# Patient Record
Sex: MALE | Race: WHITE | NOT HISPANIC OR LATINO | URBAN - METROPOLITAN AREA
[De-identification: names, ages, dates, MRNs, and addresses within clinical notes are randomized per-mention and may not be internally consistent; named-entity substitution may affect disease eponyms.]

---

## 2017-01-29 ENCOUNTER — EMERGENCY (EMERGENCY)
Facility: HOSPITAL | Age: 57
LOS: 1 days | Discharge: HOME | End: 2017-01-29
Attending: EMERGENCY MEDICINE | Admitting: EMERGENCY MEDICINE
Payer: COMMERCIAL

## 2017-01-29 VITALS
SYSTOLIC BLOOD PRESSURE: 124 MMHG | OXYGEN SATURATION: 95 % | HEART RATE: 94 BPM | DIASTOLIC BLOOD PRESSURE: 72 MMHG | TEMPERATURE: 98 F | RESPIRATION RATE: 17 BRPM

## 2017-01-29 VITALS
DIASTOLIC BLOOD PRESSURE: 60 MMHG | RESPIRATION RATE: 18 BRPM | OXYGEN SATURATION: 96 % | SYSTOLIC BLOOD PRESSURE: 109 MMHG | HEART RATE: 106 BPM | TEMPERATURE: 98 F

## 2017-01-29 DIAGNOSIS — Z88.0 ALLERGY STATUS TO PENICILLIN: ICD-10-CM

## 2017-01-29 DIAGNOSIS — Z88.5 ALLERGY STATUS TO NARCOTIC AGENT: ICD-10-CM

## 2017-01-29 DIAGNOSIS — R55 SYNCOPE AND COLLAPSE: ICD-10-CM

## 2017-01-29 DIAGNOSIS — Z88.6 ALLERGY STATUS TO ANALGESIC AGENT: ICD-10-CM

## 2017-01-29 DIAGNOSIS — F17.200 NICOTINE DEPENDENCE, UNSPECIFIED, UNCOMPLICATED: ICD-10-CM

## 2017-01-29 DIAGNOSIS — I15.9 SECONDARY HYPERTENSION, UNSPECIFIED: ICD-10-CM

## 2017-01-29 DIAGNOSIS — R07.89 OTHER CHEST PAIN: ICD-10-CM

## 2017-01-29 DIAGNOSIS — Z95.810 PRESENCE OF AUTOMATIC (IMPLANTABLE) CARDIAC DEFIBRILLATOR: Chronic | ICD-10-CM

## 2017-01-29 DIAGNOSIS — Z88.8 ALLERGY STATUS TO OTHER DRUGS, MEDICAMENTS AND BIOLOGICAL SUBSTANCES STATUS: ICD-10-CM

## 2017-01-29 LAB
ALBUMIN SERPL ELPH-MCNC: 3.4 G/DL — SIGNIFICANT CHANGE UP (ref 3.4–5)
ALP SERPL-CCNC: 181 U/L — HIGH (ref 40–120)
ALT FLD-CCNC: 52 U/L — HIGH (ref 12–42)
ANION GAP SERPL CALC-SCNC: 8 MMOL/L — LOW (ref 9–16)
APTT BLD: 39.2 SEC — HIGH (ref 27.5–37.4)
AST SERPL-CCNC: 72 U/L — HIGH (ref 15–37)
BASOPHILS NFR BLD AUTO: 0.2 % — SIGNIFICANT CHANGE UP (ref 0–2)
BILIRUB SERPL-MCNC: 1.6 MG/DL — HIGH (ref 0.2–1.2)
BUN SERPL-MCNC: 30 MG/DL — HIGH (ref 7–23)
CALCIUM SERPL-MCNC: 8.9 MG/DL — SIGNIFICANT CHANGE UP (ref 8.5–10.5)
CHLORIDE SERPL-SCNC: 100 MMOL/L — SIGNIFICANT CHANGE UP (ref 96–108)
CK MB CFR SERPL CALC: 1.9 NG/ML — SIGNIFICANT CHANGE UP (ref 0.5–3.6)
CK SERPL-CCNC: 119 U/L — SIGNIFICANT CHANGE UP (ref 39–308)
CO2 SERPL-SCNC: 28 MMOL/L — SIGNIFICANT CHANGE UP (ref 22–31)
CREAT SERPL-MCNC: 0.98 MG/DL — SIGNIFICANT CHANGE UP (ref 0.5–1.3)
EOSINOPHIL NFR BLD AUTO: 1.2 % — SIGNIFICANT CHANGE UP (ref 0–6)
GLUCOSE SERPL-MCNC: 87 MG/DL — SIGNIFICANT CHANGE UP (ref 70–99)
HCT VFR BLD CALC: 54.6 % — HIGH (ref 39–50)
HGB BLD-MCNC: 18.2 G/DL — HIGH (ref 13–17)
INR BLD: 1.49 — HIGH (ref 0.88–1.16)
LYMPHOCYTES # BLD AUTO: 17.1 % — SIGNIFICANT CHANGE UP (ref 13–44)
MCHC RBC-ENTMCNC: 28 PG — SIGNIFICANT CHANGE UP (ref 27–34)
MCHC RBC-ENTMCNC: 33.3 G/DL — SIGNIFICANT CHANGE UP (ref 32–36)
MCV RBC AUTO: 84.1 FL — SIGNIFICANT CHANGE UP (ref 80–100)
MONOCYTES NFR BLD AUTO: 15.5 % — HIGH (ref 2–14)
NEUTROPHILS NFR BLD AUTO: 66 % — SIGNIFICANT CHANGE UP (ref 43–77)
PLATELET # BLD AUTO: 224 K/UL — SIGNIFICANT CHANGE UP (ref 150–400)
POTASSIUM SERPL-MCNC: 4.4 MMOL/L — SIGNIFICANT CHANGE UP (ref 3.5–5.3)
POTASSIUM SERPL-SCNC: 4.4 MMOL/L — SIGNIFICANT CHANGE UP (ref 3.5–5.3)
PROT SERPL-MCNC: 7.8 G/DL — SIGNIFICANT CHANGE UP (ref 6.4–8.2)
PROTHROM AB SERPL-ACNC: 16.6 SEC — HIGH (ref 10–13.1)
RBC # BLD: 6.49 M/UL — HIGH (ref 4.2–5.8)
RBC # FLD: 14.6 % — SIGNIFICANT CHANGE UP (ref 10.3–16.9)
SODIUM SERPL-SCNC: 136 MMOL/L — SIGNIFICANT CHANGE UP (ref 135–145)
TROPONIN I SERPL-MCNC: <0.015 NG/ML — SIGNIFICANT CHANGE UP (ref 0.01–0.04)
WBC # BLD: 10.1 K/UL — SIGNIFICANT CHANGE UP (ref 3.8–10.5)
WBC # FLD AUTO: 10.1 K/UL — SIGNIFICANT CHANGE UP (ref 3.8–10.5)

## 2017-01-29 PROCEDURE — 85610 PROTHROMBIN TIME: CPT

## 2017-01-29 PROCEDURE — 84484 ASSAY OF TROPONIN QUANT: CPT

## 2017-01-29 PROCEDURE — 82550 ASSAY OF CK (CPK): CPT

## 2017-01-29 PROCEDURE — 93005 ELECTROCARDIOGRAM TRACING: CPT

## 2017-01-29 PROCEDURE — 80053 COMPREHEN METABOLIC PANEL: CPT

## 2017-01-29 PROCEDURE — 71046 X-RAY EXAM CHEST 2 VIEWS: CPT

## 2017-01-29 PROCEDURE — 71020: CPT | Mod: 26

## 2017-01-29 PROCEDURE — 99284 EMERGENCY DEPT VISIT MOD MDM: CPT | Mod: 25

## 2017-01-29 PROCEDURE — 85730 THROMBOPLASTIN TIME PARTIAL: CPT

## 2017-01-29 PROCEDURE — 99285 EMERGENCY DEPT VISIT HI MDM: CPT | Mod: 25

## 2017-01-29 PROCEDURE — 93010 ELECTROCARDIOGRAM REPORT: CPT

## 2017-01-29 PROCEDURE — 82553 CREATINE MB FRACTION: CPT

## 2017-01-29 PROCEDURE — 85025 COMPLETE CBC W/AUTO DIFF WBC: CPT

## 2017-01-29 PROCEDURE — 80162 ASSAY OF DIGOXIN TOTAL: CPT

## 2017-01-29 NOTE — ED PROVIDER NOTE - NEURO NEGATIVE STATEMENT, MLM
no HA or focal numbness or weakness but does have chronic lower extrem neuropathy d/t his spine issues for which he uses rolling walker to ambulate

## 2017-01-29 NOTE — ED ADULT NURSE NOTE - OBJECTIVE STATEMENT
Pt BIBA p/w 2 syncopal episodes as per ems. Pt currently endorsing dizziness and states he has a history of Afib. Pt BIBA p/w 2 syncopal episodes as per ems. Pt currently endorsing dizziness and states he has a history of Afib. Pt is also falling asleep during initial nursing assessment.

## 2017-01-29 NOTE — ED PROVIDER NOTE - DIAGNOSTIC INTERPRETATION
CXR: borderline cardiomegaly, ICD right chest with single lead in place, clear lungs, no bony abnormalities

## 2017-01-29 NOTE — ED PROVIDER NOTE - CONSTITUTIONAL, MLM
normal... Nontoxic appearing, sedated but rouses to repeated vocal commands, answers questions appropriately

## 2017-01-29 NOTE — ED ADULT NURSE NOTE - CHIEF COMPLAINT QUOTE
patient states that he had 2 syncopal episodes this morning he states he was walking, felt dizzy, and then sat down woke up and a few minutes later the same thing happened. hx of a-fib states that he was treated at [facility] yesterday for rapid afib. denies dizziness, chest pain, sob at rest.

## 2017-01-29 NOTE — ED ADULT NURSE NOTE - CHPI ED SYMPTOMS NEG
no cough/no nausea/no shortness of breath/no chills/no vomiting/no diaphoresis/no fever/no back pain

## 2017-01-29 NOTE — ED PROVIDER NOTE - PMH
Atrial fibrillation, unspecified type    Carcinoma of kidney, unspecified laterality    Chronic congestive heart failure, unspecified congestive heart failure type    Hep C w/o coma, chronic    Secondary hypertension

## 2017-01-29 NOTE — ED PROVIDER NOTE - OBJECTIVE STATEMENT
56yom with h/o renal cell CA s/p partial nephrectomy, afib s/p AICD on digoxin, Eliquis and metroprolol, CHF, chronic back pain for which he takes methadone prn p/w 2 episodes of syncope this morning prior to arrival. He reports he was walking and felt lightheaded, sat down and lost consciousness but because he was seated did not sustain any injuries. He reports after the second episode he felt some transient left sided CP. Of note pt was sedated and falling asleep during the interview and slept through his entire ED stay.

## 2017-01-29 NOTE — ED ADULT TRIAGE NOTE - CHIEF COMPLAINT QUOTE
patient states that he had 2 syncopal episodes this morning he states he was walking, felt dizzy, and then sat down woke up and a few minutes later the same thing happened. hx of a-fib states that he was treated at [facility] yesterday for rapid afib. denies dizziness, chest pain, sob at rest.

## 2017-02-03 NOTE — ED PROVIDER NOTE - MEDICAL DECISION MAKING DETAILS
Before Your Surgery      Call your surgeon if there is any change in your health. This includes signs of a cold or flu (such as a sore throat, runny nose, cough, rash or fever).    Do not smoke, drink alcohol or take over the counter medicine (unless your surgeon or primary care doctor tells you to) for the 24 hours before and after surgery.    If you take prescribed drugs: Follow your doctor s orders about which medicines to take and which to stop until after surgery.    Eating and drinking prior to surgery: follow the instructions from your surgeon    Take a shower or bath the night before surgery. Use the soap your surgeon gave you to gently clean your skin. If you do not have soap from your surgeon, use your regular soap. Do not shave or scrub the surgery site.  Wear clean pajamas and have clean sheets on your bed.       RECOMMENDATIONS:                                                      -- Patient is to take no medications on the day of surgery   -- Decrease furosemide to 20 mg daily.   If doing well, would try to decrease dose further  -- Discontinue aspirin 7-10 days prior to procedure to reduce bleeding risk.  It should be resumed post-operatively.  -- Tentative plan to start anticoagulation after surgery, in which case would stop the aspirin   
Pt with h/o afib on digoxin, beta blocker and Eliquis. Pt is not entirely forthcoming w/information but does state he was seen at [facility] 2d ago for similar symptoms and was started on digoxin at that time. I d/w PA in ED at [facility] who reviewed his recent stay there. He was admitted to cardiology, had negative serial cardiac enzymes, EP interrogated his ICD which showed no firing/events and that he has had multiple presentations with same complaints, always has a reassuring cardiac workup. He also has a h/o difficult behavior w/staff, was witnessed to leave the unit with IV in place during this past admission. My suspicion is that he did not experience cardiac syncope (if at all,) and may be over-medicating himself w/his methadone prescription based on his behavior here. His digoxin level is still pending but when I d/w him plan for continued outpatient cardiology f/u for his symptoms incl likely Holter monitor placement given his recurrent episodes of possible syncope - he became upset, stating that "I'm tired of being seen for this and never being told what's going on with me, you're sending me out to die," demanded his PIV be removed and left without receiving any discharge paperwork or copies of his results from today. VSS, ambulating w/rolling walker on discharge

## 2017-03-04 NOTE — ED ADULT NURSE NOTE - NEURO WDL
Problem: Communication  Goal: The ability to communicate needs accurately and effectively will improve  Outcome: PROGRESSING AS EXPECTED  Pt able to communicate needs clearly and effectively.    Problem: Safety  Goal: Will remain free from falls  Outcome: PROGRESSING AS EXPECTED  Pt remains free from falls at this time - bed in lowest position, bed alarm on, treaded socks and appropriate rails in use, call bell within reach. Pt rounded on hourly to address any needs.         Alert and oriented to person, place and time, memory intact, behavior appropriate to situation, PERRL.

## 2017-08-17 ENCOUNTER — EMERGENCY (EMERGENCY)
Facility: HOSPITAL | Age: 57
LOS: 1 days | Discharge: HOME | End: 2017-08-17
Attending: EMERGENCY MEDICINE | Admitting: EMERGENCY MEDICINE
Payer: COMMERCIAL

## 2017-08-17 VITALS
TEMPERATURE: 98 F | DIASTOLIC BLOOD PRESSURE: 87 MMHG | OXYGEN SATURATION: 99 % | HEART RATE: 82 BPM | SYSTOLIC BLOOD PRESSURE: 122 MMHG | RESPIRATION RATE: 18 BRPM

## 2017-08-17 VITALS
RESPIRATION RATE: 18 BRPM | OXYGEN SATURATION: 100 % | WEIGHT: 225.09 LBS | TEMPERATURE: 98 F | HEART RATE: 98 BPM | SYSTOLIC BLOOD PRESSURE: 100 MMHG | DIASTOLIC BLOOD PRESSURE: 82 MMHG

## 2017-08-17 DIAGNOSIS — Z88.0 ALLERGY STATUS TO PENICILLIN: ICD-10-CM

## 2017-08-17 DIAGNOSIS — R06.02 SHORTNESS OF BREATH: ICD-10-CM

## 2017-08-17 DIAGNOSIS — Z88.5 ALLERGY STATUS TO NARCOTIC AGENT: ICD-10-CM

## 2017-08-17 DIAGNOSIS — Z95.810 PRESENCE OF AUTOMATIC (IMPLANTABLE) CARDIAC DEFIBRILLATOR: Chronic | ICD-10-CM

## 2017-08-17 DIAGNOSIS — R05 COUGH: ICD-10-CM

## 2017-08-17 DIAGNOSIS — Z79.891 LONG TERM (CURRENT) USE OF OPIATE ANALGESIC: ICD-10-CM

## 2017-08-17 DIAGNOSIS — Z72.0 TOBACCO USE: ICD-10-CM

## 2017-08-17 DIAGNOSIS — Z79.899 OTHER LONG TERM (CURRENT) DRUG THERAPY: ICD-10-CM

## 2017-08-17 DIAGNOSIS — Z88.8 ALLERGY STATUS TO OTHER DRUGS, MEDICAMENTS AND BIOLOGICAL SUBSTANCES STATUS: ICD-10-CM

## 2017-08-17 LAB
ALBUMIN SERPL ELPH-MCNC: 3.4 G/DL — SIGNIFICANT CHANGE UP (ref 3.3–5)
ALP SERPL-CCNC: 215 U/L — HIGH (ref 40–120)
ALT FLD-CCNC: 44 U/L — SIGNIFICANT CHANGE UP (ref 10–45)
ANION GAP SERPL CALC-SCNC: 12 MMOL/L — SIGNIFICANT CHANGE UP (ref 5–17)
APTT BLD: 42.1 SEC — HIGH (ref 27.5–37.4)
AST SERPL-CCNC: 59 U/L — HIGH (ref 10–40)
BASOPHILS NFR BLD AUTO: 0.1 % — SIGNIFICANT CHANGE UP (ref 0–2)
BILIRUB SERPL-MCNC: 1.1 MG/DL — SIGNIFICANT CHANGE UP (ref 0.2–1.2)
BUN SERPL-MCNC: 33 MG/DL — HIGH (ref 7–23)
CALCIUM SERPL-MCNC: 9.3 MG/DL — SIGNIFICANT CHANGE UP (ref 8.4–10.5)
CHLORIDE SERPL-SCNC: 100 MMOL/L — SIGNIFICANT CHANGE UP (ref 96–108)
CK SERPL-CCNC: 58 U/L — SIGNIFICANT CHANGE UP (ref 30–200)
CO2 SERPL-SCNC: 26 MMOL/L — SIGNIFICANT CHANGE UP (ref 22–31)
CREAT SERPL-MCNC: 1.1 MG/DL — SIGNIFICANT CHANGE UP (ref 0.5–1.3)
EOSINOPHIL NFR BLD AUTO: 1.7 % — SIGNIFICANT CHANGE UP (ref 0–6)
ETHANOL SERPL-MCNC: <10 MG/DL — SIGNIFICANT CHANGE UP (ref 0–10)
GLUCOSE SERPL-MCNC: 123 MG/DL — HIGH (ref 70–99)
HCT VFR BLD CALC: 45.7 % — SIGNIFICANT CHANGE UP (ref 39–50)
HGB BLD-MCNC: 15 G/DL — SIGNIFICANT CHANGE UP (ref 13–17)
INR BLD: 1.55 — HIGH (ref 0.88–1.16)
LYMPHOCYTES # BLD AUTO: 19.9 % — SIGNIFICANT CHANGE UP (ref 13–44)
MCHC RBC-ENTMCNC: 28.4 PG — SIGNIFICANT CHANGE UP (ref 27–34)
MCHC RBC-ENTMCNC: 32.8 G/DL — SIGNIFICANT CHANGE UP (ref 32–36)
MCV RBC AUTO: 86.6 FL — SIGNIFICANT CHANGE UP (ref 80–100)
MONOCYTES NFR BLD AUTO: 10.9 % — SIGNIFICANT CHANGE UP (ref 2–14)
NEUTROPHILS NFR BLD AUTO: 67.4 % — SIGNIFICANT CHANGE UP (ref 43–77)
PCP SPEC-MCNC: SIGNIFICANT CHANGE UP
PLATELET # BLD AUTO: 146 K/UL — LOW (ref 150–400)
POTASSIUM SERPL-MCNC: 4.6 MMOL/L — SIGNIFICANT CHANGE UP (ref 3.5–5.3)
POTASSIUM SERPL-SCNC: 4.6 MMOL/L — SIGNIFICANT CHANGE UP (ref 3.5–5.3)
PROT SERPL-MCNC: 6.4 G/DL — SIGNIFICANT CHANGE UP (ref 6–8.3)
PROTHROM AB SERPL-ACNC: 17.4 SEC — HIGH (ref 9.8–12.7)
RBC # BLD: 5.28 M/UL — SIGNIFICANT CHANGE UP (ref 4.2–5.8)
RBC # FLD: 18.1 % — HIGH (ref 10.3–16.9)
SODIUM SERPL-SCNC: 138 MMOL/L — SIGNIFICANT CHANGE UP (ref 135–145)
WBC # BLD: 10.7 K/UL — HIGH (ref 3.8–10.5)
WBC # FLD AUTO: 10.7 K/UL — HIGH (ref 3.8–10.5)

## 2017-08-17 PROCEDURE — 99284 EMERGENCY DEPT VISIT MOD MDM: CPT | Mod: 25

## 2017-08-17 PROCEDURE — 82550 ASSAY OF CK (CPK): CPT

## 2017-08-17 PROCEDURE — 71045 X-RAY EXAM CHEST 1 VIEW: CPT

## 2017-08-17 PROCEDURE — 36415 COLL VENOUS BLD VENIPUNCTURE: CPT

## 2017-08-17 PROCEDURE — 93005 ELECTROCARDIOGRAM TRACING: CPT

## 2017-08-17 PROCEDURE — 85025 COMPLETE CBC W/AUTO DIFF WBC: CPT

## 2017-08-17 PROCEDURE — 85610 PROTHROMBIN TIME: CPT

## 2017-08-17 PROCEDURE — 71010: CPT | Mod: 26

## 2017-08-17 PROCEDURE — 83880 ASSAY OF NATRIURETIC PEPTIDE: CPT

## 2017-08-17 PROCEDURE — 80053 COMPREHEN METABOLIC PANEL: CPT

## 2017-08-17 PROCEDURE — 80307 DRUG TEST PRSMV CHEM ANLYZR: CPT

## 2017-08-17 PROCEDURE — 93010 ELECTROCARDIOGRAM REPORT: CPT

## 2017-08-17 PROCEDURE — 85730 THROMBOPLASTIN TIME PARTIAL: CPT

## 2017-08-17 PROCEDURE — 99285 EMERGENCY DEPT VISIT HI MDM: CPT | Mod: 25

## 2017-08-17 NOTE — ED ADULT NURSE REASSESSMENT NOTE - NS ED NURSE REASSESS COMMENT FT1
Patient verbalized he is feeling better, ambulated with steady gait, denies discomfort.  MIKE Fermin aware.  Awaiting discharge.

## 2017-08-17 NOTE — ED ADULT TRIAGE NOTE - CHIEF COMPLAINT QUOTE
Patient complaining of cough with brown sputum and leg pain.  Patient is lethargic but arousal.  Patient is on Methadone.

## 2017-08-17 NOTE — ED PROVIDER NOTE - MEDICAL DECISION MAKING DETAILS
c/o cough, leg pain, chronic skin changes, chronic ulcer, no evidence of acute cellulitis, no resp distress, no hypoxia, afebrile.  lethargic, falls asleep during interview  -check labs, ekg, cxr

## 2017-08-17 NOTE — ED PROVIDER NOTE - PROGRESS NOTE DETAILS
vitals stable. pt is sleeping CXR no acute infiltrates CXR no acute infiltrates. Pt still unable to ambulate at this given time CXR no acute infiltrates. pt laying flat, no resp distress. extremely lethargic, quickly falls back asleep, likely klonipin and methadone. will continue to monitor Pt received from Dr. Powell at s/o, pt with c/o sob and cough, w/u neg including cxr, ekg, and labs; sx's thought to be related to recent klonopin and methadone use. Pt sleeping supine in stretcher, comfortable, no resp distress. Will re-assess when awake. Pt reassessed, ambulating to bathroom with steady gait, reports feeling much improved and stable for discharge at this time

## 2017-08-17 NOTE — ED ADULT NURSE NOTE - CHPI ED SYMPTOMS NEG
no chills/no wheezing/no diaphoresis/no edema/no headache/no body aches/no chest pain/no hemoptysis/no cough/no fever

## 2017-08-17 NOTE — ED PROVIDER NOTE - ATTENDING CONTRIBUTION TO CARE
57M hx htn, chf, afib, AICD, hep C, on methadone, BIBEMS c/o cough with brown phlegm.  no fevers. no vomiting. also c/o leg pain. difficult to get hx from pt as he falls asleep during interview.  admits to taking klonipin tonight.   gen- nad  heent- ncat, clear conj  cv -rrr  lungs -ctab  abd - soft, nt, nd  ext -wwp, hyperpigmentation b/l, chronic ulceration to R hallux, no drainage, no fluctuance  neuro -aox3, carmen  no hypoxia, no resp distress, no airway compromise, pt falls alseep during exam and interview, no infiltrate on cxr

## 2017-08-17 NOTE — ED PROVIDER NOTE - CONSTITUTIONAL, MLM
normal... Well appearing, well nourished, in and out of sleep while in conversation  and in no apparent distress.

## 2017-08-17 NOTE — ED ADULT NURSE NOTE - OBJECTIVE STATEMENT
pt. presented with c/o "passing out, dozing off in front of walker" and shortness of breath for "not long". pt. is hard to arouse during the assessment, pt. states he took trazodone, clonazepam and methadone. brown skin discoloration and hardened skin with several small ulcers noted to b/l lower legs, pt. states those developed about 3 weeks ago. at the same time pt. states he was diagnosed with heart failure. pt. is on cardiac monitor, rhythm sinus irregular, O2 sat monitoring continuous, O2 sat is WDL. will monitor.

## 2017-08-17 NOTE — ED PROVIDER NOTE - OBJECTIVE STATEMENT
Pt is seen at bedside falling asleep as he is talking. Pt states he took Klonopin earlier tonight. Pt states he "passed out" in front of his walker twice tonight. Pt states he was experiencing shortness of breath. Pt states he has a wound care doctor at [facility] and he has an appointment this month. Pt denies fever, nausea, vomiting.

## 2017-09-05 ENCOUNTER — EMERGENCY (EMERGENCY)
Facility: HOSPITAL | Age: 57
LOS: 1 days | Discharge: HOME | End: 2017-09-05
Attending: EMERGENCY MEDICINE | Admitting: EMERGENCY MEDICINE
Payer: COMMERCIAL

## 2017-09-05 VITALS
SYSTOLIC BLOOD PRESSURE: 110 MMHG | RESPIRATION RATE: 22 BRPM | HEIGHT: 70 IN | HEART RATE: 94 BPM | TEMPERATURE: 98 F | DIASTOLIC BLOOD PRESSURE: 73 MMHG | OXYGEN SATURATION: 98 % | WEIGHT: 199.96 LBS

## 2017-09-05 VITALS
OXYGEN SATURATION: 99 % | SYSTOLIC BLOOD PRESSURE: 131 MMHG | RESPIRATION RATE: 16 BRPM | DIASTOLIC BLOOD PRESSURE: 92 MMHG | TEMPERATURE: 98 F | HEART RATE: 76 BPM

## 2017-09-05 DIAGNOSIS — Z79.899 OTHER LONG TERM (CURRENT) DRUG THERAPY: ICD-10-CM

## 2017-09-05 DIAGNOSIS — Z88.8 ALLERGY STATUS TO OTHER DRUGS, MEDICAMENTS AND BIOLOGICAL SUBSTANCES: ICD-10-CM

## 2017-09-05 DIAGNOSIS — Z95.810 PRESENCE OF AUTOMATIC (IMPLANTABLE) CARDIAC DEFIBRILLATOR: Chronic | ICD-10-CM

## 2017-09-05 DIAGNOSIS — R09.81 NASAL CONGESTION: ICD-10-CM

## 2017-09-05 DIAGNOSIS — R42 DIZZINESS AND GIDDINESS: ICD-10-CM

## 2017-09-05 DIAGNOSIS — Z88.0 ALLERGY STATUS TO PENICILLIN: ICD-10-CM

## 2017-09-05 DIAGNOSIS — I48.91 UNSPECIFIED ATRIAL FIBRILLATION: ICD-10-CM

## 2017-09-05 DIAGNOSIS — Z88.6 ALLERGY STATUS TO ANALGESIC AGENT: ICD-10-CM

## 2017-09-05 DIAGNOSIS — F17.200 NICOTINE DEPENDENCE, UNSPECIFIED, UNCOMPLICATED: ICD-10-CM

## 2017-09-05 LAB
ALBUMIN SERPL ELPH-MCNC: 3.9 G/DL — SIGNIFICANT CHANGE UP (ref 3.3–5)
ALP SERPL-CCNC: 215 U/L — HIGH (ref 40–120)
ALT FLD-CCNC: 46 U/L — HIGH (ref 10–45)
ANION GAP SERPL CALC-SCNC: 14 MMOL/L — SIGNIFICANT CHANGE UP (ref 5–17)
APPEARANCE UR: CLEAR — SIGNIFICANT CHANGE UP
APTT BLD: 39.9 SEC — HIGH (ref 27.5–37.4)
AST SERPL-CCNC: 62 U/L — HIGH (ref 10–40)
BACTERIA # UR AUTO: PRESENT /HPF
BASOPHILS NFR BLD AUTO: 0.3 % — SIGNIFICANT CHANGE UP (ref 0–2)
BILIRUB SERPL-MCNC: 0.9 MG/DL — SIGNIFICANT CHANGE UP (ref 0.2–1.2)
BILIRUB UR-MCNC: (no result)
BUN SERPL-MCNC: 25 MG/DL — HIGH (ref 7–23)
CALCIUM SERPL-MCNC: 9.5 MG/DL — SIGNIFICANT CHANGE UP (ref 8.4–10.5)
CHLORIDE SERPL-SCNC: 102 MMOL/L — SIGNIFICANT CHANGE UP (ref 96–108)
CK MB CFR SERPL CALC: 5.6 NG/ML — SIGNIFICANT CHANGE UP (ref 0–6.7)
CK SERPL-CCNC: 74 U/L — SIGNIFICANT CHANGE UP (ref 30–200)
CO2 SERPL-SCNC: 24 MMOL/L — SIGNIFICANT CHANGE UP (ref 22–31)
COLOR SPEC: YELLOW — SIGNIFICANT CHANGE UP
CREAT SERPL-MCNC: 1.1 MG/DL — SIGNIFICANT CHANGE UP (ref 0.5–1.3)
DIFF PNL FLD: (no result)
EOSINOPHIL NFR BLD AUTO: 0.8 % — SIGNIFICANT CHANGE UP (ref 0–6)
EPI CELLS # UR: SIGNIFICANT CHANGE UP /HPF
GLUCOSE SERPL-MCNC: 113 MG/DL — HIGH (ref 70–99)
GLUCOSE UR QL: NEGATIVE — SIGNIFICANT CHANGE UP
HCT VFR BLD CALC: 45.3 % — SIGNIFICANT CHANGE UP (ref 39–50)
HGB BLD-MCNC: 14.6 G/DL — SIGNIFICANT CHANGE UP (ref 13–17)
INR BLD: 1.3 — HIGH (ref 0.88–1.16)
KETONES UR-MCNC: NEGATIVE — SIGNIFICANT CHANGE UP
LEUKOCYTE ESTERASE UR-ACNC: NEGATIVE — SIGNIFICANT CHANGE UP
LYMPHOCYTES # BLD AUTO: 20.3 % — SIGNIFICANT CHANGE UP (ref 13–44)
MCHC RBC-ENTMCNC: 28.3 PG — SIGNIFICANT CHANGE UP (ref 27–34)
MCHC RBC-ENTMCNC: 32.2 G/DL — SIGNIFICANT CHANGE UP (ref 32–36)
MCV RBC AUTO: 88 FL — SIGNIFICANT CHANGE UP (ref 80–100)
MONOCYTES NFR BLD AUTO: 16.3 % — HIGH (ref 2–14)
NEUTROPHILS NFR BLD AUTO: 62.3 % — SIGNIFICANT CHANGE UP (ref 43–77)
NITRITE UR-MCNC: NEGATIVE — SIGNIFICANT CHANGE UP
PH UR: 5 — SIGNIFICANT CHANGE UP (ref 5–8)
PLATELET # BLD AUTO: 170 K/UL — SIGNIFICANT CHANGE UP (ref 150–400)
POTASSIUM SERPL-MCNC: 4.2 MMOL/L — SIGNIFICANT CHANGE UP (ref 3.5–5.3)
POTASSIUM SERPL-SCNC: 4.2 MMOL/L — SIGNIFICANT CHANGE UP (ref 3.5–5.3)
PROT SERPL-MCNC: 6.6 G/DL — SIGNIFICANT CHANGE UP (ref 6–8.3)
PROT UR-MCNC: 100 MG/DL
PROTHROM AB SERPL-ACNC: 14.5 SEC — HIGH (ref 9.8–12.7)
RBC # BLD: 5.15 M/UL — SIGNIFICANT CHANGE UP (ref 4.2–5.8)
RBC # FLD: 17.6 % — HIGH (ref 10.3–16.9)
RBC CASTS # UR COMP ASSIST: (no result) /HPF
SODIUM SERPL-SCNC: 140 MMOL/L — SIGNIFICANT CHANGE UP (ref 135–145)
SP GR SPEC: 1.02 — SIGNIFICANT CHANGE UP (ref 1–1.03)
TROPONIN T SERPL-MCNC: <0.01 NG/ML — SIGNIFICANT CHANGE UP (ref 0–0.01)
UROBILINOGEN FLD QL: 1 E.U./DL — SIGNIFICANT CHANGE UP
WBC # BLD: 8.6 K/UL — SIGNIFICANT CHANGE UP (ref 3.8–10.5)
WBC # FLD AUTO: 8.6 K/UL — SIGNIFICANT CHANGE UP (ref 3.8–10.5)
WBC UR QL: < 5 /HPF — SIGNIFICANT CHANGE UP

## 2017-09-05 PROCEDURE — 99204 OFFICE O/P NEW MOD 45 MIN: CPT

## 2017-09-05 PROCEDURE — 71010: CPT | Mod: 26

## 2017-09-05 PROCEDURE — 84484 ASSAY OF TROPONIN QUANT: CPT

## 2017-09-05 PROCEDURE — 93005 ELECTROCARDIOGRAM TRACING: CPT

## 2017-09-05 PROCEDURE — 99284 EMERGENCY DEPT VISIT MOD MDM: CPT | Mod: 25

## 2017-09-05 PROCEDURE — 80053 COMPREHEN METABOLIC PANEL: CPT

## 2017-09-05 PROCEDURE — 80162 ASSAY OF DIGOXIN TOTAL: CPT

## 2017-09-05 PROCEDURE — 93010 ELECTROCARDIOGRAM REPORT: CPT

## 2017-09-05 PROCEDURE — 82550 ASSAY OF CK (CPK): CPT

## 2017-09-05 PROCEDURE — 85730 THROMBOPLASTIN TIME PARTIAL: CPT

## 2017-09-05 PROCEDURE — 96374 THER/PROPH/DIAG INJ IV PUSH: CPT

## 2017-09-05 PROCEDURE — 85610 PROTHROMBIN TIME: CPT

## 2017-09-05 PROCEDURE — 81001 URINALYSIS AUTO W/SCOPE: CPT

## 2017-09-05 PROCEDURE — 85025 COMPLETE CBC W/AUTO DIFF WBC: CPT

## 2017-09-05 PROCEDURE — 87086 URINE CULTURE/COLONY COUNT: CPT

## 2017-09-05 PROCEDURE — 82553 CREATINE MB FRACTION: CPT

## 2017-09-05 PROCEDURE — 71045 X-RAY EXAM CHEST 1 VIEW: CPT

## 2017-09-05 RX ORDER — CLONAZEPAM 1 MG
0.5 TABLET ORAL ONCE
Qty: 0 | Refills: 0 | Status: DISCONTINUED | OUTPATIENT
Start: 2017-09-05 | End: 2017-09-05

## 2017-09-05 RX ORDER — FUROSEMIDE 40 MG
40 TABLET ORAL ONCE
Qty: 0 | Refills: 0 | Status: COMPLETED | OUTPATIENT
Start: 2017-09-05 | End: 2017-09-05

## 2017-09-05 RX ADMIN — Medication 40 MILLIGRAM(S): at 17:47

## 2017-09-05 RX ADMIN — Medication 0.5 MILLIGRAM(S): at 16:09

## 2017-09-05 NOTE — CONSULT NOTE ADULT - SUBJECTIVE AND OBJECTIVE BOX
HPI: Patient is a poor historian and fell asleep a couple of times when questioning him - this is a directed history.  called for device check    Mr. Pedro is a 57 year old male with paroxysmal atrial fibrillation (states cardioversion >10 years ago, on eliquis / notes compliance), kidney carcinoma, on methadone, hepatitis C, HTN and cardiomyopathy s/p ICD (battery change 7/2017 at [facility]), presents to the ER complaining of palpitations and SOB.    Mr. Pedro states that for the past few days he has noted increased palpitations, lightheadedness and SOB.  He states that he became lightheaded and passed out.  During our conversation he fell asleep multiple times.  He had recurrent lightheadedness and came to the ER.  He states he sees a cardiologist through [facility] but does not know the doctors name.  He denies any history of ICD shock, ablation or being on an antiarrhythmic.  He states that he goes in and out of afib and states he had one cardioversion in the past about 10 years ago.   	         PAST MEDICAL & SURGICAL HISTORY:  Carcinoma of kidney, unspecified laterality  Hep C w/o coma, chronic  Secondary hypertension  Chronic congestive heart failure, unspecified congestive heart failure type  Atrial fibrillation, unspecified type  AICD (automatic cardioverter/defibrillator) present          Social History:  + smoking    home medications (as per ED note - patient confirmed Eliquis but was unable to confirm rest):  · 	metoprolol succinate 100 mg oral tablet, extended release: 1 tab(s) orally once a day  · 	digoxin 50 mcg (0.05 mg) oral capsule: 50 microgram(s) orally once a day  · 	Eliquis 5 mg oral tablet: 1 tab(s) orally 2 times a day  · 	Plavix 75 mg oral tablet: 1 tab(s) orally once a day  · 	clonazePAM 0.5 mg oral tablet: 500 microgram(s) orally once a day (at bedtime)  · 	traZODone 50 mg oral tablet: 50 milligram(s) orally once a day (at bedtime)  · 	methadone 10 mg oral tablet: 1 tab(s) orally every 6 hours, As Needed           · Lasix 20 mg oral tablet: 1 tab(s) orally once a day    Inpatient Medications:   clonazePAM Tablet 0.5 milliGRAM(s) Oral Once    Allergies-->  aspirin (Unknown)  codeine (Unknown)  Haldol (Unknown)  morphine (Unknown)  Motrin (Unknown)  penicillin (Unknown)  Toradol (Unknown)      ROS:   CONSTITUTIONAL: No fever, weight loss + fatigue  RESPIRATORY: No cough, wheezing, chills or hemoptysis; No Shortness of Breath  CARDIOVASCULAR: see HPI  GASTROINTESTINAL: Pt denies  PSYCHIATRIC: Pt denies  HEME/LYMPH: Pt denies    PHYSICAL:  T(C): 36.4 (09-05-17 @ 14:16), Max: 36.4 (09-05-17 @ 14:16)  HR: 94 (09-05-17 @ 14:16) (94 - 94)  BP: 110/73 (09-05-17 @ 14:16) (110/73 - 110/73)  RR: 22 (09-05-17 @ 14:16) (22 - 22)  SpO2: 98% (09-05-17 @ 14:16) (98% - 98%)     Appearance: NAD, sleepy  Cardiovascular: irregularly irregular, normal rate  Respiratory: decreased breath sounds bilaterally  Neurologic: A&O x 3, No deficit noted      LABS:                        14.6   8.6   )-----------( 170      ( 05 Sep 2017 15:16 )             45.3       140  |  102  |  25<H>  ----------------------------<  113<H>  4.2   |  24  |  1.10    Ca    9.5      05 Sep 2017 15:16    TPro  6.6  /  Alb  3.9  /  TBili  0.9  /  DBili  x   /  AST  62<H>  /  ALT  46<H>  /  AlkPhos  215<H>         EKG: atrial fibrillation with a ventricular rate of 100bpm    ECHO: pending    Prior EP procedures: as above     Assessment Plan:  57 year old male with paroxysmal atrial fibrillation (states cardioversion >10 years ago, on eliquis / notes compliance), kidney carcinoma, on methadone, hepatitis C, HTN and cardiomyopathy s/p ICD (battery change 7/2017 at [facility]), presents to the ER complaining of palpitations and SOB.  Optivol (heart failure monitor on device) up over the past couple of weeks and he has been in atrial fibrillation since HPI: Patient is a poor historian and fell asleep a couple of times when questioning him - this is a directed history.  called for device check    Mr. Pedro is a 57 year old male with trial fibrillation (states cardioversion >10 years ago, on eliquis / notes compliance), kidney carcinoma, on methadone, hepatitis C, HTN and cardiomyopathy s/p ICD (battery change 7/2017 at [facility]), presents to the ER complaining of palpitations and SOB.    Mr. Pedro states that for the past few days he has noted increased palpitations, lightheadedness and SOB.  He states that he became lightheaded and passed out.  During our conversation he fell asleep multiple times.  He had recurrent lightheadedness and came to the ER.  He states he sees a cardiologist through [facility] but does not know the doctors name.  He denies any history of ICD shock, ablation or being on an antiarrhythmic.  He states that hhad one cardioversion in the past about 10 years ago.   	         PAST MEDICAL & SURGICAL HISTORY:  Carcinoma of kidney, unspecified laterality  Hep C w/o coma, chronic  Secondary hypertension  Chronic congestive heart failure, unspecified congestive heart failure type  Atrial fibrillation, unspecified type  AICD (automatic cardioverter/defibrillator) present          Social History:  + smoking    home medications (as per ED note - patient confirmed Eliquis but was unable to confirm rest):  · 	metoprolol succinate 100 mg oral tablet, extended release: 1 tab(s) orally once a day  · 	digoxin 50 mcg (0.05 mg) oral capsule: 50 microgram(s) orally once a day  · 	Eliquis 5 mg oral tablet: 1 tab(s) orally 2 times a day  · 	Plavix 75 mg oral tablet: 1 tab(s) orally once a day  · 	clonazePAM 0.5 mg oral tablet: 500 microgram(s) orally once a day (at bedtime)  · 	traZODone 50 mg oral tablet: 50 milligram(s) orally once a day (at bedtime)  · 	methadone 10 mg oral tablet: 1 tab(s) orally every 6 hours, As Needed           · Lasix 20 mg oral tablet: 1 tab(s) orally once a day    Inpatient Medications:   clonazePAM Tablet 0.5 milliGRAM(s) Oral Once    Allergies-->  aspirin (Unknown)  codeine (Unknown)  Haldol (Unknown)  morphine (Unknown)  Motrin (Unknown)  penicillin (Unknown)  Toradol (Unknown)      ROS:   CONSTITUTIONAL: No fever, weight loss + fatigue  RESPIRATORY: No cough, wheezing, chills or hemoptysis; No Shortness of Breath  CARDIOVASCULAR: see HPI  GASTROINTESTINAL: Pt denies  PSYCHIATRIC: Pt denies  HEME/LYMPH: Pt denies    PHYSICAL:  T(C): 36.4 (09-05-17 @ 14:16), Max: 36.4 (09-05-17 @ 14:16)  HR: 94 (09-05-17 @ 14:16) (94 - 94)  BP: 110/73 (09-05-17 @ 14:16) (110/73 - 110/73)  RR: 22 (09-05-17 @ 14:16) (22 - 22)  SpO2: 98% (09-05-17 @ 14:16) (98% - 98%)     Appearance: NAD, sleepy  Cardiovascular: irregularly irregular, normal rate  Respiratory: decreased breath sounds bilaterally  Neurologic: A&O x 3, No deficit noted      LABS:                        14.6   8.6   )-----------( 170      ( 05 Sep 2017 15:16 )             45.3       140  |  102  |  25<H>  ----------------------------<  113<H>  4.2   |  24  |  1.10    Ca    9.5      05 Sep 2017 15:16    TPro  6.6  /  Alb  3.9  /  TBili  0.9  /  DBili  x   /  AST  62<H>  /  ALT  46<H>  /  AlkPhos  215<H>         EKG: atrial fibrillation with a ventricular rate of 100bpm    ECHO: pending    Prior EP procedures: as above    [employer] Visia - implanted 9/11/00, generator change 7/14/17  Battery - 11.3 years  VVI 50  Patient not dependant  1.4%  Optivol up third week in July   AFIB 100% of the time  HR   Shock impedance 68  RV impedance 380  threshold 1V@0.5ms  sense 12mV  episodes reviewed consistent with afib with RVR   Assessment Plan:  57 year old male with trial fibrillation (states cardioversion >10 years ago, on eliquis / notes compliance), kidney carcinoma, on methadone, hepatitis C, HTN and cardiomyopathy s/p ICD (battery change 7/2017 at [facility]), presents to the ER complaining of palpitations and SOB.  Patient states he thinks he is in an out of atrial fibrillation; but as per interrogation he has been chronically in afib since his generator change in July.  He is unclear who his cardiologist is ([facility]), but if able to obtain would get collateral information.  If patient is permanently in atrial fibrillation would proceed with rate control strategy and diuresis.  If he is in persistent afib can consider cardioversion.  Would get an echocardiogram to assess LA size and EF.  Optivol (heart failure monitor on device) up over the past couple of weeks and has relatively rapid rates while in atrial fibrillation.  Will continue to monitor telemetry but would recommend getting collateral information and diuresis / rate control

## 2017-09-05 NOTE — ED PROVIDER NOTE - OBJECTIVE STATEMENT
57 M co syncope- states he had palpitations, lightheadedness, weakness this am for awhile- more than 10 minutes- sat down in a cab and passed out- states he took his meds this am, says while walking he felt very lightheaded and had to sit down again, fell onto his buttocks- did not pass out a second time- no head/back trauma  had similar story 2 weeks ago- hx of mi, chf, defib, smoker, methadone use  no exac/allev factors  px pain free now

## 2017-09-05 NOTE — ED PROVIDER NOTE - MUSCULOSKELETAL, MLM
Spine appears normal, range of motion is not limited, no muscle or joint tenderness, cast to L arm, short arm cast

## 2017-09-05 NOTE — ED PROVIDER NOTE - MEDICAL DECISION MAKING DETAILS
no events on interrogation, mild increase in thoracic compliance,-cxr- very mild congestion  px laying flat in the stretcher sleeping comfortably- says he is rx'ed lasix 20- but he is not taking it- d/w him- he will restart and FU with his cardiologist in 6 days

## 2017-09-05 NOTE — ED ADULT NURSE NOTE - OBJECTIVE STATEMENT
Patient to ED alert and orientedx3, complaining of syncope episode x2 while in a cab this afternoon. As per patient, "I was visiting my wife who lives in [place] and then I started feeling weak, so I jumped into a cab and asked the  to take me to [facility]. Well, I ended up passing out in the cab and then he left me out. I thought I was in front of the hospital, but I wasn't so I had to walk for a little bit. I ended up passing out again, and then I wake up and I walk over here to be triaged. I have atrial fibrillation and am on metoprolol and eliquis. I also have a defibrillator (right anterior chest wall)". EKG completed, lab work drawn and sent. Pending diagnostic radiology. Patient previously here for similar complaint. Patient walking around pacing back and forth in the ED.

## 2017-09-07 LAB
CULTURE RESULTS: SIGNIFICANT CHANGE UP
SPECIMEN SOURCE: SIGNIFICANT CHANGE UP

## 2017-11-16 ENCOUNTER — EMERGENCY (EMERGENCY)
Facility: HOSPITAL | Age: 57
LOS: 1 days | Discharge: HOME | End: 2017-11-16
Admitting: EMERGENCY MEDICINE
Payer: MEDICAID

## 2017-11-16 VITALS
SYSTOLIC BLOOD PRESSURE: 148 MMHG | TEMPERATURE: 98 F | HEART RATE: 83 BPM | DIASTOLIC BLOOD PRESSURE: 85 MMHG | HEIGHT: 71 IN | OXYGEN SATURATION: 97 % | RESPIRATION RATE: 16 BRPM | WEIGHT: 218.92 LBS

## 2017-11-16 DIAGNOSIS — R55 SYNCOPE AND COLLAPSE: ICD-10-CM

## 2017-11-16 DIAGNOSIS — I10 ESSENTIAL (PRIMARY) HYPERTENSION: ICD-10-CM

## 2017-11-16 DIAGNOSIS — Z79.891 LONG TERM (CURRENT) USE OF OPIATE ANALGESIC: ICD-10-CM

## 2017-11-16 DIAGNOSIS — Z79.899 OTHER LONG TERM (CURRENT) DRUG THERAPY: ICD-10-CM

## 2017-11-16 DIAGNOSIS — Z95.810 PRESENCE OF AUTOMATIC (IMPLANTABLE) CARDIAC DEFIBRILLATOR: Chronic | ICD-10-CM

## 2017-11-16 DIAGNOSIS — I25.10 ATHEROSCLEROTIC HEART DISEASE OF NATIVE CORONARY ARTERY WITHOUT ANGINA PECTORIS: ICD-10-CM

## 2017-11-16 DIAGNOSIS — Z79.2 LONG TERM (CURRENT) USE OF ANTIBIOTICS: ICD-10-CM

## 2017-11-16 DIAGNOSIS — Z88.0 ALLERGY STATUS TO PENICILLIN: ICD-10-CM
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
## 2017-11-16 DIAGNOSIS — E78.5 HYPERLIPIDEMIA, UNSPECIFIED: ICD-10-CM

## 2017-11-16 DIAGNOSIS — Z88.5 ALLERGY STATUS TO NARCOTIC AGENT: ICD-10-CM

## 2017-11-16 DIAGNOSIS — Z88.8 ALLERGY STATUS TO OTHER DRUGS, MEDICAMENTS AND BIOLOGICAL SUBSTANCES STATUS: ICD-10-CM

## 2017-11-16 LAB
ALBUMIN SERPL ELPH-MCNC: 3.4 G/DL — SIGNIFICANT CHANGE UP (ref 3.4–5)
ALP SERPL-CCNC: 210 U/L — HIGH (ref 40–120)
ALT FLD-CCNC: 134 U/L — HIGH (ref 12–42)
ANION GAP SERPL CALC-SCNC: 10 MMOL/L — SIGNIFICANT CHANGE UP (ref 9–16)
AST SERPL-CCNC: 81 U/L — HIGH (ref 15–37)
BASOPHILS NFR BLD AUTO: 0.3 % — SIGNIFICANT CHANGE UP (ref 0–2)
BILIRUB SERPL-MCNC: 0.7 MG/DL — SIGNIFICANT CHANGE UP (ref 0.2–1.2)
BUN SERPL-MCNC: 30 MG/DL — HIGH (ref 7–23)
CALCIUM SERPL-MCNC: 9.3 MG/DL — SIGNIFICANT CHANGE UP (ref 8.5–10.5)
CHLORIDE SERPL-SCNC: 102 MMOL/L — SIGNIFICANT CHANGE UP (ref 96–108)
CO2 SERPL-SCNC: 27 MMOL/L — SIGNIFICANT CHANGE UP (ref 22–31)
CREAT SERPL-MCNC: 1.11 MG/DL — SIGNIFICANT CHANGE UP (ref 0.5–1.3)
EOSINOPHIL NFR BLD AUTO: 2 % — SIGNIFICANT CHANGE UP (ref 0–6)
ETHANOL SERPL-MCNC: <3 MG/DL — SIGNIFICANT CHANGE UP
GLUCOSE SERPL-MCNC: 134 MG/DL — HIGH (ref 70–99)
HCT VFR BLD CALC: 41.2 % — SIGNIFICANT CHANGE UP (ref 39–50)
HGB BLD-MCNC: 14.1 G/DL — SIGNIFICANT CHANGE UP (ref 13–17)
IMM GRANULOCYTES NFR BLD AUTO: 0.4 % — SIGNIFICANT CHANGE UP (ref 0–1.5)
LYMPHOCYTES # BLD AUTO: 16 % — SIGNIFICANT CHANGE UP (ref 13–44)
MCHC RBC-ENTMCNC: 29.6 PG — SIGNIFICANT CHANGE UP (ref 27–34)
MCHC RBC-ENTMCNC: 34.2 G/DL — SIGNIFICANT CHANGE UP (ref 32–36)
MCV RBC AUTO: 86.4 FL — SIGNIFICANT CHANGE UP (ref 80–100)
MONOCYTES NFR BLD AUTO: 12.3 % — SIGNIFICANT CHANGE UP (ref 2–14)
NEUTROPHILS NFR BLD AUTO: 69 % — SIGNIFICANT CHANGE UP (ref 43–77)
PLATELET # BLD AUTO: 157 K/UL — SIGNIFICANT CHANGE UP (ref 150–400)
POTASSIUM SERPL-MCNC: 3 MMOL/L — LOW (ref 3.5–5.3)
POTASSIUM SERPL-SCNC: 3 MMOL/L — LOW (ref 3.5–5.3)
PROT SERPL-MCNC: 7.2 G/DL — SIGNIFICANT CHANGE UP (ref 6.4–8.2)
RBC # BLD: 4.77 M/UL — SIGNIFICANT CHANGE UP (ref 4.2–5.8)
RBC # FLD: 13.9 % — SIGNIFICANT CHANGE UP (ref 10.3–16.9)
SODIUM SERPL-SCNC: 139 MMOL/L — SIGNIFICANT CHANGE UP (ref 132–145)
TROPONIN I SERPL-MCNC: <0.017 NG/ML — LOW (ref 0.02–0.06)
WBC # BLD: 10.9 K/UL — HIGH (ref 3.8–10.5)
WBC # FLD AUTO: 10.9 K/UL — HIGH (ref 3.8–10.5)

## 2017-11-16 PROCEDURE — 71010: CPT | Mod: 26

## 2017-11-16 PROCEDURE — 93010 ELECTROCARDIOGRAM REPORT: CPT

## 2017-11-16 PROCEDURE — 99285 EMERGENCY DEPT VISIT HI MDM: CPT | Mod: 25

## 2017-11-16 RX ORDER — SODIUM CHLORIDE 9 MG/ML
1000 INJECTION INTRAMUSCULAR; INTRAVENOUS; SUBCUTANEOUS ONCE
Qty: 0 | Refills: 0 | Status: COMPLETED | OUTPATIENT
Start: 2017-11-16 | End: 2017-11-16

## 2017-11-16 RX ADMIN — SODIUM CHLORIDE 1000 MILLILITER(S): 9 INJECTION INTRAMUSCULAR; INTRAVENOUS; SUBCUTANEOUS at 23:25

## 2017-11-16 NOTE — ED PROVIDER NOTE - MEDICAL DECISION MAKING DETAILS
EKG sinus rhythm, nonischemic. CXR shows no acute cardiopulmonary pathology. Two troponins negative. Pt reports feeling much better while in ED, A&Ox3. NAD. AFVSS. Currently asymptomatic with no complaints at this time. Will D/C with instructions to F/U with Cardio within 24 hours. Strict return precautions reviewed with pt in which pt verbalizes understanding and agrees to.

## 2017-11-16 NOTE — ED PROVIDER NOTE - OBJECTIVE STATEMENT
56 y/o M with PMH of HTN, HLD, CAD s/o cardiac stent, A-fib s/p AICD, Renal Cell CA, on methadone, presents c/o syncopal episode this evening. [place] when he began to feel weak and lightheaded with tunnel vision. He states he began to feel his heart "race" and the next thing he remembers is waking up still seated two stops later. He states he must have syncopized but did not fall or sustain any injuries. He reports some persistent lightheadedness at this time but states he is otherwise now beginning to feel much better.    Denies fever, chills, headache, diplopia, tinnitus or hearing loss, dysarthria, CP, SOB, abdo pain, N/V/D, focal numbness or focal weakness

## 2017-11-16 NOTE — ED ADULT TRIAGE NOTE - CHIEF COMPLAINT QUOTE
Patient presents to ED with c/o syncopal episode. Pt states he was on the train, felt dizzy and passed out. Pt has hx of a-fib and methadone use. Patient ambulatory to ED, gait steady, and A+O x 3.

## 2017-11-16 NOTE — ED ADULT NURSE NOTE - OBJECTIVE STATEMENT
Pt. presents to the ED s/p syncopal episode, pt. denies falling or hitting his head reports he was sitting in his walker when he fainted. Pt. reports feeling dizzy before episode. Pt. with significant cardiac hx. Placed on continuous cardiac monitoring.

## 2017-11-16 NOTE — ED PROVIDER NOTE - PMH
Atrial fibrillation, unspecified type    Carcinoma of kidney, unspecified laterality    Chronic congestive heart failure, unspecified congestive heart failure type    Hep C w/o coma, chronic    HLD (hyperlipidemia)    HTN (hypertension)    Secondary hypertension

## 2017-11-17 VITALS
DIASTOLIC BLOOD PRESSURE: 83 MMHG | RESPIRATION RATE: 18 BRPM | OXYGEN SATURATION: 96 % | HEART RATE: 71 BPM | SYSTOLIC BLOOD PRESSURE: 172 MMHG | TEMPERATURE: 98 F

## 2017-11-17 LAB
NT-PROBNP SERPL-SCNC: 536 PG/ML — HIGH
TROPONIN I SERPL-MCNC: 0.02 NG/ML — SIGNIFICANT CHANGE UP (ref 0.02–0.06)

## 2017-11-17 RX ORDER — POTASSIUM CHLORIDE 20 MEQ
40 PACKET (EA) ORAL ONCE
Qty: 0 | Refills: 0 | Status: COMPLETED | OUTPATIENT
Start: 2017-11-17 | End: 2017-11-17

## 2017-11-17 RX ADMIN — Medication 40 MILLIEQUIVALENT(S): at 01:00

## 2017-11-22 ENCOUNTER — INPATIENT (INPATIENT)
Facility: HOSPITAL | Age: 57
LOS: 4 days | Discharge: HOME | DRG: 312 | End: 2017-11-27
Attending: INTERNAL MEDICINE | Admitting: INTERNAL MEDICINE
Payer: MEDICAID

## 2017-11-22 VITALS
WEIGHT: 218.92 LBS | RESPIRATION RATE: 18 BRPM | TEMPERATURE: 98 F | DIASTOLIC BLOOD PRESSURE: 114 MMHG | SYSTOLIC BLOOD PRESSURE: 182 MMHG | OXYGEN SATURATION: 100 % | HEIGHT: 71 IN | HEART RATE: 62 BPM

## 2017-11-22 DIAGNOSIS — Z95.810 PRESENCE OF AUTOMATIC (IMPLANTABLE) CARDIAC DEFIBRILLATOR: Chronic | ICD-10-CM

## 2017-11-22 DIAGNOSIS — N20.0 CALCULUS OF KIDNEY: Chronic | ICD-10-CM

## 2017-11-22 DIAGNOSIS — J44.9 CHRONIC OBSTRUCTIVE PULMONARY DISEASE, UNSPECIFIED: ICD-10-CM

## 2017-11-22 DIAGNOSIS — I25.10 ATHEROSCLEROTIC HEART DISEASE OF NATIVE CORONARY ARTERY WITHOUT ANGINA PECTORIS: ICD-10-CM

## 2017-11-22 DIAGNOSIS — C64.2 MALIGNANT NEOPLASM OF LEFT KIDNEY, EXCEPT RENAL PELVIS: ICD-10-CM

## 2017-11-22 DIAGNOSIS — Z29.9 ENCOUNTER FOR PROPHYLACTIC MEASURES, UNSPECIFIED: ICD-10-CM

## 2017-11-22 DIAGNOSIS — R55 SYNCOPE AND COLLAPSE: ICD-10-CM

## 2017-11-22 DIAGNOSIS — Z95.810 PRESENCE OF AUTOMATIC (IMPLANTABLE) CARDIAC DEFIBRILLATOR: ICD-10-CM

## 2017-11-22 DIAGNOSIS — I10 ESSENTIAL (PRIMARY) HYPERTENSION: ICD-10-CM

## 2017-11-22 DIAGNOSIS — I48.91 UNSPECIFIED ATRIAL FIBRILLATION: ICD-10-CM

## 2017-11-22 DIAGNOSIS — R10.9 UNSPECIFIED ABDOMINAL PAIN: ICD-10-CM

## 2017-11-22 DIAGNOSIS — Z90.5 ACQUIRED ABSENCE OF KIDNEY: Chronic | ICD-10-CM

## 2017-11-22 DIAGNOSIS — Z90.49 ACQUIRED ABSENCE OF OTHER SPECIFIED PARTS OF DIGESTIVE TRACT: Chronic | ICD-10-CM

## 2017-11-22 LAB
ALBUMIN SERPL ELPH-MCNC: 3.7 G/DL — SIGNIFICANT CHANGE UP (ref 3.5–5)
ALP SERPL-CCNC: 261 U/L — HIGH (ref 40–120)
ALT FLD-CCNC: 110 U/L DA — HIGH (ref 10–60)
ANION GAP SERPL CALC-SCNC: 7 MMOL/L — SIGNIFICANT CHANGE UP (ref 5–17)
APPEARANCE UR: CLEAR — SIGNIFICANT CHANGE UP
APTT BLD: 36.8 SEC — SIGNIFICANT CHANGE UP (ref 27.5–37.4)
AST SERPL-CCNC: 81 U/L — HIGH (ref 10–40)
BILIRUB SERPL-MCNC: 0.5 MG/DL — SIGNIFICANT CHANGE UP (ref 0.2–1.2)
BILIRUB UR-MCNC: NEGATIVE — SIGNIFICANT CHANGE UP
BUN SERPL-MCNC: 26 MG/DL — HIGH (ref 7–18)
CALCIUM SERPL-MCNC: 9.1 MG/DL — SIGNIFICANT CHANGE UP (ref 8.4–10.5)
CHLORIDE SERPL-SCNC: 106 MMOL/L — SIGNIFICANT CHANGE UP (ref 96–108)
CK MB BLD-MCNC: 3.5 % — SIGNIFICANT CHANGE UP (ref 0–3.5)
CK MB BLD-MCNC: 4.2 % — HIGH (ref 0–3.5)
CK MB CFR SERPL CALC: 7.3 NG/ML — HIGH (ref 0–3.6)
CK MB CFR SERPL CALC: 7.6 NG/ML — HIGH (ref 0–3.6)
CK SERPL-CCNC: 174 U/L — SIGNIFICANT CHANGE UP (ref 35–232)
CK SERPL-CCNC: 220 U/L — SIGNIFICANT CHANGE UP (ref 35–232)
CO2 SERPL-SCNC: 26 MMOL/L — SIGNIFICANT CHANGE UP (ref 22–31)
COLOR SPEC: YELLOW — SIGNIFICANT CHANGE UP
CREAT SERPL-MCNC: 0.92 MG/DL — SIGNIFICANT CHANGE UP (ref 0.5–1.3)
DIFF PNL FLD: NEGATIVE — SIGNIFICANT CHANGE UP
GLUCOSE SERPL-MCNC: 72 MG/DL — SIGNIFICANT CHANGE UP (ref 70–99)
GLUCOSE UR QL: NEGATIVE — SIGNIFICANT CHANGE UP
HCT VFR BLD CALC: 46 % — SIGNIFICANT CHANGE UP (ref 39–50)
HGB BLD-MCNC: 14.7 G/DL — SIGNIFICANT CHANGE UP (ref 13–17)
INR BLD: 1.04 RATIO — SIGNIFICANT CHANGE UP (ref 0.88–1.16)
KETONES UR-MCNC: NEGATIVE — SIGNIFICANT CHANGE UP
LEUKOCYTE ESTERASE UR-ACNC: NEGATIVE — SIGNIFICANT CHANGE UP
MCHC RBC-ENTMCNC: 29.9 PG — SIGNIFICANT CHANGE UP (ref 27–34)
MCHC RBC-ENTMCNC: 31.9 GM/DL — LOW (ref 32–36)
MCV RBC AUTO: 93.8 FL — SIGNIFICANT CHANGE UP (ref 80–100)
NITRITE UR-MCNC: NEGATIVE — SIGNIFICANT CHANGE UP
PH UR: 5 — SIGNIFICANT CHANGE UP (ref 5–8)
PLATELET # BLD AUTO: 180 K/UL — SIGNIFICANT CHANGE UP (ref 150–400)
POTASSIUM SERPL-MCNC: 4.1 MMOL/L — SIGNIFICANT CHANGE UP (ref 3.5–5.3)
POTASSIUM SERPL-SCNC: 4.1 MMOL/L — SIGNIFICANT CHANGE UP (ref 3.5–5.3)
PROT SERPL-MCNC: 7.8 G/DL — SIGNIFICANT CHANGE UP (ref 6–8.3)
PROT UR-MCNC: 100
PROTHROM AB SERPL-ACNC: 11.3 SEC — SIGNIFICANT CHANGE UP (ref 9.8–12.7)
RBC # BLD: 4.9 M/UL — SIGNIFICANT CHANGE UP (ref 4.2–5.8)
RBC # FLD: 13.6 % — SIGNIFICANT CHANGE UP (ref 10.3–14.5)
SODIUM SERPL-SCNC: 139 MMOL/L — SIGNIFICANT CHANGE UP (ref 135–145)
SP GR SPEC: 1.02 — SIGNIFICANT CHANGE UP (ref 1.01–1.02)
TROPONIN I SERPL-MCNC: 0.02 NG/ML — SIGNIFICANT CHANGE UP (ref 0–0.04)
TROPONIN I SERPL-MCNC: <0.015 NG/ML — SIGNIFICANT CHANGE UP (ref 0–0.04)
UROBILINOGEN FLD QL: NEGATIVE — SIGNIFICANT CHANGE UP
WBC # BLD: 8.1 K/UL — SIGNIFICANT CHANGE UP (ref 3.8–10.5)
WBC # FLD AUTO: 8.1 K/UL — SIGNIFICANT CHANGE UP (ref 3.8–10.5)

## 2017-11-22 RX ORDER — APIXABAN 2.5 MG/1
5 TABLET, FILM COATED ORAL EVERY 12 HOURS
Qty: 0 | Refills: 0 | Status: DISCONTINUED | OUTPATIENT
Start: 2017-11-22 | End: 2017-11-27

## 2017-11-22 RX ORDER — CIPROFLOXACIN LACTATE 400MG/40ML
400 VIAL (ML) INTRAVENOUS ONCE
Qty: 0 | Refills: 0 | Status: COMPLETED | OUTPATIENT
Start: 2017-11-22 | End: 2017-11-22

## 2017-11-22 RX ORDER — CLOPIDOGREL BISULFATE 75 MG/1
75 TABLET, FILM COATED ORAL DAILY
Qty: 0 | Refills: 0 | Status: DISCONTINUED | OUTPATIENT
Start: 2017-11-22 | End: 2017-11-27

## 2017-11-22 RX ORDER — METOPROLOL TARTRATE 50 MG
25 TABLET ORAL
Qty: 0 | Refills: 0 | Status: DISCONTINUED | OUTPATIENT
Start: 2017-11-22 | End: 2017-11-27

## 2017-11-22 RX ORDER — CEFTRIAXONE 500 MG/1
INJECTION, POWDER, FOR SOLUTION INTRAMUSCULAR; INTRAVENOUS
Qty: 0 | Refills: 0 | Status: DISCONTINUED | OUTPATIENT
Start: 2017-11-22 | End: 2017-11-22

## 2017-11-22 RX ORDER — CIPROFLOXACIN LACTATE 400MG/40ML
500 VIAL (ML) INTRAVENOUS EVERY 12 HOURS
Qty: 0 | Refills: 0 | Status: DISCONTINUED | OUTPATIENT
Start: 2017-11-22 | End: 2017-11-22

## 2017-11-22 RX ORDER — BUDESONIDE AND FORMOTEROL FUMARATE DIHYDRATE 160; 4.5 UG/1; UG/1
2 AEROSOL RESPIRATORY (INHALATION)
Qty: 0 | Refills: 0 | Status: DISCONTINUED | OUTPATIENT
Start: 2017-11-22 | End: 2017-11-27

## 2017-11-22 RX ORDER — OXYCODONE AND ACETAMINOPHEN 5; 325 MG/1; MG/1
1 TABLET ORAL ONCE
Qty: 0 | Refills: 0 | Status: DISCONTINUED | OUTPATIENT
Start: 2017-11-22 | End: 2017-11-22

## 2017-11-22 RX ORDER — LABETALOL HCL 100 MG
10 TABLET ORAL ONCE
Qty: 0 | Refills: 0 | Status: COMPLETED | OUTPATIENT
Start: 2017-11-22 | End: 2017-11-22

## 2017-11-22 RX ORDER — GABAPENTIN 400 MG/1
400 CAPSULE ORAL THREE TIMES A DAY
Qty: 0 | Refills: 0 | Status: DISCONTINUED | OUTPATIENT
Start: 2017-11-22 | End: 2017-11-27

## 2017-11-22 RX ORDER — ISOSORBIDE MONONITRATE 60 MG/1
30 TABLET, EXTENDED RELEASE ORAL DAILY
Qty: 0 | Refills: 0 | Status: DISCONTINUED | OUTPATIENT
Start: 2017-11-22 | End: 2017-11-27

## 2017-11-22 RX ORDER — CIPROFLOXACIN LACTATE 400MG/40ML
400 VIAL (ML) INTRAVENOUS EVERY 12 HOURS
Qty: 0 | Refills: 0 | Status: DISCONTINUED | OUTPATIENT
Start: 2017-11-22 | End: 2017-11-23

## 2017-11-22 RX ORDER — SODIUM CHLORIDE 9 MG/ML
1000 INJECTION INTRAMUSCULAR; INTRAVENOUS; SUBCUTANEOUS ONCE
Qty: 0 | Refills: 0 | Status: COMPLETED | OUTPATIENT
Start: 2017-11-22 | End: 2017-11-22

## 2017-11-22 RX ORDER — HYDROMORPHONE HYDROCHLORIDE 2 MG/ML
2 INJECTION INTRAMUSCULAR; INTRAVENOUS; SUBCUTANEOUS EVERY 6 HOURS
Qty: 0 | Refills: 0 | Status: DISCONTINUED | OUTPATIENT
Start: 2017-11-22 | End: 2017-11-25

## 2017-11-22 RX ORDER — HYDROMORPHONE HYDROCHLORIDE 2 MG/ML
0.5 INJECTION INTRAMUSCULAR; INTRAVENOUS; SUBCUTANEOUS ONCE
Qty: 0 | Refills: 0 | Status: DISCONTINUED | OUTPATIENT
Start: 2017-11-22 | End: 2017-11-22

## 2017-11-22 RX ORDER — ATORVASTATIN CALCIUM 80 MG/1
40 TABLET, FILM COATED ORAL AT BEDTIME
Qty: 0 | Refills: 0 | Status: DISCONTINUED | OUTPATIENT
Start: 2017-11-22 | End: 2017-11-27

## 2017-11-22 RX ORDER — CIPROFLOXACIN LACTATE 400MG/40ML
VIAL (ML) INTRAVENOUS
Qty: 0 | Refills: 0 | Status: DISCONTINUED | OUTPATIENT
Start: 2017-11-22 | End: 2017-11-23

## 2017-11-22 RX ORDER — MORPHINE SULFATE 50 MG/1
4 CAPSULE, EXTENDED RELEASE ORAL DAILY
Qty: 0 | Refills: 0 | Status: DISCONTINUED | OUTPATIENT
Start: 2017-11-22 | End: 2017-11-22

## 2017-11-22 RX ORDER — SODIUM CHLORIDE 9 MG/ML
1000 INJECTION INTRAMUSCULAR; INTRAVENOUS; SUBCUTANEOUS
Qty: 0 | Refills: 0 | Status: DISCONTINUED | OUTPATIENT
Start: 2017-11-22 | End: 2017-11-27

## 2017-11-22 RX ADMIN — HYDROMORPHONE HYDROCHLORIDE 0.5 MILLIGRAM(S): 2 INJECTION INTRAMUSCULAR; INTRAVENOUS; SUBCUTANEOUS at 16:10

## 2017-11-22 RX ADMIN — HYDROMORPHONE HYDROCHLORIDE 2 MILLIGRAM(S): 2 INJECTION INTRAMUSCULAR; INTRAVENOUS; SUBCUTANEOUS at 11:41

## 2017-11-22 RX ADMIN — Medication 10 MILLIGRAM(S): at 21:54

## 2017-11-22 RX ADMIN — CLOPIDOGREL BISULFATE 75 MILLIGRAM(S): 75 TABLET, FILM COATED ORAL at 11:54

## 2017-11-22 RX ADMIN — Medication 200 MILLIGRAM(S): at 18:00

## 2017-11-22 RX ADMIN — HYDROMORPHONE HYDROCHLORIDE 2 MILLIGRAM(S): 2 INJECTION INTRAMUSCULAR; INTRAVENOUS; SUBCUTANEOUS at 18:00

## 2017-11-22 RX ADMIN — MORPHINE SULFATE 4 MILLIGRAM(S): 50 CAPSULE, EXTENDED RELEASE ORAL at 08:47

## 2017-11-22 RX ADMIN — HYDROMORPHONE HYDROCHLORIDE 0.5 MILLIGRAM(S): 2 INJECTION INTRAMUSCULAR; INTRAVENOUS; SUBCUTANEOUS at 16:40

## 2017-11-22 RX ADMIN — Medication 25 MILLIGRAM(S): at 18:18

## 2017-11-22 RX ADMIN — HYDROMORPHONE HYDROCHLORIDE 2 MILLIGRAM(S): 2 INJECTION INTRAMUSCULAR; INTRAVENOUS; SUBCUTANEOUS at 18:45

## 2017-11-22 RX ADMIN — SODIUM CHLORIDE 75 MILLILITER(S): 9 INJECTION INTRAMUSCULAR; INTRAVENOUS; SUBCUTANEOUS at 23:46

## 2017-11-22 RX ADMIN — SODIUM CHLORIDE 1000 MILLILITER(S): 9 INJECTION INTRAMUSCULAR; INTRAVENOUS; SUBCUTANEOUS at 08:32

## 2017-11-22 RX ADMIN — HYDROMORPHONE HYDROCHLORIDE 2 MILLIGRAM(S): 2 INJECTION INTRAMUSCULAR; INTRAVENOUS; SUBCUTANEOUS at 11:11

## 2017-11-22 RX ADMIN — Medication 200 MILLIGRAM(S): at 11:53

## 2017-11-22 RX ADMIN — BUDESONIDE AND FORMOTEROL FUMARATE DIHYDRATE 2 PUFF(S): 160; 4.5 AEROSOL RESPIRATORY (INHALATION) at 21:53

## 2017-11-22 RX ADMIN — MORPHINE SULFATE 4 MILLIGRAM(S): 50 CAPSULE, EXTENDED RELEASE ORAL at 09:17

## 2017-11-22 RX ADMIN — GABAPENTIN 400 MILLIGRAM(S): 400 CAPSULE ORAL at 21:54

## 2017-11-22 RX ADMIN — Medication 40 MILLIGRAM(S): at 23:44

## 2017-11-22 RX ADMIN — SODIUM CHLORIDE 75 MILLILITER(S): 9 INJECTION INTRAMUSCULAR; INTRAVENOUS; SUBCUTANEOUS at 18:07

## 2017-11-22 RX ADMIN — APIXABAN 5 MILLIGRAM(S): 2.5 TABLET, FILM COATED ORAL at 18:18

## 2017-11-22 RX ADMIN — Medication 40 MILLIGRAM(S): at 18:18

## 2017-11-22 RX ADMIN — HYDROMORPHONE HYDROCHLORIDE 2 MILLIGRAM(S): 2 INJECTION INTRAMUSCULAR; INTRAVENOUS; SUBCUTANEOUS at 23:45

## 2017-11-22 RX ADMIN — GABAPENTIN 400 MILLIGRAM(S): 400 CAPSULE ORAL at 15:04

## 2017-11-22 RX ADMIN — ATORVASTATIN CALCIUM 40 MILLIGRAM(S): 80 TABLET, FILM COATED ORAL at 21:54

## 2017-11-22 NOTE — CONSULT NOTE ADULT - SUBJECTIVE AND OBJECTIVE BOX
PULMONARY CONSULT NOTE      RENEA AVILES  MRN-[id number]    Patient is a 57y old Male who presents with a chief complaint of dizziness. Pt is a current every day smoker for the past 20 years, 1pk/day. PMH of current renal cell carcinoma, biopsied 2 weeks ago, kidney stones, CAD w stents, AICD, afib on eliquis/plavix, HTN, HLD, COPD, Hep C, here for syncope on train tonight. LOC for unknown time, now just c/l L flank pain, PE wnl, will admit tele,     HISTORY OF PRESENT ILLNESS:    MEDICATIONS  (STANDING):  ciprofloxacin   IVPB      ciprofloxacin   IVPB 400 milliGRAM(s) IV Intermittent once  ciprofloxacin   IVPB 400 milliGRAM(s) IV Intermittent every 12 hours      MEDICATIONS  (PRN):  HYDROmorphone  Injectable 2 milliGRAM(s) IV Push every 6 hours PRN Moderate Pain (4 - 6)      Allergies    aspirin (Hives)  codeine (Rash)  Motrin (Rash)  penicillin (Hives; Anaphylaxis)  Toradol (Rash)  Tylenol (Hives)    Intolerances        PAST MEDICAL & SURGICAL HISTORY:  Calcium kidney stones  Renal cell carcinoma of left kidney: s/p partial nephrectomy in   biopsy again in Sep 2017 showed RCC again in stage 2  Hyperlipidemia  Hypertension  CAD (coronary artery disease): (s/p 2 stents in Sep 2017)  Atrial fibrillation  AICD (automatic cardioverter/defibrillator) present: Medtronic  Calcium kidney stone  H/O partial nephrectomy:   S/P cholecystectomy:       FAMILY HISTORY:  Family history of heart disease (Mother)  Family history of diabetes mellitus (Mother)  Family history of lung cancer (Father)  Family history of breast cancer (Mother)      SOCIAL HISTORY  Smoking History:     REVIEW OF SYSTEMS:    CONSTITUTIONAL:  No fevers, chills, sweats    HEENT:  Eyes:  No diplopia or blurred vision. ENT:  No earache, sore throat or runny nose.    CARDIOVASCULAR:  No pressure, squeezing, tightness, or heaviness about the chest; no palpitations.    RESPIRATORY:  Per HPI    GASTROINTESTINAL:  No abdominal pain, nausea, vomiting or diarrhea.    GENITOURINARY:  No dysuria, frequency or urgency.    NEUROLOGIC:  No paresthesias, fasciculations, seizures or weakness.    PSYCHIATRIC:  No disorder of thought or mood.    Vital Signs Last 24 Hrs  T(C): 36.4 (2017 07:33), Max: 36.6 (2017 05:32)  T(F): 97.6 (2017 07:33), Max: 97.9 (2017 05:32)  HR: 55 (2017 07:33) (55 - 62)  BP: 152/80 (2017 07:33) (152/80 - 182/114)  BP(mean): --  RR: 18 (2017 07:33) (18 - 18)  SpO2: 100% (:) (100% - 100%)  I&O's Detail      PHYSICAL EXAMINATION:    GENERAL: The patient is a well-developed, well-nourished _____in no apparent distress.     HEENT: Head is normocephalic and atraumatic. Extraocular muscles are intact. Mucous membranes are moist.     NECK: Supple.     LUNGS: Clear to auscultation without wheezing, rales, or rhonchi. Respirations unlabored    HEART: Regular rate and rhythm without murmur.    ABDOMEN: Soft, nontender, and nondistended.  No hepatosplenomegaly is noted.    EXTREMITIES: Without any cyanosis, clubbing, rash, lesions or edema.    NEUROLOGIC: Grossly intact.      LABS:                        14.7   8.1   )-----------( 180      ( 2017 07:04 )             46.0         139  |  106  |  26<H>  ----------------------------<  72  4.1   |  26  |  0.92    Ca    9.1      2017 07:04    TPro  7.8  /  Alb  3.7  /  TBili  0.5  /  DBili  x   /  AST  81<H>  /  ALT  110<H>  /  AlkPhos  261<H>      PT/INR - ( 2017 07:04 )   PT: 11.3 sec;   INR: 1.04 ratio         PTT - ( 2017 07:04 )  PTT:36.8 sec  Urinalysis Basic - ( 2017 07:04 )    Color: Yellow / Appearance: Clear / S.020 / pH: x  Gluc: x / Ketone: Negative  / Bili: Negative / Urobili: Negative   Blood: x / Protein: 100 / Nitrite: Negative   Leuk Esterase: Negative / RBC: 5-10 /HPF / WBC 6-10 /HPF   Sq Epi: x / Non Sq Epi: Moderate /HPF / Bacteria: Moderate /HPF        CARDIAC MARKERS ( 2017 07:04 )  <0.015 ng/mL / x     / 220 U/L / x     / 7.6 ng/mL      D-Dimer Assay, Quantitative: <150 ng/mL DDU (17 @ 07:04)            MICROBIOLOGY:    RADIOLOGY & ADDITIONAL STUDIES:    CXR:    Ct scan chest:    ekg;    echo: PULMONARY CONSULT NOTE      RENEA AVILES  MRN-[id number]    Patient is a 57y old Male who presents with a chief complaint of dizziness. Pt is a current every day smoker for the past 20 years, 1pk/day. PMH of current renal cell carcinoma, biopsied 2 weeks ago, kidney stones, CAD w stents, AICD, afib on eliquis/plavix, HTN, HLD, COPD, Hep C, here for syncope on train tonight. LOC for unknown time, now just c/l L flank pain, PE wnl, will admit tele,   Awake, alert, comfortable in bed in NAD.  HISTORY OF PRESENT ILLNESS: As above. Has sob and Rivero. Still smoking half a pack of cigs per day.    MEDICATIONS  (STANDING):  ciprofloxacin   IVPB      ciprofloxacin   IVPB 400 milliGRAM(s) IV Intermittent once  ciprofloxacin   IVPB 400 milliGRAM(s) IV Intermittent every 12 hours      MEDICATIONS  (PRN):  HYDROmorphone  Injectable 2 milliGRAM(s) IV Push every 6 hours PRN Moderate Pain (4 - 6)      Allergies    aspirin (Hives)  codeine (Rash)  Motrin (Rash)  penicillin (Hives; Anaphylaxis)  Toradol (Rash)  Tylenol (Hives)    Intolerances        PAST MEDICAL & SURGICAL HISTORY:  Calcium kidney stones  Renal cell carcinoma of left kidney: s/p partial nephrectomy in   biopsy again in Sep 2017 showed RCC again in stage 2  Hyperlipidemia  Hypertension  CAD (coronary artery disease): (s/p 2 stents in Sep 2017)  Atrial fibrillation  AICD (automatic cardioverter/defibrillator) present: Medtronic  Calcium kidney stone  H/O partial nephrectomy:   S/P cholecystectomy:       FAMILY HISTORY:  Family history of heart disease (Mother)  Family history of diabetes mellitus (Mother)  Family history of lung cancer (Father)  Family history of breast cancer (Mother)      SOCIAL HISTORY  Smoking History:     REVIEW OF SYSTEMS:    CONSTITUTIONAL:  No fevers, chills, sweats    HEENT:  Eyes:  No diplopia or blurred vision. ENT:  No earache, sore throat or runny nose.    CARDIOVASCULAR:  No pressure, squeezing, tightness, or heaviness about the chest; no palpitations.    RESPIRATORY:  Per HPI    GASTROINTESTINAL:  No abdominal pain, nausea, vomiting or diarrhea.    GENITOURINARY:  No dysuria, frequency or urgency.    NEUROLOGIC:  No paresthesias, fasciculations, seizures or weakness.    PSYCHIATRIC:  No disorder of thought or mood.    Vital Signs Last 24 Hrs  T(C): 36.4 (2017 07:33), Max: 36.6 (2017 05:32)  T(F): 97.6 (2017 07:33), Max: 97.9 (2017 05:32)  HR: 55 (2017 07:33) (55 - 62)  BP: 152/80 (2017 07:33) (152/80 - 182/114)  BP(mean): --  RR: 18 (2017 07:33) (18 - 18)  SpO2: 100% (2017 07:33) (100% - 100%)  I&O's Detail      PHYSICAL EXAMINATION:    GENERAL: The patient is a well-developed, well-nourished _____in no apparent distress.     HEENT: Head is normocephalic and atraumatic. Extraocular muscles are intact. Mucous membranes are moist.     NECK: Supple.     LUNGS: bilateral wheezes    HEART: Regular rate and rhythm without murmur.    ABDOMEN: Soft, nontender, and nondistended.  No hepatosplenomegaly is noted.    EXTREMITIES: Without any cyanosis, clubbing, rash, lesions or edema.    NEUROLOGIC: Grossly intact.      LABS:                        14.7   8.1   )-----------( 180      ( 2017 07:04 )             46.0         139  |  106  |  26<H>  ----------------------------<  72  4.1   |  26  |  0.92    Ca    9.1      2017 07:04    TPro  7.8  /  Alb  3.7  /  TBili  0.5  /  DBili  x   /  AST  81<H>  /  ALT  110<H>  /  AlkPhos  261<H>  11    PT/INR - ( 2017 07:04 )   PT: 11.3 sec;   INR: 1.04 ratio         PTT - ( 2017 07:04 )  PTT:36.8 sec  Urinalysis Basic - ( 2017 07:04 )    Color: Yellow / Appearance: Clear / S.020 / pH: x  Gluc: x / Ketone: Negative  / Bili: Negative / Urobili: Negative   Blood: x / Protein: 100 / Nitrite: Negative   Leuk Esterase: Negative / RBC: 5-10 /HPF / WBC 6-10 /HPF   Sq Epi: x / Non Sq Epi: Moderate /HPF / Bacteria: Moderate /HPF        CARDIAC MARKERS ( 2017 07:04 )  <0.015 ng/mL / x     / 220 U/L / x     / 7.6 ng/mL      D-Dimer Assay, Quantitative: <150 ng/mL DDU (17 @ 07:04)            MICROBIOLOGY:    RADIOLOGY & ADDITIONAL STUDIES:    CXR:    Ct scan chest:    ekg;    echo:

## 2017-11-22 NOTE — H&P ADULT - PROBLEM SELECTOR PLAN 2
-severe left flank pain associated with increased urinary frequency and burning pain  -h/o kidney stones in past  -f/u CT abdomen to r/o hydronephrosis or renal stone causing obstructive uropathy  -c/w IV abx, gentle IV hydration and pain control

## 2017-11-22 NOTE — H&P ADULT - ATTENDING COMMENTS
57 years old male from home with extensive PMH presents c/o syncope last night. 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   Patient states that he was in train last night and all of suddenly felt dizzy associated with headache and sweating and synopsized with LOC for less than a minute, regained consciousness quickly. No jerking movements. Never had similar episode before. No recent trauma, travel or sick contacts. Has already taken Flu vaccine.     Patient relays that recently about 8 weeks ago, [facility], had CATH with 2 stent placement (CATH report in chart). Also has left flank pain, had CT abdomen and underwent repeat kidney biopsy which showed left RCC again in stage 2, has to follow up for either partial or total nephrectomy. Does not remember the name if Oncologist but says follows with PCP Dr. Hyatt and Cardiologist Dr. Glenn Weiss at [facility].     Currently denies any chest pain, sob, fever, chills, nausea or vomiting. Complains of severe left flank pain 10/10, sharp, radiating to left back associated with urinary frequency, burning and dysuria. Received Morphine in ED but says it does not help and asking for Dilaudid. Says goes to [facility] and receives 2 mg IV Dilaudid every 2-3 days for pain.     pt seen in bed, a+o x3, nad, vitals stable except for elevated bp on admisssion, physical exam reveals no focal motor deficit, lungs b/l wheeze, regular s1s2, abd soft nd, nt, bs+. labs and diagnostic test result reviewed.    assessment   --- syncope, r/o acs, r/o arythmia, r/o cns patho, uncontrolled htn, copd exacerb, uti, r/o renal colic, r/o pyelonephritis, h/o HTN, HLD, COPD,  Afib (on eliquis), AICD (Medtronic, last s/p CATH,  Renal Call Carcinoma of left kidney (s/p Partial nephrectomy), multiple calcium kidney stones, hep c.    plan  --  adm to tele, aspirin, statin, cipro, solumedrol, atrov and prov, cont preadmit home meds, gi and dvt profilaxis  cbc, bmp, mg, phos, lipid, tsh, ce q8 x3    ct abd-pelv  echo  carotid duplex    cardio cons  neuro cons.  pulm cons

## 2017-11-22 NOTE — H&P ADULT - PROBLEM SELECTOR PLAN 3
-c/w Plavix and statin.  -Allergic to aspirin (rash) -c/w Plavix, statin and metoprolol.   -Allergic to aspirin (rash)

## 2017-11-22 NOTE — CONSULT NOTE ADULT - ASSESSMENT
57 years old male from home with extensive PMH presents c/o syncope last night. 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   Patient states that he was in train last night and all of suddenly felt dizzy associated with headache and sweating and synopsized with LOC for less than a minute, regained consciousness quickly.  1.Tele monitoring.  2.AICD interrogation.  3.Obtain most recent cath report.  4.Check urine tox.  5.Continue cardiac medication.  6.PAF-Eliquis.  7.PPI.

## 2017-11-22 NOTE — H&P ADULT - PROBLEM SELECTOR PLAN 7
-Medtronic, Last interrogated one month back   -Last known (8 weeks ago) EF is 40-45% (as per patient)

## 2017-11-22 NOTE — ED ADULT NURSE NOTE - OBJECTIVE STATEMENT
57 years old male received lying supine in ed alert and oriented x3, breathing spontaneously on room air. Patient complained of sudden onset of dizziness, also reported that he is experiencing left flank pains. P/S 8-9/10. ED physician is ware of patient.

## 2017-11-22 NOTE — ED PROVIDER NOTE - PMH
AICD (automatic cardioverter/defibrillator) present  Medtronic  Atrial fibrillation    CAD (coronary artery disease)  (s/p 2 stents in Sep 2017)  Calcium kidney stones    Hyperlipidemia    Hypertension    Opioid dependence    Renal cell carcinoma of left kidney  s/p partial nephrectomy in 2002  biopsy again in Sep 2017 showed RCC again in stage 2

## 2017-11-22 NOTE — ED PROVIDER NOTE - OBJECTIVE STATEMENT
hx current renal cell carcinoma, biopsied 2 weeks ago, kidney stones, CAD w stents, AICD, afib on eliquis/plavix, HTN, HLD, COPD, Hep C, here for syncope on train tonight. LOC for unknown time, now just c/l L flank pain    57 years old male from home with extensive PMH presents c/o syncope last night. 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   Patient states that he was in train last night and all of suddenly felt dizzy associated with headache and sweating and synopsized with LOC for less than a minute, regained consciousness quickly. No jerking movements. Never had similar episode before. No recent trauma, travel or sick contacts. Has already taken Flu vaccine.     Patient relays that recently about 8 weeks ago, he was admitted to [facility] for chest pain, had CATH with 2 stent placement (CATH report in chart). Also has left flank pain, had CT abdomen and underwent repeat kidney biopsy which showed left RCC again in stage 2, has to follow up for either partial or total nephrectomy. Does not remember the name if Oncologist but says follows with PCP Dr. Hyatt and Cardiologist Dr. Glenn Weiss at [facility].     Currently denies any chest pain, sob, fever, chills, nausea or vomiting. Complains of severe left flank pain 10/10, sharp, radiating to left back associated with urinary frequency, burning and dysuria. Received Morphine in ED but says it does not help and asking for Dilaudid. Says goes to [facility] and receives 2 mg IV Dilaudid every 2-3 days for pain.

## 2017-11-22 NOTE — H&P ADULT - ASSESSMENT
57 years old male from home with extensive PMH presents c/o syncope last night. Also has severe left flank pain associated with dysuria and increased urinary frequency. 57 years old male from home with extensive PMH presents c/o syncope last night. Also has severe left flank pain associated with dysuria and increased urinary frequency. Admitted to telemetry for further management.

## 2017-11-22 NOTE — H&P ADULT - PROBLEM SELECTOR PLAN 5
-c/w Enalapril and Imdur at home dose  -No longer taking Lasix or Aldactone  -Metoprolol on hold 2/2 bradycardia, resume appropraitely -c/w Metoprolol, Enalapril and Imdur at home dose  -No longer taking Lasix or Aldactone

## 2017-11-22 NOTE — H&P ADULT - PROBLEM SELECTOR PLAN 1
-syncope associated with LOC for less than a minute with full return to baseline  -preceded by dizziness and headache, no jerking activities  -EKG: sinus bradycardia  -f/u CT head, orthostatics  -T1 negative, continue to trend  -monitor on tele -syncope associated with LOC for less than a minute with full return to baseline  -preceded by dizziness and headache, no jerking activities  -EKG: sinus bradycardia  -f/u CT head, orthostatics  -T1 negative, continue to trend  -monitor on tele  -Holding metoprolol 2/2 sinus bradycardia, f/u Cardio consult Dr. Lackey  -Neuro consult Dr. Quach -syncope associated with LOC for less than a minute with full return to baseline  -preceded by dizziness and headache, no jerking activities  -EKG: sinus bradycardia  -f/u CT head, orthostatics  -T1 negative, continue to trend  -monitor on tele  -c/w Plavix, statin and metoprolol.   -Cardio consult Dr. Harper   -Neuro consult Dr. Ambrocio -syncope associated with LOC for less than a minute with full return to baseline  -preceded by dizziness and headache, no jerking activities  -EKG: sinus bradycardia  -f/u CT head, orthostatics  -T1 negative, continue to trend  -monitor on tele, f/u ECHO  -c/w Plavix, statin and metoprolol.   -Cardio consult Dr. Harper   -Neuro consult Dr. Ambrocio

## 2017-11-22 NOTE — CONSULT NOTE ADULT - SUBJECTIVE AND OBJECTIVE BOX
CHIEF COMPLAINT:Patient is a 57y old  Male who presents with a chief complaint of syncope.      HPI:  57 years old male from home with extensive PMH presents c/o syncope last night. 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   Patient states that he was in train last night and all of suddenly felt dizzy associated with headache and sweating and synopsized with LOC for less than a minute, regained consciousness quickly. No jerking movements. Never had similar episode before. No recent trauma, travel or sick contacts. Has already taken Flu vaccine.     Patient relays that recently about 8 weeks ago, he was admitted to [facility] for chest pain, had CATH with 2 stent placement. Also has left flank pain, had CT abdomen and underwent repeat kidney biopsy which showed left RCC again in stage 2, has to follow up for either partial or total nephrectomy. Does not remember the name if Oncologist but says follows with PCP Dr. Hyatt and Cardiologist Dr. Glenn Weiss at [facility].     Currently denies any chest pain, sob, fever, chills, nausea or vomiting. Complains of severe left flank pain 10/10, sharp, radiating to left back associated with urinary frequency, burning and dysuria. Received Morphine in ED but says it does not help and asking for Dilaudid. Says goes to [facility] and receives 2 mg IV Dilaudid every 2-3 days for pain. (22 Nov 2017 10:19)      PAST MEDICAL & SURGICAL HISTORY:  Calcium kidney stones  Renal cell carcinoma of left kidney: s/p partial nephrectomy in 2002  biopsy again in Sep 2017 showed RCC again in stage 2  Hyperlipidemia  Hypertension  CAD (coronary artery disease): (s/p 2 stents in Sep 2017)  Atrial fibrillation  AICD (automatic cardioverter/defibrillator) present: Medtronic  Calcium kidney stone  H/O partial nephrectomy: 2002  S/P cholecystectomy: 2015      MEDICATIONS  (STANDING):  apixaban 5 milliGRAM(s) Oral every 12 hours  atorvastatin 40 milliGRAM(s) Oral at bedtime  buDESOnide 160 MICROgram(s)/formoterol 4.5 MICROgram(s) Inhaler 2 Puff(s) Inhalation two times a day  ciprofloxacin   IVPB      ciprofloxacin   IVPB 400 milliGRAM(s) IV Intermittent every 12 hours  clopidogrel Tablet 75 milliGRAM(s) Oral daily  enalapril 5 milliGRAM(s) Oral daily  gabapentin 400 milliGRAM(s) Oral three times a day  isosorbide   mononitrate ER Tablet (IMDUR) 30 milliGRAM(s) Oral daily    MEDICATIONS  (PRN):  HYDROmorphone  Injectable 2 milliGRAM(s) IV Push every 6 hours PRN Moderate Pain (4 - 6)      FAMILY HISTORY:  Family history of heart disease (Mother)  Family history of diabetes mellitus (Mother)  Family history of lung cancer (Father)  Family history of breast cancer (Mother)      SOCIAL HISTORY:    [X ] Non-smoker    [X ] Alcohol-social    Allergies    aspirin (Hives)  codeine (Rash)  Motrin (Rash)  penicillin (Hives; Anaphylaxis)  Toradol (Rash)  Tylenol (Hives)        	    REVIEW OF SYSTEMS:  CONSTITUTIONAL: No fever, weight loss, or fatigue  EYES: No eye pain, visual disturbances, or discharge  ENT:  No difficulty hearing, tinnitus, vertigo; No sinus or throat pain  NECK: No pain or stiffness  RESPIRATORY: No cough, wheezing, chills or hemoptysis; No Shortness of Breath  CARDIOVASCULAR: No chest pain, palpitations, + passing out  GASTROINTESTINAL: No abdominal or epigastric pain. No nausea, vomiting, or hematemesis; No diarrhea or constipation. No melena or hematochezia.  GENITOURINARY: No dysuria, frequency, hematuria, or incontinence  NEUROLOGICAL: No headaches, memory loss, loss of strength, numbness, or tremors  SKIN: No itching, burning, rashes, or lesions   LYMPH Nodes: No enlarged glands  ENDOCRINE: No heat or cold intolerance; No hair loss  MUSCULOSKELETAL: No joint pain or swelling; No muscle, back, or extremity pain  PSYCHIATRIC: No depression, anxiety, mood swings, or difficulty sleeping  HEME/LYMPH: No easy bruising, or bleeding gums  ALLERGY AND IMMUNOLOGIC: No hives or eczema	      PHYSICAL EXAM:  T(C): 36.4 (11-22-17 @ 11:30), Max: 36.6 (11-22-17 @ 05:32)  HR: 56 (11-22-17 @ 11:30) (55 - 62)  BP: 170/80 (11-22-17 @ 11:30) (152/80 - 182/114)  RR: 18 (11-22-17 @ 11:30) (18 - 18)  SpO2: 100% (11-22-17 @ 11:30) (100% - 100%)      Appearance: Normal	  HEENT:   Normal oral mucosa, PERRL, EOMI	  Lymphatic: No lymphadenopathy  Cardiovascular: Normal S1 S2, No JVD, No murmurs, No edema  Respiratory: Lungs clear to auscultation	  Psychiatry: A & O x 3, Mood & affect appropriate  Gastrointestinal:  Soft, Non-tender, + BS	  Skin: No rashes, No ecchymoses, No cyanosis	  Neurologic: Non-focal  Extremities: Normal range of motion, No clubbing, cyanosis or edema  Vascular: Peripheral pulses palpable 2+ bilaterally        ECG:  	not in chart    	  LABS:	 	    CARDIAC MARKERS:  CARDIAC MARKERS ( 22 Nov 2017 07:04 )  <0.015 ng/mL / x     / 220 U/L / x     / 7.6 ng/mL                          14.7   8.1   )-----------( 180      ( 22 Nov 2017 07:04 )             46.0     11-22    139  |  106  |  26<H>  ----------------------------<  72  4.1   |  26  |  0.92    Ca    9.1      22 Nov 2017 07:04    TPro  7.8  /  Alb  3.7  /  TBili  0.5  /  DBili  x   /  AST  81<H>  /  ALT  110<H>  /  AlkPhos  261<H>  11-22    INTERPRETATION:  CT HEAD WITHOUT CONTRAST    HISTORY: syncope.    COMPARISON: None available.    TECHNIQUE: Noncontrast axial CT head was obtained from the skull base to   vertex.    FINDINGS:  There is no evidence of acute intracranial hemorrhage, mass effect or   midline shift. No CT evidence of acute large territory vascular infarct.   The ventricles and cortical sulci are within normal limits for age.    The visualized paranasal sinuses and mastoid air cells are well aerated.   Chronic deformity of the right lamina papyracea is a 1.9 cm in AP   dimension, with herniation of retrobulbar fat, and mild thickening of the   right medial rectus muscle, probably old posttraumatic sequelae.    IMPRESSION:   No acute intracranial hemorrhage, mass effect or midline shift.      Findings:    Abdomen: Limited sections through the lung bases demonstrate mild   bilateral atelectasis. There is a small calcified granuloma in the left   lower lobe. There is a nonspecific noncalcified 4 mm right lower lobe   lung nodule (image 3 series 2).     Stable surgical clips adjacent to the left renal hilum. No evidence for a   calculus in the kidneys, or ureters. No hydronephrosis. Evaluation for   renal parenchyma is limited by lack of IV contrast. No gross renal lesion   is identified.    Interval cholecystectomy.    Allowing for the noncontrast technique, the liver, pancreas, spleen, and  adrenals appear grossly unremarkable.    The appendix appears normal. Colonic diverticulosis without evidence for   diverticulitis. No bowel obstruction, or grossly thickened bowel wall. No   evidence for free air, ascites, or enlarged lymph node. Duplicated left   IVC.    Pelvis: No evidence for a calculus in the lower urinary tract. The   urinary bladder appears grossly unremarkable. Sigmoid diverticulosis   without evidence for diverticulitis. No pelvic free fluid, or enlarged   lymph node.    Impression: No evidence for a urinary calculus. No hydronephrosis.    Normal appendix. No bowel obstruction or grossly thickened bowel wall.   Colonic diverticulosis without evidence for diverticulitis.    Small nonspecific 4 mm right lower lobe lung nodule; follow-up chest CT   may be pursued in 12 months to ensure stability.

## 2017-11-22 NOTE — H&P ADULT - PROBLEM SELECTOR PLAN 6
-s/p Partial nephrectomy in 2002. repeat biopsy 8 wks ago showed stage 2 left RCC, does not remember the name oncologist he follows as outpatient, likely needs partial or total nephrectomy of left kidney

## 2017-11-22 NOTE — H&P ADULT - HISTORY OF PRESENT ILLNESS
57 years old male from home with extensive PMH presents c/o syncope last night. PMH of HTN, HLD, Afib (on eliquis), AICD ([employer]tr[employer], last interrogated month ago, improved EF from 10-15% to 40-45% on CATH done 8 wks back),  Renal Call Carcinoma of left kidney (s/p Partial nephrectomy in 2002, repeat biopsy 8 wks back again showed RCC in stage 2), multiple calcium kidney stones.   Patient states that he was in train last night and all of suddenly felt dizzy associated with headache and sweating and synopsized with LOC for less than a minute, regained consciousness quickly. No jerking movements. Never had similar episode before. No recent trauma, travel or sick contacts. Has already taken Flu vaccine.     Patient relays that recently about 8 weeks ago, he was admitted to [facility] for chest pain, had CATH with 2 stent placement. Also has left flank pain, had CT abdomen and underwent repeat kidney biopsy which showed left RCC again in stage 2, has to follow up for either partial or total nephrectomy. Does not remember the name if Oncologist but says follows with PCP Dr. Hyatt and Cardiologist Dr. Glenn Weiss at [facility].     Currently denies any chest pain, sob, fever, chills, nausea or vomiting. Complains of severe left flank pain 10/10, sharp, radiating to left back associated with urinary frequency, burning and dysuria. Received Morphine in ED but says it does not help and asking for Dilaudid. Says goes to [facility] and receives 2 mg IV Dilaudid every 2-3 days for pain. 57 years old male from home with extensive PMH presents c/o syncope last night. 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   Patient states that he was in train last night and all of suddenly felt dizzy associated with headache and sweating and synopsized with LOC for less than a minute, regained consciousness quickly. No jerking movements. Never had similar episode before. No recent trauma, travel or sick contacts. Has already taken Flu vaccine.     Patient relays that recently about 8 weeks ago, he was admitted to [facility] for chest pain, had CATH with 2 stent placement. Also has left flank pain, had CT abdomen and underwent repeat kidney biopsy which showed left RCC again in stage 2, has to follow up for either partial or total nephrectomy. Does not remember the name if Oncologist but says follows with PCP Dr. Hyatt and Cardiologist Dr. Glenn Weiss at [facility].     Currently denies any chest pain, sob, fever, chills, nausea or vomiting. Complains of severe left flank pain 10/10, sharp, radiating to left back associated with urinary frequency, burning and dysuria. Received Morphine in ED but says it does not help and asking for Dilaudid. Says goes to [facility] and receives 2 mg IV Dilaudid every 2-3 days for pain. 57 years old male from home with extensive PMH presents c/o syncope last night. 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   Patient states that he was in train last night and all of suddenly felt dizzy associated with headache and sweating and synopsized with LOC for less than a minute, regained consciousness quickly. No jerking movements. Never had similar episode before. No recent trauma, travel or sick contacts. Has already taken Flu vaccine.     Patient relays that recently about 8 weeks [facility] had [facility]ATH with 2 stent placement (CATH report in chart). Also has left flank pain, had CT abdomen and underwent repeat kidney biopsy which showed left RCC again in stage 2, has to follow up for either partial or total nephrectomy. Does not remember the name if Oncologist but says follows with PCP Dr. Hyatt and Cardiologist Dr. Glenn Weiss at [facility].     Currently denies any chest pain, sob, fever, chills, nausea or vomiting. Complains of severe left flank pain 10/10, sharp, radiating to left back associated with urinary frequency, burning and dysuria. Received Morphine in ED but says it does not help and asking for Dilaudid. Says goes to [facility] and receives 2 mg IV Dilaudid every 2-3 days for pain.

## 2017-11-22 NOTE — ED PROVIDER NOTE - MEDICAL DECISION MAKING DETAILS
hx current renal cell carcinoma, biopsied 2 weeks ago, kidney stones, CAD w stents, AICD, afib on eliquis/plavix, HTN, HLD, COPD, Hep C, here for syncope on train tonight. LOC for unknown time, now just c/l L flank pain, PE wnl, will admit tele, check ekg/labs/CXR, CTH and abd/pelvis r/o stone, tx pain, UA, admit tele

## 2017-11-22 NOTE — H&P ADULT - PMH
AICD (automatic cardioverter/defibrillator) present  Medtronic  Atrial fibrillation    CAD (coronary artery disease)  (s/p 2 stents in Sep 2017)  Calcium kidney stones    Hyperlipidemia    Hypertension    Renal cell carcinoma of left kidney  s/p partial nephrectomy in 2002  biopsy again in Sep 2017 showed RCC again in stage 2

## 2017-11-22 NOTE — H&P ADULT - FAMILY HISTORY
Mother  Still living? Unknown  Family history of breast cancer, Age at diagnosis: Age Unknown  Family history of diabetes mellitus, Age at diagnosis: Age Unknown  Family history of heart disease, Age at diagnosis: Age Unknown     Father  Still living? Unknown  Family history of lung cancer, Age at diagnosis: Age Unknown

## 2017-11-22 NOTE — H&P ADULT - PROBLEM SELECTOR PLAN 8
-Improved VTE score of 2. No indication of DVT ppx. -Improved VTE score of 0. No indication of DVT ppx.

## 2017-11-22 NOTE — H&P ADULT - NSHPSOCIALHISTORY_GEN_ALL_CORE
From home, ambulates with walker, retired [employer]  Current everyday smoker, smokes 10 cigarettes / day everyday (previously 2 and half pack a day) x 20 years  Denies current alcohol or drug use

## 2017-11-23 LAB
AMPHET UR-MCNC: NEGATIVE — SIGNIFICANT CHANGE UP
ANION GAP SERPL CALC-SCNC: 12 MMOL/L — SIGNIFICANT CHANGE UP (ref 5–17)
BARBITURATES UR SCN-MCNC: NEGATIVE — SIGNIFICANT CHANGE UP
BASOPHILS # BLD AUTO: 0 K/UL — SIGNIFICANT CHANGE UP (ref 0–0.2)
BASOPHILS NFR BLD AUTO: 0.3 % — SIGNIFICANT CHANGE UP (ref 0–2)
BENZODIAZ UR-MCNC: NEGATIVE — SIGNIFICANT CHANGE UP
BUN SERPL-MCNC: 23 MG/DL — HIGH (ref 7–18)
CALCIUM SERPL-MCNC: 9.5 MG/DL — SIGNIFICANT CHANGE UP (ref 8.4–10.5)
CHLORIDE SERPL-SCNC: 103 MMOL/L — SIGNIFICANT CHANGE UP (ref 96–108)
CHOLEST SERPL-MCNC: 138 MG/DL — SIGNIFICANT CHANGE UP (ref 10–199)
CK MB BLD-MCNC: 5 % — HIGH (ref 0–3.5)
CK MB CFR SERPL CALC: 6.3 NG/ML — HIGH (ref 0–3.6)
CK SERPL-CCNC: 127 U/L — SIGNIFICANT CHANGE UP (ref 35–232)
CO2 SERPL-SCNC: 24 MMOL/L — SIGNIFICANT CHANGE UP (ref 22–31)
COCAINE METAB.OTHER UR-MCNC: NEGATIVE — SIGNIFICANT CHANGE UP
CREAT SERPL-MCNC: 0.91 MG/DL — SIGNIFICANT CHANGE UP (ref 0.5–1.3)
CULTURE RESULTS: NO GROWTH — SIGNIFICANT CHANGE UP
EOSINOPHIL # BLD AUTO: 0 K/UL — SIGNIFICANT CHANGE UP (ref 0–0.5)
EOSINOPHIL NFR BLD AUTO: 0 % — SIGNIFICANT CHANGE UP (ref 0–6)
GGT SERPL-CCNC: 594 U/L — HIGH (ref 9–50)
GLUCOSE SERPL-MCNC: 120 MG/DL — HIGH (ref 70–99)
HBA1C BLD-MCNC: 5.2 % — SIGNIFICANT CHANGE UP (ref 4–5.6)
HCT VFR BLD CALC: 47.7 % — SIGNIFICANT CHANGE UP (ref 39–50)
HDLC SERPL-MCNC: 88 MG/DL — SIGNIFICANT CHANGE UP (ref 40–125)
HGB BLD-MCNC: 15.5 G/DL — SIGNIFICANT CHANGE UP (ref 13–17)
LIPID PNL WITH DIRECT LDL SERPL: 43 MG/DL — SIGNIFICANT CHANGE UP
LYMPHOCYTES # BLD AUTO: 0.6 K/UL — LOW (ref 1–3.3)
LYMPHOCYTES # BLD AUTO: 9.5 % — LOW (ref 13–44)
MAGNESIUM SERPL-MCNC: 2.1 MG/DL — SIGNIFICANT CHANGE UP (ref 1.6–2.6)
MCHC RBC-ENTMCNC: 30.2 PG — SIGNIFICANT CHANGE UP (ref 27–34)
MCHC RBC-ENTMCNC: 32.4 GM/DL — SIGNIFICANT CHANGE UP (ref 32–36)
MCV RBC AUTO: 93 FL — SIGNIFICANT CHANGE UP (ref 80–100)
METHADONE UR-MCNC: POSITIVE
MONOCYTES # BLD AUTO: 0.1 K/UL — SIGNIFICANT CHANGE UP (ref 0–0.9)
MONOCYTES NFR BLD AUTO: 1.7 % — LOW (ref 2–14)
NEUTROPHILS # BLD AUTO: 5.6 K/UL — SIGNIFICANT CHANGE UP (ref 1.8–7.4)
NEUTROPHILS NFR BLD AUTO: 88.4 % — HIGH (ref 43–77)
OPIATES UR-MCNC: POSITIVE
PCP SPEC-MCNC: SIGNIFICANT CHANGE UP
PCP UR-MCNC: NEGATIVE — SIGNIFICANT CHANGE UP
PHOSPHATE SERPL-MCNC: 2.6 MG/DL — SIGNIFICANT CHANGE UP (ref 2.5–4.5)
PLATELET # BLD AUTO: 186 K/UL — SIGNIFICANT CHANGE UP (ref 150–400)
POTASSIUM SERPL-MCNC: 4 MMOL/L — SIGNIFICANT CHANGE UP (ref 3.5–5.3)
POTASSIUM SERPL-SCNC: 4 MMOL/L — SIGNIFICANT CHANGE UP (ref 3.5–5.3)
RBC # BLD: 5.13 M/UL — SIGNIFICANT CHANGE UP (ref 4.2–5.8)
RBC # FLD: 13.5 % — SIGNIFICANT CHANGE UP (ref 10.3–14.5)
SODIUM SERPL-SCNC: 139 MMOL/L — SIGNIFICANT CHANGE UP (ref 135–145)
SPECIMEN SOURCE: SIGNIFICANT CHANGE UP
THC UR QL: POSITIVE
TOTAL CHOLESTEROL/HDL RATIO MEASUREMENT: 1.6 RATIO — LOW (ref 3.4–9.6)
TRIGL SERPL-MCNC: 33 MG/DL — SIGNIFICANT CHANGE UP (ref 10–149)
TROPONIN I SERPL-MCNC: <0.015 NG/ML — SIGNIFICANT CHANGE UP (ref 0–0.04)
TSH SERPL-MCNC: 0.49 UU/ML — SIGNIFICANT CHANGE UP (ref 0.34–4.82)
WBC # BLD: 6.4 K/UL — SIGNIFICANT CHANGE UP (ref 3.8–10.5)
WBC # FLD AUTO: 6.4 K/UL — SIGNIFICANT CHANGE UP (ref 3.8–10.5)

## 2017-11-23 RX ORDER — DIPHENHYDRAMINE HCL 50 MG
25 CAPSULE ORAL EVERY 6 HOURS
Qty: 0 | Refills: 0 | Status: DISCONTINUED | OUTPATIENT
Start: 2017-11-23 | End: 2017-11-27

## 2017-11-23 RX ORDER — METOPROLOL TARTRATE 50 MG
1 TABLET ORAL
Qty: 0 | Refills: 0 | COMMUNITY
Start: 2017-11-23

## 2017-11-23 RX ORDER — METOPROLOL TARTRATE 50 MG
2 TABLET ORAL
Qty: 0 | Refills: 0 | COMMUNITY
Start: 2017-11-23

## 2017-11-23 RX ORDER — CALAMINE AND ZINC OXIDE AND PHENOL 160; 10 MG/ML; MG/ML
1 LOTION TOPICAL
Qty: 0 | Refills: 0 | DISCHARGE
Start: 2017-11-23

## 2017-11-23 RX ORDER — HYDRALAZINE HCL 50 MG
10 TABLET ORAL ONCE
Qty: 0 | Refills: 0 | Status: COMPLETED | OUTPATIENT
Start: 2017-11-23 | End: 2017-11-23

## 2017-11-23 RX ORDER — APIXABAN 2.5 MG/1
1 TABLET, FILM COATED ORAL
Qty: 0 | Refills: 0 | DISCHARGE
Start: 2017-11-23

## 2017-11-23 RX ORDER — ISOSORBIDE MONONITRATE 60 MG/1
1 TABLET, EXTENDED RELEASE ORAL
Qty: 0 | Refills: 0 | COMMUNITY
Start: 2017-11-23

## 2017-11-23 RX ORDER — GABAPENTIN 400 MG/1
1 CAPSULE ORAL
Qty: 0 | Refills: 0 | COMMUNITY
Start: 2017-11-23

## 2017-11-23 RX ORDER — CLOPIDOGREL BISULFATE 75 MG/1
1 TABLET, FILM COATED ORAL
Qty: 0 | Refills: 0 | DISCHARGE
Start: 2017-11-23

## 2017-11-23 RX ORDER — ATORVASTATIN CALCIUM 80 MG/1
1 TABLET, FILM COATED ORAL
Qty: 0 | Refills: 0 | DISCHARGE
Start: 2017-11-23

## 2017-11-23 RX ORDER — CALAMINE 8% AND ZINC OXIDE 8% 160 MG/ML
1 LOTION TOPICAL
Qty: 0 | Refills: 0 | Status: DISCONTINUED | OUTPATIENT
Start: 2017-11-23 | End: 2017-11-27

## 2017-11-23 RX ADMIN — CALAMINE 8% AND ZINC OXIDE 8% 1 APPLICATION(S): 160 LOTION TOPICAL at 02:41

## 2017-11-23 RX ADMIN — HYDROMORPHONE HYDROCHLORIDE 2 MILLIGRAM(S): 2 INJECTION INTRAMUSCULAR; INTRAVENOUS; SUBCUTANEOUS at 06:01

## 2017-11-23 RX ADMIN — ATORVASTATIN CALCIUM 40 MILLIGRAM(S): 80 TABLET, FILM COATED ORAL at 21:29

## 2017-11-23 RX ADMIN — HYDROMORPHONE HYDROCHLORIDE 2 MILLIGRAM(S): 2 INJECTION INTRAMUSCULAR; INTRAVENOUS; SUBCUTANEOUS at 00:30

## 2017-11-23 RX ADMIN — HYDROMORPHONE HYDROCHLORIDE 2 MILLIGRAM(S): 2 INJECTION INTRAMUSCULAR; INTRAVENOUS; SUBCUTANEOUS at 06:45

## 2017-11-23 RX ADMIN — Medication 40 MILLIGRAM(S): at 18:00

## 2017-11-23 RX ADMIN — HYDROMORPHONE HYDROCHLORIDE 2 MILLIGRAM(S): 2 INJECTION INTRAMUSCULAR; INTRAVENOUS; SUBCUTANEOUS at 23:48

## 2017-11-23 RX ADMIN — Medication 200 MILLIGRAM(S): at 05:57

## 2017-11-23 RX ADMIN — Medication 25 MILLIGRAM(S): at 02:41

## 2017-11-23 RX ADMIN — Medication 40 MILLIGRAM(S): at 05:57

## 2017-11-23 RX ADMIN — HYDROMORPHONE HYDROCHLORIDE 2 MILLIGRAM(S): 2 INJECTION INTRAMUSCULAR; INTRAVENOUS; SUBCUTANEOUS at 12:01

## 2017-11-23 RX ADMIN — Medication 25 MILLIGRAM(S): at 05:57

## 2017-11-23 RX ADMIN — HYDROMORPHONE HYDROCHLORIDE 2 MILLIGRAM(S): 2 INJECTION INTRAMUSCULAR; INTRAVENOUS; SUBCUTANEOUS at 18:56

## 2017-11-23 RX ADMIN — BUDESONIDE AND FORMOTEROL FUMARATE DIHYDRATE 2 PUFF(S): 160; 4.5 AEROSOL RESPIRATORY (INHALATION) at 21:28

## 2017-11-23 RX ADMIN — ISOSORBIDE MONONITRATE 30 MILLIGRAM(S): 60 TABLET, EXTENDED RELEASE ORAL at 11:34

## 2017-11-23 RX ADMIN — BUDESONIDE AND FORMOTEROL FUMARATE DIHYDRATE 2 PUFF(S): 160; 4.5 AEROSOL RESPIRATORY (INHALATION) at 11:34

## 2017-11-23 RX ADMIN — HYDROMORPHONE HYDROCHLORIDE 2 MILLIGRAM(S): 2 INJECTION INTRAMUSCULAR; INTRAVENOUS; SUBCUTANEOUS at 18:02

## 2017-11-23 RX ADMIN — Medication 10 MILLIGRAM(S): at 00:36

## 2017-11-23 RX ADMIN — Medication 40 MILLIGRAM(S): at 11:35

## 2017-11-23 RX ADMIN — Medication 5 MILLIGRAM(S): at 05:58

## 2017-11-23 RX ADMIN — GABAPENTIN 400 MILLIGRAM(S): 400 CAPSULE ORAL at 21:29

## 2017-11-23 RX ADMIN — APIXABAN 5 MILLIGRAM(S): 2.5 TABLET, FILM COATED ORAL at 05:57

## 2017-11-23 RX ADMIN — APIXABAN 5 MILLIGRAM(S): 2.5 TABLET, FILM COATED ORAL at 18:00

## 2017-11-23 RX ADMIN — GABAPENTIN 400 MILLIGRAM(S): 400 CAPSULE ORAL at 14:01

## 2017-11-23 RX ADMIN — Medication 25 MILLIGRAM(S): at 18:00

## 2017-11-23 RX ADMIN — HYDROMORPHONE HYDROCHLORIDE 2 MILLIGRAM(S): 2 INJECTION INTRAMUSCULAR; INTRAVENOUS; SUBCUTANEOUS at 12:30

## 2017-11-23 RX ADMIN — GABAPENTIN 400 MILLIGRAM(S): 400 CAPSULE ORAL at 05:57

## 2017-11-23 RX ADMIN — Medication 40 MILLIGRAM(S): at 23:48

## 2017-11-23 RX ADMIN — CLOPIDOGREL BISULFATE 75 MILLIGRAM(S): 75 TABLET, FILM COATED ORAL at 11:34

## 2017-11-23 NOTE — PROGRESS NOTE ADULT - PROBLEM SELECTOR PLAN 3
ACS protocol  ASA, BB, Statin.  Cardiology consult. ACS protocol  ASA, BB, Statin.  Cardiology follow up

## 2017-11-23 NOTE — PROGRESS NOTE ADULT - PROBLEM SELECTOR PLAN 1
-syncope associated with LOC for less than a minute with full return to baseline  -preceded by dizziness and headache, no jerking activities  -EKG: sinus bradycardia  -f/u CT head, orthostatics  -T1 negative, continue to trend  -monitor on tele, f/u ECHO  -c/w Plavix, statin and metoprolol.   -Cardio consult Dr. Harper   -Neuro consult Dr. Ambrocio -syncope associated with LOC for less than a minute with full return to baseline  -preceded by dizziness and headache, no jerking activities  -EKG: sinus bradycardia  -f/u CT head, orthostatics  -Trops neg x 3  -continue to monitor on tele, f/u ECHO  -c/w Plavix, statin and metoprolol.   -f/u carotid doppler  -Cardio consult Dr. Harper   -Neuro consult Dr. Ambrocio

## 2017-11-23 NOTE — PROGRESS NOTE ADULT - ASSESSMENT
57 years old male from home with extensive PMH presents c/o syncope last night. 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   Patient states that he was in train last night and all of suddenly felt dizzy associated with headache and sweating and synopsized with LOC for less than a minute, regained consciousness quickly.  1.Tele monitoring.  2.AICD interrogation-no evaents  3.Check urine tox.  4.Continue cardiac medication.  5.PAF-Eliquis.  6.PPI.  7.Echocardiogram.

## 2017-11-23 NOTE — PROGRESS NOTE ADULT - SUBJECTIVE AND OBJECTIVE BOX
PGY 1 Note discussed with supervising resident and primary attending    Patient is a 57y old  Male who presents with a chief complaint of syncope (2017 10:19)      INTERVAL HPI/OVERNIGHT EVENTS: no new complaints    MEDICATIONS  (STANDING):  apixaban 5 milliGRAM(s) Oral every 12 hours  atorvastatin 40 milliGRAM(s) Oral at bedtime  buDESOnide 160 MICROgram(s)/formoterol 4.5 MICROgram(s) Inhaler 2 Puff(s) Inhalation two times a day  ciprofloxacin   IVPB      ciprofloxacin   IVPB 400 milliGRAM(s) IV Intermittent every 12 hours  clopidogrel Tablet 75 milliGRAM(s) Oral daily  enalapril 5 milliGRAM(s) Oral daily  gabapentin 400 milliGRAM(s) Oral three times a day  isosorbide   mononitrate ER Tablet (IMDUR) 30 milliGRAM(s) Oral daily  methylPREDNISolone sodium succinate Injectable 40 milliGRAM(s) IV Push every 6 hours  metoprolol     tartrate 25 milliGRAM(s) Oral two times a day  sodium chloride 0.9%. 1000 milliLiter(s) (75 mL/Hr) IV Continuous <Continuous>    MEDICATIONS  (PRN):  calamine Lotion 1 Application(s) Topical two times a day PRN Rash and/or Itching  diphenhydrAMINE   Capsule 25 milliGRAM(s) Oral every 6 hours PRN Rash and/or Itching  HYDROmorphone  Injectable 2 milliGRAM(s) IV Push every 6 hours PRN Moderate Pain (4 - 6)      __________________________________________________  REVIEW OF SYSTEMS:    CONSTITUTIONAL: No fever,   EYES: no acute visual disturbances  NECK: No pain or stiffness  RESPIRATORY: No cough; No shortness of breath  CARDIOVASCULAR: No chest pain, no palpitations  GASTROINTESTINAL: No pain. No nausea or vomiting; No diarrhea   NEUROLOGICAL: No headache or numbness, no tremors  MUSCULOSKELETAL: No joint pain, no muscle pain  GENITOURINARY: no dysuria, no frequency, no hesitancy  PSYCHIATRY: no depression , no anxiety  ALL OTHER  ROS negative        Vital Signs Last 24 Hrs  T(C): 36.8 (2017 04:39), Max: 36.8 (2017 20:34)  T(F): 98.3 (2017 04:39), Max: 98.3 (2017 04:39)  HR: 70 (2017 04:39) (55 - 70)  BP: 142/71 (2017 04:39) (142/71 - 171/101)  BP(mean): --  RR: 18 (2017 04:39) (17 - 18)  SpO2: 98% (2017 04:39) (98% - 100%)    ________________________________________________  PHYSICAL EXAM:  GENERAL: NAD  HEENT:Normocephalic;  conjunctivae and sclerae clear; moist mucous membranes;   NECK : supple  CHEST/LUNG: Clear to auscuitation bilaterally with good air entry   HEART: S1 S2  regular; no murmurs, gallops or rubs  ABDOMEN: Soft, Nontender, Nondistended; Bowel sounds present  EXTREMITIES: no cyanosis; no edema; no calf tenderness  NERVOUS SYSTEM:  Awake and alert; Oriented  to place, person and time ; no new deficits    _________________________________________________  LABS:                        14.7   8.1   )-----------( 180      ( 2017 07:04 )             46.0         139  |  106  |  26<H>  ----------------------------<  72  4.1   |  26  |  0.92    Ca    9.1      2017 07:04    TPro  7.8  /  Alb  3.7  /  TBili  0.5  /  DBili  x   /  AST  81<H>  /  ALT  110<H>  /  AlkPhos  261<H>      PT/INR - ( 2017 07:04 )   PT: 11.3 sec;   INR: 1.04 ratio         PTT - ( 2017 07:04 )  PTT:36.8 sec  Urinalysis Basic - ( 2017 07:04 )    Color: Yellow / Appearance: Clear / S.020 / pH: x  Gluc: x / Ketone: Negative  / Bili: Negative / Urobili: Negative   Blood: x / Protein: 100 / Nitrite: Negative   Leuk Esterase: Negative / RBC: 5-10 /HPF / WBC 6-10 /HPF   Sq Epi: x / Non Sq Epi: Moderate /HPF / Bacteria: Moderate /HPF      CAPILLARY BLOOD GLUCOSE            RADIOLOGY & ADDITIONAL TESTS:    Imaging Personally Reviewed:  YES/NO    Consultant(s) Notes Reviewed:   YES/ No    Care Discussed with Consultants :     Plan of care was discussed with patient and /or primary care giver; all questions and concerns were addressed and care was aligned with patient's wishes. PGY 1 Note discussed with supervising resident and primary attending    Patient is a 57y old  Male who presents with a chief complaint of syncope (2017 10:19)      INTERVAL HPI/OVERNIGHT EVENTS:   No acute events reported overnight.    Today pt presents in no acute distress.  Pt found resting comfortably in bed today.  Pt ambulating without assistance   Pt will undergo dialysis this AM.       MEDICATIONS  (STANDING):  apixaban 5 milliGRAM(s) Oral every 12 hours  atorvastatin 40 milliGRAM(s) Oral at bedtime  buDESOnide 160 MICROgram(s)/formoterol 4.5 MICROgram(s) Inhaler 2 Puff(s) Inhalation two times a day  clopidogrel Tablet 75 milliGRAM(s) Oral daily  enalapril 5 milliGRAM(s) Oral daily  gabapentin 400 milliGRAM(s) Oral three times a day  isosorbide   mononitrate ER Tablet (IMDUR) 30 milliGRAM(s) Oral daily  levoFLOXacin IVPB 750 milliGRAM(s) IV Intermittent every 24 hours  methylPREDNISolone sodium succinate Injectable 40 milliGRAM(s) IV Push every 6 hours  metoprolol     tartrate 25 milliGRAM(s) Oral two times a day  sodium chloride 0.9%. 1000 milliLiter(s) (75 mL/Hr) IV Continuous <Continuous>    MEDICATIONS  (PRN):  calamine Lotion 1 Application(s) Topical two times a day PRN Rash and/or Itching  diphenhydrAMINE   Capsule 25 milliGRAM(s) Oral every 6 hours PRN Rash and/or Itching  HYDROmorphone  Injectable 2 milliGRAM(s) IV Push every 6 hours PRN Moderate Pain (4 - 6)        __________________________________________________  REVIEW OF SYSTEMS:    CONSTITUTIONAL: No fever,   EYES: no acute visual disturbances  NECK: No pain or stiffness  RESPIRATORY: No cough; No shortness of breath  CARDIOVASCULAR: No chest pain, no palpitations  GASTROINTESTINAL: No pain. No nausea or vomiting; No diarrhea   NEUROLOGICAL: No headache or numbness, no tremors  MUSCULOSKELETAL: No joint pain, no muscle pain  GENITOURINARY: no dysuria, no frequency, no hesitancy  PSYCHIATRY: no depression , no anxiety  ALL OTHER  ROS negative        Vital Signs Last 24 Hrs  T(C): 36.8 (2017 04:39), Max: 36.8 (2017 20:34)  T(F): 98.3 (2017 04:39), Max: 98.3 (2017 04:39)  HR: 70 (2017 04:39) (55 - 70)  BP: 142/71 (2017 04:39) (142/71 - 171/101)  BP(mean): --  RR: 18 (2017 04:39) (17 - 18)  SpO2: 98% (2017 04:39) (98% - 100%)    ________________________________________________  PHYSICAL EXAM:  GENERAL: NAD  HEENT: Normocephalic;  conjunctivae and sclerae clear; moist mucous membranes;   NECK : supple  CHEST/LUNG: Clear to auscultation bilaterally with good air entry   HEART: S1 S2  regular; no murmurs, gallops or rubs  ABDOMEN: Soft, Nontender, Nondistended; Bowel sounds present  EXTREMITIES: no cyanosis; no edema; no calf tenderness  NERVOUS SYSTEM:  Awake and alert; Oriented  to place, person and time ; no new deficits    _________________________________________________  LABS:                                   15.5   6.4   )-----------( 186      ( 2017 07:03 )             47.7         139  |  103  |  23<H>  ----------------------------<  120<H>  4.0   |  24  |  0.91    Ca    9.5      2017 07:03  Phos  2.6       Mg     2.1         TPro  7.8  /  Alb  3.7  /  TBili  0.5  /  DBili  x   /  AST  81<H>  /  ALT  110<H>  /  AlkPhos  261<H>  11-22      Color: Yellow / Appearance: Clear / S.020 / pH: x  Gluc: x / Ketone: Negative  / Bili: Negative / Urobili: Negative   Blood: x / Protein: 100 / Nitrite: Negative   Leuk Esterase: Negative / RBC: 5-10 /HPF / WBC 6-10 /HPF   Sq Epi: x / Non Sq Epi: Moderate /HPF / Bacteria: Moderate /HPF      CAPILLARY BLOOD GLUCOSE    RADIOLOGY & ADDITIONAL TESTS:    Imaging Personally Reviewed:  YES    Consultant(s) Notes Reviewed:   YES    Care Discussed with Consultants :     Plan of care was discussed with patient and /or primary care giver; all questions and concerns were addressed and care was aligned with patient's wishes.

## 2017-11-23 NOTE — PROGRESS NOTE ADULT - PROBLEM SELECTOR PLAN 1
Tele monitoring  Echo and carotid doppler  Neuro and cardio eval. Tele monitoring  Echo and carotid doppler  Neuro and cardio follow up

## 2017-11-23 NOTE — PROGRESS NOTE ADULT - PROBLEM SELECTOR PLAN 2
-severe left flank pain associated with increased urinary frequency and burning pain  -h/o kidney stones in past  -f/u CT abdomen to r/o hydronephrosis or renal stone causing obstructive uropathy  -c/w IV abx, gentle IV hydration and pain control -severe left flank pain associated with increased urinary frequency and burning pain  -h/o kidney stones in past  -f/u CT abdomen to r/o hydronephrosis or renal stone causing obstructive uropathy  -UA pos, awaiting cultures  -c/w Cipro day 2

## 2017-11-23 NOTE — PROGRESS NOTE ADULT - SUBJECTIVE AND OBJECTIVE BOX
Patient is a 57y old  Male who presents with a chief complaint of syncope.  PMH of COPD.  Awake, alert, comfortable in bed in NAD.      INTERVAL HPI/OVERNIGHT EVENTS:      VITAL SIGNS:  T(F): 97.6 (17 @ 07:47)  HR: 72 (17 @ 07:47)  BP: 119/91 (17 @ 07:47)  RR: 16 (17 @ 07:47)  SpO2: 100% (17 @ 07:47)  Wt(kg): --  I&O's Detail    2017 07:01  -  2017 07:00  --------------------------------------------------------  IN:    sodium chloride 0.9%.: 600 mL    Solution: 200 mL  Total IN: 800 mL    OUT:  Total OUT: 0 mL    Total NET: 800 mL              REVIEW OF SYSTEMS:    CONSTITUTIONAL:  No fevers, chills, sweats    HEENT:  Eyes:  No diplopia or blurred vision. ENT:  No earache, sore throat or runny nose.    CARDIOVASCULAR:  No pressure, squeezing, tightness, or heaviness about the chest; no palpitations.    RESPIRATORY:  Per HPI    GASTROINTESTINAL:  No abdominal pain, nausea, vomiting or diarrhea.    GENITOURINARY:  No dysuria, frequency or urgency.    NEUROLOGIC:  No paresthesias, fasciculations, seizures or weakness.    PSYCHIATRIC:  No disorder of thought or mood.      PHYSICAL EXAM:    Constitutional: Well developed and nourished  Eyes:Perrla  ENMT: normal  Neck:supple  Respiratory: good air entry  Cardiovascular: S1 S2 regular  Gastrointestinal: Soft, Non tender  Extremities: No edema  Vascular:normal  Neurological:Awake, alert,Ox3  Musculoskeletal:Normal      MEDICATIONS  (STANDING):  apixaban 5 milliGRAM(s) Oral every 12 hours  atorvastatin 40 milliGRAM(s) Oral at bedtime  buDESOnide 160 MICROgram(s)/formoterol 4.5 MICROgram(s) Inhaler 2 Puff(s) Inhalation two times a day  clopidogrel Tablet 75 milliGRAM(s) Oral daily  enalapril 5 milliGRAM(s) Oral daily  gabapentin 400 milliGRAM(s) Oral three times a day  isosorbide   mononitrate ER Tablet (IMDUR) 30 milliGRAM(s) Oral daily  levoFLOXacin IVPB 750 milliGRAM(s) IV Intermittent every 24 hours  methylPREDNISolone sodium succinate Injectable 40 milliGRAM(s) IV Push every 6 hours  metoprolol     tartrate 25 milliGRAM(s) Oral two times a day  sodium chloride 0.9%. 1000 milliLiter(s) (75 mL/Hr) IV Continuous <Continuous>    MEDICATIONS  (PRN):  calamine Lotion 1 Application(s) Topical two times a day PRN Rash and/or Itching  diphenhydrAMINE   Capsule 25 milliGRAM(s) Oral every 6 hours PRN Rash and/or Itching  HYDROmorphone  Injectable 2 milliGRAM(s) IV Push every 6 hours PRN Moderate Pain (4 - 6)      Allergies    aspirin (Hives)  codeine (Rash)  Motrin (Rash)  penicillin (Hives; Anaphylaxis)  Toradol (Rash)  Tylenol (Hives)    Intolerances        LABS:                        15.5   6.4   )-----------( 186      ( 2017 07:03 )             47.7     11    139  |  103  |  23<H>  ----------------------------<  120<H>  4.0   |  24  |  0.91    Ca    9.5      2017 07:03  Phos  2.6       Mg     2.1         TPro  7.8  /  Alb  3.7  /  TBili  0.5  /  DBili  x   /  AST  81<H>  /  ALT  110<H>  /  AlkPhos  261<H>  11    PT/INR - ( 2017 07:04 )   PT: 11.3 sec;   INR: 1.04 ratio         PTT - ( 2017 07:04 )  PTT:36.8 sec  Urinalysis Basic - ( 2017 07:04 )    Color: Yellow / Appearance: Clear / S.020 / pH: x  Gluc: x / Ketone: Negative  / Bili: Negative / Urobili: Negative   Blood: x / Protein: 100 / Nitrite: Negative   Leuk Esterase: Negative / RBC: 5-10 /HPF / WBC 6-10 /HPF   Sq Epi: x / Non Sq Epi: Moderate /HPF / Bacteria: Moderate /HPF        CARDIAC MARKERS ( 2017 07:03 )  <0.015 ng/mL / x     / 127 U/L / x     / 6.3 ng/mL  CARDIAC MARKERS ( 2017 15:26 )  0.016 ng/mL / x     / 174 U/L / x     / 7.3 ng/mL  CARDIAC MARKERS ( 2017 07:04 )  <0.015 ng/mL / x     / 220 U/L / x     / 7.6 ng/mL      CAPILLARY BLOOD GLUCOSE        pro-bnp --  @ 07:04     d-dimer <150   @ 07:04      RADIOLOGY & ADDITIONAL TESTS:    CXR:    Ct scan chest:    ekg;    echo: Patient is a 57y old  Male who presents with a chief complaint of syncope.  PMH of COPD.  Awake, alert, comfortable in bed in NAD.  Denies sob or cough. No chest pain or dizziness    INTERVAL HPI/OVERNIGHT EVENTS:      VITAL SIGNS:  T(F): 97.6 (17 @ 07:47)  HR: 72 (17 @ 07:47)  BP: 119/91 (17 @ 07:47)  RR: 16 (17 @ 07:47)  SpO2: 100% (17 @ 07:47)  Wt(kg): --  I&O's Detail    2017 07:01  -  2017 07:00  --------------------------------------------------------  IN:    sodium chloride 0.9%.: 600 mL    Solution: 200 mL  Total IN: 800 mL    OUT:  Total OUT: 0 mL    Total NET: 800 mL              REVIEW OF SYSTEMS:    CONSTITUTIONAL:  No fevers, chills, sweats    HEENT:  Eyes:  No diplopia or blurred vision. ENT:  No earache, sore throat or runny nose.    CARDIOVASCULAR:  No pressure, squeezing, tightness, or heaviness about the chest; no palpitations.    RESPIRATORY:  Per HPI    GASTROINTESTINAL:  No abdominal pain, nausea, vomiting or diarrhea.    GENITOURINARY:  No dysuria, frequency or urgency.    NEUROLOGIC:  No paresthesias, fasciculations, seizures or weakness.    PSYCHIATRIC:  No disorder of thought or mood.      PHYSICAL EXAM:    Constitutional: Well developed and nourished  Eyes:Perrla  ENMT: normal  Neck:supple  Respiratory: good air entry  Cardiovascular: S1 S2 regular  Gastrointestinal: Soft, Non tender  Extremities: No edema  Vascular:normal  Neurological:Awake, alert,Ox3  Musculoskeletal:Normal      MEDICATIONS  (STANDING):  apixaban 5 milliGRAM(s) Oral every 12 hours  atorvastatin 40 milliGRAM(s) Oral at bedtime  buDESOnide 160 MICROgram(s)/formoterol 4.5 MICROgram(s) Inhaler 2 Puff(s) Inhalation two times a day  clopidogrel Tablet 75 milliGRAM(s) Oral daily  enalapril 5 milliGRAM(s) Oral daily  gabapentin 400 milliGRAM(s) Oral three times a day  isosorbide   mononitrate ER Tablet (IMDUR) 30 milliGRAM(s) Oral daily  levoFLOXacin IVPB 750 milliGRAM(s) IV Intermittent every 24 hours  methylPREDNISolone sodium succinate Injectable 40 milliGRAM(s) IV Push every 6 hours  metoprolol     tartrate 25 milliGRAM(s) Oral two times a day  sodium chloride 0.9%. 1000 milliLiter(s) (75 mL/Hr) IV Continuous <Continuous>    MEDICATIONS  (PRN):  calamine Lotion 1 Application(s) Topical two times a day PRN Rash and/or Itching  diphenhydrAMINE   Capsule 25 milliGRAM(s) Oral every 6 hours PRN Rash and/or Itching  HYDROmorphone  Injectable 2 milliGRAM(s) IV Push every 6 hours PRN Moderate Pain (4 - 6)      Allergies    aspirin (Hives)  codeine (Rash)  Motrin (Rash)  penicillin (Hives; Anaphylaxis)  Toradol (Rash)  Tylenol (Hives)    Intolerances        LABS:                        15.5   6.4   )-----------( 186      ( 2017 07:03 )             47.7     11-    139  |  103  |  23<H>  ----------------------------<  120<H>  4.0   |  24  |  0.91    Ca    9.5      2017 07:03  Phos  2.6       Mg     2.1         TPro  7.8  /  Alb  3.7  /  TBili  0.5  /  DBili  x   /  AST  81<H>  /  ALT  110<H>  /  AlkPhos  261<H>  11-22    PT/INR - ( 2017 07:04 )   PT: 11.3 sec;   INR: 1.04 ratio         PTT - ( 2017 07:04 )  PTT:36.8 sec  Urinalysis Basic - ( 2017 07:04 )    Color: Yellow / Appearance: Clear / S.020 / pH: x  Gluc: x / Ketone: Negative  / Bili: Negative / Urobili: Negative   Blood: x / Protein: 100 / Nitrite: Negative   Leuk Esterase: Negative / RBC: 5-10 /HPF / WBC 6-10 /HPF   Sq Epi: x / Non Sq Epi: Moderate /HPF / Bacteria: Moderate /HPF        CARDIAC MARKERS ( 2017 07:03 )  <0.015 ng/mL / x     / 127 U/L / x     / 6.3 ng/mL  CARDIAC MARKERS ( 2017 15:26 )  0.016 ng/mL / x     / 174 U/L / x     / 7.3 ng/mL  CARDIAC MARKERS ( 2017 07:04 )  <0.015 ng/mL / x     / 220 U/L / x     / 7.6 ng/mL      CAPILLARY BLOOD GLUCOSE        pro-bnp --  @ 07:04     d-dimer <150   @ 07:04      RADIOLOGY & ADDITIONAL TESTS:    CXR:    Ct scan chest:    ekg;    echo:

## 2017-11-23 NOTE — PROGRESS NOTE ADULT - PROBLEM SELECTOR PLAN 7
-[employer], Last interrogated one month back   -Last known (8 weeks ago) EF is 40-45% (as per patient) -[employer]- no abn on interrogation  -Last known (8 weeks ago) EF is 40-45% (as per patient)

## 2017-11-23 NOTE — PROGRESS NOTE ADULT - ASSESSMENT
57 years old male from home with extensive PMH presents c/o syncope last night. Also has severe left flank pain associated with dysuria and increased urinary frequency. Admitted to telemetry for further management. 57 years old male from home with extensive PMH presents c/o syncope last night. Also has severe left flank pain associated with dysuria and increased urinary frequency. Admitted to telemetry for further management.   Pt remains asymptomatic and rate controlled.  Pain is controlled with steroids and gabapentin.  Pt awaiting neuro consult.  Pacer interrogated- no abnormalities.  Will continue IV steroid for now.

## 2017-11-23 NOTE — PROGRESS NOTE ADULT - SUBJECTIVE AND OBJECTIVE BOX
CHIEF COMPLAINT:Patient is a 57y old  Male who presents with a chief complaint of syncope (22 Nov 2017 10:19)    	  REVIEW OF SYSTEMS:  CONSTITUTIONAL: No fever, weight loss, or fatigue  EYES: No eye pain, visual disturbances, or discharge  ENT:  No difficulty hearing, tinnitus, vertigo; No sinus or throat pain  NECK: No pain or stiffness  RESPIRATORY: No cough, wheezing, chills or hemoptysis; No Shortness of Breath  CARDIOVASCULAR: No chest pain, palpitations, passing out, dizziness, or leg swelling  GASTROINTESTINAL: No abdominal or epigastric pain. No nausea, vomiting, or hematemesis; No diarrhea or constipation. No melena or hematochezia.  GENITOURINARY: No dysuria, frequency, hematuria, or incontinence  NEUROLOGICAL: No headaches, memory loss, loss of strength, numbness, or tremors  SKIN: No itching, burning, rashes, or lesions   LYMPH Nodes: No enlarged glands  ENDOCRINE: No heat or cold intolerance; No hair loss  MUSCULOSKELETAL: No joint pain or swelling; No muscle, back, or extremity pain  PSYCHIATRIC: No depression, anxiety, mood swings, or difficulty sleeping  HEME/LYMPH: No easy bruising, or bleeding gums  ALLERGY AND IMMUNOLOGIC: No hives or eczema	    [ ] All others negative	  [ ] Unable to obtain    PHYSICAL EXAM:  T(C): 36.4 (11-23-17 @ 07:47), Max: 36.8 (11-22-17 @ 20:34)  HR: 72 (11-23-17 @ 07:47) (56 - 72)  BP: 119/91 (11-23-17 @ 07:47) (119/91 - 171/101)  RR: 16 (11-23-17 @ 07:47) (16 - 18)  SpO2: 100% (11-23-17 @ 07:47) (98% - 100%)  Wt(kg): --  I&O's Summary    22 Nov 2017 07:01  -  23 Nov 2017 07:00  --------------------------------------------------------  IN: 800 mL / OUT: 0 mL / NET: 800 mL        Appearance: Normal	  HEENT:   Normal oral mucosa, PERRL, EOMI	  Lymphatic: No lymphadenopathy  Cardiovascular: Normal S1 S2, No JVD, No murmurs, No edema  Respiratory: Lungs clear to auscultation	  Psychiatry: A & O x 3, Mood & affect appropriate  Gastrointestinal:  Soft, Non-tender, + BS	  Skin: No rashes, No ecchymoses, No cyanosis	  Neurologic: Non-focal  Extremities: Normal range of motion, No clubbing, cyanosis or edema  Vascular: Peripheral pulses palpable 2+ bilaterally    MEDICATIONS  (STANDING):  apixaban 5 milliGRAM(s) Oral every 12 hours  atorvastatin 40 milliGRAM(s) Oral at bedtime  buDESOnide 160 MICROgram(s)/formoterol 4.5 MICROgram(s) Inhaler 2 Puff(s) Inhalation two times a day  ciprofloxacin   IVPB      ciprofloxacin   IVPB 400 milliGRAM(s) IV Intermittent every 12 hours  clopidogrel Tablet 75 milliGRAM(s) Oral daily  enalapril 5 milliGRAM(s) Oral daily  gabapentin 400 milliGRAM(s) Oral three times a day  isosorbide   mononitrate ER Tablet (IMDUR) 30 milliGRAM(s) Oral daily  methylPREDNISolone sodium succinate Injectable 40 milliGRAM(s) IV Push every 6 hours  metoprolol     tartrate 25 milliGRAM(s) Oral two times a day  sodium chloride 0.9%. 1000 milliLiter(s) (75 mL/Hr) IV Continuous <Continuous>      TELEMETRY: 	    ECG:  	  RADIOLOGY:  OTHER: 	  	  LABS:	 	    CARDIAC MARKERS:  CARDIAC MARKERS ( 23 Nov 2017 07:03 )  <0.015 ng/mL / x     / 127 U/L / x     / 6.3 ng/mL  CARDIAC MARKERS ( 22 Nov 2017 15:26 )  0.016 ng/mL / x     / 174 U/L / x     / 7.3 ng/mL  CARDIAC MARKERS ( 22 Nov 2017 07:04 )  <0.015 ng/mL / x     / 220 U/L / x     / 7.6 ng/mL                                15.5   6.4   )-----------( 186      ( 23 Nov 2017 07:03 )             47.7     11-23    139  |  103  |  23<H>  ----------------------------<  120<H>  4.0   |  24  |  0.91    Ca    9.5      23 Nov 2017 07:03  Phos  2.6     11-23  Mg     2.1     11-23    TPro  7.8  /  Alb  3.7  /  TBili  0.5  /  DBili  x   /  AST  81<H>  /  ALT  110<H>  /  AlkPhos  261<H>  11-22      Lipid Profile: Cholesterol 138  LDL 43  HDL 88  TG 33      TSH: Thyroid Stimulating Hormone, Serum: 0.49 uU/mL (11-23 @ 07:03)      AICD-nl fx,no events.   Cath-9/29/17-s/p PTCA of RCA

## 2017-11-24 ENCOUNTER — TRANSCRIPTION ENCOUNTER (OUTPATIENT)
Age: 57
End: 2017-11-24

## 2017-11-24 LAB
ANION GAP SERPL CALC-SCNC: 8 MMOL/L — SIGNIFICANT CHANGE UP (ref 5–17)
BUN SERPL-MCNC: 39 MG/DL — HIGH (ref 7–18)
CALCIUM SERPL-MCNC: 9 MG/DL — SIGNIFICANT CHANGE UP (ref 8.4–10.5)
CHLORIDE SERPL-SCNC: 107 MMOL/L — SIGNIFICANT CHANGE UP (ref 96–108)
CO2 SERPL-SCNC: 23 MMOL/L — SIGNIFICANT CHANGE UP (ref 22–31)
CREAT SERPL-MCNC: 0.98 MG/DL — SIGNIFICANT CHANGE UP (ref 0.5–1.3)
GLUCOSE SERPL-MCNC: 179 MG/DL — HIGH (ref 70–99)
HCT VFR BLD CALC: 42.5 % — SIGNIFICANT CHANGE UP (ref 39–50)
HGB BLD-MCNC: 14 G/DL — SIGNIFICANT CHANGE UP (ref 13–17)
MAGNESIUM SERPL-MCNC: 2.3 MG/DL — SIGNIFICANT CHANGE UP (ref 1.6–2.6)
MCHC RBC-ENTMCNC: 30.2 PG — SIGNIFICANT CHANGE UP (ref 27–34)
MCHC RBC-ENTMCNC: 33 GM/DL — SIGNIFICANT CHANGE UP (ref 32–36)
MCV RBC AUTO: 91.4 FL — SIGNIFICANT CHANGE UP (ref 80–100)
PHOSPHATE SERPL-MCNC: 3.6 MG/DL — SIGNIFICANT CHANGE UP (ref 2.5–4.5)
PLATELET # BLD AUTO: 174 K/UL — SIGNIFICANT CHANGE UP (ref 150–400)
POTASSIUM SERPL-MCNC: 4.4 MMOL/L — SIGNIFICANT CHANGE UP (ref 3.5–5.3)
POTASSIUM SERPL-SCNC: 4.4 MMOL/L — SIGNIFICANT CHANGE UP (ref 3.5–5.3)
RBC # BLD: 4.65 M/UL — SIGNIFICANT CHANGE UP (ref 4.2–5.8)
RBC # FLD: 13.5 % — SIGNIFICANT CHANGE UP (ref 10.3–14.5)
SODIUM SERPL-SCNC: 138 MMOL/L — SIGNIFICANT CHANGE UP (ref 135–145)
WBC # BLD: 15.8 K/UL — HIGH (ref 3.8–10.5)
WBC # FLD AUTO: 15.8 K/UL — HIGH (ref 3.8–10.5)

## 2017-11-24 RX ORDER — HYDROMORPHONE HYDROCHLORIDE 2 MG/ML
0.5 INJECTION INTRAMUSCULAR; INTRAVENOUS; SUBCUTANEOUS ONCE
Qty: 0 | Refills: 0 | Status: DISCONTINUED | OUTPATIENT
Start: 2017-11-24 | End: 2017-11-24

## 2017-11-24 RX ORDER — TRAMADOL HYDROCHLORIDE 50 MG/1
50 TABLET ORAL EVERY 12 HOURS
Qty: 0 | Refills: 0 | Status: DISCONTINUED | OUTPATIENT
Start: 2017-11-24 | End: 2017-11-27

## 2017-11-24 RX ORDER — SENNA PLUS 8.6 MG/1
2 TABLET ORAL AT BEDTIME
Qty: 0 | Refills: 0 | Status: COMPLETED | OUTPATIENT
Start: 2017-11-24 | End: 2017-11-26

## 2017-11-24 RX ORDER — SENNA PLUS 8.6 MG/1
2 TABLET ORAL
Qty: 0 | Refills: 0 | COMMUNITY
Start: 2017-11-24

## 2017-11-24 RX ORDER — HYDROMORPHONE HYDROCHLORIDE 2 MG/ML
1 INJECTION INTRAMUSCULAR; INTRAVENOUS; SUBCUTANEOUS ONCE
Qty: 0 | Refills: 0 | Status: DISCONTINUED | OUTPATIENT
Start: 2017-11-24 | End: 2017-11-24

## 2017-11-24 RX ADMIN — HYDROMORPHONE HYDROCHLORIDE 0.5 MILLIGRAM(S): 2 INJECTION INTRAMUSCULAR; INTRAVENOUS; SUBCUTANEOUS at 16:26

## 2017-11-24 RX ADMIN — HYDROMORPHONE HYDROCHLORIDE 2 MILLIGRAM(S): 2 INJECTION INTRAMUSCULAR; INTRAVENOUS; SUBCUTANEOUS at 12:09

## 2017-11-24 RX ADMIN — SENNA PLUS 2 TABLET(S): 8.6 TABLET ORAL at 21:52

## 2017-11-24 RX ADMIN — HYDROMORPHONE HYDROCHLORIDE 2 MILLIGRAM(S): 2 INJECTION INTRAMUSCULAR; INTRAVENOUS; SUBCUTANEOUS at 07:04

## 2017-11-24 RX ADMIN — ATORVASTATIN CALCIUM 40 MILLIGRAM(S): 80 TABLET, FILM COATED ORAL at 21:52

## 2017-11-24 RX ADMIN — HYDROMORPHONE HYDROCHLORIDE 2 MILLIGRAM(S): 2 INJECTION INTRAMUSCULAR; INTRAVENOUS; SUBCUTANEOUS at 06:08

## 2017-11-24 RX ADMIN — APIXABAN 5 MILLIGRAM(S): 2.5 TABLET, FILM COATED ORAL at 18:30

## 2017-11-24 RX ADMIN — Medication 40 MILLIGRAM(S): at 21:52

## 2017-11-24 RX ADMIN — APIXABAN 5 MILLIGRAM(S): 2.5 TABLET, FILM COATED ORAL at 06:08

## 2017-11-24 RX ADMIN — HYDROMORPHONE HYDROCHLORIDE 0.5 MILLIGRAM(S): 2 INJECTION INTRAMUSCULAR; INTRAVENOUS; SUBCUTANEOUS at 09:06

## 2017-11-24 RX ADMIN — HYDROMORPHONE HYDROCHLORIDE 2 MILLIGRAM(S): 2 INJECTION INTRAMUSCULAR; INTRAVENOUS; SUBCUTANEOUS at 18:45

## 2017-11-24 RX ADMIN — HYDROMORPHONE HYDROCHLORIDE 2 MILLIGRAM(S): 2 INJECTION INTRAMUSCULAR; INTRAVENOUS; SUBCUTANEOUS at 18:30

## 2017-11-24 RX ADMIN — HYDROMORPHONE HYDROCHLORIDE 0.5 MILLIGRAM(S): 2 INJECTION INTRAMUSCULAR; INTRAVENOUS; SUBCUTANEOUS at 08:51

## 2017-11-24 RX ADMIN — Medication 40 MILLIGRAM(S): at 06:08

## 2017-11-24 RX ADMIN — GABAPENTIN 400 MILLIGRAM(S): 400 CAPSULE ORAL at 21:52

## 2017-11-24 RX ADMIN — GABAPENTIN 400 MILLIGRAM(S): 400 CAPSULE ORAL at 06:08

## 2017-11-24 RX ADMIN — HYDROMORPHONE HYDROCHLORIDE 2 MILLIGRAM(S): 2 INJECTION INTRAMUSCULAR; INTRAVENOUS; SUBCUTANEOUS at 12:24

## 2017-11-24 RX ADMIN — CLOPIDOGREL BISULFATE 75 MILLIGRAM(S): 75 TABLET, FILM COATED ORAL at 12:09

## 2017-11-24 RX ADMIN — BUDESONIDE AND FORMOTEROL FUMARATE DIHYDRATE 2 PUFF(S): 160; 4.5 AEROSOL RESPIRATORY (INHALATION) at 21:52

## 2017-11-24 RX ADMIN — Medication 25 MILLIGRAM(S): at 18:30

## 2017-11-24 RX ADMIN — ISOSORBIDE MONONITRATE 30 MILLIGRAM(S): 60 TABLET, EXTENDED RELEASE ORAL at 12:09

## 2017-11-24 RX ADMIN — GABAPENTIN 400 MILLIGRAM(S): 400 CAPSULE ORAL at 16:31

## 2017-11-24 RX ADMIN — BUDESONIDE AND FORMOTEROL FUMARATE DIHYDRATE 2 PUFF(S): 160; 4.5 AEROSOL RESPIRATORY (INHALATION) at 12:09

## 2017-11-24 RX ADMIN — HYDROMORPHONE HYDROCHLORIDE 0.5 MILLIGRAM(S): 2 INJECTION INTRAMUSCULAR; INTRAVENOUS; SUBCUTANEOUS at 16:41

## 2017-11-24 RX ADMIN — HYDROMORPHONE HYDROCHLORIDE 2 MILLIGRAM(S): 2 INJECTION INTRAMUSCULAR; INTRAVENOUS; SUBCUTANEOUS at 00:28

## 2017-11-24 RX ADMIN — Medication 5 MILLIGRAM(S): at 06:08

## 2017-11-24 RX ADMIN — Medication 25 MILLIGRAM(S): at 06:08

## 2017-11-24 NOTE — DISCHARGE NOTE ADULT - CARE PLAN
Principal Discharge DX:	Left flank pain  Goal:	Please schedule close follow up with PCP  Instructions for follow-up, activity and diet:	You were admitted for lt flank pain thought to be from renal stones.  CT abd/pelvis neg for stones.  US kidney bladder showed              .  Please schedule close follow up with PCP within one week of discharge and for continued pain management.  Please complete ____ day course of Levaquin.  Secondary Diagnosis:	Atrial fibrillation  Goal:	Please continue current cardiac regimen  Instructions for follow-up, activity and diet:	You are rate controlled and on anticoagulation.  Please continue rate control meds  Secondary Diagnosis:	CAD (coronary artery disease)  Goal:	Please continue current cardiac regimen  Secondary Diagnosis:	Syncope  Goal:	Please schedule close follow up with PCP  Instructions for follow-up, activity and diet:	You presented post syncopal episode and were admitted for syncope workup.  No events on Telemetry.  Troponins neg.  Echo sig for EF 72% w/ mod Lt ventricular hypertrophy, and grade II DD.  Carotid doppler sig for _____.  CT head was neg for acute intracranial process, and neurologist, Dr. Arana, does not believe this was due to a neurological process.  Please schedule close follow up with PCP within 1 wek of discharge. Principal Discharge DX:	Left flank pain  Goal:	Please schedule close follow up with PCP  Instructions for follow-up, activity and diet:	You were admitted for lt flank pain thought to be from renal stones.  CT abd/pelvis neg for stones.  US kidney bladder showed              .  Please schedule close follow up with PCP within one week of discharge and for continued pain management.  Please complete ____ day course of Levaquin.  Secondary Diagnosis:	Atrial fibrillation  Goal:	Please continue current cardiac regimen  Instructions for follow-up, activity and diet:	You are rate controlled and on anticoagulation.  Please continue rate control meds  Secondary Diagnosis:	CAD (coronary artery disease)  Goal:	Please continue current cardiac regimen  Secondary Diagnosis:	Syncope  Goal:	Please schedule close follow up with PCP  Instructions for follow-up, activity and diet:	You presented post syncopal episode and were admitted for syncope workup.  No events on Telemetry.  Troponins neg.  Echo sig for EF 72% w/ mod Lt ventricular hypertrophy, and grade II DD.  Carotid doppler sig for _____.  CT head was neg for acute intracranial process, and neurologist, Dr. Arana, does not believe this was due to a neurological process.  Please schedule close follow up with PCP within 1 wek of discharge.  Secondary Diagnosis:	Renal cell carcinoma of left kidney  Goal:	Please schedule close follow up with urologist within one week of discharge  Instructions for follow-up, activity and diet:	Hematuria likely 2/2 renal cell carcinoma.  Please follow up with urologist as OP.  Recommendation below- Dr. Mart. Principal Discharge DX:	Left flank pain  Goal:	Please schedule close follow up with PCP  Instructions for follow-up, activity and diet:	You were admitted for lt flank pain thought to be from renal stones.  CT abd/pelvis neg for stones.   Please  follow up with PCP dr. Hyatt within one week of discharge and for continued pain management.  Please complete 7day course of Levaquin.  Secondary Diagnosis:	Atrial fibrillation  Goal:	Please continue current cardiac regimen  Instructions for follow-up, activity and diet:	You are rate controlled and on anticoagulation.  Please continue rate control meds  Secondary Diagnosis:	CAD (coronary artery disease)  Goal:	Please continue current cardiac regimen  Secondary Diagnosis:	Syncope  Goal:	Please schedule close follow up with PCP  Instructions for follow-up, activity and diet:	You presented post syncopal episode and were admitted for syncope workup.  No events on Telemetry.  Troponins neg.  Echo sig for EF 72% w/ mod Lt ventricular hypertrophy, and grade II DD.  CT head was neg for acute intracranial process, and neurologist, Dr. Arana, does not believe this was due to a neurological process.  Please follow up with PCP within 1 week of discharge.  Secondary Diagnosis:	Renal cell carcinoma of left kidney  Goal:	Please schedule close follow up with urologist within one week of discharge  Instructions for follow-up, activity and diet:	Hematuria likely 2/2 renal cell carcinoma.  Please follow up with urologist as OP.  Recommendation below- Dr. Mart. Principal Discharge DX:	Left flank pain  Goal:	Please schedule close follow up with PCP  Instructions for follow-up, activity and diet:	You were admitted for lt flank pain thought to be from renal stones.  CT abd/pelvis neg for stones.   Please  follow up with PCP dr. Hyatt within one week of discharge and for continued pain management.  Please complete 7day course of Levaquin.  Secondary Diagnosis:	Atrial fibrillation  Goal:	Please continue current cardiac regimen  Instructions for follow-up, activity and diet:	You are rate controlled and on anticoagulation.  Please continue rate control meds  Secondary Diagnosis:	CAD (coronary artery disease)  Goal:	Please continue current cardiac regimen  Secondary Diagnosis:	Syncope  Goal:	Please schedule close follow up with PCP  Instructions for follow-up, activity and diet:	You presented post syncopal episode and were admitted for syncope workup.  No events on Telemetry.  Troponins neg.  Carotid doppler negative. Echo sig for EF 72% w/ mod Lt ventricular hypertrophy, and grade II DD.  CT head was neg for acute intracranial process, and neurologist, Dr. Arana, does not believe this was due to a neurological process.  Please follow up with PCP within 1 week of discharge.  Secondary Diagnosis:	Renal cell carcinoma of left kidney  Goal:	Please schedule close follow up with urologist within one week of discharge  Instructions for follow-up, activity and diet:	Hematuria likely 2/2 renal cell carcinoma.  Please follow up with urologist as OP.  Recommendation below- Dr. Mart.

## 2017-11-24 NOTE — DISCHARGE NOTE ADULT - PATIENT PORTAL LINK FT
“You can access the [facility] Patient Portal, offered by [facility], by registering with the following website: [email or web address]”

## 2017-11-24 NOTE — DISCHARGE NOTE ADULT - PROVIDER TOKENS
ARLETH:'[id number]' TOKEN:'[id number]',FREE:[LAST:[Olena],FIRST:[Amy],PHONE:[phone]:[(   )    -],ADDRESS:[[address] [place] [address]]]

## 2017-11-24 NOTE — DISCHARGE NOTE ADULT - MEDICATION SUMMARY - MEDICATIONS TO TAKE
I will START or STAY ON the medications listed below when I get home from the hospital:    traMADol 50 mg oral tablet  -- 1 tab(s) by mouth every 12 hours, As needed, Moderate Pain (4 - 6) MDD:2  -- Indication: For Pain    enalapril 5 mg oral tablet  -- 1 tab(s) by mouth once a day  -- Indication: For Hypertension    Flomax 0.4 mg oral capsule  -- 1 cap(s) by mouth once a day  -- Indication: For Prostate    isosorbide mononitrate 30 mg oral tablet, extended release  -- 1 tab(s) by mouth once a day  -- Indication: For Hypertension    apixaban 5 mg oral tablet  -- 1 tab(s) by mouth every 12 hours  -- Indication: For Atrial fibrillation    gabapentin 400 mg oral capsule  -- 1 cap(s) by mouth 3 times a day  -- Indication: For Pain    clonazePAM 1 mg oral tablet  -- 1 tab(s) by mouth once a day (at bedtime)  -- Indication: For Anxiety    traZODone 50 mg oral tablet  -- 1 tab(s) by mouth once a day  -- Indication: For Anxiety    atorvastatin 40 mg oral tablet  -- 1 tab(s) by mouth once a day (at bedtime)  -- Indication: For Hyperlipidemia    clopidogrel 75 mg oral tablet  -- 1 tab(s) by mouth once a day  -- Indication: For CAD (coronary artery disease)    metoprolol tartrate 25 mg oral tablet  -- 1 tab(s) by mouth 2 times a day  -- Indication: For Hypertension    Spiriva 18 mcg inhalation capsule  -- 1 cap(s) inhaled once a day  -- Indication: For COPD (chronic obstructive pulmonary disease)    Symbicort 160 mcg-4.5 mcg/inh inhalation aerosol  -- 2 puff(s) inhaled 2 times a day  -- Indication: For COPD (chronic obstructive pulmonary disease)    calamine topical lotion  -- 1 application on skin 2 times a day, As needed, Rash and/or Itching  -- Indication: For Rash    senna oral tablet  -- 2 tab(s) by mouth once a day (at bedtime)  -- Indication: For COnstipation    levoFLOXacin 750 mg oral tablet  -- 1 tab(s) by mouth every 24 hours  -- Indication: For uti

## 2017-11-24 NOTE — PROGRESS NOTE ADULT - SUBJECTIVE AND OBJECTIVE BOX
CHIEF COMPLAINT:Patient is a 57y old  Male who presents with a chief complaint of syncope. Pt appears comfortable.    	  REVIEW OF SYSTEMS:  CONSTITUTIONAL: No fever, weight loss, or fatigue  EYES: No eye pain, visual disturbances, or discharge  ENT:  No difficulty hearing, tinnitus, vertigo; No sinus or throat pain  NECK: No pain or stiffness  RESPIRATORY: No cough, wheezing, chills or hemoptysis; No Shortness of Breath  CARDIOVASCULAR: No chest pain, palpitations, passing out, dizziness, or leg swelling  GASTROINTESTINAL: No abdominal or epigastric pain. No nausea, vomiting, or hematemesis; No diarrhea or constipation. No melena or hematochezia.  GENITOURINARY: No dysuria, frequency, hematuria, or incontinence  NEUROLOGICAL: No headaches, memory loss, loss of strength, numbness, or tremors  SKIN: No itching, burning, rashes, or lesions   LYMPH Nodes: No enlarged glands  ENDOCRINE: No heat or cold intolerance; No hair loss  MUSCULOSKELETAL: No joint pain or swelling; No muscle, back, or extremity pain  PSYCHIATRIC: No depression, anxiety, mood swings, or difficulty sleeping  HEME/LYMPH: No easy bruising, or bleeding gums  ALLERGY AND IMMUNOLOGIC: No hives or eczema	      PHYSICAL EXAM:  T(C): 36.7 (11-24-17 @ 04:37), Max: 36.7 (11-23-17 @ 11:43)  HR: 68 (11-24-17 @ 04:37) (68 - 90)  BP: 139/73 (11-24-17 @ 04:37) (120/52 - 146/80)  RR: 17 (11-24-17 @ 04:37) (16 - 18)  SpO2: 99% (11-24-17 @ 04:37) (97% - 100%)  I&O's Summary    23 Nov 2017 07:01  -  24 Nov 2017 07:00  --------------------------------------------------------  IN: 1825 mL / OUT: 0 mL / NET: 1825 mL        Appearance: Normal	  HEENT:   Normal oral mucosa, PERRL, EOMI	  Lymphatic: No lymphadenopathy  Cardiovascular: Normal S1 S2, No JVD, No murmurs, No edema  Respiratory: Lungs clear to auscultation	  Psychiatry: A & O x 3, Mood & affect appropriate  Gastrointestinal:  Soft, Non-tender, + BS	  Skin: No rashes, No ecchymoses, No cyanosis	  Neurologic: Non-focal  Extremities: Normal range of motion, No clubbing, cyanosis or edema  Vascular: Peripheral pulses palpable 2+ bilaterally    MEDICATIONS  (STANDING):  apixaban 5 milliGRAM(s) Oral every 12 hours  atorvastatin 40 milliGRAM(s) Oral at bedtime  buDESOnide 160 MICROgram(s)/formoterol 4.5 MICROgram(s) Inhaler 2 Puff(s) Inhalation two times a day  clopidogrel Tablet 75 milliGRAM(s) Oral daily  enalapril 5 milliGRAM(s) Oral daily  gabapentin 400 milliGRAM(s) Oral three times a day  isosorbide   mononitrate ER Tablet (IMDUR) 30 milliGRAM(s) Oral daily  levoFLOXacin IVPB 750 milliGRAM(s) IV Intermittent every 24 hours  methylPREDNISolone sodium succinate Injectable 40 milliGRAM(s) IV Push every 6 hours  metoprolol     tartrate 25 milliGRAM(s) Oral two times a day  sodium chloride 0.9%. 1000 milliLiter(s) (75 mL/Hr) IV Continuous <Continuous>      TELEMETRY: 	nsr   	  LABS:	 	    CARDIAC MARKERS:  CARDIAC MARKERS ( 23 Nov 2017 07:03 )  <0.015 ng/mL / x     / 127 U/L / x     / 6.3 ng/mL  CARDIAC MARKERS ( 22 Nov 2017 15:26 )  0.016 ng/mL / x     / 174 U/L / x     / 7.3 ng/mL                                15.5   6.4   )-----------( 186      ( 23 Nov 2017 07:03 )             47.7     11-23    139  |  103  |  23<H>  ----------------------------<  120<H>  4.0   |  24  |  0.91    Ca    9.5      23 Nov 2017 07:03  Phos  2.6     11-23  Mg     2.1     11-23        Lipid Profile: Cholesterol 138  LDL 43  HDL 88  TG 33    HgA1c: Hemoglobin A1C, Whole Blood: 5.2 % (11-23 @ 09:44)    TSH: Thyroid Stimulating Hormone, Serum: 0.49 uU/mL (11-23 @ 07:03)      Methadone, Urine (11.23.17 @ 17:25)    Methadone, Urine: Positive    Opiate, Urine (11.23.17 @ 17:25)    Opiate, Urine: Positive    THC, Urine Qualitative (11.23.17 @ 17:25)    THC, Urine Qualitative: Positive

## 2017-11-24 NOTE — DISCHARGE NOTE ADULT - HOSPITAL COURSE
57 years old male from home with extensive PMH presents c/o syncope last night. 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   Patient states that he was in train last night and all of suddenly felt dizzy associated with headache and sweating and synopsized with LOC for less than a minute, regained consciousness quickly. No jerking movements. Never had similar episode before. No recent trauma, travel or sick contacts. Has already taken Flu vaccine.     Patient relays that recently about 8 weeks ago, he was admitted to [facility] for chest pain, had CATH with 2 stent placement (CATH report in chart). Also has left flank pain, had CT abdomen and underwent repeat kidney biopsy which showed left RCC again in stage 2, has to follow up for either partial or total nephrectomy. Does not remember the name if Oncologist but says follows with PCP Dr. Hyatt and Cardiologist Dr. Glenn Weiss at [facility].     Currently denies any chest pain, sob, fever, chills, nausea or vomiting. Complains of severe left flank pain 10/10, sharp, radiating to left back associated with urinary frequency, burning and dysuria. Received Morphine in ED but says it does not help and asking for Dilaudid. Says goes to [facility] and receives 2 mg IV Dilaudid every 2-3 days for pain.     Pt admitted to telemetry for syncopal episode and flank pain.  Pt underwent Echo sig for EF 72% w/ mod Lt ventricular hypertrophy, and grade II DD.  Carotid doppler sig for _____.  CT head was neg for acute intracranial process, and neurologist, Dr. Arana, does not believe this was due to a neurological process.  CT abd neg for stones or hydro.  Urine tox pos for methadone, opiates, an THC.     Pt currently medically stable and ready for discharge with instruction to schedule close follow up with PCP within 1 week of DC. 57 years old male from home with extensive PMH presents c/o syncope last night. 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   Patient states that he was in train last night and all of suddenly felt dizzy associated with headache and sweating and synopsized with LOC for less than a minute, regained consciousness quickly. No jerking movements. Never had similar episode before. No recent trauma, travel or sick contacts. Has already taken Flu vaccine.     Patient relays that recently about 8 weeks ago, he was admitted to [facility] for chest pain, had CATH with 2 stent placement (CATH report in chart). Also has left flank pain, had CT abdomen and underwent repeat kidney biopsy which showed left RCC again in stage 2, has to follow up for either partial or total nephrectomy. Does not remember the name if Oncologist but says follows with PCP Dr. Hyatt and Cardiologist Dr. Glenn Weiss at [facility].     Currently denies any chest pain, sob, fever, chills, nausea or vomiting. Complains of severe left flank pain 10/10, sharp, radiating to left back associated with urinary frequency, burning and dysuria. Received Morphine in ED but says it does not help and asking for Dilaudid. Says goes to [facility] and receives 2 mg IV Dilaudid every 2-3 days for pain.     Pt admitted to telemetry for syncopal episode and flank pain.  Pt underwent Echo sig for EF 72% w/ mod Lt ventricular hypertrophy, and grade II DD.  Car CT head was neg for acute intracranial process, and neurologist, Dr. Arana, does not believe this was due to a neurological process.  CT abd neg for stones or hydro.  Urine tox pos for methadone, opiates, an THC.     Pt currently medically stable and ready for discharge with instruction to schedule close follow up with PCP within 1 week of DC.

## 2017-11-24 NOTE — PROGRESS NOTE ADULT - PROBLEM SELECTOR PLAN 1
Tele monitoring  Echo and carotid doppler  Neuro and cardio follow up Tele monitoring   carotid doppler  Neuro and cardio follow up

## 2017-11-24 NOTE — DISCHARGE NOTE ADULT - CARE PROVIDER_API CALL
Ralf Mart), Urology  [address] [place] [address]  Phone: [phone]  Fax: [phone] Ralf Mart (MD), Urology  [address] [place] [address]  Phone: [phone]  Fax: [phone]    Amy Hyatt  [address] [place] [address]  Phone: [phone]  Fax: (   )    -

## 2017-11-24 NOTE — DISCHARGE NOTE ADULT - PLAN OF CARE
Please schedule close follow up with PCP You were admitted for lt flank pain thought to be from renal stones.  CT abd/pelvis neg for stones.  US kidney bladder showed              .  Please schedule close follow up with PCP within one week of discharge and for continued pain management.  Please complete ____ day course of Levaquin. Please continue current cardiac regimen You are rate controlled and on anticoagulation.  Please continue rate control meds You presented post syncopal episode and were admitted for syncope workup.  No events on Telemetry.  Troponins neg.  Echo sig for EF 72% w/ mod Lt ventricular hypertrophy, and grade II DD.  Carotid doppler sig for _____.  CT head was neg for acute intracranial process, and neurologist, Dr. Arana, does not believe this was due to a neurological process.  Please schedule close follow up with PCP within 1 wek of discharge. Please schedule close follow up with urologist within one week of discharge Hematuria likely 2/2 renal cell carcinoma.  Please follow up with urologist as OP.  Recommendation below- Dr. Mart. You were admitted for lt flank pain thought to be from renal stones.  CT abd/pelvis neg for stones.   Please  follow up with PCP dr. Hyatt within one week of discharge and for continued pain management.  Please complete 7day course of Levaquin. You presented post syncopal episode and were admitted for syncope workup.  No events on Telemetry.  Troponins neg.  Echo sig for EF 72% w/ mod Lt ventricular hypertrophy, and grade II DD.  CT head was neg for acute intracranial process, and neurologist, Dr. Arana, does not believe this was due to a neurological process.  Please follow up with PCP within 1 week of discharge. You presented post syncopal episode and were admitted for syncope workup.  No events on Telemetry.  Troponins neg.  Carotid doppler negative. Echo sig for EF 72% w/ mod Lt ventricular hypertrophy, and grade II DD.  CT head was neg for acute intracranial process, and neurologist, Dr. Arana, does not believe this was due to a neurological process.  Please follow up with PCP within 1 week of discharge.

## 2017-11-24 NOTE — PROGRESS NOTE ADULT - SUBJECTIVE AND OBJECTIVE BOX
Patient is a 57y old  Male who presents with a chief complaint of syncope.  PMH of COPD.  Awake, alert, comfortable in bed in NAD.  Denies sob or cough. No chest pain or dizziness      pt seen in icu [ x ], reg med floor [ x  ], bed [ x ], chair at bedside [ x  ]    REVIEW OF SYSTEMS:    CONSTITUTIONAL: No weakness, fevers or chills  EYES/ENT: No visual changes;  No vertigo or throat pain   NECK: No pain or stiffness  RESPIRATORY: No cough, wheezing, hemoptysis; No shortness of breath  CARDIOVASCULAR: No chest pain or palpitations  GASTROINTESTINAL: No abdominal or epigastric pain. No nausea, vomiting, or hematemesis; No diarrhea or constipation. No melena or hematochezia.  GENITOURINARY: No dysuria, frequency or hematuria  NEUROLOGICAL: No numbness or weakness  SKIN: No itching, burning, rashes, or lesions   All other review of systems is negative unless indicated above.    Physical Exam    General: WN/WD NAD  Neurology: A&Ox3, nonfocal, GONZALEZ x 4  Respiratory: CTA B/L  CV: RRR, S1S2, no murmurs, rubs or gallops  Abdominal: Soft, NT, ND +BS, Last BM  Extremities: No edema, + peripheral pulses      Allergies  aspirin (Hives)  codeine (Rash)  Motrin (Rash)  penicillin (Hives; Anaphylaxis)  Toradol (Rash)  Tylenol (Hives)      Health Issues  Syncope and collapse  Calcium kidney stones  Renal cell carcinoma of left kidney  Hyperlipidemia  Hypertension  CAD (coronary artery disease)  Atrial fibrillation  AICD (automatic cardioverter/defibrillator) present  Pacemaker  Calcium kidney stone  H/O partial nephrectomy  S/P cholecystectomy      Vitals  T(F): 98.4 (11-24-17 @ 08:08), Max: 98.4 (11-24-17 @ 08:08)  HR: 67 (11-24-17 @ 08:08) (67 - 90)  BP: 134/68 (11-24-17 @ 08:08) (120/52 - 146/80)  RR: 18 (11-24-17 @ 08:08) (16 - 18)  SpO2: 100% (11-24-17 @ 08:08) (97% - 100%)  Wt(kg): --  CAPILLARY BLOOD GLUCOSE          Labs                          14.0   15.8  )-----------( 174      ( 24 Nov 2017 08:33 )             42.5       11-24    138  |  107  |  39<H>  ----------------------------<  179<H>  4.4   |  23  |  0.98    Ca    9.0      24 Nov 2017 08:33  Phos  3.6     11-24  Mg     2.3     11-24        CARDIAC MARKERS ( 23 Nov 2017 07:03 )  <0.015 ng/mL / x     / 127 U/L / x     / 6.3 ng/mL  CARDIAC MARKERS ( 22 Nov 2017 15:26 )  0.016 ng/mL / x     / 174 U/L / x     / 7.3 ng/mL        Radiology Results  < from: CT Head No Cont (11.22.17 @ 11:35) >  No acute intracranial hemorrhage, mass effect or midline shift.    < end of copied text >      Meds    MEDICATIONS  (STANDING):  apixaban 5 milliGRAM(s) Oral every 12 hours  atorvastatin 40 milliGRAM(s) Oral at bedtime  buDESOnide 160 MICROgram(s)/formoterol 4.5 MICROgram(s) Inhaler 2 Puff(s) Inhalation two times a day  clopidogrel Tablet 75 milliGRAM(s) Oral daily  enalapril 5 milliGRAM(s) Oral daily  gabapentin 400 milliGRAM(s) Oral three times a day  isosorbide   mononitrate ER Tablet (IMDUR) 30 milliGRAM(s) Oral daily  levoFLOXacin IVPB 750 milliGRAM(s) IV Intermittent every 24 hours  methylPREDNISolone sodium succinate Injectable 40 milliGRAM(s) IV Push every 8 hours  metoprolol     tartrate 25 milliGRAM(s) Oral two times a day  sodium chloride 0.9%. 1000 milliLiter(s) (75 mL/Hr) IV Continuous <Continuous>      MEDICATIONS  (PRN):  calamine Lotion 1 Application(s) Topical two times a day PRN Rash and/or Itching  diphenhydrAMINE   Capsule 25 milliGRAM(s) Oral every 6 hours PRN Rash and/or Itching  HYDROmorphone  Injectable 2 milliGRAM(s) IV Push every 6 hours PRN Moderate Pain (4 - 6) Patient is a 57y old  Male who presents with a chief complaint of syncope.  PMH of COPD.  Awake, alert, comfortable in bed in NAD.  Denies sob or cough. No chest pain or dizziness      pt seen in icu [ x ], reg med floor [ x  ], bed [ x ], chair at bedside [ x  ]    REVIEW OF SYSTEMS:    CONSTITUTIONAL: No weakness, fevers or chills  EYES/ENT: No visual changes;  No vertigo or throat pain   NECK: No pain or stiffness  RESPIRATORY: No cough, wheezing, hemoptysis; No shortness of breath  CARDIOVASCULAR: No chest pain or palpitations  GASTROINTESTINAL: No abdominal or epigastric pain. No nausea, vomiting, or hematemesis; No diarrhea or constipation. No melena or hematochezia.  GENITOURINARY: No dysuria, frequency or hematuria  NEUROLOGICAL: No numbness or weakness  SKIN: No itching, burning, rashes, or lesions   All other review of systems is negative unless indicated above.    Physical Exam    General: WN/WD NAD  Neurology: A&Ox3, nonfocal, GONZALEZ x 4  Respiratory: CTA B/L  CV: RRR, S1S2, no murmurs, rubs or gallops  Abdominal: Soft, NT, ND +BS, Last BM  Extremities: No edema, + peripheral pulses      Allergies  aspirin (Hives)  codeine (Rash)  Motrin (Rash)  penicillin (Hives; Anaphylaxis)  Toradol (Rash)  Tylenol (Hives)      Health Issues  Syncope and collapse  Calcium kidney stones  Renal cell carcinoma of left kidney  Hyperlipidemia  Hypertension  CAD (coronary artery disease)  Atrial fibrillation  AICD (automatic cardioverter/defibrillator) present  Pacemaker  Calcium kidney stone  H/O partial nephrectomy  S/P cholecystectomy      Vitals  T(F): 98.4 (11-24-17 @ 08:08), Max: 98.4 (11-24-17 @ 08:08)  HR: 67 (11-24-17 @ 08:08) (67 - 90)  BP: 134/68 (11-24-17 @ 08:08) (120/52 - 146/80)  RR: 18 (11-24-17 @ 08:08) (16 - 18)  SpO2: 100% (11-24-17 @ 08:08) (97% - 100%)  Wt(kg): --  CAPILLARY BLOOD GLUCOSE          Labs                          14.0   15.8  )-----------( 174      ( 24 Nov 2017 08:33 )             42.5       11-24    138  |  107  |  39<H>  ----------------------------<  179<H>  4.4   |  23  |  0.98    Ca    9.0      24 Nov 2017 08:33  Phos  3.6     11-24  Mg     2.3     11-24        CARDIAC MARKERS ( 23 Nov 2017 07:03 )  <0.015 ng/mL / x     / 127 U/L / x     / 6.3 ng/mL  CARDIAC MARKERS ( 22 Nov 2017 15:26 )  0.016 ng/mL / x     / 174 U/L / x     / 7.3 ng/mL        Radiology Results  < from: CT Head No Cont (11.22.17 @ 11:35) >  No acute intracranial hemorrhage, mass effect or midline shift.    < end of copied text >        Meds    MEDICATIONS  (STANDING):  apixaban 5 milliGRAM(s) Oral every 12 hours  atorvastatin 40 milliGRAM(s) Oral at bedtime  buDESOnide 160 MICROgram(s)/formoterol 4.5 MICROgram(s) Inhaler 2 Puff(s) Inhalation two times a day  clopidogrel Tablet 75 milliGRAM(s) Oral daily  enalapril 5 milliGRAM(s) Oral daily  gabapentin 400 milliGRAM(s) Oral three times a day  isosorbide   mononitrate ER Tablet (IMDUR) 30 milliGRAM(s) Oral daily  levoFLOXacin IVPB 750 milliGRAM(s) IV Intermittent every 24 hours  methylPREDNISolone sodium succinate Injectable 40 milliGRAM(s) IV Push every 8 hours  metoprolol     tartrate 25 milliGRAM(s) Oral two times a day  sodium chloride 0.9%. 1000 milliLiter(s) (75 mL/Hr) IV Continuous <Continuous>      MEDICATIONS  (PRN):  calamine Lotion 1 Application(s) Topical two times a day PRN Rash and/or Itching  diphenhydrAMINE   Capsule 25 milliGRAM(s) Oral every 6 hours PRN Rash and/or Itching  HYDROmorphone  Injectable 2 milliGRAM(s) IV Push every 6 hours PRN Moderate Pain (4 - 6) Patient is a 57y old  Male who presents with a chief complaint of syncope.  PMH of COPD.  Awake, alert, comfortable in bed in NAD.  Denies sob or cough. No chest pain but still with some dizziness when ambulating      pt seen in icu [ x ], reg med floor [ x  ], bed [ x ], chair at bedside [ x  ]    REVIEW OF SYSTEMS:    CONSTITUTIONAL: No weakness, fevers or chills  EYES/ENT: No visual changes;  No vertigo or throat pain   NECK: No pain or stiffness  RESPIRATORY: No cough, wheezing, hemoptysis; No shortness of breath  CARDIOVASCULAR: No chest pain or palpitations  GASTROINTESTINAL: No abdominal or epigastric pain. No nausea, vomiting, or hematemesis; No diarrhea or constipation. No melena or hematochezia.  GENITOURINARY: No dysuria, frequency or hematuria  NEUROLOGICAL: No numbness or weakness  SKIN: No itching, burning, rashes, or lesions   All other review of systems is negative unless indicated above.    Physical Exam    General: WN/WD NAD  Neurology: A&Ox3, nonfocal, GONZALEZ x 4  Respiratory: CTA B/L  CV: RRR, S1S2, no murmurs, rubs or gallops  Abdominal: Soft, NT, ND +BS, Last BM  Extremities: No edema, + peripheral pulses      Allergies  aspirin (Hives)  codeine (Rash)  Motrin (Rash)  penicillin (Hives; Anaphylaxis)  Toradol (Rash)  Tylenol (Hives)      Health Issues  Syncope and collapse  Calcium kidney stones  Renal cell carcinoma of left kidney  Hyperlipidemia  Hypertension  CAD (coronary artery disease)  Atrial fibrillation  AICD (automatic cardioverter/defibrillator) present  Pacemaker  Calcium kidney stone  H/O partial nephrectomy  S/P cholecystectomy      Vitals  T(F): 98.4 (11-24-17 @ 08:08), Max: 98.4 (11-24-17 @ 08:08)  HR: 67 (11-24-17 @ 08:08) (67 - 90)  BP: 134/68 (11-24-17 @ 08:08) (120/52 - 146/80)  RR: 18 (11-24-17 @ 08:08) (16 - 18)  SpO2: 100% (11-24-17 @ 08:08) (97% - 100%)  Wt(kg): --  CAPILLARY BLOOD GLUCOSE          Labs                          14.0   15.8  )-----------( 174      ( 24 Nov 2017 08:33 )             42.5       11-24    138  |  107  |  39<H>  ----------------------------<  179<H>  4.4   |  23  |  0.98    Ca    9.0      24 Nov 2017 08:33  Phos  3.6     11-24  Mg     2.3     11-24        CARDIAC MARKERS ( 23 Nov 2017 07:03 )  <0.015 ng/mL / x     / 127 U/L / x     / 6.3 ng/mL  CARDIAC MARKERS ( 22 Nov 2017 15:26 )  0.016 ng/mL / x     / 174 U/L / x     / 7.3 ng/mL        Radiology Results  < from: CT Head No Cont (11.22.17 @ 11:35) >  No acute intracranial hemorrhage, mass effect or midline shift.    < end of copied text >        Meds    MEDICATIONS  (STANDING):  apixaban 5 milliGRAM(s) Oral every 12 hours  atorvastatin 40 milliGRAM(s) Oral at bedtime  buDESOnide 160 MICROgram(s)/formoterol 4.5 MICROgram(s) Inhaler 2 Puff(s) Inhalation two times a day  clopidogrel Tablet 75 milliGRAM(s) Oral daily  enalapril 5 milliGRAM(s) Oral daily  gabapentin 400 milliGRAM(s) Oral three times a day  isosorbide   mononitrate ER Tablet (IMDUR) 30 milliGRAM(s) Oral daily  levoFLOXacin IVPB 750 milliGRAM(s) IV Intermittent every 24 hours  methylPREDNISolone sodium succinate Injectable 40 milliGRAM(s) IV Push every 8 hours  metoprolol     tartrate 25 milliGRAM(s) Oral two times a day  sodium chloride 0.9%. 1000 milliLiter(s) (75 mL/Hr) IV Continuous <Continuous>      MEDICATIONS  (PRN):  calamine Lotion 1 Application(s) Topical two times a day PRN Rash and/or Itching  diphenhydrAMINE   Capsule 25 milliGRAM(s) Oral every 6 hours PRN Rash and/or Itching  HYDROmorphone  Injectable 2 milliGRAM(s) IV Push every 6 hours PRN Moderate Pain (4 - 6)

## 2017-11-24 NOTE — PROGRESS NOTE ADULT - ASSESSMENT
57 years old male from home with extensive PMH presents c/o syncope last night. Also has severe left flank pain associated with dysuria and increased urinary frequency. Admitted to telemetry for further management.   Pt remains asymptomatic and rate controlled.  Pain is controlled with steroids and gabapentin.  Pt awaiting neuro consult.  Pacer interrogated- no abnormalities.  Will continue IV steroid for now. 57 years old male from home with extensive PMH presents c/o syncope last night. Also has severe left flank pain associated with dysuria and increased urinary frequency. Admitted to telemetry for further management.   Pt remains asymptomatic and rate controlled.  Pain is controlled with steroids and gabapentin/ hydromorphone.  Pt complaining of hematuria- will obtain Kidney bladder US.   Neuro consult not in physical chart- no recommendations at this time.   Pacer interrogated- no abnormalities.  Will begin steroid taper

## 2017-11-24 NOTE — PROGRESS NOTE ADULT - SUBJECTIVE AND OBJECTIVE BOX
PGY 1 Note discussed with supervising resident and primary attending    Patient is a 57y old  Male who presents with a chief complaint of syncope (2017 10:19)      INTERVAL HPI/OVERNIGHT EVENTS:   No acute events reported overnight.    Today pt presents in no acute distress.  Pt found resting comfortably in bed today.  Pt ambulating without assistance   Pt will undergo dialysis this AM.       MEDICATIONS  (STANDING):  apixaban 5 milliGRAM(s) Oral every 12 hours  atorvastatin 40 milliGRAM(s) Oral at bedtime  buDESOnide 160 MICROgram(s)/formoterol 4.5 MICROgram(s) Inhaler 2 Puff(s) Inhalation two times a day  clopidogrel Tablet 75 milliGRAM(s) Oral daily  enalapril 5 milliGRAM(s) Oral daily  gabapentin 400 milliGRAM(s) Oral three times a day  isosorbide   mononitrate ER Tablet (IMDUR) 30 milliGRAM(s) Oral daily  levoFLOXacin IVPB 750 milliGRAM(s) IV Intermittent every 24 hours  methylPREDNISolone sodium succinate Injectable 40 milliGRAM(s) IV Push every 6 hours  metoprolol     tartrate 25 milliGRAM(s) Oral two times a day  sodium chloride 0.9%. 1000 milliLiter(s) (75 mL/Hr) IV Continuous <Continuous>    MEDICATIONS  (PRN):  calamine Lotion 1 Application(s) Topical two times a day PRN Rash and/or Itching  diphenhydrAMINE   Capsule 25 milliGRAM(s) Oral every 6 hours PRN Rash and/or Itching  HYDROmorphone  Injectable 2 milliGRAM(s) IV Push every 6 hours PRN Moderate Pain (4 - 6)        __________________________________________________  REVIEW OF SYSTEMS:    CONSTITUTIONAL: No fever,   EYES: no acute visual disturbances  NECK: No pain or stiffness  RESPIRATORY: No cough; No shortness of breath  CARDIOVASCULAR: No chest pain, no palpitations  GASTROINTESTINAL: No pain. No nausea or vomiting; No diarrhea   NEUROLOGICAL: No headache or numbness, no tremors  MUSCULOSKELETAL: No joint pain, no muscle pain  GENITOURINARY: no dysuria, no frequency, no hesitancy  PSYCHIATRY: no depression , no anxiety  ALL OTHER  ROS negative        Vital Signs Last 24 Hrs  T(C): 36.8 (2017 04:39), Max: 36.8 (2017 20:34)  T(F): 98.3 (2017 04:39), Max: 98.3 (2017 04:39)  HR: 70 (2017 04:39) (55 - 70)  BP: 142/71 (2017 04:39) (142/71 - 171/101)  BP(mean): --  RR: 18 (2017 04:39) (17 - 18)  SpO2: 98% (2017 04:39) (98% - 100%)    ________________________________________________  PHYSICAL EXAM:  GENERAL: NAD  HEENT: Normocephalic;  conjunctivae and sclerae clear; moist mucous membranes;   NECK : supple  CHEST/LUNG: Clear to auscultation bilaterally with good air entry   HEART: S1 S2  regular; no murmurs, gallops or rubs  ABDOMEN: Soft, Nontender, Nondistended; Bowel sounds present  EXTREMITIES: no cyanosis; no edema; no calf tenderness  NERVOUS SYSTEM:  Awake and alert; Oriented  to place, person and time ; no new deficits    _________________________________________________  LABS:                                   15.5   6.4   )-----------( 186      ( 2017 07:03 )             47.7         139  |  103  |  23<H>  ----------------------------<  120<H>  4.0   |  24  |  0.91    Ca    9.5      2017 07:03  Phos  2.6       Mg     2.1         TPro  7.8  /  Alb  3.7  /  TBili  0.5  /  DBili  x   /  AST  81<H>  /  ALT  110<H>  /  AlkPhos  261<H>  11-22      Color: Yellow / Appearance: Clear / S.020 / pH: x  Gluc: x / Ketone: Negative  / Bili: Negative / Urobili: Negative   Blood: x / Protein: 100 / Nitrite: Negative   Leuk Esterase: Negative / RBC: 5-10 /HPF / WBC 6-10 /HPF   Sq Epi: x / Non Sq Epi: Moderate /HPF / Bacteria: Moderate /HPF      CAPILLARY BLOOD GLUCOSE    RADIOLOGY & ADDITIONAL TESTS:    Imaging Personally Reviewed:  YES    Consultant(s) Notes Reviewed:   YES    Care Discussed with Consultants :     Plan of care was discussed with patient and /or primary care giver; all questions and concerns were addressed and care was aligned with patient's wishes. PGY 1 Note discussed with supervising resident and primary attending    Patient is a 57y old  Male who presents with a chief complaint of syncope (2017 10:19)      INTERVAL HPI/OVERNIGHT EVENTS:   No acute events reported overnight.    Today pt presents in no acute distress.  Pt found resting comfortably in bed today.  Pt ambulating without assistance   Pt complaining of blood tinged urine and back pain.       MEDICATIONS  (STANDING):  apixaban 5 milliGRAM(s) Oral every 12 hours  atorvastatin 40 milliGRAM(s) Oral at bedtime  buDESOnide 160 MICROgram(s)/formoterol 4.5 MICROgram(s) Inhaler 2 Puff(s) Inhalation two times a day  clopidogrel Tablet 75 milliGRAM(s) Oral daily  enalapril 5 milliGRAM(s) Oral daily  gabapentin 400 milliGRAM(s) Oral three times a day  isosorbide   mononitrate ER Tablet (IMDUR) 30 milliGRAM(s) Oral daily  levoFLOXacin IVPB 750 milliGRAM(s) IV Intermittent every 24 hours  methylPREDNISolone sodium succinate Injectable 40 milliGRAM(s) IV Push every 8 hours  metoprolol     tartrate 25 milliGRAM(s) Oral two times a day  sodium chloride 0.9%. 1000 milliLiter(s) (75 mL/Hr) IV Continuous <Continuous>    MEDICATIONS  (PRN):  calamine Lotion 1 Application(s) Topical two times a day PRN Rash and/or Itching  diphenhydrAMINE   Capsule 25 milliGRAM(s) Oral every 6 hours PRN Rash and/or Itching  HYDROmorphone  Injectable 2 milliGRAM(s) IV Push every 6 hours PRN Moderate Pain (4 - 6)        __________________________________________________  REVIEW OF SYSTEMS:    CONSTITUTIONAL: No fever,   EYES: no acute visual disturbances  NECK: No pain or stiffness  RESPIRATORY: No cough; No shortness of breath  CARDIOVASCULAR: No chest pain, no palpitations  GASTROINTESTINAL: No pain. No nausea or vomiting; No diarrhea   NEUROLOGICAL: No headache or numbness, no tremors  MUSCULOSKELETAL: No joint pain, Lt sided flank pain  GENITOURINARY: no dysuria, no frequency, no hesitancy  PSYCHIATRY: no depression , no anxiety  ALL OTHER  ROS negative        Vital Signs Last 24 Hrs  T(C): 36.9 (2017 08:08), Max: 36.9 (2017 08:08)  T(F): 98.4 (2017 08:08), Max: 98.4 (2017 08:08)  HR: 67 (2017 08:08) (67 - 90)  BP: 134/68 (2017 08:08) (120/52 - 146/80)  BP(mean): --  RR: 18 (2017 08:08) (16 - 18)  SpO2: 100% (2017 08:08) (97% - 100%)    ________________________________________________  PHYSICAL EXAM:  GENERAL: NAD  HEENT: Normocephalic;  conjunctivae and sclerae clear; moist mucous membranes;   NECK : supple  CHEST/LUNG: Clear to auscultation bilaterally with good air entry   HEART: S1 S2  regular; no murmurs, gallops or rubs  ABDOMEN: Soft, Nontender, Nondistended; Bowel sounds present  EXTREMITIES: no cyanosis; no edema; no calf tenderness  NERVOUS SYSTEM:  Awake and alert; Oriented  to place, person and time ; no new deficits;  Lt sided flank pain, not reproducible     _________________________________________________  LABS:                                   15.5   6.4   )-----------( 186      ( 2017 07:03 )             47.7     11-    139  |  103  |  23<H>  ----------------------------<  120<H>  4.0   |  24  |  0.91    Ca    9.5      2017 07:03  Phos  2.6     11-  Mg     2.1     11-    TPro  7.8  /  Alb  3.7  /  TBili  0.5  /  DBili  x   /  AST  81<H>  /  ALT  110<H>  /  AlkPhos  261<H>  11-22      Color: Yellow / Appearance: Clear / S.020 / pH: x  Gluc: x / Ketone: Negative  / Bili: Negative / Urobili: Negative   Blood: x / Protein: 100 / Nitrite: Negative   Leuk Esterase: Negative / RBC: 5-10 /HPF / WBC 6-10 /HPF   Sq Epi: x / Non Sq Epi: Moderate /HPF / Bacteria: Moderate /HPF      CAPILLARY BLOOD GLUCOSE    RADIOLOGY & ADDITIONAL TESTS:    Imaging Personally Reviewed:  YES    Consultant(s) Notes Reviewed:   YES    Care Discussed with Consultants :     Plan of care was discussed with patient and /or primary care giver; all questions and concerns were addressed and care was aligned with patient's wishes. PGY 1 Note discussed with supervising resident and primary attending    Patient is a 57y old  Male who presents with a chief complaint of syncope (2017 10:19)      INTERVAL HPI/OVERNIGHT EVENTS:   No acute events reported overnight.    Today pt presents in no acute distress.  Pt found resting comfortably in bed today.  Pt ambulating without assistance   Pt complaining of blood tinged urine and back pain.       MEDICATIONS  (STANDING):  apixaban 5 milliGRAM(s) Oral every 12 hours  atorvastatin 40 milliGRAM(s) Oral at bedtime  buDESOnide 160 MICROgram(s)/formoterol 4.5 MICROgram(s) Inhaler 2 Puff(s) Inhalation two times a day  clopidogrel Tablet 75 milliGRAM(s) Oral daily  enalapril 5 milliGRAM(s) Oral daily  gabapentin 400 milliGRAM(s) Oral three times a day  isosorbide   mononitrate ER Tablet (IMDUR) 30 milliGRAM(s) Oral daily  levoFLOXacin IVPB 750 milliGRAM(s) IV Intermittent every 24 hours  methylPREDNISolone sodium succinate Injectable 40 milliGRAM(s) IV Push every 8 hours  metoprolol     tartrate 25 milliGRAM(s) Oral two times a day  sodium chloride 0.9%. 1000 milliLiter(s) (75 mL/Hr) IV Continuous <Continuous>    MEDICATIONS  (PRN):  calamine Lotion 1 Application(s) Topical two times a day PRN Rash and/or Itching  diphenhydrAMINE   Capsule 25 milliGRAM(s) Oral every 6 hours PRN Rash and/or Itching  HYDROmorphone  Injectable 2 milliGRAM(s) IV Push every 6 hours PRN Moderate Pain (4 - 6)        __________________________________________________  REVIEW OF SYSTEMS:    CONSTITUTIONAL: No fever,   EYES: no acute visual disturbances  NECK: No pain or stiffness  RESPIRATORY: No cough; No shortness of breath  CARDIOVASCULAR: No chest pain, no palpitations  GASTROINTESTINAL: No pain. No nausea or vomiting; No diarrhea   NEUROLOGICAL: No headache or numbness, no tremors  MUSCULOSKELETAL: No joint pain, Lt sided flank pain  GENITOURINARY: no dysuria, no frequency, no hesitancy  PSYCHIATRY: no depression , no anxiety  ALL OTHER  ROS negative        Vital Signs Last 24 Hrs  T(C): 36.9 (2017 08:08), Max: 36.9 (2017 08:08)  T(F): 98.4 (2017 08:08), Max: 98.4 (2017 08:08)  HR: 67 (2017 08:08) (67 - 90)  BP: 134/68 (2017 08:08) (120/52 - 146/80)  BP(mean): --  RR: 18 (2017 08:08) (16 - 18)  SpO2: 100% (2017 08:08) (97% - 100%)    ________________________________________________  PHYSICAL EXAM:  GENERAL: NAD  HEENT: Normocephalic;  conjunctivae and sclerae clear; moist mucous membranes;   NECK : supple  CHEST/LUNG: Clear to auscultation bilaterally with good air entry   HEART: S1 S2  regular; no murmurs, gallops or rubs  ABDOMEN: Soft, Nontender, Nondistended; Bowel sounds present  EXTREMITIES: no cyanosis; no edema; no calf tenderness  NERVOUS SYSTEM:  Awake and alert; Oriented  to place, person and time ; no new deficits;  Lt sided flank pain, not reproducible     _________________________________________________  LABS:                                              14.0   15.8  )-----------( 174      ( 2017 08:33 )             42.5     11-24    138  |  107  |  39<H>  ----------------------------<  179<H>  4.4   |  23  |  0.98    Ca    9.0      2017 08:33  Phos  3.6     11-24  Mg     2.3     11-24          Color: Yellow / Appearance: Clear / S.020 / pH: x  Gluc: x / Ketone: Negative  / Bili: Negative / Urobili: Negative   Blood: x / Protein: 100 / Nitrite: Negative   Leuk Esterase: Negative / RBC: 5-10 /HPF / WBC 6-10 /HPF   Sq Epi: x / Non Sq Epi: Moderate /HPF / Bacteria: Moderate /HPF      CAPILLARY BLOOD GLUCOSE    RADIOLOGY & ADDITIONAL TESTS:    Imaging Personally Reviewed:  YES    Consultant(s) Notes Reviewed:   YES    Care Discussed with Consultants :     Plan of care was discussed with patient and /or primary care giver; all questions and concerns were addressed and care was aligned with patient's wishes.

## 2017-11-24 NOTE — PROGRESS NOTE ADULT - PROBLEM SELECTOR PLAN 1
-syncope associated with LOC for less than a minute with full return to baseline  -preceded by dizziness and headache, no jerking activities  -EKG: sinus bradycardia  -f/u CT head, orthostatics  -Trops neg x 3  -continue to monitor on tele, f/u ECHO  -c/w Plavix, statin and metoprolol.   -f/u carotid doppler  -Cardio consult Dr. Harper   -Neuro consult Dr. Ambrocio -syncope associated with LOC for less than a minute with full return to baseline  -preceded by dizziness and headache, no jerking activities  -EKG: sinus bradycardia  -f/u CT head, orthostatics  -Trops neg x 3  -continue to monitor on tele, f/u ECHO  -c/w Plavix, statin and metoprolol.   -f/u carotid doppler  -Neuro- Physical paper note in chart: no recommendations, unlikely neuro related  -Cardio consult Dr. Harper   -Neuro consult Dr. Oneill

## 2017-11-24 NOTE — PROGRESS NOTE ADULT - ASSESSMENT
57 years old male from home with extensive PMH presents c/o syncope last night. 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   Patient states that he was in train last night and all of suddenly felt dizzy associated with headache and sweating and synopsized with LOC for less than a minute, regained consciousness quickly.  1.D/C Tele monitoring.  2.AICD interrogation-no evaents  3. urine tox+, may be cause of syncope.  4.Continue cardiac medication.  5.PAF-Eliquis.  6.PPI.  7.Echocardiogram.

## 2017-11-24 NOTE — PROGRESS NOTE ADULT - PROBLEM SELECTOR PLAN 2
-severe left flank pain associated with increased urinary frequency and burning pain  -h/o kidney stones in past  -f/u CT abdomen to r/o hydronephrosis or renal stone causing obstructive uropathy  -UA pos, awaiting cultures  -c/w Cipro day 2 -severe left flank pain associated with increased urinary frequency and burning pain  -h/o kidney stones in past  -f/u CT abdomen neg for hydronephrosis or renal stone causing obstructive  uropathy  -UA pos; Urine culture negative  -Levaquin day 2/5 day course  -f/u Kidney bladder US

## 2017-11-24 NOTE — PROGRESS NOTE ADULT - PROBLEM SELECTOR PLAN 8
-Improved VTE score of 0. No indication of DVT ppx.
-Improved VTE score of 0. No indication of DVT ppx.

## 2017-11-25 DIAGNOSIS — R91.1 SOLITARY PULMONARY NODULE: ICD-10-CM

## 2017-11-25 DIAGNOSIS — I27.20 PULMONARY HYPERTENSION, UNSPECIFIED: ICD-10-CM

## 2017-11-25 LAB
ANION GAP SERPL CALC-SCNC: 9 MMOL/L — SIGNIFICANT CHANGE UP (ref 5–17)
BUN SERPL-MCNC: 44 MG/DL — HIGH (ref 7–18)
CALCIUM SERPL-MCNC: 9 MG/DL — SIGNIFICANT CHANGE UP (ref 8.4–10.5)
CHLORIDE SERPL-SCNC: 105 MMOL/L — SIGNIFICANT CHANGE UP (ref 96–108)
CO2 SERPL-SCNC: 23 MMOL/L — SIGNIFICANT CHANGE UP (ref 22–31)
CREAT SERPL-MCNC: 0.97 MG/DL — SIGNIFICANT CHANGE UP (ref 0.5–1.3)
GLUCOSE SERPL-MCNC: 119 MG/DL — HIGH (ref 70–99)
HCT VFR BLD CALC: 44.9 % — SIGNIFICANT CHANGE UP (ref 39–50)
HGB BLD-MCNC: 14.3 G/DL — SIGNIFICANT CHANGE UP (ref 13–17)
MCHC RBC-ENTMCNC: 30 PG — SIGNIFICANT CHANGE UP (ref 27–34)
MCHC RBC-ENTMCNC: 32 GM/DL — SIGNIFICANT CHANGE UP (ref 32–36)
MCV RBC AUTO: 94 FL — SIGNIFICANT CHANGE UP (ref 80–100)
PLATELET # BLD AUTO: 183 K/UL — SIGNIFICANT CHANGE UP (ref 150–400)
POTASSIUM SERPL-MCNC: 4.4 MMOL/L — SIGNIFICANT CHANGE UP (ref 3.5–5.3)
POTASSIUM SERPL-SCNC: 4.4 MMOL/L — SIGNIFICANT CHANGE UP (ref 3.5–5.3)
RBC # BLD: 4.77 M/UL — SIGNIFICANT CHANGE UP (ref 4.2–5.8)
RBC # FLD: 13.6 % — SIGNIFICANT CHANGE UP (ref 10.3–14.5)
SODIUM SERPL-SCNC: 137 MMOL/L — SIGNIFICANT CHANGE UP (ref 135–145)
WBC # BLD: 16.5 K/UL — HIGH (ref 3.8–10.5)
WBC # FLD AUTO: 16.5 K/UL — HIGH (ref 3.8–10.5)

## 2017-11-25 RX ORDER — HYDROMORPHONE HYDROCHLORIDE 2 MG/ML
2 INJECTION INTRAMUSCULAR; INTRAVENOUS; SUBCUTANEOUS
Qty: 0 | Refills: 0 | Status: DISCONTINUED | OUTPATIENT
Start: 2017-11-25 | End: 2017-11-27

## 2017-11-25 RX ADMIN — HYDROMORPHONE HYDROCHLORIDE 2 MILLIGRAM(S): 2 INJECTION INTRAMUSCULAR; INTRAVENOUS; SUBCUTANEOUS at 06:21

## 2017-11-25 RX ADMIN — GABAPENTIN 400 MILLIGRAM(S): 400 CAPSULE ORAL at 21:59

## 2017-11-25 RX ADMIN — CLOPIDOGREL BISULFATE 75 MILLIGRAM(S): 75 TABLET, FILM COATED ORAL at 11:29

## 2017-11-25 RX ADMIN — Medication 40 MILLIGRAM(S): at 05:47

## 2017-11-25 RX ADMIN — HYDROMORPHONE HYDROCHLORIDE 2 MILLIGRAM(S): 2 INJECTION INTRAMUSCULAR; INTRAVENOUS; SUBCUTANEOUS at 00:07

## 2017-11-25 RX ADMIN — HYDROMORPHONE HYDROCHLORIDE 2 MILLIGRAM(S): 2 INJECTION INTRAMUSCULAR; INTRAVENOUS; SUBCUTANEOUS at 11:29

## 2017-11-25 RX ADMIN — GABAPENTIN 400 MILLIGRAM(S): 400 CAPSULE ORAL at 05:48

## 2017-11-25 RX ADMIN — Medication 25 MILLIGRAM(S): at 17:30

## 2017-11-25 RX ADMIN — Medication 5 MILLIGRAM(S): at 05:48

## 2017-11-25 RX ADMIN — HYDROMORPHONE HYDROCHLORIDE 2 MILLIGRAM(S): 2 INJECTION INTRAMUSCULAR; INTRAVENOUS; SUBCUTANEOUS at 21:58

## 2017-11-25 RX ADMIN — SENNA PLUS 2 TABLET(S): 8.6 TABLET ORAL at 21:59

## 2017-11-25 RX ADMIN — ATORVASTATIN CALCIUM 40 MILLIGRAM(S): 80 TABLET, FILM COATED ORAL at 21:59

## 2017-11-25 RX ADMIN — APIXABAN 5 MILLIGRAM(S): 2.5 TABLET, FILM COATED ORAL at 17:30

## 2017-11-25 RX ADMIN — HYDROMORPHONE HYDROCHLORIDE 2 MILLIGRAM(S): 2 INJECTION INTRAMUSCULAR; INTRAVENOUS; SUBCUTANEOUS at 06:06

## 2017-11-25 RX ADMIN — ISOSORBIDE MONONITRATE 30 MILLIGRAM(S): 60 TABLET, EXTENDED RELEASE ORAL at 11:29

## 2017-11-25 RX ADMIN — BUDESONIDE AND FORMOTEROL FUMARATE DIHYDRATE 2 PUFF(S): 160; 4.5 AEROSOL RESPIRATORY (INHALATION) at 21:59

## 2017-11-25 RX ADMIN — BUDESONIDE AND FORMOTEROL FUMARATE DIHYDRATE 2 PUFF(S): 160; 4.5 AEROSOL RESPIRATORY (INHALATION) at 11:30

## 2017-11-25 RX ADMIN — HYDROMORPHONE HYDROCHLORIDE 2 MILLIGRAM(S): 2 INJECTION INTRAMUSCULAR; INTRAVENOUS; SUBCUTANEOUS at 22:13

## 2017-11-25 RX ADMIN — HYDROMORPHONE HYDROCHLORIDE 2 MILLIGRAM(S): 2 INJECTION INTRAMUSCULAR; INTRAVENOUS; SUBCUTANEOUS at 11:45

## 2017-11-25 RX ADMIN — HYDROMORPHONE HYDROCHLORIDE 2 MILLIGRAM(S): 2 INJECTION INTRAMUSCULAR; INTRAVENOUS; SUBCUTANEOUS at 19:23

## 2017-11-25 RX ADMIN — HYDROMORPHONE HYDROCHLORIDE 2 MILLIGRAM(S): 2 INJECTION INTRAMUSCULAR; INTRAVENOUS; SUBCUTANEOUS at 16:00

## 2017-11-25 RX ADMIN — Medication 25 MILLIGRAM(S): at 05:47

## 2017-11-25 RX ADMIN — HYDROMORPHONE HYDROCHLORIDE 2 MILLIGRAM(S): 2 INJECTION INTRAMUSCULAR; INTRAVENOUS; SUBCUTANEOUS at 15:48

## 2017-11-25 RX ADMIN — APIXABAN 5 MILLIGRAM(S): 2.5 TABLET, FILM COATED ORAL at 05:50

## 2017-11-25 RX ADMIN — HYDROMORPHONE HYDROCHLORIDE 2 MILLIGRAM(S): 2 INJECTION INTRAMUSCULAR; INTRAVENOUS; SUBCUTANEOUS at 00:31

## 2017-11-25 RX ADMIN — GABAPENTIN 400 MILLIGRAM(S): 400 CAPSULE ORAL at 15:08

## 2017-11-25 RX ADMIN — HYDROMORPHONE HYDROCHLORIDE 2 MILLIGRAM(S): 2 INJECTION INTRAMUSCULAR; INTRAVENOUS; SUBCUTANEOUS at 18:57

## 2017-11-25 NOTE — PROGRESS NOTE ADULT - SUBJECTIVE AND OBJECTIVE BOX
pt seen in icu [  ], reg med floor [  x ], bed [  ], chair at bedside [   ]    Patient is a 57y old  Male who presents with a chief complaint of syncope.  PMH of COPD. Awake, alert, comfortable in bed in NAD. Denies sob or cough. No chest pain but still with some dizziness when ambulating              REVIEW OF SYSTEMS:    CONSTITUTIONAL: No weakness, fevers or chills  EYES/ENT: No visual changes;  No vertigo or throat pain   NECK: No pain or stiffness  RESPIRATORY: No cough, wheezing, hemoptysis; No shortness of breath  CARDIOVASCULAR: No chest pain or palpitations  GASTROINTESTINAL: No abdominal or epigastric pain. No nausea, vomiting, or hematemesis; No diarrhea or constipation. No melena or hematochezia.  GENITOURINARY: No dysuria, frequency or hematuria  NEUROLOGICAL: No numbness or weakness  SKIN: No itching, burning, rashes, or lesions   All other review of systems is negative unless indicated above.    Physical Exam    General: WN/WD NAD  Neurology: A&Ox3, nonfocal, GONZALEZ x 4  Respiratory: CTA B/L  CV: RRR, S1S2, no murmurs, rubs or gallops  Abdominal: Soft, NT, ND +BS, Last BM  Extremities: No edema, + peripheral pulses      Allergies  aspirin (Hives)  codeine (Rash)  Motrin (Rash)  penicillin (Hives; Anaphylaxis)  Toradol (Rash)  Tylenol (Hives)      Health Issues  Syncope and collapse  Calcium kidney stones  Renal cell carcinoma of left kidney  Hyperlipidemia  Hypertension  CAD (coronary artery disease)  Atrial fibrillation  AICD (automatic cardioverter/defibrillator) present  Pacemaker  Calcium kidney stone  H/O partial nephrectomy  S/P cholecystectomy      Vitals  T(F): 97.4 (11-24-17 @ 21:11), Max: 98.5 (11-24-17 @ 11:54)  HR: 68 (11-25-17 @ 05:43) (62 - 77)  BP: 146/67 (11-25-17 @ 05:43) (143/67 - 152/66)  RR: 18 (11-25-17 @ 05:43) (18 - 18)  SpO2: 99% (11-25-17 @ 05:43) (97% - 100%)  Wt(kg): --  CAPILLARY BLOOD GLUCOSE          Labs                          14.3   16.5  )-----------( 183      ( 25 Nov 2017 07:16 )             44.9       11-25    137  |  105  |  44<H>  ----------------------------<  119<H>  4.4   |  23  |  0.97    Ca    9.0      25 Nov 2017 07:16  Phos  3.6     11-24  Mg     2.3     11-24              Radiology Results    < from: CT Abdomen and Pelvis No Cont (11.22.17 @ 11:35) >     No evidence for a urinary calculus. No hydronephrosis. Normal appendix. No bowel obstruction or grossly thickened bowel wall.   Colonic diverticulosis without evidence for diverticulitis. Small nonspecific 4 mm right lower lobe lung nodule; follow-up chest CT   may be pursued in 12 months to ensure stability.    < end of copied text >    < from: 12 Lead ECG (11.22.17 @ 05:41) >    Demand pacemaker; interpretation is based on intrinsic rhythm Sinus bradycardia with Fusion complexes Otherwise normal ECG    < end of copied text >    < from: Transthoracic Echocardiogram (11.23.17 @ 12:38) >     1. Mild left atrial enlargement. 2. Moderate concentric left ventricular hypertrophy.  3. Endocardium not well visualized; grossly normal left. ventricular function.  4. Grade II diastolic dysfunction. 5. RV systolic pressure is mildly increased at  41 mm Hg.  6. There is mild tricuspid regurgitation.    < end of copied text >        Meds    MEDICATIONS  (STANDING):  apixaban 5 milliGRAM(s) Oral every 12 hours  atorvastatin 40 milliGRAM(s) Oral at bedtime  buDESOnide 160 MICROgram(s)/formoterol 4.5 MICROgram(s) Inhaler 2 Puff(s) Inhalation two times a day  clopidogrel Tablet 75 milliGRAM(s) Oral daily  enalapril 5 milliGRAM(s) Oral daily  gabapentin 400 milliGRAM(s) Oral three times a day  isosorbide   mononitrate ER Tablet (IMDUR) 30 milliGRAM(s) Oral daily  levoFLOXacin IVPB 750 milliGRAM(s) IV Intermittent every 24 hours  metoprolol     tartrate 25 milliGRAM(s) Oral two times a day  predniSONE   Tablet 40 milliGRAM(s) Oral daily  senna 2 Tablet(s) Oral at bedtime  sodium chloride 0.9%. 1000 milliLiter(s) (75 mL/Hr) IV Continuous <Continuous>      MEDICATIONS  (PRN):  calamine Lotion 1 Application(s) Topical two times a day PRN Rash and/or Itching  diphenhydrAMINE   Capsule 25 milliGRAM(s) Oral every 6 hours PRN Rash and/or Itching  HYDROmorphone  Injectable 2 milliGRAM(s) IV Push every 6 hours PRN Moderate Pain (4 - 6)  traMADol 50 milliGRAM(s) Oral every 12 hours PRN Moderate Pain (4 - 6) pt seen in icu [  ], reg med floor [  x ], bed [ x ], chair at bedside [   ]    Patient is a 57y old  Male who presents with a chief complaint of syncope.  PMH of COPD. Awake, alert, comfortable in bed in NAD. Denies sob or cough. No chest pain but still with some dizziness when ambulating  Feels better despite some dizziness still. No nausea, vomiting or sob.          REVIEW OF SYSTEMS:    CONSTITUTIONAL: No weakness, fevers or chills  EYES/ENT: No visual changes;  No vertigo or throat pain   NECK: No pain or stiffness  RESPIRATORY: No cough, wheezing, hemoptysis; No shortness of breath  CARDIOVASCULAR: No chest pain or palpitations  GASTROINTESTINAL: No abdominal or epigastric pain. No nausea, vomiting, or hematemesis; No diarrhea or constipation. No melena or hematochezia.  GENITOURINARY: No dysuria, frequency or hematuria  NEUROLOGICAL: No numbness or weakness  SKIN: No itching, burning, rashes, or lesions   All other review of systems is negative unless indicated above.    Physical Exam    General: WN/WD NAD  Neurology: A&Ox3, nonfocal, GONZALEZ x 4  Respiratory: CTA B/L  CV: RRR, S1S2, no murmurs, rubs or gallops  Abdominal: Soft, NT, ND +BS, Last BM  Extremities: No edema, + peripheral pulses      Allergies  aspirin (Hives)  codeine (Rash)  Motrin (Rash)  penicillin (Hives; Anaphylaxis)  Toradol (Rash)  Tylenol (Hives)      Health Issues  Syncope and collapse  Calcium kidney stones  Renal cell carcinoma of left kidney  Hyperlipidemia  Hypertension  CAD (coronary artery disease)  Atrial fibrillation  AICD (automatic cardioverter/defibrillator) present  Pacemaker  Calcium kidney stone  H/O partial nephrectomy  S/P cholecystectomy      Vitals  T(F): 97.4 (11-24-17 @ 21:11), Max: 98.5 (11-24-17 @ 11:54)  HR: 68 (11-25-17 @ 05:43) (62 - 77)  BP: 146/67 (11-25-17 @ 05:43) (143/67 - 152/66)  RR: 18 (11-25-17 @ 05:43) (18 - 18)  SpO2: 99% (11-25-17 @ 05:43) (97% - 100%)  Wt(kg): --  CAPILLARY BLOOD GLUCOSE          Labs                          14.3   16.5  )-----------( 183      ( 25 Nov 2017 07:16 )             44.9       11-25    137  |  105  |  44<H>  ----------------------------<  119<H>  4.4   |  23  |  0.97    Ca    9.0      25 Nov 2017 07:16  Phos  3.6     11-24  Mg     2.3     11-24              Radiology Results    < from: CT Abdomen and Pelvis No Cont (11.22.17 @ 11:35) >     No evidence for a urinary calculus. No hydronephrosis. Normal appendix. No bowel obstruction or grossly thickened bowel wall.   Colonic diverticulosis without evidence for diverticulitis. Small nonspecific 4 mm right lower lobe lung nodule; follow-up chest CT   may be pursued in 12 months to ensure stability.    < end of copied text >    < from: 12 Lead ECG (11.22.17 @ 05:41) >    Demand pacemaker; interpretation is based on intrinsic rhythm Sinus bradycardia with Fusion complexes Otherwise normal ECG    < end of copied text >    < from: Transthoracic Echocardiogram (11.23.17 @ 12:38) >     1. Mild left atrial enlargement. 2. Moderate concentric left ventricular hypertrophy.  3. Endocardium not well visualized; grossly normal left. ventricular function.  4. Grade II diastolic dysfunction. 5. RV systolic pressure is mildly increased at  41 mm Hg.  6. There is mild tricuspid regurgitation.    < end of copied text >        Meds    MEDICATIONS  (STANDING):  apixaban 5 milliGRAM(s) Oral every 12 hours  atorvastatin 40 milliGRAM(s) Oral at bedtime  buDESOnide 160 MICROgram(s)/formoterol 4.5 MICROgram(s) Inhaler 2 Puff(s) Inhalation two times a day  clopidogrel Tablet 75 milliGRAM(s) Oral daily  enalapril 5 milliGRAM(s) Oral daily  gabapentin 400 milliGRAM(s) Oral three times a day  isosorbide   mononitrate ER Tablet (IMDUR) 30 milliGRAM(s) Oral daily  levoFLOXacin IVPB 750 milliGRAM(s) IV Intermittent every 24 hours  metoprolol     tartrate 25 milliGRAM(s) Oral two times a day  predniSONE   Tablet 40 milliGRAM(s) Oral daily  senna 2 Tablet(s) Oral at bedtime  sodium chloride 0.9%. 1000 milliLiter(s) (75 mL/Hr) IV Continuous <Continuous>      MEDICATIONS  (PRN):  calamine Lotion 1 Application(s) Topical two times a day PRN Rash and/or Itching  diphenhydrAMINE   Capsule 25 milliGRAM(s) Oral every 6 hours PRN Rash and/or Itching  HYDROmorphone  Injectable 2 milliGRAM(s) IV Push every 6 hours PRN Moderate Pain (4 - 6)  traMADol 50 milliGRAM(s) Oral every 12 hours PRN Moderate Pain (4 - 6)

## 2017-11-25 NOTE — PROGRESS NOTE ADULT - ASSESSMENT
57 years old male from home with extensive PMH presents c/o syncope last night. 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   Patient states that he was in train last night and all of suddenly felt dizzy associated with headache and sweating and synopsized with LOC for less than a minute, regained consciousness quickly.  1.[place] outpatient  2.AICD interrogation-no events  3. urine tox+, may be cause of syncope.  4.Continue cardiac medication.  5.PAF-Eliquis.  6.PPI.

## 2017-11-25 NOTE — PROGRESS NOTE ADULT - PROBLEM SELECTOR PLAN 3
ACS protocol  ASA, BB, Statin.  Cardiology follow up Bronchodilators neb  oxygen supp  CCB  Pfts as OP

## 2017-11-25 NOTE — PROGRESS NOTE ADULT - PROBLEM SELECTOR PLAN 1
Tele monitoring   carotid doppler  Neuro and cardio follow up carotid doppler  Neuro and cardio follow up

## 2017-11-25 NOTE — PROGRESS NOTE ADULT - SUBJECTIVE AND OBJECTIVE BOX
CHIEF COMPLAINT:Patient is a 57y old  Male who presents with a chief complaint of syncope. Pt appears comfortable.    	  REVIEW OF SYSTEMS:  CONSTITUTIONAL: No fever, weight loss, or fatigue  EYES: No eye pain, visual disturbances, or discharge  ENT:  No difficulty hearing, tinnitus, vertigo; No sinus or throat pain  NECK: No pain or stiffness  RESPIRATORY: No cough, wheezing, chills or hemoptysis; No Shortness of Breath  CARDIOVASCULAR: No chest pain, palpitations, passing out, dizziness, or leg swelling  GASTROINTESTINAL: No abdominal or epigastric pain. No nausea, vomiting, or hematemesis; No diarrhea or constipation. No melena or hematochezia.  GENITOURINARY: No dysuria, frequency, hematuria, or incontinence  NEUROLOGICAL: No headaches, memory loss, loss of strength, numbness, or tremors  SKIN: No itching, burning, rashes, or lesions   LYMPH Nodes: No enlarged glands  ENDOCRINE: No heat or cold intolerance; No hair loss  MUSCULOSKELETAL: No joint pain or swelling; No muscle, back, or extremity pain  PSYCHIATRIC: No depression, anxiety, mood swings, or difficulty sleeping  HEME/LYMPH: No easy bruising, or bleeding gums  ALLERGY AND IMMUNOLOGIC: No hives or eczema	    PHYSICAL EXAM:  T(C): 36.3 (11-24-17 @ 21:11), Max: 36.9 (11-24-17 @ 15:21)  HR: 68 (11-25-17 @ 05:43) (62 - 77)  BP: 146/67 (11-25-17 @ 05:43) (143/67 - 152/66)  RR: 18 (11-25-17 @ 05:43) (18 - 18)  SpO2: 99% (11-25-17 @ 05:43) (97% - 99%)      Appearance: Normal	  HEENT:   Normal oral mucosa, PERRL, EOMI	  Lymphatic: No lymphadenopathy  Cardiovascular: Normal S1 S2, No JVD, No murmurs, No edema  Respiratory: Lungs clear to auscultation	  Psychiatry: A & O x 3, Mood & affect appropriate  Gastrointestinal:  Soft, Non-tender, + BS	  Skin: No rashes, No ecchymoses, No cyanosis	  Neurologic: Non-focal  Extremities: Normal range of motion, No clubbing, cyanosis or edema  Vascular: Peripheral pulses palpable 2+ bilaterally    MEDICATIONS  (STANDING):  apixaban 5 milliGRAM(s) Oral every 12 hours  atorvastatin 40 milliGRAM(s) Oral at bedtime  buDESOnide 160 MICROgram(s)/formoterol 4.5 MICROgram(s) Inhaler 2 Puff(s) Inhalation two times a day  clopidogrel Tablet 75 milliGRAM(s) Oral daily  enalapril 5 milliGRAM(s) Oral daily  gabapentin 400 milliGRAM(s) Oral three times a day  isosorbide   mononitrate ER Tablet (IMDUR) 30 milliGRAM(s) Oral daily  levoFLOXacin IVPB 750 milliGRAM(s) IV Intermittent every 24 hours  metoprolol     tartrate 25 milliGRAM(s) Oral two times a day  predniSONE   Tablet 40 milliGRAM(s) Oral daily  senna 2 Tablet(s) Oral at bedtime  sodium chloride 0.9%. 1000 milliLiter(s) (75 mL/Hr) IV Continuous <Continuous>      	  LABS:	 	                        14.3   16.5  )-----------( 183      ( 25 Nov 2017 07:16 )             44.9     11-25    137  |  105  |  44<H>  ----------------------------<  119<H>  4.4   |  23  |  0.97    Ca    9.0      25 Nov 2017 07:16  Phos  3.6     11-24  Mg     2.3     11-24        Lipid Profile: Cholesterol 138  LDL 43  HDL 88  TG 33    HgA1c: Hemoglobin A1C, Whole Blood: 5.2 % (11-23 @ 09:44)    TSH: Thyroid Stimulating Hormone, Serum: 0.49 uU/mL (11-23 @ 07:03)      OBSERVATIONS:  Mitral Valve: Normal mitral valve.  Aortic Root: Aortic Root: 3.9 cm.    Aortic Valve: Normal trileaflet aortic valve.  Left Atrium: Mild left atrial enlargement.  Left Ventricle: Endocardium not well visualized; grossly  normal left ventricular function. Moderate concentric left  ventricular hypertrophy. Grade II diastolic dysfunction.  Right Heart: Normal right atrium. Normal right ventricular  size and function.pacer wire in rv There is mild tricuspid  regurgitation. Normal pulmonic valve.  Pericardium/PleuraNormal pericardium with no pericardial  effusion.  Hemodynamic: RV systolic pressure is mildly increased at  41 mm Hg.

## 2017-11-26 DIAGNOSIS — R31.9 HEMATURIA, UNSPECIFIED: ICD-10-CM

## 2017-11-26 LAB
ANION GAP SERPL CALC-SCNC: 9 MMOL/L — SIGNIFICANT CHANGE UP (ref 5–17)
APPEARANCE UR: ABNORMAL
BACTERIA # UR AUTO: ABNORMAL /HPF
BILIRUB UR-MCNC: NEGATIVE — SIGNIFICANT CHANGE UP
BUN SERPL-MCNC: 44 MG/DL — HIGH (ref 7–18)
CALCIUM SERPL-MCNC: 8.7 MG/DL — SIGNIFICANT CHANGE UP (ref 8.4–10.5)
CHLORIDE SERPL-SCNC: 103 MMOL/L — SIGNIFICANT CHANGE UP (ref 96–108)
CO2 SERPL-SCNC: 27 MMOL/L — SIGNIFICANT CHANGE UP (ref 22–31)
COLOR SPEC: ABNORMAL
CREAT SERPL-MCNC: 1.07 MG/DL — SIGNIFICANT CHANGE UP (ref 0.5–1.3)
DIFF PNL FLD: ABNORMAL
EPI CELLS # UR: SIGNIFICANT CHANGE UP /HPF
GLUCOSE SERPL-MCNC: 130 MG/DL — HIGH (ref 70–99)
GLUCOSE UR QL: NEGATIVE — SIGNIFICANT CHANGE UP
HCT VFR BLD CALC: 44.3 % — SIGNIFICANT CHANGE UP (ref 39–50)
HGB BLD-MCNC: 14.2 G/DL — SIGNIFICANT CHANGE UP (ref 13–17)
KETONES UR-MCNC: NEGATIVE — SIGNIFICANT CHANGE UP
LEUKOCYTE ESTERASE UR-ACNC: NEGATIVE — SIGNIFICANT CHANGE UP
MCHC RBC-ENTMCNC: 30.2 PG — SIGNIFICANT CHANGE UP (ref 27–34)
MCHC RBC-ENTMCNC: 32 GM/DL — SIGNIFICANT CHANGE UP (ref 32–36)
MCV RBC AUTO: 94.6 FL — SIGNIFICANT CHANGE UP (ref 80–100)
NITRITE UR-MCNC: NEGATIVE — SIGNIFICANT CHANGE UP
PH UR: 6 — SIGNIFICANT CHANGE UP (ref 5–8)
PLATELET # BLD AUTO: 167 K/UL — SIGNIFICANT CHANGE UP (ref 150–400)
POTASSIUM SERPL-MCNC: 4.1 MMOL/L — SIGNIFICANT CHANGE UP (ref 3.5–5.3)
POTASSIUM SERPL-SCNC: 4.1 MMOL/L — SIGNIFICANT CHANGE UP (ref 3.5–5.3)
PROT UR-MCNC: 100
RBC # BLD: 4.68 M/UL — SIGNIFICANT CHANGE UP (ref 4.2–5.8)
RBC # FLD: 13.7 % — SIGNIFICANT CHANGE UP (ref 10.3–14.5)
RBC CASTS # UR COMP ASSIST: >50 /HPF (ref 0–2)
SODIUM SERPL-SCNC: 139 MMOL/L — SIGNIFICANT CHANGE UP (ref 135–145)
SP GR SPEC: 1.01 — SIGNIFICANT CHANGE UP (ref 1.01–1.02)
UROBILINOGEN FLD QL: NEGATIVE — SIGNIFICANT CHANGE UP
WBC # BLD: 13.1 K/UL — HIGH (ref 3.8–10.5)
WBC # FLD AUTO: 13.1 K/UL — HIGH (ref 3.8–10.5)
WBC UR QL: SIGNIFICANT CHANGE UP /HPF (ref 0–5)

## 2017-11-26 RX ADMIN — APIXABAN 5 MILLIGRAM(S): 2.5 TABLET, FILM COATED ORAL at 05:13

## 2017-11-26 RX ADMIN — HYDROMORPHONE HYDROCHLORIDE 2 MILLIGRAM(S): 2 INJECTION INTRAMUSCULAR; INTRAVENOUS; SUBCUTANEOUS at 20:40

## 2017-11-26 RX ADMIN — HYDROMORPHONE HYDROCHLORIDE 2 MILLIGRAM(S): 2 INJECTION INTRAMUSCULAR; INTRAVENOUS; SUBCUTANEOUS at 04:49

## 2017-11-26 RX ADMIN — CLOPIDOGREL BISULFATE 75 MILLIGRAM(S): 75 TABLET, FILM COATED ORAL at 12:28

## 2017-11-26 RX ADMIN — ISOSORBIDE MONONITRATE 30 MILLIGRAM(S): 60 TABLET, EXTENDED RELEASE ORAL at 12:28

## 2017-11-26 RX ADMIN — BUDESONIDE AND FORMOTEROL FUMARATE DIHYDRATE 2 PUFF(S): 160; 4.5 AEROSOL RESPIRATORY (INHALATION) at 10:59

## 2017-11-26 RX ADMIN — Medication 5 MILLIGRAM(S): at 05:13

## 2017-11-26 RX ADMIN — HYDROMORPHONE HYDROCHLORIDE 2 MILLIGRAM(S): 2 INJECTION INTRAMUSCULAR; INTRAVENOUS; SUBCUTANEOUS at 18:00

## 2017-11-26 RX ADMIN — APIXABAN 5 MILLIGRAM(S): 2.5 TABLET, FILM COATED ORAL at 17:18

## 2017-11-26 RX ADMIN — HYDROMORPHONE HYDROCHLORIDE 2 MILLIGRAM(S): 2 INJECTION INTRAMUSCULAR; INTRAVENOUS; SUBCUTANEOUS at 00:55

## 2017-11-26 RX ADMIN — HYDROMORPHONE HYDROCHLORIDE 2 MILLIGRAM(S): 2 INJECTION INTRAMUSCULAR; INTRAVENOUS; SUBCUTANEOUS at 14:34

## 2017-11-26 RX ADMIN — HYDROMORPHONE HYDROCHLORIDE 2 MILLIGRAM(S): 2 INJECTION INTRAMUSCULAR; INTRAVENOUS; SUBCUTANEOUS at 01:10

## 2017-11-26 RX ADMIN — BUDESONIDE AND FORMOTEROL FUMARATE DIHYDRATE 2 PUFF(S): 160; 4.5 AEROSOL RESPIRATORY (INHALATION) at 22:11

## 2017-11-26 RX ADMIN — HYDROMORPHONE HYDROCHLORIDE 2 MILLIGRAM(S): 2 INJECTION INTRAMUSCULAR; INTRAVENOUS; SUBCUTANEOUS at 04:34

## 2017-11-26 RX ADMIN — HYDROMORPHONE HYDROCHLORIDE 2 MILLIGRAM(S): 2 INJECTION INTRAMUSCULAR; INTRAVENOUS; SUBCUTANEOUS at 10:58

## 2017-11-26 RX ADMIN — GABAPENTIN 400 MILLIGRAM(S): 400 CAPSULE ORAL at 05:13

## 2017-11-26 RX ADMIN — HYDROMORPHONE HYDROCHLORIDE 2 MILLIGRAM(S): 2 INJECTION INTRAMUSCULAR; INTRAVENOUS; SUBCUTANEOUS at 11:15

## 2017-11-26 RX ADMIN — HYDROMORPHONE HYDROCHLORIDE 2 MILLIGRAM(S): 2 INJECTION INTRAMUSCULAR; INTRAVENOUS; SUBCUTANEOUS at 23:34

## 2017-11-26 RX ADMIN — HYDROMORPHONE HYDROCHLORIDE 2 MILLIGRAM(S): 2 INJECTION INTRAMUSCULAR; INTRAVENOUS; SUBCUTANEOUS at 23:49

## 2017-11-26 RX ADMIN — SENNA PLUS 2 TABLET(S): 8.6 TABLET ORAL at 22:11

## 2017-11-26 RX ADMIN — Medication 25 MILLIGRAM(S): at 17:18

## 2017-11-26 RX ADMIN — HYDROMORPHONE HYDROCHLORIDE 2 MILLIGRAM(S): 2 INJECTION INTRAMUSCULAR; INTRAVENOUS; SUBCUTANEOUS at 14:30

## 2017-11-26 RX ADMIN — CALAMINE 8% AND ZINC OXIDE 8% 1 APPLICATION(S): 160 LOTION TOPICAL at 00:55

## 2017-11-26 RX ADMIN — HYDROMORPHONE HYDROCHLORIDE 2 MILLIGRAM(S): 2 INJECTION INTRAMUSCULAR; INTRAVENOUS; SUBCUTANEOUS at 17:17

## 2017-11-26 RX ADMIN — HYDROMORPHONE HYDROCHLORIDE 2 MILLIGRAM(S): 2 INJECTION INTRAMUSCULAR; INTRAVENOUS; SUBCUTANEOUS at 08:26

## 2017-11-26 RX ADMIN — GABAPENTIN 400 MILLIGRAM(S): 400 CAPSULE ORAL at 13:26

## 2017-11-26 RX ADMIN — HYDROMORPHONE HYDROCHLORIDE 2 MILLIGRAM(S): 2 INJECTION INTRAMUSCULAR; INTRAVENOUS; SUBCUTANEOUS at 20:25

## 2017-11-26 RX ADMIN — ATORVASTATIN CALCIUM 40 MILLIGRAM(S): 80 TABLET, FILM COATED ORAL at 22:10

## 2017-11-26 RX ADMIN — Medication 25 MILLIGRAM(S): at 05:12

## 2017-11-26 RX ADMIN — GABAPENTIN 400 MILLIGRAM(S): 400 CAPSULE ORAL at 22:11

## 2017-11-26 RX ADMIN — HYDROMORPHONE HYDROCHLORIDE 2 MILLIGRAM(S): 2 INJECTION INTRAMUSCULAR; INTRAVENOUS; SUBCUTANEOUS at 08:11

## 2017-11-26 RX ADMIN — Medication 25 MILLIGRAM(S): at 05:13

## 2017-11-26 RX ADMIN — Medication 40 MILLIGRAM(S): at 05:13

## 2017-11-26 NOTE — PROGRESS NOTE ADULT - SUBJECTIVE AND OBJECTIVE BOX
Patient is a 57y old  Male who presents with a chief complaint of syncope.  PMH of COPD. Awake, alert, comfortable in bed in NAD. Denies sob or cough, chest pain but still with some dizziness when ambulating  Feels better despite some dizziness still. No nausea, vomiting or sob.  Awake, alert, comfoartable in NAD    pt seen in icu [  ], reg med floor [ x  ], bed [  ], chair at bedside [   ]    REVIEW OF SYSTEMS: As above    CONSTITUTIONAL: No weakness, fevers or chills  EYES/ENT: No visual changes;  No vertigo or throat pain   NECK: No pain or stiffness  RESPIRATORY: No cough, wheezing, hemoptysis; No shortness of breath  CARDIOVASCULAR: No chest pain or palpitations  GASTROINTESTINAL: No abdominal or epigastric pain. No nausea, vomiting, or hematemesis; No diarrhea or constipation. No melena or hematochezia.  GENITOURINARY: No dysuria, frequency or hematuria  NEUROLOGICAL: No numbness or weakness  SKIN: No itching, burning, rashes, or lesions   All other review of systems is negative unless indicated above.    Physical Exam    General: WN/WD NAD  Neurology: A&Ox3, nonfocal, GONZALEZ x 4  Respiratory: CTA B/L  CV: RRR, S1S2, no murmurs, rubs or gallops  Abdominal: Soft, NT, ND +BS, Last BM  Extremities: No edema, + peripheral pulses      Allergies  aspirin (Hives)  codeine (Rash)  Motrin (Rash)  penicillin (Hives; Anaphylaxis)  Toradol (Rash)  Tylenol (Hives)      Health Issues  Syncope and collapse  Calcium kidney stones  Renal cell carcinoma of left kidney  Hyperlipidemia  Hypertension  CAD (coronary artery disease)  Atrial fibrillation  AICD (automatic cardioverter/defibrillator) present  Pacemaker  Calcium kidney stone  H/O partial nephrectomy  S/P cholecystectomy      Vitals  T(F): 97.5 (11-26-17 @ 05:05), Max: 98.1 (11-25-17 @ 21:12)  HR: 62 (11-26-17 @ 05:05) (62 - 67)  BP: 133/67 (11-26-17 @ 05:05) (105/75 - 163/86)  RR: 18 (11-26-17 @ 05:05) (17 - 18)  SpO2: 100% (11-26-17 @ 05:05) (97% - 100%)  Wt(kg): --  CAPILLARY BLOOD GLUCOSE          Labs                          14.2   13.1  )-----------( 167      ( 26 Nov 2017 07:07 )             44.3       11-26    139  |  103  |  44<H>  ----------------------------<  130<H>  4.1   |  27  |  1.07    Ca    8.7      26 Nov 2017 07:07              Radiology Results    < from: CT Abdomen and Pelvis No Cont (11.22.17 @ 11:35) >  Impression: No evidence for a urinary calculus. No hydronephrosis.    Normal appendix. No bowel obstruction or grossly thickened bowel wall.   Colonic diverticulosis without evidence for diverticulitis.    Small nonspecific 4 mm right lower lobe lung nodule; follow-up chest CT   may be pursued in 12 months to ensure stability.        < from: Transthoracic Echocardiogram (11.23.17 @ 12:38) >  CONCLUSIONS:  1. Mild left atrial enlargement.  2. Moderate concentric left ventricular hypertrophy.  3. Endocardium not well visualized; grossly normal left  ventricular function.  4. Grade II diastolic dysfunction.  5. RV systolic pressure is mildly increased at  41 mm Hg.  6. There is mild tricuspid regurgitation.      < end of copied text >      Meds    MEDICATIONS  (STANDING):  apixaban 5 milliGRAM(s) Oral every 12 hours  atorvastatin 40 milliGRAM(s) Oral at bedtime  buDESOnide 160 MICROgram(s)/formoterol 4.5 MICROgram(s) Inhaler 2 Puff(s) Inhalation two times a day  clopidogrel Tablet 75 milliGRAM(s) Oral daily  enalapril 5 milliGRAM(s) Oral daily  gabapentin 400 milliGRAM(s) Oral three times a day  isosorbide   mononitrate ER Tablet (IMDUR) 30 milliGRAM(s) Oral daily  levoFLOXacin IVPB 750 milliGRAM(s) IV Intermittent every 24 hours  metoprolol     tartrate 25 milliGRAM(s) Oral two times a day  predniSONE   Tablet 40 milliGRAM(s) Oral daily  senna 2 Tablet(s) Oral at bedtime  sodium chloride 0.9%. 1000 milliLiter(s) (75 mL/Hr) IV Continuous <Continuous>      MEDICATIONS  (PRN):  calamine Lotion 1 Application(s) Topical two times a day PRN Rash and/or Itching  diphenhydrAMINE   Capsule 25 milliGRAM(s) Oral every 6 hours PRN Rash and/or Itching  HYDROmorphone  Injectable 2 milliGRAM(s) IV Push every 3 hours PRN Severe Pain (7 - 10)  traMADol 50 milliGRAM(s) Oral every 12 hours PRN Moderate Pain (4 - 6) Patient is a 57y old  Male who presents with a chief complaint of syncope.  PMH of COPD. Awake, alert, comfortable in bed in NAD. Denies sob or cough, chest pain but still with some dizziness when ambulating  Feels better despite some dizziness still. No nausea, vomiting or sob.  Awake, alert, comfoartable in NAD    pt seen in icu [  ], reg med floor [ x  ], bed [  ], chair at bedside [   ]  Complaining of hematuria  REVIEW OF SYSTEMS: As above    CONSTITUTIONAL: No weakness, fevers or chills  EYES/ENT: No visual changes;  No vertigo or throat pain   NECK: No pain or stiffness  RESPIRATORY: No cough, wheezing, hemoptysis; No shortness of breath  CARDIOVASCULAR: No chest pain or palpitations  GASTROINTESTINAL: No abdominal or epigastric pain. No nausea, vomiting, or hematemesis; No diarrhea or constipation. No melena or hematochezia.  GENITOURINARY: No dysuria, frequency but + hematuria  NEUROLOGICAL: No numbness or weakness  SKIN: No itching, burning, rashes, or lesions   All other review of systems is negative unless indicated above.    Physical Exam    General: WN/WD NAD  Neurology: A&Ox3, nonfocal, GONZALEZ x 4  Respiratory: CTA B/L  CV: RRR, S1S2, no murmurs, rubs or gallops  Abdominal: Soft, NT, ND +BS, Last BM  Extremities: No edema, + peripheral pulses      Allergies  aspirin (Hives)  codeine (Rash)  Motrin (Rash)  penicillin (Hives; Anaphylaxis)  Toradol (Rash)  Tylenol (Hives)      Health Issues  Syncope and collapse  Calcium kidney stones  Renal cell carcinoma of left kidney  Hyperlipidemia  Hypertension  CAD (coronary artery disease)  Atrial fibrillation  AICD (automatic cardioverter/defibrillator) present  Pacemaker  Calcium kidney stone  H/O partial nephrectomy  S/P cholecystectomy      Vitals  T(F): 97.5 (11-26-17 @ 05:05), Max: 98.1 (11-25-17 @ 21:12)  HR: 62 (11-26-17 @ 05:05) (62 - 67)  BP: 133/67 (11-26-17 @ 05:05) (105/75 - 163/86)  RR: 18 (11-26-17 @ 05:05) (17 - 18)  SpO2: 100% (11-26-17 @ 05:05) (97% - 100%)  Wt(kg): --  CAPILLARY BLOOD GLUCOSE          Labs                          14.2   13.1  )-----------( 167      ( 26 Nov 2017 07:07 )             44.3       11-26    139  |  103  |  44<H>  ----------------------------<  130<H>  4.1   |  27  |  1.07    Ca    8.7      26 Nov 2017 07:07              Radiology Results    < from: CT Abdomen and Pelvis No Cont (11.22.17 @ 11:35) >  Impression: No evidence for a urinary calculus. No hydronephrosis.    Normal appendix. No bowel obstruction or grossly thickened bowel wall.   Colonic diverticulosis without evidence for diverticulitis.    Small nonspecific 4 mm right lower lobe lung nodule; follow-up chest CT   may be pursued in 12 months to ensure stability.        < from: Transthoracic Echocardiogram (11.23.17 @ 12:38) >  CONCLUSIONS:  1. Mild left atrial enlargement.  2. Moderate concentric left ventricular hypertrophy.  3. Endocardium not well visualized; grossly normal left  ventricular function.  4. Grade II diastolic dysfunction.  5. RV systolic pressure is mildly increased at  41 mm Hg.  6. There is mild tricuspid regurgitation.      < end of copied text >      Meds    MEDICATIONS  (STANDING):  apixaban 5 milliGRAM(s) Oral every 12 hours  atorvastatin 40 milliGRAM(s) Oral at bedtime  buDESOnide 160 MICROgram(s)/formoterol 4.5 MICROgram(s) Inhaler 2 Puff(s) Inhalation two times a day  clopidogrel Tablet 75 milliGRAM(s) Oral daily  enalapril 5 milliGRAM(s) Oral daily  gabapentin 400 milliGRAM(s) Oral three times a day  isosorbide   mononitrate ER Tablet (IMDUR) 30 milliGRAM(s) Oral daily  levoFLOXacin IVPB 750 milliGRAM(s) IV Intermittent every 24 hours  metoprolol     tartrate 25 milliGRAM(s) Oral two times a day  predniSONE   Tablet 40 milliGRAM(s) Oral daily  senna 2 Tablet(s) Oral at bedtime  sodium chloride 0.9%. 1000 milliLiter(s) (75 mL/Hr) IV Continuous <Continuous>      MEDICATIONS  (PRN):  calamine Lotion 1 Application(s) Topical two times a day PRN Rash and/or Itching  diphenhydrAMINE   Capsule 25 milliGRAM(s) Oral every 6 hours PRN Rash and/or Itching  HYDROmorphone  Injectable 2 milliGRAM(s) IV Push every 3 hours PRN Severe Pain (7 - 10)  traMADol 50 milliGRAM(s) Oral every 12 hours PRN Moderate Pain (4 - 6)

## 2017-11-26 NOTE — PROGRESS NOTE ADULT - SUBJECTIVE AND OBJECTIVE BOX
CHIEF COMPLAINT:Patient is a 57y old  Male who presents with a chief complaint of syncope. Pt appears comfortable.    	  REVIEW OF SYSTEMS:  CONSTITUTIONAL: No fever, weight loss, or fatigue  EYES: No eye pain, visual disturbances, or discharge  ENT:  No difficulty hearing, tinnitus, vertigo; No sinus or throat pain  NECK: No pain or stiffness  RESPIRATORY: No cough, wheezing, chills or hemoptysis; No Shortness of Breath  CARDIOVASCULAR: No chest pain, palpitations, passing out, dizziness, or leg swelling  GASTROINTESTINAL: No abdominal or epigastric pain. No nausea, vomiting, or hematemesis; No diarrhea or constipation. No melena or hematochezia.  GENITOURINARY: No dysuria, frequency, hematuria, or incontinence  NEUROLOGICAL: No headaches, memory loss, loss of strength, numbness, or tremors  SKIN: No itching, burning, rashes, or lesions   LYMPH Nodes: No enlarged glands  ENDOCRINE: No heat or cold intolerance; No hair loss  MUSCULOSKELETAL: No joint pain or swelling; No muscle, back, or extremity pain  PSYCHIATRIC: No depression, anxiety, mood swings, or difficulty sleeping  HEME/LYMPH: No easy bruising, or bleeding gums  ALLERGY AND IMMUNOLOGIC: No hives or eczema	    PHYSICAL EXAM:  T(C): 36.4 (11-26-17 @ 05:05), Max: 36.7 (11-25-17 @ 21:12)  HR: 62 (11-26-17 @ 05:05) (62 - 67)  BP: 133/67 (11-26-17 @ 05:05) (105/75 - 163/86)  RR: 18 (11-26-17 @ 05:05) (17 - 18)  SpO2: 100% (11-26-17 @ 05:05) (97% - 100%)  Wt(kg): --  I&O's Summary      Appearance: Normal	  HEENT:   Normal oral mucosa, PERRL, EOMI	  Lymphatic: No lymphadenopathy  Cardiovascular: Normal S1 S2, No JVD, No murmurs, No edema  Respiratory: Lungs clear to auscultation	  Psychiatry: A & O x 3, Mood & affect appropriate  Gastrointestinal:  Soft, Non-tender, + BS	  Skin: No rashes, No ecchymoses, No cyanosis	  Neurologic: Non-focal  Extremities: Normal range of motion, No clubbing, cyanosis or edema  Vascular: Peripheral pulses palpable 2+ bilaterally    MEDICATIONS  (STANDING):  apixaban 5 milliGRAM(s) Oral every 12 hours  atorvastatin 40 milliGRAM(s) Oral at bedtime  buDESOnide 160 MICROgram(s)/formoterol 4.5 MICROgram(s) Inhaler 2 Puff(s) Inhalation two times a day  clopidogrel Tablet 75 milliGRAM(s) Oral daily  enalapril 5 milliGRAM(s) Oral daily  gabapentin 400 milliGRAM(s) Oral three times a day  isosorbide   mononitrate ER Tablet (IMDUR) 30 milliGRAM(s) Oral daily  levoFLOXacin IVPB 750 milliGRAM(s) IV Intermittent every 24 hours  metoprolol     tartrate 25 milliGRAM(s) Oral two times a day  predniSONE   Tablet 40 milliGRAM(s) Oral daily  senna 2 Tablet(s) Oral at bedtime  sodium chloride 0.9%. 1000 milliLiter(s) (75 mL/Hr) IV Continuous <Continuous>      	  LABS:	 	                       14.2   13.1  )-----------( 167      ( 26 Nov 2017 07:07 )             44.3     11-26    139  |  103  |  44<H>  ----------------------------<  130<H>  4.1   |  27  |  1.07    Ca    8.7      26 Nov 2017 07:07        Lipid Profile: Cholesterol 138  LDL 43  HDL 88  TG 33    HgA1c: Hemoglobin A1C, Whole Blood: 5.2 % (11-23 @ 09:44)    TSH: Thyroid Stimulating Hormone, Serum: 0.49 uU/mL (11-23 @ 07:03)

## 2017-11-26 NOTE — PROGRESS NOTE ADULT - PROBLEM SELECTOR PLAN 6
-s/p Partial nephrectomy in 2002. repeat biopsy 8 wks ago showed stage 2 left RCC, does not remember the name oncologist he follows as outpatient, likely needs partial or total nephrectomy of left kidney
Quantiferon TB Gold test  Follow up CT chest in 6 months
-s/p Partial nephrectomy in 2002. repeat biopsy 8 wks ago showed stage 2 left RCC, does not remember the name oncologist he follows as outpatient, likely needs partial or total nephrectomy of left kidney
Quantiferon TB Gold test  Follow up CT chest in 6 months

## 2017-11-26 NOTE — PROGRESS NOTE ADULT - PROBLEM SELECTOR PROBLEM 7
AICD (automatic cardioverter/defibrillator) present
AICD (automatic cardioverter/defibrillator) present
Hematuria

## 2017-11-27 VITALS
OXYGEN SATURATION: 99 % | TEMPERATURE: 97 F | DIASTOLIC BLOOD PRESSURE: 85 MMHG | RESPIRATION RATE: 16 BRPM | SYSTOLIC BLOOD PRESSURE: 142 MMHG | HEART RATE: 66 BPM

## 2017-11-27 LAB
ANION GAP SERPL CALC-SCNC: 7 MMOL/L — SIGNIFICANT CHANGE UP (ref 5–17)
BUN SERPL-MCNC: 43 MG/DL — HIGH (ref 7–18)
CALCIUM SERPL-MCNC: 8.8 MG/DL — SIGNIFICANT CHANGE UP (ref 8.4–10.5)
CHLORIDE SERPL-SCNC: 102 MMOL/L — SIGNIFICANT CHANGE UP (ref 96–108)
CO2 SERPL-SCNC: 29 MMOL/L — SIGNIFICANT CHANGE UP (ref 22–31)
CREAT SERPL-MCNC: 1.13 MG/DL — SIGNIFICANT CHANGE UP (ref 0.5–1.3)
GLUCOSE SERPL-MCNC: 122 MG/DL — HIGH (ref 70–99)
HCT VFR BLD CALC: 44.2 % — SIGNIFICANT CHANGE UP (ref 39–50)
HGB BLD-MCNC: 14.4 G/DL — SIGNIFICANT CHANGE UP (ref 13–17)
MCHC RBC-ENTMCNC: 30.8 PG — SIGNIFICANT CHANGE UP (ref 27–34)
MCHC RBC-ENTMCNC: 32.5 GM/DL — SIGNIFICANT CHANGE UP (ref 32–36)
MCV RBC AUTO: 94.6 FL — SIGNIFICANT CHANGE UP (ref 80–100)
PLATELET # BLD AUTO: 147 K/UL — LOW (ref 150–400)
POTASSIUM SERPL-MCNC: 4.3 MMOL/L — SIGNIFICANT CHANGE UP (ref 3.5–5.3)
POTASSIUM SERPL-SCNC: 4.3 MMOL/L — SIGNIFICANT CHANGE UP (ref 3.5–5.3)
RBC # BLD: 4.67 M/UL — SIGNIFICANT CHANGE UP (ref 4.2–5.8)
RBC # FLD: 13.3 % — SIGNIFICANT CHANGE UP (ref 10.3–14.5)
SODIUM SERPL-SCNC: 138 MMOL/L — SIGNIFICANT CHANGE UP (ref 135–145)
WBC # BLD: 10.4 K/UL — SIGNIFICANT CHANGE UP (ref 3.8–10.5)
WBC # FLD AUTO: 10.4 K/UL — SIGNIFICANT CHANGE UP (ref 3.8–10.5)

## 2017-11-27 PROCEDURE — 76770 US EXAM ABDO BACK WALL COMP: CPT

## 2017-11-27 PROCEDURE — 80053 COMPREHEN METABOLIC PANEL: CPT

## 2017-11-27 PROCEDURE — 83036 HEMOGLOBIN GLYCOSYLATED A1C: CPT

## 2017-11-27 PROCEDURE — 82550 ASSAY OF CK (CPK): CPT

## 2017-11-27 PROCEDURE — 85027 COMPLETE CBC AUTOMATED: CPT

## 2017-11-27 PROCEDURE — 99285 EMERGENCY DEPT VISIT HI MDM: CPT | Mod: 25

## 2017-11-27 PROCEDURE — 80307 DRUG TEST PRSMV CHEM ANLYZR: CPT

## 2017-11-27 PROCEDURE — 93306 TTE W/DOPPLER COMPLETE: CPT

## 2017-11-27 PROCEDURE — 84100 ASSAY OF PHOSPHORUS: CPT

## 2017-11-27 PROCEDURE — 87086 URINE CULTURE/COLONY COUNT: CPT

## 2017-11-27 PROCEDURE — 70450 CT HEAD/BRAIN W/O DYE: CPT

## 2017-11-27 PROCEDURE — 82962 GLUCOSE BLOOD TEST: CPT

## 2017-11-27 PROCEDURE — 85730 THROMBOPLASTIN TIME PARTIAL: CPT

## 2017-11-27 PROCEDURE — 82553 CREATINE MB FRACTION: CPT

## 2017-11-27 PROCEDURE — 85610 PROTHROMBIN TIME: CPT

## 2017-11-27 PROCEDURE — 80048 BASIC METABOLIC PNL TOTAL CA: CPT

## 2017-11-27 PROCEDURE — 83735 ASSAY OF MAGNESIUM: CPT

## 2017-11-27 PROCEDURE — 84484 ASSAY OF TROPONIN QUANT: CPT

## 2017-11-27 PROCEDURE — 93005 ELECTROCARDIOGRAM TRACING: CPT

## 2017-11-27 PROCEDURE — 94640 AIRWAY INHALATION TREATMENT: CPT

## 2017-11-27 PROCEDURE — 85379 FIBRIN DEGRADATION QUANT: CPT

## 2017-11-27 PROCEDURE — 82977 ASSAY OF GGT: CPT

## 2017-11-27 PROCEDURE — 80061 LIPID PANEL: CPT

## 2017-11-27 PROCEDURE — 93880 EXTRACRANIAL BILAT STUDY: CPT

## 2017-11-27 PROCEDURE — 81001 URINALYSIS AUTO W/SCOPE: CPT

## 2017-11-27 PROCEDURE — 84443 ASSAY THYROID STIM HORMONE: CPT

## 2017-11-27 PROCEDURE — 74176 CT ABD & PELVIS W/O CONTRAST: CPT

## 2017-11-27 RX ORDER — CIPROFLOXACIN LACTATE 400MG/40ML
1 VIAL (ML) INTRAVENOUS
Qty: 2 | Refills: 0 | OUTPATIENT
Start: 2017-11-27 | End: 2017-11-29

## 2017-11-27 RX ORDER — SODIUM CHLORIDE 9 MG/ML
1000 INJECTION INTRAMUSCULAR; INTRAVENOUS; SUBCUTANEOUS
Qty: 0 | Refills: 0 | Status: DISCONTINUED | OUTPATIENT
Start: 2017-11-27 | End: 2017-11-27

## 2017-11-27 RX ORDER — TRAMADOL HYDROCHLORIDE 50 MG/1
1 TABLET ORAL
Qty: 10 | Refills: 0 | OUTPATIENT
Start: 2017-11-27 | End: 2017-12-02

## 2017-11-27 RX ORDER — HYDROMORPHONE HYDROCHLORIDE 2 MG/ML
2 INJECTION INTRAMUSCULAR; INTRAVENOUS; SUBCUTANEOUS ONCE
Qty: 0 | Refills: 0 | Status: DISCONTINUED | OUTPATIENT
Start: 2017-11-27 | End: 2017-11-27

## 2017-11-27 RX ORDER — HYDROMORPHONE HYDROCHLORIDE 2 MG/ML
2 INJECTION INTRAMUSCULAR; INTRAVENOUS; SUBCUTANEOUS EVERY 6 HOURS
Qty: 0 | Refills: 0 | Status: DISCONTINUED | OUTPATIENT
Start: 2017-11-27 | End: 2017-11-27

## 2017-11-27 RX ADMIN — HYDROMORPHONE HYDROCHLORIDE 2 MILLIGRAM(S): 2 INJECTION INTRAMUSCULAR; INTRAVENOUS; SUBCUTANEOUS at 05:26

## 2017-11-27 RX ADMIN — ISOSORBIDE MONONITRATE 30 MILLIGRAM(S): 60 TABLET, EXTENDED RELEASE ORAL at 12:22

## 2017-11-27 RX ADMIN — Medication 25 MILLIGRAM(S): at 05:26

## 2017-11-27 RX ADMIN — HYDROMORPHONE HYDROCHLORIDE 2 MILLIGRAM(S): 2 INJECTION INTRAMUSCULAR; INTRAVENOUS; SUBCUTANEOUS at 12:55

## 2017-11-27 RX ADMIN — CLOPIDOGREL BISULFATE 75 MILLIGRAM(S): 75 TABLET, FILM COATED ORAL at 12:22

## 2017-11-27 RX ADMIN — Medication 40 MILLIGRAM(S): at 05:26

## 2017-11-27 RX ADMIN — APIXABAN 5 MILLIGRAM(S): 2.5 TABLET, FILM COATED ORAL at 05:26

## 2017-11-27 RX ADMIN — GABAPENTIN 400 MILLIGRAM(S): 400 CAPSULE ORAL at 14:18

## 2017-11-27 RX ADMIN — HYDROMORPHONE HYDROCHLORIDE 2 MILLIGRAM(S): 2 INJECTION INTRAMUSCULAR; INTRAVENOUS; SUBCUTANEOUS at 09:02

## 2017-11-27 RX ADMIN — HYDROMORPHONE HYDROCHLORIDE 2 MILLIGRAM(S): 2 INJECTION INTRAMUSCULAR; INTRAVENOUS; SUBCUTANEOUS at 02:35

## 2017-11-27 RX ADMIN — Medication 5 MILLIGRAM(S): at 05:26

## 2017-11-27 RX ADMIN — Medication 25 MILLIGRAM(S): at 17:37

## 2017-11-27 RX ADMIN — HYDROMORPHONE HYDROCHLORIDE 2 MILLIGRAM(S): 2 INJECTION INTRAMUSCULAR; INTRAVENOUS; SUBCUTANEOUS at 05:45

## 2017-11-27 RX ADMIN — HYDROMORPHONE HYDROCHLORIDE 2 MILLIGRAM(S): 2 INJECTION INTRAMUSCULAR; INTRAVENOUS; SUBCUTANEOUS at 12:31

## 2017-11-27 RX ADMIN — GABAPENTIN 400 MILLIGRAM(S): 400 CAPSULE ORAL at 05:26

## 2017-11-27 RX ADMIN — HYDROMORPHONE HYDROCHLORIDE 2 MILLIGRAM(S): 2 INJECTION INTRAMUSCULAR; INTRAVENOUS; SUBCUTANEOUS at 16:23

## 2017-11-27 RX ADMIN — APIXABAN 5 MILLIGRAM(S): 2.5 TABLET, FILM COATED ORAL at 17:36

## 2017-11-27 RX ADMIN — HYDROMORPHONE HYDROCHLORIDE 2 MILLIGRAM(S): 2 INJECTION INTRAMUSCULAR; INTRAVENOUS; SUBCUTANEOUS at 02:50

## 2017-11-27 RX ADMIN — HYDROMORPHONE HYDROCHLORIDE 2 MILLIGRAM(S): 2 INJECTION INTRAMUSCULAR; INTRAVENOUS; SUBCUTANEOUS at 08:38

## 2017-11-27 RX ADMIN — HYDROMORPHONE HYDROCHLORIDE 2 MILLIGRAM(S): 2 INJECTION INTRAMUSCULAR; INTRAVENOUS; SUBCUTANEOUS at 16:41

## 2017-11-27 RX ADMIN — BUDESONIDE AND FORMOTEROL FUMARATE DIHYDRATE 2 PUFF(S): 160; 4.5 AEROSOL RESPIRATORY (INHALATION) at 10:04

## 2017-11-27 NOTE — PROGRESS NOTE ADULT - SUBJECTIVE AND OBJECTIVE BOX
Patient is a 57y old  Male who presents with a chief complaint of syncope (24 Nov 2017 14:15)    pt seen in icu [  ], reg med floor [ x  ], bed [x  ], chair at bedside [   ], a+o x3 [ x ], lethargic [  ],  nad [x  ]    Allergies    aspirin (Hives)  codeine (Rash)  Motrin (Rash)  penicillin (Hives; Anaphylaxis)  Toradol (Rash)  Tylenol (Hives)        Vitals    T(F): 97.2 (11-27-17 @ 05:15), Max: 98.4 (11-26-17 @ 20:40)  HR: 66 (11-27-17 @ 05:15) (62 - 69)  BP: 142/85 (11-27-17 @ 05:15) (142/85 - 152/68)  RR: 16 (11-27-17 @ 05:15) (16 - 18)  SpO2: 99% (11-27-17 @ 05:15) (99% - 100%)  Wt(kg): --  CAPILLARY BLOOD GLUCOSE          Labs                          14.4   10.4  )-----------( 147      ( 27 Nov 2017 07:45 )             44.2       11-27    138  |  102  |  43<H>  ----------------------------<  122<H>  4.3   |  29  |  1.13    Ca    8.8      27 Nov 2017 07:45              .Urine Clean Catch (Midstream)  11-22 @ 14:31   No growth  --  --          Radiology Results      Meds    MEDICATIONS  (STANDING):  apixaban 5 milliGRAM(s) Oral every 12 hours  atorvastatin 40 milliGRAM(s) Oral at bedtime  buDESOnide 160 MICROgram(s)/formoterol 4.5 MICROgram(s) Inhaler 2 Puff(s) Inhalation two times a day  clopidogrel Tablet 75 milliGRAM(s) Oral daily  enalapril 5 milliGRAM(s) Oral daily  gabapentin 400 milliGRAM(s) Oral three times a day  isosorbide   mononitrate ER Tablet (IMDUR) 30 milliGRAM(s) Oral daily  levoFLOXacin IVPB 750 milliGRAM(s) IV Intermittent every 24 hours  metoprolol     tartrate 25 milliGRAM(s) Oral two times a day  sodium chloride 0.9%. 1000 milliLiter(s) (75 mL/Hr) IV Continuous <Continuous>  sodium chloride 0.9%. 1000 milliLiter(s) (75 mL/Hr) IV Continuous <Continuous>      MEDICATIONS  (PRN):  calamine Lotion 1 Application(s) Topical two times a day PRN Rash and/or Itching  diphenhydrAMINE   Capsule 25 milliGRAM(s) Oral every 6 hours PRN Rash and/or Itching  HYDROmorphone  Injectable 2 milliGRAM(s) IV Push every 6 hours PRN Severe Pain (7 - 10)  traMADol 50 milliGRAM(s) Oral every 12 hours PRN Moderate Pain (4 - 6)      Physical Exam    Neuro :  no focal deficits  Respiratory: CTA B/L  CV: RRR, S1S2, no murmurs,   Abdominal: Soft, NT, ND +BS,  Extremities: No edema, + peripheral pulses    ASSESSMENT    Syncope and collapse  h/o Calcium kidney stones  Renal cell carcinoma of left kidney  Hyperlipidemia  Hypertension  CAD (coronary artery disease)  Atrial fibrillation  AICD (automatic cardioverter/defibrillator) present  Pacemaker  Calcium kidney stone  H/O partial nephrectomy  S/P cholecystectomy      PLAN    tilt table test out pt  urology f/u outpt   d/c plan Patient is a 57y old  Male who presents with a chief complaint of syncope (24 Nov 2017 14:15)    pt seen in icu [  ], reg med floor [ x  ], bed [x  ], chair at bedside [   ], a+o x3 [ x ], lethargic [  ],  nad [x  ]    Allergies    aspirin (Hives)  codeine (Rash)  Motrin (Rash)  penicillin (Hives; Anaphylaxis)  Toradol (Rash)  Tylenol (Hives)        Vitals    T(F): 97.2 (11-27-17 @ 05:15), Max: 98.4 (11-26-17 @ 20:40)  HR: 66 (11-27-17 @ 05:15) (62 - 69)  BP: 142/85 (11-27-17 @ 05:15) (142/85 - 152/68)  RR: 16 (11-27-17 @ 05:15) (16 - 18)  SpO2: 99% (11-27-17 @ 05:15) (99% - 100%)  Wt(kg): --  CAPILLARY BLOOD GLUCOSE          Labs                          14.4   10.4  )-----------( 147      ( 27 Nov 2017 07:45 )             44.2       11-27    138  |  102  |  43<H>  ----------------------------<  122<H>  4.3   |  29  |  1.13    Ca    8.8      27 Nov 2017 07:45              .Urine Clean Catch (Midstream)  11-22 @ 14:31   No growth  --  --          Radiology Results      Meds    MEDICATIONS  (STANDING):  apixaban 5 milliGRAM(s) Oral every 12 hours  atorvastatin 40 milliGRAM(s) Oral at bedtime  buDESOnide 160 MICROgram(s)/formoterol 4.5 MICROgram(s) Inhaler 2 Puff(s) Inhalation two times a day  clopidogrel Tablet 75 milliGRAM(s) Oral daily  enalapril 5 milliGRAM(s) Oral daily  gabapentin 400 milliGRAM(s) Oral three times a day  isosorbide   mononitrate ER Tablet (IMDUR) 30 milliGRAM(s) Oral daily  levoFLOXacin IVPB 750 milliGRAM(s) IV Intermittent every 24 hours  metoprolol     tartrate 25 milliGRAM(s) Oral two times a day  sodium chloride 0.9%. 1000 milliLiter(s) (75 mL/Hr) IV Continuous <Continuous>  sodium chloride 0.9%. 1000 milliLiter(s) (75 mL/Hr) IV Continuous <Continuous>      MEDICATIONS  (PRN):  calamine Lotion 1 Application(s) Topical two times a day PRN Rash and/or Itching  diphenhydrAMINE   Capsule 25 milliGRAM(s) Oral every 6 hours PRN Rash and/or Itching  HYDROmorphone  Injectable 2 milliGRAM(s) IV Push every 6 hours PRN Severe Pain (7 - 10)  traMADol 50 milliGRAM(s) Oral every 12 hours PRN Moderate Pain (4 - 6)      Physical Exam    Neuro :  no focal deficits  Respiratory: CTA B/L  CV: RRR, S1S2, no murmurs,   Abdominal: Soft, NT, ND +BS,  Extremities: No edema, + peripheral pulses    ASSESSMENT    Syncope and collapse  h/o Calcium kidney stones  Renal cell carcinoma of left kidney  Hyperlipidemia  Hypertension  CAD (coronary artery disease)  Atrial fibrillation  AICD (automatic cardioverter/defibrillator) present  Pacemaker  Calcium kidney stone  H/O partial nephrectomy  S/P cholecystectomy      PLAN    cardio f/u noted  aicd interogation neg for events  tilt table test out pt  urology f/u outpt   pain mgmt f/u outpt  cont current meds  pt stable for d/c

## 2017-11-27 NOTE — PROGRESS NOTE ADULT - SUBJECTIVE AND OBJECTIVE BOX
Patient is a 57y old  Male who presents with a chief complaint of syncope.    Pt is a current every day smoker for the past 20 years, 1pk/day. PMH of current renal cell carcinoma, biopsied 2 weeks ago, kidney stones, CAD w stents, AICD, afib on eliquis/plavix, HTN, HLD, COPD, Hep C, here for syncope on train tonight. LOC for unknown time, now just c/l L flank pain, PE wnl, will admit tele,   Awake, alert, comfortable in bed in NAD.    INTERVAL HPI/OVERNIGHT EVENTS:      VITAL SIGNS:  T(F): 97.2 (17 @ 05:15)  HR: 66 (17 @ 05:15)  BP: 142/85 (17 @ 05:15)  RR: 16 (17 @ 05:15)  SpO2: 99% (17 @ 05:15)  Wt(kg): --  I&O's Detail          REVIEW OF SYSTEMS:    CONSTITUTIONAL:  No fevers, chills, sweats    HEENT:  Eyes:  No diplopia or blurred vision. ENT:  No earache, sore throat or runny nose.    CARDIOVASCULAR:  No pressure, squeezing, tightness, or heaviness about the chest; no palpitations.    RESPIRATORY:  Per HPI    GASTROINTESTINAL:  No abdominal pain, nausea, vomiting or diarrhea.    GENITOURINARY:  No dysuria, frequency or urgency.    NEUROLOGIC:  No paresthesias, fasciculations, seizures or weakness.    PSYCHIATRIC:  No disorder of thought or mood.      PHYSICAL EXAM:    Constitutional: Well developed and nourished  Eyes:Perrla  ENMT: normal  Neck:supple  Respiratory: good air entry  Cardiovascular: S1 S2 regular  Gastrointestinal: Soft, Non tender  Extremities: No edema  Vascular:normal  Neurological:Awake, alert,Ox3  Musculoskeletal:Normal      MEDICATIONS  (STANDING):  apixaban 5 milliGRAM(s) Oral every 12 hours  atorvastatin 40 milliGRAM(s) Oral at bedtime  buDESOnide 160 MICROgram(s)/formoterol 4.5 MICROgram(s) Inhaler 2 Puff(s) Inhalation two times a day  clopidogrel Tablet 75 milliGRAM(s) Oral daily  enalapril 5 milliGRAM(s) Oral daily  gabapentin 400 milliGRAM(s) Oral three times a day  isosorbide   mononitrate ER Tablet (IMDUR) 30 milliGRAM(s) Oral daily  levoFLOXacin IVPB 750 milliGRAM(s) IV Intermittent every 24 hours  metoprolol     tartrate 25 milliGRAM(s) Oral two times a day  sodium chloride 0.9%. 1000 milliLiter(s) (75 mL/Hr) IV Continuous <Continuous>  sodium chloride 0.9%. 1000 milliLiter(s) (75 mL/Hr) IV Continuous <Continuous>    MEDICATIONS  (PRN):  calamine Lotion 1 Application(s) Topical two times a day PRN Rash and/or Itching  diphenhydrAMINE   Capsule 25 milliGRAM(s) Oral every 6 hours PRN Rash and/or Itching  HYDROmorphone  Injectable 2 milliGRAM(s) IV Push every 6 hours PRN Severe Pain (7 - 10)  traMADol 50 milliGRAM(s) Oral every 12 hours PRN Moderate Pain (4 - 6)      Allergies    aspirin (Hives)  codeine (Rash)  Motrin (Rash)  penicillin (Hives; Anaphylaxis)  Toradol (Rash)  Tylenol (Hives)    Intolerances        LABS:                        14.4   10.4  )-----------( 147      ( 2017 07:45 )             44.2         138  |  102  |  43<H>  ----------------------------<  122<H>  4.3   |  29  |  1.13    Ca    8.8      2017 07:45        Urinalysis Basic - ( 2017 12:47 )    Color: Red / Appearance: bloody / S.015 / pH: x  Gluc: x / Ketone: Negative  / Bili: Negative / Urobili: Negative   Blood: x / Protein: 100 / Nitrite: Negative   Leuk Esterase: Negative / RBC: >50 /HPF / WBC 0-2 /HPF   Sq Epi: x / Non Sq Epi: Few /HPF / Bacteria: Moderate /HPF            CAPILLARY BLOOD GLUCOSE        pro-bnp --  @ 07:04     d-dimer <150   @ 07:04      RADIOLOGY & ADDITIONAL TESTS:    CXR:    Ct scan chest:  < from: CT Abdomen and Pelvis No Cont (17 @ 11:35) >    Small nonspecific 4 mm right lower lobe lung nodule; follow-up chest CT   may be pursued in 12 months to ensure stability.    < end of copied text >    ekg;    echo:< from: Transthoracic Echocardiogram (. @ 12:38) >  1. Mild left atrial enlargement.  2. Moderate concentric left ventricular hypertrophy.  3. Endocardium not well visualized; grossly normal left  ventricular function.  4. Grade II diastolic dysfunction.  5. RV systolic pressure is mildly increased at  41 mm Hg.  6. There is mild tricuspid regurgitation.    < end of copied text > Patient is a 57y old  Male who presents with a chief complaint of syncope.    Pt is a current every day smoker for the past 20 years, 1pk/day. PMH of current renal cell carcinoma, biopsied 2 weeks ago, kidney stones, CAD w stents, AICD, afib on eliquis/plavix, HTN, HLD, COPD, Hep C, here for syncope on train tonight. LOC for unknown time, now just c/l L flank pain, PE wnl, will admit tele,   Awake, alert, comfortable in bed in NAD.  For carotid doppler today. Ambulating without difficulty  INTERVAL HPI/OVERNIGHT EVENTS:      VITAL SIGNS:  T(F): 97.2 (17 @ 05:15)  HR: 66 (17 @ 05:15)  BP: 142/85 (17 @ 05:15)  RR: 16 (17 @ 05:15)  SpO2: 99% (17 @ 05:15)  Wt(kg): --  I&O's Detail          REVIEW OF SYSTEMS:    CONSTITUTIONAL:  No fevers, chills, sweats    HEENT:  Eyes:  No diplopia or blurred vision. ENT:  No earache, sore throat or runny nose.    CARDIOVASCULAR:  No pressure, squeezing, tightness, or heaviness about the chest; no palpitations.    RESPIRATORY:  Per HPI    GASTROINTESTINAL:  No abdominal pain, nausea, vomiting or diarrhea.    GENITOURINARY:  No dysuria, frequency or urgency.    NEUROLOGIC:  No paresthesias, fasciculations, seizures or weakness.    PSYCHIATRIC:  No disorder of thought or mood.      PHYSICAL EXAM:    Constitutional: Well developed and nourished  Eyes:Perrla  ENMT: normal  Neck:supple  Respiratory: good air entry  Cardiovascular: S1 S2 regular  Gastrointestinal: Soft, Non tender  Extremities: No edema  Vascular:normal  Neurological:Awake, alert,Ox3  Musculoskeletal:Normal      MEDICATIONS  (STANDING):  apixaban 5 milliGRAM(s) Oral every 12 hours  atorvastatin 40 milliGRAM(s) Oral at bedtime  buDESOnide 160 MICROgram(s)/formoterol 4.5 MICROgram(s) Inhaler 2 Puff(s) Inhalation two times a day  clopidogrel Tablet 75 milliGRAM(s) Oral daily  enalapril 5 milliGRAM(s) Oral daily  gabapentin 400 milliGRAM(s) Oral three times a day  isosorbide   mononitrate ER Tablet (IMDUR) 30 milliGRAM(s) Oral daily  levoFLOXacin IVPB 750 milliGRAM(s) IV Intermittent every 24 hours  metoprolol     tartrate 25 milliGRAM(s) Oral two times a day  sodium chloride 0.9%. 1000 milliLiter(s) (75 mL/Hr) IV Continuous <Continuous>  sodium chloride 0.9%. 1000 milliLiter(s) (75 mL/Hr) IV Continuous <Continuous>    MEDICATIONS  (PRN):  calamine Lotion 1 Application(s) Topical two times a day PRN Rash and/or Itching  diphenhydrAMINE   Capsule 25 milliGRAM(s) Oral every 6 hours PRN Rash and/or Itching  HYDROmorphone  Injectable 2 milliGRAM(s) IV Push every 6 hours PRN Severe Pain (7 - 10)  traMADol 50 milliGRAM(s) Oral every 12 hours PRN Moderate Pain (4 - 6)      Allergies    aspirin (Hives)  codeine (Rash)  Motrin (Rash)  penicillin (Hives; Anaphylaxis)  Toradol (Rash)  Tylenol (Hives)    Intolerances        LABS:                        14.4   10.4  )-----------( 147      ( 2017 07:45 )             44.2         138  |  102  |  43<H>  ----------------------------<  122<H>  4.3   |  29  |  1.13    Ca    8.8      2017 07:45        Urinalysis Basic - ( 2017 12:47 )    Color: Red / Appearance: bloody / S.015 / pH: x  Gluc: x / Ketone: Negative  / Bili: Negative / Urobili: Negative   Blood: x / Protein: 100 / Nitrite: Negative   Leuk Esterase: Negative / RBC: >50 /HPF / WBC 0-2 /HPF   Sq Epi: x / Non Sq Epi: Few /HPF / Bacteria: Moderate /HPF            CAPILLARY BLOOD GLUCOSE        pro-bnp --  @ 07:04     d-dimer <150   @ 07:04      RADIOLOGY & ADDITIONAL TESTS:    CXR:    Ct scan chest:  < from: CT Abdomen and Pelvis No Cont (17 @ 11:35) >    Small nonspecific 4 mm right lower lobe lung nodule; follow-up chest CT   may be pursued in 12 months to ensure stability.    < end of copied text >    ekg;    echo:< from: Transthoracic Echocardiogram (17 @ 12:38) >  1. Mild left atrial enlargement.  2. Moderate concentric left ventricular hypertrophy.  3. Endocardium not well visualized; grossly normal left  ventricular function.  4. Grade II diastolic dysfunction.  5. RV systolic pressure is mildly increased at  41 mm Hg.  6. There is mild tricuspid regurgitation.    < end of copied text >

## 2017-11-27 NOTE — PROGRESS NOTE ADULT - PROBLEM SELECTOR PROBLEM 5
CAD (coronary artery disease)
Hypertension
Renal cell carcinoma of left kidney
Hypertension
Renal cell carcinoma of left kidney

## 2017-11-27 NOTE — PROGRESS NOTE ADULT - PROBLEM SELECTOR PROBLEM 2
COPD (chronic obstructive pulmonary disease)
Lung nodule
COPD (chronic obstructive pulmonary disease)
Left flank pain
Left flank pain

## 2017-11-28 ENCOUNTER — EMERGENCY (EMERGENCY)
Facility: HOSPITAL | Age: 57
LOS: 1 days | Discharge: HOME | End: 2017-11-28
Attending: EMERGENCY MEDICINE | Admitting: EMERGENCY MEDICINE
Payer: MEDICAID

## 2017-11-28 VITALS
RESPIRATION RATE: 16 BRPM | HEART RATE: 80 BPM | SYSTOLIC BLOOD PRESSURE: 174 MMHG | OXYGEN SATURATION: 99 % | DIASTOLIC BLOOD PRESSURE: 119 MMHG | TEMPERATURE: 98 F

## 2017-11-28 DIAGNOSIS — Z88.4 ALLERGY STATUS TO ANESTHETIC AGENT: ICD-10-CM

## 2017-11-28 DIAGNOSIS — M79.671 PAIN IN RIGHT FOOT: ICD-10-CM

## 2017-11-28 DIAGNOSIS — N20.0 CALCULUS OF KIDNEY: Chronic | ICD-10-CM

## 2017-11-28 DIAGNOSIS — R20.0 ANESTHESIA OF SKIN: ICD-10-CM

## 2017-11-28 DIAGNOSIS — M79.672 PAIN IN LEFT FOOT: ICD-10-CM

## 2017-11-28 DIAGNOSIS — Z90.5 ACQUIRED ABSENCE OF KIDNEY: Chronic | ICD-10-CM

## 2017-11-28 DIAGNOSIS — Z90.49 ACQUIRED ABSENCE OF OTHER SPECIFIED PARTS OF DIGESTIVE TRACT: Chronic | ICD-10-CM

## 2017-11-28 DIAGNOSIS — Z95.810 PRESENCE OF AUTOMATIC (IMPLANTABLE) CARDIAC DEFIBRILLATOR: Chronic | ICD-10-CM

## 2017-11-28 DIAGNOSIS — Z88.5 ALLERGY STATUS TO NARCOTIC AGENT: ICD-10-CM

## 2017-11-28 DIAGNOSIS — Z79.899 OTHER LONG TERM (CURRENT) DRUG THERAPY: ICD-10-CM

## 2017-11-28 DIAGNOSIS — Z88.8 ALLERGY STATUS TO OTHER DRUGS, MEDICAMENTS AND BIOLOGICAL SUBSTANCES STATUS: ICD-10-CM

## 2017-11-28 DIAGNOSIS — Z88.0 ALLERGY STATUS TO PENICILLIN: ICD-10-CM

## 2017-11-28 PROCEDURE — 99284 EMERGENCY DEPT VISIT MOD MDM: CPT | Mod: 25

## 2017-11-28 RX ORDER — CYCLOBENZAPRINE HYDROCHLORIDE 10 MG/1
10 TABLET, FILM COATED ORAL ONCE
Qty: 0 | Refills: 0 | Status: DISCONTINUED | OUTPATIENT
Start: 2017-11-28 | End: 2017-11-28

## 2017-11-28 NOTE — ED ADULT NURSE REASSESSMENT NOTE - NS ED NURSE REASSESS COMMENT FT1
pt uncooperative. was offered by MD for some pain meds and CT, but states 'only dilaudid works for him'. pt wants to get d/c; however refused to wait for papers. left the ed ambulatory.

## 2017-11-28 NOTE — ED ADULT NURSE NOTE - CHPI ED SYMPTOMS NEG
no dizziness/no chills/no tingling/no nausea/no fever/no vomiting/no decreased eating/drinking/no weakness

## 2017-11-28 NOTE — ED PROVIDER NOTE - OBJECTIVE STATEMENT
patient with hx of peripheral neuropathy, per patient manages with dialudid. per I stop, last dispensed methadone #40 on 9/12/2017, and #120 on 7/2017. patient notes he was on the train, and got up from being seated for some time, and felt numbness, and pain to both feet. patient notes mild flank pain on the L 2 weeks ago, since he passed a stone. denies N/V/D, denies abd pain, denies uti symptoms. patient notes he can only take dilaudid or methadone for his pain.

## 2017-11-28 NOTE — ED PROVIDER NOTE - MEDICAL DECISION MAKING DETAILS
patient offered cyclobenzaprine, and tylenol. notes he can only take dilaudid. I informed him we can do a workup for his neuropathy and recent diagnosis of kidney stones. patient refusing to do all work up, and requested DC.

## 2017-11-29 ENCOUNTER — EMERGENCY (EMERGENCY)
Facility: HOSPITAL | Age: 57
LOS: 1 days | Discharge: HOME | End: 2017-11-29
Attending: EMERGENCY MEDICINE
Payer: MEDICAID

## 2017-11-29 VITALS
HEART RATE: 86 BPM | OXYGEN SATURATION: 99 % | RESPIRATION RATE: 18 BRPM | DIASTOLIC BLOOD PRESSURE: 94 MMHG | SYSTOLIC BLOOD PRESSURE: 151 MMHG | TEMPERATURE: 98 F

## 2017-11-29 VITALS
WEIGHT: 209 LBS | SYSTOLIC BLOOD PRESSURE: 144 MMHG | RESPIRATION RATE: 18 BRPM | HEART RATE: 74 BPM | DIASTOLIC BLOOD PRESSURE: 86 MMHG | TEMPERATURE: 98 F | HEIGHT: 71 IN | OXYGEN SATURATION: 100 %

## 2017-11-29 DIAGNOSIS — Z79.02 LONG TERM (CURRENT) USE OF ANTITHROMBOTICS/ANTIPLATELETS: ICD-10-CM

## 2017-11-29 DIAGNOSIS — Z88.6 ALLERGY STATUS TO ANALGESIC AGENT: ICD-10-CM

## 2017-11-29 DIAGNOSIS — Z88.5 ALLERGY STATUS TO NARCOTIC AGENT: ICD-10-CM
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
## 2017-11-29 DIAGNOSIS — Z90.5 ACQUIRED ABSENCE OF KIDNEY: Chronic | ICD-10-CM

## 2017-11-29 DIAGNOSIS — I25.10 ATHEROSCLEROTIC HEART DISEASE OF NATIVE CORONARY ARTERY WITHOUT ANGINA PECTORIS: ICD-10-CM

## 2017-11-29 DIAGNOSIS — Z90.49 ACQUIRED ABSENCE OF OTHER SPECIFIED PARTS OF DIGESTIVE TRACT: ICD-10-CM

## 2017-11-29 DIAGNOSIS — Z95.810 PRESENCE OF AUTOMATIC (IMPLANTABLE) CARDIAC DEFIBRILLATOR: ICD-10-CM

## 2017-11-29 DIAGNOSIS — I10 ESSENTIAL (PRIMARY) HYPERTENSION: ICD-10-CM

## 2017-11-29 DIAGNOSIS — N20.0 CALCULUS OF KIDNEY: Chronic | ICD-10-CM

## 2017-11-29 DIAGNOSIS — M54.9 DORSALGIA, UNSPECIFIED: ICD-10-CM
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
## 2017-11-29 DIAGNOSIS — I48.91 UNSPECIFIED ATRIAL FIBRILLATION: ICD-10-CM

## 2017-11-29 DIAGNOSIS — Z95.810 PRESENCE OF AUTOMATIC (IMPLANTABLE) CARDIAC DEFIBRILLATOR: Chronic | ICD-10-CM

## 2017-11-29 DIAGNOSIS — Z90.5 ACQUIRED ABSENCE OF KIDNEY: ICD-10-CM

## 2017-11-29 DIAGNOSIS — E78.5 HYPERLIPIDEMIA, UNSPECIFIED: ICD-10-CM

## 2017-11-29 DIAGNOSIS — Z90.49 ACQUIRED ABSENCE OF OTHER SPECIFIED PARTS OF DIGESTIVE TRACT: Chronic | ICD-10-CM

## 2017-11-29 DIAGNOSIS — Z88.0 ALLERGY STATUS TO PENICILLIN: ICD-10-CM

## 2017-11-29 LAB
ANION GAP SERPL CALC-SCNC: 8 MMOL/L — SIGNIFICANT CHANGE UP (ref 5–17)
BASOPHILS # BLD AUTO: 0.1 K/UL — SIGNIFICANT CHANGE UP (ref 0–0.2)
BASOPHILS NFR BLD AUTO: 1.1 % — SIGNIFICANT CHANGE UP (ref 0–2)
BUN SERPL-MCNC: 30 MG/DL — HIGH (ref 7–18)
CALCIUM SERPL-MCNC: 8.6 MG/DL — SIGNIFICANT CHANGE UP (ref 8.4–10.5)
CHLORIDE SERPL-SCNC: 104 MMOL/L — SIGNIFICANT CHANGE UP (ref 96–108)
CO2 SERPL-SCNC: 29 MMOL/L — SIGNIFICANT CHANGE UP (ref 22–31)
CREAT SERPL-MCNC: 1.13 MG/DL — SIGNIFICANT CHANGE UP (ref 0.5–1.3)
EOSINOPHIL # BLD AUTO: 0.2 K/UL — SIGNIFICANT CHANGE UP (ref 0–0.5)
EOSINOPHIL NFR BLD AUTO: 1.8 % — SIGNIFICANT CHANGE UP (ref 0–6)
GLUCOSE SERPL-MCNC: 153 MG/DL — HIGH (ref 70–99)
HCT VFR BLD CALC: 54.1 % — HIGH (ref 39–50)
HGB BLD-MCNC: 17 G/DL — SIGNIFICANT CHANGE UP (ref 13–17)
LYMPHOCYTES # BLD AUTO: 17.4 % — SIGNIFICANT CHANGE UP (ref 13–44)
LYMPHOCYTES # BLD AUTO: 2.3 K/UL — SIGNIFICANT CHANGE UP (ref 1–3.3)
MCHC RBC-ENTMCNC: 29.4 PG — SIGNIFICANT CHANGE UP (ref 27–34)
MCHC RBC-ENTMCNC: 31.4 GM/DL — LOW (ref 32–36)
MCV RBC AUTO: 93.8 FL — SIGNIFICANT CHANGE UP (ref 80–100)
MONOCYTES # BLD AUTO: 1.1 K/UL — HIGH (ref 0–0.9)
MONOCYTES NFR BLD AUTO: 8.1 % — SIGNIFICANT CHANGE UP (ref 2–14)
NEUTROPHILS # BLD AUTO: 9.6 K/UL — HIGH (ref 1.8–7.4)
NEUTROPHILS NFR BLD AUTO: 71.6 % — SIGNIFICANT CHANGE UP (ref 43–77)
PLATELET # BLD AUTO: 171 K/UL — SIGNIFICANT CHANGE UP (ref 150–400)
POTASSIUM SERPL-MCNC: 4 MMOL/L — SIGNIFICANT CHANGE UP (ref 3.5–5.3)
POTASSIUM SERPL-SCNC: 4 MMOL/L — SIGNIFICANT CHANGE UP (ref 3.5–5.3)
RBC # BLD: 5.77 M/UL — SIGNIFICANT CHANGE UP (ref 4.2–5.8)
RBC # FLD: 13.5 % — SIGNIFICANT CHANGE UP (ref 10.3–14.5)
SODIUM SERPL-SCNC: 141 MMOL/L — SIGNIFICANT CHANGE UP (ref 135–145)
WBC # BLD: 13.5 K/UL — HIGH (ref 3.8–10.5)
WBC # FLD AUTO: 13.5 K/UL — HIGH (ref 3.8–10.5)

## 2017-11-29 RX ORDER — SODIUM CHLORIDE 9 MG/ML
1000 INJECTION INTRAMUSCULAR; INTRAVENOUS; SUBCUTANEOUS ONCE
Qty: 0 | Refills: 0 | Status: COMPLETED | OUTPATIENT
Start: 2017-11-29 | End: 2017-11-29

## 2017-11-29 RX ORDER — SODIUM CHLORIDE 9 MG/ML
1000 INJECTION INTRAMUSCULAR; INTRAVENOUS; SUBCUTANEOUS
Qty: 0 | Refills: 0 | Status: DISCONTINUED | OUTPATIENT
Start: 2017-11-29 | End: 2017-12-03

## 2017-11-29 RX ORDER — HYDROMORPHONE HYDROCHLORIDE 2 MG/ML
1 INJECTION INTRAMUSCULAR; INTRAVENOUS; SUBCUTANEOUS ONCE
Qty: 0 | Refills: 0 | Status: DISCONTINUED | OUTPATIENT
Start: 2017-11-29 | End: 2017-11-29

## 2017-11-29 RX ORDER — SODIUM CHLORIDE 9 MG/ML
3 INJECTION INTRAMUSCULAR; INTRAVENOUS; SUBCUTANEOUS EVERY 8 HOURS
Qty: 0 | Refills: 0 | Status: DISCONTINUED | OUTPATIENT
Start: 2017-11-29 | End: 2017-12-03

## 2017-11-29 RX ADMIN — SODIUM CHLORIDE 3000 MILLILITER(S): 9 INJECTION INTRAMUSCULAR; INTRAVENOUS; SUBCUTANEOUS at 23:21

## 2017-11-29 RX ADMIN — HYDROMORPHONE HYDROCHLORIDE 1 MILLIGRAM(S): 2 INJECTION INTRAMUSCULAR; INTRAVENOUS; SUBCUTANEOUS at 23:22

## 2017-11-29 RX ADMIN — HYDROMORPHONE HYDROCHLORIDE 1 MILLIGRAM(S): 2 INJECTION INTRAMUSCULAR; INTRAVENOUS; SUBCUTANEOUS at 23:19

## 2017-11-29 RX ADMIN — SODIUM CHLORIDE 3 MILLILITER(S): 9 INJECTION INTRAMUSCULAR; INTRAVENOUS; SUBCUTANEOUS at 23:22

## 2017-11-29 NOTE — ED PROVIDER NOTE - MEDICAL DECISION MAKING DETAILS
1:15a- Pt observed ambualting in ED, no distress. CT reported no TBI. Pt is well appearing walking with normal gait, stable for discharge and follow up with medical doctor. Pt educated on care and need for follow up. Discussed anticipatory guidance and return precautions. Questions answered. I had a detailed discussion with the patient and/or guardian regarding the historical points, exam findings, and any diagnostic results supporting the discharge diagnosis. Pt will f/u with PMD Dr. Hyatt at [facility].

## 2017-11-29 NOTE — ED PROVIDER NOTE - NEURO NEGATIVE STATEMENT, MLM
no loss of consciousness, no gait abnormality, no headache, no sensory deficits, and no weakness. no loss of consciousness, , no headache, no sensory deficits,

## 2017-11-29 NOTE — ED PROVIDER NOTE - PHYSICAL EXAMINATION
GCS 15, no raccoon eyes, no Battles sign, no scalp step off deformities.   No cervical, thoracic or lumbosacral midline bony deformities,  +rotation and flexion-extension of neck and truncal area intact.

## 2017-11-29 NOTE — ED PROVIDER NOTE - CHPI ED SYMPTOMS NEG
no fever, no chills, no shortness of breath, no cough, no chest pain, no palpitations, no nausea, no vomiting, no diarrhea, no abd pain, no dysuria, no urinary frequency, no hematuria, no numbness, no tingling, no weakness, no saddle anesthesia, no LOC

## 2017-11-29 NOTE — ED PROVIDER NOTE - OBJECTIVE STATEMENT
56 y/o M pt with PMHx of AICD, A-Fib, CAD (stents x 2), HLD, HTN, renal cell carcinoma, presents to ED c/o back pain s/p fall x 20 minutes PTA. Pt reports he was going down a flight of stairs when he felt dizzy and felt onto the steps; pt normally walks with a walker at baseline. Pt states he injured his back during the fall. Pt denies fever, chills, shortness of breath, cough, chest pain, palpitations, nausea, vomiting, diarrhea, abd pain, dysuria, urinary frequency, hematuria, numbness, tingling, weakness, saddle anesthesia, LOC, or any other complaints. ALLERGIES: Multiple allergies listed below. CURRENT MEDICATIONS: Metoprolol, Plavix, Eliquis, Dilaudid 6mg 56 y/o M pt with PMHx of AICD, A-Fib, CAD (stents x 2), HLD, HTN, renal cell carcinoma, presents to ED c/o back pain s/p fall x 20 minutes PTA. Pt reports he was going down a flight of stairs when he felt dizzy and felt onto the steps; pt normally walks with a walker at baseline. Pt states he injured his back during the fall. Pt denies fever, chills, shortness of breath, cough, chest pain, palpitations, nausea, vomiting, diarrhea, abd pain, dysuria, urinary frequency, hematuria, numbness, tingling, weakness, saddle anesthesia, LOC, or any other complaints. ALLERGIES: Multiple allergies listed below. CURRENT MEDICATIONS: Metoprolol, Plavix, Eliquis, Dilaudid 6mg; PMD: Dr. Camargo 56 y/o M pt with PMHx of AICD, A-Fib, CAD (stents x 2), HLD, HTN, renal cell carcinoma, presents to ED c/o back pain s/p fall x 20 minutes PTA. Pt reports he was going down a flight of stairs and fell down few steps; pt normally walks with a walker at baseline. Pt states he injured his back during the fall. Pt denies fever, chills, shortness of breath, cough, chest pain, palpitations, nausea, vomiting, diarrhea, abd pain, dysuria, urinary frequency, hematuria, numbness, tingling, weakness, saddle anesthesia, LOC, or any other complaints. ALLERGIES: Multiple allergies listed below. CURRENT MEDICATIONS: Metoprolol, Plavix, Eliquis, Dilaudid 6mg; PMD: Dr. Camargo

## 2017-11-30 LAB
APPEARANCE UR: CLEAR — SIGNIFICANT CHANGE UP
BILIRUB UR-MCNC: NEGATIVE — SIGNIFICANT CHANGE UP
COLOR SPEC: YELLOW — SIGNIFICANT CHANGE UP
DIFF PNL FLD: ABNORMAL
GLUCOSE UR QL: NEGATIVE — SIGNIFICANT CHANGE UP
KETONES UR-MCNC: NEGATIVE — SIGNIFICANT CHANGE UP
LEUKOCYTE ESTERASE UR-ACNC: NEGATIVE — SIGNIFICANT CHANGE UP
NITRITE UR-MCNC: NEGATIVE — SIGNIFICANT CHANGE UP
PH UR: 6 — SIGNIFICANT CHANGE UP (ref 5–8)
PROT UR-MCNC: 100
SP GR SPEC: 1.02 — SIGNIFICANT CHANGE UP (ref 1.01–1.02)
UROBILINOGEN FLD QL: NEGATIVE — SIGNIFICANT CHANGE UP

## 2017-11-30 PROCEDURE — 70450 CT HEAD/BRAIN W/O DYE: CPT

## 2017-11-30 PROCEDURE — 93005 ELECTROCARDIOGRAM TRACING: CPT

## 2017-11-30 PROCEDURE — 80048 BASIC METABOLIC PNL TOTAL CA: CPT

## 2017-11-30 PROCEDURE — 99284 EMERGENCY DEPT VISIT MOD MDM: CPT | Mod: 25

## 2017-11-30 PROCEDURE — 85027 COMPLETE CBC AUTOMATED: CPT

## 2017-11-30 PROCEDURE — 82962 GLUCOSE BLOOD TEST: CPT

## 2017-11-30 PROCEDURE — 81001 URINALYSIS AUTO W/SCOPE: CPT

## 2017-11-30 PROCEDURE — 96374 THER/PROPH/DIAG INJ IV PUSH: CPT

## 2017-12-09 ENCOUNTER — INPATIENT (INPATIENT)
Facility: HOSPITAL | Age: 57
LOS: 2 days | Discharge: HOME | DRG: 313 | End: 2017-12-12
Attending: INTERNAL MEDICINE | Admitting: INTERNAL MEDICINE
Payer: MEDICAID

## 2017-12-09 VITALS
TEMPERATURE: 98 F | WEIGHT: 209 LBS | DIASTOLIC BLOOD PRESSURE: 90 MMHG | RESPIRATION RATE: 16 BRPM | SYSTOLIC BLOOD PRESSURE: 145 MMHG | OXYGEN SATURATION: 100 % | HEART RATE: 83 BPM

## 2017-12-09 DIAGNOSIS — R07.9 CHEST PAIN, UNSPECIFIED: ICD-10-CM

## 2017-12-09 DIAGNOSIS — Z90.49 ACQUIRED ABSENCE OF OTHER SPECIFIED PARTS OF DIGESTIVE TRACT: Chronic | ICD-10-CM

## 2017-12-09 DIAGNOSIS — Z90.5 ACQUIRED ABSENCE OF KIDNEY: Chronic | ICD-10-CM

## 2017-12-09 DIAGNOSIS — N20.0 CALCULUS OF KIDNEY: Chronic | ICD-10-CM

## 2017-12-09 DIAGNOSIS — Z95.810 PRESENCE OF AUTOMATIC (IMPLANTABLE) CARDIAC DEFIBRILLATOR: Chronic | ICD-10-CM

## 2017-12-09 LAB
ALBUMIN SERPL ELPH-MCNC: 3.4 G/DL — LOW (ref 3.5–5)
ALP SERPL-CCNC: 175 U/L — HIGH (ref 40–120)
ALT FLD-CCNC: 113 U/L DA — HIGH (ref 10–60)
ANION GAP SERPL CALC-SCNC: 8 MMOL/L — SIGNIFICANT CHANGE UP (ref 5–17)
AST SERPL-CCNC: 78 U/L — HIGH (ref 10–40)
BASOPHILS # BLD AUTO: 0.1 K/UL — SIGNIFICANT CHANGE UP (ref 0–0.2)
BASOPHILS NFR BLD AUTO: 0.5 % — SIGNIFICANT CHANGE UP (ref 0–2)
BILIRUB SERPL-MCNC: 0.7 MG/DL — SIGNIFICANT CHANGE UP (ref 0.2–1.2)
BUN SERPL-MCNC: 37 MG/DL — HIGH (ref 7–18)
CALCIUM SERPL-MCNC: 9.5 MG/DL — SIGNIFICANT CHANGE UP (ref 8.4–10.5)
CHLORIDE SERPL-SCNC: 104 MMOL/L — SIGNIFICANT CHANGE UP (ref 96–108)
CK MB BLD-MCNC: 5.7 % — HIGH (ref 0–3.5)
CK MB CFR SERPL CALC: 4.9 NG/ML — HIGH (ref 0–3.6)
CK SERPL-CCNC: 86 U/L — SIGNIFICANT CHANGE UP (ref 35–232)
CO2 SERPL-SCNC: 27 MMOL/L — SIGNIFICANT CHANGE UP (ref 22–31)
CREAT SERPL-MCNC: 1.05 MG/DL — SIGNIFICANT CHANGE UP (ref 0.5–1.3)
EOSINOPHIL # BLD AUTO: 0.1 K/UL — SIGNIFICANT CHANGE UP (ref 0–0.5)
EOSINOPHIL NFR BLD AUTO: 1.1 % — SIGNIFICANT CHANGE UP (ref 0–6)
GLUCOSE SERPL-MCNC: 86 MG/DL — SIGNIFICANT CHANGE UP (ref 70–99)
HCT VFR BLD CALC: 46.7 % — SIGNIFICANT CHANGE UP (ref 39–50)
HGB BLD-MCNC: 15.1 G/DL — SIGNIFICANT CHANGE UP (ref 13–17)
LIDOCAIN IGE QN: 122 U/L — SIGNIFICANT CHANGE UP (ref 73–393)
LYMPHOCYTES # BLD AUTO: 1.5 K/UL — SIGNIFICANT CHANGE UP (ref 1–3.3)
LYMPHOCYTES # BLD AUTO: 13.9 % — SIGNIFICANT CHANGE UP (ref 13–44)
MCHC RBC-ENTMCNC: 29.6 PG — SIGNIFICANT CHANGE UP (ref 27–34)
MCHC RBC-ENTMCNC: 32.3 GM/DL — SIGNIFICANT CHANGE UP (ref 32–36)
MCV RBC AUTO: 91.5 FL — SIGNIFICANT CHANGE UP (ref 80–100)
MONOCYTES # BLD AUTO: 0.9 K/UL — SIGNIFICANT CHANGE UP (ref 0–0.9)
MONOCYTES NFR BLD AUTO: 7.7 % — SIGNIFICANT CHANGE UP (ref 2–14)
NEUTROPHILS # BLD AUTO: 8.4 K/UL — HIGH (ref 1.8–7.4)
NEUTROPHILS NFR BLD AUTO: 76.7 % — SIGNIFICANT CHANGE UP (ref 43–77)
NT-PROBNP SERPL-SCNC: 293 PG/ML — HIGH (ref 0–125)
PLATELET # BLD AUTO: 122 K/UL — LOW (ref 150–400)
POTASSIUM SERPL-MCNC: 4.2 MMOL/L — SIGNIFICANT CHANGE UP (ref 3.5–5.3)
POTASSIUM SERPL-SCNC: 4.2 MMOL/L — SIGNIFICANT CHANGE UP (ref 3.5–5.3)
PROT SERPL-MCNC: 6.9 G/DL — SIGNIFICANT CHANGE UP (ref 6–8.3)
RBC # BLD: 5.1 M/UL — SIGNIFICANT CHANGE UP (ref 4.2–5.8)
RBC # FLD: 13.3 % — SIGNIFICANT CHANGE UP (ref 10.3–14.5)
SODIUM SERPL-SCNC: 139 MMOL/L — SIGNIFICANT CHANGE UP (ref 135–145)
TROPONIN I SERPL-MCNC: 0.07 NG/ML — HIGH (ref 0–0.04)
WBC # BLD: 11 K/UL — HIGH (ref 3.8–10.5)
WBC # FLD AUTO: 11 K/UL — HIGH (ref 3.8–10.5)

## 2017-12-09 RX ORDER — SODIUM CHLORIDE 9 MG/ML
3 INJECTION INTRAMUSCULAR; INTRAVENOUS; SUBCUTANEOUS ONCE
Qty: 0 | Refills: 0 | Status: COMPLETED | OUTPATIENT
Start: 2017-12-09 | End: 2017-12-09

## 2017-12-09 RX ORDER — MORPHINE SULFATE 50 MG/1
4 CAPSULE, EXTENDED RELEASE ORAL ONCE
Qty: 0 | Refills: 0 | Status: DISCONTINUED | OUTPATIENT
Start: 2017-12-09 | End: 2017-12-09

## 2017-12-09 RX ADMIN — SODIUM CHLORIDE 3 MILLILITER(S): 9 INJECTION INTRAMUSCULAR; INTRAVENOUS; SUBCUTANEOUS at 21:35

## 2017-12-09 RX ADMIN — MORPHINE SULFATE 4 MILLIGRAM(S): 50 CAPSULE, EXTENDED RELEASE ORAL at 22:47

## 2017-12-09 NOTE — ED PROVIDER NOTE - OBJECTIVE STATEMENT
58 y/o M pt with PMHx of AICD, A-Fib, CAD (stents x 2), HLD, HTN, renal cell carcinoma, presents to the ED with chest pain. Pain started 20 min prior to arrival. left sided pressure/squeezing constant associated nausea and diaphoresis. no sob. non-radiating. moderate intensity . feels like previous mi. No f/c/ha/dizziness/dysuria/abd pain. pt is anaphylactic to ASA.

## 2017-12-09 NOTE — ED PROVIDER NOTE - PHYSICAL EXAMINATION
Constitutional: mild distress AAOx3  Eyes: PERRLA EOMI  Head: Normocephalic atraumatic  Mouth: MMM  Cardiac: regular rate   Resp: Lungs CTAB  GI: Abd s/nt/nd no ttp in ruq  Neuro: CN2-12 intact  Skin: No rashes

## 2017-12-09 NOTE — ED PROVIDER NOTE - NS ED ROS FT
Constitutional: No fever or chills  Eyes: No visual changes  HEENT: No throat pain  CV: + chest pain  Resp: No SOB no cough  GI: No abd pain, nausea or vomiting  : No dysuria  MSK: No musculoskeletal pain  Skin: No rash  Neuro: No headache

## 2017-12-09 NOTE — ED PROVIDER NOTE - MEDICAL DECISION MAKING DETAILS
58 y/o M pt with PMHx of AICD, A-Fib, CAD (stents x 2), HLD, HTN, renal cell carcinoma, presents to the ED with chest pain. Pain started 20 min prior to arrival. left sided pressure/squeezing constant associated nausea and diaphoresis. no sob. non-radiating. moderate intensity . feels like previous mi. No f/c/ha/dizziness/dysuria/abd pain. pt is anaphylactic to ASA. concern for acs. will obtain labs cardiac enzymes and admit for further work up. Dominic Reyez M.D., Attending Physician

## 2017-12-09 NOTE — ED ADULT NURSE NOTE - OBJECTIVE STATEMENT
56 y/o M pt with PMHx of AICD, A-Fib, CAD (stents x 2), HLD, HTN, renal cell carcinoma, presents to the ED with chest pain. Pain started 20 min prior to arrival. left sided pressure/squeezing constant associated nausea and diaphoresis. no sob. non-radiating. moderate intensity . feels like previous mi. No f/c/ha/dizziness/dysuria/abd pain

## 2017-12-10 DIAGNOSIS — R07.9 CHEST PAIN, UNSPECIFIED: ICD-10-CM

## 2017-12-10 DIAGNOSIS — E78.5 HYPERLIPIDEMIA, UNSPECIFIED: ICD-10-CM

## 2017-12-10 DIAGNOSIS — I48.91 UNSPECIFIED ATRIAL FIBRILLATION: ICD-10-CM

## 2017-12-10 DIAGNOSIS — J44.9 CHRONIC OBSTRUCTIVE PULMONARY DISEASE, UNSPECIFIED: ICD-10-CM

## 2017-12-10 DIAGNOSIS — Z29.9 ENCOUNTER FOR PROPHYLACTIC MEASURES, UNSPECIFIED: ICD-10-CM

## 2017-12-10 DIAGNOSIS — I10 ESSENTIAL (PRIMARY) HYPERTENSION: ICD-10-CM

## 2017-12-10 DIAGNOSIS — I25.10 ATHEROSCLEROTIC HEART DISEASE OF NATIVE CORONARY ARTERY WITHOUT ANGINA PECTORIS: ICD-10-CM

## 2017-12-10 LAB
ANION GAP SERPL CALC-SCNC: 9 MMOL/L — SIGNIFICANT CHANGE UP (ref 5–17)
BASOPHILS # BLD AUTO: 0 K/UL — SIGNIFICANT CHANGE UP (ref 0–0.2)
BASOPHILS NFR BLD AUTO: 0.4 % — SIGNIFICANT CHANGE UP (ref 0–2)
BUN SERPL-MCNC: 29 MG/DL — HIGH (ref 7–18)
CALCIUM SERPL-MCNC: 9 MG/DL — SIGNIFICANT CHANGE UP (ref 8.4–10.5)
CHLORIDE SERPL-SCNC: 106 MMOL/L — SIGNIFICANT CHANGE UP (ref 96–108)
CHOLEST SERPL-MCNC: 147 MG/DL — SIGNIFICANT CHANGE UP (ref 10–199)
CK MB BLD-MCNC: 6.9 % — HIGH (ref 0–3.5)
CK MB CFR SERPL CALC: 4.8 NG/ML — HIGH (ref 0–3.6)
CK SERPL-CCNC: 70 U/L — SIGNIFICANT CHANGE UP (ref 35–232)
CO2 SERPL-SCNC: 25 MMOL/L — SIGNIFICANT CHANGE UP (ref 22–31)
CREAT SERPL-MCNC: 0.86 MG/DL — SIGNIFICANT CHANGE UP (ref 0.5–1.3)
EOSINOPHIL # BLD AUTO: 0.2 K/UL — SIGNIFICANT CHANGE UP (ref 0–0.5)
EOSINOPHIL NFR BLD AUTO: 2.6 % — SIGNIFICANT CHANGE UP (ref 0–6)
FOLATE SERPL-MCNC: 13.4 NG/ML — SIGNIFICANT CHANGE UP (ref 4.8–24.2)
GLUCOSE SERPL-MCNC: 106 MG/DL — HIGH (ref 70–99)
HCT VFR BLD CALC: 45.4 % — SIGNIFICANT CHANGE UP (ref 39–50)
HDLC SERPL-MCNC: 93 MG/DL — SIGNIFICANT CHANGE UP (ref 40–125)
HGB BLD-MCNC: 15.2 G/DL — SIGNIFICANT CHANGE UP (ref 13–17)
LIPID PNL WITH DIRECT LDL SERPL: 35 MG/DL — SIGNIFICANT CHANGE UP
LYMPHOCYTES # BLD AUTO: 1.6 K/UL — SIGNIFICANT CHANGE UP (ref 1–3.3)
LYMPHOCYTES # BLD AUTO: 19.4 % — SIGNIFICANT CHANGE UP (ref 13–44)
MAGNESIUM SERPL-MCNC: 2.4 MG/DL — SIGNIFICANT CHANGE UP (ref 1.6–2.6)
MCHC RBC-ENTMCNC: 30.5 PG — SIGNIFICANT CHANGE UP (ref 27–34)
MCHC RBC-ENTMCNC: 33.4 GM/DL — SIGNIFICANT CHANGE UP (ref 32–36)
MCV RBC AUTO: 91.4 FL — SIGNIFICANT CHANGE UP (ref 80–100)
MONOCYTES # BLD AUTO: 0.8 K/UL — SIGNIFICANT CHANGE UP (ref 0–0.9)
MONOCYTES NFR BLD AUTO: 10.2 % — SIGNIFICANT CHANGE UP (ref 2–14)
NEUTROPHILS # BLD AUTO: 5.6 K/UL — SIGNIFICANT CHANGE UP (ref 1.8–7.4)
NEUTROPHILS NFR BLD AUTO: 67.4 % — SIGNIFICANT CHANGE UP (ref 43–77)
PHOSPHATE SERPL-MCNC: 3.8 MG/DL — SIGNIFICANT CHANGE UP (ref 2.5–4.5)
PLATELET # BLD AUTO: 108 K/UL — LOW (ref 150–400)
POTASSIUM SERPL-MCNC: 3.8 MMOL/L — SIGNIFICANT CHANGE UP (ref 3.5–5.3)
POTASSIUM SERPL-SCNC: 3.8 MMOL/L — SIGNIFICANT CHANGE UP (ref 3.5–5.3)
RBC # BLD: 4.96 M/UL — SIGNIFICANT CHANGE UP (ref 4.2–5.8)
RBC # FLD: 13.3 % — SIGNIFICANT CHANGE UP (ref 10.3–14.5)
SODIUM SERPL-SCNC: 140 MMOL/L — SIGNIFICANT CHANGE UP (ref 135–145)
TOTAL CHOLESTEROL/HDL RATIO MEASUREMENT: 1.6 RATIO — LOW (ref 3.4–9.6)
TRIGL SERPL-MCNC: 95 MG/DL — SIGNIFICANT CHANGE UP (ref 10–149)
TROPONIN I SERPL-MCNC: 0.06 NG/ML — HIGH (ref 0–0.04)
TSH SERPL-MCNC: 0.43 UU/ML — SIGNIFICANT CHANGE UP (ref 0.34–4.82)
VIT B12 SERPL-MCNC: 481 PG/ML — SIGNIFICANT CHANGE UP (ref 243–894)
WBC # BLD: 8.3 K/UL — SIGNIFICANT CHANGE UP (ref 3.8–10.5)
WBC # FLD AUTO: 8.3 K/UL — SIGNIFICANT CHANGE UP (ref 3.8–10.5)

## 2017-12-10 RX ORDER — CLOPIDOGREL BISULFATE 75 MG/1
75 TABLET, FILM COATED ORAL DAILY
Qty: 0 | Refills: 0 | Status: DISCONTINUED | OUTPATIENT
Start: 2017-12-10 | End: 2017-12-12

## 2017-12-10 RX ORDER — ONDANSETRON 8 MG/1
4 TABLET, FILM COATED ORAL EVERY 6 HOURS
Qty: 0 | Refills: 0 | Status: DISCONTINUED | OUTPATIENT
Start: 2017-12-10 | End: 2017-12-12

## 2017-12-10 RX ORDER — TRAZODONE HCL 50 MG
50 TABLET ORAL DAILY
Qty: 0 | Refills: 0 | Status: DISCONTINUED | OUTPATIENT
Start: 2017-12-10 | End: 2017-12-12

## 2017-12-10 RX ORDER — MORPHINE SULFATE 50 MG/1
1 CAPSULE, EXTENDED RELEASE ORAL EVERY 6 HOURS
Qty: 0 | Refills: 0 | Status: DISCONTINUED | OUTPATIENT
Start: 2017-12-10 | End: 2017-12-10

## 2017-12-10 RX ORDER — CLONAZEPAM 1 MG
1 TABLET ORAL AT BEDTIME
Qty: 0 | Refills: 0 | Status: DISCONTINUED | OUTPATIENT
Start: 2017-12-10 | End: 2017-12-12

## 2017-12-10 RX ORDER — GABAPENTIN 400 MG/1
400 CAPSULE ORAL THREE TIMES A DAY
Qty: 0 | Refills: 0 | Status: DISCONTINUED | OUTPATIENT
Start: 2017-12-10 | End: 2017-12-12

## 2017-12-10 RX ORDER — NICOTINE POLACRILEX 2 MG
1 GUM BUCCAL DAILY
Qty: 0 | Refills: 0 | Status: DISCONTINUED | OUTPATIENT
Start: 2017-12-10 | End: 2017-12-12

## 2017-12-10 RX ORDER — MORPHINE SULFATE 50 MG/1
2 CAPSULE, EXTENDED RELEASE ORAL EVERY 6 HOURS
Qty: 0 | Refills: 0 | Status: DISCONTINUED | OUTPATIENT
Start: 2017-12-10 | End: 2017-12-12

## 2017-12-10 RX ORDER — METOPROLOL TARTRATE 50 MG
25 TABLET ORAL
Qty: 0 | Refills: 0 | Status: DISCONTINUED | OUTPATIENT
Start: 2017-12-10 | End: 2017-12-12

## 2017-12-10 RX ORDER — BUDESONIDE AND FORMOTEROL FUMARATE DIHYDRATE 160; 4.5 UG/1; UG/1
2 AEROSOL RESPIRATORY (INHALATION)
Qty: 0 | Refills: 0 | Status: DISCONTINUED | OUTPATIENT
Start: 2017-12-10 | End: 2017-12-12

## 2017-12-10 RX ORDER — ISOSORBIDE MONONITRATE 60 MG/1
30 TABLET, EXTENDED RELEASE ORAL DAILY
Qty: 0 | Refills: 0 | Status: DISCONTINUED | OUTPATIENT
Start: 2017-12-10 | End: 2017-12-12

## 2017-12-10 RX ORDER — HYDROMORPHONE HYDROCHLORIDE 2 MG/ML
0.5 INJECTION INTRAMUSCULAR; INTRAVENOUS; SUBCUTANEOUS EVERY 6 HOURS
Qty: 0 | Refills: 0 | Status: DISCONTINUED | OUTPATIENT
Start: 2017-12-10 | End: 2017-12-10

## 2017-12-10 RX ORDER — APIXABAN 2.5 MG/1
5 TABLET, FILM COATED ORAL EVERY 12 HOURS
Qty: 0 | Refills: 0 | Status: DISCONTINUED | OUTPATIENT
Start: 2017-12-10 | End: 2017-12-12

## 2017-12-10 RX ORDER — TIOTROPIUM BROMIDE 18 UG/1
1 CAPSULE ORAL; RESPIRATORY (INHALATION) DAILY
Qty: 0 | Refills: 0 | Status: DISCONTINUED | OUTPATIENT
Start: 2017-12-10 | End: 2017-12-12

## 2017-12-10 RX ORDER — DOCUSATE SODIUM 100 MG
100 CAPSULE ORAL DAILY
Qty: 0 | Refills: 0 | Status: DISCONTINUED | OUTPATIENT
Start: 2017-12-10 | End: 2017-12-12

## 2017-12-10 RX ORDER — TAMSULOSIN HYDROCHLORIDE 0.4 MG/1
0.4 CAPSULE ORAL AT BEDTIME
Qty: 0 | Refills: 0 | Status: DISCONTINUED | OUTPATIENT
Start: 2017-12-10 | End: 2017-12-12

## 2017-12-10 RX ORDER — SENNA PLUS 8.6 MG/1
2 TABLET ORAL AT BEDTIME
Qty: 0 | Refills: 0 | Status: DISCONTINUED | OUTPATIENT
Start: 2017-12-10 | End: 2017-12-12

## 2017-12-10 RX ORDER — ATORVASTATIN CALCIUM 80 MG/1
40 TABLET, FILM COATED ORAL AT BEDTIME
Qty: 0 | Refills: 0 | Status: DISCONTINUED | OUTPATIENT
Start: 2017-12-10 | End: 2017-12-12

## 2017-12-10 RX ADMIN — Medication 25 MILLIGRAM(S): at 05:28

## 2017-12-10 RX ADMIN — APIXABAN 5 MILLIGRAM(S): 2.5 TABLET, FILM COATED ORAL at 17:20

## 2017-12-10 RX ADMIN — HYDROMORPHONE HYDROCHLORIDE 0.5 MILLIGRAM(S): 2 INJECTION INTRAMUSCULAR; INTRAVENOUS; SUBCUTANEOUS at 18:00

## 2017-12-10 RX ADMIN — Medication 25 MILLIGRAM(S): at 17:20

## 2017-12-10 RX ADMIN — MORPHINE SULFATE 2 MILLIGRAM(S): 50 CAPSULE, EXTENDED RELEASE ORAL at 21:00

## 2017-12-10 RX ADMIN — Medication 5 MILLIGRAM(S): at 05:28

## 2017-12-10 RX ADMIN — HYDROMORPHONE HYDROCHLORIDE 0.5 MILLIGRAM(S): 2 INJECTION INTRAMUSCULAR; INTRAVENOUS; SUBCUTANEOUS at 11:34

## 2017-12-10 RX ADMIN — HYDROMORPHONE HYDROCHLORIDE 0.5 MILLIGRAM(S): 2 INJECTION INTRAMUSCULAR; INTRAVENOUS; SUBCUTANEOUS at 17:20

## 2017-12-10 RX ADMIN — MORPHINE SULFATE 2 MILLIGRAM(S): 50 CAPSULE, EXTENDED RELEASE ORAL at 20:24

## 2017-12-10 RX ADMIN — Medication 50 MILLIGRAM(S): at 11:38

## 2017-12-10 RX ADMIN — ISOSORBIDE MONONITRATE 30 MILLIGRAM(S): 60 TABLET, EXTENDED RELEASE ORAL at 11:37

## 2017-12-10 RX ADMIN — APIXABAN 5 MILLIGRAM(S): 2.5 TABLET, FILM COATED ORAL at 05:28

## 2017-12-10 RX ADMIN — HYDROMORPHONE HYDROCHLORIDE 0.5 MILLIGRAM(S): 2 INJECTION INTRAMUSCULAR; INTRAVENOUS; SUBCUTANEOUS at 05:28

## 2017-12-10 RX ADMIN — HYDROMORPHONE HYDROCHLORIDE 0.5 MILLIGRAM(S): 2 INJECTION INTRAMUSCULAR; INTRAVENOUS; SUBCUTANEOUS at 12:00

## 2017-12-10 RX ADMIN — HYDROMORPHONE HYDROCHLORIDE 0.5 MILLIGRAM(S): 2 INJECTION INTRAMUSCULAR; INTRAVENOUS; SUBCUTANEOUS at 05:45

## 2017-12-10 RX ADMIN — BUDESONIDE AND FORMOTEROL FUMARATE DIHYDRATE 2 PUFF(S): 160; 4.5 AEROSOL RESPIRATORY (INHALATION) at 11:36

## 2017-12-10 RX ADMIN — GABAPENTIN 400 MILLIGRAM(S): 400 CAPSULE ORAL at 13:11

## 2017-12-10 RX ADMIN — CLOPIDOGREL BISULFATE 75 MILLIGRAM(S): 75 TABLET, FILM COATED ORAL at 11:37

## 2017-12-10 RX ADMIN — TIOTROPIUM BROMIDE 1 CAPSULE(S): 18 CAPSULE ORAL; RESPIRATORY (INHALATION) at 11:37

## 2017-12-10 RX ADMIN — Medication 100 MILLIGRAM(S): at 11:37

## 2017-12-10 RX ADMIN — GABAPENTIN 400 MILLIGRAM(S): 400 CAPSULE ORAL at 05:28

## 2017-12-10 RX ADMIN — Medication 1 PATCH: at 11:38

## 2017-12-10 NOTE — CONSULT NOTE ADULT - ASSESSMENT
PT  is a 57 year old male, from home, ambulates with a walker. PMHx opioid dependence, HLD, HTN, CAD (stents x2 [Sep/17]) Afib (Eliquis), AICD ([employer] interrogated on previous admission), RCC of left kidney (s/p Partial nephrectomy in 2002, repeated biopsy 2 months ago revealed recurrence in stage 2). BIBEMS to ED c/o sharp left chest pain, 5/10, lasting 30 mins and radiating to neck.    1.Tele monitoring.  2.Atypical chest pain.  3.Doubt PE, given on eliquis.  4.Dopplers-R/O DVT.  5.Continue cardiac medication.  6.PPI.

## 2017-12-10 NOTE — CONSULT NOTE ADULT - PROBLEM SELECTOR RECOMMENDATION 4
Continue with home Metoprolol + Enalapril + Imdur  Monitor BP and adjust medications if clinically indicated.

## 2017-12-10 NOTE — H&P ADULT - HISTORY OF PRESENT ILLNESS
Iker Pedro is a 57 year old male, from home, ambulates with a walker. PMHx opioid dependence, HLD, HTN, CAD (stents x2 [Sep/17]) Afib (Eliquis), AICD ([employer] interrogated on previous admission), RCC of left kidney (s/p Partial nephrectomy in 2002, repeated biopsy 2 months ago revealed recurrence in stage 2). BIBEMS to ED c/o sharp left chest pain, 5/10, lasting 30 mins and radiating to neck.  Accompanied by nausea, SOB, diaphoresis and headache. Episode [place] Pain is non reproducible, non pleuritic. Denies palpitations, abdominal pain, vomiting, paresthesias, visual disturbances.    In ED vitals were: BP: 145/90 mmHg, HR: 83 bpm, RR: 16 rpm, SaO2: 100% on room air, T: 98.1 F  EKG: NSR VR@69 bpm, no acute ST-T waves changes. Troponin 1: 0.068, proBNP: 293

## 2017-12-10 NOTE — H&P ADULT - ATTENDING COMMENTS
Iker Pedro is a 57 year old male, from home, ambulates with a walker. PMHx opioid dependence, HLD, HTN, CAD (stents x2 [Sep/17]) Afib (Eliquis), AICD ([employer] interrogated on previous admission), RCC of left kidney (s/p Partial nephrectomy in 2002, repeated biopsy 2 months ago revealed recurrence in stage 2). BIBEMS to ED c/o sharp left chest pain, 5/10, lasting 30 mins and radiating to neck.  Accompanied by nausea, SOB, diaphoresis and headache. Episode [place] Pain is non reproducible, non pleuritic. Denies palpitations, abdominal pain, vomiting, paresthesias, visual disturbances.    In ED vitals were: BP: 145/90 mmHg, HR: 83 bpm, RR: 16 rpm, SaO2: 100% on room air, T: 98.1 F  EKG: NSR VR@69 bpm, no acute ST-T waves changes. Troponin 1: 0.068, proBNP: 293    pt seen in bed, a+o x3, nad, vitals stable except elevated bp, physical exam reveals no focal motor deficit, clear lungs, regular s1s2, abd soft nd nt bs+, ext no edema. labs and diagnostic test result reviewed.    assessment   --- chest pain,  r/o acs, h/o opioid dependence, HLD, HTN, CAD (stents x2 [Sep/17]) Afib (Eliquis), AICD ([employer] interrogated on previous admission), RCC of left kidney (s/p Partial nephrectomy in 2002    plan  --  adm to tele, acs protocol, lopressor, aspirin, statin, cont preadmit home meds, gi and dvt profilaxis  cbc, bmp, mg, phos, lipid, tsh, ce q8 x3    < from: Transthoracic Echocardiogram (11.23.17 @ 12:38) >    CONCLUSIONS:  1. Mild left atrial enlargement.  2. Moderate concentric left ventricular hypertrophy.  3. Endocardium not well visualized; grossly normal left  ventricular function.  4. Grade II diastolic dysfunction.  5. RV systolic pressure is mildly increased at  41 mm Hg.  6. There is mild tricuspid regurgitation.    < end of copied text >        cardio cons

## 2017-12-10 NOTE — H&P ADULT - PROBLEM SELECTOR PLAN 1
Presentation: sharp left chest pain radiating to neck lasting 30 minutes  EKG: NSR VR 69 bpm, no acute ST-T segment changes; T1: 0.068  Lipitor + Metoprolol Presentation: sharp left chest pain radiating to neck lasting 30 minutes  EKG: NSR VR 69 bpm, no acute ST-T segment changes; T1: 0.068; trend CE  Lipitor + Metoprolol  TTE 11/17: G2DD EF: 72% w/ mild concentric LVH  Admit to Telemetry  TSH, Lipid profile, Utox follow up  Dr Harper Presentation: sharp left chest pain radiating to neck lasting 30 minutes  EKG: NSR VR 69 bpm, no acute ST-T segment changes; T1: 0.068; trend CE  Lipitor + Metoprolol, [no ASA as patient is allergic]  TTE 11/17: G2DD EF: 72% w/ mild concentric LVH  Admit to Telemetry  TSH, Lipid profile, Utox follow up  Dr Harper

## 2017-12-10 NOTE — CONSULT NOTE ADULT - PROBLEM SELECTOR RECOMMENDATION 9
EKG: NSR VR 69 bpm, no acute ST-T segment changes  Lipitor + Metoprolol, [no ASA as patient is allergic]  TTE 11/17: G2DD EF: 72% w/ mild concentric LVH  Admit to Telemetry  TSH, Lipid profile, Utox follow up  Dr Harper. EKG: NSR VR 69 bpm, no acute ST-T segment changes  Lipitor + Metoprolol, [no ASA as patient is allergic]  TTE 11/17: G2DD EF: 72% w/ mild concentric LVH  Admit to Telemetry  TSH, Lipid profile, Detox follow up  Dr Carley sifuentes.

## 2017-12-10 NOTE — CONSULT NOTE ADULT - SUBJECTIVE AND OBJECTIVE BOX
CHIEF COMPLAINT:Patient is a 57y old  Male who presents with a chief complaint of chest pain (10 Dec 2017 04:57)      HPI: PT  is a 57 year old male, from home, ambulates with a walker. PMHx opioid dependence, HLD, HTN, CAD (stents x2 [Sep/17]) Afib (Eliquis), AICD ([employer] interrogated on previous admission), RCC of left kidney (s/p Partial nephrectomy in 2002, repeated biopsy 2 months ago revealed recurrence in stage 2). BIBEMS to ED c/o sharp left chest pain, 5/10, lasting 30 mins and radiating to neck.  Accompanied by nausea, SOB, diaphoresis and headache. [place] Pain is non reproducible, non pleuritic. Denies palpitations, abdominal pain, vomiting, paresthesias, visual disturbances.    In ED vitals were: BP: 145/90 mmHg, HR: 83 bpm, RR: 16 rpm, SaO2: 100% on room air, T: 98.1 F  EKG: NSR VR@69 bpm, no acute ST-T waves changes. Troponin 1: 0.068, proBNP: 293 (10 Dec 2017 04:57)      PAST MEDICAL & SURGICAL HISTORY:  Opioid dependence  Calcium kidney stones  Renal cell carcinoma of left kidney: s/p partial nephrectomy in 2002  biopsy again in Sep 2017 showed RCC again in stage 2  Hyperlipidemia  Hypertension  CAD (coronary artery disease): (s/p 2 stents in Sep 2017)  Atrial fibrillation  AICD (automatic cardioverter/defibrillator) present: Medtronic  Calcium kidney stone  H/O partial nephrectomy: 2002  S/P cholecystectomy: 2015      MEDICATIONS  (STANDING):  apixaban 5 milliGRAM(s) Oral every 12 hours  atorvastatin 40 milliGRAM(s) Oral at bedtime  buDESOnide  80 MICROgram(s)/formoterol 4.5 MICROgram(s) Inhaler 2 Puff(s) Inhalation two times a day  clonazePAM Tablet 1 milliGRAM(s) Oral at bedtime  clopidogrel Tablet 75 milliGRAM(s) Oral daily  docusate sodium 100 milliGRAM(s) Oral daily  enalapril 5 milliGRAM(s) Oral daily  gabapentin 400 milliGRAM(s) Oral three times a day  isosorbide   mononitrate ER Tablet (IMDUR) 30 milliGRAM(s) Oral daily  metoprolol     tartrate 25 milliGRAM(s) Oral two times a day  nicotine -  14 mG/24Hr(s) Patch 1 patch Transdermal daily  senna 2 Tablet(s) Oral at bedtime  tamsulosin 0.4 milliGRAM(s) Oral at bedtime  tiotropium 18 MICROgram(s) Capsule 1 Capsule(s) Inhalation daily  traZODone 50 milliGRAM(s) Oral daily    MEDICATIONS  (PRN):  HYDROmorphone  Injectable 0.5 milliGRAM(s) IV Push every 6 hours PRN Severe Pain (7 - 10)      FAMILY HISTORY:  No pertinent family history in first degree relatives      SOCIAL HISTORY:    [X ] Non-smoker    [ X] Alcohol-social    Allergies    aspirin (Hives)  codeine (Rash)  Motrin (Rash)  penicillin (Hives; Anaphylaxis)  Toradol (Rash)  Tylenol (Hives)    Intolerances    	    REVIEW OF SYSTEMS:  CONSTITUTIONAL: No fever, weight loss, or fatigue  EYES: No eye pain, visual disturbances, or discharge  ENT:  No difficulty hearing, tinnitus, vertigo; No sinus or throat pain  NECK: No pain or stiffness  RESPIRATORY: No cough, wheezing, chills or hemoptysis; +Shortness of Breath  CARDIOVASCULAR: + chest pain, palpitations, passing out, dizziness, or leg swelling  GASTROINTESTINAL: No abdominal or epigastric pain. No nausea, vomiting, or hematemesis; No diarrhea or constipation. No melena or hematochezia.  GENITOURINARY: No dysuria, frequency, hematuria, or incontinence  NEUROLOGICAL: No headaches, memory loss, loss of strength, numbness, or tremors  SKIN: No itching, burning, rashes, or lesions   LYMPH Nodes: No enlarged glands  ENDOCRINE: No heat or cold intolerance; No hair loss  MUSCULOSKELETAL: No joint pain or swelling; No muscle, back, or extremity pain  PSYCHIATRIC: No depression, anxiety, mood swings, or difficulty sleeping  HEME/LYMPH: No easy bruising, or bleeding gums  ALLERGY AND IMMUNOLOGIC: No hives or eczema	        PHYSICAL EXAM:  T(C): 36.5 (12-10-17 @ 08:32), Max: 36.7 (12-09-17 @ 20:52)  HR: 59 (12-10-17 @ 08:32) (59 - 83)  BP: 137/78 (12-10-17 @ 08:32) (120/70 - 149/79)  RR: 18 (12-10-17 @ 08:32) (15 - 18)  SpO2: 100% (12-10-17 @ 08:32) (97% - 100%)  Wt(kg): --  I&O's Summary      Appearance: Normal	  HEENT:   Normal oral mucosa, PERRL, EOMI	  Lymphatic: No lymphadenopathy  Cardiovascular: Normal S1 S2, No JVD, No murmurs, No edema  Respiratory: Lungs clear to auscultation	  Psychiatry: A & O x 3, Mood & affect appropriate  Gastrointestinal:  Soft, Non-tender, + BS	  Skin: No rashes, No ecchymoses, No cyanosis	  Neurologic: Non-focal  Extremities: Normal range of motion, No clubbing, cyanosis or edema  Vascular: Peripheral pulses palpable 2+ bilaterally        ECG:  	NSR,LAE  	  LABS:	 	    CARDIAC MARKERS:  CARDIAC MARKERS ( 10 Dec 2017 06:25 )  0.063 ng/mL / x     / 70 U/L / x     / 4.8 ng/mL  CARDIAC MARKERS ( 09 Dec 2017 21:25 )  0.068 ng/mL / x     / 86 U/L / x     / 4.9 ng/mL                         15.2   8.3   )-----------( 108      ( 10 Dec 2017 06:25 )             45.4     12-10    140  |  106  |  29<H>  ----------------------------<  106<H>  3.8   |  25  |  0.86    Ca    9.0      10 Dec 2017 06:25  Phos  3.8     12-10  Mg     2.4     12-10    TPro  6.9  /  Alb  3.4<L>  /  TBili  0.7  /  DBili  x   /  AST  78<H>  /  ALT  113<H>  /  AlkPhos  175<H>  12-09    proBNP: Serum Pro-Brain Natriuretic Peptide: 293 pg/mL (12-09 @ 21:25)      TSH: Thyroid Stimulating Hormone, Serum: 0.43 uU/mL (12-10 @ 06:25)    CXR-.    Echocardiogram 11/17  CONCLUSIONS:  1. Mild left atrial enlargement.  2. Moderate concentric left ventricular hypertrophy.  3. Endocardium not well visualized; grossly normal left  ventricular function.  4. Grade II diastolic dysfunction.  5. RV systolic pressure is mildly increased at  41 mm Hg.  6. There is mild tricuspid regurgitation.

## 2017-12-10 NOTE — H&P ADULT - PROBLEM SELECTOR PLAN 3
Continue with home Metoprolol + Enalapril + Imdur  Monitor BP and adjust medications if clinically indicated

## 2017-12-10 NOTE — CONSULT NOTE ADULT - SUBJECTIVE AND OBJECTIVE BOX
PULMONARY CONSULT NOTE      RENEA AVILES  MRN-[id number]    Patient is a 57y old  Male who presents with a chief complaint of chest pain. The pain is 5/10, lasting 30 mins and radiating to neck.  Accompanied by nausea, SOB, diaphoresis and headache. Episode [place] Pain is non reproducible, non pleuritic. PMHx opioid dependence, HLD, HTN, CAD (stents x2 [Sep/17]) Afib (Eliquis), AICD ([employer] interrogated on previous admission), RCC of left kidney (s/p Partial nephrectomy in 2002, repeated biopsy 2 months ago revealed recurrence in stage 2). Denies palpitations, abdominal pain, vomiting, paresthesias, visual disturbances.          HISTORY OF PRESENT ILLNESS:    MEDICATIONS  (STANDING):  apixaban 5 milliGRAM(s) Oral every 12 hours  atorvastatin 40 milliGRAM(s) Oral at bedtime  buDESOnide  80 MICROgram(s)/formoterol 4.5 MICROgram(s) Inhaler 2 Puff(s) Inhalation two times a day  clonazePAM Tablet 1 milliGRAM(s) Oral at bedtime  clopidogrel Tablet 75 milliGRAM(s) Oral daily  docusate sodium 100 milliGRAM(s) Oral daily  enalapril 5 milliGRAM(s) Oral daily  gabapentin 400 milliGRAM(s) Oral three times a day  isosorbide   mononitrate ER Tablet (IMDUR) 30 milliGRAM(s) Oral daily  metoprolol     tartrate 25 milliGRAM(s) Oral two times a day  nicotine -  14 mG/24Hr(s) Patch 1 patch Transdermal daily  senna 2 Tablet(s) Oral at bedtime  tamsulosin 0.4 milliGRAM(s) Oral at bedtime  tiotropium 18 MICROgram(s) Capsule 1 Capsule(s) Inhalation daily  traZODone 50 milliGRAM(s) Oral daily      MEDICATIONS  (PRN):  HYDROmorphone  Injectable 0.5 milliGRAM(s) IV Push every 6 hours PRN Severe Pain (7 - 10)      Allergies    aspirin (Hives)  codeine (Rash)  Motrin (Rash)  penicillin (Hives; Anaphylaxis)  Toradol (Rash)  Tylenol (Hives)    Intolerances        PAST MEDICAL & SURGICAL HISTORY:  Opioid dependence  Calcium kidney stones  Renal cell carcinoma of left kidney: s/p partial nephrectomy in 2002  biopsy again in Sep 2017 showed RCC again in stage 2  Hyperlipidemia  Hypertension  CAD (coronary artery disease): (s/p 2 stents in Sep 2017)  Atrial fibrillation  AICD (automatic cardioverter/defibrillator) present: [employer]  Calcium kidney stone  H/O partial nephrectomy: 2002  S/P cholecystectomy: 2015      FAMILY HISTORY:  No pertinent family history in first degree relatives      SOCIAL HISTORY  Smoking History:     REVIEW OF SYSTEMS:    CONSTITUTIONAL:  No fevers, chills, sweats    HEENT:  Eyes:  No diplopia or blurred vision. ENT:  No earache, sore throat or runny nose.    CARDIOVASCULAR:  No pressure, squeezing, tightness, or heaviness about the chest; no palpitations.    RESPIRATORY:  Per HPI    GASTROINTESTINAL:  No abdominal pain, nausea, vomiting or diarrhea.    GENITOURINARY:  No dysuria, frequency or urgency.    NEUROLOGIC:  No paresthesias, fasciculations, seizures or weakness.    PSYCHIATRIC:  No disorder of thought or mood.    Vital Signs Last 24 Hrs  T(C): 36.5 (10 Dec 2017 08:32), Max: 36.7 (09 Dec 2017 20:52)  T(F): 97.7 (10 Dec 2017 08:32), Max: 98.1 (09 Dec 2017 20:52)  HR: 59 (10 Dec 2017 08:32) (59 - 83)  BP: 137/78 (10 Dec 2017 08:32) (120/70 - 149/79)  BP(mean): --  RR: 18 (10 Dec 2017 08:32) (15 - 18)  SpO2: 100% (10 Dec 2017 08:32) (97% - 100%)  I&O's Detail      PHYSICAL EXAMINATION:    GENERAL: The patient is a well-developed, well-nourished _____in no apparent distress.     HEENT: Head is normocephalic and atraumatic. Extraocular muscles are intact. Mucous membranes are moist.     NECK: Supple.     LUNGS: Clear to auscultation without wheezing, rales, or rhonchi. Respirations unlabored    HEART: Regular rate and rhythm without murmur.    ABDOMEN: Soft, nontender, and nondistended.  No hepatosplenomegaly is noted.    EXTREMITIES: Without any cyanosis, clubbing, rash, lesions or edema.    NEUROLOGIC: Grossly intact.      LABS:                        15.2   8.3   )-----------( 108      ( 10 Dec 2017 06:25 )             45.4     12-10    140  |  106  |  29<H>  ----------------------------<  106<H>  3.8   |  25  |  0.86    Ca    9.0      10 Dec 2017 06:25  Phos  3.8     12-10  Mg     2.4     12-10    TPro  6.9  /  Alb  3.4<L>  /  TBili  0.7  /  DBili  x   /  AST  78<H>  /  ALT  113<H>  /  AlkPhos  175<H>  12-09          CARDIAC MARKERS ( 10 Dec 2017 06:25 )  0.063 ng/mL / x     / 70 U/L / x     / 4.8 ng/mL  CARDIAC MARKERS ( 09 Dec 2017 21:25 )  0.068 ng/mL / x     / 86 U/L / x     / 4.9 ng/mL        Serum Pro-Brain Natriuretic Peptide: 293 pg/mL (12-09-17 @ 21:25)          MICROBIOLOGY:    RADIOLOGY & ADDITIONAL STUDIES:    CXR:    Ct scan chest:    ekg;    echo: PULMONARY CONSULT NOTE      RENEA AVILES  MRN-[id number]    Patient is a 57y old  Male who presents with a chief complaint of chest pain. The pain is 5/10, lasting 30 mins and radiating to neck.  Accompanied by nausea, SOB, diaphoresis and headache. [place] Pain is non reproducible, non pleuritic. PMHx opioid dependence, HLD, HTN, CAD (stents x2 [Sep/17]) Afib (Eliquis), AICD ([employer] interrogated on previous admission), RCC of left kidney (s/p Partial nephrectomy in 2002, repeated biopsy 2 months ago revealed recurrence in stage 2). Denies palpitations, abdominal pain, vomiting, paresthesias, visual disturbances.          HISTORY OF PRESENT ILLNESS:    MEDICATIONS  (STANDING):  apixaban 5 milliGRAM(s) Oral every 12 hours  atorvastatin 40 milliGRAM(s) Oral at bedtime  buDESOnide  80 MICROgram(s)/formoterol 4.5 MICROgram(s) Inhaler 2 Puff(s) Inhalation two times a day  clonazePAM Tablet 1 milliGRAM(s) Oral at bedtime  clopidogrel Tablet 75 milliGRAM(s) Oral daily  docusate sodium 100 milliGRAM(s) Oral daily  enalapril 5 milliGRAM(s) Oral daily  gabapentin 400 milliGRAM(s) Oral three times a day  isosorbide   mononitrate ER Tablet (IMDUR) 30 milliGRAM(s) Oral daily  metoprolol     tartrate 25 milliGRAM(s) Oral two times a day  nicotine -  14 mG/24Hr(s) Patch 1 patch Transdermal daily  senna 2 Tablet(s) Oral at bedtime  tamsulosin 0.4 milliGRAM(s) Oral at bedtime  tiotropium 18 MICROgram(s) Capsule 1 Capsule(s) Inhalation daily  traZODone 50 milliGRAM(s) Oral daily      MEDICATIONS  (PRN):  HYDROmorphone  Injectable 0.5 milliGRAM(s) IV Push every 6 hours PRN Severe Pain (7 - 10)      Allergies    aspirin (Hives)  codeine (Rash)  Motrin (Rash)  penicillin (Hives; Anaphylaxis)  Toradol (Rash)  Tylenol (Hives)    Intolerances        PAST MEDICAL & SURGICAL HISTORY:  Opioid dependence  Calcium kidney stones  Renal cell carcinoma of left kidney: s/p partial nephrectomy in 2002  biopsy again in Sep 2017 showed RCC again in stage 2  Hyperlipidemia  Hypertension  CAD (coronary artery disease): (s/p 2 stents in Sep 2017)  Atrial fibrillation  AICD (automatic cardioverter/defibrillator) present: [employer]  Calcium kidney stone  H/O partial nephrectomy: 2002  S/P cholecystectomy: 2015      FAMILY HISTORY:  No pertinent family history in first degree relatives      SOCIAL HISTORY  Smoking History:     REVIEW OF SYSTEMS:    CONSTITUTIONAL:  No fevers, chills, sweats    HEENT:  Eyes:  No diplopia or blurred vision. ENT:  No earache, sore throat or runny nose.    CARDIOVASCULAR:  No pressure, squeezing, tightness, or heaviness about the chest; no palpitations.    RESPIRATORY:  Per HPI    GASTROINTESTINAL:  No abdominal pain, nausea, vomiting or diarrhea.    GENITOURINARY:  No dysuria, frequency or urgency.    NEUROLOGIC:  No paresthesias, fasciculations, seizures or weakness.    PSYCHIATRIC:  No disorder of thought or mood.    Vital Signs Last 24 Hrs  T(C): 36.5 (10 Dec 2017 08:32), Max: 36.7 (09 Dec 2017 20:52)  T(F): 97.7 (10 Dec 2017 08:32), Max: 98.1 (09 Dec 2017 20:52)  HR: 59 (10 Dec 2017 08:32) (59 - 83)  BP: 137/78 (10 Dec 2017 08:32) (120/70 - 149/79)  BP(mean): --  RR: 18 (10 Dec 2017 08:32) (15 - 18)  SpO2: 100% (10 Dec 2017 08:32) (97% - 100%)  I&O's Detail      PHYSICAL EXAMINATION:    GENERAL: The patient is a well-developed, well-nourished _____in no apparent distress.     HEENT: Head is normocephalic and atraumatic. Extraocular muscles are intact. Mucous membranes are moist.     NECK: Supple.     LUNGS: Clear to auscultation without wheezing, rales, or rhonchi. Respirations unlabored    HEART: Regular rate and rhythm without murmur.    ABDOMEN: Soft, nontender, and nondistended.  No hepatosplenomegaly is noted.    EXTREMITIES: Without any cyanosis, clubbing, rash, lesions or edema.    NEUROLOGIC: Grossly intact.      LABS:                        15.2   8.3   )-----------( 108      ( 10 Dec 2017 06:25 )             45.4     12-10    140  |  106  |  29<H>  ----------------------------<  106<H>  3.8   |  25  |  0.86    Ca    9.0      10 Dec 2017 06:25  Phos  3.8     12-10  Mg     2.4     12-10    TPro  6.9  /  Alb  3.4<L>  /  TBili  0.7  /  DBili  x   /  AST  78<H>  /  ALT  113<H>  /  AlkPhos  175<H>  12-09          CARDIAC MARKERS ( 10 Dec 2017 06:25 )  0.063 ng/mL / x     / 70 U/L / x     / 4.8 ng/mL  CARDIAC MARKERS ( 09 Dec 2017 21:25 )  0.068 ng/mL / x     / 86 U/L / x     / 4.9 ng/mL        Serum Pro-Brain Natriuretic Peptide: 293 pg/mL (12-09-17 @ 21:25)          MICROBIOLOGY:    RADIOLOGY & ADDITIONAL STUDIES:    CXR:    < from: Xray Chest 2 Views PA/Lat (12.09.17 @ 21:29) >    Clear lungs.     < end of copied text >    Ct scan chest:    < from: CT Abdomen and Pelvis No Cont (11.22.17 @ 11:35) >    Abdomen: Limited sections through the lung bases demonstrate mild bilateral atelectasis. There is a small calcified granuloma in the left lower lobe.  There is a nonspecific noncalcified 4 mm right lower lobe lung nodule (image 3 series 2).     < end of copied text >    ekg;    < from: 12 Lead ECG (11.29.17 @ 21:42) >    Sinus rhythm with Premature atrial complexes. Possible Left atrial enlargement.   ST abnormality, possible digitalis effect. Abnormal ECG    < end of copied text >    echo: PULMONARY CONSULT NOTE      RENEA AVILES  MRN-[id number]    Patient is a 57y old  Male who presents with a chief complaint of chest pain. The pain is 5/10, lasting 30 mins and radiating to neck.  Accompanied by nausea, SOB, diaphoresis and headache. Episode [place] Pain is non reproducible, non pleuritic. PMHx opioid dependence, HLD, HTN, CAD (stents x2 [Sep/17]) Afib (Eliquis), AICD ([employer] interrogated on previous admission), RCC of left kidney (s/p Partial nephrectomy in 2002, repeated biopsy 2 months ago revealed recurrence in stage 2). Denies palpitations, abdominal pain, vomiting, paresthesias, visual disturbances. Awake, alert. comfortable in bed in NAD.  No sob or cough. Compliant with meds. Has not had PFTs as yet.        HISTORY OF PRESENT ILLNESS: As above.    MEDICATIONS  (STANDING):  apixaban 5 milliGRAM(s) Oral every 12 hours  atorvastatin 40 milliGRAM(s) Oral at bedtime  buDESOnide  80 MICROgram(s)/formoterol 4.5 MICROgram(s) Inhaler 2 Puff(s) Inhalation two times a day  clonazePAM Tablet 1 milliGRAM(s) Oral at bedtime  clopidogrel Tablet 75 milliGRAM(s) Oral daily  docusate sodium 100 milliGRAM(s) Oral daily  enalapril 5 milliGRAM(s) Oral daily  gabapentin 400 milliGRAM(s) Oral three times a day  isosorbide   mononitrate ER Tablet (IMDUR) 30 milliGRAM(s) Oral daily  metoprolol     tartrate 25 milliGRAM(s) Oral two times a day  nicotine -  14 mG/24Hr(s) Patch 1 patch Transdermal daily  senna 2 Tablet(s) Oral at bedtime  tamsulosin 0.4 milliGRAM(s) Oral at bedtime  tiotropium 18 MICROgram(s) Capsule 1 Capsule(s) Inhalation daily  traZODone 50 milliGRAM(s) Oral daily      MEDICATIONS  (PRN):  HYDROmorphone  Injectable 0.5 milliGRAM(s) IV Push every 6 hours PRN Severe Pain (7 - 10)      Allergies    aspirin (Hives)  codeine (Rash)  Motrin (Rash)  penicillin (Hives; Anaphylaxis)  Toradol (Rash)  Tylenol (Hives)    Intolerances        PAST MEDICAL & SURGICAL HISTORY:  Opioid dependence  Calcium kidney stones  Renal cell carcinoma of left kidney: s/p partial nephrectomy in 2002  biopsy again in Sep 2017 showed RCC again in stage 2  Hyperlipidemia  Hypertension  CAD (coronary artery disease): (s/p 2 stents in Sep 2017)  Atrial fibrillation  AICD (automatic cardioverter/defibrillator) present: [employer]  Calcium kidney stone  H/O partial nephrectomy: 2002  S/P cholecystectomy: 2015      FAMILY HISTORY:  No pertinent family history in first degree relatives      SOCIAL HISTORY  Smoking History:     REVIEW OF SYSTEMS:    CONSTITUTIONAL:  No fevers, chills, sweats    HEENT:  Eyes:  No diplopia or blurred vision. ENT:  No earache, sore throat or runny nose.    CARDIOVASCULAR:  No pressure, squeezing, tightness, or heaviness about the chest; no palpitations.    RESPIRATORY:  Per HPI    GASTROINTESTINAL:  No abdominal pain, nausea, vomiting or diarrhea.    GENITOURINARY:  No dysuria, frequency or urgency.    NEUROLOGIC:  No paresthesias, fasciculations, seizures or weakness.    PSYCHIATRIC:  No disorder of thought or mood.    Vital Signs Last 24 Hrs  T(C): 36.5 (10 Dec 2017 08:32), Max: 36.7 (09 Dec 2017 20:52)  T(F): 97.7 (10 Dec 2017 08:32), Max: 98.1 (09 Dec 2017 20:52)  HR: 59 (10 Dec 2017 08:32) (59 - 83)  BP: 137/78 (10 Dec 2017 08:32) (120/70 - 149/79)  BP(mean): --  RR: 18 (10 Dec 2017 08:32) (15 - 18)  SpO2: 100% (10 Dec 2017 08:32) (97% - 100%)  I&O's Detail      PHYSICAL EXAMINATION:    GENERAL: The patient is a well-developed, well-nourished _____in no apparent distress.     HEENT: Head is normocephalic and atraumatic. Extraocular muscles are intact. Mucous membranes are moist.     NECK: Supple.     LUNGS: Clear to auscultation without wheezing, rales, or rhonchi. Respirations unlabored    HEART: Regular rate and rhythm without murmur.    ABDOMEN: Soft, nontender, and nondistended.  No hepatosplenomegaly is noted.    EXTREMITIES: Without any cyanosis, clubbing, rash, lesions or edema.    NEUROLOGIC: Grossly intact.      LABS:                        15.2   8.3   )-----------( 108      ( 10 Dec 2017 06:25 )             45.4     12-10    140  |  106  |  29<H>  ----------------------------<  106<H>  3.8   |  25  |  0.86    Ca    9.0      10 Dec 2017 06:25  Phos  3.8     12-10  Mg     2.4     12-10    TPro  6.9  /  Alb  3.4<L>  /  TBili  0.7  /  DBili  x   /  AST  78<H>  /  ALT  113<H>  /  AlkPhos  175<H>  12-09          CARDIAC MARKERS ( 10 Dec 2017 06:25 )  0.063 ng/mL / x     / 70 U/L / x     / 4.8 ng/mL  CARDIAC MARKERS ( 09 Dec 2017 21:25 )  0.068 ng/mL / x     / 86 U/L / x     / 4.9 ng/mL        Serum Pro-Brain Natriuretic Peptide: 293 pg/mL (12-09-17 @ 21:25)          MICROBIOLOGY:    RADIOLOGY & ADDITIONAL STUDIES:    CXR:    < from: Xray Chest 2 Views PA/Lat (12.09.17 @ 21:29) >    Clear lungs.     < end of copied text >    Ct scan chest:    < from: CT Abdomen and Pelvis No Cont (11.22.17 @ 11:35) >    Abdomen: Limited sections through the lung bases demonstrate mild bilateral atelectasis. There is a small calcified granuloma in the left lower lobe.  There is a nonspecific noncalcified 4 mm right lower lobe lung nodule (image 3 series 2).     < end of copied text >    ekg;    < from: 12 Lead ECG (11.29.17 @ 21:42) >    Sinus rhythm with Premature atrial complexes. Possible Left atrial enlargement.   ST abnormality, possible digitalis effect. Abnormal ECG    < end of copied text >    echo:

## 2017-12-10 NOTE — H&P ADULT - ASSESSMENT
Iker Pedro is a 57 year old male, from home, ambulates with a walker. PMHx opioid dependence, HLD, HTN, CAD (stents x2 [Sep/17]) Afib (Eliquis). BIBEMS to ED c/o sharp left chest pain, 5/10, lasting 30 mins and radiating to neck.  Accompanied by nausea, SOB, diaphoresis and headache.     Admitted to telemetry floor to rule out ACS.

## 2017-12-11 DIAGNOSIS — J98.11 ATELECTASIS: ICD-10-CM

## 2017-12-11 DIAGNOSIS — F11.20 OPIOID DEPENDENCE, UNCOMPLICATED: ICD-10-CM

## 2017-12-11 DIAGNOSIS — C64.2 MALIGNANT NEOPLASM OF LEFT KIDNEY, EXCEPT RENAL PELVIS: ICD-10-CM

## 2017-12-11 LAB — 24R-OH-CALCIDIOL SERPL-MCNC: 24.4 NG/ML — LOW (ref 30–80)

## 2017-12-11 RX ORDER — MORPHINE SULFATE 50 MG/1
2 CAPSULE, EXTENDED RELEASE ORAL ONCE
Qty: 0 | Refills: 0 | Status: DISCONTINUED | OUTPATIENT
Start: 2017-12-11 | End: 2017-12-11

## 2017-12-11 RX ORDER — CHOLECALCIFEROL (VITAMIN D3) 125 MCG
1000 CAPSULE ORAL DAILY
Qty: 0 | Refills: 0 | Status: DISCONTINUED | OUTPATIENT
Start: 2017-12-12 | End: 2017-12-12

## 2017-12-11 RX ADMIN — GABAPENTIN 400 MILLIGRAM(S): 400 CAPSULE ORAL at 13:30

## 2017-12-11 RX ADMIN — Medication 5 MILLIGRAM(S): at 06:47

## 2017-12-11 RX ADMIN — APIXABAN 5 MILLIGRAM(S): 2.5 TABLET, FILM COATED ORAL at 18:09

## 2017-12-11 RX ADMIN — Medication 25 MILLIGRAM(S): at 18:09

## 2017-12-11 RX ADMIN — MORPHINE SULFATE 2 MILLIGRAM(S): 50 CAPSULE, EXTENDED RELEASE ORAL at 09:10

## 2017-12-11 RX ADMIN — Medication 50 MILLIGRAM(S): at 12:13

## 2017-12-11 RX ADMIN — MORPHINE SULFATE 2 MILLIGRAM(S): 50 CAPSULE, EXTENDED RELEASE ORAL at 02:19

## 2017-12-11 RX ADMIN — MORPHINE SULFATE 2 MILLIGRAM(S): 50 CAPSULE, EXTENDED RELEASE ORAL at 21:48

## 2017-12-11 RX ADMIN — GABAPENTIN 400 MILLIGRAM(S): 400 CAPSULE ORAL at 21:06

## 2017-12-11 RX ADMIN — Medication 100 MILLIGRAM(S): at 12:13

## 2017-12-11 RX ADMIN — CLOPIDOGREL BISULFATE 75 MILLIGRAM(S): 75 TABLET, FILM COATED ORAL at 12:12

## 2017-12-11 RX ADMIN — BUDESONIDE AND FORMOTEROL FUMARATE DIHYDRATE 2 PUFF(S): 160; 4.5 AEROSOL RESPIRATORY (INHALATION) at 21:05

## 2017-12-11 RX ADMIN — GABAPENTIN 400 MILLIGRAM(S): 400 CAPSULE ORAL at 06:47

## 2017-12-11 RX ADMIN — MORPHINE SULFATE 2 MILLIGRAM(S): 50 CAPSULE, EXTENDED RELEASE ORAL at 15:15

## 2017-12-11 RX ADMIN — Medication 1 MILLIGRAM(S): at 21:05

## 2017-12-11 RX ADMIN — MORPHINE SULFATE 2 MILLIGRAM(S): 50 CAPSULE, EXTENDED RELEASE ORAL at 08:40

## 2017-12-11 RX ADMIN — APIXABAN 5 MILLIGRAM(S): 2.5 TABLET, FILM COATED ORAL at 06:48

## 2017-12-11 RX ADMIN — Medication 25 MILLIGRAM(S): at 06:47

## 2017-12-11 RX ADMIN — ATORVASTATIN CALCIUM 40 MILLIGRAM(S): 80 TABLET, FILM COATED ORAL at 21:06

## 2017-12-11 RX ADMIN — TIOTROPIUM BROMIDE 1 CAPSULE(S): 18 CAPSULE ORAL; RESPIRATORY (INHALATION) at 12:13

## 2017-12-11 RX ADMIN — MORPHINE SULFATE 2 MILLIGRAM(S): 50 CAPSULE, EXTENDED RELEASE ORAL at 20:51

## 2017-12-11 RX ADMIN — ISOSORBIDE MONONITRATE 30 MILLIGRAM(S): 60 TABLET, EXTENDED RELEASE ORAL at 12:13

## 2017-12-11 RX ADMIN — SENNA PLUS 2 TABLET(S): 8.6 TABLET ORAL at 21:06

## 2017-12-11 RX ADMIN — TAMSULOSIN HYDROCHLORIDE 0.4 MILLIGRAM(S): 0.4 CAPSULE ORAL at 21:06

## 2017-12-11 RX ADMIN — MORPHINE SULFATE 2 MILLIGRAM(S): 50 CAPSULE, EXTENDED RELEASE ORAL at 14:50

## 2017-12-11 RX ADMIN — MORPHINE SULFATE 2 MILLIGRAM(S): 50 CAPSULE, EXTENDED RELEASE ORAL at 01:48

## 2017-12-11 NOTE — PROGRESS NOTE ADULT - SUBJECTIVE AND OBJECTIVE BOX
Patient is a 57y old  Male who presents with a chief complaint of chest pain (10 Dec 2017 04:57)    pt seen in icu [  ], reg med floor [   ], bed [  ], chair at bedside [   ], a+o x3 [  ], lethargic [  ],  nad [  ]    tran [  ], ngt [  ], peg [  ], et tube [  ], cent line [  ], picc line [  ]        Allergies    aspirin (Hives)  codeine (Rash)  Motrin (Rash)  penicillin (Hives; Anaphylaxis)  Toradol (Rash)  Tylenol (Hives)        Vitals    T(F): 98.4 (12-11-17 @ 11:37), Max: 98.6 (12-11-17 @ 00:29)  HR: 55 (12-11-17 @ 11:37) (55 - 60)  BP: 135/61 (12-11-17 @ 11:37) (135/61 - 154/81)  RR: 16 (12-11-17 @ 11:37) (16 - 18)  SpO2: 96% (12-11-17 @ 11:37) (96% - 100%)  Wt(kg): --  CAPILLARY BLOOD GLUCOSE          Labs                          15.2   8.3   )-----------( 108      ( 10 Dec 2017 06:25 )             45.4       12-10    140  |  106  |  29<H>  ----------------------------<  106<H>  3.8   |  25  |  0.86    Ca    9.0      10 Dec 2017 06:25  Phos  3.8     12-10  Mg     2.4     12-10    TPro  6.9  /  Alb  3.4<L>  /  TBili  0.7  /  DBili  x   /  AST  78<H>  /  ALT  113<H>  /  AlkPhos  175<H>  12-09      CARDIAC MARKERS ( 10 Dec 2017 06:25 )  0.063 ng/mL / x     / 70 U/L / x     / 4.8 ng/mL  CARDIAC MARKERS ( 09 Dec 2017 21:25 )  0.068 ng/mL / x     / 86 U/L / x     / 4.9 ng/mL        .Urine Clean Catch (Midstream)  11-22 @ 14:31   No growth  --  --          Radiology Results      Meds    MEDICATIONS  (STANDING):  apixaban 5 milliGRAM(s) Oral every 12 hours  atorvastatin 40 milliGRAM(s) Oral at bedtime  buDESOnide  80 MICROgram(s)/formoterol 4.5 MICROgram(s) Inhaler 2 Puff(s) Inhalation two times a day  clonazePAM Tablet 1 milliGRAM(s) Oral at bedtime  clopidogrel Tablet 75 milliGRAM(s) Oral daily  docusate sodium 100 milliGRAM(s) Oral daily  enalapril 5 milliGRAM(s) Oral daily  gabapentin 400 milliGRAM(s) Oral three times a day  isosorbide   mononitrate ER Tablet (IMDUR) 30 milliGRAM(s) Oral daily  metoprolol     tartrate 25 milliGRAM(s) Oral two times a day  nicotine -  14 mG/24Hr(s) Patch 1 patch Transdermal daily  senna 2 Tablet(s) Oral at bedtime  tamsulosin 0.4 milliGRAM(s) Oral at bedtime  tiotropium 18 MICROgram(s) Capsule 1 Capsule(s) Inhalation daily  traZODone 50 milliGRAM(s) Oral daily      MEDICATIONS  (PRN):  morphine  - Injectable 2 milliGRAM(s) IV Push every 6 hours PRN Moderate Pain (4 - 6)  ondansetron Injectable 4 milliGRAM(s) IV Push every 6 hours PRN Nausea and/or Vomiting      Physical Exam    Neuro :  no focal deficits  Respiratory: CTA B/L  CV: RRR, S1S2, no murmurs,   Abdominal: Soft, NT, ND +BS,  Extremities: No edema, + peripheral pulses    ASSESSMENT    atypical Chest pain  h/o Opioid dependence  copd  Calcium kidney stones  Renal cell carcinoma of left kidney  Hyperlipidemia  Hypertension  CAD (coronary artery disease)  Atrial fibrillation on eliquis  AICD (automatic cardioverter/defibrillator) present  Pacemaker  Calcium kidney stone  H/O partial nephrectomy  S/P cholecystectomy      PLAN    cont tele,   acs protocol,   cont lopressor, aspirin, statin,   ce q8 x 2 noted above  cardio f/u noted  pulm f/u noted   cont pain control  pain mgmt eval  contcurrent meds

## 2017-12-11 NOTE — PROGRESS NOTE ADULT - SUBJECTIVE AND OBJECTIVE BOX
Patient is a 57y old  Male who presents with a chief complaint of chest pain. Accompanied by nausea, SOB, diaphoresis and headache.  Denies palpitations, abdominal pain, vomiting, paresthesias, visual disturbances. Awake, alert, comfortable in bed in NAD.  No sob or cough. Compliant with meds. Has not had PFTs as yet.      INTERVAL HPI/OVERNIGHT EVENTS:      VITAL SIGNS:  T(F): 98.3 (12-11-17 @ 06:18)  HR: 55 (12-11-17 @ 06:18)  BP: 136/80 (12-11-17 @ 06:18)  RR: 17 (12-11-17 @ 06:18)  SpO2: 100% (12-11-17 @ 06:18)  Wt(kg): --  I&O's Detail          REVIEW OF SYSTEMS:    CONSTITUTIONAL:  No fevers, chills, sweats    HEENT:  Eyes:  No diplopia or blurred vision. ENT:  No earache, sore throat or runny nose.    CARDIOVASCULAR:  No pressure, squeezing, tightness, or heaviness about the chest; no palpitations.    RESPIRATORY:  Per HPI    GASTROINTESTINAL:  No abdominal pain, nausea, vomiting or diarrhea.    GENITOURINARY:  No dysuria, frequency or urgency.    NEUROLOGIC:  No paresthesias, fasciculations, seizures or weakness.    PSYCHIATRIC:  No disorder of thought or mood.      PHYSICAL EXAM:    Constitutional: Well developed and nourished  Eyes:Perrla  ENMT: normal  Neck:supple  Respiratory: good air entry  Cardiovascular: S1 S2 regular  Gastrointestinal: Soft, Non tender  Extremities: No edema  Vascular:normal  Neurological:Awake, alert,Ox3  Musculoskeletal:Normal      MEDICATIONS  (STANDING):  apixaban 5 milliGRAM(s) Oral every 12 hours  atorvastatin 40 milliGRAM(s) Oral at bedtime  buDESOnide  80 MICROgram(s)/formoterol 4.5 MICROgram(s) Inhaler 2 Puff(s) Inhalation two times a day  clonazePAM Tablet 1 milliGRAM(s) Oral at bedtime  clopidogrel Tablet 75 milliGRAM(s) Oral daily  docusate sodium 100 milliGRAM(s) Oral daily  enalapril 5 milliGRAM(s) Oral daily  gabapentin 400 milliGRAM(s) Oral three times a day  isosorbide   mononitrate ER Tablet (IMDUR) 30 milliGRAM(s) Oral daily  metoprolol     tartrate 25 milliGRAM(s) Oral two times a day  nicotine -  14 mG/24Hr(s) Patch 1 patch Transdermal daily  senna 2 Tablet(s) Oral at bedtime  tamsulosin 0.4 milliGRAM(s) Oral at bedtime  tiotropium 18 MICROgram(s) Capsule 1 Capsule(s) Inhalation daily  traZODone 50 milliGRAM(s) Oral daily    MEDICATIONS  (PRN):  morphine  - Injectable 2 milliGRAM(s) IV Push every 6 hours PRN Moderate Pain (4 - 6)  ondansetron Injectable 4 milliGRAM(s) IV Push every 6 hours PRN Nausea and/or Vomiting      Allergies    aspirin (Hives)  codeine (Rash)  Motrin (Rash)  penicillin (Hives; Anaphylaxis)  Toradol (Rash)  Tylenol (Hives)    Intolerances        LABS:                        15.2   8.3   )-----------( 108      ( 10 Dec 2017 06:25 )             45.4     12-10    140  |  106  |  29<H>  ----------------------------<  106<H>  3.8   |  25  |  0.86    Ca    9.0      10 Dec 2017 06:25  Phos  3.8     12-10  Mg     2.4     12-10    TPro  6.9  /  Alb  3.4<L>  /  TBili  0.7  /  DBili  x   /  AST  78<H>  /  ALT  113<H>  /  AlkPhos  175<H>  12-09          CARDIAC MARKERS ( 10 Dec 2017 06:25 )  0.063 ng/mL / x     / 70 U/L / x     / 4.8 ng/mL  CARDIAC MARKERS ( 09 Dec 2017 21:25 )  0.068 ng/mL / x     / 86 U/L / x     / 4.9 ng/mL      CAPILLARY BLOOD GLUCOSE        pro-bnp 293 12-09 @ 21:25     d-dimer --  12-09 @ 21:25      RADIOLOGY & ADDITIONAL TESTS:    CXR:  < from: Xray Chest 2 Views PA/Lat (12.09.17 @ 21:29) >  Clear lungs.     < end of copied text >    Ct scan chest:  < from: CT Abdomen and Pelvis No Cont (11.22.17 @ 11:35) >  Abdomen: Limited sections through the lung bases demonstrate mild   bilateral atelectasis. There is a small calcified granuloma in the left   lower lobe. There is a nonspecific noncalcified 4 mm right lower lobe   lung nodule (image 3 series 2).     Stable surgical clips adjacent to the left renal hilum. No evidence for a   calculus in the kidneys, or ureters. No hydronephrosis. Evaluation for   renal parenchyma is limited by lack of IV contrast. No gross renal lesion   is identified.    Interval cholecystectomy.    Allowing for the noncontrast technique, the liver, pancreas, spleen, and  adrenals appear grossly unremarkable.    The appendix appears normal. Colonic diverticulosis without evidence for   diverticulitis. No bowel obstruction, or grossly thickened bowel wall. No   evidence for free air, ascites, or enlarged lymph node. Duplicated left   IVC.    Pelvis: No evidence for a calculus in the lower urinary tract. The   urinary bladder appears grossly unremarkable. Sigmoid diverticulosis   without evidence for diverticulitis. No pelvic free fluid, or enlarged   lymph node.    Impression: No evidence for a urinary calculus. No hydronephrosis.    Normal appendix. No bowel obstruction or grossly thickened bowel wall.   Colonic diverticulosis without evidence for diverticulitis.    Small nonspecific 4 mm right lower lobe lung nodule; follow-up chest CT   may be pursued in 12 months to ensure stability.      < end of copied text >    ekg;  < from: 12 Lead ECG (12.09.17 @ 20:59) >  Normal sinus rhythm  Possible Left atrial enlargement  Borderline ECG    < end of copied text >    echo:  < from: Transthoracic Echocardiogram (11.23.17 @ 12:38) >  1. Mild left atrial enlargement.  2. Moderate concentric left ventricular hypertrophy.  3. Endocardium not well visualized; grossly normal left  ventricular function.  4. Grade II diastolic dysfunction.  5. RV systolic pressure is mildly increased at  41 mm Hg.  6. There is mild tricuspid regurgitation.      < end of copied text >

## 2017-12-11 NOTE — PROGRESS NOTE ADULT - SUBJECTIVE AND OBJECTIVE BOX
==================PGY 1 Note===================   Discussed with supervising resident and primary attending    ================CHIEF COMPLAINT===============  Patient is a 57y old  Male who presents with a chief complaint of chest pain (10 Dec 2017 04:57)        =========INTERVAL HPI/OVERNIGHT EVENTS=========  Offers no new complaints; current symptoms resolving      ============CURRENT MEDICATIONS===============    MEDICATIONS  (STANDING):  apixaban 5 milliGRAM(s) Oral every 12 hours  atorvastatin 40 milliGRAM(s) Oral at bedtime  buDESOnide  80 MICROgram(s)/formoterol 4.5 MICROgram(s) Inhaler 2 Puff(s) Inhalation two times a day  clonazePAM Tablet 1 milliGRAM(s) Oral at bedtime  clopidogrel Tablet 75 milliGRAM(s) Oral daily  docusate sodium 100 milliGRAM(s) Oral daily  enalapril 5 milliGRAM(s) Oral daily  gabapentin 400 milliGRAM(s) Oral three times a day  isosorbide   mononitrate ER Tablet (IMDUR) 30 milliGRAM(s) Oral daily  metoprolol     tartrate 25 milliGRAM(s) Oral two times a day  nicotine -  14 mG/24Hr(s) Patch 1 patch Transdermal daily  senna 2 Tablet(s) Oral at bedtime  tamsulosin 0.4 milliGRAM(s) Oral at bedtime  tiotropium 18 MICROgram(s) Capsule 1 Capsule(s) Inhalation daily  traZODone 50 milliGRAM(s) Oral daily    MEDICATIONS  (PRN):  morphine  - Injectable 2 milliGRAM(s) IV Push every 6 hours PRN Moderate Pain (4 - 6)  ondansetron Injectable 4 milliGRAM(s) IV Push every 6 hours PRN Nausea and/or Vomiting        ============REVIEW OF SYSTEMS==================    CONSTITUTIONAL: No fever  EYES: no acute visual disturbances  NECK: No pain or stiffness  RESPIRATORY: No cough; No shortness of breath  CARDIOVASCULAR: No chest pain, no palpitations  GASTROINTESTINAL: No pain. No nausea or vomiting; No diarrhea   NEUROLOGICAL: No headache or numbness, no tremors  MUSCULOSKELETAL: No joint pain, no muscle pain  GENITOURINARY: no dysuria, no frequency, no hesitancy  PSYCHIATRY: no depression , no anxiety  ALL OTHER  ROS negative      ================VITALS SIGNS=====================  Vital Signs Last 24 Hrs  T(C): 36.3 (11 Dec 2017 16:05), Max: 37 (11 Dec 2017 00:29)  T(F): 97.3 (11 Dec 2017 16:05), Max: 98.6 (11 Dec 2017 00:29)  HR: 74 (11 Dec 2017 16:05) (55 - 74)  BP: 117/74 (11 Dec 2017 16:05) (117/74 - 154/81)  BP(mean): --  RR: 17 (11 Dec 2017 16:05) (16 - 18)  SpO2: 99% (11 Dec 2017 16:05) (96% - 100%)    ===============PHYSICAL EXAM====================    GENERAL: NAD  HEENT: Normocephalic;  conjunctivae and sclerae clear; moist mucous membranes;   NECK : supple  CHEST/LUNG: Clear to auscultation bilaterally with good air entry   HEART: S1 S2  regular; no murmurs, gallops or rubs  ABDOMEN: Soft, Nontender, Nondistended; Bowel sounds present  EXTREMITIES: no cyanosis; no edema; no calf tenderness  SKIN: warm and dry; no rash  NERVOUS SYSTEM:  Awake and alert; Oriented  to place, person and time ; no new deficits    ==============LABORATORIES======================  LABS:                        15.2   8.3   )-----------( 108      ( 10 Dec 2017 06:25 )             45.4     12-10    140  |  106  |  29<H>  ----------------------------<  106<H>  3.8   |  25  |  0.86    Ca    9.0      10 Dec 2017 06:25  Phos  3.8     12-10  Mg     2.4     12-10    TPro  6.9  /  Alb  3.4<L>  /  TBili  0.7  /  DBili  x   /  AST  78<H>  /  ALT  113<H>  /  AlkPhos  175<H>  12-09        CAPILLARY BLOOD GLUCOSE          =============INPUTS/OUPUTS=====================        RADIOLOGY & ADDITIONAL TESTS:    Imaging Personally Reviewed:  YES    Consultant(s) Notes Reviewed:   YES    Care Discussed with Consultants : YES    Plan of care was discussed with patient  and /or primary care giver; all questions and concerns were addressed and care was aligned with patient's wishes. Time was allowed for questions that were answered to the best of my abilities

## 2017-12-11 NOTE — PROGRESS NOTE ADULT - SUBJECTIVE AND OBJECTIVE BOX
CARDIOLOGY     PROGRESS  NOTE   ________________________________________________    CHIEF COMPLAINT:Patient is a 57y old  Male who presents with a chief complaint of chest pain (10 Dec 2017 04:57)    	  REVIEW OF SYSTEMS:  CONSTITUTIONAL: No fever, weight loss, or fatigue  EYES: No eye pain, visual disturbances, or discharge  ENT:  No difficulty hearing, tinnitus, vertigo; No sinus or throat pain  NECK: No pain or stiffness  RESPIRATORY: No cough, wheezing, chills or hemoptysis; No Shortness of Breath  CARDIOVASCULAR: No chest pain, palpitations, passing out, dizziness, or leg swelling  GASTROINTESTINAL: No abdominal or epigastric pain. No nausea, vomiting, or hematemesis; No diarrhea or constipation. No melena or hematochezia.  GENITOURINARY: No dysuria, frequency, hematuria, or incontinence  NEUROLOGICAL: No headaches, memory loss, loss of strength, numbness, or tremors  SKIN: No itching, burning, rashes, or lesions   LYMPH Nodes: No enlarged glands  ENDOCRINE: No heat or cold intolerance; No hair loss  MUSCULOSKELETAL: No joint pain or swelling; No muscle, back, or extremity pain  PSYCHIATRIC: No depression, anxiety, mood swings, or difficulty sleeping  HEME/LYMPH: No easy bruising, or bleeding gums  ALLERGY AND IMMUNOLOGIC: No hives or eczema	    [ ] All others negative	  [ ] Unable to obtain    PHYSICAL EXAM:  T(C): 36.9 (12-11-17 @ 11:37), Max: 37 (12-11-17 @ 00:29)  HR: 55 (12-11-17 @ 11:37) (55 - 68)  BP: 135/61 (12-11-17 @ 11:37) (135/61 - 154/81)  RR: 16 (12-11-17 @ 11:37) (16 - 18)  SpO2: 96% (12-11-17 @ 11:37) (96% - 100%)  Wt(kg): --  I&O's Summary      Appearance: Normal	  HEENT:   Normal oral mucosa, PERRL, EOMI	  Lymphatic: No lymphadenopathy  Cardiovascular: Normal S1 S2, No JVD, No murmurs, No edema  Respiratory: Lungs clear to auscultation	  Psychiatry: A & O x 3, Mood & affect appropriate  Gastrointestinal:  Soft, Non-tender, + BS	  Skin: No rashes, No ecchymoses, No cyanosis	  Neurologic: Non-focal  Extremities: Normal range of motion, No clubbing, cyanosis or edema  Vascular: Peripheral pulses palpable 2+ bilaterally    MEDICATIONS  (STANDING):  apixaban 5 milliGRAM(s) Oral every 12 hours  atorvastatin 40 milliGRAM(s) Oral at bedtime  buDESOnide  80 MICROgram(s)/formoterol 4.5 MICROgram(s) Inhaler 2 Puff(s) Inhalation two times a day  clonazePAM Tablet 1 milliGRAM(s) Oral at bedtime  clopidogrel Tablet 75 milliGRAM(s) Oral daily  docusate sodium 100 milliGRAM(s) Oral daily  enalapril 5 milliGRAM(s) Oral daily  gabapentin 400 milliGRAM(s) Oral three times a day  isosorbide   mononitrate ER Tablet (IMDUR) 30 milliGRAM(s) Oral daily  metoprolol     tartrate 25 milliGRAM(s) Oral two times a day  nicotine -  14 mG/24Hr(s) Patch 1 patch Transdermal daily  senna 2 Tablet(s) Oral at bedtime  tamsulosin 0.4 milliGRAM(s) Oral at bedtime  tiotropium 18 MICROgram(s) Capsule 1 Capsule(s) Inhalation daily  traZODone 50 milliGRAM(s) Oral daily      TELEMETRY: 	    ECG:  	  RADIOLOGY:  OTHER: 	  	  LABS:	 	    CARDIAC MARKERS:  CARDIAC MARKERS ( 10 Dec 2017 06:25 )  0.063 ng/mL / x     / 70 U/L / x     / 4.8 ng/mL  CARDIAC MARKERS ( 09 Dec 2017 21:25 )  0.068 ng/mL / x     / 86 U/L / x     / 4.9 ng/mL                                15.2   8.3   )-----------( 108      ( 10 Dec 2017 06:25 )             45.4     12-10    140  |  106  |  29<H>  ----------------------------<  106<H>  3.8   |  25  |  0.86    Ca    9.0      10 Dec 2017 06:25  Phos  3.8     12-10  Mg     2.4     12-10    TPro  6.9  /  Alb  3.4<L>  /  TBili  0.7  /  DBili  x   /  AST  78<H>  /  ALT  113<H>  /  AlkPhos  175<H>  12-09    proBNP: Serum Pro-Brain Natriuretic Peptide: 293 pg/mL (12-09 @ 21:25)    Lipid Profile: Cholesterol 147  LDL 35  HDL 93  TG 95  Cholesterol 138  LDL 43  HDL 88  TG 33    HgA1c: Hemoglobin A1C, Whole Blood: 5.2 % (11-23 @ 09:44)    TSH: Thyroid Stimulating Hormone, Serum: 0.43 uU/mL (12-10 @ 06:25)  Thyroid Stimulating Hormone, Serum: 0.49 uU/mL (11-23 @ 07:03)          Assessment and plan  ---------------------------  PT  is a 57 year old male, from home, ambulates with a walker. PMHx opioid dependence, HLD, HTN, CAD (stents x2 [Sep/17]) Afib (Eliquis), AICD ([employer] interrogated on previous admission), RCC of left kidney (s/p Partial nephrectomy in 2002, repeated biopsy 2 months ago revealed recurrence in stage 2). BIBEMS to ED c/o sharp left chest pain, 5/10, lasting 30 mins and radiating to neck.    1.Tele monitoring.  2.Atypical chest pain.  3.Doubt PE, given on eliquis.  4.Dopplers-R/O DVT.  5.Continue cardiac medication.  6.PPI.

## 2017-12-12 ENCOUNTER — TRANSCRIPTION ENCOUNTER (OUTPATIENT)
Age: 57
End: 2017-12-12

## 2017-12-12 VITALS
DIASTOLIC BLOOD PRESSURE: 64 MMHG | OXYGEN SATURATION: 99 % | RESPIRATION RATE: 18 BRPM | TEMPERATURE: 97 F | HEART RATE: 58 BPM | SYSTOLIC BLOOD PRESSURE: 168 MMHG

## 2017-12-12 PROCEDURE — 82553 CREATINE MB FRACTION: CPT

## 2017-12-12 PROCEDURE — 84100 ASSAY OF PHOSPHORUS: CPT

## 2017-12-12 PROCEDURE — 82550 ASSAY OF CK (CPK): CPT

## 2017-12-12 PROCEDURE — 93005 ELECTROCARDIOGRAM TRACING: CPT

## 2017-12-12 PROCEDURE — 82607 VITAMIN B-12: CPT

## 2017-12-12 PROCEDURE — 85027 COMPLETE CBC AUTOMATED: CPT

## 2017-12-12 PROCEDURE — 80061 LIPID PANEL: CPT

## 2017-12-12 PROCEDURE — 83880 ASSAY OF NATRIURETIC PEPTIDE: CPT

## 2017-12-12 PROCEDURE — 84443 ASSAY THYROID STIM HORMONE: CPT

## 2017-12-12 PROCEDURE — 83735 ASSAY OF MAGNESIUM: CPT

## 2017-12-12 PROCEDURE — 82306 VITAMIN D 25 HYDROXY: CPT

## 2017-12-12 PROCEDURE — 93970 EXTREMITY STUDY: CPT

## 2017-12-12 PROCEDURE — 94640 AIRWAY INHALATION TREATMENT: CPT

## 2017-12-12 PROCEDURE — 82746 ASSAY OF FOLIC ACID SERUM: CPT

## 2017-12-12 PROCEDURE — 96374 THER/PROPH/DIAG INJ IV PUSH: CPT

## 2017-12-12 PROCEDURE — 83690 ASSAY OF LIPASE: CPT

## 2017-12-12 PROCEDURE — 99285 EMERGENCY DEPT VISIT HI MDM: CPT | Mod: 25

## 2017-12-12 PROCEDURE — 80048 BASIC METABOLIC PNL TOTAL CA: CPT

## 2017-12-12 PROCEDURE — 71046 X-RAY EXAM CHEST 2 VIEWS: CPT

## 2017-12-12 PROCEDURE — 80053 COMPREHEN METABOLIC PANEL: CPT

## 2017-12-12 PROCEDURE — 84484 ASSAY OF TROPONIN QUANT: CPT

## 2017-12-12 RX ADMIN — Medication 5 MILLIGRAM(S): at 06:38

## 2017-12-12 RX ADMIN — MORPHINE SULFATE 2 MILLIGRAM(S): 50 CAPSULE, EXTENDED RELEASE ORAL at 04:00

## 2017-12-12 RX ADMIN — GABAPENTIN 400 MILLIGRAM(S): 400 CAPSULE ORAL at 13:20

## 2017-12-12 RX ADMIN — MORPHINE SULFATE 2 MILLIGRAM(S): 50 CAPSULE, EXTENDED RELEASE ORAL at 03:26

## 2017-12-12 RX ADMIN — TIOTROPIUM BROMIDE 1 CAPSULE(S): 18 CAPSULE ORAL; RESPIRATORY (INHALATION) at 11:16

## 2017-12-12 RX ADMIN — Medication 100 MILLIGRAM(S): at 11:16

## 2017-12-12 RX ADMIN — MORPHINE SULFATE 2 MILLIGRAM(S): 50 CAPSULE, EXTENDED RELEASE ORAL at 09:27

## 2017-12-12 RX ADMIN — APIXABAN 5 MILLIGRAM(S): 2.5 TABLET, FILM COATED ORAL at 06:38

## 2017-12-12 RX ADMIN — APIXABAN 5 MILLIGRAM(S): 2.5 TABLET, FILM COATED ORAL at 17:20

## 2017-12-12 RX ADMIN — Medication 1000 UNIT(S): at 11:17

## 2017-12-12 RX ADMIN — MORPHINE SULFATE 2 MILLIGRAM(S): 50 CAPSULE, EXTENDED RELEASE ORAL at 15:35

## 2017-12-12 RX ADMIN — MORPHINE SULFATE 2 MILLIGRAM(S): 50 CAPSULE, EXTENDED RELEASE ORAL at 16:30

## 2017-12-12 RX ADMIN — Medication 25 MILLIGRAM(S): at 17:20

## 2017-12-12 RX ADMIN — GABAPENTIN 400 MILLIGRAM(S): 400 CAPSULE ORAL at 06:38

## 2017-12-12 RX ADMIN — MORPHINE SULFATE 2 MILLIGRAM(S): 50 CAPSULE, EXTENDED RELEASE ORAL at 09:57

## 2017-12-12 RX ADMIN — CLOPIDOGREL BISULFATE 75 MILLIGRAM(S): 75 TABLET, FILM COATED ORAL at 11:16

## 2017-12-12 RX ADMIN — ISOSORBIDE MONONITRATE 30 MILLIGRAM(S): 60 TABLET, EXTENDED RELEASE ORAL at 11:16

## 2017-12-12 RX ADMIN — Medication 25 MILLIGRAM(S): at 06:38

## 2017-12-12 RX ADMIN — BUDESONIDE AND FORMOTEROL FUMARATE DIHYDRATE 2 PUFF(S): 160; 4.5 AEROSOL RESPIRATORY (INHALATION) at 09:28

## 2017-12-12 NOTE — DISCHARGE NOTE ADULT - SECONDARY DIAGNOSIS.
Atrial fibrillation Opioid dependence Hypertension CAD (coronary artery disease) Hyperlipidemia Renal cell carcinoma of left kidney

## 2017-12-12 NOTE — PROGRESS NOTE ADULT - PROBLEM SELECTOR PLAN 1
Bronchodilators  Inhaled Steroid  O2 suppl.  Incentive spirometry.
Bronchodilators  Inhaled Steroid  O2 suppl.  Incentive spirometry.
Sharp left chest pain radiating to neck lasting 30 minutes  EKG: NSR VR 69 bpm, no acute ST-T segment changes  Troponins negative  Lipitor + Metoprolol, [no ASA as patient is allergic]  TTE 11/17: G2DD EF: 72% w/ mild concentric LVH  Admit to Telemetry  Cardiology Dr. Harper

## 2017-12-12 NOTE — PROGRESS NOTE ADULT - SUBJECTIVE AND OBJECTIVE BOX
Patient is a 57y old Male who presents with a chief complaint of chest pain.  Accompanied by nausea, SOB, diaphoresis and headache.  Denies palpitations, abdominal pain, vomiting, paresthesias, visual disturbances. Awake, alert, comfortable in bed in NAD.  No sob or cough. Compliant with meds. Has not had PFTs as yet.        INTERVAL HPI/OVERNIGHT EVENTS:      VITAL SIGNS:  T(F): 98.1 (12-11-17 @ 23:44)  HR: 65 (12-12-17 @ 06:35)  BP: 140/55 (12-12-17 @ 06:35)  RR: 17 (12-11-17 @ 23:44)  SpO2: 96% (12-11-17 @ 23:44)  Wt(kg): --  I&O's Detail    11 Dec 2017 07:01  -  12 Dec 2017 07:00  --------------------------------------------------------  IN:    Oral Fluid: 950 mL  Total IN: 950 mL    OUT:  Total OUT: 0 mL    Total NET: 950 mL              REVIEW OF SYSTEMS:    CONSTITUTIONAL:  No fevers, chills, sweats    HEENT:  Eyes:  No diplopia or blurred vision. ENT:  No earache, sore throat or runny nose.    CARDIOVASCULAR:  No pressure, squeezing, tightness, or heaviness about the chest; no palpitations.    RESPIRATORY:  Per HPI    GASTROINTESTINAL:  No abdominal pain, nausea, vomiting or diarrhea.    GENITOURINARY:  No dysuria, frequency or urgency.    NEUROLOGIC:  No paresthesias, fasciculations, seizures or weakness.    PSYCHIATRIC:  No disorder of thought or mood.      PHYSICAL EXAM:    Constitutional: Well developed and nourished  Eyes:Perrla  ENMT: normal  Neck:supple  Respiratory: good air entry  Cardiovascular: S1 S2 regular  Gastrointestinal: Soft, Non tender  Extremities: No edema  Vascular:normal  Neurological:Awake, alert,Ox3  Musculoskeletal:Normal      MEDICATIONS  (STANDING):  apixaban 5 milliGRAM(s) Oral every 12 hours  atorvastatin 40 milliGRAM(s) Oral at bedtime  buDESOnide  80 MICROgram(s)/formoterol 4.5 MICROgram(s) Inhaler 2 Puff(s) Inhalation two times a day  cholecalciferol 1000 Unit(s) Oral daily  clonazePAM Tablet 1 milliGRAM(s) Oral at bedtime  clopidogrel Tablet 75 milliGRAM(s) Oral daily  docusate sodium 100 milliGRAM(s) Oral daily  enalapril 5 milliGRAM(s) Oral daily  gabapentin 400 milliGRAM(s) Oral three times a day  isosorbide   mononitrate ER Tablet (IMDUR) 30 milliGRAM(s) Oral daily  metoprolol     tartrate 25 milliGRAM(s) Oral two times a day  nicotine -  14 mG/24Hr(s) Patch 1 patch Transdermal daily  senna 2 Tablet(s) Oral at bedtime  tamsulosin 0.4 milliGRAM(s) Oral at bedtime  tiotropium 18 MICROgram(s) Capsule 1 Capsule(s) Inhalation daily  traZODone 50 milliGRAM(s) Oral daily    MEDICATIONS  (PRN):  morphine  - Injectable 2 milliGRAM(s) IV Push every 6 hours PRN Moderate Pain (4 - 6)  ondansetron Injectable 4 milliGRAM(s) IV Push every 6 hours PRN Nausea and/or Vomiting      Allergies    aspirin (Hives)  codeine (Rash)  Motrin (Rash)  penicillin (Hives; Anaphylaxis)  Toradol (Rash)  Tylenol (Hives)    Intolerances        LABS:                    CAPILLARY BLOOD GLUCOSE        pro-bnp 293 12-09 @ 21:25     d-dimer --  12-09 @ 21:25      RADIOLOGY & ADDITIONAL TESTS:    CXR:  < from: Xray Chest 2 Views PA/Lat (12.09.17 @ 21:29) >  Clear lungs.     < end of copied text >    Ct scan chest:    ekg;    echo:

## 2017-12-12 NOTE — PROGRESS NOTE ADULT - PROBLEM SELECTOR PLAN 3
Bronchodilators  Inhaled Steroid  O2 suppl.
Bronchodilators  Inhaled Steroid  O2 suppl.
Urine toxicity: Positive for THC and Opioids

## 2017-12-12 NOTE — PROGRESS NOTE ADULT - PROBLEM SELECTOR PLAN 5
Cont med  Monitor BP.  low salt diet
Cont med  Monitor BP.  low salt diet
Continue with home Metoprolol + Enalapril + Imdur  Monitor BP and adjust medications if clinically indicated.

## 2017-12-12 NOTE — PROGRESS NOTE ADULT - PROBLEM SELECTOR PLAN 6
Statin  Lipid Profile
Statin  Lipid Profile
Lipitor 40 mg HS  Follow up Lipid profile  DASH/TLC diet.

## 2017-12-12 NOTE — DISCHARGE NOTE ADULT - HOSPITAL COURSE
Iker Pedro is a 57 year old male, from home, ambulates with a walker. PMHx opioid dependence, HLD, HTN, CAD (stents x2 [Sep/17]) Afib (Eliquis). BIBEMS to ED c/o sharp left chest pain, 5/10, lasting 30 mins and radiating to neck.  Accompanied by nausea, SOB, diaphoresis and headache. Admitted to telemetry floor to rule out ACS.    Cardiovascular work up was done: EKG: NSR VR 69 bpm, no acute ST-T segment changes. Troponins negative. Lipitor + Metoprolol, [no ASA as patient is allergic] TTE 11/17: G2DD EF: 72% w/ mild concentric LVH. Monitored in the telemetry with no significant events. Patient evaluated by Cardiologist Dr Harper. Who recommended no further work up needed. Please continue with your anticoagulation medication Eliquis as instructed in the medication reconciliation and your primary care physician. Patient with history of opoid pain medication dependence. Needs to be followed with Primary Care physician.    Given patient's improved clinical status and current hemodynamic stability, decision was made to discharge.  Please refer to patient's complete medical chart with documents for a full hospital course, for this is only a brief summary.

## 2017-12-12 NOTE — PROGRESS NOTE ADULT - SUBJECTIVE AND OBJECTIVE BOX
CHIEF COMPLAINT:Patient is a 57y old  Male who presents with a chief complaint of chest pain .Pt appears comfortbale.    	  REVIEW OF SYSTEMS:  CONSTITUTIONAL: No fever, weight loss, or fatigue  EYES: No eye pain, visual disturbances, or discharge  ENT:  No difficulty hearing, tinnitus, vertigo; No sinus or throat pain  NECK: No pain or stiffness  RESPIRATORY: No cough, wheezing, chills or hemoptysis; No Shortness of Breath  CARDIOVASCULAR: No chest pain, palpitations, passing out, dizziness, or leg swelling  GASTROINTESTINAL: No abdominal or epigastric pain. No nausea, vomiting, or hematemesis; No diarrhea or constipation. No melena or hematochezia.  GENITOURINARY: No dysuria, frequency, hematuria, or incontinence  NEUROLOGICAL: No headaches, memory loss, loss of strength, numbness, or tremors  SKIN: No itching, burning, rashes, or lesions   LYMPH Nodes: No enlarged glands  ENDOCRINE: No heat or cold intolerance; No hair loss  MUSCULOSKELETAL: No joint pain or swelling; No muscle, back, or extremity pain  PSYCHIATRIC: No depression, anxiety, mood swings, or difficulty sleeping  HEME/LYMPH: No easy bruising, or bleeding gums  ALLERGY AND IMMUNOLOGIC: No hives or eczema	      PHYSICAL EXAM:  T(C): 36.7 (12-11-17 @ 23:44), Max: 36.9 (12-11-17 @ 11:37)  HR: 65 (12-12-17 @ 06:35) (55 - 74)  BP: 140/55 (12-12-17 @ 06:35) (117/54 - 140/55)  RR: 17 (12-11-17 @ 23:44) (16 - 17)  SpO2: 96% (12-11-17 @ 23:44) (96% - 99%)    I&O's Summary    11 Dec 2017 07:01  -  12 Dec 2017 07:00  --------------------------------------------------------  IN: 950 mL / OUT: 0 mL / NET: 950 mL        Appearance: Normal	  HEENT:   Normal oral mucosa, PERRL, EOMI	  Lymphatic: No lymphadenopathy  Cardiovascular: Normal S1 S2, No JVD, No murmurs, No edema  Respiratory: Lungs clear to auscultation	  Psychiatry: A & O x 3, Mood & affect appropriate  Gastrointestinal:  Soft, Non-tender, + BS	  Skin: No rashes, No ecchymoses, No cyanosis	  Neurologic: Non-focal  Extremities: Normal range of motion, No clubbing, cyanosis or edema  Vascular: Peripheral pulses palpable 2+ bilaterally    MEDICATIONS  (STANDING):  apixaban 5 milliGRAM(s) Oral every 12 hours  atorvastatin 40 milliGRAM(s) Oral at bedtime  buDESOnide  80 MICROgram(s)/formoterol 4.5 MICROgram(s) Inhaler 2 Puff(s) Inhalation two times a day  cholecalciferol 1000 Unit(s) Oral daily  clonazePAM Tablet 1 milliGRAM(s) Oral at bedtime  clopidogrel Tablet 75 milliGRAM(s) Oral daily  docusate sodium 100 milliGRAM(s) Oral daily  enalapril 5 milliGRAM(s) Oral daily  gabapentin 400 milliGRAM(s) Oral three times a day  isosorbide   mononitrate ER Tablet (IMDUR) 30 milliGRAM(s) Oral daily  metoprolol     tartrate 25 milliGRAM(s) Oral two times a day  nicotine -  14 mG/24Hr(s) Patch 1 patch Transdermal daily  senna 2 Tablet(s) Oral at bedtime  tamsulosin 0.4 milliGRAM(s) Oral at bedtime  tiotropium 18 MICROgram(s) Capsule 1 Capsule(s) Inhalation daily  traZODone 50 milliGRAM(s) Oral daily      	  LABS:	 	    proBNP: Serum Pro-Brain Natriuretic Peptide: 293 pg/mL (12-09 @ 21:25)    Lipid Profile: Cholesterol 147  LDL 35  HDL 93  TG 95  Cholesterol 138  LDL 43  HDL 88  TG 33    HgA1c: Hemoglobin A1C, Whole Blood: 5.2 % (11-23 @ 09:44)    TSH: Thyroid Stimulating Hormone, Serum: 0.43 uU/mL (12-10 @ 06:25)  Thyroid Stimulating Hormone, Serum: 0.49 uU/mL (11-23 @ 07:03)      doppler-no dvt.

## 2017-12-12 NOTE — PROGRESS NOTE ADULT - ASSESSMENT
PT  is a 57 year old male, from home, ambulates with a walker. PMHx opioid dependence, HLD, HTN, CAD (stents x2 [Sep/17]) Afib (Eliquis), AICD ([employer]tr[employer] interrogated on previous admission), RCC of left kidney (s/p Partial nephrectomy in 2002, repeated biopsy 2 months ago revealed recurrence in stage 2). BIBEMS to ED c/o sharp left chest pain, 5/10, lasting 30 mins and radiating to neck.    1.D/C Tele monitoring.  2.Atypical chest pain.  3.Doubt PE, given on eliquis.  4..Continue cardiac medication.  5.PPI.

## 2017-12-12 NOTE — PROGRESS NOTE ADULT - PROBLEM SELECTOR PLAN 4
Cont med  Anticogulant  Monitor INR  EKG  Cardiology Eval
Cont med  EKG  Cardiology Eval
Continue with home Metoprolol + Enalapril + Imdur  Monitor BP and adjust medications if clinically indicated.

## 2017-12-12 NOTE — PROGRESS NOTE ADULT - PROBLEM SELECTOR PLAN 7
Aspirin, Beta blocker, Statin.  Cardiology Eval
Aspirin, Beta blocker, Statin.  Cardiology Eval
RCC of left kidney (s/p Partial nephrectomy in 2002, repeated biopsy 2 months ago revealed recurrence in stage 2)

## 2017-12-12 NOTE — DISCHARGE NOTE ADULT - CARE PLAN
Principal Discharge DX:	Chest pain  Secondary Diagnosis:	Atrial fibrillation  Secondary Diagnosis:	Opioid dependence  Secondary Diagnosis:	Hypertension  Secondary Diagnosis:	CAD (coronary artery disease)  Secondary Diagnosis:	Hyperlipidemia  Secondary Diagnosis:	Renal cell carcinoma of left kidney Principal Discharge DX:	Chest pain  Instructions for follow-up, activity and diet:	Sharp left chest pain radiating to neck lasting 30 minutes. EKG: NSR VR 69 bpm, no acute ST-T segment changes. Troponins negative. Lipitor + Metoprolol, [no ASA as patient is allergic]  TTE 11/17: G2DD EF: 72% w/ mild concentric LVH  Secondary Diagnosis:	Atrial fibrillation  Instructions for follow-up, activity and diet:	Please continue with your anticoagulation medication Eliquis as instructed in the medication reconciliation and your primary care physician.  Please continue with you rate control medication: metorprolol as instructed in the medication reconciliation and your primary care physician.  Secondary Diagnosis:	Opioid dependence  Instructions for follow-up, activity and diet:	Patient with history of opoid pain medication dependence. Needs to be followed with Primary Care physician  Secondary Diagnosis:	Hypertension  Instructions for follow-up, activity and diet:	Continue with blood pressure medication. Maintain a healthy diet that consist of low sugar, low fat, low sodium diet. Exercise frequently if possible.  Follow up with primary care physician in one week after discharge.  Secondary Diagnosis:	CAD (coronary artery disease)  Instructions for follow-up, activity and diet:	Continue with Statin therapy and Blood pressure medications  Secondary Diagnosis:	Hyperlipidemia  Instructions for follow-up, activity and diet:	Continue with cholesterol medications. Maintain a healthy diet that consist of low sugar, low fat, low sodium diet. Exercise frequently if possible.  Follow up with primary care physician in one week after discharge.  Diet suggested: DASH Diet that Emphasizes vegetables, fruits, and fat-free or low-fat dairy products. Includes whole grains, fish, poultry, beans, seeds, nuts, and vegetable oils. Limits sodium, sweets, sugary beverages, and red meats.  Secondary Diagnosis:	Renal cell carcinoma of left kidney  Instructions for follow-up, activity and diet:	RCC of left kidney (s/p Partial nephrectomy in 2002, repeated biopsy 2 months ago revealed recurrence in stage 2). Follow up outpatient with PCP and heme oncologist Principal Discharge DX:	Chest pain  Goal:	Continue with Blood pressure medications and Cholesterol medication  Instructions for follow-up, activity and diet:	Sharp left chest pain radiating to neck lasting 30 minutes. EKG: NSR VR 69 bpm, no acute ST-T segment changes. Troponins negative. Lipitor + Metoprolol, [no ASA as patient is allergic]  TTE 11/17: G2DD EF: 72% w/ mild concentric LVH  Secondary Diagnosis:	Atrial fibrillation  Instructions for follow-up, activity and diet:	Please continue with your anticoagulation medication Eliquis as instructed in the medication reconciliation and your primary care physician.  Please continue with you rate control medication: metorprolol as instructed in the medication reconciliation and your primary care physician.  Secondary Diagnosis:	Opioid dependence  Instructions for follow-up, activity and diet:	Patient with history of opoid pain medication dependence. Needs to be followed with Primary Care physician  Secondary Diagnosis:	Hypertension  Instructions for follow-up, activity and diet:	Continue with blood pressure medication. Maintain a healthy diet that consist of low sugar, low fat, low sodium diet. Exercise frequently if possible.  Follow up with primary care physician in one week after discharge.  Secondary Diagnosis:	CAD (coronary artery disease)  Instructions for follow-up, activity and diet:	Continue with Statin therapy and Blood pressure medications  Secondary Diagnosis:	Hyperlipidemia  Instructions for follow-up, activity and diet:	Continue with cholesterol medications. Maintain a healthy diet that consist of low sugar, low fat, low sodium diet. Exercise frequently if possible.  Follow up with primary care physician in one week after discharge.  Diet suggested: DASH Diet that Emphasizes vegetables, fruits, and fat-free or low-fat dairy products. Includes whole grains, fish, poultry, beans, seeds, nuts, and vegetable oils. Limits sodium, sweets, sugary beverages, and red meats.  Secondary Diagnosis:	Renal cell carcinoma of left kidney  Instructions for follow-up, activity and diet:	RCC of left kidney (s/p Partial nephrectomy in 2002, repeated biopsy 2 months ago revealed recurrence in stage 2). Follow up outpatient with PCP and heme oncologist

## 2017-12-12 NOTE — PROGRESS NOTE ADULT - SUBJECTIVE AND OBJECTIVE BOX
Patient is a 57y old  Male who presents with a chief complaint of chest pain (10 Dec 2017 04:57)    pt seen in icu [  ], reg med floor [   ], bed [  ], chair at bedside [   ], a+o x3 [  ], lethargic [  ],  nad [  ]    tran [  ], ngt [  ], peg [  ], et tube [  ], cent line [  ], picc line [  ]        Allergies    aspirin (Hives)  codeine (Rash)  Motrin (Rash)  penicillin (Hives; Anaphylaxis)  Toradol (Rash)  Tylenol (Hives)        Vitals    T(F): 98.1 (12-11-17 @ 23:44), Max: 98.4 (12-11-17 @ 11:37)  HR: 65 (12-12-17 @ 06:35) (55 - 74)  BP: 140/55 (12-12-17 @ 06:35) (117/54 - 140/55)  RR: 17 (12-11-17 @ 23:44) (16 - 17)  SpO2: 96% (12-11-17 @ 23:44) (96% - 99%)  Wt(kg): --  CAPILLARY BLOOD GLUCOSE          Labs                      .Urine Clean Catch (Midstream)  11-22 @ 14:31   No growth  --  --          Radiology Results      Meds    MEDICATIONS  (STANDING):  apixaban 5 milliGRAM(s) Oral every 12 hours  atorvastatin 40 milliGRAM(s) Oral at bedtime  buDESOnide  80 MICROgram(s)/formoterol 4.5 MICROgram(s) Inhaler 2 Puff(s) Inhalation two times a day  cholecalciferol 1000 Unit(s) Oral daily  clonazePAM Tablet 1 milliGRAM(s) Oral at bedtime  clopidogrel Tablet 75 milliGRAM(s) Oral daily  docusate sodium 100 milliGRAM(s) Oral daily  enalapril 5 milliGRAM(s) Oral daily  gabapentin 400 milliGRAM(s) Oral three times a day  isosorbide   mononitrate ER Tablet (IMDUR) 30 milliGRAM(s) Oral daily  metoprolol     tartrate 25 milliGRAM(s) Oral two times a day  nicotine -  14 mG/24Hr(s) Patch 1 patch Transdermal daily  senna 2 Tablet(s) Oral at bedtime  tamsulosin 0.4 milliGRAM(s) Oral at bedtime  tiotropium 18 MICROgram(s) Capsule 1 Capsule(s) Inhalation daily  traZODone 50 milliGRAM(s) Oral daily      MEDICATIONS  (PRN):  morphine  - Injectable 2 milliGRAM(s) IV Push every 6 hours PRN Moderate Pain (4 - 6)  ondansetron Injectable 4 milliGRAM(s) IV Push every 6 hours PRN Nausea and/or Vomiting      Physical Exam    Neuro :  no focal deficits  Respiratory: CTA B/L  CV: RRR, S1S2, no murmurs,   Abdominal: Soft, NT, ND +BS,  Extremities: No edema, + peripheral pulses    ASSESSMENT    atypical Chest pain  h/o Opioid dependence  copd  Calcium kidney stones  Renal cell carcinoma of left kidney  Hyperlipidemia  Hypertension  CAD (coronary artery disease)  Atrial fibrillation on eliquis  AICD (automatic cardioverter/defibrillator) present  Pacemaker  Calcium kidney stone  H/O partial nephrectomy  S/P cholecystectomy      PLAN    cont tele,   acs protocol,   cont lopressor, aspirin, statin,   ce q8 x 2 noted above  cardio f/u noted  pulm f/u noted   cont pain control  pain mgmt eval  contcurrent meds Patient is a 57y old  Male who presents with a chief complaint of chest pain (10 Dec 2017 04:57)    pt seen in tele [x  ], reg med floor [   ], bed [ x ], chair at bedside [   ], a+o x3 [ x ], lethargic [  ],  nad [x  ]      Allergies    aspirin (Hives)  codeine (Rash)  Motrin (Rash)  penicillin (Hives; Anaphylaxis)  Toradol (Rash)  Tylenol (Hives)        Vitals    T(F): 98.1 (12-11-17 @ 23:44), Max: 98.4 (12-11-17 @ 11:37)  HR: 65 (12-12-17 @ 06:35) (55 - 74)  BP: 140/55 (12-12-17 @ 06:35) (117/54 - 140/55)  RR: 17 (12-11-17 @ 23:44) (16 - 17)  SpO2: 96% (12-11-17 @ 23:44) (96% - 99%)  Wt(kg): --  CAPILLARY BLOOD GLUCOSE          Labs        .Urine Clean Catch (Midstream)  11-22 @ 14:31   No growth  --  --      Radiology Results      Meds    MEDICATIONS  (STANDING):  apixaban 5 milliGRAM(s) Oral every 12 hours  atorvastatin 40 milliGRAM(s) Oral at bedtime  buDESOnide  80 MICROgram(s)/formoterol 4.5 MICROgram(s) Inhaler 2 Puff(s) Inhalation two times a day  cholecalciferol 1000 Unit(s) Oral daily  clonazePAM Tablet 1 milliGRAM(s) Oral at bedtime  clopidogrel Tablet 75 milliGRAM(s) Oral daily  docusate sodium 100 milliGRAM(s) Oral daily  enalapril 5 milliGRAM(s) Oral daily  gabapentin 400 milliGRAM(s) Oral three times a day  isosorbide   mononitrate ER Tablet (IMDUR) 30 milliGRAM(s) Oral daily  metoprolol     tartrate 25 milliGRAM(s) Oral two times a day  nicotine -  14 mG/24Hr(s) Patch 1 patch Transdermal daily  senna 2 Tablet(s) Oral at bedtime  tamsulosin 0.4 milliGRAM(s) Oral at bedtime  tiotropium 18 MICROgram(s) Capsule 1 Capsule(s) Inhalation daily  traZODone 50 milliGRAM(s) Oral daily      MEDICATIONS  (PRN):  morphine  - Injectable 2 milliGRAM(s) IV Push every 6 hours PRN Moderate Pain (4 - 6)  ondansetron Injectable 4 milliGRAM(s) IV Push every 6 hours PRN Nausea and/or Vomiting      Physical Exam    Neuro :  no focal deficits  Respiratory: CTA B/L  CV: RRR, S1S2, no murmurs,   Abdominal: Soft, NT, ND +BS,  Extremities: No edema, + peripheral pulses    ASSESSMENT    atypical Chest pain  h/o Opioid dependence  copd  Calcium kidney stones  Renal cell carcinoma of left kidney  Hyperlipidemia  Hypertension  CAD (coronary artery disease)  Atrial fibrillation on eliquis  AICD (automatic cardioverter/defibrillator) present  Pacemaker  Calcium kidney stone  H/O partial nephrectomy  S/P cholecystectomy      PLAN    cont tele,   acs protocol,   cont lopressor, aspirin, statin,   ce q8 x 2 noted above  cardio f/u   cp not of cardiac ethiology  pulm f/u noted   cont pain control  pain mgmt eval  cont current meds  pt stable for d/c

## 2017-12-12 NOTE — DISCHARGE NOTE ADULT - PLAN OF CARE
Sharp left chest pain radiating to neck lasting 30 minutes. EKG: NSR VR 69 bpm, no acute ST-T segment changes. Troponins negative. Lipitor + Metoprolol, [no ASA as patient is allergic]  TTE 11/17: G2DD EF: 72% w/ mild concentric LVH Please continue with your anticoagulation medication Eliquis as instructed in the medication reconciliation and your primary care physician.  Please continue with you rate control medication: metorprolol as instructed in the medication reconciliation and your primary care physician. Patient with history of opoid pain medication dependence. Needs to be followed with Primary Care physician Continue with blood pressure medication. Maintain a healthy diet that consist of low sugar, low fat, low sodium diet. Exercise frequently if possible.  Follow up with primary care physician in one week after discharge. Continue with Statin therapy and Blood pressure medications Continue with cholesterol medications. Maintain a healthy diet that consist of low sugar, low fat, low sodium diet. Exercise frequently if possible.  Follow up with primary care physician in one week after discharge.  Diet suggested: DASH Diet that Emphasizes vegetables, fruits, and fat-free or low-fat dairy products. Includes whole grains, fish, poultry, beans, seeds, nuts, and vegetable oils. Limits sodium, sweets, sugary beverages, and red meats. RCC of left kidney (s/p Partial nephrectomy in 2002, repeated biopsy 2 months ago revealed recurrence in stage 2). Follow up outpatient with PCP and heme oncologist Continue with Blood pressure medications and Cholesterol medication

## 2017-12-12 NOTE — DISCHARGE NOTE ADULT - MEDICATION SUMMARY - MEDICATIONS TO TAKE
I will START or STAY ON the medications listed below when I get home from the hospital:    Rolling Walker  -- Rolling Walker  -- Indication: For Rolling Walker    traMADol 50 mg oral tablet  -- 1 tab(s) by mouth every 12 hours, As needed, Moderate Pain (4 - 6) MDD:2  -- Indication: For Pain    enalapril 5 mg oral tablet  -- 1 tab(s) by mouth once a day  -- Indication: For Hypertension    Flomax 0.4 mg oral capsule  -- 1 cap(s) by mouth once a day  -- Indication: For BPH    isosorbide mononitrate 30 mg oral tablet, extended release  -- 1 tab(s) by mouth once a day  -- Indication: For Hypertension    apixaban 5 mg oral tablet  -- 1 tab(s) by mouth every 12 hours  -- Indication: For Atrial fibrillation    gabapentin 400 mg oral capsule  -- 1 cap(s) by mouth 3 times a day  -- Indication: For Neuropatic Pain    clonazePAM 1 mg oral tablet  -- 1 tab(s) by mouth once a day (at bedtime)  -- Indication: For Anxiety    traZODone 50 mg oral tablet  -- 1 tab(s) by mouth once a day  -- Indication: For Antidepressant    atorvastatin 40 mg oral tablet  -- 1 tab(s) by mouth once a day (at bedtime)  -- Indication: For Hyperlipidemia    clopidogrel 75 mg oral tablet  -- 1 tab(s) by mouth once a day  -- Indication: For CAD (coronary artery disease)    metoprolol tartrate 25 mg oral tablet  -- 1 tab(s) by mouth 2 times a day  -- Indication: For Hypertension    Spiriva 18 mcg inhalation capsule  -- 1 cap(s) inhaled once a day  -- Indication: For Asthma    Symbicort 160 mcg-4.5 mcg/inh inhalation aerosol  -- 2 puff(s) inhaled 2 times a day  -- Indication: For Asthma    calamine topical lotion  -- 1 application on skin 2 times a day, As needed, Rash and/or Itching  -- Indication: For Topical Lotion    senna oral tablet  -- 2 tab(s) by mouth once a day (at bedtime)  -- Indication: For COnstipation

## 2017-12-12 NOTE — PROGRESS NOTE ADULT - PROBLEM SELECTOR PROBLEM 3
COPD (chronic obstructive pulmonary disease)
COPD (chronic obstructive pulmonary disease)
Opioid dependence

## 2017-12-27 ENCOUNTER — EMERGENCY (EMERGENCY)
Facility: HOSPITAL | Age: 57
LOS: 1 days | Discharge: HOME | End: 2017-12-27
Attending: EMERGENCY MEDICINE
Payer: MEDICAID

## 2017-12-27 VITALS
HEART RATE: 71 BPM | SYSTOLIC BLOOD PRESSURE: 107 MMHG | DIASTOLIC BLOOD PRESSURE: 70 MMHG | RESPIRATION RATE: 18 BRPM | OXYGEN SATURATION: 99 % | TEMPERATURE: 98 F

## 2017-12-27 VITALS
DIASTOLIC BLOOD PRESSURE: 72 MMHG | RESPIRATION RATE: 17 BRPM | OXYGEN SATURATION: 99 % | SYSTOLIC BLOOD PRESSURE: 109 MMHG | HEART RATE: 73 BPM

## 2017-12-27 DIAGNOSIS — Z90.49 ACQUIRED ABSENCE OF OTHER SPECIFIED PARTS OF DIGESTIVE TRACT: Chronic | ICD-10-CM

## 2017-12-27 DIAGNOSIS — Z95.810 PRESENCE OF AUTOMATIC (IMPLANTABLE) CARDIAC DEFIBRILLATOR: Chronic | ICD-10-CM

## 2017-12-27 DIAGNOSIS — N20.0 CALCULUS OF KIDNEY: Chronic | ICD-10-CM

## 2017-12-27 DIAGNOSIS — Z90.5 ACQUIRED ABSENCE OF KIDNEY: Chronic | ICD-10-CM

## 2017-12-27 PROCEDURE — 99284 EMERGENCY DEPT VISIT MOD MDM: CPT | Mod: 25

## 2017-12-27 PROCEDURE — 96372 THER/PROPH/DIAG INJ SC/IM: CPT

## 2017-12-27 RX ORDER — MORPHINE SULFATE 50 MG/1
4 CAPSULE, EXTENDED RELEASE ORAL ONCE
Qty: 0 | Refills: 0 | Status: DISCONTINUED | OUTPATIENT
Start: 2017-12-27 | End: 2017-12-27

## 2017-12-27 RX ADMIN — MORPHINE SULFATE 4 MILLIGRAM(S): 50 CAPSULE, EXTENDED RELEASE ORAL at 16:24

## 2017-12-27 NOTE — ED PROVIDER NOTE - OBJECTIVE STATEMENT
57 year-old male, history of CAD, A.fib on Apixaban, COPD, renal cancer, scheduled for partial nephrectomy next week, presents with cc acute on chronic right flank pain. Pain is continuous, sharp, getting worse today. Today, while walking, tripped and fell twice on his knees. Was able to get on his own and ambulatory after. Denies knee pain right now and states "I need a shot for my pain". Already has Oxycodone Rx for PMD. Denies head injury, neck or back pain or any other complaints.

## 2017-12-27 NOTE — ED PROVIDER NOTE - PMH
AICD (automatic cardioverter/defibrillator) present  [employer]  Atrial fibrillation    CAD (coronary artery disease)  (s/p 2 stents in Sep 2017)  Calcium kidney stones    COPD (chronic obstructive pulmonary disease)    Hyperlipidemia    Hypertension    Opioid dependence    Renal cell carcinoma of left kidney  s/p partial nephrectomy in 2002  biopsy again in Sep 2017 showed RCC again in stage 2

## 2017-12-27 NOTE — ED PROVIDER NOTE - PHYSICAL EXAMINATION
Neck full range of motion. No spinal/paraspinal tenderness. No rash. No CVAT. Ambulatory with steady gait. No focal neuro deficit.

## 2017-12-27 NOTE — ED PROVIDER NOTE - PROGRESS NOTE DETAILS
Feeling much better. Wants to go home. Voiced that will follow up with his doctors. Understands return instructions. Pt is well appearing walking with steady gait, stable for discharge and follow up without fail with medical doctor. I had a detailed discussion with the patient and/or guardian regarding the historical points, exam findings, and any diagnostic results supporting the discharge diagnosis. Pt educated on care and need for follow up. Strict return instructions and red flag signs and symptoms discussed with patient. Questions answered. Pt shows understanding of discharge information and agrees to follow.

## 2017-12-27 NOTE — ED ADULT NURSE NOTE - OBJECTIVE STATEMENT
Patient presents to ED with back pain s/p tripped and fell onto his knees x today. Denies loss of consciousness, head trauma. Patient ambulates withi cane, steady gait. MD evaluation in progress.

## 2017-12-27 NOTE — ED PROVIDER NOTE - ATTENDING CONTRIBUTION TO CARE
history of CAD, A.fib on Apixaban, COPD, renal cancer  c/o acute on chronic pain to rt side  denies any cp/sob/dizziness  on Percocet asking for "shot"  pain improved after IM Morphine  asking for d/c afterwards

## 2017-12-27 NOTE — ED PROVIDER NOTE - MEDICAL DECISION MAKING DETAILS
57 year-old male, presents with acute on chronic flank pain from renal carcinoma. Appears uncomfortable, vital signs within normal limits, afebrile. Unremarkable exam. Plan: Morphine IM x 1 and dc with surgeon follow up.

## 2018-01-01 ENCOUNTER — INPATIENT (INPATIENT)
Facility: HOSPITAL | Age: 58
LOS: 1 days | Discharge: LEFT BEFORE DISCHARGE | End: 2018-01-03
Attending: INTERNAL MEDICINE | Admitting: INTERNAL MEDICINE
Payer: MEDICAID

## 2018-01-01 VITALS
TEMPERATURE: 98 F | RESPIRATION RATE: 16 BRPM | DIASTOLIC BLOOD PRESSURE: 60 MMHG | SYSTOLIC BLOOD PRESSURE: 113 MMHG | HEART RATE: 83 BPM | OXYGEN SATURATION: 99 %

## 2018-01-01 DIAGNOSIS — R07.9 CHEST PAIN, UNSPECIFIED: ICD-10-CM

## 2018-01-01 DIAGNOSIS — Z90.49 ACQUIRED ABSENCE OF OTHER SPECIFIED PARTS OF DIGESTIVE TRACT: Chronic | ICD-10-CM

## 2018-01-01 DIAGNOSIS — N20.0 CALCULUS OF KIDNEY: Chronic | ICD-10-CM

## 2018-01-01 DIAGNOSIS — Z95.810 PRESENCE OF AUTOMATIC (IMPLANTABLE) CARDIAC DEFIBRILLATOR: Chronic | ICD-10-CM

## 2018-01-01 DIAGNOSIS — Z90.5 ACQUIRED ABSENCE OF KIDNEY: Chronic | ICD-10-CM

## 2018-01-01 LAB
ALBUMIN SERPL ELPH-MCNC: 3.8 G/DL — SIGNIFICANT CHANGE UP (ref 3.3–5)
ALP SERPL-CCNC: 162 U/L — HIGH (ref 40–120)
ALT FLD-CCNC: 130 U/L — HIGH (ref 4–41)
APPEARANCE UR: CLEAR — SIGNIFICANT CHANGE UP
APTT BLD: 42 SEC — HIGH (ref 27.5–37.4)
AST SERPL-CCNC: 99 U/L — HIGH (ref 4–40)
BASOPHILS # BLD AUTO: 0.02 K/UL — SIGNIFICANT CHANGE UP (ref 0–0.2)
BASOPHILS NFR BLD AUTO: 0.2 % — SIGNIFICANT CHANGE UP (ref 0–2)
BILIRUB SERPL-MCNC: 0.6 MG/DL — SIGNIFICANT CHANGE UP (ref 0.2–1.2)
BILIRUB UR-MCNC: NEGATIVE — SIGNIFICANT CHANGE UP
BLOOD UR QL VISUAL: HIGH
BUN SERPL-MCNC: 23 MG/DL — SIGNIFICANT CHANGE UP (ref 7–23)
CALCIUM SERPL-MCNC: 8.3 MG/DL — LOW (ref 8.4–10.5)
CHLORIDE SERPL-SCNC: 104 MMOL/L — SIGNIFICANT CHANGE UP (ref 98–107)
CK MB BLD-MCNC: 5 — HIGH (ref 0–2.5)
CK MB BLD-MCNC: 8.83 NG/ML — HIGH (ref 1–6.6)
CK SERPL-CCNC: 178 U/L — SIGNIFICANT CHANGE UP (ref 30–200)
CO2 SERPL-SCNC: 21 MMOL/L — LOW (ref 22–31)
COLOR SPEC: SIGNIFICANT CHANGE UP
CREAT SERPL-MCNC: 0.98 MG/DL — SIGNIFICANT CHANGE UP (ref 0.5–1.3)
EOSINOPHIL # BLD AUTO: 0.05 K/UL — SIGNIFICANT CHANGE UP (ref 0–0.5)
EOSINOPHIL NFR BLD AUTO: 0.6 % — SIGNIFICANT CHANGE UP (ref 0–6)
EPI CELLS # UR: SIGNIFICANT CHANGE UP
GLUCOSE SERPL-MCNC: 81 MG/DL — SIGNIFICANT CHANGE UP (ref 70–99)
GLUCOSE UR-MCNC: NEGATIVE — SIGNIFICANT CHANGE UP
HCT VFR BLD CALC: 40.4 % — SIGNIFICANT CHANGE UP (ref 39–50)
HGB BLD-MCNC: 13.4 G/DL — SIGNIFICANT CHANGE UP (ref 13–17)
IMM GRANULOCYTES # BLD AUTO: 0.03 # — SIGNIFICANT CHANGE UP
IMM GRANULOCYTES NFR BLD AUTO: 0.4 % — SIGNIFICANT CHANGE UP (ref 0–1.5)
INR BLD: 1.28 — HIGH (ref 0.88–1.17)
KETONES UR-MCNC: NEGATIVE — SIGNIFICANT CHANGE UP
LEUKOCYTE ESTERASE UR-ACNC: SIGNIFICANT CHANGE UP
LYMPHOCYTES # BLD AUTO: 1.03 K/UL — SIGNIFICANT CHANGE UP (ref 1–3.3)
LYMPHOCYTES # BLD AUTO: 12 % — LOW (ref 13–44)
MCHC RBC-ENTMCNC: 29.3 PG — SIGNIFICANT CHANGE UP (ref 27–34)
MCHC RBC-ENTMCNC: 33.2 % — SIGNIFICANT CHANGE UP (ref 32–36)
MCV RBC AUTO: 88.2 FL — SIGNIFICANT CHANGE UP (ref 80–100)
MONOCYTES # BLD AUTO: 1.29 K/UL — HIGH (ref 0–0.9)
MONOCYTES NFR BLD AUTO: 15.1 % — HIGH (ref 2–14)
NEUTROPHILS # BLD AUTO: 6.14 K/UL — SIGNIFICANT CHANGE UP (ref 1.8–7.4)
NEUTROPHILS NFR BLD AUTO: 71.7 % — SIGNIFICANT CHANGE UP (ref 43–77)
NITRITE UR-MCNC: NEGATIVE — SIGNIFICANT CHANGE UP
NRBC # FLD: 0 — SIGNIFICANT CHANGE UP
PH UR: 5.5 — SIGNIFICANT CHANGE UP (ref 5–8)
PLATELET # BLD AUTO: 128 K/UL — LOW (ref 150–400)
PMV BLD: 10.3 FL — SIGNIFICANT CHANGE UP (ref 7–13)
POTASSIUM SERPL-MCNC: 4.2 MMOL/L — SIGNIFICANT CHANGE UP (ref 3.5–5.3)
POTASSIUM SERPL-SCNC: 4.2 MMOL/L — SIGNIFICANT CHANGE UP (ref 3.5–5.3)
PROT SERPL-MCNC: 6.6 G/DL — SIGNIFICANT CHANGE UP (ref 6–8.3)
PROT UR-MCNC: 100 MG/DL — SIGNIFICANT CHANGE UP
PROTHROM AB SERPL-ACNC: 14.3 SEC — HIGH (ref 9.8–13.1)
RBC # BLD: 4.58 M/UL — SIGNIFICANT CHANGE UP (ref 4.2–5.8)
RBC # FLD: 14.2 % — SIGNIFICANT CHANGE UP (ref 10.3–14.5)
RBC CASTS # UR COMP ASSIST: >50 — HIGH (ref 0–?)
SODIUM SERPL-SCNC: 139 MMOL/L — SIGNIFICANT CHANGE UP (ref 135–145)
SP GR SPEC: 1.02 — SIGNIFICANT CHANGE UP (ref 1–1.04)
TROPONIN T SERPL-MCNC: < 0.06 NG/ML — SIGNIFICANT CHANGE UP (ref 0–0.06)
UROBILINOGEN FLD QL: NORMAL MG/DL — SIGNIFICANT CHANGE UP
WBC # BLD: 8.56 K/UL — SIGNIFICANT CHANGE UP (ref 3.8–10.5)
WBC # FLD AUTO: 8.56 K/UL — SIGNIFICANT CHANGE UP (ref 3.8–10.5)
WBC UR QL: HIGH (ref 0–?)

## 2018-01-01 RX ORDER — SODIUM CHLORIDE 9 MG/ML
1000 INJECTION INTRAMUSCULAR; INTRAVENOUS; SUBCUTANEOUS ONCE
Qty: 0 | Refills: 0 | Status: COMPLETED | OUTPATIENT
Start: 2018-01-01 | End: 2018-01-01

## 2018-01-01 RX ORDER — MORPHINE SULFATE 50 MG/1
4 CAPSULE, EXTENDED RELEASE ORAL ONCE
Qty: 0 | Refills: 0 | Status: DISCONTINUED | OUTPATIENT
Start: 2018-01-01 | End: 2018-01-01

## 2018-01-01 RX ADMIN — MORPHINE SULFATE 4 MILLIGRAM(S): 50 CAPSULE, EXTENDED RELEASE ORAL at 21:55

## 2018-01-01 RX ADMIN — MORPHINE SULFATE 4 MILLIGRAM(S): 50 CAPSULE, EXTENDED RELEASE ORAL at 18:55

## 2018-01-01 RX ADMIN — MORPHINE SULFATE 4 MILLIGRAM(S): 50 CAPSULE, EXTENDED RELEASE ORAL at 21:40

## 2018-01-01 RX ADMIN — MORPHINE SULFATE 4 MILLIGRAM(S): 50 CAPSULE, EXTENDED RELEASE ORAL at 19:10

## 2018-01-01 RX ADMIN — SODIUM CHLORIDE 1000 MILLILITER(S): 9 INJECTION INTRAMUSCULAR; INTRAVENOUS; SUBCUTANEOUS at 21:05

## 2018-01-01 NOTE — ED ADULT TRIAGE NOTE - CHIEF COMPLAINT QUOTE
pt bib ems with blood in  urine,  x 1/2 hr ;cough, chest pain, L flank pain, and congestion  x few days.  Pt is on eliquis

## 2018-01-01 NOTE — ED PROVIDER NOTE - CARE PLAN
Principal Discharge DX:	Chest pain  Goal:	r/o ACS, r/o nephrolithiasis  Secondary Diagnosis:	Flank pain

## 2018-01-01 NOTE — ED PROVIDER NOTE - OBJECTIVE STATEMENT
56 yo M w/PMHx history of CAD s/p stents x 2 ~4 months ago, A.fib on Apixaban, COPD, renal cancer, scheduled for partial nephrectomy next week, 56 yo M w/PMHx history of CAD s/p stents x 2 ~4 months ago, A.fib on Apixaban, COPD, renal cancer (s/p Partial nephrectomy in 2002, repeated biopsy 2 months ago revealed recurrence in stage 2), scheduled for partial nephrectomy next week, presenting w/acute left sided chest pain x 1-2 hrs and left flank pain w/hematuria x3 hrs. 56 yo M w/PMHx history of CAD s/p stents x 2 ~4 months ago, A.fib on Apixaban, COPD, renal cancer (s/p Partial nephrectomy in 2002, repeated biopsy 2 months ago revealed recurrence in stage 2), scheduled for partial nephrectomy next week, presenting w/acute left sided chest pain x 1-2 hrs and left flank pain w/hematuria x3 hrs. Denies F/C, +cough w/yellow/brown sputum, +mild SOB, denies N/V, peripheral weakness. +Peripheral swelling in LE B/L.

## 2018-01-01 NOTE — ED PROVIDER NOTE - MEDICAL DECISION MAKING DETAILS
56 yo M w/PMHx history of CAD s/p stents x 2 ~4 months ago, A.fib on Apixaban, COPD, renal cancer (s/p Partial nephrectomy in 2002, repeated biopsy 2 months ago revealed recurrence in stage 2), scheduled for partial nephrectomy next week, presenting w/acute left sided chest pain x 1-2 hrs and left flank pain w/hematuria x3 hrs. 56 yo M w/PMHx history of CAD s/p stents x 2 ~4 months ago, A.fib on Apixaban, COPD, renal cancer (s/p Partial nephrectomy in 2002, repeated biopsy 2 months ago revealed recurrence in stage 2), scheduled for partial nephrectomy next week, presenting w/acute left sided chest pain x 1-2 hrs and left flank pain w/hematuria x3 hrs.  -pain control PRN  -CE, EKG, UA, UCX, CBC, CMP, CT A/P, coags

## 2018-01-01 NOTE — ED ADULT NURSE NOTE - OBJECTIVE STATEMENT
Pt received in spot 13. Alert and oriented x3, ambulatory. Co hematuria, chest pain, left flank pain x 1 hour. Hx of afib, aicd, copd, htn, cad (cardiac stents x2), opiod dependence. Labs sent. IV placed. VS as stated. MD at bedside. Will continue to monitor.

## 2018-01-01 NOTE — ED PROVIDER NOTE - ATTENDING CONTRIBUTION TO CARE
KIMBERLY Attending Note - Dr. Deras 58 yo M w/PMHx history of CAD s/p stents x 2 ~4 months ago, A.fib on Apixaban, COPD, renal cancer (s/p Partial nephrectomy in 2002, repeated biopsy 2 months ago revealed recurrence in stage 2), scheduled for partial nephrectomy next week, presenting w/acute left sided chest pain x 1-2 hrs   PE: pt is alert and oriented, perrl, ent normal, membranes are moist, neck supple. no lymphadenopathy or thyroid enlargement, No JVD.  Chest clear to P&A, Heart- reg rhythm without murmur, rubs or gallops, radial pulses equal bilaterally.  Abd is soft, non-tender, Bowel sounds are active. no mass or organomegaly. : left CVA tenderness. Neuro:  Pt alert and oriented x 3. Perrl    Distal neurosensory is intact. Motor function is 5/5 strength bilaterally.  No focal deficits. Extremities:  No edema.  Skin: warm and dry.  Impression: Chest pain R/O ACS, Hematuria from Renal cancer   Plan: labs, EKG, Troponin, admission

## 2018-01-02 DIAGNOSIS — I25.10 ATHEROSCLEROTIC HEART DISEASE OF NATIVE CORONARY ARTERY WITHOUT ANGINA PECTORIS: ICD-10-CM

## 2018-01-02 DIAGNOSIS — Z29.9 ENCOUNTER FOR PROPHYLACTIC MEASURES, UNSPECIFIED: ICD-10-CM

## 2018-01-02 DIAGNOSIS — E78.5 HYPERLIPIDEMIA, UNSPECIFIED: ICD-10-CM

## 2018-01-02 DIAGNOSIS — R07.9 CHEST PAIN, UNSPECIFIED: ICD-10-CM

## 2018-01-02 DIAGNOSIS — I10 ESSENTIAL (PRIMARY) HYPERTENSION: ICD-10-CM

## 2018-01-02 DIAGNOSIS — R10.9 UNSPECIFIED ABDOMINAL PAIN: ICD-10-CM

## 2018-01-02 DIAGNOSIS — I48.91 UNSPECIFIED ATRIAL FIBRILLATION: ICD-10-CM

## 2018-01-02 LAB
AMPHET UR-MCNC: NEGATIVE — SIGNIFICANT CHANGE UP
ANISOCYTOSIS BLD QL: SLIGHT — SIGNIFICANT CHANGE UP
APAP SERPL-MCNC: < 15 UG/ML — LOW (ref 15–25)
BARBITURATES MEASUREMENT: NEGATIVE — SIGNIFICANT CHANGE UP
BARBITURATES UR SCN-MCNC: NEGATIVE — SIGNIFICANT CHANGE UP
BASOPHILS # BLD AUTO: 0.02 K/UL — SIGNIFICANT CHANGE UP (ref 0–0.2)
BASOPHILS NFR BLD AUTO: 0.3 % — SIGNIFICANT CHANGE UP (ref 0–2)
BASOPHILS NFR SPEC: 0 % — SIGNIFICANT CHANGE UP (ref 0–2)
BENZODIAZ SERPL-MCNC: NEGATIVE — SIGNIFICANT CHANGE UP
BENZODIAZ UR-MCNC: NEGATIVE — SIGNIFICANT CHANGE UP
BLASTS # FLD: 0 % — SIGNIFICANT CHANGE UP (ref 0–0)
BUN SERPL-MCNC: 27 MG/DL — HIGH (ref 7–23)
CALCIUM SERPL-MCNC: 8.2 MG/DL — LOW (ref 8.4–10.5)
CANNABINOIDS UR-MCNC: POSITIVE — SIGNIFICANT CHANGE UP
CHLORIDE SERPL-SCNC: 106 MMOL/L — SIGNIFICANT CHANGE UP (ref 98–107)
CK MB BLD-MCNC: 5.93 NG/ML — SIGNIFICANT CHANGE UP (ref 1–6.6)
CK MB BLD-MCNC: SIGNIFICANT CHANGE UP (ref 0–2.5)
CK SERPL-CCNC: 133 U/L — SIGNIFICANT CHANGE UP (ref 30–200)
CO2 SERPL-SCNC: 24 MMOL/L — SIGNIFICANT CHANGE UP (ref 22–31)
COCAINE METAB.OTHER UR-MCNC: NEGATIVE — SIGNIFICANT CHANGE UP
CREAT SERPL-MCNC: 1.05 MG/DL — SIGNIFICANT CHANGE UP (ref 0.5–1.3)
EOSINOPHIL # BLD AUTO: 0.09 K/UL — SIGNIFICANT CHANGE UP (ref 0–0.5)
EOSINOPHIL NFR BLD AUTO: 1.6 % — SIGNIFICANT CHANGE UP (ref 0–6)
EOSINOPHIL NFR FLD: 1.9 % — SIGNIFICANT CHANGE UP (ref 0–6)
ETHANOL BLD-MCNC: < 10 MG/DL — SIGNIFICANT CHANGE UP
GIANT PLATELETS BLD QL SMEAR: PRESENT — SIGNIFICANT CHANGE UP
GLUCOSE SERPL-MCNC: 120 MG/DL — HIGH (ref 70–99)
HCT VFR BLD CALC: 37.3 % — LOW (ref 39–50)
HGB BLD-MCNC: 12.7 G/DL — LOW (ref 13–17)
IMM GRANULOCYTES # BLD AUTO: 0.03 # — SIGNIFICANT CHANGE UP
IMM GRANULOCYTES NFR BLD AUTO: 0.5 % — SIGNIFICANT CHANGE UP (ref 0–1.5)
LYMPHOCYTES # BLD AUTO: 1.37 K/UL — SIGNIFICANT CHANGE UP (ref 1–3.3)
LYMPHOCYTES # BLD AUTO: 24 % — SIGNIFICANT CHANGE UP (ref 13–44)
LYMPHOCYTES NFR SPEC AUTO: 17 % — SIGNIFICANT CHANGE UP (ref 13–44)
MAGNESIUM SERPL-MCNC: 1.9 MG/DL — SIGNIFICANT CHANGE UP (ref 1.6–2.6)
MCHC RBC-ENTMCNC: 30.7 PG — SIGNIFICANT CHANGE UP (ref 27–34)
MCHC RBC-ENTMCNC: 34 % — SIGNIFICANT CHANGE UP (ref 32–36)
MCV RBC AUTO: 90.1 FL — SIGNIFICANT CHANGE UP (ref 80–100)
METAMYELOCYTES # FLD: 0 % — SIGNIFICANT CHANGE UP (ref 0–1)
METHADONE UR-MCNC: NEGATIVE — SIGNIFICANT CHANGE UP
MICROCYTES BLD QL: SLIGHT — SIGNIFICANT CHANGE UP
MONOCYTES # BLD AUTO: 1.33 K/UL — HIGH (ref 0–0.9)
MONOCYTES NFR BLD AUTO: 23.3 % — HIGH (ref 2–14)
MONOCYTES NFR BLD: 16 % — HIGH (ref 2–9)
MYELOCYTES NFR BLD: 0 % — SIGNIFICANT CHANGE UP (ref 0–0)
NEUTROPHIL AB SER-ACNC: 57.6 % — SIGNIFICANT CHANGE UP (ref 43–77)
NEUTROPHILS # BLD AUTO: 2.88 K/UL — SIGNIFICANT CHANGE UP (ref 1.8–7.4)
NEUTROPHILS NFR BLD AUTO: 50.3 % — SIGNIFICANT CHANGE UP (ref 43–77)
NEUTS BAND # BLD: 2.8 % — SIGNIFICANT CHANGE UP (ref 0–6)
NRBC # FLD: 0 — SIGNIFICANT CHANGE UP
OPIATES UR-MCNC: POSITIVE — SIGNIFICANT CHANGE UP
OTHER - HEMATOLOGY %: 0 — SIGNIFICANT CHANGE UP
OXYCODONE UR-MCNC: NEGATIVE — SIGNIFICANT CHANGE UP
PCP UR-MCNC: NEGATIVE — SIGNIFICANT CHANGE UP
PHOSPHATE SERPL-MCNC: 3.4 MG/DL — SIGNIFICANT CHANGE UP (ref 2.5–4.5)
PLATELET # BLD AUTO: 113 K/UL — LOW (ref 150–400)
PLATELET COUNT - ESTIMATE: SIGNIFICANT CHANGE UP
PMV BLD: 10.2 FL — SIGNIFICANT CHANGE UP (ref 7–13)
POTASSIUM SERPL-MCNC: 4.3 MMOL/L — SIGNIFICANT CHANGE UP (ref 3.5–5.3)
POTASSIUM SERPL-SCNC: 4.3 MMOL/L — SIGNIFICANT CHANGE UP (ref 3.5–5.3)
PROMYELOCYTES # FLD: 0 % — SIGNIFICANT CHANGE UP (ref 0–0)
RBC # BLD: 4.14 M/UL — LOW (ref 4.2–5.8)
RBC # FLD: 14.1 % — SIGNIFICANT CHANGE UP (ref 10.3–14.5)
SALICYLATES SERPL-MCNC: < 5 MG/DL — LOW (ref 15–30)
SODIUM SERPL-SCNC: 139 MMOL/L — SIGNIFICANT CHANGE UP (ref 135–145)
TROPONIN T SERPL-MCNC: < 0.06 NG/ML — SIGNIFICANT CHANGE UP (ref 0–0.06)
TSH SERPL-MCNC: 0.67 UIU/ML — SIGNIFICANT CHANGE UP (ref 0.27–4.2)
VARIANT LYMPHS # BLD: 4.7 % — SIGNIFICANT CHANGE UP
WBC # BLD: 5.72 K/UL — SIGNIFICANT CHANGE UP (ref 3.8–10.5)
WBC # FLD AUTO: 5.72 K/UL — SIGNIFICANT CHANGE UP (ref 3.8–10.5)

## 2018-01-02 RX ORDER — APIXABAN 2.5 MG/1
5 TABLET, FILM COATED ORAL EVERY 12 HOURS
Qty: 0 | Refills: 0 | Status: DISCONTINUED | OUTPATIENT
Start: 2018-01-02 | End: 2018-01-03

## 2018-01-02 RX ORDER — ATORVASTATIN CALCIUM 80 MG/1
40 TABLET, FILM COATED ORAL AT BEDTIME
Qty: 0 | Refills: 0 | Status: DISCONTINUED | OUTPATIENT
Start: 2018-01-02 | End: 2018-01-03

## 2018-01-02 RX ORDER — LIDOCAINE 4 G/100G
1 CREAM TOPICAL DAILY
Qty: 0 | Refills: 0 | Status: DISCONTINUED | OUTPATIENT
Start: 2018-01-02 | End: 2018-01-03

## 2018-01-02 RX ORDER — APIXABAN 2.5 MG/1
5 TABLET, FILM COATED ORAL EVERY 12 HOURS
Qty: 0 | Refills: 0 | Status: DISCONTINUED | OUTPATIENT
Start: 2018-01-02 | End: 2018-01-02

## 2018-01-02 RX ORDER — MORPHINE SULFATE 50 MG/1
1 CAPSULE, EXTENDED RELEASE ORAL ONCE
Qty: 0 | Refills: 0 | Status: DISCONTINUED | OUTPATIENT
Start: 2018-01-02 | End: 2018-01-02

## 2018-01-02 RX ORDER — CLOPIDOGREL BISULFATE 75 MG/1
75 TABLET, FILM COATED ORAL DAILY
Qty: 0 | Refills: 0 | Status: DISCONTINUED | OUTPATIENT
Start: 2018-01-02 | End: 2018-01-03

## 2018-01-02 RX ORDER — METOPROLOL TARTRATE 50 MG
50 TABLET ORAL
Qty: 0 | Refills: 0 | Status: DISCONTINUED | OUTPATIENT
Start: 2018-01-02 | End: 2018-01-02

## 2018-01-02 RX ORDER — GABAPENTIN 400 MG/1
400 CAPSULE ORAL THREE TIMES A DAY
Qty: 0 | Refills: 0 | Status: DISCONTINUED | OUTPATIENT
Start: 2018-01-02 | End: 2018-01-03

## 2018-01-02 RX ORDER — OXYCODONE HYDROCHLORIDE 5 MG/1
5 TABLET ORAL ONCE
Qty: 0 | Refills: 0 | Status: DISCONTINUED | OUTPATIENT
Start: 2018-01-02 | End: 2018-01-02

## 2018-01-02 RX ORDER — METOPROLOL TARTRATE 50 MG
25 TABLET ORAL
Qty: 0 | Refills: 0 | Status: DISCONTINUED | OUTPATIENT
Start: 2018-01-02 | End: 2018-01-03

## 2018-01-02 RX ORDER — TRAMADOL HYDROCHLORIDE 50 MG/1
25 TABLET ORAL ONCE
Qty: 0 | Refills: 0 | Status: DISCONTINUED | OUTPATIENT
Start: 2018-01-02 | End: 2018-01-02

## 2018-01-02 RX ORDER — SODIUM CHLORIDE 9 MG/ML
3 INJECTION INTRAMUSCULAR; INTRAVENOUS; SUBCUTANEOUS EVERY 8 HOURS
Qty: 0 | Refills: 0 | Status: DISCONTINUED | OUTPATIENT
Start: 2018-01-02 | End: 2018-01-03

## 2018-01-02 RX ORDER — MORPHINE SULFATE 50 MG/1
0.5 CAPSULE, EXTENDED RELEASE ORAL ONCE
Qty: 0 | Refills: 0 | Status: DISCONTINUED | OUTPATIENT
Start: 2018-01-02 | End: 2018-01-02

## 2018-01-02 RX ORDER — OXYCODONE HYDROCHLORIDE 5 MG/1
5 TABLET ORAL EVERY 8 HOURS
Qty: 0 | Refills: 0 | Status: DISCONTINUED | OUTPATIENT
Start: 2018-01-02 | End: 2018-01-03

## 2018-01-02 RX ORDER — TRAMADOL HYDROCHLORIDE 50 MG/1
50 TABLET ORAL EVERY 12 HOURS
Qty: 0 | Refills: 0 | Status: DISCONTINUED | OUTPATIENT
Start: 2018-01-02 | End: 2018-01-02

## 2018-01-02 RX ORDER — TAMSULOSIN HYDROCHLORIDE 0.4 MG/1
0.4 CAPSULE ORAL AT BEDTIME
Qty: 0 | Refills: 0 | Status: DISCONTINUED | OUTPATIENT
Start: 2018-01-02 | End: 2018-01-03

## 2018-01-02 RX ADMIN — SODIUM CHLORIDE 3 MILLILITER(S): 9 INJECTION INTRAMUSCULAR; INTRAVENOUS; SUBCUTANEOUS at 20:42

## 2018-01-02 RX ADMIN — Medication 25 MILLIGRAM(S): at 17:50

## 2018-01-02 RX ADMIN — TAMSULOSIN HYDROCHLORIDE 0.4 MILLIGRAM(S): 0.4 CAPSULE ORAL at 20:42

## 2018-01-02 RX ADMIN — OXYCODONE HYDROCHLORIDE 5 MILLIGRAM(S): 5 TABLET ORAL at 13:40

## 2018-01-02 RX ADMIN — GABAPENTIN 400 MILLIGRAM(S): 400 CAPSULE ORAL at 20:42

## 2018-01-02 RX ADMIN — GABAPENTIN 400 MILLIGRAM(S): 400 CAPSULE ORAL at 06:07

## 2018-01-02 RX ADMIN — SODIUM CHLORIDE 3 MILLILITER(S): 9 INJECTION INTRAMUSCULAR; INTRAVENOUS; SUBCUTANEOUS at 06:07

## 2018-01-02 RX ADMIN — SODIUM CHLORIDE 3 MILLILITER(S): 9 INJECTION INTRAMUSCULAR; INTRAVENOUS; SUBCUTANEOUS at 14:22

## 2018-01-02 RX ADMIN — OXYCODONE HYDROCHLORIDE 5 MILLIGRAM(S): 5 TABLET ORAL at 14:10

## 2018-01-02 RX ADMIN — OXYCODONE HYDROCHLORIDE 5 MILLIGRAM(S): 5 TABLET ORAL at 12:45

## 2018-01-02 RX ADMIN — Medication 5 MILLIGRAM(S): at 06:06

## 2018-01-02 RX ADMIN — APIXABAN 5 MILLIGRAM(S): 2.5 TABLET, FILM COATED ORAL at 06:06

## 2018-01-02 RX ADMIN — CLOPIDOGREL BISULFATE 75 MILLIGRAM(S): 75 TABLET, FILM COATED ORAL at 12:58

## 2018-01-02 RX ADMIN — MORPHINE SULFATE 0.5 MILLIGRAM(S): 50 CAPSULE, EXTENDED RELEASE ORAL at 22:36

## 2018-01-02 RX ADMIN — MORPHINE SULFATE 1 MILLIGRAM(S): 50 CAPSULE, EXTENDED RELEASE ORAL at 04:10

## 2018-01-02 RX ADMIN — MORPHINE SULFATE 0.5 MILLIGRAM(S): 50 CAPSULE, EXTENDED RELEASE ORAL at 20:42

## 2018-01-02 RX ADMIN — Medication 25 MILLIGRAM(S): at 06:07

## 2018-01-02 RX ADMIN — ATORVASTATIN CALCIUM 40 MILLIGRAM(S): 80 TABLET, FILM COATED ORAL at 20:42

## 2018-01-02 RX ADMIN — APIXABAN 5 MILLIGRAM(S): 2.5 TABLET, FILM COATED ORAL at 17:50

## 2018-01-02 RX ADMIN — OXYCODONE HYDROCHLORIDE 5 MILLIGRAM(S): 5 TABLET ORAL at 12:15

## 2018-01-02 RX ADMIN — GABAPENTIN 400 MILLIGRAM(S): 400 CAPSULE ORAL at 13:33

## 2018-01-02 NOTE — H&P ADULT - PROBLEM SELECTOR PLAN 4
Patient with episode of hematuria at home. Monitor for hematuria.   Will continue plavix for now given patient had stent placed this year.  H/H stable. Monitor H/H Patient with episode of hematuria at home. Monitor for hematuria.   Will continue plavix for now given patient had stent placed this year.  H/H stable. Monitor H/H  Urology consult in AM

## 2018-01-02 NOTE — H&P ADULT - HISTORY OF PRESENT ILLNESS
57 year old male, from home, ambulates with a walker. PMHx opioid dependence, HLD, HTN, CAD (stents x2 [Sep/17]) Afib (Eliquis), AICD (Medtronic), RCC of left kidney (s/p Partial nephrectomy in 2002, repeated biopsy 2 months ago revealed recurrence in stage 2). BIBEMS to ED c/o sharp left chest pain x 1 day. Patient states the chest pain is L sided, constant and radiates to the L side of the neck. He also complains of L sided flank pain and episodes of hematuria. He states he gets hematuria occassionally. Denies fever, chills cough, falls, LOC, SOB, abdominal pain, nausea, vomiting, melena, hematochezia, LE edema, calf tenderness or dysuria.

## 2018-01-02 NOTE — H&P ADULT - NSHPLABSRESULTS_GEN_ALL_CORE
EKG: NSR 84 BPM                        13.4   8.56  )-----------( 128      ( 01 Jan 2018 17:45 )             40.4   01-01    139  |  104  |  23  ----------------------------<  81  4.2   |  21<L>  |  0.98    Ca    8.3<L>      01 Jan 2018 17:45    TPro  6.6  /  Alb  3.8  /  TBili  0.6  /  DBili  x   /  AST  99<H>  /  ALT  130<H>  /  AlkPhos  162<H>  01-01  CARDIAC MARKERS ( 01 Jan 2018 17:45 )  x     / < 0.06 ng/mL / 178 u/L / 8.83 ng/mL / x        < from: Xray Chest 1 View AP- PORTABLE-Urgent (01.01.18 @ 18:56) >    PROCEDURE DATE:  Jan 1 2018     ******PRELIMINARY REPORT******    ******PRELIMINARY REPORT******            INTERPRETATION:  no emergent findings            ******PRELIMINARY REPORT******    ******PRELIMINARY REPORT******          STACEY LEMUS M.D., RADIOLOGY RESIDENT      < end of copied text >

## 2018-01-02 NOTE — H&P ADULT - PROBLEM SELECTOR PLAN 2
cont metoprolol  [id number] score of 3 cont metoprolol  [id number] score of 3  Will continue eliquis for now. patient had no episode of hematuria since arrival to ED and H/H stable. if patient continues to have hematuria or have drop in H/H consider holding eliquis cont metoprolol  [id number] score of 3  Will continue eliquis for now. patient had no episode of hematuria since arrival to ED and H/H stable.

## 2018-01-02 NOTE — H&P ADULT - PROBLEM SELECTOR PLAN 3
cont plavix. will continue plavix for now despite complaint of hematuria given stable H/H and patient had stent placed this year.   hx of asa allergy

## 2018-01-02 NOTE — H&P ADULT - ATTENDING COMMENTS
Last labs were February 2016. With patient's health problems and medications she needs labs and visit every 6 months. She was last seen last August. She has a lab order given at that visit. She needs to get her labs and be seen with the resident in the next month or I will not refill any further medications.   Pt seen and examined at bedside. Agree with assessment and plan as above   Admitted with Chest pain  history of CAD/PCI  Check SPECT   If large zone of ischemia, will need coronary angiography

## 2018-01-02 NOTE — H&P ADULT - PROBLEM SELECTOR PLAN 1
Admit to tele  check cbc, bmp,a 1c, flp, tsh, trend CE  consider ischemic eval  f/u M Dnote  cont plavix Admit to tele  check cbc, bmp,a 1c, flp, tsh, trend CE  SPECT  f/u M Dnote  cont plavix

## 2018-01-02 NOTE — H&P ADULT - ASSESSMENT
57 year old male, from home, ambulates with a walker. PMHx opioid dependence, HLD, HTN, CAD (stents x2 [Sep/17]) Afib (Eliquis), AICD (Medtronic), RCC of left kidney (s/p Partial nephrectomy in 2002, repeated biopsy 2 months ago revealed recurrence in stage 2). BIBEMS to ED c/o sharp left chest pain x 1 day.  admitted for chest pain and hematuria

## 2018-01-02 NOTE — ED ADULT NURSE REASSESSMENT NOTE - NS ED NURSE REASSESS COMMENT FT1
Pt c/o left flank pain, non-radiating rated 7-10 on pain scale over past few hours. Many pain interventions attempted. Comfort measures provided, blankets and pillows provided. MIKE Gasca contacted and aware of patient's pain status. Tramadol ordered and offered, patient refused at 01:50. Oxycodone ordered and offered, patient refused at 03:45. Patient states only morphine IV helps the pain. Patient does not show any symptoms of acute pain, VS within normal range, ambulatory without assistance with ease, on cell phone, sleeping, no grimace, no guarding, no crying, no moaning. Morphine 1mg IVP administered as ordered. Patient continues to sleep.

## 2018-01-03 ENCOUNTER — TRANSCRIPTION ENCOUNTER (OUTPATIENT)
Age: 58
End: 2018-01-03

## 2018-01-03 VITALS
DIASTOLIC BLOOD PRESSURE: 83 MMHG | OXYGEN SATURATION: 96 % | SYSTOLIC BLOOD PRESSURE: 130 MMHG | TEMPERATURE: 97 F | HEART RATE: 56 BPM | RESPIRATION RATE: 18 BRPM

## 2018-01-03 LAB
BACTERIA UR CULT: SIGNIFICANT CHANGE UP
SPECIMEN SOURCE: SIGNIFICANT CHANGE UP

## 2018-01-03 RX ORDER — TIZANIDINE 4 MG/1
2 TABLET ORAL EVERY 6 HOURS
Qty: 0 | Refills: 0 | Status: DISCONTINUED | OUTPATIENT
Start: 2018-01-03 | End: 2018-01-03

## 2018-01-03 RX ORDER — ACETAMINOPHEN 500 MG
2 TABLET ORAL
Qty: 0 | Refills: 0 | COMMUNITY
Start: 2018-01-03

## 2018-01-03 RX ORDER — ACETAMINOPHEN 500 MG
650 TABLET ORAL EVERY 6 HOURS
Qty: 0 | Refills: 0 | Status: DISCONTINUED | OUTPATIENT
Start: 2018-01-03 | End: 2018-01-03

## 2018-01-03 RX ADMIN — Medication 5 MILLIGRAM(S): at 05:12

## 2018-01-03 RX ADMIN — OXYCODONE HYDROCHLORIDE 5 MILLIGRAM(S): 5 TABLET ORAL at 13:05

## 2018-01-03 RX ADMIN — APIXABAN 5 MILLIGRAM(S): 2.5 TABLET, FILM COATED ORAL at 05:12

## 2018-01-03 RX ADMIN — CLOPIDOGREL BISULFATE 75 MILLIGRAM(S): 75 TABLET, FILM COATED ORAL at 12:12

## 2018-01-03 RX ADMIN — OXYCODONE HYDROCHLORIDE 5 MILLIGRAM(S): 5 TABLET ORAL at 04:00

## 2018-01-03 RX ADMIN — OXYCODONE HYDROCHLORIDE 5 MILLIGRAM(S): 5 TABLET ORAL at 04:32

## 2018-01-03 RX ADMIN — OXYCODONE HYDROCHLORIDE 5 MILLIGRAM(S): 5 TABLET ORAL at 12:12

## 2018-01-03 RX ADMIN — GABAPENTIN 400 MILLIGRAM(S): 400 CAPSULE ORAL at 05:12

## 2018-01-03 RX ADMIN — GABAPENTIN 400 MILLIGRAM(S): 400 CAPSULE ORAL at 12:12

## 2018-01-03 RX ADMIN — Medication 25 MILLIGRAM(S): at 05:12

## 2018-01-03 NOTE — DISCHARGE NOTE ADULT - CARE PLAN
Principal Discharge DX:	Chest pain  Goal:	Prevent future episodes. You have refused to pursue further cardiac workup despite encouragement.  Instructions for follow-up, activity and diet:	Follow up with cardiologist within one week of discharge. Call for appointment. Return to ED for any concerning symptoms. Continue medications as prescribed. Low salt, low fat, low cholesterol diet.  Secondary Diagnosis:	CAD (coronary artery disease)  Goal:	Prevent progression of disease. Ensure compliance with medications.  Instructions for follow-up, activity and diet:	Follow up with cardiologist within one week of discharge. Call for appointment. Return to ED for any concerning symptoms. Continue medications as prescribed. Low salt, low fat, low cholesterol diet.  Secondary Diagnosis:	AF (atrial fibrillation)  Goal:	Maintain HR between 60bpm-100bpm. Continue Eliquis for stroke prevention.  Instructions for follow-up, activity and diet:	Follow up with cardiologist within one week of discharge. Call for appointment. Continue medications as instructed.  Secondary Diagnosis:	HTN (hypertension)  Goal:	Maintain adequate control of your blood pressure. Goal BP < 130/80. Continue low sodium diet.  Instructions for follow-up, activity and diet:	Follow up with PCP and/or cardiologist for ongoing medical management of your hypertension. Continue medications as prescribed. Low salt diet.  Secondary Diagnosis:	HLD (hyperlipidemia)  Goal:	Maintain adequate control of your cholesterol levels. Goal LDL < 70.  Instructions for follow-up, activity and diet:	Follow up with PCP for ongoing medical management. Continue medications as prescribed. Low cholesterol diet.  Secondary Diagnosis:	Renal cell carcinoma of left kidney  Goal:	Follow up with your oncologist.  Instructions for follow-up, activity and diet:	Follow up with your oncologist.

## 2018-01-03 NOTE — DISCHARGE NOTE ADULT - PROVIDER TOKENS
FREE:[LAST:[Cardiologist],PHONE:[(   )    -],FAX:[(   )    -],ADDRESS:[Follow up with your outpatient cardiologist.]]

## 2018-01-03 NOTE — DISCHARGE NOTE ADULT - NS AS ACTIVITY OBS
Walking-Outdoors allowed/Showering allowed/Walking-Indoors allowed/No Heavy lifting/straining/Do not drive or operate machinery/Bathing allowed

## 2018-01-03 NOTE — DISCHARGE NOTE ADULT - PLAN OF CARE
Follow up with cardiologist within one week of discharge. Call for appointment. Return to ED for any concerning symptoms. Continue medications as prescribed. Low salt, low fat, low cholesterol diet. Maintain HR between 60bpm-100bpm. Continue Eliquis for stroke prevention. Follow up with cardiologist within one week of discharge. Call for appointment. Continue medications as instructed. Maintain adequate control of your blood pressure. Goal BP < 130/80. Continue low sodium diet. Follow up with PCP and/or cardiologist for ongoing medical management of your hypertension. Continue medications as prescribed. Low salt diet. Maintain adequate control of your cholesterol levels. Goal LDL < 70. Follow up with PCP for ongoing medical management. Continue medications as prescribed. Low cholesterol diet. Follow up with your oncologist. Prevent future episodes. You have refused to pursue further cardiac workup despite encouragement. Prevent progression of disease. Ensure compliance with medications.

## 2018-01-03 NOTE — PROGRESS NOTE ADULT - PROBLEM SELECTOR PLAN 1
Admit to tele  check cbc, bmp,a 1c, flp, tsh, trend CE  SPECT  f/u M Dnote  cont plavix
Admit to tele  check cbc, bmp,a 1c, flp, tsh, trend CE  SPECT and TTE   f/u M Dnote  cont plavix

## 2018-01-03 NOTE — PROGRESS NOTE ADULT - PROBLEM SELECTOR PLAN 4
Patient with episode of hematuria at home. Monitor for hematuria.   Will continue plavix for now given patient had stent placed this year.  H/H stable. Monitor H/H
+hematuria   Nephrology and Urology consults

## 2018-01-03 NOTE — DISCHARGE NOTE ADULT - PATIENT PORTAL LINK FT
“You can access the [facility] Patient Portal, offered by [facility], by registering with the following website: [email or web address]”

## 2018-01-03 NOTE — PROGRESS NOTE ADULT - SUBJECTIVE AND OBJECTIVE BOX
Subjective: Patient seen and examined. No new events except as noted.     REVIEW OF SYSTEMS:    CONSTITUTIONAL: No weakness, fevers or chills  EYES/ENT: No visual changes;  No vertigo or throat pain   NECK: No pain or stiffness  RESPIRATORY: No cough, wheezing, hemoptysis; No shortness of breath  CARDIOVASCULAR: No chest pain or palpitations  GASTROINTESTINAL: No abdominal or epigastric pain. No nausea, vomiting, or hematemesis; No diarrhea or constipation. No melena or hematochezia.  GENITOURINARY: No dysuria, frequency or hematuria  NEUROLOGICAL: No numbness or weakness  SKIN: No itching, burning, rashes, or lesions   All other review of systems is negative unless indicated above.    MEDICATIONS:  MEDICATIONS  (STANDING):  apixaban 5 milliGRAM(s) Oral every 12 hours  atorvastatin 40 milliGRAM(s) Oral at bedtime  clopidogrel Tablet 75 milliGRAM(s) Oral daily  enalapril 5 milliGRAM(s) Oral daily  gabapentin 400 milliGRAM(s) Oral three times a day  metoprolol     tartrate 25 milliGRAM(s) Oral two times a day  sodium chloride 0.9% lock flush 3 milliLiter(s) IV Push every 8 hours  tamsulosin 0.4 milliGRAM(s) Oral at bedtime      PHYSICAL EXAM:  T(C): 36.9 (01-02-18 @ 10:34), Max: 36.9 (01-01-18 @ 16:40)  HR: 60 (01-02-18 @ 10:34) (57 - 83)  BP: 152/71 (01-02-18 @ 10:34) (112/63 - 152/71)  RR: 16 (01-02-18 @ 10:34) (14 - 18)  SpO2: 100% (01-02-18 @ 10:34) (96% - 100%)  Wt(kg): --  I&O's Summary        Appearance: Normal	  HEENT:   Normal oral mucosa, PERRL, EOMI	  Lymphatic: No lymphadenopathy , no edema  Cardiovascular: Normal S1 S2, No JVD, No murmurs , Peripheral pulses palpable 2+ bilaterally  Respiratory: Lungs clear to auscultation, normal effort 	  Gastrointestinal:  Soft, Non-tender, + BS	  Skin: No rashes, No ecchymoses, No cyanosis, warm to touch  Musculoskeletal: Normal range of motion, normal strength  Psychiatry:  Mood & affect appropriate  Ext: No edema      LABS:    CARDIAC MARKERS:  CARDIAC MARKERS ( 02 Jan 2018 05:00 )  x     / < 0.06 ng/mL / 133 u/L / 5.93 ng/mL / x      CARDIAC MARKERS ( 01 Jan 2018 17:45 )  x     / < 0.06 ng/mL / 178 u/L / 8.83 ng/mL / x                                    12.7   5.72  )-----------( 113      ( 02 Jan 2018 05:00 )             37.3     01-02    139  |  106  |  27<H>  ----------------------------<  120<H>  4.3   |  24  |  1.05    Ca    8.2<L>      02 Jan 2018 05:00  Phos  3.4     01-02  Mg     1.9     01-02    TPro  6.6  /  Alb  3.8  /  TBili  0.6  /  DBili  x   /  AST  99<H>  /  ALT  130<H>  /  AlkPhos  162<H>  01-01    proBNP:   Lipid Profile:   HgA1c:   TSH: Thyroid Stimulating Hormone, Serum: 0.67 uIU/mL (01-02 @ 05:00)      NEGATIVE          TELEMETRY: 	    ECG:  	  RADIOLOGY:   DIAGNOSTIC TESTING:  [ ] Echocardiogram:  [ ]  Catheterization:  [ ] Stress Test:    OTHER:
Subjective: Patient seen and examined. No new events except as noted.   c/o flank pain   no cp or sob     REVIEW OF SYSTEMS:    CONSTITUTIONAL: + weakness, No fevers or chills  EYES/ENT: No visual changes;  No vertigo or throat pain   NECK: No pain or stiffness  RESPIRATORY: No cough, wheezing, hemoptysis; No shortness of breath  CARDIOVASCULAR: No chest pain or palpitations  GASTROINTESTINAL: No abdominal or epigastric pain. No nausea, vomiting, or hematemesis; No diarrhea or constipation. No melena or hematochezia.  GENITOURINARY: No dysuria, frequency or hematuria, +flank pain   NEUROLOGICAL: No numbness or weakness  SKIN: No itching, burning, rashes, or lesions   All other review of systems is negative unless indicated above.    MEDICATIONS:  MEDICATIONS  (STANDING):  apixaban 5 milliGRAM(s) Oral every 12 hours  atorvastatin 40 milliGRAM(s) Oral at bedtime  clopidogrel Tablet 75 milliGRAM(s) Oral daily  enalapril 5 milliGRAM(s) Oral daily  gabapentin 400 milliGRAM(s) Oral three times a day  metoprolol     tartrate 25 milliGRAM(s) Oral two times a day  sodium chloride 0.9% lock flush 3 milliLiter(s) IV Push every 8 hours  tamsulosin 0.4 milliGRAM(s) Oral at bedtime      PHYSICAL EXAM:  T(C): 36.4 (01-03-18 @ 04:42), Max: 36.4 (01-03-18 @ 04:42)  HR: 61 (01-03-18 @ 04:42) (56 - 69)  BP: 156/88 (01-03-18 @ 04:42) (142/82 - 159/88)  RR: 16 (01-03-18 @ 04:42) (16 - 18)  SpO2: 100% (01-03-18 @ 04:42) (99% - 100%)  Wt(kg): --  I&O's Summary    Height (cm): 180.34 (01-02 @ 21:31)  Weight (kg): 99.8 (01-02 @ 21:31)  BMI (kg/m2): 30.7 (01-02 @ 21:31)  BSA (m2): 2.2 (01-02 @ 21:31)    Appearance: Normal	  HEENT:   Normal oral mucosa, PERRL, EOMI	  Lymphatic: No lymphadenopathy , no edema  Cardiovascular: Normal S1 S2, No JVD, No murmurs , Peripheral pulses palpable 2+ bilaterally  Respiratory: Lungs clear to auscultation, normal effort 	  Gastrointestinal:  Soft, Non-tender, + BS	  Skin: No rashes, No ecchymoses, No cyanosis, warm to touch  Musculoskeletal: Normal range of motion, normal strength  Psychiatry:  Mood & affect appropriate  Ext: No edema      LABS:    CARDIAC MARKERS:  CARDIAC MARKERS ( 02 Jan 2018 05:00 )  x     / < 0.06 ng/mL / 133 u/L / 5.93 ng/mL / x      CARDIAC MARKERS ( 01 Jan 2018 17:45 )  x     / < 0.06 ng/mL / 178 u/L / 8.83 ng/mL / x                                    12.7   5.72  )-----------( 113      ( 02 Jan 2018 05:00 )             37.3     01-02    139  |  106  |  27<H>  ----------------------------<  120<H>  4.3   |  24  |  1.05    Ca    8.2<L>      02 Jan 2018 05:00  Phos  3.4     01-02  Mg     1.9     01-02    TPro  6.6  /  Alb  3.8  /  TBili  0.6  /  DBili  x   /  AST  99<H>  /  ALT  130<H>  /  AlkPhos  162<H>  01-01    proBNP:   Lipid Profile:   HgA1c:   TSH:     NEGATIVE          TELEMETRY: 	 Refusing Tele   ECG:  	  RADIOLOGY:   DIAGNOSTIC TESTING:  [ ] Echocardiogram:  [ ]  Catheterization:  [ ] Stress Test:    OTHER:

## 2018-01-03 NOTE — PROGRESS NOTE ADULT - PROBLEM SELECTOR PLAN 2
cont metoprolol  [id number] score of 3  Will continue eliquis for now. patient had no episode of hematuria since arrival to ED and H/H stable.
cont metoprolol  [id number] score of 3  Will continue eliquis for now. patient had no episode of hematuria since arrival to ED and H/H stable.

## 2018-01-03 NOTE — DISCHARGE NOTE ADULT - MEDICATION SUMMARY - MEDICATIONS TO TAKE
I will START or STAY ON the medications listed below when I get home from the hospital:    Rolling Walker  -- Rolling Walker  -- Indication: For Prior script    enalapril 5 mg oral tablet  -- 1 tab(s) by mouth once a day  -- Indication: For HTN (hypertension)    Flomax 0.4 mg oral capsule  -- 1 cap(s) by mouth once a day  -- Indication: For BPH     isosorbide mononitrate 30 mg oral tablet, extended release  -- 1 tab(s) by mouth once a day  -- Indication: For CAD (coronary artery disease)    apixaban 5 mg oral tablet  -- 1 tab(s) by mouth every 12 hours  -- Indication: For AF (atrial fibrillation)    gabapentin 400 mg oral capsule  -- 1 cap(s) by mouth 3 times a day  -- Indication: For Neuropathy/pain    clonazePAM 1 mg oral tablet  -- 1 tab(s) by mouth once a day (at bedtime)  -- Indication: For Home med    traZODone 50 mg oral tablet  -- 1 tab(s) by mouth once a day  -- Indication: For Depression    atorvastatin 40 mg oral tablet  -- 1 tab(s) by mouth once a day (at bedtime)  -- Indication: For HLD (hyperlipidemia)    clopidogrel 75 mg oral tablet  -- 1 tab(s) by mouth once a day  -- Indication: For CAD (coronary artery disease)    metoprolol tartrate 25 mg oral tablet  -- 1 tab(s) by mouth 2 times a day  -- Indication: For HTN (hypertension)    Spiriva 18 mcg inhalation capsule  -- 1 cap(s) inhaled once a day  -- Indication: For Asthma/COPD I will START or STAY ON the medications listed below when I get home from the hospital:    Rolling Walker  -- Rolling Walker  -- Indication: For Prior script    acetaminophen 325 mg oral tablet  -- 2 tab(s) by mouth every 6 hours  -- Indication: For Pain    enalapril 5 mg oral tablet  -- 1 tab(s) by mouth once a day  -- Indication: For HTN (hypertension)    Flomax 0.4 mg oral capsule  -- 1 cap(s) by mouth once a day  -- Indication: For BPH     isosorbide mononitrate 30 mg oral tablet, extended release  -- 1 tab(s) by mouth once a day  -- Indication: For CAD (coronary artery disease)    apixaban 5 mg oral tablet  -- 1 tab(s) by mouth every 12 hours  -- Indication: For AF (atrial fibrillation)    gabapentin 400 mg oral capsule  -- 1 cap(s) by mouth 3 times a day  -- Indication: For Neuropathy/pain    clonazePAM 1 mg oral tablet  -- 1 tab(s) by mouth once a day (at bedtime)  -- Indication: For Home med    traZODone 50 mg oral tablet  -- 1 tab(s) by mouth once a day  -- Indication: For Depression    atorvastatin 40 mg oral tablet  -- 1 tab(s) by mouth once a day (at bedtime)  -- Indication: For HLD (hyperlipidemia)    clopidogrel 75 mg oral tablet  -- 1 tab(s) by mouth once a day  -- Indication: For CAD (coronary artery disease)    metoprolol tartrate 25 mg oral tablet  -- 1 tab(s) by mouth 2 times a day  -- Indication: For HTN (hypertension)    Spiriva 18 mcg inhalation capsule  -- 1 cap(s) inhaled once a day  -- Indication: For Asthma/COPD

## 2018-01-03 NOTE — CONSULT NOTE ADULT - SUBJECTIVE AND OBJECTIVE BOX
Chief Complaint:    HPI:  57 year old male, from home, ambulates with a walker. PMHx opioid dependence, HLD, HTN, CAD (stents x2 [Sep/17]) Afib (Eliquis), AICD (Medtronic), RCC of left kidney (s/p Partial nephrectomy in 2002, repeated biopsy 2 months ago revealed recurrence in stage 2). BIBEMS to ED c/o sharp left chest pain x 1 day. Patient states the chest pain is L sided, constant and radiates to the L side of the neck. He also complains of L sided flank pain and episodes of hematuria. He states he gets hematuria occassionally. Denies fever, chills cough, falls, LOC, SOB, abdominal pain, nausea, vomiting, melena, hematochezia, LE edema, calf tenderness or dysuria. (02 Jan 2018 01:02)      PAST MEDICAL & SURGICAL HISTORY:  COPD (chronic obstructive pulmonary disease)  Opioid dependence  Calcium kidney stones  Renal cell carcinoma of left kidney: s/p partial nephrectomy in 2002  biopsy again in Sep 2017 showed RCC again in stage 2  Hyperlipidemia  Hypertension  CAD (coronary artery disease): (s/p 2 stents in Sep 2017)  Atrial fibrillation  AICD (automatic cardioverter/defibrillator) present: Medtronic  Calcium kidney stone  H/O partial nephrectomy: 2002  S/P cholecystectomy: 2015      FAMILY HISTORY:  No pertinent family history in first degree relatives      SOCIAL HISTORY:  [ ] Denies Smoking, Alcohol, or Drug Use    Allergies    aspirin (Hives)  codeine (Rash)  Motrin (Rash)  penicillin (Hives; Anaphylaxis)  Toradol (Rash)  Tylenol (Hives)    Intolerances        PAIN MEDICATIONS:  gabapentin 400 milliGRAM(s) Oral three times a day  oxyCODONE    IR 5 milliGRAM(s) Oral every 8 hours PRN      Vital Signs Last 24 Hrs  T(C): 36.3 (03 Jan 2018 12:19), Max: 36.4 (03 Jan 2018 04:42)  T(F): 97.4 (03 Jan 2018 12:19), Max: 97.6 (03 Jan 2018 04:42)  HR: 56 (03 Jan 2018 12:19) (56 - 69)  BP: 130/83 (03 Jan 2018 12:19) (130/83 - 159/88)  BP(mean): --  RR: 18 (03 Jan 2018 12:19) (16 - 18)  SpO2: 96% (03 Jan 2018 12:19) (96% - 100%)    PAIN SCORE:         SCALE USED: (1-10 VNRS)             PHYSICAL EXAM:    GENERAL: NAD, well-groomed, well-developed  HEAD:  Atraumatic, Normocephalic  EYES: EOMI, PERRLA, conjunctiva and sclera clear  ENMT: No tonsillar erythema, exudates, or enlargement; Moist mucous membranes, Good dentition, No lesions  NECK: Supple, No JVD, Normal thyroid  NERVOUS SYSTEM:  Alert & Oriented X3, Good concentration; Motor Strength 5/5 B/L upper and lower extremities; DTRs 2+ intact and symmetric  CHEST/LUNG: Clear to percussion bilaterally; No rales, rhonchi, wheezing, or rubs  HEART: Regular rate and rhythm; No murmurs, rubs, or gallops  ABDOMEN: Soft, Nontender, Nondistended; Bowel sounds present  EXTREMITIES:  2+ Peripheral Pulses, No clubbing, cyanosis, or edema  LYMPH: No lymphadenopathy noted  SKIN: No rashes or lesions        Subjective: "I have a throbbing sharp pain in my kidneys, and nothing seems to help it. The meds they're giving me isn't enough, my pain right now is 9/10"    Objective: Pt. A&Ox3, NAD, laying in bed with RN at beside attempting to place an IV. Pt. answers questions appropriately and maintains eye contact.         RECOMMENDATIONS  1. Add Tizanidine 2mg PO Q6hr PRN  2. Tylenol around the clock   3. Refer pt. to out patient pain management prior to D/C some suggestions: [facility] [phone], Dr. Iyer [phone]    [X ]  SAMINA  Reviewed and Copied to Chart ISTOP#[id number]

## 2018-01-03 NOTE — PROGRESS NOTE ADULT - ASSESSMENT
57 year old male, from home, ambulates with a walker. PMHx opioid dependence, HLD, HTN, CAD (stents x2 [Sep/17]) Afib (Eliquis), AICD (Medtronic), RCC of left kidney (s/p Partial nephrectomy in 2002, repeated biopsy 2 months ago revealed recurrence in stage 2). BIBEMS to ED c/o sharp left chest pain x 1 day.  admitted for chest pain and hematuria
57 year old male, from home, ambulates with a walker. PMHx opioid dependence, HLD, HTN, CAD (stents x2 [Sep/17]) Afib (Eliquis), AICD (Medtronic), RCC of left kidney (s/p Partial nephrectomy in 2002, repeated biopsy 2 months ago revealed recurrence in stage 2). BIBEMS to ED c/o sharp left chest pain x 1 day.  admitted for chest pain and hematuria

## 2018-01-03 NOTE — PROVIDER CONTACT NOTE (OTHER) - BACKGROUND
Hx of opioid dependence, Renal cell carcinoma, kidney stones. Admitted with chest pain and hematuria.

## 2018-01-03 NOTE — CONSULT NOTE ADULT - SUBJECTIVE AND OBJECTIVE BOX
HPI:  Mr. Pedro is a 57 year-old man with history of hypertension, coronary artery disease, atrial fibrillation, COPD, left renal cell cancer s/p partial nephrectomy in , and s/p repeat biopsy of the left kidney recently confirming recurrence of renal cell cancer. He presented yesterday to the [facility] ER with sharp left-sided chest pain for 1 day with radiation to the left neck; he also attested to intermittent left flank pain and episodic hematuria.      PAST MEDICAL & SURGICAL HISTORY:  COPD (chronic obstructive pulmonary disease)  Opioid dependence  Calcium kidney stones  Renal cell carcinoma of left kidney: s/p partial nephrectomy in   biopsy again in Sep 2017 showed RCC again in stage 2  Hyperlipidemia  Hypertension  CAD (coronary artery disease): (s/p 2 stents in Sep 2017)  Atrial fibrillation  AICD (automatic cardioverter/defibrillator) present: Medtronic  Calcium kidney stone  H/O partial nephrectomy:   S/P cholecystectomy:     Allergies  aspirin (Hives)  codeine (Rash)  Motrin (Rash)  penicillin (Hives; Anaphylaxis)  Toradol (Rash)  Tylenol (Hives)    SOCIAL HISTORY:  Denies ETOh,Smoking,     FAMILY HISTORY:  No pertinent family history in first degree relatives    REVIEW OF SYSTEMS:  CONSTITUTIONAL: No weakness, fevers or chills  EYES/ENT: No visual changes;  No vertigo or throat pain   NECK: (+)left neck pain  RESPIRATORY: No cough, wheezing, hemoptysis; No shortness of breath  CARDIOVASCULAR: (+)chest pain; no palpitations  GASTROINTESTINAL: No abdominal or epigastric pain. No nausea, vomiting, or hematemesis; No diarrhea or constipation. No melena or hematochezia.  GENITOURINARY: No dysuria, no frequency; (+)hematuria, (+)left flank pain  NEUROLOGICAL: No numbness or weakness  SKIN: No itching, burning, rashes, or lesions   All other review of systems is negative unless indicated above.    VITAL:  T(C): , Max: 36.4 (18 @ 04:42)  T(F): , Max: 97.6 (18 @ 04:42)  HR: 56 (18 @ 12:19)  BP: 130/83 (18 @ 12:19)  BP(mean): --  RR: 18 (18 @ 12:19)  SpO2: 96% (18 @ 12:19)      PHYSICAL EXAM:  Constitutional: NAD, Alert  HEENT: NCAT, MMM  Neck: Supple, No JVD  Respiratory: CTA-b/l  Cardiovascular: RRR s1s2, no m/r/g  Gastrointestinal: BS+, soft, NT/ND  Extremities: No peripheral edema b/l  Neurological: no focal deficits; strength grossly intact  Psychiatric: Normal mood, normal affect  Back: no CVAT b/l  Skin: No rashes, no nevi    LABS:                        12.7   5.72  )-----------( 113      ( 2018 05:00 )             37.3     Na(139)/K(4.3)/Cl(106)/HCO3(24)/BUN(27)/Cr(1.05)Glu(120)/Ca(8.2)/Mg(1.9)/PO4(3.4)     @ 05:00  Na(139)/K(4.2)/Cl(104)/HCO3(21)/BUN(23)/Cr(0.98)Glu(81)/Ca(8.3)/Mg(--)/PO4(--)     @ 17:45    Urinalysis Basic - ( 2018 22:18 )  Color: PINK / Appearance: CLEAR / S.024 / pH: 5.5  Gluc: NEGATIVE / Ketone: NEGATIVE  / Bili: NEGATIVE / Urobili: NORMAL mg/dL   Blood: MODERATE / Protein: 100 mg/dL / Nitrite: NEGATIVE   Leuk Esterase: TRACE / RBC: >50 / WBC 5-10   Sq Epi: x / Non Sq Epi: FEW / Bacteria: x      IMAGING:  < from: CT Abdomen and Pelvis No Cont (18 @ 19:46) >  1.  No hydronephrosis or renal stones.  2.  Trace hyperdense material in the right renal collecting system.    Bladder is partially distended with homogeneous hyperdense fluid.  I   suspect the findings represent residual IV contrast from a prior imaging   study however hemorrhage is not excluded.  If there is persistent   unexplained hematuria a follow-up CT urogram can be performed.  3.  A subcentimeter hypodensity in the lower pole left kidney probably   reflects a cyst but is incompletely characterized on this noncontrast CT   scan  4.  Mild fluid distention of the small bowel and nonspecific air-fluid   levels in the colon.  Correlate clinically for nonspecific   gastroenteritis.  5.  Unilateral right sacroiliitis.    SPECT: (1/3/18)  abnormal study - EF 38% - nonspecific cardiomyopathy      ASSESSMENT:  (1)Renal - recurrence of left renal cancer; the "hyperdense material" noted on the noncontrast CT of from 18 is highly likely blood making its way from the site of the renal cell CA down through the urinary collecting system. Intact renal function.    (2) - RCC    (3)Chest pain - awaiting SPECT.    RECOMMEND:  (1)    Thank you for involving [facility] in this patient's care.    With warm regards,    Eduardo Eden MD   [facility]  [phone] HPI:  Mr. Pedro is a 57 year-old man with history of hypertension, coronary artery disease, atrial fibrillation, COPD, left renal cell cancer s/p partial nephrectomy in , and s/p repeat biopsy of the left kidney recently confirming recurrence of renal cell cancer. He presented yesterday to the [facility] ER with sharp left-sided chest pain for 1 day with radiation to the left neck; he also attested to intermittent left flank pain, dysuria, and episodic hematuria.      PAST MEDICAL & SURGICAL HISTORY:  COPD (chronic obstructive pulmonary disease)  Opioid dependence  Calcium kidney stones  Renal cell carcinoma of left kidney: s/p partial nephrectomy in   biopsy again in Sep 2017 showed RCC again in stage 2  Hyperlipidemia  Hypertension  CAD (coronary artery disease): (s/p 2 stents in Sep 2017)  Atrial fibrillation  AICD (automatic cardioverter/defibrillator) present: Medtronic  Calcium kidney stone  H/O partial nephrectomy:   S/P cholecystectomy:     Allergies  aspirin (Hives)  codeine (Rash)  Motrin (Rash)  penicillin (Hives; Anaphylaxis)  Toradol (Rash)  Tylenol (Hives)    SOCIAL HISTORY:  Denies ETOh,Smoking,     FAMILY HISTORY:  No pertinent family history in first degree relatives    REVIEW OF SYSTEMS:  CONSTITUTIONAL: No weakness, fevers or chills  EYES/ENT: No visual changes;  No vertigo or throat pain   NECK: (+)left neck pain  RESPIRATORY: No cough, wheezing, hemoptysis; No shortness of breath  CARDIOVASCULAR: (+)chest pain; no palpitations  GASTROINTESTINAL: No abdominal or epigastric pain. No nausea, vomiting, or hematemesis; No diarrhea or constipation. No melena or hematochezia.  GENITOURINARY: (+)dysuria, (+)hematuria, (+)left flank pain  NEUROLOGICAL: No numbness or weakness  SKIN: No itching, burning, rashes, or lesions   All other review of systems is negative unless indicated above.    VITAL:  T(C): , Max: 36.4 (18 @ 04:42)  T(F): , Max: 97.6 (18 @ 04:42)  HR: 56 (18 @ 12:19)  BP: 130/83 (18 @ 12:19)  BP(mean): --  RR: 18 (18 @ 12:19)  SpO2: 96% (18 @ 12:19)      PHYSICAL EXAM:  Constitutional: NAD, Alert  HEENT: NCAT, MMM  Neck: Supple, No JVD  Respiratory: CTA-b/l  Cardiovascular: RRR s1s2, no m/r/g  Gastrointestinal: BS+, soft, NT/ND  Extremities: No peripheral edema b/l  Neurological: no focal deficits; strength grossly intact  Psychiatric: Normal mood, normal affect  Back: no CVAT b/l  Skin: No rashes, no nevi    LABS:                        12.7   5.72  )-----------( 113      ( 2018 05:00 )             37.3     Na(139)/K(4.3)/Cl(106)/HCO3(24)/BUN(27)/Cr(1.05)Glu(120)/Ca(8.2)/Mg(1.9)/PO4(3.4)     @ 05:00  Na(139)/K(4.2)/Cl(104)/HCO3(21)/BUN(23)/Cr(0.98)Glu(81)/Ca(8.3)/Mg(--)/PO4(--)     @ 17:45    Urinalysis Basic - ( 2018 22:18 )  Color: PINK / Appearance: CLEAR / S.024 / pH: 5.5  Gluc: NEGATIVE / Ketone: NEGATIVE  / Bili: NEGATIVE / Urobili: NORMAL mg/dL   Blood: MODERATE / Protein: 100 mg/dL / Nitrite: NEGATIVE   Leuk Esterase: TRACE / RBC: >50 / WBC 5-10   Sq Epi: x / Non Sq Epi: FEW / Bacteria: x      IMAGING:  < from: CT Abdomen and Pelvis No Cont (18 @ 19:46) >  1.  No hydronephrosis or renal stones.  2.  Trace hyperdense material in the right renal collecting system.    Bladder is partially distended with homogeneous hyperdense fluid.  I   suspect the findings represent residual IV contrast from a prior imaging   study however hemorrhage is not excluded.  If there is persistent   unexplained hematuria a follow-up CT urogram can be performed.  3.  A subcentimeter hypodensity in the lower pole left kidney probably   reflects a cyst but is incompletely characterized on this noncontrast CT   scan  4.  Mild fluid distention of the small bowel and nonspecific air-fluid   levels in the colon.  Correlate clinically for nonspecific   gastroenteritis.  5.  Unilateral right sacroiliitis.    SPECT: (1/3/18)  abnormal study - EF 38% - nonspecific cardiomyopathy      ASSESSMENT:  (1)Renal - recurrence of left renal cancer; the "hyperdense material" noted on the noncontrast CT of from 18 is highly likely blood making its way from the site of the renal cell CA down through the urinary collecting system. Intact renal function.    (2) - RCC    (3)Chest pain - abnormal SPECT      RECOMMEND:  (1)No need for prophylaxis against KIAH if to go for cardiac cath  (2)No objection to use of NSAIDs if needed  (3)Follow up  for management of hematuria  (4)Dose new meds for GFR 60ml/min    Thank you for involving [facility] in this patient's care.    With warm regards,    Eduardo Eden MD   [facility]  [phone]

## 2018-01-03 NOTE — CHART NOTE - NSCHARTNOTEFT_GEN_A_CORE
Informed patient left AMA prior to completion of eval and consult. In brief:    Per patient, hx of RCC s/p partial nephrectomy in 2002, came in for chest pain but having L flank pain had "8 kidney stone surgeries" at various hospitals around [facility]. CT abdomen 1/1/18 demonstrates no renal calculi or hydroureteronephrosis. Was not able to obtain further records of reported biopsy of recurrent RCC at [facility] approximately 3 months ago    Abd: mid to lower back pain, no CVA tenderness    Gustavo Licona, PGY-1  Urology, pager #[phone]

## 2018-01-03 NOTE — PROVIDER CONTACT NOTE (OTHER) - SITUATION
Pt unhappy with pain management, refused tx and was becoming verbally abusive with staff. AN's Saida and Nisreen spoke with pt. Security called as precautionary measure..

## 2018-01-03 NOTE — DISCHARGE NOTE ADULT - HOSPITAL COURSE
58 y/o male with a PMHx of CAD S/P stent placement about 4 months ago, atrial fibrillation on Eliquis, COPD, renal cancer S/P partial nephrectomy in 2002 (repeated biopsy 2 months ago revealed recurrence in stage 2, scheduled for partial nephrectomy next week), presents to ED with acute left sided chest pain.    Pt was admitted to telemetry. EKG was non-ischemic. Cardiac enzymes were negative. CXR clear. Pt was scheduled for NST and echocardiogram which he refused because he wanted to be given IV narcotics. During the extent of the hospitalization, pt continued to express belligerent behavior, continuing to ask for IV narcotics. Although PO Oxycodone was offered and given and pt was never in any acute distress, as he was seen ambulating and conversing without pain, he continued to refuse cardiac workup and just wanted pain medication to be given through his IV.     Given refusal to pursue any further cardiac workup, pt is now medically stable for discharge home as per Dr. Moon. 58 y/o male with a PMHx of CAD S/P stent placement about 4 months ago, atrial fibrillation on Eliquis, COPD, renal cancer S/P partial nephrectomy in 2002 (repeated biopsy 2 months ago revealed recurrence in stage 2, scheduled for partial nephrectomy next week), presents to ED with acute left sided chest pain.    Pt was admitted to telemetry. EKG was non-ischemic. Cardiac enzymes were negative. CXR clear. Pt was scheduled for NST and echocardiogram which he refused because he wanted to be given IV narcotics. During the extent of the hospitalization, pt continued to express belligerent behavior, continuing to ask for IV narcotics. Although PO Oxycodone was offered and given and pt was never in any acute distress, as he was seen ambulating and conversing without pain, he continued to refuse cardiac workup and just wanted pain medication to be given through his IV.     Pain management was consulted and they recommended Oxycodone as ordered with addition of Zanaflex. Pt was unhappy with pain management recommendations. Pt continued to express belligerent behavior. Aggressive with staff and smoking in the bathroom. Pt wants to leave AMA and would not let me explain risks. He is refusing to sign paperwork for AMA.

## 2018-01-03 NOTE — DISCHARGE NOTE ADULT - SECONDARY DIAGNOSIS.
AF (atrial fibrillation) HTN (hypertension) HLD (hyperlipidemia) Renal cell carcinoma of left kidney CAD (coronary artery disease)

## 2018-01-04 ENCOUNTER — INPATIENT (INPATIENT)
Facility: HOSPITAL | Age: 58
LOS: 1 days | Discharge: HOME | DRG: 313 | End: 2018-01-06
Attending: INTERNAL MEDICINE | Admitting: INTERNAL MEDICINE
Payer: MEDICAID

## 2018-01-04 VITALS — HEIGHT: 71 IN | WEIGHT: 220.02 LBS

## 2018-01-04 DIAGNOSIS — I25.10 ATHEROSCLEROTIC HEART DISEASE OF NATIVE CORONARY ARTERY WITHOUT ANGINA PECTORIS: ICD-10-CM

## 2018-01-04 DIAGNOSIS — Z29.9 ENCOUNTER FOR PROPHYLACTIC MEASURES, UNSPECIFIED: ICD-10-CM

## 2018-01-04 DIAGNOSIS — Z90.49 ACQUIRED ABSENCE OF OTHER SPECIFIED PARTS OF DIGESTIVE TRACT: Chronic | ICD-10-CM

## 2018-01-04 DIAGNOSIS — N20.0 CALCULUS OF KIDNEY: Chronic | ICD-10-CM

## 2018-01-04 DIAGNOSIS — Z90.5 ACQUIRED ABSENCE OF KIDNEY: Chronic | ICD-10-CM

## 2018-01-04 DIAGNOSIS — R07.9 CHEST PAIN, UNSPECIFIED: ICD-10-CM

## 2018-01-04 DIAGNOSIS — Z95.810 PRESENCE OF AUTOMATIC (IMPLANTABLE) CARDIAC DEFIBRILLATOR: Chronic | ICD-10-CM

## 2018-01-04 DIAGNOSIS — I10 ESSENTIAL (PRIMARY) HYPERTENSION: ICD-10-CM

## 2018-01-04 DIAGNOSIS — I48.91 UNSPECIFIED ATRIAL FIBRILLATION: ICD-10-CM

## 2018-01-04 LAB
ALBUMIN SERPL ELPH-MCNC: 3.5 G/DL — SIGNIFICANT CHANGE UP (ref 3.5–5)
ALP SERPL-CCNC: 166 U/L — HIGH (ref 40–120)
ALT FLD-CCNC: 136 U/L DA — HIGH (ref 10–60)
ANION GAP SERPL CALC-SCNC: 8 MMOL/L — SIGNIFICANT CHANGE UP (ref 5–17)
APPEARANCE UR: ABNORMAL
AST SERPL-CCNC: 87 U/L — HIGH (ref 10–40)
BASOPHILS # BLD AUTO: 0 K/UL — SIGNIFICANT CHANGE UP (ref 0–0.2)
BASOPHILS NFR BLD AUTO: 0.6 % — SIGNIFICANT CHANGE UP (ref 0–2)
BILIRUB SERPL-MCNC: 0.4 MG/DL — SIGNIFICANT CHANGE UP (ref 0.2–1.2)
BILIRUB UR-MCNC: NEGATIVE — SIGNIFICANT CHANGE UP
BUN SERPL-MCNC: 19 MG/DL — HIGH (ref 7–18)
CALCIUM SERPL-MCNC: 8.8 MG/DL — SIGNIFICANT CHANGE UP (ref 8.4–10.5)
CHLORIDE SERPL-SCNC: 107 MMOL/L — SIGNIFICANT CHANGE UP (ref 96–108)
CK MB BLD-MCNC: 3.8 % — HIGH (ref 0–3.5)
CK MB CFR SERPL CALC: 6.6 NG/ML — HIGH (ref 0–3.6)
CK SERPL-CCNC: 172 U/L — SIGNIFICANT CHANGE UP (ref 35–232)
CO2 SERPL-SCNC: 24 MMOL/L — SIGNIFICANT CHANGE UP (ref 22–31)
COLOR SPEC: ABNORMAL
CREAT SERPL-MCNC: 0.91 MG/DL — SIGNIFICANT CHANGE UP (ref 0.5–1.3)
D DIMER BLD IA.RAPID-MCNC: <150 NG/ML DDU — SIGNIFICANT CHANGE UP
DIFF PNL FLD: ABNORMAL
EOSINOPHIL # BLD AUTO: 0.1 K/UL — SIGNIFICANT CHANGE UP (ref 0–0.5)
EOSINOPHIL NFR BLD AUTO: 0.8 % — SIGNIFICANT CHANGE UP (ref 0–6)
GLUCOSE SERPL-MCNC: 80 MG/DL — SIGNIFICANT CHANGE UP (ref 70–99)
GLUCOSE UR QL: NEGATIVE — SIGNIFICANT CHANGE UP
HCT VFR BLD CALC: 46.3 % — SIGNIFICANT CHANGE UP (ref 39–50)
HGB BLD-MCNC: 15.2 G/DL — SIGNIFICANT CHANGE UP (ref 13–17)
KETONES UR-MCNC: NEGATIVE — SIGNIFICANT CHANGE UP
LEUKOCYTE ESTERASE UR-ACNC: ABNORMAL
LYMPHOCYTES # BLD AUTO: 2 K/UL — SIGNIFICANT CHANGE UP (ref 1–3.3)
LYMPHOCYTES # BLD AUTO: 22.2 % — SIGNIFICANT CHANGE UP (ref 13–44)
MCHC RBC-ENTMCNC: 30.2 PG — SIGNIFICANT CHANGE UP (ref 27–34)
MCHC RBC-ENTMCNC: 32.8 GM/DL — SIGNIFICANT CHANGE UP (ref 32–36)
MCV RBC AUTO: 92.1 FL — SIGNIFICANT CHANGE UP (ref 80–100)
MONOCYTES # BLD AUTO: 0.8 K/UL — SIGNIFICANT CHANGE UP (ref 0–0.9)
MONOCYTES NFR BLD AUTO: 8.7 % — SIGNIFICANT CHANGE UP (ref 2–14)
NEUTROPHILS # BLD AUTO: 6 K/UL — SIGNIFICANT CHANGE UP (ref 1.8–7.4)
NEUTROPHILS NFR BLD AUTO: 67.6 % — SIGNIFICANT CHANGE UP (ref 43–77)
NITRITE UR-MCNC: NEGATIVE — SIGNIFICANT CHANGE UP
PH UR: 5 — SIGNIFICANT CHANGE UP (ref 5–8)
PLATELET # BLD AUTO: 125 K/UL — LOW (ref 150–400)
POTASSIUM SERPL-MCNC: 4.1 MMOL/L — SIGNIFICANT CHANGE UP (ref 3.5–5.3)
POTASSIUM SERPL-SCNC: 4.1 MMOL/L — SIGNIFICANT CHANGE UP (ref 3.5–5.3)
PROT SERPL-MCNC: 7 G/DL — SIGNIFICANT CHANGE UP (ref 6–8.3)
PROT UR-MCNC: 100
RBC # BLD: 5.02 M/UL — SIGNIFICANT CHANGE UP (ref 4.2–5.8)
RBC # FLD: 13.3 % — SIGNIFICANT CHANGE UP (ref 10.3–14.5)
SODIUM SERPL-SCNC: 139 MMOL/L — SIGNIFICANT CHANGE UP (ref 135–145)
SP GR SPEC: 1.02 — SIGNIFICANT CHANGE UP (ref 1.01–1.02)
TROPONIN I SERPL-MCNC: <0.015 NG/ML — SIGNIFICANT CHANGE UP (ref 0–0.04)
UROBILINOGEN FLD QL: NEGATIVE — SIGNIFICANT CHANGE UP
WBC # BLD: 8.8 K/UL — SIGNIFICANT CHANGE UP (ref 3.8–10.5)
WBC # FLD AUTO: 8.8 K/UL — SIGNIFICANT CHANGE UP (ref 3.8–10.5)

## 2018-01-04 RX ORDER — LIDOCAINE 4 G/100G
1 CREAM TOPICAL ONCE
Qty: 0 | Refills: 0 | Status: COMPLETED | OUTPATIENT
Start: 2018-01-04 | End: 2018-01-04

## 2018-01-04 RX ORDER — IPRATROPIUM/ALBUTEROL SULFATE 18-103MCG
3 AEROSOL WITH ADAPTER (GRAM) INHALATION ONCE
Qty: 0 | Refills: 0 | Status: COMPLETED | OUTPATIENT
Start: 2018-01-04 | End: 2018-01-04

## 2018-01-04 RX ORDER — ATORVASTATIN CALCIUM 80 MG/1
40 TABLET, FILM COATED ORAL AT BEDTIME
Qty: 0 | Refills: 0 | Status: DISCONTINUED | OUTPATIENT
Start: 2018-01-04 | End: 2018-01-06

## 2018-01-04 RX ORDER — MORPHINE SULFATE 50 MG/1
2 CAPSULE, EXTENDED RELEASE ORAL ONCE
Qty: 0 | Refills: 0 | Status: DISCONTINUED | OUTPATIENT
Start: 2018-01-04 | End: 2018-01-04

## 2018-01-04 RX ORDER — IPRATROPIUM/ALBUTEROL SULFATE 18-103MCG
3 AEROSOL WITH ADAPTER (GRAM) INHALATION EVERY 6 HOURS
Qty: 0 | Refills: 0 | Status: DISCONTINUED | OUTPATIENT
Start: 2018-01-04 | End: 2018-01-06

## 2018-01-04 RX ORDER — NICOTINE POLACRILEX 2 MG
1 GUM BUCCAL DAILY
Qty: 0 | Refills: 0 | Status: DISCONTINUED | OUTPATIENT
Start: 2018-01-04 | End: 2018-01-06

## 2018-01-04 RX ORDER — GABAPENTIN 400 MG/1
400 CAPSULE ORAL THREE TIMES A DAY
Qty: 0 | Refills: 0 | Status: DISCONTINUED | OUTPATIENT
Start: 2018-01-04 | End: 2018-01-06

## 2018-01-04 RX ORDER — CLOPIDOGREL BISULFATE 75 MG/1
75 TABLET, FILM COATED ORAL DAILY
Qty: 0 | Refills: 0 | Status: DISCONTINUED | OUTPATIENT
Start: 2018-01-04 | End: 2018-01-06

## 2018-01-04 RX ORDER — METOPROLOL TARTRATE 50 MG
25 TABLET ORAL
Qty: 0 | Refills: 0 | Status: DISCONTINUED | OUTPATIENT
Start: 2018-01-04 | End: 2018-01-06

## 2018-01-04 RX ORDER — TIOTROPIUM BROMIDE 18 UG/1
1 CAPSULE ORAL; RESPIRATORY (INHALATION) DAILY
Qty: 0 | Refills: 0 | Status: DISCONTINUED | OUTPATIENT
Start: 2018-01-04 | End: 2018-01-06

## 2018-01-04 RX ORDER — ISOSORBIDE MONONITRATE 60 MG/1
30 TABLET, EXTENDED RELEASE ORAL DAILY
Qty: 0 | Refills: 0 | Status: DISCONTINUED | OUTPATIENT
Start: 2018-01-04 | End: 2018-01-06

## 2018-01-04 RX ORDER — MORPHINE SULFATE 50 MG/1
2 CAPSULE, EXTENDED RELEASE ORAL EVERY 6 HOURS
Qty: 0 | Refills: 0 | Status: DISCONTINUED | OUTPATIENT
Start: 2018-01-04 | End: 2018-01-06

## 2018-01-04 RX ORDER — TAMSULOSIN HYDROCHLORIDE 0.4 MG/1
0.4 CAPSULE ORAL AT BEDTIME
Qty: 0 | Refills: 0 | Status: DISCONTINUED | OUTPATIENT
Start: 2018-01-04 | End: 2018-01-06

## 2018-01-04 RX ORDER — APIXABAN 2.5 MG/1
5 TABLET, FILM COATED ORAL EVERY 12 HOURS
Qty: 0 | Refills: 0 | Status: DISCONTINUED | OUTPATIENT
Start: 2018-01-04 | End: 2018-01-06

## 2018-01-04 RX ADMIN — Medication 25 MILLIGRAM(S): at 17:45

## 2018-01-04 RX ADMIN — ATORVASTATIN CALCIUM 40 MILLIGRAM(S): 80 TABLET, FILM COATED ORAL at 23:18

## 2018-01-04 RX ADMIN — MORPHINE SULFATE 2 MILLIGRAM(S): 50 CAPSULE, EXTENDED RELEASE ORAL at 23:48

## 2018-01-04 RX ADMIN — TAMSULOSIN HYDROCHLORIDE 0.4 MILLIGRAM(S): 0.4 CAPSULE ORAL at 23:18

## 2018-01-04 RX ADMIN — MORPHINE SULFATE 2 MILLIGRAM(S): 50 CAPSULE, EXTENDED RELEASE ORAL at 23:47

## 2018-01-04 RX ADMIN — MORPHINE SULFATE 2 MILLIGRAM(S): 50 CAPSULE, EXTENDED RELEASE ORAL at 17:44

## 2018-01-04 RX ADMIN — APIXABAN 5 MILLIGRAM(S): 2.5 TABLET, FILM COATED ORAL at 23:19

## 2018-01-04 RX ADMIN — MORPHINE SULFATE 2 MILLIGRAM(S): 50 CAPSULE, EXTENDED RELEASE ORAL at 22:20

## 2018-01-04 RX ADMIN — GABAPENTIN 400 MILLIGRAM(S): 400 CAPSULE ORAL at 23:49

## 2018-01-04 NOTE — H&P ADULT - NEGATIVE GASTROINTESTINAL SYMPTOMS
no vomiting/no steatorrhea/no diarrhea/no change in bowel habits/no nausea/no constipation/no abdominal pain/no hematochezia

## 2018-01-04 NOTE — ED PROVIDER NOTE - OBJECTIVE STATEMENT
56 y/o pt w/ a PMHx of MI w/ one stent, Afib, kidney CA, DVT and CHF presents to the ED c/o SOB and CP since today. [place]. Pain radiates to left neck and is described by the pt as a pressure. Pt was scheduled to have a surgery from his kidney cancer tomorrow but had it canceled b/c he caught a URI with sx of productive cough; denies nausea, fever and any other complaints. Allergic to penicillin, Tylenol, aspirin, Toradol, codeine.

## 2018-01-04 NOTE — H&P ADULT - NSHPLABSRESULTS_GEN_ALL_CORE
Vital Signs Last 24 Hrs  T(C): 37.2 (04 Jan 2018 15:34), Max: 37.2 (04 Jan 2018 15:34)  T(F): 99 (04 Jan 2018 15:34), Max: 99 (04 Jan 2018 15:34)  HR: 92 (04 Jan 2018 15:34) (92 - 92)  BP: 129/69 (04 Jan 2018 15:34) (129/69 - 129/69)  BP(mean): --  RR: 18 (04 Jan 2018 15:34) (18 - 18)  SpO2: 91% (04 Jan 2018 15:34) (91% - 91%)

## 2018-01-04 NOTE — ED PROVIDER NOTE - PROGRESS NOTE DETAILS
pt refused duoneb, states he is not short of breath and just wants pain meds patient repeated asking for pain meds, states lidoderm doesn't work, medical record states he is allergic to APAP and codiene and given renal issues will not give nsaids.  pt talking to his neighbors and walking around yet states "im supposed to tolerate a 10 out of 10" pt exhibiting drug seeking behavior.  Dr Stephen Cuevas knows this patient and agrees pt has exhivited similar behavior in the past

## 2018-01-04 NOTE — H&P ADULT - PROBLEM SELECTOR PLAN 5
-IMPROVE VTE Individual Risk Assessment        RISK                                                          Points  [  ] Previous VTE                                                3  [  ] Thrombophilia                                             2  [  ] Lower limb paralysis                                    2        (unable to hold up >15 seconds)    [  ] Current Cancer                                             2         (within 6 months)  [  ] Immobilization > 24 hrs                              1  [  ] ICU/CCU stay > 24 hours                            1  [] Age > 60                                                    1    IMPROVE VTE Score 0. Patient is on Eliquis for A fib

## 2018-01-04 NOTE — H&P ADULT - NEUROLOGICAL DETAILS
responds to verbal commands/alert and oriented x 3/normal strength/responds to pain/sensation intact

## 2018-01-04 NOTE — H&P ADULT - PROBLEM SELECTOR PLAN 1
-Last Echo showed EF 72 percent with Grade 2 diastolic dysfunction.  -Patient has NST done that showed: Diffuse patchy uptake of tracer suggestive of a  nonspecific cardiomyopathy. Post-stress gated wall motion analysis was performed (LVEF = 38%; LVEDV = 159 ml.), revealing moderate global hypokinesis.   - -Patient presented with L sided chest pain with SOB, recently had abnormal NST in [place], but signed out AMA without cardiology follow up.   -Last Echo showed EF 72 percent with Grade 2 diastolic dysfunction.  -Patient has NST done that showed: Diffuse patchy uptake of tracer suggestive of a  nonspecific cardiomyopathy. Post-stress gated wall motion analysis was performed (LVEF = 38%; LVEDV = 159 ml.), revealing moderate global hypokinesis.   -Troponin times 1 negative. EKG NSR at 78 bpm without ST wave changes.   -Started Plavix, statin and metoprolol. Patient might need repeat cath.   -Cardiology Dr Harper.

## 2018-01-04 NOTE — ED ADULT NURSE NOTE - ED STAT RN HANDOFF DETAILS
Pt AOX3, in stable condition, endorsed to nurse Sasha Pt AOX3, in stable condition, endorsed to nurse Baez  1/5/2018 Patient was endorsed to RN Uvaldo in stable condition and no acute distress. Notified patient continues to refused Tele box and/or monitor placement.

## 2018-01-04 NOTE — ED ADULT NURSE REASSESSMENT NOTE - NS ED NURSE REASSESS COMMENT FT1
Patient is alert and orientedx3, in no acute distress, speaking in full sentences and breathing comfortably in room air. Patient denies chest pain , dizziness , SOB. Continues to refuse TELE BOX placement . MD Quinones made aware of.

## 2018-01-04 NOTE — H&P ADULT - ASSESSMENT
57 year old male, from home, ambulates with a walker. PMHx opioid dependence, HLD, HTN, CAD (stents x2 [Sep/17]) Afib (Eliquis), AICD (Medtronic), EF improved from 10 percent to 45 percent as per previous records.  RCC of left kidney (s/p Partial nephrectomy in 2002, repeated biopsy 2 months ago revealed recurrence in stage 2) presented to ED with sharp left chest pain associated with SOB and L kidney pain.

## 2018-01-04 NOTE — H&P ADULT - HISTORY OF PRESENT ILLNESS
57 year old male, from home, ambulates with a walker. PMHx opioid dependence, HLD, HTN, CAD (stents x2 [Sep/17]) Afib (Eliquis), AICD (Medtronic), RCC of left kidney (s/p Partial nephrectomy in 2002, repeated biopsy 2 months ago revealed recurrence in stage 2) presented to ED with sharp left chest pain. 57 year old male, from home, ambulates with a walker. PMHx opioid dependence, HLD, HTN, CAD (stents x2 [Sep/17]) Afib (Eliquis), AICD (Medtronic), RCC of left kidney (s/p Partial nephrectomy in 2002, repeated biopsy 2 months ago revealed recurrence in stage 2) presented to ED with sharp left chest pain associated with SOB. [place] pain [place]. Pain radiates to left neck and is described by the pt as a pressure.       Pt was scheduled to have a surgery from his kidney cancer tomorrow but had it canceled b/c he caught a URI with sx of productive cough; denies nausea, fever and any other complaints. A    He was admitted to [facility] for chest pain in September, had CATH with 2 stent placement. 57 year old male, from home, ambulates with a walker. PMHx opioid dependence, HLD, HTN, CAD (stents x2 [Sep/17]) Afib (Eliquis), AICD (Medtronic), RCC of left kidney (s/p Partial nephrectomy in 2002, repeated biopsy 2 months ago revealed recurrence in stage 2) presented to ED with sharp left chest pain associated with SOB and L kidney pain. [place] pain [place]. Pain radiates to left neck and is described by the pt as a pressure. Pt was scheduled to have a surgery from his kidney cancer tomorrow but it was canceled b/c he caught a URI with sx of productive cough. Denies nausea, vomiting, diarrhea, constipation, pedal edema, headache or blurry visio. He was admitted to [facility] for chest pain in September, had CATH with 2 stent placement.    Use to smoke 2.5 ppd from last 25 years, cut down to 10 cig/day from last 3 months. Denies alcohol and illicit drug use., 57 year old male, from home, ambulates with a walker. PMHx opioid dependence, HLD, HTN, CAD (stents x2 [Sep/17]) Afib (Eliquis), AICD (Medtronic), EF improved from 10 percent to 45 percent as per previous records.  RCC of left kidney (s/p Partial nephrectomy in 2002, repeated biopsy 2 months ago revealed recurrence in stage 2) presented to ED with sharp left chest pain associated with SOB and L kidney pain. [place] pain [place]. Pain radiates to left neck and is described by the pt as a pressure. Pt was scheduled to have a surgery from his kidney cancer tomorrow but it was canceled b/c he caught a URI with sx of productive cough. Denies nausea, vomiting, diarrhea, constipation, pedal edema, headache or blurry visio.   He was admitted to [facility] for chest pain in September, had CATH with 2 stent placement. He was admitted in [place] for chest pain and had NST done that showed Diffuse patchy uptake of tracer suggestive of a  nonspecific cardiomyopathy. Post-stress gated wall motion analysis was performed (LVEF = 38%; LVEDV = 159 ml.) revealing moderate global hypokinesis. Patient left AMA without pursuing further cardiac work up.     SH: Use to smoke 2.5 ppd from last 25 years, cut down to 10 cig/day from last 3 months. Denies alcohol and illicit drug use.,

## 2018-01-04 NOTE — ED PROVIDER NOTE - MEDICAL DECISION MAKING DETAILS
cp states hx of MI with stents but normal sinus rhythem on EKG  states short of breath yet refuses Duoneb  requesting pain medications  d-miler normal will admit for cp work up

## 2018-01-04 NOTE — ED ADULT NURSE NOTE - OBJECTIVE STATEMENT
Pt AOx3, ambulatory, c/o left flank pain, and hematuria due to kidney cancer. Pt denies N/V/dizziness/headache

## 2018-01-05 ENCOUNTER — TRANSCRIPTION ENCOUNTER (OUTPATIENT)
Age: 58
End: 2018-01-05

## 2018-01-05 DIAGNOSIS — C64.2 MALIGNANT NEOPLASM OF LEFT KIDNEY, EXCEPT RENAL PELVIS: ICD-10-CM

## 2018-01-05 DIAGNOSIS — J44.9 CHRONIC OBSTRUCTIVE PULMONARY DISEASE, UNSPECIFIED: ICD-10-CM

## 2018-01-05 LAB
ALBUMIN SERPL ELPH-MCNC: 3.2 G/DL — LOW (ref 3.5–5)
ALP SERPL-CCNC: 158 U/L — HIGH (ref 40–120)
ALT FLD-CCNC: 123 U/L DA — HIGH (ref 10–60)
ANION GAP SERPL CALC-SCNC: 6 MMOL/L — SIGNIFICANT CHANGE UP (ref 5–17)
AST SERPL-CCNC: 77 U/L — HIGH (ref 10–40)
BILIRUB SERPL-MCNC: 0.6 MG/DL — SIGNIFICANT CHANGE UP (ref 0.2–1.2)
BUN SERPL-MCNC: 20 MG/DL — HIGH (ref 7–18)
CALCIUM SERPL-MCNC: 8.6 MG/DL — SIGNIFICANT CHANGE UP (ref 8.4–10.5)
CHLORIDE SERPL-SCNC: 109 MMOL/L — HIGH (ref 96–108)
CK MB BLD-MCNC: 4.3 % — HIGH (ref 0–3.5)
CK MB BLD-MCNC: 4.3 % — HIGH (ref 0–3.5)
CK MB CFR SERPL CALC: 4.6 NG/ML — HIGH (ref 0–3.6)
CK MB CFR SERPL CALC: 5.8 NG/ML — HIGH (ref 0–3.6)
CK SERPL-CCNC: 108 U/L — SIGNIFICANT CHANGE UP (ref 35–232)
CK SERPL-CCNC: 135 U/L — SIGNIFICANT CHANGE UP (ref 35–232)
CO2 SERPL-SCNC: 26 MMOL/L — SIGNIFICANT CHANGE UP (ref 22–31)
CREAT SERPL-MCNC: 0.9 MG/DL — SIGNIFICANT CHANGE UP (ref 0.5–1.3)
CULTURE RESULTS: NO GROWTH — SIGNIFICANT CHANGE UP
GLUCOSE SERPL-MCNC: 90 MG/DL — SIGNIFICANT CHANGE UP (ref 70–99)
HAV IGM SER-ACNC: SIGNIFICANT CHANGE UP
HBV CORE IGM SER-ACNC: SIGNIFICANT CHANGE UP
HBV SURFACE AG SER-ACNC: SIGNIFICANT CHANGE UP
HCT VFR BLD CALC: 44.3 % — SIGNIFICANT CHANGE UP (ref 39–50)
HCV AB S/CO SERPL IA: 11.95 S/CO — SIGNIFICANT CHANGE UP
HCV AB SERPL-IMP: REACTIVE
HGB BLD-MCNC: 14.6 G/DL — SIGNIFICANT CHANGE UP (ref 13–17)
HIV 1+2 AB+HIV1 P24 AG SERPL QL IA: SIGNIFICANT CHANGE UP
MAGNESIUM SERPL-MCNC: 2.2 MG/DL — SIGNIFICANT CHANGE UP (ref 1.6–2.6)
MCHC RBC-ENTMCNC: 30.4 PG — SIGNIFICANT CHANGE UP (ref 27–34)
MCHC RBC-ENTMCNC: 33.1 GM/DL — SIGNIFICANT CHANGE UP (ref 32–36)
MCV RBC AUTO: 91.9 FL — SIGNIFICANT CHANGE UP (ref 80–100)
PHOSPHATE SERPL-MCNC: 3.5 MG/DL — SIGNIFICANT CHANGE UP (ref 2.5–4.5)
PLATELET # BLD AUTO: 121 K/UL — LOW (ref 150–400)
POTASSIUM SERPL-MCNC: 3.8 MMOL/L — SIGNIFICANT CHANGE UP (ref 3.5–5.3)
POTASSIUM SERPL-SCNC: 3.8 MMOL/L — SIGNIFICANT CHANGE UP (ref 3.5–5.3)
PROT SERPL-MCNC: 6.7 G/DL — SIGNIFICANT CHANGE UP (ref 6–8.3)
RBC # BLD: 4.82 M/UL — SIGNIFICANT CHANGE UP (ref 4.2–5.8)
RBC # FLD: 13.2 % — SIGNIFICANT CHANGE UP (ref 10.3–14.5)
SODIUM SERPL-SCNC: 141 MMOL/L — SIGNIFICANT CHANGE UP (ref 135–145)
SPECIMEN SOURCE: SIGNIFICANT CHANGE UP
TROPONIN I SERPL-MCNC: 0.02 NG/ML — SIGNIFICANT CHANGE UP (ref 0–0.04)
TROPONIN I SERPL-MCNC: <0.015 NG/ML — SIGNIFICANT CHANGE UP (ref 0–0.04)
WBC # BLD: 6.5 K/UL — SIGNIFICANT CHANGE UP (ref 3.8–10.5)
WBC # FLD AUTO: 6.5 K/UL — SIGNIFICANT CHANGE UP (ref 3.8–10.5)

## 2018-01-05 RX ORDER — ISOSORBIDE MONONITRATE 60 MG/1
1 TABLET, EXTENDED RELEASE ORAL
Qty: 0 | Refills: 0 | COMMUNITY

## 2018-01-05 RX ORDER — SPIRONOLACTONE 25 MG/1
1 TABLET, FILM COATED ORAL
Qty: 0 | Refills: 0 | COMMUNITY

## 2018-01-05 RX ORDER — GABAPENTIN 400 MG/1
1 CAPSULE ORAL
Qty: 0 | Refills: 0 | COMMUNITY

## 2018-01-05 RX ORDER — ATORVASTATIN CALCIUM 80 MG/1
1 TABLET, FILM COATED ORAL
Qty: 0 | Refills: 0 | COMMUNITY

## 2018-01-05 RX ORDER — FUROSEMIDE 40 MG
1 TABLET ORAL
Qty: 0 | Refills: 0 | COMMUNITY

## 2018-01-05 RX ORDER — BUDESONIDE AND FORMOTEROL FUMARATE DIHYDRATE 160; 4.5 UG/1; UG/1
2 AEROSOL RESPIRATORY (INHALATION)
Qty: 0 | Refills: 0 | COMMUNITY

## 2018-01-05 RX ORDER — LACTULOSE 10 G/15ML
15 SOLUTION ORAL
Qty: 0 | Refills: 0 | COMMUNITY

## 2018-01-05 RX ORDER — SIMVASTATIN 20 MG/1
1 TABLET, FILM COATED ORAL
Qty: 0 | Refills: 0 | COMMUNITY

## 2018-01-05 RX ORDER — TRAZODONE HCL 50 MG
1 TABLET ORAL
Qty: 0 | Refills: 0 | COMMUNITY

## 2018-01-05 RX ORDER — APIXABAN 2.5 MG/1
1 TABLET, FILM COATED ORAL
Qty: 0 | Refills: 0 | COMMUNITY

## 2018-01-05 RX ORDER — TIOTROPIUM BROMIDE 18 UG/1
1 CAPSULE ORAL; RESPIRATORY (INHALATION)
Qty: 0 | Refills: 0 | COMMUNITY

## 2018-01-05 RX ORDER — METOPROLOL TARTRATE 50 MG
1 TABLET ORAL
Qty: 0 | Refills: 0 | COMMUNITY

## 2018-01-05 RX ORDER — CLONAZEPAM 1 MG
1 TABLET ORAL
Qty: 0 | Refills: 0 | COMMUNITY

## 2018-01-05 RX ORDER — CLOPIDOGREL BISULFATE 75 MG/1
1 TABLET, FILM COATED ORAL
Qty: 0 | Refills: 0 | COMMUNITY

## 2018-01-05 RX ADMIN — Medication 25 MILLIGRAM(S): at 05:24

## 2018-01-05 RX ADMIN — MORPHINE SULFATE 2 MILLIGRAM(S): 50 CAPSULE, EXTENDED RELEASE ORAL at 15:13

## 2018-01-05 RX ADMIN — GABAPENTIN 400 MILLIGRAM(S): 400 CAPSULE ORAL at 22:35

## 2018-01-05 RX ADMIN — APIXABAN 5 MILLIGRAM(S): 2.5 TABLET, FILM COATED ORAL at 05:24

## 2018-01-05 RX ADMIN — GABAPENTIN 400 MILLIGRAM(S): 400 CAPSULE ORAL at 05:24

## 2018-01-05 RX ADMIN — TAMSULOSIN HYDROCHLORIDE 0.4 MILLIGRAM(S): 0.4 CAPSULE ORAL at 22:35

## 2018-01-05 RX ADMIN — MORPHINE SULFATE 2 MILLIGRAM(S): 50 CAPSULE, EXTENDED RELEASE ORAL at 19:55

## 2018-01-05 RX ADMIN — GABAPENTIN 400 MILLIGRAM(S): 400 CAPSULE ORAL at 13:13

## 2018-01-05 RX ADMIN — Medication 5 MILLIGRAM(S): at 05:24

## 2018-01-05 RX ADMIN — ISOSORBIDE MONONITRATE 30 MILLIGRAM(S): 60 TABLET, EXTENDED RELEASE ORAL at 13:09

## 2018-01-05 RX ADMIN — ATORVASTATIN CALCIUM 40 MILLIGRAM(S): 80 TABLET, FILM COATED ORAL at 22:35

## 2018-01-05 RX ADMIN — MORPHINE SULFATE 2 MILLIGRAM(S): 50 CAPSULE, EXTENDED RELEASE ORAL at 20:55

## 2018-01-05 RX ADMIN — CLOPIDOGREL BISULFATE 75 MILLIGRAM(S): 75 TABLET, FILM COATED ORAL at 13:09

## 2018-01-05 RX ADMIN — MORPHINE SULFATE 2 MILLIGRAM(S): 50 CAPSULE, EXTENDED RELEASE ORAL at 13:13

## 2018-01-05 RX ADMIN — Medication 3 MILLILITER(S): at 21:19

## 2018-01-05 NOTE — CONSULT NOTE ADULT - ASSESSMENT
57 year old male, from home, ambulates with a walker. PMHx opioid dependence, HLD, HTN, CAD (stents x2 [Sep/17]) Afib (Eliquis), AICD (Medtronic), EF improved from 10 percent to 45 percent as per previous records.  RCC of left kidney (s/p Partial nephrectomy in 2002, repeated biopsy 2 months ago revealed recurrence in stage 2) presented to ED with sharp left chest pain associated with SOB and L kidney pain.     1.Pt with recent negative stress test and negative trponin's-doubt chest pain is cardiac.  2.PAF-Eliquis.  3.CAD and CHF-continue cardiac medication.  4.PPI.  5.AICD checkq 3 months.

## 2018-01-05 NOTE — ED ADULT NURSE REASSESSMENT NOTE - NS ED NURSE REASSESS COMMENT FT1
Patient remains alert and oriented ,  hemodynamically stable and in no acute distress. Refused blood pressure to be retaken . Patient continues to refuse to be placed on a Tele Box and change into a gown. Patient remains alert and oriented x3,  hemodynamically stable and in no acute distress. Refused blood pressure to be retaken . Patient continues to refuse to be placed on a Tele Box and change into a gown.

## 2018-01-05 NOTE — PROGRESS NOTE ADULT - SUBJECTIVE AND OBJECTIVE BOX
Patient is a 57y old  Male who presents with a chief complaint of Chest Pain and "L kidney pain" (04 Jan 2018 15:45)    pt seen in icu [  ], reg med floor [   ], bed [  ], chair at bedside [   ], a+o x3 [  ], lethargic [  ],  nad [  ]    tran [  ], ngt [  ], peg [  ], et tube [  ], cent line [  ], picc line [  ]        Allergies    aspirin (Hives)  codeine (Rash)  Motrin (Rash)  penicillin (Hives; Anaphylaxis)  Toradol (Rash)  Tylenol (Hives)        Vitals    T(F): 97.8 (01-05-18 @ 06:35), Max: 99 (01-04-18 @ 15:34)  HR: 55 (01-05-18 @ 06:35) (55 - 92)  BP: 189/89 (01-05-18 @ 06:35) (129/69 - 189/89)  RR: 17 (01-05-18 @ 06:35) (17 - 18)  SpO2: 98% (01-05-18 @ 06:35) (91% - 98%)  Wt(kg): --  CAPILLARY BLOOD GLUCOSE          Labs                          14.6   6.5   )-----------( 121      ( 05 Jan 2018 05:37 )             44.3       01-05    141  |  109<H>  |  20<H>  ----------------------------<  90  3.8   |  26  |  0.90    Ca    8.6      05 Jan 2018 05:37  Phos  3.5     01-05  Mg     2.2     01-05    TPro  6.7  /  Alb  3.2<L>  /  TBili  0.6  /  DBili  x   /  AST  77<H>  /  ALT  123<H>  /  AlkPhos  158<H>  01-05      CARDIAC MARKERS ( 05 Jan 2018 05:37 )  <0.015 ng/mL / x     / 108 U/L / x     / 4.6 ng/mL  CARDIAC MARKERS ( 05 Jan 2018 00:47 )  0.018 ng/mL / x     / 135 U/L / x     / 5.8 ng/mL  CARDIAC MARKERS ( 04 Jan 2018 15:24 )  <0.015 ng/mL / x     / 172 U/L / x     / 6.6 ng/mL        URINE MIDSTREAM  01-02 @ 00:48 --  --  --      .Urine Clean Catch (Midstream)  11-22 @ 14:31   No growth  --  --          Radiology Results      Meds    MEDICATIONS  (STANDING):  ALBUTerol/ipratropium for Nebulization 3 milliLiter(s) Nebulizer every 6 hours  apixaban 5 milliGRAM(s) Oral every 12 hours  atorvastatin 40 milliGRAM(s) Oral at bedtime  clopidogrel Tablet 75 milliGRAM(s) Oral daily  enalapril 5 milliGRAM(s) Oral daily  gabapentin 400 milliGRAM(s) Oral three times a day  isosorbide   mononitrate ER Tablet (IMDUR) 30 milliGRAM(s) Oral daily  metoprolol     tartrate 25 milliGRAM(s) Oral two times a day  nicotine - 21 mG/24Hr(s) Patch 1 patch Transdermal daily  tamsulosin 0.4 milliGRAM(s) Oral at bedtime  tiotropium 18 MICROgram(s) Capsule 1 Capsule(s) Inhalation daily      MEDICATIONS  (PRN):  morphine  - Injectable 2 milliGRAM(s) IV Push every 6 hours PRN Severe Pain (7 - 10)      Physical Exam    Neuro :  no focal deficits  Respiratory: CTA B/L  CV: RRR, S1S2, no murmurs,   Abdominal: Soft, NT, ND +BS,  Extremities: No edema, + peripheral pulses    ASSESSMENT    Chest pain  COPD (chronic obstructive pulmonary disease)  Opioid dependence  Calcium kidney stones  Renal cell carcinoma of left kidney  Hyperlipidemia  Hypertension  CAD (coronary artery disease)  Atrial fibrillation  AICD (automatic cardioverter/defibrillator) present  Pacemaker  Calcium kidney stone  H/O partial nephrectomy  S/P cholecystectomy      PLAN Patient is a 57y old  Male who presents with a chief complaint of Chest Pain and "L kidney pain" (04 Jan 2018 15:45)    pt seen in ed telemetry [x  ], reg med floor [   ], bed [x  ], chair at bedside [   ], a+o x3 [x  ], lethargic [  ],  nad [x  ]      Allergies    aspirin (Hives)  codeine (Rash)  Motrin (Rash)  penicillin (Hives; Anaphylaxis)  Toradol (Rash)  Tylenol (Hives)        Vitals    T(F): 97.8 (01-05-18 @ 06:35), Max: 99 (01-04-18 @ 15:34)  HR: 55 (01-05-18 @ 06:35) (55 - 92)  BP: 189/89 (01-05-18 @ 06:35) (129/69 - 189/89)  RR: 17 (01-05-18 @ 06:35) (17 - 18)  SpO2: 98% (01-05-18 @ 06:35) (91% - 98%)  Wt(kg): --  CAPILLARY BLOOD GLUCOSE          Labs                          14.6   6.5   )-----------( 121      ( 05 Jan 2018 05:37 )             44.3       01-05    141  |  109<H>  |  20<H>  ----------------------------<  90  3.8   |  26  |  0.90    Ca    8.6      05 Jan 2018 05:37  Phos  3.5     01-05  Mg     2.2     01-05    TPro  6.7  /  Alb  3.2<L>  /  TBili  0.6  /  DBili  x   /  AST  77<H>  /  ALT  123<H>  /  AlkPhos  158<H>  01-05      CARDIAC MARKERS ( 05 Jan 2018 05:37 )  <0.015 ng/mL / x     / 108 U/L / x     / 4.6 ng/mL  CARDIAC MARKERS ( 05 Jan 2018 00:47 )  0.018 ng/mL / x     / 135 U/L / x     / 5.8 ng/mL  CARDIAC MARKERS ( 04 Jan 2018 15:24 )  <0.015 ng/mL / x     / 172 U/L / x     / 6.6 ng/mL        URINE MIDSTREAM  01-02 @ 00:48 --  --  --        Radiology Results      Meds    MEDICATIONS  (STANDING):  ALBUTerol/ipratropium for Nebulization 3 milliLiter(s) Nebulizer every 6 hours  apixaban 5 milliGRAM(s) Oral every 12 hours  atorvastatin 40 milliGRAM(s) Oral at bedtime  clopidogrel Tablet 75 milliGRAM(s) Oral daily  enalapril 5 milliGRAM(s) Oral daily  gabapentin 400 milliGRAM(s) Oral three times a day  isosorbide   mononitrate ER Tablet (IMDUR) 30 milliGRAM(s) Oral daily  metoprolol     tartrate 25 milliGRAM(s) Oral two times a day  nicotine - 21 mG/24Hr(s) Patch 1 patch Transdermal daily  tamsulosin 0.4 milliGRAM(s) Oral at bedtime  tiotropium 18 MICROgram(s) Capsule 1 Capsule(s) Inhalation daily      MEDICATIONS  (PRN):  morphine  - Injectable 2 milliGRAM(s) IV Push every 6 hours PRN Severe Pain (7 - 10)      Physical Exam    Neuro :  no focal deficits  Respiratory: CTA B/L  CV: RRR, S1S2, no murmurs,   Abdominal: Soft, NT, ND +BS,  Extremities: No edema, + peripheral pulses    ASSESSMENT    Chest pain  possible drug seeking behavior  h/o COPD (chronic obstructive pulmonary disease)  Opioid dependence  Calcium kidney stones  Renal cell carcinoma of left kidney  Hyperlipidemia  Hypertension  CAD (coronary artery disease)  Atrial fibrillation  AICD (automatic cardioverter/defibrillator) present  Pacemaker  Calcium kidney stone  H/O partial nephrectomy  S/P cholecystectomy      PLAN    cont to tele,   acs protocol,   cont lopressor, aspirin, statin,   cont preadmit home meds,   ce q8 x3 neg  cardio cons  psych cons  pain mgmt cons

## 2018-01-05 NOTE — CONSULT NOTE ADULT - SUBJECTIVE AND OBJECTIVE BOX
PULMONARY CONSULT NOTE      RENEA AVILES  MRN-[id number]    Patient is a 57y old  Male who presents with a chief complaint of Chest Pain and "L kidney pain" (2018 15:45)    History of Present Illness:  Chief Complaint/Reason for Admission: Chest Pain and "L kidney pain"	  History of Present Illness: 	  57 year old male, from home, ambulates with a walker. PMHx opioid dependence, HLD, HTN, CAD (stents x2 [Sep/17]) Afib (Eliquis), AICD (Medtronic), EF improved from 10 percent to 45 percent as per previous records.  RCC of left kidney (s/p Partial nephrectomy in , repeated biopsy 2 months ago revealed recurrence in stage 2) presented to ED with sharp left chest pain associated with SOB and L kidney pain. [place]. Pain radiates to left neck and is described by the pt as a pressure. Pt was scheduled to have a surgery from his kidney cancer tomorrow but it was canceled b/c he caught a URI with sx of productive cough. Denies nausea, vomiting, diarrhea, constipation, pedal edema, headache or blurry visio.   He was admitted to [facility] for chest pain in September, had CATH with 2 stent placement. He was admitted in [place] for chest pain and had NST done that showed Diffuse patchy uptake of tracer suggestive of a  nonspecific cardiomyopathy. Post-stress gated wall motion analysis was performed (LVEF = 38%; LVEDV = 159 ml.) revealing moderate global hypokinesis. Patient left AMA without pursuing further cardiac work up.         HISTORY OF PRESENT ILLNESS:As above. Still smoking on and off. Has cough and occasional sob associated with wheezing    MEDICATIONS  (STANDING):  ALBUTerol/ipratropium for Nebulization 3 milliLiter(s) Nebulizer every 6 hours  apixaban 5 milliGRAM(s) Oral every 12 hours  atorvastatin 40 milliGRAM(s) Oral at bedtime  clopidogrel Tablet 75 milliGRAM(s) Oral daily  enalapril 5 milliGRAM(s) Oral daily  gabapentin 400 milliGRAM(s) Oral three times a day  isosorbide   mononitrate ER Tablet (IMDUR) 30 milliGRAM(s) Oral daily  metoprolol     tartrate 25 milliGRAM(s) Oral two times a day  nicotine - 21 mG/24Hr(s) Patch 1 patch Transdermal daily  tamsulosin 0.4 milliGRAM(s) Oral at bedtime  tiotropium 18 MICROgram(s) Capsule 1 Capsule(s) Inhalation daily      MEDICATIONS  (PRN):  morphine  - Injectable 2 milliGRAM(s) IV Push every 6 hours PRN Severe Pain (7 - 10)      Allergies    aspirin (Hives)  aspirin (Unknown)  codeine (Rash)  codeine (Unknown)  Haldol (Unknown)  morphine (Unknown)  Motrin (Rash)  Motrin (Unknown)  penicillin (Hives; Anaphylaxis)  penicillin (Unknown)  Toradol (Rash)  Toradol (Unknown)  Tylenol (Hives)    Intolerances        PAST MEDICAL & SURGICAL HISTORY:  COPD (chronic obstructive pulmonary disease)  Opioid dependence  Calcium kidney stones  Renal cell carcinoma of left kidney: s/p partial nephrectomy in   biopsy again in Sep 2017 showed RCC again in stage 2  Hyperlipidemia  Hypertension  CAD (coronary artery disease): (s/p 2 stents in Sep 2017)  Atrial fibrillation  AICD (automatic cardioverter/defibrillator) present: Pinchd  HLD (hyperlipidemia)  HTN (hypertension)  Carcinoma of kidney, unspecified laterality  Hep C w/o coma, chronic  Secondary hypertension  Chronic congestive heart failure, unspecified congestive heart failure type  Atrial fibrillation, unspecified type  Calcium kidney stone  H/O partial nephrectomy:   S/P cholecystectomy:   AICD (automatic cardioverter/defibrillator) present      FAMILY HISTORY:  No pertinent family history in first degree relatives      SOCIAL HISTORY  Smoking History:     REVIEW OF SYSTEMS:    CONSTITUTIONAL:  No fevers, chills, sweats    HEENT:  Eyes:  No diplopia or blurred vision. ENT:  No earache, sore throat or runny nose.    CARDIOVASCULAR:  No pressure, squeezing, tightness, or heaviness about the chest; no palpitations.    RESPIRATORY:  Per HPI    GASTROINTESTINAL:  No abdominal pain, nausea, vomiting or diarrhea.    GENITOURINARY:  No dysuria, frequency or urgency.    NEUROLOGIC:  No paresthesias, fasciculations, seizures or weakness.    PSYCHIATRIC:  No disorder of thought or mood.    Vital Signs Last 24 Hrs  T(C): 36.6 (2018 06:35), Max: 37.2 (2018 15:34)  T(F): 97.8 (2018 06:35), Max: 99 (2018 15:34)  HR: 55 (2018 06:35) (55 - 92)  BP: 189/89 (2018 06:35) (129/69 - 189/89)  BP(mean): --  RR: 17 (2018 06:35) (17 - 18)  SpO2: 98% (2018 06:35) (91% - 98%)  I&O's Detail      PHYSICAL EXAMINATION:    GENERAL: The patient is a well-developed, well-nourished _____in no apparent distress.     HEENT: Head is normocephalic and atraumatic. Extraocular muscles are intact. Mucous membranes are moist.     NECK: Supple.     LUNGS: Ronchi bilaterally    HEART: Regular rate and rhythm without murmur.    ABDOMEN: Soft, nontender, and nondistended.  No hepatosplenomegaly is noted.    EXTREMITIES: Without any cyanosis, clubbing, rash, lesions or edema.    NEUROLOGIC: Grossly intact.      LABS:                        14.6   6.5   )-----------( 121      ( 2018 05:37 )             44.3     01-    141  |  109<H>  |  20<H>  ----------------------------<  90  3.8   |  26  |  0.90    Ca    8.6      2018 05:37  Phos  3.5     -  Mg     2.2     -    TPro  6.7  /  Alb  3.2<L>  /  TBili  0.6  /  DBili  x   /  AST  77<H>  /  ALT  123<H>  /  AlkPhos  158<H>  -      Urinalysis Basic - ( 2018 15:25 )    Color: Red / Appearance: Slightly Turbid / S.020 / pH: x  Gluc: x / Ketone: Negative  / Bili: Negative / Urobili: Negative   Blood: x / Protein: 100 / Nitrite: Negative   Leuk Esterase: Trace / RBC: >50 /HPF / WBC 0-2 /HPF   Sq Epi: x / Non Sq Epi: Few /HPF / Bacteria: Trace /HPF        CARDIAC MARKERS ( 2018 05:37 )  <0.015 ng/mL / x     / 108 U/L / x     / 4.6 ng/mL  CARDIAC MARKERS ( 2018 00:47 )  0.018 ng/mL / x     / 135 U/L / x     / 5.8 ng/mL  CARDIAC MARKERS ( 2018 15:24 )  <0.015 ng/mL / x     / 172 U/L / x     / 6.6 ng/mL      D-Dimer Assay, Quantitative: <150 ng/mL DDU (18 @ 15:24)            MICROBIOLOGY:    RADIOLOGY & ADDITIONAL STUDIES:    CXR:  < from: Xray Chest 1 View AP -PORTABLE-Routine (18 @ 16:42) >  Impression: The left costophrenic angle is excluded from the radiograph.   No gross pulmonary consolidation, pleural effusion or pneumothorax.    The trachea is midline.    The cardiac silhouette is within normal limits.    Stable right cardiac device.      < end of copied text >    Ct scan chest:    ekg;    echo:

## 2018-01-05 NOTE — CONSULT NOTE ADULT - SUBJECTIVE AND OBJECTIVE BOX
CHIEF COMPLAINT:Patient is a 57y old  Male who presents with a chief complaint of Chest Pain and "L kidney pain" (04 Jan 2018 15:45)      HPI:  57 year old male, from home, ambulates with a walker. PMHx opioid dependence, HLD, HTN, CAD (stents x2 [Sep/17]) Afib (Eliquis), AICD (Medtronic), EF improved from 10 percent to 45 percent as per previous records.  RCC of left kidney (s/p Partial nephrectomy in 2002, repeated biopsy 2 months ago revealed recurrence in stage 2) presented to ED with sharp left chest pain associated with SOB and L kidney pain. [place]. Pain radiates to left neck and is described by the pt as a pressure. Pt was scheduled to have a surgery from his kidney cancer tomorrow but it was canceled b/c he caught a URI with sx of productive cough. Denies nausea, vomiting, diarrhea, constipation, pedal edema, headache or blurry visio.   He was admitted to [facility] for chest pain in September, had CATH with 2 stent placement. He was admitted in [facility] for chest pain and had NST done that showed Diffuse patchy uptake of tracer suggestive of a  nonspecific cardiomyopathy. Post-stress gated wall motion analysis was performed (LVEF = 38%; LVEDV = 159 ml.) revealing moderate global hypokinesis. Patient left AMA and now presents to [place] [facility].          PAST MEDICAL & SURGICAL HISTORY:  COPD (chronic obstructive pulmonary disease)  Opioid dependence  Calcium kidney stones  Renal cell carcinoma of left kidney: s/p partial nephrectomy in 2002  biopsy again in Sep 2017 showed RCC again in stage 2  Hyperlipidemia  Hypertension  CAD (coronary artery disease): (s/p 2 stents in Sep 2017)  Atrial fibrillation  AICD (automatic cardioverter/defibrillator) present: Medtronic  HLD (hyperlipidemia)  HTN (hypertension)  Carcinoma of kidney, unspecified laterality  Hep C w/o coma, chronic  Secondary hypertension  Chronic congestive heart failure, unspecified congestive heart failure type  Atrial fibrillation, unspecified type  Calcium kidney stone  H/O partial nephrectomy: 2002  S/P cholecystectomy: 2015  AICD (automatic cardioverter/defibrillator) present      MEDICATIONS  (STANDING):  ALBUTerol/ipratropium for Nebulization 3 milliLiter(s) Nebulizer every 6 hours  apixaban 5 milliGRAM(s) Oral every 12 hours  atorvastatin 40 milliGRAM(s) Oral at bedtime  clopidogrel Tablet 75 milliGRAM(s) Oral daily  enalapril 5 milliGRAM(s) Oral daily  gabapentin 400 milliGRAM(s) Oral three times a day  isosorbide   mononitrate ER Tablet (IMDUR) 30 milliGRAM(s) Oral daily  metoprolol     tartrate 25 milliGRAM(s) Oral two times a day  nicotine - 21 mG/24Hr(s) Patch 1 patch Transdermal daily  tamsulosin 0.4 milliGRAM(s) Oral at bedtime  tiotropium 18 MICROgram(s) Capsule 1 Capsule(s) Inhalation daily    MEDICATIONS  (PRN):  morphine  - Injectable 2 milliGRAM(s) IV Push every 6 hours PRN Severe Pain (7 - 10)      FAMILY HISTORY: No hx of CAD      SOCIAL HISTORY:  SH: Use to smoke 2.5 ppd from last 25 years, cut down to 10 cig/day from last 3 months. Denies alcohol and illicit drug use.,       Allergies    aspirin (Hives)  aspirin (Unknown)  codeine (Rash)  codeine (Unknown)  Haldol (Unknown)  morphine (Unknown)  Motrin (Rash)  Motrin (Unknown)  penicillin (Hives; Anaphylaxis)  penicillin (Unknown)  Toradol (Rash)  Toradol (Unknown)  Tylenol (Hives)    Intolerances    	    REVIEW OF SYSTEMS:  CONSTITUTIONAL: No fever, weight loss, or fatigue  EYES: No eye pain, visual disturbances, or discharge  ENT:  No difficulty hearing, tinnitus, vertigo; No sinus or throat pain  NECK: No pain or stiffness  RESPIRATORY: No cough, wheezing, chills or hemoptysis; No Shortness of Breath  CARDIOVASCULAR: + chest pain, No palpitations, passing out, dizziness, or leg swelling  GASTROINTESTINAL: No abdominal or epigastric pain. No nausea, vomiting, or hematemesis; No diarrhea or constipation. No melena or hematochezia.  GENITOURINARY: No dysuria, frequency, hematuria, or incontinence  NEUROLOGICAL: No headaches, memory loss, loss of strength, numbness, or tremors  SKIN: No itching, burning, rashes, or lesions   LYMPH Nodes: No enlarged glands  ENDOCRINE: No heat or cold intolerance; No hair loss  MUSCULOSKELETAL: No joint pain or swelling; No muscle, back, or extremity pain  PSYCHIATRIC: No depression, anxiety, mood swings, or difficulty sleeping  HEME/LYMPH: No easy bruising, or bleeding gums  ALLERGY AND IMMUNOLOGIC: No hives or eczema	      PHYSICAL EXAM:  T(C): 36.6 (01-05-18 @ 06:35), Max: 37.2 (01-04-18 @ 15:34)  HR: 55 (01-05-18 @ 06:35) (55 - 92)  BP: 189/89 (01-05-18 @ 06:35) (129/69 - 189/89)  RR: 17 (01-05-18 @ 06:35) (17 - 18)  SpO2: 98% (01-05-18 @ 06:35) (91% - 98%)      Appearance: Normal	  HEENT:   Normal oral mucosa, PERRL, EOMI	  Lymphatic: No lymphadenopathy  Cardiovascular: Normal S1 S2, No JVD, No murmurs, No edema  Respiratory: Lungs clear to auscultation	  Psychiatry: A & O x 3, Mood & affect appropriate  Gastrointestinal:  Soft, Non-tender, + BS	  Skin: No rashes, No ecchymoses, No cyanosis	  Neurologic: Non-focal  Extremities: Normal range of motion, No clubbing, cyanosis or edema  Vascular: Peripheral pulses palpable 2+ bilaterally    	    ECG:  NSR	    	  LABS:	 	    CARDIAC MARKERS:  CARDIAC MARKERS ( 05 Jan 2018 05:37 )  <0.015 ng/mL / x     / 108 U/L / x     / 4.6 ng/mL  CARDIAC MARKERS ( 05 Jan 2018 00:47 )  0.018 ng/mL / x     / 135 U/L / x     / 5.8 ng/mL  CARDIAC MARKERS ( 04 Jan 2018 15:24 )  <0.015 ng/mL / x     / 172 U/L / x     / 6.6 ng/mL                              14.6   6.5   )-----------( 121      ( 05 Jan 2018 05:37 )             44.3     01-05    141  |  109<H>  |  20<H>  ----------------------------<  90  3.8   |  26  |  0.90    Ca    8.6      05 Jan 2018 05:37  Phos  3.5     01-05  Mg     2.2     01-05    TPro  6.7  /  Alb  3.2<L>  /  TBili  0.6  /  DBili  x   /  AST  77<H>  /  ALT  123<H>  /  AlkPhos  158<H>  01-05    Stress test 1/3/18:    IMPRESSIONS:Abnormal Study  * Myocardial Perfusion SPECT results are abnormal.  * Diffuse patchy uptake of tracer suggestive of a  nonspecific cardiomyopathy.  * Post-stress gated wall motion analysis was performed  (LVEF = 38 %;LVEDV = 159 ml.), revealing moderate global  hypokinesis.

## 2018-01-05 NOTE — CONSULT NOTE ADULT - PROBLEM SELECTOR RECOMMENDATION 2
Bronchodilators neb  inhaled steroids/LABA combo po Bid  smoking cessation  Pfts as OP Bronchodilators neb  inhaled steroids/LABA combo po Bid  smoking cessation  Pfts as OP  Spiriva haqndihaler

## 2018-01-06 VITALS
OXYGEN SATURATION: 100 % | RESPIRATION RATE: 17 BRPM | HEART RATE: 66 BPM | DIASTOLIC BLOOD PRESSURE: 97 MMHG | SYSTOLIC BLOOD PRESSURE: 141 MMHG | TEMPERATURE: 98 F

## 2018-01-06 RX ORDER — CLONAZEPAM 1 MG
500 TABLET ORAL
Qty: 0 | Refills: 0 | COMMUNITY

## 2018-01-06 RX ORDER — TIOTROPIUM BROMIDE 18 UG/1
1 CAPSULE ORAL; RESPIRATORY (INHALATION)
Qty: 0 | Refills: 0 | DISCHARGE
Start: 2018-01-06

## 2018-01-06 RX ORDER — DIGOXIN 250 MCG
50 TABLET ORAL
Qty: 0 | Refills: 0 | COMMUNITY

## 2018-01-06 RX ORDER — ALBUTEROL 90 UG/1
2 AEROSOL, METERED ORAL EVERY 6 HOURS
Qty: 0 | Refills: 0 | Status: DISCONTINUED | OUTPATIENT
Start: 2018-01-06 | End: 2018-01-06

## 2018-01-06 RX ORDER — APIXABAN 2.5 MG/1
1 TABLET, FILM COATED ORAL
Qty: 0 | Refills: 0 | COMMUNITY

## 2018-01-06 RX ORDER — METOPROLOL TARTRATE 50 MG
1 TABLET ORAL
Qty: 0 | Refills: 0 | COMMUNITY

## 2018-01-06 RX ORDER — CLOPIDOGREL BISULFATE 75 MG/1
1 TABLET, FILM COATED ORAL
Qty: 0 | Refills: 0 | COMMUNITY

## 2018-01-06 RX ORDER — TIOTROPIUM BROMIDE 18 UG/1
1 CAPSULE ORAL; RESPIRATORY (INHALATION)
Qty: 0 | Refills: 0 | COMMUNITY

## 2018-01-06 RX ADMIN — MORPHINE SULFATE 2 MILLIGRAM(S): 50 CAPSULE, EXTENDED RELEASE ORAL at 00:00

## 2018-01-06 RX ADMIN — MORPHINE SULFATE 2 MILLIGRAM(S): 50 CAPSULE, EXTENDED RELEASE ORAL at 14:52

## 2018-01-06 RX ADMIN — APIXABAN 5 MILLIGRAM(S): 2.5 TABLET, FILM COATED ORAL at 05:22

## 2018-01-06 RX ADMIN — TIOTROPIUM BROMIDE 1 CAPSULE(S): 18 CAPSULE ORAL; RESPIRATORY (INHALATION) at 13:31

## 2018-01-06 RX ADMIN — MORPHINE SULFATE 2 MILLIGRAM(S): 50 CAPSULE, EXTENDED RELEASE ORAL at 02:27

## 2018-01-06 RX ADMIN — MORPHINE SULFATE 2 MILLIGRAM(S): 50 CAPSULE, EXTENDED RELEASE ORAL at 02:55

## 2018-01-06 RX ADMIN — GABAPENTIN 400 MILLIGRAM(S): 400 CAPSULE ORAL at 13:31

## 2018-01-06 RX ADMIN — Medication 25 MILLIGRAM(S): at 05:22

## 2018-01-06 RX ADMIN — Medication 3 MILLILITER(S): at 02:26

## 2018-01-06 RX ADMIN — Medication 5 MILLIGRAM(S): at 05:22

## 2018-01-06 RX ADMIN — CLOPIDOGREL BISULFATE 75 MILLIGRAM(S): 75 TABLET, FILM COATED ORAL at 13:31

## 2018-01-06 RX ADMIN — MORPHINE SULFATE 2 MILLIGRAM(S): 50 CAPSULE, EXTENDED RELEASE ORAL at 08:41

## 2018-01-06 RX ADMIN — ISOSORBIDE MONONITRATE 30 MILLIGRAM(S): 60 TABLET, EXTENDED RELEASE ORAL at 13:31

## 2018-01-06 RX ADMIN — GABAPENTIN 400 MILLIGRAM(S): 400 CAPSULE ORAL at 05:22

## 2018-01-06 RX ADMIN — Medication 3 MILLILITER(S): at 09:42

## 2018-01-06 NOTE — PROGRESS NOTE ADULT - SUBJECTIVE AND OBJECTIVE BOX
Patient is a 57y old  Male who presents with a chief complaint of Chest Pain and "L kidney pain" (06 Jan 2018 00:44)    pt seen in icu [  ], reg med floor [   ], bed [  ], chair at bedside [   ], a+o x3 [  ], lethargic [  ],  nad [  ]    tran [  ], ngt [  ], peg [  ], et tube [  ], cent line [  ], picc line [  ]        Allergies    aspirin (Hives)  aspirin (Unknown)  codeine (Rash)  codeine (Unknown)  Haldol (Unknown)  morphine (Unknown)  Motrin (Rash)  Motrin (Unknown)  penicillin (Hives; Anaphylaxis)  penicillin (Unknown)  Toradol (Rash)  Toradol (Unknown)  Tylenol (Hives)        Vitals    T(F): 98.2 (01-05-18 @ 21:23), Max: 98.2 (01-05-18 @ 21:23)  HR: 57 (01-06-18 @ 05:26) (55 - 73)  BP: 152/75 (01-06-18 @ 05:26) (114/79 - 162/87)  RR: 18 (01-06-18 @ 05:26) (14 - 18)  SpO2: 98% (01-06-18 @ 05:26) (97% - 100%)  Wt(kg): --  CAPILLARY BLOOD GLUCOSE          Labs                          14.6   6.5   )-----------( 121      ( 05 Jan 2018 05:37 )             44.3       01-05    141  |  109<H>  |  20<H>  ----------------------------<  90  3.8   |  26  |  0.90    Ca    8.6      05 Jan 2018 05:37  Phos  3.5     01-05  Mg     2.2     01-05    TPro  6.7  /  Alb  3.2<L>  /  TBili  0.6  /  DBili  x   /  AST  77<H>  /  ALT  123<H>  /  AlkPhos  158<H>  01-05      CARDIAC MARKERS ( 05 Jan 2018 05:37 )  <0.015 ng/mL / x     / 108 U/L / x     / 4.6 ng/mL  CARDIAC MARKERS ( 05 Jan 2018 00:47 )  0.018 ng/mL / x     / 135 U/L / x     / 5.8 ng/mL  CARDIAC MARKERS ( 04 Jan 2018 15:24 )  <0.015 ng/mL / x     / 172 U/L / x     / 6.6 ng/mL        .Urine Clean Catch (Midstream)  01-04 @ 21:13   No growth  --  --      URINE MIDSTREAM  01-02 @ 00:48 --  --  --      .Urine Clean Catch (Midstream)  11-22 @ 14:31   No growth  --  --          Radiology Results      Meds    MEDICATIONS  (STANDING):  ALBUTerol/ipratropium for Nebulization 3 milliLiter(s) Nebulizer every 6 hours  apixaban 5 milliGRAM(s) Oral every 12 hours  atorvastatin 40 milliGRAM(s) Oral at bedtime  clopidogrel Tablet 75 milliGRAM(s) Oral daily  enalapril 5 milliGRAM(s) Oral daily  gabapentin 400 milliGRAM(s) Oral three times a day  isosorbide   mononitrate ER Tablet (IMDUR) 30 milliGRAM(s) Oral daily  metoprolol     tartrate 25 milliGRAM(s) Oral two times a day  nicotine - 21 mG/24Hr(s) Patch 1 patch Transdermal daily  tamsulosin 0.4 milliGRAM(s) Oral at bedtime  tiotropium 18 MICROgram(s) Capsule 1 Capsule(s) Inhalation daily      MEDICATIONS  (PRN):  morphine  - Injectable 2 milliGRAM(s) IV Push every 6 hours PRN Severe Pain (7 - 10)      Physical Exam    Neuro :  no focal deficits  Respiratory: CTA B/L  CV: RRR, S1S2, no murmurs,   Abdominal: Soft, NT, ND +BS,  Extremities: No edema, + peripheral pulses    ASSESSMENT    Chest pain  COPD (chronic obstructive pulmonary disease)  Opioid dependence  Calcium kidney stones  Renal cell carcinoma of left kidney  Hyperlipidemia  Hypertension  CAD (coronary artery disease)  Atrial fibrillation  AICD (automatic cardioverter/defibrillator) present  Pacemaker  Calcium kidney stone  H/O partial nephrectomy  S/P cholecystectomy      PLAN Patient is a 57y old  Male who presents with a chief complaint of Chest Pain and "L kidney pain" (06 Jan 2018 00:44)    pt seen in icu [  ], reg med floor [  x ], bed [ x ], chair at bedside [   ], a+o x3 [ x ], lethargic [  ],  nad [x  ]    no c/o cp at the moment. pt just returned from smoking a cigarette outside        Allergies    aspirin (Hives)  aspirin (Unknown)  codeine (Rash)  codeine (Unknown)  Haldol (Unknown)  morphine (Unknown)  Motrin (Rash)  Motrin (Unknown)  penicillin (Hives; Anaphylaxis)  penicillin (Unknown)  Toradol (Rash)  Toradol (Unknown)  Tylenol (Hives)        Vitals    T(F): 98.2 (01-05-18 @ 21:23), Max: 98.2 (01-05-18 @ 21:23)  HR: 57 (01-06-18 @ 05:26) (55 - 73)  BP: 152/75 (01-06-18 @ 05:26) (114/79 - 162/87)  RR: 18 (01-06-18 @ 05:26) (14 - 18)  SpO2: 98% (01-06-18 @ 05:26) (97% - 100%)  Wt(kg): --  CAPILLARY BLOOD GLUCOSE          Labs                          14.6   6.5   )-----------( 121      ( 05 Jan 2018 05:37 )             44.3       01-05    141  |  109<H>  |  20<H>  ----------------------------<  90  3.8   |  26  |  0.90    Ca    8.6      05 Jan 2018 05:37  Phos  3.5     01-05  Mg     2.2     01-05    TPro  6.7  /  Alb  3.2<L>  /  TBili  0.6  /  DBili  x   /  AST  77<H>  /  ALT  123<H>  /  AlkPhos  158<H>  01-05      CARDIAC MARKERS ( 05 Jan 2018 05:37 )  <0.015 ng/mL / x     / 108 U/L / x     / 4.6 ng/mL  CARDIAC MARKERS ( 05 Jan 2018 00:47 )  0.018 ng/mL / x     / 135 U/L / x     / 5.8 ng/mL  CARDIAC MARKERS ( 04 Jan 2018 15:24 )  <0.015 ng/mL / x     / 172 U/L / x     / 6.6 ng/mL        .Urine Clean Catch (Midstream)  01-04 @ 21:13   No growth  --  --      URINE MIDSTREAM  01-02 @ 00:48 --  --  --    Radiology Results      Meds    MEDICATIONS  (STANDING):  ALBUTerol/ipratropium for Nebulization 3 milliLiter(s) Nebulizer every 6 hours  apixaban 5 milliGRAM(s) Oral every 12 hours  atorvastatin 40 milliGRAM(s) Oral at bedtime  clopidogrel Tablet 75 milliGRAM(s) Oral daily  enalapril 5 milliGRAM(s) Oral daily  gabapentin 400 milliGRAM(s) Oral three times a day  isosorbide   mononitrate ER Tablet (IMDUR) 30 milliGRAM(s) Oral daily  metoprolol     tartrate 25 milliGRAM(s) Oral two times a day  nicotine - 21 mG/24Hr(s) Patch 1 patch Transdermal daily  tamsulosin 0.4 milliGRAM(s) Oral at bedtime  tiotropium 18 MICROgram(s) Capsule 1 Capsule(s) Inhalation daily      MEDICATIONS  (PRN):  morphine  - Injectable 2 milliGRAM(s) IV Push every 6 hours PRN Severe Pain (7 - 10)      Physical Exam    Neuro :  no focal deficits  Respiratory: CTA B/L  CV: RRR, S1S2, no murmurs,   Abdominal: Soft, NT, ND +BS,  Extremities: No edema, + peripheral pulses    ASSESSMENT    Chest pain  possible drug seeking behavior  h/o COPD (chronic obstructive pulmonary disease)  Opioid dependence  Calcium kidney stones  Renal cell carcinoma of left kidney  Hyperlipidemia  Hypertension  CAD (coronary artery disease)  Atrial fibrillation  AICD (automatic cardioverter/defibrillator) present  Pacemaker  Calcium kidney stone  H/O partial nephrectomy  S/P cholecystectomy      PLAN    cont lopressor, aspirin, statin,   cont preadmit home meds,   ce q8 x3 neg  cardio cons noted  Pt with recent negative stress test and negative trponin's-doubt chest pain is cardiac.  AICD checkq 3 months.  pain mgmt cons  advised smoking cesation  pulm f/u  cont current meds  d/c plan if cleared by cardio

## 2018-01-06 NOTE — DISCHARGE NOTE ADULT - MEDICATION SUMMARY - MEDICATIONS TO TAKE
I will START or STAY ON the medications listed below when I get home from the hospital:    methadone 10 mg oral tablet  -- 1 tab(s) by mouth every 6 hours, As Needed  -- Indication: For Prophylactic measure    acetaminophen 325 mg oral tablet  -- 2 tab(s) by mouth every 6 hours  -- Indication: For Chest pain    enalapril 5 mg oral tablet  -- 1 tab(s) by mouth once a day  -- Indication: For CAD (coronary artery disease)    Flomax 0.4 mg oral capsule  -- 1 cap(s) by mouth once a day  -- Indication: For BPH    apixaban 5 mg oral tablet  -- 1 tab(s) by mouth every 12 hours  -- Indication: For Atrial fibrillation    traZODone 50 mg oral tablet  -- 50 milligram(s) by mouth once a day (at bedtime)  -- Indication: For Insomnia    atorvastatin 40 mg oral tablet  -- 1 tab(s) by mouth once a day (at bedtime)  -- Indication: For CAD (coronary artery disease)    clopidogrel 75 mg oral tablet  -- 1 tab(s) by mouth once a day  -- Indication: For CAD (coronary artery disease)    metoprolol tartrate 25 mg oral tablet  -- 1 tab(s) by mouth 2 times a day  -- Indication: For CAD (coronary artery disease)    tiotropium 18 mcg inhalation capsule  -- 1 cap(s) inhaled once a day  -- Indication: For COPD (chronic obstructive pulmonary disease)    Lasix 20 mg oral tablet  -- 1 tab(s) by mouth once a day  -- Indication: For CAD (coronary artery disease)

## 2018-01-06 NOTE — DISCHARGE NOTE ADULT - PLAN OF CARE
You have chronic obstructive pulmonary disease, which affects your lungs. Please continue to take your home medication as prescribed. If you experience increasing shortness of breath, dizziness, or lightheadedness, please proceed to your nearest emergency department. You have high cholesterol, and should continue to take your statin as prescribed You have high blood pressure, and should continue to take your home blood pressure medications as prescribed. If you notice any dizziness or lightheadedness upon standing, please follow up with your primary care provider. If you notice any sudden severe headaches, palpitations, or weakness, please proceed to your nearest emergency department or call 911. Follow up with your cardiologist within a week from discharge. You presented with chest pain - the cardiologist does not believe it's likely secondary to your heart secondary to recent negative work up. Please have your AICD (defibrillator) checked every 3 months. Please follow up with your cardiologist within a week from discharge. You have atrial fibrillation,  an irregular heart rhythm, which can increase your risk of stroke. Please continue to take your anticoagulant medication as prescribed and follow up with your primary care provider. If you notice any signs of major bleeding, including a change in mental status, asymmetric weakness, or slurred speech, please return to the emergency department

## 2018-01-06 NOTE — DISCHARGE NOTE ADULT - HOSPITAL COURSE
57 year old male, from home, ambulates with a walker. PMHx opioid dependence, HLD, HTN, CAD (stents x2 [Sep/17]) Afib (Eliquis), AICD (Medtronic), EF improved from 10 percent to 45 percent as per previous records.  RCC of left kidney (s/p Partial nephrectomy in 2002, repeated biopsy 2 months ago revealed recurrence in stage 2) presented to ED with sharp left chest pain associated with SOB and L kidney pain. [place]. Pain radiates to left neck and is described by the pt as a pressure. Pt was scheduled to have a surgery from his kidney cancer tomorrow but it was canceled b/c he caught a URI with sx of productive cough. Denies nausea, vomiting, diarrhea, constipation, pedal edema, headache or blurry visio.   He was admitted to [facility] for chest pain in September, had CATH with 2 stent placement. He was admitted in [place] for chest pain and had NST done that showed Diffuse patchy uptake of tracer suggestive of a  nonspecific cardiomyopathy. Post-stress gated wall motion analysis was performed (LVEF = 38%; LVEDV = 159 ml.) revealing moderate global hypokinesis. Patient left AMA without pursuing further cardiac work up. Used to smoke 2.5 ppd from last 25 years, cut down to 10 cig/day from last 3 months. Denies alcohol and illicit drug use.    Pt cleared by cardiology and instructs patient to have AICD check q3 months. Patient seen and examined at bedside with no acute complaints. The medical plan and results were discussed with the patient throughout the hospital course. Patient is medically clear for discharge and is advised to follow up with his primary care physician within a week for continued medical care. Please see above and the medical records for additional information.

## 2018-01-06 NOTE — DISCHARGE NOTE ADULT - PATIENT PORTAL LINK FT
“You can access the [facility] Patient Portal, offered by [facility], by registering with the following website: [email or web address]”

## 2018-01-06 NOTE — PROGRESS NOTE ADULT - SUBJECTIVE AND OBJECTIVE BOX
CHIEF COMPLAINT:Patient is a 57y old  Male who presents with a chief complaint of Chest Pain and "L kidney pain". Pt appears comfortable.    	  REVIEW OF SYSTEMS:  CONSTITUTIONAL: No fever, weight loss, or fatigue  EYES: No eye pain, visual disturbances, or discharge  ENT:  No difficulty hearing, tinnitus, vertigo; No sinus or throat pain  NECK: No pain or stiffness  RESPIRATORY: No cough, wheezing, chills or hemoptysis; No Shortness of Breath  CARDIOVASCULAR: No chest pain, palpitations, passing out, dizziness, or leg swelling  GASTROINTESTINAL: No abdominal or epigastric pain. No nausea, vomiting, or hematemesis; No diarrhea or constipation. No melena or hematochezia.  GENITOURINARY: No dysuria, frequency, hematuria, or incontinence  NEUROLOGICAL: No headaches, memory loss, loss of strength, numbness, or tremors  SKIN: No itching, burning, rashes, or lesions   LYMPH Nodes: No enlarged glands  ENDOCRINE: No heat or cold intolerance; No hair loss  MUSCULOSKELETAL: No joint pain or swelling; No muscle, back, or extremity pain  PSYCHIATRIC: No depression, anxiety, mood swings, or difficulty sleeping  HEME/LYMPH: No easy bruising, or bleeding gums  ALLERGY AND IMMUNOLOGIC: No hives or eczema	      PHYSICAL EXAM:  T(C): 36.8 (01-05-18 @ 21:23), Max: 36.8 (01-05-18 @ 21:23)  HR: 57 (01-06-18 @ 05:26) (55 - 73)  BP: 152/75 (01-06-18 @ 05:26) (114/79 - 162/87)  RR: 18 (01-06-18 @ 05:26) (14 - 18)  SpO2: 98% (01-06-18 @ 05:26) (97% - 100%)      Appearance: Normal	  HEENT:   Normal oral mucosa, PERRL, EOMI	  Lymphatic: No lymphadenopathy  Cardiovascular: Normal S1 S2, No JVD, No murmurs, No edema  Respiratory: Lungs clear to auscultation	  Psychiatry: A & O x 3, Mood & affect appropriate  Gastrointestinal:  Soft, Non-tender, + BS	  Skin: No rashes, No ecchymoses, No cyanosis	  Neurologic: Non-focal  Extremities: Normal range of motion, No clubbing, cyanosis or edema  Vascular: Peripheral pulses palpable 2+ bilaterally    MEDICATIONS  (STANDING):  ALBUTerol/ipratropium for Nebulization 3 milliLiter(s) Nebulizer every 6 hours  apixaban 5 milliGRAM(s) Oral every 12 hours  atorvastatin 40 milliGRAM(s) Oral at bedtime  clopidogrel Tablet 75 milliGRAM(s) Oral daily  enalapril 5 milliGRAM(s) Oral daily  gabapentin 400 milliGRAM(s) Oral three times a day  isosorbide   mononitrate ER Tablet (IMDUR) 30 milliGRAM(s) Oral daily  metoprolol     tartrate 25 milliGRAM(s) Oral two times a day  nicotine - 21 mG/24Hr(s) Patch 1 patch Transdermal daily  tamsulosin 0.4 milliGRAM(s) Oral at bedtime  tiotropium 18 MICROgram(s) Capsule 1 Capsule(s) Inhalation daily      	  LABS:	 	    CARDIAC MARKERS:  CARDIAC MARKERS ( 05 Jan 2018 05:37 )  <0.015 ng/mL / x     / 108 U/L / x     / 4.6 ng/mL  CARDIAC MARKERS ( 05 Jan 2018 00:47 )  0.018 ng/mL / x     / 135 U/L / x     / 5.8 ng/mL  CARDIAC MARKERS ( 04 Jan 2018 15:24 )  <0.015 ng/mL / x     / 172 U/L / x     / 6.6 ng/mL                        14.6   6.5   )-----------( 121      ( 05 Jan 2018 05:37 )             44.3     01-05    141  |  109<H>  |  20<H>  ----------------------------<  90  3.8   |  26  |  0.90    Ca    8.6      05 Jan 2018 05:37  Phos  3.5     01-05  Mg     2.2     01-05    TPro  6.7  /  Alb  3.2<L>  /  TBili  0.6  /  DBili  x   /  AST  77<H>  /  ALT  123<H>  /  AlkPhos  158<H>  01-05    proBNP: Serum Pro-Brain Natriuretic Peptide: 293 pg/mL (12-09 @ 21:25)    Lipid Profile: Cholesterol 147  LDL 35  HDL 93  TG 95    HgA1c:   TSH: Thyroid Stimulating Hormone, Serum: 0.67 uIU/mL (01-02 @ 05:00)  Thyroid Stimulating Hormone, Serum: 0.43 uU/mL (12-10 @ 06:25)

## 2018-01-06 NOTE — DISCHARGE NOTE ADULT - CARE PLAN
Principal Discharge DX:	CAD (coronary artery disease)  Goal:	Follow up with your cardiologist within a week from discharge.  Instructions for follow-up, activity and diet:	You presented with chest pain - the cardiologist does not believe it's likely secondary to your heart secondary to recent negative work up. Please have your AICD (defibrillator) checked every 3 months. Please follow up with your cardiologist within a week from discharge.  Secondary Diagnosis:	Atrial fibrillation  Instructions for follow-up, activity and diet:	You have atrial fibrillation,  an irregular heart rhythm, which can increase your risk of stroke. Please continue to take your anticoagulant medication as prescribed and follow up with your primary care provider. If you notice any signs of major bleeding, including a change in mental status, asymmetric weakness, or slurred speech, please return to the emergency department  Secondary Diagnosis:	COPD (chronic obstructive pulmonary disease)  Instructions for follow-up, activity and diet:	You have chronic obstructive pulmonary disease, which affects your lungs. Please continue to take your home medication as prescribed. If you experience increasing shortness of breath, dizziness, or lightheadedness, please proceed to your nearest emergency department.  Secondary Diagnosis:	HLD (hyperlipidemia)  Instructions for follow-up, activity and diet:	You have high cholesterol, and should continue to take your statin as prescribed  Secondary Diagnosis:	Hypertension  Instructions for follow-up, activity and diet:	You have high blood pressure, and should continue to take your home blood pressure medications as prescribed. If you notice any dizziness or lightheadedness upon standing, please follow up with your primary care provider. If you notice any sudden severe headaches, palpitations, or weakness, please proceed to your nearest emergency department or call 911.

## 2018-01-06 NOTE — DISCHARGE NOTE ADULT - CARE PROVIDER_API CALL
Jeannie Harper), Cardiology; Internal Medicine  [address] [place] [id number]  Phone: [phone]  Fax: [phone]

## 2018-01-06 NOTE — PROGRESS NOTE ADULT - SUBJECTIVE AND OBJECTIVE BOX
Patient is a 57y old  Male who presents with a chief complaint of Chest Pain and "L kidney pain" (2018 00:44)  Awake, alert, comfortable in bed in NAD. He was disrespectful to staff earlier and agitated. Still smoking outside the premises. no respiratory distress. No cough or wheezing. No further chest pain.    INTERVAL HPI/OVERNIGHT EVENTS:      VITAL SIGNS:  T(F): 98.2 (18 @ 21:23)  HR: 57 (18 @ 05:26)  BP: 152/75 (18 @ 05:26)  RR: 18 (18 @ 05:26)  SpO2: 98% (18 @ 05:26)  Wt(kg): --  I&O's Detail          REVIEW OF SYSTEMS:    CONSTITUTIONAL:  No fevers, chills, sweats    HEENT:  Eyes:  No diplopia or blurred vision. ENT:  No earache, sore throat or runny nose.    CARDIOVASCULAR:  No pressure, squeezing, tightness, or heaviness about the chest; no palpitations.    RESPIRATORY:  Per HPI    GASTROINTESTINAL:  No abdominal pain, nausea, vomiting or diarrhea.    GENITOURINARY:  No dysuria, frequency or urgency.    NEUROLOGIC:  No paresthesias, fasciculations, seizures or weakness.    PSYCHIATRIC:  No disorder of thought or mood.      PHYSICAL EXAM:    Constitutional: Well developed and nourished  Eyes:Perrla  ENMT: normal  Neck:supple  Respiratory: Occasional ronchi bilaterally  Cardiovascular: S1 S2 regular  Gastrointestinal: Soft, Non tender  Extremities: No edema  Vascular:normal  Neurological:Awake, alert,Ox3  Musculoskeletal:Normal      MEDICATIONS  (STANDING):  ALBUTerol/ipratropium for Nebulization 3 milliLiter(s) Nebulizer every 6 hours  apixaban 5 milliGRAM(s) Oral every 12 hours  atorvastatin 40 milliGRAM(s) Oral at bedtime  clopidogrel Tablet 75 milliGRAM(s) Oral daily  enalapril 5 milliGRAM(s) Oral daily  gabapentin 400 milliGRAM(s) Oral three times a day  isosorbide   mononitrate ER Tablet (IMDUR) 30 milliGRAM(s) Oral daily  metoprolol     tartrate 25 milliGRAM(s) Oral two times a day  nicotine - 21 mG/24Hr(s) Patch 1 patch Transdermal daily  tamsulosin 0.4 milliGRAM(s) Oral at bedtime  tiotropium 18 MICROgram(s) Capsule 1 Capsule(s) Inhalation daily    MEDICATIONS  (PRN):  morphine  - Injectable 2 milliGRAM(s) IV Push every 6 hours PRN Severe Pain (7 - 10)      Allergies    aspirin (Hives)  aspirin (Unknown)  codeine (Rash)  codeine (Unknown)  Haldol (Unknown)  morphine (Unknown)  Motrin (Rash)  Motrin (Unknown)  penicillin (Hives; Anaphylaxis)  penicillin (Unknown)  Toradol (Rash)  Toradol (Unknown)  Tylenol (Hives)    Intolerances        LABS:                        14.6   6.5   )-----------( 121      ( 2018 05:37 )             44.3         141  |  109<H>  |  20<H>  ----------------------------<  90  3.8   |  26  |  0.90    Ca    8.6      2018 05:37  Phos  3.5       Mg     2.2         TPro  6.7  /  Alb  3.2<L>  /  TBili  0.6  /  DBili  x   /  AST  77<H>  /  ALT  123<H>  /  AlkPhos  158<H>        Urinalysis Basic - ( 2018 15:25 )    Color: Red / Appearance: Slightly Turbid / S.020 / pH: x  Gluc: x / Ketone: Negative  / Bili: Negative / Urobili: Negative   Blood: x / Protein: 100 / Nitrite: Negative   Leuk Esterase: Trace / RBC: >50 /HPF / WBC 0-2 /HPF   Sq Epi: x / Non Sq Epi: Few /HPF / Bacteria: Trace /HPF        CARDIAC MARKERS ( 2018 05:37 )  <0.015 ng/mL / x     / 108 U/L / x     / 4.6 ng/mL  CARDIAC MARKERS ( 2018 00:47 )  0.018 ng/mL / x     / 135 U/L / x     / 5.8 ng/mL  CARDIAC MARKERS ( 2018 15:24 )  <0.015 ng/mL / x     / 172 U/L / x     / 6.6 ng/mL      CAPILLARY BLOOD GLUCOSE        pro-bnp --  @ 15:24     d-dimer <150   @ 15:24      RADIOLOGY & ADDITIONAL TESTS:    CXR:    Ct scan chest:    ekg;    echo:

## 2018-01-11 ENCOUNTER — EMERGENCY (EMERGENCY)
Facility: HOSPITAL | Age: 58
LOS: 1 days | Discharge: HOME | End: 2018-01-11
Attending: EMERGENCY MEDICINE | Admitting: EMERGENCY MEDICINE
Payer: COMMERCIAL

## 2018-01-11 VITALS
TEMPERATURE: 98 F | SYSTOLIC BLOOD PRESSURE: 181 MMHG | OXYGEN SATURATION: 100 % | DIASTOLIC BLOOD PRESSURE: 96 MMHG | RESPIRATION RATE: 18 BRPM | HEART RATE: 61 BPM

## 2018-01-11 VITALS
RESPIRATION RATE: 19 BRPM | HEART RATE: 77 BPM | SYSTOLIC BLOOD PRESSURE: 181 MMHG | DIASTOLIC BLOOD PRESSURE: 97 MMHG | TEMPERATURE: 97 F | OXYGEN SATURATION: 98 %

## 2018-01-11 DIAGNOSIS — Z88.8 ALLERGY STATUS TO OTHER DRUGS, MEDICAMENTS AND BIOLOGICAL SUBSTANCES STATUS: ICD-10-CM

## 2018-01-11 DIAGNOSIS — R10.9 UNSPECIFIED ABDOMINAL PAIN: ICD-10-CM

## 2018-01-11 DIAGNOSIS — Z72.0 TOBACCO USE: ICD-10-CM

## 2018-01-11 DIAGNOSIS — Z90.49 ACQUIRED ABSENCE OF OTHER SPECIFIED PARTS OF DIGESTIVE TRACT: Chronic | ICD-10-CM

## 2018-01-11 DIAGNOSIS — N20.0 CALCULUS OF KIDNEY: Chronic | ICD-10-CM

## 2018-01-11 DIAGNOSIS — J44.9 CHRONIC OBSTRUCTIVE PULMONARY DISEASE, UNSPECIFIED: ICD-10-CM

## 2018-01-11 DIAGNOSIS — Z95.810 PRESENCE OF AUTOMATIC (IMPLANTABLE) CARDIAC DEFIBRILLATOR: Chronic | ICD-10-CM

## 2018-01-11 DIAGNOSIS — Z79.899 OTHER LONG TERM (CURRENT) DRUG THERAPY: ICD-10-CM

## 2018-01-11 DIAGNOSIS — I25.10 ATHEROSCLEROTIC HEART DISEASE OF NATIVE CORONARY ARTERY WITHOUT ANGINA PECTORIS: ICD-10-CM

## 2018-01-11 DIAGNOSIS — N30.90 CYSTITIS, UNSPECIFIED WITHOUT HEMATURIA: ICD-10-CM

## 2018-01-11 DIAGNOSIS — Z88.6 ALLERGY STATUS TO ANALGESIC AGENT: ICD-10-CM

## 2018-01-11 DIAGNOSIS — Z90.5 ACQUIRED ABSENCE OF KIDNEY: Chronic | ICD-10-CM

## 2018-01-11 DIAGNOSIS — Z88.0 ALLERGY STATUS TO PENICILLIN: ICD-10-CM

## 2018-01-11 DIAGNOSIS — I10 ESSENTIAL (PRIMARY) HYPERTENSION: ICD-10-CM

## 2018-01-11 DIAGNOSIS — E78.5 HYPERLIPIDEMIA, UNSPECIFIED: ICD-10-CM

## 2018-01-11 LAB
ALBUMIN SERPL ELPH-MCNC: 3.8 G/DL — SIGNIFICANT CHANGE UP (ref 3.3–5)
ALP SERPL-CCNC: 148 U/L — HIGH (ref 40–120)
ALT FLD-CCNC: 145 U/L — HIGH (ref 10–45)
ANION GAP SERPL CALC-SCNC: 11 MMOL/L — SIGNIFICANT CHANGE UP (ref 5–17)
APPEARANCE UR: CLEAR — SIGNIFICANT CHANGE UP
AST SERPL-CCNC: 97 U/L — HIGH (ref 10–40)
BASOPHILS NFR BLD AUTO: 0.1 % — SIGNIFICANT CHANGE UP (ref 0–2)
BILIRUB SERPL-MCNC: 0.3 MG/DL — SIGNIFICANT CHANGE UP (ref 0.2–1.2)
BILIRUB UR-MCNC: NEGATIVE — SIGNIFICANT CHANGE UP
BUN SERPL-MCNC: 17 MG/DL — SIGNIFICANT CHANGE UP (ref 7–23)
CALCIUM SERPL-MCNC: 9 MG/DL — SIGNIFICANT CHANGE UP (ref 8.4–10.5)
CHLORIDE SERPL-SCNC: 104 MMOL/L — SIGNIFICANT CHANGE UP (ref 96–108)
CO2 SERPL-SCNC: 26 MMOL/L — SIGNIFICANT CHANGE UP (ref 22–31)
COLOR SPEC: YELLOW — SIGNIFICANT CHANGE UP
CREAT SERPL-MCNC: 0.99 MG/DL — SIGNIFICANT CHANGE UP (ref 0.5–1.3)
DIFF PNL FLD: (no result)
EOSINOPHIL NFR BLD AUTO: 2 % — SIGNIFICANT CHANGE UP (ref 0–6)
GLUCOSE SERPL-MCNC: 129 MG/DL — HIGH (ref 70–99)
GLUCOSE UR QL: NEGATIVE — SIGNIFICANT CHANGE UP
HCT VFR BLD CALC: 40 % — SIGNIFICANT CHANGE UP (ref 39–50)
HGB BLD-MCNC: 13.5 G/DL — SIGNIFICANT CHANGE UP (ref 13–17)
KETONES UR-MCNC: NEGATIVE — SIGNIFICANT CHANGE UP
LEUKOCYTE ESTERASE UR-ACNC: (no result)
LYMPHOCYTES # BLD AUTO: 21.8 % — SIGNIFICANT CHANGE UP (ref 13–44)
MCHC RBC-ENTMCNC: 29.4 PG — SIGNIFICANT CHANGE UP (ref 27–34)
MCHC RBC-ENTMCNC: 33.8 G/DL — SIGNIFICANT CHANGE UP (ref 32–36)
MCV RBC AUTO: 87.1 FL — SIGNIFICANT CHANGE UP (ref 80–100)
MONOCYTES NFR BLD AUTO: 12.2 % — SIGNIFICANT CHANGE UP (ref 2–14)
NEUTROPHILS NFR BLD AUTO: 63.9 % — SIGNIFICANT CHANGE UP (ref 43–77)
NITRITE UR-MCNC: POSITIVE
PH UR: 6 — SIGNIFICANT CHANGE UP (ref 5–8)
PLATELET # BLD AUTO: 129 K/UL — LOW (ref 150–400)
POTASSIUM SERPL-MCNC: 3.9 MMOL/L — SIGNIFICANT CHANGE UP (ref 3.5–5.3)
POTASSIUM SERPL-SCNC: 3.9 MMOL/L — SIGNIFICANT CHANGE UP (ref 3.5–5.3)
PROT SERPL-MCNC: 6.3 G/DL — SIGNIFICANT CHANGE UP (ref 6–8.3)
PROT UR-MCNC: 100 MG/DL
RBC # BLD: 4.59 M/UL — SIGNIFICANT CHANGE UP (ref 4.2–5.8)
RBC # FLD: 14.2 % — SIGNIFICANT CHANGE UP (ref 10.3–16.9)
SODIUM SERPL-SCNC: 141 MMOL/L — SIGNIFICANT CHANGE UP (ref 135–145)
SP GR SPEC: 1.02 — SIGNIFICANT CHANGE UP (ref 1–1.03)
UROBILINOGEN FLD QL: 0.2 E.U./DL — SIGNIFICANT CHANGE UP
WBC # BLD: 8.1 K/UL — SIGNIFICANT CHANGE UP (ref 3.8–10.5)
WBC # FLD AUTO: 8.1 K/UL — SIGNIFICANT CHANGE UP (ref 3.8–10.5)

## 2018-01-11 PROCEDURE — 87086 URINE CULTURE/COLONY COUNT: CPT

## 2018-01-11 PROCEDURE — 74176 CT ABD & PELVIS W/O CONTRAST: CPT | Mod: 26

## 2018-01-11 PROCEDURE — 99285 EMERGENCY DEPT VISIT HI MDM: CPT | Mod: 25

## 2018-01-11 PROCEDURE — 74176 CT ABD & PELVIS W/O CONTRAST: CPT

## 2018-01-11 PROCEDURE — 81001 URINALYSIS AUTO W/SCOPE: CPT

## 2018-01-11 PROCEDURE — 71046 X-RAY EXAM CHEST 2 VIEWS: CPT | Mod: 26

## 2018-01-11 PROCEDURE — 85025 COMPLETE CBC W/AUTO DIFF WBC: CPT

## 2018-01-11 PROCEDURE — 80053 COMPREHEN METABOLIC PANEL: CPT

## 2018-01-11 PROCEDURE — 96374 THER/PROPH/DIAG INJ IV PUSH: CPT

## 2018-01-11 PROCEDURE — 71046 X-RAY EXAM CHEST 2 VIEWS: CPT

## 2018-01-11 PROCEDURE — 99284 EMERGENCY DEPT VISIT MOD MDM: CPT | Mod: 25

## 2018-01-11 RX ORDER — CIPROFLOXACIN LACTATE 400MG/40ML
1 VIAL (ML) INTRAVENOUS
Qty: 20 | Refills: 0 | OUTPATIENT
Start: 2018-01-11 | End: 2018-01-20

## 2018-01-11 RX ORDER — MORPHINE SULFATE 50 MG/1
6 CAPSULE, EXTENDED RELEASE ORAL ONCE
Qty: 0 | Refills: 0 | Status: DISCONTINUED | OUTPATIENT
Start: 2018-01-11 | End: 2018-01-11

## 2018-01-11 RX ADMIN — MORPHINE SULFATE 6 MILLIGRAM(S): 50 CAPSULE, EXTENDED RELEASE ORAL at 08:46

## 2018-01-11 NOTE — ED PROVIDER NOTE - OBJECTIVE STATEMENT
56 yo M, with recurrence of L Renal Cell Carcinoma about 2 months ago, history of partial L nephrectomy for kidney stones about 15 years ago, presenting with sudden onset of left flank pain about 20 min prior to arrival. Patient reports he was walking down the street when he felt a sudden 58 yo M, with recurrence of L Renal Cell Carcinoma about 2 months ago, history of partial L nephrectomy for kidney stones about 15 years ago, presenting with sudden onset of left flank pain about 20 min prior to arrival. Patient reports he was walking down the street when he felt a sudden pain to the left flank, reports he has peripheral neuropathy so he couldn't catch himself and he fell. States witnessed fall. No LOC. No trauma. Reports he then walked to [facility] to be evaluated.     Patient states he has had this pain before, intermittently and intermittent hematuria as well. Patient states he was scheduled for a partial nephrectomy a few weeks ago however he had a cold and it got delayed.     Currently reports pain 10/10, sharp, left flank, non radiating.

## 2018-01-11 NOTE — ED ADULT NURSE NOTE - PMH
AICD (automatic cardioverter/defibrillator) present  Medtronic  Atrial fibrillation    Atrial fibrillation, unspecified type    CAD (coronary artery disease)  (s/p 2 stents in Sep 2017)  Calcium kidney stones    Carcinoma of kidney, unspecified laterality    Chronic congestive heart failure, unspecified congestive heart failure type    COPD (chronic obstructive pulmonary disease)    Hep C w/o coma, chronic    HLD (hyperlipidemia)    HTN (hypertension)    Hyperlipidemia    Hypertension    Opioid dependence    Renal cell carcinoma of left kidney  s/p partial nephrectomy in 2002  biopsy again in Sep 2017 showed RCC again in stage 2  Secondary hypertension

## 2018-01-11 NOTE — ED ADULT NURSE NOTE - PSH
AICD (automatic cardioverter/defibrillator) present    Calcium kidney stone    H/O partial nephrectomy  2002  S/P cholecystectomy  2015

## 2018-01-11 NOTE — ED ADULT NURSE NOTE - OBJECTIVE STATEMENT
Pt presents to ED c/o left kidney pain onset 30 mins ago. Pt states he was walking down the street and got a sudden stabbing pain in his left flank. Pt presents to ED c/o left kidney pain onset 30 mins ago. Pt states he was walking down the street and got a sudden stabbing pain in his left flank. hx renal cell carcinoma. Pt states he has blood in urine off and on.

## 2018-01-11 NOTE — ED PROVIDER NOTE - MEDICAL DECISION MAKING DETAILS
Very well appearing patient with alleged RCC, complaining of sudden onset of L flank pain, hematuria, which occurs fairly routinely for patient. Many bacteria and nitrate positive urine, will treat for UTI outpatient with cipro. Follow up with Nephrologist.

## 2018-01-11 NOTE — ED ADULT TRIAGE NOTE - CHIEF COMPLAINT QUOTE
pt has a history of renal cell carcinoma , pt c/o left flank pain that started 20 min ago , pt also c/o hematuria , pt very uncomfortable in triage

## 2018-01-12 LAB
CULTURE RESULTS: NO GROWTH — SIGNIFICANT CHANGE UP
SPECIMEN SOURCE: SIGNIFICANT CHANGE UP

## 2018-01-13 ENCOUNTER — EMERGENCY (EMERGENCY)
Facility: HOSPITAL | Age: 58
LOS: 1 days | Discharge: HOME | End: 2018-01-13
Attending: EMERGENCY MEDICINE
Payer: MEDICAID

## 2018-01-13 VITALS
DIASTOLIC BLOOD PRESSURE: 80 MMHG | RESPIRATION RATE: 16 BRPM | WEIGHT: 225.09 LBS | HEIGHT: 71 IN | SYSTOLIC BLOOD PRESSURE: 190 MMHG | HEART RATE: 76 BPM

## 2018-01-13 DIAGNOSIS — Z95.810 PRESENCE OF AUTOMATIC (IMPLANTABLE) CARDIAC DEFIBRILLATOR: Chronic | ICD-10-CM

## 2018-01-13 DIAGNOSIS — Z90.5 ACQUIRED ABSENCE OF KIDNEY: Chronic | ICD-10-CM

## 2018-01-13 DIAGNOSIS — Z90.49 ACQUIRED ABSENCE OF OTHER SPECIFIED PARTS OF DIGESTIVE TRACT: Chronic | ICD-10-CM

## 2018-01-13 DIAGNOSIS — N20.0 CALCULUS OF KIDNEY: Chronic | ICD-10-CM

## 2018-01-13 LAB
ACETONE SERPL-MCNC: NEGATIVE — SIGNIFICANT CHANGE UP
ALBUMIN SERPL ELPH-MCNC: 3.3 G/DL — LOW (ref 3.5–5)
ALP SERPL-CCNC: 171 U/L — HIGH (ref 40–120)
ALT FLD-CCNC: 144 U/L DA — HIGH (ref 10–60)
ANION GAP SERPL CALC-SCNC: 5 MMOL/L — SIGNIFICANT CHANGE UP (ref 5–17)
AST SERPL-CCNC: 76 U/L — HIGH (ref 10–40)
BASOPHILS # BLD AUTO: 0.1 K/UL — SIGNIFICANT CHANGE UP (ref 0–0.2)
BASOPHILS NFR BLD AUTO: 0.6 % — SIGNIFICANT CHANGE UP (ref 0–2)
BILIRUB SERPL-MCNC: 0.5 MG/DL — SIGNIFICANT CHANGE UP (ref 0.2–1.2)
BUN SERPL-MCNC: 12 MG/DL — SIGNIFICANT CHANGE UP (ref 7–18)
CALCIUM SERPL-MCNC: 7.9 MG/DL — LOW (ref 8.4–10.5)
CHLORIDE SERPL-SCNC: 107 MMOL/L — SIGNIFICANT CHANGE UP (ref 96–108)
CO2 SERPL-SCNC: 28 MMOL/L — SIGNIFICANT CHANGE UP (ref 22–31)
CREAT SERPL-MCNC: 0.85 MG/DL — SIGNIFICANT CHANGE UP (ref 0.5–1.3)
EOSINOPHIL # BLD AUTO: 0.2 K/UL — SIGNIFICANT CHANGE UP (ref 0–0.5)
EOSINOPHIL NFR BLD AUTO: 1.6 % — SIGNIFICANT CHANGE UP (ref 0–6)
GLUCOSE SERPL-MCNC: 133 MG/DL — HIGH (ref 70–99)
HCT VFR BLD CALC: 41.9 % — SIGNIFICANT CHANGE UP (ref 39–50)
HGB BLD-MCNC: 13.5 G/DL — SIGNIFICANT CHANGE UP (ref 13–17)
LIDOCAIN IGE QN: 152 U/L — SIGNIFICANT CHANGE UP (ref 73–393)
LYMPHOCYTES # BLD AUTO: 2 K/UL — SIGNIFICANT CHANGE UP (ref 1–3.3)
LYMPHOCYTES # BLD AUTO: 20.6 % — SIGNIFICANT CHANGE UP (ref 13–44)
MAGNESIUM SERPL-MCNC: 2.1 MG/DL — SIGNIFICANT CHANGE UP (ref 1.6–2.6)
MCHC RBC-ENTMCNC: 29.6 PG — SIGNIFICANT CHANGE UP (ref 27–34)
MCHC RBC-ENTMCNC: 32.3 GM/DL — SIGNIFICANT CHANGE UP (ref 32–36)
MCV RBC AUTO: 91.7 FL — SIGNIFICANT CHANGE UP (ref 80–100)
MONOCYTES # BLD AUTO: 0.5 K/UL — SIGNIFICANT CHANGE UP (ref 0–0.9)
MONOCYTES NFR BLD AUTO: 5.2 % — SIGNIFICANT CHANGE UP (ref 2–14)
NEUTROPHILS # BLD AUTO: 6.9 K/UL — SIGNIFICANT CHANGE UP (ref 1.8–7.4)
NEUTROPHILS NFR BLD AUTO: 72 % — SIGNIFICANT CHANGE UP (ref 43–77)
PLATELET # BLD AUTO: 127 K/UL — LOW (ref 150–400)
POTASSIUM SERPL-MCNC: 3.3 MMOL/L — LOW (ref 3.5–5.3)
POTASSIUM SERPL-SCNC: 3.3 MMOL/L — LOW (ref 3.5–5.3)
PROT SERPL-MCNC: 6.4 G/DL — SIGNIFICANT CHANGE UP (ref 6–8.3)
RBC # BLD: 4.57 M/UL — SIGNIFICANT CHANGE UP (ref 4.2–5.8)
RBC # FLD: 13.2 % — SIGNIFICANT CHANGE UP (ref 10.3–14.5)
SODIUM SERPL-SCNC: 140 MMOL/L — SIGNIFICANT CHANGE UP (ref 135–145)
WBC # BLD: 9.6 K/UL — SIGNIFICANT CHANGE UP (ref 3.8–10.5)
WBC # FLD AUTO: 9.6 K/UL — SIGNIFICANT CHANGE UP (ref 3.8–10.5)

## 2018-01-13 PROCEDURE — 96375 TX/PRO/DX INJ NEW DRUG ADDON: CPT

## 2018-01-13 PROCEDURE — 96374 THER/PROPH/DIAG INJ IV PUSH: CPT

## 2018-01-13 PROCEDURE — 82009 KETONE BODYS QUAL: CPT

## 2018-01-13 PROCEDURE — 83690 ASSAY OF LIPASE: CPT

## 2018-01-13 PROCEDURE — 80053 COMPREHEN METABOLIC PANEL: CPT

## 2018-01-13 PROCEDURE — 85027 COMPLETE CBC AUTOMATED: CPT

## 2018-01-13 PROCEDURE — 99285 EMERGENCY DEPT VISIT HI MDM: CPT

## 2018-01-13 PROCEDURE — 99284 EMERGENCY DEPT VISIT MOD MDM: CPT | Mod: 25

## 2018-01-13 PROCEDURE — 83735 ASSAY OF MAGNESIUM: CPT

## 2018-01-13 RX ORDER — MORPHINE SULFATE 50 MG/1
4 CAPSULE, EXTENDED RELEASE ORAL ONCE
Qty: 0 | Refills: 0 | Status: DISCONTINUED | OUTPATIENT
Start: 2018-01-13 | End: 2018-01-13

## 2018-01-13 RX ORDER — SODIUM CHLORIDE 9 MG/ML
3 INJECTION INTRAMUSCULAR; INTRAVENOUS; SUBCUTANEOUS ONCE
Qty: 0 | Refills: 0 | Status: COMPLETED | OUTPATIENT
Start: 2018-01-13 | End: 2018-01-13

## 2018-01-13 RX ORDER — SODIUM CHLORIDE 9 MG/ML
1000 INJECTION INTRAMUSCULAR; INTRAVENOUS; SUBCUTANEOUS ONCE
Qty: 0 | Refills: 0 | Status: COMPLETED | OUTPATIENT
Start: 2018-01-13 | End: 2018-01-13

## 2018-01-13 RX ORDER — ONDANSETRON 8 MG/1
4 TABLET, FILM COATED ORAL ONCE
Qty: 0 | Refills: 0 | Status: COMPLETED | OUTPATIENT
Start: 2018-01-13 | End: 2018-01-13

## 2018-01-13 RX ORDER — POTASSIUM CHLORIDE 20 MEQ
40 PACKET (EA) ORAL ONCE
Qty: 0 | Refills: 0 | Status: DISCONTINUED | OUTPATIENT
Start: 2018-01-13 | End: 2018-01-17

## 2018-01-13 RX ORDER — SODIUM CHLORIDE 9 MG/ML
1000 INJECTION INTRAMUSCULAR; INTRAVENOUS; SUBCUTANEOUS
Qty: 0 | Refills: 0 | Status: DISCONTINUED | OUTPATIENT
Start: 2018-01-13 | End: 2018-01-17

## 2018-01-13 RX ADMIN — SODIUM CHLORIDE 1000 MILLILITER(S): 9 INJECTION INTRAMUSCULAR; INTRAVENOUS; SUBCUTANEOUS at 19:32

## 2018-01-13 RX ADMIN — SODIUM CHLORIDE 3 MILLILITER(S): 9 INJECTION INTRAMUSCULAR; INTRAVENOUS; SUBCUTANEOUS at 19:36

## 2018-01-13 RX ADMIN — ONDANSETRON 4 MILLIGRAM(S): 8 TABLET, FILM COATED ORAL at 19:32

## 2018-01-13 RX ADMIN — MORPHINE SULFATE 4 MILLIGRAM(S): 50 CAPSULE, EXTENDED RELEASE ORAL at 19:32

## 2018-01-13 RX ADMIN — MORPHINE SULFATE 4 MILLIGRAM(S): 50 CAPSULE, EXTENDED RELEASE ORAL at 19:58

## 2018-01-13 RX ADMIN — SODIUM CHLORIDE 125 MILLILITER(S): 9 INJECTION INTRAMUSCULAR; INTRAVENOUS; SUBCUTANEOUS at 19:32

## 2018-01-13 NOTE — ED PROVIDER NOTE - MUSCULOSKELETAL, MLM
Spine appears normal, range of motion is not limited, L lower back tenderness, L straight leg 90 degrees.

## 2018-01-13 NOTE — ED PROVIDER NOTE - OBJECTIVE STATEMENT
56 y/o M pt w/ PMHx of COPD, Renal Cell CA, Renal colic, Afib, presents to ED c/o L flank pain x 1 hour. Pt reports constant pain, 10/10 in intensity, w/ radiation to his L groin. Pt also reports hematuria and dysuria. Pt states pain is similar to previous renal colic. Pt denies vomiting, fever, fecal incontinence, or any other complaints. ALLERGIES: Penicillin (anaphylaxis), Tylenol (hives), aspirin (hives), Toradol 58 y/o M pt w/ PMHx of COPD, Renal Cell CA, Renal colic, Afib, presents to ED c/o L flank pain x 1 hour. Pt reports constant pain, 10/10 in intensity, w/ radiation to his L groin. Pt also reports hematuria and dysuria. Pt states pain is similar to previous renal colic. Pt denies vomiting, fever, fecal incontinence, or any other complaints. ALLERGIES: Penicillin (anaphylaxis), Tylenol (hives), aspirin (hives), Toradol  Pt has appt for Nephrectomy next week 58 y/o M pt w/ PMHx of COPD, Renal Cell CA, Renal colic, Afib, presents to ED c/o L flank pain x 1 hour. Pt reports constant pain, 10/10 in intensity, w/ radiation to his L groin. Pt also reports hematuria and dysuria. Pt states pain is similar to previous renal colic. Pt denies vomiting, fever, fecal incontinence, or any other complaints. ALLERGIES: Penicillin (anaphylaxis), Tylenol (hives), aspirin (hives), Toradol  Pt has appt for Nephrectomy next Wednesday

## 2018-01-13 NOTE — ED PROVIDER NOTE - CARDIAC, MLM
AICD on R upper chest. Normal rate, regular rhythm.  Heart sounds S1, S2.  No murmurs, rubs or gallops.

## 2018-01-13 NOTE — ED PROVIDER NOTE - PROGRESS NOTE DETAILS
pt refuses CT A/P, has been sleeping after Morphine, in no distress, did not give urine, will d/c home

## 2018-01-18 ENCOUNTER — EMERGENCY (EMERGENCY)
Facility: HOSPITAL | Age: 58
LOS: 1 days | Discharge: HOME | End: 2018-01-18
Attending: EMERGENCY MEDICINE
Payer: MEDICAID

## 2018-01-18 VITALS
WEIGHT: 220.02 LBS | OXYGEN SATURATION: 98 % | DIASTOLIC BLOOD PRESSURE: 97 MMHG | SYSTOLIC BLOOD PRESSURE: 149 MMHG | HEIGHT: 71 IN | TEMPERATURE: 98 F | RESPIRATION RATE: 19 BRPM | HEART RATE: 60 BPM

## 2018-01-18 DIAGNOSIS — Z90.49 ACQUIRED ABSENCE OF OTHER SPECIFIED PARTS OF DIGESTIVE TRACT: Chronic | ICD-10-CM

## 2018-01-18 DIAGNOSIS — Z95.810 PRESENCE OF AUTOMATIC (IMPLANTABLE) CARDIAC DEFIBRILLATOR: Chronic | ICD-10-CM

## 2018-01-18 DIAGNOSIS — N20.0 CALCULUS OF KIDNEY: Chronic | ICD-10-CM

## 2018-01-18 DIAGNOSIS — Z90.5 ACQUIRED ABSENCE OF KIDNEY: Chronic | ICD-10-CM

## 2018-01-18 PROCEDURE — 99285 EMERGENCY DEPT VISIT HI MDM: CPT | Mod: 25

## 2018-01-18 RX ORDER — SODIUM CHLORIDE 9 MG/ML
1000 INJECTION INTRAMUSCULAR; INTRAVENOUS; SUBCUTANEOUS ONCE
Qty: 0 | Refills: 0 | Status: COMPLETED | OUTPATIENT
Start: 2018-01-18 | End: 2018-01-18

## 2018-01-18 RX ORDER — SODIUM CHLORIDE 9 MG/ML
3 INJECTION INTRAMUSCULAR; INTRAVENOUS; SUBCUTANEOUS ONCE
Qty: 0 | Refills: 0 | Status: COMPLETED | OUTPATIENT
Start: 2018-01-18 | End: 2018-01-18

## 2018-01-19 LAB
ANION GAP SERPL CALC-SCNC: 7 MMOL/L — SIGNIFICANT CHANGE UP (ref 5–17)
BASOPHILS # BLD AUTO: 0 K/UL — SIGNIFICANT CHANGE UP (ref 0–0.2)
BASOPHILS NFR BLD AUTO: 0.7 % — SIGNIFICANT CHANGE UP (ref 0–2)
BUN SERPL-MCNC: 14 MG/DL — SIGNIFICANT CHANGE UP (ref 7–18)
CALCIUM SERPL-MCNC: 8.5 MG/DL — SIGNIFICANT CHANGE UP (ref 8.4–10.5)
CHLORIDE SERPL-SCNC: 106 MMOL/L — SIGNIFICANT CHANGE UP (ref 96–108)
CO2 SERPL-SCNC: 27 MMOL/L — SIGNIFICANT CHANGE UP (ref 22–31)
CREAT SERPL-MCNC: 0.79 MG/DL — SIGNIFICANT CHANGE UP (ref 0.5–1.3)
EOSINOPHIL # BLD AUTO: 0.2 K/UL — SIGNIFICANT CHANGE UP (ref 0–0.5)
EOSINOPHIL NFR BLD AUTO: 2.2 % — SIGNIFICANT CHANGE UP (ref 0–6)
GLUCOSE SERPL-MCNC: 81 MG/DL — SIGNIFICANT CHANGE UP (ref 70–99)
HCT VFR BLD CALC: 43.5 % — SIGNIFICANT CHANGE UP (ref 39–50)
HGB BLD-MCNC: 13.9 G/DL — SIGNIFICANT CHANGE UP (ref 13–17)
LYMPHOCYTES # BLD AUTO: 1.8 K/UL — SIGNIFICANT CHANGE UP (ref 1–3.3)
LYMPHOCYTES # BLD AUTO: 24.3 % — SIGNIFICANT CHANGE UP (ref 13–44)
MCHC RBC-ENTMCNC: 29.3 PG — SIGNIFICANT CHANGE UP (ref 27–34)
MCHC RBC-ENTMCNC: 31.8 GM/DL — LOW (ref 32–36)
MCV RBC AUTO: 92 FL — SIGNIFICANT CHANGE UP (ref 80–100)
MONOCYTES # BLD AUTO: 0.7 K/UL — SIGNIFICANT CHANGE UP (ref 0–0.9)
MONOCYTES NFR BLD AUTO: 9.4 % — SIGNIFICANT CHANGE UP (ref 2–14)
NEUTROPHILS # BLD AUTO: 4.8 K/UL — SIGNIFICANT CHANGE UP (ref 1.8–7.4)
NEUTROPHILS NFR BLD AUTO: 63.4 % — SIGNIFICANT CHANGE UP (ref 43–77)
PLATELET # BLD AUTO: 138 K/UL — LOW (ref 150–400)
POTASSIUM SERPL-MCNC: 3.5 MMOL/L — SIGNIFICANT CHANGE UP (ref 3.5–5.3)
POTASSIUM SERPL-SCNC: 3.5 MMOL/L — SIGNIFICANT CHANGE UP (ref 3.5–5.3)
RBC # BLD: 4.73 M/UL — SIGNIFICANT CHANGE UP (ref 4.2–5.8)
RBC # FLD: 13.5 % — SIGNIFICANT CHANGE UP (ref 10.3–14.5)
SODIUM SERPL-SCNC: 140 MMOL/L — SIGNIFICANT CHANGE UP (ref 135–145)
WBC # BLD: 7.6 K/UL — SIGNIFICANT CHANGE UP (ref 3.8–10.5)
WBC # FLD AUTO: 7.6 K/UL — SIGNIFICANT CHANGE UP (ref 3.8–10.5)

## 2018-01-19 PROCEDURE — 82962 GLUCOSE BLOOD TEST: CPT

## 2018-01-19 PROCEDURE — 96374 THER/PROPH/DIAG INJ IV PUSH: CPT

## 2018-01-19 PROCEDURE — 99284 EMERGENCY DEPT VISIT MOD MDM: CPT | Mod: 25

## 2018-01-19 PROCEDURE — 93005 ELECTROCARDIOGRAM TRACING: CPT

## 2018-01-19 PROCEDURE — 85027 COMPLETE CBC AUTOMATED: CPT

## 2018-01-19 PROCEDURE — 80048 BASIC METABOLIC PNL TOTAL CA: CPT

## 2018-01-19 RX ORDER — MORPHINE SULFATE 50 MG/1
4 CAPSULE, EXTENDED RELEASE ORAL ONCE
Qty: 0 | Refills: 0 | Status: DISCONTINUED | OUTPATIENT
Start: 2018-01-19 | End: 2018-01-19

## 2018-01-19 RX ADMIN — SODIUM CHLORIDE 3 MILLILITER(S): 9 INJECTION INTRAMUSCULAR; INTRAVENOUS; SUBCUTANEOUS at 01:10

## 2018-01-19 RX ADMIN — SODIUM CHLORIDE 3000 MILLILITER(S): 9 INJECTION INTRAMUSCULAR; INTRAVENOUS; SUBCUTANEOUS at 01:14

## 2018-01-19 RX ADMIN — MORPHINE SULFATE 4 MILLIGRAM(S): 50 CAPSULE, EXTENDED RELEASE ORAL at 01:14

## 2018-01-19 NOTE — ED PROVIDER NOTE - PHYSICAL EXAMINATION
Kernig and Brudzinski signs area negative, no petechiae, no photophobia, no dysarthria, no facial asymmetry, no focal deficits.   Left flank area discomfort, no pulsatile abdominal mass.

## 2018-01-19 NOTE — ED ADULT NURSE NOTE - OBJECTIVE STATEMENT
pt is aox3 came to er via ems sp [place] per pt he was sitting down and did not fall. pt was seen by attending

## 2018-01-19 NOTE — ED ADULT NURSE REASSESSMENT NOTE - NS ED NURSE REASSESS COMMENT FT1
pt. went to Ct refused to stay on his back. was seen on his back returning from ct. goes out side to smoke.  requested urine pt still refused

## 2018-01-19 NOTE — ED PROVIDER NOTE - OBJECTIVE STATEMENT
Pt states he developed left flnak pain 40 minutes prior to ER arrival on subway. Pt states pain was intense to pont where he [passed out and missed 2 stops. Pt also states noticed hematuria.  No fever, no vomiting.  Meds Metoprolol, Eliquis, Plavix. Pt w. hx of Afib, HTN, left renal cell Ca, s/p AICD

## 2018-01-19 NOTE — ED PROVIDER NOTE - MEDICAL DECISION MAKING DETAILS
3:18a- Pt refused CT, refused to give urine, provided with food. Pt walking in ED no focal deficits. Pt is well appearing walking with normal gait, stable for discharge and follow up with medical doctor. Pt educated on care and need for follow up. Discussed anticipatory guidance and return precautions. Questions answered. I had a detailed discussion with the patient and/or guardian regarding the historical points, exam findings, and any diagnostic results supporting the discharge diagnosis.

## 2018-02-01 ENCOUNTER — EMERGENCY (EMERGENCY)
Facility: HOSPITAL | Age: 58
LOS: 1 days | Discharge: HOME | End: 2018-02-01
Attending: EMERGENCY MEDICINE
Payer: MEDICAID

## 2018-02-01 VITALS
DIASTOLIC BLOOD PRESSURE: 82 MMHG | RESPIRATION RATE: 16 BRPM | WEIGHT: 220.02 LBS | OXYGEN SATURATION: 98 % | TEMPERATURE: 98 F | HEART RATE: 64 BPM | HEIGHT: 71 IN | SYSTOLIC BLOOD PRESSURE: 128 MMHG

## 2018-02-01 VITALS
RESPIRATION RATE: 18 BRPM | OXYGEN SATURATION: 99 % | DIASTOLIC BLOOD PRESSURE: 69 MMHG | SYSTOLIC BLOOD PRESSURE: 146 MMHG | TEMPERATURE: 98 F | HEART RATE: 65 BPM

## 2018-02-01 DIAGNOSIS — Z90.49 ACQUIRED ABSENCE OF OTHER SPECIFIED PARTS OF DIGESTIVE TRACT: Chronic | ICD-10-CM

## 2018-02-01 DIAGNOSIS — Z95.810 PRESENCE OF AUTOMATIC (IMPLANTABLE) CARDIAC DEFIBRILLATOR: Chronic | ICD-10-CM

## 2018-02-01 DIAGNOSIS — Z90.5 ACQUIRED ABSENCE OF KIDNEY: Chronic | ICD-10-CM

## 2018-02-01 DIAGNOSIS — N20.0 CALCULUS OF KIDNEY: Chronic | ICD-10-CM

## 2018-02-01 PROCEDURE — 99284 EMERGENCY DEPT VISIT MOD MDM: CPT | Mod: 25

## 2018-02-01 PROCEDURE — 99283 EMERGENCY DEPT VISIT LOW MDM: CPT | Mod: 25

## 2018-02-01 PROCEDURE — 73502 X-RAY EXAM HIP UNI 2-3 VIEWS: CPT

## 2018-02-01 PROCEDURE — 72170 X-RAY EXAM OF PELVIS: CPT

## 2018-02-01 PROCEDURE — 73502 X-RAY EXAM HIP UNI 2-3 VIEWS: CPT | Mod: 26,RT

## 2018-02-01 RX ORDER — OXYCODONE AND ACETAMINOPHEN 5; 325 MG/1; MG/1
1 TABLET ORAL ONCE
Qty: 0 | Refills: 0 | Status: DISCONTINUED | OUTPATIENT
Start: 2018-02-01 | End: 2018-02-01

## 2018-02-01 NOTE — ED ADULT NURSE REASSESSMENT NOTE - NS ED NURSE REASSESS COMMENT FT1
Pt. is sleeping in the stretcher, no complaints voiced. No signs of pain observed at this time. No signs of acute distress noted. Pt. is in stable condition. Will continue to monitor closely.

## 2018-02-01 NOTE — ED PROVIDER NOTE - CHPI ED SYMPTOMS NEG
no numbness/no bruising/no abrasion/no tingling/no weakness/no stiffness/no back pain/no fever/no deformity

## 2018-02-02 ENCOUNTER — EMERGENCY (EMERGENCY)
Facility: HOSPITAL | Age: 58
LOS: 1 days | Discharge: HOME | End: 2018-02-02
Attending: EMERGENCY MEDICINE
Payer: MEDICAID

## 2018-02-02 VITALS
OXYGEN SATURATION: 98 % | RESPIRATION RATE: 17 BRPM | HEART RATE: 88 BPM | SYSTOLIC BLOOD PRESSURE: 124 MMHG | DIASTOLIC BLOOD PRESSURE: 60 MMHG | TEMPERATURE: 98 F

## 2018-02-02 VITALS
HEART RATE: 61 BPM | RESPIRATION RATE: 17 BRPM | OXYGEN SATURATION: 99 % | DIASTOLIC BLOOD PRESSURE: 88 MMHG | TEMPERATURE: 97 F | SYSTOLIC BLOOD PRESSURE: 155 MMHG

## 2018-02-02 DIAGNOSIS — Z95.810 PRESENCE OF AUTOMATIC (IMPLANTABLE) CARDIAC DEFIBRILLATOR: Chronic | ICD-10-CM

## 2018-02-02 DIAGNOSIS — Z90.49 ACQUIRED ABSENCE OF OTHER SPECIFIED PARTS OF DIGESTIVE TRACT: Chronic | ICD-10-CM

## 2018-02-02 DIAGNOSIS — Z90.5 ACQUIRED ABSENCE OF KIDNEY: Chronic | ICD-10-CM

## 2018-02-02 DIAGNOSIS — N20.0 CALCULUS OF KIDNEY: Chronic | ICD-10-CM

## 2018-02-02 LAB
ALBUMIN SERPL ELPH-MCNC: 3.3 G/DL — LOW (ref 3.5–5)
ALP SERPL-CCNC: 168 U/L — HIGH (ref 40–120)
ALT FLD-CCNC: 92 U/L DA — HIGH (ref 10–60)
ANION GAP SERPL CALC-SCNC: 5 MMOL/L — SIGNIFICANT CHANGE UP (ref 5–17)
APTT BLD: 38 SEC — HIGH (ref 27.5–37.4)
AST SERPL-CCNC: 93 U/L — HIGH (ref 10–40)
BASOPHILS # BLD AUTO: 0.1 K/UL — SIGNIFICANT CHANGE UP (ref 0–0.2)
BASOPHILS NFR BLD AUTO: 0.9 % — SIGNIFICANT CHANGE UP (ref 0–2)
BILIRUB SERPL-MCNC: 0.8 MG/DL — SIGNIFICANT CHANGE UP (ref 0.2–1.2)
BUN SERPL-MCNC: 13 MG/DL — SIGNIFICANT CHANGE UP (ref 7–18)
CALCIUM SERPL-MCNC: 9.1 MG/DL — SIGNIFICANT CHANGE UP (ref 8.4–10.5)
CHLORIDE SERPL-SCNC: 108 MMOL/L — SIGNIFICANT CHANGE UP (ref 96–108)
CO2 SERPL-SCNC: 30 MMOL/L — SIGNIFICANT CHANGE UP (ref 22–31)
CREAT SERPL-MCNC: 0.88 MG/DL — SIGNIFICANT CHANGE UP (ref 0.5–1.3)
D DIMER BLD IA.RAPID-MCNC: <150 NG/ML DDU — SIGNIFICANT CHANGE UP
EOSINOPHIL # BLD AUTO: 0.2 K/UL — SIGNIFICANT CHANGE UP (ref 0–0.5)
EOSINOPHIL NFR BLD AUTO: 2.5 % — SIGNIFICANT CHANGE UP (ref 0–6)
GLUCOSE SERPL-MCNC: 92 MG/DL — SIGNIFICANT CHANGE UP (ref 70–99)
HCT VFR BLD CALC: 43.6 % — SIGNIFICANT CHANGE UP (ref 39–50)
HGB BLD-MCNC: 13.9 G/DL — SIGNIFICANT CHANGE UP (ref 13–17)
INR BLD: 1.11 RATIO — SIGNIFICANT CHANGE UP (ref 0.88–1.16)
LYMPHOCYTES # BLD AUTO: 1.1 K/UL — SIGNIFICANT CHANGE UP (ref 1–3.3)
LYMPHOCYTES # BLD AUTO: 15.7 % — SIGNIFICANT CHANGE UP (ref 13–44)
MCHC RBC-ENTMCNC: 29.2 PG — SIGNIFICANT CHANGE UP (ref 27–34)
MCHC RBC-ENTMCNC: 31.9 GM/DL — LOW (ref 32–36)
MCV RBC AUTO: 91.3 FL — SIGNIFICANT CHANGE UP (ref 80–100)
MONOCYTES # BLD AUTO: 0.9 K/UL — SIGNIFICANT CHANGE UP (ref 0–0.9)
MONOCYTES NFR BLD AUTO: 11.9 % — SIGNIFICANT CHANGE UP (ref 2–14)
NEUTROPHILS # BLD AUTO: 5.1 K/UL — SIGNIFICANT CHANGE UP (ref 1.8–7.4)
NEUTROPHILS NFR BLD AUTO: 69 % — SIGNIFICANT CHANGE UP (ref 43–77)
NT-PROBNP SERPL-SCNC: 506 PG/ML — HIGH (ref 0–125)
PLATELET # BLD AUTO: 160 K/UL — SIGNIFICANT CHANGE UP (ref 150–400)
POTASSIUM SERPL-MCNC: 4 MMOL/L — SIGNIFICANT CHANGE UP (ref 3.5–5.3)
POTASSIUM SERPL-SCNC: 4 MMOL/L — SIGNIFICANT CHANGE UP (ref 3.5–5.3)
PROT SERPL-MCNC: 6.9 G/DL — SIGNIFICANT CHANGE UP (ref 6–8.3)
PROTHROM AB SERPL-ACNC: 12.1 SEC — SIGNIFICANT CHANGE UP (ref 9.8–12.7)
RBC # BLD: 4.77 M/UL — SIGNIFICANT CHANGE UP (ref 4.2–5.8)
RBC # FLD: 12.5 % — SIGNIFICANT CHANGE UP (ref 10.3–14.5)
SODIUM SERPL-SCNC: 143 MMOL/L — SIGNIFICANT CHANGE UP (ref 135–145)
TROPONIN I SERPL-MCNC: <0.015 NG/ML — SIGNIFICANT CHANGE UP (ref 0–0.04)
WBC # BLD: 7.3 K/UL — SIGNIFICANT CHANGE UP (ref 3.8–10.5)
WBC # FLD AUTO: 7.3 K/UL — SIGNIFICANT CHANGE UP (ref 3.8–10.5)

## 2018-02-02 PROCEDURE — 96374 THER/PROPH/DIAG INJ IV PUSH: CPT

## 2018-02-02 PROCEDURE — 83880 ASSAY OF NATRIURETIC PEPTIDE: CPT

## 2018-02-02 PROCEDURE — 84484 ASSAY OF TROPONIN QUANT: CPT

## 2018-02-02 PROCEDURE — 85610 PROTHROMBIN TIME: CPT

## 2018-02-02 PROCEDURE — 71046 X-RAY EXAM CHEST 2 VIEWS: CPT | Mod: 26

## 2018-02-02 PROCEDURE — 93005 ELECTROCARDIOGRAM TRACING: CPT

## 2018-02-02 PROCEDURE — 85027 COMPLETE CBC AUTOMATED: CPT

## 2018-02-02 PROCEDURE — 99285 EMERGENCY DEPT VISIT HI MDM: CPT | Mod: 25

## 2018-02-02 PROCEDURE — 80053 COMPREHEN METABOLIC PANEL: CPT

## 2018-02-02 PROCEDURE — 85379 FIBRIN DEGRADATION QUANT: CPT

## 2018-02-02 PROCEDURE — 71046 X-RAY EXAM CHEST 2 VIEWS: CPT

## 2018-02-02 PROCEDURE — 96375 TX/PRO/DX INJ NEW DRUG ADDON: CPT

## 2018-02-02 PROCEDURE — 99284 EMERGENCY DEPT VISIT MOD MDM: CPT | Mod: 25

## 2018-02-02 PROCEDURE — 85730 THROMBOPLASTIN TIME PARTIAL: CPT

## 2018-02-02 RX ORDER — FUROSEMIDE 40 MG
40 TABLET ORAL ONCE
Qty: 0 | Refills: 0 | Status: COMPLETED | OUTPATIENT
Start: 2018-02-02 | End: 2018-02-02

## 2018-02-02 RX ORDER — DIPHENHYDRAMINE HCL 50 MG
50 CAPSULE ORAL ONCE
Qty: 0 | Refills: 0 | Status: COMPLETED | OUTPATIENT
Start: 2018-02-02 | End: 2018-02-02

## 2018-02-02 RX ORDER — SODIUM CHLORIDE 9 MG/ML
3 INJECTION INTRAMUSCULAR; INTRAVENOUS; SUBCUTANEOUS ONCE
Qty: 0 | Refills: 0 | Status: COMPLETED | OUTPATIENT
Start: 2018-02-02 | End: 2018-02-02

## 2018-02-02 RX ORDER — KETOROLAC TROMETHAMINE 30 MG/ML
30 SYRINGE (ML) INJECTION ONCE
Qty: 0 | Refills: 0 | Status: DISCONTINUED | OUTPATIENT
Start: 2018-02-02 | End: 2018-02-02

## 2018-02-02 RX ORDER — TRAMADOL HYDROCHLORIDE 50 MG/1
50 TABLET ORAL ONCE
Qty: 0 | Refills: 0 | Status: DISCONTINUED | OUTPATIENT
Start: 2018-02-02 | End: 2018-02-02

## 2018-02-02 RX ORDER — IPRATROPIUM/ALBUTEROL SULFATE 18-103MCG
3 AEROSOL WITH ADAPTER (GRAM) INHALATION ONCE
Qty: 0 | Refills: 0 | Status: COMPLETED | OUTPATIENT
Start: 2018-02-02 | End: 2018-02-02

## 2018-02-02 RX ADMIN — Medication 50 MILLIGRAM(S): at 09:41

## 2018-02-02 RX ADMIN — Medication 40 MILLIGRAM(S): at 09:40

## 2018-02-02 RX ADMIN — Medication 30 MILLIGRAM(S): at 09:40

## 2018-02-02 RX ADMIN — SODIUM CHLORIDE 3 MILLILITER(S): 9 INJECTION INTRAMUSCULAR; INTRAVENOUS; SUBCUTANEOUS at 08:10

## 2018-02-02 NOTE — ED PROVIDER NOTE - MEDICAL DECISION MAKING DETAILS
Pt w/ history of a fib on Eliquis and CHF w/ lower extremity swelling. Will obtain xray, labs and reassess.

## 2018-02-02 NOTE — ED PROVIDER NOTE - OBJECTIVE STATEMENT
56 y/o M pt w/ history of a fib on Eloquis and Plavix, CHF comes in today for 1 day of b/l extremity swelling. Reports sudden onset in the evening. Denies chest pain. shortness of breath, fever, chills or any other complaints. NKDA. 58 y/o M pt w/ history of a fib on Eliquis and Plavix, CHF comes in today for 1 day of b/l extremity swelling. Reports sudden onset in the evening. Denies chest pain. shortness of breath, fever, chills or any other complaints. NKDA.

## 2018-02-02 NOTE — ED PROVIDER NOTE - PROGRESS NOTE DETAILS
labs unremarkable, CXR shows no evidence of pulmonary edema  given lasix for peripheral edema-pt reports he took lasix in past for this problem but stopped when it affected his kidney function.  Discussed above with patient. Patient now reports he is having kidney stone pains but denies flank pain, reports he feels stone is in bladder and trying to come out. Given tramadol and toradol for pain. Will attempt to obtain UA. Currently patient stable for discharge.

## 2018-02-11 ENCOUNTER — INPATIENT (INPATIENT)
Facility: HOSPITAL | Age: 58
LOS: 2 days | Discharge: HOME | End: 2018-02-14
Attending: INTERNAL MEDICINE

## 2018-02-11 VITALS
TEMPERATURE: 98 F | SYSTOLIC BLOOD PRESSURE: 186 MMHG | DIASTOLIC BLOOD PRESSURE: 87 MMHG | RESPIRATION RATE: 18 BRPM | OXYGEN SATURATION: 100 % | HEART RATE: 88 BPM

## 2018-02-11 DIAGNOSIS — Z95.810 PRESENCE OF AUTOMATIC (IMPLANTABLE) CARDIAC DEFIBRILLATOR: Chronic | ICD-10-CM

## 2018-02-11 DIAGNOSIS — Z98.890 OTHER SPECIFIED POSTPROCEDURAL STATES: Chronic | ICD-10-CM

## 2018-02-11 DIAGNOSIS — Z90.5 ACQUIRED ABSENCE OF KIDNEY: Chronic | ICD-10-CM

## 2018-02-11 DIAGNOSIS — Z90.49 ACQUIRED ABSENCE OF OTHER SPECIFIED PARTS OF DIGESTIVE TRACT: Chronic | ICD-10-CM

## 2018-02-11 LAB
ALBUMIN SERPL ELPH-MCNC: 4 G/DL — SIGNIFICANT CHANGE UP (ref 3–5.5)
ALP SERPL-CCNC: 200 U/L — HIGH (ref 30–115)
ALT FLD-CCNC: 85 U/L — HIGH (ref 0–41)
ANION GAP SERPL CALC-SCNC: 11 MMOL/L — SIGNIFICANT CHANGE UP (ref 7–14)
APPEARANCE UR: (no result)
APTT BLD: 23.8 SEC — CRITICAL LOW (ref 27–39.2)
AST SERPL-CCNC: 68 U/L — HIGH (ref 0–41)
BASOPHILS # BLD AUTO: 0.01 K/UL — SIGNIFICANT CHANGE UP (ref 0–0.2)
BASOPHILS NFR BLD AUTO: 0.1 % — SIGNIFICANT CHANGE UP (ref 0–1)
BILIRUB DIRECT SERPL-MCNC: 0.6 MG/DL — HIGH (ref 0–0.2)
BILIRUB INDIRECT FLD-MCNC: 0.9 MG/DL — SIGNIFICANT CHANGE UP
BILIRUB SERPL-MCNC: 1.5 MG/DL — HIGH (ref 0.2–1.2)
BILIRUB UR-MCNC: NEGATIVE — SIGNIFICANT CHANGE UP
BUN SERPL-MCNC: 28 MG/DL — HIGH (ref 10–20)
CALCIUM SERPL-MCNC: 9.7 MG/DL — SIGNIFICANT CHANGE UP (ref 8.5–10.1)
CHLORIDE SERPL-SCNC: 101 MMOL/L — SIGNIFICANT CHANGE UP (ref 98–110)
CK MB CFR SERPL CALC: 13.3 NG/ML — HIGH (ref 0.6–6.3)
CK SERPL-CCNC: 157 U/L — SIGNIFICANT CHANGE UP (ref 0–225)
CO2 SERPL-SCNC: 24 MMOL/L — SIGNIFICANT CHANGE UP (ref 17–32)
COLOR SPEC: YELLOW — SIGNIFICANT CHANGE UP
CREAT SERPL-MCNC: 1 MG/DL — SIGNIFICANT CHANGE UP (ref 0.7–1.5)
DIFF PNL FLD: (no result)
EOSINOPHIL # BLD AUTO: 0 K/UL — SIGNIFICANT CHANGE UP (ref 0–0.7)
EOSINOPHIL NFR BLD AUTO: 0 % — SIGNIFICANT CHANGE UP (ref 0–8)
GLUCOSE SERPL-MCNC: 109 MG/DL — SIGNIFICANT CHANGE UP (ref 70–110)
GLUCOSE UR QL: NEGATIVE MG/DL — SIGNIFICANT CHANGE UP
HCT VFR BLD CALC: 45.8 % — SIGNIFICANT CHANGE UP (ref 42–52)
HGB BLD-MCNC: 15.2 G/DL — SIGNIFICANT CHANGE UP (ref 14–18)
IMM GRANULOCYTES NFR BLD AUTO: 0.7 % — HIGH (ref 0.1–0.3)
INR BLD: 1.12 RATIO — SIGNIFICANT CHANGE UP (ref 0.65–1.3)
KETONES UR-MCNC: NEGATIVE — SIGNIFICANT CHANGE UP
LACTATE SERPL-SCNC: 1.9 MMOL/L — SIGNIFICANT CHANGE UP (ref 0.5–2.2)
LEUKOCYTE ESTERASE UR-ACNC: NEGATIVE — SIGNIFICANT CHANGE UP
LIDOCAIN IGE QN: 15 U/L — SIGNIFICANT CHANGE UP (ref 7–60)
LYMPHOCYTES # BLD AUTO: 0.98 K/UL — LOW (ref 1.2–3.4)
LYMPHOCYTES # BLD AUTO: 6.3 % — LOW (ref 20.5–51.1)
MCHC RBC-ENTMCNC: 29 PG — SIGNIFICANT CHANGE UP (ref 27–31)
MCHC RBC-ENTMCNC: 33.2 G/DL — SIGNIFICANT CHANGE UP (ref 32–37)
MCV RBC AUTO: 87.2 FL — SIGNIFICANT CHANGE UP (ref 80–94)
MONOCYTES # BLD AUTO: 0.98 K/UL — HIGH (ref 0.1–0.6)
MONOCYTES NFR BLD AUTO: 6.3 % — SIGNIFICANT CHANGE UP (ref 1.7–9.3)
NEUTROPHILS # BLD AUTO: 13.39 K/UL — HIGH (ref 1.4–6.5)
NEUTROPHILS NFR BLD AUTO: 86.6 % — HIGH (ref 42.2–75.2)
NITRITE UR-MCNC: NEGATIVE — SIGNIFICANT CHANGE UP
PH UR: 7.5 — SIGNIFICANT CHANGE UP (ref 5–8)
PLATELET # BLD AUTO: 183 K/UL — SIGNIFICANT CHANGE UP (ref 130–400)
POTASSIUM SERPL-MCNC: 5.5 MMOL/L — HIGH (ref 3.5–5)
POTASSIUM SERPL-SCNC: 5.5 MMOL/L — HIGH (ref 3.5–5)
PROT SERPL-MCNC: 6.4 G/DL — SIGNIFICANT CHANGE UP (ref 6–8)
PROT UR-MCNC: 100 MG/DL
PROTHROM AB SERPL-ACNC: 12.1 SEC — SIGNIFICANT CHANGE UP (ref 9.95–12.87)
RBC # BLD: 5.25 M/UL — SIGNIFICANT CHANGE UP (ref 4.7–6.1)
RBC # FLD: 13.3 % — SIGNIFICANT CHANGE UP (ref 11.5–14.5)
SODIUM SERPL-SCNC: 136 MMOL/L — SIGNIFICANT CHANGE UP (ref 135–146)
SP GR SPEC: 1.01 — SIGNIFICANT CHANGE UP (ref 1.01–1.03)
TROPONIN I SERPL-MCNC: <0.02 NG/ML — SIGNIFICANT CHANGE UP (ref 0–0.05)
UROBILINOGEN FLD QL: 0.2 MG/DL — SIGNIFICANT CHANGE UP (ref 0.2–0.2)
WBC # BLD: 15.47 K/UL — HIGH (ref 4.8–10.8)
WBC # FLD AUTO: 15.47 K/UL — HIGH (ref 4.8–10.8)

## 2018-02-11 RX ORDER — MORPHINE SULFATE 50 MG/1
4 CAPSULE, EXTENDED RELEASE ORAL ONCE
Qty: 0 | Refills: 0 | Status: DISCONTINUED | OUTPATIENT
Start: 2018-02-11 | End: 2018-02-11

## 2018-02-11 RX ORDER — METOPROLOL TARTRATE 50 MG
50 TABLET ORAL DAILY
Qty: 0 | Refills: 0 | Status: DISCONTINUED | OUTPATIENT
Start: 2018-02-11 | End: 2018-02-14

## 2018-02-11 RX ORDER — ATORVASTATIN CALCIUM 80 MG/1
40 TABLET, FILM COATED ORAL AT BEDTIME
Qty: 0 | Refills: 0 | Status: DISCONTINUED | OUTPATIENT
Start: 2018-02-11 | End: 2018-02-14

## 2018-02-11 RX ORDER — IPRATROPIUM/ALBUTEROL SULFATE 18-103MCG
3 AEROSOL WITH ADAPTER (GRAM) INHALATION EVERY 6 HOURS
Qty: 0 | Refills: 0 | Status: DISCONTINUED | OUTPATIENT
Start: 2018-02-11 | End: 2018-02-14

## 2018-02-11 RX ORDER — TAMSULOSIN HYDROCHLORIDE 0.4 MG/1
0.4 CAPSULE ORAL AT BEDTIME
Qty: 0 | Refills: 0 | Status: DISCONTINUED | OUTPATIENT
Start: 2018-02-11 | End: 2018-02-14

## 2018-02-11 RX ORDER — TIOTROPIUM BROMIDE 18 UG/1
1 CAPSULE ORAL; RESPIRATORY (INHALATION) DAILY
Qty: 0 | Refills: 0 | Status: DISCONTINUED | OUTPATIENT
Start: 2018-02-11 | End: 2018-02-11

## 2018-02-11 RX ORDER — APIXABAN 2.5 MG/1
5 TABLET, FILM COATED ORAL EVERY 12 HOURS
Qty: 0 | Refills: 0 | Status: DISCONTINUED | OUTPATIENT
Start: 2018-02-11 | End: 2018-02-14

## 2018-02-11 RX ORDER — BUDESONIDE AND FORMOTEROL FUMARATE DIHYDRATE 160; 4.5 UG/1; UG/1
2 AEROSOL RESPIRATORY (INHALATION)
Qty: 0 | Refills: 0 | Status: DISCONTINUED | OUTPATIENT
Start: 2018-02-11 | End: 2018-02-11

## 2018-02-11 RX ORDER — METHADONE HYDROCHLORIDE 40 MG/1
1 TABLET ORAL
Qty: 0 | Refills: 0 | COMMUNITY

## 2018-02-11 RX ORDER — ONDANSETRON 8 MG/1
4 TABLET, FILM COATED ORAL ONCE
Qty: 0 | Refills: 0 | Status: COMPLETED | OUTPATIENT
Start: 2018-02-11 | End: 2018-02-11

## 2018-02-11 RX ORDER — CIPROFLOXACIN LACTATE 400MG/40ML
400 VIAL (ML) INTRAVENOUS EVERY 12 HOURS
Qty: 0 | Refills: 0 | Status: DISCONTINUED | OUTPATIENT
Start: 2018-02-11 | End: 2018-02-13

## 2018-02-11 RX ORDER — TRAZODONE HCL 50 MG
50 TABLET ORAL
Qty: 0 | Refills: 0 | COMMUNITY

## 2018-02-11 RX ORDER — GABAPENTIN 400 MG/1
400 CAPSULE ORAL EVERY 8 HOURS
Qty: 0 | Refills: 0 | Status: DISCONTINUED | OUTPATIENT
Start: 2018-02-11 | End: 2018-02-14

## 2018-02-11 RX ORDER — MORPHINE SULFATE 50 MG/1
4 CAPSULE, EXTENDED RELEASE ORAL EVERY 8 HOURS
Qty: 0 | Refills: 0 | Status: DISCONTINUED | OUTPATIENT
Start: 2018-02-11 | End: 2018-02-13

## 2018-02-11 RX ORDER — PANTOPRAZOLE SODIUM 20 MG/1
40 TABLET, DELAYED RELEASE ORAL
Qty: 0 | Refills: 0 | Status: DISCONTINUED | OUTPATIENT
Start: 2018-02-11 | End: 2018-02-14

## 2018-02-11 RX ORDER — FUROSEMIDE 40 MG
1 TABLET ORAL
Qty: 0 | Refills: 0 | COMMUNITY

## 2018-02-11 RX ORDER — SODIUM CHLORIDE 9 MG/ML
1000 INJECTION INTRAMUSCULAR; INTRAVENOUS; SUBCUTANEOUS ONCE
Qty: 0 | Refills: 0 | Status: COMPLETED | OUTPATIENT
Start: 2018-02-11 | End: 2018-02-11

## 2018-02-11 RX ORDER — CLOPIDOGREL BISULFATE 75 MG/1
75 TABLET, FILM COATED ORAL DAILY
Qty: 0 | Refills: 0 | Status: DISCONTINUED | OUTPATIENT
Start: 2018-02-11 | End: 2018-02-14

## 2018-02-11 RX ADMIN — MORPHINE SULFATE 4 MILLIGRAM(S): 50 CAPSULE, EXTENDED RELEASE ORAL at 13:51

## 2018-02-11 RX ADMIN — Medication 50 MILLIGRAM(S): at 14:01

## 2018-02-11 RX ADMIN — MORPHINE SULFATE 4 MILLIGRAM(S): 50 CAPSULE, EXTENDED RELEASE ORAL at 08:48

## 2018-02-11 RX ADMIN — MORPHINE SULFATE 4 MILLIGRAM(S): 50 CAPSULE, EXTENDED RELEASE ORAL at 21:08

## 2018-02-11 RX ADMIN — CLOPIDOGREL BISULFATE 75 MILLIGRAM(S): 75 TABLET, FILM COATED ORAL at 14:01

## 2018-02-11 RX ADMIN — MORPHINE SULFATE 4 MILLIGRAM(S): 50 CAPSULE, EXTENDED RELEASE ORAL at 06:10

## 2018-02-11 RX ADMIN — MORPHINE SULFATE 4 MILLIGRAM(S): 50 CAPSULE, EXTENDED RELEASE ORAL at 13:30

## 2018-02-11 RX ADMIN — SODIUM CHLORIDE 1000 MILLILITER(S): 9 INJECTION INTRAMUSCULAR; INTRAVENOUS; SUBCUTANEOUS at 05:37

## 2018-02-11 RX ADMIN — GABAPENTIN 400 MILLIGRAM(S): 400 CAPSULE ORAL at 14:01

## 2018-02-11 RX ADMIN — PANTOPRAZOLE SODIUM 40 MILLIGRAM(S): 20 TABLET, DELAYED RELEASE ORAL at 14:02

## 2018-02-11 RX ADMIN — MORPHINE SULFATE 4 MILLIGRAM(S): 50 CAPSULE, EXTENDED RELEASE ORAL at 22:19

## 2018-02-11 RX ADMIN — Medication 200 MILLIGRAM(S): at 16:50

## 2018-02-11 RX ADMIN — TAMSULOSIN HYDROCHLORIDE 0.4 MILLIGRAM(S): 0.4 CAPSULE ORAL at 14:02

## 2018-02-11 RX ADMIN — MORPHINE SULFATE 4 MILLIGRAM(S): 50 CAPSULE, EXTENDED RELEASE ORAL at 05:37

## 2018-02-11 RX ADMIN — APIXABAN 5 MILLIGRAM(S): 2.5 TABLET, FILM COATED ORAL at 16:50

## 2018-02-11 RX ADMIN — ONDANSETRON 4 MILLIGRAM(S): 8 TABLET, FILM COATED ORAL at 05:37

## 2018-02-11 RX ADMIN — Medication 3 MILLILITER(S): at 22:21

## 2018-02-11 NOTE — H&P ADULT - PSH
AICD (automatic cardioverter/defibrillator) present    H/O partial nephrectomy  2002  H/O transurethral destruction of bladder lesion    History of percutaneous coronary intervention    S/P cholecystectomy  2015

## 2018-02-11 NOTE — H&P ADULT - FAMILY HISTORY
Mother  Still living? Unknown  Family history of chronic ischemic heart disease, Age at diagnosis: Age Unknown     Father  Still living? No  Family history of lung cancer, Age at diagnosis: Age Unknown

## 2018-02-11 NOTE — ED ADULT NURSE NOTE - CHIEF COMPLAINT QUOTE
syncope episode PTA, alert and in no distress. patient is refusing EKG, threw EKG leads at PCA, states he is not here for EKG.

## 2018-02-11 NOTE — ED PROVIDER NOTE - MEDICAL DECISION MAKING DETAILS
CT no renal stone, or acute abd pathology.  Reviewed HPI w/ pt: pt felt dizzy and syncopized twice on bus w/o any HA,, cp or other injury.  pt had flank pain later.  In ED pt has been stable w/o dizziness, or new sx.  Pt admitted to tele for syncope for monitoring, aicd interrogation, and further management.

## 2018-02-11 NOTE — ED PROVIDER NOTE - ATTENDING CONTRIBUTION TO CARE
57 y.o. male comes in c/o a syncopal episode 57 y.o. male comes in c/o a flank pain which started suddenly on the bus. Pt states he also syncopized on the bus. Reports hematuria. States had 8 kidney stones in the past, feels similar. On exam, pt in NAD, AAOx3, head NC/AT, CN II-XII intact, lungs CTA B/L, CV S1S2 regular, abdomen soft/NT/ND/(+)BS, back (-) CVA tenderness, ext (-) edema. motor 5/5x4, sensation intact, pulses intact and equal in all extremities. WIll do labs, CT, UA, and reevaluate.

## 2018-02-11 NOTE — ED PROVIDER NOTE - PHYSICAL EXAMINATION
Alert, NAD, WDWN, non-toxic appearing  PERRL, EOMI, normal pupils, no icterus, normal external ENT, pink/moist membranes  Airway intact, normal resp effort w/o tachypnea, speaking full sentences, lungs CTA b/l  CVS1S2, RRR, no m/g/r, no JVD, 2+ pulses b/l, no edema, warm/well-perfused  Abdomen soft, no tender/dist/guard/rebound, +left CVA tenderness  FROM all 4 ext, no bony tender/deform, skin warm/dry, no rash

## 2018-02-11 NOTE — ED PROVIDER NOTE - PROGRESS NOTE DETAILS
Pt signed out to Dr. Ortega pending labs, CT, UA and dispo. Pt found sleeping.  Has moderate pain.  Results of CT reveiwed w/ pt.  labs pending.  analgesia redosed spoke to MAR, admitting

## 2018-02-11 NOTE — H&P ADULT - HISTORY OF PRESENT ILLNESS
56 yo M poor historian with cad s/p 1 stent [40 weeks ago], CHF [pEF/rEF], AICD, HTN, PAD, bladder cancer s/p chemo/rad and TURBT, RCC s/p partial nephrectomy and Chemo/radiation, prostate cancer s/p radiation therapy, nephrolithiasis, HCV no treatment, peripheral neuropathy, COPD presents to the ED after he synopsized on a bus. He was riding the bus and then he Began to feel lightheaded, flushed and have palpitations then he had LOC, unsure duration. He woke up and realized he passed out, no confusion or witnessed seizure activity, he got off the bus and began to walk home then he felt the same pre syncopal symptoms so he called 911. He also complains of MCCAIN that started over 1 day and gross hematuria that started one day ago a/w left flank pain.

## 2018-02-11 NOTE — H&P ADULT - PMH
AICD (automatic cardioverter/defibrillator) present  [employer]  Atrial fibrillation    Bladder cancer    CAD (coronary artery disease)  (s/p 2 stents in Sep 2017)  Chronic congestive heart failure, unspecified congestive heart failure type  rEF/pEF  COPD (chronic obstructive pulmonary disease)    Hep C w/o coma, chronic    HTN (hypertension)    Hyperlipidemia    Nephrolithiasis    Peripheral neuropathy    Prostate cancer  s/p radiation  Renal cell carcinoma of left kidney  s/p partial nephrectomy in 2002  biopsy again in Sep 2017 showed RCC again in stage 2

## 2018-02-11 NOTE — ED ADULT NURSE NOTE - PAIN RATING/NUMBER SCALE (0-10): REST
HPI     Ms. Branch was referred by Mike Rodriguez Ii, MD  for diabetic eye exam     She reports dry eyes, improved with Refresh BID OU. She also reports   satisfactory vision all ranges with glasses (1 year old), but frames   broken and she requests new SRx today.  History of soft contact lens wear, but pt is not interested in continuing   contact lenses at this time.    (+)drops: Refresh BID OU  (-)pain  (-)flashes  (-)floaters  (-)diplopia    Diabetic yes    this morning   Hemoglobin A1C       Date                     Value               Ref Range             Status                01/24/2017               7.8 (H)             4.5 - 6.2 %           Final                  08/09/2016               8.2 (H)             4.5 - 6.2 %           Final                   05/09/2016               8.7 (H)             4.5 - 6.2 %           Final            ----------    OCULAR HISTORY  Last Eye Exam 1 year ago at [facility], last exam at [facility]   10/14/14 Dr. Oneal   (-)eye surgery   (-)eye injury     FAMILY HISTORY  No family history of glaucoma or macular degeneration              Last edited by Jenny Casey, OD on 1/25/2017 10:21 AM.         Assessment /Plan     For exam results, see Encounter Report.    Type 2 diabetes mellitus without retinopathy   Uncontrolled. No retinopathy noted OU. Continue management of DM as directed by PCP. Monitor with DFE in 1 year.     Nuclear sclerotic cataract of both eyes   Not visually significant OU. Monitor.      Bilateral dry eyes   Continue Refresh BID-TID OU.    Hypermetropia of both eyes   Stable OU. New glasses prescription released, adaptation expected.  Explained that refractive error can change with blood glucose fluctuations.  Eyeglass Final Rx     Eyeglass Final Rx      Sphere Cylinder Axis Add   Right +2.50 Sphere  +2.00   Left +1.75 +0.75 002 +2.00       Type:  PAL    Expiration Date:  1/26/2018                 RTC 1 yr                 
10

## 2018-02-11 NOTE — H&P ADULT - ATTENDING COMMENTS
Patient seen and examined independently agree with plan of resident.  Syncope -- cardiac enzymes were negative x3. AICD interrogation.     Vague history of different genitourinary cancers- no evidence of hematuria --urine culture pending.  CAD-- follows with cardiology at [facility] and [facility]. on plavix   Afib on eliquis.

## 2018-02-11 NOTE — H&P ADULT - NSHPSOCIALHISTORY_GEN_ALL_CORE
Tobacco: half PPD x 30 yrs  EtOH: denies  Illicit drugs: denies  Lives alone, independent with ADLs, use walker

## 2018-02-11 NOTE — ED ADULT NURSE NOTE - OBJECTIVE STATEMENT
Complaining of "passing out in the bus two hours ago and flank pain that started 2 hours ago." AA&Ox3. Breathing on room air. Denies hitting head.

## 2018-02-11 NOTE — ED PROVIDER NOTE - OBJECTIVE STATEMENT
57 yr old male, PMH of DM, HTN, HLD, CHF with AICD, AFib on Eliquis, CAD x1 stent, and nephrolithiasis x8, presenting with left flank pain that started tonight, associated with hematuria and dysuria, states he went on the bus to get to the hospital and passed out with LOC due to the pain, woke up and got off the bus, had another syncopal episode, then came here to the ED. Does not currently have a urologist. Denies any fevers, chills, rash, SOB, chest pain, N/V/D, or LE swelling 57 yr old male, PMH of DM, HTN, HLD, CHF with AICD, AFib on Eliquis, CAD x1 stent, and nephrolithiasis x8, renal, bladder, and prostate CA in remission, presenting with left flank pain that started tonight, associated with hematuria and dysuria, states he went on the bus to get to the hospital and passed out with LOC due to the pain, woke up and got off the bus, had another syncopal episode, then came here to the ED. Does not currently have a urologist. Denies any fevers, chills, rash, SOB, chest pain, N/V/D, or LE swelling

## 2018-02-11 NOTE — H&P ADULT - ASSESSMENT
Syncope likely vasovagal    - admit to tele  - 2d echo, interrogate AICD  - orthostatics      Mild COPD exacerbation    - duonebs around the clock, prednisone 40 x 5 days  - cw long acting inhalers    Dysuria + gross hematuria + nitrite positive u/a + leukocytosis    - treat as UTI with rocephin, CT noncon no kidney stones  - follow up urine culture, check blood culture  - consider urology eval given history of bladder ca and renal cell carcinoma    CAD/PAD/AFIB/Neuropathy/HTN - c/w home meds eliquis and plavix, H/H stable, monitor given hematuria    dvt ppx gi ppx  ambulate as tolerate  dash diet  dispo - home when stable  full code Syncope likely vasovagal    - admit to tele  - 2d echo, interrogate AICD  - orthostatics      Mild COPD exacerbation    - duonebs around the clock, prednisone 40 x 5 days  - cw long acting inhalers    Dysuria + gross hematuria + nitrite positive u/a + leukocytosis    - treat as UTI with cipro [pen allergy], CT noncon no kidney stones  - follow up urine culture, check blood culture  - consider urology eval given history of bladder ca and renal cell carcinoma    CAD/PAD/AFIB/Neuropathy/HTN - c/w home meds eliquis and plavix, H/H stable, monitor given hematuria    dvt ppx gi ppx  ambulate as tolerate  dash diet  dispo - home when stable  full code Syncope likely vasovagal    - admit to tele  - 2d echo, interrogate AICD  - orthostatics      Mild COPD exacerbation    - duonebs around the clock, prednisone 40 x 5 days  - cw long acting inhalers    Dysuria + gross hematuria + nitrite positive u/a + leukocytosis    - treat as UTI with cipro [pen allergy], CT noncon no kidney stones  - follow up urine culture, check blood culture  - consider urology eval given history of bladder ca and renal cell carcinoma    CAD/CHF/p+rEF/AFIB/Neuropathy/HTN - c/w home meds eliquis and plavix, H/H stable, monitor given hematuria    dvt ppx gi ppx  ambulate as tolerate  dash diet  dispo - home when stable  full code Syncope likely vasovagal    - admit to tele  - 2d echo, interrogate AICD  - orthostatics      Mild COPD exacerbation, active smoker    - duonebs around the clock, prednisone 40 x 5 days, on room air oxygen prn  - switch to long acting inhalers when no more wheezing    Dysuria + gross hematuria + nitrite positive u/a + leukocytosis    - treat as UTI with cipro [pen allergy], CT noncon no kidney stones  - on eliquis and plavix, Hg 15, monitor h/h  - follow up urine culture, check blood culture  - consider urology eval given history of bladder ca and renal cell carcinoma    CAD/CHF/p+rEF/AFIB/Neuropathy/HTN - c/w home meds eliquis and plavix, lipitor, gabapentin, flomax, toprol    dvt ppx gi ppx  ambulate as tolerate  dash diet  dispo - home when stable  full code Syncope likely vasovagal    - admit to tele  - 2d echo, interrogate AICD  - orthostatics      Mild COPD exacerbation, active smoker    - duonebs around the clock, prednisone 40 x 5 days, on room air oxygen prn  - switch to long acting inhalers when no more wheezing    Dysuria + gross hematuria + nitrite positive u/a + leukocytosis    - treat as UTI with cipro [pen allergy], CT noncon no kidney stones  - on eliquis and plavix, Hg 15, monitor h/h  - follow up urine culture, check blood culture  - consider urology eval given history of bladder ca and renal cell carcinoma    CAD/CHF/p+rEF/AFIB/Neuropathy/HTN - c/w home meds eliquis and plavix, lipitor, gabapentin, flomax, toprol    HCV - patient noncompliant with outpatient follow up, check viral load and genotype, outpatient GI referral for treatment    dvt ppx gi ppx  ambulate as tolerate  dash diet  dispo - home when stable  full code

## 2018-02-11 NOTE — ED PROVIDER NOTE - NS ED ROS FT
Review of Systems:  	•	CONSTITUTIONAL - no fever, no diaphoresis, no chills  	•	SKIN - no rash  	•	ENT - no change in hearing, no sore throat, no ear pain or tinnitus  	•	RESPIRATORY - no shortness of breath, no cough  	•	CARDIAC - no chest pain, no palpitations  	•	GI - no abd pain, no nausea, no vomiting, no diarrhea, no constipation  	•	GENITO-URINARY - +dysuria, hematuria  	•	MUSCULOSKELETAL - no joint paint, no swelling, no redness  	•	NEUROLOGIC - no weakness, no headache, no paresthesias, no LOC

## 2018-02-11 NOTE — ED ADULT TRIAGE NOTE - CHIEF COMPLAINT QUOTE
syncope episode PTA, alert and in no distress. syncope episode PTA, alert and in no distress. patient is refusing EKG, threw EKG leads at PCA, states he is not here for EKG.

## 2018-02-12 DIAGNOSIS — R31.9 HEMATURIA, UNSPECIFIED: ICD-10-CM

## 2018-02-12 LAB
ALBUMIN SERPL ELPH-MCNC: 3.4 G/DL — SIGNIFICANT CHANGE UP (ref 3–5.5)
ALP SERPL-CCNC: 160 U/L — HIGH (ref 30–115)
ALT FLD-CCNC: 74 U/L — HIGH (ref 0–41)
ANION GAP SERPL CALC-SCNC: 7 MMOL/L — SIGNIFICANT CHANGE UP (ref 7–14)
AST SERPL-CCNC: 45 U/L — HIGH (ref 0–41)
BASOPHILS # BLD AUTO: 0.02 K/UL — SIGNIFICANT CHANGE UP (ref 0–0.2)
BASOPHILS NFR BLD AUTO: 0.2 % — SIGNIFICANT CHANGE UP (ref 0–1)
BILIRUB SERPL-MCNC: 0.6 MG/DL — SIGNIFICANT CHANGE UP (ref 0.2–1.2)
BUN SERPL-MCNC: 30 MG/DL — HIGH (ref 10–20)
CALCIUM SERPL-MCNC: 8.8 MG/DL — SIGNIFICANT CHANGE UP (ref 8.5–10.1)
CHLORIDE SERPL-SCNC: 106 MMOL/L — SIGNIFICANT CHANGE UP (ref 98–110)
CK MB BLD-MCNC: 13 % — HIGH (ref 0–4)
CK MB CFR SERPL CALC: 5.9 NG/ML — SIGNIFICANT CHANGE UP (ref 0.6–6.3)
CK SERPL-CCNC: 46 U/L — SIGNIFICANT CHANGE UP (ref 0–225)
CO2 SERPL-SCNC: 26 MMOL/L — SIGNIFICANT CHANGE UP (ref 17–32)
CREAT SERPL-MCNC: 1.2 MG/DL — SIGNIFICANT CHANGE UP (ref 0.7–1.5)
CULTURE RESULTS: NO GROWTH — SIGNIFICANT CHANGE UP
EOSINOPHIL # BLD AUTO: 0.07 K/UL — SIGNIFICANT CHANGE UP (ref 0–0.7)
EOSINOPHIL NFR BLD AUTO: 0.7 % — SIGNIFICANT CHANGE UP (ref 0–8)
GLUCOSE SERPL-MCNC: 96 MG/DL — SIGNIFICANT CHANGE UP (ref 70–110)
HCT VFR BLD CALC: 42.7 % — SIGNIFICANT CHANGE UP (ref 42–52)
HGB BLD-MCNC: 14 G/DL — SIGNIFICANT CHANGE UP (ref 14–18)
HIV 1+2 AB+HIV1 P24 AG SERPL QL IA: SIGNIFICANT CHANGE UP
IMM GRANULOCYTES NFR BLD AUTO: 0.6 % — HIGH (ref 0.1–0.3)
LYMPHOCYTES # BLD AUTO: 2.4 K/UL — SIGNIFICANT CHANGE UP (ref 1.2–3.4)
LYMPHOCYTES # BLD AUTO: 23.7 % — SIGNIFICANT CHANGE UP (ref 20.5–51.1)
MCHC RBC-ENTMCNC: 29 PG — SIGNIFICANT CHANGE UP (ref 27–31)
MCHC RBC-ENTMCNC: 32.8 G/DL — SIGNIFICANT CHANGE UP (ref 32–37)
MCV RBC AUTO: 88.4 FL — SIGNIFICANT CHANGE UP (ref 80–94)
MONOCYTES # BLD AUTO: 1.22 K/UL — HIGH (ref 0.1–0.6)
MONOCYTES NFR BLD AUTO: 12.1 % — HIGH (ref 1.7–9.3)
NEUTROPHILS # BLD AUTO: 6.35 K/UL — SIGNIFICANT CHANGE UP (ref 1.4–6.5)
NEUTROPHILS NFR BLD AUTO: 62.7 % — SIGNIFICANT CHANGE UP (ref 42.2–75.2)
PLATELET # BLD AUTO: 159 K/UL — SIGNIFICANT CHANGE UP (ref 130–400)
POTASSIUM SERPL-MCNC: 4.3 MMOL/L — SIGNIFICANT CHANGE UP (ref 3.5–5)
POTASSIUM SERPL-SCNC: 4.3 MMOL/L — SIGNIFICANT CHANGE UP (ref 3.5–5)
PROT SERPL-MCNC: 5.7 G/DL — LOW (ref 6–8)
RBC # BLD: 4.83 M/UL — SIGNIFICANT CHANGE UP (ref 4.7–6.1)
RBC # FLD: 13.3 % — SIGNIFICANT CHANGE UP (ref 11.5–14.5)
SODIUM SERPL-SCNC: 139 MMOL/L — SIGNIFICANT CHANGE UP (ref 135–146)
SPECIMEN SOURCE: SIGNIFICANT CHANGE UP
TROPONIN I SERPL-MCNC: <0.02 NG/ML — SIGNIFICANT CHANGE UP (ref 0–0.05)
WBC # BLD: 10.12 K/UL — SIGNIFICANT CHANGE UP (ref 4.8–10.8)
WBC # FLD AUTO: 10.12 K/UL — SIGNIFICANT CHANGE UP (ref 4.8–10.8)

## 2018-02-12 RX ORDER — MORPHINE SULFATE 50 MG/1
2 CAPSULE, EXTENDED RELEASE ORAL ONCE
Qty: 0 | Refills: 0 | Status: DISCONTINUED | OUTPATIENT
Start: 2018-02-12 | End: 2018-02-12

## 2018-02-12 RX ADMIN — GABAPENTIN 400 MILLIGRAM(S): 400 CAPSULE ORAL at 22:25

## 2018-02-12 RX ADMIN — MORPHINE SULFATE 4 MILLIGRAM(S): 50 CAPSULE, EXTENDED RELEASE ORAL at 05:52

## 2018-02-12 RX ADMIN — GABAPENTIN 400 MILLIGRAM(S): 400 CAPSULE ORAL at 13:18

## 2018-02-12 RX ADMIN — MORPHINE SULFATE 4 MILLIGRAM(S): 50 CAPSULE, EXTENDED RELEASE ORAL at 06:26

## 2018-02-12 RX ADMIN — MORPHINE SULFATE 4 MILLIGRAM(S): 50 CAPSULE, EXTENDED RELEASE ORAL at 13:13

## 2018-02-12 RX ADMIN — GABAPENTIN 400 MILLIGRAM(S): 400 CAPSULE ORAL at 06:30

## 2018-02-12 RX ADMIN — Medication 200 MILLIGRAM(S): at 17:35

## 2018-02-12 RX ADMIN — MORPHINE SULFATE 2 MILLIGRAM(S): 50 CAPSULE, EXTENDED RELEASE ORAL at 05:10

## 2018-02-12 RX ADMIN — ATORVASTATIN CALCIUM 40 MILLIGRAM(S): 80 TABLET, FILM COATED ORAL at 05:54

## 2018-02-12 RX ADMIN — TAMSULOSIN HYDROCHLORIDE 0.4 MILLIGRAM(S): 0.4 CAPSULE ORAL at 22:25

## 2018-02-12 RX ADMIN — Medication 50 MILLIGRAM(S): at 06:28

## 2018-02-12 RX ADMIN — ATORVASTATIN CALCIUM 40 MILLIGRAM(S): 80 TABLET, FILM COATED ORAL at 22:25

## 2018-02-12 RX ADMIN — Medication 3 MILLILITER(S): at 19:34

## 2018-02-12 RX ADMIN — PANTOPRAZOLE SODIUM 40 MILLIGRAM(S): 20 TABLET, DELAYED RELEASE ORAL at 06:28

## 2018-02-12 RX ADMIN — MORPHINE SULFATE 4 MILLIGRAM(S): 50 CAPSULE, EXTENDED RELEASE ORAL at 14:00

## 2018-02-12 RX ADMIN — CLOPIDOGREL BISULFATE 75 MILLIGRAM(S): 75 TABLET, FILM COATED ORAL at 13:17

## 2018-02-12 RX ADMIN — MORPHINE SULFATE 2 MILLIGRAM(S): 50 CAPSULE, EXTENDED RELEASE ORAL at 20:23

## 2018-02-12 RX ADMIN — MORPHINE SULFATE 2 MILLIGRAM(S): 50 CAPSULE, EXTENDED RELEASE ORAL at 22:22

## 2018-02-12 RX ADMIN — Medication 200 MILLIGRAM(S): at 07:01

## 2018-02-12 RX ADMIN — Medication 50 MILLIGRAM(S): at 06:31

## 2018-02-12 RX ADMIN — MORPHINE SULFATE 2 MILLIGRAM(S): 50 CAPSULE, EXTENDED RELEASE ORAL at 02:47

## 2018-02-12 RX ADMIN — APIXABAN 5 MILLIGRAM(S): 2.5 TABLET, FILM COATED ORAL at 06:30

## 2018-02-12 RX ADMIN — MORPHINE SULFATE 4 MILLIGRAM(S): 50 CAPSULE, EXTENDED RELEASE ORAL at 22:25

## 2018-02-12 RX ADMIN — APIXABAN 5 MILLIGRAM(S): 2.5 TABLET, FILM COATED ORAL at 17:35

## 2018-02-12 NOTE — CONSULT NOTE ADULT - PROBLEM SELECTOR RECOMMENDATION 9
Send urine for culture and cytology  Obtain a CT Abdomen/Pelvis with iv contrast (CT Urogram)  Pt will need cystocopy as outpatient Send urine for culture and cytology, Obtain PSA level  Obtain a CT Abdomen/Pelvis with iv contrast (CT Urogram)  Pt will need cystocopy as outpatient Send urine for culture and cytology, Obtain PSA level  Obtain a CT Abdomen/Pelvis with iv contrast (CT Urogram)  --- cancell due to allergy with hives  Pt will need cystocopy as outpatient  psa as outpt

## 2018-02-12 NOTE — PROGRESS NOTE ADULT - SUBJECTIVE AND OBJECTIVE BOX
Patient is a 57y old  Male who presents with a chief complaint of loss of consciousness (2018 11:48)      Patient seen and examined. he complains of generalized fatigue. no chest pain or SOB.    Overnight events:none    PAST MEDICAL & SURGICAL HISTORY:  Nephrolithiasis  Prostate cancer: s/p radiation  Peripheral neuropathy  Bladder cancer  COPD (chronic obstructive pulmonary disease)  Renal cell carcinoma of left kidney: s/p partial nephrectomy in   biopsy again in Sep 2017 showed RCC again in stage 2  Hyperlipidemia  CAD (coronary artery disease): (s/p 2 stents in Sep 2017)  Atrial fibrillation  AICD (automatic cardioverter/defibrillator) present: [employer]  HTN (hypertension)  Hep C w/o coma, chronic  Chronic congestive heart failure, unspecified congestive heart failure type: rEF/pEF  H/O transurethral destruction of bladder lesion  History of percutaneous coronary intervention  H/O partial nephrectomy:   S/P cholecystectomy:   AICD (automatic cardioverter/defibrillator) present      MEDICATIONS  (STANDING):  ALBUTerol/ipratropium for Nebulization 3 milliLiter(s) Nebulizer every 6 hours  apixaban 5 milliGRAM(s) Oral every 12 hours  atorvastatin 40 milliGRAM(s) Oral at bedtime  ciprofloxacin   IVPB 400 milliGRAM(s) IV Intermittent every 12 hours  clopidogrel Tablet 75 milliGRAM(s) Oral daily  gabapentin 400 milliGRAM(s) Oral every 8 hours  metoprolol succinate ER 50 milliGRAM(s) Oral daily  pantoprazole    Tablet 40 milliGRAM(s) Oral before breakfast  predniSONE   Tablet 50 milliGRAM(s) Oral every 24 hours  tamsulosin 0.4 milliGRAM(s) Oral at bedtime    MEDICATIONS  (PRN):  morphine  - Injectable 4 milliGRAM(s) IV Push every 8 hours PRN Moderate Pain (4 - 6)            T(C): 36 (18 @ 13:55), Max: 36.2 (18 @ 06:51)  HR: 53 (18 @ 12:21) (53 - 76)  BP: 131/88 (18 @ 12:21) (125/74 - 171/78)  RR: 18 (18 @ 12:21) (18 - 20)  SpO2: 100% (18 @ 17:33) (100% - 100%)    PHYSICAL EXAM:    GENERAL: NAD, well-developed  EYES: EOMI, PERRLA, conjunctiva and sclera clear  CHEST/LUNG: Clear to auscultation bilaterally; No wheeze  HEART: Regular rate and rhythm; No murmurs, rubs, or gallops  ABDOMEN: Soft, Nontender, Nondistended; Bowel sounds present  EXTREMITIES:  2+ Peripheral Pulses, No clubbing, cyanosis, or edema  PSYCH: AAOx3  NEUROLOGY: non-focal  SKIN: No rashes or lesions    Labs:                        14.0   10.12 )-----------( 159      ( 2018 05:02 )             42.7                 139  |  106  |  30<H>  ----------------------------<  96  4.3   |  26  |  1.2    Ca    8.8      2018 05:02    TPro  5.7<L>  /  Alb  3.4  /  TBili  0.6  /  DBili  x   /  AST  45<H>  /  ALT  74<H>  /  AlkPhos  160<H>      LIVER FUNCTIONS - ( 2018 05:02 )  Alb: 3.4 g/dL / Pro: 5.7 g/dL / ALK PHOS: 160 U/L / ALT: 74 U/L / AST: 45 U/L / GGT: x                 PT/INR - ( 2018 04:18 )   PT: 12.10 sec;   INR: 1.12 ratio         PTT - ( 2018 04:18 )  PTT:23.8 sec  CARDIAC MARKERS ( 2018 05:02 )  <0.02 ng/mL / x     / 46 U/L / x     / 5.9 ng/mL  CARDIAC MARKERS ( 2018 04:18 )  <0.02 ng/mL / x     / 157 U/L / x     / 13.3 ng/mL        Urinalysis Basic - ( 2018 04:18 )    Color: Yellow / Appearance: Cloudy / S.010 / pH: x  Gluc: x / Ketone: Negative  / Bili: Negative / Urobili: 0.2 mg/dL   Blood: x / Protein: 100 mg/dL / Nitrite: Negative   Leuk Esterase: Negative / RBC: >50 /HPF / WBC x   Sq Epi: x / Non Sq Epi: x / Bacteria: x Patient is a 57y old  Male who presents with a chief complaint of loss of consciousness (2018 11:48)      Patient seen and examined. he complains of generalized fatigue. no chest pain or SOB.    Overnight events:none    PAST MEDICAL & SURGICAL HISTORY:  Nephrolithiasis  Prostate cancer: s/p radiation  Peripheral neuropathy  Bladder cancer  COPD (chronic obstructive pulmonary disease)  Renal cell carcinoma of left kidney: s/p partial nephrectomy in   biopsy again in Sep 2017 showed RCC again in stage 2  Hyperlipidemia  CAD (coronary artery disease): (s/p 2 stents in Sep 2017)  Atrial fibrillation  AICD (automatic cardioverter/defibrillator) present: [employer]  HTN (hypertension)  Hep C w/o coma, chronic  Chronic congestive heart failure, unspecified congestive heart failure type: rEF/pEF  H/O transurethral destruction of bladder lesion  History of percutaneous coronary intervention  H/O partial nephrectomy:   S/P cholecystectomy:   AICD (automatic cardioverter/defibrillator) present      MEDICATIONS  (STANDING):  ALBUTerol/ipratropium for Nebulization 3 milliLiter(s) Nebulizer every 6 hours  apixaban 5 milliGRAM(s) Oral every 12 hours  atorvastatin 40 milliGRAM(s) Oral at bedtime  ciprofloxacin   IVPB 400 milliGRAM(s) IV Intermittent every 12 hours  clopidogrel Tablet 75 milliGRAM(s) Oral daily  gabapentin 400 milliGRAM(s) Oral every 8 hours  metoprolol succinate ER 50 milliGRAM(s) Oral daily  pantoprazole    Tablet 40 milliGRAM(s) Oral before breakfast  predniSONE   Tablet 50 milliGRAM(s) Oral every 24 hours  tamsulosin 0.4 milliGRAM(s) Oral at bedtime    MEDICATIONS  (PRN):  morphine  - Injectable 4 milliGRAM(s) IV Push every 8 hours PRN Moderate Pain (4 - 6)            T(C): 36 (18 @ 13:55), Max: 36.2 (18 @ 06:51)  HR: 53 (18 @ 12:21) (53 - 76)  BP: 131/88 (18 @ 12:21) (125/74 - 171/78)  RR: 18 (18 @ 12:21) (18 - 20)  SpO2: 100% (18 @ 17:33) (100% - 100%)    PHYSICAL EXAM:    GENERAL: NAD, well-developed  CHEST/LUNG: mild wheezing  HEART: Regular rate and rhythm;  ABDOMEN: Soft, Nontender, Nondistended; Bowel sounds present  EXTREMITIES:  2+ Peripheral Pulses, No clubbing, cyanosis, or edema  PSYCH: AAOx3  NEUROLOGY: non-focal  SKIN: No rashes or lesions    Labs:                        14.0   10.12 )-----------( 159      ( 2018 05:02 )             42.7             02-    139  |  106  |  30<H>  ----------------------------<  96  4.3   |  26  |  1.2    Ca    8.8      2018 05:02    TPro  5.7<L>  /  Alb  3.4  /  TBili  0.6  /  DBili  x   /  AST  45<H>  /  ALT  74<H>  /  AlkPhos  160<H>  0212    LIVER FUNCTIONS - ( 2018 05:02 )  Alb: 3.4 g/dL / Pro: 5.7 g/dL / ALK PHOS: 160 U/L / ALT: 74 U/L / AST: 45 U/L / GGT: x                 PT/INR - ( 2018 04:18 )   PT: 12.10 sec;   INR: 1.12 ratio         PTT - ( 2018 04:18 )  PTT:23.8 sec  CARDIAC MARKERS ( 2018 05:02 )  <0.02 ng/mL / x     / 46 U/L / x     / 5.9 ng/mL  CARDIAC MARKERS ( 2018 04:18 )  <0.02 ng/mL / x     / 157 U/L / x     / 13.3 ng/mL        Urinalysis Basic - ( 2018 04:18 )    Color: Yellow / Appearance: Cloudy / S.010 / pH: x  Gluc: x / Ketone: Negative  / Bili: Negative / Urobili: 0.2 mg/dL   Blood: x / Protein: 100 mg/dL / Nitrite: Negative   Leuk Esterase: Negative / RBC: >50 /HPF / WBC x   Sq Epi: x / Non Sq Epi: x / Bacteria: x

## 2018-02-12 NOTE — PROGRESS NOTE ADULT - ASSESSMENT
57 y old man with PMH of afib on eliquis, cad, chf, aicd,, htn, pad, bladder ca, prostate ca, copd, chronic hcv no rx, presented for syncope. negative orthostatics, negative CE and EKG.    - admit to tele  - 2d echo, interrogate AICD  - orthostatics      Mild COPD exacerbation, active smoker    - duonebs around the clock, prednisone 40 x 5 days, on room air oxygen prn  - switch to long acting inhalers when no more wheezing    Dysuria + gross hematuria + nitrite positive u/a + leukocytosis    - treat as UTI with cipro [pen allergy], CT noncon no kidney stones  - on eliquis and plavix, Hg 15, monitor h/h  - follow up urine culture, check blood culture  - consider urology eval given history of bladder ca and renal cell carcinoma    CAD/CHF/p+rEF/AFIB/Neuropathy/HTN - c/w home meds eliquis and plavix, lipitor, gabapentin, flomax, toprol  HCV - patient noncompliant with outpatient follow up, check viral load and genotype, outpatient GI referral for treatment    dvt ppx gi ppx  ambulate as tolerate  dash diet  dispo - home when stable  full code 57 y old man with PMH of afib on eliquis, cad, chf, aicd,, htn, pad, bladder ca, prostate ca, copd, chronic hcv no rx, presented for syncope. negative orthostatics, negative CE and EKG.    syncope:  -no events on tele  - 2d echo pending, interrogate AICD pending. contacted tech today    Mild COPD exacerbation, active smoker    - duonebs around the clock, prednisone 40 x 5 days, on room air oxygen prn  - switch to long acting inhalers when no more wheezing    Dysuria + gross hematuria + nitrite positive u/a + leukocytosis    - treat as UTI with cipro [pen allergy], CT noncon no kidney stones  - on eliquis and plavix, Hg 15, monitor h/h  - follow up urine culture, check blood culture  - urology evaluation pending given history of bladder ca and renal cell carcinoma    CAD/CHF/p+rEF/AFIB/Neuropathy/HTN - c/w home meds eliquis and plavix, lipitor, gabapentin, flomax, toprol  HCV - patient noncompliant with outpatient follow up, check viral load and genotype, outpatient GI referral for treatment    dvt ppx gi ppx  ambulate as tolerate  dash diet  dispo - home when stable  full code

## 2018-02-12 NOTE — PROVIDER CONTACT NOTE (OTHER) - SITUATION
MD Story notified pt stated he wants to leave unit to go for a walk, pt educated that we cannot monitor his heart if he leaves unit, pt stated understanding but still went, pt stated he will come back.

## 2018-02-12 NOTE — CONSULT NOTE ADULT - ASSESSMENT
Patient is a 57y old  Male who presents with a chief complaint of loss of consciousness (2018 11:48)      HPI:  56 yo M poor historian with cad s/p 1 stent [40 weeks ago], CHF [pEF/rEF], AICD, HTN, PAD, bladder cancer s/p chemo/rad and TURBT, RCC s/p partial nephrectomy and Chemo/radiation, prostate cancer s/p radiation therapy, nephrolithiasis, HCV no treatment, peripheral neuropathy, COPD presents to the ED after he synopsized on a bus. He was riding the bus and then he Began to feel lightheaded, flushed and have palpitations then he had LOC, unsure duration. He woke up and realized he passed out, no confusion or witnessed seizure activity, he got off the bus and began to walk home then he felt the same pre syncopal symptoms so he called 911. He also complains of MCCAIN that started over 1 day and gross hematuria that started one day ago a/w left flank pain. (2018 11:48)    Pt states he has a hx of kidney ca (left partial nephrectomy), bladder ca treated with "scraping" 5 years ago at [facility] and had radiation therapy to prostate and bladder shortly thereafter. Pt has not seen a urologist in over 2 years.      PAST MEDICAL & SURGICAL HISTORY:  Nephrolithiasis  Prostate cancer: s/p radiation  Peripheral neuropathy  Bladder cancer  COPD (chronic obstructive pulmonary disease)  Renal cell carcinoma of left kidney: s/p partial nephrectomy in   biopsy again in Sep 2017 showed RCC again in stage 2  Hyperlipidemia  CAD (coronary artery disease): (s/p 2 stents in Sep 2017)  Atrial fibrillation  AICD (automatic cardioverter/defibrillator) present: [employer]  HTN (hypertension)  Hep C w/o coma, chronic  Chronic congestive heart failure, unspecified congestive heart failure type: rEF/pEF  H/O transurethral destruction of bladder lesion  History of percutaneous coronary intervention  H/O partial nephrectomy:   S/P cholecystectomy:   AICD (automatic cardioverter/defibrillator) present      REVIEW OF SYSTEMS:    CONSTITUTIONAL:  fevers or chills  HEENT: No visual changes  ENDO: No sweating  NECK: No pain or stiffness  MUSCULOSKELETAL: No back pain, no joint pain  RESPIRATORY: No shortness of breath  CARDIOVASCULAR: No chest pain  GASTROINTESTINAL: No abdominal or epigastric pain. No nausea, vomiting,  No diarrhea or constipation.   NEUROLOGICAL: No mental status changes  PSYCH: No depression, no mood changes  SKIN: No itching      MEDICATIONS  (STANDING):  ALBUTerol/ipratropium for Nebulization 3 milliLiter(s) Nebulizer every 6 hours  apixaban 5 milliGRAM(s) Oral every 12 hours  atorvastatin 40 milliGRAM(s) Oral at bedtime  ciprofloxacin   IVPB 400 milliGRAM(s) IV Intermittent every 12 hours  clopidogrel Tablet 75 milliGRAM(s) Oral daily  gabapentin 400 milliGRAM(s) Oral every 8 hours  metoprolol succinate ER 50 milliGRAM(s) Oral daily  pantoprazole    Tablet 40 milliGRAM(s) Oral before breakfast  predniSONE   Tablet 50 milliGRAM(s) Oral every 24 hours  tamsulosin 0.4 milliGRAM(s) Oral at bedtime    MEDICATIONS  (PRN):  morphine  - Injectable 4 milliGRAM(s) IV Push every 8 hours PRN Moderate Pain (4 - 6)      Allergies    aspirin (Hives)  codeine (Rash)  codeine (Unknown)  Haldol (Unknown)  Motrin (Rash)  penicillin (Hives; Anaphylaxis)  Toradol (Rash)  Tylenol (Hives)    Intolerances        SOCIAL HISTORY: No illicit drug use    FAMILY HISTORY:  Family history of lung cancer (Father)  Family history of chronic ischemic heart disease (Mother)      Vital Signs Last 24 Hrs  T(C): 36 (2018 13:55), Max: 36.2 (2018 06:51)  T(F): 96.8 (2018 13:55), Max: 97.1 (2018 06:51)  HR: 53 (2018 12:21) (53 - 76)  BP: 131/88 (2018 12:21) (125/74 - 149/78)  BP(mean): --  RR: 18 (2018 12:21) (18 - 20)  SpO2: --    PHYSICAL EXAM:    Constitutional: NAD, well-developed  HEENT: EOMI  Neck: no pain  Back: + left sided CVA tenderness  Respiratory: No accessory respiratory muscle use  Abd: Soft, NT/ND  no organomegally  no hernia  : no scrotal swelling, tenderness or edema, no suprapubic tenderness  CIARRA:   Extremities: no edema  Neurological: A/O x 3  Psychiatric: Normal mood, normal affect  Skin: No rashes    I&O's Summary      LABS:                        14.0   10.12 )-----------( 159      ( 2018 05:02 )             42.7     02    139  |  106  |  30<H>  ----------------------------<  96  4.3   |  26  |  1.2    Ca    8.8      2018 05:02    TPro  5.7<L>  /  Alb  3.4  /  TBili  0.6  /  DBili  x   /  AST  45<H>  /  ALT  74<H>  /  AlkPhos  160<H>      PT/INR - ( 2018 04:18 )   PT: 12.10 sec;   INR: 1.12 ratio         PTT - ( 2018 04:18 )  PTT:23.8 sec  Urinalysis Basic - ( 2018 04:18 )    Color: Yellow / Appearance: Cloudy / S.010 / pH: x  Gluc: x / Ketone: Negative  / Bili: Negative / Urobili: 0.2 mg/dL   Blood: x / Protein: 100 mg/dL / Nitrite: Negative   Leuk Esterase< from: CT Abdomen and Pelvis No Cont (18 @ 08:00) >    EXAM:  CT ABDOMEN AND PELVIS            PROCEDURE DATE:  2018            INTERPRETATION:  CLINICAL STATEMENT: Flank pain.  Syncope.    TECHNIQUE: Contiguous axial CT images were obtained from the lower chest   to the pubic symphysis without intravenous contrast.  Oral contrast was   not administered.  Reformatted images in the coronal and sagittal planes   were acquired.    COMPARISON CT: CT abdomen and pelvis performed 2018.    OTHER STUDIES USED FOR CORRELATION: None.       FINDINGS:    LOWER CHEST: Bibasilar subsegmental atelectasis. Partially imaged   pacemaker leads.    HEPATOBILIARY: Postcholecystectomy..    SPLEEN: Unremarkable.    PANCREAS: There are a few round calcifications within the pancreas   consistent with chronic pancreatitis.    ADRENAL GLANDS: Unremarkable.    KIDNEYS: No hydronephrosis. Surgical clips are noted within the adjacent   to the left kidney. No nephrolithiasis.    ABDOMINOPELVIC NODES: Unremarkable.    PELVIC ORGANS: Coarse prostatic parenchymal calcifications.    PERITONEUM/MESENTERY/BOWEL: Colonic diverticulosis without evidence of   diverticulitis. No bowel obstruction. No pneumoperitoneum or ascites.N   ormal appendix.     BONES/SOFT TISSUES: Degenerative changes of the thoracolumbar spine.    OTHER: Duplicated infrarenal IVC. Atherosclerotic disease of the normal   caliber aorta.      IMPRESSION:     Status post cholecystectomy.    A few round CAGES within the pancreas consistent with chronic   pancreatitis.    Colonic diverticulosis.    Bones as above.    Deployed IVC and atherosclerotic disease.    No CT evidence for acute intra-abdominal pathology.        < end of copied text >  < from: CT Abdomen and Pelvis No Cont (18 @ 08:00) >    EXAM:  CT ABDOMEN AND PELVIS            PROCEDURE DATE:  2018            INTERPRETATION:  CLINICAL STATEMENT: Flank pain.  Syncope.    TECHNIQUE: Contiguous axial CT images were obtained from the lower chest   to the pubic symphysis without intravenous contrast.  Oral contrast was   not administered.  Reformatted images in the coronal and sagittal planes   were acquired.    COMPARISON CT: CT abdomen and pelvis performed 2018.    OTHER STUDIES USED FOR CORRELATION: None.       FINDINGS:    LOWER CHEST: Bibasilar subsegmental atelectasis. Partially imaged   pacemaker leads.    HEPATOBILIARY: Postcholecystectomy..    SPLEEN: Unremarkable.    PANCREAS: There are a few round calcifications within the pancreas   consistent with chronic pancreatitis.    ADRENAL GLANDS: Unremarkable.    KIDNEYS: No hydronephrosis. Surgical clips are noted within the adjacent   to the left kidney. No nephrolithiasis.    ABDOMINOPELVIC NODES: Unremarkable.    PELVIC ORGANS: Coarse prostatic parenchymal calcifications.    PERITONEUM/MESENTERY/BOWEL: Colonic diverticulosis without evidence of   diverticulitis. No bowel obstruction. No pneumoperitoneum or ascites.N   ormal appendix.     BONES/SOFT TISSUES: Degenerative changes of the thoracolumbar spine.    OTHER: Duplicated infrarenal IVC. Atherosclerotic disease of the normal   caliber aorta.      IMPRESSION:     Status post cholecystectomy.    A few round CAGES within the pancreas consistent with chronic   pancreatitis.    Colonic diverticulosis.    Bones as above.    Deployed IVC and atherosclerotic disease.    No CT evidence for acute intra-abdominal pathology.        < end of copied text >  : Negative / RBC: >50 /HPF / WBC x   Sq Epi: x / Non Sq Epi: x / Bacteria: x      Urine Culture:         RADIOLOGY & ADDITIONAL STUDIES: Patient is a 57y old  Male who presents with a chief complaint of loss of consciousness (2018 11:48)      HPI:  56 yo M poor historian with cad s/p 1 stent [40 weeks ago], CHF [pEF/rEF], AICD, HTN, PAD, bladder cancer s/p chemo/rad and TURBT, RCC s/p left partial nephrectomy  and  prostate cancer s/p radiation therapy ( [employer]), nephrolithiasis, HCV no treatment, peripheral neuropathy, COPD presents to the ED after he synopsized on a bus. He was riding the bus and then he Began to feel lightheaded, flushed and have palpitations then he had LOC, unsure duration. He woke up and realized he passed out, no confusion or witnessed seizure activity, he got off the bus and began to walk home then he felt the same pre syncopal symptoms so he called 911. He also complains of MCCAIN that started over 1 day and gross hematuria that started one day ago a/w left flank pain after falling  due to syncope.. (2018 11:48)    Pt states he has a hx of kidney ca (left partial nephrectomy), bladder ca treated with "scraping" 5 years ago at [facility] and had radiation therapy to prostate and bladder shortly thereafter. Pt has not seen a urologist in over 2 years.      PAST MEDICAL & SURGICAL HISTORY:  Nephrolithiasis  Prostate cancer: s/p radiation --  Peripheral neuropathy --etiology unclear  Bladder cancer, stage T1 , post chemotx + RT --   COPD (chronic obstructive pulmonary disease)  Renal cell carcinoma of left kidney: s/p partial nephrectomy in   biopsy again in Sep 2017 showed RCC again in stage 2  Hyperlipidemia  CAD (coronary artery disease): (s/p 2 stents in Sep 2017)  Atrial fibrillation  AICD (automatic cardioverter/defibrillator) present: [employer]  HTN (hypertension)  Hep C w/o coma, chronic  Chronic congestive heart failure, unspecified congestive heart failure type: rEF/pEF  H/O transurethral destruction of bladder lesion  History of percutaneous coronary intervention  H/O partial nephrectomy:   S/P cholecystectomy:   AICD (automatic cardioverter/defibrillator) present      REVIEW OF SYSTEMS:    CONSTITUTIONAL:  fevers or chills  HEENT: No visual changes  ENDO: No sweating  NECK: No pain or stiffness  MUSCULOSKELETAL: No back pain, no joint pain  RESPIRATORY: No shortness of breath  CARDIOVASCULAR: No chest pain  GASTROINTESTINAL: No abdominal or epigastric pain. No nausea, vomiting,  No diarrhea or constipation.   NEUROLOGICAL: No mental status changes  PSYCH: No depression, no mood changes  SKIN: No itching      MEDICATIONS  (STANDING):  ALBUTerol/ipratropium for Nebulization 3 milliLiter(s) Nebulizer every 6 hours  apixaban 5 milliGRAM(s) Oral every 12 hours  atorvastatin 40 milliGRAM(s) Oral at bedtime  ciprofloxacin   IVPB 400 milliGRAM(s) IV Intermittent every 12 hours  clopidogrel Tablet 75 milliGRAM(s) Oral daily  gabapentin 400 milliGRAM(s) Oral every 8 hours  metoprolol succinate ER 50 milliGRAM(s) Oral daily  pantoprazole    Tablet 40 milliGRAM(s) Oral before breakfast  predniSONE   Tablet 50 milliGRAM(s) Oral every 24 hours  tamsulosin 0.4 milliGRAM(s) Oral at bedtime    MEDICATIONS  (PRN):  morphine  - Injectable 4 milliGRAM(s) IV Push every 8 hours PRN Moderate Pain (4 - 6)      Allergies    aspirin (Hives)  codeine (Rash)  codeine (Unknown)  Haldol (Unknown)  Motrin (Rash)  penicillin (Hives; Anaphylaxis)  Toradol (Rash)  Tylenol (Hives)    Intolerances        SOCIAL HISTORY: No illicit drug use    FAMILY HISTORY:  Family history of lung cancer (Father)  Family history of chronic ischemic heart disease (Mother)      Vital Signs Last 24 Hrs  T(C): 36 (2018 13:55), Max: 36.2 (2018 06:51)  T(F): 96.8 (2018 13:55), Max: 97.1 (2018 06:51)  HR: 53 (2018 12:21) (53 - 76)  BP: 131/88 (2018 12:21) (125/74 - 149/78)  BP(mean): --  RR: 18 (2018 12:21) (18 - 20)  SpO2: --    PHYSICAL EXAM:    Constitutional: NAD, well-developed  HEENT: EOMI  Neck: no pain  Back: + left sided CVA tenderness, mild  Respiratory: No accessory respiratory muscle use  Abd: Soft, NT/ND  no organomegally  no hernia  : no scrotal swelling, tenderness or edema, no suprapubic tenderness  CIARRA: to be done as outpt after psa   Extremities: no edema  Neurological: A/O x 3  Psychiatric: Normal mood, normal affect  Skin: No rashes    I&O's Summary      LABS:                        14.0   10.12 )-----------( 159      ( 2018 05:02 )             42.7         139  |  106  |  30<H>  ----------------------------<  96  4.3   |  26  |  1.2    Ca    8.8      2018 05:02    TPro  5.7<L>  /  Alb  3.4  /  TBili  0.6  /  DBili  x   /  AST  45<H>  /  ALT  74<H>  /  AlkPhos  160<H>      PT/INR - ( 2018 04:18 )   PT: 12.10 sec;   INR: 1.12 ratio         PTT - ( 2018 04:18 )  PTT:23.8 sec  Urinalysis Basic - ( 2018 04:18 )    Color: Yellow / Appearance: Cloudy / S.010 / pH: x  Gluc: x / Ketone: Negative  / Bili: Negative / Urobili: 0.2 mg/dL   Blood: x / Protein: 100 mg/dL / Nitrite: Negative   Leuk Esterase< from: CT Abdomen and Pelvis No Cont (18 @ 08:00) >    EXAM:  CT ABDOMEN AND PELVIS            PROCEDURE DATE:  2018            INTERPRETATION:  CLINICAL STATEMENT: Flank pain.  Syncope.    TECHNIQUE: Contiguous axial CT images were obtained from the lower chest   to the pubic symphysis without intravenous contrast.  Oral contrast was   not administered.  Reformatted images in the coronal and sagittal planes   were acquired.    COMPARISON CT: CT abdomen and pelvis performed 2018.    OTHER STUDIES USED FOR CORRELATION: None.       FINDINGS:    LOWER CHEST: Bibasilar subsegmental atelectasis. Partially imaged   pacemaker leads.    HEPATOBILIARY: Postcholecystectomy..    SPLEEN: Unremarkable.    PANCREAS: There are a few round calcifications within the pancreas   consistent with chronic pancreatitis.    ADRENAL GLANDS: Unremarkable.    KIDNEYS: No hydronephrosis. Surgical clips are noted within the adjacent   to the left kidney. No nephrolithiasis.    ABDOMINOPELVIC NODES: Unremarkable.    PELVIC ORGANS: Coarse prostatic parenchymal calcifications.    PERITONEUM/MESENTERY/BOWEL: Colonic diverticulosis without evidence of   diverticulitis. No bowel obstruction. No pneumoperitoneum or ascites.N   ormal appendix.     BONES/SOFT TISSUES: Degenerative changes of the thoracolumbar spine.    OTHER: Duplicated infrarenal IVC. Atherosclerotic disease of the normal   caliber aorta.      IMPRESSION:     Status post cholecystectomy.    A few round CAGES within the pancreas consistent with chronic   pancreatitis.    Colonic diverticulosis.    Bones as above.    Deployed IVC and atherosclerotic disease.    No CT evidence for acute intra-abdominal pathology.        < end of copied text >  < from: CT Abdomen and Pelvis No Cont (18 @ 08:00) >    EXAM:  CT ABDOMEN AND PELVIS            PROCEDURE DATE:  2018            INTERPRETATION:  CLINICAL STATEMENT: Flank pain.  Syncope.    TECHNIQUE: Contiguous axial CT images were obtained from the lower chest   to the pubic symphysis without intravenous contrast.  Oral contrast was   not administered.  Reformatted images in the coronal and sagittal planes   were acquired.    COMPARISON CT: CT abdomen and pelvis performed 2018.    OTHER STUDIES USED FOR CORRELATION: None.       FINDINGS:    LOWER CHEST: Bibasilar subsegmental atelectasis. Partially imaged   pacemaker leads.    HEPATOBILIARY: Postcholecystectomy..    SPLEEN: Unremarkable.    PANCREAS: There are a few round calcifications within the pancreas   consistent with chronic pancreatitis.    ADRENAL GLANDS: Unremarkable.    KIDNEYS: No hydronephrosis. Surgical clips are noted within the adjacent   to the left kidney. No nephrolithiasis.    ABDOMINOPELVIC NODES: Unremarkable.    PELVIC ORGANS: Coarse prostatic parenchymal calcifications.    PERITONEUM/MESENTERY/BOWEL: Colonic diverticulosis without evidence of   diverticulitis. No bowel obstruction. No pneumoperitoneum or ascites.N   ormal appendix.     BONES/SOFT TISSUES: Degenerative changes of the thoracolumbar spine.    OTHER: Duplicated infrarenal IVC. Atherosclerotic disease of the normal   caliber aorta.      IMPRESSION:     Status post cholecystectomy.    A few round CAGES within the pancreas consistent with chronic   pancreatitis.    Colonic diverticulosis.    Bones as above.    Deployed IVC and atherosclerotic disease.    No CT evidence for acute intra-abdominal pathology.        < end of copied text >  : Negative / RBC: >50 /HPF / WBC x   Sq Epi: x / Non Sq Epi: x / Bacteria: x      Urine Culture:         RADIOLOGY & ADDITIONAL STUDIES:

## 2018-02-13 DIAGNOSIS — C61 MALIGNANT NEOPLASM OF PROSTATE: ICD-10-CM

## 2018-02-13 DIAGNOSIS — C64.2 MALIGNANT NEOPLASM OF LEFT KIDNEY, EXCEPT RENAL PELVIS: ICD-10-CM

## 2018-02-13 DIAGNOSIS — C67.9 MALIGNANT NEOPLASM OF BLADDER, UNSPECIFIED: ICD-10-CM

## 2018-02-13 LAB
ANION GAP SERPL CALC-SCNC: 9 MMOL/L — SIGNIFICANT CHANGE UP (ref 7–14)
BASOPHILS # BLD AUTO: 0.02 K/UL — SIGNIFICANT CHANGE UP (ref 0–0.2)
BASOPHILS NFR BLD AUTO: 0.1 % — SIGNIFICANT CHANGE UP (ref 0–1)
BUN SERPL-MCNC: 36 MG/DL — HIGH (ref 10–20)
CALCIUM SERPL-MCNC: 8.8 MG/DL — SIGNIFICANT CHANGE UP (ref 8.5–10.1)
CHLORIDE SERPL-SCNC: 101 MMOL/L — SIGNIFICANT CHANGE UP (ref 98–110)
CO2 SERPL-SCNC: 25 MMOL/L — SIGNIFICANT CHANGE UP (ref 17–32)
CREAT SERPL-MCNC: 1.1 MG/DL — SIGNIFICANT CHANGE UP (ref 0.7–1.5)
EOSINOPHIL # BLD AUTO: 0.03 K/UL — SIGNIFICANT CHANGE UP (ref 0–0.7)
EOSINOPHIL NFR BLD AUTO: 0.2 % — SIGNIFICANT CHANGE UP (ref 0–8)
GLUCOSE SERPL-MCNC: 145 MG/DL — HIGH (ref 70–110)
HCT VFR BLD CALC: 43.5 % — SIGNIFICANT CHANGE UP (ref 42–52)
HGB BLD-MCNC: 14.3 G/DL — SIGNIFICANT CHANGE UP (ref 14–18)
IMM GRANULOCYTES NFR BLD AUTO: 0.5 % — HIGH (ref 0.1–0.3)
LYMPHOCYTES # BLD AUTO: 12.9 % — LOW (ref 20.5–51.1)
LYMPHOCYTES # BLD AUTO: 2.18 K/UL — SIGNIFICANT CHANGE UP (ref 1.2–3.4)
MCHC RBC-ENTMCNC: 28.9 PG — SIGNIFICANT CHANGE UP (ref 27–31)
MCHC RBC-ENTMCNC: 32.9 G/DL — SIGNIFICANT CHANGE UP (ref 32–37)
MCV RBC AUTO: 88.1 FL — SIGNIFICANT CHANGE UP (ref 80–94)
MONOCYTES # BLD AUTO: 1.7 K/UL — HIGH (ref 0.1–0.6)
MONOCYTES NFR BLD AUTO: 10.1 % — HIGH (ref 1.7–9.3)
NEUTROPHILS # BLD AUTO: 12.9 K/UL — HIGH (ref 1.4–6.5)
NEUTROPHILS NFR BLD AUTO: 76.2 % — HIGH (ref 42.2–75.2)
PLATELET # BLD AUTO: 190 K/UL — SIGNIFICANT CHANGE UP (ref 130–400)
POTASSIUM SERPL-MCNC: 3.7 MMOL/L — SIGNIFICANT CHANGE UP (ref 3.5–5)
POTASSIUM SERPL-SCNC: 3.7 MMOL/L — SIGNIFICANT CHANGE UP (ref 3.5–5)
RBC # BLD: 4.94 M/UL — SIGNIFICANT CHANGE UP (ref 4.7–6.1)
RBC # FLD: 13.2 % — SIGNIFICANT CHANGE UP (ref 11.5–14.5)
SODIUM SERPL-SCNC: 135 MMOL/L — SIGNIFICANT CHANGE UP (ref 135–146)
WBC # BLD: 16.91 K/UL — HIGH (ref 4.8–10.8)
WBC # FLD AUTO: 16.91 K/UL — HIGH (ref 4.8–10.8)

## 2018-02-13 RX ORDER — SODIUM CHLORIDE 9 MG/ML
1000 INJECTION INTRAMUSCULAR; INTRAVENOUS; SUBCUTANEOUS
Qty: 0 | Refills: 0 | Status: DISCONTINUED | OUTPATIENT
Start: 2018-02-13 | End: 2018-02-14

## 2018-02-13 RX ORDER — MORPHINE SULFATE 50 MG/1
5 CAPSULE, EXTENDED RELEASE ORAL EVERY 4 HOURS
Qty: 0 | Refills: 0 | Status: DISCONTINUED | OUTPATIENT
Start: 2018-02-13 | End: 2018-02-14

## 2018-02-13 RX ORDER — CIPROFLOXACIN LACTATE 400MG/40ML
500 VIAL (ML) INTRAVENOUS EVERY 12 HOURS
Qty: 0 | Refills: 0 | Status: DISCONTINUED | OUTPATIENT
Start: 2018-02-13 | End: 2018-02-13

## 2018-02-13 RX ORDER — MORPHINE SULFATE 50 MG/1
4 CAPSULE, EXTENDED RELEASE ORAL EVERY 4 HOURS
Qty: 0 | Refills: 0 | Status: DISCONTINUED | OUTPATIENT
Start: 2018-02-13 | End: 2018-02-13

## 2018-02-13 RX ORDER — MORPHINE SULFATE 50 MG/1
4 CAPSULE, EXTENDED RELEASE ORAL EVERY 4 HOURS
Qty: 0 | Refills: 0 | Status: DISCONTINUED | OUTPATIENT
Start: 2018-02-13 | End: 2018-02-14

## 2018-02-13 RX ADMIN — GABAPENTIN 400 MILLIGRAM(S): 400 CAPSULE ORAL at 07:20

## 2018-02-13 RX ADMIN — CLOPIDOGREL BISULFATE 75 MILLIGRAM(S): 75 TABLET, FILM COATED ORAL at 12:02

## 2018-02-13 RX ADMIN — MORPHINE SULFATE 4 MILLIGRAM(S): 50 CAPSULE, EXTENDED RELEASE ORAL at 12:00

## 2018-02-13 RX ADMIN — PANTOPRAZOLE SODIUM 40 MILLIGRAM(S): 20 TABLET, DELAYED RELEASE ORAL at 07:16

## 2018-02-13 RX ADMIN — MORPHINE SULFATE 4 MILLIGRAM(S): 50 CAPSULE, EXTENDED RELEASE ORAL at 07:45

## 2018-02-13 RX ADMIN — Medication 50 MILLIGRAM(S): at 07:21

## 2018-02-13 RX ADMIN — Medication 50 MILLIGRAM(S): at 07:22

## 2018-02-13 RX ADMIN — MORPHINE SULFATE 4 MILLIGRAM(S): 50 CAPSULE, EXTENDED RELEASE ORAL at 12:30

## 2018-02-13 RX ADMIN — Medication 200 MILLIGRAM(S): at 07:19

## 2018-02-13 RX ADMIN — MORPHINE SULFATE 4 MILLIGRAM(S): 50 CAPSULE, EXTENDED RELEASE ORAL at 21:45

## 2018-02-13 RX ADMIN — MORPHINE SULFATE 4 MILLIGRAM(S): 50 CAPSULE, EXTENDED RELEASE ORAL at 03:32

## 2018-02-13 RX ADMIN — MORPHINE SULFATE 4 MILLIGRAM(S): 50 CAPSULE, EXTENDED RELEASE ORAL at 02:52

## 2018-02-13 RX ADMIN — APIXABAN 5 MILLIGRAM(S): 2.5 TABLET, FILM COATED ORAL at 17:08

## 2018-02-13 RX ADMIN — MORPHINE SULFATE 4 MILLIGRAM(S): 50 CAPSULE, EXTENDED RELEASE ORAL at 08:15

## 2018-02-13 RX ADMIN — MORPHINE SULFATE 4 MILLIGRAM(S): 50 CAPSULE, EXTENDED RELEASE ORAL at 16:29

## 2018-02-13 RX ADMIN — GABAPENTIN 400 MILLIGRAM(S): 400 CAPSULE ORAL at 12:02

## 2018-02-13 RX ADMIN — SODIUM CHLORIDE 75 MILLILITER(S): 9 INJECTION INTRAMUSCULAR; INTRAVENOUS; SUBCUTANEOUS at 16:29

## 2018-02-13 RX ADMIN — APIXABAN 5 MILLIGRAM(S): 2.5 TABLET, FILM COATED ORAL at 07:17

## 2018-02-13 NOTE — PROGRESS NOTE ADULT - ASSESSMENT
57 y old man with PMH of afib on eliquis, cad, chf, aicd,, htn, pad, bladder ca, prostate ca, copd, chronic hcv no rx, presented for syncope. negative orthostatics, negative CE and EKG.    syncope:  -no events on tele  - 2d echo pending, interrogate AICD pending. contacted tech yesterday    Mild COPD exacerbation, active smoker    - duonebs around the clock, prednisone 40 x 5 days, on room air oxygen prn       Hematuria. Recommendation:   Obtain a CT Abdomen/Pelvis with iv contrast (CT Urogram)  --- cancelled due to allergy with hives  Pt will need cystocopy as outpatient  psa as outpt.   Problem: Prostate cancer.  Recommendation: psa outpt.         Problem: Malignant neoplasm of urinary bladder, unspecified site.  Recommendation: will need cystoscopy as outpt.        Problem: Renal cell carcinoma of left kidney.  Recommendation: will need MRI as outpt. 57 y old man with PMH of afib on eliquis, cad, chf, aicd,, htn, pad, bladder ca, prostate ca, copd, chronic hcv no rx, presented for syncope. negative orthostatics, negative CE and EKG.    syncope:  -no events on tele  - 2d echo pending, interrogation AICD is done and no events.    Mild COPD exacerbation, active smoker    - duonebs around the clock, prednisone 40 x 5 days, on room air oxygen prn       Hematuria. Recommendation:   Obtain a CT Abdomen/Pelvis with iv contrast (CT Urogram)  --- cancelled due to allergy with hives  Pt will need cystocopy as outpatient  psa as outpt.   Problem: Prostate cancer.  Recommendation: psa outpt.         Problem: Malignant neoplasm of urinary bladder, unspecified site.  Recommendation: will need cystoscopy as outpt.        Problem: Renal cell carcinoma of left kidney.  Recommendation: will need MRI as outpt.

## 2018-02-13 NOTE — PROGRESS NOTE ADULT - ASSESSMENT
Case discussed with resident    Care discussed with pt/family #Syncope  -serial trop and ekg negative  -negative orthostatics  -cont tele monitoring until f/u ECHO and interogate ICD  -EPS evaluation    #AF  -cont eliquis--rate control     #- Prostate/Bladder/Renal Cell CA with hematuria  -outpatient cystoscopy and PSA (if remains in hospital can check PSA with morning labs)  -UA c/w microscopic hematuria----f/u repeat UA and Ucx  -Gu consult    #Leukocytosis---no sign of infection  -repeat cbc  -f/u bcx and ucx    #dvt/GI ppx    case d/w medicine resident       Case discussed with resident    Care discussed with pt/family #Syncope  -serial trop and ekg negative  -negative orthostatics  -cont tele monitoring until f/u ECHO and interogate ICD  -EPS evaluation    #AF  -cont eliquis--rate control     #- Prostate/Bladder/Renal Cell CA with hematuria  -outpatient cystoscopy and PSA (if remains in hospital can check PSA with morning labs)  -UA c/w microscopic hematuria----f/u repeat UA and Ucx  -Gu consult    #Leukocytosis---no sign of infection  -repeat cbc  -f/u bcx and ucx    #HCV- not on treatment  -will need GI f/u as otupatient for initiation of treatment  -monitor lft    #dvt/GI ppx    case d/w medicine resident       Case discussed with resident    Care discussed with pt/family #Syncope  -serial trop and ekg negative  -negative orthostatics  -cont tele monitoring until f/u ECHO and interogate ICD  -EPS evaluation    #AF  -cont eliquis--rate control     #- Prostate/Bladder/Renal Cell CA with hematuria  -outpatient cystoscopy and PSA (if remains in hospital can check PSA with morning labs)  -UA c/w microscopic hematuria----f/u repeat UA and Ucx  -Gu consult    #Leukocytosis---no sign of infection  -repeat cbc  -f/u bcx and ucx    #HCV- not on treatment  -will need GI f/u as otupatient for initiation of treatment  -monitor lft    #still on prn iv morphine----? drug seeking as unclear etiology of pain  -transition to oral regimen for discharge plan    #dvt/GI ppx    case d/w medicine resident ---anticipate likely d/c home in next 24 hrs if AICD interogattion and echo ok as well as repeat CBC      Case discussed with resident    Care discussed with pt/family

## 2018-02-13 NOTE — PROGRESS NOTE ADULT - SUBJECTIVE AND OBJECTIVE BOX
INTERVAL HPI/OVERNIGHT EVENTS: no overnight events- still on telemetry- awaiting AICD interogattion    T(F): 96.5 (02-13-18 @ 05:01), Max: 96.8 (02-12-18 @ 13:55)  HR: 55 (02-13-18 @ 05:01) (55 - 55)  BP: 132/66 (02-13-18 @ 05:01) (132/66 - 132/66)  RR: 18 (02-13-18 @ 05:01) (18 - 18)  SpO2: --  I&O's Summary              PHYSICAL EXAM:  GENERAL: NAD, AAOx3  HEAD:  Atraumatic, Normocephalic  EYES:  conjunctiva and sclera clear  ENMT: Moist mucous membranes  NECK: Supple  NERVOUS SYSTEM:  Alert & Oriented X3, Good concentration  CHEST/LUNG: CTAB;  fair air entry- No rales, rhonchi, wheezing  HEART: +s1/s2 irreg irreg; No murmurs  ABDOMEN: Soft, Nontender, Nondistended; Bowel sounds present  EXTREMITIES:  2+ Peripheral Pulses, No edema  SKIN: No rashes or lesions    Consultant(s) Notes Reviewed:  [x ] YES  [ ] NO  Care Discussed with Consultants/Other Providers [ x] YES  [ ] NO    Medications reviewed    LABS:                        14.3   16.91 )-----------( 190      ( 13 Feb 2018 05:17 )             43.5     02-13    135  |  101  |  36<H>  ----------------------------<  145<H>  3.7   |  25  |  1.1    Ca    8.8      13 Feb 2018 05:17    TPro  5.7<L>  /  Alb  3.4  /  TBili  0.6  /  DBili  x   /  AST  45<H>  /  ALT  74<H>  /  AlkPhos  160<H>  02-12        Culture - Blood (collected 12 Feb 2018 05:02)  Source: .Blood None  Preliminary Report (13 Feb 2018 11:01):    No growth to date.    Culture - Urine (collected 11 Feb 2018 04:18)  Source: .Urine Clean Catch (Midstream)  Final Report (12 Feb 2018 22:46):    No growth        RADIOLOGY & ADDITIONAL TESTS:    Imaging or report Personally Reviewed: [x} ES  [ ] NO INTERVAL HPI/OVERNIGHT EVENTS: no overnight events- still on telemetry- awaiting AICD interogattion and echo- patient states he has no pcp and does not want to continue with outpatient care at [facility] (however as per chart documented as [facility]?)    T(F): 96.5 (02-13-18 @ 05:01), Max: 96.8 (02-12-18 @ 13:55)  HR: 55 (02-13-18 @ 05:01) (55 - 55)  BP: 132/66 (02-13-18 @ 05:01) (132/66 - 132/66)  RR: 18 (02-13-18 @ 05:01) (18 - 18)  SpO2: --  I&O's Summary        PHYSICAL EXAM:  GENERAL: NAD, AAOx3, resting comfortably  HEAD:  Atraumatic, Normocephalic  EYES:  conjunctiva and sclera clear  ENMT: Moist mucous membranes  NECK: Supple  CHEST/LUNG: CTAB;  fair air entry- No rales, rhonchi, wheezing  HEART: +s1/s2 irreg irreg; No murmurs  ABDOMEN: Soft, Nontender, Nondistended; Bowel sounds present  EXTREMITIES:  2+ Peripheral Pulses, No edema, chronic venous stasis changes LE    Consultant(s) Notes Reviewed:  [x ] YES  [ ] NO  Care Discussed with Consultants/Other Providers [ x] YES  [ ] NO    Medications reviewed    LABS:                        14.3   16.91 )-----------( 190      ( 13 Feb 2018 05:17 )             43.5     02-13    135  |  101  |  36<H>  ----------------------------<  145<H>  3.7   |  25  |  1.1    Ca    8.8      13 Feb 2018 05:17    TPro  5.7<L>  /  Alb  3.4  /  TBili  0.6  /  DBili  x   /  AST  45<H>  /  ALT  74<H>  /  AlkPhos  160<H>  02-12      Culture - Blood (collected 12 Feb 2018 05:02)  Source: .Blood None  Preliminary Report (13 Feb 2018 11:01):    No growth to date.    Culture - Urine (collected 11 Feb 2018 04:18)  Source: .Urine Clean Catch (Midstream)  Final Report (12 Feb 2018 22:46):    No growth        RADIOLOGY & ADDITIONAL TESTS:    Imaging or report Personally Reviewed: [x} ES  [ ] NO

## 2018-02-13 NOTE — PROGRESS NOTE ADULT - SUBJECTIVE AND OBJECTIVE BOX
SUBJECTIVE:    Patient is a 57y old  Male who presents with a chief complaint of loss of consciousness (11 Feb 2018 11:48)    Currently admitted to medicine with the primary diagnosis of Syncope     Today is hospital day 2d. This morning he is resting comfortably in bed and reports no new issues or overnight events.     PAST MEDICAL & SURGICAL HISTORY  PAST MEDICAL & SURGICAL HISTORY:  Nephrolithiasis  Prostate cancer: s/p radiation  Peripheral neuropathy  Bladder cancer  COPD (chronic obstructive pulmonary disease)  Renal cell carcinoma of left kidney: s/p partial nephrectomy in 2002  biopsy again in Sep 2017 showed RCC again in stage 2  Hyperlipidemia  CAD (coronary artery disease): (s/p 2 stents in Sep 2017)  Atrial fibrillation  AICD (automatic cardioverter/defibrillator) present: [employer]  HTN (hypertension)  Hep C w/o coma, chronic  Chronic congestive heart failure, unspecified congestive heart failure type: rEF/pEF  H/O transurethral destruction of bladder lesion  History of percutaneous coronary intervention  H/O partial nephrectomy: 2002  S/P cholecystectomy: 2015  AICD (automatic cardioverter/defibrillator) present    SOCIAL HISTORY:    ALLERGIES:  aspirin (Hives)  codeine (Rash)  codeine (Unknown)  Haldol (Unknown)  Motrin (Rash)  penicillin (Hives; Anaphylaxis)  Toradol (Rash)  Tylenol (Hives)    MEDICATIONS:  STANDING MEDICATIONS  ALBUTerol/ipratropium for Nebulization 3 milliLiter(s) Nebulizer every 6 hours  apixaban 5 milliGRAM(s) Oral every 12 hours  atorvastatin 40 milliGRAM(s) Oral at bedtime  ciprofloxacin   IVPB 400 milliGRAM(s) IV Intermittent every 12 hours  clopidogrel Tablet 75 milliGRAM(s) Oral daily  gabapentin 400 milliGRAM(s) Oral every 8 hours  metoprolol succinate ER 50 milliGRAM(s) Oral daily  pantoprazole    Tablet 40 milliGRAM(s) Oral before breakfast  predniSONE   Tablet 50 milliGRAM(s) Oral every 24 hours  sodium chloride 0.9%. 1000 milliLiter(s) IV Continuous <Continuous>  tamsulosin 0.4 milliGRAM(s) Oral at bedtime    PRN MEDICATIONS  morphine  - Injectable 4 milliGRAM(s) IV Push every 4 hours PRN    VITALS:   T(F): 96.5  HR: 55  BP: 132/66  RR: 18  SpO2: --    LABS:                        14.3   16.91 )-----------( 190      ( 13 Feb 2018 05:17 )             43.5     02-12    139  |  106  |  30<H>  ----------------------------<  96  4.3   |  26  |  1.2    Ca    8.8      12 Feb 2018 05:02    TPro  5.7<L>  /  Alb  3.4  /  TBili  0.6  /  DBili  x   /  AST  45<H>  /  ALT  74<H>  /  AlkPhos  160<H>  02-12              Culture - Urine (collected 11 Feb 2018 04:18)  Source: .Urine Clean Catch (Midstream)  Final Report (12 Feb 2018 22:46):    No growth      CARDIAC MARKERS ( 12 Feb 2018 05:02 )  <0.02 ng/mL / x     / 46 U/L / x     / 5.9 ng/mL      RADIOLOGY:    PHYSICAL EXAM:  GEN: No acute distress  HEENT:   LUNGS: Clear to auscultation bilaterally   HEART: S1/S2 present. RRR.   ABD: Soft, non-tender, non-distended. Bowel sounds present  EXT:  NEURO: AAOX3

## 2018-02-14 ENCOUNTER — TRANSCRIPTION ENCOUNTER (OUTPATIENT)
Age: 58
End: 2018-02-14

## 2018-02-14 LAB
ANION GAP SERPL CALC-SCNC: 7 MMOL/L — SIGNIFICANT CHANGE UP (ref 7–14)
BUN SERPL-MCNC: 33 MG/DL — HIGH (ref 10–20)
CALCIUM SERPL-MCNC: 8.8 MG/DL — SIGNIFICANT CHANGE UP (ref 8.5–10.1)
CHLORIDE SERPL-SCNC: 104 MMOL/L — SIGNIFICANT CHANGE UP (ref 98–110)
CO2 SERPL-SCNC: 24 MMOL/L — SIGNIFICANT CHANGE UP (ref 17–32)
CREAT SERPL-MCNC: 1.1 MG/DL — SIGNIFICANT CHANGE UP (ref 0.7–1.5)
GLUCOSE SERPL-MCNC: 68 MG/DL — LOW (ref 70–110)
HCT VFR BLD CALC: 44.5 % — SIGNIFICANT CHANGE UP (ref 42–52)
HGB BLD-MCNC: 14.7 G/DL — SIGNIFICANT CHANGE UP (ref 14–18)
MCHC RBC-ENTMCNC: 28.9 PG — SIGNIFICANT CHANGE UP (ref 27–31)
MCHC RBC-ENTMCNC: 33 G/DL — SIGNIFICANT CHANGE UP (ref 32–37)
MCV RBC AUTO: 87.4 FL — SIGNIFICANT CHANGE UP (ref 80–94)
NRBC # BLD: 0 /100 WBCS — SIGNIFICANT CHANGE UP (ref 0–0)
PLATELET # BLD AUTO: 115 K/UL — LOW (ref 130–400)
POTASSIUM SERPL-MCNC: 4.3 MMOL/L — SIGNIFICANT CHANGE UP (ref 3.5–5)
POTASSIUM SERPL-SCNC: 4.3 MMOL/L — SIGNIFICANT CHANGE UP (ref 3.5–5)
RBC # BLD: 5.09 M/UL — SIGNIFICANT CHANGE UP (ref 4.7–6.1)
RBC # FLD: SIGNIFICANT CHANGE UP % (ref 11.5–14.5)
SODIUM SERPL-SCNC: 135 MMOL/L — SIGNIFICANT CHANGE UP (ref 135–146)
WBC # BLD: 14.12 K/UL — HIGH (ref 4.8–10.8)
WBC # FLD AUTO: 14.12 K/UL — HIGH (ref 4.8–10.8)

## 2018-02-14 RX ORDER — DOCUSATE SODIUM 100 MG
100 CAPSULE ORAL THREE TIMES A DAY
Qty: 0 | Refills: 0 | Status: DISCONTINUED | OUTPATIENT
Start: 2018-02-14 | End: 2018-02-14

## 2018-02-14 RX ORDER — MORPHINE SULFATE 50 MG/1
15 CAPSULE, EXTENDED RELEASE ORAL
Qty: 0 | Refills: 0 | Status: DISCONTINUED | OUTPATIENT
Start: 2018-02-14 | End: 2018-02-14

## 2018-02-14 RX ORDER — SENNA PLUS 8.6 MG/1
1 TABLET ORAL AT BEDTIME
Qty: 0 | Refills: 0 | Status: DISCONTINUED | OUTPATIENT
Start: 2018-02-14 | End: 2018-02-14

## 2018-02-14 RX ORDER — OXYCODONE AND ACETAMINOPHEN 5; 325 MG/1; MG/1
2 TABLET ORAL EVERY 6 HOURS
Qty: 0 | Refills: 0 | Status: DISCONTINUED | OUTPATIENT
Start: 2018-02-14 | End: 2018-02-14

## 2018-02-14 RX ORDER — MORPHINE SULFATE 50 MG/1
4 CAPSULE, EXTENDED RELEASE ORAL ONCE
Qty: 0 | Refills: 0 | Status: DISCONTINUED | OUTPATIENT
Start: 2018-02-14 | End: 2018-02-14

## 2018-02-14 RX ORDER — MORPHINE SULFATE 50 MG/1
8 CAPSULE, EXTENDED RELEASE ORAL
Qty: 0 | Refills: 0 | Status: DISCONTINUED | OUTPATIENT
Start: 2018-02-14 | End: 2018-02-14

## 2018-02-14 RX ORDER — MORPHINE SULFATE 50 MG/1
6 CAPSULE, EXTENDED RELEASE ORAL ONCE
Qty: 0 | Refills: 0 | Status: DISCONTINUED | OUTPATIENT
Start: 2018-02-14 | End: 2018-02-14

## 2018-02-14 RX ADMIN — MORPHINE SULFATE 4 MILLIGRAM(S): 50 CAPSULE, EXTENDED RELEASE ORAL at 03:24

## 2018-02-14 RX ADMIN — MORPHINE SULFATE 8 MILLIGRAM(S): 50 CAPSULE, EXTENDED RELEASE ORAL at 12:14

## 2018-02-14 RX ADMIN — Medication 100 MILLIGRAM(S): at 13:13

## 2018-02-14 RX ADMIN — MORPHINE SULFATE 8 MILLIGRAM(S): 50 CAPSULE, EXTENDED RELEASE ORAL at 10:17

## 2018-02-14 RX ADMIN — CLOPIDOGREL BISULFATE 75 MILLIGRAM(S): 75 TABLET, FILM COATED ORAL at 13:13

## 2018-02-14 RX ADMIN — GABAPENTIN 400 MILLIGRAM(S): 400 CAPSULE ORAL at 13:14

## 2018-02-14 RX ADMIN — MORPHINE SULFATE 8 MILLIGRAM(S): 50 CAPSULE, EXTENDED RELEASE ORAL at 15:34

## 2018-02-14 NOTE — DISCHARGE NOTE ADULT - PROVIDER TOKENS
FREE:[LAST:[[facility] clinic],PHONE:[phone]],FAX:[(   [phone]    -]],TOKEN:'[id number]'

## 2018-02-14 NOTE — PROGRESS NOTE ADULT - ASSESSMENT
57 y old man with PMH of afib on eliquis, cad, chf, aicd,, htn, pad, bladder ca, prostate ca, copd, chronic hcv no rx, presented for syncope. negative orthostatics, negative CE and EKG.    #syncope: none witnessed in the hospital   -no events on tele since admission  - 2d echo done  - AICD interrogated without events    #Mild COPD exacerbation, active smoker, no wheezing. Counseled about smoking cessation and is non compliant, refused to stop.  Duonebs around the clock, prednisone 40 x 5 days, hence one more day and then stop.  Current saturation is good off oxygen.       #CVA pain: He is complaining of 10/10 pain all the time that does not improve despite giving opioid pain medication. Currently switched from IV to PO equivalent pain medications this morning and the patient was very agitated and was disrespectful to staff and the medical team, claiming that we are not attending to his pain despite the active pain medication order. Patient was a code grey. Patient was informed that the PO meds equalled the IV meds in dose but patient was still unhappy with the change. He eventually became verbally abusive with the nurse.    #Chronic congestive heart failure with preserved ejection fraction/CAD/AFIB/HTN -   c/w home meds eliquis and plavix, lipitor, Toorolol    #HCV - patient noncompliant with outpatient follow up,  outpatient GI referral for treatment    #Neuropathic pain continue with gabapentin  #History of nephrolithiasis continue flomax     #DVT ppx gi ppx  ambulate as tolerate  dash diet  dispo - home when stable  full code

## 2018-02-14 NOTE — PROGRESS NOTE ADULT - SUBJECTIVE AND OBJECTIVE BOX
57y y/o Male w pMHx of L RCC s/p partial nephrectomy, bladderCA, prostate CA s/p radiation txtherapy (cyberknife). Seen and examined at bedside states he still experiencing some L flank pain and hematuria on and off. States urine was clear last night but today he noticed some gross hematuria. Urinating well on his own, not passing clots.    VITAL SIGNS:  T(C): --  T(F): --  HR: 58 (02-14-18 @ 05:15) (58 - 58)  BP: 160/72 (02-14-18 @ 05:15) (160/72 - 160/72)  BP(mean): --  RR: 18 (02-14-18 @ 05:15) (18 - 18)  SpO2: --  Wt(kg): --        Creatinine, Serum: 1.1 mg/dL (02-14-18 @ 06:51)  Creatinine, Serum: 1.1 mg/dL (02-13-18 @ 05:17)  Creatinine, Serum: 1.2 mg/dL (02-12-18 @ 05:02)  Creatinine, Serum: 1.0 mg/dL (02-11-18 @ 04:18)      Hemoglobin: 14.7 g/dL (02-14-18 @ 06:51)  Hemoglobin: 14.3 g/dL (02-13-18 @ 05:17)  Hemoglobin: 14.0 g/dL (02-12-18 @ 05:02)  Hemoglobin: 15.2 g/dL (02-11-18 @ 04:18)      WBC Count: 14.12 K/uL (02-14-18 @ 06:51)  WBC Count: 16.91 K/uL (02-13-18 @ 05:17)  WBC Count: 10.12 K/uL (02-12-18 @ 05:02)  WBC Count: 15.47 K/uL (02-11-18 @ 04:18)      INR: 1.12 ratio (02-11-18 @ 04:18)            Culture - Blood (collected 02-12-18 @ 05:02)  Source: .Blood None  Preliminary Report (02-13-18 @ 11:01):    No growth to date.          Culture - Urine (collected 02-11-18 @ 04:18)  Source: .Urine Clean Catch (Midstream)  Final Report (02-12-18 @ 22:46):    No growth        02-14    135  |  104  |  33<H>  ----------------------------<  68<L>  4.3   |  24  |  1.1    Ca    8.8      14 Feb 2018 06:51        ALBUTerol/ipratropium for Nebulization 3 milliLiter(s) Nebulizer every 6 hours  apixaban 5 milliGRAM(s) Oral every 12 hours  atorvastatin 40 milliGRAM(s) Oral at bedtime  clopidogrel Tablet 75 milliGRAM(s) Oral daily  docusate sodium 100 milliGRAM(s) Oral three times a day  gabapentin 400 milliGRAM(s) Oral every 8 hours  metoprolol succinate ER 50 milliGRAM(s) Oral daily  morphine   Solution 8 milliGRAM(s) Oral five times a day PRN  pantoprazole    Tablet 40 milliGRAM(s) Oral before breakfast  predniSONE   Tablet 50 milliGRAM(s) Oral every 24 hours  senna 1 Tablet(s) Oral at bedtime PRN  sodium chloride 0.9%. 1000 milliLiter(s) IV Continuous <Continuous>  tamsulosin 0.4 milliGRAM(s) Oral at bedtime          PHYSICAL EXAM:    Constitutional: WDWN resting comfortably in bed; NAD, awake/alert  Gastrointestinal: soft, NT/ND; no rebound or guarding; +BS, no hepatosplenomegaly, No cvat b/l, No suprapubic tenderness, bladder non palpable.      Impression: 56 y/o male with gross hematuria  Plan:  PSA level as o/p  Cystoscopy as o/p  MRI as o/p

## 2018-02-14 NOTE — PROGRESS NOTE ADULT - SUBJECTIVE AND OBJECTIVE BOX
INTERVAL HPI/OVERNIGHT EVENTS:   no overnight events- episode of agitation this morning after stopping iv morphine- AICD interogatted by EPS (device functioning normally)- also seen by  who recommend further outpatient w/u (including psa/cystoscopy) and close f/u    T(F): 96.5 (02-13-18 @ 05:01), Max: 96.8 (02-12-18 @ 13:55)  HR: 55 (02-13-18 @ 05:01) (55 - 55)  BP: 132/66 (02-13-18 @ 05:01) (132/66 - 132/66)  RR: 18 (02-13-18 @ 05:01) (18 - 18)  SpO2: --  I&O's Summary        PHYSICAL EXAM:  GENERAL: NAD, AAOx3, resting comfortably  HEAD:  Atraumatic, Normocephalic  EYES:  conjunctiva and sclera clear  ENMT: Moist mucous membranes  NECK: Supple  CHEST/LUNG: CTAB;  fair air entry- No rales, rhonchi, wheezing  HEART: +s1/s2 irreg irreg; No murmurs  ABDOMEN: Soft, Nontender, Nondistended; Bowel sounds present  EXTREMITIES:  2+ Peripheral Pulses, No edema, chronic venous stasis changes LE    Consultant(s) Notes Reviewed:  [x ] YES  [ ] NO  Care Discussed with Consultants/Other Providers [ x] YES  [ ] NO    Medications reviewed    LABS:                        14.3   16.91 )-----------( 190      ( 13 Feb 2018 05:17 )             43.5     02-13    135  |  101  |  36<H>  ----------------------------<  145<H>  3.7   |  25  |  1.1    Ca    8.8      13 Feb 2018 05:17    TPro  5.7<L>  /  Alb  3.4  /  TBili  0.6  /  DBili  x   /  AST  45<H>  /  ALT  74<H>  /  AlkPhos  160<H>  02-12      Culture - Blood (collected 12 Feb 2018 05:02)  Source: .Blood None  Preliminary Report (13 Feb 2018 11:01):    No growth to date.    Culture - Urine (collected 11 Feb 2018 04:18)  Source: .Urine Clean Catch (Midstream)  Final Report (12 Feb 2018 22:46):    No growth        RADIOLOGY & ADDITIONAL TESTS:    Imaging or report Personally Reviewed: [x} ES  [ ] NO

## 2018-02-14 NOTE — DISCHARGE NOTE ADULT - PLAN OF CARE
prevent complications follow-up with primary care doctor in the [facility] or [facility]. AICD showed no abnormal events, other tests were negative continue with eliquis, metoprolol and follow-up with PMD had 4 days of prednisone for exacerbation, continue with home meds continue with plavix, metoprolol and statin continue with gabapentin MRI of kidney and bladder outpatient, cytoscopy outpatient PSA outpatient and urologist outpatient

## 2018-02-14 NOTE — PROGRESS NOTE ADULT - ASSESSMENT
#Syncope  -serial trop and ekg negative  -negative orthostatics  -AICD interogattion normal    #AF  -cont eliquis--rate control     #- Prostate/Bladder/Renal Cell CA with hematuria  -outpatient cystoscopy and PSA with outaptietn  f/u    #Leukocytosis---no sign of infection- likely 2/2 steroid  -repeat cbc with wbc 14.2 this morning  -bcx and ucx negative  -as one more day of oral prednisone tomorrow then stop    #HCV- not on treatment  -will need GI f/u as otupatient for initiation of treatment  -monitor lft    #chronic pain-will f/u with pain managment as outaptient     #dvt/GI ppx    case d/w medicine resident  including review of discharge home med reconciliation  DISCHARGE home today- time spendt on d/c >30 minutes including coordination of care  he plans to f/u at OSH ([facility] or [facility])---also can give him # for [facility] however he does not live in [place] and requesting for f/u closer to home ([place] or [facility])

## 2018-02-14 NOTE — DISCHARGE NOTE ADULT - PATIENT PORTAL LINK FT
You can access the [facility] Patient Portal, offered by [facility], by registering with the following website: [email or web address]

## 2018-02-14 NOTE — DISCHARGE NOTE ADULT - MEDICATION SUMMARY - MEDICATIONS TO TAKE
I will START or STAY ON the medications listed below when I get home from the hospital:    Flomax 0.4 mg oral capsule  -- 1 cap(s) by mouth once a day  -- Indication: For Nephrolithiasis    apixaban 5 mg oral tablet  -- 1 tab(s) by mouth every 12 hours  -- Indication: For atrial fibrillation    gabapentin 400 mg oral capsule  -- 1 cap(s) by mouth 3 times a day  -- Indication: For Neuropathic pain    atorvastatin 40 mg oral tablet  -- 1 tab(s) by mouth once a day (at bedtime)  -- Indication: For CAD    clopidogrel 75 mg oral tablet  -- 1 tab(s) by mouth once a day  -- Indication: For CAD    Metoprolol Succinate ER 50 mg oral tablet, extended release  -- 1 tab(s) by mouth every 24 hours  -- Indication: For atrial fibrillation    Symbicort 80 mcg-4.5 mcg/inh inhalation aerosol  -- 2 puff(s) inhaled 2 times a day  -- Indication: For COPD    tiotropium 18 mcg inhalation capsule  -- 1 cap(s) inhaled once a day  -- Indication: For COPD

## 2018-02-14 NOTE — PROGRESS NOTE ADULT - SUBJECTIVE AND OBJECTIVE BOX
SUBJECTIVE:    Patient is a 57y old  Male who presents with a chief complaint of loss of consciousness (11 Feb 2018 11:48)    Currently admitted to medicine with the primary diagnosis of Syncope     Today is hospital day 3d.   PAST MEDICAL & SURGICAL HISTORY  PAST MEDICAL & SURGICAL HISTORY:  Nephrolithiasis  Prostate cancer: s/p radiation  Peripheral neuropathy  Bladder cancer  COPD (chronic obstructive pulmonary disease)  Renal cell carcinoma of left kidney: s/p partial nephrectomy in 2002  biopsy again in Sep 2017 showed RCC again in stage 2  Hyperlipidemia  CAD (coronary artery disease): (s/p 2 stents in Sep 2017)  Atrial fibrillation  AICD (automatic cardioverter/defibrillator) present: [employer]  HTN (hypertension)  Hep C w/o coma, chronic  Chronic congestive heart failure, unspecified congestive heart failure type: rEF/pEF  H/O transurethral destruction of bladder lesion  History of percutaneous coronary intervention  H/O partial nephrectomy: 2002  S/P cholecystectomy: 2015  AICD (automatic cardioverter/defibrillator) present    SOCIAL HISTORY:    ALLERGIES:  aspirin (Hives)  codeine (Rash)  codeine (Unknown)  Haldol (Unknown)  Motrin (Rash)  penicillin (Hives; Anaphylaxis)  Toradol (Rash)  Tylenol (Hives)    MEDICATIONS:  STANDING MEDICATIONS  ALBUTerol/ipratropium for Nebulization 3 milliLiter(s) Nebulizer every 6 hours  apixaban 5 milliGRAM(s) Oral every 12 hours  atorvastatin 40 milliGRAM(s) Oral at bedtime  clopidogrel Tablet 75 milliGRAM(s) Oral daily  docusate sodium 100 milliGRAM(s) Oral three times a day  gabapentin 400 milliGRAM(s) Oral every 8 hours  metoprolol succinate ER 50 milliGRAM(s) Oral daily  morphine   Solution 6 milliGRAM(s) Oral once  pantoprazole    Tablet 40 milliGRAM(s) Oral before breakfast  predniSONE   Tablet 50 milliGRAM(s) Oral every 24 hours  sodium chloride 0.9%. 1000 milliLiter(s) IV Continuous <Continuous>  tamsulosin 0.4 milliGRAM(s) Oral at bedtime    PRN MEDICATIONS  oxyCODONE    5 mG/acetaminophen 325 mG 2 Tablet(s) Oral every 6 hours PRN  senna 1 Tablet(s) Oral at bedtime PRN    VITALS:   T(F): --  HR: 58  BP: 160/72  RR: 18  SpO2: --    LABS:                        14.3   16.91 )-----------( 190      ( 13 Feb 2018 05:17 )             43.5     02-13    135  |  101  |  36<H>  ----------------------------<  145<H>  3.7   |  25  |  1.1    Ca    8.8      13 Feb 2018 05:17                Culture - Blood (collected 12 Feb 2018 05:02)  Source: .Blood None  Preliminary Report (13 Feb 2018 11:01):    No growth to date.          RADIOLOGY:    PHYSICAL EXAM:  GEN: No acute distress  HEENT:   LUNGS: Clear to auscultation bilaterally   HEART: S1/S2 present. RRR.   ABD: Soft, non-tender, non-distended. Bowel sounds present  EXT:  NEURO: AAOX3

## 2018-02-14 NOTE — DISCHARGE NOTE ADULT - SECONDARY DIAGNOSIS.
Chronic atrial fibrillation Chronic obstructive pulmonary disease, unspecified COPD type Coronary artery disease involving native coronary artery with other forms of angina pectoris, unspecified whether native or transplanted heart Other polyneuropathy Malignant neoplasm of urinary bladder, unspecified site Prostate cancer

## 2018-02-14 NOTE — DISCHARGE NOTE ADULT - CARE PLAN
Principal Discharge DX:	Syncope, unspecified syncope type  Goal:	prevent complications  Assessment and plan of treatment:	follow-up with primary care doctor in the [place] or [facility] clinic. AICD showed no abnormal events, other tests were negative  Secondary Diagnosis:	Chronic atrial fibrillation  Goal:	prevent complications  Assessment and plan of treatment:	continue with eliquis, metoprolol and follow-up with PMD  Secondary Diagnosis:	Chronic obstructive pulmonary disease, unspecified COPD type  Goal:	prevent complications  Assessment and plan of treatment:	had 4 days of prednisone for exacerbation, continue with home meds  Secondary Diagnosis:	Coronary artery disease involving native coronary artery with other forms of angina pectoris, unspecified whether native or transplanted heart  Goal:	prevent complications  Assessment and plan of treatment:	continue with plavix, metoprolol and statin  Secondary Diagnosis:	Other polyneuropathy  Goal:	prevent complications  Assessment and plan of treatment:	continue with gabapentin  Secondary Diagnosis:	Malignant neoplasm of urinary bladder, unspecified site  Goal:	prevent complications  Assessment and plan of treatment:	MRI of kidney and bladder outpatient, cytoscopy outpatient  Secondary Diagnosis:	Prostate cancer  Goal:	prevent complications  Assessment and plan of treatment:	PSA outpatient and urologist outpatient

## 2018-02-14 NOTE — DISCHARGE NOTE ADULT - CARE PROVIDER_API CALL
[facility],   Phone: [phone]  Fax: (   )    -    David Powell), Urology  [address] [place] [address]  Phone: [phone]  Fax: [phone]

## 2018-02-14 NOTE — DISCHARGE NOTE ADULT - HOSPITAL COURSE
syncope: patient had negative cardiac enzymes and EKG. AICD was interrogated and did not have abnormal events. Echo was done and pending results.     chronic atrial fibrillation: uncomplicated course here and patient continued eliquis and metoprolol.     COPD: patient received duonebs around the clock, PO prednisone for 4 days.  CAD: was uncomplicated on admission and continued plavix and metoprolol, lipitor  prostate cancer: follow-up outpatient as per  f lab tests  neuropathic pain: patient was taking gabapentin on hospital course  hx of nephrolithiasis: patient was complaining of 10/10 pain and when IV morphine was switched to PO pain medication, patient was very agitated and verbally abuse to nursing and medical team. CT non contrast showed no stones  Malignant neoplasm of urinary bladder  renal cell carcinoma of left kidney

## 2018-02-15 VITALS — RESPIRATION RATE: 18 BRPM | WEIGHT: 214.51 LBS | TEMPERATURE: 97 F

## 2018-02-15 DIAGNOSIS — Z95.810 PRESENCE OF AUTOMATIC (IMPLANTABLE) CARDIAC DEFIBRILLATOR: ICD-10-CM

## 2018-02-15 DIAGNOSIS — R55 SYNCOPE AND COLLAPSE: ICD-10-CM

## 2018-02-15 DIAGNOSIS — Z95.5 PRESENCE OF CORONARY ANGIOPLASTY IMPLANT AND GRAFT: ICD-10-CM

## 2018-02-15 DIAGNOSIS — I11.0 HYPERTENSIVE HEART DISEASE WITH HEART FAILURE: ICD-10-CM

## 2018-02-15 DIAGNOSIS — Z85.46 PERSONAL HISTORY OF MALIGNANT NEOPLASM OF PROSTATE: ICD-10-CM

## 2018-02-15 DIAGNOSIS — E78.5 HYPERLIPIDEMIA, UNSPECIFIED: ICD-10-CM

## 2018-02-15 DIAGNOSIS — I48.91 UNSPECIFIED ATRIAL FIBRILLATION: ICD-10-CM

## 2018-02-15 DIAGNOSIS — J44.1 CHRONIC OBSTRUCTIVE PULMONARY DISEASE WITH (ACUTE) EXACERBATION: ICD-10-CM

## 2018-02-15 DIAGNOSIS — Z79.84 LONG TERM (CURRENT) USE OF ORAL HYPOGLYCEMIC DRUGS: ICD-10-CM

## 2018-02-15 DIAGNOSIS — F17.200 NICOTINE DEPENDENCE, UNSPECIFIED, UNCOMPLICATED: ICD-10-CM

## 2018-02-15 DIAGNOSIS — Z79.01 LONG TERM (CURRENT) USE OF ANTICOAGULANTS: ICD-10-CM

## 2018-02-15 DIAGNOSIS — R31.0 GROSS HEMATURIA: ICD-10-CM

## 2018-02-15 DIAGNOSIS — D72.829 ELEVATED WHITE BLOOD CELL COUNT, UNSPECIFIED: ICD-10-CM

## 2018-02-15 DIAGNOSIS — Z90.5 ACQUIRED ABSENCE OF KIDNEY: ICD-10-CM

## 2018-02-15 DIAGNOSIS — E11.51 TYPE 2 DIABETES MELLITUS WITH DIABETIC PERIPHERAL ANGIOPATHY WITHOUT GANGRENE: ICD-10-CM

## 2018-02-15 DIAGNOSIS — K57.30 DIVERTICULOSIS OF LARGE INTESTINE WITHOUT PERFORATION OR ABSCESS WITHOUT BLEEDING: ICD-10-CM

## 2018-02-15 DIAGNOSIS — Z85.51 PERSONAL HISTORY OF MALIGNANT NEOPLASM OF BLADDER: ICD-10-CM

## 2018-02-15 DIAGNOSIS — B19.20 UNSPECIFIED VIRAL HEPATITIS C WITHOUT HEPATIC COMA: ICD-10-CM

## 2018-02-15 DIAGNOSIS — C79.11 SECONDARY MALIGNANT NEOPLASM OF BLADDER: ICD-10-CM

## 2018-02-15 DIAGNOSIS — Z53.29 PROCEDURE AND TREATMENT NOT CARRIED OUT BECAUSE OF PATIENT'S DECISION FOR OTHER REASONS: ICD-10-CM

## 2018-02-15 DIAGNOSIS — N39.0 URINARY TRACT INFECTION, SITE NOT SPECIFIED: ICD-10-CM

## 2018-02-15 DIAGNOSIS — I50.32 CHRONIC DIASTOLIC (CONGESTIVE) HEART FAILURE: ICD-10-CM

## 2018-02-15 DIAGNOSIS — I25.10 ATHEROSCLEROTIC HEART DISEASE OF NATIVE CORONARY ARTERY WITHOUT ANGINA PECTORIS: ICD-10-CM

## 2018-02-15 DIAGNOSIS — Z85.528 PERSONAL HISTORY OF OTHER MALIGNANT NEOPLASM OF KIDNEY: ICD-10-CM

## 2018-02-17 ENCOUNTER — EMERGENCY (EMERGENCY)
Facility: HOSPITAL | Age: 58
LOS: 1 days | Discharge: HOME | End: 2018-02-17
Attending: EMERGENCY MEDICINE
Payer: MEDICAID

## 2018-02-17 VITALS
HEIGHT: 71 IN | TEMPERATURE: 98 F | HEART RATE: 72 BPM | WEIGHT: 199.96 LBS | SYSTOLIC BLOOD PRESSURE: 179 MMHG | RESPIRATION RATE: 18 BRPM | DIASTOLIC BLOOD PRESSURE: 110 MMHG | OXYGEN SATURATION: 99 %

## 2018-02-17 DIAGNOSIS — Z98.890 OTHER SPECIFIED POSTPROCEDURAL STATES: Chronic | ICD-10-CM

## 2018-02-17 DIAGNOSIS — Z90.5 ACQUIRED ABSENCE OF KIDNEY: Chronic | ICD-10-CM

## 2018-02-17 DIAGNOSIS — Z90.49 ACQUIRED ABSENCE OF OTHER SPECIFIED PARTS OF DIGESTIVE TRACT: Chronic | ICD-10-CM

## 2018-02-17 DIAGNOSIS — Z95.5 PRESENCE OF CORONARY ANGIOPLASTY IMPLANT AND GRAFT: Chronic | ICD-10-CM

## 2018-02-17 DIAGNOSIS — Z95.810 PRESENCE OF AUTOMATIC (IMPLANTABLE) CARDIAC DEFIBRILLATOR: Chronic | ICD-10-CM

## 2018-02-17 PROBLEM — C67.9 MALIGNANT NEOPLASM OF BLADDER, UNSPECIFIED: Chronic | Status: ACTIVE | Noted: 2018-02-11

## 2018-02-17 PROBLEM — N20.0 CALCULUS OF KIDNEY: Chronic | Status: ACTIVE | Noted: 2018-02-11

## 2018-02-17 PROBLEM — C61 MALIGNANT NEOPLASM OF PROSTATE: Chronic | Status: ACTIVE | Noted: 2018-02-11

## 2018-02-17 PROBLEM — G62.9 POLYNEUROPATHY, UNSPECIFIED: Chronic | Status: ACTIVE | Noted: 2018-02-11

## 2018-02-17 LAB
ALBUMIN SERPL ELPH-MCNC: 2.8 G/DL — LOW (ref 3.5–5)
ALP SERPL-CCNC: 179 U/L — HIGH (ref 40–120)
ALT FLD-CCNC: 118 U/L DA — HIGH (ref 10–60)
ANION GAP SERPL CALC-SCNC: 9 MMOL/L — SIGNIFICANT CHANGE UP (ref 5–17)
AST SERPL-CCNC: 154 U/L — HIGH (ref 10–40)
BILIRUB SERPL-MCNC: 0.3 MG/DL — SIGNIFICANT CHANGE UP (ref 0.2–1.2)
BUN SERPL-MCNC: 20 MG/DL — HIGH (ref 7–18)
CALCIUM SERPL-MCNC: 8.1 MG/DL — LOW (ref 8.4–10.5)
CHLORIDE SERPL-SCNC: 107 MMOL/L — SIGNIFICANT CHANGE UP (ref 96–108)
CO2 SERPL-SCNC: 24 MMOL/L — SIGNIFICANT CHANGE UP (ref 22–31)
CREAT SERPL-MCNC: 0.86 MG/DL — SIGNIFICANT CHANGE UP (ref 0.5–1.3)
CULTURE RESULTS: SIGNIFICANT CHANGE UP
GLUCOSE SERPL-MCNC: 85 MG/DL — SIGNIFICANT CHANGE UP (ref 70–99)
POTASSIUM SERPL-MCNC: 5.7 MMOL/L — HIGH (ref 3.5–5.3)
POTASSIUM SERPL-SCNC: 5.7 MMOL/L — HIGH (ref 3.5–5.3)
PROT SERPL-MCNC: 6.6 G/DL — SIGNIFICANT CHANGE UP (ref 6–8.3)
SODIUM SERPL-SCNC: 140 MMOL/L — SIGNIFICANT CHANGE UP (ref 135–145)
SPECIMEN SOURCE: SIGNIFICANT CHANGE UP
TROPONIN I SERPL-MCNC: <0.015 NG/ML — SIGNIFICANT CHANGE UP (ref 0–0.04)

## 2018-02-17 PROCEDURE — 99284 EMERGENCY DEPT VISIT MOD MDM: CPT | Mod: 25

## 2018-02-17 PROCEDURE — 71045 X-RAY EXAM CHEST 1 VIEW: CPT | Mod: 26

## 2018-02-17 PROCEDURE — 80053 COMPREHEN METABOLIC PANEL: CPT

## 2018-02-17 PROCEDURE — 84484 ASSAY OF TROPONIN QUANT: CPT

## 2018-02-17 PROCEDURE — 36415 COLL VENOUS BLD VENIPUNCTURE: CPT

## 2018-02-17 PROCEDURE — 71045 X-RAY EXAM CHEST 1 VIEW: CPT

## 2018-02-17 PROCEDURE — 99285 EMERGENCY DEPT VISIT HI MDM: CPT

## 2018-02-17 RX ORDER — OXYCODONE AND ACETAMINOPHEN 5; 325 MG/1; MG/1
1 TABLET ORAL ONCE
Qty: 0 | Refills: 0 | Status: DISCONTINUED | OUTPATIENT
Start: 2018-02-17 | End: 2018-02-17

## 2018-02-17 NOTE — ED PROVIDER NOTE - PROGRESS NOTE DETAILS
Pt refused ct, ekg, labs. I spoke with patient multiple times discussing, risks, benefits, alternatives. Pt is not cooperative, demanding pain mediation. Than goes back to sleep. Pt not being truthful about history. Previous records reviewed and he was recently admitted to [facility], he is still concerned about dilaudid and requesting it.

## 2018-02-17 NOTE — ED PROVIDER NOTE - PSH
AICD (automatic cardioverter/defibrillator) present    H/O partial nephrectomy  2002  H/O transurethral destruction of bladder lesion    History of coronary artery stent placement    History of percutaneous coronary intervention    S/P cholecystectomy  2015

## 2018-02-17 NOTE — ED PROVIDER NOTE - CONDUCTED A DETAILED DISCUSSION WITH PATIENT AND/OR GUARDIAN REGARDING, MDM
need for outpatient follow-up/lab results/radiology results radiology results/return to ED if symptoms worsen, persist or questions arise/need for outpatient follow-up/lab results

## 2018-02-17 NOTE — ED PROVIDER NOTE - MEDICAL DECISION MAKING DETAILS
(3) no apparent problem Pt uncooperative refusing tests, previous records reviewed and recently had imaging and admission.

## 2018-02-17 NOTE — ED PROVIDER NOTE - OBJECTIVE STATEMENT
54 y/o M pt with PMHx of Renal Cell Carcinoma, AICD, AFib, Bladder CA, CAD (s/p stents), Chronic CHF, COPD, Hep C, HTN, HLD, Nephrolithiasis, Peripheral Neuropathy, Prostate CA (s/p radiation therapy), and Biopsy and PSHx of AICD placement, Partial Nephrectomy, Transurethral Destruction of Bladder Lesion, Coronary Artery Stent Placement, Percutaneous Coronary Intervention, and Cholecystectomy presents to ED c/o episode of syncope described as "blacking out" last night (yesterday). Pt reports he was riding on the train last night when he "blacked out" for several seconds. Pt denies nausea, vomiting, abd pain, SOB, or any other complaints. Pt notes taking Dilaudid daily for pain relief. Triage note states pt c/o of SOB but pt denies SOB in ED. Allergies: Penicillin (anaphylaxis), Tylenol (hives), Aspirin (hives), Toradol (rash), Codeine (rash), Motrin (rash), Haldol (unknown). 58 y/o M pt with PMHx of Renal Cell Carcinoma, AICD, AFib, Bladder CA, CAD (s/p stents), Chronic CHF, COPD, Hep C, HTN, HLD, Nephrolithiasis, Peripheral Neuropathy, Prostate CA (s/p radiation therapy), and Biopsy and PSHx of AICD placement, Partial Nephrectomy, Transurethral Destruction of Bladder Lesion, Coronary Artery Stent Placement, Percutaneous Coronary Intervention, and Cholecystectomy presents to ED c/o episode of syncope described as "blacking out" last night (yesterday). Pt reports he was riding on the train last night when he "blacked out" for several seconds. Pt denies nausea, vomiting, abd pain, SOB, or any other complaints. Pt notes taking Dilaudid daily for pain relief. Triage note states pt c/o of SOB but pt denies SOB in ED. Allergies: Penicillin (anaphylaxis), Tylenol (hives), Aspirin (hives), Toradol (rash), Codeine (rash), Motrin (rash), Haldol (unknown).

## 2018-02-17 NOTE — ED PROVIDER NOTE - CPE EDP SKIN NORM
"Subjective:       Patient ID: Jose Rosen is a 33 y.o. male.    Chief Complaint: Neck Pain    Neck Pain    This is a new problem. The current episode started in the past 7 days (4 days ago). The problem occurs constantly. The problem has been unchanged. The pain is associated with a fall (while playing soccer). The pain is present in the left side. The quality of the pain is described as aching. The pain is at a severity of 4/10. The pain is moderate. Nothing aggravates the symptoms. The pain is worse during the day. Associated symptoms include paresis (mild "funky" feeling left arm). Pertinent negatives include no chest pain, fever, headaches, numbness, tingling, trouble swallowing or weakness. He has tried NSAIDs and heat for the symptoms. The treatment provided mild relief.      There is no problem list on file for this patient.      Current Outpatient Prescriptions:     MULTIVIT-MINERALS/FOLIC ACID (MEN'S MULTIVITAMIN GUMMIES ORAL), Take by mouth., Disp: , Rfl:     The following portions of the patient's history were reviewed and updated as appropriate: allergies, past family history, past medical history, past social history and past surgical history.      Review of Systems   Constitutional: Negative for activity change, fever and unexpected weight change.   HENT: Negative for hearing loss, rhinorrhea and trouble swallowing.    Eyes: Negative for discharge and visual disturbance.   Respiratory: Negative for chest tightness and wheezing.    Cardiovascular: Negative for chest pain and palpitations.   Gastrointestinal: Negative for blood in stool, constipation, diarrhea and vomiting.   Endocrine: Negative for polydipsia and polyuria.   Genitourinary: Negative for difficulty urinating, hematuria and urgency.   Musculoskeletal: Positive for neck pain. Negative for arthralgias and joint swelling.   Neurological: Negative for tingling, weakness, numbness and headaches.   Psychiatric/Behavioral: Negative for " "confusion and dysphoric mood.       Objective:      Physical Exam   Constitutional: He is oriented to person, place, and time. He appears well-developed and well-nourished.   HENT:   Head: Normocephalic and atraumatic.   Cardiovascular: Normal rate, regular rhythm and normal heart sounds.  Exam reveals no gallop.    No murmur heard.  Pulmonary/Chest: Effort normal and breath sounds normal. He has no wheezes. He has no rales.   Musculoskeletal:        Cervical back: He exhibits spasm. He exhibits normal range of motion, no tenderness and no pain.   ROM Cervical Spine:  flexion to 60 degrees, extension to 60 degrees, left rotation to 60 degrees, right rotation to 60 degrees, left lateral bending to 45 degrees and right lateral bending to 45 degrees  Midline Tenderness: absent midline  Paraspinous tenderness: mild on the left  UE Neurologic Exam:  unremarkable     Neurological: He is alert and oriented to person, place, and time.   Skin: Skin is warm and dry.   Psychiatric: He has a normal mood and affect.   Vitals reviewed.   No signs of impingement; Full ROM left shoulder.    Assessment:       1. Cervical strain, acute, initial encounter        Plan:       Naproxen/cyclobenzaprine.  Moist heat/exercises.  Call if symptoms worsen or last 2 weeks.    "This note will not be shared with the patient."  " normal...

## 2018-02-18 ENCOUNTER — EMERGENCY (EMERGENCY)
Facility: HOSPITAL | Age: 58
LOS: 1 days | Discharge: HOME | End: 2018-02-18
Attending: EMERGENCY MEDICINE | Admitting: EMERGENCY MEDICINE
Payer: COMMERCIAL

## 2018-02-18 VITALS
SYSTOLIC BLOOD PRESSURE: 191 MMHG | OXYGEN SATURATION: 99 % | HEART RATE: 60 BPM | RESPIRATION RATE: 18 BRPM | TEMPERATURE: 98 F | DIASTOLIC BLOOD PRESSURE: 80 MMHG

## 2018-02-18 VITALS
SYSTOLIC BLOOD PRESSURE: 167 MMHG | RESPIRATION RATE: 16 BRPM | DIASTOLIC BLOOD PRESSURE: 111 MMHG | HEART RATE: 67 BPM | OXYGEN SATURATION: 99 % | TEMPERATURE: 98 F

## 2018-02-18 DIAGNOSIS — Z88.5 ALLERGY STATUS TO NARCOTIC AGENT: ICD-10-CM

## 2018-02-18 DIAGNOSIS — Z90.49 ACQUIRED ABSENCE OF OTHER SPECIFIED PARTS OF DIGESTIVE TRACT: Chronic | ICD-10-CM

## 2018-02-18 DIAGNOSIS — Z88.0 ALLERGY STATUS TO PENICILLIN: ICD-10-CM

## 2018-02-18 DIAGNOSIS — I11.0 HYPERTENSIVE HEART DISEASE WITH HEART FAILURE: ICD-10-CM

## 2018-02-18 DIAGNOSIS — Z88.8 ALLERGY STATUS TO OTHER DRUGS, MEDICAMENTS AND BIOLOGICAL SUBSTANCES STATUS: ICD-10-CM

## 2018-02-18 DIAGNOSIS — Z95.5 PRESENCE OF CORONARY ANGIOPLASTY IMPLANT AND GRAFT: Chronic | ICD-10-CM

## 2018-02-18 DIAGNOSIS — Z98.890 OTHER SPECIFIED POSTPROCEDURAL STATES: Chronic | ICD-10-CM

## 2018-02-18 DIAGNOSIS — Z88.6 ALLERGY STATUS TO ANALGESIC AGENT: ICD-10-CM

## 2018-02-18 DIAGNOSIS — R55 SYNCOPE AND COLLAPSE: ICD-10-CM

## 2018-02-18 DIAGNOSIS — I25.10 ATHEROSCLEROTIC HEART DISEASE OF NATIVE CORONARY ARTERY WITHOUT ANGINA PECTORIS: ICD-10-CM

## 2018-02-18 DIAGNOSIS — Z95.810 PRESENCE OF AUTOMATIC (IMPLANTABLE) CARDIAC DEFIBRILLATOR: Chronic | ICD-10-CM

## 2018-02-18 DIAGNOSIS — Z79.899 OTHER LONG TERM (CURRENT) DRUG THERAPY: ICD-10-CM

## 2018-02-18 DIAGNOSIS — R42 DIZZINESS AND GIDDINESS: ICD-10-CM

## 2018-02-18 DIAGNOSIS — Z90.5 ACQUIRED ABSENCE OF KIDNEY: Chronic | ICD-10-CM

## 2018-02-18 LAB
ALBUMIN SERPL ELPH-MCNC: 4 G/DL — SIGNIFICANT CHANGE UP (ref 3.3–5)
ALP SERPL-CCNC: 170 U/L — HIGH (ref 40–120)
ALT FLD-CCNC: 96 U/L — HIGH (ref 10–45)
ANION GAP SERPL CALC-SCNC: 12 MMOL/L — SIGNIFICANT CHANGE UP (ref 5–17)
APTT BLD: 35.2 SEC — SIGNIFICANT CHANGE UP (ref 27.5–37.4)
AST SERPL-CCNC: 66 U/L — HIGH (ref 10–40)
BASOPHILS NFR BLD AUTO: 0.1 % — SIGNIFICANT CHANGE UP (ref 0–2)
BILIRUB SERPL-MCNC: 0.5 MG/DL — SIGNIFICANT CHANGE UP (ref 0.2–1.2)
BUN SERPL-MCNC: 22 MG/DL — SIGNIFICANT CHANGE UP (ref 7–23)
CALCIUM SERPL-MCNC: 9.2 MG/DL — SIGNIFICANT CHANGE UP (ref 8.4–10.5)
CHLORIDE SERPL-SCNC: 102 MMOL/L — SIGNIFICANT CHANGE UP (ref 96–108)
CK MB CFR SERPL CALC: 7.8 NG/ML — HIGH (ref 0–6.7)
CK SERPL-CCNC: 81 U/L — SIGNIFICANT CHANGE UP (ref 30–200)
CO2 SERPL-SCNC: 25 MMOL/L — SIGNIFICANT CHANGE UP (ref 22–31)
CREAT SERPL-MCNC: 1.08 MG/DL — SIGNIFICANT CHANGE UP (ref 0.5–1.3)
EOSINOPHIL NFR BLD AUTO: 0.9 % — SIGNIFICANT CHANGE UP (ref 0–6)
GLUCOSE SERPL-MCNC: 90 MG/DL — SIGNIFICANT CHANGE UP (ref 70–99)
HCT VFR BLD CALC: 45 % — SIGNIFICANT CHANGE UP (ref 39–50)
HGB BLD-MCNC: 14.8 G/DL — SIGNIFICANT CHANGE UP (ref 13–17)
INR BLD: 1.07 — SIGNIFICANT CHANGE UP (ref 0.88–1.16)
LYMPHOCYTES # BLD AUTO: 12.8 % — LOW (ref 13–44)
MCHC RBC-ENTMCNC: 29.1 PG — SIGNIFICANT CHANGE UP (ref 27–34)
MCHC RBC-ENTMCNC: 32.9 G/DL — SIGNIFICANT CHANGE UP (ref 32–36)
MCV RBC AUTO: 88.6 FL — SIGNIFICANT CHANGE UP (ref 80–100)
MONOCYTES NFR BLD AUTO: 9.9 % — SIGNIFICANT CHANGE UP (ref 2–14)
NEUTROPHILS NFR BLD AUTO: 76.3 % — SIGNIFICANT CHANGE UP (ref 43–77)
PLATELET # BLD AUTO: 161 K/UL — SIGNIFICANT CHANGE UP (ref 150–400)
POTASSIUM SERPL-MCNC: 4 MMOL/L — SIGNIFICANT CHANGE UP (ref 3.5–5.3)
POTASSIUM SERPL-SCNC: 4 MMOL/L — SIGNIFICANT CHANGE UP (ref 3.5–5.3)
PROT SERPL-MCNC: 7 G/DL — SIGNIFICANT CHANGE UP (ref 6–8.3)
PROTHROM AB SERPL-ACNC: 11.9 SEC — SIGNIFICANT CHANGE UP (ref 9.8–12.7)
RBC # BLD: 5.08 M/UL — SIGNIFICANT CHANGE UP (ref 4.2–5.8)
RBC # FLD: 13.4 % — SIGNIFICANT CHANGE UP (ref 10.3–16.9)
SODIUM SERPL-SCNC: 139 MMOL/L — SIGNIFICANT CHANGE UP (ref 135–145)
TROPONIN T SERPL-MCNC: <0.01 NG/ML — SIGNIFICANT CHANGE UP (ref 0–0.01)
WBC # BLD: 11.4 K/UL — HIGH (ref 3.8–10.5)
WBC # FLD AUTO: 11.4 K/UL — HIGH (ref 3.8–10.5)

## 2018-02-18 PROCEDURE — 80053 COMPREHEN METABOLIC PANEL: CPT

## 2018-02-18 PROCEDURE — 85730 THROMBOPLASTIN TIME PARTIAL: CPT

## 2018-02-18 PROCEDURE — 99285 EMERGENCY DEPT VISIT HI MDM: CPT | Mod: 25

## 2018-02-18 PROCEDURE — 36415 COLL VENOUS BLD VENIPUNCTURE: CPT

## 2018-02-18 PROCEDURE — 99284 EMERGENCY DEPT VISIT MOD MDM: CPT | Mod: 25

## 2018-02-18 PROCEDURE — 85610 PROTHROMBIN TIME: CPT

## 2018-02-18 PROCEDURE — 70450 CT HEAD/BRAIN W/O DYE: CPT

## 2018-02-18 PROCEDURE — 84484 ASSAY OF TROPONIN QUANT: CPT

## 2018-02-18 PROCEDURE — 82550 ASSAY OF CK (CPK): CPT

## 2018-02-18 PROCEDURE — 70450 CT HEAD/BRAIN W/O DYE: CPT | Mod: 26

## 2018-02-18 PROCEDURE — 93010 ELECTROCARDIOGRAM REPORT: CPT

## 2018-02-18 PROCEDURE — 71046 X-RAY EXAM CHEST 2 VIEWS: CPT | Mod: 26

## 2018-02-18 PROCEDURE — 85025 COMPLETE CBC W/AUTO DIFF WBC: CPT

## 2018-02-18 PROCEDURE — 71046 X-RAY EXAM CHEST 2 VIEWS: CPT

## 2018-02-18 PROCEDURE — 82553 CREATINE MB FRACTION: CPT

## 2018-02-18 RX ORDER — SODIUM CHLORIDE 9 MG/ML
3 INJECTION INTRAMUSCULAR; INTRAVENOUS; SUBCUTANEOUS ONCE
Qty: 0 | Refills: 0 | Status: COMPLETED | OUTPATIENT
Start: 2018-02-18 | End: 2018-02-18

## 2018-02-18 RX ORDER — SODIUM CHLORIDE 9 MG/ML
1000 INJECTION INTRAMUSCULAR; INTRAVENOUS; SUBCUTANEOUS ONCE
Qty: 0 | Refills: 0 | Status: COMPLETED | OUTPATIENT
Start: 2018-02-18 | End: 2018-02-18

## 2018-02-18 RX ADMIN — SODIUM CHLORIDE 3 MILLILITER(S): 9 INJECTION INTRAMUSCULAR; INTRAVENOUS; SUBCUTANEOUS at 08:56

## 2018-02-18 RX ADMIN — SODIUM CHLORIDE 1000 MILLILITER(S): 9 INJECTION INTRAMUSCULAR; INTRAVENOUS; SUBCUTANEOUS at 09:00

## 2018-02-18 NOTE — ED PROVIDER NOTE - MEDICAL DECISION MAKING DETAILS
56 yo M poor historian with CAD (s/p cardiac stent), CHF, AICD, atrial fibrillation, HTN, PAD, bladder cancer s/p chemo/rad and TURBT, RCC s/p partial nephrectomy and Chemo/radiation, prostate cancer s/p radiation therapy, nephrolithiasis, HCV no treatment, peripheral neuropathy, COPD presents to the ED with syncopal episodes X 2 and head trauma. On blood thinners. Labs/ CT WNL. AICD interrogated and with no acute findings per cardiology fellow. Suspect malingering behavior. Pt asking for pain meds at the end of ED stay. Advised to f/up outpt with PCP and pain management.

## 2018-02-18 NOTE — ED PROVIDER NOTE - PROGRESS NOTE DETAILS
Labs/ EKG noted. cardiology in Labs/ EKG noted. Cardiology consulted and interrogated AICD with no acute events.

## 2018-02-18 NOTE — ED PROVIDER NOTE - CONSTITUTIONAL, MLM
normal... Well appearing, well nourished, awake, alert, oriented to person, place, time/situation and in no apparent distress. Well appearing, well nourished, awake, alert, oriented  and in no apparent distress.

## 2018-02-18 NOTE — ED ADULT NURSE NOTE - OBJECTIVE STATEMENT
Patient presents to the ED via EMS, as per patient he was walking in the street, when he got dizzy, stopped with his walker and then he passed out. Patient reports that he called EMS. Awake and alert x3. Complaining of shortness of breath. No fever or chills. Patient presents to the ED via EMS, as per patient he was walking in the street, when he got dizzy, stopped with his walker and then he passed out. Patient reports that he called EMS. Awake and alert x3. Complaining of shortness of breath. No fever or chills. Has an AICD.

## 2018-02-18 NOTE — ED PROVIDER NOTE - OBJECTIVE STATEMENT
58 yo M poor historian with cad s/p 1 stent [40 weeks ago], CHF [pEF/rEF], AICD, HTN, PAD, bladder cancer s/p chemo/rad and TURBT, RCC s/p partial nephrectomy and Chemo/radiation, prostate cancer s/p radiation therapy, nephrolithiasis, HCV no treatment, peripheral neuropathy, COPD presents to the ED after he synopsized on a bus. He was riding the bus and then he Began to feel lightheaded, flushed and have palpitations then he had LOC, unsure duration. He woke up and realized he passed out, no confusion or witnessed seizure activity, he got off the bus and began to walk home then he felt the same pre syncopal symptoms so he called 911. He also complains of MCCAIN that started over 1 day and gross hematuria that started one day ago a/w left flank pain. 56 yo M poor historian with CAD (s/p cardiac stent), CHF, AICD, atrial fibrillation, HTN, PAD, bladder cancer s/p chemo/rad and TURBT, RCC s/p partial nephrectomy and Chemo/radiation, prostate cancer s/p radiation therapy, nephrolithiasis, HCV no treatment, peripheral neuropathy, COPD presents to the ED after he had a syncopal episode. States he was walking and felt dizzy and  He was riding the bus and then he Began to feel lightheaded, flushed and have palpitations then he had LOC, unsure duration. He woke up and realized he passed out, no confusion or witnessed seizure activity, he got off the bus and began to walk home then he felt the same pre syncopal symptoms so he called 911. He also complains of MCCAIN that started over 1 day and gross hematuria that started one day ago a/w left flank pain. 56 yo M poor historian with CAD (s/p cardiac stent), CHF, AICD, atrial fibrillation, HTN, PAD, bladder cancer s/p chemo/rad and TURBT, RCC s/p partial nephrectomy and Chemo/radiation, prostate cancer s/p radiation therapy, nephrolithiasis, HCV no treatment, peripheral neuropathy, COPD presents to the ED after he had a syncopal episode. States he was walking and felt dizzy and lightheaded with palpitations and then he had LOC and hit his head against his walker, unsure duration. States this happened to him a second time and he presents to ED for evaluation. Denies CP/SOB. C/o chronic pain and "muscle spasm".

## 2018-02-18 NOTE — CHART NOTE - NSCHARTNOTEFT_GEN_A_CORE
EPS Device interrogation    Indication: syncope     Device model: 	Visia AF VR [id number]				         Functioning Mode: 	VVI 		    Underlying Rhythm: NSR    Pacemaker dependency:  No    Battery status:  10.9 years     RV lead impedance 380ohms         Shock coil Impedance: 75ohms    Events/Alert:  None    Parameter change: 	None    For ICD only:  tachy therapy – unchanged     discussed with ED attending, no events noted on ICD interrogation, patient is 15% paced.

## 2018-02-19 DIAGNOSIS — C61 MALIGNANT NEOPLASM OF PROSTATE: ICD-10-CM

## 2018-02-20 LAB — HCV GENTYP BLD NAA+PROBE: (no result)

## 2018-02-21 DIAGNOSIS — E66.01 MORBID (SEVERE) OBESITY DUE TO EXCESS CALORIES: ICD-10-CM

## 2018-02-23 NOTE — PATIENT PROFILE ADULT. - BILL OF RIGHTS/ADMISSION INFORMATION PROVIDED TO:
Patient Representative Full range of motion of upper and lower extremities, no joint tenderness/swelling.

## 2018-02-26 ENCOUNTER — EMERGENCY (EMERGENCY)
Facility: HOSPITAL | Age: 58
LOS: 1 days | Discharge: HOME | End: 2018-02-26
Attending: EMERGENCY MEDICINE | Admitting: EMERGENCY MEDICINE
Payer: COMMERCIAL

## 2018-02-26 VITALS
SYSTOLIC BLOOD PRESSURE: 170 MMHG | RESPIRATION RATE: 16 BRPM | DIASTOLIC BLOOD PRESSURE: 90 MMHG | OXYGEN SATURATION: 97 % | TEMPERATURE: 98 F | HEART RATE: 76 BPM | WEIGHT: 210.1 LBS

## 2018-02-26 VITALS
SYSTOLIC BLOOD PRESSURE: 150 MMHG | OXYGEN SATURATION: 98 % | RESPIRATION RATE: 16 BRPM | TEMPERATURE: 98 F | DIASTOLIC BLOOD PRESSURE: 74 MMHG | HEART RATE: 64 BPM

## 2018-02-26 DIAGNOSIS — Z90.5 ACQUIRED ABSENCE OF KIDNEY: Chronic | ICD-10-CM

## 2018-02-26 DIAGNOSIS — Z98.890 OTHER SPECIFIED POSTPROCEDURAL STATES: Chronic | ICD-10-CM

## 2018-02-26 DIAGNOSIS — Z90.49 ACQUIRED ABSENCE OF OTHER SPECIFIED PARTS OF DIGESTIVE TRACT: ICD-10-CM

## 2018-02-26 DIAGNOSIS — Z90.49 ACQUIRED ABSENCE OF OTHER SPECIFIED PARTS OF DIGESTIVE TRACT: Chronic | ICD-10-CM

## 2018-02-26 DIAGNOSIS — Z79.02 LONG TERM (CURRENT) USE OF ANTITHROMBOTICS/ANTIPLATELETS: ICD-10-CM

## 2018-02-26 DIAGNOSIS — E78.5 HYPERLIPIDEMIA, UNSPECIFIED: ICD-10-CM

## 2018-02-26 DIAGNOSIS — F17.200 NICOTINE DEPENDENCE, UNSPECIFIED, UNCOMPLICATED: ICD-10-CM

## 2018-02-26 DIAGNOSIS — Z79.899 OTHER LONG TERM (CURRENT) DRUG THERAPY: ICD-10-CM

## 2018-02-26 DIAGNOSIS — Z90.89 ACQUIRED ABSENCE OF OTHER ORGANS: ICD-10-CM

## 2018-02-26 DIAGNOSIS — R10.9 UNSPECIFIED ABDOMINAL PAIN: ICD-10-CM

## 2018-02-26 DIAGNOSIS — I11.0 HYPERTENSIVE HEART DISEASE WITH HEART FAILURE: ICD-10-CM

## 2018-02-26 DIAGNOSIS — J44.9 CHRONIC OBSTRUCTIVE PULMONARY DISEASE, UNSPECIFIED: ICD-10-CM

## 2018-02-26 DIAGNOSIS — Z95.810 PRESENCE OF AUTOMATIC (IMPLANTABLE) CARDIAC DEFIBRILLATOR: ICD-10-CM

## 2018-02-26 DIAGNOSIS — Z95.810 PRESENCE OF AUTOMATIC (IMPLANTABLE) CARDIAC DEFIBRILLATOR: Chronic | ICD-10-CM

## 2018-02-26 DIAGNOSIS — Z95.5 PRESENCE OF CORONARY ANGIOPLASTY IMPLANT AND GRAFT: Chronic | ICD-10-CM

## 2018-02-26 DIAGNOSIS — I25.10 ATHEROSCLEROTIC HEART DISEASE OF NATIVE CORONARY ARTERY WITHOUT ANGINA PECTORIS: ICD-10-CM

## 2018-02-26 DIAGNOSIS — I50.9 HEART FAILURE, UNSPECIFIED: ICD-10-CM

## 2018-02-26 LAB
ALBUMIN SERPL ELPH-MCNC: 3.9 G/DL — SIGNIFICANT CHANGE UP (ref 3.3–5)
ALP SERPL-CCNC: 164 U/L — HIGH (ref 40–120)
ALT FLD-CCNC: 72 U/L — HIGH (ref 10–45)
ANION GAP SERPL CALC-SCNC: 12 MMOL/L — SIGNIFICANT CHANGE UP (ref 5–17)
AST SERPL-CCNC: 49 U/L — HIGH (ref 10–40)
BASOPHILS NFR BLD AUTO: 0.2 % — SIGNIFICANT CHANGE UP (ref 0–2)
BILIRUB SERPL-MCNC: 0.4 MG/DL — SIGNIFICANT CHANGE UP (ref 0.2–1.2)
BUN SERPL-MCNC: 21 MG/DL — SIGNIFICANT CHANGE UP (ref 7–23)
CALCIUM SERPL-MCNC: 9.3 MG/DL — SIGNIFICANT CHANGE UP (ref 8.4–10.5)
CHLORIDE SERPL-SCNC: 104 MMOL/L — SIGNIFICANT CHANGE UP (ref 96–108)
CO2 SERPL-SCNC: 24 MMOL/L — SIGNIFICANT CHANGE UP (ref 22–31)
CREAT SERPL-MCNC: 0.95 MG/DL — SIGNIFICANT CHANGE UP (ref 0.5–1.3)
EOSINOPHIL NFR BLD AUTO: 2.4 % — SIGNIFICANT CHANGE UP (ref 0–6)
GLUCOSE SERPL-MCNC: 112 MG/DL — HIGH (ref 70–99)
HCT VFR BLD CALC: 41.2 % — SIGNIFICANT CHANGE UP (ref 39–50)
HGB BLD-MCNC: 13.9 G/DL — SIGNIFICANT CHANGE UP (ref 13–17)
LYMPHOCYTES # BLD AUTO: 22.2 % — SIGNIFICANT CHANGE UP (ref 13–44)
MCHC RBC-ENTMCNC: 29.4 PG — SIGNIFICANT CHANGE UP (ref 27–34)
MCHC RBC-ENTMCNC: 33.7 G/DL — SIGNIFICANT CHANGE UP (ref 32–36)
MCV RBC AUTO: 87.3 FL — SIGNIFICANT CHANGE UP (ref 80–100)
MONOCYTES NFR BLD AUTO: 12.3 % — SIGNIFICANT CHANGE UP (ref 2–14)
NEUTROPHILS NFR BLD AUTO: 62.9 % — SIGNIFICANT CHANGE UP (ref 43–77)
PLATELET # BLD AUTO: 147 K/UL — LOW (ref 150–400)
POTASSIUM SERPL-MCNC: 4.1 MMOL/L — SIGNIFICANT CHANGE UP (ref 3.5–5.3)
POTASSIUM SERPL-SCNC: 4.1 MMOL/L — SIGNIFICANT CHANGE UP (ref 3.5–5.3)
PROT SERPL-MCNC: 6.7 G/DL — SIGNIFICANT CHANGE UP (ref 6–8.3)
RBC # BLD: 4.72 M/UL — SIGNIFICANT CHANGE UP (ref 4.2–5.8)
RBC # FLD: 13.4 % — SIGNIFICANT CHANGE UP (ref 10.3–16.9)
SODIUM SERPL-SCNC: 140 MMOL/L — SIGNIFICANT CHANGE UP (ref 135–145)
WBC # BLD: 9.8 K/UL — SIGNIFICANT CHANGE UP (ref 3.8–10.5)
WBC # FLD AUTO: 9.8 K/UL — SIGNIFICANT CHANGE UP (ref 3.8–10.5)

## 2018-02-26 PROCEDURE — 85025 COMPLETE CBC W/AUTO DIFF WBC: CPT

## 2018-02-26 PROCEDURE — 96374 THER/PROPH/DIAG INJ IV PUSH: CPT

## 2018-02-26 PROCEDURE — 74176 CT ABD & PELVIS W/O CONTRAST: CPT | Mod: 26

## 2018-02-26 PROCEDURE — 36415 COLL VENOUS BLD VENIPUNCTURE: CPT

## 2018-02-26 PROCEDURE — 80053 COMPREHEN METABOLIC PANEL: CPT

## 2018-02-26 PROCEDURE — 99284 EMERGENCY DEPT VISIT MOD MDM: CPT | Mod: 25

## 2018-02-26 PROCEDURE — 99285 EMERGENCY DEPT VISIT HI MDM: CPT | Mod: 25

## 2018-02-26 PROCEDURE — 74176 CT ABD & PELVIS W/O CONTRAST: CPT

## 2018-02-26 RX ORDER — MORPHINE SULFATE 50 MG/1
4 CAPSULE, EXTENDED RELEASE ORAL ONCE
Qty: 0 | Refills: 0 | Status: DISCONTINUED | OUTPATIENT
Start: 2018-02-26 | End: 2018-02-26

## 2018-02-26 RX ADMIN — MORPHINE SULFATE 4 MILLIGRAM(S): 50 CAPSULE, EXTENDED RELEASE ORAL at 04:04

## 2018-02-26 NOTE — ED PROVIDER NOTE - ATTENDING CONTRIBUTION TO CARE
I have seen and examined the pt, reviewed all pertinent clinical data. I agree with the documentation/care/plan executed by MIKE Hernandes.

## 2018-02-26 NOTE — ED ADULT TRIAGE NOTE - CHIEF COMPLAINT QUOTE
left flank pain for one day, both arms rash since yesterday, itchiness  hs of left kidney partial nephrotomy

## 2018-02-26 NOTE — ED PROVIDER NOTE - OBJECTIVE STATEMENT
56 y/o m hx CAD, HTN, afib s/p AICD, bladder CA s/p TURBT, RCC s/p partial left nephrectomy presents stating having left flank pain x 3 days.  Pt stating he went to a hospital in [place] 2 days ago (doesn't remember the name) and reports he was told "I have a clip out of place" on his left kidney.  Pt stating he can't take pain medications at home, as he is allergic to all oral pain medications, stating "when I get pain I come to the hospital and get 2mg dilaudid IV push."  Denies dysuria, n/v/d, CP, SOB, fever, all other ROS negative.

## 2018-02-26 NOTE — ED PROVIDER NOTE - MEDICAL DECISION MAKING DETAILS
58 y/o m hx CAD, HTN, afib s/p AICD, bladder CA s/p TURBT, RCC s/p partial left nephrectomy presents s/p left flank pain x 3 days; exam unremarkable, given morphine and fell asleep, CT normal, labs wnl, pt unwilling to provide urine sample, is stable for d/c to f/u with his urology/pmd at [facility].

## 2018-02-26 NOTE — ED ADULT NURSE NOTE - OBJECTIVE STATEMENT
Patient presents to ED c/o left flank pain since yesterday. Pt has pmh left partial nephrectomy. Associated symptoms include burning during urination and hematuria. Patient denies chest pain, chills, fever, N/V/D.

## 2018-03-05 NOTE — ED ADULT NURSE NOTE - PAIN: BODY LOCATION
no loss of consciousness, no gait abnormality, no headache, no sensory deficits, and no weakness. back

## 2018-03-15 NOTE — ED PROVIDER NOTE - CROS ED RESP ALL NEG
Daily Note     Today's date: 3/15/2018  Patient name: Mike Dickey  : 1955  MRN: [id number]  Referring provider: Rosa Shah MD  Dx:   Encounter Diagnosis     ICD-10-CM    1  Complete rotator cuff tear of left shoulder M75 122                   Subjective:   Primary c/o biceps discomfort but manageable  She has been sleeping in a recliner because she is afraid of rolling over on her side  Objective: See treatment diary below  aily Treatment Diary     Manual   3-1 3/5 3/8 3/12 3/15       PROM 5 min 8' 8' 5 min x x       Edema massage    5 min  and UT 5 min                                                   Exercise Diary              pendullums 20 x x 30 x x       Wrist flex/ext 10 x 20 20 ea 1# 10x 1# x 20       Elbow flex AA 10 x 20 x x x       Sup/pron 10 x 20 x x x       scap squeeze  5"x10 x x x x       bike   10' x x x       Counter stretch   3" 10x x x x       shrugs    10 x x       Gripper red digiflex     20 x                                                                                                                                                          Modalities              Ice and H-wave 15 min 20' x x  x                                 X=same as last visit    Assessment: She is doing outstandingly well  Tends to move her arm too aggressively because she has very little discomfort  Plan: Continue to give feedback about moving slowly 
negative...

## 2018-03-23 ENCOUNTER — EMERGENCY (EMERGENCY)
Facility: HOSPITAL | Age: 58
LOS: 1 days | Discharge: HOME | End: 2018-03-23
Attending: EMERGENCY MEDICINE
Payer: MEDICAID

## 2018-03-23 VITALS
OXYGEN SATURATION: 100 % | HEART RATE: 60 BPM | TEMPERATURE: 98 F | SYSTOLIC BLOOD PRESSURE: 155 MMHG | DIASTOLIC BLOOD PRESSURE: 80 MMHG | RESPIRATION RATE: 18 BRPM

## 2018-03-23 VITALS
DIASTOLIC BLOOD PRESSURE: 95 MMHG | RESPIRATION RATE: 17 BRPM | WEIGHT: 210.1 LBS | OXYGEN SATURATION: 98 % | TEMPERATURE: 97 F | HEART RATE: 60 BPM | HEIGHT: 71 IN | SYSTOLIC BLOOD PRESSURE: 152 MMHG

## 2018-03-23 DIAGNOSIS — Z98.890 OTHER SPECIFIED POSTPROCEDURAL STATES: Chronic | ICD-10-CM

## 2018-03-23 DIAGNOSIS — Z90.49 ACQUIRED ABSENCE OF OTHER SPECIFIED PARTS OF DIGESTIVE TRACT: Chronic | ICD-10-CM

## 2018-03-23 DIAGNOSIS — Z90.5 ACQUIRED ABSENCE OF KIDNEY: Chronic | ICD-10-CM

## 2018-03-23 DIAGNOSIS — Z95.810 PRESENCE OF AUTOMATIC (IMPLANTABLE) CARDIAC DEFIBRILLATOR: Chronic | ICD-10-CM

## 2018-03-23 DIAGNOSIS — Z95.5 PRESENCE OF CORONARY ANGIOPLASTY IMPLANT AND GRAFT: Chronic | ICD-10-CM

## 2018-03-23 LAB
ALBUMIN SERPL ELPH-MCNC: 3.7 G/DL — SIGNIFICANT CHANGE UP (ref 3.5–5)
ALP SERPL-CCNC: 130 U/L — HIGH (ref 40–120)
ALT FLD-CCNC: 48 U/L DA — SIGNIFICANT CHANGE UP (ref 10–60)
ANION GAP SERPL CALC-SCNC: 8 MMOL/L — SIGNIFICANT CHANGE UP (ref 5–17)
APPEARANCE UR: ABNORMAL
APTT BLD: 42.8 SEC — HIGH (ref 27.5–37.4)
AST SERPL-CCNC: 47 U/L — HIGH (ref 10–40)
BASOPHILS # BLD AUTO: 0.1 K/UL — SIGNIFICANT CHANGE UP (ref 0–0.2)
BASOPHILS NFR BLD AUTO: 0.9 % — SIGNIFICANT CHANGE UP (ref 0–2)
BILIRUB SERPL-MCNC: 0.7 MG/DL — SIGNIFICANT CHANGE UP (ref 0.2–1.2)
BILIRUB UR-MCNC: NEGATIVE — SIGNIFICANT CHANGE UP
BUN SERPL-MCNC: 16 MG/DL — SIGNIFICANT CHANGE UP (ref 7–18)
CALCIUM SERPL-MCNC: 9.4 MG/DL — SIGNIFICANT CHANGE UP (ref 8.4–10.5)
CHLORIDE SERPL-SCNC: 107 MMOL/L — SIGNIFICANT CHANGE UP (ref 96–108)
CO2 SERPL-SCNC: 27 MMOL/L — SIGNIFICANT CHANGE UP (ref 22–31)
COLOR SPEC: ABNORMAL
CREAT SERPL-MCNC: 0.99 MG/DL — SIGNIFICANT CHANGE UP (ref 0.5–1.3)
DIFF PNL FLD: ABNORMAL
EOSINOPHIL # BLD AUTO: 0.1 K/UL — SIGNIFICANT CHANGE UP (ref 0–0.5)
EOSINOPHIL NFR BLD AUTO: 0.7 % — SIGNIFICANT CHANGE UP (ref 0–6)
GLUCOSE SERPL-MCNC: 88 MG/DL — SIGNIFICANT CHANGE UP (ref 70–99)
GLUCOSE UR QL: NEGATIVE — SIGNIFICANT CHANGE UP
HCT VFR BLD CALC: 52.7 % — HIGH (ref 39–50)
HGB BLD-MCNC: 16.9 G/DL — SIGNIFICANT CHANGE UP (ref 13–17)
INR BLD: 1.39 RATIO — HIGH (ref 0.88–1.16)
KETONES UR-MCNC: NEGATIVE — SIGNIFICANT CHANGE UP
LEUKOCYTE ESTERASE UR-ACNC: ABNORMAL
LYMPHOCYTES # BLD AUTO: 1.7 K/UL — SIGNIFICANT CHANGE UP (ref 1–3.3)
LYMPHOCYTES # BLD AUTO: 20 % — SIGNIFICANT CHANGE UP (ref 13–44)
MCHC RBC-ENTMCNC: 28.8 PG — SIGNIFICANT CHANGE UP (ref 27–34)
MCHC RBC-ENTMCNC: 32.1 GM/DL — SIGNIFICANT CHANGE UP (ref 32–36)
MCV RBC AUTO: 89.4 FL — SIGNIFICANT CHANGE UP (ref 80–100)
MONOCYTES # BLD AUTO: 0.8 K/UL — SIGNIFICANT CHANGE UP (ref 0–0.9)
MONOCYTES NFR BLD AUTO: 9.6 % — SIGNIFICANT CHANGE UP (ref 2–14)
NEUTROPHILS # BLD AUTO: 5.8 K/UL — SIGNIFICANT CHANGE UP (ref 1.8–7.4)
NEUTROPHILS NFR BLD AUTO: 68.8 % — SIGNIFICANT CHANGE UP (ref 43–77)
NITRITE UR-MCNC: NEGATIVE — SIGNIFICANT CHANGE UP
PH UR: 5 — SIGNIFICANT CHANGE UP (ref 5–8)
PLATELET # BLD AUTO: 181 K/UL — SIGNIFICANT CHANGE UP (ref 150–400)
POTASSIUM SERPL-MCNC: 3.6 MMOL/L — SIGNIFICANT CHANGE UP (ref 3.5–5.3)
POTASSIUM SERPL-SCNC: 3.6 MMOL/L — SIGNIFICANT CHANGE UP (ref 3.5–5.3)
PROT SERPL-MCNC: 7.6 G/DL — SIGNIFICANT CHANGE UP (ref 6–8.3)
PROT UR-MCNC: 100
PROTHROM AB SERPL-ACNC: 15.3 SEC — HIGH (ref 9.8–12.7)
RBC # BLD: 5.89 M/UL — HIGH (ref 4.2–5.8)
RBC # FLD: 11.9 % — SIGNIFICANT CHANGE UP (ref 10.3–14.5)
SODIUM SERPL-SCNC: 142 MMOL/L — SIGNIFICANT CHANGE UP (ref 135–145)
SP GR SPEC: 1.02 — SIGNIFICANT CHANGE UP (ref 1.01–1.02)
UROBILINOGEN FLD QL: 1
WBC # BLD: 8.5 K/UL — SIGNIFICANT CHANGE UP (ref 3.8–10.5)
WBC # FLD AUTO: 8.5 K/UL — SIGNIFICANT CHANGE UP (ref 3.8–10.5)

## 2018-03-23 PROCEDURE — 87086 URINE CULTURE/COLONY COUNT: CPT

## 2018-03-23 PROCEDURE — 71046 X-RAY EXAM CHEST 2 VIEWS: CPT

## 2018-03-23 PROCEDURE — 80053 COMPREHEN METABOLIC PANEL: CPT

## 2018-03-23 PROCEDURE — 94640 AIRWAY INHALATION TREATMENT: CPT

## 2018-03-23 PROCEDURE — 96374 THER/PROPH/DIAG INJ IV PUSH: CPT

## 2018-03-23 PROCEDURE — 85610 PROTHROMBIN TIME: CPT

## 2018-03-23 PROCEDURE — 99285 EMERGENCY DEPT VISIT HI MDM: CPT

## 2018-03-23 PROCEDURE — 71046 X-RAY EXAM CHEST 2 VIEWS: CPT | Mod: 26

## 2018-03-23 PROCEDURE — 96375 TX/PRO/DX INJ NEW DRUG ADDON: CPT

## 2018-03-23 PROCEDURE — 85730 THROMBOPLASTIN TIME PARTIAL: CPT

## 2018-03-23 PROCEDURE — 93005 ELECTROCARDIOGRAM TRACING: CPT

## 2018-03-23 PROCEDURE — 74176 CT ABD & PELVIS W/O CONTRAST: CPT

## 2018-03-23 PROCEDURE — 85027 COMPLETE CBC AUTOMATED: CPT

## 2018-03-23 PROCEDURE — 99285 EMERGENCY DEPT VISIT HI MDM: CPT | Mod: 25

## 2018-03-23 PROCEDURE — 81001 URINALYSIS AUTO W/SCOPE: CPT

## 2018-03-23 PROCEDURE — 74176 CT ABD & PELVIS W/O CONTRAST: CPT | Mod: 26

## 2018-03-23 PROCEDURE — 96376 TX/PRO/DX INJ SAME DRUG ADON: CPT

## 2018-03-23 RX ORDER — ALBUTEROL 90 UG/1
2 AEROSOL, METERED ORAL
Qty: 1 | Refills: 0 | OUTPATIENT
Start: 2018-03-23 | End: 2018-04-21

## 2018-03-23 RX ORDER — IPRATROPIUM/ALBUTEROL SULFATE 18-103MCG
3 AEROSOL WITH ADAPTER (GRAM) INHALATION ONCE
Qty: 0 | Refills: 0 | Status: COMPLETED | OUTPATIENT
Start: 2018-03-23 | End: 2018-03-23

## 2018-03-23 RX ORDER — MORPHINE SULFATE 50 MG/1
4 CAPSULE, EXTENDED RELEASE ORAL ONCE
Qty: 0 | Refills: 0 | Status: DISCONTINUED | OUTPATIENT
Start: 2018-03-23 | End: 2018-03-23

## 2018-03-23 RX ADMIN — Medication 3 MILLILITER(S): at 18:25

## 2018-03-23 RX ADMIN — Medication 3 MILLILITER(S): at 19:27

## 2018-03-23 RX ADMIN — MORPHINE SULFATE 4 MILLIGRAM(S): 50 CAPSULE, EXTENDED RELEASE ORAL at 17:41

## 2018-03-23 RX ADMIN — Medication 3 MILLILITER(S): at 17:40

## 2018-03-23 RX ADMIN — MORPHINE SULFATE 4 MILLIGRAM(S): 50 CAPSULE, EXTENDED RELEASE ORAL at 13:30

## 2018-03-23 RX ADMIN — Medication 125 MILLIGRAM(S): at 17:39

## 2018-03-23 NOTE — ED PROVIDER NOTE - MEDICAL DECISION MAKING DETAILS
Will obtain labs, flu swab, CXR, CT ab/pelvis r/o colitis versus stone. reports flu like / URI s/s, +smoker, also w 2 weeks diarrhea, L flank pain similar to previous kidney stone.  Will obtain labs, flu swab, CXR, CT ab/pelvis r/o colitis versus stone.

## 2018-03-23 NOTE — ED PROVIDER NOTE - OBJECTIVE STATEMENT
58 y/o M pt with PMHx of AICD, Afib, bladder CA, CAD (stents x 2), CHF, COPD, Hep C, HTN, HLD nephrolithiasis, prostate CA, renal cell carcinoma, and kidney stone (dx 1 week ago) presents to ED c/o vomiting and L sided back pain x today; diarrhea x 1.5 weeks; and productive cough and nasal congestion x 1 week. Pt states that his back pain is similar to his previous kidney stone pain. Pt denies fever, chills, dysuria, or any other complaints. ALLERGIES: As listed.

## 2018-03-23 NOTE — ED PROVIDER NOTE - PMH
AICD (automatic cardioverter/defibrillator) present  [employer]  Atrial fibrillation    Bladder cancer    CAD (coronary artery disease)  (s/p 2 stents in Sep 2017)  Chronic congestive heart failure, unspecified congestive heart failure type  rEF/pEF  COPD (chronic obstructive pulmonary disease)    Hep C w/o coma, chronic    HTN (hypertension)    Hyperlipidemia    Kidney stone    Nephrolithiasis    Peripheral neuropathy    Prostate cancer  s/p radiation  Renal cell carcinoma of left kidney  s/p partial nephrectomy in 2002  biopsy again in Sep 2017 showed RCC again in stage 2

## 2018-03-24 LAB
CULTURE RESULTS: NO GROWTH — SIGNIFICANT CHANGE UP
SPECIMEN SOURCE: SIGNIFICANT CHANGE UP

## 2018-04-02 NOTE — CONSULT NOTE ADULT - PROBLEM/RECOMMENDATION-4
Spiritual Care Assessment/Progress Note  [facility]      NAME: Cassie Hamm      MRN: [id number]  AGE: 67 y.o.  SEX: female  Holiness Affiliation: Oriental orthodox   Language: English     4/2/2018     Total Time (in minutes): 12     Spiritual Assessment begun in [facility] CARDIOPULMONARY CARE through conversation with:         [x]Patient        [] Family    [] Friend(s)        Reason for Consult: Initial/Spiritual assessment, patient floor     Spiritual beliefs: (Please include comment if needed)     [] Involved in a chayo tradition/spiritual practice:     [] Supported by a chayo community:      [] Claims no spiritual orientation:      [] Seeking spiritual identity:           [] Adheres to an individual form of spirituality:      [x] Not able to assess:                     Identified resources for coping:      [x] Prayer                  [] Devotional reading               [] Music                  [] Guided Imagery     [x] Family/friends                 [] Pet visits     [] Other:        Interventions offered during this visit: (See comments for more details)    Patient Interventions: Affirmation of emotions/emotional suffering, Catharsis/review of pertinent events in supportive environment, Iconic (affirming the presence of God/Higher Power), Initial/Spiritual assessment, patient floor, Normalization of emotional/spiritual concerns, Prayer (assurance of)           Plan of Care:     [] Discuss Spiritual/Cultural needs    [] Support AMD and/or advance care planning process      [] Support grieving process   [] Coordinate Rites/Rituals    [] Coordination with community clergy   [x] No spiritual needs identified at this time   [] Detailed Plan of Care below (See Comments)  [] Make referral to Music Therapy  [] Make referral to Pet Therapy     [] Make referral to Addiction services  [] Make referral to [facility]  [] Make referral to Spiritual Care Partner  [] No future visits requested Comments: Initial visit with patient in 2292. Introduced self and explained role of chaplains in the hospital. Provided supportive presence and empathetic listening as patient shared events leading to her hospitalization. Patient stated that she was having some difficulties with confusion and this was also reflected in the conversation. Patient is hopeful for continued improvement and discharge. Offered prayer per patient's request and advised of  availability as needed and desired. Chaplain Jerome Frost M.Div.    Paging Service [phone]) DISPLAY PLAN FREE TEXT

## 2018-04-05 ENCOUNTER — EMERGENCY (EMERGENCY)
Facility: HOSPITAL | Age: 58
LOS: 1 days | Discharge: HOME | End: 2018-04-05
Attending: EMERGENCY MEDICINE
Payer: MEDICAID

## 2018-04-05 VITALS
SYSTOLIC BLOOD PRESSURE: 144 MMHG | OXYGEN SATURATION: 99 % | HEIGHT: 71 IN | WEIGHT: 225.09 LBS | RESPIRATION RATE: 18 BRPM | DIASTOLIC BLOOD PRESSURE: 78 MMHG | TEMPERATURE: 98 F | HEART RATE: 73 BPM

## 2018-04-05 DIAGNOSIS — Z90.49 ACQUIRED ABSENCE OF OTHER SPECIFIED PARTS OF DIGESTIVE TRACT: Chronic | ICD-10-CM

## 2018-04-05 DIAGNOSIS — Z90.5 ACQUIRED ABSENCE OF KIDNEY: Chronic | ICD-10-CM

## 2018-04-05 DIAGNOSIS — Z95.5 PRESENCE OF CORONARY ANGIOPLASTY IMPLANT AND GRAFT: Chronic | ICD-10-CM

## 2018-04-05 DIAGNOSIS — Z98.890 OTHER SPECIFIED POSTPROCEDURAL STATES: Chronic | ICD-10-CM

## 2018-04-05 DIAGNOSIS — Z95.810 PRESENCE OF AUTOMATIC (IMPLANTABLE) CARDIAC DEFIBRILLATOR: Chronic | ICD-10-CM

## 2018-04-05 PROCEDURE — 99284 EMERGENCY DEPT VISIT MOD MDM: CPT | Mod: 25

## 2018-04-06 PROCEDURE — 71046 X-RAY EXAM CHEST 2 VIEWS: CPT | Mod: 26

## 2018-04-06 PROCEDURE — 94640 AIRWAY INHALATION TREATMENT: CPT

## 2018-04-06 PROCEDURE — 93005 ELECTROCARDIOGRAM TRACING: CPT

## 2018-04-06 PROCEDURE — 71046 X-RAY EXAM CHEST 2 VIEWS: CPT

## 2018-04-06 PROCEDURE — 99283 EMERGENCY DEPT VISIT LOW MDM: CPT | Mod: 25

## 2018-04-06 RX ORDER — OXYCODONE HYDROCHLORIDE 5 MG/1
5 TABLET ORAL ONCE
Qty: 0 | Refills: 0 | Status: DISCONTINUED | OUTPATIENT
Start: 2018-04-06 | End: 2018-04-06

## 2018-04-06 RX ORDER — IPRATROPIUM/ALBUTEROL SULFATE 18-103MCG
3 AEROSOL WITH ADAPTER (GRAM) INHALATION ONCE
Qty: 0 | Refills: 0 | Status: COMPLETED | OUTPATIENT
Start: 2018-04-06 | End: 2018-04-06

## 2018-04-06 RX ADMIN — Medication 3 MILLILITER(S): at 02:08

## 2018-04-06 RX ADMIN — OXYCODONE HYDROCHLORIDE 5 MILLIGRAM(S): 5 TABLET ORAL at 02:59

## 2018-04-06 NOTE — ED PROVIDER NOTE - MEDICAL DECISION MAKING DETAILS
58yo M 56yo M w cough for 1 wk, SOB today in the context of sneezing and coughing. Hx of COPD, typical wheezing, finished Levaquin and Prednisone. Also chr back pain, states has been evaluated for renal cell CA, scheduled for Sx. Wheezing on exam, well appearing. Will get CXR, treat w nebs, reevaluate. Low suspicion for ACS/PE

## 2018-04-06 NOTE — ED ADULT NURSE NOTE - OBJECTIVE STATEMENT
57 years old male brought to ED by EMS, h/o persistent cough with yellow sputum production,. patient also complained of left flank pains. Stated he was diagnosed with an URTI. Patient c/o SOB, is awaiting ED Physician assessment.

## 2018-04-06 NOTE — ED PROVIDER NOTE - OBJECTIVE STATEMENT
58 y/o M pt w/ a PMHx of prostate ca, bladder ca, COPD, renal cell carcinoma, HLD, CAD, Afib, HTN, AICD, Hep C, CHF c/o productive cough, SOB x today. Today pt was on train when sudden onset of productive coughing fit ("bringing up bubbles") w/ associated SOB. Reports that he has had a URI for a week which caused a postponement of a partial nephrectomy. Renal cell carcinoma dx'd 3 months ago. Also reports chills and diarrhea. Denies fever and any other complaints. Allergic to aspirin, codeine, Haldol, Motrin, penicillin Toradol, Tylenol. Pt has recently been on prednisone.

## 2018-04-25 NOTE — ED PROVIDER NOTE - CROS ED CONS ALL NEG
1. Schedule colonoscopy with Dr. Oscar Ruiz with IV twilight sedation. 2.  bowel prep from pharmacy - I have prescribed Suprep. This is a smaller volume preparation.  If this is too expensive, however, please call my office and we will order an alterna negative...

## 2018-04-26 ENCOUNTER — EMERGENCY (EMERGENCY)
Facility: HOSPITAL | Age: 58
LOS: 1 days | Discharge: HOME | End: 2018-04-26
Attending: EMERGENCY MEDICINE | Admitting: EMERGENCY MEDICINE
Payer: MEDICAID

## 2018-04-26 VITALS
OXYGEN SATURATION: 98 % | DIASTOLIC BLOOD PRESSURE: 89 MMHG | SYSTOLIC BLOOD PRESSURE: 158 MMHG | HEART RATE: 62 BPM | RESPIRATION RATE: 20 BRPM | TEMPERATURE: 98 F

## 2018-04-26 DIAGNOSIS — Z90.5 ACQUIRED ABSENCE OF KIDNEY: Chronic | ICD-10-CM

## 2018-04-26 DIAGNOSIS — Z90.49 ACQUIRED ABSENCE OF OTHER SPECIFIED PARTS OF DIGESTIVE TRACT: Chronic | ICD-10-CM

## 2018-04-26 DIAGNOSIS — Z98.890 OTHER SPECIFIED POSTPROCEDURAL STATES: Chronic | ICD-10-CM

## 2018-04-26 DIAGNOSIS — Z95.5 PRESENCE OF CORONARY ANGIOPLASTY IMPLANT AND GRAFT: Chronic | ICD-10-CM

## 2018-04-26 DIAGNOSIS — Z95.810 PRESENCE OF AUTOMATIC (IMPLANTABLE) CARDIAC DEFIBRILLATOR: Chronic | ICD-10-CM

## 2018-04-26 PROCEDURE — 99285 EMERGENCY DEPT VISIT HI MDM: CPT | Mod: 25

## 2018-04-26 NOTE — ED ADULT TRIAGE NOTE - CHIEF COMPLAINT QUOTE
Pt with a pmhx of kidney stones, prostate ca, bladder ca, & renal cell carcinoma presents with c/o left flank pain.

## 2018-04-27 VITALS
SYSTOLIC BLOOD PRESSURE: 163 MMHG | OXYGEN SATURATION: 97 % | TEMPERATURE: 98 F | RESPIRATION RATE: 18 BRPM | DIASTOLIC BLOOD PRESSURE: 109 MMHG | HEART RATE: 60 BPM

## 2018-04-27 LAB
ALBUMIN SERPL ELPH-MCNC: 3.8 G/DL — SIGNIFICANT CHANGE UP (ref 3.3–5)
ALP SERPL-CCNC: 140 U/L — HIGH (ref 40–120)
ALT FLD-CCNC: 44 U/L — HIGH (ref 4–41)
APPEARANCE UR: SIGNIFICANT CHANGE UP
AST SERPL-CCNC: 45 U/L — HIGH (ref 4–40)
BASOPHILS # BLD AUTO: 0.04 K/UL — SIGNIFICANT CHANGE UP (ref 0–0.2)
BASOPHILS NFR BLD AUTO: 0.5 % — SIGNIFICANT CHANGE UP (ref 0–2)
BILIRUB SERPL-MCNC: 0.4 MG/DL — SIGNIFICANT CHANGE UP (ref 0.2–1.2)
BILIRUB UR-MCNC: NEGATIVE — SIGNIFICANT CHANGE UP
BLOOD UR QL VISUAL: HIGH
BUN SERPL-MCNC: 13 MG/DL — SIGNIFICANT CHANGE UP (ref 7–23)
CALCIUM SERPL-MCNC: 9.1 MG/DL — SIGNIFICANT CHANGE UP (ref 8.4–10.5)
CHLORIDE SERPL-SCNC: 101 MMOL/L — SIGNIFICANT CHANGE UP (ref 98–107)
CO2 SERPL-SCNC: 27 MMOL/L — SIGNIFICANT CHANGE UP (ref 22–31)
COLOR SPEC: HIGH
CREAT SERPL-MCNC: 0.87 MG/DL — SIGNIFICANT CHANGE UP (ref 0.5–1.3)
EOSINOPHIL # BLD AUTO: 0.13 K/UL — SIGNIFICANT CHANGE UP (ref 0–0.5)
EOSINOPHIL NFR BLD AUTO: 1.6 % — SIGNIFICANT CHANGE UP (ref 0–6)
GLUCOSE SERPL-MCNC: 90 MG/DL — SIGNIFICANT CHANGE UP (ref 70–99)
GLUCOSE UR-MCNC: NEGATIVE — SIGNIFICANT CHANGE UP
HCT VFR BLD CALC: 45.6 % — SIGNIFICANT CHANGE UP (ref 39–50)
HGB BLD-MCNC: 14.7 G/DL — SIGNIFICANT CHANGE UP (ref 13–17)
IMM GRANULOCYTES # BLD AUTO: 0.05 # — SIGNIFICANT CHANGE UP
IMM GRANULOCYTES NFR BLD AUTO: 0.6 % — SIGNIFICANT CHANGE UP (ref 0–1.5)
KETONES UR-MCNC: SIGNIFICANT CHANGE UP
LEUKOCYTE ESTERASE UR-ACNC: SIGNIFICANT CHANGE UP
LYMPHOCYTES # BLD AUTO: 1.93 K/UL — SIGNIFICANT CHANGE UP (ref 1–3.3)
LYMPHOCYTES # BLD AUTO: 23.5 % — SIGNIFICANT CHANGE UP (ref 13–44)
MCHC RBC-ENTMCNC: 28.5 PG — SIGNIFICANT CHANGE UP (ref 27–34)
MCHC RBC-ENTMCNC: 32.2 % — SIGNIFICANT CHANGE UP (ref 32–36)
MCV RBC AUTO: 88.5 FL — SIGNIFICANT CHANGE UP (ref 80–100)
MONOCYTES # BLD AUTO: 1.2 K/UL — HIGH (ref 0–0.9)
MONOCYTES NFR BLD AUTO: 14.6 % — HIGH (ref 2–14)
MUCOUS THREADS # UR AUTO: SIGNIFICANT CHANGE UP
NEUTROPHILS # BLD AUTO: 4.85 K/UL — SIGNIFICANT CHANGE UP (ref 1.8–7.4)
NEUTROPHILS NFR BLD AUTO: 59.2 % — SIGNIFICANT CHANGE UP (ref 43–77)
NITRITE UR-MCNC: NEGATIVE — SIGNIFICANT CHANGE UP
NRBC # FLD: 0 — SIGNIFICANT CHANGE UP
PH UR: 6 — SIGNIFICANT CHANGE UP (ref 4.6–8)
PLATELET # BLD AUTO: 199 K/UL — SIGNIFICANT CHANGE UP (ref 150–400)
PMV BLD: 10.5 FL — SIGNIFICANT CHANGE UP (ref 7–13)
POTASSIUM SERPL-MCNC: 3.6 MMOL/L — SIGNIFICANT CHANGE UP (ref 3.5–5.3)
POTASSIUM SERPL-SCNC: 3.6 MMOL/L — SIGNIFICANT CHANGE UP (ref 3.5–5.3)
PROT SERPL-MCNC: 6.6 G/DL — SIGNIFICANT CHANGE UP (ref 6–8.3)
PROT UR-MCNC: 150 MG/DL — HIGH
RBC # BLD: 5.15 M/UL — SIGNIFICANT CHANGE UP (ref 4.2–5.8)
RBC # FLD: 13.5 % — SIGNIFICANT CHANGE UP (ref 10.3–14.5)
RBC CASTS # UR COMP ASSIST: >50 — HIGH (ref 0–?)
SODIUM SERPL-SCNC: 141 MMOL/L — SIGNIFICANT CHANGE UP (ref 135–145)
SP GR SPEC: 1.02 — SIGNIFICANT CHANGE UP (ref 1–1.04)
SQUAMOUS # UR AUTO: SIGNIFICANT CHANGE UP
UROBILINOGEN FLD QL: 1 MG/DL — SIGNIFICANT CHANGE UP
WBC # BLD: 8.2 K/UL — SIGNIFICANT CHANGE UP (ref 3.8–10.5)
WBC # FLD AUTO: 8.2 K/UL — SIGNIFICANT CHANGE UP (ref 3.8–10.5)
WBC CLUMPS #/AREA URNS HPF: PRESENT — HIGH (ref 0–?)
WBC UR QL: >50 — HIGH (ref 0–?)

## 2018-04-27 PROCEDURE — 74176 CT ABD & PELVIS W/O CONTRAST: CPT | Mod: 26

## 2018-04-27 RX ORDER — HYDROMORPHONE HYDROCHLORIDE 2 MG/ML
1 INJECTION INTRAMUSCULAR; INTRAVENOUS; SUBCUTANEOUS ONCE
Qty: 0 | Refills: 0 | Status: DISCONTINUED | OUTPATIENT
Start: 2018-04-27 | End: 2018-04-27

## 2018-04-27 RX ORDER — SODIUM CHLORIDE 9 MG/ML
500 INJECTION INTRAMUSCULAR; INTRAVENOUS; SUBCUTANEOUS ONCE
Qty: 0 | Refills: 0 | Status: COMPLETED | OUTPATIENT
Start: 2018-04-27 | End: 2018-04-27

## 2018-04-27 RX ADMIN — HYDROMORPHONE HYDROCHLORIDE 1 MILLIGRAM(S): 2 INJECTION INTRAMUSCULAR; INTRAVENOUS; SUBCUTANEOUS at 05:33

## 2018-04-27 RX ADMIN — SODIUM CHLORIDE 500 MILLILITER(S): 9 INJECTION INTRAMUSCULAR; INTRAVENOUS; SUBCUTANEOUS at 01:05

## 2018-04-27 RX ADMIN — HYDROMORPHONE HYDROCHLORIDE 1 MILLIGRAM(S): 2 INJECTION INTRAMUSCULAR; INTRAVENOUS; SUBCUTANEOUS at 01:05

## 2018-04-27 NOTE — ED PROVIDER NOTE - OBJECTIVE STATEMENT
58 M, a. fib (Plavix, Eliquis), AICD (EF 10-15%), HTN, asthma, COPD, h/o kidney stones, c/o L side pain and hematuria x 2 tonight. H/o L partial nephrectomy for RCC (2002) (and has recurrence 4 mos ago). Has Urology in the [place]. Is intended to get nephrectomy.

## 2018-04-27 NOTE — ED PROVIDER NOTE - CONSTITUTIONAL, MLM
normal... Well appearing, well nourished, awake, alert, oriented to person, place, time/situation and in pain distress.

## 2018-04-27 NOTE — ED PROVIDER NOTE - ATTENDING CONTRIBUTION TO CARE
I performed a face-to-face evaluation of the patient and performed a history and physical examination. I agree with the history and physical examination.    H/o L RCC and kidney stone. Sudden-onset hematuria and L flank pain. Likely kidney stone. Pain meds, CT, gentle IVF (EF 10%).

## 2018-04-27 NOTE — ED ADULT NURSE NOTE - OBJECTIVE STATEMENT
Patient A&Ox4 complaining of L flank pain, hx of kidney stones. Patient stable, no problems with urination

## 2018-04-28 LAB
BACTERIA UR CULT: SIGNIFICANT CHANGE UP
SPECIMEN SOURCE: SIGNIFICANT CHANGE UP

## 2018-04-30 ENCOUNTER — EMERGENCY (EMERGENCY)
Facility: HOSPITAL | Age: 58
LOS: 1 days | Discharge: HOME | End: 2018-04-30
Attending: EMERGENCY MEDICINE | Admitting: EMERGENCY MEDICINE
Payer: COMMERCIAL

## 2018-04-30 VITALS
SYSTOLIC BLOOD PRESSURE: 181 MMHG | HEART RATE: 73 BPM | WEIGHT: 220.46 LBS | OXYGEN SATURATION: 100 % | DIASTOLIC BLOOD PRESSURE: 113 MMHG | RESPIRATION RATE: 18 BRPM | TEMPERATURE: 98 F

## 2018-04-30 DIAGNOSIS — F17.200 NICOTINE DEPENDENCE, UNSPECIFIED, UNCOMPLICATED: ICD-10-CM

## 2018-04-30 DIAGNOSIS — R10.9 UNSPECIFIED ABDOMINAL PAIN: ICD-10-CM

## 2018-04-30 DIAGNOSIS — I25.10 ATHEROSCLEROTIC HEART DISEASE OF NATIVE CORONARY ARTERY WITHOUT ANGINA PECTORIS: ICD-10-CM

## 2018-04-30 DIAGNOSIS — Z90.5 ACQUIRED ABSENCE OF KIDNEY: Chronic | ICD-10-CM

## 2018-04-30 DIAGNOSIS — Z88.0 ALLERGY STATUS TO PENICILLIN: ICD-10-CM

## 2018-04-30 DIAGNOSIS — Z88.5 ALLERGY STATUS TO NARCOTIC AGENT: ICD-10-CM

## 2018-04-30 DIAGNOSIS — J44.9 CHRONIC OBSTRUCTIVE PULMONARY DISEASE, UNSPECIFIED: ICD-10-CM

## 2018-04-30 DIAGNOSIS — Z90.49 ACQUIRED ABSENCE OF OTHER SPECIFIED PARTS OF DIGESTIVE TRACT: Chronic | ICD-10-CM

## 2018-04-30 DIAGNOSIS — Z95.5 PRESENCE OF CORONARY ANGIOPLASTY IMPLANT AND GRAFT: Chronic | ICD-10-CM

## 2018-04-30 DIAGNOSIS — Z79.899 OTHER LONG TERM (CURRENT) DRUG THERAPY: ICD-10-CM

## 2018-04-30 DIAGNOSIS — E78.5 HYPERLIPIDEMIA, UNSPECIFIED: ICD-10-CM

## 2018-04-30 DIAGNOSIS — Z88.8 ALLERGY STATUS TO OTHER DRUGS, MEDICAMENTS AND BIOLOGICAL SUBSTANCES STATUS: ICD-10-CM

## 2018-04-30 DIAGNOSIS — Z98.890 OTHER SPECIFIED POSTPROCEDURAL STATES: Chronic | ICD-10-CM

## 2018-04-30 DIAGNOSIS — I10 ESSENTIAL (PRIMARY) HYPERTENSION: ICD-10-CM

## 2018-04-30 DIAGNOSIS — Z95.810 PRESENCE OF AUTOMATIC (IMPLANTABLE) CARDIAC DEFIBRILLATOR: Chronic | ICD-10-CM

## 2018-04-30 PROCEDURE — 99283 EMERGENCY DEPT VISIT LOW MDM: CPT | Mod: 25

## 2018-04-30 PROCEDURE — 99282 EMERGENCY DEPT VISIT SF MDM: CPT

## 2018-04-30 NOTE — ED PROVIDER NOTE - OBJECTIVE STATEMENT
56 y/o m hx RCC, CAD s/p CABG, HTN, presents stating having left flank pain since yesterday.  Pt stating pain coming in strong waves, and "I almost passed out because of the pain."  Pt denies vomiting, fever, dysuria, all other ROS negative.

## 2018-04-30 NOTE — ED ADULT NURSE NOTE - CHPI ED SYMPTOMS NEG
no abdominal distension/no nausea/no dysuria/no fever/no burning urination/no hematuria/no blood in stool/no chills/no vomiting/no diarrhea

## 2018-04-30 NOTE — ED ADULT NURSE REASSESSMENT NOTE - NS ED NURSE REASSESS COMMENT FT1
pt ambulating from bathroom to nursing station asking " who is the doctor, I am not giving urine and I am not taking tylenol, something I am allergic too what are they an asshole" Pt announced leaving and walked out.

## 2018-04-30 NOTE — ED PROVIDER NOTE - MEDICAL DECISION MAKING DETAILS
56 y/o m hx RCC, CAD s/p CABG, HTN presents c/o left flank pain since yesterday; pt with recent w/u at [facility] 4 days ago, normal CT noted.  Pt sleeping comfortably prior to provider encounter, offered tylenol which he states he is allergic to, although not on allergy list.  Pt noted to have percocet rx on i-stop from 4/1/18.  Pt declined tylenol, refused to give urine sample and left ED.

## 2018-05-16 ENCOUNTER — EMERGENCY (EMERGENCY)
Facility: HOSPITAL | Age: 58
LOS: 1 days | Discharge: HOME | End: 2018-05-16
Attending: EMERGENCY MEDICINE
Payer: MEDICAID

## 2018-05-16 VITALS
WEIGHT: 199.96 LBS | HEART RATE: 65 BPM | RESPIRATION RATE: 16 BRPM | OXYGEN SATURATION: 100 % | SYSTOLIC BLOOD PRESSURE: 215 MMHG | TEMPERATURE: 98 F | HEIGHT: 73 IN | DIASTOLIC BLOOD PRESSURE: 135 MMHG

## 2018-05-16 VITALS
OXYGEN SATURATION: 100 % | RESPIRATION RATE: 18 BRPM | HEART RATE: 71 BPM | DIASTOLIC BLOOD PRESSURE: 103 MMHG | SYSTOLIC BLOOD PRESSURE: 162 MMHG | TEMPERATURE: 97 F

## 2018-05-16 DIAGNOSIS — Z98.890 OTHER SPECIFIED POSTPROCEDURAL STATES: Chronic | ICD-10-CM

## 2018-05-16 DIAGNOSIS — Z90.5 ACQUIRED ABSENCE OF KIDNEY: Chronic | ICD-10-CM

## 2018-05-16 DIAGNOSIS — Z95.810 PRESENCE OF AUTOMATIC (IMPLANTABLE) CARDIAC DEFIBRILLATOR: Chronic | ICD-10-CM

## 2018-05-16 DIAGNOSIS — Z90.49 ACQUIRED ABSENCE OF OTHER SPECIFIED PARTS OF DIGESTIVE TRACT: Chronic | ICD-10-CM

## 2018-05-16 DIAGNOSIS — Z95.5 PRESENCE OF CORONARY ANGIOPLASTY IMPLANT AND GRAFT: Chronic | ICD-10-CM

## 2018-05-16 PROCEDURE — 73110 X-RAY EXAM OF WRIST: CPT

## 2018-05-16 PROCEDURE — 99284 EMERGENCY DEPT VISIT MOD MDM: CPT | Mod: 25

## 2018-05-16 PROCEDURE — 73110 X-RAY EXAM OF WRIST: CPT | Mod: 26,RT

## 2018-05-16 PROCEDURE — 70450 CT HEAD/BRAIN W/O DYE: CPT | Mod: 26

## 2018-05-16 PROCEDURE — 70450 CT HEAD/BRAIN W/O DYE: CPT

## 2018-05-16 PROCEDURE — 96372 THER/PROPH/DIAG INJ SC/IM: CPT

## 2018-05-16 PROCEDURE — 94640 AIRWAY INHALATION TREATMENT: CPT

## 2018-05-16 PROCEDURE — 99284 EMERGENCY DEPT VISIT MOD MDM: CPT

## 2018-05-16 RX ORDER — HYDROMORPHONE HYDROCHLORIDE 2 MG/ML
1 INJECTION INTRAMUSCULAR; INTRAVENOUS; SUBCUTANEOUS ONCE
Qty: 0 | Refills: 0 | Status: DISCONTINUED | OUTPATIENT
Start: 2018-05-16 | End: 2018-05-16

## 2018-05-16 RX ORDER — IPRATROPIUM/ALBUTEROL SULFATE 18-103MCG
3 AEROSOL WITH ADAPTER (GRAM) INHALATION ONCE
Qty: 0 | Refills: 0 | Status: COMPLETED | OUTPATIENT
Start: 2018-05-16 | End: 2018-05-16

## 2018-05-16 RX ORDER — ALBUTEROL 90 UG/1
2.5 AEROSOL, METERED ORAL ONCE
Qty: 0 | Refills: 0 | Status: COMPLETED | OUTPATIENT
Start: 2018-05-16 | End: 2018-05-16

## 2018-05-16 RX ADMIN — Medication 60 MILLIGRAM(S): at 05:46

## 2018-05-16 RX ADMIN — ALBUTEROL 2.5 MILLIGRAM(S): 90 AEROSOL, METERED ORAL at 04:02

## 2018-05-16 RX ADMIN — HYDROMORPHONE HYDROCHLORIDE 1 MILLIGRAM(S): 2 INJECTION INTRAMUSCULAR; INTRAVENOUS; SUBCUTANEOUS at 05:47

## 2018-05-16 RX ADMIN — HYDROMORPHONE HYDROCHLORIDE 1 MILLIGRAM(S): 2 INJECTION INTRAMUSCULAR; INTRAVENOUS; SUBCUTANEOUS at 03:59

## 2018-05-16 RX ADMIN — Medication 3 MILLILITER(S): at 04:02

## 2018-05-16 NOTE — ED PROVIDER NOTE - MUSCULOSKELETAL MINIMAL EXAM
RANGE OF MOTION LIMITED/TENDERNESS/ttp over dorsal aspect of R wrist, no deformity, mild swelling/redness, pain with wrist flexion/extension, 2+ radial pulses, normal motor and sensory function.

## 2018-05-16 NOTE — ED PROVIDER NOTE - PROGRESS NOTE DETAILS
CT head neg, R wrist XR neg for acute fracture  discussed above with patient. On reeval wheezing improved after meds. Wrist splint placed on R wrist. Patient stable for discharge.

## 2018-05-16 NOTE — ED PROVIDER NOTE - OBJECTIVE STATEMENT
58yo M with Afib on Eliquis, CAD, COPD, L RCC and kidney stones presents after fall. [place] he lost his balance trying to lock the wheels on his walker and fell forward, attempted to break his fall by extending R hand but fell on ground. Reports LOC though unsure if he hit his head. Reports R wrist pain. Also reports that since yesterday he has bl ear itching and yellowish discharge, denies pain.

## 2018-05-16 NOTE — ED PROVIDER NOTE - ENMT, MLM
Airway patent, Nasal mucosa clear. Mouth with normal mucosa. Throat has no vesicles, no oropharyngeal exudates and uvula is midline. BL auditory canals wnl, TMS clear. Head atraumatic/normocephalic

## 2018-05-16 NOTE — ED ADULT NURSE NOTE - OBJECTIVE STATEMENT
came to Ed due to drainage both ears, dizziness and pain right wrist today, pt ambulatory with walker, seen and examined by Dr Gillespie.

## 2018-05-16 NOTE — ED PROVIDER NOTE - CARE PLAN
Principal Discharge DX:	Wrist sprain, right, initial encounter  Secondary Diagnosis:	COPD (chronic obstructive pulmonary disease)

## 2018-05-16 NOTE — ED PROVIDER NOTE - MEDICAL DECISION MAKING DETAILS
58yo M on Eliquis, COPD presents after fall with R wrist pain. Will obtain CThead, R wrist XR. Given Im dilaudid for pain. Given intermittent nebs and prednisone for wheezing. Will reassess.

## 2018-05-23 PROCEDURE — 94640 AIRWAY INHALATION TREATMENT: CPT

## 2018-05-23 PROCEDURE — 80053 COMPREHEN METABOLIC PANEL: CPT

## 2018-05-23 PROCEDURE — 87521 HEPATITIS C PROBE&RVRS TRNSC: CPT

## 2018-05-23 PROCEDURE — 83735 ASSAY OF MAGNESIUM: CPT

## 2018-05-23 PROCEDURE — 87389 HIV-1 AG W/HIV-1&-2 AB AG IA: CPT

## 2018-05-23 PROCEDURE — 87086 URINE CULTURE/COLONY COUNT: CPT

## 2018-05-23 PROCEDURE — 82553 CREATINE MB FRACTION: CPT

## 2018-05-23 PROCEDURE — 71045 X-RAY EXAM CHEST 1 VIEW: CPT

## 2018-05-23 PROCEDURE — 80074 ACUTE HEPATITIS PANEL: CPT

## 2018-05-23 PROCEDURE — G0378: CPT

## 2018-05-23 PROCEDURE — 84484 ASSAY OF TROPONIN QUANT: CPT

## 2018-05-23 PROCEDURE — 93005 ELECTROCARDIOGRAM TRACING: CPT

## 2018-05-23 PROCEDURE — 99285 EMERGENCY DEPT VISIT HI MDM: CPT | Mod: 25

## 2018-05-23 PROCEDURE — 82550 ASSAY OF CK (CPK): CPT

## 2018-05-23 PROCEDURE — 84100 ASSAY OF PHOSPHORUS: CPT

## 2018-05-23 PROCEDURE — 85027 COMPLETE CBC AUTOMATED: CPT

## 2018-05-23 PROCEDURE — 81001 URINALYSIS AUTO W/SCOPE: CPT

## 2018-05-23 PROCEDURE — 85379 FIBRIN DEGRADATION QUANT: CPT

## 2018-06-08 ENCOUNTER — INPATIENT (INPATIENT)
Facility: HOSPITAL | Age: 58
LOS: 2 days | Discharge: HOME | DRG: 312 | End: 2018-06-11
Attending: INTERNAL MEDICINE | Admitting: INTERNAL MEDICINE
Payer: MEDICAID

## 2018-06-08 VITALS
WEIGHT: 179.9 LBS | DIASTOLIC BLOOD PRESSURE: 106 MMHG | HEART RATE: 71 BPM | SYSTOLIC BLOOD PRESSURE: 171 MMHG | TEMPERATURE: 98 F | HEIGHT: 72 IN | OXYGEN SATURATION: 97 % | RESPIRATION RATE: 16 BRPM

## 2018-06-08 DIAGNOSIS — Z29.9 ENCOUNTER FOR PROPHYLACTIC MEASURES, UNSPECIFIED: ICD-10-CM

## 2018-06-08 DIAGNOSIS — Z95.810 PRESENCE OF AUTOMATIC (IMPLANTABLE) CARDIAC DEFIBRILLATOR: Chronic | ICD-10-CM

## 2018-06-08 DIAGNOSIS — Z98.890 OTHER SPECIFIED POSTPROCEDURAL STATES: Chronic | ICD-10-CM

## 2018-06-08 DIAGNOSIS — I48.91 UNSPECIFIED ATRIAL FIBRILLATION: ICD-10-CM

## 2018-06-08 DIAGNOSIS — Z95.5 PRESENCE OF CORONARY ANGIOPLASTY IMPLANT AND GRAFT: Chronic | ICD-10-CM

## 2018-06-08 DIAGNOSIS — Z90.5 ACQUIRED ABSENCE OF KIDNEY: Chronic | ICD-10-CM

## 2018-06-08 DIAGNOSIS — R55 SYNCOPE AND COLLAPSE: ICD-10-CM

## 2018-06-08 DIAGNOSIS — I10 ESSENTIAL (PRIMARY) HYPERTENSION: ICD-10-CM

## 2018-06-08 DIAGNOSIS — R31.9 HEMATURIA, UNSPECIFIED: ICD-10-CM

## 2018-06-08 DIAGNOSIS — I50.20 UNSPECIFIED SYSTOLIC (CONGESTIVE) HEART FAILURE: ICD-10-CM

## 2018-06-08 DIAGNOSIS — Z90.49 ACQUIRED ABSENCE OF OTHER SPECIFIED PARTS OF DIGESTIVE TRACT: Chronic | ICD-10-CM

## 2018-06-08 DIAGNOSIS — M54.9 DORSALGIA, UNSPECIFIED: ICD-10-CM

## 2018-06-08 LAB
ACETONE SERPL-MCNC: NEGATIVE — SIGNIFICANT CHANGE UP
ALBUMIN SERPL ELPH-MCNC: 3.4 G/DL — LOW (ref 3.5–5)
ALP SERPL-CCNC: 210 U/L — HIGH (ref 40–120)
ALT FLD-CCNC: 81 U/L DA — HIGH (ref 10–60)
AMPHET UR-MCNC: NEGATIVE — SIGNIFICANT CHANGE UP
ANION GAP SERPL CALC-SCNC: 6 MMOL/L — SIGNIFICANT CHANGE UP (ref 5–17)
APPEARANCE UR: ABNORMAL
APTT BLD: 38.6 SEC — HIGH (ref 27.5–37.4)
AST SERPL-CCNC: 58 U/L — HIGH (ref 10–40)
BACTERIA # UR AUTO: ABNORMAL /HPF
BARBITURATES UR SCN-MCNC: NEGATIVE — SIGNIFICANT CHANGE UP
BASOPHILS # BLD AUTO: 0.1 K/UL — SIGNIFICANT CHANGE UP (ref 0–0.2)
BASOPHILS NFR BLD AUTO: 0.6 % — SIGNIFICANT CHANGE UP (ref 0–2)
BENZODIAZ UR-MCNC: NEGATIVE — SIGNIFICANT CHANGE UP
BILIRUB SERPL-MCNC: 0.5 MG/DL — SIGNIFICANT CHANGE UP (ref 0.2–1.2)
BILIRUB UR-MCNC: NEGATIVE — SIGNIFICANT CHANGE UP
BUN SERPL-MCNC: 17 MG/DL — SIGNIFICANT CHANGE UP (ref 7–18)
CALCIUM SERPL-MCNC: 8.6 MG/DL — SIGNIFICANT CHANGE UP (ref 8.4–10.5)
CHLORIDE SERPL-SCNC: 108 MMOL/L — SIGNIFICANT CHANGE UP (ref 96–108)
CK MB BLD-MCNC: 3.3 % — SIGNIFICANT CHANGE UP (ref 0–3.5)
CK MB BLD-MCNC: 3.6 % — HIGH (ref 0–3.5)
CK MB CFR SERPL CALC: 3 NG/ML — SIGNIFICANT CHANGE UP (ref 0–3.6)
CK MB CFR SERPL CALC: 3.4 NG/ML — SIGNIFICANT CHANGE UP (ref 0–3.6)
CK SERPL-CCNC: 91 U/L — SIGNIFICANT CHANGE UP (ref 35–232)
CK SERPL-CCNC: 94 U/L — SIGNIFICANT CHANGE UP (ref 35–232)
CO2 SERPL-SCNC: 25 MMOL/L — SIGNIFICANT CHANGE UP (ref 22–31)
COCAINE METAB.OTHER UR-MCNC: NEGATIVE — SIGNIFICANT CHANGE UP
COLOR SPEC: ABNORMAL
CREAT SERPL-MCNC: 0.8 MG/DL — SIGNIFICANT CHANGE UP (ref 0.5–1.3)
DIFF PNL FLD: ABNORMAL
EOSINOPHIL # BLD AUTO: 0.2 K/UL — SIGNIFICANT CHANGE UP (ref 0–0.5)
EOSINOPHIL NFR BLD AUTO: 1.5 % — SIGNIFICANT CHANGE UP (ref 0–6)
EPI CELLS # UR: ABNORMAL /HPF
GLUCOSE SERPL-MCNC: 93 MG/DL — SIGNIFICANT CHANGE UP (ref 70–99)
GLUCOSE UR QL: NEGATIVE — SIGNIFICANT CHANGE UP
HCT VFR BLD CALC: 46 % — SIGNIFICANT CHANGE UP (ref 39–50)
HGB BLD-MCNC: 15.1 G/DL — SIGNIFICANT CHANGE UP (ref 13–17)
INR BLD: 1.07 RATIO — SIGNIFICANT CHANGE UP (ref 0.88–1.16)
KETONES UR-MCNC: NEGATIVE — SIGNIFICANT CHANGE UP
LACTATE SERPL-SCNC: 0.8 MMOL/L — SIGNIFICANT CHANGE UP (ref 0.7–2)
LEUKOCYTE ESTERASE UR-ACNC: ABNORMAL
LYMPHOCYTES # BLD AUTO: 1.7 K/UL — SIGNIFICANT CHANGE UP (ref 1–3.3)
LYMPHOCYTES # BLD AUTO: 16.4 % — SIGNIFICANT CHANGE UP (ref 13–44)
MAGNESIUM SERPL-MCNC: 2.3 MG/DL — SIGNIFICANT CHANGE UP (ref 1.6–2.6)
MCHC RBC-ENTMCNC: 29 PG — SIGNIFICANT CHANGE UP (ref 27–34)
MCHC RBC-ENTMCNC: 32.7 GM/DL — SIGNIFICANT CHANGE UP (ref 32–36)
MCV RBC AUTO: 88.5 FL — SIGNIFICANT CHANGE UP (ref 80–100)
METHADONE UR-MCNC: NEGATIVE — SIGNIFICANT CHANGE UP
MONOCYTES # BLD AUTO: 0.9 K/UL — SIGNIFICANT CHANGE UP (ref 0–0.9)
MONOCYTES NFR BLD AUTO: 9 % — SIGNIFICANT CHANGE UP (ref 2–14)
NEUTROPHILS # BLD AUTO: 7.5 K/UL — HIGH (ref 1.8–7.4)
NEUTROPHILS NFR BLD AUTO: 72.6 % — SIGNIFICANT CHANGE UP (ref 43–77)
NITRITE UR-MCNC: NEGATIVE — SIGNIFICANT CHANGE UP
OPIATES UR-MCNC: POSITIVE
PCP SPEC-MCNC: SIGNIFICANT CHANGE UP
PCP UR-MCNC: NEGATIVE — SIGNIFICANT CHANGE UP
PH UR: 5 — SIGNIFICANT CHANGE UP (ref 5–8)
PLATELET # BLD AUTO: 204 K/UL — SIGNIFICANT CHANGE UP (ref 150–400)
POTASSIUM SERPL-MCNC: 3.5 MMOL/L — SIGNIFICANT CHANGE UP (ref 3.5–5.3)
POTASSIUM SERPL-SCNC: 3.5 MMOL/L — SIGNIFICANT CHANGE UP (ref 3.5–5.3)
PROT SERPL-MCNC: 6.9 G/DL — SIGNIFICANT CHANGE UP (ref 6–8.3)
PROT UR-MCNC: 100
PROTHROM AB SERPL-ACNC: 11.7 SEC — SIGNIFICANT CHANGE UP (ref 9.8–12.7)
RBC # BLD: 5.2 M/UL — SIGNIFICANT CHANGE UP (ref 4.2–5.8)
RBC # FLD: 12.6 % — SIGNIFICANT CHANGE UP (ref 10.3–14.5)
RBC CASTS # UR COMP ASSIST: >50 /HPF (ref 0–2)
SODIUM SERPL-SCNC: 139 MMOL/L — SIGNIFICANT CHANGE UP (ref 135–145)
SP GR SPEC: 1.02 — SIGNIFICANT CHANGE UP (ref 1.01–1.02)
THC UR QL: POSITIVE
TROPONIN I SERPL-MCNC: <0.015 NG/ML — SIGNIFICANT CHANGE UP (ref 0–0.04)
TROPONIN I SERPL-MCNC: <0.015 NG/ML — SIGNIFICANT CHANGE UP (ref 0–0.04)
UROBILINOGEN FLD QL: NEGATIVE — SIGNIFICANT CHANGE UP
WBC # BLD: 10.3 K/UL — SIGNIFICANT CHANGE UP (ref 3.8–10.5)
WBC # FLD AUTO: 10.3 K/UL — SIGNIFICANT CHANGE UP (ref 3.8–10.5)
WBC UR QL: SIGNIFICANT CHANGE UP /HPF (ref 0–5)

## 2018-06-08 PROCEDURE — 70450 CT HEAD/BRAIN W/O DYE: CPT | Mod: 26

## 2018-06-08 PROCEDURE — 72125 CT NECK SPINE W/O DYE: CPT | Mod: 26

## 2018-06-08 PROCEDURE — 71045 X-RAY EXAM CHEST 1 VIEW: CPT | Mod: 26

## 2018-06-08 PROCEDURE — 74176 CT ABD & PELVIS W/O CONTRAST: CPT | Mod: 26

## 2018-06-08 PROCEDURE — 99285 EMERGENCY DEPT VISIT HI MDM: CPT

## 2018-06-08 RX ORDER — ONDANSETRON 8 MG/1
4 TABLET, FILM COATED ORAL ONCE
Qty: 0 | Refills: 0 | Status: COMPLETED | OUTPATIENT
Start: 2018-06-08 | End: 2018-06-08

## 2018-06-08 RX ORDER — DIPHENHYDRAMINE HCL 50 MG
25 CAPSULE ORAL ONCE
Qty: 0 | Refills: 0 | Status: COMPLETED | OUTPATIENT
Start: 2018-06-08 | End: 2018-06-08

## 2018-06-08 RX ORDER — SODIUM CHLORIDE 9 MG/ML
1000 INJECTION INTRAMUSCULAR; INTRAVENOUS; SUBCUTANEOUS
Qty: 0 | Refills: 0 | Status: DISCONTINUED | OUTPATIENT
Start: 2018-06-08 | End: 2018-06-11

## 2018-06-08 RX ORDER — METOPROLOL TARTRATE 50 MG
50 TABLET ORAL
Qty: 0 | Refills: 0 | Status: DISCONTINUED | OUTPATIENT
Start: 2018-06-08 | End: 2018-06-11

## 2018-06-08 RX ORDER — MORPHINE SULFATE 50 MG/1
1 CAPSULE, EXTENDED RELEASE ORAL EVERY 4 HOURS
Qty: 0 | Refills: 0 | Status: DISCONTINUED | OUTPATIENT
Start: 2018-06-08 | End: 2018-06-10

## 2018-06-08 RX ORDER — MORPHINE SULFATE 50 MG/1
1 CAPSULE, EXTENDED RELEASE ORAL EVERY 4 HOURS
Qty: 0 | Refills: 0 | Status: DISCONTINUED | OUTPATIENT
Start: 2018-06-08 | End: 2018-06-08

## 2018-06-08 RX ORDER — TIOTROPIUM BROMIDE 18 UG/1
1 CAPSULE ORAL; RESPIRATORY (INHALATION) DAILY
Qty: 0 | Refills: 0 | Status: DISCONTINUED | OUTPATIENT
Start: 2018-06-08 | End: 2018-06-11

## 2018-06-08 RX ORDER — CLOPIDOGREL BISULFATE 75 MG/1
75 TABLET, FILM COATED ORAL DAILY
Qty: 0 | Refills: 0 | Status: DISCONTINUED | OUTPATIENT
Start: 2018-06-08 | End: 2018-06-11

## 2018-06-08 RX ORDER — APIXABAN 2.5 MG/1
5 TABLET, FILM COATED ORAL EVERY 12 HOURS
Qty: 0 | Refills: 0 | Status: DISCONTINUED | OUTPATIENT
Start: 2018-06-08 | End: 2018-06-11

## 2018-06-08 RX ORDER — TAMSULOSIN HYDROCHLORIDE 0.4 MG/1
0.4 CAPSULE ORAL AT BEDTIME
Qty: 0 | Refills: 0 | Status: DISCONTINUED | OUTPATIENT
Start: 2018-06-08 | End: 2018-06-11

## 2018-06-08 RX ORDER — ENOXAPARIN SODIUM 100 MG/ML
40 INJECTION SUBCUTANEOUS DAILY
Qty: 0 | Refills: 0 | Status: DISCONTINUED | OUTPATIENT
Start: 2018-06-08 | End: 2018-06-08

## 2018-06-08 RX ORDER — BUDESONIDE AND FORMOTEROL FUMARATE DIHYDRATE 160; 4.5 UG/1; UG/1
2 AEROSOL RESPIRATORY (INHALATION)
Qty: 0 | Refills: 0 | Status: DISCONTINUED | OUTPATIENT
Start: 2018-06-08 | End: 2018-06-11

## 2018-06-08 RX ORDER — SODIUM CHLORIDE 9 MG/ML
1000 INJECTION INTRAMUSCULAR; INTRAVENOUS; SUBCUTANEOUS
Qty: 0 | Refills: 0 | Status: DISCONTINUED | OUTPATIENT
Start: 2018-06-08 | End: 2018-06-08

## 2018-06-08 RX ORDER — ATORVASTATIN CALCIUM 80 MG/1
40 TABLET, FILM COATED ORAL AT BEDTIME
Qty: 0 | Refills: 0 | Status: DISCONTINUED | OUTPATIENT
Start: 2018-06-08 | End: 2018-06-11

## 2018-06-08 RX ORDER — GABAPENTIN 400 MG/1
400 CAPSULE ORAL THREE TIMES A DAY
Qty: 0 | Refills: 0 | Status: DISCONTINUED | OUTPATIENT
Start: 2018-06-08 | End: 2018-06-11

## 2018-06-08 RX ORDER — ALBUTEROL 90 UG/1
2 AEROSOL, METERED ORAL EVERY 6 HOURS
Qty: 0 | Refills: 0 | Status: DISCONTINUED | OUTPATIENT
Start: 2018-06-08 | End: 2018-06-11

## 2018-06-08 RX ORDER — ASPIRIN/CALCIUM CARB/MAGNESIUM 324 MG
81 TABLET ORAL ONCE
Qty: 0 | Refills: 0 | Status: COMPLETED | OUTPATIENT
Start: 2018-06-08 | End: 2018-06-08

## 2018-06-08 RX ORDER — MORPHINE SULFATE 50 MG/1
4 CAPSULE, EXTENDED RELEASE ORAL ONCE
Qty: 0 | Refills: 0 | Status: DISCONTINUED | OUTPATIENT
Start: 2018-06-08 | End: 2018-06-08

## 2018-06-08 RX ADMIN — MORPHINE SULFATE 1 MILLIGRAM(S): 50 CAPSULE, EXTENDED RELEASE ORAL at 21:57

## 2018-06-08 RX ADMIN — Medication 25 MILLIGRAM(S): at 12:30

## 2018-06-08 RX ADMIN — APIXABAN 5 MILLIGRAM(S): 2.5 TABLET, FILM COATED ORAL at 17:59

## 2018-06-08 RX ADMIN — Medication 50 MILLIGRAM(S): at 17:59

## 2018-06-08 RX ADMIN — MORPHINE SULFATE 4 MILLIGRAM(S): 50 CAPSULE, EXTENDED RELEASE ORAL at 10:55

## 2018-06-08 RX ADMIN — ONDANSETRON 4 MILLIGRAM(S): 8 TABLET, FILM COATED ORAL at 10:55

## 2018-06-08 RX ADMIN — GABAPENTIN 400 MILLIGRAM(S): 400 CAPSULE ORAL at 21:22

## 2018-06-08 RX ADMIN — SODIUM CHLORIDE 125 MILLILITER(S): 9 INJECTION INTRAMUSCULAR; INTRAVENOUS; SUBCUTANEOUS at 10:29

## 2018-06-08 RX ADMIN — CLOPIDOGREL BISULFATE 75 MILLIGRAM(S): 75 TABLET, FILM COATED ORAL at 17:59

## 2018-06-08 RX ADMIN — MORPHINE SULFATE 1 MILLIGRAM(S): 50 CAPSULE, EXTENDED RELEASE ORAL at 22:10

## 2018-06-08 RX ADMIN — MORPHINE SULFATE 4 MILLIGRAM(S): 50 CAPSULE, EXTENDED RELEASE ORAL at 11:19

## 2018-06-08 NOTE — H&P ADULT - PROBLEM SELECTOR PLAN 1
-syncope and/or seizure and/or CVA  -orthostatic BP negative  -EKG no acute changes seen  -cardiac enzyme 1 set WNL and will f/u T2  -remote tele monitor  -repeat echo, CT head with no acute intracranial pathology  -f/u EEG and MRI to r/o syncope  -neuro Dr Quach  -carotid doppler done with no flow limitation Nov 2017  -also r/o CVA with right sided upper and lower extremities weakness for a month  -on Plavix, Eliquis, metoprolol and atorvastatin -syncope and/or seizure and/or CVA  -orthostatic BP negative  -EKG no acute changes seen, PVC +  -cardiac enzyme 1 set WNL and will f/u T2  -remote tele monitor  -repeat echo, CT head with no acute intracranial pathology  -f/u EEG and MRI to r/o syncope  -neuro Dr Quach  -carotid doppler done with no flow limitation Nov 2017  -also r/o CVA with right sided upper and lower extremities weakness for a month  -on Plavix, Eliquis, metoprolol and atorvastatin  -Neuro Dr Quach and cardio Dr Nolasco

## 2018-06-08 NOTE — ED PROVIDER NOTE - OBJECTIVE STATEMENT
57 y.o. male BIBA pt claims with Lt flank pain, later found himself on the floor with urinary incontinence, also noted hematuria, no fever, n/v, chest pain, sob, tongue biting, nonradiating pain, now c/o neck pain, dysuria since this am

## 2018-06-08 NOTE — H&P ADULT - ASSESSMENT
56 yo M with cad s/p 1 stent, systolic CHF, AICD last interrogated in Feb 2018 inpatient, HTN, PAD, bladder cancer s/p chemo/rad and TURBT, RCC s/p partial nephrectomy and Chemo/radiation, prostate cancer s/p radiation therapy, nephrolithiasis, HCV no treatment, peripheral neuropathy, COPD presents to the ED after he synopsized at home for about a 2 min.

## 2018-06-08 NOTE — ED ADULT NURSE NOTE - NS ED NURSE TRANSPORT WITH
Fever  Fever is >100.4  Treat fever if symptomatic  Infants <6mos can't take ibuprofen.  If >6mos, and if needed, can alternate ibuprofen and acetaminphen every 3-4 hrs.  Don't add a degree if take temp under the arm.  If fever persists or new symptoms develop, call as you may need to be rechecked.    Doses    Acetaminophen (Tylenol)                  Infants (160mg/5ml)    Childrens (160mg/5ml) Meltaway (80mg)   Tabs (160mg)  Weight    6-11 lbs        1.25ml       1.25ml   12-17 lbs      2.5ml                       2.5ml  18-23 lbs      3.75ml                  3.75ml  24-35 lbs      5ml                  5ml (1 tsp)                         2 tabs                 1 tab  36-47 lbs                  7.5ml (1 ½ tsp)             3 tabs                     1 tab  48-59 lbs       10 ml (2 tsp)                         4 tabs                        2 tabs  60-71 lbs      12.5ml (2 ½ tsp)                  5 tabs              2 tabs  72-95 lbs       15ml (3 tsp)                        6 tabs                         2-3 tabs      Ibuprofen (motrin, advil)                    Infants (60mg/1.25ml)  Children (100mg/5ml) Chewable (60mg) Tabs (100mg)  Weight           Dont give if less than 6 months  12-17 lbs  1.25ml                          2.5ml (1/2 tsp)  18-23 lbs          1.875ml                        4ml (3/4 tsp)  24-35 lbs                                               5ml (1 tsp)                              2 tabs               1 tab  36-47 lbs                                   7.5ml (1 ½ tsp)                       3 tabs              1 tab  48-59 lbs                           10ml (2 tsp)                             4 tabs              2 tabs  60-71 lbs                           12.5ml (2 ½ tsp)                      5 tabs             2 tabs  72-95 lbs                                   15ml (3 tsp)                            6 tabs              3 tabs       cardiac monitor

## 2018-06-08 NOTE — ED ADULT NURSE NOTE - ED STAT RN HANDOFF DETAILS
Pt AOX3, in stable condition, placed on cardiac monitor. No SOB, no acute distress noted. endorsed to nurse Cedric

## 2018-06-08 NOTE — ED PROVIDER NOTE - CARE PLAN
Principal Discharge DX:	Syncope  Secondary Diagnosis:	Back pain Principal Discharge DX:	Syncope  Secondary Diagnosis:	Back pain  Secondary Diagnosis:	Hematuria

## 2018-06-08 NOTE — H&P ADULT - NSHPPHYSICALEXAM_GEN_ALL_CORE
PHYSICAL EXAM:    GENERAL: NAD, well-developed    HEAD:  Atraumatic, Normocephalic    EYES: EOMI, PERRLA, conjunctiva and sclera clear    NECK: Supple, No JVD    CHEST/LUNG: Clear to auscultation bilaterally; No wheeze    HEART: Regular rate and rhythm; No murmurs, rubs, or gallops    ABDOMEN: Soft, Nontender, Nondistended; Bowel sounds present    EXTREMITIES:  2+ Peripheral Pulses, No clubbing, cyanosis, or edema    PSYCH: AAOx3    NEUROLOGY: non-focal    SKIN: No rashes or lesions    No other pertinent positive finding except mentioned as above.

## 2018-06-08 NOTE — H&P ADULT - NSHPSOCIALHISTORY_GEN_ALL_CORE
denied smoking alcohol use and illicit drug use 1-2 PPD for 20 years, cut down to 6 cigarettes per day for the last year  Denied alcohol and illicit drug use

## 2018-06-08 NOTE — H&P ADULT - PROBLEM SELECTOR PLAN 3
-pain control with  morphine, as multiple medication allergy  -CT scan with no evident of acute cervical spinal injury for neck pain and   -ct abd normal without stone

## 2018-06-08 NOTE — H&P ADULT - HISTORY OF PRESENT ILLNESS
56 yo M poor historian with cad s/p 1 stent, systolic CHF, AICD last interrogated in Feb 2018 inpatient, HTN, PAD, bladder cancer s/p chemo/rad and TURBT, RCC s/p partial nephrectomy and Chemo/radiation, prostate cancer s/p radiation therapy, nephrolithiasis, HCV no treatment, peripheral neuropathy, COPD presents to the ED after he synopsized on a bus. He was riding the bus and then he Began to feel lightheaded, flushed and have palpitations then he had LOC, unsure duration. He woke up and realized he passed out, no confusion or witnessed seizure activity, he got off the bus and began to walk home then he felt the same pre syncopal symptoms so he called 911. He also complains of MCCAIN that started over 1 day and gross hematuria that started one day ago a/w left    57 y.o. male BIBA pt claims with Lt flank pain, later found himself on the floor with urinary incontinence, also noted hematuria, no fever, n/v, chest pain, sob, tongue biting, nonradiating pain, now c/o neck pain, dysuria since this am 56 yo M with cad s/p 1 stent, systolic CHF, AICD last interrogated in Feb 2018 inpatient, HTN, PAD, bladder cancer s/p chemo/rad and TURBT, RCC s/p partial nephrectomy and Chemo/radiation, prostate cancer s/p radiation therapy, nephrolithiasis, HCV no treatment, peripheral neuropathy, COPD presents to the ED after he synopsized at home for about a 2 min. Pt stated that he does not remember what happen, woke up covered in urine, he felt confused, tired and still sleepy currently. Did not notice any injury , abrasion, tongue injury, or loss of bowel control. Pt also started having significant right sided weakness which he did not seek medical attention started a month ago. Also admit having hematuria after he woke up. Denied nausea, vomiting, chest pian, palpitation, headache and any other symptoms.

## 2018-06-08 NOTE — ED PROVIDER NOTE - PROGRESS NOTE DETAILS
pt with no renal calculi, ambulating without any diff., pt requests for narcotic upon arrival. Labs-neg, will d/c home.  syncope poss from pain.  advised to f/u with PMD &  pt with no renal calculi, ambulating without any diff., pt requests for narcotic upon arrival. Labs-neg, will   admit pt, d/w Dr. matthews

## 2018-06-08 NOTE — H&P ADULT - PROBLEM SELECTOR PLAN 7
IMPROVE VTE Individual Risk Assessment    RISK                                                          Points  [] Previous VTE                                           3  [] Thrombophilia                                        2  [] Lower limb paralysis                              2   [] Current Cancer                                       2   [x] Immobilization > 24 hrs                        1  [] ICU/CCU stay > 24 hours                       1  [] Age > 60                                                   1    IMPROVE VTE Score: 1  pt with right sided weakness and lethargy, unlikely to mobilize, high risk, on Eliquis for DVT PPx.

## 2018-06-08 NOTE — H&P ADULT - PROBLEM SELECTOR PLAN 2
-likely dehydration and with history of stones with left flank pain  -s/p CT abd negative for nephrolithiasis (likely passed)  -IV hydration

## 2018-06-08 NOTE — H&P ADULT - NSHPLABSRESULTS_GEN_ALL_CORE
Vital Signs Last 24 Hrs  T(C): 36.4 (08 Jun 2018 10:03), Max: 36.4 (08 Jun 2018 10:03)  T(F): 97.6 (08 Jun 2018 10:03), Max: 97.6 (08 Jun 2018 10:03)  HR: 71 (08 Jun 2018 10:03) (71 - 71)  BP: 171/106 (08 Jun 2018 10:03) (171/106 - 171/106)  BP(mean): --  RR: 16 (08 Jun 2018 10:03) (16 - 16)  SpO2: 97% (08 Jun 2018 10:03) (97% - 97%)      Labs:                        15.1   10.3  )-----------( 204      ( 08 Jun 2018 10:32 )             46.0     06-08    139  |  108  |  17  ----------------------------<  93  3.5   |  25  |  0.80    Ca    8.6      08 Jun 2018 10:32  Mg     2.3     06-08    TPro  6.9  /  Alb  3.4<L>  /  TBili  0.5  /  DBili  x   /  AST  58<H>  /  ALT  81<H>  /  AlkPhos  210<H>  06-08

## 2018-06-08 NOTE — H&P ADULT - NSHPREVIEWOFSYSTEMS_GEN_ALL_CORE
REVIEW OF SYSTEMS:    CONSTITUTIONAL: No weakness, fevers or chills  EYES/ENT: No visual changes;  No vertigo or throat pain   NECK: No pain or stiffness  RESPIRATORY: No cough, wheezing, hemoptysis; No shortness of breath  CARDIOVASCULAR: No chest pain or palpitations  GASTROINTESTINAL: No abdominal or epigastric pain. No nausea, vomiting, or hematemesis; No diarrhea or constipation. No melena or hematochezia.  GENITOURINARY: No dysuria, frequency or hematuria  NEUROLOGICAL: No numbness or weakness  SKIN: No itching, rashes  No other complaint except mentioned as above. REVIEW OF SYSTEMS:    CONSTITUTIONAL: No fevers or chills  EYES/ENT: No visual changes;  No vertigo or throat pain   NECK: No pain or stiffness  RESPIRATORY: No cough, wheezing, hemoptysis; No shortness of breath  CARDIOVASCULAR: No chest pain or palpitations  GASTROINTESTINAL: No abdominal or epigastric pain. No nausea, vomiting, or hematemesis; No diarrhea or constipation. No melena or hematochezia.  GENITOURINARY: No dysuria, frequency , + hematuria  NEUROLOGICAL: No numbness or weakness  SKIN: No itching, rashes  No other complaint except mentioned as above.

## 2018-06-08 NOTE — ED PROVIDER NOTE - MUSCULOSKELETAL, MLM
Spine appears normal, range of motion is not limited, Lt flank-CVAT, Lt lower back- sl tenderness to palp., straight leg-90 deg

## 2018-06-08 NOTE — ED PROVIDER NOTE - ENMT, MLM
Airway patent, Nasal mucosa clear. Mouth with dry mucosa. Throat has no vesicles, no oropharyngeal exudates and uvula is midline. no tongue biting

## 2018-06-09 DIAGNOSIS — R40.20 UNSPECIFIED COMA: ICD-10-CM

## 2018-06-09 LAB
ANION GAP SERPL CALC-SCNC: 6 MMOL/L — SIGNIFICANT CHANGE UP (ref 5–17)
BUN SERPL-MCNC: 15 MG/DL — SIGNIFICANT CHANGE UP (ref 7–18)
CALCIUM SERPL-MCNC: 8.7 MG/DL — SIGNIFICANT CHANGE UP (ref 8.4–10.5)
CHLORIDE SERPL-SCNC: 106 MMOL/L — SIGNIFICANT CHANGE UP (ref 96–108)
CHOLEST SERPL-MCNC: 145 MG/DL — SIGNIFICANT CHANGE UP (ref 10–199)
CO2 SERPL-SCNC: 29 MMOL/L — SIGNIFICANT CHANGE UP (ref 22–31)
CREAT SERPL-MCNC: 0.9 MG/DL — SIGNIFICANT CHANGE UP (ref 0.5–1.3)
CULTURE RESULTS: NO GROWTH — SIGNIFICANT CHANGE UP
GLUCOSE SERPL-MCNC: 121 MG/DL — HIGH (ref 70–99)
HCG SERPL-ACNC: <1 MIU/ML — SIGNIFICANT CHANGE UP
HCT VFR BLD CALC: 44.3 % — SIGNIFICANT CHANGE UP (ref 39–50)
HDLC SERPL-MCNC: 57 MG/DL — SIGNIFICANT CHANGE UP (ref 40–125)
HGB BLD-MCNC: 14.5 G/DL — SIGNIFICANT CHANGE UP (ref 13–17)
LIPID PNL WITH DIRECT LDL SERPL: 73 MG/DL — SIGNIFICANT CHANGE UP
MAGNESIUM SERPL-MCNC: 2.3 MG/DL — SIGNIFICANT CHANGE UP (ref 1.6–2.6)
MCHC RBC-ENTMCNC: 29.1 PG — SIGNIFICANT CHANGE UP (ref 27–34)
MCHC RBC-ENTMCNC: 32.7 GM/DL — SIGNIFICANT CHANGE UP (ref 32–36)
MCV RBC AUTO: 89 FL — SIGNIFICANT CHANGE UP (ref 80–100)
PHOSPHATE SERPL-MCNC: 3.6 MG/DL — SIGNIFICANT CHANGE UP (ref 2.5–4.5)
PLATELET # BLD AUTO: 175 K/UL — SIGNIFICANT CHANGE UP (ref 150–400)
POTASSIUM SERPL-MCNC: 3.4 MMOL/L — LOW (ref 3.5–5.3)
POTASSIUM SERPL-SCNC: 3.4 MMOL/L — LOW (ref 3.5–5.3)
RBC # BLD: 4.98 M/UL — SIGNIFICANT CHANGE UP (ref 4.2–5.8)
RBC # FLD: 13 % — SIGNIFICANT CHANGE UP (ref 10.3–14.5)
SODIUM SERPL-SCNC: 141 MMOL/L — SIGNIFICANT CHANGE UP (ref 135–145)
SPECIMEN SOURCE: SIGNIFICANT CHANGE UP
TOTAL CHOLESTEROL/HDL RATIO MEASUREMENT: 2.5 RATIO — LOW (ref 3.4–9.6)
TRIGL SERPL-MCNC: 75 MG/DL — SIGNIFICANT CHANGE UP (ref 10–149)
TSH SERPL-MCNC: 0.62 UU/ML — SIGNIFICANT CHANGE UP (ref 0.34–4.82)
VIT B12 SERPL-MCNC: 404 PG/ML — SIGNIFICANT CHANGE UP (ref 232–1245)
WBC # BLD: 8 K/UL — SIGNIFICANT CHANGE UP (ref 3.8–10.5)
WBC # FLD AUTO: 8 K/UL — SIGNIFICANT CHANGE UP (ref 3.8–10.5)

## 2018-06-09 PROCEDURE — 95819 EEG AWAKE AND ASLEEP: CPT | Mod: 26

## 2018-06-09 PROCEDURE — 99223 1ST HOSP IP/OBS HIGH 75: CPT

## 2018-06-09 RX ADMIN — ALBUTEROL 2 PUFF(S): 90 AEROSOL, METERED ORAL at 11:52

## 2018-06-09 RX ADMIN — MORPHINE SULFATE 1 MILLIGRAM(S): 50 CAPSULE, EXTENDED RELEASE ORAL at 20:36

## 2018-06-09 RX ADMIN — MORPHINE SULFATE 1 MILLIGRAM(S): 50 CAPSULE, EXTENDED RELEASE ORAL at 12:15

## 2018-06-09 RX ADMIN — MORPHINE SULFATE 1 MILLIGRAM(S): 50 CAPSULE, EXTENDED RELEASE ORAL at 16:22

## 2018-06-09 RX ADMIN — MORPHINE SULFATE 1 MILLIGRAM(S): 50 CAPSULE, EXTENDED RELEASE ORAL at 04:20

## 2018-06-09 RX ADMIN — GABAPENTIN 400 MILLIGRAM(S): 400 CAPSULE ORAL at 13:41

## 2018-06-09 RX ADMIN — Medication 50 MILLIGRAM(S): at 05:40

## 2018-06-09 RX ADMIN — APIXABAN 5 MILLIGRAM(S): 2.5 TABLET, FILM COATED ORAL at 05:40

## 2018-06-09 RX ADMIN — CLOPIDOGREL BISULFATE 75 MILLIGRAM(S): 75 TABLET, FILM COATED ORAL at 11:52

## 2018-06-09 RX ADMIN — APIXABAN 5 MILLIGRAM(S): 2.5 TABLET, FILM COATED ORAL at 17:47

## 2018-06-09 RX ADMIN — MORPHINE SULFATE 1 MILLIGRAM(S): 50 CAPSULE, EXTENDED RELEASE ORAL at 07:44

## 2018-06-09 RX ADMIN — MORPHINE SULFATE 1 MILLIGRAM(S): 50 CAPSULE, EXTENDED RELEASE ORAL at 20:59

## 2018-06-09 RX ADMIN — MORPHINE SULFATE 1 MILLIGRAM(S): 50 CAPSULE, EXTENDED RELEASE ORAL at 04:50

## 2018-06-09 RX ADMIN — MORPHINE SULFATE 1 MILLIGRAM(S): 50 CAPSULE, EXTENDED RELEASE ORAL at 11:52

## 2018-06-09 RX ADMIN — MORPHINE SULFATE 1 MILLIGRAM(S): 50 CAPSULE, EXTENDED RELEASE ORAL at 08:00

## 2018-06-09 RX ADMIN — BUDESONIDE AND FORMOTEROL FUMARATE DIHYDRATE 2 PUFF(S): 160; 4.5 AEROSOL RESPIRATORY (INHALATION) at 21:13

## 2018-06-09 RX ADMIN — GABAPENTIN 400 MILLIGRAM(S): 400 CAPSULE ORAL at 05:40

## 2018-06-09 RX ADMIN — Medication 50 MILLIGRAM(S): at 17:47

## 2018-06-09 RX ADMIN — GABAPENTIN 400 MILLIGRAM(S): 400 CAPSULE ORAL at 21:13

## 2018-06-09 RX ADMIN — MORPHINE SULFATE 1 MILLIGRAM(S): 50 CAPSULE, EXTENDED RELEASE ORAL at 17:07

## 2018-06-09 RX ADMIN — BUDESONIDE AND FORMOTEROL FUMARATE DIHYDRATE 2 PUFF(S): 160; 4.5 AEROSOL RESPIRATORY (INHALATION) at 11:52

## 2018-06-09 NOTE — EEG REPORT - NS EEG TEXT BOX
[facility] Epile[facility] [place]  Report of Routine EEG with Video    [facility]: [address], [address] [place] [address], Phone: [phone]  [facility]: [phone] [place] [address], Phone: [phone]  Office: [address] [place] [address], Phone: [phone]    Patient Name: RENEA AVILES    Age: 57 y  : 1960  Patient ID: -, MRN #: -, Location: -  Referring Physician: DR BUSTILLO    EEG #:   Study Date: 2018		    Technical Information:					  On Instrument: -  Placement and Labeling of Electrodes:  The EEG was performed utilizing 20 channels referential EEG connections (coronal over temporal over parasagittal montage) using all standard 10-20 electrode placements with EKG.  Recording was at a sampling rate of 256 samples per second per channel.  Time synchronized digital video recording was done simultaneously with EEG recording.  A low light infrared camera was used for low light recording.  Garrison and seizure detection algorithms were utilized.    History:  Concern for Seizures    Medication	  No Data.	    Study Interpretation:    [facility] Epile[facility] [place]  Report of Routine EEG with Video    [facility]: [address] [facility] , [address] [place] [address], Phone: [phone]  [facility]: [phone] [place] [address], Phone: [phone]  Office: [address] [place] [address], Phone: [phone]    Patient Name: DANIELE SCHWAB    Age: 89 y  : 1921  Patient ID: -, MRN #: -, Location: -  Referring Physician: DR BUSTILLO    EEG #:   Study Date: 2018		    Technical Information:					  On Instrument: -  Placement and Labeling of Electrodes:  The EEG was performed utilizing 20 channels referential EEG connections (coronal over temporal over parasagittal montage) using all standard 10-20 electrode placements with EKG.  Recording was at a sampling rate of 256 samples per second per channel.  Time synchronized digital video recording was done simultaneously with EEG recording.  A low light infrared camera was used for low light recording.  Garrison and seizure detection algorithms were utilized.    History:  Concern for Seizures    Medication	  No Data.	    Study Interpretation:      Study Interpretation:    FINDINGS:  The background was continuous, spontaneously variable and reactive.  During wakefulness, the posteriorly dominant rhythm consisted of symmetric, well modulated 7-8 Hz activity, with an amplitude to 40 uV, that attenuated to eye opening.  Low amplitude central beta was noted in wakefulness.    Sleep Background:  Drowsiness was characterized by fragmentation, attenuation, and slowing of the background activity.    Segments of stage II sleep were not recorded.    Background Slowing:  Intermittent theta and polymorphic delta activity diffusely.  No focal slowing was present.    Other Paroxysmal Non-Epileptiform Findings:    None.    Epileptiform Activity:   No epileptiform discharges were present.    Events:  No clinical events were recorded.  No seizures were recorded.    Activation Procedures:   -Hyperventilation was not performed.    -Photic stimulation was not performed.    Artifacts:  Intermittent myogenic and movement artifacts were noted.    EEG Classification:  Abnormal study  - mild to moderate diffuse slowing    Impression:  Findings indicate non-specific mild to moderate diffuse or multifocal cerebral dysfunction. There were no epileptiform abnormalities recorded, which does not exclude the diagnosis of epilepsy.  Consider repeat study if clinically indicated.  	  Olu Mcdonough M.D.			    Attending Physician

## 2018-06-09 NOTE — CONSULT NOTE ADULT - ATTENDING COMMENTS
CARDIOLOGY ATTENDING    Patient seen and examined. Agree with above. Has [employer] ICD for reported NICM. Also has PAF. Now admitted with syncope.    Plan  -check [employer] ICD r/o arrhythmia  -if ICD interrogation unremarkable then no further inpatient cardiac workup needed given unremarkable echo in 2017

## 2018-06-09 NOTE — CONSULT NOTE ADULT - SUBJECTIVE AND OBJECTIVE BOX
Patient is a 57y old  Male who presents with a chief complaint of syncope (2018 14:47)      HPI:  56 yo M with cad s/p 1 stent, systolic CHF, AICD last interrogated in 2018 inpatient, HTN, PAD, bladder cancer s/p chemo/rad and TURBT, RCC s/p partial nephrectomy and Chemo/radiation, prostate cancer s/p radiation therapy, nephrolithiasis, HCV no treatment, peripheral neuropathy, COPD presents to the ED after he synopsized at home for about a 2 min. Pt stated that he does not remember what happen, woke up covered in urine, he felt confused, tired and still sleepy currently. Did not notice any injury , abrasion, tongue injury, or loss of bowel control. Pt also started having significant right sided weakness which he did not seek medical attention started a month ago. Also admit having hematuria after he woke up. Denied nausea, vomiting, chest pian, palpitation, headache and any other symptoms. (2018 14:47)         The seizure is described as  Seizure onset at  The frequency of seizure is  The seizure is brought up by  The patient has been previously on     History of head trauma/ concussion:  History of meningitis:  History of febrile seizures:  Family history of seizures:    Neurological Review of Systems:  No difficulty with language.  No vision loss or double vision.  No dizziness, vertigo or new hearing loss.  No difficulty with speech or swallowing.  No focal weakness.  No focal sensory changes.  No numbness or tingling in the bilateral lower extremities.  No difficulty with balance.  No difficulty with ambulation.        MEDICATIONS  (STANDING):  apixaban 5 milliGRAM(s) Oral every 12 hours  atorvastatin 40 milliGRAM(s) Oral at bedtime  buDESOnide  80 MICROgram(s)/formoterol 4.5 MICROgram(s) Inhaler 2 Puff(s) Inhalation two times a day  clopidogrel Tablet 75 milliGRAM(s) Oral daily  gabapentin 400 milliGRAM(s) Oral three times a day  metoprolol tartrate 50 milliGRAM(s) Oral two times a day  sodium chloride 0.9%. 1000 milliLiter(s) (100 mL/Hr) IV Continuous <Continuous>  tamsulosin 0.4 milliGRAM(s) Oral at bedtime  tiotropium 18 MICROgram(s) Capsule 1 Capsule(s) Inhalation daily    MEDICATIONS  (PRN):  ALBUTerol    90 MICROgram(s) HFA Inhaler 2 Puff(s) Inhalation every 6 hours PRN Bronchospasm  morphine  - Injectable 1 milliGRAM(s) IV Push every 4 hours PRN Moderate Pain (4 - 6)    Allergies    aspirin (Hives)  codeine (Rash)  codeine (Unknown)  Haldol (Unknown)  Motrin (Rash)  penicillin (Hives; Anaphylaxis)  Toradol (Rash)  Tylenol (Hives)    Intolerances      PAST MEDICAL & SURGICAL HISTORY:  Kidney stone  Nephrolithiasis  Prostate cancer: s/p radiation  Peripheral neuropathy  Bladder cancer  COPD (chronic obstructive pulmonary disease)  Renal cell carcinoma of left kidney: s/p partial nephrectomy in   biopsy again in Sep 2017 showed RCC again in stage 2  Hyperlipidemia  CAD (coronary artery disease): (s/p 2 stents in Sep 2017)  Atrial fibrillation  AICD (automatic cardioverter/defibrillator) present: [employer]tronic  HTN (hypertension)  Hep C w/o coma, chronic  Chronic congestive heart failure, unspecified congestive heart failure type: rEF/pEF  History of coronary artery stent placement  H/O transurethral destruction of bladder lesion  History of percutaneous coronary intervention  H/O partial nephrectomy:   S/P cholecystectomy:   AICD (automatic cardioverter/defibrillator) present    FAMILY HISTORY:  Family history of lung cancer  Family history of chronic ischemic heart disease    SOCIAL HISTORY: non smoker/ former smoker/ active smoker    Review of Systems:  Constitutional: No generalized weakness. No fevers or chills.                    Eyes, Ears, Mouth, Throat: No vision loss   Respiratory: No shortness of breath or cough.                                Cardiovascular: No chest pain or palpitations  Gastrointestinal: No nausea or vomiting.                                         Genitourinary: No urinary incontinence or burning on urination.  Musculoskeletal: No joint pain.                                                           Dermatologic: No rash.  Neurological: as per HPI                                                                      Psychiatric: No behavioral problems.  Endocrine: No known hypoglycemia.               Hematologic/Lymphatic: No easy bleeding.    O:  Vital Signs Last 24 Hrs  T(C): 36.7 (2018 05:38), Max: 36.7 (2018 05:38)  T(F): 98 (2018 05:38), Max: 98 (2018 05:38)  HR: 59 (2018 05:38) (59 - 68)  BP: 143/108 (2018 05:38) (143/108 - 181/87)  BP(mean): --  RR: 16 (2018 05:38) (16 - 18)  SpO2: 100% (2018 05:38) (97% - 100%)    General Exam:   General appearance: No acute distress                 Cardiovascular: Pedal dorsalis pulses intact bilaterally    Mental Status: Orientated to self, date and place.  Attention intact.  No dysarthria, aphasia or neglect.  Knowledge intact.  Registration intact.  Short and long term memory grossly intact.      Cranial Nerves: CN I - not tested.  PERRL, EOMI, VFF, no nystagmus or diplopia.  No APD.  Fundi not visualized.  CN V1-3 intact to light touch and pinprick.  No facial asymmetry.  Hearing intact to finger rub bilaterally.  Tongue, uvula and palate midline.  Sternocleidomastoid and Trapezius intact bilaterally.    Motor:   Tone: normal.                  Strength intact throughout  No pronator drift bilaterally                      No dysmetria on finger-nose-finger or heel-shin-heel  No truncal ataxia.  No resting, postural or action tremor.  No myoclonus.    Sensation: intact to light touch, pinprick, vibration and proprioception    Deep Tendon Reflexes: 1+ bilateral biceps, triceps, brachioradialis, knee and ankle  Toes flexor bilaterally    Gait: normal and stable.  Rhomberg -jeison.    Other:     LABS:                        14.5   8.0   )-----------( 175      ( 2018 06:54 )             44.3     -    141  |  106  |  15  ----------------------------<  121<H>  3.4<L>   |  29  |  0.90    Ca    8.7      2018 06:54  Phos  3.6       Mg     2.3         TPro  6.9  /  Alb  3.4<L>  /  TBili  0.5  /  DBili  x   /  AST  58<H>  /  ALT  81<H>  /  AlkPhos  210<H>      PT/INR - ( 2018 10:32 )   PT: 11.7 sec;   INR: 1.07 ratio         PTT - ( 2018 10:32 )  PTT:38.6 sec  Urinalysis Basic - ( 2018 12:38 )    Color: Tamara / Appearance: very cloudy / S.025 / pH: x  Gluc: x / Ketone: Negative  / Bili: Negative / Urobili: Negative   Blood: x / Protein: 100 / Nitrite: Negative   Leuk Esterase: Trace / RBC: >50 /HPF / WBC 0-2 /HPF   Sq Epi: x / Non Sq Epi: Occasional /HPF / Bacteria: Few /HPF        RADIOLOGY & ADDITIONAL STUDIES:    EKG:   < from: CT Head No Cont (18 @ 11:25) >  Impression:  1. Unremarkable noncontrast CT scan of the brain.        < end of copied text >        Impression:         Recommendations:  1.         UA/UCx, Chest Xray, Utox and alcohol level  3.         MRI brain without contrast  4.         EEG  5.       9.         Seizure and fall precautions  10.        The patient has been advised that driving is not allowed for 1 year after a seizure by [place] law.  The patient is advised to avoid swimming and any activities that may put their life at danger shall they have a seizure.    11.        DVT PPx    Thank you for the courtesy of this consult. Patient is a 57y old  Male who presents with a chief complaint of syncope (2018 14:47)      HPI:  58 yo M with cad s/p 1 stent, systolic CHF, AICD last interrogated in 2018 inpatient, HTN, PAD, bladder cancer s/p chemo/rad and TURBT, RCC s/p partial nephrectomy and Chemo/radiation, prostate cancer s/p radiation therapy, nephrolithiasis, HCV no treatment, peripheral neuropathy, COPD presents to the ED after passing out and waking up on the floor covered in urine.  The patient denies aura.  No known duration of the event, no observers.  After waking up, the patient noted that he has right face, arm and leg weakness, unchanged since onset.  He has some word finding difficulties, denies slurred speech.      No prior similar events.    Neurological Review of Systems:  No vision loss or double vision.  No dizziness, vertigo or new hearing loss.  No difficulty with speech or swallowing.  No focal sensory changes.  + chronic numbness or tingling in the bilateral lower extremities due to neuropathy.  No difficulty with balance.  + difficulty with ambulation.        MEDICATIONS  (STANDING):  apixaban 5 milliGRAM(s) Oral every 12 hours  atorvastatin 40 milliGRAM(s) Oral at bedtime  buDESOnide  80 MICROgram(s)/formoterol 4.5 MICROgram(s) Inhaler 2 Puff(s) Inhalation two times a day  clopidogrel Tablet 75 milliGRAM(s) Oral daily  gabapentin 400 milliGRAM(s) Oral three times a day  metoprolol tartrate 50 milliGRAM(s) Oral two times a day  sodium chloride 0.9%. 1000 milliLiter(s) (100 mL/Hr) IV Continuous <Continuous>  tamsulosin 0.4 milliGRAM(s) Oral at bedtime  tiotropium 18 MICROgram(s) Capsule 1 Capsule(s) Inhalation daily    MEDICATIONS  (PRN):  ALBUTerol    90 MICROgram(s) HFA Inhaler 2 Puff(s) Inhalation every 6 hours PRN Bronchospasm  morphine  - Injectable 1 milliGRAM(s) IV Push every 4 hours PRN Moderate Pain (4 - 6)    Allergies    aspirin (Hives)  codeine (Rash)  codeine (Unknown)  Haldol (Unknown)  Motrin (Rash)  penicillin (Hives; Anaphylaxis)  Toradol (Rash)  Tylenol (Hives)    Intolerances      PAST MEDICAL & SURGICAL HISTORY:  Kidney stone  Nephrolithiasis  Prostate cancer: s/p radiation  Peripheral neuropathy  Bladder cancer  COPD (chronic obstructive pulmonary disease)  Renal cell carcinoma of left kidney: s/p partial nephrectomy in   biopsy again in Sep 2017 showed RCC again in stage 2  Hyperlipidemia  CAD (coronary artery disease): (s/p 2 stents in Sep 2017)  Atrial fibrillation  AICD (automatic cardioverter/defibrillator) present: [employer]  HTN (hypertension)  Hep C w/o coma, chronic  Chronic congestive heart failure, unspecified congestive heart failure type: rEF/pEF  History of coronary artery stent placement  H/O transurethral destruction of bladder lesion  History of percutaneous coronary intervention  H/O partial nephrectomy:   S/P cholecystectomy: 2015  AICD (automatic cardioverter/defibrillator) present    FAMILY HISTORY:  Family history of lung cancer  Family history of chronic ischemic heart disease    SOCIAL HISTORY: active smoker    Review of Systems:  Constitutional: No fevers.                    Eyes, Ears, Mouth, Throat: No vision loss   Respiratory: No cough.                                Cardiovascular: No chest pain or palpitations  Gastrointestinal: No nausea or vomiting.                                         Genitourinary: +blood in urine  Musculoskeletal: No joint pain.                                                           Dermatologic: No rash.  Neurological: as per HPI                                                                      Psychiatric: No behavioral problems.  Endocrine: No known hypoglycemia.               Hematologic/Lymphatic: No easy bleeding.    O:  Vital Signs Last 24 Hrs  T(C): 36.7 (2018 05:38), Max: 36.7 (2018 05:38)  T(F): 98 (2018 05:38), Max: 98 (2018 05:38)  HR: 59 (2018 05:38) (59 - 68)  BP: 143/108 (2018 05:38) (143/108 - 181/87)  BP(mean): --  RR: 16 (2018 05:38) (16 - 18)  SpO2: 100% (2018 05:38) (97% - 100%)    General Exam:   General appearance: No acute distress                 Cardiovascular: Pedal dorsalis pulses intact bilaterally    Mental Status: Orientated to self, date and place.  Attention intact.  No dysarthria, aphasia or neglect.  Knowledge intact.  Registration intact.  Short and long term memory grossly intact.      Cranial Nerves: CN I - not tested.  PERRL, EOMI, VFF, no nystagmus or diplopia.  No APD.  Fundi not visualized.  CN V1-3 intact to light touch.  No facial asymmetry.  Hearing intact to finger rub bilaterally.  Tongue, uvula and palate midline.  Sternocleidomastoid and Trapezius intact bilaterally.    Motor:   Tone: normal.                  Strength intact throughout  No pronator drift bilaterally                      No dysmetria on finger-nose-finger or heel-shin-heel    Sensation: intact to light touch bl but has stocking glove sensory loss in bl legs and arms to LT    Deep Tendon Reflexes: 1+ bilateral biceps, triceps, brachioradialis, knee and 0 bl ankle  Toes flexor bilaterally    Gait: Cautious normal and stable.     Other:     LABS:                        14.5   8.0   )-----------( 175      ( 2018 06:54 )             44.3         141  |  106  |  15  ----------------------------<  121<H>  3.4<L>   |  29  |  0.90    Ca    8.7      2018 06:54  Phos  3.6       Mg     2.3         TPro  6.9  /  Alb  3.4<L>  /  TBili  0.5  /  DBili  x   /  AST  58<H>  /  ALT  81<H>  /  AlkPhos  210<H>      PT/INR - ( 2018 10:32 )   PT: 11.7 sec;   INR: 1.07 ratio         PTT - ( 2018 10:32 )  PTT:38.6 sec  Urinalysis Basic - ( 2018 12:38 )    Color: Tamara / Appearance: very cloudy / S.025 / pH: x  Gluc: x / Ketone: Negative  / Bili: Negative / Urobili: Negative   Blood: x / Protein: 100 / Nitrite: Negative   Leuk Esterase: Trace / RBC: >50 /HPF / WBC 0-2 /HPF   Sq Epi: x / Non Sq Epi: Occasional /HPF / Bacteria: Few /HPF        RADIOLOGY & ADDITIONAL STUDIES:    EKG: sinus rhythm with PVCs or fusion complexes  < from: CT Head No Cont (18 @ 11:25) > (images reviwed)  Impression:  1. Unremarkable noncontrast CT scan of the brain.        < end of copied text >  EEg: Findings indicate non-specific mild to moderate diffuse or multifocal cerebral dysfunction. There were no epileptiform abnormalities recorded, which does not exclude the diagnosis of epilepsy.  Consider repeat study if clinically indicated.

## 2018-06-09 NOTE — CONSULT NOTE ADULT - SUBJECTIVE AND OBJECTIVE BOX
HISTORY OF PRESENT ILLNESS:   58 yo M with cad s/p 1 stent, systolic CHF, AICD last interrogated in Feb 2018 inpatient, HTN, PAD, bladder cancer s/p chemo/rad and TURBT, RCC s/p partial nephrectomy and Chemo/radiation, prostate cancer s/p radiation therapy, nephrolithiasis, HCV no treatment, peripheral neuropathy, COPD presents to the ED after he synopsized at home for about a 2 min. Pt stated that he does not remember what happen, woke up covered in urine, he felt confused, tired and still sleepy currently. Did not notice any injury , abrasion, tongue injury, or loss of bowel control. Pt also started having significant right sided weakness which he did not seek medical attention started a month ago. Also admit having hematuria after he woke up. Denied nausea, vomiting, chest pian, palpitation, headache and any other symptoms.     PAST MEDICAL & SURGICAL HISTORY:  Kidney stone  Nephrolithiasis  Prostate cancer: s/p radiation  Peripheral neuropathy  Bladder cancer  COPD (chronic obstructive pulmonary disease)  Renal cell carcinoma of left kidney: s/p partial nephrectomy in 2002  biopsy again in Sep 2017 showed RCC again in stage 2  Hyperlipidemia  CAD (coronary artery disease): (s/p 2 stents in Sep 2017)  Atrial fibrillation  AICD (automatic cardioverter/defibrillator) present: [employer]  HTN (hypertension)  Hep C w/o coma, chronic  Chronic congestive heart failure, unspecified congestive heart failure type: rEF/pEF  History of coronary artery stent placement  H/O transurethral destruction of bladder lesion  History of percutaneous coronary intervention  H/O partial nephrectomy: 2002  S/P cholecystectomy: 2015  AICD (automatic cardioverter/defibrillator) present      [ ] Diabetes   [x ] Hypertension  [x ] Hyperlipidemia  [x ] CAD  [x ] PCI  [ ] CABG    PREVIOUS DIAGNOSTIC TESTING:    [x ] Echocardiogram: < from: Transthoracic Echocardiogram (11.23.17 @ 12:38) >  CONCLUSIONS:  1. Mild left atrial enlargement.  2. Moderate concentric left ventricular hypertrophy.  3. Endocardium not well visualized; grossly normal left  ventricular function.  4. Grade II diastolic dysfunction.  5. RV systolic pressure is mildly increased at  41 mm Hg.  6. There is mild tricuspid regurgitation.    < end of copied text >    [x ]  Catheterization:   [ ] Stress Test:  	< from: Nuclear Stress Test-Pharmacologic (01.03.18 @ 14:17) >  IMPRESSIONS:Abnormal Study  * Myocardial Perfusion SPECT results are abnormal.  * Diffuse patchy uptake of tracer suggestive of a  nonspecific cardiomyopathy.  * Post-stress gated wall motion analysis was performed  (LVEF = 38 %;LVEDV = 159 ml.), revealing moderate global  hypokinesis.  ------------------------------------------------------------------------  Confirmed on  1/3/2018 - 15:35:28 by aTta Satcy MD  ----------------------------------------------------------------------    < end of copied text >      MEDICATIONS:  apixaban 5 milliGRAM(s) Oral every 12 hours  clopidogrel Tablet 75 milliGRAM(s) Oral daily  metoprolol tartrate 50 milliGRAM(s) Oral two times a day  tamsulosin 0.4 milliGRAM(s) Oral at bedtime      ALBUTerol    90 MICROgram(s) HFA Inhaler 2 Puff(s) Inhalation every 6 hours PRN  buDESOnide  80 MICROgram(s)/formoterol 4.5 MICROgram(s) Inhaler 2 Puff(s) Inhalation two times a day  tiotropium 18 MICROgram(s) Capsule 1 Capsule(s) Inhalation daily    gabapentin 400 milliGRAM(s) Oral three times a day  morphine  - Injectable 1 milliGRAM(s) IV Push every 4 hours PRN      atorvastatin 40 milliGRAM(s) Oral at bedtime    sodium chloride 0.9%. 1000 milliLiter(s) IV Continuous <Continuous>      Allergies    aspirin (Hives)  codeine (Rash)  codeine (Unknown)  Haldol (Unknown)  Motrin (Rash)  penicillin (Hives; Anaphylaxis)  Toradol (Rash)  Tylenol (Hives)    Intolerances        FAMILY HISTORY:  Family history of lung cancer  Family history of chronic ischemic heart disease      SOCIAL HISTORY:    [ ] Non-smoker  [ ] Smoker  [ ] Alcohol      REVIEW OF SYSTEMS:  [ ]chest pain  [  ]shortness of breath  [  ]palpitations  [x  ]syncope  [ ]near syncope [  ]diplopia  [  ]altered mental status   [  ]fevers  [ ]chills [ ]nausea  [ ]vomitting  [ ]abdominal pain  [ ]melena  [ ]BRBPR  [  ]epistaxis  [  ]rash  [  ]lower extremity edema      CONSTITUTIONAL: No fevers or chills  EYES/ENT: No visual changes;  No vertigo or throat pain   NECK: No pain or stiffness  RESPIRATORY: No cough, wheezing, hemoptysis; No shortness of breath  CARDIOVASCULAR: No chest pain or palpitations  GASTROINTESTINAL: No abdominal or epigastric pain. No nausea, vomiting, or hematemesis; No diarrhea or constipation. No melena or hematochezia.  GENITOURINARY: No dysuria, frequency , + hematuria  NEUROLOGICAL: No numbness or weakness  SKIN: No itching, rashes	    [x ] All others negative	  [ ] Unable to obtain    PHYSICAL EXAM:  T(C): 36.4 (06-08-18 @ 23:16), Max: 36.4 (06-08-18 @ 10:03)  HR: 68 (06-08-18 @ 23:16) (61 - 71)  BP: 175/72 (06-08-18 @ 23:16) (171/106 - 181/87)  RR: 16 (06-08-18 @ 23:16) (16 - 18)  SpO2: 97% (06-08-18 @ 23:16) (97% - 100%)  Wt(kg): --  I&O's Summary    08 Jun 2018 07:01  -  09 Jun 2018 05:24  --------------------------------------------------------  IN: 200 mL / OUT: 600 mL / NET: -400 mL        Appearance: Normal	  HEENT:   Normal oral mucosa, PERRL, EOMI	  Lymphatic: No lymphadenopathy  Cardiovascular: Normal S1 S2, No JVD, No murmurs, No edema  Respiratory: Lungs clear to auscultation	  Psychiatry: A & O x 3, Mood & affect appropriate  Gastrointestinal:  Soft, Non-tender, + BS	  Skin: No rashes, No ecchymoses, No cyanosis	  Neurologic: Non-focal  Extremities: Normal range of motion, No clubbing, cyanosis or edema  Vascular: Peripheral pulses palpable 2+ bilaterally    TELEMETRY: 	    ECG:  	  RADIOLOGY:  OTHER: 	  	  LABS:	 	    CARDIAC MARKERS:  Troponin I, Serum: <0.015 ng/mL (06-08 @ 16:12)  Troponin I, Serum: <0.015 ng/mL (06-08 @ 10:32)                                  15.1   10.3  )-----------( 204      ( 08 Jun 2018 10:32 )             46.0     06-08    139  |  108  |  17  ----------------------------<  93  3.5   |  25  |  0.80    Ca    8.6      08 Jun 2018 10:32  Mg     2.3     06-08    TPro  6.9  /  Alb  3.4<L>  /  TBili  0.5  /  DBili  x   /  AST  58<H>  /  ALT  81<H>  /  AlkPhos  210<H>  06-08    proBNP:   Lipid Profile:   HgA1c:   TSH:     ASSESSMENT/PLAN: 	57 yr male hx of chf, AICD, HTN, PAD, Bladder CA, s/p chemo and RAD, RCC with partial nephrectomy , HCV , copd now syncope    AICD int pending   cardiac markers neg   tele so far with out arrythmias  Neuro follow up   A/C with eliquis   plavix , statin , BB  no s/s of chf on exam   D/W Dr Isabel

## 2018-06-10 PROCEDURE — 70470 CT HEAD/BRAIN W/O & W/DYE: CPT | Mod: 26

## 2018-06-10 RX ORDER — LABETALOL HCL 100 MG
10 TABLET ORAL ONCE
Qty: 0 | Refills: 0 | Status: COMPLETED | OUTPATIENT
Start: 2018-06-10 | End: 2018-06-10

## 2018-06-10 RX ORDER — MORPHINE SULFATE 50 MG/1
0.5 CAPSULE, EXTENDED RELEASE ORAL EVERY 6 HOURS
Qty: 0 | Refills: 0 | Status: DISCONTINUED | OUTPATIENT
Start: 2018-06-10 | End: 2018-06-11

## 2018-06-10 RX ORDER — LIDOCAINE 4 G/100G
1 CREAM TOPICAL DAILY
Qty: 0 | Refills: 0 | Status: DISCONTINUED | OUTPATIENT
Start: 2018-06-10 | End: 2018-06-11

## 2018-06-10 RX ORDER — MORPHINE SULFATE 50 MG/1
1 CAPSULE, EXTENDED RELEASE ORAL ONCE
Qty: 0 | Refills: 0 | Status: DISCONTINUED | OUTPATIENT
Start: 2018-06-10 | End: 2018-06-10

## 2018-06-10 RX ORDER — DIPHENHYDRAMINE HCL 50 MG
100 CAPSULE ORAL ONCE
Qty: 0 | Refills: 0 | Status: COMPLETED | OUTPATIENT
Start: 2018-06-10 | End: 2018-06-10

## 2018-06-10 RX ORDER — HYDROMORPHONE HYDROCHLORIDE 2 MG/ML
1 INJECTION INTRAMUSCULAR; INTRAVENOUS; SUBCUTANEOUS EVERY 8 HOURS
Qty: 0 | Refills: 0 | Status: DISCONTINUED | OUTPATIENT
Start: 2018-06-10 | End: 2018-06-11

## 2018-06-10 RX ADMIN — MORPHINE SULFATE 0.5 MILLIGRAM(S): 50 CAPSULE, EXTENDED RELEASE ORAL at 23:34

## 2018-06-10 RX ADMIN — GABAPENTIN 400 MILLIGRAM(S): 400 CAPSULE ORAL at 05:29

## 2018-06-10 RX ADMIN — Medication 10 MILLIGRAM(S): at 22:37

## 2018-06-10 RX ADMIN — APIXABAN 5 MILLIGRAM(S): 2.5 TABLET, FILM COATED ORAL at 17:43

## 2018-06-10 RX ADMIN — MORPHINE SULFATE 1 MILLIGRAM(S): 50 CAPSULE, EXTENDED RELEASE ORAL at 17:42

## 2018-06-10 RX ADMIN — ATORVASTATIN CALCIUM 40 MILLIGRAM(S): 80 TABLET, FILM COATED ORAL at 22:37

## 2018-06-10 RX ADMIN — MORPHINE SULFATE 1 MILLIGRAM(S): 50 CAPSULE, EXTENDED RELEASE ORAL at 04:33

## 2018-06-10 RX ADMIN — APIXABAN 5 MILLIGRAM(S): 2.5 TABLET, FILM COATED ORAL at 05:29

## 2018-06-10 RX ADMIN — HYDROMORPHONE HYDROCHLORIDE 1 MILLIGRAM(S): 2 INJECTION INTRAMUSCULAR; INTRAVENOUS; SUBCUTANEOUS at 19:55

## 2018-06-10 RX ADMIN — MORPHINE SULFATE 1 MILLIGRAM(S): 50 CAPSULE, EXTENDED RELEASE ORAL at 09:05

## 2018-06-10 RX ADMIN — CLOPIDOGREL BISULFATE 75 MILLIGRAM(S): 75 TABLET, FILM COATED ORAL at 11:46

## 2018-06-10 RX ADMIN — MORPHINE SULFATE 1 MILLIGRAM(S): 50 CAPSULE, EXTENDED RELEASE ORAL at 08:29

## 2018-06-10 RX ADMIN — TAMSULOSIN HYDROCHLORIDE 0.4 MILLIGRAM(S): 0.4 CAPSULE ORAL at 22:36

## 2018-06-10 RX ADMIN — HYDROMORPHONE HYDROCHLORIDE 1 MILLIGRAM(S): 2 INJECTION INTRAMUSCULAR; INTRAVENOUS; SUBCUTANEOUS at 11:42

## 2018-06-10 RX ADMIN — Medication 50 MILLIGRAM(S): at 05:29

## 2018-06-10 RX ADMIN — MORPHINE SULFATE 0.5 MILLIGRAM(S): 50 CAPSULE, EXTENDED RELEASE ORAL at 22:49

## 2018-06-10 RX ADMIN — Medication 100 MILLIGRAM(S): at 12:34

## 2018-06-10 RX ADMIN — MORPHINE SULFATE 1 MILLIGRAM(S): 50 CAPSULE, EXTENDED RELEASE ORAL at 18:00

## 2018-06-10 RX ADMIN — MORPHINE SULFATE 1 MILLIGRAM(S): 50 CAPSULE, EXTENDED RELEASE ORAL at 00:55

## 2018-06-10 RX ADMIN — HYDROMORPHONE HYDROCHLORIDE 1 MILLIGRAM(S): 2 INJECTION INTRAMUSCULAR; INTRAVENOUS; SUBCUTANEOUS at 12:10

## 2018-06-10 RX ADMIN — Medication 50 MILLIGRAM(S): at 17:44

## 2018-06-10 RX ADMIN — MORPHINE SULFATE 1 MILLIGRAM(S): 50 CAPSULE, EXTENDED RELEASE ORAL at 05:16

## 2018-06-10 RX ADMIN — MORPHINE SULFATE 1 MILLIGRAM(S): 50 CAPSULE, EXTENDED RELEASE ORAL at 00:32

## 2018-06-10 RX ADMIN — BUDESONIDE AND FORMOTEROL FUMARATE DIHYDRATE 2 PUFF(S): 160; 4.5 AEROSOL RESPIRATORY (INHALATION) at 11:46

## 2018-06-10 RX ADMIN — HYDROMORPHONE HYDROCHLORIDE 1 MILLIGRAM(S): 2 INJECTION INTRAMUSCULAR; INTRAVENOUS; SUBCUTANEOUS at 20:30

## 2018-06-10 RX ADMIN — BUDESONIDE AND FORMOTEROL FUMARATE DIHYDRATE 2 PUFF(S): 160; 4.5 AEROSOL RESPIRATORY (INHALATION) at 22:36

## 2018-06-10 RX ADMIN — GABAPENTIN 400 MILLIGRAM(S): 400 CAPSULE ORAL at 22:37

## 2018-06-10 RX ADMIN — TIOTROPIUM BROMIDE 1 CAPSULE(S): 18 CAPSULE ORAL; RESPIRATORY (INHALATION) at 11:46

## 2018-06-10 NOTE — PROGRESS NOTE ADULT - SUBJECTIVE AND OBJECTIVE BOX
Patient is a 57y old  Male who presents with a chief complaint of syncope (2018 14:47)    PATIENT IS SEEN AND EXAMINED IN MEDICAL FLOOR.    ALLERGIES:  aspirin (Hives)  codeine (Rash)  codeine (Unknown)  Haldol (Unknown)  Motrin (Rash)  penicillin (Hives; Anaphylaxis)  Toradol (Rash)  Tylenol (Hives)    Daily Weight in k (10 Amos 2018 05:00)    VITALS:    Vital Signs Last 24 Hrs  T(C): 36.7 (10 Amos 2018 07:20), Max: 37.1 (2018 23:25)  T(F): 98 (10 Amos 2018 07:20), Max: 98.8 (2018 23:25)  HR: 62 (10 Amos 2018 07:20) (58 - 86)  BP: 176/64 (10 Amos 2018 07:20) (153/89 - 184/92)  BP(mean): --  RR: 16 (10 Amos 2018 07:20) (16 - 18)  SpO2: 97% (10 Amos 2018 07:20) (97% - 100%)    LABS:  CBC Full  -  ( 2018 06:54 )  WBC Count : 8.0 K/uL  Hemoglobin : 14.5 g/dL  Hematocrit : 44.3 %  Platelet Count - Automated : 175 K/uL  Mean Cell Volume : 89.0 fl  Mean Cell Hemoglobin : 29.1 pg  Mean Cell Hemoglobin Concentration : 32.7 gm/dL  Auto Neutrophil # : x  Auto Lymphocyte # : x  Auto Monocyte # : x  Auto Eosinophil # : x  Auto Basophil # : x  Auto Neutrophil % : x  Auto Lymphocyte % : x  Auto Monocyte % : x  Auto Eosinophil % : x  Auto Basophil % : x          141  |  106  |  15  ----------------------------<  121<H>  3.4<L>   |  29  |  0.90    Ca    8.7      2018 06:54  Phos  3.6       Mg     2.3           CAPILLARY BLOOD GLUCOSE      CARDIAC MARKERS ( 2018 16:12 )  <0.015 ng/mL / x     / 91 U/L / x     / 3.0 ng/mL        .Urine Clean Catch (Midstream)  08 @ 19:14   No growth  --  --      URINE MIDSTREAM  04-27 @ 04:44 --  --  --      .Urine Clean Catch (Midstream)   @ 18:30   No growth  --  --          MEDICATIONS:    MEDICATIONS  (STANDING):  apixaban 5 milliGRAM(s) Oral every 12 hours  atorvastatin 40 milliGRAM(s) Oral at bedtime  buDESOnide  80 MICROgram(s)/formoterol 4.5 MICROgram(s) Inhaler 2 Puff(s) Inhalation two times a day  clopidogrel Tablet 75 milliGRAM(s) Oral daily  gabapentin 400 milliGRAM(s) Oral three times a day  lidocaine   Patch 1 Patch Transdermal daily  metoprolol tartrate 50 milliGRAM(s) Oral two times a day  sodium chloride 0.9%. 1000 milliLiter(s) (100 mL/Hr) IV Continuous <Continuous>  tamsulosin 0.4 milliGRAM(s) Oral at bedtime  tiotropium 18 MICROgram(s) Capsule 1 Capsule(s) Inhalation daily      MEDICATIONS  (PRN):  ALBUTerol    90 MICROgram(s) HFA Inhaler 2 Puff(s) Inhalation every 6 hours PRN Bronchospasm  HYDROmorphone  Injectable 1 milliGRAM(s) IV Push every 8 hours PRN Severe Pain (7 - 10)  morphine  - Injectable 0.5 milliGRAM(s) IV Push every 6 hours PRN Moderate Pain (4 - 6)      REVIEW OF SYSTEMS:                           ALL ROS DONE [ X   ]    CONSTITUTIONAL:  LETHARGIC [   ], FEVER [   ], UNRESPONSIVE [   ]  CVS:  CP  [   ], SOB, [   ], PALPITATIONS [   ], DIZZYNESS [   ]  RS: COUGH [   ], SPUTUM [   ]  GI: ABDOMINAL PAIN [   ], NAUSEA [   ], VOMITINGS [   ], DIARRHEA [   ], CONSTIPATION [   ]  :  DYSURIA [   ], NOCTURIA [   ], INCREASED FREQUENCY [   ], DRIBLING [   ],  SKELETAL: PAINFUL JOINTS [   ], SWOLLEN JOINTS [   ], NECK ACHE [   ], LOW BACK ACHE [   ],  SKIN : ULCERS [   ], RASH [   ], ITCHING [   ]  CNS: HEAD ACHE [   ], DOUBLE VISION [   ], BLURRED VISION [   ], AMS / CONFUSION [   ], SEIZURES [   ], WEAKNESS [   ],TINGLING / NUMBNESS [   ]    PHYSICAL EXAMINATION:  GENERAL APPEARANCE: NO DISTRESS  HEENT:  NO PALLOR, NO  JVD,  NO   NODES, NECK SUPPLE  CVS: S1 +, S2 +,   RS: AEEB,  OCCASIONAL  RALES +,   NO RONCHI  ABD: SOFT, NT, NO, BS +  EXT: NO PE  SKIN: WARM,   SKELETAL:  ROM ACCEPTABLE  CNS:  AAO X 3   , NO  DEFICITS    RADIOLOGY :    < from: CT Head No Cont (18 @ 11:25) >  Impression:  1. Unremarkable noncontrast CT scan of the brain.        < end of copied text >      < from: CT Cervical Spine No Cont (18 @ 11:25) >  IMPRESSION:    No acute findings    < end of copied text >          EEG :    Impression:  Findings indicate non-specific mild to moderate diffuse or multifocal cerebral dysfunction. There were no epileptiform abnormalities recorded, which does not exclude the diagnosis of epilepsy.  Consider repeat study if clinically indicated.    < from: CT Abdomen and Pelvis No Cont (18 @ 11:25) >  Impression: No evidence for a calculus in the kidneys or ureters. No   hydronephrosis.    Normal appendix. No bowel obstruction or grossly thickened bowel wall.   Colonic diverticulosis without evidence for diverticulitis.    Mild nodular contour of the liver, suggestive of cirrhosis.      < end of copied text >        ASSESSMENT :     Syncope and collapse  Kidney stone  Nephrolithiasis  Prostate cancer  Peripheral neuropathy  Bladder cancer  COPD (chronic obstructive pulmonary disease)  Opioid dependence  Calcium kidney stones  Renal cell carcinoma of left kidney  Hyperlipidemia  Hypertension  CAD (coronary artery disease)  Atrial fibrillation  AICD (automatic cardioverter/defibrillator) present  HLD (hyperlipidemia)  HTN (hypertension)  Carcinoma of kidney, unspecified laterality  Hep C w/o coma, chronic  Secondary hypertension  Chronic congestive heart failure, unspecified congestive heart failure type  Atrial fibrillation, unspecified type  Pacemaker  History of coronary artery stent placement  Presence of inferior vena cava filter  H/O transurethral destruction of bladder lesion  History of percutaneous coronary intervention  Calcium kidney stone  H/O partial nephrectomy  S/P cholecystectomy  AICD (automatic cardioverter/defibrillator) present      PLAN:  HPI:  56 yo M with cad s/p 1 stent, systolic CHF, AICD last interrogated in 2018 inpatient, HTN, PAD, bladder cancer s/p chemo/rad and TURBT, RCC s/p partial nephrectomy and Chemo/radiation, prostate cancer s/p radiation therapy, nephrolithiasis, HCV no treatment, peripheral neuropathy, COPD presents to the ED after he synopsized at home for about a 2 min. Pt stated that he does not remember what happen, woke up covered in urine, he felt confused, tired and still sleepy currently. Did not notice any injury , abrasion, tongue injury, or loss of bowel control. Pt also started having significant right sided weakness which he did not seek medical attention started a month ago. Also admit having hematuria after he woke up. Denied nausea, vomiting, chest pian, palpitation, headache and any other symptoms. (2018 14:47)    - SUSPECTED SYNCOPE - ACS IS R/O. AICD INTERROGATION PENDING. EEG IS NEGATIVE FOR SEIZURES. CT HEAD WITH / WITH OUT CONTRAST ORDERED.   - CHRONIC PAIN  -  ON PAIN Rx  - RENAL CELL CANCER , BLADDER CANCER, HCV, CIRRHOSIS OF LIVER  - F/UP WITH NEUROLOGY, CARDIOLOGY AND ONCOLOGY UPON DC HOME  - DC PLAN IN AM IF STABLE  - GI AND DVT PROPHYLAXIS  - DR. ROLON

## 2018-06-10 NOTE — PROGRESS NOTE ADULT - SUBJECTIVE AND OBJECTIVE BOX
Subjective: pt seen and examined, no complaints on exam.     no chest pressure or sob on exam     tele stable     ALBUTerol    90 MICROgram(s) HFA Inhaler 2 Puff(s) Inhalation every 6 hours PRN  apixaban 5 milliGRAM(s) Oral every 12 hours  atorvastatin 40 milliGRAM(s) Oral at bedtime  buDESOnide  80 MICROgram(s)/formoterol 4.5 MICROgram(s) Inhaler 2 Puff(s) Inhalation two times a day  clopidogrel Tablet 75 milliGRAM(s) Oral daily  gabapentin 400 milliGRAM(s) Oral three times a day  metoprolol tartrate 50 milliGRAM(s) Oral two times a day  morphine  - Injectable 1 milliGRAM(s) IV Push every 4 hours PRN  sodium chloride 0.9%. 1000 milliLiter(s) IV Continuous <Continuous>  tamsulosin 0.4 milliGRAM(s) Oral at bedtime  tiotropium 18 MICROgram(s) Capsule 1 Capsule(s) Inhalation daily                            14.5   8.0   )-----------( 175      ( 09 Jun 2018 06:54 )             44.3       Hemoglobin: 14.5 g/dL (06-09 @ 06:54)  Hemoglobin: 15.1 g/dL (06-08 @ 10:32)      06-09    141  |  106  |  15  ----------------------------<  121<H>  3.4<L>   |  29  |  0.90    Ca    8.7      09 Jun 2018 06:54  Phos  3.6     06-09  Mg     2.3     06-09    TPro  6.9  /  Alb  3.4<L>  /  TBili  0.5  /  DBili  x   /  AST  58<H>  /  ALT  81<H>  /  AlkPhos  210<H>  06-08    Creatinine Trend: 0.90<--, 0.80<--    COAGS:     CARDIAC MARKERS ( 08 Jun 2018 16:12 )  <0.015 ng/mL / x     / 91 U/L / x     / 3.0 ng/mL  CARDIAC MARKERS ( 08 Jun 2018 10:32 )  <0.015 ng/mL / x     / 94 U/L / x     / 3.4 ng/mL        T(C): 37.1 (06-09-18 @ 23:25), Max: 37.1 (06-09-18 @ 23:25)  HR: 86 (06-09-18 @ 23:25) (58 - 86)  BP: 184/92 (06-09-18 @ 23:25) (143/108 - 184/92)  RR: 18 (06-09-18 @ 23:25) (16 - 18)  SpO2: 100% (06-09-18 @ 23:25) (97% - 100%)  Wt(kg): --    I&O's Summary    08 Jun 2018 07:01  -  09 Jun 2018 07:00  --------------------------------------------------------  IN: 200 mL / OUT: 600 mL / NET: -400 mL      Appearance: Normal	  HEENT:   Normal oral mucosa, PERRL, EOMI	  Lymphatic: No lymphadenopathy , no edema  Cardiovascular: Normal S1 S2, No JVD, No murmurs , Peripheral pulses palpable 2+ bilaterally  Respiratory: Lungs clear to auscultation, normal effort 	  Gastrointestinal:  Soft, Non-tender, + BS	  Skin: No rashes, No ecchymoses, No cyanosis, warm to touch  Musculoskeletal: Normal range of motion, normal strength  Psychiatry:  Mood & affect appropriate    TELEMETRY: 	    ECG:  	  RADIOLOGY:   DIAGNOSTIC TESTING:  [ ] Echocardiogram:  [ ]  Catheterization:  [ ] Stress Test:    OTHER: 	        ASSESSMENT/PLAN: 	57y Male   hx of chf, AICD, HTN, PAD, Bladder CA, s/p chemo and RAD, RCC with partial nephrectomy , HCV , copd now syncope    AICD int pending   cardiac markers neg   tele so far with out arrythmias  Neuro follow up - MRI pending   A/C with eliquis   plavix , statin , BB  echo pending   no s/s of chf on exam   D/W Dr Isabel

## 2018-06-10 NOTE — PROGRESS NOTE ADULT - ATTENDING COMMENTS
CARDIOLOGY ATTENDING    Agree with above. Awaiting medtronic ICD interrogation for syncope. F/u at [facility] after discharge

## 2018-06-10 NOTE — PROGRESS NOTE ADULT - SUBJECTIVE AND OBJECTIVE BOX
PGY 1 Note discussed with supervising resident and primary attending    Patient is a 57y old  Male who presents with a chief complaint of syncope (08 Jun 2018 14:47)      INTERVAL HPI/OVERNIGHT EVENTS: patient complaining of extensive pain on left flank even though walking comfortable around.     MEDICATIONS  (STANDING):  apixaban 5 milliGRAM(s) Oral every 12 hours  atorvastatin 40 milliGRAM(s) Oral at bedtime  buDESOnide  80 MICROgram(s)/formoterol 4.5 MICROgram(s) Inhaler 2 Puff(s) Inhalation two times a day  clopidogrel Tablet 75 milliGRAM(s) Oral daily  gabapentin 400 milliGRAM(s) Oral three times a day  lidocaine   Patch 1 Patch Transdermal daily  metoprolol tartrate 50 milliGRAM(s) Oral two times a day  sodium chloride 0.9%. 1000 milliLiter(s) (100 mL/Hr) IV Continuous <Continuous>  tamsulosin 0.4 milliGRAM(s) Oral at bedtime  tiotropium 18 MICROgram(s) Capsule 1 Capsule(s) Inhalation daily    MEDICATIONS  (PRN):  ALBUTerol    90 MICROgram(s) HFA Inhaler 2 Puff(s) Inhalation every 6 hours PRN Bronchospasm  HYDROmorphone  Injectable 1 milliGRAM(s) IV Push every 8 hours PRN Severe Pain (7 - 10)  morphine  - Injectable 0.5 milliGRAM(s) IV Push every 6 hours PRN Moderate Pain (4 - 6)      __________________________________________________  REVIEW OF SYSTEMS:    CONSTITUTIONAL: No fever,   EYES: no acute visual disturbances  NECK: No pain or stiffness  RESPIRATORY: No cough; No shortness of breath  CARDIOVASCULAR: No chest pain, no palpitations  GASTROINTESTINAL: No pain. No nausea or vomiting; No diarrhea   NEUROLOGICAL: No headache or numbness, no tremors  MUSCULOSKELETAL: pain in left flank   GENITOURINARY: no dysuria, no frequency, no hesitancy  PSYCHIATRY: no depression , no anxiety  ALL OTHER  ROS negative        Vital Signs Last 24 Hrs  T(C): 36.7 (10 Amos 2018 07:20), Max: 37.1 (09 Jun 2018 23:25)  T(F): 98 (10 Amos 2018 07:20), Max: 98.8 (09 Jun 2018 23:25)  HR: 62 (10 Amos 2018 07:20) (58 - 86)  BP: 176/64 (10 Amos 2018 07:20) (153/89 - 184/92)  BP(mean): --  RR: 16 (10 Amos 2018 07:20) (16 - 18)  SpO2: 97% (10 Amos 2018 07:20) (97% - 100%)    ________________________________________________  PHYSICAL EXAM:  GENERAL: NAD  HEENT: Normocephalic;  conjunctivae and sclerae clear; moist mucous membranes;   NECK : supple  CHEST/LUNG: Clear to auscultation bilaterally with good air entry   HEART: S1 S2  regular; no murmurs, gallops or rubs  ABDOMEN: Soft, Nontender, Nondistended; Bowel sounds present  EXTREMITIES: no cyanosis; no edema; no calf tenderness  SKIN: warm and dry; no rash  NERVOUS SYSTEM:  Awake and alert; Oriented  to place, person and time ; no new deficits    _________________________________________________  LABS:                        14.5   8.0   )-----------( 175      ( 09 Jun 2018 06:54 )             44.3     06-09    141  |  106  |  15  ----------------------------<  121<H>  3.4<L>   |  29  |  0.90    Ca    8.7      09 Jun 2018 06:54  Phos  3.6     06-09  Mg     2.3     06-09          CAPILLARY BLOOD GLUCOSE            RADIOLOGY & ADDITIONAL TESTS:    Imaging Personally Reviewed:  YES    Consultant(s) Notes Reviewed:   YES    Care Discussed with Consultants :     Plan of care was discussed with patient and /or primary care giver; all questions and concerns were addressed and care was aligned with patient's wishes.

## 2018-06-10 NOTE — PROGRESS NOTE ADULT - PROBLEM SELECTOR PLAN 1
-syncope and/or seizure and/or CVA  -orthostatic BP negative  -EKG no acute changes seen, PVC +  -cardiac enzyme x 3 negative  -remote tele monitor  -repeat echo WNL, CT head with no acute intracranial pathology  -EEG WNL  -repeat CT head w/wo contrast to rule out any metastasis   -neuro Dr Quach following   -carotid doppler done with no flow limitation Nov 2017  -also r/o CVA with right sided upper and lower extremities weakness for a month  -on Plavix, Eliquis, metoprolol and atorvastatin  -Neuro Dr Quach and cardio Dr Nolasco

## 2018-06-11 ENCOUNTER — TRANSCRIPTION ENCOUNTER (OUTPATIENT)
Age: 58
End: 2018-06-11

## 2018-06-11 VITALS
DIASTOLIC BLOOD PRESSURE: 89 MMHG | SYSTOLIC BLOOD PRESSURE: 149 MMHG | OXYGEN SATURATION: 97 % | RESPIRATION RATE: 18 BRPM | TEMPERATURE: 98 F | HEART RATE: 75 BPM

## 2018-06-11 PROCEDURE — 84484 ASSAY OF TROPONIN QUANT: CPT

## 2018-06-11 PROCEDURE — 95957 EEG DIGITAL ANALYSIS: CPT

## 2018-06-11 PROCEDURE — 96375 TX/PRO/DX INJ NEW DRUG ADDON: CPT

## 2018-06-11 PROCEDURE — 82009 KETONE BODYS QUAL: CPT

## 2018-06-11 PROCEDURE — 87086 URINE CULTURE/COLONY COUNT: CPT

## 2018-06-11 PROCEDURE — 93306 TTE W/DOPPLER COMPLETE: CPT

## 2018-06-11 PROCEDURE — 83735 ASSAY OF MAGNESIUM: CPT

## 2018-06-11 PROCEDURE — 80048 BASIC METABOLIC PNL TOTAL CA: CPT

## 2018-06-11 PROCEDURE — 84100 ASSAY OF PHOSPHORUS: CPT

## 2018-06-11 PROCEDURE — 95819 EEG AWAKE AND ASLEEP: CPT

## 2018-06-11 PROCEDURE — 85730 THROMBOPLASTIN TIME PARTIAL: CPT

## 2018-06-11 PROCEDURE — 72125 CT NECK SPINE W/O DYE: CPT

## 2018-06-11 PROCEDURE — 94640 AIRWAY INHALATION TREATMENT: CPT

## 2018-06-11 PROCEDURE — 96374 THER/PROPH/DIAG INJ IV PUSH: CPT | Mod: XU

## 2018-06-11 PROCEDURE — 83605 ASSAY OF LACTIC ACID: CPT

## 2018-06-11 PROCEDURE — 74176 CT ABD & PELVIS W/O CONTRAST: CPT

## 2018-06-11 PROCEDURE — 99285 EMERGENCY DEPT VISIT HI MDM: CPT | Mod: 25

## 2018-06-11 PROCEDURE — 80053 COMPREHEN METABOLIC PANEL: CPT

## 2018-06-11 PROCEDURE — 81001 URINALYSIS AUTO W/SCOPE: CPT

## 2018-06-11 PROCEDURE — 70470 CT HEAD/BRAIN W/O & W/DYE: CPT

## 2018-06-11 PROCEDURE — 80061 LIPID PANEL: CPT

## 2018-06-11 PROCEDURE — 93005 ELECTROCARDIOGRAM TRACING: CPT

## 2018-06-11 PROCEDURE — 71045 X-RAY EXAM CHEST 1 VIEW: CPT

## 2018-06-11 PROCEDURE — 84443 ASSAY THYROID STIM HORMONE: CPT

## 2018-06-11 PROCEDURE — 85610 PROTHROMBIN TIME: CPT

## 2018-06-11 PROCEDURE — 85027 COMPLETE CBC AUTOMATED: CPT

## 2018-06-11 PROCEDURE — 82550 ASSAY OF CK (CPK): CPT

## 2018-06-11 PROCEDURE — 80307 DRUG TEST PRSMV CHEM ANLYZR: CPT

## 2018-06-11 PROCEDURE — 82553 CREATINE MB FRACTION: CPT

## 2018-06-11 PROCEDURE — 70450 CT HEAD/BRAIN W/O DYE: CPT

## 2018-06-11 PROCEDURE — 84702 CHORIONIC GONADOTROPIN TEST: CPT

## 2018-06-11 PROCEDURE — 82607 VITAMIN B-12: CPT

## 2018-06-11 PROCEDURE — 97161 PT EVAL LOW COMPLEX 20 MIN: CPT

## 2018-06-11 RX ORDER — PERMETHRIN CREAM 5% W/W 50 MG/G
1 CREAM TOPICAL
Qty: 500 | Refills: 0 | OUTPATIENT
Start: 2018-06-11 | End: 2018-06-13

## 2018-06-11 RX ORDER — KETOROLAC TROMETHAMINE 30 MG/ML
15 SYRINGE (ML) INJECTION ONCE
Qty: 0 | Refills: 0 | Status: DISCONTINUED | OUTPATIENT
Start: 2018-06-11 | End: 2018-06-11

## 2018-06-11 RX ORDER — DIPHENHYDRAMINE HCL 50 MG
25 CAPSULE ORAL EVERY 4 HOURS
Qty: 0 | Refills: 0 | Status: DISCONTINUED | OUTPATIENT
Start: 2018-06-11 | End: 2018-06-11

## 2018-06-11 RX ORDER — DIPHENHYDRAMINE HCL 50 MG
1 CAPSULE ORAL
Qty: 30 | Refills: 0 | OUTPATIENT
Start: 2018-06-11 | End: 2018-06-15

## 2018-06-11 RX ORDER — HYDROMORPHONE HYDROCHLORIDE 2 MG/ML
1 INJECTION INTRAMUSCULAR; INTRAVENOUS; SUBCUTANEOUS ONCE
Qty: 0 | Refills: 0 | Status: DISCONTINUED | OUTPATIENT
Start: 2018-06-11 | End: 2018-06-11

## 2018-06-11 RX ORDER — PERMETHRIN CREAM 5% W/W 50 MG/G
1 CREAM TOPICAL DAILY
Qty: 0 | Refills: 0 | Status: DISCONTINUED | OUTPATIENT
Start: 2018-06-11 | End: 2018-06-11

## 2018-06-11 RX ADMIN — HYDROMORPHONE HYDROCHLORIDE 1 MILLIGRAM(S): 2 INJECTION INTRAMUSCULAR; INTRAVENOUS; SUBCUTANEOUS at 06:59

## 2018-06-11 RX ADMIN — MORPHINE SULFATE 0.5 MILLIGRAM(S): 50 CAPSULE, EXTENDED RELEASE ORAL at 18:30

## 2018-06-11 RX ADMIN — Medication 50 MILLIGRAM(S): at 17:59

## 2018-06-11 RX ADMIN — MORPHINE SULFATE 0.5 MILLIGRAM(S): 50 CAPSULE, EXTENDED RELEASE ORAL at 17:58

## 2018-06-11 RX ADMIN — Medication 50 MILLIGRAM(S): at 07:03

## 2018-06-11 RX ADMIN — GABAPENTIN 400 MILLIGRAM(S): 400 CAPSULE ORAL at 07:03

## 2018-06-11 RX ADMIN — HYDROMORPHONE HYDROCHLORIDE 1 MILLIGRAM(S): 2 INJECTION INTRAMUSCULAR; INTRAVENOUS; SUBCUTANEOUS at 07:30

## 2018-06-11 RX ADMIN — PERMETHRIN CREAM 5% W/W 1 APPLICATION(S): 50 CREAM TOPICAL at 14:16

## 2018-06-11 RX ADMIN — HYDROMORPHONE HYDROCHLORIDE 1 MILLIGRAM(S): 2 INJECTION INTRAMUSCULAR; INTRAVENOUS; SUBCUTANEOUS at 14:14

## 2018-06-11 RX ADMIN — HYDROMORPHONE HYDROCHLORIDE 1 MILLIGRAM(S): 2 INJECTION INTRAMUSCULAR; INTRAVENOUS; SUBCUTANEOUS at 02:58

## 2018-06-11 RX ADMIN — Medication 25 MILLIGRAM(S): at 11:05

## 2018-06-11 RX ADMIN — APIXABAN 5 MILLIGRAM(S): 2.5 TABLET, FILM COATED ORAL at 07:03

## 2018-06-11 RX ADMIN — HYDROMORPHONE HYDROCHLORIDE 1 MILLIGRAM(S): 2 INJECTION INTRAMUSCULAR; INTRAVENOUS; SUBCUTANEOUS at 03:25

## 2018-06-11 RX ADMIN — HYDROMORPHONE HYDROCHLORIDE 1 MILLIGRAM(S): 2 INJECTION INTRAMUSCULAR; INTRAVENOUS; SUBCUTANEOUS at 14:45

## 2018-06-11 RX ADMIN — MORPHINE SULFATE 0.5 MILLIGRAM(S): 50 CAPSULE, EXTENDED RELEASE ORAL at 10:30

## 2018-06-11 RX ADMIN — APIXABAN 5 MILLIGRAM(S): 2.5 TABLET, FILM COATED ORAL at 17:59

## 2018-06-11 RX ADMIN — CLOPIDOGREL BISULFATE 75 MILLIGRAM(S): 75 TABLET, FILM COATED ORAL at 14:17

## 2018-06-11 RX ADMIN — MORPHINE SULFATE 0.5 MILLIGRAM(S): 50 CAPSULE, EXTENDED RELEASE ORAL at 09:52

## 2018-06-11 RX ADMIN — GABAPENTIN 400 MILLIGRAM(S): 400 CAPSULE ORAL at 14:18

## 2018-06-11 NOTE — DISCHARGE NOTE ADULT - HOSPITAL COURSE
56 yo M with cad s/p 1 stent, systolic CHF, AICD last interrogated in Feb 2018 inpatient, HTN, PAD, bladder cancer s/p chemo/rad and TURBT, RCC s/p partial nephrectomy and Chemo/radiation, prostate cancer s/p radiation therapy, nephrolithiasis, HCV no treatment, peripheral neuropathy, COPD presents to the ED after he synopsized at home for about a 2 min. Pt stated that he does not remember what happen, woke up covered in urine, he felt confused, tired and still sleepy currently. Did not notice any injury , abrasion, tongue injury, or loss of bowel control. Pt also started having significant right sided weakness which he did not seek medical attention started a month ago. Also admit having hematuria after he woke up. Denied nausea, vomiting, chest pian, palpitation, headache and any other symptoms.   You were admitted for syncope. CT head was negative for any stroke, CT head w/wo contrast is negative for any metastasis. EEG was is negative for any seizure like activity. AICD was interrogated and its working appropriately, with Battery life 10.7 years, 0.4% episodes of afib, with no episodes of Ventricular tachycardia.  1 episode of pace terminated episode. No events on telemetry. Syncope was likely secondary to vasovagal episde. Follow up with neurology and your own cardiology as out patient.  Rate well controlled. EKG on day of discharge is normal sinus rhythm. continue with metoprolol and apixiban. follow up with cardiologist as routine.  Your pacemaker interrogated on 6/11/18. Battery life 10.7 years, 0.4% episodes of afib, with no episodes of Ventricular tachycardia.  1 episode of pace terminated episode. You have chronic back pain. continue with gabapentin. follow up with PCP in 1 week.  You have exoriating rashes on your skin. Please use permethrin cream .Apply a thin layer of cream all over your skin from your neck down to your toes (including the soles of your feet). Be careful to apply cream in all skins folds, such as between your toes and fingers or around your waist or buttocks.  For treatment of babies or adults over 65 years of age, the cream should also be applied to the scalp or hairline, temples, and forehead.  You may need to use all of the cream in the tube to cover your body.  Leave the cream on your skin for 8-14 hours.  After 8-14 hours have passed, wash off the cream by bathing or showering.  Your skin may be itchy after treatment with permethrin cream. This does not mean your treatment did not work. If you see live mites 14 days or more after treatment, then you will need to repeat the treatment process.    Plan of care was discussed with patient. He understand and agree with the plan. patient will be discharged to home in stable condition

## 2018-06-11 NOTE — DISCHARGE NOTE ADULT - CARE PROVIDERS DIRECT ADDRESSES
,DirectAddress_Unknown,DirectAddress_Unknown,[email or web address]

## 2018-06-11 NOTE — PROGRESS NOTE ADULT - ATTENDING COMMENTS
Patient seen and examined, agree with above assessment and plan as transcribed above.    - Awaiting ICD interrogation    Bryce Nolasco MD, FACC

## 2018-06-11 NOTE — DISCHARGE NOTE ADULT - PLAN OF CARE
resolved You were admitted for syncope. CT head was negative for any stroke, CT head w/wo contrast is negative for any metastasis. EEG was is negative for any seizure like activity. AICD was interrogated and its working appropriately, with Battery life 10.7 years, 0.4% episodes of afib, with no episodes of Ventricular tachycardia.  1 episode of pace terminated episode. No events on telemetry. Syncope was likely secondary to vasovagal episde. Follow up with neurology and your own cardiology as out patient. Rate well controlled. EKG on day of discharge is normal sinus rhythm. continue with metoprolol and apixiban. follow up with cardiologist as routine Your pacemaker interrogated on 6/11/18. Battery life 10.7 years, 0.4% episodes of afib, with no episodes of Ventricular tachycardia.  1 episode of pace terminated episode. You have chronic back pain. continue with gabapentin. follow up with PCP in 1 week. You have exoriating rashes on your skin. Please use permethrin cream .Apply a thin layer of cream all over your skin from your neck down to your toes (including the soles of your feet). Be careful to apply cream in all skins folds, such as between your toes and fingers or around your waist or buttocks.  For treatment of babies or adults over 65 years of age, the cream should also be applied to the scalp or hairline, temples, and forehead.  You may need to use all of the cream in the tube to cover your body.  Leave the cream on your skin for 8-14 hours.  After 8-14 hours have passed, wash off the cream by bathing or showering.  Your skin may be itchy after treatment with permethrin cream. This does not mean your treatment did not work. If you see live mites 14 days or more after treatment, then you will need to repeat the treatment process.

## 2018-06-11 NOTE — PROGRESS NOTE ADULT - SUBJECTIVE AND OBJECTIVE BOX
Subjective: pt seen and examined, no chest pain or sob on exam     ALBUTerol    90 MICROgram(s) HFA Inhaler 2 Puff(s) Inhalation every 6 hours PRN  apixaban 5 milliGRAM(s) Oral every 12 hours  atorvastatin 40 milliGRAM(s) Oral at bedtime  buDESOnide  80 MICROgram(s)/formoterol 4.5 MICROgram(s) Inhaler 2 Puff(s) Inhalation two times a day  clopidogrel Tablet 75 milliGRAM(s) Oral daily  gabapentin 400 milliGRAM(s) Oral three times a day  HYDROmorphone  Injectable 1 milliGRAM(s) IV Push every 8 hours PRN  lidocaine   Patch 1 Patch Transdermal daily  metoprolol tartrate 50 milliGRAM(s) Oral two times a day  morphine  - Injectable 0.5 milliGRAM(s) IV Push every 6 hours PRN  sodium chloride 0.9%. 1000 milliLiter(s) IV Continuous <Continuous>  tamsulosin 0.4 milliGRAM(s) Oral at bedtime  tiotropium 18 MICROgram(s) Capsule 1 Capsule(s) Inhalation daily                            14.5   8.0   )-----------( 175      ( 09 Jun 2018 06:54 )             44.3       Hemoglobin: 14.5 g/dL (06-09 @ 06:54)  Hemoglobin: 15.1 g/dL (06-08 @ 10:32)      06-09    141  |  106  |  15  ----------------------------<  121<H>  3.4<L>   |  29  |  0.90    Ca    8.7      09 Jun 2018 06:54  Phos  3.6     06-09  Mg     2.3     06-09      Creatinine Trend: 0.90<--, 0.80<--    COAGS:     CARDIAC MARKERS ( 08 Jun 2018 16:12 )  <0.015 ng/mL / x     / 91 U/L / x     / 3.0 ng/mL  CARDIAC MARKERS ( 08 Jun 2018 10:32 )  <0.015 ng/mL / x     / 94 U/L / x     / 3.4 ng/mL        T(C): 36.4 (06-10-18 @ 15:45), Max: 36.7 (06-10-18 @ 07:20)  HR: 63 (06-10-18 @ 21:08) (62 - 67)  BP: 192/104 (06-10-18 @ 21:08) (157/101 - 192/104)  RR: 18 (06-10-18 @ 21:08) (16 - 18)  SpO2: 99% (06-10-18 @ 21:08) (95% - 99%)  Wt(kg): --    I&O's Summary      Appearance: Normal	  HEENT:   Normal oral mucosa, PERRL, EOMI	  Lymphatic: No lymphadenopathy , no edema  Cardiovascular: Normal S1 S2, No JVD, No murmurs , Peripheral pulses palpable 2+ bilaterally  Respiratory: Lungs clear to auscultation, normal effort 	  Gastrointestinal:  Soft, Non-tender, + BS	  Skin: No rashes, No ecchymoses, No cyanosis, warm to touch  Musculoskeletal: Normal range of motion, normal strength  Psychiatry:  Mood & affect appropriate    TELEMETRY: 	nsr      [ ] Echocardiogram: < from: Transthoracic Echocardiogram (06.09.18 @ 16:12) >   mm Hg.  ------------------------------------------------------------------------  CONCLUSIONS:  1. Normal Left Ventricular Systolic Function,  low normal  2. Grade II diastolic dysfunction.  3. RV systolic pressure is mildly increased at  41 mm Hg.  4. There is moderate tricuspid regurgitation.    < end of copied text >    [ ]  Catheterization:  [ ] Stress Test:    OTHER: 	        ASSESSMENT/PLAN: 	57y Male   hx of chf, AICD, HTN, PAD, Bladder CA, s/p chemo and RAD, RCC with partial nephrectomy , HCV , copd now syncope    ECHO noted.   AICD int pending   cardiac markers neg   Tele stable - rate control , A/C with eliquis  Neuro follow up - MRI pending    plavix , statin , BB  pain management   no s/s of chf on exam   D/W Dr Nolasco

## 2018-06-11 NOTE — DISCHARGE NOTE ADULT - MEDICATION SUMMARY - MEDICATIONS TO TAKE
I will START or STAY ON the medications listed below when I get home from the hospital:    Flomax 0.4 mg oral capsule  -- 1 cap(s) by mouth once a day  -- Indication: For Benign prostatic hyperplasia    apixaban 5 mg oral tablet  -- 1 tab(s) by mouth every 12 hours  -- Indication: For Atrial fibrillation    gabapentin 400 mg oral capsule  -- 1 cap(s) by mouth 3 times a day  -- Indication: For Neuropathy    diphenhydrAMINE 25 mg oral capsule  -- 1 cap(s) by mouth every 4 hours, As needed, Rash and/or Itching  -- Indication: For Allergy and generalized itching    atorvastatin 40 mg oral tablet  -- 1 tab(s) by mouth once a day (at bedtime)  -- Indication: For coronary artery disease    clopidogrel 75 mg oral tablet  -- 1 tab(s) by mouth once a day  -- Indication: For coronary artery disease    Metoprolol Succinate ER 50 mg oral tablet, extended release  -- 1 tab(s) by mouth every 24 hours  -- Indication: For Atrial fibrillation    tiotropium 18 mcg inhalation capsule  -- 1 cap(s) inhaled once a day  -- Indication: For copd    Symbicort 80 mcg-4.5 mcg/inh inhalation aerosol  -- 2 puff(s) inhaled 2 times a day  -- Indication: For COPD    albuterol 90 mcg/inh inhalation aerosol  -- 2 puff(s) inhaled every 4 hours   -- For inhalation only.  It is very important that you take or use this exactly as directed.  Do not skip doses or discontinue unless directed by your doctor.  Obtain medical advice before taking any non-prescription drugs as some may affect the action of this medication.  Shake well before use.    -- Indication: For COPD    permethrin 1% topical lotion  -- Apply on skin to affected area once (at bedtime) let it dry and rinse afterwards.   -- Indication: For Scabies     Anti-Fungal Liquid 1% topical solution  -- Apply on skin to affected area once a day   -- For external use only.    -- Indication: For Skin rash

## 2018-06-11 NOTE — DISCHARGE NOTE ADULT - PATIENT PORTAL LINK FT
You can access the [email or web address] Patient Portal, offered by [facility], by registering with the following website: [email or web address]

## 2018-06-11 NOTE — DISCHARGE NOTE ADULT - CARE PROVIDER_API CALL
Bryce Nolasco (MD), Cardiovascular Disease  [address]  [place] [address]  Phone: [phone]  Fax: [phone]    Phyllis Kumar), Internal Medicine; Medical Oncology  [address] [place] [address]  Phone: [phone]  Fax: [phone]    Chilo Quach), Internal Medicine  [address] [place] [address] [place] [address]  Phone: [phone]  Fax: [phone]

## 2018-06-11 NOTE — DISCHARGE NOTE ADULT - SECONDARY DIAGNOSIS.
Paroxysmal atrial fibrillation AICD (automatic cardioverter/defibrillator) present Back pain Skin rash

## 2018-06-11 NOTE — DISCHARGE NOTE ADULT - CARE PLAN
Principal Discharge DX:	Syncope  Goal:	resolved  Assessment and plan of treatment:	You were admitted for syncope. CT head was negative for any stroke, CT head w/wo contrast is negative for any metastasis. EEG was is negative for any seizure like activity. AICD was interrogated and its working appropriately, with Battery life 10.7 years, 0.4% episodes of afib, with no episodes of Ventricular tachycardia.  1 episode of pace terminated episode. No events on telemetry. Syncope was likely secondary to vasovagal episde. Follow up with neurology and your own cardiology as out patient.  Secondary Diagnosis:	Paroxysmal atrial fibrillation  Assessment and plan of treatment:	Rate well controlled. EKG on day of discharge is normal sinus rhythm. continue with metoprolol and apixiban. follow up with cardiologist as routine  Secondary Diagnosis:	AICD (automatic cardioverter/defibrillator) present  Assessment and plan of treatment:	Your pacemaker interrogated on 6/11/18. Battery life 10.7 years, 0.4% episodes of afib, with no episodes of Ventricular tachycardia.  1 episode of pace terminated episode.  Secondary Diagnosis:	Back pain  Assessment and plan of treatment:	You have chronic back pain. continue with gabapentin. follow up with PCP in 1 week.  Secondary Diagnosis:	Skin rash  Assessment and plan of treatment:	You have exoriating rashes on your skin. Please use permethrin cream .Apply a thin layer of cream all over your skin from your neck down to your toes (including the soles of your feet). Be careful to apply cream in all skins folds, such as between your toes and fingers or around your waist or buttocks.  For treatment of babies or adults over 65 years of age, the cream should also be applied to the scalp or hairline, temples, and forehead.  You may need to use all of the cream in the tube to cover your body.  Leave the cream on your skin for 8-14 hours.  After 8-14 hours have passed, wash off the cream by bathing or showering.  Your skin may be itchy after treatment with permethrin cream. This does not mean your treatment did not work. If you see live mites 14 days or more after treatment, then you will need to repeat the treatment process.

## 2018-06-20 ENCOUNTER — EMERGENCY (EMERGENCY)
Facility: HOSPITAL | Age: 58
LOS: 1 days | Discharge: LEFT BEFORE DISCHARGE | End: 2018-06-20
Attending: EMERGENCY MEDICINE | Admitting: EMERGENCY MEDICINE
Payer: MEDICAID

## 2018-06-20 VITALS
TEMPERATURE: 98 F | SYSTOLIC BLOOD PRESSURE: 183 MMHG | DIASTOLIC BLOOD PRESSURE: 112 MMHG | OXYGEN SATURATION: 100 % | HEART RATE: 66 BPM | RESPIRATION RATE: 14 BRPM

## 2018-06-20 DIAGNOSIS — Z90.5 ACQUIRED ABSENCE OF KIDNEY: Chronic | ICD-10-CM

## 2018-06-20 DIAGNOSIS — Z90.49 ACQUIRED ABSENCE OF OTHER SPECIFIED PARTS OF DIGESTIVE TRACT: Chronic | ICD-10-CM

## 2018-06-20 DIAGNOSIS — Z95.810 PRESENCE OF AUTOMATIC (IMPLANTABLE) CARDIAC DEFIBRILLATOR: Chronic | ICD-10-CM

## 2018-06-20 DIAGNOSIS — Z98.890 OTHER SPECIFIED POSTPROCEDURAL STATES: Chronic | ICD-10-CM

## 2018-06-20 DIAGNOSIS — Z95.5 PRESENCE OF CORONARY ANGIOPLASTY IMPLANT AND GRAFT: Chronic | ICD-10-CM

## 2018-06-20 LAB
ALBUMIN SERPL ELPH-MCNC: 4.2 G/DL — SIGNIFICANT CHANGE UP (ref 3.3–5)
ALP SERPL-CCNC: 296 U/L — HIGH (ref 40–120)
ALT FLD-CCNC: 62 U/L — HIGH (ref 4–41)
AST SERPL-CCNC: 60 U/L — HIGH (ref 4–40)
BASOPHILS # BLD AUTO: 0.06 K/UL — SIGNIFICANT CHANGE UP (ref 0–0.2)
BASOPHILS NFR BLD AUTO: 0.5 % — SIGNIFICANT CHANGE UP (ref 0–2)
BILIRUB SERPL-MCNC: 0.9 MG/DL — SIGNIFICANT CHANGE UP (ref 0.2–1.2)
BUN SERPL-MCNC: 17 MG/DL — SIGNIFICANT CHANGE UP (ref 7–23)
CALCIUM SERPL-MCNC: 9.3 MG/DL — SIGNIFICANT CHANGE UP (ref 8.4–10.5)
CHLORIDE SERPL-SCNC: 103 MMOL/L — SIGNIFICANT CHANGE UP (ref 98–107)
CO2 SERPL-SCNC: 21 MMOL/L — LOW (ref 22–31)
CREAT SERPL-MCNC: 1.09 MG/DL — SIGNIFICANT CHANGE UP (ref 0.5–1.3)
EOSINOPHIL # BLD AUTO: 0.18 K/UL — SIGNIFICANT CHANGE UP (ref 0–0.5)
EOSINOPHIL NFR BLD AUTO: 1.5 % — SIGNIFICANT CHANGE UP (ref 0–6)
GLUCOSE SERPL-MCNC: 81 MG/DL — SIGNIFICANT CHANGE UP (ref 70–99)
HCT VFR BLD CALC: 51.2 % — HIGH (ref 39–50)
HGB BLD-MCNC: 16.5 G/DL — SIGNIFICANT CHANGE UP (ref 13–17)
IMM GRANULOCYTES # BLD AUTO: 0.07 # — SIGNIFICANT CHANGE UP
IMM GRANULOCYTES NFR BLD AUTO: 0.6 % — SIGNIFICANT CHANGE UP (ref 0–1.5)
LYMPHOCYTES # BLD AUTO: 2.64 K/UL — SIGNIFICANT CHANGE UP (ref 1–3.3)
LYMPHOCYTES # BLD AUTO: 21.3 % — SIGNIFICANT CHANGE UP (ref 13–44)
MCHC RBC-ENTMCNC: 28.7 PG — SIGNIFICANT CHANGE UP (ref 27–34)
MCHC RBC-ENTMCNC: 32.2 % — SIGNIFICANT CHANGE UP (ref 32–36)
MCV RBC AUTO: 89.2 FL — SIGNIFICANT CHANGE UP (ref 80–100)
MONOCYTES # BLD AUTO: 1.52 K/UL — HIGH (ref 0–0.9)
MONOCYTES NFR BLD AUTO: 12.2 % — SIGNIFICANT CHANGE UP (ref 2–14)
NEUTROPHILS # BLD AUTO: 7.94 K/UL — HIGH (ref 1.8–7.4)
NEUTROPHILS NFR BLD AUTO: 63.9 % — SIGNIFICANT CHANGE UP (ref 43–77)
NRBC # FLD: 0 — SIGNIFICANT CHANGE UP
PLATELET # BLD AUTO: 187 K/UL — SIGNIFICANT CHANGE UP (ref 150–400)
PMV BLD: 10.3 FL — SIGNIFICANT CHANGE UP (ref 7–13)
POTASSIUM SERPL-MCNC: 4.4 MMOL/L — SIGNIFICANT CHANGE UP (ref 3.5–5.3)
POTASSIUM SERPL-SCNC: 4.4 MMOL/L — SIGNIFICANT CHANGE UP (ref 3.5–5.3)
PROT SERPL-MCNC: 7.7 G/DL — SIGNIFICANT CHANGE UP (ref 6–8.3)
RBC # BLD: 5.74 M/UL — SIGNIFICANT CHANGE UP (ref 4.2–5.8)
RBC # FLD: 13.4 % — SIGNIFICANT CHANGE UP (ref 10.3–14.5)
SODIUM SERPL-SCNC: 141 MMOL/L — SIGNIFICANT CHANGE UP (ref 135–145)
TROPONIN T, HIGH SENSITIVITY RESULT: 17 NG/L — SIGNIFICANT CHANGE UP (ref ?–14)
WBC # BLD: 12.41 K/UL — HIGH (ref 3.8–10.5)
WBC # FLD AUTO: 12.41 K/UL — HIGH (ref 3.8–10.5)

## 2018-06-20 PROCEDURE — 93010 ELECTROCARDIOGRAM REPORT: CPT

## 2018-06-20 PROCEDURE — 99285 EMERGENCY DEPT VISIT HI MDM: CPT | Mod: 25

## 2018-06-20 NOTE — ED ADULT TRIAGE NOTE - CHIEF COMPLAINT QUOTE
Pt c/o L flank pain and arrives post syncope on bus. Pt states had L sided flank pain "it's a flair up of my cancer pain". Pt was sitting on bus and states became very dizzy and synopsized. Hx of AICD, CHF, AFib on Eliquis and Plavix, renal CA. Pt denies chest pain, SOB. Pt is currently not on chemo or radiation. Pt c/o L flank pain and arrives post syncope on bus. Pt states has L sided flank pain, "it's a flair up of my cancer pain". Pt was sitting on bus and states became very dizzy and synopsized. Hx of AICD, CHF, AFib on Eliquis and Plavix, renal CA. Pt denies chest pain, SOB. Pt is currently not on chemo or radiation. Pt is restless in triage.

## 2018-06-20 NOTE — ED ADULT NURSE NOTE - CHIEF COMPLAINT QUOTE
Pt c/o L flank pain and arrives post syncope on bus. Pt states has L sided flank pain, "it's a flair up of my cancer pain". Pt was sitting on bus and states became very dizzy and synopsized. Hx of AICD, CHF, AFib on Eliquis and Plavix, renal CA. Pt denies chest pain, SOB. Pt is currently not on chemo or radiation. Pt is restless in triage.

## 2018-06-20 NOTE — ED PROVIDER NOTE - OBJECTIVE STATEMENT
Attending: Per chart review, patient has had numerous ED visits throughout [facility] (formerly [facility], [facility] and [facility]) for flank pain and syncope with references to hospital visits in [place] and [place]. Pt was admitted for similar presentation June 8th-June 11th at formerly [facility], had EEG reporting no seizure activity, pacemaker interrogation and had echocardiogram report low normal systolic function and grade II diastolic function. Pt has had five CT a/p for r/o kidney stones in past 6 months, none of which have reported ureterolithiasis. Attending: Per chart review, patient has had numerous ED visits throughout [facility] ([facility], [facility] and [facility]) for flank pain and syncope with references to hospital visits in [place] and [place]. Pt was admitted for similar presentation June 8th-June 11th at [facility], had EEG reporting no seizure activity, pacemaker interrogation and had echocardiogram report low normal systolic function and grade II diastolic function. Pt has had five CT a/p for r/o kidney stones in past 6 months, none of which have reported ureterolithiasis.    Gollogly: 57M h/o CAD, AICD, left RCC s/p partial nephrectomy in 2002 presents c/o L flank pain x several hours. Pt states he had syncopal episode on the bus earlier tonight, denies CP, SOB, palpitations, or head trauma. Pt with multiple prior ED visits with similar presentation, was admitted at [facility] 10 days ago for syncope with negative workup. Gollogly: 57M h/o CAD, AICD, left RCC s/p partial nephrectomy in 2002 presents c/o L flank pain x several hours. Pt states he had syncopal episode on the bus earlier tonight, denies CP, SOB, palpitations, or head trauma. Pt with multiple prior ED visits with similar presentation, was admitted at [facility] 10 days ago for syncope with negative workup.    Attending (RILEY): Per chart review, patient has had numerous ED visits throughout [facility] ([facility], [facility] and [facility]) for flank pain and syncope with references to hospital visits in [place] and [place]. Pt was admitted for similar presentation June 8th-June 11th at [facility], had EEG reporting no seizure activity, pacemaker interrogation and had echocardiogram report low normal systolic function and grade II diastolic function. Pt has had five CT a/p for r/o kidney stones in past 6 months, none of which have reported ureterolithiasis.

## 2018-06-20 NOTE — ED PROVIDER NOTE - MEDICAL DECISION MAKING DETAILS
Pt with L flank pain and syncope. Will eval for kidney stone and electrolyte abnormality. Plan: ekg, cxr, CT abdomen stone hunt, UA, reassess.

## 2018-06-20 NOTE — ED PROVIDER NOTE - PROGRESS NOTE DETAILS
Pt came to nurses station, requested Dilaudid and then walked to kitchenette, pulled out meal tray and heated it up in microwave before returning to room. RILEY. Patient announced to RN he was going outside to smoke a cigarette and left ED. Does not have an IV. RILEY. Patient able to lay flat on stretcher comfortably during attending exam. When transporter arrives to take patient to CT, pt states he cannot lay on CT table without pain medication (states while laying on stretcher). Pt offered Lidocaine patch and muscle relaxer. Pt jumps off from stretcher, yells expletives, walks into the bathroom, slams door and continues yelling through door. RILEY. Patient announces he is leaving and walks with easy steady gait while making derogatory comments. DICKSON Patient able to lay flat on stretcher comfortably during attending exam. When transporter arrives to take patient to CT, pt states he cannot lay on CT table without pain medication (states while laying on stretcher). Pt offered Lidocaine patch and muscle relaxer prior to CT. Pt jumps off from stretcher, yells expletives, walks into the bathroom, slams door and continues yelling through door stating he needs Dilaudid. RILEY. Patient announces he is leaving and walks with easy steady gait while making derogatory comments and yelling expletives.. RILEY.

## 2018-06-20 NOTE — ED PROVIDER NOTE - ATTENDING CONTRIBUTION TO CARE
I was physically present for the E/M service provided. I agree with above history, physical, and plan which I have reviewed and edited where appropriate. I was physically present for the key portions of the service provided.    58 yo M with cad s/p 1 stent, systolic CHF, AICD last interrogated in Feb 2018 inpatient, HTN, PAD, bladder cancer s/p chemo/rad and TURBT, RCC s/p partial nephrectomy and Chemo/radiation, prostate cancer s/p radiation therapy, nephrolithiasis, HCV no treatment p/w flank pain and report of syncope due to pain while on bus. No CP, SOB. No N/V/D. Afebrile. Well-appearing. Lungs CTA. Heart RRR. Abdomen soft NTND. Will check EKG, troponin, and CT a/p r/o ureterolithiasis. I was physically present for the E/M service provided. I agree with above history, physical, and plan which I have reviewed and edited where appropriate. I was physically present for the key portions of the service provided.    58 yo M with cad s/p 1 stent, systolic CHF, AICD last interrogated in Feb 2018 inpatient, HTN, PAD, bladder cancer s/p chemo/rad and TURBT, RCC s/p partial nephrectomy and Chemo/radiation, prostate cancer s/p radiation therapy, nephrolithiasis, HCV no treatment p/w flank pain and report of syncope due to pain while on bus. No CP, SOB. No N/V/D. Afebrile. Well-appearing. Lungs CTA. Heart RRR. Abdomen soft NTND. No CVA TTP. +Left paraspinal muscle TTP. Will check EKG, troponin, and CT a/p r/o ureterolithiasis. I was physically present for the E/M service provided. I agree with above history, physical, and plan which I have reviewed and edited where appropriate. I was physically present for the key portions of the service provided.    58 yo M with cad s/p 1 stent, systolic CHF, AICD last interrogated in Feb 2018 inpatient, HTN, PAD, bladder cancer s/p chemo/rad and TURBT, RCC s/p partial nephrectomy and Chemo/radiation, prostate cancer s/p radiation therapy, nephrolithiasis, HCV no treatment p/w flank pain and report of syncope due to pain while on bus. No CP, SOB. No N/V/D. Afebrile. Well-appearing. Lungs CTA. Heart RRR. Abdomen soft NTND. No CVA TTP. +Left paraspinal lumbar muscle TTP without mid-line TTP. Will check EKG, troponin, and CT a/p r/o ureterolithiasis.

## 2018-07-16 PROBLEM — Z95.810 PRESENCE OF AUTOMATIC (IMPLANTABLE) CARDIAC DEFIBRILLATOR: Chronic | Status: ACTIVE | Noted: 2017-11-22

## 2018-07-16 PROBLEM — I50.9 HEART FAILURE, UNSPECIFIED: Chronic | Status: ACTIVE | Noted: 2017-01-29

## 2018-07-25 ENCOUNTER — INPATIENT (INPATIENT)
Facility: HOSPITAL | Age: 58
LOS: 0 days | Discharge: HOME | DRG: 312 | End: 2018-07-26
Attending: INTERNAL MEDICINE | Admitting: INTERNAL MEDICINE
Payer: MEDICAID

## 2018-07-25 VITALS
OXYGEN SATURATION: 98 % | RESPIRATION RATE: 17 BRPM | WEIGHT: 220.02 LBS | HEART RATE: 61 BPM | SYSTOLIC BLOOD PRESSURE: 125 MMHG | TEMPERATURE: 98 F | DIASTOLIC BLOOD PRESSURE: 84 MMHG | HEIGHT: 71 IN

## 2018-07-25 DIAGNOSIS — Z95.5 PRESENCE OF CORONARY ANGIOPLASTY IMPLANT AND GRAFT: Chronic | ICD-10-CM

## 2018-07-25 DIAGNOSIS — Z98.890 OTHER SPECIFIED POSTPROCEDURAL STATES: Chronic | ICD-10-CM

## 2018-07-25 DIAGNOSIS — I10 ESSENTIAL (PRIMARY) HYPERTENSION: ICD-10-CM

## 2018-07-25 DIAGNOSIS — J44.9 CHRONIC OBSTRUCTIVE PULMONARY DISEASE, UNSPECIFIED: ICD-10-CM

## 2018-07-25 DIAGNOSIS — I50.32 CHRONIC DIASTOLIC (CONGESTIVE) HEART FAILURE: ICD-10-CM

## 2018-07-25 DIAGNOSIS — Z90.49 ACQUIRED ABSENCE OF OTHER SPECIFIED PARTS OF DIGESTIVE TRACT: Chronic | ICD-10-CM

## 2018-07-25 DIAGNOSIS — I24.9 ACUTE ISCHEMIC HEART DISEASE, UNSPECIFIED: ICD-10-CM

## 2018-07-25 DIAGNOSIS — I48.91 UNSPECIFIED ATRIAL FIBRILLATION: ICD-10-CM

## 2018-07-25 DIAGNOSIS — R55 SYNCOPE AND COLLAPSE: ICD-10-CM

## 2018-07-25 DIAGNOSIS — Z90.5 ACQUIRED ABSENCE OF KIDNEY: Chronic | ICD-10-CM

## 2018-07-25 DIAGNOSIS — Z29.9 ENCOUNTER FOR PROPHYLACTIC MEASURES, UNSPECIFIED: ICD-10-CM

## 2018-07-25 DIAGNOSIS — Z95.810 PRESENCE OF AUTOMATIC (IMPLANTABLE) CARDIAC DEFIBRILLATOR: Chronic | ICD-10-CM

## 2018-07-25 DIAGNOSIS — I25.10 ATHEROSCLEROTIC HEART DISEASE OF NATIVE CORONARY ARTERY WITHOUT ANGINA PECTORIS: ICD-10-CM

## 2018-07-25 PROBLEM — N20.0 CALCULUS OF KIDNEY: Chronic | Status: ACTIVE | Noted: 2018-03-23

## 2018-07-25 PROBLEM — B18.2 CHRONIC VIRAL HEPATITIS C: Chronic | Status: ACTIVE | Noted: 2017-01-29

## 2018-07-25 PROBLEM — E78.5 HYPERLIPIDEMIA, UNSPECIFIED: Chronic | Status: ACTIVE | Noted: 2017-11-22

## 2018-07-25 PROBLEM — C64.2 MALIGNANT NEOPLASM OF LEFT KIDNEY, EXCEPT RENAL PELVIS: Chronic | Status: ACTIVE | Noted: 2017-11-22

## 2018-07-25 LAB
ANION GAP SERPL CALC-SCNC: 4 MMOL/L — LOW (ref 5–17)
ANION GAP SERPL CALC-SCNC: 7 MMOL/L — SIGNIFICANT CHANGE UP (ref 5–17)
APPEARANCE UR: CLEAR — SIGNIFICANT CHANGE UP
BASOPHILS # BLD AUTO: 0 K/UL — SIGNIFICANT CHANGE UP (ref 0–0.2)
BASOPHILS # BLD AUTO: 0.1 K/UL — SIGNIFICANT CHANGE UP (ref 0–0.2)
BASOPHILS NFR BLD AUTO: 0.8 % — SIGNIFICANT CHANGE UP (ref 0–2)
BASOPHILS NFR BLD AUTO: 1 % — SIGNIFICANT CHANGE UP (ref 0–2)
BILIRUB UR-MCNC: ABNORMAL
BUN SERPL-MCNC: 17 MG/DL — SIGNIFICANT CHANGE UP (ref 7–18)
BUN SERPL-MCNC: 18 MG/DL — SIGNIFICANT CHANGE UP (ref 7–18)
CALCIUM SERPL-MCNC: 8.6 MG/DL — SIGNIFICANT CHANGE UP (ref 8.4–10.5)
CALCIUM SERPL-MCNC: 8.8 MG/DL — SIGNIFICANT CHANGE UP (ref 8.4–10.5)
CHLORIDE SERPL-SCNC: 108 MMOL/L — SIGNIFICANT CHANGE UP (ref 96–108)
CHLORIDE SERPL-SCNC: 108 MMOL/L — SIGNIFICANT CHANGE UP (ref 96–108)
CHOLEST SERPL-MCNC: 107 MG/DL — SIGNIFICANT CHANGE UP (ref 10–199)
CK MB BLD-MCNC: 3.2 % — SIGNIFICANT CHANGE UP (ref 0–3.5)
CK MB CFR SERPL CALC: 4.2 NG/ML — HIGH (ref 0–3.6)
CK SERPL-CCNC: 131 U/L — SIGNIFICANT CHANGE UP (ref 35–232)
CO2 SERPL-SCNC: 25 MMOL/L — SIGNIFICANT CHANGE UP (ref 22–31)
CO2 SERPL-SCNC: 29 MMOL/L — SIGNIFICANT CHANGE UP (ref 22–31)
COLOR SPEC: YELLOW — SIGNIFICANT CHANGE UP
CREAT SERPL-MCNC: 1.07 MG/DL — SIGNIFICANT CHANGE UP (ref 0.5–1.3)
CREAT SERPL-MCNC: 1.08 MG/DL — SIGNIFICANT CHANGE UP (ref 0.5–1.3)
DIFF PNL FLD: ABNORMAL
EOSINOPHIL # BLD AUTO: 0.1 K/UL — SIGNIFICANT CHANGE UP (ref 0–0.5)
EOSINOPHIL # BLD AUTO: 0.1 K/UL — SIGNIFICANT CHANGE UP (ref 0–0.5)
EOSINOPHIL NFR BLD AUTO: 1.8 % — SIGNIFICANT CHANGE UP (ref 0–6)
EOSINOPHIL NFR BLD AUTO: 2.2 % — SIGNIFICANT CHANGE UP (ref 0–6)
EPI CELLS # UR: SIGNIFICANT CHANGE UP /HPF
GLUCOSE SERPL-MCNC: 76 MG/DL — SIGNIFICANT CHANGE UP (ref 70–99)
GLUCOSE SERPL-MCNC: 96 MG/DL — SIGNIFICANT CHANGE UP (ref 70–99)
GLUCOSE UR QL: NEGATIVE — SIGNIFICANT CHANGE UP
HBA1C BLD-MCNC: 5.2 % — SIGNIFICANT CHANGE UP (ref 4–5.6)
HCT VFR BLD CALC: 44 % — SIGNIFICANT CHANGE UP (ref 39–50)
HCT VFR BLD CALC: 47 % — SIGNIFICANT CHANGE UP (ref 39–50)
HDLC SERPL-MCNC: 48 MG/DL — SIGNIFICANT CHANGE UP (ref 40–125)
HGB BLD-MCNC: 14.8 G/DL — SIGNIFICANT CHANGE UP (ref 13–17)
HGB BLD-MCNC: 15.2 G/DL — SIGNIFICANT CHANGE UP (ref 13–17)
KETONES UR-MCNC: NEGATIVE — SIGNIFICANT CHANGE UP
LEUKOCYTE ESTERASE UR-ACNC: ABNORMAL
LIPID PNL WITH DIRECT LDL SERPL: 42 MG/DL — SIGNIFICANT CHANGE UP
LYMPHOCYTES # BLD AUTO: 1.6 K/UL — SIGNIFICANT CHANGE UP (ref 1–3.3)
LYMPHOCYTES # BLD AUTO: 1.7 K/UL — SIGNIFICANT CHANGE UP (ref 1–3.3)
LYMPHOCYTES # BLD AUTO: 20.9 % — SIGNIFICANT CHANGE UP (ref 13–44)
LYMPHOCYTES # BLD AUTO: 28.2 % — SIGNIFICANT CHANGE UP (ref 13–44)
MAGNESIUM SERPL-MCNC: 2.4 MG/DL — SIGNIFICANT CHANGE UP (ref 1.6–2.6)
MCHC RBC-ENTMCNC: 28.8 PG — SIGNIFICANT CHANGE UP (ref 27–34)
MCHC RBC-ENTMCNC: 29.8 PG — SIGNIFICANT CHANGE UP (ref 27–34)
MCHC RBC-ENTMCNC: 32.5 GM/DL — SIGNIFICANT CHANGE UP (ref 32–36)
MCHC RBC-ENTMCNC: 33.6 GM/DL — SIGNIFICANT CHANGE UP (ref 32–36)
MCV RBC AUTO: 88.7 FL — SIGNIFICANT CHANGE UP (ref 80–100)
MCV RBC AUTO: 88.8 FL — SIGNIFICANT CHANGE UP (ref 80–100)
MONOCYTES # BLD AUTO: 0.9 K/UL — SIGNIFICANT CHANGE UP (ref 0–0.9)
MONOCYTES # BLD AUTO: 1 K/UL — HIGH (ref 0–0.9)
MONOCYTES NFR BLD AUTO: 12.8 % — SIGNIFICANT CHANGE UP (ref 2–14)
MONOCYTES NFR BLD AUTO: 14.4 % — HIGH (ref 2–14)
NEUTROPHILS # BLD AUTO: 3.2 K/UL — SIGNIFICANT CHANGE UP (ref 1.8–7.4)
NEUTROPHILS # BLD AUTO: 5 K/UL — SIGNIFICANT CHANGE UP (ref 1.8–7.4)
NEUTROPHILS NFR BLD AUTO: 54.4 % — SIGNIFICANT CHANGE UP (ref 43–77)
NEUTROPHILS NFR BLD AUTO: 63.5 % — SIGNIFICANT CHANGE UP (ref 43–77)
NITRITE UR-MCNC: POSITIVE
PH UR: 5 — SIGNIFICANT CHANGE UP (ref 5–8)
PHOSPHATE SERPL-MCNC: 3.6 MG/DL — SIGNIFICANT CHANGE UP (ref 2.5–4.5)
PLATELET # BLD AUTO: 139 K/UL — LOW (ref 150–400)
PLATELET # BLD AUTO: 168 K/UL — SIGNIFICANT CHANGE UP (ref 150–400)
POTASSIUM SERPL-MCNC: 3.9 MMOL/L — SIGNIFICANT CHANGE UP (ref 3.5–5.3)
POTASSIUM SERPL-MCNC: 4.2 MMOL/L — SIGNIFICANT CHANGE UP (ref 3.5–5.3)
POTASSIUM SERPL-SCNC: 3.9 MMOL/L — SIGNIFICANT CHANGE UP (ref 3.5–5.3)
POTASSIUM SERPL-SCNC: 4.2 MMOL/L — SIGNIFICANT CHANGE UP (ref 3.5–5.3)
PROT UR-MCNC: 100
RBC # BLD: 4.95 M/UL — SIGNIFICANT CHANGE UP (ref 4.2–5.8)
RBC # BLD: 5.28 M/UL — SIGNIFICANT CHANGE UP (ref 4.2–5.8)
RBC # FLD: 12.3 % — SIGNIFICANT CHANGE UP (ref 10.3–14.5)
RBC # FLD: 12.3 % — SIGNIFICANT CHANGE UP (ref 10.3–14.5)
RBC CASTS # UR COMP ASSIST: ABNORMAL /HPF (ref 0–2)
SODIUM SERPL-SCNC: 140 MMOL/L — SIGNIFICANT CHANGE UP (ref 135–145)
SODIUM SERPL-SCNC: 141 MMOL/L — SIGNIFICANT CHANGE UP (ref 135–145)
SP GR SPEC: 1.02 — SIGNIFICANT CHANGE UP (ref 1.01–1.02)
TOTAL CHOLESTEROL/HDL RATIO MEASUREMENT: 2.2 RATIO — LOW (ref 3.4–9.6)
TRIGL SERPL-MCNC: 84 MG/DL — SIGNIFICANT CHANGE UP (ref 10–149)
TROPONIN I SERPL-MCNC: <0.015 NG/ML — SIGNIFICANT CHANGE UP (ref 0–0.04)
TSH SERPL-MCNC: 0.58 UU/ML — SIGNIFICANT CHANGE UP (ref 0.34–4.82)
UROBILINOGEN FLD QL: 4
WBC # BLD: 5.9 K/UL — SIGNIFICANT CHANGE UP (ref 3.8–10.5)
WBC # BLD: 7.8 K/UL — SIGNIFICANT CHANGE UP (ref 3.8–10.5)
WBC # FLD AUTO: 5.9 K/UL — SIGNIFICANT CHANGE UP (ref 3.8–10.5)
WBC # FLD AUTO: 7.8 K/UL — SIGNIFICANT CHANGE UP (ref 3.8–10.5)
WBC UR QL: SIGNIFICANT CHANGE UP /HPF (ref 0–5)

## 2018-07-25 PROCEDURE — 74018 RADEX ABDOMEN 1 VIEW: CPT | Mod: 26

## 2018-07-25 PROCEDURE — 71045 X-RAY EXAM CHEST 1 VIEW: CPT | Mod: 26

## 2018-07-25 PROCEDURE — 99285 EMERGENCY DEPT VISIT HI MDM: CPT | Mod: 25

## 2018-07-25 RX ORDER — SODIUM CHLORIDE 9 MG/ML
1000 INJECTION INTRAMUSCULAR; INTRAVENOUS; SUBCUTANEOUS ONCE
Qty: 0 | Refills: 0 | Status: COMPLETED | OUTPATIENT
Start: 2018-07-25 | End: 2018-07-25

## 2018-07-25 RX ORDER — SODIUM CHLORIDE 9 MG/ML
1000 INJECTION INTRAMUSCULAR; INTRAVENOUS; SUBCUTANEOUS
Qty: 0 | Refills: 0 | Status: DISCONTINUED | OUTPATIENT
Start: 2018-07-25 | End: 2018-07-26

## 2018-07-25 RX ORDER — TRAMADOL HYDROCHLORIDE 50 MG/1
50 TABLET ORAL EVERY 8 HOURS
Qty: 0 | Refills: 0 | Status: DISCONTINUED | OUTPATIENT
Start: 2018-07-25 | End: 2018-07-26

## 2018-07-25 RX ORDER — SODIUM CHLORIDE 9 MG/ML
3 INJECTION INTRAMUSCULAR; INTRAVENOUS; SUBCUTANEOUS ONCE
Qty: 0 | Refills: 0 | Status: COMPLETED | OUTPATIENT
Start: 2018-07-25 | End: 2018-07-25

## 2018-07-25 RX ORDER — TAMSULOSIN HYDROCHLORIDE 0.4 MG/1
0.4 CAPSULE ORAL AT BEDTIME
Qty: 0 | Refills: 0 | Status: DISCONTINUED | OUTPATIENT
Start: 2018-07-25 | End: 2018-07-26

## 2018-07-25 RX ORDER — NICOTINE POLACRILEX 2 MG
1 GUM BUCCAL DAILY
Qty: 0 | Refills: 0 | Status: DISCONTINUED | OUTPATIENT
Start: 2018-07-25 | End: 2018-07-26

## 2018-07-25 RX ORDER — CLOPIDOGREL BISULFATE 75 MG/1
75 TABLET, FILM COATED ORAL DAILY
Qty: 0 | Refills: 0 | Status: DISCONTINUED | OUTPATIENT
Start: 2018-07-25 | End: 2018-07-26

## 2018-07-25 RX ORDER — ALBUTEROL 90 UG/1
2 AEROSOL, METERED ORAL EVERY 6 HOURS
Qty: 0 | Refills: 0 | Status: DISCONTINUED | OUTPATIENT
Start: 2018-07-25 | End: 2018-07-26

## 2018-07-25 RX ORDER — BUDESONIDE AND FORMOTEROL FUMARATE DIHYDRATE 160; 4.5 UG/1; UG/1
2 AEROSOL RESPIRATORY (INHALATION)
Qty: 0 | Refills: 0 | Status: DISCONTINUED | OUTPATIENT
Start: 2018-07-25 | End: 2018-07-26

## 2018-07-25 RX ORDER — MORPHINE SULFATE 50 MG/1
1 CAPSULE, EXTENDED RELEASE ORAL EVERY 6 HOURS
Qty: 0 | Refills: 0 | Status: DISCONTINUED | OUTPATIENT
Start: 2018-07-25 | End: 2018-07-26

## 2018-07-25 RX ORDER — ATORVASTATIN CALCIUM 80 MG/1
40 TABLET, FILM COATED ORAL AT BEDTIME
Qty: 0 | Refills: 0 | Status: DISCONTINUED | OUTPATIENT
Start: 2018-07-25 | End: 2018-07-26

## 2018-07-25 RX ORDER — ROCURONIUM BROMIDE 10 MG/ML
50 VIAL (ML) INTRAVENOUS ONCE
Qty: 0 | Refills: 0 | Status: DISCONTINUED | OUTPATIENT
Start: 2018-07-25 | End: 2018-07-25

## 2018-07-25 RX ORDER — DIPHENHYDRAMINE HCL 50 MG
50 CAPSULE ORAL ONCE
Qty: 0 | Refills: 0 | Status: COMPLETED | OUTPATIENT
Start: 2018-07-25 | End: 2018-07-25

## 2018-07-25 RX ORDER — APIXABAN 2.5 MG/1
5 TABLET, FILM COATED ORAL EVERY 12 HOURS
Qty: 0 | Refills: 0 | Status: DISCONTINUED | OUTPATIENT
Start: 2018-07-25 | End: 2018-07-26

## 2018-07-25 RX ORDER — ETOMIDATE 2 MG/ML
20 INJECTION INTRAVENOUS ONCE
Qty: 0 | Refills: 0 | Status: DISCONTINUED | OUTPATIENT
Start: 2018-07-25 | End: 2018-07-25

## 2018-07-25 RX ORDER — METOPROLOL TARTRATE 50 MG
50 TABLET ORAL DAILY
Qty: 0 | Refills: 0 | Status: DISCONTINUED | OUTPATIENT
Start: 2018-07-25 | End: 2018-07-26

## 2018-07-25 RX ORDER — MORPHINE SULFATE 50 MG/1
6 CAPSULE, EXTENDED RELEASE ORAL ONCE
Qty: 0 | Refills: 0 | Status: DISCONTINUED | OUTPATIENT
Start: 2018-07-25 | End: 2018-07-25

## 2018-07-25 RX ORDER — TRAMADOL HYDROCHLORIDE 50 MG/1
25 TABLET ORAL EVERY 12 HOURS
Qty: 0 | Refills: 0 | Status: DISCONTINUED | OUTPATIENT
Start: 2018-07-25 | End: 2018-07-25

## 2018-07-25 RX ORDER — TIOTROPIUM BROMIDE 18 UG/1
1 CAPSULE ORAL; RESPIRATORY (INHALATION) DAILY
Qty: 0 | Refills: 0 | Status: DISCONTINUED | OUTPATIENT
Start: 2018-07-25 | End: 2018-07-26

## 2018-07-25 RX ORDER — HYDROMORPHONE HYDROCHLORIDE 2 MG/ML
2 INJECTION INTRAMUSCULAR; INTRAVENOUS; SUBCUTANEOUS ONCE
Qty: 0 | Refills: 0 | Status: DISCONTINUED | OUTPATIENT
Start: 2018-07-25 | End: 2018-07-25

## 2018-07-25 RX ORDER — SODIUM CHLORIDE 9 MG/ML
1000 INJECTION INTRAMUSCULAR; INTRAVENOUS; SUBCUTANEOUS
Qty: 0 | Refills: 0 | Status: DISCONTINUED | OUTPATIENT
Start: 2018-07-25 | End: 2018-07-25

## 2018-07-25 RX ORDER — GABAPENTIN 400 MG/1
400 CAPSULE ORAL THREE TIMES A DAY
Qty: 0 | Refills: 0 | Status: DISCONTINUED | OUTPATIENT
Start: 2018-07-25 | End: 2018-07-26

## 2018-07-25 RX ADMIN — Medication 50 MILLIGRAM(S): at 06:35

## 2018-07-25 RX ADMIN — MORPHINE SULFATE 1 MILLIGRAM(S): 50 CAPSULE, EXTENDED RELEASE ORAL at 14:00

## 2018-07-25 RX ADMIN — CLOPIDOGREL BISULFATE 75 MILLIGRAM(S): 75 TABLET, FILM COATED ORAL at 13:37

## 2018-07-25 RX ADMIN — MORPHINE SULFATE 1 MILLIGRAM(S): 50 CAPSULE, EXTENDED RELEASE ORAL at 13:37

## 2018-07-25 RX ADMIN — TAMSULOSIN HYDROCHLORIDE 0.4 MILLIGRAM(S): 0.4 CAPSULE ORAL at 21:30

## 2018-07-25 RX ADMIN — GABAPENTIN 400 MILLIGRAM(S): 400 CAPSULE ORAL at 21:30

## 2018-07-25 RX ADMIN — GABAPENTIN 400 MILLIGRAM(S): 400 CAPSULE ORAL at 13:37

## 2018-07-25 RX ADMIN — HYDROMORPHONE HYDROCHLORIDE 2 MILLIGRAM(S): 2 INJECTION INTRAMUSCULAR; INTRAVENOUS; SUBCUTANEOUS at 23:51

## 2018-07-25 RX ADMIN — SODIUM CHLORIDE 3000 MILLILITER(S): 9 INJECTION INTRAMUSCULAR; INTRAVENOUS; SUBCUTANEOUS at 05:19

## 2018-07-25 RX ADMIN — ATORVASTATIN CALCIUM 40 MILLIGRAM(S): 80 TABLET, FILM COATED ORAL at 21:30

## 2018-07-25 RX ADMIN — MORPHINE SULFATE 1 MILLIGRAM(S): 50 CAPSULE, EXTENDED RELEASE ORAL at 21:05

## 2018-07-25 RX ADMIN — MORPHINE SULFATE 6 MILLIGRAM(S): 50 CAPSULE, EXTENDED RELEASE ORAL at 07:07

## 2018-07-25 RX ADMIN — MORPHINE SULFATE 6 MILLIGRAM(S): 50 CAPSULE, EXTENDED RELEASE ORAL at 06:36

## 2018-07-25 RX ADMIN — SODIUM CHLORIDE 3 MILLILITER(S): 9 INJECTION INTRAMUSCULAR; INTRAVENOUS; SUBCUTANEOUS at 05:19

## 2018-07-25 RX ADMIN — TIOTROPIUM BROMIDE 1 CAPSULE(S): 18 CAPSULE ORAL; RESPIRATORY (INHALATION) at 13:37

## 2018-07-25 NOTE — CONSULT NOTE ADULT - SUBJECTIVE AND OBJECTIVE BOX
CHIEF COMPLAINT:Patient is a 57y old  Male who presents with a chief complaint of chest pain , syncope .      HPI:  56 yo male with cad s/p 1 stent, systolic CHF, AICD last interrogated in June 2018 inpatient, HTN, PAD, bladder cancer s/p chemo/rad and TURBT, RCC s/p partial nephrectomy and Chemo/radiation, prostate cancer s/p radiation therapy, nephrolithiasis, HCV no treatment, peripheral neuropathy, COPD presents to the ED after he had chest pain , [place] 2 [place]. Patient started having an flank spasm on Left side , followed by mild chest pressure in left side of chest , not radiating to arm/ back after which he lost consciousness and passed out for 2-3 minutes. Witnessed syncope by bystanders , no head trauma , no fall , no injury. Denies any palpitations , nausea , vomiting , headache , dizziness.   [facility] complaint of syncope and all work up was negative at that time which includes EEG ,CT head , AICD interrogation.        PAST MEDICAL & SURGICAL HISTORY:  Kidney stone  Nephrolithiasis  Prostate cancer: s/p radiation  Peripheral neuropathy  Bladder cancer  COPD (chronic obstructive pulmonary disease)  Renal cell carcinoma of left kidney: s/p partial nephrectomy in 2002  biopsy again in Sep 2017 showed RCC again in stage 2  Hyperlipidemia  CAD (coronary artery disease): (s/p 2 stents in Sep 2017)  Atrial fibrillation  AICD (automatic cardioverter/defibrillator) present: [place] [employer]  HTN (hypertension)  Hep C w/o coma, chronic  Chronic congestive heart failure, unspecified congestive heart failure type: rEF/pEF  History of coronary artery stent placement  H/O transurethral destruction of bladder lesion  History of percutaneous coronary intervention  H/O partial nephrectomy: 2002  S/P cholecystectomy: 2015  AICD (automatic cardioverter/defibrillator) present      MEDICATIONS  (STANDING):  apixaban 5 milliGRAM(s) Oral every 12 hours  atorvastatin 40 milliGRAM(s) Oral at bedtime  buDESOnide  80 MICROgram(s)/formoterol 4.5 MICROgram(s) Inhaler 2 Puff(s) Inhalation two times a day  clopidogrel Tablet 75 milliGRAM(s) Oral daily  gabapentin 400 milliGRAM(s) Oral three times a day  metoprolol succinate ER 50 milliGRAM(s) Oral daily  nicotine -   7 mG/24Hr(s) Patch 1 patch Transdermal daily  sodium chloride 0.9%. 1000 milliLiter(s) (70 mL/Hr) IV Continuous <Continuous>  tamsulosin 0.4 milliGRAM(s) Oral at bedtime  tiotropium 18 MICROgram(s) Capsule 1 Capsule(s) Inhalation daily    MEDICATIONS  (PRN):  ALBUTerol    90 MICROgram(s) HFA Inhaler 2 Puff(s) Inhalation every 6 hours PRN Bronchospasm  morphine  - Injectable 1 milliGRAM(s) IV Push every 6 hours PRN Moderate Pain (4 - 6)  naproxen 500 milliGRAM(s) Oral every 8 hours PRN mild pain  traMADol 50 milliGRAM(s) Oral every 8 hours PRN Moderate Pain (4 - 6)      FAMILY HISTORY:  Family history of lung cancer (Father)  Family history of chronic ischemic heart disease      SOCIAL HISTORY:    [x ] Non-smoker    [ x] Alcohol-denies    Allergies    aspirin (Hives)  codeine (Rash)  codeine (Unknown)  Haldol (Unknown)  Motrin (Rash)  penicillin (Hives; Anaphylaxis)  Toradol (Rash)  Tylenol (Hives)    Intolerances    	    REVIEW OF SYSTEMS:  CONSTITUTIONAL: No fever, weight loss, or fatigue  EYES: No eye pain, visual disturbances, or discharge  ENT:  No difficulty hearing, tinnitus, vertigo; No sinus or throat pain  NECK: No pain or stiffness  RESPIRATORY: No cough, wheezing, chills or hemoptysis; No Shortness of Breath  CARDIOVASCULAR: + chest pain, no palpitations, + passing out,+ dizziness  GASTROINTESTINAL: No abdominal or epigastric pain. No nausea, vomiting, or hematemesis; No diarrhea or constipation. No melena or hematochezia.  GENITOURINARY: No dysuria, frequency, hematuria, or incontinence  NEUROLOGICAL: No headaches, memory loss, loss of strength, numbness, or tremors  SKIN: No itching, burning, rashes, or lesions   LYMPH Nodes: No enlarged glands  ENDOCRINE: No heat or cold intolerance; No hair loss  MUSCULOSKELETAL: No joint pain or swelling; No muscle, back, or extremity pain  PSYCHIATRIC: No depression, anxiety, mood swings, or difficulty sleeping  HEME/LYMPH: No easy bruising, or bleeding gums  ALLERGY AND IMMUNOLOGIC: No hives or eczema	      PHYSICAL EXAM:  T(C): 36.4 (07-25-18 @ 10:43), Max: 36.5 (07-25-18 @ 07:15)  HR: 59 (07-25-18 @ 10:43) (56 - 61)  BP: 110/93 (07-25-18 @ 10:43) (110/93 - 125/84)  RR: 18 (07-25-18 @ 10:43) (17 - 18)  SpO2: 98% (07-25-18 @ 10:43) (98% - 100%)  Wt(kg): --  I&O's Summary      Appearance: Normal	  HEENT:   Normal oral mucosa, PERRL, EOMI	  Lymphatic: No lymphadenopathy  Cardiovascular: Normal S1 S2, No JVD, No murmurs, No edema  Respiratory: Lungs clear to auscultation	  Psychiatry: A & O x 3, Mood & affect appropriate  Gastrointestinal:  Soft, Non-tender, + BS	  Skin: No rashes, No ecchymoses, No cyanosis	  Neurologic: Non-focal  Extremities: Normal range of motion, No clubbing, cyanosis or edema  Vascular: Peripheral pulses palpable 2+ bilaterally        ECG:  	not in chart  	  LABS:	 	    CARDIAC MARKERS:  CARDIAC MARKERS ( 25 Jul 2018 05:22 )  <0.015 ng/mL / x     / 153 U/L / x     / 4.3 ng/mL                              15.2   7.8   )-----------( 168      ( 25 Jul 2018 05:22 )             47.0     07-25    140  |  108  |  18  ----------------------------<  76  4.2   |  25  |  1.08    Ca    8.8      25 Jul 2018 05:22      EXAM:  CT BRAIN [facility]                            PROCEDURE DATE:  06/10/2018          INTERPRETATION:  CT brain with and without contrast    HISTORY: Renal and bladder carcinoma, syncope  Comparison noncontrast exam performed 2 days prior  Contrast: Omnipaque 95cc; 5cc discarded    Evaluation demonstrates no evidence of mass-effect or midline shift. No   intraparenchymal mass lesions or hemorrhage is identified. There is   normal physiologic enhancement. There is no evidence of hydrocephalus. No   extra-axial collections are noted.    The bone windows demonstrate no gross osseous abnormalities.        Impression:  1. unremarkable.        OBSERVATIONS:  Mitral Valve: Normal mitral valve.  Aortic Root: Aortic Root: 3.8 cm.    Aortic Valve: Normal trileaflet aortic valve.  Left Ventricle: Normal Left Ventricular Systolic Function,  low normal Normal left ventricularinternal dimensions and  wall thicknesses. Grade II diastolic dysfunction.  Right Heart: Normal right atrium. Normal right ventricular  size and function.pacer wire noted There is moderate  tricuspid regurgitation. Normal pulmonic valve.  Pericardium/PleuraNormal pericardium with no pericardial  effusion.  Hemodynamic: RV systolic pressure is mildly increased at  41 mm Hg.  ------------------------------------------------------------------------  CONCLUSIONS:  1. Normal Left Ventricular Systolic Function,  low normal  2. Grade II diastolic dysfunction.  3. RV systolic pressure is mildly increased at  41 mm Hg.  4. There is moderate tricuspid regurgitation.    ------------------------------------------------------------------------  Confirmed on  6/10/2018 - 11:56:52 by Bruce Ac MD    IMPRESSIONS:Abnormal Study  * Myocardial Perfusion SPECT results are abnormal.  * Diffuse patchy uptake of tracer suggestive of a  nonspecific cardiomyopathy.  * Post-stress gated wall motion analysis was performed  (LVEF = 38 %;LVEDV = 159 ml.), revealing moderate global  hypokinesis.  ------------------------------------------------------------------------  Confirmed on  1/3/2018 - 15:35:28 by Tata Stacy MD  ------------------------------------------------------------------------    EEG Classification:  Abnormal study  - mild to moderate diffuse slowing    Impression:  Findings indicate non-specific mild to moderate diffuse or multifocal cerebral dysfunction. There were no epileptiform abnormalities recorded, which does not exclude the diagnosis of epilepsy.  Consider repeat study if clinically indicated.

## 2018-07-25 NOTE — H&P ADULT - ASSESSMENT
56 yo male with cad s/p 1 stent, systolic CHF, AICD last interrogated in June 2018 inpatient, HTN, PAD, bladder cancer s/p chemo/rad and TURBT, RCC s/p partial nephrectomy and Chemo/radiation, prostate cancer s/p radiation therapy, nephrolithiasis, HCV no treatment, peripheral neuropathy, COPD presents to the ED after he had chest pain , [place]. Patient started having an flank spasm on Left side , followed by mild chest pressure in left side of chest , not radiating to arm/ back after which he lost consciousness and passed out for 2-3 minutes. Witnessed syncope by bystanders , no head trauma , no fall , no injury. Denies any palpitations , nausea , vomiting , headache , dizziness.   Patient was admitted to [facility] in June 2018 with similar complaint of syncope and all work up was negative at that time which includes EEG ,CT head , AICD interrogation.    In ED , BP : 125/ 84 mm hg , HR : 61, Temp : 97.6 F  Cbc wnl   bmp wnl ; T1 negative   EKG : paced rhythm     Will admit to telemetry for ACS rule out and rule out renal stone.

## 2018-07-25 NOTE — CONSULT NOTE ADULT - SUBJECTIVE AND OBJECTIVE BOX
CHIEF COMPLAINT: Patient is a 57y old  Male who presents with a chief complaint of chest pain , syncope (2018 08:50)      HPI:  56 yo male with cad s/p 1 stent, systolic CHF, AICD last interrogated in 2018 inpatient, HTN, PAD, bladder cancer s/p chemo/rad and TURBT, RCC s/p partial nephrectomy and Chemo/radiation, prostate cancer s/p radiation therapy, nephrolithiasis, HCV no treatment, peripheral neuropathy, COPD presents to the ED after he had chest pain , [place]. Patient started having an flank spasm on Left side , followed by mild chest pressure in left side of chest , not radiating to arm/ back after which he lost consciousness and passed out for 2-3 minutes. Witnessed syncope by bystanders , no head trauma , no fall , no injury. Denies any palpitations , nausea , vomiting , headache , dizziness.   Patient was admitted to [facility] in 2018 with similar complaint of syncope and all work up was negative at that time which includes EEG ,CT head , AICD interrogation.    In ED , BP : 125/ 84 mm hg , HR : 61, Temp : 97.6 F  Cbc wnl   bmp wnl ; T1 negative (2018 08:50)   Patient seen and examined.     PAST MEDICAL & SURGICAL HISTORY:  Kidney stone  Nephrolithiasis  Prostate cancer: s/p radiation  Peripheral neuropathy  Bladder cancer  COPD (chronic obstructive pulmonary disease)  Renal cell carcinoma of left kidney: s/p partial nephrectomy in   biopsy again in Sep 2017 showed RCC again in stage 2  Hyperlipidemia  CAD (coronary artery disease): (s/p 2 stents in Sep 2017)  Atrial fibrillation  AICD (automatic cardioverter/defibrillator) present: Medtronic  HTN (hypertension)  Hep C w/o coma, chronic  Chronic congestive heart failure, unspecified congestive heart failure type: rEF/pEF  History of coronary artery stent placement  H/O transurethral destruction of bladder lesion  History of percutaneous coronary intervention  H/O partial nephrectomy:   S/P cholecystectomy:   AICD (automatic cardioverter/defibrillator) present      Allergies    aspirin (Hives)  codeine (Rash)  codeine (Unknown)  Haldol (Unknown)  Motrin (Rash)  penicillin (Hives; Anaphylaxis)  Toradol (Rash)  Tylenol (Hives)    Intolerances        MEDICATIONS  (STANDING):  apixaban 5 milliGRAM(s) Oral every 12 hours  atorvastatin 40 milliGRAM(s) Oral at bedtime  buDESOnide  80 MICROgram(s)/formoterol 4.5 MICROgram(s) Inhaler 2 Puff(s) Inhalation two times a day  clopidogrel Tablet 75 milliGRAM(s) Oral daily  gabapentin 400 milliGRAM(s) Oral three times a day  metoprolol succinate ER 50 milliGRAM(s) Oral daily  nicotine -   7 mG/24Hr(s) Patch 1 patch Transdermal daily  sodium chloride 0.9%. 1000 milliLiter(s) (70 mL/Hr) IV Continuous <Continuous>  tamsulosin 0.4 milliGRAM(s) Oral at bedtime  tiotropium 18 MICROgram(s) Capsule 1 Capsule(s) Inhalation daily      MEDICATIONS  (PRN):  ALBUTerol    90 MICROgram(s) HFA Inhaler 2 Puff(s) Inhalation every 6 hours PRN Bronchospasm  morphine  - Injectable 1 milliGRAM(s) IV Push every 6 hours PRN Moderate Pain (4 - 6)  naproxen 500 milliGRAM(s) Oral every 8 hours PRN mild pain  traMADol 50 milliGRAM(s) Oral every 8 hours PRN Moderate Pain (4 - 6)   Medications up to date at time of exam.    FAMILY HISTORY:  Family history of lung cancer (Father)  Family history of chronic ischemic heart disease      SOCIAL HISTORY  Smoking History: [   ] smoking/smoke exposure, [ x  ] former smoker, [  ] denies smoking  Living Condition: [   ] apartment, [   ] private house  Work History: former   Travel History: denies recent travel  Illicit Substance Use: denies  Alcohol Use: denies    REVIEW OF SYSTEMS:    CONSTITUTIONAL:  denies fevers, chills, sweats, weight loss    HEENT:  denies diplopia or blurred vision, sore throat or runny nose.    CARDIOVASCULAR:  denies pressure, squeezing, tightness, or heaviness about the chest; no palpitations.    RESPIRATORY:  denies SOB, cough, +MCCAIN, wheezing.    GASTROINTESTINAL:  denies abdominal pain, nausea, vomiting or diarrhea.    GENITOURINARY: denies dysuria, frequency or urgency.    NEUROLOGIC:  denies numbness, tingling, seizures or weakness.    PSYCHIATRIC:  denies disorder of thought or mood.    MSK: denies swelling, redness      PHYSICAL EXAMINATION:    GENERAL: The patient is a well-developed, well-nourished, in no apparent distress.     Vital Signs Last 24 Hrs  T(C): 36.4 (2018 10:43), Max: 36.5 (2018 07:15)  T(F): 97.6 (2018 10:43), Max: 97.7 (2018 07:15)  HR: 59 (2018 10:43) (56 - 61)  BP: 110/93 (2018 10:43) (110/93 - 125/84)  BP(mean): --  RR: 18 (2018 10:43) (17 - 18)  SpO2: 98% (2018 10:43) (98% - 100%)   (if applicable)    Chest Tube (if applicable)    HEENT: Head is normocephalic and atraumatic. .    NECK: Supple, no palpable adenopathy.    LUNGS: Clear to auscultation, no wheezing, rales, or rhonchi.    HEART: Regular rate and rhythm without murmur.    ABDOMEN: Soft, nontender, and nondistended.  No hepatosplenomegaly is noted.    EXTREMITIES: Without any cyanosis, clubbing, rash, lesions or edema.    NEUROLOGIC: Awake, alert.    SKIN: Warm, dry, good turgor.      LABS:                        15.2   7.8   )-----------( 168      ( 2018 05:22 )             47.0     07-25    140  |  108  |  18  ----------------------------<  76  4.2   |  25  |  1.08    Ca    8.8      2018 05:22        Urinalysis Basic - ( 2018 06:38 )    Color: Yellow / Appearance: Clear / S.025 / pH: x  Gluc: x / Ketone: Negative  / Bili: Small / Urobili: 4   Blood: x / Protein: 100 / Nitrite: Positive   Leuk Esterase: Trace / RBC: 25-50 /HPF / WBC 3-5 /HPF   Sq Epi: x / Non Sq Epi: Few /HPF / Bacteria: x        CARDIAC MARKERS ( 2018 05:22 )  <0.015 ng/mL / x     / 153 U/L / x     / 4.3 ng/mL                MICROBIOLOGY: (if applicable)    RADIOLOGY & ADDITIONAL STUDIES:  EKG:   CXR:  < from: Xray Chest 1 View-PORTABLE IMMEDIATE (18 @ 07:08) >    EXAM:  XR CHEST PORTABLE IMMED 1V                            PROCEDURE DATE:  2018          INTERPRETATION:  CLINICAL STATEMENT: Chest Pain.    TECHNIQUE: AP view of the chest.    COMPARISON: 2018    FINDINGS/  IMPRESSION:  Right cardiac device.    No consolidation or pleural effusion    Chronic deformity left clavicle    Heart size cannot be accurately assessed in this projection.          RENEA PENA M.D., ATTENDING RADIOLOGIST  This document has been electronically signed. 2018  7:14AM                < end of copied text >    ECHO:    < from: Transthoracic Echocardiogram (18 @ 16:12) >    Patient name: RENEA AVILES  YOB: 1960   Age: 57 (M)   MR#: [id number]  Study Date: 2018  Location: [address] [place], [place] [address]Sonographer: Romana Pisano [facility]  Study quality: Technically difficult  Referring Physician:  GONZALES CURIEL MD  Blood Pressure: 143/108 mmHg  Height: 183 cm  Weight: 82 kg  BSA: 2 m2  ------------------------------------------------------------------------    PROCEDURE: Transthoracic echocardiogram with 2-D, M-Mode  and complete spectral and color flow Doppler.  INDICATION: Syncope and Collapse (R55)  HISTORY:  ------------------------------------------------------------------------  DIMENSIONS:  Dimensions:     Normal Values:  LA:     4.4 cm    2.0 - 4.0 cm  Ao:     3.8 cm    2.0 - 3.8 cm  SEPTUM: 1.5 cm    0.6 -1.2 cm  PWT:    1.2 cm    0.6 - 1.1 cm  LVIDd:  6.0 cm    3.0 - 5.6 cm  LVIDs:  4.5 cm    1.8 - 4.0 cm      Derived Variables:  LVMI: 181 g/m2  RWT: 0.40  Ejection Fraction Visual Estimate: 50 %    ------------------------------------------------------------------------  OBSERVATIONS:  Mitral Valve: Normal mitral valve.  Aortic Root: Aortic Root: 3.8 cm.    Aortic Valve: Normal trileaflet aortic valve.  Left Ventricle: Normal Left Ventricular Systolic Function,  low normal Normal left ventricularinternal dimensions and  wall thicknesses. Grade II diastolic dysfunction.  Right Heart: Normal right atrium. Normal right ventricular  size and function.pacer wire noted There is moderate  tricuspid regurgitation. Normal pulmonic valve.  Pericardium/PleuraNormal pericardium with no pericardial  effusion.  Hemodynamic: RV systolic pressure is mildly increased at  41 mm Hg.  ------------------------------------------------------------------------  CONCLUSIONS:  1. Normal Left Ventricular Systolic Function,  low normal  2. Grade II diastolic dysfunction.  3. RV systolic pressure is mildly increased at  41 mm Hg.  4. There is moderate tricuspid regurgitation.    ------------------------------------------------------------------------  Confirmed on  6/10/2018 - 11:56:52 by Bruce Ac MD  ------------------------------------------------------------------------    < end of copied text >      IMPRESSION: 57y Male PAST MEDICAL & SURGICAL HISTORY:  Kidney stone  Nephrolithiasis  Prostate cancer: s/p radiation  Peripheral neuropathy  Bladder cancer  COPD (chronic obstructive pulmonary disease)  Renal cell carcinoma of left kidney: s/p partial nephrectomy in   biopsy again in Sep 2017 showed RCC again in stage 2  Hyperlipidemia  CAD (coronary artery disease): (s/p 2 stents in Sep 2017)  Atrial fibrillation  AICD (automatic cardioverter/defibrillator) present: [employer]tr[employer]  HTN (hypertension)  Hep C w/o coma, chronic  Chronic congestive heart failure, unspecified congestive heart failure type: rEF/pEF  History of coronary artery stent placement  H/O transurethral destruction of bladder lesion  History of percutaneous coronary intervention  H/O partial nephrectomy:   S/P cholecystectomy:   AICD (automatic cardioverter/defibrillator) present     56 yo male with cad s/p 1 stent, systolic CHF, AICD last interrogated in 2018 inpatient, HTN, PAD, bladder cancer s/p chemo/rad and TURBT, RCC s/p partial nephrectomy and Chemo/radiation, prostate cancer s/p radiation therapy, nephrolithiasis, HCV no treatment, peripheral neuropathy, COPD presents to the ED after he had chest pain , [place] Patient started having an flank spasm on Left side , followed by mild chest pressure in left side of chest , not radiating to arm/ back after which he lost consciousness and passed out for 2-3 minutes. Witnessed syncope by bystanders , no head trauma , no fall , no injury.    SOB, etiology unclear    +syncope  +hematuria UA    Patient was recently admitted for similar issue, was d/c and supposed to f/u with cardiology for additional ICD lead placement.    SUGGESTION:     - con't with bronchodilators, o2 supplement as needed.    - CXR pending, old CXR as above   - nicotine patch    - cigarette cessation discussed with patient.    - DVT and GI prophylaxis   - patient is supposed to be getting ICD upgrade, states he has not made an appointment as of yet. CHIEF COMPLAINT: Patient is a 57y old  Male who presents with a chief complaint of chest pain , syncope (2018 08:50)      HPI:  56 yo male with cad s/p 1 stent, systolic CHF, AICD last interrogated in 2018 inpatient, HTN, PAD, bladder cancer s/p chemo/rad and TURBT, RCC s/p partial nephrectomy and Chemo/radiation, prostate cancer s/p radiation therapy, nephrolithiasis, HCV no treatment, peripheral neuropathy, COPD presents to the ED after he had chest pain , [place] Patient started having an flank spasm on Left side , followed by mild chest pressure in left side of chest , not radiating to arm/ back after which he lost consciousness and passed out for 2-3 minutes. Witnessed syncope by bystanders , no head trauma , no fall , no injury. Denies any palpitations , nausea , vomiting , headache , dizziness.   Patient was admitted to [facility] in 2018 with similar complaint of syncope and all work up was negative at that time which includes EEG ,CT head , AICD interrogation.    In ED , BP : 125/ 84 mm hg , HR : 61, Temp : 97.6 F  Cbc wnl   bmp wnl ; T1 negative (2018 08:50)   Patient seen and examined.     PAST MEDICAL & SURGICAL HISTORY:  Kidney stone  Nephrolithiasis  Prostate cancer: s/p radiation  Peripheral neuropathy  Bladder cancer  COPD (chronic obstructive pulmonary disease)  Renal cell carcinoma of left kidney: s/p partial nephrectomy in   biopsy again in Sep 2017 showed RCC again in stage 2  Hyperlipidemia  CAD (coronary artery disease): (s/p 2 stents in Sep 2017)  Atrial fibrillation  AICD (automatic cardioverter/defibrillator) present: [employer]tronic  HTN (hypertension)  Hep C w/o coma, chronic  Chronic congestive heart failure, unspecified congestive heart failure type: rEF/pEF  History of coronary artery stent placement  H/O transurethral destruction of bladder lesion  History of percutaneous coronary intervention  H/O partial nephrectomy:   S/P cholecystectomy:   AICD (automatic cardioverter/defibrillator) present      Allergies    aspirin (Hives)  codeine (Rash)  codeine (Unknown)  Haldol (Unknown)  Motrin (Rash)  penicillin (Hives; Anaphylaxis)  Toradol (Rash)  Tylenol (Hives)    Intolerances        MEDICATIONS  (STANDING):  apixaban 5 milliGRAM(s) Oral every 12 hours  atorvastatin 40 milliGRAM(s) Oral at bedtime  buDESOnide  80 MICROgram(s)/formoterol 4.5 MICROgram(s) Inhaler 2 Puff(s) Inhalation two times a day  clopidogrel Tablet 75 milliGRAM(s) Oral daily  gabapentin 400 milliGRAM(s) Oral three times a day  metoprolol succinate ER 50 milliGRAM(s) Oral daily  nicotine -   7 mG/24Hr(s) Patch 1 patch Transdermal daily  sodium chloride 0.9%. 1000 milliLiter(s) (70 mL/Hr) IV Continuous <Continuous>  tamsulosin 0.4 milliGRAM(s) Oral at bedtime  tiotropium 18 MICROgram(s) Capsule 1 Capsule(s) Inhalation daily      MEDICATIONS  (PRN):  ALBUTerol    90 MICROgram(s) HFA Inhaler 2 Puff(s) Inhalation every 6 hours PRN Bronchospasm  morphine  - Injectable 1 milliGRAM(s) IV Push every 6 hours PRN Moderate Pain (4 - 6)  naproxen 500 milliGRAM(s) Oral every 8 hours PRN mild pain  traMADol 50 milliGRAM(s) Oral every 8 hours PRN Moderate Pain (4 - 6)   Medications up to date at time of exam.    FAMILY HISTORY:  Family history of lung cancer (Father)  Family history of chronic ischemic heart disease      SOCIAL HISTORY  Smoking History: [   ] smoking/smoke exposure, [ x  ] former smoker, [  ] denies smoking  Living Condition: [   ] apartment, [   ] private house  Work History: former   Travel History: denies recent travel  Illicit Substance Use: denies  Alcohol Use: denies    REVIEW OF SYSTEMS:    CONSTITUTIONAL:  denies fevers, chills, sweats, weight loss    HEENT:  denies diplopia or blurred vision, sore throat or runny nose.    CARDIOVASCULAR:  denies pressure, squeezing, tightness, or heaviness about the chest; no palpitations.    RESPIRATORY:  denies SOB, cough, +MCCAIN, wheezing.    GASTROINTESTINAL:  denies abdominal pain, nausea, vomiting or diarrhea.    GENITOURINARY: denies dysuria, frequency or urgency.    NEUROLOGIC:  denies numbness, tingling, seizures or weakness.    PSYCHIATRIC:  denies disorder of thought or mood.    MSK: denies swelling, redness      PHYSICAL EXAMINATION:    GENERAL: The patient is a well-developed, well-nourished, in no apparent distress.     Vital Signs Last 24 Hrs  T(C): 36.4 (2018 10:43), Max: 36.5 (2018 07:15)  T(F): 97.6 (2018 10:43), Max: 97.7 (2018 07:15)  HR: 59 (2018 10:43) (56 - 61)  BP: 110/93 (2018 10:43) (110/93 - 125/84)  BP(mean): --  RR: 18 (2018 10:43) (17 - 18)  SpO2: 98% (2018 10:43) (98% - 100%)   (if applicable)    Chest Tube (if applicable)    HEENT: Head is normocephalic and atraumatic. .    NECK: Supple, no palpable adenopathy.    LUNGS: Clear to auscultation, no wheezing, rales, or rhonchi.    HEART: Regular rate and rhythm without murmur.    ABDOMEN: Soft, nontender, and nondistended.  No hepatosplenomegaly is noted.    EXTREMITIES: Without any cyanosis, clubbing, rash, lesions or edema.    NEUROLOGIC: Awake, alert.    SKIN: Warm, dry, good turgor.      LABS:                        15.2   7.8   )-----------( 168      ( 2018 05:22 )             47.0     07-25    140  |  108  |  18  ----------------------------<  76  4.2   |  25  |  1.08    Ca    8.8      2018 05:22        Urinalysis Basic - ( 2018 06:38 )    Color: Yellow / Appearance: Clear / S.025 / pH: x  Gluc: x / Ketone: Negative  / Bili: Small / Urobili: 4   Blood: x / Protein: 100 / Nitrite: Positive   Leuk Esterase: Trace / RBC: 25-50 /HPF / WBC 3-5 /HPF   Sq Epi: x / Non Sq Epi: Few /HPF / Bacteria: x        CARDIAC MARKERS ( 2018 05:22 )  <0.015 ng/mL / x     / 153 U/L / x     / 4.3 ng/mL                MICROBIOLOGY: (if applicable)    RADIOLOGY & ADDITIONAL STUDIES:  EKG:   CXR:  < from: Xray Chest 1 View-PORTABLE IMMEDIATE (18 @ 07:08) >    EXAM:  XR CHEST PORTABLE IMMED 1V                            PROCEDURE DATE:  2018          INTERPRETATION:  CLINICAL STATEMENT: Chest Pain.    TECHNIQUE: AP view of the chest.    COMPARISON: 2018    FINDINGS/  IMPRESSION:  Right cardiac device.    No consolidation or pleural effusion    Chronic deformity left clavicle    Heart size cannot be accurately assessed in this projection.          RENEA PENA M.D., ATTENDING RADIOLOGIST  This document has been electronically signed. 2018  7:14AM                < end of copied text >    ECHO:    < from: Transthoracic Echocardiogram (18 @ 16:12) >    Patient name: RENEA AVILES  YOB: 1960   Age: 57 (M)   MR#: [id number]  Study Date: 2018  Location: [address] [place], [place] [address]Sonographer: Romana Pisano [facility]  Study quality: Technically difficult  Referring Physician:  GONZALES CURIEL MD  Blood Pressure: 143/108 mmHg  Height: 183 cm  Weight: 82 kg  BSA: 2 m2  ------------------------------------------------------------------------    PROCEDURE: Transthoracic echocardiogram with 2-D, M-Mode  and complete spectral and color flow Doppler.  INDICATION: Syncope and Collapse (R55)  HISTORY:  ------------------------------------------------------------------------  DIMENSIONS:  Dimensions:     Normal Values:  LA:     4.4 cm    2.0 - 4.0 cm  Ao:     3.8 cm    2.0 - 3.8 cm  SEPTUM: 1.5 cm    0.6 -1.2 cm  PWT:    1.2 cm    0.6 - 1.1 cm  LVIDd:  6.0 cm    3.0 - 5.6 cm  LVIDs:  4.5 cm    1.8 - 4.0 cm      Derived Variables:  LVMI: 181 g/m2  RWT: 0.40  Ejection Fraction Visual Estimate: 50 %    ------------------------------------------------------------------------  OBSERVATIONS:  Mitral Valve: Normal mitral valve.  Aortic Root: Aortic Root: 3.8 cm.    Aortic Valve: Normal trileaflet aortic valve.  Left Ventricle: Normal Left Ventricular Systolic Function,  low normal Normal left ventricularinternal dimensions and  wall thicknesses. Grade II diastolic dysfunction.  Right Heart: Normal right atrium. Normal right ventricular  size and function.pacer wire noted There is moderate  tricuspid regurgitation. Normal pulmonic valve.  Pericardium/PleuraNormal pericardium with no pericardial  effusion.  Hemodynamic: RV systolic pressure is mildly increased at  41 mm Hg.  ------------------------------------------------------------------------  CONCLUSIONS:  1. Normal Left Ventricular Systolic Function,  low normal  2. Grade II diastolic dysfunction.  3. RV systolic pressure is mildly increased at  41 mm Hg.  4. There is moderate tricuspid regurgitation.    ------------------------------------------------------------------------  Confirmed on  6/10/2018 - 11:56:52 by Bruce Ac MD  ------------------------------------------------------------------------    < end of copied text >      IMPRESSION: 57y Male PAST MEDICAL & SURGICAL HISTORY:  Kidney stone  Nephrolithiasis  Prostate cancer: s/p radiation  Peripheral neuropathy  Bladder cancer  COPD (chronic obstructive pulmonary disease)  Renal cell carcinoma of left kidney: s/p partial nephrectomy in   biopsy again in Sep 2017 showed RCC again in stage 2  Hyperlipidemia  CAD (coronary artery disease): (s/p 2 stents in Sep 2017)  Atrial fibrillation  AICD (automatic cardioverter/defibrillator) present: [employer]  HTN (hypertension)  Hep C w/o coma, chronic  Chronic congestive heart failure, unspecified congestive heart failure type: rEF/pEF  History of coronary artery stent placement  H/O transurethral destruction of bladder lesion  History of percutaneous coronary intervention  H/O partial nephrectomy:   S/P cholecystectomy:   AICD (automatic cardioverter/defibrillator) present     56 yo male with cad s/p 1 stent, systolic CHF, AICD last interrogated in 2018 inpatient, HTN, PAD, bladder cancer s/p chemo/rad and TURBT, RCC s/p partial nephrectomy and Chemo/radiation, prostate cancer s/p radiation therapy, nephrolithiasis, HCV no treatment, peripheral neuropathy, COPD presents to the ED after he had chest pain , [place] Patient started having an flank spasm on Left side , followed by mild chest pressure in left side of chest , not radiating to arm/ back after which he lost consciousness and passed out for 2-3 minutes. Witnessed syncope by bystanders , no head trauma , no fall , no injury.    SOB, etiology unclear    +syncope  +hematuria UA    Patient was recently admitted for similar issue, was d/c and supposed to f/u with cardiology for additional ICD lead placement.    SUGGESTION:     - con't with bronchodilators, o2 supplement as needed.    - CXR pending, old CXR as above   - nicotine patch    - cigarette cessation discussed with patient.    - DVT and GI prophylaxis   - patient is supposed to be getting ICD upgrade, states he has not made an appointment as of yet.  Agree with above assessment and plan as transcribed.

## 2018-07-25 NOTE — H&P ADULT - NSHPSOCIALHISTORY_GEN_ALL_CORE
1-2 PPD for 20 years, cut down to 6 cigarettes per day for the last year  Denied alcohol and illicit drug use

## 2018-07-25 NOTE — ED ADULT NURSE NOTE - OBJECTIVE STATEMENT
57 years old male presented to ED c/o flank pains, which seems to be worsening. P/S 8-9/10. ED physician is aware of patient. 57 years old male presented to ED c/o left flank pains, which started suddenly and seems to be worsening. P/S 8-9/10. ED physician is aware of patient.

## 2018-07-25 NOTE — ED ADULT NURSE REASSESSMENT NOTE - NS ED NURSE REASSESS COMMENT FT1
assumed care of pt from previous RN Pola. pt a&ox3 in NAD. denies CP or any other complaints. admitted for ACS. will continue to monitor and provide care as needed.

## 2018-07-25 NOTE — H&P ADULT - NSHPPHYSICALEXAM_GEN_ALL_CORE
GENERAL: NAD  HEENT: Normocephalic;  conjunctivae and sclerae clear; moist mucous membranes;   NECK : supple  CHEST/LUNG: Clear to auscultation bilaterally with good air entry   HEART: S1 S2  regular; no murmurs, gallops or rubs  ABDOMEN: Soft, Nontender, Nondistended; Bowel sounds present  EXTREMITIES: no cyanosis; no edema; no calf tenderness  SKIN: warm and dry; no rash  NERVOUS SYSTEM:  Awake and alert; Oriented  to place, person and time ; no new deficits

## 2018-07-25 NOTE — H&P ADULT - PROBLEM SELECTOR PLAN 1
[address] 
[phone] Patient: [employer] MRN: [id number] Visit Information Date & Time Provider Department Dept. Phone Encounter #  
 1/16/2018 10:00 AM Mitesh Munroe  [facility] Oncology at [address] [facility] [phone] [id number] Upcoming Health Maintenance Date Due  
 HPV AGE 9Y-34Y (1 of 3 - Female 3 Dose Series) 9/23/2002 Pneumococcal 19-64 Highest Risk (1 of 3 - PCV13) 9/23/2010 PAP AKA CERVICAL CYTOLOGY 9/23/2012 Influenza Age 5 to Adult 8/1/2017 DTaP/Tdap/Td series (2 - Td) 6/7/2026 Allergies as of 1/16/2018  Review Complete On: 1/16/2018 By: Allan Ulloa No Known Allergies Current Immunizations  Never Reviewed Name Date Tdap 6/7/2016  9:21 PM  
  
 Not reviewed this visit You Were Diagnosed With   
  
 Codes Comments Neutrophilia    -  Primary ICD-10-CM: D72.9 ICD-9-CM: 210. 8 Vitals BP Pulse Temp Resp Height(growth percentile) Weight(growth percentile) 125/80 74 97.7 °F (36.5 °C) 16 5' 4\" (1.626 m) 209 lb 9.6 oz (95.1 kg) LMP SpO2 BMI OB Status Smoking Status 11/01/2017 (Approximate) 96% 35.98 kg/m2 Unknown Never Smoker Vitals History BMI and BSA Data Body Mass Index Body Surface Area 35.98 kg/m 2 2.07 m 2 Preferred Pharmacy Pharmacy Name Phone [facility] [address] [address] [address] [phone] Your Updated Medication List  
  
   
This list is accurate as of: 1/16/18 10:38 AM.  Always use your most recent med list. amLODIPine 5 mg tablet Commonly known as:  Josie Roshan TK 1 T PO QD  
  
 benztropine 0.5 mg tablet Commonly known as:  COGENTIN  
TK 1 T PO BID  
  
 busPIRone 10 mg tablet Commonly known as:  BUSPAR TK 1 T PO BID  
  
 clonazePAM 0.5 mg tablet Commonly known as:  Zetta David  
 TK 1 T PO  QD PRN  
  
 ferrous sulfate 325 mg (65 mg iron) tablet TK 1 T PO 1 TIME A DAY  
  
 * haloperidol 1 mg tablet Commonly known as:  HALDOL TK 1 T PO BID * haloperidol 2 mg tablet Commonly known as:  HALDOL TK 1 T PO BID * haloperidol 5 mg tablet Commonly known as:  HALDOL TK 1 T PO BID  
  
 hydroCHLOROthiazide 12.5 mg capsule Commonly known as:  Ivana Bellevue TK 1 C PO QD  
  
 hydrOXYzine pamoate 50 mg capsule Commonly known as:  VISTARIL TK 1 C PO BID PRN INVEGA SUSTENNA 156 mg/mL injection Generic drug:  paliperidone palmitate 117 mg by IntraMUSCular route once. Every 4 weeks  
  
 sertraline 100 mg tablet Commonly known as:  ZOLOFT  
TK 1 T PO QD  
  
 traZODone 150 mg tablet Commonly known as:  DESYREL  
TK 1 T PO HS PRN  
  
 * Notice: This list has 3 medication(s) that are the same as other medications prescribed for you. Read the directions carefully, and ask your doctor or other care provider to review them with you. To-Do List   
 Every 6 Months Lab:  CBC WITH AUTOMATED DIFF Introducing [facility] SERVICES! Rocio Gary introduces [employer] patient portal. Now you can access parts of your medical record, email your doctor's office, and request medication refills online. 1. In your internet browser, go to [email or web address] 2. Click on the First Time User? Click Here link in the Sign In box. You will see the New Member Sign Up page. 3. Enter your [employer] Access Code exactly as it appears below. You will not need to use this code after youve completed the sign-up process. If you do not sign up before the expiration date, you must request a new code. · [employer] Access Code: [email or web address] Expires: 4/16/2018 10:38 AM 
 
4. Enter the last four digits of your Social Security Number (xxxx) and Date of Birth (mm/dd/yyyy) as indicated and click Submit. You will be taken to the next sign-up page. 5. Create a [employer] ID. This will be your [employer] login ID and cannot be changed, so think of one that is secure and easy to remember. 6. Create a [employer] password. You can change your password at any time. 7. Enter your Password Reset Question and Answer. This can be used at a later time if you forget your password. 8. Enter your e-mail address. You will receive e-mail notification when new information is available in [address] 9. Click Sign Up. You can now view and download portions of your medical record. 10. Click the Download Summary menu link to download a portable copy of your medical information. If you have questions, please visit the Frequently Asked Questions section of the [employer] website. Remember, [employer] is NOT to be used for urgent needs. For medical emergencies, dial 911. Now available from your iPhone and Android! Please provide this summary of care documentation to your next provider. Your primary care clinician is listed as Marichuy. If you have any questions after today's visit, please call [phone]. Comes in with chest pain and syncope   All neurological workup has been negative recently including CT head and EEG , less likely neurological , no focal deficit on exam   Risk factors for cardiogenic syncope : A fib , CAD   Will monitor on tele for a day , trend troponin , T1 negative   EKG : paced rhythm   Interrogate AICD , patient felt a shock about 1 week back  Will continue with aspirin ,statin , b-blocker from home   Could also be vasovagal, patient said did not eat anything before the episode or from pain , as patient is complaining of flank spasm   Orthostatics checked at bedside negative   Echo done in June 2018 shows EF of 50 % with grade 2 dd , moderate TR Comes in with chest pain and syncope   All neurological workup has been negative recently including CT head and EEG , less likely neurological , no focal deficit on exam   Risk factors for cardiogenic syncope : A fib , CAD   Will monitor on tele for a day , trend troponin , T1 negative   EKG : paced rhythm   Interrogate AICD , patient felt a shock about 1 week back  Will continue with aspirin ,statin , b-blocker from home   Could also be vasovagal, patient said did not eat anything before the episode or from pain , as patient is complaining of flank spasm   Orthostatics checked at bedside negative   Echo done in June 2018 shows EF of 50 % with grade 2 dd , moderate TR  Will consult neuro Dr. Quach and Cardio, Dr. Harper

## 2018-07-25 NOTE — H&P ADULT - FAMILY HISTORY
Family history of chronic ischemic heart disease     Father  Still living? Unknown  Family history of lung cancer, Age at diagnosis: Age Unknown

## 2018-07-25 NOTE — H&P ADULT - PROBLEM SELECTOR PLAN 3
Patient has grade 2 diastolic dysfunction  Will continue with home meds   Not in exacerbation at this time   Should get yearly ECHO

## 2018-07-25 NOTE — CONSULT NOTE ADULT - ASSESSMENT
57 year old male, from home, ambulates with a walker. PMHx opioid dependence, HLD, HTN, CAD (stents x2 [Sep/17]) Afib (Eliquis), AICD (Medtronic), EF improved from 10 percent to 45 percent as per previous records.  RCC of left kidney (s/p Partial nephrectomy in 2002, repeated biopsy 2 months ago revealed recurrence in stage 2) presented to ED with sharp left chest pain and syncope    1.Pt with recent negative stress test and negative trponin-doubt chest pain is cardiac.  2.PAF-Eliquis.  3.CAD and CHF-continue cardiac medication.  4.PPI.  5.AICD interrogation.  6.Orthostatic bp q shift.

## 2018-07-25 NOTE — ED PROVIDER NOTE - MEDICAL DECISION MAKING DETAILS
Pt with multiple cardiac risk factors: Afib, s/p pacemaker, AICD, HTN, smoker age.  MAR and Dr. Howard endorsed. Pt agrees with admission. I had a detailed discussion with the patient and/or guardian regarding the historical points, exam findings, and any diagnostic results supporting the admit diagnosis.

## 2018-07-25 NOTE — H&P ADULT - NSHPLABSRESULTS_GEN_ALL_CORE
15.2   7.8   )-----------( 168      ( 25 Jul 2018 05:22 )             47.0     07-25    140  |  108  |  18  ----------------------------<  76  4.2   |  25  |  1.08    Ca    8.8      25 Jul 2018 05:22        < from: Xray Chest 1 View-PORTABLE IMMEDIATE (07.25.18 @ 07:08) >    FINDINGS/  IMPRESSION:  Right cardiac device.    No consolidation or pleural effusion    Chronic deformity left clavicle    Heart size cannot be accurately assessed in this projection.      < end of copied text >

## 2018-07-25 NOTE — H&P ADULT - PROBLEM SELECTOR PLAN 8
IMPROVE VTE Individual Risk Assessment          RISK                                                          Points  [  ] Previous VTE                                                3  [  ] Thrombophilia                                             2  [  ] Lower limb paralysis                                   2        (unable to hold up >15 seconds)    [  ] Current Cancer                                             2         (within 6 months)  [x  ] Immobilization > 24 hrs                              1  [  ] ICU/CCU stay > 24 hours                             1  [  ] Age > 60                                                         1    IMPROVE VTE Score: 1  No indication of DVT PPX

## 2018-07-25 NOTE — H&P ADULT - HISTORY OF PRESENT ILLNESS
56 yo male with cad s/p 1 stent, systolic CHF, AICD last interrogated in June 2018 inpatient, HTN, PAD, bladder cancer s/p chemo/rad and TURBT, RCC s/p partial nephrectomy and Chemo/radiation, prostate cancer s/p radiation therapy, nephrolithiasis, HCV no treatment, peripheral neuropathy, COPD presents to the ED after he had chest pain , [place]. Patient started having an flank spasm on Left side , followed by mild chest pressure in left side of chest , not radiating to arm/ back after which he lost consciousness and passed out for 2-3 minutes. Witnessed syncope by bystanders , no head trauma , no fall , no injury. Denies any palpitations , nausea , vomiting , headache , dizziness.   Patient was admitted to [facility] in June 2018 with similar complaint of syncope and all work up was negative at that time which includes EEG ,CT head , AICD interrogation.    In ED , BP : 125/ 84 mm hg , HR : 61, Temp : 97.6 F  Cbc wnl   bmp wnl ; T1 negative

## 2018-07-25 NOTE — ED PROVIDER NOTE - CROS ED MARK PERT SYS NEG
[address]  Authorization for Surgical Operation and Procedure                                                                                           I hereby authorize Isaiah Villagran MD, my physician and his/her assistants (if applicable), which may include medical students, residents, and/or fellows, to perform the following surgical operation/ procedure and administer such anesthesia as may be determined necessary by my physician: Operation/Procedure name (s) RIGHT FEMUR/ HIP OPEN REDUCTION INTERNAL FIXATION on Merline Waldron   2. I recognize that during the surgical operation/procedure, unforeseen conditions may necessitate additional or different procedures than those listed above. I, therefore, further authorize and request that the above-named surgeon, assistants, or designees perform such procedures as are, in their judgment, necessary and desirable. 3.   My surgeon/physician has discussed prior to my surgery the potential benefits, risks and side effects of this procedure; the likelihood of achieving goals; and potential problems that might occur during recuperation. They also discussed reasonable alternatives to the procedure, including risks, benefits, and side effects related to the alternatives and risks related to not receiving this procedure. I have had all my questions answered and I acknowledge that no guarantee has been made as to the result that may be obtained. 4.   Should the need arise during my operation/procedure, which includes change of level of care prior to discharge, I also consent to the administration of blood and/or blood products. Further, I understand that despite careful testing and screening of blood or blood products by collecting agencies, I may still be subject to ill effects as a result of receiving a blood transfusion and/or blood products.   The following are some, but not all, of the potential risks that can occur: fever and allergic reactions, hemolytic reactions, transmission of diseases such as Hepatitis, AIDS and Cytomegalovirus (CMV) and fluid overload. In the event that I wish to have an autologous transfusion of my own blood, or a directed donor transfusion, I will discuss this with my physician. Check only if Refusing Blood or Blood Products  I understand refusal of blood or blood products as deemed necessary by my physician may have serious consequences to my condition to include possible death. I hereby assume responsibility for my refusal and release the hospital, its personnel, and my physicians from any responsibility for the consequences of my refusal.    o  Refuse   5. I authorize the use of any specimen, organs, tissues, body parts or foreign objects that may be removed from my body during the operation/procedure for diagnosis, research or teaching purposes and their subsequent disposal by hospital authorities. I also authorize the release of specimen test results and/or written reports to my treating physician on the hospital medical staff or other referring or consulting physicians involved in my care, at the discretion of the Pathologist or my treating physician. 6.   I consent to the photographing or videotaping of the operations or procedures to be performed, including appropriate portions of my body for medical, scientific, or educational purposes, provided my identity is not revealed by the pictures or by descriptive texts accompanying them. If the procedure has been photographed/videotaped, the surgeon will obtain the original picture, image, videotape or CD. The hospital will not be responsible for storage, release or maintenance of the picture, image, tape or CD.    7.   I consent to the presence of a  or observers in the operating room as deemed necessary by my physician or their designees.     8.   I recognize that in the event my procedure results in extended X-Ray/fluoroscopy time, I may develop a skin reaction. 9. If I have a Do Not Attempt Resuscitation (DNAR) order in place, that status will be suspended while in the operating room, procedural suite, and during the recovery period unless otherwise explicitly stated by me (or a person authorized to consent on my behalf). The surgeon or my attending physician will determine when the applicable recovery period ends for purposes of reinstating the DNAR order. 10. Patients having a sterilization procedure: I understand that if the procedure is successful the results will be permanent and it will therefore be impossible for me to inseminate, conceive, or bear children. I also understand that the procedure is intended to result in sterility, although the result has not been guaranteed. 11. I acknowledge that my physician has explained sedation/analgesia administration to me including the risk and benefits I consent to the administration of sedation/analgesia as may be necessary or desirable in the judgment of my physician. I CERTIFY THAT I HAVE READ AND FULLY UNDERSTAND THE ABOVE CONSENT TO OPERATION and/or OTHER PROCEDURE.     _________________________________________ _________________________________     ___________________________________  Signature of Patient     Signature of Responsible Person                   Printed Name of Responsible Person                              _________________________________________ ______________________________        ___________________________________  Signature of Witness         Date  Time         Relationship to Patient    STATEMENT OF PHYSICIAN My signature below affirms that prior to the time of the procedure; I have explained to the patient and/or his/her legal representative, the risks and benefits involved in the proposed treatment and any reasonable alternative to the proposed treatment.  I have also explained the risks and benefits involved in refusal of the proposed treatment and alternatives to the proposed treatment and have answered the patient's questions.  If I have a significant financial interest in a co-management agreement or a significant financial interest in any product or implant, or other significant relationship used in this procedure/surgery, I have disclosed this and had a discussion with my patient.     _______________________________________________________________ _____________________________  Brad Orf of Physician)                                                                                         (Date)                                   (Time)  Patient Name: Sandra Gordon    : 10/6/1941   Printed: 2023      Medical Record #: [id number]                                              Page 1 of 1 michelle all pertinent systems negative

## 2018-07-25 NOTE — H&P ADULT - PROBLEM SELECTOR PLAN 2
Patient has a h/o stone and complaining of spasm on Left side   will get Ultrasound KUB to rule out stone   Continue with hydration

## 2018-07-26 ENCOUNTER — TRANSCRIPTION ENCOUNTER (OUTPATIENT)
Age: 58
End: 2018-07-26

## 2018-07-26 VITALS — WEIGHT: 213.85 LBS

## 2018-07-26 DIAGNOSIS — M54.9 DORSALGIA, UNSPECIFIED: ICD-10-CM

## 2018-07-26 DIAGNOSIS — R10.9 UNSPECIFIED ABDOMINAL PAIN: ICD-10-CM

## 2018-07-26 LAB
24R-OH-CALCIDIOL SERPL-MCNC: 26.9 NG/ML — LOW (ref 30–80)
ANION GAP SERPL CALC-SCNC: 7 MMOL/L — SIGNIFICANT CHANGE UP (ref 5–17)
BASOPHILS # BLD AUTO: 0 K/UL — SIGNIFICANT CHANGE UP (ref 0–0.2)
BASOPHILS NFR BLD AUTO: 0.5 % — SIGNIFICANT CHANGE UP (ref 0–2)
BUN SERPL-MCNC: 25 MG/DL — HIGH (ref 7–18)
CALCIUM SERPL-MCNC: 8.8 MG/DL — SIGNIFICANT CHANGE UP (ref 8.4–10.5)
CHLORIDE SERPL-SCNC: 107 MMOL/L — SIGNIFICANT CHANGE UP (ref 96–108)
CO2 SERPL-SCNC: 25 MMOL/L — SIGNIFICANT CHANGE UP (ref 22–31)
CREAT SERPL-MCNC: 1.14 MG/DL — SIGNIFICANT CHANGE UP (ref 0.5–1.3)
EOSINOPHIL # BLD AUTO: 0.1 K/UL — SIGNIFICANT CHANGE UP (ref 0–0.5)
EOSINOPHIL NFR BLD AUTO: 2.3 % — SIGNIFICANT CHANGE UP (ref 0–6)
GLUCOSE SERPL-MCNC: 115 MG/DL — HIGH (ref 70–99)
HCT VFR BLD CALC: 46.6 % — SIGNIFICANT CHANGE UP (ref 39–50)
HGB BLD-MCNC: 15.2 G/DL — SIGNIFICANT CHANGE UP (ref 13–17)
LYMPHOCYTES # BLD AUTO: 1.6 K/UL — SIGNIFICANT CHANGE UP (ref 1–3.3)
LYMPHOCYTES # BLD AUTO: 24.8 % — SIGNIFICANT CHANGE UP (ref 13–44)
MAGNESIUM SERPL-MCNC: 2.2 MG/DL — SIGNIFICANT CHANGE UP (ref 1.6–2.6)
MCHC RBC-ENTMCNC: 29.1 PG — SIGNIFICANT CHANGE UP (ref 27–34)
MCHC RBC-ENTMCNC: 32.6 GM/DL — SIGNIFICANT CHANGE UP (ref 32–36)
MCV RBC AUTO: 89.3 FL — SIGNIFICANT CHANGE UP (ref 80–100)
MONOCYTES # BLD AUTO: 0.8 K/UL — SIGNIFICANT CHANGE UP (ref 0–0.9)
MONOCYTES NFR BLD AUTO: 11.9 % — SIGNIFICANT CHANGE UP (ref 2–14)
NEUTROPHILS # BLD AUTO: 4 K/UL — SIGNIFICANT CHANGE UP (ref 1.8–7.4)
NEUTROPHILS NFR BLD AUTO: 60.6 % — SIGNIFICANT CHANGE UP (ref 43–77)
PHOSPHATE SERPL-MCNC: 3.4 MG/DL — SIGNIFICANT CHANGE UP (ref 2.5–4.5)
PLATELET # BLD AUTO: 138 K/UL — LOW (ref 150–400)
POTASSIUM SERPL-MCNC: 4.1 MMOL/L — SIGNIFICANT CHANGE UP (ref 3.5–5.3)
POTASSIUM SERPL-SCNC: 4.1 MMOL/L — SIGNIFICANT CHANGE UP (ref 3.5–5.3)
RBC # BLD: 5.22 M/UL — SIGNIFICANT CHANGE UP (ref 4.2–5.8)
RBC # FLD: 12.4 % — SIGNIFICANT CHANGE UP (ref 10.3–14.5)
SODIUM SERPL-SCNC: 139 MMOL/L — SIGNIFICANT CHANGE UP (ref 135–145)
VIT B12 SERPL-MCNC: 630 PG/ML — SIGNIFICANT CHANGE UP (ref 232–1245)
WBC # BLD: 6.6 K/UL — SIGNIFICANT CHANGE UP (ref 3.8–10.5)
WBC # FLD AUTO: 6.6 K/UL — SIGNIFICANT CHANGE UP (ref 3.8–10.5)

## 2018-07-26 PROCEDURE — 99223 1ST HOSP IP/OBS HIGH 75: CPT

## 2018-07-26 RX ORDER — OXYCODONE HYDROCHLORIDE 5 MG/1
1 TABLET ORAL
Qty: 12 | Refills: 0 | OUTPATIENT
Start: 2018-07-26 | End: 2018-07-28

## 2018-07-26 RX ORDER — ERGOCALCIFEROL 1.25 MG/1
50000 CAPSULE ORAL
Qty: 0 | Refills: 0 | Status: DISCONTINUED | OUTPATIENT
Start: 2018-07-26 | End: 2018-07-26

## 2018-07-26 RX ORDER — MORPHINE SULFATE 50 MG/1
2 CAPSULE, EXTENDED RELEASE ORAL EVERY 6 HOURS
Qty: 0 | Refills: 0 | Status: DISCONTINUED | OUTPATIENT
Start: 2018-07-26 | End: 2018-07-26

## 2018-07-26 RX ADMIN — GABAPENTIN 400 MILLIGRAM(S): 400 CAPSULE ORAL at 13:24

## 2018-07-26 RX ADMIN — MORPHINE SULFATE 2 MILLIGRAM(S): 50 CAPSULE, EXTENDED RELEASE ORAL at 14:40

## 2018-07-26 RX ADMIN — BUDESONIDE AND FORMOTEROL FUMARATE DIHYDRATE 2 PUFF(S): 160; 4.5 AEROSOL RESPIRATORY (INHALATION) at 09:02

## 2018-07-26 RX ADMIN — MORPHINE SULFATE 1 MILLIGRAM(S): 50 CAPSULE, EXTENDED RELEASE ORAL at 09:01

## 2018-07-26 RX ADMIN — GABAPENTIN 400 MILLIGRAM(S): 400 CAPSULE ORAL at 05:57

## 2018-07-26 RX ADMIN — ERGOCALCIFEROL 50000 UNIT(S): 1.25 CAPSULE ORAL at 12:27

## 2018-07-26 RX ADMIN — MORPHINE SULFATE 1 MILLIGRAM(S): 50 CAPSULE, EXTENDED RELEASE ORAL at 03:37

## 2018-07-26 RX ADMIN — MORPHINE SULFATE 2 MILLIGRAM(S): 50 CAPSULE, EXTENDED RELEASE ORAL at 14:21

## 2018-07-26 RX ADMIN — MORPHINE SULFATE 1 MILLIGRAM(S): 50 CAPSULE, EXTENDED RELEASE ORAL at 09:15

## 2018-07-26 RX ADMIN — HYDROMORPHONE HYDROCHLORIDE 2 MILLIGRAM(S): 2 INJECTION INTRAMUSCULAR; INTRAVENOUS; SUBCUTANEOUS at 00:41

## 2018-07-26 RX ADMIN — MORPHINE SULFATE 1 MILLIGRAM(S): 50 CAPSULE, EXTENDED RELEASE ORAL at 04:29

## 2018-07-26 RX ADMIN — CLOPIDOGREL BISULFATE 75 MILLIGRAM(S): 75 TABLET, FILM COATED ORAL at 12:27

## 2018-07-26 RX ADMIN — TIOTROPIUM BROMIDE 1 CAPSULE(S): 18 CAPSULE ORAL; RESPIRATORY (INHALATION) at 12:27

## 2018-07-26 RX ADMIN — APIXABAN 5 MILLIGRAM(S): 2.5 TABLET, FILM COATED ORAL at 05:57

## 2018-07-26 NOTE — PROGRESS NOTE ADULT - PROBLEM SELECTOR PLAN 1
S/P Syncope   All neurological workup has been negative recently including CT head and EEG , less likely neurological , no focal deficit on exam   Risk factors for cardiogenic syncope : A fib , CAD   EKG : paced rhythm   Awaiting interrogated AICD readout - if negative will proceed with d/c planning for pt to f/u as outpt for tilt table test - as pt's incident can be consistent with a vaso-vagal episode  Will continue with aspirin ,statin , b-blocker from home   Could also be vasovagal, patient said did not eat anything before the episode or from pain , as patient is complaining of flank spasm   Orthostatics checked at bedside negative   Echo done in June 2018 shows EF of 50 % with grade 2 dd , moderate TR  Consulted Neuro-Dr. Quach and Cardio- Dr. Harper

## 2018-07-26 NOTE — PROGRESS NOTE ADULT - PROBLEM SELECTOR PLAN 2
Highly doubt any nephrolithic involvement as pt is not in constant pain and at times walking about very comfortably- pt has exhibited very specific drug seeking behavior, requesting specific iv dosages and combinations.  Will get Ultrasound KUB to rule out stone   Continue with hydration

## 2018-07-26 NOTE — DISCHARGE NOTE ADULT - MEDICATION SUMMARY - MEDICATIONS TO TAKE
I will START or STAY ON the medications listed below when I get home from the hospital:    oxyCODONE 5 mg oral capsule  -- 1 cap(s) by mouth every 6 hours, As Needed MDD:4   -- Caution federal law prohibits the transfer of this drug to any person other  than the person for whom it was prescribed.  It is very important that you take or use this exactly as directed.  Do not skip doses or discontinue unless directed by your doctor.  May cause drowsiness.  Alcohol may intensify this effect.  Use care when operating dangerous machinery.  This prescription cannot be refilled.  Using more of this medication than prescribed may cause serious breathing problems.    -- Indication: For Pain    Flomax 0.4 mg oral capsule  -- 1 cap(s) by mouth once a day  -- Indication: For CArdiovascular agent    apixaban 5 mg oral tablet  -- 1 tab(s) by mouth every 12 hours  -- Indication: For Anticoagulation    gabapentin 400 mg oral capsule  -- 1 cap(s) by mouth 3 times a day  -- Indication: For Pain    atorvastatin 40 mg oral tablet  -- 1 tab(s) by mouth once a day (at bedtime)  -- Indication: For Prophylactic measure    clopidogrel 75 mg oral tablet  -- 1 tab(s) by mouth once a day  -- Indication: For Prophylactic measure    Metoprolol Succinate ER 50 mg oral tablet, extended release  -- 1 tab(s) by mouth every 24 hours  -- Indication: For HTN (hypertension)    Symbicort 80 mcg-4.5 mcg/inh inhalation aerosol  -- 2 puff(s) inhaled 2 times a day  -- Indication: For COPD (chronic obstructive pulmonary disease)    tiotropium 18 mcg inhalation capsule  -- 1 cap(s) inhaled once a day  -- Indication: For COPD (chronic obstructive pulmonary disease)

## 2018-07-26 NOTE — PROGRESS NOTE ADULT - ASSESSMENT
58 yo male with cad s/p 1 stent, systolic CHF, AICD last interrogated in June 2018 inpatient, HTN, PAD, bladder cancer s/p chemo/rad and TURBT, RCC s/p partial nephrectomy and Chemo/radiation, prostate cancer s/p radiation therapy, nephrolithiasis, HCV no treatment, peripheral neuropathy, COPD presents to the ED after he had chest pain , [place] Patient started having an flank spasm on Left side , followed by mild chest pressure in left side of chest , not radiating to arm/ back after which he lost consciousness and passed out for 2-3 minutes. Witnessed syncope by bystanders , no head trauma , no fall , no injury. Denies any palpitations , nausea , vomiting , headache , dizziness.   Patient was admitted to [facility] in June 2018 with similar complaint of syncope and all work up was negative at that time which includes EEG ,CT head , AICD interrogation.    In ED , BP : 125/ 84 mm hg , HR : 61, Temp : 97.6 F  Cbc wnl   bmp wnl ; T1 negative   EKG : paced rhythm     Will admit to telemetry for ACS rule out and rule out renal stone.

## 2018-07-26 NOTE — PROGRESS NOTE ADULT - ASSESSMENT
57 year old male, from home, ambulates with a walker. PMHx opioid dependence, HLD, HTN, CAD (stents x2 [Sep/17]) Afib (Eliquis), AICD (Medtronic), EF improved from 10 percent to 45 percent as per previous records.  RCC of left kidney (s/p Partial nephrectomy in 2002, repeated biopsy 2 months ago revealed recurrence in stage 2) presented to ED with sharp left chest pain and syncope    1.Pt with recent negative stress test and negative trponin-doubt chest pain is cardiac.  2.PAF-Eliquis.  3.CAD and CHF-continue cardiac medication.  4.PPI.  5.AICD interrogation.  6.D/C IVF.

## 2018-07-26 NOTE — DISCHARGE NOTE ADULT - PLAN OF CARE
to follow up with your Primary care doctor for tilt table test as outpatient All neurological workup has been negative recently including CT head and EEG, less likely neurological cause. EKG showed paced rhythm and AICD interrogation did not show any shock or any event. Your syncope is most likely because of vaso vagal syncope. Please follow up with your primary care doctor for tilt table test. Please take all your medication as advised. Please take all your medication as advised and see your primary care doctor in a week. Please follow up with your kidney doctor with in a week. You had cardiac work up and you were seen by cardiologist and you do not need any further heart work up at the moment. Please see your cardiologist in a week. please take all medication as advised.

## 2018-07-26 NOTE — CONSULT NOTE ADULT - PROBLEM SELECTOR RECOMMENDATION 9
- no nsaids due to allergy - dc naprosyn  - would avoid iv opioids  - gabapentin 400mg po tid  - try to obtain records from [facility] regarding cancer history  - pt states that he has history of renal cell carcinoma  - can dc on short acting opioids - 3 days and follow up with pcp  - stool softeners

## 2018-07-26 NOTE — DISCHARGE NOTE ADULT - HOSPITAL COURSE
56 yo male with cad s/p 1 stent, systolic CHF, AICD last interrogated in June 2018 inpatient, HTN, PAD, bladder cancer s/p chemo/rad and TURBT, RCC s/p partial nephrectomy and Chemo/radiation, prostate cancer s/p radiation therapy, nephrolithiasis, HCV no treatment, peripheral neuropathy, COPD presents to the ED after he had chest pain , [place] 2 [place]. Patient started having an flank spasm on Left side , followed by mild chest pressure in left side of chest , not radiating to arm/ back after which he lost consciousness and passed out for 2-3 minutes. Witnessed syncope by bystanders , no head trauma , no fall , no injury. Denies any palpitations , nausea , vomiting , headache , dizziness.   Patient was admitted to [facility] in June 2018 with similar complaint of syncope and all work up was negative at that time which includes EEG ,CT head , AICD interrogation.    In ED , BP : 125/ 84 mm hg , HR : 61, Temp : 97.6 F  Cbc wnl   bmp wnl ; T1 negative       All neurological workup has been negative recently including CT head and EEG , less likely neurological , no focal deficit on exam   Risk factors for cardiogenic syncope : A fib , CAD , EKG : paced rhythm, interrogated AICD negative for any event or shock, d/c planning for pt to f/u as outpt for tilt table test - as pt's incident can be consistent with a vaso-vagal episode, Will continue with aspirin ,statin , b-blocker from home , Could also be vasovagal, patient said did not eat anything before the episode or from pain , as patient is complaining of flank spasm   Orthostatics checked at bedside negative , Echo done in June 2018 shows EF of 50 % with grade 2 dd , moderate TR, Consulted Neuro-Dr. Quach and Cardio- Dr. Harper  Pain management were consulted for drug seeking behavior pt ws asking hydromorphone constantly.    pt was examined at bed side and medically stable for discharge D/w Dr. reyna.

## 2018-07-26 NOTE — CONSULT NOTE ADULT - SUBJECTIVE AND OBJECTIVE BOX
RENEA AVILES  57y  Male      Patient is a 57y old  Male who presents with a chief complaint of chest pain , syncope (25 Jul 2018 08:[id number] yo male with cad s/p 1 stent, systolic CHF, AICD last interrogated in June 2018 inpatient, HTN, PAD, bladder cancer s/p chemo/rad and TURBT, RCC s/p partial nephrectomy and Chemo/radiation, prostate cancer s/p radiation therapy, nephrolithiasis, HCV no treatment, peripheral neuropathy, COPD presents to the ED after he had chest pain , [place]. Patient started having an flank spasm on Left side , followed by mild chest pressure in left side of chest , not radiating to arm/ back after which he lost consciousness and passed out for 2-3 minutes. Witnessed syncope by bystanders , no head trauma , no fall , no injury. Denies any palpitations , nausea , vomiting , headache , dizziness.   Patient was admitted to [facility] in June 2018 with similar complaint of syncope and all work up was negative at that time which includes EEG ,CT head , AICD interrogation.  ).    Pt ambulating halls without any difficulty.  Pt states that he has a history of left renal cell carcinoma.  Pt underwent partial nephrectomy and subsequent chemo and radiation.  Pt recently states that he underwent chemo and radiation.  However, unable to verify that pt has cancer.  Pt refuses to give name of oncologist.  ISTOP done and pt has been going to different places for pain meds and with different providers.  Pt admitted for syncope.  Workup negative.        PAST MEDICAL/SURGICAL HISTORY  PAST MEDICAL & SURGICAL HISTORY:  Kidney stone  Nephrolithiasis  Prostate cancer: s/p radiation  Peripheral neuropathy  Bladder cancer  COPD (chronic obstructive pulmonary disease)  Renal cell carcinoma of left kidney: s/p partial nephrectomy in 2002  biopsy again in Sep 2017 showed RCC again in stage 2  Hyperlipidemia  CAD (coronary artery disease): (s/p 2 stents in Sep 2017)  Atrial fibrillation  AICD (automatic cardioverter/defibrillator) present: [employer]ply  HTN (hypertension)  Hep C w/o coma, chronic  Chronic congestive heart failure, unspecified congestive heart failure type: rEF/pEF  History of coronary artery stent placement  H/O transurethral destruction of bladder lesion  History of percutaneous coronary intervention  H/O partial nephrectomy: 2002  S/P cholecystectomy: 2015  AICD (automatic cardioverter/defibrillator) present      REVIEW OF SYSTEMS:  CONSTITUTIONAL: No fever, weight loss, or fatigue  NECK: No pain or stiffness  RESPIRATORY: No cough, wheezing, chills or hemoptysis; No shortness of breath  CARDIOVASCULAR: No chest pain, palpitations, dizziness, or leg swelling  GASTROINTESTINAL: No abdominal or epigastric pain. No nausea, vomiting, or hematemesis; No diarrhea or constipation. No melena or hematochezia.  GENITOURINARY: +left flank pain  NEUROLOGICAL: No headaches, memory loss, loss of strength, numbness, or tremors  SKIN: No itching, burning, rashes, or lesions   MUSCULOSKELETAL: +back pain  PSYCHIATRIC: No depression, anxiety, mood swings, or difficulty sleeping    T(C): 36.4 (07-26-18 @ 05:15), Max: 36.8 (07-25-18 @ 21:06)  HR: 62 (07-26-18 @ 05:15) (57 - 62)  BP: 143/104 (07-26-18 @ 05:15) (143/104 - 153/96)  RR: 18 (07-26-18 @ 05:15) (18 - 18)  SpO2: 99% (07-26-18 @ 05:15) (99% - 100%)  Wt(kg): --Vital Signs Last 24 Hrs  T(C): 36.4 (26 Jul 2018 05:15), Max: 36.8 (25 Jul 2018 21:06)  T(F): 97.5 (26 Jul 2018 05:15), Max: 98.2 (25 Jul 2018 21:06)  HR: 62 (26 Jul 2018 05:15) (57 - 62)  BP: 143/104 (26 Jul 2018 05:15) (143/104 - 153/96)  BP(mean): --  RR: 18 (26 Jul 2018 05:15) (18 - 18)  SpO2: 99% (26 Jul 2018 05:15) (99% - 100%)    PHYSICAL EXAM:  GENERAL: NAD, well-groomed, well-developed  HEAD:  Atraumatic, Normocephalic  NECK: Supple, No JVD, Normal thyroid  NERVOUS SYSTEM:  Alert & Oriented X3, Good concentration; Motor Strength 5/5 B/L upper and lower extremities; DTRs 2+ intact and symmetric  CHEST/LUNG: Clear to percussion bilaterally; No rales, rhonchi, wheezing, or rubs  HEART: Regular rate and rhythm; No murmurs, rubs, or gallops  ABDOMEN: Soft, Nontender, + mild distention; Bowel sounds present  BACK: + left cva tenderness  EXTREMITIES:  2+ Peripheral Pulses, + discoloration of le  MUSCULOSKELETAL: +tenderness - left side of back  SKIN: No rashes or lesions    Consultant(s) Notes Reviewed:  [x ] YES  [ ] NO  Care Discussed with Consultants/Other Providers [ x] YES  [ ] NO    LABS:  CBC   07-26-18 @ 07:01  Hematcorit 46.6  Hemoglobin 15.2  Mean Cell Hemoglobin 29.1  Platelet Count-Automated 138  RBC Count 5.22  Red Cell Distrib Width 12.4  Wbc Count 6.6      BMP  07-26-18 @ 07:01  Anion Gap. Serum 7  Blood Urea Nitrogen,Serm 25  Calcium, Total Serum 8.8  Carbon Dioxide, Serum 25  Chloride, Serum 107  Creatinine, Serum 1.14  eGFR in  82  eGFR in Non Afican American 70  Gloucose, serum 115  Potassium, Serum 4.1  Sodium, Serum 139              07-25-18 @ 12:51  Anion Gap. Serum 4  Blood Urea Nitrogen,Serm 17  Calcium, Total Serum 8.6  Carbon Dioxide, Serum 29  Chloride, Serum 108  Creatinine, Serum 1.07  eGFR in  88  eGFR in Non Afican American 76  Gloucose, serum 96  Potassium, Serum 3.9  Sodium, Serum 141              07-25-18 @ 05:22  Anion Gap. Serum 7  Blood Urea Nitrogen,Serm 18  Calcium, Total Serum 8.8  Carbon Dioxide, Serum 25  Chloride, Serum 108  Creatinine, Serum 1.08  eGFR in  87  eGFR in Non Afican American 75  Gloucose, serum 76  Potassium, Serum 4.2  Sodium, Serum 140                  CMP  07-26-18 @ 07:01  Geovanna Aminotransferase(ALT/SGPT)--  Albumin, Serum --  Alkaline Phosphatase, Serum --  Anion Gap, Serum 7  Aspartate Aminotransferase (AST/SGOT)--  Bilirubin Total, Serum --  Blood Urea Nitrogen, Serum 25  Calcium,Total Serum 8.8  Carbon Dioxide, Serum 25  Chloride, Serum 107  Creatinine, Serum 1.14  eGFR if  82  eGFR if Non African American 70  Glucose, Serum 115  Potassium, Serum 4.1  Protein Total, Serum --  Sodium, Serum 139                      07-25-18 @ 12:51  Geovanna Aminotransferase(ALT/SGPT)--  Albumin, Serum --  Alkaline Phosphatase, Serum --  Anion Gap, Serum 4  Aspartate Aminotransferase (AST/SGOT)--  Bilirubin Total, Serum --  Blood Urea Nitrogen, Serum 17  Calcium,Total Serum 8.6  Carbon Dioxide, Serum 29  Chloride, Serum 108  Creatinine, Serum 1.07  eGFR if  88  eGFR if Non African American 76  Glucose, Serum 96  Potassium, Serum 3.9  Protein Total, Serum --  Sodium, Serum 141                      07-25-18 @ 05:22  Geovanna Aminotransferase(ALT/SGPT)--  Albumin, Serum --  Alkaline Phosphatase, Serum --  Anion Gap, Serum 7  Aspartate Aminotransferase (AST/SGOT)--  Bilirubin Total, Serum --  Blood Urea Nitrogen, Serum 18  Calcium,Total Serum 8.8  Carbon Dioxide, Serum 25  Chloride, Serum 108  Creatinine, Serum 1.08  eGFR if  87  eGFR if Non African American 75  Glucose, Serum 76  Potassium, Serum 4.2  Protein Total, Serum --  Sodium, Serum 140                          PT/INR      Amylase/Lipase            RADIOLOGY & ADDITIONAL TESTS:    Imaging Personally Reviewed:  [ ] YES  [ ] NO

## 2018-07-26 NOTE — DISCHARGE NOTE ADULT - CARE PLAN
Principal Discharge DX:	Chest pain  Secondary Diagnosis:	HTN (hypertension)  Secondary Diagnosis:	Hyperlipidemia  Secondary Diagnosis:	Renal cell carcinoma of left kidney Principal Discharge DX:	Syncope  Goal:	to follow up with your Primary care doctor for tilt table test as outpatient  Assessment and plan of treatment:	All neurological workup has been negative recently including CT head and EEG, less likely neurological cause. EKG showed paced rhythm and AICD interrogation did not show any shock or any event. Your syncope is most likely because of vaso vagal syncope. Please follow up with your primary care doctor for tilt table test. Please take all your medication as advised.  Secondary Diagnosis:	HTN (hypertension)  Assessment and plan of treatment:	Please take all your medication as advised and see your primary care doctor in a week.  Secondary Diagnosis:	Hyperlipidemia  Assessment and plan of treatment:	Please take all your medication as advised and see your primary care doctor in a week.  Secondary Diagnosis:	Renal cell carcinoma of left kidney  Assessment and plan of treatment:	Please follow up with your kidney doctor with in a week.  Secondary Diagnosis:	Chest pain  Assessment and plan of treatment:	You had cardiac work up and you were seen by cardiologist and you do not need any further heart work up at the moment. Please see your cardiologist in a week.  Secondary Diagnosis:	Chronic left flank pain  Assessment and plan of treatment:	please take all medication as advised.

## 2018-07-26 NOTE — PROGRESS NOTE ADULT - SUBJECTIVE AND OBJECTIVE BOX
CHIEF COMPLAINT:Patient is a 57y old  Male who presents with a chief complaint of chest pain , syncope.Pt appears comfortable.    	  REVIEW OF SYSTEMS:  CONSTITUTIONAL: No fever, weight loss, or fatigue  EYES: No eye pain, visual disturbances, or discharge  ENT:  No difficulty hearing, tinnitus, vertigo; No sinus or throat pain  NECK: No pain or stiffness  RESPIRATORY: No cough, wheezing, chills or hemoptysis; No Shortness of Breath  CARDIOVASCULAR: No chest pain, palpitations, passing out, dizziness, or leg swelling  GASTROINTESTINAL: No abdominal or epigastric pain. No nausea, vomiting, or hematemesis; No diarrhea or constipation. No melena or hematochezia.  GENITOURINARY: No dysuria, frequency, hematuria, or incontinence  NEUROLOGICAL: No headaches, memory loss, loss of strength, numbness, or tremors  SKIN: No itching, burning, rashes, or lesions   LYMPH Nodes: No enlarged glands  ENDOCRINE: No heat or cold intolerance; No hair loss  MUSCULOSKELETAL: No joint pain or swelling; No muscle, back, or extremity pain  PSYCHIATRIC: No depression, anxiety, mood swings, or difficulty sleeping  HEME/LYMPH: No easy bruising, or bleeding gums  ALLERGY AND IMMUNOLOGIC: No hives or eczema	      PHYSICAL EXAM:  T(C): 36.4 (07-26-18 @ 05:15), Max: 36.8 (07-25-18 @ 21:06)  HR: 62 (07-26-18 @ 05:15) (57 - 62)  BP: 143/104 (07-26-18 @ 05:15) (110/93 - 153/96)  RR: 18 (07-26-18 @ 05:15) (18 - 18)  SpO2: 99% (07-26-18 @ 05:15) (98% - 100%)      Appearance: Normal	  HEENT:   Normal oral mucosa, PERRL, EOMI	  Lymphatic: No lymphadenopathy  Cardiovascular: Normal S1 S2, No JVD, No murmurs, No edema  Respiratory: Lungs clear to auscultation	  Psychiatry: A & O x 3, Mood & affect appropriate  Gastrointestinal:  Soft, Non-tender, + BS	  Skin: No rashes, No ecchymoses, No cyanosis	  Neurologic: Non-focal  Extremities: Normal range of motion, No clubbing, cyanosis or edema  Vascular: Peripheral pulses palpable 2+ bilaterally    MEDICATIONS  (STANDING):  apixaban 5 milliGRAM(s) Oral every 12 hours  atorvastatin 40 milliGRAM(s) Oral at bedtime  buDESOnide  80 MICROgram(s)/formoterol 4.5 MICROgram(s) Inhaler 2 Puff(s) Inhalation two times a day  clopidogrel Tablet 75 milliGRAM(s) Oral daily  ergocalciferol 59608 Unit(s) Oral every week  gabapentin 400 milliGRAM(s) Oral three times a day  metoprolol succinate ER 50 milliGRAM(s) Oral daily  nicotine -   7 mG/24Hr(s) Patch 1 patch Transdermal daily  tamsulosin 0.4 milliGRAM(s) Oral at bedtime  tiotropium 18 MICROgram(s) Capsule 1 Capsule(s) Inhalation daily      	  LABS:	 	    CARDIAC MARKERS:  CARDIAC MARKERS ( 25 Jul 2018 12:51 )  <0.015 ng/mL / x     / 131 U/L / x     / 4.2 ng/mL  CARDIAC MARKERS ( 25 Jul 2018 05:22 )  <0.015 ng/mL / x     / 153 U/L / x     / 4.3 ng/mL                        15.2   6.6   )-----------( 138      ( 26 Jul 2018 07:01 )             46.6     07-26    139  |  107  |  25<H>  ----------------------------<  115<H>  4.1   |  25  |  1.14    Ca    8.8      26 Jul 2018 07:01  Phos  3.4     07-26  Mg     2.2     07-26        Lipid Profile: Cholesterol 107  LDL 42  HDL 48  TG 84    HgA1c: Hemoglobin A1C, Whole Blood: 5.2 % (07-25 @ 17:51)    TSH: Thyroid Stimulating Hormone, Serum: 0.58 uU/mL (07-25 @ 12:51)      EXAM:  XR KUB 1 VIEW                            PROCEDURE DATE:  07/25/2018          INTERPRETATION:  EXAM: XR KUB 1 VIEW     HISTORY: Pain r/o renal stone.    COMPARISON: CT abdomen pelvis 6/8/2018.    TECHNIQUE: 4 supine AP views of the abdomen.    FINDINGS:  No radiographic evidence for a radiodense renal calculus is identified.    The bowel gas pattern is nonspecific. No evidence of free air. Nondilated   air and stool filled colon is noted.    Surgical staples project over both upper quadrants.    Partially visualized ICD lead.    IMPRESSION:  No radiographic evidence for a radiodense renal calculus.

## 2018-07-26 NOTE — PROGRESS NOTE ADULT - SUBJECTIVE AND OBJECTIVE BOX
RENEA AVILES  MR# [id number]  57yMale        Patient is a 57y old  Male who presents with a chief complaint of chest pain , syncope (2018 08:50)      INTERVAL HPI/OVERNIGHT EVENTS:  Patient seen and examined at bedside. No notiations of chest pain, palpitation, SOB, nausea, vomiting, abdominal pain.    ALLERGIES  aspirin (Hives)  codeine (Rash)  codeine (Unknown)  Haldol (Unknown)  Motrin (Rash)  penicillin (Hives; Anaphylaxis)  Toradol (Rash)  Tylenol (Hives)      MEDICATIONS  apixaban 5 milliGRAM(s) Oral every 12 hours  atorvastatin 40 milliGRAM(s) Oral at bedtime  buDESOnide  80 MICROgram(s)/formoterol 4.5 MICROgram(s) Inhaler 2 Puff(s) Inhalation two times a day  clopidogrel Tablet 75 milliGRAM(s) Oral daily  ergocalciferol 19559 Unit(s) Oral <User Schedule>  gabapentin 400 milliGRAM(s) Oral three times a day  metoprolol succinate ER 50 milliGRAM(s) Oral daily  morphine  - Injectable 2 milliGRAM(s) IV Push every 6 hours PRN Severe Pain (7 - 10)  nicotine -   7 mG/24Hr(s) Patch 1 patch Transdermal daily  tamsulosin 0.4 milliGRAM(s) Oral at bedtime  tiotropium 18 MICROgram(s) Capsule 1 Capsule(s) Inhalation daily  traMADol 50 milliGRAM(s) Oral every 8 hours PRN Moderate Pain (4 - 6)              REVIEW OF SYSTEMS:  CONSTITUTIONAL: No fever, weight loss, or fatigue  EYES: No eye pain, visual disturbances, or discharge  ENT:  No difficulty hearing, tinnitus, vertigo; No sinus or throat pain  NECK: No pain or stiffness  RESPIRATORY: No cough, wheezing, chills or hemoptysis; No Shortness of Breath  CARDIOVASCULAR: No chest pain, palpitations, passing out, dizziness, or leg swelling  GASTROINTESTINAL: No abdominal or epigastric pain. No nausea, vomiting, or hematemesis; No diarrhea or constipation. No melena or hematochezia.  GENITOURINARY: No dysuria, frequency, hematuria, or incontinence  NEUROLOGICAL: No headaches, memory loss, loss of strength, numbness, or tremors  SKIN: No itching, burning, rashes, or lesions   LYMPH Nodes: No enlarged glands  ENDOCRINE: No heat or cold intolerance; No hair loss  MUSCULOSKELETAL: No joint pain or swelling; No muscle, back, or extremity pain  PSYCHIATRIC: No depression, anxiety, mood swings, or difficulty sleeping  HEME/LYMPH: No easy bruising, or bleeding gums  ALLERGY AND IMMUNOLOGIC: No hives or eczema	    [ ] All others negative	  [ ] Unable to obtain      T(C): 36.4 (18 @ 05:15), Max: 36.8 (18 @ 21:06)  T(F): 97.5 (18 @ 05:15), Max: 98.2 (18 @ 21:06)  HR: 62 (18 @ 05:15) (57 - 62)  BP: 143/104 (18 @ 05:15) (143/104 - 153/96)  RR: 18 (18 @ 05:15) (18 - 18)  SpO2: 99% (18 @ 05:15) (99% - 100%)  Wt(kg): --    I&O's Summary        PHYSICAL EXAM:  A X O x  HEAD:  Atraumatic, Normocephalic  EYES: EOMI, PERRLA, conjunctiva and sclera clear  NECK: Supple, No JVD, Normal thyroid  Resp: CTAB, No crackles, wheezing,   CVS: Regular rate and rhythm; No discernable murmurs, rubs, or gallops  ABD: Soft, Nontender, Nondistended; Bowel sounds present  EXTREMITIES:  2+ Peripheral Pulses, No edema  LYMPH: No dicernable lymphadenopathy noted  GENERAL: NAD, well-groomed, well-developed      LABS:                        15.2   6.6   )-----------( 138      ( 2018 07:01 )             46.6     -    139  |  107  |  25<H>  ----------------------------<  115<H>  4.1   |  25  |  1.14    Ca    8.8      2018 07:01  Phos  3.4       Mg     2.2             Urinalysis Basic - ( 2018 06:38 )    Color: Yellow / Appearance: Clear / S.025 / pH: x  Gluc: x / Ketone: Negative  / Bili: Small / Urobili: 4   Blood: x / Protein: 100 / Nitrite: Positive   Leuk Esterase: Trace / RBC: 25-50 /HPF / WBC 3-5 /HPF   Sq Epi: x / Non Sq Epi: Few /HPF / Bacteria: x      CAPILLARY BLOOD GLUCOSE          Troponins:  ProBNP:  Lipid Profile:   HgA1c:  TSH:           RADIOLOGY & ADDITIONAL TESTS:    Imaging Personally Reviewed:  [ ] YES  [ ] NO      Consultant(s) Notes Reviewed:  [x ] YES  [ ] NO    Care Discussed with Consultants/Other Providers [ x] YES  [ ] NO          PAST MEDICAL & SURGICAL HISTORY:  Kidney stone  Nephrolithiasis  Prostate cancer: s/p radiation  Peripheral neuropathy  Bladder cancer  COPD (chronic obstructive pulmonary disease)  Renal cell carcinoma of left kidney: s/p partial nephrectomy in   biopsy again in Sep 2017 showed RCC again in stage 2  Hyperlipidemia  CAD (coronary artery disease): (s/p 2 stents in Sep 2017)  Atrial fibrillation  AICD (automatic cardioverter/defibrillator) present: [employer]  HTN (hypertension)  Hep C w/o coma, chronic  Chronic congestive heart failure, unspecified congestive heart failure type: rEF/pEF  History of coronary artery stent placement  H/O transurethral destruction of bladder lesion  History of percutaneous coronary intervention  H/O partial nephrectomy:   S/P cholecystectomy:   AICD (automatic cardioverter/defibrillator) present        Acute ischemic heart disease  H/o or current diagnosis of HF- ACEI/ARB contraindication unknown  H/o or current diagnosis of HF- Contraindication to ACEI/ARBs  H/o or current diagnosis of HF- ACEI/ARB contraindication unknown  H/o or current diagnosis of HF- Contraindication to ACEI/ARBs  H/o or current diagnosis of HF- ACEI/ARB contraindication unknown  H/o or current diagnosis of HF- Contraindication to ACEI/ARBs  H/o or current diagnosis of HF- ACEI/ARB contraindication unknown  H/o or current diagnosis of HF- Contraindication to ACEI/ARBs  H/o or current diagnosis of HF- ACEI/ARB contraindication unknown  Family history of lung cancer (Father)  Family history of chronic ischemic heart disease  No pertinent family history in first degree relatives  Family history of heart disease  Family history of diabetes mellitus  Family history of lung cancer  Family history of breast cancer  Handoff  MEWS Score  Kidney stone  Nephrolithiasis  Prostate cancer  Peripheral neuropathy  Bladder cancer  COPD (chronic obstructive pulmonary disease)  Opioid dependence  Calcium kidney stones  Renal cell carcinoma of left kidney  Hyperlipidemia  Hypertension  CAD (coronary artery disease)  Atrial fibrillation  AICD (automatic cardioverter/defibrillator) present  HLD (hyperlipidemia)  HTN (hypertension)  Carcinoma of kidney, unspecified laterality  Hep C w/o coma, chronic  Secondary hypertension  Chronic congestive heart failure, unspecified congestive heart failure type  Atrial fibrillation, unspecified type  Pacemaker  Kidney stone  COPD (chronic obstructive pulmonary disease)  ACS (acute coronary syndrome)  Syncope  Prophylactic measure  HTN (hypertension)  CAD (coronary artery disease)  COPD (chronic obstructive pulmonary disease)  Atrial fibrillation  Chronic diastolic (congestive) heart failure  ACS (acute coronary syndrome)  History of coronary artery stent placement  Presence of inferior vena cava filter  H/O transurethral destruction of bladder lesion  History of percutaneous coronary intervention  Calcium kidney stone  H/O partial nephrectomy  S/P cholecystectomy  AICD (automatic cardioverter/defibrillator) present  History of coronary artery stent placement  A-- LT FLANK PAIN  Renal stone  34

## 2018-07-26 NOTE — DISCHARGE NOTE ADULT - SECONDARY DIAGNOSIS.
HTN (hypertension) Hyperlipidemia Renal cell carcinoma of left kidney Chest pain Chronic left flank pain

## 2018-07-26 NOTE — DISCHARGE NOTE ADULT - PATIENT PORTAL LINK FT
You can access the [facility] Patient Portal, offered by [facility], by registering with the following website: [email or web address]

## 2018-07-26 NOTE — PROGRESS NOTE ADULT - SUBJECTIVE AND OBJECTIVE BOX
Time of Visit:  Patient seen and examined.     MEDICATIONS  (STANDING):  apixaban 5 milliGRAM(s) Oral every 12 hours  atorvastatin 40 milliGRAM(s) Oral at bedtime  buDESOnide  80 MICROgram(s)/formoterol 4.5 MICROgram(s) Inhaler 2 Puff(s) Inhalation two times a day  clopidogrel Tablet 75 milliGRAM(s) Oral daily  ergocalciferol 71235 Unit(s) Oral <User Schedule>  gabapentin 400 milliGRAM(s) Oral three times a day  metoprolol succinate ER 50 milliGRAM(s) Oral daily  nicotine -   7 mG/24Hr(s) Patch 1 patch Transdermal daily  tamsulosin 0.4 milliGRAM(s) Oral at bedtime  tiotropium 18 MICROgram(s) Capsule 1 Capsule(s) Inhalation daily      MEDICATIONS  (PRN):  ALBUTerol    90 MICROgram(s) HFA Inhaler 2 Puff(s) Inhalation every 6 hours PRN Bronchospasm  morphine  - Injectable 1 milliGRAM(s) IV Push every 6 hours PRN Moderate Pain (4 - 6)  naproxen 500 milliGRAM(s) Oral every 8 hours PRN mild pain  traMADol 50 milliGRAM(s) Oral every 8 hours PRN Moderate Pain (4 - 6)       Medications up to date at time of exam.    ROS; No fever, chills, SOB, cough, congestion.  PHYSICAL EXAMINATION:    Vital Signs Last 24 Hrs  T(C): 36.4 (2018 05:15), Max: 36.8 (2018 21:06)  T(F): 97.5 (2018 05:15), Max: 98.2 (2018 21:06)  HR: 62 (2018 05:15) (57 - 62)  BP: 143/104 (2018 05:15) (122/68 - 153/96)  BP(mean): --  RR: 18 (2018 05:15) (18 - 18)  SpO2: 99% (2018 05:15) (98% - 100%)   (if applicable)    General; Alert and oriented. No acute distress.     HEENT: Head is normocephalic and atraumatic. Extraocular muscles are intact. Moist mucosa.     NECK: Supple, no palpable adenopathy.    LUNGS: Clear to auscultation, no wheezing, rales, or rhonchi. No use of accessory muscle. + AICD.    HEART: S1 S2 irregular/ regular rate and no click / rub.     ABDOMEN: Soft, nontender, and nondistended.  No hepatosplenomegaly is noted. Active bowel sounds.     EXTREMITIES: Without any cyanosis, clubbing, rash, lesions or edema.    NEUROLOGIC: Awake, alert, oriented.     SKIN: Warm and moist. Non diaphoretic.       LABS:                        15.2   6.6   )-----------( 138      ( 2018 07:01 )             46.6     -    139  |  107  |  25<H>  ----------------------------<  115<H>  4.1   |  25  |  1.14    Ca    8.8      2018 07:01  Phos  3.4       Mg     2.2             Urinalysis Basic - ( 2018 06:38 )    Color: Yellow / Appearance: Clear / S.025 / pH: x  Gluc: x / Ketone: Negative  / Bili: Small / Urobili: 4   Blood: x / Protein: 100 / Nitrite: Positive   Leuk Esterase: Trace / RBC: 25-50 /HPF / WBC 3-5 /HPF   Sq Epi: x / Non Sq Epi: Few /HPF / Bacteria: x        CARDIAC MARKERS ( 2018 12:51 )  <0.015 ng/mL / x     / 131 U/L / x     / 4.2 ng/mL  CARDIAC MARKERS ( 2018 05:22 )  <0.015 ng/mL / x     / 153 U/L / x     / 4.3 ng/mL                MICROBIOLOGY: (if applicable)    RADIOLOGY & ADDITIONAL STUDIES:  EKG:   CXR:   < from: Xray Chest 1 View-PORTABLE IMMEDIATE (18 @ 07:08) >  INTERPRETATION:  CLINICAL STATEMENT: Chest Pain.    TECHNIQUE: AP view of the chest.    COMPARISON: 2018    FINDINGS/  IMPRESSION:  Right cardiac device.    No consolidation or pleural effusion    Chronic deformity left clavicle    Heart size cannot be accurately assessed in this projection.    IMPRESSION: 57y Male PAST MEDICAL & SURGICAL HISTORY:  Kidney stone  Nephrolithiasis  Prostate cancer: s/p radiation  Peripheral neuropathy  Bladder cancer  COPD (chronic obstructive pulmonary disease)  Renal cell carcinoma of left kidney: s/p partial nephrectomy in   biopsy again in Sep 2017 showed RCC again in stage 2  Hyperlipidemia  CAD (coronary artery disease): (s/p 2 stents in Sep 2017)  Atrial fibrillation  AICD (automatic cardioverter/defibrillator) present: Med[employer]onic  HTN (hypertension)  Hep C w/o coma, chronic  Chronic congestive heart failure, unspecified congestive heart failure type: rEF/pEF  History of coronary artery stent placement  H/O transurethral destruction of bladder lesion  History of percutaneous coronary intervention  H/O partial nephrectomy:   S/P cholecystectomy:   AICD (automatic cardioverter/defibrillator) present     Impression; 58 Y/O Male with prior mentioned multiple chronic conditions. Presented in ED with Chest pain, SOB and [place] 2 minutes. CXR with no acute findings. Patient was recently admitted for similar issue, was d/c and supposed to f/u with cardiology for additional ICD lead placement.SOB due to unclear etiology.    Suggestion;  Continue PRN Albuterol inhaler. Budesonide Twice Daily. Tiotropium Daily.  Reinforced Smoking cessation. Nicotine patch.  O2 saturation 96% room air.   DVT/ GI prophylactic. Time of Visit:  Patient seen and examined.     MEDICATIONS  (STANDING):  apixaban 5 milliGRAM(s) Oral every 12 hours  atorvastatin 40 milliGRAM(s) Oral at bedtime  buDESOnide  80 MICROgram(s)/formoterol 4.5 MICROgram(s) Inhaler 2 Puff(s) Inhalation two times a day  clopidogrel Tablet 75 milliGRAM(s) Oral daily  ergocalciferol 35959 Unit(s) Oral <User Schedule>  gabapentin 400 milliGRAM(s) Oral three times a day  metoprolol succinate ER 50 milliGRAM(s) Oral daily  nicotine -   7 mG/24Hr(s) Patch 1 patch Transdermal daily  tamsulosin 0.4 milliGRAM(s) Oral at bedtime  tiotropium 18 MICROgram(s) Capsule 1 Capsule(s) Inhalation daily      MEDICATIONS  (PRN):  ALBUTerol    90 MICROgram(s) HFA Inhaler 2 Puff(s) Inhalation every 6 hours PRN Bronchospasm  morphine  - Injectable 1 milliGRAM(s) IV Push every 6 hours PRN Moderate Pain (4 - 6)  naproxen 500 milliGRAM(s) Oral every 8 hours PRN mild pain  traMADol 50 milliGRAM(s) Oral every 8 hours PRN Moderate Pain (4 - 6)       Medications up to date at time of exam.    ROS; No fever, chills, SOB, cough, congestion.  PHYSICAL EXAMINATION:    Vital Signs Last 24 Hrs  T(C): 36.4 (2018 05:15), Max: 36.8 (2018 21:06)  T(F): 97.5 (2018 05:15), Max: 98.2 (2018 21:06)  HR: 62 (2018 05:15) (57 - 62)  BP: 143/104 (2018 05:15) (122/68 - 153/96)  BP(mean): --  RR: 18 (2018 05:15) (18 - 18)  SpO2: 99% (2018 05:15) (98% - 100%)   (if applicable)    General; Alert and oriented. No acute distress.     HEENT: Head is normocephalic and atraumatic. Extraocular muscles are intact. Moist mucosa.     NECK: Supple, no palpable adenopathy.    LUNGS: Clear to auscultation, no wheezing, rales, or rhonchi. No use of accessory muscle. + AICD.    HEART: S1 S2 irregular/ regular rate and no click / rub.     ABDOMEN: Soft, nontender, and nondistended.  No hepatosplenomegaly is noted. Active bowel sounds.     EXTREMITIES: Without any cyanosis, clubbing, rash, lesions or edema.    NEUROLOGIC: Awake, alert, oriented.     SKIN: Warm and moist. Non diaphoretic.       LABS:                        15.2   6.6   )-----------( 138      ( 2018 07:01 )             46.6     -    139  |  107  |  25<H>  ----------------------------<  115<H>  4.1   |  25  |  1.14    Ca    8.8      2018 07:01  Phos  3.4       Mg     2.2             Urinalysis Basic - ( 2018 06:38 )    Color: Yellow / Appearance: Clear / S.025 / pH: x  Gluc: x / Ketone: Negative  / Bili: Small / Urobili: 4   Blood: x / Protein: 100 / Nitrite: Positive   Leuk Esterase: Trace / RBC: 25-50 /HPF / WBC 3-5 /HPF   Sq Epi: x / Non Sq Epi: Few /HPF / Bacteria: x        CARDIAC MARKERS ( 2018 12:51 )  <0.015 ng/mL / x     / 131 U/L / x     / 4.2 ng/mL  CARDIAC MARKERS ( 2018 05:22 )  <0.015 ng/mL / x     / 153 U/L / x     / 4.3 ng/mL                MICROBIOLOGY: (if applicable)    RADIOLOGY & ADDITIONAL STUDIES:  EKG:   CXR:   < from: Xray Chest 1 View-PORTABLE IMMEDIATE (18 @ 07:08) >  INTERPRETATION:  CLINICAL STATEMENT: Chest Pain.    TECHNIQUE: AP view of the chest.    COMPARISON: 2018    FINDINGS/  IMPRESSION:  Right cardiac device.    No consolidation or pleural effusion    Chronic deformity left clavicle    Heart size cannot be accurately assessed in this projection.    IMPRESSION: 57y Male PAST MEDICAL & SURGICAL HISTORY:  Kidney stone  Nephrolithiasis  Prostate cancer: s/p radiation  Peripheral neuropathy  Bladder cancer  COPD (chronic obstructive pulmonary disease)  Renal cell carcinoma of left kidney: s/p partial nephrectomy in   biopsy again in Sep 2017 showed RCC again in stage 2  Hyperlipidemia  CAD (coronary artery disease): (s/p 2 stents in Sep 2017)  Atrial fibrillation  AICD (automatic cardioverter/defibrillator) present: [employer]  HTN (hypertension)  Hep C w/o coma, chronic  Chronic congestive heart failure, unspecified congestive heart failure type: rEF/pEF  History of coronary artery stent placement  H/O transurethral destruction of bladder lesion  History of percutaneous coronary intervention  H/O partial nephrectomy:   S/P cholecystectomy:   AICD (automatic cardioverter/defibrillator) present     Impression; 56 Y/O Male with prior mentioned multiple chronic conditions. Presented in ED with Chest pain, SOB and [place] 2 minutes. CXR with no acute findings. Patient was recently admitted for similar issue, was d/c and supposed to f/u with cardiology for additional ICD lead placement.SOB due to unclear etiology.    Suggestion;  Continue PRN Albuterol inhaler. Budesonide Twice Daily. Tiotropium Daily.  Reinforced Smoking cessation. Nicotine patch.  O2 saturation 96% room air.   DVT/ GI prophylactic.      Agree with above assessment and plan as transcribed.

## 2018-08-13 ENCOUNTER — EMERGENCY (EMERGENCY)
Facility: HOSPITAL | Age: 58
LOS: 1 days | Discharge: HOME | End: 2018-08-13
Attending: EMERGENCY MEDICINE
Payer: MEDICAID

## 2018-08-13 VITALS
OXYGEN SATURATION: 99 % | DIASTOLIC BLOOD PRESSURE: 96 MMHG | HEART RATE: 79 BPM | SYSTOLIC BLOOD PRESSURE: 156 MMHG | RESPIRATION RATE: 18 BRPM | TEMPERATURE: 98 F

## 2018-08-13 DIAGNOSIS — Z98.890 OTHER SPECIFIED POSTPROCEDURAL STATES: Chronic | ICD-10-CM

## 2018-08-13 DIAGNOSIS — Z90.49 ACQUIRED ABSENCE OF OTHER SPECIFIED PARTS OF DIGESTIVE TRACT: Chronic | ICD-10-CM

## 2018-08-13 DIAGNOSIS — Z90.5 ACQUIRED ABSENCE OF KIDNEY: Chronic | ICD-10-CM

## 2018-08-13 DIAGNOSIS — Z95.5 PRESENCE OF CORONARY ANGIOPLASTY IMPLANT AND GRAFT: Chronic | ICD-10-CM

## 2018-08-13 DIAGNOSIS — Z95.810 PRESENCE OF AUTOMATIC (IMPLANTABLE) CARDIAC DEFIBRILLATOR: Chronic | ICD-10-CM

## 2018-08-13 PROCEDURE — 71045 X-RAY EXAM CHEST 1 VIEW: CPT | Mod: 26

## 2018-08-13 PROCEDURE — 99285 EMERGENCY DEPT VISIT HI MDM: CPT | Mod: 25

## 2018-08-13 RX ORDER — SODIUM CHLORIDE 9 MG/ML
3 INJECTION INTRAMUSCULAR; INTRAVENOUS; SUBCUTANEOUS ONCE
Qty: 0 | Refills: 0 | Status: COMPLETED | OUTPATIENT
Start: 2018-08-13 | End: 2018-08-13

## 2018-08-14 LAB
ALBUMIN SERPL ELPH-MCNC: 3.5 G/DL — SIGNIFICANT CHANGE UP (ref 3.5–5)
ALP SERPL-CCNC: 416 U/L — HIGH (ref 40–120)
ALT FLD-CCNC: 158 U/L DA — HIGH (ref 10–60)
ANION GAP SERPL CALC-SCNC: 7 MMOL/L — SIGNIFICANT CHANGE UP (ref 5–17)
APTT BLD: 41.8 SEC — HIGH (ref 27.5–37.4)
AST SERPL-CCNC: 103 U/L — HIGH (ref 10–40)
BASOPHILS # BLD AUTO: 0.1 K/UL — SIGNIFICANT CHANGE UP (ref 0–0.2)
BASOPHILS NFR BLD AUTO: 0.8 % — SIGNIFICANT CHANGE UP (ref 0–2)
BILIRUB SERPL-MCNC: 0.4 MG/DL — SIGNIFICANT CHANGE UP (ref 0.2–1.2)
BUN SERPL-MCNC: 14 MG/DL — SIGNIFICANT CHANGE UP (ref 7–18)
CALCIUM SERPL-MCNC: 8.9 MG/DL — SIGNIFICANT CHANGE UP (ref 8.4–10.5)
CHLORIDE SERPL-SCNC: 105 MMOL/L — SIGNIFICANT CHANGE UP (ref 96–108)
CO2 SERPL-SCNC: 28 MMOL/L — SIGNIFICANT CHANGE UP (ref 22–31)
CREAT SERPL-MCNC: 0.97 MG/DL — SIGNIFICANT CHANGE UP (ref 0.5–1.3)
EOSINOPHIL # BLD AUTO: 0.1 K/UL — SIGNIFICANT CHANGE UP (ref 0–0.5)
EOSINOPHIL NFR BLD AUTO: 1.4 % — SIGNIFICANT CHANGE UP (ref 0–6)
GLUCOSE SERPL-MCNC: 92 MG/DL — SIGNIFICANT CHANGE UP (ref 70–99)
HCT VFR BLD CALC: 48.1 % — SIGNIFICANT CHANGE UP (ref 39–50)
HGB BLD-MCNC: 16.1 G/DL — SIGNIFICANT CHANGE UP (ref 13–17)
INR BLD: 1.18 RATIO — HIGH (ref 0.88–1.16)
LYMPHOCYTES # BLD AUTO: 1.7 K/UL — SIGNIFICANT CHANGE UP (ref 1–3.3)
LYMPHOCYTES # BLD AUTO: 16.3 % — SIGNIFICANT CHANGE UP (ref 13–44)
MCHC RBC-ENTMCNC: 29.5 PG — SIGNIFICANT CHANGE UP (ref 27–34)
MCHC RBC-ENTMCNC: 33.4 GM/DL — SIGNIFICANT CHANGE UP (ref 32–36)
MCV RBC AUTO: 88.2 FL — SIGNIFICANT CHANGE UP (ref 80–100)
MONOCYTES # BLD AUTO: 0.9 K/UL — SIGNIFICANT CHANGE UP (ref 0–0.9)
MONOCYTES NFR BLD AUTO: 8.3 % — SIGNIFICANT CHANGE UP (ref 2–14)
NEUTROPHILS # BLD AUTO: 7.8 K/UL — HIGH (ref 1.8–7.4)
NEUTROPHILS NFR BLD AUTO: 73.2 % — SIGNIFICANT CHANGE UP (ref 43–77)
PLATELET # BLD AUTO: 210 K/UL — SIGNIFICANT CHANGE UP (ref 150–400)
POTASSIUM SERPL-MCNC: 3.6 MMOL/L — SIGNIFICANT CHANGE UP (ref 3.5–5.3)
POTASSIUM SERPL-SCNC: 3.6 MMOL/L — SIGNIFICANT CHANGE UP (ref 3.5–5.3)
PROT SERPL-MCNC: 7.4 G/DL — SIGNIFICANT CHANGE UP (ref 6–8.3)
PROTHROM AB SERPL-ACNC: 12.9 SEC — HIGH (ref 9.8–12.7)
RBC # BLD: 5.45 M/UL — SIGNIFICANT CHANGE UP (ref 4.2–5.8)
RBC # FLD: 12.5 % — SIGNIFICANT CHANGE UP (ref 10.3–14.5)
SODIUM SERPL-SCNC: 140 MMOL/L — SIGNIFICANT CHANGE UP (ref 135–145)
TROPONIN I SERPL-MCNC: <0.015 NG/ML — SIGNIFICANT CHANGE UP (ref 0–0.04)
WBC # BLD: 10.6 K/UL — HIGH (ref 3.8–10.5)
WBC # FLD AUTO: 10.6 K/UL — HIGH (ref 3.8–10.5)

## 2018-08-14 PROCEDURE — 99285 EMERGENCY DEPT VISIT HI MDM: CPT | Mod: 25

## 2018-08-14 PROCEDURE — 85027 COMPLETE CBC AUTOMATED: CPT

## 2018-08-14 PROCEDURE — 85730 THROMBOPLASTIN TIME PARTIAL: CPT

## 2018-08-14 PROCEDURE — 84484 ASSAY OF TROPONIN QUANT: CPT

## 2018-08-14 PROCEDURE — 85610 PROTHROMBIN TIME: CPT

## 2018-08-14 PROCEDURE — 93005 ELECTROCARDIOGRAM TRACING: CPT

## 2018-08-14 PROCEDURE — 71045 X-RAY EXAM CHEST 1 VIEW: CPT

## 2018-08-14 PROCEDURE — 80053 COMPREHEN METABOLIC PANEL: CPT

## 2018-08-14 RX ORDER — OXYCODONE HYDROCHLORIDE 5 MG/1
10 TABLET ORAL ONCE
Qty: 0 | Refills: 0 | Status: DISCONTINUED | OUTPATIENT
Start: 2018-08-14 | End: 2018-08-14

## 2018-08-14 RX ORDER — OXYCODONE HYDROCHLORIDE 5 MG/1
1 TABLET ORAL
Qty: 12 | Refills: 0 | OUTPATIENT
Start: 2018-08-14 | End: 2018-08-16

## 2018-08-14 RX ADMIN — OXYCODONE HYDROCHLORIDE 10 MILLIGRAM(S): 5 TABLET ORAL at 00:41

## 2018-08-14 RX ADMIN — SODIUM CHLORIDE 3 MILLILITER(S): 9 INJECTION INTRAMUSCULAR; INTRAVENOUS; SUBCUTANEOUS at 00:00

## 2018-08-14 NOTE — ED PROVIDER NOTE - OBJECTIVE STATEMENT
Patient reports he had his AICD replaced on 8/3/18 at [facility]. Since then, has had severe pain in insertion site. Worse with bending/twisting, moving right arm. No change with exertion. No fever, sob, ap, n/v/d. Triage wrote "shortness of breath" but patient denied to me.

## 2018-08-14 NOTE — ED PROVIDER NOTE - MEDICAL DECISION MAKING DETAILS
Patient with post-op pain at AICD insertion site. No signs of infection. Will check labs, give pain medicine, reassess. Patient with post-op pain at AICD insertion site. No signs of infection. Most likely post-op pain rather than cardiac pain. Will check labs, give pain medicine, reassess.

## 2018-08-14 NOTE — ED ADULT NURSE NOTE - NSIMPLEMENTINTERV_GEN_ALL_ED
Implemented All Fall Risk Interventions:  [place] to call system. Call bell, personal items and telephone within reach. Instruct patient to call for assistance. Room bathroom lighting operational. Non-slip footwear when patient is off stretcher. Physically safe environment: no spills, clutter or unnecessary equipment. Stretcher in lowest position, wheels locked, appropriate side rails in place. Provide visual cue, wrist band, yellow gown, etc. Monitor gait and stability. Monitor for mental status changes and reorient to person, place, and time. Review medications for side effects contributing to fall risk. Reinforce activity limits and safety measures with patient and family.

## 2018-08-14 NOTE — ED PROVIDER NOTE - PROGRESS NOTE DETAILS
Patient signed out to Dr. Gillespie. Awaiting lab results and reassessment after pain medicine. If labs normal, may d/c to f/u with his surgeon. Checked iSTOP - no active opiate prescriptions. Patient is resting comfortably, NAD. To f/u with PMD in 1-2 days and EP in 1 week. Return to the ED immediately if getting worse, not improving, or if having any new or troubling symptoms.

## 2018-08-14 NOTE — ED PROVIDER NOTE - RESPIRATORY CHEST EXAM
sutured incision overlying right upper chest AICD C/D/I with tenderness. No warmth, erythema, drainage, induration

## 2018-10-01 NOTE — ED ADULT NURSE NOTE - RESPIRATORY WDL
----- Message from Anjana Navarro [phone]  Pt    Breathing spontaneous and unlabored. Breath sounds clear and equal bilaterally with regular rhythm.

## 2018-10-21 ENCOUNTER — TRANSCRIPTION ENCOUNTER (OUTPATIENT)
Age: 58
End: 2018-10-21

## 2018-10-21 ENCOUNTER — INPATIENT (INPATIENT)
Facility: HOSPITAL | Age: 58
LOS: 0 days | Discharge: LEFT BEFORE DISCHARGE | DRG: 696 | End: 2018-10-21
Attending: FAMILY MEDICINE | Admitting: FAMILY MEDICINE
Payer: MEDICAID

## 2018-10-21 VITALS
RESPIRATION RATE: 18 BRPM | HEIGHT: 67 IN | TEMPERATURE: 97 F | SYSTOLIC BLOOD PRESSURE: 162 MMHG | WEIGHT: 199.96 LBS | OXYGEN SATURATION: 97 % | DIASTOLIC BLOOD PRESSURE: 98 MMHG | HEART RATE: 90 BPM

## 2018-10-21 VITALS — WEIGHT: 224.87 LBS

## 2018-10-21 DIAGNOSIS — Z98.890 OTHER SPECIFIED POSTPROCEDURAL STATES: Chronic | ICD-10-CM

## 2018-10-21 DIAGNOSIS — Z29.9 ENCOUNTER FOR PROPHYLACTIC MEASURES, UNSPECIFIED: ICD-10-CM

## 2018-10-21 DIAGNOSIS — I48.91 UNSPECIFIED ATRIAL FIBRILLATION: ICD-10-CM

## 2018-10-21 DIAGNOSIS — Z95.5 PRESENCE OF CORONARY ANGIOPLASTY IMPLANT AND GRAFT: Chronic | ICD-10-CM

## 2018-10-21 DIAGNOSIS — Z90.5 ACQUIRED ABSENCE OF KIDNEY: Chronic | ICD-10-CM

## 2018-10-21 DIAGNOSIS — Z95.810 PRESENCE OF AUTOMATIC (IMPLANTABLE) CARDIAC DEFIBRILLATOR: Chronic | ICD-10-CM

## 2018-10-21 DIAGNOSIS — I63.9 CEREBRAL INFARCTION, UNSPECIFIED: ICD-10-CM

## 2018-10-21 DIAGNOSIS — J44.9 CHRONIC OBSTRUCTIVE PULMONARY DISEASE, UNSPECIFIED: ICD-10-CM

## 2018-10-21 DIAGNOSIS — Z90.49 ACQUIRED ABSENCE OF OTHER SPECIFIED PARTS OF DIGESTIVE TRACT: Chronic | ICD-10-CM

## 2018-10-21 DIAGNOSIS — R10.9 UNSPECIFIED ABDOMINAL PAIN: ICD-10-CM

## 2018-10-21 DIAGNOSIS — I50.9 HEART FAILURE, UNSPECIFIED: ICD-10-CM

## 2018-10-21 DIAGNOSIS — C64.2 MALIGNANT NEOPLASM OF LEFT KIDNEY, EXCEPT RENAL PELVIS: ICD-10-CM

## 2018-10-21 LAB
ALBUMIN SERPL ELPH-MCNC: 3.4 G/DL — LOW (ref 3.5–5)
ALP SERPL-CCNC: 165 U/L — HIGH (ref 40–120)
ALT FLD-CCNC: 54 U/L DA — SIGNIFICANT CHANGE UP (ref 10–60)
ANION GAP SERPL CALC-SCNC: 10 MMOL/L — SIGNIFICANT CHANGE UP (ref 5–17)
APPEARANCE UR: ABNORMAL
APTT BLD: 39.4 SEC — HIGH (ref 27.5–37.4)
AST SERPL-CCNC: 40 U/L — SIGNIFICANT CHANGE UP (ref 10–40)
BACTERIA # UR AUTO: ABNORMAL /HPF
BASOPHILS # BLD AUTO: 0 K/UL — SIGNIFICANT CHANGE UP (ref 0–0.2)
BASOPHILS NFR BLD AUTO: 0.2 % — SIGNIFICANT CHANGE UP (ref 0–2)
BILIRUB SERPL-MCNC: 0.5 MG/DL — SIGNIFICANT CHANGE UP (ref 0.2–1.2)
BILIRUB UR-MCNC: NEGATIVE — SIGNIFICANT CHANGE UP
BUN SERPL-MCNC: 16 MG/DL — SIGNIFICANT CHANGE UP (ref 7–18)
CALCIUM SERPL-MCNC: 8.9 MG/DL — SIGNIFICANT CHANGE UP (ref 8.4–10.5)
CHLORIDE SERPL-SCNC: 105 MMOL/L — SIGNIFICANT CHANGE UP (ref 96–108)
CO2 SERPL-SCNC: 23 MMOL/L — SIGNIFICANT CHANGE UP (ref 22–31)
COLOR SPEC: ABNORMAL
CREAT SERPL-MCNC: 1.11 MG/DL — SIGNIFICANT CHANGE UP (ref 0.5–1.3)
DIFF PNL FLD: ABNORMAL
EOSINOPHIL # BLD AUTO: 0 K/UL — SIGNIFICANT CHANGE UP (ref 0–0.5)
EOSINOPHIL NFR BLD AUTO: 0.1 % — SIGNIFICANT CHANGE UP (ref 0–6)
EPI CELLS # UR: ABNORMAL /HPF
ETHANOL SERPL-MCNC: <3 MG/DL — SIGNIFICANT CHANGE UP (ref 0–10)
GLUCOSE SERPL-MCNC: 149 MG/DL — HIGH (ref 70–99)
GLUCOSE UR QL: 250
HCT VFR BLD CALC: 45.5 % — SIGNIFICANT CHANGE UP (ref 39–50)
HGB BLD-MCNC: 15.3 G/DL — SIGNIFICANT CHANGE UP (ref 13–17)
INR BLD: 1.02 RATIO — SIGNIFICANT CHANGE UP (ref 0.88–1.16)
KETONES UR-MCNC: NEGATIVE — SIGNIFICANT CHANGE UP
LEUKOCYTE ESTERASE UR-ACNC: ABNORMAL
LYMPHOCYTES # BLD AUTO: 0.7 K/UL — LOW (ref 1–3.3)
LYMPHOCYTES # BLD AUTO: 6.1 % — LOW (ref 13–44)
MCHC RBC-ENTMCNC: 29 PG — SIGNIFICANT CHANGE UP (ref 27–34)
MCHC RBC-ENTMCNC: 33.5 GM/DL — SIGNIFICANT CHANGE UP (ref 32–36)
MCV RBC AUTO: 86.4 FL — SIGNIFICANT CHANGE UP (ref 80–100)
MONOCYTES # BLD AUTO: 0.1 K/UL — SIGNIFICANT CHANGE UP (ref 0–0.9)
MONOCYTES NFR BLD AUTO: 0.6 % — LOW (ref 2–14)
NEUTROPHILS # BLD AUTO: 10.8 K/UL — HIGH (ref 1.8–7.4)
NEUTROPHILS NFR BLD AUTO: 93 % — HIGH (ref 43–77)
NITRITE UR-MCNC: POSITIVE
PH UR: 5 — SIGNIFICANT CHANGE UP (ref 5–8)
PLATELET # BLD AUTO: 173 K/UL — SIGNIFICANT CHANGE UP (ref 150–400)
POTASSIUM SERPL-MCNC: 3.6 MMOL/L — SIGNIFICANT CHANGE UP (ref 3.5–5.3)
POTASSIUM SERPL-SCNC: 3.6 MMOL/L — SIGNIFICANT CHANGE UP (ref 3.5–5.3)
PROT SERPL-MCNC: 7 G/DL — SIGNIFICANT CHANGE UP (ref 6–8.3)
PROT UR-MCNC: 100
PROTHROM AB SERPL-ACNC: 11.1 SEC — SIGNIFICANT CHANGE UP (ref 9.8–12.7)
RBC # BLD: 5.27 M/UL — SIGNIFICANT CHANGE UP (ref 4.2–5.8)
RBC # FLD: 12.7 % — SIGNIFICANT CHANGE UP (ref 10.3–14.5)
RBC CASTS # UR COMP ASSIST: >50 /HPF (ref 0–2)
SODIUM SERPL-SCNC: 138 MMOL/L — SIGNIFICANT CHANGE UP (ref 135–145)
SP GR SPEC: 1.02 — SIGNIFICANT CHANGE UP (ref 1.01–1.02)
TROPONIN I SERPL-MCNC: <0.015 NG/ML — SIGNIFICANT CHANGE UP (ref 0–0.04)
UROBILINOGEN FLD QL: NEGATIVE — SIGNIFICANT CHANGE UP
WBC # BLD: 11.6 K/UL — HIGH (ref 3.8–10.5)
WBC # FLD AUTO: 11.6 K/UL — HIGH (ref 3.8–10.5)
WBC UR QL: SIGNIFICANT CHANGE UP /HPF (ref 0–5)

## 2018-10-21 PROCEDURE — 70450 CT HEAD/BRAIN W/O DYE: CPT | Mod: 26

## 2018-10-21 PROCEDURE — 99223 1ST HOSP IP/OBS HIGH 75: CPT

## 2018-10-21 PROCEDURE — 99285 EMERGENCY DEPT VISIT HI MDM: CPT

## 2018-10-21 PROCEDURE — 74176 CT ABD & PELVIS W/O CONTRAST: CPT | Mod: 26

## 2018-10-21 RX ORDER — ATORVASTATIN CALCIUM 80 MG/1
40 TABLET, FILM COATED ORAL AT BEDTIME
Qty: 0 | Refills: 0 | Status: DISCONTINUED | OUTPATIENT
Start: 2018-10-21 | End: 2018-10-21

## 2018-10-21 RX ORDER — APIXABAN 2.5 MG/1
5 TABLET, FILM COATED ORAL EVERY 12 HOURS
Qty: 0 | Refills: 0 | Status: DISCONTINUED | OUTPATIENT
Start: 2018-10-21 | End: 2018-10-21

## 2018-10-21 RX ORDER — BUDESONIDE AND FORMOTEROL FUMARATE DIHYDRATE 160; 4.5 UG/1; UG/1
2 AEROSOL RESPIRATORY (INHALATION)
Qty: 0 | Refills: 0 | Status: DISCONTINUED | OUTPATIENT
Start: 2018-10-21 | End: 2018-10-21

## 2018-10-21 RX ORDER — METOPROLOL TARTRATE 50 MG
1 TABLET ORAL
Qty: 0 | Refills: 0 | COMMUNITY

## 2018-10-21 RX ORDER — MORPHINE SULFATE 50 MG/1
6 CAPSULE, EXTENDED RELEASE ORAL ONCE
Qty: 0 | Refills: 0 | Status: DISCONTINUED | OUTPATIENT
Start: 2018-10-21 | End: 2018-10-21

## 2018-10-21 RX ORDER — NICOTINE POLACRILEX 2 MG
1 GUM BUCCAL DAILY
Qty: 0 | Refills: 0 | Status: DISCONTINUED | OUTPATIENT
Start: 2018-10-21 | End: 2018-10-21

## 2018-10-21 RX ORDER — LIDOCAINE 4 G/100G
1 CREAM TOPICAL DAILY
Qty: 0 | Refills: 0 | Status: DISCONTINUED | OUTPATIENT
Start: 2018-10-21 | End: 2018-10-21

## 2018-10-21 RX ORDER — SOTALOL HCL 120 MG
80 TABLET ORAL
Qty: 0 | Refills: 0 | Status: DISCONTINUED | OUTPATIENT
Start: 2018-10-21 | End: 2018-10-21

## 2018-10-21 RX ORDER — BUDESONIDE AND FORMOTEROL FUMARATE DIHYDRATE 160; 4.5 UG/1; UG/1
2 AEROSOL RESPIRATORY (INHALATION)
Qty: 0 | Refills: 0 | COMMUNITY

## 2018-10-21 RX ORDER — LISINOPRIL 2.5 MG/1
40 TABLET ORAL DAILY
Qty: 0 | Refills: 0 | Status: DISCONTINUED | OUTPATIENT
Start: 2018-10-21 | End: 2018-10-21

## 2018-10-21 RX ORDER — TAMSULOSIN HYDROCHLORIDE 0.4 MG/1
1 CAPSULE ORAL
Qty: 0 | Refills: 0 | COMMUNITY

## 2018-10-21 RX ORDER — CLOPIDOGREL BISULFATE 75 MG/1
75 TABLET, FILM COATED ORAL DAILY
Qty: 0 | Refills: 0 | Status: DISCONTINUED | OUTPATIENT
Start: 2018-10-21 | End: 2018-10-21

## 2018-10-21 RX ORDER — SODIUM CHLORIDE 9 MG/ML
1000 INJECTION INTRAMUSCULAR; INTRAVENOUS; SUBCUTANEOUS ONCE
Qty: 0 | Refills: 0 | Status: COMPLETED | OUTPATIENT
Start: 2018-10-21 | End: 2018-10-21

## 2018-10-21 RX ORDER — GABAPENTIN 400 MG/1
1 CAPSULE ORAL
Qty: 0 | Refills: 0 | COMMUNITY

## 2018-10-21 RX ORDER — TIOTROPIUM BROMIDE 18 UG/1
1 CAPSULE ORAL; RESPIRATORY (INHALATION) DAILY
Qty: 0 | Refills: 0 | Status: DISCONTINUED | OUTPATIENT
Start: 2018-10-21 | End: 2018-10-21

## 2018-10-21 RX ORDER — ISOSORBIDE MONONITRATE 60 MG/1
30 TABLET, EXTENDED RELEASE ORAL DAILY
Qty: 0 | Refills: 0 | Status: DISCONTINUED | OUTPATIENT
Start: 2018-10-21 | End: 2018-10-21

## 2018-10-21 RX ORDER — GABAPENTIN 400 MG/1
600 CAPSULE ORAL THREE TIMES A DAY
Qty: 0 | Refills: 0 | Status: DISCONTINUED | OUTPATIENT
Start: 2018-10-21 | End: 2018-10-21

## 2018-10-21 RX ORDER — SPIRONOLACTONE 25 MG/1
25 TABLET, FILM COATED ORAL DAILY
Qty: 0 | Refills: 0 | Status: DISCONTINUED | OUTPATIENT
Start: 2018-10-21 | End: 2018-10-21

## 2018-10-21 RX ADMIN — SODIUM CHLORIDE 1000 MILLILITER(S): 9 INJECTION INTRAMUSCULAR; INTRAVENOUS; SUBCUTANEOUS at 06:52

## 2018-10-21 RX ADMIN — GABAPENTIN 600 MILLIGRAM(S): 400 CAPSULE ORAL at 13:52

## 2018-10-21 RX ADMIN — CLOPIDOGREL BISULFATE 75 MILLIGRAM(S): 75 TABLET, FILM COATED ORAL at 13:53

## 2018-10-21 RX ADMIN — ISOSORBIDE MONONITRATE 30 MILLIGRAM(S): 60 TABLET, EXTENDED RELEASE ORAL at 13:52

## 2018-10-21 RX ADMIN — MORPHINE SULFATE 6 MILLIGRAM(S): 50 CAPSULE, EXTENDED RELEASE ORAL at 06:52

## 2018-10-21 RX ADMIN — TIOTROPIUM BROMIDE 1 CAPSULE(S): 18 CAPSULE ORAL; RESPIRATORY (INHALATION) at 13:53

## 2018-10-21 NOTE — DISCHARGE NOTE ADULT - MEDICATION SUMMARY - MEDICATIONS TO TAKE
I will START or STAY ON the medications listed below when I get home from the hospital:    spironolactone 25 mg oral tablet  -- 1 tab(s) by mouth once a day  -- Indication: For diuretic     lisinopril 40 mg oral tablet  -- 1 tab(s) by mouth once a day  -- Indication: For HTN     Imdur 30 mg oral tablet, extended release  -- 1 tab(s) by mouth once a day (in the morning)  -- Indication: For HTN     sotalol 80 mg oral tablet  -- 1 tab(s) by mouth 2 times a day  -- Indication: For Atrial fibrillation    apixaban 5 mg oral tablet  -- 1 tab(s) by mouth every 12 hours  -- Indication: For Atrial fibrillation    gabapentin 600 mg oral tablet  -- 1 tab(s) by mouth 3 times a day  -- Indication: For Pain     clopidogrel 75 mg oral tablet  -- 1 tab(s) by mouth once a day  -- Indication: For Cerebral infarction    tiotropium 18 mcg inhalation capsule  -- 1 cap(s) inhaled once a day  -- Indication: For COPD (chronic obstructive pulmonary disease)    Symbicort 160 mcg-4.5 mcg/inh inhalation aerosol  -- 2 puff(s) inhaled 2 times a day  -- Indication: For COPD (chronic obstructive pulmonary disease)

## 2018-10-21 NOTE — CONSULT NOTE ADULT - PROBLEM SELECTOR RECOMMENDATION 9
- pt with flank pain  - unable to confirm if pt has renal cell carcinoma  - ct neg except for previous renal surgery  - no iv opioids  - multiple allergies to tylenol and nsaids  - no reason for iv opioids at this time.  - pt does not have oncologist or pain specialist.  - istop shows multiple short term prescriptions   - spoke to pt and recommended non opioids options such as lidocaine, gabapentin, cymbalta.  Pt refused and decides to leave ama.  Pt understand risks and benefits.  Pt to follow up with PCP with hematuria or urologist.  - pt ambulating around room without any difficulty

## 2018-10-21 NOTE — ED ADULT NURSE REASSESSMENT NOTE - NS ED NURSE REASSESS COMMENT FT1
Pt aox3, ambulatory, out to smoke again with angiocath in place with Nurse supervisor Brittaney permission.

## 2018-10-21 NOTE — ED PROVIDER NOTE - MEDICAL DECISION MAKING DETAILS
57yo M w multiple medical problems, recurrent renal CA not on treatment, in the ER for L sided flank pain and hematuria. Also, RUE/RLE weakness. Stroke code called. Will get CT abdomen as well, abdominal/tox w/u, likely admit

## 2018-10-21 NOTE — DISCHARGE NOTE ADULT - PATIENT PORTAL LINK FT
You can access the [facility] Patient Portal, offered by [facility], by registering with the following website: [email or web address]

## 2018-10-21 NOTE — H&P ADULT - PROBLEM SELECTOR PLAN 6
IMPROVE VTE Individual Risk Assessment    RISK                                                          Points  [] Previous VTE                                           3  [] Thrombophilia                                        2  [] Lower limb paralysis                              2   [] Current Cancer                                       2   [] Immobilization > 24 hrs                        1  [] ICU/CCU stay > 24 hours                       1  [] Age > 60                                                   1    IMPROVE VTE Score: IMPROVE VTE Individual Risk Assessment    RISK                                                          Points  [] Previous VTE                                           3  [] Thrombophilia                                        2  [] Lower limb paralysis                              2   [] Current Cancer                                       2   [] Immobilization > 24 hrs                        1  [] ICU/CCU stay > 24 hours                       1  [] Age > 60                                                   1    IMPROVE VTE Score: 0, no indication for DVT PPx

## 2018-10-21 NOTE — H&P ADULT - PROBLEM SELECTOR PLAN 3
Euvolemic; no orthopnea, rales, or LE swelling  - Prior TTE 2/2018 EF 50% w/ G2DD  - Consider low dose ACEi and BB once CVA w/u complete  ***F/u TTE Euvolemic; no orthopnea, rales, or LE swelling  - Prior TTE 2/2018 EF 50% w/ G2DD  - Resumed Spironolactone, Imdur, and ACEi  ***F/u TTE Euvolemic; no orthopnea, rales, or LE swelling  - Prior TTE 2/2018 EF 50% w/ G2DD  - Resumed Spironolactone, Imdur, and ACEi Euvolemic; no orthopnea, rales, or LE swelling  - Prior TTE 7/2018 EF 50% w/ G2DD  - Resumed Spironolactone, Imdur, and ACEi

## 2018-10-21 NOTE — H&P ADULT - PROBLEM SELECTOR PLAN 2
P/w flank pain and hematuria; UA shows 50 RBC and negative for infection  - CT a/p negative for nephrolithiasis; s/p 6 mg Morphine in ED  - Noted extensive allergic Hx to Tylenol and Codeine; resume Gabapentin  ***F/u pain management consultation P/w flank pain and hematuria; UA shows 50 RBC and negative for infection  - CT a/p negative for nephrolithiasis; s/p 6 mg Morphine in ED  - Noted extensive allergic Hx to Tylenol and Codeine; resumed Gabapentin  ***F/u pain management consultation Dorina MUNOZ P/w flank pain and hematuria; UA shows 50 RBC and negative for infection  - CT a/p negative for nephrolithiasis; s/p 6 mg Morphine in ED  - Noted extensive allergic Hx to Tylenol and Codeine; resumed Gabapentin 600  ***Avoid Opioids; C/w daily Lidocaine patch  F/u pain management consultation Dorina MUNOZ

## 2018-10-21 NOTE — DISCHARGE NOTE ADULT - PLAN OF CARE
resolution of symptoms You were found to have blood in urine and that should be further investigated. You requested to leave against medical advise. Risks and consequences of leaving before compilation of medical evaluation including worsening pain, infection, stroke and death. Please follow up with your doctor as soon as possible for reevaluation and further management.   Return to the emergency room for worsening symptoms such as numbness, weakness to extremities, fever, chest pain, fainting, worsening pain or any concerning symptoms Your neurological exam and CT head is reassuring. However you requested to be discharged with out completion of medical evaluation. Please follow up with your doctor as soon as possible for reevaluation and further management. Return to the ER if unable to see your doctor or for any concerning symptoms

## 2018-10-21 NOTE — H&P ADULT - NSHPSOCIALHISTORY_GEN_ALL_CORE
1-2 PPD for 20 years, cut down to 6 cigarettes per day for the last year; denied alcohol and illicit drug use Ambulates w/ rollator 2/2 peripheral neuropathy, 1-2 PPD for 20 years, cut down to 6 cigarettes per day for the last year; denied alcohol and illicit drug use, Hx of  service

## 2018-10-21 NOTE — ED ADULT NURSE REASSESSMENT NOTE - NS ED NURSE REASSESS COMMENT FT1
1000: Pt aox3, ambulatory, requesting pain medication, MD. Mcrae aware. pain management consult in place. As per nurse supervision Brittaney, pt is allowed to go outside to hospital to have a one-time smoke, pt currently has right metacarpal Angiocath in place, Nurse supervisor Brittaney aware and approved.

## 2018-10-21 NOTE — ED PROVIDER NOTE - CARE PLAN
Principal Discharge DX:	Cerebrovascular accident (CVA), unspecified mechanism  Secondary Diagnosis:	Flank pain

## 2018-10-21 NOTE — ED PROVIDER NOTE - CONSTITUTIONAL, MLM
normal... Well appearing, well nourished, awake, alert, oriented to person, place, time/situation and in moderate distress from pain

## 2018-10-21 NOTE — ED ADULT NURSE REASSESSMENT NOTE - NS ED NURSE REASSESS COMMENT FT1
Pt aox3, aggressive, cursing, passing, requesting pain medications, Md. Mcrae, nurse supervisor and security personnel aware, waiting for orders.

## 2018-10-21 NOTE — CONSULT NOTE ADULT - SUBJECTIVE AND OBJECTIVE BOX
RENEA AVILES  58y  Male      Patient is a 58y old  Male who presents with a chief complaint of flank pain and RUE weakness (21 Oct 2018 08:13).  57 year old male with PMH CAD s/p 1 stent, CHF (EF 50%, G2DD - June 2018), AICD (replaced 8/8/18; interrogated July 2018), HTN, PAD, Bladder CA s/p CTX/Radiation/TURBT, RCC s/p CTX/Radiation/Partial Nephrectomy, Prostate CA s/p Radiation therapy, Nephrolithiasis, HCV (reportedly no treatment), and COPD presents to the ED w/ complaints of L sided flank pain w/ 1 episode of hematuria - in the ED patient reported RUE weakness 30 minutes prior to arrival Code Stroke was called - NIHSS reportedly 2, no tPA given 2/2 active AC use, hematuria, and improving symptoms. The L flank pain began last night w/ hematuria, spasm in quality, burning urination, and immediately followed w/ RUE weakness; 2 mins, then another 8 minutes later that night both exacerbated w/ inhalation. CT head negative for acute stroke and CT abdomen pelvis w/out contrast showed no acute renal pathology including nephrolithiasis. Patient recently admitted for CP complaints July 2018 and work up at the time was unremarkable. In the ED patient seen in NAD w/ no other complaints.           PAST MEDICAL/SURGICAL HISTORY  PAST MEDICAL & SURGICAL HISTORY:  Kidney stone  Nephrolithiasis  Prostate cancer: s/p radiation  Peripheral neuropathy  Bladder cancer  COPD (chronic obstructive pulmonary disease)  Renal cell carcinoma of left kidney: s/p partial nephrectomy in 2002  biopsy again in Sep 2017 showed RCC again in stage 2  Hyperlipidemia  CAD (coronary artery disease): (s/p 2 stents in Sep 2017)  Atrial fibrillation  AICD (automatic cardioverter/defibrillator) present: [employer]  HTN (hypertension)  Hep C w/o coma, chronic  Chronic congestive heart failure, unspecified congestive heart failure type: rEF/pEF  History of coronary artery stent placement  H/O transurethral destruction of bladder lesion  History of percutaneous coronary intervention  H/O partial nephrectomy: 2002  S/P cholecystectomy: 2015  AICD (automatic cardioverter/defibrillator) present      REVIEW OF SYSTEMS:  CONSTITUTIONAL: No fever, weight loss, or fatigue  RESPIRATORY: No cough, wheezing, chills or hemoptysis; No shortness of breath  CARDIOVASCULAR: No chest pain, palpitations, dizziness, or leg swelling  GASTROINTESTINAL: No abdominal or epigastric pain. No nausea, vomiting, or hematemesis; No diarrhea or constipation  GENITOURINARY: No dysuria, frequency, hematuria, or incontinence  NEUROLOGICAL: No headaches, memory loss, loss of strength, numbness, or tremors  SKIN: No itching, burning, rashes, or lesions   MUSCULOSKELETAL: No joint pain or swelling; No muscle, back, or extremity pain  PSYCHIATRIC: No depression, anxiety, mood swings, or difficulty sleeping    T(C): 36.8 (10-21-18 @ 12:29), Max: 36.8 (10-21-18 @ 12:29)  HR: 75 (10-21-18 @ 12:29) (74 - 90)  BP: 154/92 (10-21-18 @ 12:29) (143/82 - 162/98)  RR: 20 (10-21-18 @ 12:29) (18 - 20)  SpO2: 97% (10-21-18 @ 12:29) (95% - 97%)  Wt(kg): --Vital Signs Last 24 Hrs  T(C): 36.8 (21 Oct 2018 12:29), Max: 36.8 (21 Oct 2018 12:29)  T(F): 98.2 (21 Oct 2018 12:29), Max: 98.2 (21 Oct 2018 12:29)  HR: 75 (21 Oct 2018 12:29) (74 - 90)  BP: 154/92 (21 Oct 2018 12:29) (143/82 - 162/98)  BP(mean): --  RR: 20 (21 Oct 2018 12:29) (18 - 20)  SpO2: 97% (21 Oct 2018 12:29) (95% - 97%)    PHYSICAL EXAM:  GENERAL: NAD, well-groomed, well-developed  HEAD:  Atraumatic, Normocephalic  NECK: Supple, No JVD, Normal thyroid  NERVOUS SYSTEM:  Alert & Oriented X3, Good concentration;   CHEST/LUNG: Clear to percussion bilaterally; No rales, rhonchi, wheezing, or rubs  HEART: Regular rate and rhythm; No murmurs, rubs, or gallops  ABDOMEN: Soft, Nontender, Nondistended; Bowel sounds present  EXTREMITIES:  2+ Peripheral Pulses, No clubbing, cyanosis, or edema  MUSCULOSKELETAL:  SKIN: No rashes or lesions    Consultant(s) Notes Reviewed:  [x ] YES  [ ] NO  Care Discussed with Consultants/Other Providers [ x] YES  [ ] NO  ISTOP [ ] Yes  [  ] No      LABS:  CBC   10-21-18 @ 05:54  Hematcorit 45.5  Hemoglobin 15.3  Mean Cell Hemoglobin 29.0  Platelet Count-Automated 173  RBC Count 5.27  Red Cell Distrib Width 12.7  Wbc Count 11.6      BMP  10-21-18 @ 05:54  Anion Gap. Serum 10  Blood Urea Nitrogen,Serm 16  Calcium, Total Serum 8.9  Carbon Dioxide, Serum 23  Chloride, Serum 105  Creatinine, Serum 1.11  eGFR in  84  eGFR in Non Afican American 72  Gloucose, serum 149  Potassium, Serum 3.6  Sodium, Serum 138                  CMP  10-21-18 @ 05:54  Geovanna Aminotransferase(ALT/SGPT)54  Albumin, Serum 3.4  Alkaline Phosphatase, Serum 165  Anion Gap, Serum 10  Aspartate Aminotransferase (AST/SGOT)40  Bilirubin Total, Serum 0.5  Blood Urea Nitrogen, Serum 16  Calcium,Total Serum 8.9  Carbon Dioxide, Serum 23  Chloride, Serum 105  Creatinine, Serum 1.11  eGFR if  84  eGFR if Non African American 72  Glucose, Serum 149  Potassium, Serum 3.6  Protein Total, Serum 7.0  Sodium, Serum 138                          PT/INR  PT/INR  10-21-18 @ 05:54  INR 1.02  Prothrombin Time Comment --  Prothrobin Time, [id number]      Amylase/Lipase            RADIOLOGY & ADDITIONAL TESTS:    Imaging Personally Reviewed:  [ ] YES  [ ] NO RENEA AVILES  58y  Male      Patient is a 58y old  Male who presents with a chief complaint of flank pain and RUE weakness (21 Oct 2018 08:13).  57 year old male with PMH CAD s/p 1 stent, CHF (EF 50%, G2DD - June 2018), AICD (replaced 8/8/18; interrogated July 2018), HTN, PAD, Bladder CA s/p CTX/Radiation/TURBT, RCC s/p CTX/Radiation/Partial Nephrectomy, Prostate CA s/p Radiation therapy, Nephrolithiasis, HCV (reportedly no treatment), and COPD presents to the ED w/ complaints of L sided flank pain w/ 1 episode of hematuria - in the ED patient reported RUE weakness 30 minutes prior to arrival Code Stroke was called - NIHSS reportedly 2, no tPA given 2/2 active AC use, hematuria, and improving symptoms. The L flank pain began last night w/ hematuria, spasm in quality, burning urination, and immediately followed w/ RUE weakness; 2 mins, then another 8 minutes later that night both exacerbated w/ inhalation. CT head negative for acute stroke and CT abdomen pelvis w/out contrast showed no acute renal pathology including nephrolithiasis. Patient recently admitted for CP complaints July 2018 and work up at the time was unremarkable. In the ED patient seen in NAD w/ no other complaints.       58 year old male presenting with right flank pain. Pt states + history of renal cell carcinoma and underwent surgery.  However, pt does not follow up with oncologist or have pain specialist.   Pt has abnormal urine findings.  Pt also complaining of rue weakness and was a code julia.  Ct head - neg.  Pt with no deficits and ambulating without any difficulties.      PAST MEDICAL/SURGICAL HISTORY  PAST MEDICAL & SURGICAL HISTORY:  Kidney stone  Nephrolithiasis  Prostate cancer: s/p radiation  Peripheral neuropathy  Bladder cancer  COPD (chronic obstructive pulmonary disease)  Renal cell carcinoma of left kidney: s/p partial nephrectomy in 2002  biopsy again in Sep 2017 showed RCC again in stage 2  Hyperlipidemia  CAD (coronary artery disease): (s/p 2 stents in Sep 2017)  Atrial fibrillation  AICD (automatic cardioverter/defibrillator) present: [employer]  HTN (hypertension)  Hep C w/o coma, chronic  Chronic congestive heart failure, unspecified congestive heart failure type: rEF/pEF  History of coronary artery stent placement  H/O transurethral destruction of bladder lesion  History of percutaneous coronary intervention  H/O partial nephrectomy: 2002  S/P cholecystectomy: 2015  AICD (automatic cardioverter/defibrillator) present      REVIEW OF SYSTEMS:  CONSTITUTIONAL: No fever, weight loss, or fatigue  RESPIRATORY: No cough, wheezing, chills or hemoptysis; No shortness of breath  CARDIOVASCULAR: No chest pain, palpitations, dizziness, or leg swelling  GASTROINTESTINAL: + right abdominal pain,  No diarrhea or constipation  GENITOURINARY: + right flank pain  NEUROLOGICAL: No headaches, memory loss, loss of strength, numbness, or tremors  MUSCULOSKELETAL: No joint pain or swelling; No muscle, back, or extremity pain      T(C): 36.8 (10-21-18 @ 12:29), Max: 36.8 (10-21-18 @ 12:29)  HR: 75 (10-21-18 @ 12:29) (74 - 90)  BP: 154/92 (10-21-18 @ 12:29) (143/82 - 162/98)  RR: 20 (10-21-18 @ 12:29) (18 - 20)  SpO2: 97% (10-21-18 @ 12:29) (95% - 97%)  Wt(kg): --Vital Signs Last 24 Hrs  T(C): 36.8 (21 Oct 2018 12:29), Max: 36.8 (21 Oct 2018 12:29)  T(F): 98.2 (21 Oct 2018 12:29), Max: 98.2 (21 Oct 2018 12:29)  HR: 75 (21 Oct 2018 12:29) (74 - 90)  BP: 154/92 (21 Oct 2018 12:29) (143/82 - 162/98)  BP(mean): --  RR: 20 (21 Oct 2018 12:29) (18 - 20)  SpO2: 97% (21 Oct 2018 12:29) (95% - 97%)    PHYSICAL EXAM:  GENERAL: NAD, well-groomed, well-developed  NERVOUS SYSTEM:  Alert & Oriented X3, Good concentration;   CHEST/LUNG: Clear to percussion bilaterally; No rales, rhonchi, wheezing, or rubs  HEART: Regular rate and rhythm; No murmurs, rubs, or gallops  ABDOMEN: + distention + right flank pain  EXTREMITIES:  2+ Peripheral Pulses, No clubbing, cyanosis, or edema  MUSCULOSKELETAL: + from      Consultant(s) Notes Reviewed:  [x ] YES  [ ] NO  Care Discussed with Consultants/Other Providers [ x] YES  [ ] NO  ISTOP [ ] Yes  [  ] No      LABS:  CBC   10-21-18 @ 05:54  Hematcorit 45.5  Hemoglobin 15.3  Mean Cell Hemoglobin 29.0  Platelet Count-Automated 173  RBC Count 5.27  Red Cell Distrib Width 12.7  Wbc Count 11.6      BMP  10-21-18 @ 05:54  Anion Gap. Serum 10  Blood Urea Nitrogen,Serm 16  Calcium, Total Serum 8.9  Carbon Dioxide, Serum 23  Chloride, Serum 105  Creatinine, Serum 1.11  eGFR in  84  eGFR in Non Afican American 72  Gloucose, serum 149  Potassium, Serum 3.6  Sodium, Serum 138                  CMP  10-21-18 @ 05:54  Geovanna Aminotransferase(ALT/SGPT)54  Albumin, Serum 3.4  Alkaline Phosphatase, Serum 165  Anion Gap, Serum 10  Aspartate Aminotransferase (AST/SGOT)40  Bilirubin Total, Serum 0.5  Blood Urea Nitrogen, Serum 16  Calcium,Total Serum 8.9  Carbon Dioxide, Serum 23  Chloride, Serum 105  Creatinine, Serum 1.11  eGFR if  84  eGFR if Non African American 72  Glucose, Serum 149  Potassium, Serum 3.6  Protein Total, Serum 7.0  Sodium, Serum 138                          PT/INR  PT/INR  10-21-18 @ 05:54  INR 1.02  Prothrombin Time Comment --  Prothrobin Time, [id number]      Amylase/Lipase            RADIOLOGY & ADDITIONAL TESTS:  < from: CT Abdomen and Pelvis No Cont (10.21.18 @ 07:10) >    EXAM:  CT ABDOMEN AND PELVIS                            PROCEDURE DATE:  10/21/2018          INTERPRETATION:  CLINICAL INFORMATION: Hematuria, left flank pain.   Recurrent renal carcinoma.    COMPARISON: CT abdomen/pelvis of 6/8/2018.    PROCEDURE:  CT of the Abdomen and Pelvis was performed without intravenous contrast.   Intravenous contrast: None.  Oral contrast: None.  Sagittal and coronal reformats were performed.    FINDINGS:    LOWER CHEST: Within normal limits.    LIVER: Within normal limits.  BILE DUCTS: Normal caliber.  GALLBLADDER: Status post cholecystectomy.  SPLEEN: Within normal limits.  PANCREAS: Within normal limits.  ADRENALS: Within normal limits.  KIDNEYS/URETERS: No nephrolithiasis, hydronephrosis, or evidence of   obstructive uropathy. Surgical clips within the left retroperitoneum   along the course of the distal renal artery.    BLADDER: Within normal limits.  REPRODUCTIVE ORGANS: The prostate is normal in size and contains areas of   coarse calcification.    BOWEL: No bowel obstruction. Scattered colonic diverticulosis without   diverticulitis. Normal appendix.  PERITONEUM: No ascites.  VESSELS:  Within normal limits. Duplicated IVC, normal variant.  RETROPERITONEUM: No lymphadenopathy.    ABDOMINAL WALL: Within normal limits.  BONES: Multilevel degenerative changes of the spine.    IMPRESSION:     No nephrolithiasis, hydronephrosis, or evidence of obstructive uropathy.    Evaluation for renal carcinoma recurrence is limited on this noncontrast   examination. No discrete extrarenal nodularity identified.                    JACQUELINE LEARY M.D., ATTENDING RADIOLOGIST  This document has been electronically signed. Oct 21 2018  8:05AM        < end of copied text >    < from: CT Brain Stroke Protocol (10.21.18 @ 05:46) >  EXAM:  CT BRAIN STROKE PROTOCOL                            PROCEDURE DATE:  10/21/2018          INTERPRETATION:  CLINICAL INFORMATION: Right-sided weakness. Code stroke.    COMPARISON: CT head of 6/10/2018.    PROCEDURE:   Noncontrast CT of the Head was performed from the skull base to the   vertex. Coronal and Sagittal reformats were obtained.    FINDINGS:    There is no acute intracranial hemorrhage, mass effect, midline shift,   extra-axial collection, hydrocephalus, or evidence of acute vascular   territorial infarction. Encephalomalacia and gliosis within the bilateral   inferior frontal lobes, left greater than right, stable since prior   examination, and may represent sequelae of prior trauma.    The visualized paranasal sinuses and mastoid air cells are clear. Chronic   deformity of the right medial orbital wall. Paranasal sinuses and mastoid   air cells are clear. Calvarium is intact.    IMPRESSION:     No acute intracranial hemorrhage, mass effect, or evidence of acute   vascular territorial infarction.    If clinical symptoms persist or worsen, more sensitive evaluation with   brain MRI may be obtained, if no contraindications exist.    Dr. Leary discussed the above findings with Dr. Caceres at 5:44 AM on   10/21/2018 with read back.                JACQUELINE LEARY M.D., ATTENDING RADIOLOGIST  This document has been electronically signed. Oct 21 2018  5:46AM              < end of copied text >    Imaging Personally Reviewed:  [ ] YES  [ ] NO

## 2018-10-21 NOTE — H&P ADULT - PROBLEM SELECTOR PLAN 1
P/w RUE weakness; NIHSS 2, no tPA given 2/2 active AC use, hematuria, and improving symptoms  - CT head negative  - C/w Plavix (ASA allergy) and Statin  Neurology TBD P/w reported RUE weakness during examination however neurologically intact  - NIHSS 2 in ED, NIHSS 0 on examination  - Passed dysphagia screening  - No tPA given 2/2 active AC use, hematuria, and improving symptoms  - CT head negative, EKG NSR  - C/w Plavix (ASA allergy) and Statin  Neurology Dr Jameson  ***F/u CD and further recommendations pending above

## 2018-10-21 NOTE — DISCHARGE NOTE ADULT - HOSPITAL COURSE
57 year old male with PMH CAD s/p 1 stent, CHF (EF 50%, G2DD - June 2018), AICD (replaced 8/8/18; interrogated July 2018), HTN, PAD, Bladder CA s/p CTX/Radiation/TURBT, RCC s/p CTX/Radiation/Partial Nephrectomy, Prostate CA s/p Radiation therapy, Nephrolithiasis, HCV (reportedly no treatment), and COPD presents to the ED w/ complaints of L sided flank pain w/ 1 episode of hematuria - in the ED patient reported RUE weakness 30 minutes prior to arrival Code Stroke was called - NIHSS reportedly 2, no tPA given 2/2 active AC use, hematuria, and improving symptoms.   CT head negative for acute stroke and CT abdomen pelvis w/out contrast showed no acute renal pathology including nephrolithiasis.   Patient recently admitted for CP complaints July 2018 and work up at the time was unremarkable. Pt  neurologically intact  Passed dysphagia screening  No tPA given 2/2 active AC use, hematuria, and improving symptoms  CT head negative, EKG NSR     Flank pain and hematuria; UA shows 50 RBC and negative for infection  CT a/p negative for nephrolithiasis; s/p 6 mg Morphine in ED    Patient not cooperative, threatening nursing staff, yelling, requesting IV opioids   Pt offered other agents for pain, but refused. Pt pacing in hallway.   Requesting to be discharged AMA.   Risks and consequences (including infection, stoke, death)  of leaving before compilation of medical evaluation discussed with  patient.    Pt verbalizes understanding and insists  on leaving. Advised to follow up wit PCP ASAP for reevaluation and further management of pain and hematuria. Recommended to return to ER in case of worsening symptoms symptoms including chest pain  abdominal pain, vomiting,  worsening back pain, numbness, weakness to lower extremities, fever, SOB or any concerning symptoms. Pt refused to sign AMA form and left the hospital in steady gait

## 2018-10-21 NOTE — ED PROVIDER NOTE - OBJECTIVE STATEMENT
57yo M w multiple medical problems, Afib on Eliquis, AICD, renal CA w recurrence, evaluated at [facility], in the ER for L sided back pains, radiating into the abdomen and one episode of hematuria, similar to his prior "cancer pains". Not on any meds. Denies abdom pains or any other GI/ stx. Also, states as his pain intensified, noted tingling and weakness in his R arm 30 mins prior to arrival, unable to initially move it. No numbness, no HA/blurry vision/slurred speech. No other neuro stx. Weakness significantly improving with time.

## 2018-10-21 NOTE — H&P ADULT - RS GEN PE MLT RESP DETAILS PC
no rales/good air movement/clear to auscultation bilaterally/no rhonchi/no wheezes/breath sounds equal

## 2018-10-21 NOTE — ED ADULT NURSE NOTE - NSIMPLEMENTINTERV_GEN_ALL_ED
Implemented All Fall Risk Interventions:  [place] to call system. Call bell, personal items and telephone within reach. Instruct patient to call for assistance. Room bathroom lighting operational. Non-slip footwear when patient is off stretcher. Physically safe environment: no spills, clutter or unnecessary equipment. Stretcher in lowest position, wheels locked, appropriate side rails in place. Provide visual cue, wrist band, yellow gown, etc. Monitor gait and stability. Monitor for mental status changes and reorient to person, place, and time. Review medications for side effects contributing to fall risk. Reinforce activity limits and safety measures with patient and family.

## 2018-10-21 NOTE — H&P ADULT - NSHPPHYSICALEXAM_GEN_ALL_CORE
T(C): 36.4 (21 Oct 2018 07:24), Max: 36.4 (21 Oct 2018 07:24)  T(F): 97.5 (21 Oct 2018 07:24), Max: 97.5 (21 Oct 2018 07:24)  HR: 74 (21 Oct 2018 07:24) (74 - 90)  BP: 143/82 (21 Oct 2018 07:24) (143/82 - 162/98)  RR: 18 (21 Oct 2018 07:24) (18 - 18)  SpO2: 95% (21 Oct 2018 07:24) (95% - 97%)

## 2018-10-21 NOTE — CONSULT NOTE ADULT - SUBJECTIVE AND OBJECTIVE BOX
Patient is a 58y old  Male who presents with a chief complaint of flank pain and RUE weakness (21 Oct 2018 14:21)      HPI:  57 year old male with PMH CAD s/p 1 stent, CHF (EF 50%, G2DD - 2018), AICD (replaced 18; interrogated 2018), HTN, PAD, Bladder CA s/p CTX/Radiation/TURBT, RCC s/p CTX/Radiation/Partial Nephrectomy, Prostate CA s/p Radiation therapy, Nephrolithiasis, HCV (reportedly no treatment), and COPD presents to the ED w/ complaints of L sided flank pain w/ 1 episode of hematuria - in the ED patient reported RUE weakness 30 minutes prior to arrival Code Stroke was called - NIHSS reportedly 2, no tPA given 2/2 active AC use, hematuria, and improving symptoms. The L flank pain began last night w/ hematuria, spasm in quality, burning urination, and immediately followed w/ RUE weakness; 2 mins, then another 8 minutes later that night both exacerbated w/ inhalation. CT head negative for acute stroke and CT abdomen pelvis w/out contrast showed no acute renal pathology including nephrolithiasis. Patient recently admitted for CP complaints 2018 and work up at the time was unremarkable. In the ED patient seen in NAD w/ no other complaints. (21 Oct 2018 08:13)    NIHSS in ED 2, for right arm and leg weakness.         The patient was last know well at  The patient lives at home/ NH.  The patient walks without assistance/ with a cane or walker    Neurological Review of Systems:  No difficulty with language.  No vision loss or double vision.  No dizziness, vertigo or new hearing loss.  No difficulty with speech or swallowing.  No focal weakness.  No focal sensory changes.  No numbness or tingling in the bilateral lower extremities.  No difficulty with balance.  No difficulty with ambulation.      MEDICATIONS  (STANDING):  apixaban 5 milliGRAM(s) Oral every 12 hours  atorvastatin 40 milliGRAM(s) Oral at bedtime  buDESOnide 160 MICROgram(s)/formoterol 4.5 MICROgram(s) Inhaler 2 Puff(s) Inhalation two times a day  clopidogrel Tablet 75 milliGRAM(s) Oral daily  gabapentin 600 milliGRAM(s) Oral three times a day  isosorbide   mononitrate ER Tablet (IMDUR) 30 milliGRAM(s) Oral daily  lisinopril 40 milliGRAM(s) Oral daily  nicotine -  14 mG/24Hr(s) Patch 1 patch Transdermal daily  sotalol 80 milliGRAM(s) Oral two times a day  spironolactone 25 milliGRAM(s) Oral daily  tiotropium 18 MICROgram(s) Capsule 1 Capsule(s) Inhalation daily    MEDICATIONS  (PRN):  lidocaine   Patch 1 Patch Transdermal daily PRN Pain    Allergies    aspirin (Hives)  codeine (Rash)  codeine (Unknown)  Haldol (Unknown)  Motrin (Rash)  penicillin (Hives; Anaphylaxis)  Toradol (Rash)  Tylenol (Hives)    Intolerances      PAST MEDICAL & SURGICAL HISTORY:  Kidney stone  Nephrolithiasis  Prostate cancer: s/p radiation  Peripheral neuropathy  Bladder cancer  COPD (chronic obstructive pulmonary disease)  Renal cell carcinoma of left kidney: s/p partial nephrectomy in   biopsy again in Sep 2017 showed RCC again in stage 2  Hyperlipidemia  CAD (coronary artery disease): (s/p 2 stents in Sep 2017)  Atrial fibrillation  AICD (automatic cardioverter/defibrillator) present: [employer]  HTN (hypertension)  Hep C w/o coma, chronic  Chronic congestive heart failure, unspecified congestive heart failure type: rEF/pEF  History of coronary artery stent placement  H/O transurethral destruction of bladder lesion  History of percutaneous coronary intervention  H/O partial nephrectomy:   S/P cholecystectomy:   AICD (automatic cardioverter/defibrillator) present    FAMILY HISTORY:  Family history of lung cancer  Family history of chronic ischemic heart disease    SOCIAL HISTORY: non smoker/ former smoker/ active smoker    Review of Systems:  Constitutional: No generalized weakness. No fevers or chills.                    Eyes, Ears, Mouth, Throat: No vision loss   Respiratory: No shortness of breath or cough.                                Cardiovascular: No chest pain or palpitations  Gastrointestinal: No nausea or vomiting.                                         Genitourinary: No urinary incontinence or burning on urination.  Musculoskeletal: No joint pain.                                                           Dermatologic: No rash.  Neurological: as per HPI                                                                      Psychiatric: No behavioral problems.  Endocrine: No known hypoglycemia.               Hematologic/Lymphatic: No easy bleeding.    O:  Vital Signs Last 24 Hrs  T(C): 36.6 (21 Oct 2018 13:16), Max: 36.8 (21 Oct 2018 12:29)  T(F): 97.9 (21 Oct 2018 13:16), Max: 98.2 (21 Oct 2018 12:29)  HR: 80 (21 Oct 2018 13:16) (74 - 90)  BP: 154/90 (21 Oct 2018 13:16) (143/82 - 162/98)  BP(mean): --  RR: 19 (21 Oct 2018 13:16) (18 - 20)  SpO2: 96% (21 Oct 2018 13:16) (95% - 97%)    General Exam:   General appearance: No acute distress                 Cardiovascular: Pedal dorsalis pulses intact bilaterally    Neurological Exam:  NIH Stroke Scale (NIHSS):   1a. LOConscious:  0-alert 1-lethargy 2-obtund 3-coma:    _____  1b. LOC Questions:  0-both 1-one 2-none                       _____  1c. LOC Commands:  0-both 1-one 2-none                     _____  2.   Gaze:  0-nl 1-partial 2-conjugate                                _____  3.   Visual:  0-nl 1-part suzan 2-full suzan 3-bilat suzan         _____  4.   Facial Palsy:  0-nl 1-minor 2-part 3-complete             _____  5.   Motor Arm:  0-nl 1-drift 2-effort 3-no effort         Left             _____                              4-no move UN-amputated                     Right  _____  6.   Motor Leg:                                                                 Left   _____                                                                                   Right _____  7.   Ataxia:  0-nl 1-one limb 2-two UN-amp                      _____  8.   Sensory:  0-nl 1-mild 2-severe                                  _____  9.   Language:  0-nl 1-mild 2-severe 3-mute                     _____  10.  Dysarthria:  0-nl 1-mild 2-severe 3-barrier                  _____  11.  Extinction/Inattention:  0-nl 1-mild 2-deep                 _____         TOTAL NIHSS       ________    Mental Status: Orientated to self, date and place.  Attention intact.  No dysarthria, aphasia or neglect.  Knowledge intact.  Registration intact.  Short and long term memory grossly intact.      Cranial Nerves: CN I - not tested.  PERRL, EOMI, VFF, no nystagmus or diplopia.  No APD.  Fundi not visualized bilaterally.  CN V1-3 intact to light touch and pinprick.  No facial asymmetry.  Hearing intact to finger rub bilaterally.  Tongue, uvula and palate midline.  Sternocleidomastoid and Trapezius intact bilaterally.    Motor:   Tone: normal.                  Strength intact throughout  No pronator drift bilaterally                      No dysmetria on finger-nose-finger or heel-shin-heel  No truncal ataxia.  No resting, postural or action tremor.  No myoclonus.    Sensation: intact to light touch, pinprick, vibration and proprioception    Deep Tendon Reflexes: 1+ bilateral biceps, triceps, brachioradialis, knee and ankle  Toes flexor bilaterally    Gait: normal and stable.  Rhomberg -jeison.    Other:      LABS:                        15.3   11.6  )-----------( 173      ( 21 Oct 2018 05:54 )             45.5     10-21    138  |  105  |  16  ----------------------------<  149<H>  3.6   |  23  |  1.11    Ca    8.9      21 Oct 2018 05:54    TPro  7.0  /  Alb  3.4<L>  /  TBili  0.5  /  DBili  x   /  AST  40  /  ALT  54  /  AlkPhos  165<H>  10-21    PT/INR - ( 21 Oct 2018 05:54 )   PT: 11.1 sec;   INR: 1.02 ratio         PTT - ( 21 Oct 2018 05:54 )  PTT:39.4 sec  Urinalysis Basic - ( 21 Oct 2018 06:12 )    Color: Tamara / Appearance: very cloudy / S.020 / pH: x  Gluc: x / Ketone: Negative  / Bili: Negative / Urobili: Negative   Blood: x / Protein: 100 / Nitrite: Positive   Leuk Esterase: Trace / RBC: >50 /HPF / WBC 3-5 /HPF   Sq Epi: x / Non Sq Epi: Occasional /HPF / Bacteria: Few /HPF      LDL  HbA1C    RADIOLOGY & ADDITIONAL STUDIES:    EKG:   < from: CT Brain Stroke Protocol (10.21.18 @ 05:46) > (images reviewed)    IMPRESSION:     No acute intracranial hemorrhage, mass effect, or evidence of acute   vascular territorial infarction.    If clinical symptoms persist or worsen, more sensitive evaluation with   brain MRI may be obtained, if no contraindications exist.    Dr. Leary discussed the above findings with Dr. Caceres at 5:44 AM on   10/21/2018 with read back.        < end of copied text >      Impression:       Recommendations:  1.             Admit to telemetry   2.              CTA head and neck in 24hours  3.             TTE  4.             Please check HbA1C and fasting lipid profile  5.             Start ASA 81mg (or ASA 325mg rectally) and Lipitor 40mg HS  6.             BP goal of normal/ permissive HTN of SBP <200/<180<160  7.             NS at 125 cc/h/ D5 NS at 125 cc/hour, if NPO, if cardiac function allows, to ensure good perfusion  8.             Frequent neurochecks  9.             Urine Tox  10.          Able to resume normal diet as passed bedside swallowing test/ NPO for now  11.          Formal speech and swallow evaluation  12.          PT evaluation  13.          STAT CTH IF the patient has sudden change in mental status or neurological exam  14.          DVT PPx    Thank you for the courtesy of this consult.

## 2018-10-21 NOTE — ED PROVIDER NOTE - PROGRESS NOTE DETAILS
D/w Neuro MD by TeleStroke, Dr Lucas, agree w no tPA given minimal stx, rapidly improving stx, Eliquis treatment and hx of CA. Patient verbally aggressive to ER staff, requesting Dilaudid for pain, multiple drug allergies. Suspicious for drug seeking behaviour. Pt noted to ambulated and using both upper extremities w no deficits. Spoke w Dr Guerrero/MAR, will admit for persistent neuro stx

## 2018-10-21 NOTE — H&P ADULT - HISTORY OF PRESENT ILLNESS
57 year old male with PMH CAD s/p 1 stent, CHF (EF 50%, G2DD - June 2018), AICD (replaced 8/8/18; interrogated July 2018), HTN, PAD, Bladder CA s/p CTX/Radiation/TURBT, RCC s/p CTX/Radiation/Partial Nephrectomy, Prostate CA s/p Radiation therapy, Nephrolithiasis, HCV (reportedly no treatment), and COPD presents to the ED w/ complaints of L sided flank pain w/ 1 episode of hematuria - in the ED patient reported RUE weakness 30 minutes prior to arrival Code Stroke was called - NIHSS 2, no tPA given 2/2 active AC use, hematuria, and improving symptoms. CT head negative for acute stroke and CT abdomen pelvis w/out contrast showed no acute renal pathology including nephrolithiasis. Patient recently admitted for CP complaints July 2018 and work up at the time was unremarkable. In the ED patient seen in NAD - 57 year old male with PMH CAD s/p 1 stent, CHF (EF 50%, G2DD - June 2018), AICD (replaced 8/8/18; interrogated July 2018), HTN, PAD, Bladder CA s/p CTX/Radiation/TURBT, RCC s/p CTX/Radiation/Partial Nephrectomy, Prostate CA s/p Radiation therapy, Nephrolithiasis, HCV (reportedly no treatment), and COPD presents to the ED w/ complaints of L sided flank pain w/ 1 episode of hematuria - in the ED patient reported RUE weakness 30 minutes prior to arrival Code Stroke was called - NIHSS reportedly 2, no tPA given 2/2 active AC use, hematuria, and improving symptoms. The L flank pain began last night w/ hematuria, spasm in quality, burning urination, and immediately followed w/ RUE weakness; 2 mins, then another 8 minutes later that night both exacerbated w/ inhalation. CT head negative for acute stroke and CT abdomen pelvis w/out contrast showed no acute renal pathology including nephrolithiasis. Patient recently admitted for CP complaints July 2018 and work up at the time was unremarkable. In the ED patient seen in NAD w/ no other complaints. 58 year old male with PMH CAD s/p 1 stent, CHF (EF 50%, G2DD - June 2018), AICD (replaced 8/8/18; interrogated July 2018), HTN, PAD, Bladder CA s/p CTX/Radiation/TURBT, RCC s/p CTX/Radiation/Partial Nephrectomy, Prostate CA s/p Radiation therapy, Nephrolithiasis, HCV (reportedly no treatment), and COPD presents to the ED w/ complaints of L sided flank pain w/ 1 episode of hematuria - in the ED patient reported RUE weakness 30 minutes prior to arrival Code Stroke was called - NIHSS reportedly 2, no tPA given 2/2 active AC use, hematuria, and improving symptoms. The L flank pain began last night w/ hematuria, spasm in quality, burning urination, and immediately followed w/ RUE weakness; 2 mins, then another 8 minutes later that night both exacerbated w/ inhalation. CT head negative for acute stroke and CT abdomen pelvis w/out contrast showed no acute renal pathology including nephrolithiasis. Patient recently admitted for CP complaints July 2018 and work up at the time was unremarkable. In the ED patient seen in NAD w/ no other complaints.

## 2018-10-21 NOTE — DISCHARGE NOTE ADULT - CARE PLAN
Principal Discharge DX:	Flank pain  Goal:	resolution of symptoms  Assessment and plan of treatment:	You were found to have blood in urine and that should be further investigated. You requested to leave against medical advise. Risks and consequences of leaving before compilation of medical evaluation including worsening pain, infection, stroke and death. Please follow up with your doctor as soon as possible for reevaluation and further management.   Return to the emergency room for worsening symptoms such as numbness, weakness to extremities, fever, chest pain, fainting, worsening pain or any concerning symptoms  Secondary Diagnosis:	Cerebrovascular accident (CVA), unspecified mechanism  Assessment and plan of treatment:	Your neurological exam and CT head is reassuring. However you requested to be discharged with out completion of medical evaluation. Please follow up with your doctor as soon as possible for reevaluation and further management. Return to the ER if unable to see your doctor or for any concerning symptoms

## 2018-10-21 NOTE — H&P ADULT - NEUROLOGICAL DETAILS
alert and oriented x 3/sensation intact/normal strength/responds to verbal commands/cranial nerves intact

## 2018-10-21 NOTE — H&P ADULT - PROBLEM SELECTOR PLAN 4
EKG;   - H&H stable 15, no further episodes of hematuria; Resume Eliquis EKG NSR  - H&H stable 15, no further episodes of hematuria; Resume Eliquis

## 2018-10-21 NOTE — H&P ADULT - ASSESSMENT
57 year old male with PMH CAD s/p 1 stent, CHF (EF 50%, G2DD - June 2018), AICD (replaced 8/8/18; interrogated July 2018), HTN, PAD, Bladder CA s/p CTX/Radiation/TURBT, RCC s/p CTX/Radiation/Partial Nephrectomy, Prostate CA s/p Radiation therapy, Nephrolithiasis, HCV (reportedly no treatment), and COPD presents to the ED w/ complaints of L sided flank pain w/ 1 episode of hematuria - admitting for further evaluation 58 year old male with PMH CAD s/p 1 stent, CHF (EF 50%, G2DD - June 2018), AICD (replaced 8/8/18; interrogated July 2018), HTN, PAD, Bladder CA s/p CTX/Radiation/TURBT, RCC s/p CTX/Radiation/Partial Nephrectomy, Prostate CA s/p Radiation therapy, Nephrolithiasis, HCV (reportedly no treatment), and COPD presents to the ED w/ complaints of L sided flank pain w/ 1 episode of hematuria - admitting for further evaluation

## 2018-10-22 LAB
CULTURE RESULTS: NO GROWTH — SIGNIFICANT CHANGE UP
SPECIMEN SOURCE: SIGNIFICANT CHANGE UP

## 2018-12-26 PROCEDURE — G0378: CPT

## 2018-12-26 PROCEDURE — 82962 GLUCOSE BLOOD TEST: CPT

## 2018-12-26 PROCEDURE — 85610 PROTHROMBIN TIME: CPT

## 2018-12-26 PROCEDURE — 80053 COMPREHEN METABOLIC PANEL: CPT

## 2018-12-26 PROCEDURE — 93005 ELECTROCARDIOGRAM TRACING: CPT

## 2018-12-26 PROCEDURE — 85027 COMPLETE CBC AUTOMATED: CPT

## 2018-12-26 PROCEDURE — 85730 THROMBOPLASTIN TIME PARTIAL: CPT

## 2018-12-26 PROCEDURE — 74018 RADEX ABDOMEN 1 VIEW: CPT

## 2018-12-26 PROCEDURE — 84100 ASSAY OF PHOSPHORUS: CPT

## 2018-12-26 PROCEDURE — 81001 URINALYSIS AUTO W/SCOPE: CPT

## 2018-12-26 PROCEDURE — 94640 AIRWAY INHALATION TREATMENT: CPT

## 2018-12-26 PROCEDURE — 96374 THER/PROPH/DIAG INJ IV PUSH: CPT

## 2018-12-26 PROCEDURE — 99285 EMERGENCY DEPT VISIT HI MDM: CPT | Mod: 25

## 2018-12-26 PROCEDURE — 82550 ASSAY OF CK (CPK): CPT

## 2018-12-26 PROCEDURE — 83735 ASSAY OF MAGNESIUM: CPT

## 2018-12-26 PROCEDURE — 82553 CREATINE MB FRACTION: CPT

## 2018-12-26 PROCEDURE — 80048 BASIC METABOLIC PNL TOTAL CA: CPT

## 2018-12-26 PROCEDURE — 71045 X-RAY EXAM CHEST 1 VIEW: CPT

## 2018-12-26 PROCEDURE — 70450 CT HEAD/BRAIN W/O DYE: CPT

## 2018-12-26 PROCEDURE — 84484 ASSAY OF TROPONIN QUANT: CPT

## 2018-12-26 PROCEDURE — 83036 HEMOGLOBIN GLYCOSYLATED A1C: CPT

## 2018-12-26 PROCEDURE — 74176 CT ABD & PELVIS W/O CONTRAST: CPT

## 2018-12-26 PROCEDURE — 80061 LIPID PANEL: CPT

## 2018-12-26 PROCEDURE — 87086 URINE CULTURE/COLONY COUNT: CPT

## 2018-12-26 PROCEDURE — 80307 DRUG TEST PRSMV CHEM ANLYZR: CPT

## 2018-12-26 PROCEDURE — 84443 ASSAY THYROID STIM HORMONE: CPT

## 2018-12-26 PROCEDURE — 82607 VITAMIN B-12: CPT

## 2018-12-26 PROCEDURE — 82306 VITAMIN D 25 HYDROXY: CPT

## 2019-01-23 ENCOUNTER — EMERGENCY (EMERGENCY)
Facility: HOSPITAL | Age: 59
LOS: 1 days | Discharge: HOME | End: 2019-01-23
Attending: EMERGENCY MEDICINE
Payer: MEDICAID

## 2019-01-23 VITALS
DIASTOLIC BLOOD PRESSURE: 89 MMHG | RESPIRATION RATE: 16 BRPM | SYSTOLIC BLOOD PRESSURE: 127 MMHG | HEIGHT: 70 IN | HEART RATE: 65 BPM | TEMPERATURE: 98 F | WEIGHT: 199.96 LBS

## 2019-01-23 VITALS — RESPIRATION RATE: 16 BRPM | OXYGEN SATURATION: 99 % | HEART RATE: 62 BPM

## 2019-01-23 DIAGNOSIS — Z98.890 OTHER SPECIFIED POSTPROCEDURAL STATES: Chronic | ICD-10-CM

## 2019-01-23 DIAGNOSIS — Z95.810 PRESENCE OF AUTOMATIC (IMPLANTABLE) CARDIAC DEFIBRILLATOR: Chronic | ICD-10-CM

## 2019-01-23 DIAGNOSIS — Z95.5 PRESENCE OF CORONARY ANGIOPLASTY IMPLANT AND GRAFT: Chronic | ICD-10-CM

## 2019-01-23 DIAGNOSIS — Z90.5 ACQUIRED ABSENCE OF KIDNEY: Chronic | ICD-10-CM

## 2019-01-23 DIAGNOSIS — Z90.49 ACQUIRED ABSENCE OF OTHER SPECIFIED PARTS OF DIGESTIVE TRACT: Chronic | ICD-10-CM

## 2019-01-23 PROCEDURE — 96372 THER/PROPH/DIAG INJ SC/IM: CPT

## 2019-01-23 PROCEDURE — 99284 EMERGENCY DEPT VISIT MOD MDM: CPT

## 2019-01-23 PROCEDURE — 99283 EMERGENCY DEPT VISIT LOW MDM: CPT | Mod: 25

## 2019-01-23 PROCEDURE — 94640 AIRWAY INHALATION TREATMENT: CPT

## 2019-01-23 PROCEDURE — 71046 X-RAY EXAM CHEST 2 VIEWS: CPT

## 2019-01-23 PROCEDURE — 71046 X-RAY EXAM CHEST 2 VIEWS: CPT | Mod: 26

## 2019-01-23 RX ORDER — ALBUTEROL 90 UG/1
3 AEROSOL, METERED ORAL
Qty: 25 | Refills: 0
Start: 2019-01-23

## 2019-01-23 RX ORDER — IPRATROPIUM/ALBUTEROL SULFATE 18-103MCG
3 AEROSOL WITH ADAPTER (GRAM) INHALATION ONCE
Qty: 0 | Refills: 0 | Status: COMPLETED | OUTPATIENT
Start: 2019-01-23 | End: 2019-01-23

## 2019-01-23 RX ORDER — MORPHINE SULFATE 50 MG/1
6 CAPSULE, EXTENDED RELEASE ORAL ONCE
Qty: 0 | Refills: 0 | Status: DISCONTINUED | OUTPATIENT
Start: 2019-01-23 | End: 2019-01-23

## 2019-01-23 RX ORDER — ALBUTEROL 90 UG/1
3 AEROSOL, METERED ORAL
Qty: 25 | Refills: 0 | OUTPATIENT
Start: 2019-01-23

## 2019-01-23 RX ORDER — ALBUTEROL 90 UG/1
2 AEROSOL, METERED ORAL
Qty: 1 | Refills: 0
Start: 2019-01-23

## 2019-01-23 RX ADMIN — Medication 60 MILLIGRAM(S): at 04:17

## 2019-01-23 RX ADMIN — Medication 3 MILLILITER(S): at 04:17

## 2019-01-23 RX ADMIN — MORPHINE SULFATE 6 MILLIGRAM(S): 50 CAPSULE, EXTENDED RELEASE ORAL at 04:16

## 2019-01-23 NOTE — ED ADULT NURSE NOTE - OBJECTIVE STATEMENT
pt says he passed on a bench on the train then got up and urinated in between train cars where he passed a kidney stone. When he got off of the train his walker was not locked and he slipped and fell. He then called EMS. pt also complains of a cough and a knot in his chest when he drinks water,

## 2019-01-23 NOTE — ED PROVIDER NOTE - NSFOLLOWUPCLINICS_GEN_ALL_ED_FT
[facility]  [facility]  [address] - [address] [place] [address]  Phone: [phone]  Fax: [phone]  Follow Up Time:

## 2019-01-23 NOTE — ED PROVIDER NOTE - NSFOLLOWUPINSTRUCTIONS_ED_ALL_ED_FT
Return to ER immediately if you feel short of breath, have chest pain, fever, coughing worsens, vomiting, weakness any concerns. Take medications as instructed if they were prescribed.  See your primary doctor as soon as possible (1-2 days). If you need assistance with follow up appointment, you can contact our Care Coordinator at [phone].

## 2019-01-23 NOTE — ED PROVIDER NOTE - OBJECTIVE STATEMENT
Chief complaint of wheezing x 2 days, pt states left his rescue inhaler at friends house. No chest pain, no fever. Pt with frequent coughing. Pt also requesting analgesia, states has chronic back pain. No urinary or bowel incontinence or retention.

## 2019-01-23 NOTE — ED PROVIDER NOTE - MEDICAL DECISION MAKING DETAILS
5:15a- Pt feels better, no exertional or conversational dyspnea Pox 99% RA.  Lungs CTA b/l RR 20.  Pt is well appearing walking with normal gait, stable for discharge and follow up with medical doctor. Pt educated on care and need for follow up. Discussed anticipatory guidance and return precautions. Questions answered. I had a detailed discussion with the patient and/or guardian regarding the historical points, exam findings, and any diagnostic results supporting the discharge diagnosis.

## 2019-01-23 NOTE — ED ADULT TRIAGE NOTE - CHIEF COMPLAINT QUOTE
productive cough with green/yellow phlegm x 3 weeks, states he saw a doctor 2 weeks ago & was given meds for his cough. Pt states he finished his antibiotics without any relief

## 2019-01-23 NOTE — ED PROVIDER NOTE - PHYSICAL EXAMINATION
Lungs: b/l exp scattered wheezing, no respiratory distress  No cervical, thoracic or lumbosacral midline bony deformities,  +rotation and flexion-extension of neck and truncal area intact.   No saddle anesthesia, B/L knee, pedal and EHL flex and ext intact, normal gait, no foot drop. Truncal flexion and extension intact.

## 2019-01-23 NOTE — ED ADULT NURSE REASSESSMENT NOTE - GENERAL PATIENT STATE
comfortable appearance/pt eating crackers and drinking 4 cups of juice  w/ o discomfort  or nausea and vomiting.

## 2019-01-23 NOTE — ED ADULT NURSE NOTE - NSIMPLEMENTINTERV_GEN_ALL_ED
Implemented All Universal Safety Interventions:  [place] to call system. Call bell, personal items and telephone within reach. Instruct patient to call for assistance. Room bathroom lighting operational. Non-slip footwear when patient is off stretcher. Physically safe environment: no spills, clutter or unnecessary equipment. Stretcher in lowest position, wheels locked, appropriate side rails in place.

## 2019-01-28 NOTE — ED ADULT NURSE NOTE - DISCHARGE DATE/TIME
Other (Free Text): Allyson has trouble swallowing pills and would like to avoid pills if possible.
Note Text (......Xxx Chief Complaint.): This diagnosis correlates with the
Detail Level: Zone
30-Apr-2018 07:36

## 2019-02-16 ENCOUNTER — EMERGENCY (EMERGENCY)
Facility: HOSPITAL | Age: 59
LOS: 1 days | Discharge: HOME | End: 2019-02-16
Attending: EMERGENCY MEDICINE | Admitting: EMERGENCY MEDICINE
Payer: MEDICAID

## 2019-02-16 VITALS
OXYGEN SATURATION: 98 % | WEIGHT: 235.01 LBS | HEART RATE: 60 BPM | TEMPERATURE: 98 F | RESPIRATION RATE: 18 BRPM | DIASTOLIC BLOOD PRESSURE: 86 MMHG | SYSTOLIC BLOOD PRESSURE: 128 MMHG

## 2019-02-16 VITALS
TEMPERATURE: 99 F | HEART RATE: 65 BPM | RESPIRATION RATE: 18 BRPM | DIASTOLIC BLOOD PRESSURE: 53 MMHG | OXYGEN SATURATION: 96 % | SYSTOLIC BLOOD PRESSURE: 103 MMHG

## 2019-02-16 DIAGNOSIS — Z88.8 ALLERGY STATUS TO OTHER DRUGS, MEDICAMENTS AND BIOLOGICAL SUBSTANCES: ICD-10-CM

## 2019-02-16 DIAGNOSIS — I10 ESSENTIAL (PRIMARY) HYPERTENSION: ICD-10-CM

## 2019-02-16 DIAGNOSIS — R55 SYNCOPE AND COLLAPSE: ICD-10-CM

## 2019-02-16 DIAGNOSIS — Z95.5 PRESENCE OF CORONARY ANGIOPLASTY IMPLANT AND GRAFT: Chronic | ICD-10-CM

## 2019-02-16 DIAGNOSIS — Z79.899 OTHER LONG TERM (CURRENT) DRUG THERAPY: ICD-10-CM

## 2019-02-16 DIAGNOSIS — C64.2 MALIGNANT NEOPLASM OF LEFT KIDNEY, EXCEPT RENAL PELVIS: ICD-10-CM

## 2019-02-16 DIAGNOSIS — Z90.5 ACQUIRED ABSENCE OF KIDNEY: Chronic | ICD-10-CM

## 2019-02-16 DIAGNOSIS — Z79.82 LONG TERM (CURRENT) USE OF ASPIRIN: ICD-10-CM

## 2019-02-16 DIAGNOSIS — Z98.890 OTHER SPECIFIED POSTPROCEDURAL STATES: Chronic | ICD-10-CM

## 2019-02-16 DIAGNOSIS — J44.1 CHRONIC OBSTRUCTIVE PULMONARY DISEASE WITH (ACUTE) EXACERBATION: ICD-10-CM

## 2019-02-16 DIAGNOSIS — Z88.0 ALLERGY STATUS TO PENICILLIN: ICD-10-CM

## 2019-02-16 DIAGNOSIS — Z95.810 PRESENCE OF AUTOMATIC (IMPLANTABLE) CARDIAC DEFIBRILLATOR: Chronic | ICD-10-CM

## 2019-02-16 DIAGNOSIS — I48.91 UNSPECIFIED ATRIAL FIBRILLATION: ICD-10-CM

## 2019-02-16 DIAGNOSIS — R10.9 UNSPECIFIED ABDOMINAL PAIN: ICD-10-CM

## 2019-02-16 DIAGNOSIS — Z90.49 ACQUIRED ABSENCE OF OTHER SPECIFIED PARTS OF DIGESTIVE TRACT: Chronic | ICD-10-CM

## 2019-02-16 DIAGNOSIS — Z88.5 ALLERGY STATUS TO NARCOTIC AGENT: ICD-10-CM

## 2019-02-16 LAB
ALBUMIN SERPL ELPH-MCNC: 3.3 G/DL — LOW (ref 3.4–5)
ALP SERPL-CCNC: 113 U/L — SIGNIFICANT CHANGE UP (ref 40–120)
ALT FLD-CCNC: 80 U/L — HIGH (ref 12–42)
ANION GAP SERPL CALC-SCNC: 6 MMOL/L — LOW (ref 9–16)
APPEARANCE UR: ABNORMAL
APTT BLD: 34.1 SEC — SIGNIFICANT CHANGE UP (ref 27.5–36.3)
AST SERPL-CCNC: 48 U/L — HIGH (ref 15–37)
BASOPHILS NFR BLD AUTO: 0.9 % — SIGNIFICANT CHANGE UP (ref 0–2)
BILIRUB SERPL-MCNC: 0.6 MG/DL — SIGNIFICANT CHANGE UP (ref 0.2–1.2)
BILIRUB UR-MCNC: NEGATIVE — SIGNIFICANT CHANGE UP
BUN SERPL-MCNC: 29 MG/DL — HIGH (ref 7–23)
CALCIUM SERPL-MCNC: 8.7 MG/DL — SIGNIFICANT CHANGE UP (ref 8.5–10.5)
CHLORIDE SERPL-SCNC: 101 MMOL/L — SIGNIFICANT CHANGE UP (ref 96–108)
CK SERPL-CCNC: 44 U/L — SIGNIFICANT CHANGE UP (ref 39–308)
CO2 SERPL-SCNC: 33 MMOL/L — HIGH (ref 22–31)
COLOR SPEC: ABNORMAL
CREAT SERPL-MCNC: 0.97 MG/DL — SIGNIFICANT CHANGE UP (ref 0.5–1.3)
DIFF PNL FLD: ABNORMAL
EOSINOPHIL NFR BLD AUTO: 0.2 % — SIGNIFICANT CHANGE UP (ref 0–6)
GLUCOSE SERPL-MCNC: 93 MG/DL — SIGNIFICANT CHANGE UP (ref 70–99)
GLUCOSE UR QL: NEGATIVE — SIGNIFICANT CHANGE UP
HCT VFR BLD CALC: 44.8 % — SIGNIFICANT CHANGE UP (ref 39–50)
HGB BLD-MCNC: 14.9 G/DL — SIGNIFICANT CHANGE UP (ref 13–17)
IMM GRANULOCYTES NFR BLD AUTO: 4.9 % — HIGH (ref 0–1.5)
INR BLD: 1.16 — SIGNIFICANT CHANGE UP (ref 0.88–1.16)
KETONES UR-MCNC: NEGATIVE — SIGNIFICANT CHANGE UP
LACTATE SERPL-SCNC: 1.3 MMOL/L — SIGNIFICANT CHANGE UP (ref 0.4–2)
LEUKOCYTE ESTERASE UR-ACNC: NEGATIVE — SIGNIFICANT CHANGE UP
LYMPHOCYTES # BLD AUTO: 16.5 % — SIGNIFICANT CHANGE UP (ref 13–44)
MCHC RBC-ENTMCNC: 29.4 PG — SIGNIFICANT CHANGE UP (ref 27–34)
MCHC RBC-ENTMCNC: 33.3 G/DL — SIGNIFICANT CHANGE UP (ref 32–36)
MCV RBC AUTO: 88.4 FL — SIGNIFICANT CHANGE UP (ref 80–100)
MONOCYTES NFR BLD AUTO: 9.1 % — SIGNIFICANT CHANGE UP (ref 2–14)
NEUTROPHILS NFR BLD AUTO: 68.4 % — SIGNIFICANT CHANGE UP (ref 43–77)
NITRITE UR-MCNC: NEGATIVE — SIGNIFICANT CHANGE UP
PH UR: 7 — SIGNIFICANT CHANGE UP (ref 5–8)
PLATELET # BLD AUTO: 247 K/UL — SIGNIFICANT CHANGE UP (ref 150–400)
POTASSIUM SERPL-MCNC: 3.7 MMOL/L — SIGNIFICANT CHANGE UP (ref 3.5–5.3)
POTASSIUM SERPL-SCNC: 3.7 MMOL/L — SIGNIFICANT CHANGE UP (ref 3.5–5.3)
PROT SERPL-MCNC: 6.7 G/DL — SIGNIFICANT CHANGE UP (ref 6.4–8.2)
PROT UR-MCNC: 30 MG/DL
PROTHROM AB SERPL-ACNC: 12.9 SEC — SIGNIFICANT CHANGE UP (ref 10–12.9)
RBC # BLD: 5.07 M/UL — SIGNIFICANT CHANGE UP (ref 4.2–5.8)
RBC # FLD: 14.2 % — SIGNIFICANT CHANGE UP (ref 10.3–14.5)
SODIUM SERPL-SCNC: 140 MMOL/L — SIGNIFICANT CHANGE UP (ref 132–145)
SP GR SPEC: 1.01 — SIGNIFICANT CHANGE UP (ref 1–1.03)
TROPONIN I SERPL-MCNC: <0.017 NG/ML — LOW (ref 0.02–0.06)
UROBILINOGEN FLD QL: 0.2 E.U./DL — SIGNIFICANT CHANGE UP
WBC # BLD: 16.2 K/UL — HIGH (ref 3.8–10.5)
WBC # FLD AUTO: 16.2 K/UL — HIGH (ref 3.8–10.5)

## 2019-02-16 PROCEDURE — 74177 CT ABD & PELVIS W/CONTRAST: CPT | Mod: 26

## 2019-02-16 PROCEDURE — 71045 X-RAY EXAM CHEST 1 VIEW: CPT | Mod: 26

## 2019-02-16 PROCEDURE — 93010 ELECTROCARDIOGRAM REPORT: CPT

## 2019-02-16 PROCEDURE — 99220: CPT | Mod: 25

## 2019-02-16 PROCEDURE — 71275 CT ANGIOGRAPHY CHEST: CPT | Mod: 26

## 2019-02-16 RX ORDER — ALBUTEROL 90 UG/1
2.5 AEROSOL, METERED ORAL
Qty: 0 | Refills: 0 | Status: COMPLETED | OUTPATIENT
Start: 2019-02-16 | End: 2019-02-16

## 2019-02-16 RX ORDER — DIPHENHYDRAMINE HCL 50 MG
50 CAPSULE ORAL ONCE
Qty: 0 | Refills: 0 | Status: COMPLETED | OUTPATIENT
Start: 2019-02-16 | End: 2019-02-16

## 2019-02-16 RX ORDER — AZITHROMYCIN 500 MG/1
500 TABLET, FILM COATED ORAL ONCE
Qty: 0 | Refills: 0 | Status: COMPLETED | OUTPATIENT
Start: 2019-02-16 | End: 2019-02-16

## 2019-02-16 RX ORDER — SODIUM CHLORIDE 9 MG/ML
1000 INJECTION INTRAMUSCULAR; INTRAVENOUS; SUBCUTANEOUS ONCE
Qty: 0 | Refills: 0 | Status: COMPLETED | OUTPATIENT
Start: 2019-02-16 | End: 2019-02-16

## 2019-02-16 RX ORDER — MORPHINE SULFATE 50 MG/1
8 CAPSULE, EXTENDED RELEASE ORAL ONCE
Qty: 0 | Refills: 0 | Status: DISCONTINUED | OUTPATIENT
Start: 2019-02-16 | End: 2019-02-16

## 2019-02-16 RX ORDER — ALBUTEROL 90 UG/1
1 AEROSOL, METERED ORAL ONCE
Qty: 0 | Refills: 0 | Status: COMPLETED | OUTPATIENT
Start: 2019-02-16 | End: 2019-02-16

## 2019-02-16 RX ADMIN — MORPHINE SULFATE 8 MILLIGRAM(S): 50 CAPSULE, EXTENDED RELEASE ORAL at 19:47

## 2019-02-16 RX ADMIN — Medication 125 MILLIGRAM(S): at 18:17

## 2019-02-16 RX ADMIN — AZITHROMYCIN 500 MILLIGRAM(S): 500 TABLET, FILM COATED ORAL at 18:17

## 2019-02-16 RX ADMIN — ALBUTEROL 2.5 MILLIGRAM(S): 90 AEROSOL, METERED ORAL at 18:20

## 2019-02-16 RX ADMIN — SODIUM CHLORIDE 500 MILLILITER(S): 9 INJECTION INTRAMUSCULAR; INTRAVENOUS; SUBCUTANEOUS at 18:18

## 2019-02-16 RX ADMIN — ALBUTEROL 1 PUFF(S): 90 AEROSOL, METERED ORAL at 23:37

## 2019-02-16 RX ADMIN — ALBUTEROL 2.5 MILLIGRAM(S): 90 AEROSOL, METERED ORAL at 18:18

## 2019-02-16 RX ADMIN — Medication 50 MILLIGRAM(S): at 23:01

## 2019-02-16 RX ADMIN — ALBUTEROL 2.5 MILLIGRAM(S): 90 AEROSOL, METERED ORAL at 18:34

## 2019-02-16 RX ADMIN — Medication 102 MILLIGRAM(S): at 19:37

## 2019-02-16 NOTE — ED PROVIDER NOTE - CARE PLAN
Principal Discharge DX:	Flank pain, acute  Secondary Diagnosis:	COPD exacerbation  Secondary Diagnosis:	Atrial fibrillation  Secondary Diagnosis:	H/O partial nephrectomy  Secondary Diagnosis:	Hep C w/o coma, chronic  Secondary Diagnosis:	History of percutaneous coronary intervention  Secondary Diagnosis:	HTN (hypertension)

## 2019-02-16 NOTE — ED PROVIDER NOTE - CLINICAL SUMMARY MEDICAL DECISION MAKING FREE TEXT BOX
59 y/o M with a PMHx of kidney CA, CAD, A-fib, an AICD, HLD, Hep C, HTN and COPD presents to the ED s/p syncopal episode. Pt states that he "passed out" while walking on the street earlier today while walking to the train station with associated SOB. Also reports having L sided kidney CA and has had episodes of hematuria today, follows with [facility] for care.  Pt denies fevers, chills, abdominal pain, N/V/D, CP and HA.  Needs premedication for hives from IV CT, needs COPD tx, needs troponin, will admit to CDU.

## 2019-02-16 NOTE — ED CDU PROVIDER INITIAL DAY NOTE - OBJECTIVE STATEMENT
57 y/o M with a PMHx of kidney CA, CAD, A-fib, an AICD, HLD, Hep C, HTN and COPD presents to the ED s/p syncopal episode. Pt states that he "passed out" while walking on the street earlier today while [place] the train station with associated SOB. Also reports having L sided kidney CA and has had episodes of hematuria today, follows with [facility] for care.  Pt denies fevers, chills, abdominal pain, N/V/D, CP and HA.

## 2019-02-16 NOTE — ED PROVIDER NOTE - OBJECTIVE STATEMENT
59 y/o M with a PMHx of kidney CA, CAD, A-fib, an AICD, HLD, Hep C, HTN and COPD presents to the ED s/p syncopal episode. Pt states that he "passed out" while walking on the street earlier today while [place] the [place] with associated SOB. Also reports having L sided kidney CA and has had episodes of hematuria today, follows with [facility] for care.  Pt denies fevers, chills, abdominal pain, N/V/D, CP and HA.

## 2019-02-16 NOTE — ED PROVIDER NOTE - FAMILY HISTORY
Family history of lung cancer     Father  Still living? No  Family history of chronic ischemic heart disease, Age at diagnosis: Age Unknown

## 2019-02-16 NOTE — ED CDU PROVIDER DISPOSITION NOTE - CLINICAL COURSE
CT chest and abd reviewed, resp status improved, abd no pain at this time, follow up with urology and primary care.

## 2019-02-16 NOTE — ED PROVIDER NOTE - SECONDARY DIAGNOSIS.
COPD exacerbation Atrial fibrillation H/O partial nephrectomy History of percutaneous coronary intervention HTN (hypertension) Renal cell carcinoma of left kidney Hep C w/o coma, chronic

## 2019-02-16 NOTE — ED ADULT NURSE NOTE - NSIMPLEMENTINTERV_GEN_ALL_ED
Implemented All Universal Safety Interventions:  [place] to call system. Call bell, personal items and telephone within reach. Instruct patient to call for assistance. Room bathroom lighting operational. Non-slip footwear when patient is off stretcher. Physically safe environment: no spills, clutter or unnecessary equipment. Stretcher in lowest position, wheels locked, appropriate side rails in place.

## 2019-02-16 NOTE — ED PROVIDER NOTE - DIAGNOSTIC INTERPRETATION
Chest x-ray interpreted by ER Physician Dr. Srinivasan  Findings: heart size normal, no infiltrates, lungs fully expanded, soft tissues appear normal.

## 2019-02-16 NOTE — ED ADULT NURSE NOTE - CHPI ED NUR SYMPTOMS NEG
no dizziness/no chills/no diaphoresis/no chest pain/no congestion/no fever/no nausea/no back pain/no vomiting

## 2019-02-16 NOTE — ED PROVIDER NOTE - CHPI ED SYMPTOMS NEG
no nausea, no vomiting, no diarrhea, no fevers, no chills, no CP no nausea, no vomiting, no diarrhea, no fevers, no chills, no CP/no diaphoresis

## 2019-02-16 NOTE — ED CDU PROVIDER DISPOSITION NOTE - NSFOLLOWUPINSTRUCTIONS_ED_ALL_ED_FT
Please follow up with your urologist and primary care doctor this week.  Use prednisone and albuerol as prescribed.

## 2019-02-16 NOTE — ED CDU PROVIDER INITIAL DAY NOTE - MEDICAL DECISION MAKING DETAILS
59 y/o M with a PMHx of kidney CA, CAD, A-fib, an AICD, HLD, Hep C, HTN and COPD presents to the ED s/p syncopal episode. Pt states that he "passed out" while walking on the street earlier today while [place] the [place] with associated SOB. Also reports having L sided kidney CA and has had episodes of hematuria today, follows with [facility] for care.  Pt denies fevers, chills, abdominal pain, N/V/D, CP and HA.  premedicate with solumedrol for CT for hives hx and solumedrol will help with copd, CT scan, tx with albuterol.

## 2019-02-16 NOTE — ED ADULT NURSE NOTE - OBJECTIVE STATEMENT
Pt presents to ED c/o syncopal episode while walking to the train, no head injury and right flank pain. Pt reports new dx of Renal CA, did not start chemo/radiation yet. Pt reports having cardiac stent placed due to 10% EF, has implanted defibrillator. Pt denies any CP currently, some SOB, hx of COPD. Denies any fever/chills, no N/V/D.

## 2019-02-17 LAB
CULTURE RESULTS: NO GROWTH — SIGNIFICANT CHANGE UP
SPECIMEN SOURCE: SIGNIFICANT CHANGE UP

## 2019-02-17 PROCEDURE — 70450 CT HEAD/BRAIN W/O DYE: CPT | Mod: 26

## 2019-02-22 NOTE — ED PROVIDER NOTE - NS_EDPROVIDERDISPOUSERTYPE_ED_A_ED
HISTORY OF PRESENT ILLNESS  Oren Helm is a 57 y.o. female. HPI  Ms. Lancaster presents for 1 week of abnormal stools and hematochezia. She reports she will have a loose bowel movement that will be accompanied by bright red blood. She is also noting stool incontinence that is also bloody. She admits to mild abdominal pain and increased flatulence. Bowel movements are not painful. Hx of similar in the past. She attributes prior episodes to colon polyps. Hx of EGD/colonoscopy 7/26/2018 via Dr. Anival Vyas. Significant for rectal polyp, diverticulosis, and internal hemorrhoids. No recent antibiotic use, no recent travel. BM's have been 3-4 times daily. Review of Systems   Constitutional: Positive for chills and malaise/fatigue. Negative for fever. Gastrointestinal: Positive for abdominal pain (very mild, LUQ), blood in stool, diarrhea and nausea. Negative for constipation, melena and vomiting. Neurological: Positive for dizziness (lightheadedness - with quick standing - 2x/week). Visit Vitals  /71 (BP 1 Location: Right arm, BP Patient Position: Sitting)   Pulse 95   Temp 98.5 °F (36.9 °C) (Oral)   Resp 20   Ht 5' 3\" (1.6 m)   Wt 262 lb (118.8 kg)   SpO2 95%   BMI 46.41 kg/m²       Physical Exam   Constitutional: She is oriented to person, place, and time. She appears well-developed and well-nourished. No distress. HENT:   Head: Normocephalic and atraumatic. Right Ear: Tympanic membrane, external ear and ear canal normal.   Left Ear: Tympanic membrane, external ear and ear canal normal.   Nose: Nose normal.   Mouth/Throat: Uvula is midline, oropharynx is clear and moist and mucous membranes are normal. No oropharyngeal exudate, posterior oropharyngeal edema, posterior oropharyngeal erythema or tonsillar abscesses. Eyes: Conjunctivae are normal. Pupils are equal, round, and reactive to light. No scleral icterus. Neck: Neck supple.    Cardiovascular: Normal rate, regular rhythm and normal heart sounds. Exam reveals no gallop. No murmur heard. Pulses:       Dorsalis pedis pulses are 2+ on the right side, and 2+ on the left side. Posterior tibial pulses are 2+ on the right side, and 2+ on the left side. No pedal edema. Pulmonary/Chest: Effort normal and breath sounds normal. No respiratory distress. She has no decreased breath sounds. She has no wheezes. She has no rhonchi. She has no rales. Abdominal: Soft. Normal appearance and bowel sounds are normal. She exhibits no distension. There is tenderness in the left upper quadrant and left lower quadrant. There is no rigidity, no rebound and no guarding. Genitourinary: Rectal exam shows no external hemorrhoid, no fissure, no mass, no tenderness and anal tone normal.   Genitourinary Comments: No palpable rectal abnormalities. Loose stool visualized outside of rectum. Lymphadenopathy:        Head (right side): No submandibular and no tonsillar adenopathy present. Head (left side): No submandibular and no tonsillar adenopathy present. She has no cervical adenopathy. Right: No supraclavicular adenopathy present. Left: No supraclavicular adenopathy present. Neurological: She is alert and oriented to person, place, and time. Skin: Skin is warm and dry. Psychiatric: She has a normal mood and affect. Her speech is normal.       ASSESSMENT and PLAN  Diagnoses and all orders for this visit:    1. Left sided abdominal pain  2. Acute diarrhea  3. Hematochezia  -     CBC WITH AUTOMATED DIFF; Future  -     METABOLIC PANEL, COMPREHENSIVE; Future  -     CULTURE, STOOL; Future  -     C. DIFFICILE AG & TOXIN A/B; Future  -     SHIGA-LIKE TOXIN; Future  -     WBC, STOOL; Future  -     OCCULT BLOOD IMMUNOASSAY,DIAGNOSTIC; Future  -     metroNIDAZOLE (FLAGYL) 500 mg tablet; Take 1 Tab by mouth three (3) times daily for 10 days. - Concern for diverticulitis. Will treat.        Follow-up Disposition:  Return in about 5 days (around 2/27/2019). Sooner prn. Scribe Attestation (For Scribes USE Only)... Attending Attestation (For Attendings USE Only).../Scribe Attestation (For Scribes USE Only)...

## 2019-02-24 LAB
CULTURE RESULTS: SIGNIFICANT CHANGE UP
CULTURE RESULTS: SIGNIFICANT CHANGE UP
SPECIMEN SOURCE: SIGNIFICANT CHANGE UP
SPECIMEN SOURCE: SIGNIFICANT CHANGE UP

## 2019-04-15 ENCOUNTER — EMERGENCY (EMERGENCY)
Facility: HOSPITAL | Age: 59
LOS: 1 days | Discharge: HOME | End: 2019-04-15
Attending: EMERGENCY MEDICINE | Admitting: EMERGENCY MEDICINE
Payer: MEDICAID

## 2019-04-15 VITALS
TEMPERATURE: 98 F | OXYGEN SATURATION: 95 % | SYSTOLIC BLOOD PRESSURE: 123 MMHG | HEART RATE: 96 BPM | RESPIRATION RATE: 16 BRPM | DIASTOLIC BLOOD PRESSURE: 90 MMHG

## 2019-04-15 VITALS
SYSTOLIC BLOOD PRESSURE: 147 MMHG | OXYGEN SATURATION: 98 % | TEMPERATURE: 98 F | RESPIRATION RATE: 17 BRPM | DIASTOLIC BLOOD PRESSURE: 114 MMHG | HEART RATE: 60 BPM

## 2019-04-15 DIAGNOSIS — Z90.49 ACQUIRED ABSENCE OF OTHER SPECIFIED PARTS OF DIGESTIVE TRACT: Chronic | ICD-10-CM

## 2019-04-15 DIAGNOSIS — Z79.52 LONG TERM (CURRENT) USE OF SYSTEMIC STEROIDS: ICD-10-CM

## 2019-04-15 DIAGNOSIS — Z95.810 PRESENCE OF AUTOMATIC (IMPLANTABLE) CARDIAC DEFIBRILLATOR: Chronic | ICD-10-CM

## 2019-04-15 DIAGNOSIS — R55 SYNCOPE AND COLLAPSE: ICD-10-CM

## 2019-04-15 DIAGNOSIS — Z88.8 ALLERGY STATUS TO OTHER DRUGS, MEDICAMENTS AND BIOLOGICAL SUBSTANCES STATUS: ICD-10-CM

## 2019-04-15 DIAGNOSIS — Z79.01 LONG TERM (CURRENT) USE OF ANTICOAGULANTS: ICD-10-CM

## 2019-04-15 DIAGNOSIS — I50.9 HEART FAILURE, UNSPECIFIED: ICD-10-CM

## 2019-04-15 DIAGNOSIS — Z98.890 OTHER SPECIFIED POSTPROCEDURAL STATES: Chronic | ICD-10-CM

## 2019-04-15 DIAGNOSIS — I11.0 HYPERTENSIVE HEART DISEASE WITH HEART FAILURE: ICD-10-CM

## 2019-04-15 DIAGNOSIS — Z88.6 ALLERGY STATUS TO ANALGESIC AGENT: ICD-10-CM

## 2019-04-15 DIAGNOSIS — E78.5 HYPERLIPIDEMIA, UNSPECIFIED: ICD-10-CM

## 2019-04-15 DIAGNOSIS — Z95.5 PRESENCE OF CORONARY ANGIOPLASTY IMPLANT AND GRAFT: Chronic | ICD-10-CM

## 2019-04-15 DIAGNOSIS — J45.901 UNSPECIFIED ASTHMA WITH (ACUTE) EXACERBATION: ICD-10-CM

## 2019-04-15 DIAGNOSIS — Z79.899 OTHER LONG TERM (CURRENT) DRUG THERAPY: ICD-10-CM

## 2019-04-15 DIAGNOSIS — Z88.0 ALLERGY STATUS TO PENICILLIN: ICD-10-CM

## 2019-04-15 DIAGNOSIS — Z79.02 LONG TERM (CURRENT) USE OF ANTITHROMBOTICS/ANTIPLATELETS: ICD-10-CM

## 2019-04-15 DIAGNOSIS — Z90.5 ACQUIRED ABSENCE OF KIDNEY: Chronic | ICD-10-CM

## 2019-04-15 LAB
ALBUMIN SERPL ELPH-MCNC: 4 G/DL — SIGNIFICANT CHANGE UP (ref 3.3–5)
ALP SERPL-CCNC: 148 U/L — HIGH (ref 40–120)
ALT FLD-CCNC: 67 U/L — HIGH (ref 10–45)
ANION GAP SERPL CALC-SCNC: 14 MMOL/L — SIGNIFICANT CHANGE UP (ref 5–17)
APTT BLD: 43.9 SEC — HIGH (ref 27.5–36.3)
AST SERPL-CCNC: 77 U/L — HIGH (ref 10–40)
BASOPHILS # BLD AUTO: 0.04 K/UL — SIGNIFICANT CHANGE UP (ref 0–0.2)
BASOPHILS NFR BLD AUTO: 0.3 % — SIGNIFICANT CHANGE UP (ref 0–2)
BILIRUB SERPL-MCNC: 1.1 MG/DL — SIGNIFICANT CHANGE UP (ref 0.2–1.2)
BUN SERPL-MCNC: 11 MG/DL — SIGNIFICANT CHANGE UP (ref 7–23)
CALCIUM SERPL-MCNC: 9.9 MG/DL — SIGNIFICANT CHANGE UP (ref 8.4–10.5)
CHLORIDE SERPL-SCNC: 102 MMOL/L — SIGNIFICANT CHANGE UP (ref 96–108)
CK MB CFR SERPL CALC: 2.1 NG/ML — SIGNIFICANT CHANGE UP (ref 0–6.7)
CO2 SERPL-SCNC: 29 MMOL/L — SIGNIFICANT CHANGE UP (ref 22–31)
CREAT SERPL-MCNC: 0.96 MG/DL — SIGNIFICANT CHANGE UP (ref 0.5–1.3)
EOSINOPHIL # BLD AUTO: 0.27 K/UL — SIGNIFICANT CHANGE UP (ref 0–0.5)
EOSINOPHIL NFR BLD AUTO: 1.9 % — SIGNIFICANT CHANGE UP (ref 0–6)
GLUCOSE SERPL-MCNC: 96 MG/DL — SIGNIFICANT CHANGE UP (ref 70–99)
HCT VFR BLD CALC: 48.4 % — SIGNIFICANT CHANGE UP (ref 39–50)
HGB BLD-MCNC: 15.8 G/DL — SIGNIFICANT CHANGE UP (ref 13–17)
IMM GRANULOCYTES NFR BLD AUTO: 0.6 % — SIGNIFICANT CHANGE UP (ref 0–1.5)
INR BLD: 1.31 — HIGH (ref 0.88–1.16)
LYMPHOCYTES # BLD AUTO: 14.7 % — SIGNIFICANT CHANGE UP (ref 13–44)
LYMPHOCYTES # BLD AUTO: 2.06 K/UL — SIGNIFICANT CHANGE UP (ref 1–3.3)
MCHC RBC-ENTMCNC: 29.8 PG — SIGNIFICANT CHANGE UP (ref 27–34)
MCHC RBC-ENTMCNC: 32.6 GM/DL — SIGNIFICANT CHANGE UP (ref 32–36)
MCV RBC AUTO: 91.3 FL — SIGNIFICANT CHANGE UP (ref 80–100)
MONOCYTES # BLD AUTO: 1.28 K/UL — HIGH (ref 0–0.9)
MONOCYTES NFR BLD AUTO: 9.2 % — SIGNIFICANT CHANGE UP (ref 2–14)
NEUTROPHILS # BLD AUTO: 10.24 K/UL — HIGH (ref 1.8–7.4)
NEUTROPHILS NFR BLD AUTO: 73.3 % — SIGNIFICANT CHANGE UP (ref 43–77)
NRBC # BLD: 0 /100 WBCS — SIGNIFICANT CHANGE UP (ref 0–0)
PLATELET # BLD AUTO: 207 K/UL — SIGNIFICANT CHANGE UP (ref 150–400)
POTASSIUM SERPL-MCNC: 4.6 MMOL/L — SIGNIFICANT CHANGE UP (ref 3.5–5.3)
POTASSIUM SERPL-SCNC: 4.6 MMOL/L — SIGNIFICANT CHANGE UP (ref 3.5–5.3)
PROT SERPL-MCNC: 7.5 G/DL — SIGNIFICANT CHANGE UP (ref 6–8.3)
PROTHROM AB SERPL-ACNC: 14.9 SEC — HIGH (ref 10–12.9)
RBC # BLD: 5.3 M/UL — SIGNIFICANT CHANGE UP (ref 4.2–5.8)
RBC # FLD: 13.5 % — SIGNIFICANT CHANGE UP (ref 10.3–14.5)
SODIUM SERPL-SCNC: 145 MMOL/L — SIGNIFICANT CHANGE UP (ref 135–145)
TROPONIN T SERPL-MCNC: <0.01 NG/ML — SIGNIFICANT CHANGE UP (ref 0–0.01)
WBC # BLD: 13.98 K/UL — HIGH (ref 3.8–10.5)
WBC # FLD AUTO: 13.98 K/UL — HIGH (ref 3.8–10.5)

## 2019-04-15 PROCEDURE — 36415 COLL VENOUS BLD VENIPUNCTURE: CPT

## 2019-04-15 PROCEDURE — 93010 ELECTROCARDIOGRAM REPORT: CPT

## 2019-04-15 PROCEDURE — 93005 ELECTROCARDIOGRAM TRACING: CPT

## 2019-04-15 PROCEDURE — 80053 COMPREHEN METABOLIC PANEL: CPT

## 2019-04-15 PROCEDURE — 85025 COMPLETE CBC W/AUTO DIFF WBC: CPT

## 2019-04-15 PROCEDURE — 82550 ASSAY OF CK (CPK): CPT

## 2019-04-15 PROCEDURE — 71046 X-RAY EXAM CHEST 2 VIEWS: CPT | Mod: 26

## 2019-04-15 PROCEDURE — 71046 X-RAY EXAM CHEST 2 VIEWS: CPT

## 2019-04-15 PROCEDURE — 99284 EMERGENCY DEPT VISIT MOD MDM: CPT | Mod: 25

## 2019-04-15 PROCEDURE — 94640 AIRWAY INHALATION TREATMENT: CPT

## 2019-04-15 PROCEDURE — 85730 THROMBOPLASTIN TIME PARTIAL: CPT

## 2019-04-15 PROCEDURE — 82553 CREATINE MB FRACTION: CPT

## 2019-04-15 PROCEDURE — 99285 EMERGENCY DEPT VISIT HI MDM: CPT | Mod: 25

## 2019-04-15 PROCEDURE — 84484 ASSAY OF TROPONIN QUANT: CPT

## 2019-04-15 PROCEDURE — 85610 PROTHROMBIN TIME: CPT

## 2019-04-15 PROCEDURE — 96374 THER/PROPH/DIAG INJ IV PUSH: CPT

## 2019-04-15 PROCEDURE — 82962 GLUCOSE BLOOD TEST: CPT

## 2019-04-15 RX ORDER — IPRATROPIUM/ALBUTEROL SULFATE 18-103MCG
3 AEROSOL WITH ADAPTER (GRAM) INHALATION
Qty: 0 | Refills: 0 | Status: COMPLETED | OUTPATIENT
Start: 2019-04-15 | End: 2019-04-15

## 2019-04-15 RX ORDER — HYDROMORPHONE HYDROCHLORIDE 2 MG/ML
1 INJECTION INTRAMUSCULAR; INTRAVENOUS; SUBCUTANEOUS ONCE
Qty: 0 | Refills: 0 | Status: DISCONTINUED | OUTPATIENT
Start: 2019-04-15 | End: 2019-04-15

## 2019-04-15 RX ORDER — LISINOPRIL 2.5 MG/1
40 TABLET ORAL ONCE
Qty: 0 | Refills: 0 | Status: COMPLETED | OUTPATIENT
Start: 2019-04-15 | End: 2019-04-15

## 2019-04-15 RX ORDER — TRAMADOL HYDROCHLORIDE 50 MG/1
50 TABLET ORAL ONCE
Qty: 0 | Refills: 0 | Status: DISCONTINUED | OUTPATIENT
Start: 2019-04-15 | End: 2019-04-15

## 2019-04-15 RX ORDER — OXYCODONE AND ACETAMINOPHEN 5; 325 MG/1; MG/1
1 TABLET ORAL ONCE
Qty: 0 | Refills: 0 | Status: DISCONTINUED | OUTPATIENT
Start: 2019-04-15 | End: 2019-04-15

## 2019-04-15 RX ADMIN — Medication 3 MILLILITER(S): at 21:15

## 2019-04-15 RX ADMIN — LISINOPRIL 40 MILLIGRAM(S): 2.5 TABLET ORAL at 21:49

## 2019-04-15 RX ADMIN — Medication 60 MILLIGRAM(S): at 21:15

## 2019-04-15 RX ADMIN — HYDROMORPHONE HYDROCHLORIDE 1 MILLIGRAM(S): 2 INJECTION INTRAMUSCULAR; INTRAVENOUS; SUBCUTANEOUS at 21:54

## 2019-04-15 NOTE — ED PROVIDER NOTE - CLINICAL SUMMARY MEDICAL DECISION MAKING FREE TEXT BOX
57 yo M with pmh of RCC s/p partial nephrectomy, CAD s/p stents, afib on eliquis, AICD, HLD, hep C, HTN, COPD, benign brain tumor c/o syncopal episode today. [place] the train and started to get kidney pain in his L flank. Pt then started to feel lightheaded and then woke up on the floor. No trauma. BP elevated, pt states he took his meds today. Well appearing, no signs of trauma. EKG NSR no ischemic changes. Lungs with diffuse wheezing. Will check labs including Yin, nebs, steroids, CXR.

## 2019-04-15 NOTE — ED PROVIDER NOTE - PHYSICAL EXAMINATION
CONSTITUTIONAL: Well-appearing; well-nourished; in no apparent distress.   HEAD: Normocephalic; atraumatic.   EYES: PERRL; EOM intact; conjunctiva and sclera clear  ENT: normal nose; no rhinorrhea; normal pharynx with no erythema or lesions.   NECK: Supple; non-tender; no LAD  CARDIOVASCULAR: Normal S1, S2; no murmurs, rubs, or gallops. Regular rate and rhythm.   RESPIRATORY: Breathing easily; diffuse wheezing   GI: Soft; non-distended; non-tender; no palpable organomegaly.   MSK: FROM at all extremities, normal tone   EXT: No cyanosis or edema; N/V intact  SKIN: Normal for age and race; warm; dry; good turgor; no apparent lesions or rash.   NEURO: A & O x 3; face symmetric; grossly unremarkable.   PSYCHOLOGICAL: The patient’s mood and manner are appropriate. CONSTITUTIONAL: Well-appearing; well-nourished; in no apparent distress.   HEAD: Normocephalic; atraumatic.   EYES: PERRL; EOM intact; conjunctiva and sclera clear  ENT: normal nose; no rhinorrhea; normal pharynx with no erythema or lesions.   NECK: Supple; non-tender; no LAD  CARDIOVASCULAR: Normal S1, S2; no murmurs, rubs, or gallops. Regular rate and rhythm.   RESPIRATORY: Breathing easily; diffuse wheezing   GI: Soft; non-distended; non-tender; no palpable organomegaly.   MSK: FROM at all extremities, normal tone , +tenderness to L flank   EXT: No cyanosis or edema; N/V intact  SKIN: Normal for age and race; warm; dry; good turgor; no apparent lesions or rash.   NEURO: A & O x 3; face symmetric; grossly unremarkable.   PSYCHOLOGICAL: The patient’s mood and manner are appropriate.

## 2019-04-15 NOTE — ED ADULT NURSE REASSESSMENT NOTE - NS ED NURSE REASSESS COMMENT FT1
MIKE Matthews at bedside for evaluation, awaiting orders
SSA at bedside for XR, pt. refused to go at this time, states in too much pain, PA aware
assessment as noted, nad, provided with warm blanket for comfort, assessment on-going, pending further orders
pt. medicated for pain as noted, tegan. well, assessment on-going
pt. was given sandwich, per request, consumed 100%, refused Percocet and Tramadol. PA aware, other interventions as noted, pt. cursing out loud, "I've got me a PA that don't fucking know nothing"
pt. walking around, un-assisted, asking PA for something specific for pain, awaiting further orders

## 2019-04-15 NOTE — ED PROVIDER NOTE - NSFOLLOWUPINSTRUCTIONS_ED_ALL_ED_FT
Syncope    Syncope is when you temporarily lose consciousness, also called fainting or passing out. It is caused by a sudden decrease in blood flow to the brain. Even though most causes of syncope are not dangerous, syncope can possibly be a sign of a serious medical problem. Signs that you may be about to faint include feeling dizzy, lightheaded, nausea, visual changes, or cold/clammy skin. Do not drive, operate heavy machinery, or play sports until your health care provider says it is okay.    SEEK IMMEDIATE MEDICAL CARE IF YOU HAVE ANY OF THE FOLLOWING SYMPTOMS: severe headache, pain in your chest/abdomen/back, bleeding from your mouth or rectum, palpitations, shortness of breath, pain with breathing, seizure, confusion, or trouble walking.    Chronic Pain    Chronic pain is a complex condition and the Emergency Department is not the ideal place to manage this condition. Prescription painkillers must be written by your primary care provider or a pain management physician. Avoid activities or triggers that exacerbate your pain.    SEEK IMMEDIATE MEDICAL CARE IF YOU HAVE ANY OF THE FOLLOWING SYMPTOMS: severe chest pain, fainting, vomiting blood, dark or bloody stools, or pain different from your chronic pain.    Asthma    Asthma is a condition in which the airways tighten and narrow, making it difficult to breath. Asthma episodes, also called asthma attacks, range from minor to life-threatening. Symptoms include wheezing, coughing, chest tightness, or shortness of breath. The diagnosis of asthma is made by a review of your medical history and a physical exam, but may involve additional testing. Asthma cannot be cured, but medicines and lifestyle changes can help control it. Avoid triggers of asthma which may include animal dander, pollen, mold, smoke, air pollutants, etc.     SEEK IMMEDIATE MEDICAL CARE IF YOU HAVE ANY OF THE FOLLOWING SYMPTOMS: worsening of symptoms, shortness of breath at rest, chest pain, bluish discoloration to lips or fingertips, lightheadedness/dizziness, or fever.

## 2019-04-15 NOTE — ED ADULT NURSE NOTE - OBJECTIVE STATEMENT
Patient verbalized that he felt his body "shake" while sitting in subway car today and he passed out for a few seconds.  Patient woke up on the floor of subway car.  Verbalized the "shaking" is similar symptoms during his sleep apnea.  .  Denies neck / back pain, numbness, weakness  NAD, no neuro deficits appreciated, denies complaint at present

## 2019-04-15 NOTE — ED PROVIDER NOTE - ATTENDING CONTRIBUTION TO CARE
57 yo M with pmh of RCC s/p partial nephrectomy, CAD s/p stents, afib on eliquis, AICD, HLD, hep C, HTN, COPD, benign brain tumor c/o syncopal episode today. no seizure like symptoms. has hx of syncope in the past. no cp/sob, no n/v. seen in feb and admitted for this, and had labs/ct scans which were neg. last echo 1 yr ago no acute findings. AICD did not fire. +wheezing on exam, mild L cva tenderness. will check labs, give nebs, and dc home to f/u outpatient. unlikely to benefit from inpatient syncope workup today.

## 2019-04-15 NOTE — ED PROVIDER NOTE - OBJECTIVE STATEMENT
57 yo M with pmh of RCC s/p partial nephrectomy, CAD s/p stents, afib on eliquis, AICD, HLD, hep C, HTN, COPD, benign brain tumor c/o syncopal episode today. [place] the train and started to get kidney pain in his L flank. Pt then started to feel lightheaded and then woke up on the floor. Pt states his body jerked on the floor. Denies trauma. Denies incontinence. Denies cp, sob, ha, n/v, visual changes, palpitations, abd pain, dysuria. Pt reports some wheezing. Pt  admitted to the obs unit at [facility] on 2/16 for a syncopal episode. At that time head CT, CTA chest and CT A/P were all unremarkable.

## 2019-04-15 NOTE — ED PROVIDER NOTE - CARE PLAN
Principal Discharge DX:	Syncope  Secondary Diagnosis:	Asthma exacerbation  Secondary Diagnosis:	Flank pain  Secondary Diagnosis:	Essential hypertension

## 2019-04-15 NOTE — ED ADULT TRIAGE NOTE - CHIEF COMPLAINT QUOTE
Patient verbalized that he felt his body "shake" while sitting [place] [employer] car today and he passed out for a few seconds.  Patient woke up on the floor of subway car.  Verbalized the "shaking" is similar symptoms during his sleep apnea.  .  Denies neck / back pain, numbness, weakness

## 2019-05-09 ENCOUNTER — EMERGENCY (EMERGENCY)
Facility: HOSPITAL | Age: 59
LOS: 1 days | Discharge: HOME | End: 2019-05-09
Attending: EMERGENCY MEDICINE | Admitting: EMERGENCY MEDICINE
Payer: MEDICAID

## 2019-05-09 VITALS
SYSTOLIC BLOOD PRESSURE: 154 MMHG | RESPIRATION RATE: 16 BRPM | DIASTOLIC BLOOD PRESSURE: 84 MMHG | HEART RATE: 69 BPM | TEMPERATURE: 98 F | OXYGEN SATURATION: 98 %

## 2019-05-09 VITALS
RESPIRATION RATE: 20 BRPM | HEART RATE: 79 BPM | DIASTOLIC BLOOD PRESSURE: 97 MMHG | WEIGHT: 199.96 LBS | OXYGEN SATURATION: 98 % | TEMPERATURE: 98 F | SYSTOLIC BLOOD PRESSURE: 149 MMHG

## 2019-05-09 DIAGNOSIS — Z90.5 ACQUIRED ABSENCE OF KIDNEY: Chronic | ICD-10-CM

## 2019-05-09 DIAGNOSIS — Z98.890 OTHER SPECIFIED POSTPROCEDURAL STATES: Chronic | ICD-10-CM

## 2019-05-09 DIAGNOSIS — Z90.49 ACQUIRED ABSENCE OF OTHER SPECIFIED PARTS OF DIGESTIVE TRACT: Chronic | ICD-10-CM

## 2019-05-09 DIAGNOSIS — Z95.5 PRESENCE OF CORONARY ANGIOPLASTY IMPLANT AND GRAFT: Chronic | ICD-10-CM

## 2019-05-09 DIAGNOSIS — Z95.810 PRESENCE OF AUTOMATIC (IMPLANTABLE) CARDIAC DEFIBRILLATOR: Chronic | ICD-10-CM

## 2019-05-09 LAB
ALBUMIN SERPL ELPH-MCNC: 3.9 G/DL — SIGNIFICANT CHANGE UP (ref 3.3–5)
ALP SERPL-CCNC: 272 U/L — HIGH (ref 40–120)
ALT FLD-CCNC: 57 U/L — HIGH (ref 10–45)
ANION GAP SERPL CALC-SCNC: 10 MMOL/L — SIGNIFICANT CHANGE UP (ref 5–17)
APTT BLD: 41.6 SEC — HIGH (ref 27.5–36.3)
AST SERPL-CCNC: 56 U/L — HIGH (ref 10–40)
BASOPHILS # BLD AUTO: 0.05 K/UL — SIGNIFICANT CHANGE UP (ref 0–0.2)
BASOPHILS NFR BLD AUTO: 0.4 % — SIGNIFICANT CHANGE UP (ref 0–2)
BILIRUB SERPL-MCNC: 0.5 MG/DL — SIGNIFICANT CHANGE UP (ref 0.2–1.2)
BUN SERPL-MCNC: 22 MG/DL — SIGNIFICANT CHANGE UP (ref 7–23)
CALCIUM SERPL-MCNC: 9.6 MG/DL — SIGNIFICANT CHANGE UP (ref 8.4–10.5)
CHLORIDE SERPL-SCNC: 101 MMOL/L — SIGNIFICANT CHANGE UP (ref 96–108)
CK MB CFR SERPL CALC: 2.3 NG/ML — SIGNIFICANT CHANGE UP (ref 0–6.7)
CK SERPL-CCNC: 71 U/L — SIGNIFICANT CHANGE UP (ref 30–200)
CO2 SERPL-SCNC: 26 MMOL/L — SIGNIFICANT CHANGE UP (ref 22–31)
CREAT SERPL-MCNC: 0.94 MG/DL — SIGNIFICANT CHANGE UP (ref 0.5–1.3)
EOSINOPHIL # BLD AUTO: 0.15 K/UL — SIGNIFICANT CHANGE UP (ref 0–0.5)
EOSINOPHIL NFR BLD AUTO: 1.3 % — SIGNIFICANT CHANGE UP (ref 0–6)
GLUCOSE SERPL-MCNC: 109 MG/DL — HIGH (ref 70–99)
HCT VFR BLD CALC: 44.7 % — SIGNIFICANT CHANGE UP (ref 39–50)
HGB BLD-MCNC: 14.4 G/DL — SIGNIFICANT CHANGE UP (ref 13–17)
IMM GRANULOCYTES NFR BLD AUTO: 0.5 % — SIGNIFICANT CHANGE UP (ref 0–1.5)
INR BLD: 1.19 — HIGH (ref 0.88–1.16)
LYMPHOCYTES # BLD AUTO: 1.96 K/UL — SIGNIFICANT CHANGE UP (ref 1–3.3)
LYMPHOCYTES # BLD AUTO: 16.6 % — SIGNIFICANT CHANGE UP (ref 13–44)
MCHC RBC-ENTMCNC: 28.9 PG — SIGNIFICANT CHANGE UP (ref 27–34)
MCHC RBC-ENTMCNC: 32.2 GM/DL — SIGNIFICANT CHANGE UP (ref 32–36)
MCV RBC AUTO: 89.8 FL — SIGNIFICANT CHANGE UP (ref 80–100)
MONOCYTES # BLD AUTO: 1.4 K/UL — HIGH (ref 0–0.9)
MONOCYTES NFR BLD AUTO: 11.8 % — SIGNIFICANT CHANGE UP (ref 2–14)
NEUTROPHILS # BLD AUTO: 8.22 K/UL — HIGH (ref 1.8–7.4)
NEUTROPHILS NFR BLD AUTO: 69.4 % — SIGNIFICANT CHANGE UP (ref 43–77)
NRBC # BLD: 0 /100 WBCS — SIGNIFICANT CHANGE UP (ref 0–0)
PLATELET # BLD AUTO: 245 K/UL — SIGNIFICANT CHANGE UP (ref 150–400)
POTASSIUM SERPL-MCNC: 4.5 MMOL/L — SIGNIFICANT CHANGE UP (ref 3.5–5.3)
POTASSIUM SERPL-SCNC: 4.5 MMOL/L — SIGNIFICANT CHANGE UP (ref 3.5–5.3)
PROT SERPL-MCNC: 7.4 G/DL — SIGNIFICANT CHANGE UP (ref 6–8.3)
PROTHROM AB SERPL-ACNC: 13.5 SEC — HIGH (ref 10–12.9)
RBC # BLD: 4.98 M/UL — SIGNIFICANT CHANGE UP (ref 4.2–5.8)
RBC # FLD: 13 % — SIGNIFICANT CHANGE UP (ref 10.3–14.5)
SODIUM SERPL-SCNC: 137 MMOL/L — SIGNIFICANT CHANGE UP (ref 135–145)
TROPONIN T SERPL-MCNC: <0.01 NG/ML — SIGNIFICANT CHANGE UP (ref 0–0.01)
TROPONIN T SERPL-MCNC: <0.01 NG/ML — SIGNIFICANT CHANGE UP (ref 0–0.01)
WBC # BLD: 11.84 K/UL — HIGH (ref 3.8–10.5)
WBC # FLD AUTO: 11.84 K/UL — HIGH (ref 3.8–10.5)

## 2019-05-09 PROCEDURE — 99284 EMERGENCY DEPT VISIT MOD MDM: CPT | Mod: 25

## 2019-05-09 PROCEDURE — 82553 CREATINE MB FRACTION: CPT

## 2019-05-09 PROCEDURE — 36415 COLL VENOUS BLD VENIPUNCTURE: CPT

## 2019-05-09 PROCEDURE — 82550 ASSAY OF CK (CPK): CPT

## 2019-05-09 PROCEDURE — 74176 CT ABD & PELVIS W/O CONTRAST: CPT

## 2019-05-09 PROCEDURE — 71046 X-RAY EXAM CHEST 2 VIEWS: CPT | Mod: 26

## 2019-05-09 PROCEDURE — 93010 ELECTROCARDIOGRAM REPORT: CPT

## 2019-05-09 PROCEDURE — 85730 THROMBOPLASTIN TIME PARTIAL: CPT

## 2019-05-09 PROCEDURE — 83690 ASSAY OF LIPASE: CPT

## 2019-05-09 PROCEDURE — 96374 THER/PROPH/DIAG INJ IV PUSH: CPT

## 2019-05-09 PROCEDURE — 74176 CT ABD & PELVIS W/O CONTRAST: CPT | Mod: 26

## 2019-05-09 PROCEDURE — 71046 X-RAY EXAM CHEST 2 VIEWS: CPT

## 2019-05-09 PROCEDURE — 93005 ELECTROCARDIOGRAM TRACING: CPT

## 2019-05-09 PROCEDURE — 84484 ASSAY OF TROPONIN QUANT: CPT

## 2019-05-09 PROCEDURE — 99285 EMERGENCY DEPT VISIT HI MDM: CPT | Mod: 25

## 2019-05-09 PROCEDURE — 85025 COMPLETE CBC W/AUTO DIFF WBC: CPT

## 2019-05-09 PROCEDURE — 70450 CT HEAD/BRAIN W/O DYE: CPT | Mod: 26

## 2019-05-09 PROCEDURE — 85610 PROTHROMBIN TIME: CPT

## 2019-05-09 PROCEDURE — 80053 COMPREHEN METABOLIC PANEL: CPT

## 2019-05-09 PROCEDURE — 70450 CT HEAD/BRAIN W/O DYE: CPT

## 2019-05-09 RX ORDER — MORPHINE SULFATE 50 MG/1
4 CAPSULE, EXTENDED RELEASE ORAL ONCE
Refills: 0 | Status: DISCONTINUED | OUTPATIENT
Start: 2019-05-09 | End: 2019-05-09

## 2019-05-09 RX ADMIN — MORPHINE SULFATE 4 MILLIGRAM(S): 50 CAPSULE, EXTENDED RELEASE ORAL at 06:32

## 2019-05-09 RX ADMIN — MORPHINE SULFATE 4 MILLIGRAM(S): 50 CAPSULE, EXTENDED RELEASE ORAL at 06:36

## 2019-05-09 RX ADMIN — MORPHINE SULFATE 4 MILLIGRAM(S): 50 CAPSULE, EXTENDED RELEASE ORAL at 04:56

## 2019-05-09 RX ADMIN — MORPHINE SULFATE 4 MILLIGRAM(S): 50 CAPSULE, EXTENDED RELEASE ORAL at 06:51

## 2019-05-09 NOTE — ED ADULT NURSE REASSESSMENT NOTE - NS ED NURSE REASSESS COMMENT FT1
Pt received from night KARI Rosas. Pt resting comfortably in NAD. Repeat Trop ordered. Denies cp. Will continue to monitor.

## 2019-05-09 NOTE — ED PROVIDER NOTE - CLINICAL SUMMARY MEDICAL DECISION MAKING FREE TEXT BOX
L flank pain, L chest pressure, possible syncopal episode. multiple visits in the past. no focal neuro deficits  -check labs, ekg  -cxr, ct head, ct a/p

## 2019-05-09 NOTE — ED ADULT NURSE REASSESSMENT NOTE - NS ED NURSE REASSESS COMMENT FT1
pt moaning out from stretcher in pain requesting additional morphine dose. Behavior disruptive to surrounding patients Md Andre dominguez spoke with pt that CT is negative but Troponin to be repeated. pt repetivie request for pain meds will medicate per orders monitor and reassess

## 2019-05-09 NOTE — ED PROVIDER NOTE - PROGRESS NOTE DETAILS
no stone on CT, fullness to pancreas head, however no elevated lipase, doubt pancreatitis. received signout- awaiting repeat troponin for disposition repeat troponin negative. discussed with patient who feels better and ready for dc home. encouraged to follow up with cardiologist. patient is agreeable to plan. ED evaluation and management discussed with the patient and family (if available) in detail.  Close PMD follow up encouraged.  Strict ED return instructions discussed in detail and patient given the opportunity to ask any questions about their discharge diagnosis and instructions. Patient verbalized understanding. Patient is agreeable to plan.

## 2019-05-09 NOTE — ED PROVIDER NOTE - OBJECTIVE STATEMENT
58M hx copd, aicd, afib, htn, hep C, chf, c/o L sided flank pain. pt states pain started earlier. states then he developed L sided chest pressure.  no SOB.  pt states he was walking and felt lightheaded. states walker rolled out in front of him and he passed out. unsure if hit head. no vomiting. pt states has had multiple kidney stones in the past.

## 2019-05-09 NOTE — ED ADULT NURSE NOTE - NSIMPLEMENTINTERV_GEN_ALL_ED
Implemented All Fall with Harm Risk Interventions:  [place] to call system. Call bell, personal items and telephone within reach. Instruct patient to call for assistance. Room bathroom lighting operational. Non-slip footwear when patient is off stretcher. Physically safe environment: no spills, clutter or unnecessary equipment. Stretcher in lowest position, wheels locked, appropriate side rails in place. Provide visual cue, wrist band, yellow gown, etc. Monitor gait and stability. Monitor for mental status changes and reorient to person, place, and time. Review medications for side effects contributing to fall risk. Reinforce activity limits and safety measures with patient and family. Provide visual clues: red socks.

## 2019-05-09 NOTE — ED ADULT NURSE NOTE - OBJECTIVE STATEMENT
pt received into spot 7 A&Ox3 ambulatory with walker BIBA complaining of left sided CP/ abd and "body pain" with dizziness and fall vs syncopal episode tonight after leaving his girlfriends house. Unsure if he hit his head or not no obvious trauma/ deformities abrasion noted. Denies head ache blurry vision N/V numbness/ tingling reports some SOB on ambulation. Respirations appear even and unlabored sating at 98% on room air. 12 lead ekg done NSR noted to continuous cardiac monitor. 20G placed to RAC labs drawn and sent awaiting md sifuentes will monitor and reassess

## 2019-05-11 NOTE — ED PROVIDER NOTE - CHIEF COMPLAINT
Name: Rosa Salazar ADMIT: 5/10/2019   : 1942  PCP: Provider, No Known    MRN: [id number] LOS: 1 days   AGE/SEX: 76 y.o. female  ROOM: [facility]     Subjective   Subjective   CC:  Abdominal pain  No acute events.  Patient went for colonoscopy today and tolerated this well.  No new complaints.  No f/c/n/v/d.    Objective   Objective   Vital Signs  Temp:  [97.1 °F (36.2 °C)-98.5 °F (36.9 °C)] 98.2 °F (36.8 °C)  Heart Rate:  [60-76] 63  Resp:  [12-16] 16  BP: ()/(55-62) 117/62  SpO2:  [97 %-99 %] 99 %  on  Flow (L/min):  [2] 2;   Device (Oxygen Therapy): room air  Body mass index is 20.04 kg/m².  Physical Exam   Constitutional: She is oriented to person, place, and time. No distress.   HENT:   Head: Normocephalic and atraumatic.   Mouth/Throat: Oropharynx is clear and moist.   Eyes: Conjunctivae and EOM are normal. Pupils are equal, round, and reactive to light.   Neck: Normal range of motion. Neck supple.   Cardiovascular: Normal rate, regular rhythm and intact distal pulses.   Pulmonary/Chest: Effort normal and breath sounds normal.   Abdominal: Soft. Bowel sounds are normal. There is no tenderness.   Musculoskeletal: She exhibits no edema or tenderness.   Neurological: She is alert and oriented to person, place, and time.   Skin: Skin is warm and dry. She is not diaphoretic.   Psychiatric: She has a normal mood and affect. Her behavior is normal.   Nursing note and vitals reviewed.      Results Review:       I reviewed the patient's new clinical results.  Results from last 7 days   Lab Units 05/10/19  1046   WBC 10*3/mm3 9.01   HEMOGLOBIN g/dL 11.9*   PLATELETS 10*3/mm3 437     Results from last 7 days   Lab Units 19  1206 05/10/19  1046 19  1411   SODIUM mmol/L  --  142  --    POTASSIUM mmol/L 3.1* 3.0*  --    CHLORIDE mmol/L  --  98  --    CO2 mmol/L  --  28.7  --    BUN mg/dL  --  13  --    CREATININE mg/dL  --  0.82 0.80   GLUCOSE mg/dL  --  108*  --    Estimated Creatinine Clearance:  The patient is a 58y Male complaining of cough. 45.1 mL/min (by C-G formula based on SCr of 0.82 mg/dL).  Results from last 7 days   Lab Units 05/10/19  1046   ALBUMIN g/dL 3.80   BILIRUBIN mg/dL 0.5   ALK PHOS U/L 135*   AST (SGOT) U/L 26   ALT (SGPT) U/L 16     Results from last 7 days   Lab Units 05/10/19  1046   CALCIUM mg/dL 9.1   ALBUMIN g/dL 3.80   MAGNESIUM mg/dL 2.1       No results found for: HGBA1C, POCGLU      lactobacillus acidophilus 1 capsule Oral Daily   multivitamin with minerals 1 tablet Oral Daily   sodium chloride 3 mL Intravenous Q12H       sodium chloride 1,000 mL Last Rate: 1,000 mL (05/11/19 0931)   Diet Clear Liquid  NPO Diet       Assessment/Plan     Active Hospital Problems    Diagnosis  POA   • **Abdominal pain [R10.9]  Yes   • Malnutrition (CMS/HCC) [E46]  Unknown   • Liver lesion [K76.9]  Unknown   • Kidney stone [N20.0]  Unknown   • Hypokalemia [E87.6]  Unknown   • Colon cancer screening [Z12.11]  Not Applicable      Resolved Hospital Problems   No resolved problems to display.    Abdominal pain  - CT revealing evidence of malignant metastases in the liver  - had colonoscopy 5/11/9 revealing very likely malignant colon mass-general surgery is following, planning resection tomorrow  - oncology has evaluated  - patient states that she would not consider chemotherapy if diagnosed with a stage IV malignancy, would pursue palliative care         Kidney stone  Not causing any symptoms at this point and unchanged from prior 4 days ago. Labs stable.  - will monitor       Hypokalemia  - replace per protocol  - Mg okay      VTE Prophylaxis - SCDs   Code Status - Full code  Disposition - TBD      Sonny Dickens MD  [facility]  05/11/19  5:30 PM

## 2019-05-12 ENCOUNTER — EMERGENCY (EMERGENCY)
Facility: HOSPITAL | Age: 59
LOS: 0 days | Discharge: HOME | End: 2019-05-12
Attending: EMERGENCY MEDICINE | Admitting: STUDENT IN AN ORGANIZED HEALTH CARE EDUCATION/TRAINING PROGRAM
Payer: MEDICAID

## 2019-05-12 VITALS
RESPIRATION RATE: 18 BRPM | SYSTOLIC BLOOD PRESSURE: 155 MMHG | TEMPERATURE: 97 F | OXYGEN SATURATION: 95 % | HEART RATE: 87 BPM | DIASTOLIC BLOOD PRESSURE: 100 MMHG

## 2019-05-12 VITALS
HEART RATE: 85 BPM | RESPIRATION RATE: 18 BRPM | DIASTOLIC BLOOD PRESSURE: 100 MMHG | SYSTOLIC BLOOD PRESSURE: 173 MMHG | OXYGEN SATURATION: 100 %

## 2019-05-12 DIAGNOSIS — Z79.899 OTHER LONG TERM (CURRENT) DRUG THERAPY: ICD-10-CM

## 2019-05-12 DIAGNOSIS — Z95.5 PRESENCE OF CORONARY ANGIOPLASTY IMPLANT AND GRAFT: Chronic | ICD-10-CM

## 2019-05-12 DIAGNOSIS — Z95.810 PRESENCE OF AUTOMATIC (IMPLANTABLE) CARDIAC DEFIBRILLATOR: Chronic | ICD-10-CM

## 2019-05-12 DIAGNOSIS — Z90.49 ACQUIRED ABSENCE OF OTHER SPECIFIED PARTS OF DIGESTIVE TRACT: Chronic | ICD-10-CM

## 2019-05-12 DIAGNOSIS — Z79.52 LONG TERM (CURRENT) USE OF SYSTEMIC STEROIDS: ICD-10-CM

## 2019-05-12 DIAGNOSIS — J44.9 CHRONIC OBSTRUCTIVE PULMONARY DISEASE, UNSPECIFIED: ICD-10-CM

## 2019-05-12 DIAGNOSIS — I11.0 HYPERTENSIVE HEART DISEASE WITH HEART FAILURE: ICD-10-CM

## 2019-05-12 DIAGNOSIS — I48.91 UNSPECIFIED ATRIAL FIBRILLATION: ICD-10-CM

## 2019-05-12 DIAGNOSIS — Z88.8 ALLERGY STATUS TO OTHER DRUGS, MEDICAMENTS AND BIOLOGICAL SUBSTANCES: ICD-10-CM

## 2019-05-12 DIAGNOSIS — Z98.890 OTHER SPECIFIED POSTPROCEDURAL STATES: Chronic | ICD-10-CM

## 2019-05-12 DIAGNOSIS — R10.9 UNSPECIFIED ABDOMINAL PAIN: ICD-10-CM

## 2019-05-12 DIAGNOSIS — N12 TUBULO-INTERSTITIAL NEPHRITIS, NOT SPECIFIED AS ACUTE OR CHRONIC: ICD-10-CM

## 2019-05-12 DIAGNOSIS — I50.9 HEART FAILURE, UNSPECIFIED: ICD-10-CM

## 2019-05-12 DIAGNOSIS — Z79.51 LONG TERM (CURRENT) USE OF INHALED STEROIDS: ICD-10-CM

## 2019-05-12 DIAGNOSIS — Z88.0 ALLERGY STATUS TO PENICILLIN: ICD-10-CM

## 2019-05-12 DIAGNOSIS — Z90.5 ACQUIRED ABSENCE OF KIDNEY: Chronic | ICD-10-CM

## 2019-05-12 LAB
ALBUMIN SERPL ELPH-MCNC: 4.2 G/DL — SIGNIFICANT CHANGE UP (ref 3.5–5.2)
ALP SERPL-CCNC: 288 U/L — HIGH (ref 30–115)
ALT FLD-CCNC: 60 U/L — HIGH (ref 0–41)
ANION GAP SERPL CALC-SCNC: 14 MMOL/L — SIGNIFICANT CHANGE UP (ref 7–14)
APPEARANCE UR: ABNORMAL
AST SERPL-CCNC: 82 U/L — HIGH (ref 0–41)
BACTERIA # UR AUTO: ABNORMAL /HPF
BASOPHILS # BLD AUTO: 0.02 K/UL — SIGNIFICANT CHANGE UP (ref 0–0.2)
BASOPHILS NFR BLD AUTO: 0.1 % — SIGNIFICANT CHANGE UP (ref 0–1)
BILIRUB SERPL-MCNC: 0.4 MG/DL — SIGNIFICANT CHANGE UP (ref 0.2–1.2)
BILIRUB UR-MCNC: ABNORMAL
BUN SERPL-MCNC: 22 MG/DL — HIGH (ref 10–20)
CALCIUM SERPL-MCNC: 9.9 MG/DL — SIGNIFICANT CHANGE UP (ref 8.5–10.1)
CHLORIDE SERPL-SCNC: 101 MMOL/L — SIGNIFICANT CHANGE UP (ref 98–110)
CO2 SERPL-SCNC: 21 MMOL/L — SIGNIFICANT CHANGE UP (ref 17–32)
COLOR SPEC: ABNORMAL
CREAT SERPL-MCNC: 0.9 MG/DL — SIGNIFICANT CHANGE UP (ref 0.7–1.5)
DIFF PNL FLD: ABNORMAL
EOSINOPHIL # BLD AUTO: 0 K/UL — SIGNIFICANT CHANGE UP (ref 0–0.7)
EOSINOPHIL NFR BLD AUTO: 0 % — SIGNIFICANT CHANGE UP (ref 0–8)
GLUCOSE SERPL-MCNC: 140 MG/DL — HIGH (ref 70–99)
GLUCOSE UR QL: 250
HCT VFR BLD CALC: 45.4 % — SIGNIFICANT CHANGE UP (ref 42–52)
HGB BLD-MCNC: 14.8 G/DL — SIGNIFICANT CHANGE UP (ref 14–18)
IMM GRANULOCYTES NFR BLD AUTO: 0.5 % — HIGH (ref 0.1–0.3)
KETONES UR-MCNC: 15
LACTATE SERPL-SCNC: 1.8 MMOL/L — SIGNIFICANT CHANGE UP (ref 0.5–2.2)
LEUKOCYTE ESTERASE UR-ACNC: ABNORMAL
LIDOCAIN IGE QN: 46 U/L — SIGNIFICANT CHANGE UP (ref 7–60)
LYMPHOCYTES # BLD AUTO: 0.85 K/UL — LOW (ref 1.2–3.4)
LYMPHOCYTES # BLD AUTO: 5.3 % — LOW (ref 20.5–51.1)
MCHC RBC-ENTMCNC: 28.4 PG — SIGNIFICANT CHANGE UP (ref 27–31)
MCHC RBC-ENTMCNC: 32.6 G/DL — SIGNIFICANT CHANGE UP (ref 32–37)
MCV RBC AUTO: 87.1 FL — SIGNIFICANT CHANGE UP (ref 80–94)
MONOCYTES # BLD AUTO: 1.03 K/UL — HIGH (ref 0.1–0.6)
MONOCYTES NFR BLD AUTO: 6.4 % — SIGNIFICANT CHANGE UP (ref 1.7–9.3)
NEUTROPHILS # BLD AUTO: 14.01 K/UL — HIGH (ref 1.4–6.5)
NEUTROPHILS NFR BLD AUTO: 87.7 % — HIGH (ref 42.2–75.2)
NITRITE UR-MCNC: POSITIVE
NRBC # BLD: 0 /100 WBCS — SIGNIFICANT CHANGE UP (ref 0–0)
PH UR: 5.5 — SIGNIFICANT CHANGE UP (ref 5–8)
PLATELET # BLD AUTO: 275 K/UL — SIGNIFICANT CHANGE UP (ref 130–400)
POTASSIUM SERPL-MCNC: 7.2 MMOL/L — CRITICAL HIGH (ref 3.5–5)
POTASSIUM SERPL-SCNC: 7.2 MMOL/L — CRITICAL HIGH (ref 3.5–5)
PROT SERPL-MCNC: 7.8 G/DL — SIGNIFICANT CHANGE UP (ref 6–8)
PROT UR-MCNC: >=300
RBC # BLD: 5.21 M/UL — SIGNIFICANT CHANGE UP (ref 4.7–6.1)
RBC # FLD: 12.9 % — SIGNIFICANT CHANGE UP (ref 11.5–14.5)
RBC CASTS # UR COMP ASSIST: >50 /HPF
SODIUM SERPL-SCNC: 136 MMOL/L — SIGNIFICANT CHANGE UP (ref 135–146)
SP GR SPEC: >=1.03 — SIGNIFICANT CHANGE UP (ref 1.01–1.03)
TROPONIN T SERPL-MCNC: <0.01 NG/ML — SIGNIFICANT CHANGE UP
TROPONIN T SERPL-MCNC: <0.01 NG/ML — SIGNIFICANT CHANGE UP
UROBILINOGEN FLD QL: 1 (ref 0.2–0.2)
WBC # BLD: 15.99 K/UL — HIGH (ref 4.8–10.8)
WBC # FLD AUTO: 15.99 K/UL — HIGH (ref 4.8–10.8)
WBC UR QL: SIGNIFICANT CHANGE UP /HPF

## 2019-05-12 PROCEDURE — 99285 EMERGENCY DEPT VISIT HI MDM: CPT

## 2019-05-12 PROCEDURE — 71046 X-RAY EXAM CHEST 2 VIEWS: CPT | Mod: 26

## 2019-05-12 PROCEDURE — 74177 CT ABD & PELVIS W/CONTRAST: CPT | Mod: 26

## 2019-05-12 PROCEDURE — 70450 CT HEAD/BRAIN W/O DYE: CPT | Mod: 26

## 2019-05-12 RX ORDER — MORPHINE SULFATE 50 MG/1
4 CAPSULE, EXTENDED RELEASE ORAL ONCE
Refills: 0 | Status: DISCONTINUED | OUTPATIENT
Start: 2019-05-12 | End: 2019-05-12

## 2019-05-12 RX ORDER — ONDANSETRON 8 MG/1
4 TABLET, FILM COATED ORAL ONCE
Refills: 0 | Status: COMPLETED | OUTPATIENT
Start: 2019-05-12 | End: 2019-05-12

## 2019-05-12 RX ORDER — DIPHENHYDRAMINE HCL 50 MG
50 CAPSULE ORAL ONCE
Refills: 0 | Status: COMPLETED | OUTPATIENT
Start: 2019-05-12 | End: 2019-05-12

## 2019-05-12 RX ORDER — MORPHINE SULFATE 50 MG/1
6 CAPSULE, EXTENDED RELEASE ORAL ONCE
Refills: 0 | Status: DISCONTINUED | OUTPATIENT
Start: 2019-05-12 | End: 2019-05-12

## 2019-05-12 RX ADMIN — ONDANSETRON 4 MILLIGRAM(S): 8 TABLET, FILM COATED ORAL at 13:08

## 2019-05-12 RX ADMIN — MORPHINE SULFATE 6 MILLIGRAM(S): 50 CAPSULE, EXTENDED RELEASE ORAL at 14:53

## 2019-05-12 RX ADMIN — MORPHINE SULFATE 6 MILLIGRAM(S): 50 CAPSULE, EXTENDED RELEASE ORAL at 13:53

## 2019-05-12 RX ADMIN — Medication 50 MILLIGRAM(S): at 19:50

## 2019-05-12 RX ADMIN — MORPHINE SULFATE 4 MILLIGRAM(S): 50 CAPSULE, EXTENDED RELEASE ORAL at 18:13

## 2019-05-12 NOTE — ED PROVIDER NOTE - PHYSICAL EXAMINATION
CONSTITUTIONAL: robust, walking in ED chatting with other patients  SKIN: Warm dry  HEAD: NCAT  EYES: NL inspection  ENT: MMM  NECK: Supple; non tender.  CARD: RRR  RESP: CTAB  ABD: Soft, + ttp Labd, no r/g  BACK: No cvat  EXT: no pedal edema  NEURO: Grossly unremarkable; no cerebellar signs, nL gait  PSYCH: Cooperative, appropriate.

## 2019-05-12 NOTE — ED PROVIDER NOTE - OBJECTIVE STATEMENT
57 y/o M extensive pmh, hx renal cell carcinoma and hx brain tumor, p/w L sided flank pain and hematuria today. has had multiple similar episodes. pain is sharp, non radiating, no relieving or exacerbating factors. also c/o syncope: was on train last night, thinks may have passed out while seated "felt like I dozed off". Then today was on bus, was seated, felt L sided flank pain, then passed out slid to floor. No injury, woke up immediately . NO cp, sob, n/v/d, other abnl bleeding.

## 2019-05-12 NOTE — ED PROVIDER NOTE - PROGRESS NOTE DETAILS
Pt walking in ED, NAD Pt walking in ED talking to other patients, no distress Chart review shows pt has presented to [facility] ED in Feb, April and May w/ syncope; April chief complaint similar to today. Pt was in EDOU in Feb. pt refusing to remain NPO before CT reading, eating now on stretcher

## 2019-05-12 NOTE — ED PROVIDER NOTE - NSFOLLOWUPINSTRUCTIONS_ED_ALL_ED_FT
Pyelonephritis    Pyelonephritis is a kidney infection. The kidneys are the organs that filter a person's blood and move waste out of the bloodstream and into the urine. Urine passes from the kidneys, through the ureters, and into the bladder. In most cases, the infection clears up with treatment and does not cause further problems. More severe infections or chronic infections can sometimes spread to the bloodstream or lead to other problems with the kidneys. Symptoms include frequent or painful urination, abdominal pain, back pain, flank pain, fever/chills, nausea, or vomiting. If you were prescribed an antibiotic medicine, take it as told by your health care provider. Do not stop taking the antibiotic even if you start to feel better.    SEEK IMMEDIATE MEDICAL CARE IF YOU HAVE THE FOLLOWING SYMPTOMS: inability to hold down antibiotics or fluids, worsening pain, dizziness/lightheadedness, or change in mental status.

## 2019-05-12 NOTE — ED PROVIDER NOTE - PROVIDER TOKENS
FREE:[LAST:[Madeline],PHONE:[(   )    -],FAX:[(   )    -],ADDRESS:[your private PMD Dr. Correa in [place]]],FREE:[LAST:[Marty],PHONE:[(   )    -],FAX:[(   )    -],ADDRESS:[your private Urologist Dr. Ferguson at [facility]]]

## 2019-05-12 NOTE — ED ADULT NURSE NOTE - NSIMPLEMENTINTERV_GEN_ALL_ED
Implemented All Fall Risk Interventions:  [place] to call system. Call bell, personal items and telephone within reach. Instruct patient to call for assistance. Room bathroom lighting operational. Non-slip footwear when patient is off stretcher. Physically safe environment: no spills, clutter or unnecessary equipment. Stretcher in lowest position, wheels locked, appropriate side rails in place. Provide visual cue, wrist band, yellow gown, etc. Monitor gait and stability. Monitor for mental status changes and reorient to person, place, and time. Review medications for side effects contributing to fall risk. Reinforce activity limits and safety measures with patient and family.

## 2019-05-12 NOTE — ED PROVIDER NOTE - NS ED ROS FT
Constitutional:  No fever  Eyes:  No visual changes  ENMT: No neck pain or stiffness  Cardiac:  No chest pain  Respiratory:  No cough or respiratory distress.   GI:  See HPI  :  No dysuria, frequency or burning.  MS:  No back pain.  Neuro:  No headache   Skin:  No skin rash  Except as documented in the HPI,  all other systems are negative

## 2019-05-12 NOTE — ED PROVIDER NOTE - CLINICAL SUMMARY MEDICAL DECISION MAKING FREE TEXT BOX
pt s/o to me - 58y m extensive pmh incl RCC s/p partial L nephrectomy in past now with recurrence a/o Sep 2017, chf s/p aicd, copd p/w L flank pain x sev days & reported syncope - similar presentation to [facility] 3d ago, ED work-up incl ekg/Tn x 2, unremarkable cth & non-con ct ap - w/u today incl ekg/tn x 2 nl, +UTI, ct ap w/iv con unremarkable - abx given for early pyelo, strict return precautions discussed, encouraged close outpt f/u with private PMD & Urologist @ [facility]

## 2019-05-12 NOTE — ED ADULT TRIAGE NOTE - CHIEF COMPLAINT QUOTE
Pt states he passed out on the bus and told by bystander he was shaking, pt also complains of left flank pain

## 2019-05-12 NOTE — ED PROVIDER NOTE - CARE PROVIDER_API CALL
Madeline,   your private PMD Dr. Correa in [place]  Phone: (   )    -  Fax: (   )    -  Follow Up Time:     Marty,   your private Urologist Dr. Ferguson at [facility]  Phone: (   )    -  Fax: (   )    -  Follow Up Time:

## 2019-05-13 DIAGNOSIS — I11.0 HYPERTENSIVE HEART DISEASE WITH HEART FAILURE: ICD-10-CM

## 2019-05-13 DIAGNOSIS — R07.9 CHEST PAIN, UNSPECIFIED: ICD-10-CM

## 2019-05-13 DIAGNOSIS — I50.9 HEART FAILURE, UNSPECIFIED: ICD-10-CM

## 2019-05-13 DIAGNOSIS — I25.10 ATHEROSCLEROTIC HEART DISEASE OF NATIVE CORONARY ARTERY WITHOUT ANGINA PECTORIS: ICD-10-CM

## 2019-05-13 DIAGNOSIS — R42 DIZZINESS AND GIDDINESS: ICD-10-CM

## 2019-05-13 DIAGNOSIS — R07.89 OTHER CHEST PAIN: ICD-10-CM

## 2019-05-13 DIAGNOSIS — Z79.52 LONG TERM (CURRENT) USE OF SYSTEMIC STEROIDS: ICD-10-CM

## 2019-05-13 DIAGNOSIS — R10.9 UNSPECIFIED ABDOMINAL PAIN: ICD-10-CM

## 2019-05-13 DIAGNOSIS — Z88.0 ALLERGY STATUS TO PENICILLIN: ICD-10-CM

## 2019-05-13 DIAGNOSIS — J44.9 CHRONIC OBSTRUCTIVE PULMONARY DISEASE, UNSPECIFIED: ICD-10-CM

## 2019-05-13 DIAGNOSIS — Z88.5 ALLERGY STATUS TO NARCOTIC AGENT: ICD-10-CM

## 2019-05-13 DIAGNOSIS — E78.5 HYPERLIPIDEMIA, UNSPECIFIED: ICD-10-CM

## 2019-05-13 DIAGNOSIS — Z79.01 LONG TERM (CURRENT) USE OF ANTICOAGULANTS: ICD-10-CM

## 2019-05-13 LAB
CULTURE RESULTS: NO GROWTH — SIGNIFICANT CHANGE UP
SPECIMEN SOURCE: SIGNIFICANT CHANGE UP

## 2019-06-04 NOTE — ED PROVIDER NOTE - PRINCIPAL DIAGNOSIS
Add 52 Modifier (Optional): no Consent: The patient's consent was obtained including but not limited to risks of crusting, scabbing, blistering, scarring, darker or lighter pigmentary change, recurrence, incomplete removal and infection. Detail Level: Detailed Medical Necessity Clause: This procedure was medically necessary because the lesions that were treated were: Post-Care Instructions: I reviewed with the patient in detail post-care instructions. Patient is to wear sunprotection, and avoid picking at any of the treated lesions. Pt may apply Vaseline to crusted or scabbing areas. Medical Necessity Information: It is in your best interest to select a reason for this procedure from the list below. All of these items fulfill various CMS LCD requirements except the new and changing color options. Chronic pain due to neoplasm

## 2019-06-27 NOTE — ED ADULT NURSE NOTE - NS ED NURSE LEVEL OF CONSCIOUSNESS ORIENTATION
Skin normal color for race, warm, dry and intact. No evidence of rash. Oriented - self; Oriented - place; Oriented - time

## 2019-07-02 NOTE — ED ADULT NURSE NOTE - LOCATION
ED tech presenting to CT for US IV start following IV infiltration during saline test injection.   flank

## 2019-08-01 NOTE — H&P ADULT - PROBLEM SELECTOR PROBLEM 1
Scribe Attestation (For Scribes USE Only)... Attending Attestation (For Attendings USE Only).../Scribe Attestation (For Scribes USE Only)... ACS (acute coronary syndrome) Syncope

## 2019-09-06 NOTE — H&P ADULT - PROBLEM SELECTOR PROBLEM 4
This is YOUR patient, pt has upcoming appt on 9/26/19. Pt saw Mayra one time for acute has seen you in past on 4/1/19, 12/7/18, 10/1/18, you are patients PCP. Please refill if appropriate. Thanks   Atrial fibrillation

## 2019-10-04 NOTE — PROGRESS NOTE ADULT - PROBLEM SELECTOR PLAN 3
Patient Name: Richard Flor  Procedure Date: 3/16/2017 1:05 PM  MRN: [id number]  Account Number: [id number]  YOB: 1944  Admit Type: Outpatient  Age: 72  Gender: Female  Note Status: Finalized  Attending MD: Renita Kinney MD  Procedure:            Colonoscopy  Indications:          High risk colon cancer surveillance: Personal history                        of colon cancer, Last colonoscopy: 2015  Providers:            Renita Kinney MD  Referring MD:         Mandakini Pokharna, MD  Sedation:             See the Anesthesia note for documentation of the                        administered medications  Procedure:       Pre-Anesthesia Assessment:       - The anesthesia plan was to use monitored anesthesia care (MAC).       A History and Physical was performed prior to the procedure. Patient       medications and allergies were reviewed. The risks and benefits of the       procedure and sedation options were discussed. Questions were answered       and informed consent was obtained. Patient identification and proposed       procedure were verified. The patient was deemed in satisfactory       condition to undergo the procedure. The heart rate, respiratory rate,       oxygen saturations, blood pressure and response to sedation were       monitored throughout the procedure.       The endoscope was passed under direct vision. The Endoscope was       introduced through the anus and advanced to the cecum, identified by       appendiceal orifice and ileocecal valve. the scope was slowly withdrawn,       and the colon mucosa was closely examined. Total withdrawal time was 14       minutes. The physical status of the patient was re-assessed after the       procedure. The colonoscopy was performed without difficulty. The patient       tolerated the procedure well. The quality of the bowel preparation was       good. The quality of the bowel preparation was evaluated using the BBPS       ([place] Bowel 
Preparation Scale) with scores of: Right Colon = 2 (minor       amount of residual staining, small fragments of stool and/or opaque       liquid, but mucosa seen well), Transverse Colon = 3 (entire mucosa seen       well with no residual staining, small fragments of stool or opaque       liquid) and Left Colon = 3 (entire mucosa seen well with no residual       staining, small fragments of stool or opaque liquid). The total BBPS       score equals 8. The quality of the bowel preparation was good.  Findings:       The perianal and digital rectal examinations were normal.       A 4 mm polyp was found in the ascending colon. The polyp was sessile.       The polyp was removed with a cold biopsy forceps. Resection and       retrieval were complete. Verification of patient identification for the       specimen was done.       Many small and large-mouthed diverticula were found in the entire colon.       There was no evidence of diverticular bleeding.       Internal hemorrhoids were found during retroflexion. The hemorrhoids       were medium-sized.       There was evidence of a prior functional end-to-end low-anterior       anastomosis in the rectum. This was patent and was characterized by       healthy appearing mucosa. The anastomosis was traversed.  Impression:       - One 4 mm polyp in the ascending colon, removed with a cold biopsy       forceps. Resected and retrieved.       - Diverticulosis in the entire examined colon. There was no evidence of       diverticular bleeding.       - Internal hemorrhoids.       - Patent functional end-to-end low-anterior anastomosis, characterized       by healthy appearing mucosa.  Recommendation:       - Await pathology results.       - Return to primary care physician as previously scheduled.       - Repeat colonoscopy in 3 years for surveillance.       - Patient has a contact number available for emergencies. The signs and       symptoms of potential delayed complications were 
discussed with the       patient. Return to normal activities tomorrow. Written discharge       instructions were provided to the patient.  Complications:       No immediate complications.  MD Renita Estrada MD  3/16/2017 1:50:59 PM  This report has been signed electronically by the above.  Number of Addenda: 0  Procedure Code(s):    --- Professional ---                        30000, Colonoscopy, flexible; with biopsy, single or                        multiple  Diagnosis Code(s):    --- Professional ---                        Z85.038, Personal history of other malignant neoplasm                        of large intestine                        D12.2, Benign neoplasm of ascending colon                        K64.8, Other hemorrhoids                        Z98.0, Intestinal bypass and anastomosis status                        K57.30, Diverticulosis of large intestine without                        perforation or abscess without bleeding  CPT copyright 2015 [employer]. All rights reserved.  The codes documented in this report are preliminary and upon  review may  be revised to meet current compliance requirements.       [facility] [address] [address] [phone]    
cont plavix. will continue plavix for now despite complaint of hematuria given stable H/H and patient had stent placed this year.   hx of asa allergy
cont plavix. will continue plavix for now despite complaint of hematuria given stable H/H and patient had stent placed this year.   hx of asa allergy

## 2019-10-22 NOTE — ED PROVIDER NOTE - SKIN, MLM
Patient very drowsy in ASU. Her O2 sats are under 90 on room air. She requiring supplemental O2 via NC to keep sats up. She states she is breathing comfortably. She has no shortness of breath. Pain is controlled. Will plan to keep for observation. She has history of moderate to severe COPD with hypoxia.  She uses O2 at night via NC.    Skin normal color for race, warm, dry and intact. No evidence of rash.

## 2020-01-02 ENCOUNTER — INPATIENT (INPATIENT)
Facility: HOSPITAL | Age: 60
LOS: 0 days | Discharge: LEFT BEFORE DISCHARGE | DRG: 191 | End: 2020-01-03
Attending: STUDENT IN AN ORGANIZED HEALTH CARE EDUCATION/TRAINING PROGRAM | Admitting: STUDENT IN AN ORGANIZED HEALTH CARE EDUCATION/TRAINING PROGRAM
Payer: MEDICAID

## 2020-01-02 VITALS
HEIGHT: 66 IN | DIASTOLIC BLOOD PRESSURE: 83 MMHG | HEART RATE: 73 BPM | RESPIRATION RATE: 20 BRPM | SYSTOLIC BLOOD PRESSURE: 121 MMHG | OXYGEN SATURATION: 98 % | WEIGHT: 240.08 LBS | TEMPERATURE: 99 F

## 2020-01-02 DIAGNOSIS — Z95.810 PRESENCE OF AUTOMATIC (IMPLANTABLE) CARDIAC DEFIBRILLATOR: Chronic | ICD-10-CM

## 2020-01-02 DIAGNOSIS — Z98.890 OTHER SPECIFIED POSTPROCEDURAL STATES: Chronic | ICD-10-CM

## 2020-01-02 DIAGNOSIS — Z90.5 ACQUIRED ABSENCE OF KIDNEY: Chronic | ICD-10-CM

## 2020-01-02 DIAGNOSIS — Z95.5 PRESENCE OF CORONARY ANGIOPLASTY IMPLANT AND GRAFT: Chronic | ICD-10-CM

## 2020-01-02 DIAGNOSIS — Z90.49 ACQUIRED ABSENCE OF OTHER SPECIFIED PARTS OF DIGESTIVE TRACT: Chronic | ICD-10-CM

## 2020-01-02 DIAGNOSIS — J44.1 CHRONIC OBSTRUCTIVE PULMONARY DISEASE WITH (ACUTE) EXACERBATION: ICD-10-CM

## 2020-01-02 LAB
ALBUMIN SERPL ELPH-MCNC: 3.7 G/DL — SIGNIFICANT CHANGE UP (ref 3.5–5)
ALP SERPL-CCNC: 121 U/L — HIGH (ref 40–120)
ALT FLD-CCNC: 24 U/L DA — SIGNIFICANT CHANGE UP (ref 10–60)
ANION GAP SERPL CALC-SCNC: 6 MMOL/L — SIGNIFICANT CHANGE UP (ref 5–17)
AST SERPL-CCNC: 20 U/L — SIGNIFICANT CHANGE UP (ref 10–40)
BASOPHILS # BLD AUTO: 0.05 K/UL — SIGNIFICANT CHANGE UP (ref 0–0.2)
BASOPHILS NFR BLD AUTO: 0.5 % — SIGNIFICANT CHANGE UP (ref 0–2)
BILIRUB SERPL-MCNC: 0.6 MG/DL — SIGNIFICANT CHANGE UP (ref 0.2–1.2)
BUN SERPL-MCNC: 21 MG/DL — HIGH (ref 7–18)
CALCIUM SERPL-MCNC: 9 MG/DL — SIGNIFICANT CHANGE UP (ref 8.4–10.5)
CHLORIDE SERPL-SCNC: 108 MMOL/L — SIGNIFICANT CHANGE UP (ref 96–108)
CO2 SERPL-SCNC: 30 MMOL/L — SIGNIFICANT CHANGE UP (ref 22–31)
CREAT SERPL-MCNC: 1.08 MG/DL — SIGNIFICANT CHANGE UP (ref 0.5–1.3)
EOSINOPHIL # BLD AUTO: 0.18 K/UL — SIGNIFICANT CHANGE UP (ref 0–0.5)
EOSINOPHIL NFR BLD AUTO: 1.7 % — SIGNIFICANT CHANGE UP (ref 0–6)
FLU A RESULT: SIGNIFICANT CHANGE UP
FLU A RESULT: SIGNIFICANT CHANGE UP
FLUAV AG NPH QL: SIGNIFICANT CHANGE UP
FLUBV AG NPH QL: SIGNIFICANT CHANGE UP
GLUCOSE SERPL-MCNC: 83 MG/DL — SIGNIFICANT CHANGE UP (ref 70–99)
HCT VFR BLD CALC: 49.8 % — SIGNIFICANT CHANGE UP (ref 39–50)
HGB BLD-MCNC: 15.8 G/DL — SIGNIFICANT CHANGE UP (ref 13–17)
IMM GRANULOCYTES NFR BLD AUTO: 0.5 % — SIGNIFICANT CHANGE UP (ref 0–1.5)
LYMPHOCYTES # BLD AUTO: 1.99 K/UL — SIGNIFICANT CHANGE UP (ref 1–3.3)
LYMPHOCYTES # BLD AUTO: 18.7 % — SIGNIFICANT CHANGE UP (ref 13–44)
MAGNESIUM SERPL-MCNC: 2.3 MG/DL — SIGNIFICANT CHANGE UP (ref 1.6–2.6)
MCHC RBC-ENTMCNC: 27.9 PG — SIGNIFICANT CHANGE UP (ref 27–34)
MCHC RBC-ENTMCNC: 31.7 GM/DL — LOW (ref 32–36)
MCV RBC AUTO: 88 FL — SIGNIFICANT CHANGE UP (ref 80–100)
MONOCYTES # BLD AUTO: 1.43 K/UL — HIGH (ref 0–0.9)
MONOCYTES NFR BLD AUTO: 13.5 % — SIGNIFICANT CHANGE UP (ref 2–14)
NEUTROPHILS # BLD AUTO: 6.92 K/UL — SIGNIFICANT CHANGE UP (ref 1.8–7.4)
NEUTROPHILS NFR BLD AUTO: 65.1 % — SIGNIFICANT CHANGE UP (ref 43–77)
NRBC # BLD: 0 /100 WBCS — SIGNIFICANT CHANGE UP (ref 0–0)
NT-PROBNP SERPL-SCNC: 242 PG/ML — HIGH (ref 0–125)
PLATELET # BLD AUTO: 188 K/UL — SIGNIFICANT CHANGE UP (ref 150–400)
POTASSIUM SERPL-MCNC: 3.6 MMOL/L — SIGNIFICANT CHANGE UP (ref 3.5–5.3)
POTASSIUM SERPL-SCNC: 3.6 MMOL/L — SIGNIFICANT CHANGE UP (ref 3.5–5.3)
PROT SERPL-MCNC: 7.2 G/DL — SIGNIFICANT CHANGE UP (ref 6–8.3)
RBC # BLD: 5.66 M/UL — SIGNIFICANT CHANGE UP (ref 4.2–5.8)
RBC # FLD: 14.9 % — HIGH (ref 10.3–14.5)
RSV RESULT: SIGNIFICANT CHANGE UP
RSV RNA RESP QL NAA+PROBE: SIGNIFICANT CHANGE UP
SODIUM SERPL-SCNC: 144 MMOL/L — SIGNIFICANT CHANGE UP (ref 135–145)
TROPONIN I SERPL-MCNC: <0.015 NG/ML — SIGNIFICANT CHANGE UP (ref 0–0.04)
WBC # BLD: 10.62 K/UL — HIGH (ref 3.8–10.5)
WBC # FLD AUTO: 10.62 K/UL — HIGH (ref 3.8–10.5)

## 2020-01-02 PROCEDURE — 99285 EMERGENCY DEPT VISIT HI MDM: CPT

## 2020-01-02 PROCEDURE — 71045 X-RAY EXAM CHEST 1 VIEW: CPT | Mod: 26

## 2020-01-02 RX ORDER — TRAMADOL HYDROCHLORIDE 50 MG/1
50 TABLET ORAL ONCE
Refills: 0 | Status: DISCONTINUED | OUTPATIENT
Start: 2020-01-02 | End: 2020-01-02

## 2020-01-02 RX ORDER — IPRATROPIUM/ALBUTEROL SULFATE 18-103MCG
3 AEROSOL WITH ADAPTER (GRAM) INHALATION ONCE
Refills: 0 | Status: COMPLETED | OUTPATIENT
Start: 2020-01-02 | End: 2020-01-02

## 2020-01-02 RX ADMIN — Medication 40 MILLIGRAM(S): at 20:44

## 2020-01-02 RX ADMIN — Medication 3 MILLILITER(S): at 20:44

## 2020-01-02 NOTE — ED PROVIDER NOTE - NS ED MD EM SELECTION
Progress Notes by Antionette Gallardo at 09/13/17 12:51 PM     Author:  Antionette Gallardo Service:  (none) Author Type:  Medical Records Staff     Filed:  09/13/17 12:52 PM Encounter Date:  9/11/2017 Status:  Signed     :  Antionette Gallardo (Medical Records Staff)            I AGREE[JN1.1M]      Revision History        User Key Date/Time User Provider Type Action    > JN1.1 09/13/17 12:52 PM Antionette Gallardo Medical Records Staff Sign    M - Manual             [id number] Comprehensive

## 2020-01-02 NOTE — ED PROVIDER NOTE - PROGRESS NOTE DETAILS
Farrar: pt ambulating and still having wet cough.  cxr no pna no effusion.  bnp low  admit to med for copd exacerbation

## 2020-01-02 NOTE — ED PROVIDER NOTE - CLINICAL SUMMARY MEDICAL DECISION MAKING FREE TEXT BOX
59 yr old male with hx of ICD, CHF, COPD, a fib, HTn, HLD, RCC presents to ed c/o MCCAIN today worse with walking upstairs. no fever, no rashes.  + cough and pleuritic chest pain. no leg swelling, no fever or chills    MCCAIN likely COPD exacerbation r/o chf vs acs vs viral. labs, cxr, ekg, duonebs, prednisone, admit

## 2020-01-02 NOTE — ED PROVIDER NOTE - OBJECTIVE STATEMENT
59 yr old male with hx of ICD, CHF, COPD, a fib, HTn, HLD, RCC presents to ed c/o MCCAIN today worse with walking upstairs. no fever, no rashes.  + cough and pleuritic chest pain. no leg swelling, no fever or chills

## 2020-01-02 NOTE — ED ADULT NURSE NOTE - OBJECTIVE STATEMENT
pt stated he started feeling sob 2 hours ago and he has pt stated he started feeling sob 2 hours ago and he has left side kidney pain.

## 2020-01-03 VITALS
HEART RATE: 97 BPM | RESPIRATION RATE: 18 BRPM | SYSTOLIC BLOOD PRESSURE: 137 MMHG | OXYGEN SATURATION: 96 % | TEMPERATURE: 98 F | DIASTOLIC BLOOD PRESSURE: 93 MMHG

## 2020-01-03 DIAGNOSIS — I50.22 CHRONIC SYSTOLIC (CONGESTIVE) HEART FAILURE: ICD-10-CM

## 2020-01-03 DIAGNOSIS — Z29.9 ENCOUNTER FOR PROPHYLACTIC MEASURES, UNSPECIFIED: ICD-10-CM

## 2020-01-03 DIAGNOSIS — J44.1 CHRONIC OBSTRUCTIVE PULMONARY DISEASE WITH (ACUTE) EXACERBATION: ICD-10-CM

## 2020-01-03 DIAGNOSIS — I10 ESSENTIAL (PRIMARY) HYPERTENSION: ICD-10-CM

## 2020-01-03 DIAGNOSIS — F11.10 OPIOID ABUSE, UNCOMPLICATED: ICD-10-CM

## 2020-01-03 DIAGNOSIS — I48.0 PAROXYSMAL ATRIAL FIBRILLATION: ICD-10-CM

## 2020-01-03 DIAGNOSIS — R10.9 UNSPECIFIED ABDOMINAL PAIN: ICD-10-CM

## 2020-01-03 DIAGNOSIS — F11.20 OPIOID DEPENDENCE, UNCOMPLICATED: ICD-10-CM

## 2020-01-03 LAB
ALBUMIN SERPL ELPH-MCNC: 3.4 G/DL — LOW (ref 3.5–5)
ALP SERPL-CCNC: 105 U/L — SIGNIFICANT CHANGE UP (ref 40–120)
ALT FLD-CCNC: 22 U/L DA — SIGNIFICANT CHANGE UP (ref 10–60)
ANION GAP SERPL CALC-SCNC: 5 MMOL/L — SIGNIFICANT CHANGE UP (ref 5–17)
AST SERPL-CCNC: 15 U/L — SIGNIFICANT CHANGE UP (ref 10–40)
BASOPHILS # BLD AUTO: 0.01 K/UL — SIGNIFICANT CHANGE UP (ref 0–0.2)
BASOPHILS NFR BLD AUTO: 0.1 % — SIGNIFICANT CHANGE UP (ref 0–2)
BILIRUB SERPL-MCNC: 0.6 MG/DL — SIGNIFICANT CHANGE UP (ref 0.2–1.2)
BUN SERPL-MCNC: 22 MG/DL — HIGH (ref 7–18)
CALCIUM SERPL-MCNC: 9.3 MG/DL — SIGNIFICANT CHANGE UP (ref 8.4–10.5)
CHLORIDE SERPL-SCNC: 108 MMOL/L — SIGNIFICANT CHANGE UP (ref 96–108)
CHOLEST SERPL-MCNC: 121 MG/DL — SIGNIFICANT CHANGE UP (ref 10–199)
CO2 SERPL-SCNC: 27 MMOL/L — SIGNIFICANT CHANGE UP (ref 22–31)
CREAT SERPL-MCNC: 1.1 MG/DL — SIGNIFICANT CHANGE UP (ref 0.5–1.3)
EOSINOPHIL # BLD AUTO: 0 K/UL — SIGNIFICANT CHANGE UP (ref 0–0.5)
EOSINOPHIL NFR BLD AUTO: 0 % — SIGNIFICANT CHANGE UP (ref 0–6)
FOLATE SERPL-MCNC: 14.3 NG/ML — SIGNIFICANT CHANGE UP
GLUCOSE BLDC GLUCOMTR-MCNC: 187 MG/DL — HIGH (ref 70–99)
GLUCOSE SERPL-MCNC: 253 MG/DL — HIGH (ref 70–99)
HBA1C BLD-MCNC: 6.2 % — HIGH (ref 4–5.6)
HCT VFR BLD CALC: 48.3 % — SIGNIFICANT CHANGE UP (ref 39–50)
HDLC SERPL-MCNC: 56 MG/DL — SIGNIFICANT CHANGE UP
HGB BLD-MCNC: 15.5 G/DL — SIGNIFICANT CHANGE UP (ref 13–17)
IMM GRANULOCYTES NFR BLD AUTO: 0.5 % — SIGNIFICANT CHANGE UP (ref 0–1.5)
LIPID PNL WITH DIRECT LDL SERPL: 53 MG/DL — SIGNIFICANT CHANGE UP
LYMPHOCYTES # BLD AUTO: 0.64 K/UL — LOW (ref 1–3.3)
LYMPHOCYTES # BLD AUTO: 6.7 % — LOW (ref 13–44)
MAGNESIUM SERPL-MCNC: 2.3 MG/DL — SIGNIFICANT CHANGE UP (ref 1.6–2.6)
MCHC RBC-ENTMCNC: 27.9 PG — SIGNIFICANT CHANGE UP (ref 27–34)
MCHC RBC-ENTMCNC: 32.1 GM/DL — SIGNIFICANT CHANGE UP (ref 32–36)
MCV RBC AUTO: 86.9 FL — SIGNIFICANT CHANGE UP (ref 80–100)
MONOCYTES # BLD AUTO: 0.29 K/UL — SIGNIFICANT CHANGE UP (ref 0–0.9)
MONOCYTES NFR BLD AUTO: 3 % — SIGNIFICANT CHANGE UP (ref 2–14)
NEUTROPHILS # BLD AUTO: 8.55 K/UL — HIGH (ref 1.8–7.4)
NEUTROPHILS NFR BLD AUTO: 89.7 % — HIGH (ref 43–77)
NRBC # BLD: 0 /100 WBCS — SIGNIFICANT CHANGE UP (ref 0–0)
PHOSPHATE SERPL-MCNC: 2.4 MG/DL — LOW (ref 2.5–4.5)
PLATELET # BLD AUTO: 158 K/UL — SIGNIFICANT CHANGE UP (ref 150–400)
POTASSIUM SERPL-MCNC: 3.7 MMOL/L — SIGNIFICANT CHANGE UP (ref 3.5–5.3)
POTASSIUM SERPL-SCNC: 3.7 MMOL/L — SIGNIFICANT CHANGE UP (ref 3.5–5.3)
PROT SERPL-MCNC: 6.9 G/DL — SIGNIFICANT CHANGE UP (ref 6–8.3)
RBC # BLD: 5.56 M/UL — SIGNIFICANT CHANGE UP (ref 4.2–5.8)
RBC # FLD: 14.5 % — SIGNIFICANT CHANGE UP (ref 10.3–14.5)
SODIUM SERPL-SCNC: 140 MMOL/L — SIGNIFICANT CHANGE UP (ref 135–145)
TOTAL CHOLESTEROL/HDL RATIO MEASUREMENT: 2.2 RATIO — LOW (ref 3.4–9.6)
TRIGL SERPL-MCNC: 61 MG/DL — SIGNIFICANT CHANGE UP (ref 10–149)
TSH SERPL-MCNC: 0.21 UU/ML — LOW (ref 0.34–4.82)
VIT B12 SERPL-MCNC: 416 PG/ML — SIGNIFICANT CHANGE UP (ref 232–1245)
WBC # BLD: 9.54 K/UL — SIGNIFICANT CHANGE UP (ref 3.8–10.5)
WBC # FLD AUTO: 9.54 K/UL — SIGNIFICANT CHANGE UP (ref 3.8–10.5)

## 2020-01-03 PROCEDURE — 82962 GLUCOSE BLOOD TEST: CPT

## 2020-01-03 PROCEDURE — 80061 LIPID PANEL: CPT

## 2020-01-03 PROCEDURE — 85027 COMPLETE CBC AUTOMATED: CPT

## 2020-01-03 PROCEDURE — 87631 RESP VIRUS 3-5 TARGETS: CPT

## 2020-01-03 PROCEDURE — 84443 ASSAY THYROID STIM HORMONE: CPT

## 2020-01-03 PROCEDURE — 99222 1ST HOSP IP/OBS MODERATE 55: CPT

## 2020-01-03 PROCEDURE — 82607 VITAMIN B-12: CPT

## 2020-01-03 PROCEDURE — 94640 AIRWAY INHALATION TREATMENT: CPT

## 2020-01-03 PROCEDURE — 99221 1ST HOSP IP/OBS SF/LOW 40: CPT

## 2020-01-03 PROCEDURE — 80053 COMPREHEN METABOLIC PANEL: CPT

## 2020-01-03 PROCEDURE — 84100 ASSAY OF PHOSPHORUS: CPT

## 2020-01-03 PROCEDURE — 83036 HEMOGLOBIN GLYCOSYLATED A1C: CPT

## 2020-01-03 PROCEDURE — 99285 EMERGENCY DEPT VISIT HI MDM: CPT | Mod: 25

## 2020-01-03 PROCEDURE — 83735 ASSAY OF MAGNESIUM: CPT

## 2020-01-03 PROCEDURE — 83880 ASSAY OF NATRIURETIC PEPTIDE: CPT

## 2020-01-03 PROCEDURE — 82746 ASSAY OF FOLIC ACID SERUM: CPT

## 2020-01-03 PROCEDURE — 84484 ASSAY OF TROPONIN QUANT: CPT

## 2020-01-03 PROCEDURE — 93005 ELECTROCARDIOGRAM TRACING: CPT

## 2020-01-03 PROCEDURE — 71045 X-RAY EXAM CHEST 1 VIEW: CPT

## 2020-01-03 PROCEDURE — 36415 COLL VENOUS BLD VENIPUNCTURE: CPT

## 2020-01-03 RX ORDER — GABAPENTIN 400 MG/1
600 CAPSULE ORAL THREE TIMES A DAY
Refills: 0 | Status: DISCONTINUED | OUTPATIENT
Start: 2020-01-03 | End: 2020-01-03

## 2020-01-03 RX ORDER — ENOXAPARIN SODIUM 100 MG/ML
40 INJECTION SUBCUTANEOUS DAILY
Refills: 0 | Status: DISCONTINUED | OUTPATIENT
Start: 2020-01-03 | End: 2020-01-03

## 2020-01-03 RX ORDER — CLOPIDOGREL BISULFATE 75 MG/1
75 TABLET, FILM COATED ORAL DAILY
Refills: 0 | Status: DISCONTINUED | OUTPATIENT
Start: 2020-01-03 | End: 2020-01-03

## 2020-01-03 RX ORDER — TIOTROPIUM BROMIDE 18 UG/1
1 CAPSULE ORAL; RESPIRATORY (INHALATION) DAILY
Refills: 0 | Status: DISCONTINUED | OUTPATIENT
Start: 2020-01-03 | End: 2020-01-03

## 2020-01-03 RX ORDER — OXYCODONE HYDROCHLORIDE 5 MG/1
5 TABLET ORAL ONCE
Refills: 0 | Status: DISCONTINUED | OUTPATIENT
Start: 2020-01-03 | End: 2020-01-03

## 2020-01-03 RX ORDER — NICOTINE POLACRILEX 2 MG
1 GUM BUCCAL DAILY
Refills: 0 | Status: DISCONTINUED | OUTPATIENT
Start: 2020-01-03 | End: 2020-01-03

## 2020-01-03 RX ORDER — LISINOPRIL 2.5 MG/1
40 TABLET ORAL DAILY
Refills: 0 | Status: DISCONTINUED | OUTPATIENT
Start: 2020-01-03 | End: 2020-01-03

## 2020-01-03 RX ORDER — INSULIN LISPRO 100/ML
VIAL (ML) SUBCUTANEOUS
Refills: 0 | Status: DISCONTINUED | OUTPATIENT
Start: 2020-01-03 | End: 2020-01-03

## 2020-01-03 RX ORDER — SOTALOL HCL 120 MG
80 TABLET ORAL
Refills: 0 | Status: DISCONTINUED | OUTPATIENT
Start: 2020-01-03 | End: 2020-01-03

## 2020-01-03 RX ORDER — ISOSORBIDE MONONITRATE 60 MG/1
30 TABLET, EXTENDED RELEASE ORAL DAILY
Refills: 0 | Status: DISCONTINUED | OUTPATIENT
Start: 2020-01-03 | End: 2020-01-03

## 2020-01-03 RX ORDER — HYDROMORPHONE HYDROCHLORIDE 2 MG/ML
1 INJECTION INTRAMUSCULAR; INTRAVENOUS; SUBCUTANEOUS ONCE
Refills: 0 | Status: DISCONTINUED | OUTPATIENT
Start: 2020-01-03 | End: 2020-01-03

## 2020-01-03 RX ORDER — IPRATROPIUM/ALBUTEROL SULFATE 18-103MCG
3 AEROSOL WITH ADAPTER (GRAM) INHALATION EVERY 6 HOURS
Refills: 0 | Status: DISCONTINUED | OUTPATIENT
Start: 2020-01-03 | End: 2020-01-03

## 2020-01-03 RX ORDER — SPIRONOLACTONE 25 MG/1
1 TABLET, FILM COATED ORAL
Qty: 0 | Refills: 0 | DISCHARGE

## 2020-01-03 RX ORDER — APIXABAN 2.5 MG/1
5 TABLET, FILM COATED ORAL EVERY 12 HOURS
Refills: 0 | Status: DISCONTINUED | OUTPATIENT
Start: 2020-01-03 | End: 2020-01-03

## 2020-01-03 RX ORDER — SPIRONOLACTONE 25 MG/1
25 TABLET, FILM COATED ORAL DAILY
Refills: 0 | Status: DISCONTINUED | OUTPATIENT
Start: 2020-01-03 | End: 2020-01-03

## 2020-01-03 RX ORDER — TRAMADOL HYDROCHLORIDE 50 MG/1
50 TABLET ORAL EVERY 6 HOURS
Refills: 0 | Status: DISCONTINUED | OUTPATIENT
Start: 2020-01-03 | End: 2020-01-03

## 2020-01-03 RX ADMIN — GABAPENTIN 600 MILLIGRAM(S): 400 CAPSULE ORAL at 13:20

## 2020-01-03 RX ADMIN — TIOTROPIUM BROMIDE 1 CAPSULE(S): 18 CAPSULE ORAL; RESPIRATORY (INHALATION) at 12:11

## 2020-01-03 RX ADMIN — Medication 40 MILLIGRAM(S): at 06:58

## 2020-01-03 RX ADMIN — GABAPENTIN 600 MILLIGRAM(S): 400 CAPSULE ORAL at 06:57

## 2020-01-03 RX ADMIN — APIXABAN 5 MILLIGRAM(S): 2.5 TABLET, FILM COATED ORAL at 06:58

## 2020-01-03 RX ADMIN — ISOSORBIDE MONONITRATE 30 MILLIGRAM(S): 60 TABLET, EXTENDED RELEASE ORAL at 12:11

## 2020-01-03 RX ADMIN — Medication 80 MILLIGRAM(S): at 06:58

## 2020-01-03 RX ADMIN — LISINOPRIL 40 MILLIGRAM(S): 2.5 TABLET ORAL at 06:58

## 2020-01-03 RX ADMIN — CLOPIDOGREL BISULFATE 75 MILLIGRAM(S): 75 TABLET, FILM COATED ORAL at 12:11

## 2020-01-03 RX ADMIN — OXYCODONE HYDROCHLORIDE 5 MILLIGRAM(S): 5 TABLET ORAL at 14:26

## 2020-01-03 NOTE — H&P ADULT - PROBLEM SELECTOR PLAN 6
IMPROVE VTE Individual Risk Assessment          RISK                                                          Points  [  ] Previous VTE                                                3  [  ] Thrombophilia                                             2  [  ] Lower limb paralysis                                   2        (unable to hold up >15 seconds)    [  ] Current Cancer                                             2         (within 6 months)  [ X ] Immobilization > 24 hrs                              1  [  ] ICU/CCU stay > 24 hours                             1  [ X ] Age > 60                                                         1    IMPROVE VTE Score:     [employer] will cover dvt ppx

## 2020-01-03 NOTE — CONSULT NOTE ADULT - PROBLEM SELECTOR RECOMMENDATION 9
- pt complaining of left flank pain. Pt states that he has history of renal cell carcinoma.  No evidence of previous ct. pending repeat ct.  Pt does not have contact info for oncologist  - no iv opioids  - pt with multiple allergies.  No tylenol no nsaids  - one dose of oxycodone given   - please continue with tramadol only if needed. Spoke to pt regarding no escalation of opioids or iv opioids on floor  - urine tox, ua   - pt can follow up with oncologist as outpt  - no opioids upon discharge

## 2020-01-03 NOTE — H&P ADULT - PROBLEM SELECTOR PLAN 2
exhibits strong pain medication seeking behavior  claims allergies to most non-opioid analgesics  c/w tramadol prn only  pt counseled that he will not receive morphine or dilaudid  avoid all IV opioids

## 2020-01-03 NOTE — H&P ADULT - NSHPSOCIALHISTORY_GEN_ALL_CORE
Ambulates w/ rollator 2/2 peripheral neuropathy, 1-2 PPD for 20 years, cut down to 6 cigarettes per day for the last year; denied alcohol and illicit drug use, Hx of  service

## 2020-01-03 NOTE — H&P ADULT - ASSESSMENT
60 y/o male admitted to medicine for COPD exacerbation. Patient note to be wheezing on exam, however is maintaining O2 sat on room air and noted to be ambulating without difficulty. Patient persistently asks for opioid analgesics and claims to have numerous allergies to all other pain relievers. Refusing tramadol as he says "this does not work for me". Patient becomes belligerent when told he will not get any more aggressive pain meds.

## 2020-01-03 NOTE — H&P ADULT - NSICDXPASTSURGICALHX_GEN_ALL_CORE_FT
PAST SURGICAL HISTORY:  AICD (automatic cardioverter/defibrillator) present     H/O partial nephrectomy 2002    H/O transurethral destruction of bladder lesion     History of coronary artery stent placement     History of percutaneous coronary intervention     S/P cholecystectomy 2015

## 2020-01-03 NOTE — H&P ADULT - PROBLEM SELECTOR PLAN 1
p/w dyspnea x1 day with wheezing  CXR shows no infiltrate or congestion  s/p prednisone 40mg x1 in ED  c/w prednisone 40mg daily with plan to taper  flu negative  duoneb prn  o2 support prn

## 2020-01-03 NOTE — CONSULT NOTE ADULT - SUBJECTIVE AND OBJECTIVE BOX
HPI:  59 year old male from home with PMHx of opioid dependence, CAD s/p 5 stents, CHF (EF 50%, G2DD - June 2018, s/p AICD, HTN, PAD, Bladder CA s/p CTX/Radiation/TURBT, RCC s/p CTX/Radiation/Partial Nephrectomy, Prostate CA s/p Radiation therapy, Nephrolithiasis, HCV (reportedly no treatment), and COPD presents to the ED with chief complaint of shortness of breath x 1 day. He reports he was at his PMD's office yesterday where he passed a kidney stone, causing him severe pain. He says he left the office and suddenly became short of breath and began wheezing, prompting him to come to the ED. Patient was given prednisone 40mg x1 in ED with improvement in wheezing, however he insisted he could not be discharged as he was still short of breath. Of note, patient was witnessed multiple times ambulating around the ED without difficulty. He persistently asks fo IV narcotics to ease the pain from his kidney stone passing yesterday, claiming allergies to most non-opioid analgesics. When denied morphine, he becomes belligerent and says he will have his brother bring him opioids from home. He is in no acute distress and exhibits strong pain medication seeking behavior. (03 Jan 2020 04:40)          PAIN SCORE:         SCALE USED: (1-10 VNRS)      PAST MEDICAL & SURGICAL HISTORY:  Kidney stone  Nephrolithiasis  Prostate cancer: s/p radiation  Peripheral neuropathy  Bladder cancer  COPD (chronic obstructive pulmonary disease)  Renal cell carcinoma of left kidney: s/p partial nephrectomy in 2002  biopsy again in Sep 2017 showed RCC again in stage 2  Hyperlipidemia  CAD (coronary artery disease): (s/p 2 stents in Sep 2017)  Atrial fibrillation  AICD (automatic cardioverter/defibrillator) present: [employer]  HTN (hypertension)  Hep C w/o coma, chronic  Chronic congestive heart failure, unspecified congestive heart failure type: rEF/pEF  History of coronary artery stent placement  H/O transurethral destruction of bladder lesion  History of percutaneous coronary intervention  H/O partial nephrectomy: 2002  S/P cholecystectomy: 2015  AICD (automatic cardioverter/defibrillator) present      FAMILY HISTORY:  Family history of lung cancer  Family history of chronic ischemic heart disease      SOCIAL HISTORY:  [ ] Denies Smoking, Alcohol, or Drug Use    Allergies    aspirin (Hives)  codeine (Rash)  codeine (Unknown)  Haldol (Unknown)  Motrin (Rash)  penicillin (Hives; Anaphylaxis)  Toradol (Rash)  Tylenol (Hives)    Intolerances        PAIN MEDICATIONS:  gabapentin 600 milliGRAM(s) Oral three times a day  traMADol 50 milliGRAM(s) Oral every 6 hours PRN    Heme:  apixaban 5 milliGRAM(s) Oral every 12 hours  clopidogrel Tablet 75 milliGRAM(s) Oral daily    Antibiotics:    Cardiovascular:  isosorbide   mononitrate ER Tablet (IMDUR) 30 milliGRAM(s) Oral daily  lisinopril 40 milliGRAM(s) Oral daily  sotalol 80 milliGRAM(s) Oral two times a day    GI:    Endocrine:  predniSONE   Tablet 40 milliGRAM(s) Oral daily    All Other Medications:  nicotine -   7 mG/24Hr(s) Patch 1 patch Transdermal daily        Vital Signs Last 24 Hrs  T(C): 37 (03 Jan 2020 08:16), Max: 37.2 (02 Jan 2020 18:58)  T(F): 98.6 (03 Jan 2020 08:16), Max: 99 (02 Jan 2020 18:58)  HR: 68 (03 Jan 2020 08:16) (64 - 73)  BP: 172/88 (03 Jan 2020 08:16) (121/83 - 172/88)  BP(mean): --  RR: 22 (03 Jan 2020 08:16) (20 - 22)  SpO2: 96% (03 Jan 2020 08:16) (96% - 99%)    LABS:                          15.5   9.54  )-----------( 158      ( 03 Jan 2020 10:31 )             48.3     01-03    140  |  108  |  22<H>  ----------------------------<  253<H>  3.7   |  27  |  1.10    Ca    9.3      03 Jan 2020 10:31  Phos  2.4     01-03  Mg     2.3     01-03    TPro  6.9  /  Alb  3.4<L>  /  TBili  0.6  /  DBili  x   /  AST  15  /  ALT  22  /  AlkPhos  105  01-03            REVIEW OF SYSTEMS:  CONSTITUTIONAL: No fever, weight loss, or fatigue  NEUROLOGICAL: No headaches, memory loss, loss of strength, numbness, tremors, dizziness or blurred vision  RESPIRATORY: No cough, wheezing, chills or hemoptysis; No shortness of breath  CARDIOVASCULAR: No chest pain, palpitations, dizziness, or leg swelling  GASTROINTESTINAL: No abdominal or epigastric pain. No nausea, vomiting, or hematemesis; No diarrhea or constipation. No melena or hematochezia.  GENITOURINARY: No dysuria, frequency, hematuria, retention or incontinence  MUSCULOSKELETAL: No joint pain or swelling; No muscle, back, or extremity pain, no upper or lower motor strength weakness, no saddle anesthesia, bowel/bladder incontinence, no falls  SKIN: No itching, burning, rashes, or lesions   PSYCHIATRIC: No depression, anxiety, mood swings, or difficulty sleeping    PHYSICAL EXAM:  GENERAL: NAD, well-groomed, well-developed, no signs of toxicity  NERVOUS SYSTEM:  Alert & Oriented X3, Good concentration  HEAD:  Atraumatic, Normocephalic, symmetrical  CHEST/LUNG: Clear to auscultation bilaterally; No rales, rhonchi, wheezing, or rubs  HEART: Regular rate and rhythm; No murmurs, rubs, or gallops  ABDOMEN: Soft, Nontender, Nondistended; Bowel sounds present  EXTREMITIES:  2+ Peripheral Pulses, No clubbing, cyanosis, or edema  MUSCULOSKELETAL: Motor Strength 5/5 B/L upper and lower extremities; moves all extremities equally against gravity; ROM intact; + decreased rom  SKIN: No rashes or lesions    RADIOLOGY:    Drug Screen:          ORT Score -   [ ]  [place]  Reviewed and Copied to Chart CC:  left flank pain    HPI:  59 year old male from home with PMHx of opioid dependence, CAD s/p 5 stents, CHF (EF 50%, G2DD - June 2018, s/p AICD, HTN, PAD, Bladder CA s/p CTX/Radiation/TURBT, RCC s/p CTX/Radiation/Partial Nephrectomy, Prostate CA s/p Radiation therapy, Nephrolithiasis, HCV (reportedly no treatment), and COPD presents to the ED with chief complaint of shortness of breath x 1 day. He reports he was at his PMD's office yesterday where he passed a kidney stone, causing him severe pain. He says he left the office and suddenly became short of breath and began wheezing, prompting him to come to the ED. Patient was given prednisone 40mg x1 in ED with improvement in wheezing, however he insisted he could not be discharged as he was still short of breath. Of note, patient was witnessed multiple times ambulating around the ED without difficulty. He persistently asks fo IV narcotics to ease the pain from his kidney stone passing yesterday, claiming allergies to most non-opioid analgesics. When denied morphine, he becomes belligerent and says he will have his brother bring him opioids from home. He is in no acute distress and exhibits strong pain medication seeking behavior. (03 Jan 2020 04:40)      59 year old       PAIN SCORE:         SCALE USED: (1-10 VNRS)      PAST MEDICAL & SURGICAL HISTORY:  Kidney stone  Nephrolithiasis  Prostate cancer: s/p radiation  Peripheral neuropathy  Bladder cancer  COPD (chronic obstructive pulmonary disease)  Renal cell carcinoma of left kidney: s/p partial nephrectomy in 2002  biopsy again in Sep 2017 showed RCC again in stage 2  Hyperlipidemia  CAD (coronary artery disease): (s/p 2 stents in Sep 2017)  Atrial fibrillation  AICD (automatic cardioverter/defibrillator) present: [employer]  HTN (hypertension)  Hep C w/o coma, chronic  Chronic congestive heart failure, unspecified congestive heart failure type: rEF/pEF  History of coronary artery stent placement  H/O transurethral destruction of bladder lesion  History of percutaneous coronary intervention  H/O partial nephrectomy: 2002  S/P cholecystectomy: 2015  AICD (automatic cardioverter/defibrillator) present      FAMILY HISTORY:  Family history of lung cancer  Family history of chronic ischemic heart disease      SOCIAL HISTORY:  [ ] Denies Smoking, Alcohol, or Drug Use    Allergies    aspirin (Hives)  codeine (Rash)  codeine (Unknown)  Haldol (Unknown)  Motrin (Rash)  penicillin (Hives; Anaphylaxis)  Toradol (Rash)  Tylenol (Hives)    Intolerances        PAIN MEDICATIONS:  gabapentin 600 milliGRAM(s) Oral three times a day  traMADol 50 milliGRAM(s) Oral every 6 hours PRN    Heme:  apixaban 5 milliGRAM(s) Oral every 12 hours  clopidogrel Tablet 75 milliGRAM(s) Oral daily    Antibiotics:    Cardiovascular:  isosorbide   mononitrate ER Tablet (IMDUR) 30 milliGRAM(s) Oral daily  lisinopril 40 milliGRAM(s) Oral daily  sotalol 80 milliGRAM(s) Oral two times a day    GI:    Endocrine:  predniSONE   Tablet 40 milliGRAM(s) Oral daily    All Other Medications:  nicotine -   7 mG/24Hr(s) Patch 1 patch Transdermal daily        Vital Signs Last 24 Hrs  T(C): 37 (03 Jan 2020 08:16), Max: 37.2 (02 Jan 2020 18:58)  T(F): 98.6 (03 Jan 2020 08:16), Max: 99 (02 Jan 2020 18:58)  HR: 68 (03 Jan 2020 08:16) (64 - 73)  BP: 172/88 (03 Jan 2020 08:16) (121/83 - 172/88)  BP(mean): --  RR: 22 (03 Jan 2020 08:16) (20 - 22)  SpO2: 96% (03 Jan 2020 08:16) (96% - 99%)    LABS:                          15.5   9.54  )-----------( 158      ( 03 Jan 2020 10:31 )             48.3     01-03    140  |  108  |  22<H>  ----------------------------<  253<H>  3.7   |  27  |  1.10    Ca    9.3      03 Jan 2020 10:31  Phos  2.4     01-03  Mg     2.3     01-03    TPro  6.9  /  Alb  3.4<L>  /  TBili  0.6  /  DBili  x   /  AST  15  /  ALT  22  /  AlkPhos  105  01-03            REVIEW OF SYSTEMS:  CONSTITUTIONAL: No fever, weight loss, or fatigue  NEUROLOGICAL: No headaches, memory loss, loss of strength, numbness, tremors, dizziness or blurred vision  RESPIRATORY: No cough, wheezing, chills or hemoptysis; No shortness of breath  CARDIOVASCULAR: No chest pain, palpitations, dizziness, or leg swelling  GASTROINTESTINAL: No abdominal or epigastric pain. No nausea, vomiting, or hematemesis; No diarrhea or constipation. No melena or hematochezia.  GENITOURINARY: No dysuria, frequency, hematuria, retention or incontinence  MUSCULOSKELETAL: No joint pain or swelling; No muscle, back, or extremity pain, no upper or lower motor strength weakness, no saddle anesthesia, bowel/bladder incontinence, no falls  SKIN: No itching, burning, rashes, or lesions   PSYCHIATRIC: No depression, anxiety, mood swings, or difficulty sleeping    PHYSICAL EXAM:  GENERAL: NAD, well-groomed, well-developed, no signs of toxicity  NERVOUS SYSTEM:  Alert & Oriented X3, Good concentration  HEAD:  Atraumatic, Normocephalic, symmetrical  CHEST/LUNG: Clear to auscultation bilaterally; No rales, rhonchi, wheezing, or rubs  HEART: Regular rate and rhythm; No murmurs, rubs, or gallops  ABDOMEN: Soft, Nontender, Nondistended; Bowel sounds present  EXTREMITIES:  2+ Peripheral Pulses, No clubbing, cyanosis, or edema  MUSCULOSKELETAL: Motor Strength 5/5 B/L upper and lower extremities; moves all extremities equally against gravity; ROM intact; + decreased rom  SKIN: No rashes or lesions    RADIOLOGY:    Drug Screen:          ORT Score -   [ ]  [place]  Reviewed and Copied to Chart CC:  left flank pain    HPI:  59 year old male from home with PMHx of opioid dependence, CAD s/p 5 stents, CHF (EF 50%, G2DD - June 2018, s/p AICD, HTN, PAD, Bladder CA s/p CTX/Radiation/TURBT, RCC s/p CTX/Radiation/Partial Nephrectomy, Prostate CA s/p Radiation therapy, Nephrolithiasis, HCV (reportedly no treatment), and COPD presents to the ED with chief complaint of shortness of breath x 1 day. He reports he was at his PMD's office yesterday where he passed a kidney stone, causing him severe pain. He says he left the office and suddenly became short of breath and began wheezing, prompting him to come to the ED. Patient was given prednisone 40mg x1 in ED with improvement in wheezing, however he insisted he could not be discharged as he was still short of breath. Of note, patient was witnessed multiple times ambulating around the ED without difficulty. He persistently asks fo IV narcotics to ease the pain from his kidney stone passing yesterday, claiming allergies to most non-opioid analgesics. When denied morphine, he becomes belligerent and says he will have his brother bring him opioids from home. He is in no acute distress and exhibits strong pain medication seeking behavior. (03 Jan 2020 04:40)      59 year old male agitated and demanding iv opioids.  Pt states that he has left flank pain.  Pt states that he had hematuria when he used the bathroom.  Pt is requesting iv pain meds and not po.  Pt claims history of renal cell carcinoma 2002 and recent recurrent mass which he has not followed up with.  No nausea or vomiting  + smoking history  Pt is tolerating diet. + numerous allergies including nsaids, codeine and tylenol.        PAIN SCORE:   10/10   SCALE USED: (1-10 VNRS)      PAST MEDICAL & SURGICAL HISTORY:  Kidney stone  Nephrolithiasis  Prostate cancer: s/p radiation  Peripheral neuropathy  Bladder cancer  COPD (chronic obstructive pulmonary disease)  Renal cell carcinoma of left kidney: s/p partial nephrectomy in 2002  biopsy again in Sep 2017 showed RCC again in stage 2  Hyperlipidemia  CAD (coronary artery disease): (s/p 2 stents in Sep 2017)  Atrial fibrillation  AICD (automatic cardioverter/defibrillator) present: [employer]  HTN (hypertension)  Hep C w/o coma, chronic  Chronic congestive heart failure, unspecified congestive heart failure type: rEF/pEF  History of coronary artery stent placement  H/O transurethral destruction of bladder lesion  History of percutaneous coronary intervention  H/O partial nephrectomy: 2002  S/P cholecystectomy: 2015  AICD (automatic cardioverter/defibrillator) present      FAMILY HISTORY:  Family history of lung cancer  Family history of chronic ischemic heart disease      SOCIAL HISTORY:  [ x] Denies Smoking, Alcohol, or Drug Use    Allergies    aspirin (Hives)  codeine (Rash)  codeine (Unknown)  Haldol (Unknown)  Motrin (Rash)  penicillin (Hives; Anaphylaxis)  Toradol (Rash)  Tylenol (Hives)    Intolerances        PAIN MEDICATIONS:  gabapentin 600 milliGRAM(s) Oral three times a day  traMADol 50 milliGRAM(s) Oral every 6 hours PRN    Heme:  apixaban 5 milliGRAM(s) Oral every 12 hours  clopidogrel Tablet 75 milliGRAM(s) Oral daily    Antibiotics:    Cardiovascular:  isosorbide   mononitrate ER Tablet (IMDUR) 30 milliGRAM(s) Oral daily  lisinopril 40 milliGRAM(s) Oral daily  sotalol 80 milliGRAM(s) Oral two times a day    GI:    Endocrine:  predniSONE   Tablet 40 milliGRAM(s) Oral daily    All Other Medications:  nicotine -   7 mG/24Hr(s) Patch 1 patch Transdermal daily        Vital Signs Last 24 Hrs  T(C): 37 (03 Jan 2020 08:16), Max: 37.2 (02 Jan 2020 18:58)  T(F): 98.6 (03 Jan 2020 08:16), Max: 99 (02 Jan 2020 18:58)  HR: 68 (03 Jan 2020 08:16) (64 - 73)  BP: 172/88 (03 Jan 2020 08:16) (121/83 - 172/88)  BP(mean): --  RR: 22 (03 Jan 2020 08:16) (20 - 22)  SpO2: 96% (03 Jan 2020 08:16) (96% - 99%)    LABS:                          15.5   9.54  )-----------( 158      ( 03 Jan 2020 10:31 )             48.3     01-03    140  |  108  |  22<H>  ----------------------------<  253<H>  3.7   |  27  |  1.10    Ca    9.3      03 Jan 2020 10:31  Phos  2.4     01-03  Mg     2.3     01-03    TPro  6.9  /  Alb  3.4<L>  /  TBili  0.6  /  DBili  x   /  AST  15  /  ALT  22  /  AlkPhos  105  01-03            REVIEW OF SYSTEMS:  CONSTITUTIONAL: No fever, weight loss, or fatigue  NEUROLOGICAL: No headaches, memory loss, loss of strength, numbness, tremors, dizziness or blurred vision  RESPIRATORY: No cough, wheezing, chills or hemoptysis; No shortness of breath  CARDIOVASCULAR: No chest pain, palpitations, dizziness, or leg swelling  GASTROINTESTINAL: No abdominal or epigastric pain. No nausea, vomiting, or hematemesis; No diarrhea or constipation. No melena or hematochezia.  GENITOURINARY: No dysuria, frequency, hematuria, retention or incontinence  MUSCULOSKELETAL: No joint pain or swelling; No muscle, back, or extremity pain, no upper or lower motor strength weakness, no saddle anesthesia, bowel/bladder incontinence, no falls      PHYSICAL EXAM:  GENERAL: NAD, well-groomed, well-developed, no signs of toxicity  NERVOUS SYSTEM:  Alert & Oriented X3, Good concentration  HEAD:  Atraumatic, Normocephalic, symmetrical  CHEST/LUNG: Clear to auscultation bilaterally; No rales, rhonchi, wheezing, or rubs  HEART: Regular rate and rhythm; No murmurs, rubs, or gallops  ABDOMEN: Soft, Nontender, Nondistended; Bowel sounds present  EXTREMITIES:  2+ Peripheral Pulses, No clubbing, cyanosis, or edema  MUSCULOSKELETAL: Motor Strength 5/5 B/L upper and lower extremities; moves all extremities equally against gravity; ROM intact; + decreased rom  SKIN: No rashes or lesions    RADIOLOGY:    Drug Screen:          ORT Score -   [ ]  [place]  Reviewed and Copied to Chart CC:  left flank pain    HPI:  59 year old male from home with PMHx of opioid dependence, CAD s/p 5 stents, CHF (EF 50%, G2DD - June 2018, s/p AICD, HTN, PAD, Bladder CA s/p CTX/Radiation/TURBT, RCC s/p CTX/Radiation/Partial Nephrectomy, Prostate CA s/p Radiation therapy, Nephrolithiasis, HCV (reportedly no treatment), and COPD presents to the ED with chief complaint of shortness of breath x 1 day. He reports he was at his PMD's office yesterday where he passed a kidney stone, causing him severe pain. He says he left the office and suddenly became short of breath and began wheezing, prompting him to come to the ED. Patient was given prednisone 40mg x1 in ED with improvement in wheezing, however he insisted he could not be discharged as he was still short of breath. Of note, patient was witnessed multiple times ambulating around the ED without difficulty. He persistently asks fo IV narcotics to ease the pain from his kidney stone passing yesterday, claiming allergies to most non-opioid analgesics. When denied morphine, he becomes belligerent and says he will have his brother bring him opioids from home. He is in no acute distress and exhibits strong pain medication seeking behavior. (03 Jan 2020 04:40)      59 year old male agitated and demanding iv opioids.  Pt states that he has left flank pain.  Pt states that he had hematuria when he used the bathroom.  Pt is requesting iv pain meds and not po.  Pt claims history of renal cell carcinoma 2002 and recent recurrent mass which he has not followed up with.  No nausea or vomiting  + smoking history  Pt is tolerating diet. + numerous allergies including nsaids, codeine and tylenol.  Pt unable to tell name of oncologist.       PAIN SCORE:   10/10   SCALE USED: (1-10 VNRS)      PAST MEDICAL & SURGICAL HISTORY:  Kidney stone  Nephrolithiasis  Prostate cancer: s/p radiation  Peripheral neuropathy  Bladder cancer  COPD (chronic obstructive pulmonary disease)  Renal cell carcinoma of left kidney: s/p partial nephrectomy in 2002  biopsy again in Sep 2017 showed RCC again in stage 2  Hyperlipidemia  CAD (coronary artery disease): (s/p 2 stents in Sep 2017)  Atrial fibrillation  AICD (automatic cardioverter/defibrillator) present: [employer]  HTN (hypertension)  Hep C w/o coma, chronic  Chronic congestive heart failure, unspecified congestive heart failure type: rEF/pEF  History of coronary artery stent placement  H/O transurethral destruction of bladder lesion  History of percutaneous coronary intervention  H/O partial nephrectomy: 2002  S/P cholecystectomy: 2015  AICD (automatic cardioverter/defibrillator) present      FAMILY HISTORY:  Family history of lung cancer  Family history of chronic ischemic heart disease      SOCIAL HISTORY:  [ x] Denies Smoking, Alcohol, or Drug Use    Allergies    aspirin (Hives)  codeine (Rash)  codeine (Unknown)  Haldol (Unknown)  Motrin (Rash)  penicillin (Hives; Anaphylaxis)  Toradol (Rash)  Tylenol (Hives)    Intolerances        PAIN MEDICATIONS:  gabapentin 600 milliGRAM(s) Oral three times a day  traMADol 50 milliGRAM(s) Oral every 6 hours PRN    Heme:  apixaban 5 milliGRAM(s) Oral every 12 hours  clopidogrel Tablet 75 milliGRAM(s) Oral daily    Antibiotics:    Cardiovascular:  isosorbide   mononitrate ER Tablet (IMDUR) 30 milliGRAM(s) Oral daily  lisinopril 40 milliGRAM(s) Oral daily  sotalol 80 milliGRAM(s) Oral two times a day    GI:    Endocrine:  predniSONE   Tablet 40 milliGRAM(s) Oral daily    All Other Medications:  nicotine -   7 mG/24Hr(s) Patch 1 patch Transdermal daily        Vital Signs Last 24 Hrs  T(C): 37 (03 Jan 2020 08:16), Max: 37.2 (02 Jan 2020 18:58)  T(F): 98.6 (03 Jan 2020 08:16), Max: 99 (02 Jan 2020 18:58)  HR: 68 (03 Jan 2020 08:16) (64 - 73)  BP: 172/88 (03 Jan 2020 08:16) (121/83 - 172/88)  BP(mean): --  RR: 22 (03 Jan 2020 08:16) (20 - 22)  SpO2: 96% (03 Jan 2020 08:16) (96% - 99%)    LABS:                          15.5   9.54  )-----------( 158      ( 03 Jan 2020 10:31 )             48.3     01-03    140  |  108  |  22<H>  ----------------------------<  253<H>  3.7   |  27  |  1.10    Ca    9.3      03 Jan 2020 10:31  Phos  2.4     01-03  Mg     2.3     01-03    TPro  6.9  /  Alb  3.4<L>  /  TBili  0.6  /  DBili  x   /  AST  15  /  ALT  22  /  AlkPhos  105  01-03            REVIEW OF SYSTEMS:  CONSTITUTIONAL: No fever, weight loss, or fatigue  NEUROLOGICAL: No headaches, memory loss, loss of strength, numbness, tremors, dizziness or blurred vision  RESPIRATORY: + cough + wheeze  CARDIOVASCULAR: No chest pain, palpitations, dizziness, or leg swelling  GASTROINTESTINAL: No abdominal or epigastric pain. No nausea, vomiting, or hematemesis; No diarrhea or constipation. No melena or hematochezia.  GENITOURINARY: No dysuria, frequency, hematuria, retention or incontinence  MUSCULOSKELETAL: + left flank pain      PHYSICAL EXAM:  GENERAL: NAD  NERVOUS SYSTEM:  Alert & Oriented X3  CHEST/LUNG: Clear to auscultation bilaterally; No rales, rhonchi, wheezing, or rubs  HEART: Regular rate and rhythm; No murmurs, rubs, or gallops  ABDOMEN: Soft, Nontender, Nondistended; Bowel sounds present  EXTREMITIES:  2+ Peripheral Pulses, No clubbing, cyanosis, or edema  MUSCULOSKELETAL: +left flank tenderness  + full rom    RADIOLOGY:  < from: Xray Chest 1 View AP/PA (01.02.20 @ 21:29) >    EXAM:  XR CHEST AP OR PA 1V                            PROCEDURE DATE:  01/02/2020          INTERPRETATION:  AP semierect chest on January 2, 2020 at 9:20 PM. Patient has asthma.    Heart is normal for projection.    Right-sided defibrillator againnoted.    The lung fields and pleural surfaces are unremarkable.    Chest is similar to May 12, 2019.    IMPRESSION: No acute finding or change.    < end of copied text >      Drug Screen:          ORT Score -   [ ]  [place]  Reviewed and Copied to Chart

## 2020-01-03 NOTE — H&P ADULT - PROBLEM SELECTOR PLAN 3
s/p AICD, most recent EF 50%  c/w home meds  avoid aggressive fluids  not in exacerbation at this time

## 2020-01-03 NOTE — H&P ADULT - HISTORY OF PRESENT ILLNESS
59 year old male from home with PMHx of opioid dependence, CAD s/p 5 stents, CHF (EF 50%, G2DD - June 2018, s/p AICD, HTN, PAD, Bladder CA s/p CTX/Radiation/TURBT, RCC s/p CTX/Radiation/Partial Nephrectomy, Prostate CA s/p Radiation therapy, Nephrolithiasis, HCV (reportedly no treatment), and COPD presents to the ED with chief complaint of shortness of breath x 1 day. He reports he was at his PMD's office yesterday where he passed a kidney stone, causing him severe pain. He says he left the office and suddenly became short of breath and began wheezing, prompting him to come to the ED. Patient was given prednisone 40mg x1 in ED with improvement in wheezing, however he insisted he could not be discharged as he was still short of breath. Of note, patient was witnessed multiple times ambulating around the ED without difficulty. He persistently asks fo IV narcotics to ease the pain from his kidney stone passing yesterday, claiming allergies to most non-opioid analgesics. When denies morphine, he becomes belligerent and says he will have his brother bring him opioids from home. He is in no acute distress and exhibits strong pain medication seeking behavior. 59 year old male from home with PMHx of opioid dependence, CAD s/p 5 stents, CHF (EF 50%, G2DD - June 2018, s/p AICD, HTN, PAD, Bladder CA s/p CTX/Radiation/TURBT, RCC s/p CTX/Radiation/Partial Nephrectomy, Prostate CA s/p Radiation therapy, Nephrolithiasis, HCV (reportedly no treatment), and COPD presents to the ED with chief complaint of shortness of breath x 1 day. He reports he was at his PMD's office yesterday where he passed a kidney stone, causing him severe pain. He says he left the office and suddenly became short of breath and began wheezing, prompting him to come to the ED. Patient was given prednisone 40mg x1 in ED with improvement in wheezing, however he insisted he could not be discharged as he was still short of breath. Of note, patient was witnessed multiple times ambulating around the ED without difficulty. He persistently asks fo IV narcotics to ease the pain from his kidney stone passing yesterday, claiming allergies to most non-opioid analgesics. When denied morphine, he becomes belligerent and says he will have his brother bring him opioids from home. He is in no acute distress and exhibits strong pain medication seeking behavior.

## 2020-01-03 NOTE — H&P ADULT - NSHPPHYSICALEXAM_GEN_ALL_CORE
Vital Signs Last 24 Hrs  T(C): 37.2 (02 Jan 2020 18:58), Max: 37.2 (02 Jan 2020 18:58)  T(F): 99 (02 Jan 2020 18:58), Max: 99 (02 Jan 2020 18:58)  HR: 73 (02 Jan 2020 18:58) (73 - 73)  BP: 121/83 (02 Jan 2020 18:58) (121/83 - 121/83)  BP(mean): --  RR: 20 (02 Jan 2020 18:58) (20 - 20)  SpO2: 98% (02 Jan 2020 18:58) (98% - 98%)

## 2020-01-03 NOTE — H&P ADULT - ATTENDING COMMENTS
Pt seen and examined. Discussed with Housestaff.  59 year old man with PMH of COPD / CHF/A-fib among others presenting with 1 day of SOB and wheezing worse with exertion - similar to flare of COPD. NO additional findings on ROS.  IN the ED, he received treatment with improvement but continues to complain of SOB.    Vital Signs Last 24 Hrs  T(C): 37.2 (02 Jan 2020 18:58), Max: 37.2 (02 Jan 2020 18:58)  T(F): 99 (02 Jan 2020 18:58), Max: 99 (02 Jan 2020 18:58)  HR: 73 (02 Jan 2020 18:58) (73 - 73)  BP: 121/83 (02 Jan 2020 18:58) (121/83 - 121/83)  RR: 20 (02 Jan 2020 18:58) (20 - 20)  SpO2: 98% (02 Jan 2020 18:58) (98% - 98%)    Middle aged man, sleeping but rousable, NAD presently   Appreciable breath sounds in all lung fields  Faint expiratory wheezing scattered in all the lung fields  RRR S1S2 only  Soft NT ND BS +  NO pedal edema  No focal deficits    Labs                        15.8   10.62 )-----------( 188      ( 02 Jan 2020 20:38 )             49.8     01-02    144  |  108  |  21<H>  ----------------------------<  83  3.6   |  30  |  1.08    Ca    9.0      02 Jan 2020 20:38  Mg     2.3     01-02    TPro  7.2  /  Alb  3.7  /  TBili  0.6  /  DBili  x   /  AST  20  /  ALT  24  /  AlkPhos  121<H>  01-02    Flu test -ve    CXR reviewed - unremarkable for acute pulmonary process  PPM noted    Impression  59 year old man with acute flare of COPD    A/P  Acute hypoxic respiratory failure  COPD exacerbation  CHF - no active issues    Plan  Admit to Medicine  Continue bronchodilators RTC  Change steroids to oral and taper   O2 supplement as necessary   Resume home meds  Supportive care

## 2020-01-03 NOTE — CONSULT NOTE ADULT - ASSESSMENT
×   Patient Search   Multi-Patient Search   Reports   Drug Listing   Designation   My MARLINE Numbers  Data Detail Level: Printer-Friendly View  Extended View    Confidential Drug Utilization Report  Search Terms: ke moore, 1960 Search Date: 01/03/2020 04:46:24 PM  The Drug Utilization Report below displays all of the controlled substance prescriptions, if any, that your patient has filled in the last twelve months. The information displayed on this report is compiled from pharmacy submissions to the Department, and accurately reflects the information as submitted by the pharmacies.    This report was requested by: Dorina Longoria | Reference #: [id number]

## 2020-02-13 NOTE — ED ADULT NURSE NOTE - FALL HARM RISK TYPE OF ASSESSMENT
Cardiac Rehab Education Consult    Diagnosis  Stent     Cardiac Risk Factors  HTN, HPL, obesity, lack of exercise, family history, former smoker    Lifestyle modification for cardiac risk factor reduction discussed.       Activity Guidelines  Post event recovery and activity guidelines discussed.   Pt advised not to lift/push/pull anything greater than 5 pounds for 5 days.      Discharge  Phase II Cardiac Rehab referral to [facility].  Education Consult Completed.    Please see Patient Education tab for additional details and education materials provided.       Daily Assessment

## 2020-04-09 NOTE — ED PROVIDER NOTE - NSCAREINITIATED _GEN_ER
Rn called pt pt id verified by name and . Pt appt changed to 2020 w Nolberto and then a f/u appt made 2020 w Britney per providers request d/t covid 19 status    Chester Farrar(Attending)

## 2020-04-20 NOTE — DISCHARGE NOTE ADULT - NS MD DC PLAN IMMU FLU REF OTH
4/20/2020 2:54 PM 
 
Patient:  Oscar Rodriguez YOB: 1954 Date of Visit: 4/20/2020 Dear Kelsey Hamilton MD 
[facility] [address] [facility] [address] [id number] VIA Facsimile: [phone]: Thank you for referring Mr. Woody Mendoza to me for evaluation/treatment. Below are the relevant portions of my assessment and plan of care. If you have questions, please do not hesitate to call me. I look forward to following Mr. Malvin Siemens along with you. Sincerely, Jenny Vilchis MD 
 
 Contraindicated

## 2020-05-13 NOTE — ED PROVIDER NOTE - PR
146
General Sunscreen Counseling: I recommended a broad spectrum sunscreen with a SPF of 30 or higher.  I explained that SPF 30 sunscreens block approximately 97 percent of the sun's harmful rays.  Sunscreens should be applied at least 15 minutes prior to expected sun exposure and then every 2 hours after that as long as sun exposure continues. If swimming or exercising sunscreen should be reapplied every 45 minutes to an hour after getting wet or sweating.  One ounce, or the equivalent of a shot glass full of sunscreen, is adequate to protect the skin not covered by a bathing suit. I also recommended a lip balm with a sunscreen as well. Sun protective clothing can be used in lieu of sunscreen but must be worn the entire time you are exposed to the sun's rays.
Detail Level: Zone

## 2020-06-16 NOTE — ED CDU PROVIDER INITIAL DAY NOTE - CPE EDP PSYCH NORM
Patient Education        Plantar Warts in Children: Care Instructions  Your Care Instructions  A plantar wart is a harmless skin growth. Plantar warts occur on the bottom of the feet and may be painful when your child walks. A virus makes the top layer of skin grow quickly, causing a wart. Warts usually go away on their own in months or years. Warts are spread easily. Your child can infect himself or herself again by touching the wart and then touching another part of the body. Others can also be infected by sharing towels or other personal items. Most plantar warts do not need treatment. But if warts cause your child pain or spread, your doctor may recommend that you use an over-the-counter treatment. These include salicylic acid or duct tape. Your doctor may prescribe a stronger medicine to put on warts or may inject them with medicine. Your doctor also can remove warts through surgery or by freezing them. Follow-up care is a key part of your child's treatment and safety. Be sure to make and go to all appointments, and call your doctor if your child is having problems. It's also a good idea to know your child's test results and keep a list of the medicines your child takes. How can you care for your child at home? · Use salicylic acid or duct tape as your doctor directs. You put the medicine or the tape on a wart for a while and then file down the dead skin on the wart. You use the salicylic acid treatment for 2 to 3 months or the tape for 1 to 2 months. · If your doctor prescribes medicine to put on warts, use it exactly as prescribed. Call your doctor if you think your child is having a problem with his or her medicine. · Give your child comfortable shoes and socks to wear. Avoid shoes that put a lot of pressure on the foot. · Pad the wart with doughnut-shaped felt or a moleskin patch. You can buy these at a [employer]. Put the pad around the plantar wart so that it relieves pressure on the wart.  You normal...

## 2020-06-19 NOTE — ED CDU PROVIDER INITIAL DAY NOTE - PSYCHIATRIC NEGATIVE STATEMENT, MLM
"Subjective   Brady Ramos is a 8 y.o. female.   Pulse 92   Temp 97.6 °F (36.4 °C)   Resp 20   Ht 141 cm (55.51\")   Wt 31.5 kg (69 lb 6.4 oz)   SpO2 98%   BMI 15.83 kg/m²   History reviewed. No pertinent past medical history.  History reviewed. No pertinent past medical history.  No Known Allergies      Rash   This is a new problem. Episode onset: past couple weeks. The problem has been rapidly worsening since onset. The affected locations include the left arm. The problem is moderate. The rash is characterized by redness, itchiness and burning. Pertinent negatives include no anorexia, congestion, cough, decreased physical activity, decreased responsiveness, decreased sleep, drinking less, diarrhea, facial edema, fatigue, fever, itching, joint pain, rhinorrhea, shortness of breath, sore throat or vomiting.        The following portions of the patient's history were reviewed and updated as appropriate: allergies, current medications, past family history, past medical history, past social history, past surgical history and problem list.    Review of Systems   Constitutional: Negative for decreased responsiveness, fatigue and fever.   HENT: Negative for congestion, rhinorrhea and sore throat.    Respiratory: Negative for cough and shortness of breath.    Gastrointestinal: Negative for anorexia, diarrhea and vomiting.   Musculoskeletal: Negative for joint pain.   Skin: Positive for rash. Negative for itching.       Objective   Physical Exam   Skin:            Assessment/Plan   Brady was seen today for recurrent skin infections.    Diagnoses and all orders for this visit:    Tinea corporis    Other orders  -     terbinafine (lamISIL) 1 % cream; Apply  topically to the appropriate area as directed 2 (Two) Times a Day As Needed for Irritation for up to 21 days. Left arm, small area               "
no known mental health issues.
No complaints

## 2020-09-14 NOTE — ED PROVIDER NOTE - AXIS
-- DO NOT REPLY / DO NOT REPLY ALL --  -- Message is from the [facility]--    Provider paged via [employer] Documentation - The below message was copied and pasted from a [employer] page:      9/13/2020 10:15:25 PM   [facility] Center[facility]   Shanta Maria MD 0\" tooltip-popup-delay=\"500\" bwjzwzn-bimpsa-rv-body=\"true\" uib-tooltip=\"\" tooltip-placement=\"auto top\" tooltip-enable=\"false\" psx-ellipsis=\"\"Asia Jalloh MD   Secure Text   [phone] PATIENT NUMBER -------------------------------- ACC NURSE LINE (IF QUESTIONS ONLY - [phone]) URGENT PATIENT CALLER NAME: FERNANDO-PATIENT RE: FERNANDO FORD PATIENT 1994 PATIENT OB PROVIDER: DR. JALLOH WEEKS PREGNANT: 13 IF RX, PHARMACY #: [id number] PATIENT IS 14 WEEKS PREGNANT AND HAS BEEN VOMITING SINCE THIS MORNING WITH MILD WEAKNESS. PATIENT VERBALIZED WAS ABLE TO TAKE A FEW SIP OF FLAVORED WATER AND 3 VANILLA WAFERS AND HELD IT DOWN FOR OVER 20 MINUTES NOW. PATIENT IS HESITANT IN GOING TO THE ER NOW AND WAS RE[phone] BECAUSE [facility] IS CLOSED NOW. IF NECESSARY, PATIENT CAN BE REACHED -[phone];  1994. THANK YOU ACC NURSE VENUS.   READ 00:00:24     Normal

## 2020-11-22 NOTE — ED ADULT NURSE NOTE - NS ED NOTE ABUSE RESPONSE YN
YOSHI MCCOY is a 56y with pmhx schizoaffective disorder, DMT2, HLD, now presenting with fever for 24hours duration from H 2/2 to pyelonephritis. Yes

## 2021-02-11 NOTE — ED PROVIDER NOTE - CADM POA CENTRAL LINE
Requested Prescriptions     Pending Prescriptions Disp Refills    aspirin delayed-release 81 mg tablet 30 Tab 3     Sig: Take 1 Tab by mouth daily. No

## 2021-02-26 NOTE — ED PROVIDER NOTE - PROGRESS NOTE
Sent Voalte message to  Neo Worthy who responded and will call patient regarding bilateral thigh pain.     Improved.

## 2021-05-16 NOTE — ED ADULT NURSE NOTE - EAR DISTURBANCES
TM normal bilaterally, erythema and exudates noted to the L posterior oropharyngeal region, no peritonsillar abscess noted, enlarged and tender cervical lymphadenopathy, L>R. normal

## 2021-05-16 NOTE — ED ADULT NURSE NOTE - GENITOURINARY WDL
Admission Reconciliation is Completed  Discharge Reconciliation is Not Complete Bladder non-tender and non-distended. Urine clear yellow. Admission Reconciliation is Completed  Discharge Reconciliation is Completed

## 2021-07-29 NOTE — PATIENT PROFILE ADULT. - NUMBER OF YRS
What Type Of Note Output Would You Prefer (Optional)?: Standard Output How Severe Is Your Skin Lesion?: mild Has Your Skin Lesion Been Treated?: not been treated Is This A New Presentation, Or A Follow-Up?: Skin Lesion 20

## 2021-11-05 NOTE — ED ADULT TRIAGE NOTE - MODE OF ARRIVAL
Call from KEVIN Joel with Dr Martinez.  Based on patient's labs, 40k of Retacrit given today and she is to come in next Friday as well for H/H and possible dose of Retacrit. Otherwise patient was coming every other week.  Order updated and patient to be notified and scheduled for next Friday 11/12.   50a2/EMS

## 2022-01-14 NOTE — H&P ADULT - NSTOBACCOSCREENHP_GEN_A_CS
Implemented All Universal Safety Interventions:  [place] to call system. Call bell, personal items and telephone within reach. Instruct patient to call for assistance. Room bathroom lighting operational. Non-slip footwear when patient is off stretcher. Physically safe environment: no spills, clutter or unnecessary equipment. Stretcher in lowest position, wheels locked, appropriate side rails in place. No

## 2022-02-21 NOTE — PATIENT PROFILE ADULT. - HEALTHCARE QUESTIONS, PROFILE
Advance Care Planning     Advance Care Planning Activator (Inpatient)  Conversation Note      Date of ACP Conversation: 2/21/2022     Conversation Conducted with:  Healthcare Decision Maker: Next of Kin by law (only applies in absence of above) (name) Vu Patel Spouse    ACP Activator[facility] [address] [facility] Decision Maker:     Current Designated Health Care Decision Maker:     Primary Decision Maker: Jassi Nunez Spouse - [phone]    Secondary Decision Maker: Ngozi Tirado - Child - [phone]    Today we documented Decision Maker(s) consistent with Legal Next of Kin hierarchy. Care Preferences    Ventilation: \"If you were in your present state of health and suddenly became very ill and were unable to breathe on your own, what would your preference be about the use of a ventilator (breathing machine) if it were available to you? \"      Would the patient desire the use of ventilator (breathing machine)?: yes    \"If your health worsens and it becomes clear that your chance of recovery is unlikely, what would your preference be about the use of a ventilator (breathing machine) if it were available to you? \"     Would the patient desire the use of ventilator (breathing machine)?: No      Resuscitation  \"CPR works best to restart the heart when there is a sudden event, like a heart attack, in someone who is otherwise healthy. Unfortunately, CPR does not typically restart the heart for people who have serious health conditions or who are very sick. \"    \"In the event your heart stopped as a result of an underlying serious health condition, would you want attempts to be made to restart your heart (answer \"yes\" for attempt to resuscitate) or would you prefer a natural death (answer \"no\" for do not attempt to resuscitate)? \" yes       [] Yes   [x] No   Educated Patient / Radha Reynold regarding differences between Advance Directives and portable DNR orders.     Length of ACP Conversation in minutes: 5  Conversation Outcomes:  [x] ACP discussion completed  [] Existing advance directive reviewed with patient; no changes to patient's previously recorded wishes  [] New Advance Directive completed  [] Portable Do Not Rescitate prepared for Provider review and signature  [] POLST/POST/MOLST/MOST prepared for Provider review and signature      Follow-up plan:    [] Schedule follow-up conversation to continue planning  [x] Referred individual to Provider for additional questions/concerns   [] Advised patient/agent/surrogate to review completed ACP document and update if needed with changes in condition, patient preferences or care setting    [x] This note routed to one or more involved healthcare providers    Electronically signed by Brooklyn Gutierrez on 2/21/2022 at 10:50 AM none

## 2022-02-25 NOTE — DISCHARGE NOTE ADULT - NS AS DC PROVIDER CONTACT Y/N MULTI
Yes
PAST SURGICAL HISTORY:  History of endoscopy "years ago" for acid reflux - not on any medications currently    History of lumbar surgery possibly 3 years ago - unsure of type of lumbar surgery    S/P LASIK surgery of both eyes 9 years ago

## 2022-02-28 NOTE — ED ADULT NURSE NOTE - PRIMARY CARE PROVIDER
Pt requesting referral to derm for dry scalp. States he has tried every OTC known to man and has had this problem for 14 yrs. Referral in epic. Pt aware Dermatology dept will contact him to schedule.  
ukn
Yes

## 2022-03-29 NOTE — ED PROVIDER NOTE - DIAGNOSTIC INTERPRETATION
PROCEDURE NOTE: YAG Capsulotomy OD. Diagnosis: Posterior Capsular Opacity. Anesthesia: Topical. Prior to commencing surgery patient identification, surgical procedure, site, and side were confirmed by Dr. Berlin Wagoner. Following topical proparacaine anesthesia, the patient was positioned at the YAG laser, a contact lens coupled to the cornea with methylcellulose and an axial posterior performed capsulotomy without complication using 2.6 Mj x 25. Excess methylcellulose was washed from the eye. One drop of Alphagan was instilled and the patient returned to the holding area having tolerated the procedure well and without complication. Jaiden Luke no infiltrate, aicd

## 2022-04-04 NOTE — ED ADULT NURSE NOTE - CADM POA URETHRAL CATHETER
She is having allergies, using Zyrtec and Flonase (once last night).    Her rash resolved.     consent signed today, we discussed risks of bleeding with possible blood transfusion, infection, damage to surrounding structures including bowel, urinary tract, vessels, nerves, uterus, adnexa, etc., fetal injury, postoperative medical sequelae, and death. Informed consent is obtained and she denies questions.     No

## 2022-04-19 NOTE — ED ADULT NURSE NOTE - NSSISCREENINGQ4_ED_A_ED
Symptomatic Alert Team Member Letter    2022    Leticia Driver    Employee Health was alerted, from the questionnaire you completed, that you are having COVID-19 symptoms.     Please inform employee health if you have had an exposure (household, community or work).  An exposure is being with a person who is positive for Covid-19 less than 6 feet apart for more than 15 minutes (cumulative) without proper PPE. More testing may be required.    **If you have answered this question in error, you will need to respond to this message to confirm it was an error.       If you are having symptoms, the alert you received provided you with information on how to access your self-testing kit. These results are required to be sent to Employee Health. If you have not already received a COVID-19 test, or completed a self-test, you will need to do so now. Bring your badge and this letter, or your red alert to the location nearest you.     Click here for  LOCATIONS:    Team Member to do list:  • Complete a COVID-19 test TODAY   • Report your results TODAY by following the steps below    Self-Reporting Results Instructions:    • Complete a [employer] landon alert  • Email: [email or web address]   o All necessary information is required, or the results will not be accepted. By submitting an email with these results, you are attesting that the information submitted is accurate and that you are submitting your own results.   o Information needed:  - Name  -   - Employee ID  - Date test was performed  - Antigen or PCR  - Reason for COVID-19 test: symptoms and/or exposure  - Test result    Work Status:  • If your test is negative, you may return to work if:  o Your temperature has remained less than 100.0°F for at least 24 hours without using any fever reducing medications (ibuprofen, acetaminophen, etc.)  o Significant improvement in respiratory symptoms and cough  o No new or worsening symptoms  o If you are  free of fever, and do not have a persistent cough, shortness of breath, runny nose or weakness  If you feel sick, regardless of test results, stay home and contact your leader. Follow Guidance for [facility] Sick Healthcare Workers located on the COVID-19 Information Center.      • If your test is positive, you may not report to work. Notify your leader. Employee Health will reach out to you via email, and your Jiff landon or online Jiff account when you are ready to be cleared to return to work.    Test Collection Instructions:  • QuickVue SARS Antigen Specimen Collection Instructions  • QuickVue SARS Resulting Instructions  • QuickVue SARS Antigen Fact Sheet for Patients  • Nasal Swab Collection Instructions: English  • Nasal Swab Collection Instructions:  Yoruba    Symptoms of COVID-19 Infection:   • Fever or Chills  • Cough  • Shortness of breath or difficulty breathing  • Fatigue  • Muscle or body aches  • Headache  • New loss of taste or smell  • Sore throat  • Congestion or runny nose   • Nausea or vomiting  • Diarrhea    [facility] Employee Health  Email: [email or web address]  Hotline: [phone]    CC: Manager     No

## 2022-04-22 NOTE — ED ADULT NURSE NOTE - NS ED NURSE REPORT GIVEN TO FT
Colette Natarajan had increased Torsemide on 4/20  She was weighed after lunch, no worsening sx, will continue to follow. KARI MAGALLANES

## 2022-06-14 NOTE — ED PROVIDER NOTE - TOBACCO USE
Epic referral completed  
Hello,    Please create an EPIC Referral/Order via [facility] to support this patient visits to [facility]:    [facility] - NPI [id number]  [address] [facility] [address]  CONSULT & TREAT   ARGENTINA LAGUNADEVIN - NPI [id number]  CPT CODES [address], [address], [address]  6 VISITS    Please inform us when the referral has been completed.    Thank you.  
PLEASE ADVISE  
Never smoker

## 2022-07-25 NOTE — H&P ADULT - NSTOBACCOEDUHP_GEN_A_NCS
Additional Notes: Tretinoin 0.1% cream,  vit c with hydroquinone , tinted spf50
Detail Level: Simple
Offered and patient declined

## 2022-09-15 NOTE — H&P ADULT - PROBLEM/PLAN-8
DISPLAY PLAN FREE TEXT normal/soft/nontender/no distention/bowel sounds normal/no rebound tenderness/no guarding/no rigidity

## 2022-10-23 NOTE — ED ADULT NURSE NOTE - TEMPLATE
Patient brought in by dad for difficulty breathing and congestion. Denies fever. Pulse ox 94% room air.
General

## 2022-11-21 NOTE — H&P ADULT - PROBLEM/PLAN-5
"Chief Complaint  Hypothyroidism (Med check )    Sirena Jimenez presents to [facility]  To follow-up on thyroid.  Interval improvement noted but he is still supratherapeutic on current dose.  We will make adjustments with short-term lab follow-up.        Objective   Vital Signs:   Vitals:    11/21/22 1528   BP: 142/96   Pulse: 106   Resp: 18   Temp: 98.7 °F (37.1 °C)   SpO2: 98%   Weight: 126 kg (277 lb)   Height: 188 cm (74.02\")                Physical Exam  Neck:      Thyroid: No thyroid mass, thyromegaly or thyroid tenderness.   Neurological:      Mental Status: He is alert.          Result Review :       Hepatitis C Antibody (11/07/2022 14:35)-nl  TSH+Free T4 (11/07/2022 14:35)-supratherapuetic           Assessment and Plan    Diagnoses and all orders for this visit:    1. Hypothyroidism, acquired (Primary)  Assessment & Plan:  Further adjustment of levothyroxine needed.  Decrease dose to 150 mcg a.m. dosing on empty stomach.  Recheck labs 2 months towards the end of January.    Orders:  -     levothyroxine (SYNTHROID, LEVOTHROID) 150 MCG tablet; Take 1 tablet by mouth Daily for 90 days.  Dispense: 90 tablet; Refill: 0  -     TSH+Free T4; Future      Follow Up   Return if symptoms worsen or fail to improve.  Patient was given instructions and counseling regarding his condition or for health maintenance advice. Please see specific information pulled into the AVS if appropriate.   " DISPLAY PLAN FREE TEXT

## 2023-01-01 ENCOUNTER — EMERGENCY (EMERGENCY)
Facility: HOSPITAL | Age: 63
LOS: 1 days | Discharge: HOME | End: 2023-01-01
Attending: EMERGENCY MEDICINE | Admitting: EMERGENCY MEDICINE
Payer: MEDICAID

## 2023-01-01 ENCOUNTER — INPATIENT (INPATIENT)
Facility: HOSPITAL | Age: 63
LOS: 0 days | Discharge: NURSING, REHAB OR LONG-TERM CARE | DRG: 82 | End: 2023-03-24
Attending: HOSPITALIST | Admitting: HOSPITALIST
Payer: MEDICAID

## 2023-01-01 ENCOUNTER — TRANSCRIPTION ENCOUNTER (OUTPATIENT)
Age: 63
End: 2023-01-01

## 2023-01-01 ENCOUNTER — INPATIENT (INPATIENT)
Facility: HOSPITAL | Age: 63
LOS: 40 days | End: 2023-06-07
Attending: HOSPITALIST | Admitting: HOSPITALIST
Payer: MEDICAID

## 2023-01-01 ENCOUNTER — INPATIENT (INPATIENT)
Facility: HOSPITAL | Age: 63
LOS: 6 days | Discharge: NURSING, REHAB OR LONG-TERM CARE | End: 2023-03-17
Attending: HOSPITALIST | Admitting: HOSPITALIST
Payer: MEDICAID

## 2023-01-01 ENCOUNTER — INPATIENT (INPATIENT)
Facility: HOSPITAL | Age: 63
LOS: 13 days | Discharge: NURSING, REHAB OR LONG-TERM CARE | DRG: 291 | End: 2023-04-11
Attending: HOSPITALIST | Admitting: HOSPITALIST
Payer: MEDICAID

## 2023-01-01 ENCOUNTER — EMERGENCY (EMERGENCY)
Facility: HOSPITAL | Age: 63
LOS: 1 days | Discharge: HOME | End: 2023-01-01
Admitting: EMERGENCY MEDICINE
Payer: MEDICAID

## 2023-01-01 ENCOUNTER — INPATIENT (INPATIENT)
Facility: HOSPITAL | Age: 63
LOS: 6 days | Discharge: NURSING, REHAB OR LONG-TERM CARE | End: 2023-04-27
Attending: STUDENT IN AN ORGANIZED HEALTH CARE EDUCATION/TRAINING PROGRAM | Admitting: STUDENT IN AN ORGANIZED HEALTH CARE EDUCATION/TRAINING PROGRAM
Payer: MEDICAID

## 2023-01-01 VITALS
OXYGEN SATURATION: 98 % | RESPIRATION RATE: 18 BRPM | SYSTOLIC BLOOD PRESSURE: 115 MMHG | WEIGHT: 190.04 LBS | DIASTOLIC BLOOD PRESSURE: 73 MMHG | HEART RATE: 75 BPM

## 2023-01-01 VITALS
RESPIRATION RATE: 16 BRPM | DIASTOLIC BLOOD PRESSURE: 66 MMHG | TEMPERATURE: 98 F | HEART RATE: 88 BPM | SYSTOLIC BLOOD PRESSURE: 123 MMHG | OXYGEN SATURATION: 100 %

## 2023-01-01 VITALS
TEMPERATURE: 98 F | SYSTOLIC BLOOD PRESSURE: 117 MMHG | DIASTOLIC BLOOD PRESSURE: 63 MMHG | OXYGEN SATURATION: 100 % | RESPIRATION RATE: 18 BRPM | HEART RATE: 86 BPM

## 2023-01-01 VITALS
OXYGEN SATURATION: 100 % | HEART RATE: 71 BPM | DIASTOLIC BLOOD PRESSURE: 61 MMHG | SYSTOLIC BLOOD PRESSURE: 131 MMHG | RESPIRATION RATE: 16 BRPM | TEMPERATURE: 98 F

## 2023-01-01 VITALS
OXYGEN SATURATION: 100 % | HEART RATE: 70 BPM | RESPIRATION RATE: 18 BRPM | DIASTOLIC BLOOD PRESSURE: 62 MMHG | SYSTOLIC BLOOD PRESSURE: 108 MMHG | TEMPERATURE: 98 F

## 2023-01-01 VITALS
HEART RATE: 74 BPM | DIASTOLIC BLOOD PRESSURE: 83 MMHG | SYSTOLIC BLOOD PRESSURE: 145 MMHG | TEMPERATURE: 98 F | RESPIRATION RATE: 18 BRPM | OXYGEN SATURATION: 100 %

## 2023-01-01 VITALS
DIASTOLIC BLOOD PRESSURE: 74 MMHG | SYSTOLIC BLOOD PRESSURE: 117 MMHG | HEART RATE: 82 BPM | HEIGHT: 71 IN | OXYGEN SATURATION: 100 % | RESPIRATION RATE: 18 BRPM | TEMPERATURE: 98 F

## 2023-01-01 VITALS — DIASTOLIC BLOOD PRESSURE: 67 MMHG | OXYGEN SATURATION: 96 % | SYSTOLIC BLOOD PRESSURE: 95 MMHG | HEART RATE: 117 BPM

## 2023-01-01 VITALS
SYSTOLIC BLOOD PRESSURE: 160 MMHG | TEMPERATURE: 98 F | WEIGHT: 210.1 LBS | HEART RATE: 102 BPM | OXYGEN SATURATION: 95 % | DIASTOLIC BLOOD PRESSURE: 67 MMHG | RESPIRATION RATE: 20 BRPM

## 2023-01-01 VITALS
TEMPERATURE: 98 F | HEART RATE: 89 BPM | RESPIRATION RATE: 18 BRPM | DIASTOLIC BLOOD PRESSURE: 67 MMHG | SYSTOLIC BLOOD PRESSURE: 104 MMHG | OXYGEN SATURATION: 99 %

## 2023-01-01 VITALS
SYSTOLIC BLOOD PRESSURE: 143 MMHG | RESPIRATION RATE: 18 BRPM | TEMPERATURE: 98 F | OXYGEN SATURATION: 100 % | DIASTOLIC BLOOD PRESSURE: 84 MMHG | HEART RATE: 79 BPM

## 2023-01-01 VITALS
TEMPERATURE: 99 F | SYSTOLIC BLOOD PRESSURE: 132 MMHG | HEART RATE: 86 BPM | DIASTOLIC BLOOD PRESSURE: 74 MMHG | OXYGEN SATURATION: 99 % | RESPIRATION RATE: 17 BRPM

## 2023-01-01 VITALS
HEART RATE: 94 BPM | RESPIRATION RATE: 18 BRPM | OXYGEN SATURATION: 99 % | SYSTOLIC BLOOD PRESSURE: 112 MMHG | TEMPERATURE: 98 F | DIASTOLIC BLOOD PRESSURE: 78 MMHG

## 2023-01-01 DIAGNOSIS — D64.9 ANEMIA, UNSPECIFIED: ICD-10-CM

## 2023-01-01 DIAGNOSIS — I48.21 PERMANENT ATRIAL FIBRILLATION: ICD-10-CM

## 2023-01-01 DIAGNOSIS — Z29.9 ENCOUNTER FOR PROPHYLACTIC MEASURES, UNSPECIFIED: ICD-10-CM

## 2023-01-01 DIAGNOSIS — G40.909 EPILEPSY, UNSPECIFIED, NOT INTRACTABLE, WITHOUT STATUS EPILEPTICUS: ICD-10-CM

## 2023-01-01 DIAGNOSIS — N31.9 NEUROMUSCULAR DYSFUNCTION OF BLADDER, UNSPECIFIED: ICD-10-CM

## 2023-01-01 DIAGNOSIS — E03.9 HYPOTHYROIDISM, UNSPECIFIED: ICD-10-CM

## 2023-01-01 DIAGNOSIS — Z98.890 OTHER SPECIFIED POSTPROCEDURAL STATES: Chronic | ICD-10-CM

## 2023-01-01 DIAGNOSIS — R07.9 CHEST PAIN, UNSPECIFIED: ICD-10-CM
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
## 2023-01-01 DIAGNOSIS — B37.9 CANDIDIASIS, UNSPECIFIED: ICD-10-CM

## 2023-01-01 DIAGNOSIS — Z95.810 PRESENCE OF AUTOMATIC (IMPLANTABLE) CARDIAC DEFIBRILLATOR: Chronic | ICD-10-CM

## 2023-01-01 DIAGNOSIS — I10 ESSENTIAL (PRIMARY) HYPERTENSION: ICD-10-CM

## 2023-01-01 DIAGNOSIS — J44.9 CHRONIC OBSTRUCTIVE PULMONARY DISEASE, UNSPECIFIED: ICD-10-CM

## 2023-01-01 DIAGNOSIS — R45.1 RESTLESSNESS AND AGITATION: ICD-10-CM

## 2023-01-01 DIAGNOSIS — Z90.49 ACQUIRED ABSENCE OF OTHER SPECIFIED PARTS OF DIGESTIVE TRACT: Chronic | ICD-10-CM

## 2023-01-01 DIAGNOSIS — E83.39 OTHER DISORDERS OF PHOSPHORUS METABOLISM: ICD-10-CM

## 2023-01-01 DIAGNOSIS — Z71.89 OTHER SPECIFIED COUNSELING: ICD-10-CM

## 2023-01-01 DIAGNOSIS — R55 SYNCOPE AND COLLAPSE: ICD-10-CM

## 2023-01-01 DIAGNOSIS — I48.91 UNSPECIFIED ATRIAL FIBRILLATION: ICD-10-CM

## 2023-01-01 DIAGNOSIS — N18.6 END STAGE RENAL DISEASE: ICD-10-CM

## 2023-01-01 DIAGNOSIS — R53.2 FUNCTIONAL QUADRIPLEGIA: ICD-10-CM

## 2023-01-01 DIAGNOSIS — N18.9 CHRONIC KIDNEY DISEASE, UNSPECIFIED: ICD-10-CM

## 2023-01-01 DIAGNOSIS — Z95.5 PRESENCE OF CORONARY ANGIOPLASTY IMPLANT AND GRAFT: Chronic | ICD-10-CM

## 2023-01-01 DIAGNOSIS — E87.5 HYPERKALEMIA: ICD-10-CM

## 2023-01-01 DIAGNOSIS — I95.9 HYPOTENSION, UNSPECIFIED: ICD-10-CM

## 2023-01-01 DIAGNOSIS — D69.6 THROMBOCYTOPENIA, UNSPECIFIED: ICD-10-CM

## 2023-01-01 DIAGNOSIS — I48.20 CHRONIC ATRIAL FIBRILLATION, UNSPECIFIED: ICD-10-CM

## 2023-01-01 DIAGNOSIS — I25.10 ATHEROSCLEROTIC HEART DISEASE OF NATIVE CORONARY ARTERY WITHOUT ANGINA PECTORIS: ICD-10-CM

## 2023-01-01 DIAGNOSIS — R41.82 ALTERED MENTAL STATUS, UNSPECIFIED: ICD-10-CM

## 2023-01-01 DIAGNOSIS — N39.0 URINARY TRACT INFECTION, SITE NOT SPECIFIED: ICD-10-CM

## 2023-01-01 DIAGNOSIS — D70.9 NEUTROPENIA, UNSPECIFIED: ICD-10-CM

## 2023-01-01 DIAGNOSIS — L03.115 CELLULITIS OF RIGHT LOWER LIMB: ICD-10-CM

## 2023-01-01 DIAGNOSIS — Z90.5 ACQUIRED ABSENCE OF KIDNEY: Chronic | ICD-10-CM

## 2023-01-01 DIAGNOSIS — R29.6 REPEATED FALLS: ICD-10-CM

## 2023-01-01 DIAGNOSIS — Z51.5 ENCOUNTER FOR PALLIATIVE CARE: ICD-10-CM

## 2023-01-01 DIAGNOSIS — T14.8XXA OTHER INJURY OF UNSPECIFIED BODY REGION, INITIAL ENCOUNTER: ICD-10-CM

## 2023-01-01 DIAGNOSIS — D72.829 ELEVATED WHITE BLOOD CELL COUNT, UNSPECIFIED: ICD-10-CM

## 2023-01-01 DIAGNOSIS — G93.41 METABOLIC ENCEPHALOPATHY: ICD-10-CM

## 2023-01-01 DIAGNOSIS — E78.5 HYPERLIPIDEMIA, UNSPECIFIED: ICD-10-CM

## 2023-01-01 DIAGNOSIS — Z45.02 ENCOUNTER FOR ADJUSTMENT AND MANAGEMENT OF AUTOMATIC IMPLANTABLE CARDIAC DEFIBRILLATOR: ICD-10-CM

## 2023-01-01 DIAGNOSIS — I50.9 HEART FAILURE, UNSPECIFIED: ICD-10-CM

## 2023-01-01 DIAGNOSIS — R56.9 UNSPECIFIED CONVULSIONS: ICD-10-CM

## 2023-01-01 DIAGNOSIS — S31.000A UNSPECIFIED OPEN WOUND OF LOWER BACK AND PELVIS WITHOUT PENETRATION INTO RETROPERITONEUM, INITIAL ENCOUNTER: ICD-10-CM

## 2023-01-01 DIAGNOSIS — R50.9 FEVER, UNSPECIFIED: ICD-10-CM

## 2023-01-01 DIAGNOSIS — Z00.00 ENCOUNTER FOR GENERAL ADULT MEDICAL EXAMINATION WITHOUT ABNORMAL FINDINGS: ICD-10-CM

## 2023-01-01 DIAGNOSIS — L89.899 PRESSURE ULCER OF OTHER SITE, UNSPECIFIED STAGE: ICD-10-CM

## 2023-01-01 DIAGNOSIS — E83.51 HYPOCALCEMIA: ICD-10-CM

## 2023-01-01 DIAGNOSIS — I63.9 CEREBRAL INFARCTION, UNSPECIFIED: ICD-10-CM

## 2023-01-01 DIAGNOSIS — S90.819A ABRASION, UNSPECIFIED FOOT, INITIAL ENCOUNTER: ICD-10-CM

## 2023-01-01 DIAGNOSIS — R46.89 OTHER SYMPTOMS AND SIGNS INVOLVING APPEARANCE AND BEHAVIOR: ICD-10-CM

## 2023-01-01 DIAGNOSIS — I50.32 CHRONIC DIASTOLIC (CONGESTIVE) HEART FAILURE: ICD-10-CM

## 2023-01-01 LAB
-  AMPHOTERICIN B: SIGNIFICANT CHANGE UP
-  AMPICILLIN/SULBACTAM: SIGNIFICANT CHANGE UP
-  ANIDULAFUNGIN: SIGNIFICANT CHANGE UP
-  CASPOFUNGIN: SIGNIFICANT CHANGE UP
-  CEFAZOLIN: SIGNIFICANT CHANGE UP
-  DAPTOMYCIN: SIGNIFICANT CHANGE UP
-  FLUCONAZOLE: SIGNIFICANT CHANGE UP
-  GENTAMICIN: SIGNIFICANT CHANGE UP
-  LINEZOLID: SIGNIFICANT CHANGE UP
-  MICAFUNGIN: SIGNIFICANT CHANGE UP
-  OXACILLIN: SIGNIFICANT CHANGE UP
-  PENICILLIN: SIGNIFICANT CHANGE UP
-  POSACONAZOLE: SIGNIFICANT CHANGE UP
-  RIFAMPIN: SIGNIFICANT CHANGE UP
-  TETRACYCLINE: SIGNIFICANT CHANGE UP
-  TRIMETHOPRIM/SULFAMETHOXAZOLE: SIGNIFICANT CHANGE UP
-  VANCOMYCIN: SIGNIFICANT CHANGE UP
-  VORICONAZOLE: SIGNIFICANT CHANGE UP
A1C WITH ESTIMATED AVERAGE GLUCOSE RESULT: 4.6 % — SIGNIFICANT CHANGE UP (ref 4–5.6)
ALBUMIN SERPL ELPH-MCNC: 3.2 G/DL — LOW (ref 3.3–5)
ALBUMIN SERPL ELPH-MCNC: 3.2 G/DL — LOW (ref 3.3–5)
ALBUMIN SERPL ELPH-MCNC: 3.3 G/DL — SIGNIFICANT CHANGE UP (ref 3.3–5)
ALBUMIN SERPL ELPH-MCNC: 3.4 G/DL — SIGNIFICANT CHANGE UP (ref 3.3–5)
ALBUMIN SERPL ELPH-MCNC: 3.4 G/DL — SIGNIFICANT CHANGE UP (ref 3.3–5)
ALBUMIN SERPL ELPH-MCNC: 3.5 G/DL — SIGNIFICANT CHANGE UP (ref 3.3–5)
ALBUMIN SERPL ELPH-MCNC: 3.5 G/DL — SIGNIFICANT CHANGE UP (ref 3.3–5)
ALBUMIN SERPL ELPH-MCNC: 3.6 G/DL — SIGNIFICANT CHANGE UP (ref 3.3–5)
ALBUMIN SERPL ELPH-MCNC: 3.7 G/DL — SIGNIFICANT CHANGE UP (ref 3.3–5)
ALBUMIN SERPL ELPH-MCNC: 3.9 G/DL — SIGNIFICANT CHANGE UP (ref 3.3–5)
ALP SERPL-CCNC: 300 U/L — HIGH (ref 40–120)
ALP SERPL-CCNC: 323 U/L — HIGH (ref 40–120)
ALP SERPL-CCNC: 329 U/L — HIGH (ref 40–120)
ALP SERPL-CCNC: 348 U/L — HIGH (ref 40–120)
ALP SERPL-CCNC: 358 U/L — HIGH (ref 40–120)
ALP SERPL-CCNC: 363 U/L — HIGH (ref 40–120)
ALP SERPL-CCNC: 365 U/L — HIGH (ref 40–120)
ALP SERPL-CCNC: 375 U/L — HIGH (ref 40–120)
ALP SERPL-CCNC: 393 U/L — HIGH (ref 40–120)
ALP SERPL-CCNC: 410 U/L — HIGH (ref 40–120)
ALP SERPL-CCNC: 420 U/L — HIGH (ref 40–120)
ALP SERPL-CCNC: 424 U/L — HIGH (ref 40–120)
ALP SERPL-CCNC: 503 U/L — HIGH (ref 40–120)
ALP SERPL-CCNC: 506 U/L — HIGH (ref 40–120)
ALP SERPL-CCNC: 556 U/L — HIGH (ref 40–120)
ALT FLD-CCNC: 19 U/L — SIGNIFICANT CHANGE UP (ref 4–41)
ALT FLD-CCNC: 25 U/L — SIGNIFICANT CHANGE UP (ref 10–45)
ALT FLD-CCNC: 27 U/L — SIGNIFICANT CHANGE UP (ref 10–45)
ALT FLD-CCNC: 27 U/L — SIGNIFICANT CHANGE UP (ref 4–41)
ALT FLD-CCNC: 28 U/L — SIGNIFICANT CHANGE UP (ref 4–41)
ALT FLD-CCNC: 29 U/L — SIGNIFICANT CHANGE UP (ref 4–41)
ALT FLD-CCNC: 30 U/L — SIGNIFICANT CHANGE UP (ref 10–45)
ALT FLD-CCNC: 32 U/L — SIGNIFICANT CHANGE UP (ref 10–45)
ALT FLD-CCNC: 38 U/L — SIGNIFICANT CHANGE UP (ref 10–45)
ALT FLD-CCNC: 42 U/L — SIGNIFICANT CHANGE UP (ref 10–45)
ALT FLD-CCNC: 43 U/L — HIGH (ref 4–41)
ALT FLD-CCNC: 46 U/L — HIGH (ref 10–45)
ALT FLD-CCNC: 46 U/L — HIGH (ref 4–41)
ALT FLD-CCNC: 53 U/L — HIGH (ref 4–41)
ALT FLD-CCNC: 58 U/L — HIGH (ref 4–41)
AMMONIA BLD-MCNC: 40 UMOL/L — SIGNIFICANT CHANGE UP (ref 11–55)
ANION GAP SERPL CALC-SCNC: 10 MMOL/L — SIGNIFICANT CHANGE UP (ref 7–14)
ANION GAP SERPL CALC-SCNC: 11 MMOL/L — SIGNIFICANT CHANGE UP (ref 7–14)
ANION GAP SERPL CALC-SCNC: 12 MMOL/L — SIGNIFICANT CHANGE UP (ref 5–17)
ANION GAP SERPL CALC-SCNC: 12 MMOL/L — SIGNIFICANT CHANGE UP (ref 7–14)
ANION GAP SERPL CALC-SCNC: 13 MMOL/L — SIGNIFICANT CHANGE UP (ref 5–17)
ANION GAP SERPL CALC-SCNC: 13 MMOL/L — SIGNIFICANT CHANGE UP (ref 7–14)
ANION GAP SERPL CALC-SCNC: 14 MMOL/L — SIGNIFICANT CHANGE UP (ref 5–17)
ANION GAP SERPL CALC-SCNC: 14 MMOL/L — SIGNIFICANT CHANGE UP (ref 7–14)
ANION GAP SERPL CALC-SCNC: 15 MMOL/L — HIGH (ref 7–14)
ANION GAP SERPL CALC-SCNC: 15 MMOL/L — SIGNIFICANT CHANGE UP (ref 5–17)
ANION GAP SERPL CALC-SCNC: 16 MMOL/L — HIGH (ref 7–14)
ANION GAP SERPL CALC-SCNC: 16 MMOL/L — HIGH (ref 7–14)
ANION GAP SERPL CALC-SCNC: 16 MMOL/L — SIGNIFICANT CHANGE UP (ref 5–17)
ANION GAP SERPL CALC-SCNC: 16 MMOL/L — SIGNIFICANT CHANGE UP (ref 5–17)
ANION GAP SERPL CALC-SCNC: 17 MMOL/L — SIGNIFICANT CHANGE UP (ref 5–17)
ANION GAP SERPL CALC-SCNC: 18 MMOL/L — HIGH (ref 5–17)
ANION GAP SERPL CALC-SCNC: 18 MMOL/L — HIGH (ref 7–14)
ANION GAP SERPL CALC-SCNC: 19 MMOL/L — HIGH (ref 7–14)
ANION GAP SERPL CALC-SCNC: 19 MMOL/L — HIGH (ref 7–14)
ANION GAP SERPL CALC-SCNC: 20 MMOL/L — HIGH (ref 7–14)
ANISOCYTOSIS BLD QL: SLIGHT — SIGNIFICANT CHANGE UP
ANISOCYTOSIS BLD QL: SLIGHT — SIGNIFICANT CHANGE UP
APPEARANCE UR: ABNORMAL
APPEARANCE UR: CLEAR — SIGNIFICANT CHANGE UP
APPEARANCE UR: CLEAR — SIGNIFICANT CHANGE UP
APTT BLD: 39.2 SEC — HIGH (ref 27–36.3)
APTT BLD: 39.3 SEC — HIGH (ref 27.5–35.5)
APTT BLD: 39.9 SEC — HIGH (ref 27.5–35.5)
AST SERPL-CCNC: 21 U/L — SIGNIFICANT CHANGE UP (ref 4–40)
AST SERPL-CCNC: 22 U/L — SIGNIFICANT CHANGE UP (ref 4–40)
AST SERPL-CCNC: 25 U/L — SIGNIFICANT CHANGE UP (ref 4–40)
AST SERPL-CCNC: 27 U/L — SIGNIFICANT CHANGE UP (ref 10–40)
AST SERPL-CCNC: 28 U/L — SIGNIFICANT CHANGE UP (ref 10–40)
AST SERPL-CCNC: 31 U/L — SIGNIFICANT CHANGE UP (ref 4–40)
AST SERPL-CCNC: 32 U/L — SIGNIFICANT CHANGE UP (ref 10–40)
AST SERPL-CCNC: 32 U/L — SIGNIFICANT CHANGE UP (ref 10–40)
AST SERPL-CCNC: 33 U/L — SIGNIFICANT CHANGE UP (ref 10–40)
AST SERPL-CCNC: 37 U/L — SIGNIFICANT CHANGE UP (ref 4–40)
AST SERPL-CCNC: 38 U/L — SIGNIFICANT CHANGE UP (ref 10–40)
AST SERPL-CCNC: 44 U/L — HIGH (ref 4–40)
AST SERPL-CCNC: 50 U/L — HIGH (ref 4–40)
AST SERPL-CCNC: 71 U/L — HIGH (ref 10–40)
AST SERPL-CCNC: 77 U/L — HIGH (ref 4–40)
B PERT DNA SPEC QL NAA+PROBE: SIGNIFICANT CHANGE UP
B PERT+PARAPERT DNA PNL SPEC NAA+PROBE: SIGNIFICANT CHANGE UP
BACTERIA # UR AUTO: ABNORMAL
BACTERIA # UR AUTO: ABNORMAL
BACTERIA # UR AUTO: NEGATIVE — SIGNIFICANT CHANGE UP
BASE EXCESS BLDV CALC-SCNC: -0.1 MMOL/L — SIGNIFICANT CHANGE UP (ref -2–3)
BASE EXCESS BLDV CALC-SCNC: -0.4 MMOL/L — SIGNIFICANT CHANGE UP (ref -2–3)
BASE EXCESS BLDV CALC-SCNC: -3 MMOL/L — LOW (ref -2–3)
BASE EXCESS BLDV CALC-SCNC: -4.5 MMOL/L — LOW (ref -2–3)
BASE EXCESS BLDV CALC-SCNC: -5.6 MMOL/L — LOW (ref -2–3)
BASE EXCESS BLDV CALC-SCNC: -6.8 MMOL/L — LOW (ref -2–3)
BASE EXCESS BLDV CALC-SCNC: 2.8 MMOL/L — SIGNIFICANT CHANGE UP (ref -2–3)
BASOPHILS # BLD AUTO: 0 K/UL — SIGNIFICANT CHANGE UP (ref 0–0.2)
BASOPHILS # BLD AUTO: 0 K/UL — SIGNIFICANT CHANGE UP (ref 0–0.2)
BASOPHILS # BLD AUTO: 0.01 K/UL — SIGNIFICANT CHANGE UP (ref 0–0.2)
BASOPHILS # BLD AUTO: 0.02 K/UL — SIGNIFICANT CHANGE UP (ref 0–0.2)
BASOPHILS # BLD AUTO: 0.03 K/UL — SIGNIFICANT CHANGE UP (ref 0–0.2)
BASOPHILS NFR BLD AUTO: 0 % — SIGNIFICANT CHANGE UP (ref 0–2)
BASOPHILS NFR BLD AUTO: 0 % — SIGNIFICANT CHANGE UP (ref 0–2)
BASOPHILS NFR BLD AUTO: 0.1 % — SIGNIFICANT CHANGE UP (ref 0–2)
BASOPHILS NFR BLD AUTO: 0.1 % — SIGNIFICANT CHANGE UP (ref 0–2)
BASOPHILS NFR BLD AUTO: 0.2 % — SIGNIFICANT CHANGE UP (ref 0–2)
BASOPHILS NFR BLD AUTO: 0.3 % — SIGNIFICANT CHANGE UP (ref 0–2)
BILIRUB SERPL-MCNC: 0.2 MG/DL — SIGNIFICANT CHANGE UP (ref 0.2–1.2)
BILIRUB SERPL-MCNC: 0.3 MG/DL — SIGNIFICANT CHANGE UP (ref 0.2–1.2)
BILIRUB SERPL-MCNC: 0.4 MG/DL — SIGNIFICANT CHANGE UP (ref 0.2–1.2)
BILIRUB SERPL-MCNC: 0.5 MG/DL — SIGNIFICANT CHANGE UP (ref 0.2–1.2)
BILIRUB SERPL-MCNC: 0.5 MG/DL — SIGNIFICANT CHANGE UP (ref 0.2–1.2)
BILIRUB SERPL-MCNC: 0.6 MG/DL — SIGNIFICANT CHANGE UP (ref 0.2–1.2)
BILIRUB SERPL-MCNC: 0.9 MG/DL — SIGNIFICANT CHANGE UP (ref 0.2–1.2)
BILIRUB UR-MCNC: NEGATIVE — SIGNIFICANT CHANGE UP
BLD GP AB SCN SERPL QL: NEGATIVE — SIGNIFICANT CHANGE UP
BLOOD GAS VENOUS COMPREHENSIVE RESULT: SIGNIFICANT CHANGE UP
BORDETELLA PARAPERTUSSIS (RAPRVP): SIGNIFICANT CHANGE UP
BUN SERPL-MCNC: 105 MG/DL — HIGH (ref 7–23)
BUN SERPL-MCNC: 106 MG/DL — HIGH (ref 7–23)
BUN SERPL-MCNC: 13 MG/DL — SIGNIFICANT CHANGE UP (ref 7–23)
BUN SERPL-MCNC: 19 MG/DL — SIGNIFICANT CHANGE UP (ref 7–23)
BUN SERPL-MCNC: 27 MG/DL — HIGH (ref 7–23)
BUN SERPL-MCNC: 30 MG/DL — HIGH (ref 7–23)
BUN SERPL-MCNC: 33 MG/DL — HIGH (ref 7–23)
BUN SERPL-MCNC: 36 MG/DL — HIGH (ref 7–23)
BUN SERPL-MCNC: 40 MG/DL — HIGH (ref 7–23)
BUN SERPL-MCNC: 44 MG/DL — HIGH (ref 7–23)
BUN SERPL-MCNC: 44 MG/DL — HIGH (ref 7–23)
BUN SERPL-MCNC: 50 MG/DL — HIGH (ref 7–23)
BUN SERPL-MCNC: 51 MG/DL — HIGH (ref 7–23)
BUN SERPL-MCNC: 52 MG/DL — HIGH (ref 7–23)
BUN SERPL-MCNC: 53 MG/DL — HIGH (ref 7–23)
BUN SERPL-MCNC: 54 MG/DL — HIGH (ref 7–23)
BUN SERPL-MCNC: 55 MG/DL — HIGH (ref 7–23)
BUN SERPL-MCNC: 56 MG/DL — HIGH (ref 7–23)
BUN SERPL-MCNC: 56 MG/DL — HIGH (ref 7–23)
BUN SERPL-MCNC: 57 MG/DL — HIGH (ref 7–23)
BUN SERPL-MCNC: 58 MG/DL — HIGH (ref 7–23)
BUN SERPL-MCNC: 61 MG/DL — HIGH (ref 7–23)
BUN SERPL-MCNC: 61 MG/DL — HIGH (ref 7–23)
BUN SERPL-MCNC: 63 MG/DL — HIGH (ref 7–23)
BUN SERPL-MCNC: 64 MG/DL — HIGH (ref 7–23)
BUN SERPL-MCNC: 65 MG/DL — HIGH (ref 7–23)
BUN SERPL-MCNC: 68 MG/DL — HIGH (ref 7–23)
BUN SERPL-MCNC: 68 MG/DL — HIGH (ref 7–23)
BUN SERPL-MCNC: 73 MG/DL — HIGH (ref 7–23)
BUN SERPL-MCNC: 74 MG/DL — HIGH (ref 7–23)
BUN SERPL-MCNC: 75 MG/DL — HIGH (ref 7–23)
BUN SERPL-MCNC: 77 MG/DL — HIGH (ref 7–23)
BUN SERPL-MCNC: 77 MG/DL — HIGH (ref 7–23)
BUN SERPL-MCNC: 78 MG/DL — HIGH (ref 7–23)
BUN SERPL-MCNC: 78 MG/DL — HIGH (ref 7–23)
BUN SERPL-MCNC: 85 MG/DL — HIGH (ref 7–23)
BUN SERPL-MCNC: 86 MG/DL — HIGH (ref 7–23)
BUN SERPL-MCNC: 89 MG/DL — HIGH (ref 7–23)
BUN SERPL-MCNC: 90 MG/DL — HIGH (ref 7–23)
BUN SERPL-MCNC: 91 MG/DL — HIGH (ref 7–23)
BUN SERPL-MCNC: 92 MG/DL — HIGH (ref 7–23)
BUN SERPL-MCNC: 93 MG/DL — HIGH (ref 7–23)
BUN SERPL-MCNC: 98 MG/DL — HIGH (ref 7–23)
C PNEUM DNA SPEC QL NAA+PROBE: SIGNIFICANT CHANGE UP
CA-I SERPL-SCNC: 1.15 MMOL/L — SIGNIFICANT CHANGE UP (ref 1.15–1.33)
CA-I SERPL-SCNC: 1.18 MMOL/L — SIGNIFICANT CHANGE UP (ref 1.15–1.33)
CA-I SERPL-SCNC: 1.21 MMOL/L — SIGNIFICANT CHANGE UP (ref 1.15–1.33)
CA-I SERPL-SCNC: 1.21 MMOL/L — SIGNIFICANT CHANGE UP (ref 1.15–1.33)
CA-I SERPL-SCNC: 1.28 MMOL/L — SIGNIFICANT CHANGE UP (ref 1.15–1.33)
CALCIUM SERPL-MCNC: 7.8 MG/DL — LOW (ref 8.4–10.5)
CALCIUM SERPL-MCNC: 7.9 MG/DL — LOW (ref 8.4–10.5)
CALCIUM SERPL-MCNC: 8 MG/DL — LOW (ref 8.4–10.5)
CALCIUM SERPL-MCNC: 8.1 MG/DL — LOW (ref 8.4–10.5)
CALCIUM SERPL-MCNC: 8.1 MG/DL — LOW (ref 8.4–10.5)
CALCIUM SERPL-MCNC: 8.2 MG/DL — LOW (ref 8.4–10.5)
CALCIUM SERPL-MCNC: 8.3 MG/DL — LOW (ref 8.4–10.5)
CALCIUM SERPL-MCNC: 8.3 MG/DL — LOW (ref 8.4–10.5)
CALCIUM SERPL-MCNC: 8.4 MG/DL — SIGNIFICANT CHANGE UP (ref 8.4–10.5)
CALCIUM SERPL-MCNC: 8.5 MG/DL — SIGNIFICANT CHANGE UP (ref 8.4–10.5)
CALCIUM SERPL-MCNC: 8.5 MG/DL — SIGNIFICANT CHANGE UP (ref 8.4–10.5)
CALCIUM SERPL-MCNC: 8.6 MG/DL — SIGNIFICANT CHANGE UP (ref 8.4–10.5)
CALCIUM SERPL-MCNC: 8.7 MG/DL — SIGNIFICANT CHANGE UP (ref 8.4–10.5)
CALCIUM SERPL-MCNC: 8.8 MG/DL — SIGNIFICANT CHANGE UP (ref 8.4–10.5)
CALCIUM SERPL-MCNC: 8.9 MG/DL — SIGNIFICANT CHANGE UP (ref 8.4–10.5)
CALCIUM SERPL-MCNC: 9 MG/DL — SIGNIFICANT CHANGE UP (ref 8.4–10.5)
CALCIUM SERPL-MCNC: 9.1 MG/DL — SIGNIFICANT CHANGE UP (ref 8.4–10.5)
CALCIUM SERPL-MCNC: 9.2 MG/DL — SIGNIFICANT CHANGE UP (ref 8.4–10.5)
CALCIUM SERPL-MCNC: 9.2 MG/DL — SIGNIFICANT CHANGE UP (ref 8.4–10.5)
CALCIUM SERPL-MCNC: 9.3 MG/DL — SIGNIFICANT CHANGE UP (ref 8.4–10.5)
CHLORIDE BLDV-SCNC: 103 MMOL/L — SIGNIFICANT CHANGE UP (ref 96–108)
CHLORIDE BLDV-SCNC: 104 MMOL/L — SIGNIFICANT CHANGE UP (ref 96–108)
CHLORIDE BLDV-SCNC: 104 MMOL/L — SIGNIFICANT CHANGE UP (ref 96–108)
CHLORIDE BLDV-SCNC: 108 MMOL/L — SIGNIFICANT CHANGE UP (ref 96–108)
CHLORIDE BLDV-SCNC: 108 MMOL/L — SIGNIFICANT CHANGE UP (ref 96–108)
CHLORIDE BLDV-SCNC: 113 MMOL/L — HIGH (ref 96–108)
CHLORIDE BLDV-SCNC: 97 MMOL/L — SIGNIFICANT CHANGE UP (ref 96–108)
CHLORIDE SERPL-SCNC: 100 MMOL/L — SIGNIFICANT CHANGE UP (ref 96–108)
CHLORIDE SERPL-SCNC: 100 MMOL/L — SIGNIFICANT CHANGE UP (ref 98–107)
CHLORIDE SERPL-SCNC: 101 MMOL/L — SIGNIFICANT CHANGE UP (ref 96–108)
CHLORIDE SERPL-SCNC: 101 MMOL/L — SIGNIFICANT CHANGE UP (ref 98–107)
CHLORIDE SERPL-SCNC: 102 MMOL/L — SIGNIFICANT CHANGE UP (ref 96–108)
CHLORIDE SERPL-SCNC: 102 MMOL/L — SIGNIFICANT CHANGE UP (ref 96–108)
CHLORIDE SERPL-SCNC: 102 MMOL/L — SIGNIFICANT CHANGE UP (ref 98–107)
CHLORIDE SERPL-SCNC: 103 MMOL/L — SIGNIFICANT CHANGE UP (ref 98–107)
CHLORIDE SERPL-SCNC: 104 MMOL/L — SIGNIFICANT CHANGE UP (ref 96–108)
CHLORIDE SERPL-SCNC: 104 MMOL/L — SIGNIFICANT CHANGE UP (ref 96–108)
CHLORIDE SERPL-SCNC: 104 MMOL/L — SIGNIFICANT CHANGE UP (ref 98–107)
CHLORIDE SERPL-SCNC: 105 MMOL/L — SIGNIFICANT CHANGE UP (ref 98–107)
CHLORIDE SERPL-SCNC: 105 MMOL/L — SIGNIFICANT CHANGE UP (ref 98–107)
CHLORIDE SERPL-SCNC: 106 MMOL/L — SIGNIFICANT CHANGE UP (ref 96–108)
CHLORIDE SERPL-SCNC: 106 MMOL/L — SIGNIFICANT CHANGE UP (ref 98–107)
CHLORIDE SERPL-SCNC: 107 MMOL/L — SIGNIFICANT CHANGE UP (ref 96–108)
CHLORIDE SERPL-SCNC: 107 MMOL/L — SIGNIFICANT CHANGE UP (ref 98–107)
CHLORIDE SERPL-SCNC: 108 MMOL/L — SIGNIFICANT CHANGE UP (ref 96–108)
CHLORIDE SERPL-SCNC: 109 MMOL/L — HIGH (ref 96–108)
CHLORIDE SERPL-SCNC: 109 MMOL/L — HIGH (ref 96–108)
CHLORIDE SERPL-SCNC: 110 MMOL/L — HIGH (ref 98–107)
CHLORIDE SERPL-SCNC: 95 MMOL/L — LOW (ref 98–107)
CHLORIDE SERPL-SCNC: 96 MMOL/L — SIGNIFICANT CHANGE UP (ref 96–108)
CHLORIDE SERPL-SCNC: 97 MMOL/L — LOW (ref 98–107)
CHLORIDE SERPL-SCNC: 98 MMOL/L — SIGNIFICANT CHANGE UP (ref 98–107)
CHLORIDE SERPL-SCNC: 99 MMOL/L — SIGNIFICANT CHANGE UP (ref 96–108)
CHLORIDE SERPL-SCNC: 99 MMOL/L — SIGNIFICANT CHANGE UP (ref 98–107)
CHOLEST SERPL-MCNC: 107 MG/DL — SIGNIFICANT CHANGE UP
CK MB BLD-MCNC: 4.3 % — HIGH (ref 0–2.5)
CK MB BLD-MCNC: 5.2 % — HIGH (ref 0–2.5)
CK MB CFR SERPL CALC: 1.2 NG/ML — SIGNIFICANT CHANGE UP
CK MB CFR SERPL CALC: 1.5 NG/ML — SIGNIFICANT CHANGE UP
CK SERPL-CCNC: 28 U/L — LOW (ref 30–200)
CK SERPL-CCNC: 29 U/L — LOW (ref 30–200)
CO2 BLDV-SCNC: 21.3 MMOL/L — LOW (ref 22–26)
CO2 BLDV-SCNC: 21.5 MMOL/L — LOW (ref 22–26)
CO2 BLDV-SCNC: 24.8 MMOL/L — SIGNIFICANT CHANGE UP (ref 22–26)
CO2 BLDV-SCNC: 25 MMOL/L — SIGNIFICANT CHANGE UP (ref 22–26)
CO2 BLDV-SCNC: 25.3 MMOL/L — SIGNIFICANT CHANGE UP (ref 22–26)
CO2 BLDV-SCNC: 28 MMOL/L — HIGH (ref 22–26)
CO2 BLDV-SCNC: 29.3 MMOL/L — HIGH (ref 22–26)
CO2 SERPL-SCNC: 16 MMOL/L — LOW (ref 22–31)
CO2 SERPL-SCNC: 17 MMOL/L — LOW (ref 22–31)
CO2 SERPL-SCNC: 18 MMOL/L — LOW (ref 22–31)
CO2 SERPL-SCNC: 19 MMOL/L — LOW (ref 22–31)
CO2 SERPL-SCNC: 20 MMOL/L — LOW (ref 22–31)
CO2 SERPL-SCNC: 21 MMOL/L — LOW (ref 22–31)
CO2 SERPL-SCNC: 22 MMOL/L — SIGNIFICANT CHANGE UP (ref 22–31)
CO2 SERPL-SCNC: 23 MMOL/L — SIGNIFICANT CHANGE UP (ref 22–31)
CO2 SERPL-SCNC: 24 MMOL/L — SIGNIFICANT CHANGE UP (ref 22–31)
CO2 SERPL-SCNC: 25 MMOL/L — SIGNIFICANT CHANGE UP (ref 22–31)
CO2 SERPL-SCNC: 26 MMOL/L — SIGNIFICANT CHANGE UP (ref 22–31)
CO2 SERPL-SCNC: 26 MMOL/L — SIGNIFICANT CHANGE UP (ref 22–31)
CO2 SERPL-SCNC: 27 MMOL/L — SIGNIFICANT CHANGE UP (ref 22–31)
COLOR SPEC: ABNORMAL
COLOR SPEC: ABNORMAL
COLOR SPEC: SIGNIFICANT CHANGE UP
COLOR SPEC: YELLOW — SIGNIFICANT CHANGE UP
COMMENT - URINE: SIGNIFICANT CHANGE UP
COMMENT - URINE: SIGNIFICANT CHANGE UP
CREAT SERPL-MCNC: 1.63 MG/DL — HIGH (ref 0.5–1.3)
CREAT SERPL-MCNC: 2.24 MG/DL — HIGH (ref 0.5–1.3)
CREAT SERPL-MCNC: 2.81 MG/DL — HIGH (ref 0.5–1.3)
CREAT SERPL-MCNC: 2.84 MG/DL — HIGH (ref 0.5–1.3)
CREAT SERPL-MCNC: 3.04 MG/DL — HIGH (ref 0.5–1.3)
CREAT SERPL-MCNC: 3.25 MG/DL — HIGH (ref 0.5–1.3)
CREAT SERPL-MCNC: 3.44 MG/DL — HIGH (ref 0.5–1.3)
CREAT SERPL-MCNC: 3.54 MG/DL — HIGH (ref 0.5–1.3)
CREAT SERPL-MCNC: 3.67 MG/DL — HIGH (ref 0.5–1.3)
CREAT SERPL-MCNC: 3.68 MG/DL — HIGH (ref 0.5–1.3)
CREAT SERPL-MCNC: 3.71 MG/DL — HIGH (ref 0.5–1.3)
CREAT SERPL-MCNC: 3.98 MG/DL — HIGH (ref 0.5–1.3)
CREAT SERPL-MCNC: 4 MG/DL — HIGH (ref 0.5–1.3)
CREAT SERPL-MCNC: 4.01 MG/DL — HIGH (ref 0.5–1.3)
CREAT SERPL-MCNC: 4.08 MG/DL — HIGH (ref 0.5–1.3)
CREAT SERPL-MCNC: 4.18 MG/DL — HIGH (ref 0.5–1.3)
CREAT SERPL-MCNC: 4.36 MG/DL — HIGH (ref 0.5–1.3)
CREAT SERPL-MCNC: 4.43 MG/DL — HIGH (ref 0.5–1.3)
CREAT SERPL-MCNC: 4.52 MG/DL — HIGH (ref 0.5–1.3)
CREAT SERPL-MCNC: 4.55 MG/DL — HIGH (ref 0.5–1.3)
CREAT SERPL-MCNC: 4.59 MG/DL — HIGH (ref 0.5–1.3)
CREAT SERPL-MCNC: 4.6 MG/DL — HIGH (ref 0.5–1.3)
CREAT SERPL-MCNC: 4.6 MG/DL — HIGH (ref 0.5–1.3)
CREAT SERPL-MCNC: 4.62 MG/DL — HIGH (ref 0.5–1.3)
CREAT SERPL-MCNC: 4.74 MG/DL — HIGH (ref 0.5–1.3)
CREAT SERPL-MCNC: 4.76 MG/DL — HIGH (ref 0.5–1.3)
CREAT SERPL-MCNC: 4.77 MG/DL — HIGH (ref 0.5–1.3)
CREAT SERPL-MCNC: 4.8 MG/DL — HIGH (ref 0.5–1.3)
CREAT SERPL-MCNC: 4.89 MG/DL — HIGH (ref 0.5–1.3)
CREAT SERPL-MCNC: 4.91 MG/DL — HIGH (ref 0.5–1.3)
CREAT SERPL-MCNC: 4.92 MG/DL — HIGH (ref 0.5–1.3)
CREAT SERPL-MCNC: 4.93 MG/DL — HIGH (ref 0.5–1.3)
CREAT SERPL-MCNC: 4.95 MG/DL — HIGH (ref 0.5–1.3)
CREAT SERPL-MCNC: 5.12 MG/DL — HIGH (ref 0.5–1.3)
CREAT SERPL-MCNC: 5.12 MG/DL — HIGH (ref 0.5–1.3)
CREAT SERPL-MCNC: 5.14 MG/DL — HIGH (ref 0.5–1.3)
CREAT SERPL-MCNC: 5.17 MG/DL — HIGH (ref 0.5–1.3)
CREAT SERPL-MCNC: 5.18 MG/DL — HIGH (ref 0.5–1.3)
CREAT SERPL-MCNC: 5.18 MG/DL — HIGH (ref 0.5–1.3)
CREAT SERPL-MCNC: 5.19 MG/DL — HIGH (ref 0.5–1.3)
CREAT SERPL-MCNC: 5.25 MG/DL — HIGH (ref 0.5–1.3)
CREAT SERPL-MCNC: 5.3 MG/DL — HIGH (ref 0.5–1.3)
CREAT SERPL-MCNC: 5.32 MG/DL — HIGH (ref 0.5–1.3)
CREAT SERPL-MCNC: 5.4 MG/DL — HIGH (ref 0.5–1.3)
CREAT SERPL-MCNC: 5.47 MG/DL — HIGH (ref 0.5–1.3)
CREAT SERPL-MCNC: 5.47 MG/DL — HIGH (ref 0.5–1.3)
CREAT SERPL-MCNC: 5.51 MG/DL — HIGH (ref 0.5–1.3)
CREAT SERPL-MCNC: 5.53 MG/DL — HIGH (ref 0.5–1.3)
CREAT SERPL-MCNC: 5.82 MG/DL — HIGH (ref 0.5–1.3)
CREAT SERPL-MCNC: 6.04 MG/DL — HIGH (ref 0.5–1.3)
CREAT SERPL-MCNC: 6.06 MG/DL — HIGH (ref 0.5–1.3)
CREAT SERPL-MCNC: 6.18 MG/DL — HIGH (ref 0.5–1.3)
CREAT SERPL-MCNC: 6.71 MG/DL — HIGH (ref 0.5–1.3)
CRP SERPL-MCNC: 107.6 MG/L — HIGH
CRP SERPL-MCNC: 111.6 MG/L — HIGH
CULTURE RESULTS: SIGNIFICANT CHANGE UP
D DIMER BLD IA.RAPID-MCNC: 217 NG/ML DDU — SIGNIFICANT CHANGE UP
DACRYOCYTES BLD QL SMEAR: SLIGHT — SIGNIFICANT CHANGE UP
DACRYOCYTES BLD QL SMEAR: SLIGHT — SIGNIFICANT CHANGE UP
DIALYSIS INSTRUMENT RESULT - HEPATITIS B SURFACE ANTIGEN: NEGATIVE — SIGNIFICANT CHANGE UP
DIFF PNL FLD: ABNORMAL
EGFR: 10 ML/MIN/1.73M2 — LOW
EGFR: 11 ML/MIN/1.73M2 — LOW
EGFR: 12 ML/MIN/1.73M2 — LOW
EGFR: 13 ML/MIN/1.73M2 — LOW
EGFR: 14 ML/MIN/1.73M2 — LOW
EGFR: 15 ML/MIN/1.73M2 — LOW
EGFR: 16 ML/MIN/1.73M2 — LOW
EGFR: 18 ML/MIN/1.73M2 — LOW
EGFR: 19 ML/MIN/1.73M2 — LOW
EGFR: 19 ML/MIN/1.73M2 — LOW
EGFR: 21 ML/MIN/1.73M2 — LOW
EGFR: 22 ML/MIN/1.73M2 — LOW
EGFR: 24 ML/MIN/1.73M2 — LOW
EGFR: 24 ML/MIN/1.73M2 — LOW
EGFR: 32 ML/MIN/1.73M2 — LOW
EGFR: 47 ML/MIN/1.73M2 — LOW
EGFR: 9 ML/MIN/1.73M2 — LOW
ELLIPTOCYTES BLD QL SMEAR: SLIGHT — SIGNIFICANT CHANGE UP
EOSINOPHIL # BLD AUTO: 0.14 K/UL — SIGNIFICANT CHANGE UP (ref 0–0.5)
EOSINOPHIL # BLD AUTO: 0.18 K/UL — SIGNIFICANT CHANGE UP (ref 0–0.5)
EOSINOPHIL # BLD AUTO: 0.19 K/UL — SIGNIFICANT CHANGE UP (ref 0–0.5)
EOSINOPHIL # BLD AUTO: 0.19 K/UL — SIGNIFICANT CHANGE UP (ref 0–0.5)
EOSINOPHIL # BLD AUTO: 0.2 K/UL — SIGNIFICANT CHANGE UP (ref 0–0.5)
EOSINOPHIL # BLD AUTO: 0.21 K/UL — SIGNIFICANT CHANGE UP (ref 0–0.5)
EOSINOPHIL # BLD AUTO: 0.24 K/UL — SIGNIFICANT CHANGE UP (ref 0–0.5)
EOSINOPHIL # BLD AUTO: 0.29 K/UL — SIGNIFICANT CHANGE UP (ref 0–0.5)
EOSINOPHIL NFR BLD AUTO: 1.4 % — SIGNIFICANT CHANGE UP (ref 0–6)
EOSINOPHIL NFR BLD AUTO: 1.4 % — SIGNIFICANT CHANGE UP (ref 0–6)
EOSINOPHIL NFR BLD AUTO: 1.5 % — SIGNIFICANT CHANGE UP (ref 0–6)
EOSINOPHIL NFR BLD AUTO: 1.6 % — SIGNIFICANT CHANGE UP (ref 0–6)
EOSINOPHIL NFR BLD AUTO: 1.8 % — SIGNIFICANT CHANGE UP (ref 0–6)
EOSINOPHIL NFR BLD AUTO: 1.9 % — SIGNIFICANT CHANGE UP (ref 0–6)
EOSINOPHIL NFR BLD AUTO: 1.9 % — SIGNIFICANT CHANGE UP (ref 0–6)
EOSINOPHIL NFR BLD AUTO: 2.5 % — SIGNIFICANT CHANGE UP (ref 0–6)
EOSINOPHIL NFR BLD AUTO: 2.6 % — SIGNIFICANT CHANGE UP (ref 0–6)
EOSINOPHIL NFR BLD AUTO: 2.6 % — SIGNIFICANT CHANGE UP (ref 0–6)
EOSINOPHIL NFR BLD AUTO: 2.7 % — SIGNIFICANT CHANGE UP (ref 0–6)
EPI CELLS # UR: 0 /HPF — SIGNIFICANT CHANGE UP
EPI CELLS # UR: 0 /HPF — SIGNIFICANT CHANGE UP
EPI CELLS # UR: 0 /HPF — SIGNIFICANT CHANGE UP (ref 0–5)
EPI CELLS # UR: 0 — SIGNIFICANT CHANGE UP
EPI CELLS # UR: 5 /HPF — SIGNIFICANT CHANGE UP (ref 0–5)
ERYTHROCYTE [SEDIMENTATION RATE] IN BLOOD: 77 MM/HR — HIGH (ref 1–15)
ESTIMATED AVERAGE GLUCOSE: 85 — SIGNIFICANT CHANGE UP
FERRITIN SERPL-MCNC: 964 NG/ML — HIGH (ref 30–400)
FLUAV AG NPH QL: SIGNIFICANT CHANGE UP
FLUAV SUBTYP SPEC NAA+PROBE: SIGNIFICANT CHANGE UP
FLUBV AG NPH QL: SIGNIFICANT CHANGE UP
FLUBV RNA SPEC QL NAA+PROBE: SIGNIFICANT CHANGE UP
FOLATE SERPL-MCNC: >20 NG/ML — SIGNIFICANT CHANGE UP
GAS PNL BLDA: SIGNIFICANT CHANGE UP
GAS PNL BLDV: 134 MMOL/L — LOW (ref 136–145)
GAS PNL BLDV: 136 MMOL/L — SIGNIFICANT CHANGE UP (ref 136–145)
GAS PNL BLDV: 137 MMOL/L — SIGNIFICANT CHANGE UP (ref 136–145)
GAS PNL BLDV: 138 MMOL/L — SIGNIFICANT CHANGE UP (ref 136–145)
GAS PNL BLDV: 143 MMOL/L — SIGNIFICANT CHANGE UP (ref 136–145)
GAS PNL BLDV: SIGNIFICANT CHANGE UP
GLUCOSE BLDC GLUCOMTR-MCNC: 101 MG/DL — HIGH (ref 70–99)
GLUCOSE BLDC GLUCOMTR-MCNC: 112 MG/DL — HIGH (ref 70–99)
GLUCOSE BLDC GLUCOMTR-MCNC: 121 MG/DL — HIGH (ref 70–99)
GLUCOSE BLDC GLUCOMTR-MCNC: 126 MG/DL — HIGH (ref 70–99)
GLUCOSE BLDC GLUCOMTR-MCNC: 133 MG/DL — HIGH (ref 70–99)
GLUCOSE BLDC GLUCOMTR-MCNC: 138 MG/DL — HIGH (ref 70–99)
GLUCOSE BLDC GLUCOMTR-MCNC: 86 MG/DL — SIGNIFICANT CHANGE UP (ref 70–99)
GLUCOSE BLDV-MCNC: 126 MG/DL — HIGH (ref 70–99)
GLUCOSE BLDV-MCNC: 151 MG/DL — HIGH (ref 70–99)
GLUCOSE BLDV-MCNC: 74 MG/DL — SIGNIFICANT CHANGE UP (ref 70–99)
GLUCOSE BLDV-MCNC: 88 MG/DL — SIGNIFICANT CHANGE UP (ref 70–99)
GLUCOSE BLDV-MCNC: 89 MG/DL — SIGNIFICANT CHANGE UP (ref 70–99)
GLUCOSE BLDV-MCNC: 94 MG/DL — SIGNIFICANT CHANGE UP (ref 70–99)
GLUCOSE BLDV-MCNC: 97 MG/DL — SIGNIFICANT CHANGE UP (ref 70–99)
GLUCOSE SERPL-MCNC: 101 MG/DL — HIGH (ref 70–99)
GLUCOSE SERPL-MCNC: 102 MG/DL — HIGH (ref 70–99)
GLUCOSE SERPL-MCNC: 103 MG/DL — HIGH (ref 70–99)
GLUCOSE SERPL-MCNC: 103 MG/DL — HIGH (ref 70–99)
GLUCOSE SERPL-MCNC: 104 MG/DL — HIGH (ref 70–99)
GLUCOSE SERPL-MCNC: 106 MG/DL — HIGH (ref 70–99)
GLUCOSE SERPL-MCNC: 108 MG/DL — HIGH (ref 70–99)
GLUCOSE SERPL-MCNC: 111 MG/DL — HIGH (ref 70–99)
GLUCOSE SERPL-MCNC: 113 MG/DL — HIGH (ref 70–99)
GLUCOSE SERPL-MCNC: 114 MG/DL — HIGH (ref 70–99)
GLUCOSE SERPL-MCNC: 115 MG/DL — HIGH (ref 70–99)
GLUCOSE SERPL-MCNC: 122 MG/DL — HIGH (ref 70–99)
GLUCOSE SERPL-MCNC: 124 MG/DL — HIGH (ref 70–99)
GLUCOSE SERPL-MCNC: 124 MG/DL — HIGH (ref 70–99)
GLUCOSE SERPL-MCNC: 126 MG/DL — HIGH (ref 70–99)
GLUCOSE SERPL-MCNC: 126 MG/DL — HIGH (ref 70–99)
GLUCOSE SERPL-MCNC: 129 MG/DL — HIGH (ref 70–99)
GLUCOSE SERPL-MCNC: 132 MG/DL — HIGH (ref 70–99)
GLUCOSE SERPL-MCNC: 139 MG/DL — HIGH (ref 70–99)
GLUCOSE SERPL-MCNC: 140 MG/DL — HIGH (ref 70–99)
GLUCOSE SERPL-MCNC: 140 MG/DL — HIGH (ref 70–99)
GLUCOSE SERPL-MCNC: 141 MG/DL — HIGH (ref 70–99)
GLUCOSE SERPL-MCNC: 145 MG/DL — HIGH (ref 70–99)
GLUCOSE SERPL-MCNC: 162 MG/DL — HIGH (ref 70–99)
GLUCOSE SERPL-MCNC: 173 MG/DL — HIGH (ref 70–99)
GLUCOSE SERPL-MCNC: 177 MG/DL — HIGH (ref 70–99)
GLUCOSE SERPL-MCNC: 183 MG/DL — HIGH (ref 70–99)
GLUCOSE SERPL-MCNC: 197 MG/DL — HIGH (ref 70–99)
GLUCOSE SERPL-MCNC: 73 MG/DL — SIGNIFICANT CHANGE UP (ref 70–99)
GLUCOSE SERPL-MCNC: 76 MG/DL — SIGNIFICANT CHANGE UP (ref 70–99)
GLUCOSE SERPL-MCNC: 78 MG/DL — SIGNIFICANT CHANGE UP (ref 70–99)
GLUCOSE SERPL-MCNC: 79 MG/DL — SIGNIFICANT CHANGE UP (ref 70–99)
GLUCOSE SERPL-MCNC: 79 MG/DL — SIGNIFICANT CHANGE UP (ref 70–99)
GLUCOSE SERPL-MCNC: 80 MG/DL — SIGNIFICANT CHANGE UP (ref 70–99)
GLUCOSE SERPL-MCNC: 81 MG/DL — SIGNIFICANT CHANGE UP (ref 70–99)
GLUCOSE SERPL-MCNC: 86 MG/DL — SIGNIFICANT CHANGE UP (ref 70–99)
GLUCOSE SERPL-MCNC: 88 MG/DL — SIGNIFICANT CHANGE UP (ref 70–99)
GLUCOSE SERPL-MCNC: 88 MG/DL — SIGNIFICANT CHANGE UP (ref 70–99)
GLUCOSE SERPL-MCNC: 90 MG/DL — SIGNIFICANT CHANGE UP (ref 70–99)
GLUCOSE SERPL-MCNC: 92 MG/DL — SIGNIFICANT CHANGE UP (ref 70–99)
GLUCOSE SERPL-MCNC: 92 MG/DL — SIGNIFICANT CHANGE UP (ref 70–99)
GLUCOSE SERPL-MCNC: 93 MG/DL — SIGNIFICANT CHANGE UP (ref 70–99)
GLUCOSE SERPL-MCNC: 93 MG/DL — SIGNIFICANT CHANGE UP (ref 70–99)
GLUCOSE SERPL-MCNC: 94 MG/DL — SIGNIFICANT CHANGE UP (ref 70–99)
GLUCOSE SERPL-MCNC: 95 MG/DL — SIGNIFICANT CHANGE UP (ref 70–99)
GLUCOSE SERPL-MCNC: 98 MG/DL — SIGNIFICANT CHANGE UP (ref 70–99)
GLUCOSE SERPL-MCNC: 99 MG/DL — SIGNIFICANT CHANGE UP (ref 70–99)
GLUCOSE UR QL: ABNORMAL
GLUCOSE UR QL: NEGATIVE — SIGNIFICANT CHANGE UP
GLUCOSE UR QL: NEGATIVE — SIGNIFICANT CHANGE UP
GRAN CASTS # UR COMP ASSIST: SIGNIFICANT CHANGE UP /LPF
HADV DNA SPEC QL NAA+PROBE: SIGNIFICANT CHANGE UP
HAPTOGLOB SERPL-MCNC: 155 MG/DL — SIGNIFICANT CHANGE UP (ref 34–200)
HAV IGM SER-ACNC: SIGNIFICANT CHANGE UP
HAV IGM SER-ACNC: SIGNIFICANT CHANGE UP
HBV CORE AB SER-ACNC: SIGNIFICANT CHANGE UP
HBV CORE IGM SER-ACNC: SIGNIFICANT CHANGE UP
HBV SURFACE AB SER-ACNC: <3 MIU/ML — LOW
HBV SURFACE AB SER-ACNC: <3 MIU/ML — LOW
HBV SURFACE AG SER-ACNC: SIGNIFICANT CHANGE UP
HCO3 BLDV-SCNC: 20 MMOL/L — LOW (ref 22–29)
HCO3 BLDV-SCNC: 20 MMOL/L — LOW (ref 22–29)
HCO3 BLDV-SCNC: 23 MMOL/L — SIGNIFICANT CHANGE UP (ref 22–29)
HCO3 BLDV-SCNC: 24 MMOL/L — SIGNIFICANT CHANGE UP (ref 22–29)
HCO3 BLDV-SCNC: 24 MMOL/L — SIGNIFICANT CHANGE UP (ref 22–29)
HCO3 BLDV-SCNC: 27 MMOL/L — SIGNIFICANT CHANGE UP (ref 22–29)
HCO3 BLDV-SCNC: 28 MMOL/L — SIGNIFICANT CHANGE UP (ref 22–29)
HCOV 229E RNA SPEC QL NAA+PROBE: SIGNIFICANT CHANGE UP
HCOV HKU1 RNA SPEC QL NAA+PROBE: SIGNIFICANT CHANGE UP
HCOV NL63 RNA SPEC QL NAA+PROBE: SIGNIFICANT CHANGE UP
HCOV OC43 RNA SPEC QL NAA+PROBE: SIGNIFICANT CHANGE UP
HCT VFR BLD CALC: 20.9 % — CRITICAL LOW (ref 39–50)
HCT VFR BLD CALC: 21.5 % — LOW (ref 39–50)
HCT VFR BLD CALC: 21.5 % — LOW (ref 39–50)
HCT VFR BLD CALC: 21.8 % — LOW (ref 39–50)
HCT VFR BLD CALC: 21.9 % — LOW (ref 39–50)
HCT VFR BLD CALC: 22.2 % — LOW (ref 39–50)
HCT VFR BLD CALC: 22.4 % — LOW (ref 39–50)
HCT VFR BLD CALC: 22.6 % — LOW (ref 39–50)
HCT VFR BLD CALC: 22.7 % — LOW (ref 39–50)
HCT VFR BLD CALC: 22.7 % — LOW (ref 39–50)
HCT VFR BLD CALC: 22.9 % — LOW (ref 39–50)
HCT VFR BLD CALC: 23.9 % — LOW (ref 39–50)
HCT VFR BLD CALC: 24 % — LOW (ref 39–50)
HCT VFR BLD CALC: 24.1 % — LOW (ref 39–50)
HCT VFR BLD CALC: 24.2 % — LOW (ref 39–50)
HCT VFR BLD CALC: 24.4 % — LOW (ref 39–50)
HCT VFR BLD CALC: 24.5 % — LOW (ref 39–50)
HCT VFR BLD CALC: 24.5 % — LOW (ref 39–50)
HCT VFR BLD CALC: 24.7 % — LOW (ref 39–50)
HCT VFR BLD CALC: 25.1 % — LOW (ref 39–50)
HCT VFR BLD CALC: 25.1 % — LOW (ref 39–50)
HCT VFR BLD CALC: 25.6 % — LOW (ref 39–50)
HCT VFR BLD CALC: 25.8 % — LOW (ref 39–50)
HCT VFR BLD CALC: 25.9 % — LOW (ref 39–50)
HCT VFR BLD CALC: 25.9 % — LOW (ref 39–50)
HCT VFR BLD CALC: 26.4 % — LOW (ref 39–50)
HCT VFR BLD CALC: 26.9 % — LOW (ref 39–50)
HCT VFR BLD CALC: 27 % — LOW (ref 39–50)
HCT VFR BLD CALC: 27 % — LOW (ref 39–50)
HCT VFR BLD CALC: 27.6 % — LOW (ref 39–50)
HCT VFR BLD CALC: 27.6 % — LOW (ref 39–50)
HCT VFR BLD CALC: 27.7 % — LOW (ref 39–50)
HCT VFR BLD CALC: 28.1 % — LOW (ref 39–50)
HCT VFR BLD CALC: 28.2 % — LOW (ref 39–50)
HCT VFR BLD CALC: 28.3 % — LOW (ref 39–50)
HCT VFR BLD CALC: 28.4 % — LOW (ref 39–50)
HCT VFR BLD CALC: 28.5 % — LOW (ref 39–50)
HCT VFR BLD CALC: 28.9 % — LOW (ref 39–50)
HCT VFR BLD CALC: 29 % — LOW (ref 39–50)
HCT VFR BLD CALC: 29.1 % — LOW (ref 39–50)
HCT VFR BLD CALC: 29.1 % — LOW (ref 39–50)
HCT VFR BLD CALC: 30.2 % — LOW (ref 39–50)
HCT VFR BLD CALC: 30.3 % — LOW (ref 39–50)
HCT VFR BLD CALC: 30.6 % — LOW (ref 39–50)
HCT VFR BLD CALC: 31 % — LOW (ref 39–50)
HCT VFR BLD CALC: 31.4 % — LOW (ref 39–50)
HCT VFR BLD CALC: 31.5 % — LOW (ref 39–50)
HCT VFR BLD CALC: 31.6 % — LOW (ref 39–50)
HCT VFR BLD CALC: 32.1 % — LOW (ref 39–50)
HCT VFR BLD CALC: 32.3 % — LOW (ref 39–50)
HCT VFR BLD CALC: 32.6 % — LOW (ref 39–50)
HCT VFR BLD CALC: 33.6 % — LOW (ref 39–50)
HCT VFR BLD CALC: 34.4 % — LOW (ref 39–50)
HCT VFR BLD CALC: 34.6 % — LOW (ref 39–50)
HCT VFR BLD CALC: 34.6 % — LOW (ref 39–50)
HCT VFR BLD CALC: 37.3 % — LOW (ref 39–50)
HCT VFR BLD CALC: 37.9 % — LOW (ref 39–50)
HCT VFR BLDA CALC: 22 % — LOW (ref 39–51)
HCT VFR BLDA CALC: 24 % — LOW (ref 39–51)
HCT VFR BLDA CALC: 25 % — LOW (ref 39–51)
HCT VFR BLDA CALC: 31 % — LOW (ref 39–51)
HCT VFR BLDA CALC: 32 % — LOW (ref 39–51)
HCT VFR BLDA CALC: 33 % — LOW (ref 39–51)
HCT VFR BLDA CALC: 35 % — LOW (ref 39–51)
HCV AB S/CO SERPL IA: 8.3 S/CO — HIGH (ref 0–0.99)
HCV AB S/CO SERPL IA: 8.43 S/CO — HIGH (ref 0–0.99)
HCV AB S/CO SERPL IA: 8.61 S/CO — HIGH (ref 0–0.99)
HCV AB S/CO SERPL IA: 9.25 S/CO — HIGH (ref 0–0.99)
HCV AB S/CO SERPL IA: 9.49 S/CO — HIGH (ref 0–0.99)
HCV AB SERPL-IMP: REACTIVE
HCV RNA FLD QL NAA+PROBE: SIGNIFICANT CHANGE UP
HCV RNA SPEC QL PROBE+SIG AMP: SIGNIFICANT CHANGE UP
HDLC SERPL-MCNC: 42 MG/DL — SIGNIFICANT CHANGE UP
HGB BLD CALC-MCNC: 10.3 G/DL — LOW (ref 12.6–17.4)
HGB BLD CALC-MCNC: 10.8 G/DL — LOW (ref 12.6–17.4)
HGB BLD CALC-MCNC: 11 G/DL — LOW (ref 12.6–17.4)
HGB BLD CALC-MCNC: 11.5 G/DL — LOW (ref 12.6–17.4)
HGB BLD CALC-MCNC: 7.4 G/DL — LOW (ref 12.6–17.4)
HGB BLD CALC-MCNC: 8.1 G/DL — LOW (ref 12.6–17.4)
HGB BLD CALC-MCNC: 8.2 G/DL — LOW (ref 12.6–17.4)
HGB BLD-MCNC: 10.1 G/DL — LOW (ref 13–17)
HGB BLD-MCNC: 10.4 G/DL — LOW (ref 13–17)
HGB BLD-MCNC: 10.4 G/DL — LOW (ref 13–17)
HGB BLD-MCNC: 10.5 G/DL — LOW (ref 13–17)
HGB BLD-MCNC: 10.5 G/DL — LOW (ref 13–17)
HGB BLD-MCNC: 10.7 G/DL — LOW (ref 13–17)
HGB BLD-MCNC: 10.8 G/DL — LOW (ref 13–17)
HGB BLD-MCNC: 10.8 G/DL — LOW (ref 13–17)
HGB BLD-MCNC: 11.6 G/DL — LOW (ref 13–17)
HGB BLD-MCNC: 11.8 G/DL — LOW (ref 13–17)
HGB BLD-MCNC: 6.6 G/DL — CRITICAL LOW (ref 13–17)
HGB BLD-MCNC: 6.7 G/DL — CRITICAL LOW (ref 13–17)
HGB BLD-MCNC: 6.8 G/DL — CRITICAL LOW (ref 13–17)
HGB BLD-MCNC: 6.9 G/DL — CRITICAL LOW (ref 13–17)
HGB BLD-MCNC: 7 G/DL — CRITICAL LOW (ref 13–17)
HGB BLD-MCNC: 7 G/DL — CRITICAL LOW (ref 13–17)
HGB BLD-MCNC: 7.1 G/DL — LOW (ref 13–17)
HGB BLD-MCNC: 7.2 G/DL — LOW (ref 13–17)
HGB BLD-MCNC: 7.4 G/DL — LOW (ref 13–17)
HGB BLD-MCNC: 7.4 G/DL — LOW (ref 13–17)
HGB BLD-MCNC: 7.6 G/DL — LOW (ref 13–17)
HGB BLD-MCNC: 7.7 G/DL — LOW (ref 13–17)
HGB BLD-MCNC: 7.8 G/DL — LOW (ref 13–17)
HGB BLD-MCNC: 7.8 G/DL — LOW (ref 13–17)
HGB BLD-MCNC: 8 G/DL — LOW (ref 13–17)
HGB BLD-MCNC: 8 G/DL — LOW (ref 13–17)
HGB BLD-MCNC: 8.2 G/DL — LOW (ref 13–17)
HGB BLD-MCNC: 8.2 G/DL — LOW (ref 13–17)
HGB BLD-MCNC: 8.3 G/DL — LOW (ref 13–17)
HGB BLD-MCNC: 8.3 G/DL — LOW (ref 13–17)
HGB BLD-MCNC: 8.6 G/DL — LOW (ref 13–17)
HGB BLD-MCNC: 8.6 G/DL — LOW (ref 13–17)
HGB BLD-MCNC: 8.8 G/DL — LOW (ref 13–17)
HGB BLD-MCNC: 8.9 G/DL — LOW (ref 13–17)
HGB BLD-MCNC: 9 G/DL — LOW (ref 13–17)
HGB BLD-MCNC: 9 G/DL — LOW (ref 13–17)
HGB BLD-MCNC: 9.2 G/DL — LOW (ref 13–17)
HGB BLD-MCNC: 9.2 G/DL — LOW (ref 13–17)
HGB BLD-MCNC: 9.3 G/DL — LOW (ref 13–17)
HGB BLD-MCNC: 9.3 G/DL — LOW (ref 13–17)
HGB BLD-MCNC: 9.4 G/DL — LOW (ref 13–17)
HGB BLD-MCNC: 9.5 G/DL — LOW (ref 13–17)
HGB BLD-MCNC: 9.5 G/DL — LOW (ref 13–17)
HGB BLD-MCNC: 9.6 G/DL — LOW (ref 13–17)
HGB BLD-MCNC: 9.7 G/DL — LOW (ref 13–17)
HGB BLD-MCNC: 9.8 G/DL — LOW (ref 13–17)
HMPV RNA SPEC QL NAA+PROBE: SIGNIFICANT CHANGE UP
HPIV1 RNA SPEC QL NAA+PROBE: SIGNIFICANT CHANGE UP
HPIV2 RNA SPEC QL NAA+PROBE: SIGNIFICANT CHANGE UP
HPIV3 RNA SPEC QL NAA+PROBE: SIGNIFICANT CHANGE UP
HPIV4 RNA SPEC QL NAA+PROBE: SIGNIFICANT CHANGE UP
HSV+VZV DNA SPEC QL NAA+PROBE: SIGNIFICANT CHANGE UP
HYALINE CASTS # UR AUTO: 0 /LPF — SIGNIFICANT CHANGE UP (ref 0–7)
HYALINE CASTS # UR AUTO: 1 /LPF — SIGNIFICANT CHANGE UP (ref 0–7)
HYALINE CASTS # UR AUTO: 11 /LPF — HIGH (ref 0–2)
HYALINE CASTS # UR AUTO: 3 /LPF — HIGH (ref 0–2)
HYALINE CASTS # UR AUTO: 62 /LPF — HIGH (ref 0–7)
IANC: 11.44 K/UL — HIGH (ref 1.8–7.4)
IANC: 5.66 K/UL — SIGNIFICANT CHANGE UP (ref 1.8–7.4)
IANC: 7.5 K/UL — HIGH (ref 1.8–7.4)
IANC: 8.06 K/UL — HIGH (ref 1.8–7.4)
IMM GRANULOCYTES NFR BLD AUTO: 0.4 % — SIGNIFICANT CHANGE UP (ref 0–0.9)
IMM GRANULOCYTES NFR BLD AUTO: 0.5 % — SIGNIFICANT CHANGE UP (ref 0–0.9)
IMM GRANULOCYTES NFR BLD AUTO: 0.6 % — SIGNIFICANT CHANGE UP (ref 0–0.9)
IMM GRANULOCYTES NFR BLD AUTO: 0.8 % — SIGNIFICANT CHANGE UP (ref 0–0.9)
INR BLD: 1.34 RATIO — HIGH (ref 0.88–1.16)
INR BLD: 1.54 RATIO — HIGH (ref 0.88–1.16)
INR BLD: 1.63 RATIO — HIGH (ref 0.88–1.16)
INR BLD: 1.76 RATIO — HIGH (ref 0.88–1.16)
IRON SATN MFR SERPL: 32 % — SIGNIFICANT CHANGE UP (ref 14–50)
IRON SATN MFR SERPL: 66 UG/DL — SIGNIFICANT CHANGE UP (ref 45–165)
KETONES UR-MCNC: NEGATIVE — SIGNIFICANT CHANGE UP
LACTATE BLDV-MCNC: 1.1 MMOL/L — SIGNIFICANT CHANGE UP (ref 0.5–2)
LACTATE BLDV-MCNC: 1.2 MMOL/L — SIGNIFICANT CHANGE UP (ref 0.5–2)
LACTATE BLDV-MCNC: 1.2 MMOL/L — SIGNIFICANT CHANGE UP (ref 0.5–2)
LACTATE BLDV-MCNC: 1.5 MMOL/L — SIGNIFICANT CHANGE UP (ref 0.5–2)
LACTATE BLDV-MCNC: 1.6 MMOL/L — SIGNIFICANT CHANGE UP (ref 0.5–2)
LACTATE BLDV-MCNC: 1.8 MMOL/L — SIGNIFICANT CHANGE UP (ref 0.5–2)
LACTATE BLDV-MCNC: 1.9 MMOL/L — SIGNIFICANT CHANGE UP (ref 0.5–2)
LACTATE BLDV-MCNC: 3 MMOL/L — HIGH (ref 0.5–2)
LACTATE SERPL-SCNC: 0.5 MMOL/L — SIGNIFICANT CHANGE UP (ref 0.5–2)
LACTATE SERPL-SCNC: 0.9 MMOL/L — SIGNIFICANT CHANGE UP (ref 0.5–2)
LEUKOCYTE ESTERASE UR-ACNC: ABNORMAL
LEUKOCYTE ESTERASE UR-ACNC: NEGATIVE — SIGNIFICANT CHANGE UP
LEVETIRACETAM SERPL-MCNC: 15.3 UG/ML — SIGNIFICANT CHANGE UP (ref 10–40)
LIPID PNL WITH DIRECT LDL SERPL: 51 MG/DL — SIGNIFICANT CHANGE UP
LYMPHOCYTES # BLD AUTO: 0.84 K/UL — LOW (ref 1–3.3)
LYMPHOCYTES # BLD AUTO: 1.07 K/UL — SIGNIFICANT CHANGE UP (ref 1–3.3)
LYMPHOCYTES # BLD AUTO: 1.1 K/UL — SIGNIFICANT CHANGE UP (ref 1–3.3)
LYMPHOCYTES # BLD AUTO: 1.13 K/UL — SIGNIFICANT CHANGE UP (ref 1–3.3)
LYMPHOCYTES # BLD AUTO: 1.18 K/UL — SIGNIFICANT CHANGE UP (ref 1–3.3)
LYMPHOCYTES # BLD AUTO: 1.25 K/UL — SIGNIFICANT CHANGE UP (ref 1–3.3)
LYMPHOCYTES # BLD AUTO: 1.26 K/UL — SIGNIFICANT CHANGE UP (ref 1–3.3)
LYMPHOCYTES # BLD AUTO: 1.48 K/UL — SIGNIFICANT CHANGE UP (ref 1–3.3)
LYMPHOCYTES # BLD AUTO: 1.52 K/UL — SIGNIFICANT CHANGE UP (ref 1–3.3)
LYMPHOCYTES # BLD AUTO: 1.63 K/UL — SIGNIFICANT CHANGE UP (ref 1–3.3)
LYMPHOCYTES # BLD AUTO: 1.85 K/UL — SIGNIFICANT CHANGE UP (ref 1–3.3)
LYMPHOCYTES # BLD AUTO: 11.1 % — LOW (ref 13–44)
LYMPHOCYTES # BLD AUTO: 11.3 % — LOW (ref 13–44)
LYMPHOCYTES # BLD AUTO: 11.3 % — LOW (ref 13–44)
LYMPHOCYTES # BLD AUTO: 11.9 % — LOW (ref 13–44)
LYMPHOCYTES # BLD AUTO: 12.5 % — LOW (ref 13–44)
LYMPHOCYTES # BLD AUTO: 13.9 % — SIGNIFICANT CHANGE UP (ref 13–44)
LYMPHOCYTES # BLD AUTO: 15.5 % — SIGNIFICANT CHANGE UP (ref 13–44)
LYMPHOCYTES # BLD AUTO: 15.6 % — SIGNIFICANT CHANGE UP (ref 13–44)
LYMPHOCYTES # BLD AUTO: 16.6 % — SIGNIFICANT CHANGE UP (ref 13–44)
LYMPHOCYTES # BLD AUTO: 6.2 % — LOW (ref 13–44)
LYMPHOCYTES # BLD AUTO: 9.9 % — LOW (ref 13–44)
M PNEUMO DNA SPEC QL NAA+PROBE: SIGNIFICANT CHANGE UP
MACROCYTES BLD QL: SLIGHT — SIGNIFICANT CHANGE UP
MAGNESIUM SERPL-MCNC: 1.7 MG/DL — SIGNIFICANT CHANGE UP (ref 1.6–2.6)
MAGNESIUM SERPL-MCNC: 1.8 MG/DL — SIGNIFICANT CHANGE UP (ref 1.6–2.6)
MAGNESIUM SERPL-MCNC: 1.9 MG/DL — SIGNIFICANT CHANGE UP (ref 1.6–2.6)
MAGNESIUM SERPL-MCNC: 2 MG/DL — SIGNIFICANT CHANGE UP (ref 1.6–2.6)
MAGNESIUM SERPL-MCNC: 2.1 MG/DL — SIGNIFICANT CHANGE UP (ref 1.6–2.6)
MAGNESIUM SERPL-MCNC: 2.2 MG/DL — SIGNIFICANT CHANGE UP (ref 1.6–2.6)
MAGNESIUM SERPL-MCNC: 2.3 MG/DL — SIGNIFICANT CHANGE UP (ref 1.6–2.6)
MAGNESIUM SERPL-MCNC: 2.4 MG/DL — SIGNIFICANT CHANGE UP (ref 1.6–2.6)
MANUAL SMEAR VERIFICATION: SIGNIFICANT CHANGE UP
MANUAL SMEAR VERIFICATION: SIGNIFICANT CHANGE UP
MCHC RBC-ENTMCNC: 27.2 PG — SIGNIFICANT CHANGE UP (ref 27–34)
MCHC RBC-ENTMCNC: 27.6 PG — SIGNIFICANT CHANGE UP (ref 27–34)
MCHC RBC-ENTMCNC: 27.7 PG — SIGNIFICANT CHANGE UP (ref 27–34)
MCHC RBC-ENTMCNC: 27.8 PG — SIGNIFICANT CHANGE UP (ref 27–34)
MCHC RBC-ENTMCNC: 27.9 PG — SIGNIFICANT CHANGE UP (ref 27–34)
MCHC RBC-ENTMCNC: 27.9 PG — SIGNIFICANT CHANGE UP (ref 27–34)
MCHC RBC-ENTMCNC: 28 PG — SIGNIFICANT CHANGE UP (ref 27–34)
MCHC RBC-ENTMCNC: 28.1 PG — SIGNIFICANT CHANGE UP (ref 27–34)
MCHC RBC-ENTMCNC: 28.1 PG — SIGNIFICANT CHANGE UP (ref 27–34)
MCHC RBC-ENTMCNC: 28.2 PG — SIGNIFICANT CHANGE UP (ref 27–34)
MCHC RBC-ENTMCNC: 28.3 PG — SIGNIFICANT CHANGE UP (ref 27–34)
MCHC RBC-ENTMCNC: 28.4 PG — SIGNIFICANT CHANGE UP (ref 27–34)
MCHC RBC-ENTMCNC: 28.4 PG — SIGNIFICANT CHANGE UP (ref 27–34)
MCHC RBC-ENTMCNC: 28.5 PG — SIGNIFICANT CHANGE UP (ref 27–34)
MCHC RBC-ENTMCNC: 28.6 PG — SIGNIFICANT CHANGE UP (ref 27–34)
MCHC RBC-ENTMCNC: 28.7 PG — SIGNIFICANT CHANGE UP (ref 27–34)
MCHC RBC-ENTMCNC: 28.8 PG — SIGNIFICANT CHANGE UP (ref 27–34)
MCHC RBC-ENTMCNC: 28.9 PG — SIGNIFICANT CHANGE UP (ref 27–34)
MCHC RBC-ENTMCNC: 29 PG — SIGNIFICANT CHANGE UP (ref 27–34)
MCHC RBC-ENTMCNC: 29.1 PG — SIGNIFICANT CHANGE UP (ref 27–34)
MCHC RBC-ENTMCNC: 29.2 PG — SIGNIFICANT CHANGE UP (ref 27–34)
MCHC RBC-ENTMCNC: 29.3 PG — SIGNIFICANT CHANGE UP (ref 27–34)
MCHC RBC-ENTMCNC: 29.3 PG — SIGNIFICANT CHANGE UP (ref 27–34)
MCHC RBC-ENTMCNC: 29.4 PG — SIGNIFICANT CHANGE UP (ref 27–34)
MCHC RBC-ENTMCNC: 29.5 PG — SIGNIFICANT CHANGE UP (ref 27–34)
MCHC RBC-ENTMCNC: 29.7 PG — SIGNIFICANT CHANGE UP (ref 27–34)
MCHC RBC-ENTMCNC: 29.8 GM/DL — LOW (ref 32–36)
MCHC RBC-ENTMCNC: 29.8 PG — SIGNIFICANT CHANGE UP (ref 27–34)
MCHC RBC-ENTMCNC: 29.8 PG — SIGNIFICANT CHANGE UP (ref 27–34)
MCHC RBC-ENTMCNC: 30 PG — SIGNIFICANT CHANGE UP (ref 27–34)
MCHC RBC-ENTMCNC: 30.1 GM/DL — LOW (ref 32–36)
MCHC RBC-ENTMCNC: 30.1 PG — SIGNIFICANT CHANGE UP (ref 27–34)
MCHC RBC-ENTMCNC: 30.3 GM/DL — LOW (ref 32–36)
MCHC RBC-ENTMCNC: 30.3 PG — SIGNIFICANT CHANGE UP (ref 27–34)
MCHC RBC-ENTMCNC: 30.4 GM/DL — LOW (ref 32–36)
MCHC RBC-ENTMCNC: 30.5 GM/DL — LOW (ref 32–36)
MCHC RBC-ENTMCNC: 30.6 GM/DL — LOW (ref 32–36)
MCHC RBC-ENTMCNC: 30.7 GM/DL — LOW (ref 32–36)
MCHC RBC-ENTMCNC: 30.7 GM/DL — LOW (ref 32–36)
MCHC RBC-ENTMCNC: 30.9 GM/DL — LOW (ref 32–36)
MCHC RBC-ENTMCNC: 31 GM/DL — LOW (ref 32–36)
MCHC RBC-ENTMCNC: 31.1 GM/DL — LOW (ref 32–36)
MCHC RBC-ENTMCNC: 31.2 GM/DL — LOW (ref 32–36)
MCHC RBC-ENTMCNC: 31.3 GM/DL — LOW (ref 32–36)
MCHC RBC-ENTMCNC: 31.4 GM/DL — LOW (ref 32–36)
MCHC RBC-ENTMCNC: 31.4 GM/DL — LOW (ref 32–36)
MCHC RBC-ENTMCNC: 31.5 GM/DL — LOW (ref 32–36)
MCHC RBC-ENTMCNC: 31.6 GM/DL — LOW (ref 32–36)
MCHC RBC-ENTMCNC: 31.7 GM/DL — LOW (ref 32–36)
MCHC RBC-ENTMCNC: 31.7 GM/DL — LOW (ref 32–36)
MCHC RBC-ENTMCNC: 31.8 GM/DL — LOW (ref 32–36)
MCHC RBC-ENTMCNC: 31.9 GM/DL — LOW (ref 32–36)
MCHC RBC-ENTMCNC: 31.9 GM/DL — LOW (ref 32–36)
MCHC RBC-ENTMCNC: 32 GM/DL — SIGNIFICANT CHANGE UP (ref 32–36)
MCHC RBC-ENTMCNC: 32.1 GM/DL — SIGNIFICANT CHANGE UP (ref 32–36)
MCHC RBC-ENTMCNC: 32.1 GM/DL — SIGNIFICANT CHANGE UP (ref 32–36)
MCHC RBC-ENTMCNC: 32.2 GM/DL — SIGNIFICANT CHANGE UP (ref 32–36)
MCHC RBC-ENTMCNC: 32.2 GM/DL — SIGNIFICANT CHANGE UP (ref 32–36)
MCHC RBC-ENTMCNC: 32.4 GM/DL — SIGNIFICANT CHANGE UP (ref 32–36)
MCHC RBC-ENTMCNC: 32.6 GM/DL — SIGNIFICANT CHANGE UP (ref 32–36)
MCHC RBC-ENTMCNC: 32.6 GM/DL — SIGNIFICANT CHANGE UP (ref 32–36)
MCHC RBC-ENTMCNC: 32.7 GM/DL — SIGNIFICANT CHANGE UP (ref 32–36)
MCHC RBC-ENTMCNC: 33 GM/DL — SIGNIFICANT CHANGE UP (ref 32–36)
MCHC RBC-ENTMCNC: 33.1 GM/DL — SIGNIFICANT CHANGE UP (ref 32–36)
MCV RBC AUTO: 86.8 FL — SIGNIFICANT CHANGE UP (ref 80–100)
MCV RBC AUTO: 86.8 FL — SIGNIFICANT CHANGE UP (ref 80–100)
MCV RBC AUTO: 87 FL — SIGNIFICANT CHANGE UP (ref 80–100)
MCV RBC AUTO: 87.1 FL — SIGNIFICANT CHANGE UP (ref 80–100)
MCV RBC AUTO: 87.6 FL — SIGNIFICANT CHANGE UP (ref 80–100)
MCV RBC AUTO: 88.7 FL — SIGNIFICANT CHANGE UP (ref 80–100)
MCV RBC AUTO: 88.9 FL — SIGNIFICANT CHANGE UP (ref 80–100)
MCV RBC AUTO: 88.9 FL — SIGNIFICANT CHANGE UP (ref 80–100)
MCV RBC AUTO: 89 FL — SIGNIFICANT CHANGE UP (ref 80–100)
MCV RBC AUTO: 89 FL — SIGNIFICANT CHANGE UP (ref 80–100)
MCV RBC AUTO: 89.1 FL — SIGNIFICANT CHANGE UP (ref 80–100)
MCV RBC AUTO: 89.3 FL — SIGNIFICANT CHANGE UP (ref 80–100)
MCV RBC AUTO: 89.8 FL — SIGNIFICANT CHANGE UP (ref 80–100)
MCV RBC AUTO: 90.1 FL — SIGNIFICANT CHANGE UP (ref 80–100)
MCV RBC AUTO: 90.1 FL — SIGNIFICANT CHANGE UP (ref 80–100)
MCV RBC AUTO: 90.3 FL — SIGNIFICANT CHANGE UP (ref 80–100)
MCV RBC AUTO: 90.4 FL — SIGNIFICANT CHANGE UP (ref 80–100)
MCV RBC AUTO: 90.5 FL — SIGNIFICANT CHANGE UP (ref 80–100)
MCV RBC AUTO: 90.8 FL — SIGNIFICANT CHANGE UP (ref 80–100)
MCV RBC AUTO: 90.8 FL — SIGNIFICANT CHANGE UP (ref 80–100)
MCV RBC AUTO: 90.9 FL — SIGNIFICANT CHANGE UP (ref 80–100)
MCV RBC AUTO: 91 FL — SIGNIFICANT CHANGE UP (ref 80–100)
MCV RBC AUTO: 91.2 FL — SIGNIFICANT CHANGE UP (ref 80–100)
MCV RBC AUTO: 91.4 FL — SIGNIFICANT CHANGE UP (ref 80–100)
MCV RBC AUTO: 91.5 FL — SIGNIFICANT CHANGE UP (ref 80–100)
MCV RBC AUTO: 91.5 FL — SIGNIFICANT CHANGE UP (ref 80–100)
MCV RBC AUTO: 91.6 FL — SIGNIFICANT CHANGE UP (ref 80–100)
MCV RBC AUTO: 91.6 FL — SIGNIFICANT CHANGE UP (ref 80–100)
MCV RBC AUTO: 91.7 FL — SIGNIFICANT CHANGE UP (ref 80–100)
MCV RBC AUTO: 91.8 FL — SIGNIFICANT CHANGE UP (ref 80–100)
MCV RBC AUTO: 91.9 FL — SIGNIFICANT CHANGE UP (ref 80–100)
MCV RBC AUTO: 91.9 FL — SIGNIFICANT CHANGE UP (ref 80–100)
MCV RBC AUTO: 92.1 FL — SIGNIFICANT CHANGE UP (ref 80–100)
MCV RBC AUTO: 92.2 FL — SIGNIFICANT CHANGE UP (ref 80–100)
MCV RBC AUTO: 92.3 FL — SIGNIFICANT CHANGE UP (ref 80–100)
MCV RBC AUTO: 92.6 FL — SIGNIFICANT CHANGE UP (ref 80–100)
MCV RBC AUTO: 92.6 FL — SIGNIFICANT CHANGE UP (ref 80–100)
MCV RBC AUTO: 93.1 FL — SIGNIFICANT CHANGE UP (ref 80–100)
MCV RBC AUTO: 93.2 FL — SIGNIFICANT CHANGE UP (ref 80–100)
MCV RBC AUTO: 93.3 FL — SIGNIFICANT CHANGE UP (ref 80–100)
MCV RBC AUTO: 93.5 FL — SIGNIFICANT CHANGE UP (ref 80–100)
MCV RBC AUTO: 93.6 FL — SIGNIFICANT CHANGE UP (ref 80–100)
MCV RBC AUTO: 93.6 FL — SIGNIFICANT CHANGE UP (ref 80–100)
MCV RBC AUTO: 93.8 FL — SIGNIFICANT CHANGE UP (ref 80–100)
MCV RBC AUTO: 94.2 FL — SIGNIFICANT CHANGE UP (ref 80–100)
MCV RBC AUTO: 95 FL — SIGNIFICANT CHANGE UP (ref 80–100)
MCV RBC AUTO: 95.3 FL — SIGNIFICANT CHANGE UP (ref 80–100)
MCV RBC AUTO: 95.3 FL — SIGNIFICANT CHANGE UP (ref 80–100)
METHOD TYPE: SIGNIFICANT CHANGE UP
METHOD TYPE: SIGNIFICANT CHANGE UP
MICROCYTES BLD QL: SLIGHT — SIGNIFICANT CHANGE UP
MICROCYTES BLD QL: SLIGHT — SIGNIFICANT CHANGE UP
MONOCYTES # BLD AUTO: 0.4 K/UL — SIGNIFICANT CHANGE UP (ref 0–0.9)
MONOCYTES # BLD AUTO: 0.44 K/UL — SIGNIFICANT CHANGE UP (ref 0–0.9)
MONOCYTES # BLD AUTO: 0.7 K/UL — SIGNIFICANT CHANGE UP (ref 0–0.9)
MONOCYTES # BLD AUTO: 0.85 K/UL — SIGNIFICANT CHANGE UP (ref 0–0.9)
MONOCYTES # BLD AUTO: 0.91 K/UL — HIGH (ref 0–0.9)
MONOCYTES # BLD AUTO: 0.91 K/UL — HIGH (ref 0–0.9)
MONOCYTES # BLD AUTO: 0.93 K/UL — HIGH (ref 0–0.9)
MONOCYTES # BLD AUTO: 1.04 K/UL — HIGH (ref 0–0.9)
MONOCYTES # BLD AUTO: 1.25 K/UL — HIGH (ref 0–0.9)
MONOCYTES # BLD AUTO: 1.28 K/UL — HIGH (ref 0–0.9)
MONOCYTES # BLD AUTO: 1.32 K/UL — HIGH (ref 0–0.9)
MONOCYTES NFR BLD AUTO: 10.1 % — SIGNIFICANT CHANGE UP (ref 2–14)
MONOCYTES NFR BLD AUTO: 10.6 % — SIGNIFICANT CHANGE UP (ref 2–14)
MONOCYTES NFR BLD AUTO: 10.7 % — SIGNIFICANT CHANGE UP (ref 2–14)
MONOCYTES NFR BLD AUTO: 11.1 % — SIGNIFICANT CHANGE UP (ref 2–14)
MONOCYTES NFR BLD AUTO: 4.5 % — SIGNIFICANT CHANGE UP (ref 2–14)
MONOCYTES NFR BLD AUTO: 6 % — SIGNIFICANT CHANGE UP (ref 2–14)
MONOCYTES NFR BLD AUTO: 7.1 % — SIGNIFICANT CHANGE UP (ref 2–14)
MONOCYTES NFR BLD AUTO: 7.4 % — SIGNIFICANT CHANGE UP (ref 2–14)
MONOCYTES NFR BLD AUTO: 7.6 % — SIGNIFICANT CHANGE UP (ref 2–14)
MONOCYTES NFR BLD AUTO: 7.8 % — SIGNIFICANT CHANGE UP (ref 2–14)
MONOCYTES NFR BLD AUTO: 8.3 % — SIGNIFICANT CHANGE UP (ref 2–14)
MRSA PCR RESULT.: DETECTED
NEUTROPHILS # BLD AUTO: 11.44 K/UL — HIGH (ref 1.8–7.4)
NEUTROPHILS # BLD AUTO: 5.54 K/UL — SIGNIFICANT CHANGE UP (ref 1.8–7.4)
NEUTROPHILS # BLD AUTO: 5.66 K/UL — SIGNIFICANT CHANGE UP (ref 1.8–7.4)
NEUTROPHILS # BLD AUTO: 7.01 K/UL — SIGNIFICANT CHANGE UP (ref 1.8–7.4)
NEUTROPHILS # BLD AUTO: 7.5 K/UL — HIGH (ref 1.8–7.4)
NEUTROPHILS # BLD AUTO: 8.06 K/UL — HIGH (ref 1.8–7.4)
NEUTROPHILS # BLD AUTO: 8.52 K/UL — HIGH (ref 1.8–7.4)
NEUTROPHILS # BLD AUTO: 8.77 K/UL — HIGH (ref 1.8–7.4)
NEUTROPHILS # BLD AUTO: 9.19 K/UL — HIGH (ref 1.8–7.4)
NEUTROPHILS # BLD AUTO: 9.93 K/UL — HIGH (ref 1.8–7.4)
NEUTROPHILS # BLD AUTO: 9.99 K/UL — HIGH (ref 1.8–7.4)
NEUTROPHILS NFR BLD AUTO: 70.6 % — SIGNIFICANT CHANGE UP (ref 43–77)
NEUTROPHILS NFR BLD AUTO: 72.1 % — SIGNIFICANT CHANGE UP (ref 43–77)
NEUTROPHILS NFR BLD AUTO: 74.7 % — SIGNIFICANT CHANGE UP (ref 43–77)
NEUTROPHILS NFR BLD AUTO: 75.3 % — SIGNIFICANT CHANGE UP (ref 43–77)
NEUTROPHILS NFR BLD AUTO: 75.9 % — SIGNIFICANT CHANGE UP (ref 43–77)
NEUTROPHILS NFR BLD AUTO: 76.2 % — SIGNIFICANT CHANGE UP (ref 43–77)
NEUTROPHILS NFR BLD AUTO: 76.8 % — SIGNIFICANT CHANGE UP (ref 43–77)
NEUTROPHILS NFR BLD AUTO: 78.4 % — HIGH (ref 43–77)
NEUTROPHILS NFR BLD AUTO: 79.1 % — HIGH (ref 43–77)
NEUTROPHILS NFR BLD AUTO: 79.4 % — HIGH (ref 43–77)
NEUTROPHILS NFR BLD AUTO: 84.1 % — HIGH (ref 43–77)
NITRITE UR-MCNC: NEGATIVE — SIGNIFICANT CHANGE UP
NITRITE UR-MCNC: POSITIVE
NON HDL CHOLESTEROL: 65 MG/DL — SIGNIFICANT CHANGE UP
NRBC # BLD: 0 /100 WBCS — SIGNIFICANT CHANGE UP (ref 0–0)
NRBC # FLD: 0 K/UL — SIGNIFICANT CHANGE UP (ref 0–0)
NRBC # FLD: 0.02 K/UL — HIGH (ref 0–0)
NT-PROBNP SERPL-SCNC: 414 PG/ML — HIGH
NT-PROBNP SERPL-SCNC: 984 PG/ML — HIGH
ORGANISM # SPEC MICROSCOPIC CNT: SIGNIFICANT CHANGE UP
ORGANISM # SPEC MICROSCOPIC CNT: SIGNIFICANT CHANGE UP
OVALOCYTES BLD QL SMEAR: SLIGHT — SIGNIFICANT CHANGE UP
PCO2 BLDV: 38 MMHG — LOW (ref 42–55)
PCO2 BLDV: 39 MMHG — LOW (ref 42–55)
PCO2 BLDV: 44 MMHG — SIGNIFICANT CHANGE UP (ref 42–55)
PCO2 BLDV: 47 MMHG — SIGNIFICANT CHANGE UP (ref 42–55)
PCO2 BLDV: 49 MMHG — SIGNIFICANT CHANGE UP (ref 42–55)
PCO2 BLDV: 54 MMHG — SIGNIFICANT CHANGE UP (ref 42–55)
PCO2 BLDV: 54 MMHG — SIGNIFICANT CHANGE UP (ref 42–55)
PH BLDV: 7.24 — LOW (ref 7.32–7.43)
PH BLDV: 7.24 — LOW (ref 7.32–7.43)
PH BLDV: 7.29 — LOW (ref 7.32–7.43)
PH BLDV: 7.3 — LOW (ref 7.32–7.43)
PH BLDV: 7.32 — SIGNIFICANT CHANGE UP (ref 7.32–7.43)
PH BLDV: 7.41 — SIGNIFICANT CHANGE UP (ref 7.32–7.43)
PH BLDV: 7.41 — SIGNIFICANT CHANGE UP (ref 7.32–7.43)
PH UR: 6 — SIGNIFICANT CHANGE UP (ref 5–8)
PH UR: 6.5 — SIGNIFICANT CHANGE UP (ref 5–8)
PH UR: 6.5 — SIGNIFICANT CHANGE UP (ref 5–8)
PH UR: 7 — SIGNIFICANT CHANGE UP (ref 5–8)
PHOSPHATE SERPL-MCNC: 1.5 MG/DL — LOW (ref 2.5–4.5)
PHOSPHATE SERPL-MCNC: 1.9 MG/DL — LOW (ref 2.5–4.5)
PHOSPHATE SERPL-MCNC: 2.9 MG/DL — SIGNIFICANT CHANGE UP (ref 2.5–4.5)
PHOSPHATE SERPL-MCNC: 3.1 MG/DL — SIGNIFICANT CHANGE UP (ref 2.5–4.5)
PHOSPHATE SERPL-MCNC: 3.4 MG/DL — SIGNIFICANT CHANGE UP (ref 2.5–4.5)
PHOSPHATE SERPL-MCNC: 3.8 MG/DL — SIGNIFICANT CHANGE UP (ref 2.5–4.5)
PHOSPHATE SERPL-MCNC: 3.8 MG/DL — SIGNIFICANT CHANGE UP (ref 2.5–4.5)
PHOSPHATE SERPL-MCNC: 3.9 MG/DL — SIGNIFICANT CHANGE UP (ref 2.5–4.5)
PHOSPHATE SERPL-MCNC: 4 MG/DL — SIGNIFICANT CHANGE UP (ref 2.5–4.5)
PHOSPHATE SERPL-MCNC: 4.1 MG/DL — SIGNIFICANT CHANGE UP (ref 2.5–4.5)
PHOSPHATE SERPL-MCNC: 4.2 MG/DL — SIGNIFICANT CHANGE UP (ref 2.5–4.5)
PHOSPHATE SERPL-MCNC: 4.2 MG/DL — SIGNIFICANT CHANGE UP (ref 2.5–4.5)
PHOSPHATE SERPL-MCNC: 4.3 MG/DL — SIGNIFICANT CHANGE UP (ref 2.5–4.5)
PHOSPHATE SERPL-MCNC: 4.3 MG/DL — SIGNIFICANT CHANGE UP (ref 2.5–4.5)
PHOSPHATE SERPL-MCNC: 4.4 MG/DL — SIGNIFICANT CHANGE UP (ref 2.5–4.5)
PHOSPHATE SERPL-MCNC: 4.5 MG/DL — SIGNIFICANT CHANGE UP (ref 2.5–4.5)
PHOSPHATE SERPL-MCNC: 4.7 MG/DL — HIGH (ref 2.5–4.5)
PHOSPHATE SERPL-MCNC: 4.7 MG/DL — HIGH (ref 2.5–4.5)
PHOSPHATE SERPL-MCNC: 4.8 MG/DL — HIGH (ref 2.5–4.5)
PHOSPHATE SERPL-MCNC: 4.8 MG/DL — HIGH (ref 2.5–4.5)
PHOSPHATE SERPL-MCNC: 4.9 MG/DL — HIGH (ref 2.5–4.5)
PHOSPHATE SERPL-MCNC: 4.9 MG/DL — HIGH (ref 2.5–4.5)
PHOSPHATE SERPL-MCNC: 5 MG/DL — HIGH (ref 2.5–4.5)
PHOSPHATE SERPL-MCNC: 5.4 MG/DL — HIGH (ref 2.5–4.5)
PHOSPHATE SERPL-MCNC: 5.5 MG/DL — HIGH (ref 2.5–4.5)
PHOSPHATE SERPL-MCNC: 5.5 MG/DL — HIGH (ref 2.5–4.5)
PHOSPHATE SERPL-MCNC: 5.6 MG/DL — HIGH (ref 2.5–4.5)
PHOSPHATE SERPL-MCNC: 5.6 MG/DL — HIGH (ref 2.5–4.5)
PHOSPHATE SERPL-MCNC: 6 MG/DL — HIGH (ref 2.5–4.5)
PHOSPHATE SERPL-MCNC: 6.1 MG/DL — HIGH (ref 2.5–4.5)
PHOSPHATE SERPL-MCNC: 6.3 MG/DL — HIGH (ref 2.5–4.5)
PHOSPHATE SERPL-MCNC: 6.4 MG/DL — HIGH (ref 2.5–4.5)
PHOSPHATE SERPL-MCNC: 6.5 MG/DL — HIGH (ref 2.5–4.5)
PHOSPHATE SERPL-MCNC: 6.6 MG/DL — HIGH (ref 2.5–4.5)
PHOSPHATE SERPL-MCNC: 7.1 MG/DL — HIGH (ref 2.5–4.5)
PHOSPHATE SERPL-MCNC: 7.3 MG/DL — HIGH (ref 2.5–4.5)
PLAT MORPH BLD: NORMAL — SIGNIFICANT CHANGE UP
PLAT MORPH BLD: NORMAL — SIGNIFICANT CHANGE UP
PLATELET # BLD AUTO: 117 K/UL — LOW (ref 150–400)
PLATELET # BLD AUTO: 121 K/UL — LOW (ref 150–400)
PLATELET # BLD AUTO: 122 K/UL — LOW (ref 150–400)
PLATELET # BLD AUTO: 123 K/UL — LOW (ref 150–400)
PLATELET # BLD AUTO: 123 K/UL — LOW (ref 150–400)
PLATELET # BLD AUTO: 125 K/UL — LOW (ref 150–400)
PLATELET # BLD AUTO: 126 K/UL — LOW (ref 150–400)
PLATELET # BLD AUTO: 129 K/UL — LOW (ref 150–400)
PLATELET # BLD AUTO: 130 K/UL — LOW (ref 150–400)
PLATELET # BLD AUTO: 130 K/UL — LOW (ref 150–400)
PLATELET # BLD AUTO: 131 K/UL — LOW (ref 150–400)
PLATELET # BLD AUTO: 132 K/UL — LOW (ref 150–400)
PLATELET # BLD AUTO: 133 K/UL — LOW (ref 150–400)
PLATELET # BLD AUTO: 137 K/UL — LOW (ref 150–400)
PLATELET # BLD AUTO: 138 K/UL — LOW (ref 150–400)
PLATELET # BLD AUTO: 139 K/UL — LOW (ref 150–400)
PLATELET # BLD AUTO: 140 K/UL — LOW (ref 150–400)
PLATELET # BLD AUTO: 142 K/UL — LOW (ref 150–400)
PLATELET # BLD AUTO: 143 K/UL — LOW (ref 150–400)
PLATELET # BLD AUTO: 144 K/UL — LOW (ref 150–400)
PLATELET # BLD AUTO: 145 K/UL — LOW (ref 150–400)
PLATELET # BLD AUTO: 148 K/UL — LOW (ref 150–400)
PLATELET # BLD AUTO: 148 K/UL — LOW (ref 150–400)
PLATELET # BLD AUTO: 152 K/UL — SIGNIFICANT CHANGE UP (ref 150–400)
PLATELET # BLD AUTO: 158 K/UL — SIGNIFICANT CHANGE UP (ref 150–400)
PLATELET # BLD AUTO: 159 K/UL — SIGNIFICANT CHANGE UP (ref 150–400)
PLATELET # BLD AUTO: 160 K/UL — SIGNIFICANT CHANGE UP (ref 150–400)
PLATELET # BLD AUTO: 161 K/UL — SIGNIFICANT CHANGE UP (ref 150–400)
PLATELET # BLD AUTO: 161 K/UL — SIGNIFICANT CHANGE UP (ref 150–400)
PLATELET # BLD AUTO: 162 K/UL — SIGNIFICANT CHANGE UP (ref 150–400)
PLATELET # BLD AUTO: 165 K/UL — SIGNIFICANT CHANGE UP (ref 150–400)
PLATELET # BLD AUTO: 166 K/UL — SIGNIFICANT CHANGE UP (ref 150–400)
PLATELET # BLD AUTO: 166 K/UL — SIGNIFICANT CHANGE UP (ref 150–400)
PLATELET # BLD AUTO: 167 K/UL — SIGNIFICANT CHANGE UP (ref 150–400)
PLATELET # BLD AUTO: 167 K/UL — SIGNIFICANT CHANGE UP (ref 150–400)
PLATELET # BLD AUTO: 168 K/UL — SIGNIFICANT CHANGE UP (ref 150–400)
PLATELET # BLD AUTO: 171 K/UL — SIGNIFICANT CHANGE UP (ref 150–400)
PLATELET # BLD AUTO: 172 K/UL — SIGNIFICANT CHANGE UP (ref 150–400)
PLATELET # BLD AUTO: 176 K/UL — SIGNIFICANT CHANGE UP (ref 150–400)
PLATELET # BLD AUTO: 176 K/UL — SIGNIFICANT CHANGE UP (ref 150–400)
PLATELET # BLD AUTO: 180 K/UL — SIGNIFICANT CHANGE UP (ref 150–400)
PLATELET # BLD AUTO: 181 K/UL — SIGNIFICANT CHANGE UP (ref 150–400)
PLATELET # BLD AUTO: 183 K/UL — SIGNIFICANT CHANGE UP (ref 150–400)
PLATELET # BLD AUTO: 184 K/UL — SIGNIFICANT CHANGE UP (ref 150–400)
PLATELET # BLD AUTO: 188 K/UL — SIGNIFICANT CHANGE UP (ref 150–400)
PLATELET # BLD AUTO: 190 K/UL — SIGNIFICANT CHANGE UP (ref 150–400)
PLATELET # BLD AUTO: 195 K/UL — SIGNIFICANT CHANGE UP (ref 150–400)
PLATELET # BLD AUTO: 197 K/UL — SIGNIFICANT CHANGE UP (ref 150–400)
PLATELET # BLD AUTO: 203 K/UL — SIGNIFICANT CHANGE UP (ref 150–400)
PLATELET # BLD AUTO: 204 K/UL — SIGNIFICANT CHANGE UP (ref 150–400)
PLATELET # BLD AUTO: 206 K/UL — SIGNIFICANT CHANGE UP (ref 150–400)
PLATELET # BLD AUTO: 208 K/UL — SIGNIFICANT CHANGE UP (ref 150–400)
PLATELET # BLD AUTO: 219 K/UL — SIGNIFICANT CHANGE UP (ref 150–400)
PLATELET # BLD AUTO: 219 K/UL — SIGNIFICANT CHANGE UP (ref 150–400)
PLATELET # BLD AUTO: 224 K/UL — SIGNIFICANT CHANGE UP (ref 150–400)
PO2 BLDV: 23 MMHG — LOW (ref 25–45)
PO2 BLDV: 25 MMHG — SIGNIFICANT CHANGE UP (ref 25–45)
PO2 BLDV: 27 MMHG — SIGNIFICANT CHANGE UP (ref 25–45)
PO2 BLDV: 29 MMHG — SIGNIFICANT CHANGE UP (ref 25–45)
PO2 BLDV: 46 MMHG — HIGH (ref 25–45)
PO2 BLDV: 51 MMHG — HIGH (ref 25–45)
PO2 BLDV: 63 MMHG — HIGH (ref 25–45)
POIKILOCYTOSIS BLD QL AUTO: SLIGHT — SIGNIFICANT CHANGE UP
POLYCHROMASIA BLD QL SMEAR: SLIGHT — SIGNIFICANT CHANGE UP
POTASSIUM BLDV-SCNC: 3.8 MMOL/L — SIGNIFICANT CHANGE UP (ref 3.5–5.1)
POTASSIUM BLDV-SCNC: 4.1 MMOL/L — SIGNIFICANT CHANGE UP (ref 3.5–5.1)
POTASSIUM BLDV-SCNC: 4.2 MMOL/L — SIGNIFICANT CHANGE UP (ref 3.5–5.1)
POTASSIUM BLDV-SCNC: 4.4 MMOL/L — SIGNIFICANT CHANGE UP (ref 3.5–5.1)
POTASSIUM BLDV-SCNC: 4.6 MMOL/L — SIGNIFICANT CHANGE UP (ref 3.5–5.1)
POTASSIUM BLDV-SCNC: 4.9 MMOL/L — SIGNIFICANT CHANGE UP (ref 3.5–5.1)
POTASSIUM BLDV-SCNC: 5.1 MMOL/L — SIGNIFICANT CHANGE UP (ref 3.5–5.1)
POTASSIUM SERPL-MCNC: 2.8 MMOL/L — CRITICAL LOW (ref 3.5–5.3)
POTASSIUM SERPL-MCNC: 3.5 MMOL/L — SIGNIFICANT CHANGE UP (ref 3.5–5.3)
POTASSIUM SERPL-MCNC: 3.6 MMOL/L — SIGNIFICANT CHANGE UP (ref 3.5–5.3)
POTASSIUM SERPL-MCNC: 3.7 MMOL/L — SIGNIFICANT CHANGE UP (ref 3.5–5.3)
POTASSIUM SERPL-MCNC: 3.8 MMOL/L — SIGNIFICANT CHANGE UP (ref 3.5–5.3)
POTASSIUM SERPL-MCNC: 3.8 MMOL/L — SIGNIFICANT CHANGE UP (ref 3.5–5.3)
POTASSIUM SERPL-MCNC: 3.9 MMOL/L — SIGNIFICANT CHANGE UP (ref 3.5–5.3)
POTASSIUM SERPL-MCNC: 4 MMOL/L — SIGNIFICANT CHANGE UP (ref 3.5–5.3)
POTASSIUM SERPL-MCNC: 4.1 MMOL/L — SIGNIFICANT CHANGE UP (ref 3.5–5.3)
POTASSIUM SERPL-MCNC: 4.2 MMOL/L — SIGNIFICANT CHANGE UP (ref 3.5–5.3)
POTASSIUM SERPL-MCNC: 4.3 MMOL/L — SIGNIFICANT CHANGE UP (ref 3.5–5.3)
POTASSIUM SERPL-MCNC: 4.4 MMOL/L — SIGNIFICANT CHANGE UP (ref 3.5–5.3)
POTASSIUM SERPL-MCNC: 4.5 MMOL/L — SIGNIFICANT CHANGE UP (ref 3.5–5.3)
POTASSIUM SERPL-MCNC: 4.6 MMOL/L — SIGNIFICANT CHANGE UP (ref 3.5–5.3)
POTASSIUM SERPL-MCNC: 4.7 MMOL/L — SIGNIFICANT CHANGE UP (ref 3.5–5.3)
POTASSIUM SERPL-MCNC: 4.8 MMOL/L — SIGNIFICANT CHANGE UP (ref 3.5–5.3)
POTASSIUM SERPL-MCNC: 5 MMOL/L — SIGNIFICANT CHANGE UP (ref 3.5–5.3)
POTASSIUM SERPL-MCNC: 5.1 MMOL/L — SIGNIFICANT CHANGE UP (ref 3.5–5.3)
POTASSIUM SERPL-MCNC: 5.2 MMOL/L — SIGNIFICANT CHANGE UP (ref 3.5–5.3)
POTASSIUM SERPL-MCNC: 5.7 MMOL/L — HIGH (ref 3.5–5.3)
POTASSIUM SERPL-MCNC: 6.3 MMOL/L — CRITICAL HIGH (ref 3.5–5.3)
POTASSIUM SERPL-SCNC: 2.8 MMOL/L — CRITICAL LOW (ref 3.5–5.3)
POTASSIUM SERPL-SCNC: 3.5 MMOL/L — SIGNIFICANT CHANGE UP (ref 3.5–5.3)
POTASSIUM SERPL-SCNC: 3.6 MMOL/L — SIGNIFICANT CHANGE UP (ref 3.5–5.3)
POTASSIUM SERPL-SCNC: 3.7 MMOL/L — SIGNIFICANT CHANGE UP (ref 3.5–5.3)
POTASSIUM SERPL-SCNC: 3.8 MMOL/L — SIGNIFICANT CHANGE UP (ref 3.5–5.3)
POTASSIUM SERPL-SCNC: 3.8 MMOL/L — SIGNIFICANT CHANGE UP (ref 3.5–5.3)
POTASSIUM SERPL-SCNC: 3.9 MMOL/L — SIGNIFICANT CHANGE UP (ref 3.5–5.3)
POTASSIUM SERPL-SCNC: 4 MMOL/L — SIGNIFICANT CHANGE UP (ref 3.5–5.3)
POTASSIUM SERPL-SCNC: 4.1 MMOL/L — SIGNIFICANT CHANGE UP (ref 3.5–5.3)
POTASSIUM SERPL-SCNC: 4.2 MMOL/L — SIGNIFICANT CHANGE UP (ref 3.5–5.3)
POTASSIUM SERPL-SCNC: 4.3 MMOL/L — SIGNIFICANT CHANGE UP (ref 3.5–5.3)
POTASSIUM SERPL-SCNC: 4.4 MMOL/L — SIGNIFICANT CHANGE UP (ref 3.5–5.3)
POTASSIUM SERPL-SCNC: 4.5 MMOL/L — SIGNIFICANT CHANGE UP (ref 3.5–5.3)
POTASSIUM SERPL-SCNC: 4.6 MMOL/L — SIGNIFICANT CHANGE UP (ref 3.5–5.3)
POTASSIUM SERPL-SCNC: 4.7 MMOL/L — SIGNIFICANT CHANGE UP (ref 3.5–5.3)
POTASSIUM SERPL-SCNC: 4.8 MMOL/L — SIGNIFICANT CHANGE UP (ref 3.5–5.3)
POTASSIUM SERPL-SCNC: 5 MMOL/L — SIGNIFICANT CHANGE UP (ref 3.5–5.3)
POTASSIUM SERPL-SCNC: 5.1 MMOL/L — SIGNIFICANT CHANGE UP (ref 3.5–5.3)
POTASSIUM SERPL-SCNC: 5.2 MMOL/L — SIGNIFICANT CHANGE UP (ref 3.5–5.3)
POTASSIUM SERPL-SCNC: 5.7 MMOL/L — HIGH (ref 3.5–5.3)
POTASSIUM SERPL-SCNC: 6.3 MMOL/L — CRITICAL HIGH (ref 3.5–5.3)
PROT SERPL-MCNC: 6 G/DL — SIGNIFICANT CHANGE UP (ref 6–8.3)
PROT SERPL-MCNC: 6 G/DL — SIGNIFICANT CHANGE UP (ref 6–8.3)
PROT SERPL-MCNC: 6.2 G/DL — SIGNIFICANT CHANGE UP (ref 6–8.3)
PROT SERPL-MCNC: 6.3 G/DL — SIGNIFICANT CHANGE UP (ref 6–8.3)
PROT SERPL-MCNC: 6.4 G/DL — SIGNIFICANT CHANGE UP (ref 6–8.3)
PROT SERPL-MCNC: 6.7 G/DL — SIGNIFICANT CHANGE UP (ref 6–8.3)
PROT SERPL-MCNC: 6.7 G/DL — SIGNIFICANT CHANGE UP (ref 6–8.3)
PROT SERPL-MCNC: 6.8 G/DL — SIGNIFICANT CHANGE UP (ref 6–8.3)
PROT SERPL-MCNC: 6.9 G/DL — SIGNIFICANT CHANGE UP (ref 6–8.3)
PROT SERPL-MCNC: 7.1 G/DL — SIGNIFICANT CHANGE UP (ref 6–8.3)
PROT SERPL-MCNC: 7.2 G/DL — SIGNIFICANT CHANGE UP (ref 6–8.3)
PROT UR-MCNC: ABNORMAL
PROTHROM AB SERPL-ACNC: 15.6 SEC — HIGH (ref 10.5–13.4)
PROTHROM AB SERPL-ACNC: 18 SEC — HIGH (ref 10.5–13.4)
PROTHROM AB SERPL-ACNC: 19 SEC — HIGH (ref 10.5–13.4)
PROTHROM AB SERPL-ACNC: 20.4 SEC — HIGH (ref 10.5–13.4)
RAPID RVP RESULT: SIGNIFICANT CHANGE UP
RBC # BLD: 2.24 M/UL — LOW (ref 4.2–5.8)
RBC # BLD: 2.33 M/UL — LOW (ref 4.2–5.8)
RBC # BLD: 2.33 M/UL — LOW (ref 4.2–5.8)
RBC # BLD: 2.34 M/UL — LOW (ref 4.2–5.8)
RBC # BLD: 2.39 M/UL — LOW (ref 4.2–5.8)
RBC # BLD: 2.39 M/UL — LOW (ref 4.2–5.8)
RBC # BLD: 2.41 M/UL — LOW (ref 4.2–5.8)
RBC # BLD: 2.43 M/UL — LOW (ref 4.2–5.8)
RBC # BLD: 2.44 M/UL — LOW (ref 4.2–5.8)
RBC # BLD: 2.44 M/UL — LOW (ref 4.2–5.8)
RBC # BLD: 2.54 M/UL — LOW (ref 4.2–5.8)
RBC # BLD: 2.57 M/UL — LOW (ref 4.2–5.8)
RBC # BLD: 2.6 M/UL — LOW (ref 4.2–5.8)
RBC # BLD: 2.62 M/UL — LOW (ref 4.2–5.8)
RBC # BLD: 2.63 M/UL — LOW (ref 4.2–5.8)
RBC # BLD: 2.64 M/UL — LOW (ref 4.2–5.8)
RBC # BLD: 2.66 M/UL — LOW (ref 4.2–5.8)
RBC # BLD: 2.71 M/UL — LOW (ref 4.2–5.8)
RBC # BLD: 2.71 M/UL — LOW (ref 4.2–5.8)
RBC # BLD: 2.73 M/UL — LOW (ref 4.2–5.8)
RBC # BLD: 2.77 M/UL — LOW (ref 4.2–5.8)
RBC # BLD: 2.81 M/UL — LOW (ref 4.2–5.8)
RBC # BLD: 2.82 M/UL — LOW (ref 4.2–5.8)
RBC # BLD: 2.83 M/UL — LOW (ref 4.2–5.8)
RBC # BLD: 2.85 M/UL — LOW (ref 4.2–5.8)
RBC # BLD: 2.95 M/UL — LOW (ref 4.2–5.8)
RBC # BLD: 2.96 M/UL — LOW (ref 4.2–5.8)
RBC # BLD: 2.99 M/UL — LOW (ref 4.2–5.8)
RBC # BLD: 2.99 M/UL — LOW (ref 4.2–5.8)
RBC # BLD: 3.04 M/UL — LOW (ref 4.2–5.8)
RBC # BLD: 3.05 M/UL — LOW (ref 4.2–5.8)
RBC # BLD: 3.07 M/UL — LOW (ref 4.2–5.8)
RBC # BLD: 3.09 M/UL — LOW (ref 4.2–5.8)
RBC # BLD: 3.1 M/UL — LOW (ref 4.2–5.8)
RBC # BLD: 3.12 M/UL — LOW (ref 4.2–5.8)
RBC # BLD: 3.15 M/UL — LOW (ref 4.2–5.8)
RBC # BLD: 3.22 M/UL — LOW (ref 4.2–5.8)
RBC # BLD: 3.23 M/UL — LOW (ref 4.2–5.8)
RBC # BLD: 3.27 M/UL — LOW (ref 4.2–5.8)
RBC # BLD: 3.28 M/UL — LOW (ref 4.2–5.8)
RBC # BLD: 3.3 M/UL — LOW (ref 4.2–5.8)
RBC # BLD: 3.33 M/UL — LOW (ref 4.2–5.8)
RBC # BLD: 3.39 M/UL — LOW (ref 4.2–5.8)
RBC # BLD: 3.41 M/UL — LOW (ref 4.2–5.8)
RBC # BLD: 3.45 M/UL — LOW (ref 4.2–5.8)
RBC # BLD: 3.46 M/UL — LOW (ref 4.2–5.8)
RBC # BLD: 3.59 M/UL — LOW (ref 4.2–5.8)
RBC # BLD: 3.63 M/UL — LOW (ref 4.2–5.8)
RBC # BLD: 3.69 M/UL — LOW (ref 4.2–5.8)
RBC # BLD: 3.71 M/UL — LOW (ref 4.2–5.8)
RBC # BLD: 3.72 M/UL — LOW (ref 4.2–5.8)
RBC # BLD: 3.78 M/UL — LOW (ref 4.2–5.8)
RBC # BLD: 3.79 M/UL — LOW (ref 4.2–5.8)
RBC # BLD: 3.83 M/UL — LOW (ref 4.2–5.8)
RBC # BLD: 4.14 M/UL — LOW (ref 4.2–5.8)
RBC # BLD: 4.19 M/UL — LOW (ref 4.2–5.8)
RBC # FLD: 13.6 % — SIGNIFICANT CHANGE UP (ref 10.3–14.5)
RBC # FLD: 13.8 % — SIGNIFICANT CHANGE UP (ref 10.3–14.5)
RBC # FLD: 13.9 % — SIGNIFICANT CHANGE UP (ref 10.3–14.5)
RBC # FLD: 14 % — SIGNIFICANT CHANGE UP (ref 10.3–14.5)
RBC # FLD: 14 % — SIGNIFICANT CHANGE UP (ref 10.3–14.5)
RBC # FLD: 14.1 % — SIGNIFICANT CHANGE UP (ref 10.3–14.5)
RBC # FLD: 14.2 % — SIGNIFICANT CHANGE UP (ref 10.3–14.5)
RBC # FLD: 14.2 % — SIGNIFICANT CHANGE UP (ref 10.3–14.5)
RBC # FLD: 14.3 % — SIGNIFICANT CHANGE UP (ref 10.3–14.5)
RBC # FLD: 14.4 % — SIGNIFICANT CHANGE UP (ref 10.3–14.5)
RBC # FLD: 14.5 % — SIGNIFICANT CHANGE UP (ref 10.3–14.5)
RBC # FLD: 14.6 % — HIGH (ref 10.3–14.5)
RBC # FLD: 14.7 % — HIGH (ref 10.3–14.5)
RBC # FLD: 14.8 % — HIGH (ref 10.3–14.5)
RBC # FLD: 14.9 % — HIGH (ref 10.3–14.5)
RBC # FLD: 15 % — HIGH (ref 10.3–14.5)
RBC # FLD: 15.1 % — HIGH (ref 10.3–14.5)
RBC # FLD: 15.2 % — HIGH (ref 10.3–14.5)
RBC # FLD: 15.3 % — HIGH (ref 10.3–14.5)
RBC # FLD: 15.4 % — HIGH (ref 10.3–14.5)
RBC # FLD: 15.4 % — HIGH (ref 10.3–14.5)
RBC # FLD: 15.5 % — HIGH (ref 10.3–14.5)
RBC # FLD: 15.6 % — HIGH (ref 10.3–14.5)
RBC # FLD: 15.7 % — HIGH (ref 10.3–14.5)
RBC # FLD: 15.7 % — HIGH (ref 10.3–14.5)
RBC # FLD: 15.8 % — HIGH (ref 10.3–14.5)
RBC # FLD: 15.9 % — HIGH (ref 10.3–14.5)
RBC # FLD: 16.1 % — HIGH (ref 10.3–14.5)
RBC # FLD: 16.2 % — HIGH (ref 10.3–14.5)
RBC # FLD: 16.2 % — HIGH (ref 10.3–14.5)
RBC BLD AUTO: ABNORMAL
RBC BLD AUTO: ABNORMAL
RBC CASTS # UR COMP ASSIST: 494 /HPF — HIGH (ref 0–4)
RBC CASTS # UR COMP ASSIST: 76 /HPF — HIGH (ref 0–4)
RBC CASTS # UR COMP ASSIST: 98 /HPF — HIGH (ref 0–4)
RBC CASTS # UR COMP ASSIST: >50 /HPF — HIGH (ref 0–4)
RBC CASTS # UR COMP ASSIST: SIGNIFICANT CHANGE UP /HPF (ref 0–4)
RETICS #: 128 K/UL — HIGH (ref 25–125)
RETICS/RBC NFR: 4.6 % — HIGH (ref 0.5–2.5)
RH IG SCN BLD-IMP: POSITIVE — SIGNIFICANT CHANGE UP
RSV RNA NPH QL NAA+NON-PROBE: SIGNIFICANT CHANGE UP
RSV RNA SPEC QL NAA+PROBE: SIGNIFICANT CHANGE UP
RV+EV RNA SPEC QL NAA+PROBE: SIGNIFICANT CHANGE UP
S AUREUS DNA NOSE QL NAA+PROBE: DETECTED
SAO2 % BLDV: 33.4 % — LOW (ref 67–88)
SAO2 % BLDV: 37 % — LOW (ref 67–88)
SAO2 % BLDV: 42.5 % — LOW (ref 67–88)
SAO2 % BLDV: 50.3 % — LOW (ref 67–88)
SAO2 % BLDV: 69 % — SIGNIFICANT CHANGE UP (ref 67–88)
SAO2 % BLDV: 81.7 % — SIGNIFICANT CHANGE UP (ref 67–88)
SAO2 % BLDV: 92.3 % — HIGH (ref 67–88)
SARS-COV-2 RNA SPEC QL NAA+PROBE: SIGNIFICANT CHANGE UP
SODIUM SERPL-SCNC: 132 MMOL/L — LOW (ref 135–145)
SODIUM SERPL-SCNC: 132 MMOL/L — LOW (ref 135–145)
SODIUM SERPL-SCNC: 133 MMOL/L — LOW (ref 135–145)
SODIUM SERPL-SCNC: 135 MMOL/L — SIGNIFICANT CHANGE UP (ref 135–145)
SODIUM SERPL-SCNC: 135 MMOL/L — SIGNIFICANT CHANGE UP (ref 135–145)
SODIUM SERPL-SCNC: 136 MMOL/L — SIGNIFICANT CHANGE UP (ref 135–145)
SODIUM SERPL-SCNC: 137 MMOL/L — SIGNIFICANT CHANGE UP (ref 135–145)
SODIUM SERPL-SCNC: 138 MMOL/L — SIGNIFICANT CHANGE UP (ref 135–145)
SODIUM SERPL-SCNC: 139 MMOL/L — SIGNIFICANT CHANGE UP (ref 135–145)
SODIUM SERPL-SCNC: 140 MMOL/L — SIGNIFICANT CHANGE UP (ref 135–145)
SODIUM SERPL-SCNC: 141 MMOL/L — SIGNIFICANT CHANGE UP (ref 135–145)
SODIUM SERPL-SCNC: 142 MMOL/L — SIGNIFICANT CHANGE UP (ref 135–145)
SODIUM SERPL-SCNC: 143 MMOL/L — SIGNIFICANT CHANGE UP (ref 135–145)
SODIUM SERPL-SCNC: 143 MMOL/L — SIGNIFICANT CHANGE UP (ref 135–145)
SP GR SPEC: 1.01 — SIGNIFICANT CHANGE UP (ref 1.01–1.02)
SP GR SPEC: 1.01 — SIGNIFICANT CHANGE UP (ref 1.01–1.02)
SP GR SPEC: 1.01 — SIGNIFICANT CHANGE UP (ref 1.01–1.05)
SP GR SPEC: 1.01 — SIGNIFICANT CHANGE UP (ref 1.01–1.05)
SP GR SPEC: 1.02 — SIGNIFICANT CHANGE UP (ref 1.01–1.02)
SP GR SPEC: 1.02 — SIGNIFICANT CHANGE UP (ref 1.01–1.05)
SPECIMEN SOURCE: SIGNIFICANT CHANGE UP
SURGICAL PATHOLOGY STUDY: SIGNIFICANT CHANGE UP
T PALLIDUM AB TITR SER: NEGATIVE — SIGNIFICANT CHANGE UP
T4 FREE SERPL-MCNC: 1.2 NG/DL — SIGNIFICANT CHANGE UP (ref 0.9–1.8)
T4 FREE SERPL-MCNC: 1.6 NG/DL — SIGNIFICANT CHANGE UP (ref 0.9–1.8)
TIBC SERPL-MCNC: 206 UG/DL — LOW (ref 220–430)
TRIGL SERPL-MCNC: 68 MG/DL — SIGNIFICANT CHANGE UP
TROPONIN T, HIGH SENSITIVITY RESULT: 101 NG/L — CRITICAL HIGH
TROPONIN T, HIGH SENSITIVITY RESULT: 101 NG/L — HIGH (ref 0–51)
TROPONIN T, HIGH SENSITIVITY RESULT: 106 NG/L — HIGH (ref 0–51)
TROPONIN T, HIGH SENSITIVITY RESULT: 108 NG/L — CRITICAL HIGH
TROPONIN T, HIGH SENSITIVITY RESULT: 115 NG/L — CRITICAL HIGH
TROPONIN T, HIGH SENSITIVITY RESULT: 116 NG/L — CRITICAL HIGH
TROPONIN T, HIGH SENSITIVITY RESULT: 120 NG/L — CRITICAL HIGH
TROPONIN T, HIGH SENSITIVITY RESULT: 134 NG/L — CRITICAL HIGH
TROPONIN T, HIGH SENSITIVITY RESULT: 157 NG/L — CRITICAL HIGH
TROPONIN T, HIGH SENSITIVITY RESULT: 166 NG/L — CRITICAL HIGH
TROPONIN T, HIGH SENSITIVITY RESULT: 86 NG/L — CRITICAL HIGH
TSH SERPL-MCNC: 0.02 UIU/ML — LOW (ref 0.27–4.2)
TSH SERPL-MCNC: <0.1 UIU/ML — LOW (ref 0.27–4.2)
UIBC SERPL-MCNC: 140 UG/DL — SIGNIFICANT CHANGE UP (ref 110–370)
UROBILINOGEN FLD QL: NEGATIVE — SIGNIFICANT CHANGE UP
UROBILINOGEN FLD QL: SIGNIFICANT CHANGE UP
VANCOMYCIN FLD-MCNC: 10 UG/ML — SIGNIFICANT CHANGE UP
VANCOMYCIN FLD-MCNC: 5.3 UG/ML — SIGNIFICANT CHANGE UP
VARIANT LYMPHS # BLD: 0.9 % — SIGNIFICANT CHANGE UP (ref 0–6)
VIT B12 SERPL-MCNC: 904 PG/ML — SIGNIFICANT CHANGE UP (ref 232–1245)
WBC # BLD: 10.24 K/UL — SIGNIFICANT CHANGE UP (ref 3.8–10.5)
WBC # BLD: 10.29 K/UL — SIGNIFICANT CHANGE UP (ref 3.8–10.5)
WBC # BLD: 10.35 K/UL — SIGNIFICANT CHANGE UP (ref 3.8–10.5)
WBC # BLD: 10.44 K/UL — SIGNIFICANT CHANGE UP (ref 3.8–10.5)
WBC # BLD: 10.61 K/UL — HIGH (ref 3.8–10.5)
WBC # BLD: 10.65 K/UL — HIGH (ref 3.8–10.5)
WBC # BLD: 11.17 K/UL — HIGH (ref 3.8–10.5)
WBC # BLD: 11.22 K/UL — HIGH (ref 3.8–10.5)
WBC # BLD: 11.31 K/UL — HIGH (ref 3.8–10.5)
WBC # BLD: 11.31 K/UL — HIGH (ref 3.8–10.5)
WBC # BLD: 11.48 K/UL — HIGH (ref 3.8–10.5)
WBC # BLD: 11.72 K/UL — HIGH (ref 3.8–10.5)
WBC # BLD: 11.73 K/UL — HIGH (ref 3.8–10.5)
WBC # BLD: 11.95 K/UL — HIGH (ref 3.8–10.5)
WBC # BLD: 11.97 K/UL — HIGH (ref 3.8–10.5)
WBC # BLD: 12.05 K/UL — HIGH (ref 3.8–10.5)
WBC # BLD: 12.1 K/UL — HIGH (ref 3.8–10.5)
WBC # BLD: 12.25 K/UL — HIGH (ref 3.8–10.5)
WBC # BLD: 12.34 K/UL — HIGH (ref 3.8–10.5)
WBC # BLD: 12.67 K/UL — HIGH (ref 3.8–10.5)
WBC # BLD: 12.68 K/UL — HIGH (ref 3.8–10.5)
WBC # BLD: 12.75 K/UL — HIGH (ref 3.8–10.5)
WBC # BLD: 13.04 K/UL — HIGH (ref 3.8–10.5)
WBC # BLD: 13.61 K/UL — HIGH (ref 3.8–10.5)
WBC # BLD: 13.98 K/UL — HIGH (ref 3.8–10.5)
WBC # BLD: 15.47 K/UL — HIGH (ref 3.8–10.5)
WBC # BLD: 16.13 K/UL — HIGH (ref 3.8–10.5)
WBC # BLD: 18.29 K/UL — HIGH (ref 3.8–10.5)
WBC # BLD: 18.5 K/UL — HIGH (ref 3.8–10.5)
WBC # BLD: 3.58 K/UL — LOW (ref 3.8–10.5)
WBC # BLD: 7.2 K/UL — SIGNIFICANT CHANGE UP (ref 3.8–10.5)
WBC # BLD: 7.3 K/UL — SIGNIFICANT CHANGE UP (ref 3.8–10.5)
WBC # BLD: 7.66 K/UL — SIGNIFICANT CHANGE UP (ref 3.8–10.5)
WBC # BLD: 7.74 K/UL — SIGNIFICANT CHANGE UP (ref 3.8–10.5)
WBC # BLD: 7.87 K/UL — SIGNIFICANT CHANGE UP (ref 3.8–10.5)
WBC # BLD: 7.89 K/UL — SIGNIFICANT CHANGE UP (ref 3.8–10.5)
WBC # BLD: 8.02 K/UL — SIGNIFICANT CHANGE UP (ref 3.8–10.5)
WBC # BLD: 8.15 K/UL — SIGNIFICANT CHANGE UP (ref 3.8–10.5)
WBC # BLD: 8.29 K/UL — SIGNIFICANT CHANGE UP (ref 3.8–10.5)
WBC # BLD: 8.43 K/UL — SIGNIFICANT CHANGE UP (ref 3.8–10.5)
WBC # BLD: 8.55 K/UL — SIGNIFICANT CHANGE UP (ref 3.8–10.5)
WBC # BLD: 8.67 K/UL — SIGNIFICANT CHANGE UP (ref 3.8–10.5)
WBC # BLD: 8.83 K/UL — SIGNIFICANT CHANGE UP (ref 3.8–10.5)
WBC # BLD: 8.88 K/UL — SIGNIFICANT CHANGE UP (ref 3.8–10.5)
WBC # BLD: 8.96 K/UL — SIGNIFICANT CHANGE UP (ref 3.8–10.5)
WBC # BLD: 9.13 K/UL — SIGNIFICANT CHANGE UP (ref 3.8–10.5)
WBC # BLD: 9.14 K/UL — SIGNIFICANT CHANGE UP (ref 3.8–10.5)
WBC # BLD: 9.22 K/UL — SIGNIFICANT CHANGE UP (ref 3.8–10.5)
WBC # BLD: 9.22 K/UL — SIGNIFICANT CHANGE UP (ref 3.8–10.5)
WBC # BLD: 9.28 K/UL — SIGNIFICANT CHANGE UP (ref 3.8–10.5)
WBC # BLD: 9.47 K/UL — SIGNIFICANT CHANGE UP (ref 3.8–10.5)
WBC # BLD: 9.48 K/UL — SIGNIFICANT CHANGE UP (ref 3.8–10.5)
WBC # BLD: 9.56 K/UL — SIGNIFICANT CHANGE UP (ref 3.8–10.5)
WBC # BLD: 9.61 K/UL — SIGNIFICANT CHANGE UP (ref 3.8–10.5)
WBC # BLD: 9.72 K/UL — SIGNIFICANT CHANGE UP (ref 3.8–10.5)
WBC # BLD: 9.79 K/UL — SIGNIFICANT CHANGE UP (ref 3.8–10.5)
WBC # BLD: 9.8 K/UL — SIGNIFICANT CHANGE UP (ref 3.8–10.5)
WBC # FLD AUTO: 10.24 K/UL — SIGNIFICANT CHANGE UP (ref 3.8–10.5)
WBC # FLD AUTO: 10.29 K/UL — SIGNIFICANT CHANGE UP (ref 3.8–10.5)
WBC # FLD AUTO: 10.35 K/UL — SIGNIFICANT CHANGE UP (ref 3.8–10.5)
WBC # FLD AUTO: 10.44 K/UL — SIGNIFICANT CHANGE UP (ref 3.8–10.5)
WBC # FLD AUTO: 10.61 K/UL — HIGH (ref 3.8–10.5)
WBC # FLD AUTO: 10.65 K/UL — HIGH (ref 3.8–10.5)
WBC # FLD AUTO: 11.17 K/UL — HIGH (ref 3.8–10.5)
WBC # FLD AUTO: 11.22 K/UL — HIGH (ref 3.8–10.5)
WBC # FLD AUTO: 11.31 K/UL — HIGH (ref 3.8–10.5)
WBC # FLD AUTO: 11.31 K/UL — HIGH (ref 3.8–10.5)
WBC # FLD AUTO: 11.48 K/UL — HIGH (ref 3.8–10.5)
WBC # FLD AUTO: 11.72 K/UL — HIGH (ref 3.8–10.5)
WBC # FLD AUTO: 11.73 K/UL — HIGH (ref 3.8–10.5)
WBC # FLD AUTO: 11.95 K/UL — HIGH (ref 3.8–10.5)
WBC # FLD AUTO: 11.97 K/UL — HIGH (ref 3.8–10.5)
WBC # FLD AUTO: 12.05 K/UL — HIGH (ref 3.8–10.5)
WBC # FLD AUTO: 12.1 K/UL — HIGH (ref 3.8–10.5)
WBC # FLD AUTO: 12.25 K/UL — HIGH (ref 3.8–10.5)
WBC # FLD AUTO: 12.34 K/UL — HIGH (ref 3.8–10.5)
WBC # FLD AUTO: 12.67 K/UL — HIGH (ref 3.8–10.5)
WBC # FLD AUTO: 12.68 K/UL — HIGH (ref 3.8–10.5)
WBC # FLD AUTO: 12.75 K/UL — HIGH (ref 3.8–10.5)
WBC # FLD AUTO: 13.04 K/UL — HIGH (ref 3.8–10.5)
WBC # FLD AUTO: 13.61 K/UL — HIGH (ref 3.8–10.5)
WBC # FLD AUTO: 13.98 K/UL — HIGH (ref 3.8–10.5)
WBC # FLD AUTO: 15.47 K/UL — HIGH (ref 3.8–10.5)
WBC # FLD AUTO: 16.13 K/UL — HIGH (ref 3.8–10.5)
WBC # FLD AUTO: 18.29 K/UL — HIGH (ref 3.8–10.5)
WBC # FLD AUTO: 18.5 K/UL — HIGH (ref 3.8–10.5)
WBC # FLD AUTO: 3.58 K/UL — LOW (ref 3.8–10.5)
WBC # FLD AUTO: 7.2 K/UL — SIGNIFICANT CHANGE UP (ref 3.8–10.5)
WBC # FLD AUTO: 7.3 K/UL — SIGNIFICANT CHANGE UP (ref 3.8–10.5)
WBC # FLD AUTO: 7.66 K/UL — SIGNIFICANT CHANGE UP (ref 3.8–10.5)
WBC # FLD AUTO: 7.74 K/UL — SIGNIFICANT CHANGE UP (ref 3.8–10.5)
WBC # FLD AUTO: 7.87 K/UL — SIGNIFICANT CHANGE UP (ref 3.8–10.5)
WBC # FLD AUTO: 7.89 K/UL — SIGNIFICANT CHANGE UP (ref 3.8–10.5)
WBC # FLD AUTO: 8.02 K/UL — SIGNIFICANT CHANGE UP (ref 3.8–10.5)
WBC # FLD AUTO: 8.15 K/UL — SIGNIFICANT CHANGE UP (ref 3.8–10.5)
WBC # FLD AUTO: 8.29 K/UL — SIGNIFICANT CHANGE UP (ref 3.8–10.5)
WBC # FLD AUTO: 8.43 K/UL — SIGNIFICANT CHANGE UP (ref 3.8–10.5)
WBC # FLD AUTO: 8.55 K/UL — SIGNIFICANT CHANGE UP (ref 3.8–10.5)
WBC # FLD AUTO: 8.67 K/UL — SIGNIFICANT CHANGE UP (ref 3.8–10.5)
WBC # FLD AUTO: 8.83 K/UL — SIGNIFICANT CHANGE UP (ref 3.8–10.5)
WBC # FLD AUTO: 8.88 K/UL — SIGNIFICANT CHANGE UP (ref 3.8–10.5)
WBC # FLD AUTO: 8.96 K/UL — SIGNIFICANT CHANGE UP (ref 3.8–10.5)
WBC # FLD AUTO: 9.13 K/UL — SIGNIFICANT CHANGE UP (ref 3.8–10.5)
WBC # FLD AUTO: 9.14 K/UL — SIGNIFICANT CHANGE UP (ref 3.8–10.5)
WBC # FLD AUTO: 9.22 K/UL — SIGNIFICANT CHANGE UP (ref 3.8–10.5)
WBC # FLD AUTO: 9.22 K/UL — SIGNIFICANT CHANGE UP (ref 3.8–10.5)
WBC # FLD AUTO: 9.28 K/UL — SIGNIFICANT CHANGE UP (ref 3.8–10.5)
WBC # FLD AUTO: 9.47 K/UL — SIGNIFICANT CHANGE UP (ref 3.8–10.5)
WBC # FLD AUTO: 9.48 K/UL — SIGNIFICANT CHANGE UP (ref 3.8–10.5)
WBC # FLD AUTO: 9.56 K/UL — SIGNIFICANT CHANGE UP (ref 3.8–10.5)
WBC # FLD AUTO: 9.61 K/UL — SIGNIFICANT CHANGE UP (ref 3.8–10.5)
WBC # FLD AUTO: 9.72 K/UL — SIGNIFICANT CHANGE UP (ref 3.8–10.5)
WBC # FLD AUTO: 9.79 K/UL — SIGNIFICANT CHANGE UP (ref 3.8–10.5)
WBC # FLD AUTO: 9.8 K/UL — SIGNIFICANT CHANGE UP (ref 3.8–10.5)
WBC UR QL: 2 /HPF — SIGNIFICANT CHANGE UP (ref 0–5)
WBC UR QL: 27 /HPF — HIGH (ref 0–5)
WBC UR QL: 5 /HPF — SIGNIFICANT CHANGE UP (ref 0–5)
WBC UR QL: >50 /HPF — SIGNIFICANT CHANGE UP (ref 0–5)
WBC UR QL: >720 /HPF — HIGH (ref 0–5)

## 2023-01-01 PROCEDURE — 99232 SBSQ HOSP IP/OBS MODERATE 35: CPT | Mod: GC

## 2023-01-01 PROCEDURE — 99232 SBSQ HOSP IP/OBS MODERATE 35: CPT

## 2023-01-01 PROCEDURE — 70450 CT HEAD/BRAIN W/O DYE: CPT | Mod: 26

## 2023-01-01 PROCEDURE — 99223 1ST HOSP IP/OBS HIGH 75: CPT

## 2023-01-01 PROCEDURE — 84100 ASSAY OF PHOSPHORUS: CPT

## 2023-01-01 PROCEDURE — 83010 ASSAY OF HAPTOGLOBIN QUANT: CPT

## 2023-01-01 PROCEDURE — 82803 BLOOD GASES ANY COMBINATION: CPT

## 2023-01-01 PROCEDURE — 93010 ELECTROCARDIOGRAM REPORT: CPT

## 2023-01-01 PROCEDURE — 80074 ACUTE HEPATITIS PANEL: CPT

## 2023-01-01 PROCEDURE — 85018 HEMOGLOBIN: CPT

## 2023-01-01 PROCEDURE — 90935 HEMODIALYSIS ONE EVALUATION: CPT

## 2023-01-01 PROCEDURE — 84295 ASSAY OF SERUM SODIUM: CPT

## 2023-01-01 PROCEDURE — 73590 X-RAY EXAM OF LOWER LEG: CPT | Mod: 26,RT

## 2023-01-01 PROCEDURE — 99233 SBSQ HOSP IP/OBS HIGH 50: CPT

## 2023-01-01 PROCEDURE — 87040 BLOOD CULTURE FOR BACTERIA: CPT

## 2023-01-01 PROCEDURE — 83605 ASSAY OF LACTIC ACID: CPT

## 2023-01-01 PROCEDURE — 36415 COLL VENOUS BLD VENIPUNCTURE: CPT

## 2023-01-01 PROCEDURE — 99221 1ST HOSP IP/OBS SF/LOW 40: CPT

## 2023-01-01 PROCEDURE — 72125 CT NECK SPINE W/O DYE: CPT | Mod: 26,MA

## 2023-01-01 PROCEDURE — 73590 X-RAY EXAM OF LOWER LEG: CPT

## 2023-01-01 PROCEDURE — 36000 PLACE NEEDLE IN VEIN: CPT

## 2023-01-01 PROCEDURE — 36430 TRANSFUSION BLD/BLD COMPNT: CPT

## 2023-01-01 PROCEDURE — 88305 TISSUE EXAM BY PATHOLOGIST: CPT | Mod: 26

## 2023-01-01 PROCEDURE — 71046 X-RAY EXAM CHEST 2 VIEWS: CPT | Mod: 26

## 2023-01-01 PROCEDURE — 95816 EEG AWAKE AND DROWSY: CPT

## 2023-01-01 PROCEDURE — 74177 CT ABD & PELVIS W/CONTRAST: CPT | Mod: 26

## 2023-01-01 PROCEDURE — 83735 ASSAY OF MAGNESIUM: CPT

## 2023-01-01 PROCEDURE — P9016: CPT

## 2023-01-01 PROCEDURE — 85014 HEMATOCRIT: CPT

## 2023-01-01 PROCEDURE — 80048 BASIC METABOLIC PNL TOTAL CA: CPT

## 2023-01-01 PROCEDURE — 71045 X-RAY EXAM CHEST 1 VIEW: CPT | Mod: 26

## 2023-01-01 PROCEDURE — 84443 ASSAY THYROID STIM HORMONE: CPT

## 2023-01-01 PROCEDURE — 99233 SBSQ HOSP IP/OBS HIGH 50: CPT | Mod: GC

## 2023-01-01 PROCEDURE — 99284 EMERGENCY DEPT VISIT MOD MDM: CPT

## 2023-01-01 PROCEDURE — 70450 CT HEAD/BRAIN W/O DYE: CPT | Mod: 26,MA

## 2023-01-01 PROCEDURE — 85610 PROTHROMBIN TIME: CPT

## 2023-01-01 PROCEDURE — 99222 1ST HOSP IP/OBS MODERATE 55: CPT | Mod: GC

## 2023-01-01 PROCEDURE — 80053 COMPREHEN METABOLIC PANEL: CPT

## 2023-01-01 PROCEDURE — 99285 EMERGENCY DEPT VISIT HI MDM: CPT | Mod: 25

## 2023-01-01 PROCEDURE — 74177 CT ABD & PELVIS W/CONTRAST: CPT

## 2023-01-01 PROCEDURE — 97161 PT EVAL LOW COMPLEX 20 MIN: CPT

## 2023-01-01 PROCEDURE — 99261: CPT

## 2023-01-01 PROCEDURE — 87077 CULTURE AEROBIC IDENTIFY: CPT

## 2023-01-01 PROCEDURE — 11104 PUNCH BX SKIN SINGLE LESION: CPT

## 2023-01-01 PROCEDURE — 81001 URINALYSIS AUTO W/SCOPE: CPT

## 2023-01-01 PROCEDURE — 73502 X-RAY EXAM HIP UNI 2-3 VIEWS: CPT

## 2023-01-01 PROCEDURE — 99239 HOSP IP/OBS DSCHRG MGMT >30: CPT

## 2023-01-01 PROCEDURE — 96374 THER/PROPH/DIAG INJ IV PUSH: CPT

## 2023-01-01 PROCEDURE — 85025 COMPLETE CBC W/AUTO DIFF WBC: CPT

## 2023-01-01 PROCEDURE — 72125 CT NECK SPINE W/O DYE: CPT | Mod: 26

## 2023-01-01 PROCEDURE — U0003: CPT

## 2023-01-01 PROCEDURE — 86901 BLOOD TYPING SEROLOGIC RH(D): CPT

## 2023-01-01 PROCEDURE — 92610 EVALUATE SWALLOWING FUNCTION: CPT

## 2023-01-01 PROCEDURE — 86850 RBC ANTIBODY SCREEN: CPT

## 2023-01-01 PROCEDURE — 82962 GLUCOSE BLOOD TEST: CPT

## 2023-01-01 PROCEDURE — 84484 ASSAY OF TROPONIN QUANT: CPT

## 2023-01-01 PROCEDURE — 97163 PT EVAL HIGH COMPLEX 45 MIN: CPT

## 2023-01-01 PROCEDURE — 73610 X-RAY EXAM OF ANKLE: CPT | Mod: 26,RT

## 2023-01-01 PROCEDURE — 86780 TREPONEMA PALLIDUM: CPT

## 2023-01-01 PROCEDURE — 71045 X-RAY EXAM CHEST 1 VIEW: CPT

## 2023-01-01 PROCEDURE — 85045 AUTOMATED RETICULOCYTE COUNT: CPT

## 2023-01-01 PROCEDURE — 99285 EMERGENCY DEPT VISIT HI MDM: CPT

## 2023-01-01 PROCEDURE — 96375 TX/PRO/DX INJ NEW DRUG ADDON: CPT

## 2023-01-01 PROCEDURE — 73610 X-RAY EXAM OF ANKLE: CPT

## 2023-01-01 PROCEDURE — 82746 ASSAY OF FOLIC ACID SERUM: CPT

## 2023-01-01 PROCEDURE — 99231 SBSQ HOSP IP/OBS SF/LOW 25: CPT

## 2023-01-01 PROCEDURE — 86900 BLOOD TYPING SEROLOGIC ABO: CPT

## 2023-01-01 PROCEDURE — 72125 CT NECK SPINE W/O DYE: CPT | Mod: MA

## 2023-01-01 PROCEDURE — 83036 HEMOGLOBIN GLYCOSYLATED A1C: CPT

## 2023-01-01 PROCEDURE — 71250 CT THORAX DX C-: CPT | Mod: 26,MA

## 2023-01-01 PROCEDURE — 99497 ADVNCD CARE PLAN 30 MIN: CPT | Mod: 25

## 2023-01-01 PROCEDURE — 73630 X-RAY EXAM OF FOOT: CPT | Mod: 26,RT

## 2023-01-01 PROCEDURE — U0005: CPT

## 2023-01-01 PROCEDURE — 95816 EEG AWAKE AND DROWSY: CPT | Mod: 26

## 2023-01-01 PROCEDURE — 93283 PRGRMG EVAL IMPLANTABLE DFB: CPT | Mod: 26

## 2023-01-01 PROCEDURE — 86923 COMPATIBILITY TEST ELECTRIC: CPT

## 2023-01-01 PROCEDURE — 82330 ASSAY OF CALCIUM: CPT

## 2023-01-01 PROCEDURE — 88312 SPECIAL STAINS GROUP 1: CPT | Mod: 26

## 2023-01-01 PROCEDURE — 82607 VITAMIN B-12: CPT

## 2023-01-01 PROCEDURE — 86803 HEPATITIS C AB TEST: CPT

## 2023-01-01 PROCEDURE — 87637 SARSCOV2&INF A&B&RSV AMP PRB: CPT

## 2023-01-01 PROCEDURE — 82550 ASSAY OF CK (CPK): CPT

## 2023-01-01 PROCEDURE — 87070 CULTURE OTHR SPECIMN AEROBIC: CPT

## 2023-01-01 PROCEDURE — 82947 ASSAY GLUCOSE BLOOD QUANT: CPT

## 2023-01-01 PROCEDURE — 85027 COMPLETE CBC AUTOMATED: CPT

## 2023-01-01 PROCEDURE — C1751: CPT

## 2023-01-01 PROCEDURE — 84132 ASSAY OF SERUM POTASSIUM: CPT

## 2023-01-01 PROCEDURE — 87086 URINE CULTURE/COLONY COUNT: CPT

## 2023-01-01 PROCEDURE — 99222 1ST HOSP IP/OBS MODERATE 55: CPT

## 2023-01-01 PROCEDURE — 87521 HEPATITIS C PROBE&RVRS TRNSC: CPT

## 2023-01-01 PROCEDURE — 93306 TTE W/DOPPLER COMPLETE: CPT | Mod: 26

## 2023-01-01 PROCEDURE — 87340 HEPATITIS B SURFACE AG IA: CPT

## 2023-01-01 PROCEDURE — 70450 CT HEAD/BRAIN W/O DYE: CPT | Mod: MA

## 2023-01-01 PROCEDURE — 90792 PSYCH DIAG EVAL W/MED SRVCS: CPT

## 2023-01-01 PROCEDURE — 99223 1ST HOSP IP/OBS HIGH 75: CPT | Mod: GC,25

## 2023-01-01 PROCEDURE — 74176 CT ABD & PELVIS W/O CONTRAST: CPT | Mod: 26,MA

## 2023-01-01 PROCEDURE — 72132 CT LUMBAR SPINE W/DYE: CPT | Mod: 26

## 2023-01-01 PROCEDURE — 80177 DRUG SCRN QUAN LEVETIRACETAM: CPT

## 2023-01-01 PROCEDURE — 85730 THROMBOPLASTIN TIME PARTIAL: CPT

## 2023-01-01 PROCEDURE — 82435 ASSAY OF BLOOD CHLORIDE: CPT

## 2023-01-01 PROCEDURE — 73502 X-RAY EXAM HIP UNI 2-3 VIEWS: CPT | Mod: 26,LT

## 2023-01-01 PROCEDURE — 82140 ASSAY OF AMMONIA: CPT

## 2023-01-01 PROCEDURE — 93005 ELECTROCARDIOGRAM TRACING: CPT

## 2023-01-01 PROCEDURE — 71250 CT THORAX DX C-: CPT | Mod: MA

## 2023-01-01 PROCEDURE — 36569 INSJ PICC 5 YR+ W/O IMAGING: CPT

## 2023-01-01 PROCEDURE — 97530 THERAPEUTIC ACTIVITIES: CPT

## 2023-01-01 PROCEDURE — 74176 CT ABD & PELVIS W/O CONTRAST: CPT | Mod: MA

## 2023-01-01 PROCEDURE — 97110 THERAPEUTIC EXERCISES: CPT

## 2023-01-01 PROCEDURE — 93923 UPR/LXTR ART STDY 3+ LVLS: CPT | Mod: 26

## 2023-01-01 PROCEDURE — 72125 CT NECK SPINE W/O DYE: CPT

## 2023-01-01 PROCEDURE — 87102 FUNGUS ISOLATION CULTURE: CPT

## 2023-01-01 RX ORDER — CLONAZEPAM 1 MG
0.5 TABLET ORAL
Refills: 0 | Status: DISCONTINUED | OUTPATIENT
Start: 2023-01-01 | End: 2023-01-01

## 2023-01-01 RX ORDER — ATORVASTATIN CALCIUM 80 MG/1
40 TABLET, FILM COATED ORAL AT BEDTIME
Refills: 0 | Status: DISCONTINUED | OUTPATIENT
Start: 2023-01-01 | End: 2023-01-01

## 2023-01-01 RX ORDER — OLANZAPINE 15 MG/1
1 TABLET, FILM COATED ORAL
Qty: 0 | Refills: 0 | DISCHARGE
Start: 2023-01-01

## 2023-01-01 RX ORDER — ONDANSETRON 8 MG/1
4 TABLET, FILM COATED ORAL ONCE
Refills: 0 | Status: COMPLETED | OUTPATIENT
Start: 2023-01-01 | End: 2023-01-01

## 2023-01-01 RX ORDER — MEROPENEM 1 G/30ML
500 INJECTION INTRAVENOUS ONCE
Refills: 0 | Status: COMPLETED | OUTPATIENT
Start: 2023-01-01 | End: 2023-01-01

## 2023-01-01 RX ORDER — LANOLIN ALCOHOL/MO/W.PET/CERES
3 CREAM (GRAM) TOPICAL AT BEDTIME
Refills: 0 | Status: DISCONTINUED | OUTPATIENT
Start: 2023-01-01 | End: 2023-01-01

## 2023-01-01 RX ORDER — LINEZOLID 600 MG/300ML
600 INJECTION, SOLUTION INTRAVENOUS ONCE
Refills: 0 | Status: COMPLETED | OUTPATIENT
Start: 2023-01-01 | End: 2023-01-01

## 2023-01-01 RX ORDER — TIOTROPIUM BROMIDE 18 UG/1
2 CAPSULE ORAL; RESPIRATORY (INHALATION) DAILY
Refills: 0 | Status: DISCONTINUED | OUTPATIENT
Start: 2023-01-01 | End: 2023-01-01

## 2023-01-01 RX ORDER — DIPHENHYDRAMINE HCL 50 MG
50 CAPSULE ORAL ONCE
Refills: 0 | Status: COMPLETED | OUTPATIENT
Start: 2023-01-01 | End: 2023-01-01

## 2023-01-01 RX ORDER — LEVOTHYROXINE SODIUM 125 MCG
75 TABLET ORAL AT BEDTIME
Refills: 0 | Status: DISCONTINUED | OUTPATIENT
Start: 2023-01-01 | End: 2023-01-01

## 2023-01-01 RX ORDER — MIDODRINE HYDROCHLORIDE 2.5 MG/1
5 TABLET ORAL ONCE
Refills: 0 | Status: COMPLETED | OUTPATIENT
Start: 2023-01-01 | End: 2023-01-01

## 2023-01-01 RX ORDER — ISOSORBIDE MONONITRATE 60 MG/1
30 TABLET, EXTENDED RELEASE ORAL DAILY
Refills: 0 | Status: DISCONTINUED | OUTPATIENT
Start: 2023-01-01 | End: 2023-01-01

## 2023-01-01 RX ORDER — HYDROMORPHONE HYDROCHLORIDE 2 MG/ML
0.25 INJECTION INTRAMUSCULAR; INTRAVENOUS; SUBCUTANEOUS ONCE
Refills: 0 | Status: DISCONTINUED | OUTPATIENT
Start: 2023-01-01 | End: 2023-01-01

## 2023-01-01 RX ORDER — DIPHENHYDRAMINE HCL 50 MG
25 CAPSULE ORAL ONCE
Refills: 0 | Status: COMPLETED | OUTPATIENT
Start: 2023-01-01 | End: 2023-01-01

## 2023-01-01 RX ORDER — FUROSEMIDE 40 MG
80 TABLET ORAL ONCE
Refills: 0 | Status: COMPLETED | OUTPATIENT
Start: 2023-01-01 | End: 2023-01-01

## 2023-01-01 RX ORDER — DIPHENHYDRAMINE HCL 50 MG
25 CAPSULE ORAL ONCE
Refills: 0 | Status: DISCONTINUED | OUTPATIENT
Start: 2023-01-01 | End: 2023-01-01

## 2023-01-01 RX ORDER — LISINOPRIL 2.5 MG/1
1 TABLET ORAL
Qty: 0 | Refills: 0 | DISCHARGE

## 2023-01-01 RX ORDER — ACETAMINOPHEN 500 MG
650 TABLET ORAL EVERY 6 HOURS
Refills: 0 | Status: DISCONTINUED | OUTPATIENT
Start: 2023-01-01 | End: 2023-01-01

## 2023-01-01 RX ORDER — HYDROMORPHONE HYDROCHLORIDE 2 MG/ML
1 INJECTION INTRAMUSCULAR; INTRAVENOUS; SUBCUTANEOUS ONCE
Refills: 0 | Status: DISCONTINUED | OUTPATIENT
Start: 2023-01-01 | End: 2023-01-01

## 2023-01-01 RX ORDER — SENNA PLUS 8.6 MG/1
2 TABLET ORAL AT BEDTIME
Refills: 0 | Status: DISCONTINUED | OUTPATIENT
Start: 2023-01-01 | End: 2023-01-01

## 2023-01-01 RX ORDER — POLYETHYLENE GLYCOL 3350 17 G/17G
17 POWDER, FOR SOLUTION ORAL DAILY
Refills: 0 | Status: DISCONTINUED | OUTPATIENT
Start: 2023-01-01 | End: 2023-01-01

## 2023-01-01 RX ORDER — LEVETIRACETAM 250 MG/1
500 TABLET, FILM COATED ORAL DAILY
Refills: 0 | Status: DISCONTINUED | OUTPATIENT
Start: 2023-01-01 | End: 2023-01-01

## 2023-01-01 RX ORDER — ERYTHROPOIETIN 10000 [IU]/ML
4000 INJECTION, SOLUTION INTRAVENOUS; SUBCUTANEOUS
Refills: 0 | Status: DISCONTINUED | OUTPATIENT
Start: 2023-01-01 | End: 2023-01-01

## 2023-01-01 RX ORDER — HYDROCORTISONE 1 %
1 OINTMENT (GRAM) TOPICAL
Refills: 0 | Status: DISCONTINUED | OUTPATIENT
Start: 2023-01-01 | End: 2023-01-01

## 2023-01-01 RX ORDER — CHOLECALCIFEROL (VITAMIN D3) 125 MCG
1 CAPSULE ORAL
Qty: 0 | Refills: 0 | DISCHARGE

## 2023-01-01 RX ORDER — APIXABAN 2.5 MG/1
5 TABLET, FILM COATED ORAL
Refills: 0 | Status: DISCONTINUED | OUTPATIENT
Start: 2023-01-01 | End: 2023-01-01

## 2023-01-01 RX ORDER — MUPIROCIN 20 MG/G
1 OINTMENT TOPICAL
Refills: 0 | Status: DISCONTINUED | OUTPATIENT
Start: 2023-01-01 | End: 2023-01-01

## 2023-01-01 RX ORDER — OLANZAPINE 15 MG/1
2.5 TABLET, FILM COATED ORAL EVERY 6 HOURS
Refills: 0 | Status: DISCONTINUED | OUTPATIENT
Start: 2023-01-01 | End: 2023-01-01

## 2023-01-01 RX ORDER — MEROPENEM 1 G/30ML
500 INJECTION INTRAVENOUS EVERY 24 HOURS
Refills: 0 | Status: DISCONTINUED | OUTPATIENT
Start: 2023-01-01 | End: 2023-01-01

## 2023-01-01 RX ORDER — ERYTHROPOIETIN 10000 [IU]/ML
4000 INJECTION, SOLUTION INTRAVENOUS; SUBCUTANEOUS ONCE
Refills: 0 | Status: COMPLETED | OUTPATIENT
Start: 2023-01-01 | End: 2023-01-01

## 2023-01-01 RX ORDER — ASCORBIC ACID 60 MG
500 TABLET,CHEWABLE ORAL DAILY
Refills: 0 | Status: DISCONTINUED | OUTPATIENT
Start: 2023-01-01 | End: 2023-01-01

## 2023-01-01 RX ORDER — HYDROMORPHONE HYDROCHLORIDE 2 MG/ML
2 INJECTION INTRAMUSCULAR; INTRAVENOUS; SUBCUTANEOUS ONCE
Refills: 0 | Status: DISCONTINUED | OUTPATIENT
Start: 2023-01-01 | End: 2023-01-01

## 2023-01-01 RX ORDER — LIDOCAINE 4 G/100G
1 CREAM TOPICAL DAILY
Refills: 0 | Status: DISCONTINUED | OUTPATIENT
Start: 2023-01-01 | End: 2023-01-01

## 2023-01-01 RX ORDER — BACLOFEN 100 %
1 POWDER (GRAM) MISCELLANEOUS
Qty: 0 | Refills: 0 | DISCHARGE
Start: 2023-01-01

## 2023-01-01 RX ORDER — LEVOTHYROXINE SODIUM 125 MCG
112 TABLET ORAL DAILY
Refills: 0 | Status: DISCONTINUED | OUTPATIENT
Start: 2023-01-01 | End: 2023-01-01

## 2023-01-01 RX ORDER — BUDESONIDE AND FORMOTEROL FUMARATE DIHYDRATE 160; 4.5 UG/1; UG/1
2 AEROSOL RESPIRATORY (INHALATION)
Qty: 0 | Refills: 0 | DISCHARGE

## 2023-01-01 RX ORDER — SODIUM ZIRCONIUM CYCLOSILICATE 10 G/10G
10 POWDER, FOR SUSPENSION ORAL ONCE
Refills: 0 | Status: COMPLETED | OUTPATIENT
Start: 2023-01-01 | End: 2023-01-01

## 2023-01-01 RX ORDER — OXYCODONE HYDROCHLORIDE 5 MG/1
5 TABLET ORAL ONCE
Refills: 0 | Status: DISCONTINUED | OUTPATIENT
Start: 2023-01-01 | End: 2023-01-01

## 2023-01-01 RX ORDER — MIDODRINE HYDROCHLORIDE 2.5 MG/1
10 TABLET ORAL ONCE
Refills: 0 | Status: COMPLETED | OUTPATIENT
Start: 2023-01-01 | End: 2023-01-01

## 2023-01-01 RX ORDER — POLYETHYLENE GLYCOL 3350 17 G/17G
17 POWDER, FOR SOLUTION ORAL
Refills: 0 | Status: DISCONTINUED | OUTPATIENT
Start: 2023-01-01 | End: 2023-01-01

## 2023-01-01 RX ORDER — LACTULOSE 10 G/15ML
30 SOLUTION ORAL
Qty: 0 | Refills: 0 | DISCHARGE
Start: 2023-01-01

## 2023-01-01 RX ORDER — CEFTRIAXONE 500 MG/1
1000 INJECTION, POWDER, FOR SOLUTION INTRAMUSCULAR; INTRAVENOUS EVERY 24 HOURS
Refills: 0 | Status: DISCONTINUED | OUTPATIENT
Start: 2023-01-01 | End: 2023-01-01

## 2023-01-01 RX ORDER — ACETAMINOPHEN 500 MG
1000 TABLET ORAL ONCE
Refills: 0 | Status: COMPLETED | OUTPATIENT
Start: 2023-01-01 | End: 2023-01-01

## 2023-01-01 RX ORDER — ONDANSETRON 8 MG/1
4 TABLET, FILM COATED ORAL EVERY 8 HOURS
Refills: 0 | Status: DISCONTINUED | OUTPATIENT
Start: 2023-01-01 | End: 2023-01-01

## 2023-01-01 RX ORDER — DIPHENHYDRAMINE HCL 50 MG
50 CAPSULE ORAL
Refills: 0 | Status: DISCONTINUED | OUTPATIENT
Start: 2023-01-01 | End: 2023-01-01

## 2023-01-01 RX ORDER — OLANZAPINE 15 MG/1
5 TABLET, FILM COATED ORAL EVERY 6 HOURS
Refills: 0 | Status: DISCONTINUED | OUTPATIENT
Start: 2023-01-01 | End: 2023-01-01

## 2023-01-01 RX ORDER — DIPHENHYDRAMINE HCL 50 MG
1 CAPSULE ORAL
Qty: 0 | Refills: 0 | DISCHARGE

## 2023-01-01 RX ORDER — MIDODRINE HYDROCHLORIDE 2.5 MG/1
5 TABLET ORAL
Refills: 0 | Status: DISCONTINUED | OUTPATIENT
Start: 2023-01-01 | End: 2023-01-01

## 2023-01-01 RX ORDER — ISOSORBIDE MONONITRATE 60 MG/1
1 TABLET, EXTENDED RELEASE ORAL
Qty: 0 | Refills: 0 | DISCHARGE

## 2023-01-01 RX ORDER — LEVETIRACETAM 250 MG/1
1 TABLET, FILM COATED ORAL
Qty: 0 | Refills: 0 | DISCHARGE
Start: 2023-01-01

## 2023-01-01 RX ORDER — LEVETIRACETAM 250 MG/1
250 TABLET, FILM COATED ORAL
Refills: 0 | Status: DISCONTINUED | OUTPATIENT
Start: 2023-01-01 | End: 2023-01-01

## 2023-01-01 RX ORDER — ERYTHROPOIETIN 10000 [IU]/ML
10000 INJECTION, SOLUTION INTRAVENOUS; SUBCUTANEOUS ONCE
Refills: 0 | Status: COMPLETED | OUTPATIENT
Start: 2023-01-01 | End: 2023-01-01

## 2023-01-01 RX ORDER — DIPHENHYDRAMINE HCL 50 MG
50 CAPSULE ORAL DAILY
Refills: 0 | Status: DISCONTINUED | OUTPATIENT
Start: 2023-01-01 | End: 2023-01-01

## 2023-01-01 RX ORDER — CLONAZEPAM 1 MG
0.5 TABLET ORAL ONCE
Refills: 0 | Status: DISCONTINUED | OUTPATIENT
Start: 2023-01-01 | End: 2023-01-01

## 2023-01-01 RX ORDER — SEVELAMER CARBONATE 2400 MG/1
1600 POWDER, FOR SUSPENSION ORAL
Refills: 0 | Status: DISCONTINUED | OUTPATIENT
Start: 2023-01-01 | End: 2023-01-01

## 2023-01-01 RX ORDER — ATORVASTATIN CALCIUM 80 MG/1
1 TABLET, FILM COATED ORAL
Qty: 0 | Refills: 0 | DISCHARGE

## 2023-01-01 RX ORDER — TRAZODONE HCL 50 MG
0.5 TABLET ORAL
Qty: 0 | Refills: 0 | DISCHARGE

## 2023-01-01 RX ORDER — CHLORHEXIDINE GLUCONATE 213 G/1000ML
1 SOLUTION TOPICAL DAILY
Refills: 0 | Status: DISCONTINUED | OUTPATIENT
Start: 2023-01-01 | End: 2023-01-01

## 2023-01-01 RX ORDER — VANCOMYCIN HCL 1 G
1000 VIAL (EA) INTRAVENOUS ONCE
Refills: 0 | Status: DISCONTINUED | OUTPATIENT
Start: 2023-01-01 | End: 2023-01-01

## 2023-01-01 RX ORDER — CHOLECALCIFEROL (VITAMIN D3) 125 MCG
1000 CAPSULE ORAL DAILY
Refills: 0 | Status: DISCONTINUED | OUTPATIENT
Start: 2023-01-01 | End: 2023-01-01

## 2023-01-01 RX ORDER — DIPHENHYDRAMINE HCL 50 MG
50 CAPSULE ORAL ONCE
Refills: 0 | Status: DISCONTINUED | OUTPATIENT
Start: 2023-01-01 | End: 2023-01-01

## 2023-01-01 RX ORDER — MUPIROCIN 20 MG/G
1 OINTMENT TOPICAL
Refills: 0 | Status: COMPLETED | OUTPATIENT
Start: 2023-01-01 | End: 2023-01-01

## 2023-01-01 RX ORDER — TAMSULOSIN HYDROCHLORIDE 0.4 MG/1
2 CAPSULE ORAL
Qty: 0 | Refills: 0 | DISCHARGE

## 2023-01-01 RX ORDER — FLUCONAZOLE 150 MG/1
800 TABLET ORAL ONCE
Refills: 0 | Status: COMPLETED | OUTPATIENT
Start: 2023-01-01 | End: 2023-01-01

## 2023-01-01 RX ORDER — GABAPENTIN 400 MG/1
100 CAPSULE ORAL ONCE
Refills: 0 | Status: COMPLETED | OUTPATIENT
Start: 2023-01-01 | End: 2023-01-01

## 2023-01-01 RX ORDER — OLANZAPINE 15 MG/1
2.5 TABLET, FILM COATED ORAL ONCE
Refills: 0 | Status: COMPLETED | OUTPATIENT
Start: 2023-01-01 | End: 2023-01-01

## 2023-01-01 RX ORDER — HYDROMORPHONE HYDROCHLORIDE 2 MG/ML
0.5 INJECTION INTRAMUSCULAR; INTRAVENOUS; SUBCUTANEOUS ONCE
Refills: 0 | Status: DISCONTINUED | OUTPATIENT
Start: 2023-01-01 | End: 2023-01-01

## 2023-01-01 RX ORDER — ATORVASTATIN CALCIUM 80 MG/1
1 TABLET, FILM COATED ORAL
Qty: 0 | Refills: 0 | DISCHARGE
Start: 2023-01-01

## 2023-01-01 RX ORDER — APIXABAN 2.5 MG/1
1 TABLET, FILM COATED ORAL
Qty: 0 | Refills: 0 | DISCHARGE

## 2023-01-01 RX ORDER — CALAMINE AND ZINC OXIDE AND PHENOL 160; 10 MG/ML; MG/ML
1 LOTION TOPICAL THREE TIMES A DAY
Refills: 0 | Status: DISCONTINUED | OUTPATIENT
Start: 2023-01-01 | End: 2023-01-01

## 2023-01-01 RX ORDER — BACLOFEN 100 %
1 POWDER (GRAM) MISCELLANEOUS
Qty: 0 | Refills: 0 | DISCHARGE

## 2023-01-01 RX ORDER — BNT162B2 ORIGINAL AND OMICRON BA.4/BA.5 .1125; .1125 MG/2.25ML; MG/2.25ML
0.3 INJECTION, SUSPENSION INTRAMUSCULAR ONCE
Refills: 0 | Status: DISCONTINUED | OUTPATIENT
Start: 2023-01-01 | End: 2023-01-01

## 2023-01-01 RX ORDER — APIXABAN 2.5 MG/1
2.5 TABLET, FILM COATED ORAL EVERY 12 HOURS
Refills: 0 | Status: DISCONTINUED | OUTPATIENT
Start: 2023-01-01 | End: 2023-01-01

## 2023-01-01 RX ORDER — ASCORBIC ACID 60 MG
1 TABLET,CHEWABLE ORAL
Qty: 0 | Refills: 0 | DISCHARGE

## 2023-01-01 RX ORDER — FUROSEMIDE 40 MG
40 TABLET ORAL ONCE
Refills: 0 | Status: COMPLETED | OUTPATIENT
Start: 2023-01-01 | End: 2023-01-01

## 2023-01-01 RX ORDER — MEN-PHOR .5; .5 G/G; G/G
1 LOTION TOPICAL
Qty: 0 | Refills: 0 | DISCHARGE
Start: 2023-01-01

## 2023-01-01 RX ORDER — LIDOCAINE 4 G/100G
1 CREAM TOPICAL EVERY 24 HOURS
Refills: 0 | Status: DISCONTINUED | OUTPATIENT
Start: 2023-01-01 | End: 2023-01-01

## 2023-01-01 RX ORDER — GABAPENTIN 400 MG/1
1 CAPSULE ORAL
Qty: 0 | Refills: 0 | DISCHARGE

## 2023-01-01 RX ORDER — NYSTATIN CREAM 100000 [USP'U]/G
1 CREAM TOPICAL THREE TIMES A DAY
Refills: 0 | Status: DISCONTINUED | OUTPATIENT
Start: 2023-01-01 | End: 2023-01-01

## 2023-01-01 RX ORDER — CHLORHEXIDINE GLUCONATE 213 G/1000ML
1 SOLUTION TOPICAL
Refills: 0 | Status: DISCONTINUED | OUTPATIENT
Start: 2023-01-01 | End: 2023-01-01

## 2023-01-01 RX ORDER — SENNA PLUS 8.6 MG/1
2 TABLET ORAL
Qty: 0 | Refills: 0 | DISCHARGE
Start: 2023-01-01

## 2023-01-01 RX ORDER — SODIUM CHLORIDE 9 MG/ML
250 INJECTION INTRAMUSCULAR; INTRAVENOUS; SUBCUTANEOUS ONCE
Refills: 0 | Status: COMPLETED | OUTPATIENT
Start: 2023-01-01 | End: 2023-01-01

## 2023-01-01 RX ORDER — SODIUM CHLORIDE 9 MG/ML
500 INJECTION INTRAMUSCULAR; INTRAVENOUS; SUBCUTANEOUS ONCE
Refills: 0 | Status: COMPLETED | OUTPATIENT
Start: 2023-01-01 | End: 2023-01-01

## 2023-01-01 RX ORDER — LEVOTHYROXINE SODIUM 125 MCG
100 TABLET ORAL DAILY
Refills: 0 | Status: DISCONTINUED | OUTPATIENT
Start: 2023-01-01 | End: 2023-01-01

## 2023-01-01 RX ORDER — METOPROLOL TARTRATE 50 MG
50 TABLET ORAL DAILY
Refills: 0 | Status: DISCONTINUED | OUTPATIENT
Start: 2023-01-01 | End: 2023-01-01

## 2023-01-01 RX ORDER — CLONAZEPAM 1 MG
0.75 TABLET ORAL
Refills: 0 | Status: DISCONTINUED | OUTPATIENT
Start: 2023-01-01 | End: 2023-01-01

## 2023-01-01 RX ORDER — HYDROMORPHONE HYDROCHLORIDE 2 MG/ML
0.25 INJECTION INTRAMUSCULAR; INTRAVENOUS; SUBCUTANEOUS ONCE
Refills: 0 | Status: COMPLETED | OUTPATIENT
Start: 2023-01-01

## 2023-01-01 RX ORDER — LEVOTHYROXINE SODIUM 125 MCG
1 TABLET ORAL
Qty: 0 | Refills: 0 | DISCHARGE
Start: 2023-01-01

## 2023-01-01 RX ORDER — BACLOFEN 100 %
10 POWDER (GRAM) MISCELLANEOUS EVERY 12 HOURS
Refills: 0 | Status: DISCONTINUED | OUTPATIENT
Start: 2023-01-01 | End: 2023-01-01

## 2023-01-01 RX ORDER — SENNA PLUS 8.6 MG/1
2 TABLET ORAL
Qty: 0 | Refills: 0 | DISCHARGE

## 2023-01-01 RX ORDER — MUPIROCIN 20 MG/G
1 OINTMENT TOPICAL THREE TIMES A DAY
Refills: 0 | Status: DISCONTINUED | OUTPATIENT
Start: 2023-01-01 | End: 2023-01-01

## 2023-01-01 RX ORDER — SEVELAMER CARBONATE 2400 MG/1
1600 POWDER, FOR SUSPENSION ORAL THREE TIMES A DAY
Refills: 0 | Status: DISCONTINUED | OUTPATIENT
Start: 2023-01-01 | End: 2023-01-01

## 2023-01-01 RX ORDER — METOPROLOL TARTRATE 50 MG
2.5 TABLET ORAL ONCE
Refills: 0 | Status: COMPLETED | OUTPATIENT
Start: 2023-01-01 | End: 2023-01-01

## 2023-01-01 RX ORDER — AZTREONAM 2 G
1000 VIAL (EA) INJECTION
Refills: 0 | Status: DISCONTINUED | OUTPATIENT
Start: 2023-01-01 | End: 2023-01-01

## 2023-01-01 RX ORDER — HYDROCORTISONE 1 %
1 OINTMENT (GRAM) TOPICAL THREE TIMES A DAY
Refills: 0 | Status: DISCONTINUED | OUTPATIENT
Start: 2023-01-01 | End: 2023-01-01

## 2023-01-01 RX ORDER — PANTOPRAZOLE SODIUM 20 MG/1
1 TABLET, DELAYED RELEASE ORAL
Qty: 0 | Refills: 0 | DISCHARGE
Start: 2023-01-01

## 2023-01-01 RX ORDER — ONDANSETRON 8 MG/1
1 TABLET, FILM COATED ORAL
Refills: 0 | DISCHARGE

## 2023-01-01 RX ORDER — DEXTROSE 50 % IN WATER 50 %
50 SYRINGE (ML) INTRAVENOUS ONCE
Refills: 0 | Status: COMPLETED | OUTPATIENT
Start: 2023-01-01 | End: 2023-01-01

## 2023-01-01 RX ORDER — CALAMINE AND ZINC OXIDE AND PHENOL 160; 10 MG/ML; MG/ML
1 LOTION TOPICAL
Qty: 0 | Refills: 0 | DISCHARGE
Start: 2023-01-01

## 2023-01-01 RX ORDER — INSULIN HUMAN 100 [IU]/ML
10 INJECTION, SOLUTION SUBCUTANEOUS ONCE
Refills: 0 | Status: COMPLETED | OUTPATIENT
Start: 2023-01-01 | End: 2023-01-01

## 2023-01-01 RX ORDER — OXYCODONE HYDROCHLORIDE 5 MG/1
5 TABLET ORAL EVERY 6 HOURS
Refills: 0 | Status: DISCONTINUED | OUTPATIENT
Start: 2023-01-01 | End: 2023-01-01

## 2023-01-01 RX ORDER — MEROPENEM 1 G/30ML
INJECTION INTRAVENOUS
Refills: 0 | Status: DISCONTINUED | OUTPATIENT
Start: 2023-01-01 | End: 2023-01-01

## 2023-01-01 RX ORDER — NYSTATIN CREAM 100000 [USP'U]/G
1 CREAM TOPICAL
Qty: 0 | Refills: 0 | DISCHARGE
Start: 2023-01-01

## 2023-01-01 RX ORDER — MIDODRINE HYDROCHLORIDE 2.5 MG/1
10 TABLET ORAL THREE TIMES A DAY
Refills: 0 | Status: DISCONTINUED | OUTPATIENT
Start: 2023-01-01 | End: 2023-01-01

## 2023-01-01 RX ORDER — POTASSIUM CHLORIDE 20 MEQ
20 PACKET (EA) ORAL ONCE
Refills: 0 | Status: COMPLETED | OUTPATIENT
Start: 2023-01-01 | End: 2023-01-01

## 2023-01-01 RX ORDER — LACTOBACILLUS ACIDOPHILUS 100MM CELL
1 CAPSULE ORAL
Refills: 0 | Status: DISCONTINUED | OUTPATIENT
Start: 2023-01-01 | End: 2023-01-01

## 2023-01-01 RX ORDER — CEFTRIAXONE 500 MG/1
1000 INJECTION, POWDER, FOR SOLUTION INTRAMUSCULAR; INTRAVENOUS ONCE
Refills: 0 | Status: COMPLETED | OUTPATIENT
Start: 2023-01-01 | End: 2023-01-01

## 2023-01-01 RX ORDER — LEVOTHYROXINE SODIUM 125 MCG
1 TABLET ORAL
Qty: 0 | Refills: 0 | DISCHARGE

## 2023-01-01 RX ORDER — DIPHENHYDRAMINE HCL 50 MG
25 CAPSULE ORAL
Refills: 0 | Status: DISCONTINUED | OUTPATIENT
Start: 2023-01-01 | End: 2023-01-01

## 2023-01-01 RX ORDER — MUPIROCIN 20 MG/G
1 OINTMENT TOPICAL
Qty: 0 | Refills: 0 | DISCHARGE
Start: 2023-01-01

## 2023-01-01 RX ORDER — ASCORBIC ACID 60 MG
1 TABLET,CHEWABLE ORAL
Qty: 0 | Refills: 0 | DISCHARGE
Start: 2023-01-01

## 2023-01-01 RX ORDER — LEVETIRACETAM 250 MG/1
1 TABLET, FILM COATED ORAL
Qty: 0 | Refills: 0 | DISCHARGE

## 2023-01-01 RX ORDER — TRAZODONE HCL 50 MG
25 TABLET ORAL AT BEDTIME
Refills: 0 | Status: DISCONTINUED | OUTPATIENT
Start: 2023-01-01 | End: 2023-01-01

## 2023-01-01 RX ORDER — PANTOPRAZOLE SODIUM 20 MG/1
40 TABLET, DELAYED RELEASE ORAL
Refills: 0 | Status: DISCONTINUED | OUTPATIENT
Start: 2023-01-01 | End: 2023-01-01

## 2023-01-01 RX ORDER — ERYTHROPOIETIN 10000 [IU]/ML
4000 INJECTION, SOLUTION INTRAVENOUS; SUBCUTANEOUS
Qty: 0 | Refills: 0 | DISCHARGE
Start: 2023-01-01

## 2023-01-01 RX ORDER — ACETAMINOPHEN 500 MG
1000 TABLET ORAL ONCE
Refills: 0 | Status: DISCONTINUED | OUTPATIENT
Start: 2023-01-01 | End: 2023-01-01

## 2023-01-01 RX ORDER — MEN-PHOR .5; .5 G/G; G/G
1 LOTION TOPICAL
Refills: 0 | Status: DISCONTINUED | OUTPATIENT
Start: 2023-01-01 | End: 2023-01-01

## 2023-01-01 RX ORDER — SODIUM CHLORIDE 9 MG/ML
250 INJECTION INTRAMUSCULAR; INTRAVENOUS; SUBCUTANEOUS ONCE
Refills: 0 | Status: DISCONTINUED | OUTPATIENT
Start: 2023-01-01 | End: 2023-01-01

## 2023-01-01 RX ORDER — CLONAZEPAM 1 MG
1 TABLET ORAL
Qty: 0 | Refills: 0 | DISCHARGE

## 2023-01-01 RX ORDER — APIXABAN 2.5 MG/1
2.5 TABLET, FILM COATED ORAL
Refills: 0 | Status: DISCONTINUED | OUTPATIENT
Start: 2023-01-01 | End: 2023-01-01

## 2023-01-01 RX ORDER — SODIUM CHLORIDE 9 MG/ML
100 INJECTION INTRAMUSCULAR; INTRAVENOUS; SUBCUTANEOUS ONCE
Refills: 0 | Status: DISCONTINUED | OUTPATIENT
Start: 2023-01-01 | End: 2023-01-01

## 2023-01-01 RX ORDER — LEVETIRACETAM 250 MG/1
250 TABLET, FILM COATED ORAL DAILY
Refills: 0 | Status: DISCONTINUED | OUTPATIENT
Start: 2023-01-01 | End: 2023-01-01

## 2023-01-01 RX ORDER — BACLOFEN 100 %
5 POWDER (GRAM) MISCELLANEOUS EVERY 12 HOURS
Refills: 0 | Status: DISCONTINUED | OUTPATIENT
Start: 2023-01-01 | End: 2023-01-01

## 2023-01-01 RX ORDER — LEVETIRACETAM 250 MG/1
500 TABLET, FILM COATED ORAL ONCE
Refills: 0 | Status: COMPLETED | OUTPATIENT
Start: 2023-01-01 | End: 2023-01-01

## 2023-01-01 RX ORDER — DIPHENHYDRAMINE HCL 50 MG
50 CAPSULE ORAL ONCE
Refills: 0 | Status: COMPLETED | OUTPATIENT
Start: 2023-01-01

## 2023-01-01 RX ORDER — OLANZAPINE 15 MG/1
5 TABLET, FILM COATED ORAL ONCE
Refills: 0 | Status: COMPLETED | OUTPATIENT
Start: 2023-01-01 | End: 2023-01-01

## 2023-01-01 RX ORDER — CHOLECALCIFEROL (VITAMIN D3) 125 MCG
1000 CAPSULE ORAL
Qty: 0 | Refills: 0 | DISCHARGE
Start: 2023-01-01

## 2023-01-01 RX ORDER — DIPHENHYDRAMINE HCL 50 MG
25 CAPSULE ORAL EVERY 6 HOURS
Refills: 0 | Status: DISCONTINUED | OUTPATIENT
Start: 2023-01-01 | End: 2023-01-01

## 2023-01-01 RX ORDER — POLYETHYLENE GLYCOL 3350 17 G/17G
17 POWDER, FOR SOLUTION ORAL
Qty: 0 | Refills: 0 | DISCHARGE

## 2023-01-01 RX ORDER — APIXABAN 2.5 MG/1
1 TABLET, FILM COATED ORAL
Qty: 0 | Refills: 0 | DISCHARGE
Start: 2023-01-01

## 2023-01-01 RX ORDER — ACETAMINOPHEN 500 MG
2 TABLET ORAL
Qty: 0 | Refills: 0 | DISCHARGE
Start: 2023-01-01

## 2023-01-01 RX ORDER — TAMSULOSIN HYDROCHLORIDE 0.4 MG/1
1 CAPSULE ORAL
Qty: 0 | Refills: 0 | DISCHARGE

## 2023-01-01 RX ORDER — VANCOMYCIN HCL 1 G
1000 VIAL (EA) INTRAVENOUS ONCE
Refills: 0 | Status: COMPLETED | OUTPATIENT
Start: 2023-01-01 | End: 2023-01-01

## 2023-01-01 RX ORDER — HALOPERIDOL DECANOATE 100 MG/ML
1 INJECTION INTRAMUSCULAR ONCE
Refills: 0 | Status: DISCONTINUED | OUTPATIENT
Start: 2023-01-01 | End: 2023-01-01

## 2023-01-01 RX ORDER — TIOTROPIUM BROMIDE 18 UG/1
2 CAPSULE ORAL; RESPIRATORY (INHALATION)
Qty: 0 | Refills: 0 | DISCHARGE
Start: 2023-01-01

## 2023-01-01 RX ORDER — CLONAZEPAM 1 MG
1 TABLET ORAL
Qty: 0 | Refills: 0 | DISCHARGE
Start: 2023-01-01

## 2023-01-01 RX ORDER — HYDROXYZINE HCL 10 MG
25 TABLET ORAL ONCE
Refills: 0 | Status: DISCONTINUED | OUTPATIENT
Start: 2023-01-01 | End: 2023-01-01

## 2023-01-01 RX ORDER — SEVELAMER CARBONATE 2400 MG/1
800 POWDER, FOR SUSPENSION ORAL
Refills: 0 | Status: DISCONTINUED | OUTPATIENT
Start: 2023-01-01 | End: 2023-01-01

## 2023-01-01 RX ORDER — SODIUM CHLORIDE 9 MG/ML
200 INJECTION INTRAMUSCULAR; INTRAVENOUS; SUBCUTANEOUS ONCE
Refills: 0 | Status: DISCONTINUED | OUTPATIENT
Start: 2023-01-01 | End: 2023-01-01

## 2023-01-01 RX ORDER — SEVELAMER CARBONATE 2400 MG/1
2 POWDER, FOR SUSPENSION ORAL
Qty: 0 | Refills: 0 | DISCHARGE
Start: 2023-01-01

## 2023-01-01 RX ORDER — LIDOCAINE 4 G/100G
1 CREAM TOPICAL ONCE
Refills: 0 | Status: COMPLETED | OUTPATIENT
Start: 2023-01-01 | End: 2023-01-01

## 2023-01-01 RX ORDER — LACTOBACILLUS ACIDOPHILUS 100MM CELL
1 CAPSULE ORAL
Qty: 0 | Refills: 0 | DISCHARGE
Start: 2023-01-01

## 2023-01-01 RX ORDER — LACTULOSE 10 G/15ML
20 SOLUTION ORAL EVERY 6 HOURS
Refills: 0 | Status: DISCONTINUED | OUTPATIENT
Start: 2023-01-01 | End: 2023-01-01

## 2023-01-01 RX ORDER — TAMSULOSIN HYDROCHLORIDE 0.4 MG/1
0.8 CAPSULE ORAL AT BEDTIME
Refills: 0 | Status: DISCONTINUED | OUTPATIENT
Start: 2023-01-01 | End: 2023-01-01

## 2023-01-01 RX ORDER — SOTALOL HCL 120 MG
1 TABLET ORAL
Qty: 0 | Refills: 0 | DISCHARGE

## 2023-01-01 RX ORDER — POTASSIUM CHLORIDE 20 MEQ
40 PACKET (EA) ORAL ONCE
Refills: 0 | Status: COMPLETED | OUTPATIENT
Start: 2023-01-01 | End: 2023-01-01

## 2023-01-01 RX ORDER — TAMSULOSIN HYDROCHLORIDE 0.4 MG/1
1 CAPSULE ORAL
Qty: 0 | Refills: 0 | DISCHARGE
Start: 2023-01-01

## 2023-01-01 RX ORDER — INSULIN HUMAN 100 [IU]/ML
5 INJECTION, SOLUTION SUBCUTANEOUS ONCE
Refills: 0 | Status: COMPLETED | OUTPATIENT
Start: 2023-01-01 | End: 2023-01-01

## 2023-01-01 RX ORDER — POLYETHYLENE GLYCOL 3350 17 G/17G
17 POWDER, FOR SOLUTION ORAL
Qty: 0 | Refills: 0 | DISCHARGE
Start: 2023-01-01

## 2023-01-01 RX ORDER — METOPROLOL TARTRATE 50 MG
25 TABLET ORAL ONCE
Refills: 0 | Status: COMPLETED | OUTPATIENT
Start: 2023-01-01 | End: 2023-01-01

## 2023-01-01 RX ORDER — APIXABAN 2.5 MG/1
5 TABLET, FILM COATED ORAL EVERY 12 HOURS
Refills: 0 | Status: DISCONTINUED | OUTPATIENT
Start: 2023-01-01 | End: 2023-01-01

## 2023-01-01 RX ORDER — TRAZODONE HCL 50 MG
1 TABLET ORAL
Qty: 0 | Refills: 0 | DISCHARGE
Start: 2023-01-01

## 2023-01-01 RX ORDER — SEVELAMER CARBONATE 2400 MG/1
2 POWDER, FOR SUSPENSION ORAL
Qty: 0 | Refills: 0 | DISCHARGE

## 2023-01-01 RX ORDER — METOPROLOL TARTRATE 50 MG
1 TABLET ORAL
Qty: 0 | Refills: 0 | DISCHARGE

## 2023-01-01 RX ORDER — CLONAZEPAM 1 MG
0.25 TABLET ORAL THREE TIMES A DAY
Refills: 0 | Status: DISCONTINUED | OUTPATIENT
Start: 2023-01-01 | End: 2023-01-01

## 2023-01-01 RX ORDER — OLANZAPINE 15 MG/1
2.5 TABLET, FILM COATED ORAL AT BEDTIME
Refills: 0 | Status: DISCONTINUED | OUTPATIENT
Start: 2023-01-01 | End: 2023-01-01

## 2023-01-01 RX ORDER — LEVETIRACETAM 250 MG/1
750 TABLET, FILM COATED ORAL ONCE
Refills: 0 | Status: COMPLETED | OUTPATIENT
Start: 2023-01-01 | End: 2023-01-01

## 2023-01-01 RX ORDER — HYDROMORPHONE HYDROCHLORIDE 2 MG/ML
0.5 INJECTION INTRAMUSCULAR; INTRAVENOUS; SUBCUTANEOUS EVERY 6 HOURS
Refills: 0 | Status: DISCONTINUED | OUTPATIENT
Start: 2023-01-01 | End: 2023-01-01

## 2023-01-01 RX ORDER — LANOLIN ALCOHOL/MO/W.PET/CERES
1 CREAM (GRAM) TOPICAL
Qty: 0 | Refills: 0 | DISCHARGE
Start: 2023-01-01

## 2023-01-01 RX ORDER — OLANZAPINE 15 MG/1
1 TABLET, FILM COATED ORAL
Refills: 0 | DISCHARGE

## 2023-01-01 RX ORDER — ERTAPENEM SODIUM 1 G/1
500 INJECTION, POWDER, LYOPHILIZED, FOR SOLUTION INTRAMUSCULAR; INTRAVENOUS EVERY 24 HOURS
Refills: 0 | Status: DISCONTINUED | OUTPATIENT
Start: 2023-01-01 | End: 2023-01-01

## 2023-01-01 RX ORDER — MAGNESIUM SULFATE 500 MG/ML
1 VIAL (ML) INJECTION ONCE
Refills: 0 | Status: DISCONTINUED | OUTPATIENT
Start: 2023-01-01 | End: 2023-01-01

## 2023-01-01 RX ORDER — LIDOCAINE 4 G/100G
1 CREAM TOPICAL
Qty: 0 | Refills: 0 | DISCHARGE
Start: 2023-01-01

## 2023-01-01 RX ORDER — ACETAMINOPHEN 500 MG
975 TABLET ORAL EVERY 8 HOURS
Refills: 0 | Status: DISCONTINUED | OUTPATIENT
Start: 2023-01-01 | End: 2023-01-01

## 2023-01-01 RX ORDER — VANCOMYCIN HCL 1 G
1250 VIAL (EA) INTRAVENOUS ONCE
Refills: 0 | Status: DISCONTINUED | OUTPATIENT
Start: 2023-01-01 | End: 2023-01-01

## 2023-01-01 RX ORDER — ISOSORBIDE MONONITRATE 60 MG/1
1 TABLET, EXTENDED RELEASE ORAL
Refills: 0 | DISCHARGE

## 2023-01-01 RX ORDER — TAMSULOSIN HYDROCHLORIDE 0.4 MG/1
0.4 CAPSULE ORAL AT BEDTIME
Refills: 0 | Status: DISCONTINUED | OUTPATIENT
Start: 2023-01-01 | End: 2023-01-01

## 2023-01-01 RX ORDER — IPRATROPIUM/ALBUTEROL SULFATE 18-103MCG
3 AEROSOL WITH ADAPTER (GRAM) INHALATION EVERY 6 HOURS
Refills: 0 | Status: DISCONTINUED | OUTPATIENT
Start: 2023-01-01 | End: 2023-01-01

## 2023-01-01 RX ORDER — OLANZAPINE 15 MG/1
2.5 TABLET, FILM COATED ORAL DAILY
Refills: 0 | Status: DISCONTINUED | OUTPATIENT
Start: 2023-01-01 | End: 2023-01-01

## 2023-01-01 RX ORDER — LORATADINE 10 MG/1
10 TABLET ORAL DAILY
Refills: 0 | Status: DISCONTINUED | OUTPATIENT
Start: 2023-01-01 | End: 2023-01-01

## 2023-01-01 RX ORDER — TIOTROPIUM BROMIDE 18 UG/1
2 CAPSULE ORAL; RESPIRATORY (INHALATION)
Qty: 0 | Refills: 0 | DISCHARGE

## 2023-01-01 RX ORDER — MEROPENEM 1 G/30ML
500 INJECTION INTRAVENOUS EVERY 24 HOURS
Refills: 0 | Status: COMPLETED | OUTPATIENT
Start: 2023-01-01 | End: 2023-01-01

## 2023-01-01 RX ORDER — OMEPRAZOLE 10 MG/1
1 CAPSULE, DELAYED RELEASE ORAL
Qty: 0 | Refills: 0 | DISCHARGE

## 2023-01-01 RX ORDER — BACLOFEN 100 %
5 POWDER (GRAM) MISCELLANEOUS ONCE
Refills: 0 | Status: COMPLETED | OUTPATIENT
Start: 2023-01-01 | End: 2023-01-01

## 2023-01-01 RX ORDER — LACTULOSE 10 G/15ML
20 SOLUTION ORAL ONCE
Refills: 0 | Status: COMPLETED | OUTPATIENT
Start: 2023-01-01 | End: 2023-01-01

## 2023-01-01 RX ORDER — BACLOFEN 100 %
2.5 POWDER (GRAM) MISCELLANEOUS EVERY 12 HOURS
Refills: 0 | Status: DISCONTINUED | OUTPATIENT
Start: 2023-01-01 | End: 2023-01-01

## 2023-01-01 RX ORDER — METOPROLOL TARTRATE 50 MG
1 TABLET ORAL
Qty: 0 | Refills: 0 | DISCHARGE
Start: 2023-01-01

## 2023-01-01 RX ORDER — DIPHENHYDRAMINE HCL 50 MG
1 CAPSULE ORAL
Qty: 0 | Refills: 0 | DISCHARGE
Start: 2023-01-01

## 2023-01-01 RX ADMIN — Medication 100 MICROGRAM(S): at 05:29

## 2023-01-01 RX ADMIN — OXYCODONE HYDROCHLORIDE 5 MILLIGRAM(S): 5 TABLET ORAL at 06:59

## 2023-01-01 RX ADMIN — MUPIROCIN 1 APPLICATION(S): 20 OINTMENT TOPICAL at 07:22

## 2023-01-01 RX ADMIN — Medication 1 TABLET(S): at 12:29

## 2023-01-01 RX ADMIN — MUPIROCIN 1 APPLICATION(S): 20 OINTMENT TOPICAL at 19:02

## 2023-01-01 RX ADMIN — Medication 5 MILLIGRAM(S): at 18:15

## 2023-01-01 RX ADMIN — CALAMINE AND ZINC OXIDE AND PHENOL 1 APPLICATION(S): 160; 10 LOTION TOPICAL at 06:17

## 2023-01-01 RX ADMIN — OLANZAPINE 5 MILLIGRAM(S): 15 TABLET, FILM COATED ORAL at 11:43

## 2023-01-01 RX ADMIN — APIXABAN 5 MILLIGRAM(S): 2.5 TABLET, FILM COATED ORAL at 19:32

## 2023-01-01 RX ADMIN — Medication 10 MILLIGRAM(S): at 22:26

## 2023-01-01 RX ADMIN — Medication 0.25 MILLIGRAM(S): at 22:01

## 2023-01-01 RX ADMIN — Medication 1 TABLET(S): at 11:29

## 2023-01-01 RX ADMIN — Medication 1 TABLET(S): at 08:51

## 2023-01-01 RX ADMIN — LEVETIRACETAM 250 MILLIGRAM(S): 250 TABLET, FILM COATED ORAL at 06:48

## 2023-01-01 RX ADMIN — Medication 100 MICROGRAM(S): at 05:42

## 2023-01-01 RX ADMIN — Medication 10 MILLIGRAM(S): at 18:24

## 2023-01-01 RX ADMIN — Medication 1 TABLET(S): at 15:20

## 2023-01-01 RX ADMIN — Medication 1 TABLET(S): at 12:53

## 2023-01-01 RX ADMIN — TAMSULOSIN HYDROCHLORIDE 0.4 MILLIGRAM(S): 0.4 CAPSULE ORAL at 22:01

## 2023-01-01 RX ADMIN — LEVETIRACETAM 500 MILLIGRAM(S): 250 TABLET, FILM COATED ORAL at 19:05

## 2023-01-01 RX ADMIN — Medication 1 TABLET(S): at 19:28

## 2023-01-01 RX ADMIN — SENNA PLUS 2 TABLET(S): 8.6 TABLET ORAL at 21:46

## 2023-01-01 RX ADMIN — SEVELAMER CARBONATE 800 MILLIGRAM(S): 2400 POWDER, FOR SUSPENSION ORAL at 17:35

## 2023-01-01 RX ADMIN — Medication 2.5 MILLIGRAM(S): at 05:56

## 2023-01-01 RX ADMIN — HYDROMORPHONE HYDROCHLORIDE 0.5 MILLIGRAM(S): 2 INJECTION INTRAMUSCULAR; INTRAVENOUS; SUBCUTANEOUS at 16:51

## 2023-01-01 RX ADMIN — Medication 0.5 MILLIGRAM(S): at 05:42

## 2023-01-01 RX ADMIN — CALAMINE AND ZINC OXIDE AND PHENOL 1 APPLICATION(S): 160; 10 LOTION TOPICAL at 08:51

## 2023-01-01 RX ADMIN — CHLORHEXIDINE GLUCONATE 1 APPLICATION(S): 213 SOLUTION TOPICAL at 13:04

## 2023-01-01 RX ADMIN — LORATADINE 10 MILLIGRAM(S): 10 TABLET ORAL at 12:39

## 2023-01-01 RX ADMIN — Medication 50 MILLILITER(S): at 10:28

## 2023-01-01 RX ADMIN — SEVELAMER CARBONATE 1600 MILLIGRAM(S): 2400 POWDER, FOR SUSPENSION ORAL at 14:59

## 2023-01-01 RX ADMIN — CHLORHEXIDINE GLUCONATE 1 APPLICATION(S): 213 SOLUTION TOPICAL at 12:51

## 2023-01-01 RX ADMIN — Medication 50 MILLIGRAM(S): at 16:02

## 2023-01-01 RX ADMIN — LEVETIRACETAM 250 MILLIGRAM(S): 250 TABLET, FILM COATED ORAL at 18:24

## 2023-01-01 RX ADMIN — Medication 1 TABLET(S): at 13:24

## 2023-01-01 RX ADMIN — Medication 0.5 MILLIGRAM(S): at 09:17

## 2023-01-01 RX ADMIN — Medication 0.75 MILLIGRAM(S): at 17:58

## 2023-01-01 RX ADMIN — ATORVASTATIN CALCIUM 40 MILLIGRAM(S): 80 TABLET, FILM COATED ORAL at 22:39

## 2023-01-01 RX ADMIN — Medication 5 MILLIGRAM(S): at 06:06

## 2023-01-01 RX ADMIN — ERYTHROPOIETIN 4000 UNIT(S): 10000 INJECTION, SOLUTION INTRAVENOUS; SUBCUTANEOUS at 12:24

## 2023-01-01 RX ADMIN — Medication 100 MICROGRAM(S): at 05:00

## 2023-01-01 RX ADMIN — NYSTATIN CREAM 1 APPLICATION(S): 100000 CREAM TOPICAL at 22:12

## 2023-01-01 RX ADMIN — Medication 10 MILLIGRAM(S): at 12:37

## 2023-01-01 RX ADMIN — Medication 1 MILLIGRAM(S): at 15:40

## 2023-01-01 RX ADMIN — LEVETIRACETAM 500 MILLIGRAM(S): 250 TABLET, FILM COATED ORAL at 14:25

## 2023-01-01 RX ADMIN — CALAMINE AND ZINC OXIDE AND PHENOL 1 APPLICATION(S): 160; 10 LOTION TOPICAL at 23:24

## 2023-01-01 RX ADMIN — Medication 5 MILLIGRAM(S): at 05:19

## 2023-01-01 RX ADMIN — CALAMINE AND ZINC OXIDE AND PHENOL 1 APPLICATION(S): 160; 10 LOTION TOPICAL at 21:44

## 2023-01-01 RX ADMIN — Medication 25 MILLIGRAM(S): at 15:40

## 2023-01-01 RX ADMIN — MUPIROCIN 1 APPLICATION(S): 20 OINTMENT TOPICAL at 19:01

## 2023-01-01 RX ADMIN — MIDODRINE HYDROCHLORIDE 10 MILLIGRAM(S): 2.5 TABLET ORAL at 16:48

## 2023-01-01 RX ADMIN — Medication 50 MILLIGRAM(S): at 05:41

## 2023-01-01 RX ADMIN — CALAMINE AND ZINC OXIDE AND PHENOL 1 APPLICATION(S): 160; 10 LOTION TOPICAL at 22:02

## 2023-01-01 RX ADMIN — Medication 2.5 MILLIGRAM(S): at 05:52

## 2023-01-01 RX ADMIN — ONDANSETRON 4 MILLIGRAM(S): 8 TABLET, FILM COATED ORAL at 20:00

## 2023-01-01 RX ADMIN — Medication 1 TABLET(S): at 17:59

## 2023-01-01 RX ADMIN — Medication 1 MILLIGRAM(S): at 05:40

## 2023-01-01 RX ADMIN — Medication 50 MILLIGRAM(S): at 05:19

## 2023-01-01 RX ADMIN — Medication 1 DROP(S): at 11:38

## 2023-01-01 RX ADMIN — Medication 25 MILLIGRAM(S): at 06:32

## 2023-01-01 RX ADMIN — ATORVASTATIN CALCIUM 40 MILLIGRAM(S): 80 TABLET, FILM COATED ORAL at 23:44

## 2023-01-01 RX ADMIN — LEVETIRACETAM 500 MILLIGRAM(S): 250 TABLET, FILM COATED ORAL at 12:56

## 2023-01-01 RX ADMIN — Medication 1 TABLET(S): at 09:26

## 2023-01-01 RX ADMIN — Medication 500 MILLIGRAM(S): at 12:48

## 2023-01-01 RX ADMIN — ISOSORBIDE MONONITRATE 30 MILLIGRAM(S): 60 TABLET, EXTENDED RELEASE ORAL at 11:48

## 2023-01-01 RX ADMIN — Medication 5 MILLIGRAM(S): at 20:17

## 2023-01-01 RX ADMIN — Medication 10 MILLIGRAM(S): at 02:45

## 2023-01-01 RX ADMIN — Medication 100 MICROGRAM(S): at 05:14

## 2023-01-01 RX ADMIN — Medication 1 TABLET(S): at 19:06

## 2023-01-01 RX ADMIN — CALAMINE AND ZINC OXIDE AND PHENOL 1 APPLICATION(S): 160; 10 LOTION TOPICAL at 16:13

## 2023-01-01 RX ADMIN — SEVELAMER CARBONATE 1600 MILLIGRAM(S): 2400 POWDER, FOR SUSPENSION ORAL at 13:02

## 2023-01-01 RX ADMIN — Medication 1 TABLET(S): at 15:22

## 2023-01-01 RX ADMIN — Medication 10 MILLIGRAM(S): at 05:37

## 2023-01-01 RX ADMIN — Medication 100 MICROGRAM(S): at 06:17

## 2023-01-01 RX ADMIN — Medication 1 DROP(S): at 18:03

## 2023-01-01 RX ADMIN — APIXABAN 5 MILLIGRAM(S): 2.5 TABLET, FILM COATED ORAL at 18:15

## 2023-01-01 RX ADMIN — Medication 100 MICROGRAM(S): at 06:30

## 2023-01-01 RX ADMIN — Medication 1 TABLET(S): at 11:13

## 2023-01-01 RX ADMIN — Medication 50 MILLIGRAM(S): at 06:08

## 2023-01-01 RX ADMIN — Medication 1 MILLIGRAM(S): at 16:31

## 2023-01-01 RX ADMIN — Medication 2.5 MILLIGRAM(S): at 17:24

## 2023-01-01 RX ADMIN — Medication 1 DROP(S): at 05:56

## 2023-01-01 RX ADMIN — HYDROMORPHONE HYDROCHLORIDE 0.5 MILLIGRAM(S): 2 INJECTION INTRAMUSCULAR; INTRAVENOUS; SUBCUTANEOUS at 14:50

## 2023-01-01 RX ADMIN — SENNA PLUS 2 TABLET(S): 8.6 TABLET ORAL at 22:48

## 2023-01-01 RX ADMIN — Medication 1 TABLET(S): at 14:50

## 2023-01-01 RX ADMIN — MUPIROCIN 1 APPLICATION(S): 20 OINTMENT TOPICAL at 07:51

## 2023-01-01 RX ADMIN — SENNA PLUS 2 TABLET(S): 8.6 TABLET ORAL at 21:47

## 2023-01-01 RX ADMIN — Medication 100 MICROGRAM(S): at 05:18

## 2023-01-01 RX ADMIN — POLYETHYLENE GLYCOL 3350 17 GRAM(S): 17 POWDER, FOR SOLUTION ORAL at 12:37

## 2023-01-01 RX ADMIN — APIXABAN 2.5 MILLIGRAM(S): 2.5 TABLET, FILM COATED ORAL at 06:54

## 2023-01-01 RX ADMIN — Medication 1 TABLET(S): at 12:38

## 2023-01-01 RX ADMIN — Medication 1 TABLET(S): at 15:19

## 2023-01-01 RX ADMIN — Medication 0.5 MILLIGRAM(S): at 06:31

## 2023-01-01 RX ADMIN — Medication 100 MICROGRAM(S): at 05:02

## 2023-01-01 RX ADMIN — CHLORHEXIDINE GLUCONATE 1 APPLICATION(S): 213 SOLUTION TOPICAL at 14:35

## 2023-01-01 RX ADMIN — Medication 10 MILLIGRAM(S): at 06:31

## 2023-01-01 RX ADMIN — Medication 50 MILLIGRAM(S): at 05:16

## 2023-01-01 RX ADMIN — LORATADINE 10 MILLIGRAM(S): 10 TABLET ORAL at 13:16

## 2023-01-01 RX ADMIN — CHLORHEXIDINE GLUCONATE 1 APPLICATION(S): 213 SOLUTION TOPICAL at 11:36

## 2023-01-01 RX ADMIN — APIXABAN 2.5 MILLIGRAM(S): 2.5 TABLET, FILM COATED ORAL at 17:59

## 2023-01-01 RX ADMIN — APIXABAN 5 MILLIGRAM(S): 2.5 TABLET, FILM COATED ORAL at 05:38

## 2023-01-01 RX ADMIN — Medication 0.75 MILLIGRAM(S): at 20:54

## 2023-01-01 RX ADMIN — APIXABAN 5 MILLIGRAM(S): 2.5 TABLET, FILM COATED ORAL at 07:44

## 2023-01-01 RX ADMIN — SEVELAMER CARBONATE 800 MILLIGRAM(S): 2400 POWDER, FOR SUSPENSION ORAL at 16:47

## 2023-01-01 RX ADMIN — Medication 25 MILLIGRAM(S): at 20:27

## 2023-01-01 RX ADMIN — CHLORHEXIDINE GLUCONATE 1 APPLICATION(S): 213 SOLUTION TOPICAL at 14:19

## 2023-01-01 RX ADMIN — PANTOPRAZOLE SODIUM 40 MILLIGRAM(S): 20 TABLET, DELAYED RELEASE ORAL at 07:50

## 2023-01-01 RX ADMIN — Medication 10 MILLIGRAM(S): at 17:27

## 2023-01-01 RX ADMIN — LORATADINE 10 MILLIGRAM(S): 10 TABLET ORAL at 12:11

## 2023-01-01 RX ADMIN — Medication 50 MILLIGRAM(S): at 05:12

## 2023-01-01 RX ADMIN — Medication 1 TABLET(S): at 13:53

## 2023-01-01 RX ADMIN — MUPIROCIN 1 APPLICATION(S): 20 OINTMENT TOPICAL at 06:50

## 2023-01-01 RX ADMIN — Medication 0.5 MILLIGRAM(S): at 18:25

## 2023-01-01 RX ADMIN — Medication 40 MILLIEQUIVALENT(S): at 09:06

## 2023-01-01 RX ADMIN — APIXABAN 5 MILLIGRAM(S): 2.5 TABLET, FILM COATED ORAL at 17:24

## 2023-01-01 RX ADMIN — ATORVASTATIN CALCIUM 40 MILLIGRAM(S): 80 TABLET, FILM COATED ORAL at 22:07

## 2023-01-01 RX ADMIN — SEVELAMER CARBONATE 1600 MILLIGRAM(S): 2400 POWDER, FOR SUSPENSION ORAL at 09:00

## 2023-01-01 RX ADMIN — APIXABAN 5 MILLIGRAM(S): 2.5 TABLET, FILM COATED ORAL at 05:11

## 2023-01-01 RX ADMIN — ATORVASTATIN CALCIUM 40 MILLIGRAM(S): 80 TABLET, FILM COATED ORAL at 21:05

## 2023-01-01 RX ADMIN — SEVELAMER CARBONATE 1600 MILLIGRAM(S): 2400 POWDER, FOR SUSPENSION ORAL at 18:02

## 2023-01-01 RX ADMIN — Medication 5 MILLIGRAM(S): at 18:48

## 2023-01-01 RX ADMIN — Medication 100 MILLIGRAM(S): at 05:41

## 2023-01-01 RX ADMIN — Medication 1 APPLICATION(S): at 05:01

## 2023-01-01 RX ADMIN — MUPIROCIN 1 APPLICATION(S): 20 OINTMENT TOPICAL at 05:41

## 2023-01-01 RX ADMIN — Medication 0.5 MILLIGRAM(S): at 07:46

## 2023-01-01 RX ADMIN — MUPIROCIN 1 APPLICATION(S): 20 OINTMENT TOPICAL at 05:02

## 2023-01-01 RX ADMIN — Medication 3 MILLIGRAM(S): at 22:39

## 2023-01-01 RX ADMIN — ERYTHROPOIETIN 4000 UNIT(S): 10000 INJECTION, SOLUTION INTRAVENOUS; SUBCUTANEOUS at 21:57

## 2023-01-01 RX ADMIN — Medication 50 MILLIGRAM(S): at 06:28

## 2023-01-01 RX ADMIN — Medication 1 DROP(S): at 23:30

## 2023-01-01 RX ADMIN — Medication 100 MICROGRAM(S): at 05:16

## 2023-01-01 RX ADMIN — HYDROMORPHONE HYDROCHLORIDE 0.5 MILLIGRAM(S): 2 INJECTION INTRAMUSCULAR; INTRAVENOUS; SUBCUTANEOUS at 01:48

## 2023-01-01 RX ADMIN — Medication 1 TABLET(S): at 11:47

## 2023-01-01 RX ADMIN — APIXABAN 5 MILLIGRAM(S): 2.5 TABLET, FILM COATED ORAL at 05:28

## 2023-01-01 RX ADMIN — Medication 0.5 MILLIGRAM(S): at 22:26

## 2023-01-01 RX ADMIN — Medication 10 MILLIGRAM(S): at 18:01

## 2023-01-01 RX ADMIN — Medication 0.5 MILLIGRAM(S): at 08:57

## 2023-01-01 RX ADMIN — HYDROMORPHONE HYDROCHLORIDE 1 MILLIGRAM(S): 2 INJECTION INTRAMUSCULAR; INTRAVENOUS; SUBCUTANEOUS at 07:03

## 2023-01-01 RX ADMIN — SENNA PLUS 2 TABLET(S): 8.6 TABLET ORAL at 22:44

## 2023-01-01 RX ADMIN — SEVELAMER CARBONATE 800 MILLIGRAM(S): 2400 POWDER, FOR SUSPENSION ORAL at 12:55

## 2023-01-01 RX ADMIN — Medication 0.75 MILLIGRAM(S): at 22:05

## 2023-01-01 RX ADMIN — Medication 5 MILLIGRAM(S): at 05:55

## 2023-01-01 RX ADMIN — Medication 500 MILLIGRAM(S): at 13:00

## 2023-01-01 RX ADMIN — Medication 10 MILLIGRAM(S): at 05:41

## 2023-01-01 RX ADMIN — APIXABAN 5 MILLIGRAM(S): 2.5 TABLET, FILM COATED ORAL at 10:29

## 2023-01-01 RX ADMIN — INSULIN HUMAN 5 UNIT(S): 100 INJECTION, SOLUTION SUBCUTANEOUS at 10:28

## 2023-01-01 RX ADMIN — SODIUM CHLORIDE 250 MILLILITER(S): 9 INJECTION INTRAMUSCULAR; INTRAVENOUS; SUBCUTANEOUS at 06:27

## 2023-01-01 RX ADMIN — CALAMINE AND ZINC OXIDE AND PHENOL 1 APPLICATION(S): 160; 10 LOTION TOPICAL at 14:35

## 2023-01-01 RX ADMIN — PANTOPRAZOLE SODIUM 40 MILLIGRAM(S): 20 TABLET, DELAYED RELEASE ORAL at 05:51

## 2023-01-01 RX ADMIN — Medication 500 MILLIGRAM(S): at 11:54

## 2023-01-01 RX ADMIN — HYDROMORPHONE HYDROCHLORIDE 1 MILLIGRAM(S): 2 INJECTION INTRAMUSCULAR; INTRAVENOUS; SUBCUTANEOUS at 06:33

## 2023-01-01 RX ADMIN — Medication 100 MICROGRAM(S): at 05:21

## 2023-01-01 RX ADMIN — SEVELAMER CARBONATE 800 MILLIGRAM(S): 2400 POWDER, FOR SUSPENSION ORAL at 09:26

## 2023-01-01 RX ADMIN — Medication 1 MILLIGRAM(S): at 00:36

## 2023-01-01 RX ADMIN — ATORVASTATIN CALCIUM 40 MILLIGRAM(S): 80 TABLET, FILM COATED ORAL at 21:56

## 2023-01-01 RX ADMIN — Medication 0.75 MILLIGRAM(S): at 17:27

## 2023-01-01 RX ADMIN — Medication 5 MILLIGRAM(S): at 17:13

## 2023-01-01 RX ADMIN — Medication 0.5 MILLIGRAM(S): at 17:52

## 2023-01-01 RX ADMIN — Medication 0.5 MILLIGRAM(S): at 05:49

## 2023-01-01 RX ADMIN — LEVETIRACETAM 250 MILLIGRAM(S): 250 TABLET, FILM COATED ORAL at 16:45

## 2023-01-01 RX ADMIN — SEVELAMER CARBONATE 1600 MILLIGRAM(S): 2400 POWDER, FOR SUSPENSION ORAL at 09:16

## 2023-01-01 RX ADMIN — LEVETIRACETAM 400 MILLIGRAM(S): 250 TABLET, FILM COATED ORAL at 14:16

## 2023-01-01 RX ADMIN — APIXABAN 5 MILLIGRAM(S): 2.5 TABLET, FILM COATED ORAL at 05:16

## 2023-01-01 RX ADMIN — CHLORHEXIDINE GLUCONATE 1 APPLICATION(S): 213 SOLUTION TOPICAL at 05:38

## 2023-01-01 RX ADMIN — HYDROMORPHONE HYDROCHLORIDE 0.5 MILLIGRAM(S): 2 INJECTION INTRAMUSCULAR; INTRAVENOUS; SUBCUTANEOUS at 11:12

## 2023-01-01 RX ADMIN — Medication 0.75 MILLIGRAM(S): at 17:46

## 2023-01-01 RX ADMIN — CALAMINE AND ZINC OXIDE AND PHENOL 1 APPLICATION(S): 160; 10 LOTION TOPICAL at 15:20

## 2023-01-01 RX ADMIN — APIXABAN 5 MILLIGRAM(S): 2.5 TABLET, FILM COATED ORAL at 22:59

## 2023-01-01 RX ADMIN — OXYCODONE HYDROCHLORIDE 5 MILLIGRAM(S): 5 TABLET ORAL at 11:54

## 2023-01-01 RX ADMIN — Medication 50 MILLIGRAM(S): at 10:29

## 2023-01-01 RX ADMIN — Medication 25 MILLIGRAM(S): at 09:54

## 2023-01-01 RX ADMIN — LEVETIRACETAM 500 MILLIGRAM(S): 250 TABLET, FILM COATED ORAL at 12:38

## 2023-01-01 RX ADMIN — CHLORHEXIDINE GLUCONATE 1 APPLICATION(S): 213 SOLUTION TOPICAL at 13:19

## 2023-01-01 RX ADMIN — Medication 20 MILLIEQUIVALENT(S): at 00:09

## 2023-01-01 RX ADMIN — CALAMINE AND ZINC OXIDE AND PHENOL 1 APPLICATION(S): 160; 10 LOTION TOPICAL at 22:38

## 2023-01-01 RX ADMIN — SEVELAMER CARBONATE 800 MILLIGRAM(S): 2400 POWDER, FOR SUSPENSION ORAL at 16:44

## 2023-01-01 RX ADMIN — Medication 100 MICROGRAM(S): at 06:15

## 2023-01-01 RX ADMIN — Medication 50 MILLIGRAM(S): at 08:51

## 2023-01-01 RX ADMIN — Medication 500 MILLIGRAM(S): at 09:31

## 2023-01-01 RX ADMIN — SEVELAMER CARBONATE 1600 MILLIGRAM(S): 2400 POWDER, FOR SUSPENSION ORAL at 13:09

## 2023-01-01 RX ADMIN — LEVETIRACETAM 250 MILLIGRAM(S): 250 TABLET, FILM COATED ORAL at 18:16

## 2023-01-01 RX ADMIN — APIXABAN 5 MILLIGRAM(S): 2.5 TABLET, FILM COATED ORAL at 17:25

## 2023-01-01 RX ADMIN — LORATADINE 10 MILLIGRAM(S): 10 TABLET ORAL at 15:43

## 2023-01-01 RX ADMIN — CALAMINE AND ZINC OXIDE AND PHENOL 1 APPLICATION(S): 160; 10 LOTION TOPICAL at 14:02

## 2023-01-01 RX ADMIN — Medication 50 MILLIGRAM(S): at 23:40

## 2023-01-01 RX ADMIN — APIXABAN 5 MILLIGRAM(S): 2.5 TABLET, FILM COATED ORAL at 05:29

## 2023-01-01 RX ADMIN — Medication 500 MILLIGRAM(S): at 12:37

## 2023-01-01 RX ADMIN — LINEZOLID 300 MILLIGRAM(S): 600 INJECTION, SOLUTION INTRAVENOUS at 18:25

## 2023-01-01 RX ADMIN — CALAMINE AND ZINC OXIDE AND PHENOL 1 APPLICATION(S): 160; 10 LOTION TOPICAL at 21:41

## 2023-01-01 RX ADMIN — CHLORHEXIDINE GLUCONATE 1 APPLICATION(S): 213 SOLUTION TOPICAL at 12:55

## 2023-01-01 RX ADMIN — Medication 1 MILLIGRAM(S): at 17:16

## 2023-01-01 RX ADMIN — SEVELAMER CARBONATE 800 MILLIGRAM(S): 2400 POWDER, FOR SUSPENSION ORAL at 12:26

## 2023-01-01 RX ADMIN — SEVELAMER CARBONATE 800 MILLIGRAM(S): 2400 POWDER, FOR SUSPENSION ORAL at 08:42

## 2023-01-01 RX ADMIN — Medication 1 TABLET(S): at 12:09

## 2023-01-01 RX ADMIN — CHLORHEXIDINE GLUCONATE 1 APPLICATION(S): 213 SOLUTION TOPICAL at 15:20

## 2023-01-01 RX ADMIN — TIOTROPIUM BROMIDE 2 PUFF(S): 18 CAPSULE ORAL; RESPIRATORY (INHALATION) at 09:01

## 2023-01-01 RX ADMIN — APIXABAN 5 MILLIGRAM(S): 2.5 TABLET, FILM COATED ORAL at 05:57

## 2023-01-01 RX ADMIN — SENNA PLUS 2 TABLET(S): 8.6 TABLET ORAL at 22:28

## 2023-01-01 RX ADMIN — MUPIROCIN 1 APPLICATION(S): 20 OINTMENT TOPICAL at 17:53

## 2023-01-01 RX ADMIN — LORATADINE 10 MILLIGRAM(S): 10 TABLET ORAL at 15:20

## 2023-01-01 RX ADMIN — Medication 1 TABLET(S): at 13:20

## 2023-01-01 RX ADMIN — Medication 100 MICROGRAM(S): at 06:49

## 2023-01-01 RX ADMIN — SEVELAMER CARBONATE 800 MILLIGRAM(S): 2400 POWDER, FOR SUSPENSION ORAL at 19:28

## 2023-01-01 RX ADMIN — HYDROMORPHONE HYDROCHLORIDE 0.5 MILLIGRAM(S): 2 INJECTION INTRAMUSCULAR; INTRAVENOUS; SUBCUTANEOUS at 06:18

## 2023-01-01 RX ADMIN — Medication 5 MILLIGRAM(S): at 17:31

## 2023-01-01 RX ADMIN — CALAMINE AND ZINC OXIDE AND PHENOL 1 APPLICATION(S): 160; 10 LOTION TOPICAL at 05:15

## 2023-01-01 RX ADMIN — CHLORHEXIDINE GLUCONATE 1 APPLICATION(S): 213 SOLUTION TOPICAL at 12:50

## 2023-01-01 RX ADMIN — Medication 1 MILLIGRAM(S): at 13:03

## 2023-01-01 RX ADMIN — Medication 0.5 MILLIGRAM(S): at 09:05

## 2023-01-01 RX ADMIN — ATORVASTATIN CALCIUM 40 MILLIGRAM(S): 80 TABLET, FILM COATED ORAL at 22:28

## 2023-01-01 RX ADMIN — Medication 1 MILLIGRAM(S): at 13:48

## 2023-01-01 RX ADMIN — ATORVASTATIN CALCIUM 40 MILLIGRAM(S): 80 TABLET, FILM COATED ORAL at 21:47

## 2023-01-01 RX ADMIN — Medication 50 MILLIGRAM(S): at 05:21

## 2023-01-01 RX ADMIN — Medication 10 MILLIGRAM(S): at 05:40

## 2023-01-01 RX ADMIN — POLYETHYLENE GLYCOL 3350 17 GRAM(S): 17 POWDER, FOR SOLUTION ORAL at 11:55

## 2023-01-01 RX ADMIN — Medication 100 MICROGRAM(S): at 07:37

## 2023-01-01 RX ADMIN — Medication 5 MILLIGRAM(S): at 17:12

## 2023-01-01 RX ADMIN — CHLORHEXIDINE GLUCONATE 1 APPLICATION(S): 213 SOLUTION TOPICAL at 05:36

## 2023-01-01 RX ADMIN — HYDROMORPHONE HYDROCHLORIDE 0.25 MILLIGRAM(S): 2 INJECTION INTRAMUSCULAR; INTRAVENOUS; SUBCUTANEOUS at 13:37

## 2023-01-01 RX ADMIN — Medication 1 TABLET(S): at 11:12

## 2023-01-01 RX ADMIN — MUPIROCIN 1 APPLICATION(S): 20 OINTMENT TOPICAL at 22:44

## 2023-01-01 RX ADMIN — Medication 0.75 MILLIGRAM(S): at 17:16

## 2023-01-01 RX ADMIN — Medication 1 DROP(S): at 00:57

## 2023-01-01 RX ADMIN — Medication 1 APPLICATION(S): at 17:46

## 2023-01-01 RX ADMIN — MUPIROCIN 1 APPLICATION(S): 20 OINTMENT TOPICAL at 06:17

## 2023-01-01 RX ADMIN — MIDODRINE HYDROCHLORIDE 5 MILLIGRAM(S): 2.5 TABLET ORAL at 20:30

## 2023-01-01 RX ADMIN — OXYCODONE HYDROCHLORIDE 5 MILLIGRAM(S): 5 TABLET ORAL at 12:15

## 2023-01-01 RX ADMIN — Medication 1 MILLIGRAM(S): at 05:08

## 2023-01-01 RX ADMIN — Medication 1 TABLET(S): at 14:26

## 2023-01-01 RX ADMIN — Medication 1 TABLET(S): at 11:38

## 2023-01-01 RX ADMIN — SENNA PLUS 2 TABLET(S): 8.6 TABLET ORAL at 21:22

## 2023-01-01 RX ADMIN — LORATADINE 10 MILLIGRAM(S): 10 TABLET ORAL at 12:16

## 2023-01-01 RX ADMIN — SODIUM ZIRCONIUM CYCLOSILICATE 10 GRAM(S): 10 POWDER, FOR SUSPENSION ORAL at 09:05

## 2023-01-01 RX ADMIN — POLYETHYLENE GLYCOL 3350 17 GRAM(S): 17 POWDER, FOR SOLUTION ORAL at 11:13

## 2023-01-01 RX ADMIN — Medication 10 MILLIGRAM(S): at 17:53

## 2023-01-01 RX ADMIN — SENNA PLUS 2 TABLET(S): 8.6 TABLET ORAL at 22:27

## 2023-01-01 RX ADMIN — SEVELAMER CARBONATE 800 MILLIGRAM(S): 2400 POWDER, FOR SUSPENSION ORAL at 12:39

## 2023-01-01 RX ADMIN — Medication 500 MILLIGRAM(S): at 13:15

## 2023-01-01 RX ADMIN — HYDROMORPHONE HYDROCHLORIDE 1 MILLIGRAM(S): 2 INJECTION INTRAMUSCULAR; INTRAVENOUS; SUBCUTANEOUS at 07:53

## 2023-01-01 RX ADMIN — SEVELAMER CARBONATE 800 MILLIGRAM(S): 2400 POWDER, FOR SUSPENSION ORAL at 17:26

## 2023-01-01 RX ADMIN — Medication 500 MILLIGRAM(S): at 11:04

## 2023-01-01 RX ADMIN — Medication 1 TABLET(S): at 12:37

## 2023-01-01 RX ADMIN — LEVETIRACETAM 500 MILLIGRAM(S): 250 TABLET, FILM COATED ORAL at 11:20

## 2023-01-01 RX ADMIN — Medication 0.5 MILLIGRAM(S): at 17:20

## 2023-01-01 RX ADMIN — Medication 0.5 MILLIGRAM(S): at 07:31

## 2023-01-01 RX ADMIN — APIXABAN 5 MILLIGRAM(S): 2.5 TABLET, FILM COATED ORAL at 05:36

## 2023-01-01 RX ADMIN — Medication 1 TABLET(S): at 11:33

## 2023-01-01 RX ADMIN — MUPIROCIN 1 APPLICATION(S): 20 OINTMENT TOPICAL at 17:59

## 2023-01-01 RX ADMIN — Medication 1 APPLICATION(S): at 05:43

## 2023-01-01 RX ADMIN — CEFTRIAXONE 100 MILLIGRAM(S): 500 INJECTION, POWDER, FOR SOLUTION INTRAMUSCULAR; INTRAVENOUS at 17:36

## 2023-01-01 RX ADMIN — LIDOCAINE 1 PATCH: 4 CREAM TOPICAL at 17:21

## 2023-01-01 RX ADMIN — Medication 0.75 MILLIGRAM(S): at 18:01

## 2023-01-01 RX ADMIN — TAMSULOSIN HYDROCHLORIDE 0.4 MILLIGRAM(S): 0.4 CAPSULE ORAL at 21:21

## 2023-01-01 RX ADMIN — CEFTRIAXONE 100 MILLIGRAM(S): 500 INJECTION, POWDER, FOR SOLUTION INTRAMUSCULAR; INTRAVENOUS at 06:14

## 2023-01-01 RX ADMIN — Medication 25 MILLIGRAM(S): at 18:16

## 2023-01-01 RX ADMIN — Medication 10 MILLIGRAM(S): at 23:18

## 2023-01-01 RX ADMIN — Medication 50 MILLIGRAM(S): at 16:57

## 2023-01-01 RX ADMIN — SENNA PLUS 2 TABLET(S): 8.6 TABLET ORAL at 22:35

## 2023-01-01 RX ADMIN — SENNA PLUS 2 TABLET(S): 8.6 TABLET ORAL at 23:51

## 2023-01-01 RX ADMIN — APIXABAN 2.5 MILLIGRAM(S): 2.5 TABLET, FILM COATED ORAL at 06:48

## 2023-01-01 RX ADMIN — INSULIN HUMAN 10 UNIT(S): 100 INJECTION, SOLUTION SUBCUTANEOUS at 12:29

## 2023-01-01 RX ADMIN — NYSTATIN CREAM 1 APPLICATION(S): 100000 CREAM TOPICAL at 21:23

## 2023-01-01 RX ADMIN — Medication 1 APPLICATION(S): at 22:33

## 2023-01-01 RX ADMIN — LEVETIRACETAM 500 MILLIGRAM(S): 250 TABLET, FILM COATED ORAL at 15:20

## 2023-01-01 RX ADMIN — Medication 250 MILLIGRAM(S): at 12:15

## 2023-01-01 RX ADMIN — Medication 50 MILLIGRAM(S): at 05:57

## 2023-01-01 RX ADMIN — Medication 0.5 MILLIGRAM(S): at 23:36

## 2023-01-01 RX ADMIN — Medication 100 MICROGRAM(S): at 07:45

## 2023-01-01 RX ADMIN — SEVELAMER CARBONATE 800 MILLIGRAM(S): 2400 POWDER, FOR SUSPENSION ORAL at 22:02

## 2023-01-01 RX ADMIN — HYDROMORPHONE HYDROCHLORIDE 1 MILLIGRAM(S): 2 INJECTION INTRAMUSCULAR; INTRAVENOUS; SUBCUTANEOUS at 14:54

## 2023-01-01 RX ADMIN — NYSTATIN CREAM 1 APPLICATION(S): 100000 CREAM TOPICAL at 14:58

## 2023-01-01 RX ADMIN — SEVELAMER CARBONATE 800 MILLIGRAM(S): 2400 POWDER, FOR SUSPENSION ORAL at 11:52

## 2023-01-01 RX ADMIN — Medication 100 MICROGRAM(S): at 07:04

## 2023-01-01 RX ADMIN — MUPIROCIN 1 APPLICATION(S): 20 OINTMENT TOPICAL at 05:01

## 2023-01-01 RX ADMIN — Medication 0.5 MILLIGRAM(S): at 00:03

## 2023-01-01 RX ADMIN — SEVELAMER CARBONATE 800 MILLIGRAM(S): 2400 POWDER, FOR SUSPENSION ORAL at 08:35

## 2023-01-01 RX ADMIN — APIXABAN 5 MILLIGRAM(S): 2.5 TABLET, FILM COATED ORAL at 17:39

## 2023-01-01 RX ADMIN — SEVELAMER CARBONATE 800 MILLIGRAM(S): 2400 POWDER, FOR SUSPENSION ORAL at 17:54

## 2023-01-01 RX ADMIN — SEVELAMER CARBONATE 1600 MILLIGRAM(S): 2400 POWDER, FOR SUSPENSION ORAL at 14:26

## 2023-01-01 RX ADMIN — Medication 25 MILLIGRAM(S): at 17:41

## 2023-01-01 RX ADMIN — Medication 5 MILLIGRAM(S): at 20:48

## 2023-01-01 RX ADMIN — SEVELAMER CARBONATE 1600 MILLIGRAM(S): 2400 POWDER, FOR SUSPENSION ORAL at 21:41

## 2023-01-01 RX ADMIN — APIXABAN 2.5 MILLIGRAM(S): 2.5 TABLET, FILM COATED ORAL at 07:49

## 2023-01-01 RX ADMIN — POLYETHYLENE GLYCOL 3350 17 GRAM(S): 17 POWDER, FOR SOLUTION ORAL at 12:46

## 2023-01-01 RX ADMIN — CALAMINE AND ZINC OXIDE AND PHENOL 1 APPLICATION(S): 160; 10 LOTION TOPICAL at 22:11

## 2023-01-01 RX ADMIN — SEVELAMER CARBONATE 800 MILLIGRAM(S): 2400 POWDER, FOR SUSPENSION ORAL at 08:56

## 2023-01-01 RX ADMIN — SENNA PLUS 2 TABLET(S): 8.6 TABLET ORAL at 22:01

## 2023-01-01 RX ADMIN — CHLORHEXIDINE GLUCONATE 1 APPLICATION(S): 213 SOLUTION TOPICAL at 11:54

## 2023-01-01 RX ADMIN — LORATADINE 10 MILLIGRAM(S): 10 TABLET ORAL at 13:08

## 2023-01-01 RX ADMIN — CHLORHEXIDINE GLUCONATE 1 APPLICATION(S): 213 SOLUTION TOPICAL at 05:07

## 2023-01-01 RX ADMIN — MEROPENEM 100 MILLIGRAM(S): 1 INJECTION INTRAVENOUS at 19:48

## 2023-01-01 RX ADMIN — Medication 1 TABLET(S): at 17:36

## 2023-01-01 RX ADMIN — SENNA PLUS 2 TABLET(S): 8.6 TABLET ORAL at 21:42

## 2023-01-01 RX ADMIN — CALAMINE AND ZINC OXIDE AND PHENOL 1 APPLICATION(S): 160; 10 LOTION TOPICAL at 05:04

## 2023-01-01 RX ADMIN — Medication 100 MICROGRAM(S): at 07:13

## 2023-01-01 RX ADMIN — OLANZAPINE 2.5 MILLIGRAM(S): 15 TABLET, FILM COATED ORAL at 21:02

## 2023-01-01 RX ADMIN — Medication 25 MILLIGRAM(S): at 12:30

## 2023-01-01 RX ADMIN — CALAMINE AND ZINC OXIDE AND PHENOL 1 APPLICATION(S): 160; 10 LOTION TOPICAL at 22:23

## 2023-01-01 RX ADMIN — LEVETIRACETAM 500 MILLIGRAM(S): 250 TABLET, FILM COATED ORAL at 11:04

## 2023-01-01 RX ADMIN — ATORVASTATIN CALCIUM 40 MILLIGRAM(S): 80 TABLET, FILM COATED ORAL at 22:12

## 2023-01-01 RX ADMIN — SEVELAMER CARBONATE 800 MILLIGRAM(S): 2400 POWDER, FOR SUSPENSION ORAL at 11:03

## 2023-01-01 RX ADMIN — Medication 1 TABLET(S): at 12:55

## 2023-01-01 RX ADMIN — APIXABAN 5 MILLIGRAM(S): 2.5 TABLET, FILM COATED ORAL at 21:41

## 2023-01-01 RX ADMIN — SEVELAMER CARBONATE 1600 MILLIGRAM(S): 2400 POWDER, FOR SUSPENSION ORAL at 17:24

## 2023-01-01 RX ADMIN — Medication 0.25 MILLIGRAM(S): at 08:48

## 2023-01-01 RX ADMIN — LORATADINE 10 MILLIGRAM(S): 10 TABLET ORAL at 11:16

## 2023-01-01 RX ADMIN — ERYTHROPOIETIN 4000 UNIT(S): 10000 INJECTION, SOLUTION INTRAVENOUS; SUBCUTANEOUS at 15:58

## 2023-01-01 RX ADMIN — Medication 50 MILLIGRAM(S): at 05:00

## 2023-01-01 RX ADMIN — LORATADINE 10 MILLIGRAM(S): 10 TABLET ORAL at 14:45

## 2023-01-01 RX ADMIN — MUPIROCIN 1 APPLICATION(S): 20 OINTMENT TOPICAL at 05:37

## 2023-01-01 RX ADMIN — LORATADINE 10 MILLIGRAM(S): 10 TABLET ORAL at 12:27

## 2023-01-01 RX ADMIN — Medication 500 MILLIGRAM(S): at 11:20

## 2023-01-01 RX ADMIN — CALAMINE AND ZINC OXIDE AND PHENOL 1 APPLICATION(S): 160; 10 LOTION TOPICAL at 23:01

## 2023-01-01 RX ADMIN — Medication 10 MILLIGRAM(S): at 12:49

## 2023-01-01 RX ADMIN — Medication 40 MILLIGRAM(S): at 15:15

## 2023-01-01 RX ADMIN — Medication 0.25 MILLIGRAM(S): at 02:51

## 2023-01-01 RX ADMIN — Medication 0.5 MILLIGRAM(S): at 10:13

## 2023-01-01 RX ADMIN — MUPIROCIN 1 APPLICATION(S): 20 OINTMENT TOPICAL at 16:39

## 2023-01-01 RX ADMIN — Medication 1 TABLET(S): at 09:44

## 2023-01-01 RX ADMIN — Medication 0.5 MILLIGRAM(S): at 09:12

## 2023-01-01 RX ADMIN — HYDROMORPHONE HYDROCHLORIDE 1 MILLIGRAM(S): 2 INJECTION INTRAMUSCULAR; INTRAVENOUS; SUBCUTANEOUS at 22:33

## 2023-01-01 RX ADMIN — CALAMINE AND ZINC OXIDE AND PHENOL 1 APPLICATION(S): 160; 10 LOTION TOPICAL at 22:54

## 2023-01-01 RX ADMIN — HYDROMORPHONE HYDROCHLORIDE 1 MILLIGRAM(S): 2 INJECTION INTRAMUSCULAR; INTRAVENOUS; SUBCUTANEOUS at 18:10

## 2023-01-01 RX ADMIN — OLANZAPINE 2.5 MILLIGRAM(S): 15 TABLET, FILM COATED ORAL at 06:11

## 2023-01-01 RX ADMIN — Medication 50 MILLIGRAM(S): at 05:40

## 2023-01-01 RX ADMIN — Medication 3 MILLIGRAM(S): at 22:00

## 2023-01-01 RX ADMIN — Medication 500 MILLIGRAM(S): at 13:52

## 2023-01-01 RX ADMIN — HYDROMORPHONE HYDROCHLORIDE 0.25 MILLIGRAM(S): 2 INJECTION INTRAMUSCULAR; INTRAVENOUS; SUBCUTANEOUS at 04:10

## 2023-01-01 RX ADMIN — Medication 975 MILLIGRAM(S): at 22:12

## 2023-01-01 RX ADMIN — Medication 0.75 MILLIGRAM(S): at 17:35

## 2023-01-01 RX ADMIN — HYDROMORPHONE HYDROCHLORIDE 0.5 MILLIGRAM(S): 2 INJECTION INTRAMUSCULAR; INTRAVENOUS; SUBCUTANEOUS at 23:59

## 2023-01-01 RX ADMIN — OXYCODONE HYDROCHLORIDE 5 MILLIGRAM(S): 5 TABLET ORAL at 12:40

## 2023-01-01 RX ADMIN — Medication 1 APPLICATION(S): at 21:40

## 2023-01-01 RX ADMIN — Medication 25 MILLIGRAM(S): at 12:38

## 2023-01-01 RX ADMIN — Medication 10 MILLIGRAM(S): at 07:26

## 2023-01-01 RX ADMIN — Medication 0.5 MILLIGRAM(S): at 09:20

## 2023-01-01 RX ADMIN — POLYETHYLENE GLYCOL 3350 17 GRAM(S): 17 POWDER, FOR SOLUTION ORAL at 14:26

## 2023-01-01 RX ADMIN — Medication 5 MILLIGRAM(S): at 17:47

## 2023-01-01 RX ADMIN — Medication 500 MILLIGRAM(S): at 15:20

## 2023-01-01 RX ADMIN — SEVELAMER CARBONATE 800 MILLIGRAM(S): 2400 POWDER, FOR SUSPENSION ORAL at 12:19

## 2023-01-01 RX ADMIN — Medication 1 MILLIGRAM(S): at 08:31

## 2023-01-01 RX ADMIN — Medication 50 MILLIGRAM(S): at 15:20

## 2023-01-01 RX ADMIN — Medication 25 MILLIGRAM(S): at 18:44

## 2023-01-01 RX ADMIN — LEVETIRACETAM 500 MILLIGRAM(S): 250 TABLET, FILM COATED ORAL at 12:12

## 2023-01-01 RX ADMIN — LEVETIRACETAM 250 MILLIGRAM(S): 250 TABLET, FILM COATED ORAL at 11:13

## 2023-01-01 RX ADMIN — Medication 0.75 MILLIGRAM(S): at 18:50

## 2023-01-01 RX ADMIN — LORATADINE 10 MILLIGRAM(S): 10 TABLET ORAL at 13:02

## 2023-01-01 RX ADMIN — Medication 50 MILLIGRAM(S): at 05:38

## 2023-01-01 RX ADMIN — CALAMINE AND ZINC OXIDE AND PHENOL 1 APPLICATION(S): 160; 10 LOTION TOPICAL at 13:36

## 2023-01-01 RX ADMIN — SEVELAMER CARBONATE 800 MILLIGRAM(S): 2400 POWDER, FOR SUSPENSION ORAL at 13:00

## 2023-01-01 RX ADMIN — Medication 1 TABLET(S): at 12:49

## 2023-01-01 RX ADMIN — LEVETIRACETAM 500 MILLIGRAM(S): 250 TABLET, FILM COATED ORAL at 14:44

## 2023-01-01 RX ADMIN — SEVELAMER CARBONATE 800 MILLIGRAM(S): 2400 POWDER, FOR SUSPENSION ORAL at 12:53

## 2023-01-01 RX ADMIN — APIXABAN 5 MILLIGRAM(S): 2.5 TABLET, FILM COATED ORAL at 05:51

## 2023-01-01 RX ADMIN — Medication 100 MICROGRAM(S): at 05:13

## 2023-01-01 RX ADMIN — APIXABAN 5 MILLIGRAM(S): 2.5 TABLET, FILM COATED ORAL at 05:31

## 2023-01-01 RX ADMIN — APIXABAN 5 MILLIGRAM(S): 2.5 TABLET, FILM COATED ORAL at 07:03

## 2023-01-01 RX ADMIN — MUPIROCIN 1 APPLICATION(S): 20 OINTMENT TOPICAL at 06:24

## 2023-01-01 RX ADMIN — Medication 0.5 MILLIGRAM(S): at 18:29

## 2023-01-01 RX ADMIN — LEVETIRACETAM 500 MILLIGRAM(S): 250 TABLET, FILM COATED ORAL at 15:42

## 2023-01-01 RX ADMIN — Medication 10 MILLIGRAM(S): at 16:03

## 2023-01-01 RX ADMIN — MUPIROCIN 1 APPLICATION(S): 20 OINTMENT TOPICAL at 13:08

## 2023-01-01 RX ADMIN — LEVETIRACETAM 500 MILLIGRAM(S): 250 TABLET, FILM COATED ORAL at 13:14

## 2023-01-01 RX ADMIN — LEVETIRACETAM 500 MILLIGRAM(S): 250 TABLET, FILM COATED ORAL at 13:23

## 2023-01-01 RX ADMIN — SEVELAMER CARBONATE 800 MILLIGRAM(S): 2400 POWDER, FOR SUSPENSION ORAL at 13:04

## 2023-01-01 RX ADMIN — Medication 5 MILLIGRAM(S): at 17:39

## 2023-01-01 RX ADMIN — Medication 0.5 MILLIGRAM(S): at 06:24

## 2023-01-01 RX ADMIN — Medication 1 APPLICATION(S): at 18:00

## 2023-01-01 RX ADMIN — Medication 1 TABLET(S): at 17:26

## 2023-01-01 RX ADMIN — MUPIROCIN 1 APPLICATION(S): 20 OINTMENT TOPICAL at 01:26

## 2023-01-01 RX ADMIN — Medication 0.5 MILLIGRAM(S): at 05:36

## 2023-01-01 RX ADMIN — LORATADINE 10 MILLIGRAM(S): 10 TABLET ORAL at 12:26

## 2023-01-01 RX ADMIN — Medication 10 MILLIGRAM(S): at 09:30

## 2023-01-01 RX ADMIN — Medication 3 MILLIGRAM(S): at 21:29

## 2023-01-01 RX ADMIN — MUPIROCIN 1 APPLICATION(S): 20 OINTMENT TOPICAL at 17:24

## 2023-01-01 RX ADMIN — Medication 2.5 MILLIGRAM(S): at 01:38

## 2023-01-01 RX ADMIN — CALAMINE AND ZINC OXIDE AND PHENOL 1 APPLICATION(S): 160; 10 LOTION TOPICAL at 21:54

## 2023-01-01 RX ADMIN — CHLORHEXIDINE GLUCONATE 1 APPLICATION(S): 213 SOLUTION TOPICAL at 05:23

## 2023-01-01 RX ADMIN — SEVELAMER CARBONATE 1600 MILLIGRAM(S): 2400 POWDER, FOR SUSPENSION ORAL at 07:45

## 2023-01-01 RX ADMIN — Medication 50 MILLIGRAM(S): at 05:42

## 2023-01-01 RX ADMIN — APIXABAN 5 MILLIGRAM(S): 2.5 TABLET, FILM COATED ORAL at 05:42

## 2023-01-01 RX ADMIN — LORATADINE 10 MILLIGRAM(S): 10 TABLET ORAL at 23:46

## 2023-01-01 RX ADMIN — MIDODRINE HYDROCHLORIDE 10 MILLIGRAM(S): 2.5 TABLET ORAL at 10:44

## 2023-01-01 RX ADMIN — APIXABAN 5 MILLIGRAM(S): 2.5 TABLET, FILM COATED ORAL at 18:25

## 2023-01-01 RX ADMIN — Medication 1 DROP(S): at 13:01

## 2023-01-01 RX ADMIN — Medication 1 TABLET(S): at 14:45

## 2023-01-01 RX ADMIN — SENNA PLUS 2 TABLET(S): 8.6 TABLET ORAL at 21:35

## 2023-01-01 RX ADMIN — Medication 1 MILLIGRAM(S): at 03:54

## 2023-01-01 RX ADMIN — POLYETHYLENE GLYCOL 3350 17 GRAM(S): 17 POWDER, FOR SOLUTION ORAL at 11:48

## 2023-01-01 RX ADMIN — ERYTHROPOIETIN 4000 UNIT(S): 10000 INJECTION, SOLUTION INTRAVENOUS; SUBCUTANEOUS at 17:04

## 2023-01-01 RX ADMIN — Medication 0.5 MILLIGRAM(S): at 17:18

## 2023-01-01 RX ADMIN — Medication 0.5 MILLIGRAM(S): at 05:38

## 2023-01-01 RX ADMIN — Medication 25 MILLIGRAM(S): at 17:37

## 2023-01-01 RX ADMIN — SEVELAMER CARBONATE 800 MILLIGRAM(S): 2400 POWDER, FOR SUSPENSION ORAL at 12:37

## 2023-01-01 RX ADMIN — LEVETIRACETAM 500 MILLIGRAM(S): 250 TABLET, FILM COATED ORAL at 12:52

## 2023-01-01 RX ADMIN — APIXABAN 5 MILLIGRAM(S): 2.5 TABLET, FILM COATED ORAL at 06:06

## 2023-01-01 RX ADMIN — LEVETIRACETAM 500 MILLIGRAM(S): 250 TABLET, FILM COATED ORAL at 12:08

## 2023-01-01 RX ADMIN — Medication 0.5 MILLIGRAM(S): at 08:56

## 2023-01-01 RX ADMIN — MEROPENEM 100 MILLIGRAM(S): 1 INJECTION INTRAVENOUS at 14:33

## 2023-01-01 RX ADMIN — APIXABAN 2.5 MILLIGRAM(S): 2.5 TABLET, FILM COATED ORAL at 17:55

## 2023-01-01 RX ADMIN — APIXABAN 5 MILLIGRAM(S): 2.5 TABLET, FILM COATED ORAL at 15:42

## 2023-01-01 RX ADMIN — SEVELAMER CARBONATE 800 MILLIGRAM(S): 2400 POWDER, FOR SUSPENSION ORAL at 18:42

## 2023-01-01 RX ADMIN — Medication 100 MILLIGRAM(S): at 03:30

## 2023-01-01 RX ADMIN — Medication 500 MILLIGRAM(S): at 19:06

## 2023-01-01 RX ADMIN — Medication 0.5 MILLIGRAM(S): at 22:00

## 2023-01-01 RX ADMIN — LEVETIRACETAM 250 MILLIGRAM(S): 250 TABLET, FILM COATED ORAL at 07:45

## 2023-01-01 RX ADMIN — MUPIROCIN 1 APPLICATION(S): 20 OINTMENT TOPICAL at 05:15

## 2023-01-01 RX ADMIN — CALAMINE AND ZINC OXIDE AND PHENOL 1 APPLICATION(S): 160; 10 LOTION TOPICAL at 05:20

## 2023-01-01 RX ADMIN — LORATADINE 10 MILLIGRAM(S): 10 TABLET ORAL at 12:52

## 2023-01-01 RX ADMIN — Medication 100 MILLIGRAM(S): at 07:13

## 2023-01-01 RX ADMIN — HYDROMORPHONE HYDROCHLORIDE 1 MILLIGRAM(S): 2 INJECTION INTRAMUSCULAR; INTRAVENOUS; SUBCUTANEOUS at 23:29

## 2023-01-01 RX ADMIN — ISOSORBIDE MONONITRATE 30 MILLIGRAM(S): 60 TABLET, EXTENDED RELEASE ORAL at 13:07

## 2023-01-01 RX ADMIN — ERYTHROPOIETIN 4000 UNIT(S): 10000 INJECTION, SOLUTION INTRAVENOUS; SUBCUTANEOUS at 08:57

## 2023-01-01 RX ADMIN — Medication 1 TABLET(S): at 19:56

## 2023-01-01 RX ADMIN — SEVELAMER CARBONATE 800 MILLIGRAM(S): 2400 POWDER, FOR SUSPENSION ORAL at 09:00

## 2023-01-01 RX ADMIN — Medication 1 APPLICATION(S): at 16:02

## 2023-01-01 RX ADMIN — CALAMINE AND ZINC OXIDE AND PHENOL 1 APPLICATION(S): 160; 10 LOTION TOPICAL at 12:36

## 2023-01-01 RX ADMIN — APIXABAN 2.5 MILLIGRAM(S): 2.5 TABLET, FILM COATED ORAL at 07:27

## 2023-01-01 RX ADMIN — TAMSULOSIN HYDROCHLORIDE 0.4 MILLIGRAM(S): 0.4 CAPSULE ORAL at 22:34

## 2023-01-01 RX ADMIN — Medication 100 MICROGRAM(S): at 05:40

## 2023-01-01 RX ADMIN — Medication 50 MILLIGRAM(S): at 05:28

## 2023-01-01 RX ADMIN — Medication 0.5 MILLIGRAM(S): at 18:01

## 2023-01-01 RX ADMIN — Medication 50 MILLIGRAM(S): at 06:34

## 2023-01-01 RX ADMIN — SEVELAMER CARBONATE 800 MILLIGRAM(S): 2400 POWDER, FOR SUSPENSION ORAL at 12:57

## 2023-01-01 RX ADMIN — MIDODRINE HYDROCHLORIDE 10 MILLIGRAM(S): 2.5 TABLET ORAL at 18:56

## 2023-01-01 RX ADMIN — SENNA PLUS 2 TABLET(S): 8.6 TABLET ORAL at 22:11

## 2023-01-01 RX ADMIN — Medication 10 MILLIGRAM(S): at 05:28

## 2023-01-01 RX ADMIN — CALAMINE AND ZINC OXIDE AND PHENOL 1 APPLICATION(S): 160; 10 LOTION TOPICAL at 21:08

## 2023-01-01 RX ADMIN — CHLORHEXIDINE GLUCONATE 1 APPLICATION(S): 213 SOLUTION TOPICAL at 14:53

## 2023-01-01 RX ADMIN — ATORVASTATIN CALCIUM 40 MILLIGRAM(S): 80 TABLET, FILM COATED ORAL at 22:55

## 2023-01-01 RX ADMIN — Medication 1 MILLIGRAM(S): at 21:21

## 2023-01-01 RX ADMIN — CALAMINE AND ZINC OXIDE AND PHENOL 1 APPLICATION(S): 160; 10 LOTION TOPICAL at 05:28

## 2023-01-01 RX ADMIN — ERYTHROPOIETIN 4000 UNIT(S): 10000 INJECTION, SOLUTION INTRAVENOUS; SUBCUTANEOUS at 21:17

## 2023-01-01 RX ADMIN — OLANZAPINE 2.5 MILLIGRAM(S): 15 TABLET, FILM COATED ORAL at 14:20

## 2023-01-01 RX ADMIN — SEVELAMER CARBONATE 800 MILLIGRAM(S): 2400 POWDER, FOR SUSPENSION ORAL at 17:59

## 2023-01-01 RX ADMIN — APIXABAN 5 MILLIGRAM(S): 2.5 TABLET, FILM COATED ORAL at 05:48

## 2023-01-01 RX ADMIN — Medication 0.5 MILLIGRAM(S): at 07:38

## 2023-01-01 RX ADMIN — SEVELAMER CARBONATE 800 MILLIGRAM(S): 2400 POWDER, FOR SUSPENSION ORAL at 08:45

## 2023-01-01 RX ADMIN — Medication 10 MILLIGRAM(S): at 17:59

## 2023-01-01 RX ADMIN — ATORVASTATIN CALCIUM 40 MILLIGRAM(S): 80 TABLET, FILM COATED ORAL at 22:47

## 2023-01-01 RX ADMIN — LEVETIRACETAM 250 MILLIGRAM(S): 250 TABLET, FILM COATED ORAL at 17:37

## 2023-01-01 RX ADMIN — ISOSORBIDE MONONITRATE 30 MILLIGRAM(S): 60 TABLET, EXTENDED RELEASE ORAL at 13:04

## 2023-01-01 RX ADMIN — Medication 0.5 MILLIGRAM(S): at 15:24

## 2023-01-01 RX ADMIN — SEVELAMER CARBONATE 1600 MILLIGRAM(S): 2400 POWDER, FOR SUSPENSION ORAL at 11:17

## 2023-01-01 RX ADMIN — MUPIROCIN 1 APPLICATION(S): 20 OINTMENT TOPICAL at 22:46

## 2023-01-01 RX ADMIN — ATORVASTATIN CALCIUM 40 MILLIGRAM(S): 80 TABLET, FILM COATED ORAL at 22:59

## 2023-01-01 RX ADMIN — NYSTATIN CREAM 1 APPLICATION(S): 100000 CREAM TOPICAL at 13:02

## 2023-01-01 RX ADMIN — LEVETIRACETAM 500 MILLIGRAM(S): 250 TABLET, FILM COATED ORAL at 12:17

## 2023-01-01 RX ADMIN — Medication 0.5 MILLIGRAM(S): at 07:13

## 2023-01-01 RX ADMIN — MUPIROCIN 1 APPLICATION(S): 20 OINTMENT TOPICAL at 05:34

## 2023-01-01 RX ADMIN — Medication 0.75 MILLIGRAM(S): at 17:24

## 2023-01-01 RX ADMIN — Medication 100 MICROGRAM(S): at 12:53

## 2023-01-01 RX ADMIN — Medication 2.5 MILLIGRAM(S): at 11:28

## 2023-01-01 RX ADMIN — Medication 1 TABLET(S): at 12:13

## 2023-01-01 RX ADMIN — TAMSULOSIN HYDROCHLORIDE 0.4 MILLIGRAM(S): 0.4 CAPSULE ORAL at 21:56

## 2023-01-01 RX ADMIN — Medication 1 TABLET(S): at 12:27

## 2023-01-01 RX ADMIN — APIXABAN 5 MILLIGRAM(S): 2.5 TABLET, FILM COATED ORAL at 18:48

## 2023-01-01 RX ADMIN — Medication 0.25 MILLIGRAM(S): at 04:14

## 2023-01-01 RX ADMIN — Medication 100 MICROGRAM(S): at 05:26

## 2023-01-01 RX ADMIN — Medication 0.5 MILLIGRAM(S): at 10:36

## 2023-01-01 RX ADMIN — ONDANSETRON 4 MILLIGRAM(S): 8 TABLET, FILM COATED ORAL at 15:57

## 2023-01-01 RX ADMIN — POLYETHYLENE GLYCOL 3350 17 GRAM(S): 17 POWDER, FOR SOLUTION ORAL at 13:53

## 2023-01-01 RX ADMIN — APIXABAN 5 MILLIGRAM(S): 2.5 TABLET, FILM COATED ORAL at 05:05

## 2023-01-01 RX ADMIN — CHLORHEXIDINE GLUCONATE 1 APPLICATION(S): 213 SOLUTION TOPICAL at 13:41

## 2023-01-01 RX ADMIN — LEVETIRACETAM 500 MILLIGRAM(S): 250 TABLET, FILM COATED ORAL at 13:52

## 2023-01-01 RX ADMIN — MUPIROCIN 1 APPLICATION(S): 20 OINTMENT TOPICAL at 05:19

## 2023-01-01 RX ADMIN — MIDODRINE HYDROCHLORIDE 5 MILLIGRAM(S): 2.5 TABLET ORAL at 15:20

## 2023-01-01 RX ADMIN — TIOTROPIUM BROMIDE 2 PUFF(S): 18 CAPSULE ORAL; RESPIRATORY (INHALATION) at 15:10

## 2023-01-01 RX ADMIN — Medication 50 MILLILITER(S): at 12:30

## 2023-01-01 RX ADMIN — MUPIROCIN 1 APPLICATION(S): 20 OINTMENT TOPICAL at 05:16

## 2023-01-01 RX ADMIN — CALAMINE AND ZINC OXIDE AND PHENOL 1 APPLICATION(S): 160; 10 LOTION TOPICAL at 22:04

## 2023-01-01 RX ADMIN — Medication 1 APPLICATION(S): at 05:19

## 2023-01-01 RX ADMIN — Medication 975 MILLIGRAM(S): at 21:40

## 2023-01-01 RX ADMIN — Medication 500 MILLIGRAM(S): at 11:55

## 2023-01-01 RX ADMIN — Medication 0.5 MILLIGRAM(S): at 05:41

## 2023-01-01 RX ADMIN — ATORVASTATIN CALCIUM 40 MILLIGRAM(S): 80 TABLET, FILM COATED ORAL at 21:36

## 2023-01-01 RX ADMIN — Medication 500 MILLIGRAM(S): at 11:13

## 2023-01-01 RX ADMIN — NYSTATIN CREAM 1 APPLICATION(S): 100000 CREAM TOPICAL at 21:48

## 2023-01-01 RX ADMIN — ERYTHROPOIETIN 4000 UNIT(S): 10000 INJECTION, SOLUTION INTRAVENOUS; SUBCUTANEOUS at 18:47

## 2023-01-01 RX ADMIN — Medication 0.5 MILLIGRAM(S): at 17:54

## 2023-01-01 RX ADMIN — Medication 500 MILLIGRAM(S): at 12:27

## 2023-01-01 RX ADMIN — Medication 1 TABLET(S): at 11:36

## 2023-01-01 RX ADMIN — Medication 500 MILLIGRAM(S): at 12:56

## 2023-01-01 RX ADMIN — Medication 5 MILLIGRAM(S): at 05:11

## 2023-01-01 RX ADMIN — OLANZAPINE 2.5 MILLIGRAM(S): 15 TABLET, FILM COATED ORAL at 08:48

## 2023-01-01 RX ADMIN — Medication 50 MILLIGRAM(S): at 14:29

## 2023-01-01 RX ADMIN — APIXABAN 2.5 MILLIGRAM(S): 2.5 TABLET, FILM COATED ORAL at 06:24

## 2023-01-01 RX ADMIN — Medication 500 MILLIGRAM(S): at 12:55

## 2023-01-01 RX ADMIN — Medication 2.5 MILLIGRAM(S): at 05:31

## 2023-01-01 RX ADMIN — ATORVASTATIN CALCIUM 40 MILLIGRAM(S): 80 TABLET, FILM COATED ORAL at 21:20

## 2023-01-01 RX ADMIN — Medication 1 TABLET(S): at 12:40

## 2023-01-01 RX ADMIN — Medication 10 MILLIGRAM(S): at 17:18

## 2023-01-01 RX ADMIN — MIDODRINE HYDROCHLORIDE 10 MILLIGRAM(S): 2.5 TABLET ORAL at 19:31

## 2023-01-01 RX ADMIN — Medication 10 MILLIGRAM(S): at 05:38

## 2023-01-01 RX ADMIN — SEVELAMER CARBONATE 800 MILLIGRAM(S): 2400 POWDER, FOR SUSPENSION ORAL at 09:01

## 2023-01-01 RX ADMIN — Medication 10 MILLIGRAM(S): at 21:40

## 2023-01-01 RX ADMIN — Medication 5 MILLIGRAM(S): at 21:28

## 2023-01-01 RX ADMIN — Medication 1 TABLET(S): at 13:07

## 2023-01-01 RX ADMIN — ATORVASTATIN CALCIUM 40 MILLIGRAM(S): 80 TABLET, FILM COATED ORAL at 00:01

## 2023-01-01 RX ADMIN — LEVETIRACETAM 250 MILLIGRAM(S): 250 TABLET, FILM COATED ORAL at 22:01

## 2023-01-01 RX ADMIN — Medication 10 MILLIGRAM(S): at 05:42

## 2023-01-01 RX ADMIN — LEVETIRACETAM 500 MILLIGRAM(S): 250 TABLET, FILM COATED ORAL at 12:27

## 2023-01-01 RX ADMIN — Medication 3 MILLIGRAM(S): at 22:08

## 2023-01-01 RX ADMIN — LORATADINE 10 MILLIGRAM(S): 10 TABLET ORAL at 13:20

## 2023-01-01 RX ADMIN — MUPIROCIN 1 APPLICATION(S): 20 OINTMENT TOPICAL at 15:45

## 2023-01-01 RX ADMIN — Medication 1 TABLET(S): at 17:38

## 2023-01-01 RX ADMIN — Medication 1 APPLICATION(S): at 07:43

## 2023-01-01 RX ADMIN — SEVELAMER CARBONATE 800 MILLIGRAM(S): 2400 POWDER, FOR SUSPENSION ORAL at 17:40

## 2023-01-01 RX ADMIN — LEVETIRACETAM 500 MILLIGRAM(S): 250 TABLET, FILM COATED ORAL at 12:18

## 2023-01-01 RX ADMIN — Medication 0.25 MILLIGRAM(S): at 04:45

## 2023-01-01 RX ADMIN — MUPIROCIN 1 APPLICATION(S): 20 OINTMENT TOPICAL at 17:23

## 2023-01-01 RX ADMIN — MUPIROCIN 1 APPLICATION(S): 20 OINTMENT TOPICAL at 06:49

## 2023-01-01 RX ADMIN — Medication 0.5 MILLIGRAM(S): at 07:56

## 2023-01-01 RX ADMIN — LEVETIRACETAM 500 MILLIGRAM(S): 250 TABLET, FILM COATED ORAL at 13:20

## 2023-01-01 RX ADMIN — Medication 0.75 MILLIGRAM(S): at 19:31

## 2023-01-01 RX ADMIN — NYSTATIN CREAM 1 APPLICATION(S): 100000 CREAM TOPICAL at 13:08

## 2023-01-01 RX ADMIN — LEVETIRACETAM 250 MILLIGRAM(S): 250 TABLET, FILM COATED ORAL at 21:44

## 2023-01-01 RX ADMIN — Medication 5 MILLIGRAM(S): at 18:37

## 2023-01-01 RX ADMIN — ATORVASTATIN CALCIUM 40 MILLIGRAM(S): 80 TABLET, FILM COATED ORAL at 22:01

## 2023-01-01 RX ADMIN — LEVETIRACETAM 500 MILLIGRAM(S): 250 TABLET, FILM COATED ORAL at 12:06

## 2023-01-01 RX ADMIN — MUPIROCIN 1 APPLICATION(S): 20 OINTMENT TOPICAL at 17:56

## 2023-01-01 RX ADMIN — OLANZAPINE 2.5 MILLIGRAM(S): 15 TABLET, FILM COATED ORAL at 12:13

## 2023-01-01 RX ADMIN — MUPIROCIN 1 APPLICATION(S): 20 OINTMENT TOPICAL at 17:25

## 2023-01-01 RX ADMIN — LEVETIRACETAM 500 MILLIGRAM(S): 250 TABLET, FILM COATED ORAL at 12:29

## 2023-01-01 RX ADMIN — MUPIROCIN 1 APPLICATION(S): 20 OINTMENT TOPICAL at 22:32

## 2023-01-01 RX ADMIN — APIXABAN 2.5 MILLIGRAM(S): 2.5 TABLET, FILM COATED ORAL at 17:37

## 2023-01-01 RX ADMIN — LIDOCAINE 1 PATCH: 4 CREAM TOPICAL at 13:02

## 2023-01-01 RX ADMIN — SEVELAMER CARBONATE 1600 MILLIGRAM(S): 2400 POWDER, FOR SUSPENSION ORAL at 17:20

## 2023-01-01 RX ADMIN — MUPIROCIN 1 APPLICATION(S): 20 OINTMENT TOPICAL at 07:04

## 2023-01-01 RX ADMIN — SENNA PLUS 2 TABLET(S): 8.6 TABLET ORAL at 22:07

## 2023-01-01 RX ADMIN — CHLORHEXIDINE GLUCONATE 1 APPLICATION(S): 213 SOLUTION TOPICAL at 11:15

## 2023-01-01 RX ADMIN — ATORVASTATIN CALCIUM 40 MILLIGRAM(S): 80 TABLET, FILM COATED ORAL at 22:48

## 2023-01-01 RX ADMIN — MUPIROCIN 1 APPLICATION(S): 20 OINTMENT TOPICAL at 05:36

## 2023-01-01 RX ADMIN — SEVELAMER CARBONATE 800 MILLIGRAM(S): 2400 POWDER, FOR SUSPENSION ORAL at 08:57

## 2023-01-01 RX ADMIN — Medication 0.5 MILLIGRAM(S): at 17:33

## 2023-01-01 RX ADMIN — Medication 0.5 MILLIGRAM(S): at 12:39

## 2023-01-01 RX ADMIN — Medication 1 TABLET(S): at 11:54

## 2023-01-01 RX ADMIN — Medication 10 MILLIGRAM(S): at 05:43

## 2023-01-01 RX ADMIN — MEROPENEM 100 MILLIGRAM(S): 1 INJECTION INTRAVENOUS at 20:46

## 2023-01-01 RX ADMIN — MUPIROCIN 1 APPLICATION(S): 20 OINTMENT TOPICAL at 19:55

## 2023-01-01 RX ADMIN — Medication 500 MILLIGRAM(S): at 12:53

## 2023-01-01 RX ADMIN — APIXABAN 5 MILLIGRAM(S): 2.5 TABLET, FILM COATED ORAL at 11:28

## 2023-01-01 RX ADMIN — Medication 1 APPLICATION(S): at 05:02

## 2023-01-01 RX ADMIN — Medication 50 MILLIGRAM(S): at 05:48

## 2023-01-01 RX ADMIN — SEVELAMER CARBONATE 800 MILLIGRAM(S): 2400 POWDER, FOR SUSPENSION ORAL at 19:06

## 2023-01-01 RX ADMIN — Medication 50 MILLIGRAM(S): at 18:58

## 2023-01-01 RX ADMIN — Medication 10 MILLIGRAM(S): at 05:01

## 2023-01-01 RX ADMIN — Medication 1000 UNIT(S): at 14:49

## 2023-01-01 RX ADMIN — SEVELAMER CARBONATE 800 MILLIGRAM(S): 2400 POWDER, FOR SUSPENSION ORAL at 11:30

## 2023-01-01 RX ADMIN — LEVETIRACETAM 500 MILLIGRAM(S): 250 TABLET, FILM COATED ORAL at 12:26

## 2023-01-01 RX ADMIN — Medication 1 TABLET(S): at 11:03

## 2023-01-01 RX ADMIN — MIDODRINE HYDROCHLORIDE 10 MILLIGRAM(S): 2.5 TABLET ORAL at 12:37

## 2023-01-01 RX ADMIN — Medication 10 MILLIGRAM(S): at 17:50

## 2023-01-01 RX ADMIN — NYSTATIN CREAM 1 APPLICATION(S): 100000 CREAM TOPICAL at 05:34

## 2023-01-01 RX ADMIN — APIXABAN 5 MILLIGRAM(S): 2.5 TABLET, FILM COATED ORAL at 05:56

## 2023-01-01 RX ADMIN — LEVETIRACETAM 500 MILLIGRAM(S): 250 TABLET, FILM COATED ORAL at 11:51

## 2023-01-01 RX ADMIN — SEVELAMER CARBONATE 800 MILLIGRAM(S): 2400 POWDER, FOR SUSPENSION ORAL at 09:46

## 2023-01-01 RX ADMIN — SEVELAMER CARBONATE 800 MILLIGRAM(S): 2400 POWDER, FOR SUSPENSION ORAL at 09:10

## 2023-01-01 RX ADMIN — LEVETIRACETAM 400 MILLIGRAM(S): 250 TABLET, FILM COATED ORAL at 20:28

## 2023-01-01 RX ADMIN — OXYCODONE HYDROCHLORIDE 5 MILLIGRAM(S): 5 TABLET ORAL at 11:18

## 2023-01-01 RX ADMIN — NYSTATIN CREAM 1 APPLICATION(S): 100000 CREAM TOPICAL at 13:09

## 2023-01-01 RX ADMIN — Medication 50 MILLIGRAM(S): at 06:53

## 2023-01-01 RX ADMIN — Medication 500 MILLIGRAM(S): at 14:21

## 2023-01-01 RX ADMIN — LORATADINE 10 MILLIGRAM(S): 10 TABLET ORAL at 13:52

## 2023-01-01 RX ADMIN — SEVELAMER CARBONATE 1600 MILLIGRAM(S): 2400 POWDER, FOR SUSPENSION ORAL at 09:28

## 2023-01-01 RX ADMIN — Medication 5 MILLIGRAM(S): at 07:54

## 2023-01-01 RX ADMIN — Medication 1 TABLET(S): at 13:00

## 2023-01-01 RX ADMIN — Medication 1 TABLET(S): at 07:48

## 2023-01-01 RX ADMIN — MUPIROCIN 1 APPLICATION(S): 20 OINTMENT TOPICAL at 06:42

## 2023-01-01 RX ADMIN — Medication 0.5 MILLIGRAM(S): at 07:54

## 2023-01-01 RX ADMIN — MIDODRINE HYDROCHLORIDE 5 MILLIGRAM(S): 2.5 TABLET ORAL at 23:54

## 2023-01-01 RX ADMIN — Medication 10 MILLIGRAM(S): at 17:45

## 2023-01-01 RX ADMIN — Medication 0.5 MILLIGRAM(S): at 09:41

## 2023-01-01 RX ADMIN — MEROPENEM 100 MILLIGRAM(S): 1 INJECTION INTRAVENOUS at 17:36

## 2023-01-01 RX ADMIN — Medication 1 DROP(S): at 23:15

## 2023-01-01 RX ADMIN — CHLORHEXIDINE GLUCONATE 1 APPLICATION(S): 213 SOLUTION TOPICAL at 17:30

## 2023-01-01 RX ADMIN — Medication 5 MILLIGRAM(S): at 06:15

## 2023-01-01 RX ADMIN — SEVELAMER CARBONATE 1600 MILLIGRAM(S): 2400 POWDER, FOR SUSPENSION ORAL at 11:12

## 2023-01-01 RX ADMIN — SEVELAMER CARBONATE 800 MILLIGRAM(S): 2400 POWDER, FOR SUSPENSION ORAL at 09:16

## 2023-01-01 RX ADMIN — SEVELAMER CARBONATE 1600 MILLIGRAM(S): 2400 POWDER, FOR SUSPENSION ORAL at 08:38

## 2023-01-01 RX ADMIN — Medication 0.5 MILLIGRAM(S): at 11:03

## 2023-01-01 RX ADMIN — Medication 1000 UNIT(S): at 11:54

## 2023-01-01 RX ADMIN — Medication 0.5 MILLIGRAM(S): at 11:15

## 2023-01-01 RX ADMIN — SEVELAMER CARBONATE 800 MILLIGRAM(S): 2400 POWDER, FOR SUSPENSION ORAL at 17:11

## 2023-01-01 RX ADMIN — Medication 0.5 MILLIGRAM(S): at 07:32

## 2023-01-01 RX ADMIN — CALAMINE AND ZINC OXIDE AND PHENOL 1 APPLICATION(S): 160; 10 LOTION TOPICAL at 06:53

## 2023-01-01 RX ADMIN — CHLORHEXIDINE GLUCONATE 1 APPLICATION(S): 213 SOLUTION TOPICAL at 05:40

## 2023-01-01 RX ADMIN — Medication 1 DROP(S): at 17:26

## 2023-01-01 RX ADMIN — Medication 0.75 MILLIGRAM(S): at 18:07

## 2023-01-01 RX ADMIN — CALAMINE AND ZINC OXIDE AND PHENOL 1 APPLICATION(S): 160; 10 LOTION TOPICAL at 13:19

## 2023-01-01 RX ADMIN — Medication 0.5 MILLIGRAM(S): at 12:36

## 2023-01-01 RX ADMIN — APIXABAN 2.5 MILLIGRAM(S): 2.5 TABLET, FILM COATED ORAL at 07:04

## 2023-01-01 RX ADMIN — Medication 1 DROP(S): at 05:38

## 2023-01-01 RX ADMIN — Medication 0.75 MILLIGRAM(S): at 17:41

## 2023-01-01 RX ADMIN — Medication 1 TABLET(S): at 12:26

## 2023-01-01 RX ADMIN — Medication 400 MILLIGRAM(S): at 15:00

## 2023-01-01 RX ADMIN — APIXABAN 5 MILLIGRAM(S): 2.5 TABLET, FILM COATED ORAL at 05:21

## 2023-01-01 RX ADMIN — CALAMINE AND ZINC OXIDE AND PHENOL 1 APPLICATION(S): 160; 10 LOTION TOPICAL at 20:47

## 2023-01-01 RX ADMIN — Medication 1 TABLET(S): at 19:02

## 2023-01-01 RX ADMIN — APIXABAN 5 MILLIGRAM(S): 2.5 TABLET, FILM COATED ORAL at 17:47

## 2023-01-01 RX ADMIN — SEVELAMER CARBONATE 1600 MILLIGRAM(S): 2400 POWDER, FOR SUSPENSION ORAL at 17:49

## 2023-01-01 RX ADMIN — Medication 50 MILLIGRAM(S): at 07:04

## 2023-01-01 RX ADMIN — OLANZAPINE 2.5 MILLIGRAM(S): 15 TABLET, FILM COATED ORAL at 17:49

## 2023-01-01 RX ADMIN — Medication 1 APPLICATION(S): at 05:54

## 2023-01-01 RX ADMIN — Medication 1 TABLET(S): at 13:25

## 2023-01-01 RX ADMIN — LIDOCAINE 1 PATCH: 4 CREAM TOPICAL at 05:20

## 2023-01-01 RX ADMIN — NYSTATIN CREAM 1 APPLICATION(S): 100000 CREAM TOPICAL at 22:35

## 2023-01-01 RX ADMIN — Medication 1 TABLET(S): at 11:27

## 2023-01-01 RX ADMIN — ISOSORBIDE MONONITRATE 30 MILLIGRAM(S): 60 TABLET, EXTENDED RELEASE ORAL at 12:22

## 2023-01-01 RX ADMIN — Medication 0.25 MILLIGRAM(S): at 11:32

## 2023-01-01 RX ADMIN — Medication 500 MILLIGRAM(S): at 11:48

## 2023-01-01 RX ADMIN — Medication 0.5 MILLIGRAM(S): at 08:58

## 2023-01-01 RX ADMIN — Medication 5 MILLIGRAM(S): at 18:56

## 2023-01-01 RX ADMIN — Medication 100 MICROGRAM(S): at 05:38

## 2023-01-01 RX ADMIN — Medication 50 MILLIGRAM(S): at 07:27

## 2023-01-01 RX ADMIN — MUPIROCIN 1 APPLICATION(S): 20 OINTMENT TOPICAL at 13:42

## 2023-01-01 RX ADMIN — SEVELAMER CARBONATE 800 MILLIGRAM(S): 2400 POWDER, FOR SUSPENSION ORAL at 17:38

## 2023-01-01 RX ADMIN — NYSTATIN CREAM 1 APPLICATION(S): 100000 CREAM TOPICAL at 05:52

## 2023-01-01 RX ADMIN — ERTAPENEM SODIUM 100 MILLIGRAM(S): 1 INJECTION, POWDER, LYOPHILIZED, FOR SOLUTION INTRAMUSCULAR; INTRAVENOUS at 11:37

## 2023-01-01 RX ADMIN — Medication 25 MILLIGRAM(S): at 11:29

## 2023-01-01 RX ADMIN — CHLORHEXIDINE GLUCONATE 1 APPLICATION(S): 213 SOLUTION TOPICAL at 13:03

## 2023-01-01 RX ADMIN — Medication 100 MICROGRAM(S): at 07:44

## 2023-01-01 RX ADMIN — LEVETIRACETAM 500 MILLIGRAM(S): 250 TABLET, FILM COATED ORAL at 13:09

## 2023-01-01 RX ADMIN — SODIUM CHLORIDE 500 MILLILITER(S): 9 INJECTION INTRAMUSCULAR; INTRAVENOUS; SUBCUTANEOUS at 10:19

## 2023-01-01 RX ADMIN — APIXABAN 5 MILLIGRAM(S): 2.5 TABLET, FILM COATED ORAL at 17:31

## 2023-01-01 RX ADMIN — HYDROMORPHONE HYDROCHLORIDE 0.5 MILLIGRAM(S): 2 INJECTION INTRAMUSCULAR; INTRAVENOUS; SUBCUTANEOUS at 06:03

## 2023-01-01 RX ADMIN — HYDROMORPHONE HYDROCHLORIDE 0.5 MILLIGRAM(S): 2 INJECTION INTRAMUSCULAR; INTRAVENOUS; SUBCUTANEOUS at 01:04

## 2023-01-01 RX ADMIN — Medication 1 TABLET(S): at 14:23

## 2023-01-01 RX ADMIN — SODIUM CHLORIDE 500 MILLILITER(S): 9 INJECTION INTRAMUSCULAR; INTRAVENOUS; SUBCUTANEOUS at 20:31

## 2023-01-01 RX ADMIN — CALAMINE AND ZINC OXIDE AND PHENOL 1 APPLICATION(S): 160; 10 LOTION TOPICAL at 14:44

## 2023-01-01 RX ADMIN — ATORVASTATIN CALCIUM 40 MILLIGRAM(S): 80 TABLET, FILM COATED ORAL at 20:45

## 2023-01-01 RX ADMIN — LEVETIRACETAM 250 MILLIGRAM(S): 250 TABLET, FILM COATED ORAL at 06:24

## 2023-01-01 RX ADMIN — Medication 1 APPLICATION(S): at 05:52

## 2023-01-01 RX ADMIN — MUPIROCIN 1 APPLICATION(S): 20 OINTMENT TOPICAL at 20:03

## 2023-01-01 RX ADMIN — Medication 100 MICROGRAM(S): at 05:52

## 2023-01-01 RX ADMIN — POLYETHYLENE GLYCOL 3350 17 GRAM(S): 17 POWDER, FOR SOLUTION ORAL at 12:41

## 2023-01-01 RX ADMIN — CALAMINE AND ZINC OXIDE AND PHENOL 1 APPLICATION(S): 160; 10 LOTION TOPICAL at 22:30

## 2023-01-01 RX ADMIN — APIXABAN 5 MILLIGRAM(S): 2.5 TABLET, FILM COATED ORAL at 18:18

## 2023-01-01 RX ADMIN — CHLORHEXIDINE GLUCONATE 1 APPLICATION(S): 213 SOLUTION TOPICAL at 12:19

## 2023-01-01 RX ADMIN — SENNA PLUS 2 TABLET(S): 8.6 TABLET ORAL at 21:08

## 2023-01-01 RX ADMIN — APIXABAN 5 MILLIGRAM(S): 2.5 TABLET, FILM COATED ORAL at 21:44

## 2023-01-01 RX ADMIN — Medication 75 MICROGRAM(S): at 00:24

## 2023-01-01 RX ADMIN — Medication 10 MILLIGRAM(S): at 07:05

## 2023-01-01 RX ADMIN — POLYETHYLENE GLYCOL 3350 17 GRAM(S): 17 POWDER, FOR SOLUTION ORAL at 11:56

## 2023-01-01 RX ADMIN — Medication 1 MILLIGRAM(S): at 21:05

## 2023-01-01 RX ADMIN — Medication 25 MILLIGRAM(S): at 16:25

## 2023-01-01 RX ADMIN — SEVELAMER CARBONATE 800 MILLIGRAM(S): 2400 POWDER, FOR SUSPENSION ORAL at 09:09

## 2023-01-01 RX ADMIN — Medication 500 MILLIGRAM(S): at 14:49

## 2023-01-01 RX ADMIN — Medication 1 TABLET(S): at 12:50

## 2023-01-01 RX ADMIN — Medication 25 MILLIGRAM(S): at 18:01

## 2023-01-01 RX ADMIN — ATORVASTATIN CALCIUM 40 MILLIGRAM(S): 80 TABLET, FILM COATED ORAL at 21:22

## 2023-01-01 RX ADMIN — FLUCONAZOLE 100 MILLIGRAM(S): 150 TABLET ORAL at 21:57

## 2023-01-01 RX ADMIN — SEVELAMER CARBONATE 800 MILLIGRAM(S): 2400 POWDER, FOR SUSPENSION ORAL at 09:31

## 2023-01-01 RX ADMIN — APIXABAN 2.5 MILLIGRAM(S): 2.5 TABLET, FILM COATED ORAL at 05:12

## 2023-01-01 RX ADMIN — Medication 1 MILLIGRAM(S): at 12:14

## 2023-01-01 RX ADMIN — SENNA PLUS 2 TABLET(S): 8.6 TABLET ORAL at 22:18

## 2023-01-01 RX ADMIN — ATORVASTATIN CALCIUM 40 MILLIGRAM(S): 80 TABLET, FILM COATED ORAL at 20:47

## 2023-01-01 RX ADMIN — MUPIROCIN 1 APPLICATION(S): 20 OINTMENT TOPICAL at 22:12

## 2023-01-01 RX ADMIN — CALAMINE AND ZINC OXIDE AND PHENOL 1 APPLICATION(S): 160; 10 LOTION TOPICAL at 22:26

## 2023-01-01 RX ADMIN — SEVELAMER CARBONATE 800 MILLIGRAM(S): 2400 POWDER, FOR SUSPENSION ORAL at 08:52

## 2023-01-01 RX ADMIN — CALAMINE AND ZINC OXIDE AND PHENOL 1 APPLICATION(S): 160; 10 LOTION TOPICAL at 22:00

## 2023-01-01 RX ADMIN — CHLORHEXIDINE GLUCONATE 1 APPLICATION(S): 213 SOLUTION TOPICAL at 09:32

## 2023-01-01 RX ADMIN — APIXABAN 2.5 MILLIGRAM(S): 2.5 TABLET, FILM COATED ORAL at 19:28

## 2023-01-01 RX ADMIN — Medication 500 MILLIGRAM(S): at 11:27

## 2023-01-01 RX ADMIN — SENNA PLUS 2 TABLET(S): 8.6 TABLET ORAL at 22:12

## 2023-01-01 RX ADMIN — CALAMINE AND ZINC OXIDE AND PHENOL 1 APPLICATION(S): 160; 10 LOTION TOPICAL at 05:18

## 2023-01-01 RX ADMIN — ATORVASTATIN CALCIUM 40 MILLIGRAM(S): 80 TABLET, FILM COATED ORAL at 21:41

## 2023-01-01 RX ADMIN — OLANZAPINE 2.5 MILLIGRAM(S): 15 TABLET, FILM COATED ORAL at 23:05

## 2023-01-01 RX ADMIN — LORATADINE 10 MILLIGRAM(S): 10 TABLET ORAL at 14:54

## 2023-01-01 RX ADMIN — Medication 10 MILLIGRAM(S): at 21:37

## 2023-01-01 RX ADMIN — Medication 500 MILLIGRAM(S): at 12:49

## 2023-01-01 RX ADMIN — Medication 0.75 MILLIGRAM(S): at 17:57

## 2023-01-01 RX ADMIN — Medication 50 MILLIGRAM(S): at 05:31

## 2023-01-01 RX ADMIN — APIXABAN 2.5 MILLIGRAM(S): 2.5 TABLET, FILM COATED ORAL at 17:18

## 2023-01-01 RX ADMIN — Medication 1 DROP(S): at 05:42

## 2023-01-01 RX ADMIN — MUPIROCIN 1 APPLICATION(S): 20 OINTMENT TOPICAL at 21:49

## 2023-01-01 RX ADMIN — SENNA PLUS 2 TABLET(S): 8.6 TABLET ORAL at 22:23

## 2023-01-01 RX ADMIN — SEVELAMER CARBONATE 1600 MILLIGRAM(S): 2400 POWDER, FOR SUSPENSION ORAL at 08:31

## 2023-01-01 RX ADMIN — MUPIROCIN 1 APPLICATION(S): 20 OINTMENT TOPICAL at 18:25

## 2023-01-01 RX ADMIN — Medication 1 TABLET(S): at 13:15

## 2023-01-01 RX ADMIN — Medication 0.5 MILLIGRAM(S): at 10:22

## 2023-01-01 RX ADMIN — Medication 0.75 MILLIGRAM(S): at 17:36

## 2023-01-01 RX ADMIN — MIDODRINE HYDROCHLORIDE 10 MILLIGRAM(S): 2.5 TABLET ORAL at 08:51

## 2023-01-01 RX ADMIN — NYSTATIN CREAM 1 APPLICATION(S): 100000 CREAM TOPICAL at 05:20

## 2023-01-01 RX ADMIN — SEVELAMER CARBONATE 800 MILLIGRAM(S): 2400 POWDER, FOR SUSPENSION ORAL at 14:01

## 2023-01-01 RX ADMIN — Medication 100 MICROGRAM(S): at 05:36

## 2023-01-01 RX ADMIN — HYDROMORPHONE HYDROCHLORIDE 1 MILLIGRAM(S): 2 INJECTION INTRAMUSCULAR; INTRAVENOUS; SUBCUTANEOUS at 01:51

## 2023-01-01 RX ADMIN — LACTULOSE 20 GRAM(S): 10 SOLUTION ORAL at 12:36

## 2023-01-01 RX ADMIN — HYDROMORPHONE HYDROCHLORIDE 0.5 MILLIGRAM(S): 2 INJECTION INTRAMUSCULAR; INTRAVENOUS; SUBCUTANEOUS at 00:55

## 2023-01-01 RX ADMIN — LORATADINE 10 MILLIGRAM(S): 10 TABLET ORAL at 11:51

## 2023-01-01 RX ADMIN — APIXABAN 2.5 MILLIGRAM(S): 2.5 TABLET, FILM COATED ORAL at 17:53

## 2023-01-01 RX ADMIN — POLYETHYLENE GLYCOL 3350 17 GRAM(S): 17 POWDER, FOR SOLUTION ORAL at 13:22

## 2023-01-01 RX ADMIN — LEVETIRACETAM 500 MILLIGRAM(S): 250 TABLET, FILM COATED ORAL at 13:06

## 2023-01-01 RX ADMIN — Medication 50 MILLIGRAM(S): at 06:17

## 2023-01-01 RX ADMIN — APIXABAN 2.5 MILLIGRAM(S): 2.5 TABLET, FILM COATED ORAL at 05:41

## 2023-01-01 RX ADMIN — Medication 0.75 MILLIGRAM(S): at 17:25

## 2023-01-01 RX ADMIN — HYDROMORPHONE HYDROCHLORIDE 0.5 MILLIGRAM(S): 2 INJECTION INTRAMUSCULAR; INTRAVENOUS; SUBCUTANEOUS at 16:41

## 2023-01-01 RX ADMIN — CALAMINE AND ZINC OXIDE AND PHENOL 1 APPLICATION(S): 160; 10 LOTION TOPICAL at 21:47

## 2023-01-01 RX ADMIN — LEVETIRACETAM 500 MILLIGRAM(S): 250 TABLET, FILM COATED ORAL at 11:37

## 2023-01-01 RX ADMIN — Medication 40 MILLIGRAM(S): at 11:27

## 2023-01-01 RX ADMIN — LORATADINE 10 MILLIGRAM(S): 10 TABLET ORAL at 13:23

## 2023-01-01 RX ADMIN — Medication 1 TABLET(S): at 20:03

## 2023-01-01 RX ADMIN — Medication 1 MILLIGRAM(S): at 22:29

## 2023-01-01 RX ADMIN — ATORVASTATIN CALCIUM 40 MILLIGRAM(S): 80 TABLET, FILM COATED ORAL at 22:08

## 2023-01-01 RX ADMIN — TAMSULOSIN HYDROCHLORIDE 0.4 MILLIGRAM(S): 0.4 CAPSULE ORAL at 21:02

## 2023-01-01 RX ADMIN — SENNA PLUS 2 TABLET(S): 8.6 TABLET ORAL at 21:57

## 2023-01-01 RX ADMIN — LORATADINE 10 MILLIGRAM(S): 10 TABLET ORAL at 11:26

## 2023-01-01 RX ADMIN — Medication 1 DROP(S): at 12:17

## 2023-01-01 RX ADMIN — OLANZAPINE 2.5 MILLIGRAM(S): 15 TABLET, FILM COATED ORAL at 21:21

## 2023-01-01 RX ADMIN — Medication 50 MILLIGRAM(S): at 05:14

## 2023-01-01 RX ADMIN — Medication 5 MILLIGRAM(S): at 15:42

## 2023-01-01 RX ADMIN — Medication 50 MILLIGRAM(S): at 06:31

## 2023-01-01 RX ADMIN — Medication 2 MILLIGRAM(S): at 17:36

## 2023-01-01 RX ADMIN — Medication 0.5 MILLIGRAM(S): at 21:40

## 2023-01-01 RX ADMIN — Medication 250 MILLIGRAM(S): at 18:14

## 2023-01-01 RX ADMIN — MUPIROCIN 1 APPLICATION(S): 20 OINTMENT TOPICAL at 21:23

## 2023-01-01 RX ADMIN — Medication 10 MILLIGRAM(S): at 05:16

## 2023-01-01 RX ADMIN — Medication 5 MILLIGRAM(S): at 18:06

## 2023-01-01 RX ADMIN — PANTOPRAZOLE SODIUM 40 MILLIGRAM(S): 20 TABLET, DELAYED RELEASE ORAL at 06:15

## 2023-01-01 RX ADMIN — Medication 100 MICROGRAM(S): at 05:48

## 2023-01-01 RX ADMIN — CHLORHEXIDINE GLUCONATE 1 APPLICATION(S): 213 SOLUTION TOPICAL at 11:21

## 2023-01-01 RX ADMIN — APIXABAN 5 MILLIGRAM(S): 2.5 TABLET, FILM COATED ORAL at 06:18

## 2023-01-01 RX ADMIN — SEVELAMER CARBONATE 800 MILLIGRAM(S): 2400 POWDER, FOR SUSPENSION ORAL at 13:23

## 2023-01-01 RX ADMIN — Medication 1 TABLET(S): at 08:53

## 2023-01-01 RX ADMIN — LEVETIRACETAM 250 MILLIGRAM(S): 250 TABLET, FILM COATED ORAL at 20:01

## 2023-01-01 RX ADMIN — Medication 40 MILLIGRAM(S): at 02:33

## 2023-01-01 RX ADMIN — Medication 1 APPLICATION(S): at 05:37

## 2023-01-01 RX ADMIN — Medication 50 MILLIGRAM(S): at 18:02

## 2023-01-01 RX ADMIN — Medication 5 MILLIGRAM(S): at 19:31

## 2023-01-01 RX ADMIN — Medication 1 DROP(S): at 05:30

## 2023-01-01 RX ADMIN — Medication 0.5 MILLIGRAM(S): at 07:04

## 2023-01-01 RX ADMIN — SEVELAMER CARBONATE 800 MILLIGRAM(S): 2400 POWDER, FOR SUSPENSION ORAL at 18:46

## 2023-01-01 RX ADMIN — SENNA PLUS 2 TABLET(S): 8.6 TABLET ORAL at 22:59

## 2023-01-01 RX ADMIN — CHLORHEXIDINE GLUCONATE 1 APPLICATION(S): 213 SOLUTION TOPICAL at 13:06

## 2023-01-01 RX ADMIN — LEVETIRACETAM 500 MILLIGRAM(S): 250 TABLET, FILM COATED ORAL at 18:18

## 2023-01-01 RX ADMIN — Medication 100 MICROGRAM(S): at 05:56

## 2023-01-01 RX ADMIN — MEROPENEM 100 MILLIGRAM(S): 1 INJECTION INTRAVENOUS at 22:35

## 2023-01-01 RX ADMIN — POLYETHYLENE GLYCOL 3350 17 GRAM(S): 17 POWDER, FOR SOLUTION ORAL at 12:17

## 2023-01-01 RX ADMIN — OXYCODONE HYDROCHLORIDE 5 MILLIGRAM(S): 5 TABLET ORAL at 12:50

## 2023-01-01 RX ADMIN — SEVELAMER CARBONATE 1600 MILLIGRAM(S): 2400 POWDER, FOR SUSPENSION ORAL at 12:06

## 2023-01-01 RX ADMIN — APIXABAN 5 MILLIGRAM(S): 2.5 TABLET, FILM COATED ORAL at 16:45

## 2023-01-01 RX ADMIN — CHLORHEXIDINE GLUCONATE 1 APPLICATION(S): 213 SOLUTION TOPICAL at 05:02

## 2023-01-01 RX ADMIN — POLYETHYLENE GLYCOL 3350 17 GRAM(S): 17 POWDER, FOR SOLUTION ORAL at 12:20

## 2023-01-01 RX ADMIN — ATORVASTATIN CALCIUM 40 MILLIGRAM(S): 80 TABLET, FILM COATED ORAL at 22:27

## 2023-01-01 RX ADMIN — SEVELAMER CARBONATE 1600 MILLIGRAM(S): 2400 POWDER, FOR SUSPENSION ORAL at 14:49

## 2023-01-01 RX ADMIN — Medication 0.5 MILLIGRAM(S): at 08:35

## 2023-01-01 RX ADMIN — LEVETIRACETAM 500 MILLIGRAM(S): 250 TABLET, FILM COATED ORAL at 11:54

## 2023-01-01 RX ADMIN — SEVELAMER CARBONATE 800 MILLIGRAM(S): 2400 POWDER, FOR SUSPENSION ORAL at 09:44

## 2023-01-01 RX ADMIN — APIXABAN 5 MILLIGRAM(S): 2.5 TABLET, FILM COATED ORAL at 05:40

## 2023-01-01 RX ADMIN — ERYTHROPOIETIN 10000 UNIT(S): 10000 INJECTION, SOLUTION INTRAVENOUS; SUBCUTANEOUS at 16:54

## 2023-01-01 RX ADMIN — Medication 1 TABLET(S): at 13:02

## 2023-01-01 RX ADMIN — MEROPENEM 100 MILLIGRAM(S): 1 INJECTION INTRAVENOUS at 01:23

## 2023-01-01 RX ADMIN — LEVETIRACETAM 500 MILLIGRAM(S): 250 TABLET, FILM COATED ORAL at 14:01

## 2023-01-01 RX ADMIN — Medication 25 MILLIGRAM(S): at 20:22

## 2023-01-01 RX ADMIN — Medication 0.5 MILLIGRAM(S): at 16:01

## 2023-01-01 RX ADMIN — CHLORHEXIDINE GLUCONATE 1 APPLICATION(S): 213 SOLUTION TOPICAL at 14:31

## 2023-01-01 RX ADMIN — ATORVASTATIN CALCIUM 40 MILLIGRAM(S): 80 TABLET, FILM COATED ORAL at 23:11

## 2023-01-01 RX ADMIN — HYDROMORPHONE HYDROCHLORIDE 1 MILLIGRAM(S): 2 INJECTION INTRAMUSCULAR; INTRAVENOUS; SUBCUTANEOUS at 19:29

## 2023-01-01 RX ADMIN — ATORVASTATIN CALCIUM 40 MILLIGRAM(S): 80 TABLET, FILM COATED ORAL at 21:43

## 2023-01-01 RX ADMIN — Medication 5 MILLIGRAM(S): at 17:37

## 2023-01-01 RX ADMIN — SEVELAMER CARBONATE 800 MILLIGRAM(S): 2400 POWDER, FOR SUSPENSION ORAL at 12:49

## 2023-01-01 RX ADMIN — Medication 25 MILLIGRAM(S): at 15:52

## 2023-01-01 RX ADMIN — CALAMINE AND ZINC OXIDE AND PHENOL 1 APPLICATION(S): 160; 10 LOTION TOPICAL at 13:21

## 2023-01-01 RX ADMIN — Medication 50 MILLIGRAM(S): at 06:15

## 2023-01-01 RX ADMIN — MUPIROCIN 1 APPLICATION(S): 20 OINTMENT TOPICAL at 19:28

## 2023-01-01 RX ADMIN — ATORVASTATIN CALCIUM 40 MILLIGRAM(S): 80 TABLET, FILM COATED ORAL at 21:58

## 2023-01-01 RX ADMIN — MUPIROCIN 1 APPLICATION(S): 20 OINTMENT TOPICAL at 21:01

## 2023-01-01 RX ADMIN — Medication 0.5 MILLIGRAM(S): at 21:43

## 2023-01-01 RX ADMIN — CALAMINE AND ZINC OXIDE AND PHENOL 1 APPLICATION(S): 160; 10 LOTION TOPICAL at 05:31

## 2023-01-01 RX ADMIN — Medication 50 MILLIGRAM(S): at 05:01

## 2023-01-01 RX ADMIN — Medication 500 MILLIGRAM(S): at 12:42

## 2023-01-01 RX ADMIN — LIDOCAINE 1 PATCH: 4 CREAM TOPICAL at 06:59

## 2023-01-01 RX ADMIN — MUPIROCIN 1 APPLICATION(S): 20 OINTMENT TOPICAL at 05:30

## 2023-01-01 RX ADMIN — Medication 25 MILLIGRAM(S): at 06:45

## 2023-01-01 RX ADMIN — ERTAPENEM SODIUM 100 MILLIGRAM(S): 1 INJECTION, POWDER, LYOPHILIZED, FOR SOLUTION INTRAMUSCULAR; INTRAVENOUS at 13:08

## 2023-01-01 RX ADMIN — Medication 100 MICROGRAM(S): at 14:49

## 2023-01-01 RX ADMIN — SEVELAMER CARBONATE 800 MILLIGRAM(S): 2400 POWDER, FOR SUSPENSION ORAL at 08:51

## 2023-01-01 RX ADMIN — HYDROMORPHONE HYDROCHLORIDE 0.25 MILLIGRAM(S): 2 INJECTION INTRAMUSCULAR; INTRAVENOUS; SUBCUTANEOUS at 22:55

## 2023-01-01 RX ADMIN — POLYETHYLENE GLYCOL 3350 17 GRAM(S): 17 POWDER, FOR SOLUTION ORAL at 11:26

## 2023-01-01 RX ADMIN — Medication 100 MILLIGRAM(S): at 16:04

## 2023-01-01 RX ADMIN — LEVETIRACETAM 250 MILLIGRAM(S): 250 TABLET, FILM COATED ORAL at 17:46

## 2023-01-01 RX ADMIN — APIXABAN 5 MILLIGRAM(S): 2.5 TABLET, FILM COATED ORAL at 20:46

## 2023-01-01 RX ADMIN — ATORVASTATIN CALCIUM 40 MILLIGRAM(S): 80 TABLET, FILM COATED ORAL at 23:03

## 2023-01-01 RX ADMIN — SEVELAMER CARBONATE 800 MILLIGRAM(S): 2400 POWDER, FOR SUSPENSION ORAL at 08:49

## 2023-01-01 RX ADMIN — APIXABAN 5 MILLIGRAM(S): 2.5 TABLET, FILM COATED ORAL at 21:29

## 2023-01-01 RX ADMIN — APIXABAN 5 MILLIGRAM(S): 2.5 TABLET, FILM COATED ORAL at 07:42

## 2023-01-01 RX ADMIN — LEVETIRACETAM 500 MILLIGRAM(S): 250 TABLET, FILM COATED ORAL at 13:07

## 2023-01-01 RX ADMIN — Medication 1 TABLET(S): at 13:28

## 2023-01-01 RX ADMIN — Medication 50 MILLIGRAM(S): at 07:03

## 2023-01-01 RX ADMIN — MUPIROCIN 1 APPLICATION(S): 20 OINTMENT TOPICAL at 05:32

## 2023-01-01 RX ADMIN — MUPIROCIN 1 APPLICATION(S): 20 OINTMENT TOPICAL at 05:50

## 2023-01-01 RX ADMIN — Medication 3 MILLILITER(S): at 15:52

## 2023-01-01 RX ADMIN — OLANZAPINE 2.5 MILLIGRAM(S): 15 TABLET, FILM COATED ORAL at 21:41

## 2023-01-01 RX ADMIN — NYSTATIN CREAM 1 APPLICATION(S): 100000 CREAM TOPICAL at 05:37

## 2023-01-01 RX ADMIN — LEVETIRACETAM 500 MILLIGRAM(S): 250 TABLET, FILM COATED ORAL at 15:12

## 2023-01-01 RX ADMIN — MUPIROCIN 1 APPLICATION(S): 20 OINTMENT TOPICAL at 22:35

## 2023-01-01 RX ADMIN — Medication 1 DROP(S): at 13:42

## 2023-01-01 RX ADMIN — LEVETIRACETAM 500 MILLIGRAM(S): 250 TABLET, FILM COATED ORAL at 13:02

## 2023-01-01 RX ADMIN — Medication 1 TABLET(S): at 08:44

## 2023-01-01 RX ADMIN — APIXABAN 5 MILLIGRAM(S): 2.5 TABLET, FILM COATED ORAL at 18:06

## 2023-01-01 RX ADMIN — HYDROMORPHONE HYDROCHLORIDE 0.5 MILLIGRAM(S): 2 INJECTION INTRAMUSCULAR; INTRAVENOUS; SUBCUTANEOUS at 04:48

## 2023-01-01 RX ADMIN — Medication 500 MILLIGRAM(S): at 12:29

## 2023-01-01 RX ADMIN — SEVELAMER CARBONATE 800 MILLIGRAM(S): 2400 POWDER, FOR SUSPENSION ORAL at 19:56

## 2023-01-01 RX ADMIN — HYDROMORPHONE HYDROCHLORIDE 0.5 MILLIGRAM(S): 2 INJECTION INTRAMUSCULAR; INTRAVENOUS; SUBCUTANEOUS at 12:30

## 2023-01-01 RX ADMIN — Medication 1000 UNIT(S): at 11:57

## 2023-01-01 RX ADMIN — Medication 5 MILLIGRAM(S): at 18:08

## 2023-01-01 RX ADMIN — MUPIROCIN 1 APPLICATION(S): 20 OINTMENT TOPICAL at 07:52

## 2023-01-01 RX ADMIN — LEVETIRACETAM 500 MILLIGRAM(S): 250 TABLET, FILM COATED ORAL at 11:26

## 2023-01-01 RX ADMIN — ISOSORBIDE MONONITRATE 30 MILLIGRAM(S): 60 TABLET, EXTENDED RELEASE ORAL at 21:36

## 2023-01-01 RX ADMIN — Medication 0.75 MILLIGRAM(S): at 17:13

## 2023-01-01 RX ADMIN — SODIUM ZIRCONIUM CYCLOSILICATE 10 GRAM(S): 10 POWDER, FOR SUSPENSION ORAL at 15:10

## 2023-01-01 RX ADMIN — MUPIROCIN 1 APPLICATION(S): 20 OINTMENT TOPICAL at 18:00

## 2023-01-01 RX ADMIN — LORATADINE 10 MILLIGRAM(S): 10 TABLET ORAL at 11:47

## 2023-01-01 RX ADMIN — APIXABAN 5 MILLIGRAM(S): 2.5 TABLET, FILM COATED ORAL at 06:15

## 2023-01-01 RX ADMIN — CALAMINE AND ZINC OXIDE AND PHENOL 1 APPLICATION(S): 160; 10 LOTION TOPICAL at 22:48

## 2023-01-01 RX ADMIN — HYDROMORPHONE HYDROCHLORIDE 0.25 MILLIGRAM(S): 2 INJECTION INTRAMUSCULAR; INTRAVENOUS; SUBCUTANEOUS at 22:25

## 2023-01-01 RX ADMIN — SEVELAMER CARBONATE 800 MILLIGRAM(S): 2400 POWDER, FOR SUSPENSION ORAL at 11:11

## 2023-01-01 RX ADMIN — Medication 500 MILLIGRAM(S): at 14:51

## 2023-01-01 RX ADMIN — MUPIROCIN 1 APPLICATION(S): 20 OINTMENT TOPICAL at 13:09

## 2023-01-01 RX ADMIN — Medication 100 MILLIGRAM(S): at 23:43

## 2023-01-01 RX ADMIN — Medication 10 MILLIGRAM(S): at 22:59

## 2023-01-01 RX ADMIN — LEVETIRACETAM 500 MILLIGRAM(S): 250 TABLET, FILM COATED ORAL at 11:13

## 2023-01-01 RX ADMIN — MUPIROCIN 1 APPLICATION(S): 20 OINTMENT TOPICAL at 17:33

## 2023-01-01 RX ADMIN — Medication 25 MILLIGRAM(S): at 07:16

## 2023-01-01 RX ADMIN — Medication 0.75 MILLIGRAM(S): at 20:15

## 2023-01-01 RX ADMIN — ERTAPENEM SODIUM 100 MILLIGRAM(S): 1 INJECTION, POWDER, LYOPHILIZED, FOR SOLUTION INTRAMUSCULAR; INTRAVENOUS at 12:22

## 2023-01-01 RX ADMIN — SEVELAMER CARBONATE 800 MILLIGRAM(S): 2400 POWDER, FOR SUSPENSION ORAL at 12:48

## 2023-01-01 RX ADMIN — ISOSORBIDE MONONITRATE 30 MILLIGRAM(S): 60 TABLET, EXTENDED RELEASE ORAL at 12:41

## 2023-01-01 RX ADMIN — Medication 3 MILLIGRAM(S): at 23:59

## 2023-01-01 RX ADMIN — SENNA PLUS 2 TABLET(S): 8.6 TABLET ORAL at 23:11

## 2023-01-01 RX ADMIN — Medication 0.5 MILLIGRAM(S): at 05:40

## 2023-01-01 RX ADMIN — Medication 25 MILLIGRAM(S): at 01:01

## 2023-01-01 RX ADMIN — MUPIROCIN 1 APPLICATION(S): 20 OINTMENT TOPICAL at 22:02

## 2023-01-01 RX ADMIN — Medication 50 MILLIGRAM(S): at 11:55

## 2023-01-01 RX ADMIN — Medication 0.5 MILLIGRAM(S): at 09:44

## 2023-01-01 RX ADMIN — CALAMINE AND ZINC OXIDE AND PHENOL 1 APPLICATION(S): 160; 10 LOTION TOPICAL at 07:03

## 2023-01-01 RX ADMIN — Medication 0.75 MILLIGRAM(S): at 18:55

## 2023-01-01 RX ADMIN — Medication 500 MILLIGRAM(S): at 11:37

## 2023-01-01 RX ADMIN — SEVELAMER CARBONATE 800 MILLIGRAM(S): 2400 POWDER, FOR SUSPENSION ORAL at 17:31

## 2023-01-01 RX ADMIN — ONDANSETRON 4 MILLIGRAM(S): 8 TABLET, FILM COATED ORAL at 20:05

## 2023-01-01 RX ADMIN — HYDROMORPHONE HYDROCHLORIDE 0.25 MILLIGRAM(S): 2 INJECTION INTRAMUSCULAR; INTRAVENOUS; SUBCUTANEOUS at 15:42

## 2023-01-01 RX ADMIN — APIXABAN 5 MILLIGRAM(S): 2.5 TABLET, FILM COATED ORAL at 05:55

## 2023-01-01 RX ADMIN — ONDANSETRON 4 MILLIGRAM(S): 8 TABLET, FILM COATED ORAL at 01:51

## 2023-01-01 RX ADMIN — POLYETHYLENE GLYCOL 3350 17 GRAM(S): 17 POWDER, FOR SOLUTION ORAL at 08:53

## 2023-01-01 RX ADMIN — Medication 100 MICROGRAM(S): at 08:51

## 2023-01-01 RX ADMIN — HYDROMORPHONE HYDROCHLORIDE 0.5 MILLIGRAM(S): 2 INJECTION INTRAMUSCULAR; INTRAVENOUS; SUBCUTANEOUS at 00:30

## 2023-01-01 RX ADMIN — ERYTHROPOIETIN 4000 UNIT(S): 10000 INJECTION, SOLUTION INTRAVENOUS; SUBCUTANEOUS at 19:37

## 2023-01-01 RX ADMIN — Medication 5 MILLIGRAM(S): at 06:08

## 2023-01-01 RX ADMIN — Medication 3 MILLIGRAM(S): at 21:46

## 2023-01-01 RX ADMIN — APIXABAN 5 MILLIGRAM(S): 2.5 TABLET, FILM COATED ORAL at 19:31

## 2023-01-01 RX ADMIN — TAMSULOSIN HYDROCHLORIDE 0.4 MILLIGRAM(S): 0.4 CAPSULE ORAL at 21:41

## 2023-01-01 RX ADMIN — APIXABAN 5 MILLIGRAM(S): 2.5 TABLET, FILM COATED ORAL at 08:51

## 2023-01-01 RX ADMIN — Medication 0.5 MILLIGRAM(S): at 08:32

## 2023-01-01 RX ADMIN — OLANZAPINE 2.5 MILLIGRAM(S): 15 TABLET, FILM COATED ORAL at 21:56

## 2023-01-01 RX ADMIN — Medication 50 MILLIGRAM(S): at 05:56

## 2023-01-01 RX ADMIN — Medication 0.5 MILLIGRAM(S): at 06:15

## 2023-01-01 RX ADMIN — Medication 0.5 MILLIGRAM(S): at 02:28

## 2023-01-01 RX ADMIN — Medication 10 MILLIGRAM(S): at 21:43

## 2023-01-01 RX ADMIN — APIXABAN 2.5 MILLIGRAM(S): 2.5 TABLET, FILM COATED ORAL at 17:25

## 2023-01-01 RX ADMIN — SEVELAMER CARBONATE 800 MILLIGRAM(S): 2400 POWDER, FOR SUSPENSION ORAL at 18:06

## 2023-01-01 RX ADMIN — CHLORHEXIDINE GLUCONATE 1 APPLICATION(S): 213 SOLUTION TOPICAL at 09:46

## 2023-01-01 RX ADMIN — SEVELAMER CARBONATE 1600 MILLIGRAM(S): 2400 POWDER, FOR SUSPENSION ORAL at 21:11

## 2023-01-01 RX ADMIN — Medication 100 MICROGRAM(S): at 07:26

## 2023-01-01 RX ADMIN — CHLORHEXIDINE GLUCONATE 1 APPLICATION(S): 213 SOLUTION TOPICAL at 14:24

## 2023-01-01 RX ADMIN — CHLORHEXIDINE GLUCONATE 1 APPLICATION(S): 213 SOLUTION TOPICAL at 13:13

## 2023-01-01 RX ADMIN — POLYETHYLENE GLYCOL 3350 17 GRAM(S): 17 POWDER, FOR SOLUTION ORAL at 11:11

## 2023-01-01 RX ADMIN — LEVETIRACETAM 250 MILLIGRAM(S): 250 TABLET, FILM COATED ORAL at 17:20

## 2023-01-01 RX ADMIN — Medication 1 DROP(S): at 11:20

## 2023-01-01 RX ADMIN — APIXABAN 5 MILLIGRAM(S): 2.5 TABLET, FILM COATED ORAL at 18:57

## 2023-01-01 RX ADMIN — CHLORHEXIDINE GLUCONATE 1 APPLICATION(S): 213 SOLUTION TOPICAL at 11:11

## 2023-01-01 RX ADMIN — HYDROMORPHONE HYDROCHLORIDE 0.25 MILLIGRAM(S): 2 INJECTION INTRAMUSCULAR; INTRAVENOUS; SUBCUTANEOUS at 21:42

## 2023-01-01 RX ADMIN — CALAMINE AND ZINC OXIDE AND PHENOL 1 APPLICATION(S): 160; 10 LOTION TOPICAL at 20:15

## 2023-01-01 RX ADMIN — SEVELAMER CARBONATE 1600 MILLIGRAM(S): 2400 POWDER, FOR SUSPENSION ORAL at 09:12

## 2023-01-01 RX ADMIN — Medication 1 TABLET(S): at 11:18

## 2023-01-01 RX ADMIN — Medication 100 MICROGRAM(S): at 07:50

## 2023-01-01 RX ADMIN — HYDROMORPHONE HYDROCHLORIDE 1 MILLIGRAM(S): 2 INJECTION INTRAMUSCULAR; INTRAVENOUS; SUBCUTANEOUS at 18:48

## 2023-01-01 RX ADMIN — ATORVASTATIN CALCIUM 40 MILLIGRAM(S): 80 TABLET, FILM COATED ORAL at 22:04

## 2023-01-01 RX ADMIN — MUPIROCIN 1 APPLICATION(S): 20 OINTMENT TOPICAL at 14:26

## 2023-01-01 RX ADMIN — Medication 50 MILLIGRAM(S): at 07:44

## 2023-01-01 RX ADMIN — Medication 50 MILLIGRAM(S): at 05:50

## 2023-01-01 RX ADMIN — Medication 2.5 MILLIGRAM(S): at 22:31

## 2023-01-01 RX ADMIN — ATORVASTATIN CALCIUM 40 MILLIGRAM(S): 80 TABLET, FILM COATED ORAL at 21:08

## 2023-01-01 RX ADMIN — Medication 0.75 MILLIGRAM(S): at 18:06

## 2023-01-01 RX ADMIN — Medication 100 MILLIGRAM(S): at 14:33

## 2023-01-01 RX ADMIN — APIXABAN 2.5 MILLIGRAM(S): 2.5 TABLET, FILM COATED ORAL at 19:03

## 2023-01-01 RX ADMIN — Medication 1 MILLIGRAM(S): at 05:32

## 2023-01-01 RX ADMIN — Medication 1 DROP(S): at 12:12

## 2023-01-01 RX ADMIN — Medication 25 MILLIGRAM(S): at 06:47

## 2023-01-01 RX ADMIN — CHLORHEXIDINE GLUCONATE 1 APPLICATION(S): 213 SOLUTION TOPICAL at 14:43

## 2023-01-01 RX ADMIN — CALAMINE AND ZINC OXIDE AND PHENOL 1 APPLICATION(S): 160; 10 LOTION TOPICAL at 21:56

## 2023-01-01 RX ADMIN — APIXABAN 5 MILLIGRAM(S): 2.5 TABLET, FILM COATED ORAL at 05:14

## 2023-01-01 RX ADMIN — LEVETIRACETAM 500 MILLIGRAM(S): 250 TABLET, FILM COATED ORAL at 11:47

## 2023-01-01 RX ADMIN — Medication 100 MICROGRAM(S): at 06:54

## 2023-01-01 RX ADMIN — Medication 10 MILLIGRAM(S): at 20:02

## 2023-01-01 RX ADMIN — NYSTATIN CREAM 1 APPLICATION(S): 100000 CREAM TOPICAL at 05:03

## 2023-01-01 RX ADMIN — Medication 5 MILLIGRAM(S): at 21:45

## 2023-01-01 RX ADMIN — SENNA PLUS 2 TABLET(S): 8.6 TABLET ORAL at 21:05

## 2023-01-01 RX ADMIN — SEVELAMER CARBONATE 1600 MILLIGRAM(S): 2400 POWDER, FOR SUSPENSION ORAL at 16:02

## 2023-01-01 RX ADMIN — LEVETIRACETAM 500 MILLIGRAM(S): 250 TABLET, FILM COATED ORAL at 14:58

## 2023-01-01 RX ADMIN — Medication 50 MILLIGRAM(S): at 06:33

## 2023-01-01 RX ADMIN — SEVELAMER CARBONATE 800 MILLIGRAM(S): 2400 POWDER, FOR SUSPENSION ORAL at 18:49

## 2023-01-01 RX ADMIN — CALAMINE AND ZINC OXIDE AND PHENOL 1 APPLICATION(S): 160; 10 LOTION TOPICAL at 14:18

## 2023-01-01 RX ADMIN — CALAMINE AND ZINC OXIDE AND PHENOL 1 APPLICATION(S): 160; 10 LOTION TOPICAL at 18:18

## 2023-01-01 RX ADMIN — CALAMINE AND ZINC OXIDE AND PHENOL 1 APPLICATION(S): 160; 10 LOTION TOPICAL at 13:06

## 2023-01-01 RX ADMIN — Medication 1 MILLIGRAM(S): at 23:31

## 2023-01-01 RX ADMIN — LEVETIRACETAM 250 MILLIGRAM(S): 250 TABLET, FILM COATED ORAL at 17:47

## 2023-01-01 RX ADMIN — SEVELAMER CARBONATE 800 MILLIGRAM(S): 2400 POWDER, FOR SUSPENSION ORAL at 12:52

## 2023-01-01 RX ADMIN — Medication 0.75 MILLIGRAM(S): at 17:38

## 2023-01-01 RX ADMIN — NYSTATIN CREAM 1 APPLICATION(S): 100000 CREAM TOPICAL at 21:20

## 2023-01-01 RX ADMIN — APIXABAN 2.5 MILLIGRAM(S): 2.5 TABLET, FILM COATED ORAL at 05:50

## 2023-01-01 RX ADMIN — SEVELAMER CARBONATE 1600 MILLIGRAM(S): 2400 POWDER, FOR SUSPENSION ORAL at 13:42

## 2023-01-01 RX ADMIN — HYDROMORPHONE HYDROCHLORIDE 0.25 MILLIGRAM(S): 2 INJECTION INTRAMUSCULAR; INTRAVENOUS; SUBCUTANEOUS at 14:23

## 2023-01-01 RX ADMIN — ISOSORBIDE MONONITRATE 30 MILLIGRAM(S): 60 TABLET, EXTENDED RELEASE ORAL at 12:38

## 2023-01-01 RX ADMIN — MUPIROCIN 1 APPLICATION(S): 20 OINTMENT TOPICAL at 07:14

## 2023-01-01 RX ADMIN — Medication 1 DROP(S): at 18:10

## 2023-01-01 RX ADMIN — Medication 50 MILLIGRAM(S): at 07:59

## 2023-01-01 RX ADMIN — Medication 0.5 MILLIGRAM(S): at 02:45

## 2023-01-01 RX ADMIN — LEVETIRACETAM 500 MILLIGRAM(S): 250 TABLET, FILM COATED ORAL at 12:37

## 2023-01-01 RX ADMIN — CALAMINE AND ZINC OXIDE AND PHENOL 1 APPLICATION(S): 160; 10 LOTION TOPICAL at 22:53

## 2023-01-01 RX ADMIN — Medication 10 MILLIGRAM(S): at 23:36

## 2023-01-01 RX ADMIN — POLYETHYLENE GLYCOL 3350 17 GRAM(S): 17 POWDER, FOR SOLUTION ORAL at 12:08

## 2023-01-01 RX ADMIN — ATORVASTATIN CALCIUM 40 MILLIGRAM(S): 80 TABLET, FILM COATED ORAL at 21:49

## 2023-01-01 RX ADMIN — Medication 500 MILLIGRAM(S): at 13:19

## 2023-01-01 RX ADMIN — Medication 500 MILLIGRAM(S): at 13:24

## 2023-01-01 RX ADMIN — CHLORHEXIDINE GLUCONATE 1 APPLICATION(S): 213 SOLUTION TOPICAL at 13:53

## 2023-01-01 RX ADMIN — SEVELAMER CARBONATE 800 MILLIGRAM(S): 2400 POWDER, FOR SUSPENSION ORAL at 17:24

## 2023-01-01 RX ADMIN — LEVETIRACETAM 500 MILLIGRAM(S): 250 TABLET, FILM COATED ORAL at 13:05

## 2023-01-01 RX ADMIN — LEVETIRACETAM 400 MILLIGRAM(S): 250 TABLET, FILM COATED ORAL at 12:49

## 2023-01-01 RX ADMIN — SEVELAMER CARBONATE 800 MILLIGRAM(S): 2400 POWDER, FOR SUSPENSION ORAL at 20:01

## 2023-01-01 RX ADMIN — LEVETIRACETAM 250 MILLIGRAM(S): 250 TABLET, FILM COATED ORAL at 15:41

## 2023-01-01 RX ADMIN — Medication 0.75 MILLIGRAM(S): at 18:37

## 2023-01-01 RX ADMIN — Medication 1 DROP(S): at 05:01

## 2023-01-01 RX ADMIN — NYSTATIN CREAM 1 APPLICATION(S): 100000 CREAM TOPICAL at 22:20

## 2023-01-01 RX ADMIN — APIXABAN 2.5 MILLIGRAM(S): 2.5 TABLET, FILM COATED ORAL at 19:56

## 2023-01-01 RX ADMIN — Medication 1 TABLET(S): at 09:31

## 2023-01-01 RX ADMIN — LEVETIRACETAM 400 MILLIGRAM(S): 250 TABLET, FILM COATED ORAL at 07:12

## 2023-01-01 RX ADMIN — ATORVASTATIN CALCIUM 40 MILLIGRAM(S): 80 TABLET, FILM COATED ORAL at 20:16

## 2023-01-01 RX ADMIN — SEVELAMER CARBONATE 800 MILLIGRAM(S): 2400 POWDER, FOR SUSPENSION ORAL at 20:19

## 2023-01-01 RX ADMIN — SODIUM CHLORIDE 250 MILLILITER(S): 9 INJECTION INTRAMUSCULAR; INTRAVENOUS; SUBCUTANEOUS at 00:23

## 2023-01-01 RX ADMIN — SENNA PLUS 2 TABLET(S): 8.6 TABLET ORAL at 20:47

## 2023-01-01 RX ADMIN — SEVELAMER CARBONATE 800 MILLIGRAM(S): 2400 POWDER, FOR SUSPENSION ORAL at 08:32

## 2023-01-01 RX ADMIN — Medication 3 MILLIGRAM(S): at 22:13

## 2023-01-01 RX ADMIN — APIXABAN 5 MILLIGRAM(S): 2.5 TABLET, FILM COATED ORAL at 05:00

## 2023-01-01 RX ADMIN — LEVETIRACETAM 500 MILLIGRAM(S): 250 TABLET, FILM COATED ORAL at 15:18

## 2023-01-01 RX ADMIN — Medication 10 MILLIGRAM(S): at 19:05

## 2023-01-01 RX ADMIN — POLYETHYLENE GLYCOL 3350 17 GRAM(S): 17 POWDER, FOR SOLUTION ORAL at 11:37

## 2023-01-01 RX ADMIN — Medication 100 MICROGRAM(S): at 06:24

## 2023-01-01 RX ADMIN — LORATADINE 10 MILLIGRAM(S): 10 TABLET ORAL at 11:12

## 2023-01-01 RX ADMIN — Medication 1 MILLIGRAM(S): at 21:56

## 2023-01-01 RX ADMIN — MUPIROCIN 1 APPLICATION(S): 20 OINTMENT TOPICAL at 17:38

## 2023-01-01 RX ADMIN — Medication 25 MILLIGRAM(S): at 16:28

## 2023-01-01 RX ADMIN — ATORVASTATIN CALCIUM 40 MILLIGRAM(S): 80 TABLET, FILM COATED ORAL at 20:18

## 2023-01-01 RX ADMIN — MEROPENEM 100 MILLIGRAM(S): 1 INJECTION INTRAVENOUS at 18:53

## 2023-01-01 RX ADMIN — Medication 1 DROP(S): at 16:03

## 2023-01-01 RX ADMIN — CALAMINE AND ZINC OXIDE AND PHENOL 1 APPLICATION(S): 160; 10 LOTION TOPICAL at 06:08

## 2023-01-01 RX ADMIN — CALAMINE AND ZINC OXIDE AND PHENOL 1 APPLICATION(S): 160; 10 LOTION TOPICAL at 23:21

## 2023-01-01 RX ADMIN — Medication 0.5 MILLIGRAM(S): at 07:59

## 2023-01-01 RX ADMIN — APIXABAN 2.5 MILLIGRAM(S): 2.5 TABLET, FILM COATED ORAL at 06:17

## 2023-01-01 RX ADMIN — Medication 1 TABLET(S): at 11:56

## 2023-01-01 RX ADMIN — APIXABAN 5 MILLIGRAM(S): 2.5 TABLET, FILM COATED ORAL at 22:35

## 2023-01-01 RX ADMIN — Medication 500 MILLIGRAM(S): at 11:47

## 2023-01-01 RX ADMIN — HYDROMORPHONE HYDROCHLORIDE 0.25 MILLIGRAM(S): 2 INJECTION INTRAMUSCULAR; INTRAVENOUS; SUBCUTANEOUS at 22:12

## 2023-01-01 RX ADMIN — SEVELAMER CARBONATE 1600 MILLIGRAM(S): 2400 POWDER, FOR SUSPENSION ORAL at 11:57

## 2023-01-01 RX ADMIN — ISOSORBIDE MONONITRATE 30 MILLIGRAM(S): 60 TABLET, EXTENDED RELEASE ORAL at 11:12

## 2023-01-01 RX ADMIN — LEVETIRACETAM 500 MILLIGRAM(S): 250 TABLET, FILM COATED ORAL at 14:50

## 2023-01-01 RX ADMIN — Medication 1 TABLET(S): at 12:05

## 2023-01-01 RX ADMIN — ERYTHROPOIETIN 4000 UNIT(S): 10000 INJECTION, SOLUTION INTRAVENOUS; SUBCUTANEOUS at 08:33

## 2023-01-01 RX ADMIN — Medication 25 MILLIGRAM(S): at 16:22

## 2023-01-01 RX ADMIN — Medication 10 MILLIGRAM(S): at 17:41

## 2023-01-01 RX ADMIN — HYDROMORPHONE HYDROCHLORIDE 1 MILLIGRAM(S): 2 INJECTION INTRAMUSCULAR; INTRAVENOUS; SUBCUTANEOUS at 12:56

## 2023-01-01 RX ADMIN — Medication 1 TABLET(S): at 14:59

## 2023-01-01 RX ADMIN — MUPIROCIN 1 APPLICATION(S): 20 OINTMENT TOPICAL at 05:28

## 2023-01-01 RX ADMIN — Medication 1 TABLET(S): at 13:06

## 2023-01-01 RX ADMIN — Medication 5 MILLIGRAM(S): at 05:22

## 2023-01-01 RX ADMIN — APIXABAN 2.5 MILLIGRAM(S): 2.5 TABLET, FILM COATED ORAL at 05:16

## 2023-01-01 RX ADMIN — SEVELAMER CARBONATE 800 MILLIGRAM(S): 2400 POWDER, FOR SUSPENSION ORAL at 08:58

## 2023-01-01 RX ADMIN — Medication 0.5 MILLIGRAM(S): at 08:24

## 2023-01-01 RX ADMIN — Medication 1 TABLET(S): at 13:41

## 2023-01-01 RX ADMIN — Medication 0.75 MILLIGRAM(S): at 21:44

## 2023-01-01 RX ADMIN — Medication 1000 UNIT(S): at 11:37

## 2023-01-01 RX ADMIN — APIXABAN 2.5 MILLIGRAM(S): 2.5 TABLET, FILM COATED ORAL at 07:15

## 2023-01-01 RX ADMIN — ATORVASTATIN CALCIUM 40 MILLIGRAM(S): 80 TABLET, FILM COATED ORAL at 21:46

## 2023-01-01 RX ADMIN — CALAMINE AND ZINC OXIDE AND PHENOL 1 APPLICATION(S): 160; 10 LOTION TOPICAL at 15:19

## 2023-01-01 RX ADMIN — Medication 5 MILLIGRAM(S): at 07:38

## 2023-01-01 RX ADMIN — HYDROMORPHONE HYDROCHLORIDE 2 MILLIGRAM(S): 2 INJECTION INTRAMUSCULAR; INTRAVENOUS; SUBCUTANEOUS at 07:06

## 2023-01-01 RX ADMIN — Medication 0.5 MILLIGRAM(S): at 09:01

## 2023-01-01 RX ADMIN — TIOTROPIUM BROMIDE 2 PUFF(S): 18 CAPSULE ORAL; RESPIRATORY (INHALATION) at 11:55

## 2023-01-01 RX ADMIN — Medication 1 TABLET(S): at 18:46

## 2023-01-01 RX ADMIN — APIXABAN 5 MILLIGRAM(S): 2.5 TABLET, FILM COATED ORAL at 17:23

## 2023-01-01 RX ADMIN — ATORVASTATIN CALCIUM 40 MILLIGRAM(S): 80 TABLET, FILM COATED ORAL at 21:02

## 2023-01-01 RX ADMIN — Medication 1 APPLICATION(S): at 12:57

## 2023-01-01 RX ADMIN — APIXABAN 5 MILLIGRAM(S): 2.5 TABLET, FILM COATED ORAL at 07:31

## 2023-01-01 RX ADMIN — ISOSORBIDE MONONITRATE 30 MILLIGRAM(S): 60 TABLET, EXTENDED RELEASE ORAL at 14:23

## 2023-01-01 RX ADMIN — CHLORHEXIDINE GLUCONATE 1 APPLICATION(S): 213 SOLUTION TOPICAL at 11:48

## 2023-01-01 RX ADMIN — APIXABAN 2.5 MILLIGRAM(S): 2.5 TABLET, FILM COATED ORAL at 18:25

## 2023-01-01 RX ADMIN — Medication 25 MILLIGRAM(S): at 21:44

## 2023-01-01 RX ADMIN — Medication 1 TABLET(S): at 12:57

## 2023-01-01 RX ADMIN — SENNA PLUS 2 TABLET(S): 8.6 TABLET ORAL at 21:49

## 2023-01-01 RX ADMIN — Medication 100 MICROGRAM(S): at 07:03

## 2023-01-01 RX ADMIN — Medication 1 TABLET(S): at 12:17

## 2023-01-01 RX ADMIN — ERTAPENEM SODIUM 100 MILLIGRAM(S): 1 INJECTION, POWDER, LYOPHILIZED, FOR SOLUTION INTRAMUSCULAR; INTRAVENOUS at 14:59

## 2023-01-01 RX ADMIN — ATORVASTATIN CALCIUM 40 MILLIGRAM(S): 80 TABLET, FILM COATED ORAL at 22:44

## 2023-01-01 RX ADMIN — CALAMINE AND ZINC OXIDE AND PHENOL 1 APPLICATION(S): 160; 10 LOTION TOPICAL at 05:57

## 2023-01-01 RX ADMIN — HYDROMORPHONE HYDROCHLORIDE 0.5 MILLIGRAM(S): 2 INJECTION INTRAMUSCULAR; INTRAVENOUS; SUBCUTANEOUS at 06:50

## 2023-01-01 RX ADMIN — MUPIROCIN 1 APPLICATION(S): 20 OINTMENT TOPICAL at 07:32

## 2023-01-01 RX ADMIN — Medication 0.75 MILLIGRAM(S): at 18:43

## 2023-01-01 RX ADMIN — Medication 25 MILLIGRAM(S): at 19:55

## 2023-01-01 RX ADMIN — ATORVASTATIN CALCIUM 40 MILLIGRAM(S): 80 TABLET, FILM COATED ORAL at 21:52

## 2023-01-01 RX ADMIN — LORATADINE 10 MILLIGRAM(S): 10 TABLET ORAL at 11:03

## 2023-01-01 RX ADMIN — Medication 0.5 MILLIGRAM(S): at 08:49

## 2023-01-01 RX ADMIN — Medication 500 MILLIGRAM(S): at 12:09

## 2023-01-01 RX ADMIN — OLANZAPINE 2.5 MILLIGRAM(S): 15 TABLET, FILM COATED ORAL at 22:01

## 2023-01-01 RX ADMIN — Medication 0.5 MILLIGRAM(S): at 08:42

## 2023-01-01 RX ADMIN — SEVELAMER CARBONATE 800 MILLIGRAM(S): 2400 POWDER, FOR SUSPENSION ORAL at 18:24

## 2023-01-01 RX ADMIN — CHLORHEXIDINE GLUCONATE 1 APPLICATION(S): 213 SOLUTION TOPICAL at 17:35

## 2023-01-01 RX ADMIN — APIXABAN 5 MILLIGRAM(S): 2.5 TABLET, FILM COATED ORAL at 05:18

## 2023-01-01 RX ADMIN — Medication 0.25 MILLIGRAM(S): at 12:37

## 2023-01-01 RX ADMIN — Medication 0.5 MILLIGRAM(S): at 06:17

## 2023-01-01 RX ADMIN — Medication 50 MILLIGRAM(S): at 13:10

## 2023-01-01 RX ADMIN — SENNA PLUS 2 TABLET(S): 8.6 TABLET ORAL at 21:37

## 2023-01-01 RX ADMIN — Medication 1000 UNIT(S): at 11:13

## 2023-01-01 RX ADMIN — SEVELAMER CARBONATE 800 MILLIGRAM(S): 2400 POWDER, FOR SUSPENSION ORAL at 17:58

## 2023-01-01 RX ADMIN — CALAMINE AND ZINC OXIDE AND PHENOL 1 APPLICATION(S): 160; 10 LOTION TOPICAL at 13:00

## 2023-01-01 RX ADMIN — Medication 10 MILLIGRAM(S): at 22:00

## 2023-01-01 RX ADMIN — HYDROMORPHONE HYDROCHLORIDE 2 MILLIGRAM(S): 2 INJECTION INTRAMUSCULAR; INTRAVENOUS; SUBCUTANEOUS at 16:47

## 2023-01-01 RX ADMIN — SEVELAMER CARBONATE 1600 MILLIGRAM(S): 2400 POWDER, FOR SUSPENSION ORAL at 12:18

## 2023-01-01 RX ADMIN — Medication 1 TABLET(S): at 11:20

## 2023-01-01 RX ADMIN — Medication 1 TABLET(S): at 11:16

## 2023-01-01 RX ADMIN — Medication 25 MILLIGRAM(S): at 17:52

## 2023-01-01 RX ADMIN — CALAMINE AND ZINC OXIDE AND PHENOL 1 APPLICATION(S): 160; 10 LOTION TOPICAL at 07:49

## 2023-01-01 RX ADMIN — MUPIROCIN 1 APPLICATION(S): 20 OINTMENT TOPICAL at 21:20

## 2023-01-01 RX ADMIN — APIXABAN 5 MILLIGRAM(S): 2.5 TABLET, FILM COATED ORAL at 18:44

## 2023-01-01 RX ADMIN — Medication 1 APPLICATION(S): at 17:26

## 2023-01-01 RX ADMIN — MEROPENEM 100 MILLIGRAM(S): 1 INJECTION INTRAVENOUS at 23:00

## 2023-01-01 RX ADMIN — HYDROMORPHONE HYDROCHLORIDE 0.5 MILLIGRAM(S): 2 INJECTION INTRAMUSCULAR; INTRAVENOUS; SUBCUTANEOUS at 06:58

## 2023-01-01 RX ADMIN — Medication 500 MILLIGRAM(S): at 12:19

## 2023-01-01 RX ADMIN — Medication 500 MILLIGRAM(S): at 13:06

## 2023-01-01 RX ADMIN — CHLORHEXIDINE GLUCONATE 1 APPLICATION(S): 213 SOLUTION TOPICAL at 12:44

## 2023-01-01 RX ADMIN — Medication 1 APPLICATION(S): at 05:34

## 2023-01-01 RX ADMIN — Medication 0.5 MILLIGRAM(S): at 05:14

## 2023-01-01 RX ADMIN — SEVELAMER CARBONATE 800 MILLIGRAM(S): 2400 POWDER, FOR SUSPENSION ORAL at 17:18

## 2023-01-01 RX ADMIN — SEVELAMER CARBONATE 1600 MILLIGRAM(S): 2400 POWDER, FOR SUSPENSION ORAL at 16:39

## 2023-01-01 RX ADMIN — Medication 50 MILLIGRAM(S): at 02:39

## 2023-01-01 RX ADMIN — MUPIROCIN 1 APPLICATION(S): 20 OINTMENT TOPICAL at 14:58

## 2023-01-01 RX ADMIN — Medication 1 APPLICATION(S): at 06:07

## 2023-01-01 RX ADMIN — APIXABAN 2.5 MILLIGRAM(S): 2.5 TABLET, FILM COATED ORAL at 17:58

## 2023-01-01 RX ADMIN — SEVELAMER CARBONATE 1600 MILLIGRAM(S): 2400 POWDER, FOR SUSPENSION ORAL at 05:43

## 2023-01-01 RX ADMIN — Medication 1 MILLIGRAM(S): at 22:34

## 2023-01-01 RX ADMIN — POLYETHYLENE GLYCOL 3350 17 GRAM(S): 17 POWDER, FOR SOLUTION ORAL at 12:29

## 2023-01-01 RX ADMIN — Medication 5 MILLIGRAM(S): at 05:59

## 2023-01-01 RX ADMIN — LORATADINE 10 MILLIGRAM(S): 10 TABLET ORAL at 12:29

## 2023-01-01 RX ADMIN — ATORVASTATIN CALCIUM 40 MILLIGRAM(S): 80 TABLET, FILM COATED ORAL at 21:29

## 2023-01-01 RX ADMIN — Medication 10 MILLIGRAM(S): at 05:15

## 2023-01-01 RX ADMIN — Medication 100 MICROGRAM(S): at 06:08

## 2023-01-01 RX ADMIN — SODIUM CHLORIDE 500 MILLILITER(S): 9 INJECTION INTRAMUSCULAR; INTRAVENOUS; SUBCUTANEOUS at 04:48

## 2023-01-01 RX ADMIN — ERYTHROPOIETIN 4000 UNIT(S): 10000 INJECTION, SOLUTION INTRAVENOUS; SUBCUTANEOUS at 16:47

## 2023-01-01 RX ADMIN — LEVETIRACETAM 250 MILLIGRAM(S): 250 TABLET, FILM COATED ORAL at 16:48

## 2023-01-01 RX ADMIN — Medication 1 DROP(S): at 05:52

## 2023-01-01 RX ADMIN — Medication 80 MILLIGRAM(S): at 17:46

## 2023-01-01 RX ADMIN — Medication 1 TABLET(S): at 17:24

## 2023-01-01 RX ADMIN — HYDROMORPHONE HYDROCHLORIDE 0.25 MILLIGRAM(S): 2 INJECTION INTRAMUSCULAR; INTRAVENOUS; SUBCUTANEOUS at 10:02

## 2023-01-01 RX ADMIN — Medication 500 MILLIGRAM(S): at 12:11

## 2023-01-01 RX ADMIN — Medication 1 MILLIGRAM(S): at 23:40

## 2023-01-01 RX ADMIN — APIXABAN 5 MILLIGRAM(S): 2.5 TABLET, FILM COATED ORAL at 05:37

## 2023-01-01 RX ADMIN — SEVELAMER CARBONATE 800 MILLIGRAM(S): 2400 POWDER, FOR SUSPENSION ORAL at 13:36

## 2023-01-01 RX ADMIN — CALAMINE AND ZINC OXIDE AND PHENOL 1 APPLICATION(S): 160; 10 LOTION TOPICAL at 13:51

## 2023-01-01 RX ADMIN — Medication 1 DROP(S): at 19:38

## 2023-01-01 RX ADMIN — ERYTHROPOIETIN 4000 UNIT(S): 10000 INJECTION, SOLUTION INTRAVENOUS; SUBCUTANEOUS at 16:57

## 2023-01-01 RX ADMIN — CHLORHEXIDINE GLUCONATE 1 APPLICATION(S): 213 SOLUTION TOPICAL at 12:37

## 2023-01-01 RX ADMIN — Medication 100 MICROGRAM(S): at 05:12

## 2023-01-01 RX ADMIN — APIXABAN 2.5 MILLIGRAM(S): 2.5 TABLET, FILM COATED ORAL at 06:28

## 2023-01-01 RX ADMIN — APIXABAN 2.5 MILLIGRAM(S): 2.5 TABLET, FILM COATED ORAL at 05:14

## 2023-01-01 RX ADMIN — OLANZAPINE 2.5 MILLIGRAM(S): 15 TABLET, FILM COATED ORAL at 11:18

## 2023-01-01 RX ADMIN — Medication 0.5 MILLIGRAM(S): at 21:34

## 2023-01-01 RX ADMIN — SEVELAMER CARBONATE 800 MILLIGRAM(S): 2400 POWDER, FOR SUSPENSION ORAL at 12:12

## 2023-01-01 RX ADMIN — SEVELAMER CARBONATE 1600 MILLIGRAM(S): 2400 POWDER, FOR SUSPENSION ORAL at 11:38

## 2023-01-01 RX ADMIN — LEVETIRACETAM 500 MILLIGRAM(S): 250 TABLET, FILM COATED ORAL at 13:21

## 2023-01-01 RX ADMIN — Medication 50 MILLIGRAM(S): at 06:49

## 2023-01-01 RX ADMIN — CHLORHEXIDINE GLUCONATE 1 APPLICATION(S): 213 SOLUTION TOPICAL at 06:15

## 2023-01-01 RX ADMIN — SEVELAMER CARBONATE 800 MILLIGRAM(S): 2400 POWDER, FOR SUSPENSION ORAL at 08:53

## 2023-01-01 RX ADMIN — Medication 5 MILLIGRAM(S): at 17:46

## 2023-01-01 RX ADMIN — SENNA PLUS 2 TABLET(S): 8.6 TABLET ORAL at 22:42

## 2023-01-01 RX ADMIN — Medication 25 MILLIGRAM(S): at 01:17

## 2023-01-01 RX ADMIN — Medication 0.5 MILLIGRAM(S): at 05:51

## 2023-01-01 RX ADMIN — SEVELAMER CARBONATE 800 MILLIGRAM(S): 2400 POWDER, FOR SUSPENSION ORAL at 19:02

## 2023-01-01 RX ADMIN — Medication 0.5 MILLIGRAM(S): at 19:38

## 2023-01-01 RX ADMIN — Medication 0.25 MILLIGRAM(S): at 19:48

## 2023-01-01 RX ADMIN — HYDROMORPHONE HYDROCHLORIDE 0.25 MILLIGRAM(S): 2 INJECTION INTRAMUSCULAR; INTRAVENOUS; SUBCUTANEOUS at 03:52

## 2023-01-01 RX ADMIN — CHLORHEXIDINE GLUCONATE 1 APPLICATION(S): 213 SOLUTION TOPICAL at 12:33

## 2023-01-01 RX ADMIN — Medication 25 MILLIGRAM(S): at 10:14

## 2023-01-01 RX ADMIN — Medication 50 MILLIGRAM(S): at 05:05

## 2023-01-01 RX ADMIN — OLANZAPINE 2.5 MILLIGRAM(S): 15 TABLET, FILM COATED ORAL at 21:22

## 2023-01-01 RX ADMIN — Medication 5 MILLIGRAM(S): at 05:38

## 2023-01-01 RX ADMIN — APIXABAN 2.5 MILLIGRAM(S): 2.5 TABLET, FILM COATED ORAL at 17:41

## 2023-01-01 RX ADMIN — SEVELAMER CARBONATE 800 MILLIGRAM(S): 2400 POWDER, FOR SUSPENSION ORAL at 13:07

## 2023-01-01 RX ADMIN — Medication 5 MILLIGRAM(S): at 07:57

## 2023-01-01 RX ADMIN — POLYETHYLENE GLYCOL 3350 17 GRAM(S): 17 POWDER, FOR SOLUTION ORAL at 12:26

## 2023-01-01 RX ADMIN — Medication 100 MICROGRAM(S): at 06:14

## 2023-01-01 RX ADMIN — Medication 500 MILLIGRAM(S): at 11:10

## 2023-01-01 RX ADMIN — SENNA PLUS 2 TABLET(S): 8.6 TABLET ORAL at 21:58

## 2023-01-01 RX ADMIN — MUPIROCIN 1 APPLICATION(S): 20 OINTMENT TOPICAL at 17:06

## 2023-01-01 RX ADMIN — Medication 0.5 MILLIGRAM(S): at 23:17

## 2023-01-01 RX ADMIN — Medication 0.5 MILLIGRAM(S): at 11:48

## 2023-01-01 RX ADMIN — Medication 1 TABLET(S): at 13:14

## 2023-01-01 RX ADMIN — HYDROMORPHONE HYDROCHLORIDE 0.5 MILLIGRAM(S): 2 INJECTION INTRAMUSCULAR; INTRAVENOUS; SUBCUTANEOUS at 18:25

## 2023-01-01 RX ADMIN — CHLORHEXIDINE GLUCONATE 1 APPLICATION(S): 213 SOLUTION TOPICAL at 15:19

## 2023-01-01 RX ADMIN — POLYETHYLENE GLYCOL 3350 17 GRAM(S): 17 POWDER, FOR SOLUTION ORAL at 11:17

## 2023-01-01 RX ADMIN — SEVELAMER CARBONATE 800 MILLIGRAM(S): 2400 POWDER, FOR SUSPENSION ORAL at 13:54

## 2023-01-01 RX ADMIN — SENNA PLUS 2 TABLET(S): 8.6 TABLET ORAL at 22:56

## 2023-01-01 RX ADMIN — Medication 5 MILLIGRAM(S): at 05:05

## 2023-01-01 RX ADMIN — LEVETIRACETAM 250 MILLIGRAM(S): 250 TABLET, FILM COATED ORAL at 19:31

## 2023-01-01 RX ADMIN — Medication 5 MILLIGRAM(S): at 08:52

## 2023-01-01 RX ADMIN — MUPIROCIN 1 APPLICATION(S): 20 OINTMENT TOPICAL at 17:18

## 2023-01-01 RX ADMIN — SEVELAMER CARBONATE 1600 MILLIGRAM(S): 2400 POWDER, FOR SUSPENSION ORAL at 11:55

## 2023-01-01 RX ADMIN — Medication 10 MILLIGRAM(S): at 17:26

## 2023-01-01 RX ADMIN — APIXABAN 5 MILLIGRAM(S): 2.5 TABLET, FILM COATED ORAL at 18:01

## 2023-01-01 RX ADMIN — CALAMINE AND ZINC OXIDE AND PHENOL 1 APPLICATION(S): 160; 10 LOTION TOPICAL at 05:13

## 2023-01-01 RX ADMIN — MIDODRINE HYDROCHLORIDE 10 MILLIGRAM(S): 2.5 TABLET ORAL at 17:46

## 2023-01-01 RX ADMIN — APIXABAN 2.5 MILLIGRAM(S): 2.5 TABLET, FILM COATED ORAL at 20:02

## 2023-01-01 RX ADMIN — ATORVASTATIN CALCIUM 40 MILLIGRAM(S): 80 TABLET, FILM COATED ORAL at 21:10

## 2023-01-01 RX ADMIN — SEVELAMER CARBONATE 800 MILLIGRAM(S): 2400 POWDER, FOR SUSPENSION ORAL at 13:20

## 2023-01-01 RX ADMIN — Medication 0.5 MILLIGRAM(S): at 05:00

## 2023-01-01 RX ADMIN — LORATADINE 10 MILLIGRAM(S): 10 TABLET ORAL at 12:38

## 2023-01-01 RX ADMIN — MUPIROCIN 1 APPLICATION(S): 20 OINTMENT TOPICAL at 05:52

## 2023-01-01 RX ADMIN — APIXABAN 5 MILLIGRAM(S): 2.5 TABLET, FILM COATED ORAL at 20:18

## 2023-01-01 RX ADMIN — Medication 1 DROP(S): at 00:48

## 2023-01-01 RX ADMIN — ISOSORBIDE MONONITRATE 30 MILLIGRAM(S): 60 TABLET, EXTENDED RELEASE ORAL at 15:19

## 2023-01-01 RX ADMIN — Medication 500 MILLIGRAM(S): at 12:36

## 2023-01-01 RX ADMIN — CHLORHEXIDINE GLUCONATE 1 APPLICATION(S): 213 SOLUTION TOPICAL at 12:22

## 2023-01-01 RX ADMIN — ATORVASTATIN CALCIUM 40 MILLIGRAM(S): 80 TABLET, FILM COATED ORAL at 22:33

## 2023-01-01 RX ADMIN — CHLORHEXIDINE GLUCONATE 1 APPLICATION(S): 213 SOLUTION TOPICAL at 05:53

## 2023-01-01 RX ADMIN — MUPIROCIN 1 APPLICATION(S): 20 OINTMENT TOPICAL at 19:06

## 2023-01-01 RX ADMIN — MUPIROCIN 1 APPLICATION(S): 20 OINTMENT TOPICAL at 13:02

## 2023-01-01 RX ADMIN — LEVETIRACETAM 250 MILLIGRAM(S): 250 TABLET, FILM COATED ORAL at 23:59

## 2023-01-01 RX ADMIN — Medication 0.5 MILLIGRAM(S): at 09:09

## 2023-01-01 RX ADMIN — SEVELAMER CARBONATE 800 MILLIGRAM(S): 2400 POWDER, FOR SUSPENSION ORAL at 17:30

## 2023-01-01 RX ADMIN — Medication 1 APPLICATION(S): at 05:56

## 2023-01-01 RX ADMIN — Medication 5 MILLIGRAM(S): at 11:47

## 2023-01-01 RX ADMIN — LORATADINE 10 MILLIGRAM(S): 10 TABLET ORAL at 13:22

## 2023-01-01 RX ADMIN — Medication 100 MICROGRAM(S): at 12:52

## 2023-01-01 RX ADMIN — LEVETIRACETAM 500 MILLIGRAM(S): 250 TABLET, FILM COATED ORAL at 11:56

## 2023-01-01 RX ADMIN — MEROPENEM 100 MILLIGRAM(S): 1 INJECTION INTRAVENOUS at 15:23

## 2023-01-01 RX ADMIN — CALAMINE AND ZINC OXIDE AND PHENOL 1 APPLICATION(S): 160; 10 LOTION TOPICAL at 14:49

## 2023-01-01 RX ADMIN — LEVETIRACETAM 250 MILLIGRAM(S): 250 TABLET, FILM COATED ORAL at 16:01

## 2023-01-01 RX ADMIN — Medication 25 MILLIGRAM(S): at 17:35

## 2023-01-01 RX ADMIN — Medication 0.5 MILLIGRAM(S): at 05:48

## 2023-01-01 RX ADMIN — APIXABAN 5 MILLIGRAM(S): 2.5 TABLET, FILM COATED ORAL at 18:02

## 2023-01-01 RX ADMIN — HYDROMORPHONE HYDROCHLORIDE 0.5 MILLIGRAM(S): 2 INJECTION INTRAMUSCULAR; INTRAVENOUS; SUBCUTANEOUS at 00:25

## 2023-01-01 RX ADMIN — HYDROMORPHONE HYDROCHLORIDE 0.5 MILLIGRAM(S): 2 INJECTION INTRAMUSCULAR; INTRAVENOUS; SUBCUTANEOUS at 17:05

## 2023-01-01 RX ADMIN — Medication 50 MILLIGRAM(S): at 05:26

## 2023-01-01 RX ADMIN — APIXABAN 2.5 MILLIGRAM(S): 2.5 TABLET, FILM COATED ORAL at 18:46

## 2023-01-01 RX ADMIN — CALAMINE AND ZINC OXIDE AND PHENOL 1 APPLICATION(S): 160; 10 LOTION TOPICAL at 21:05

## 2023-01-01 RX ADMIN — Medication 1 TABLET(S): at 12:11

## 2023-01-01 RX ADMIN — APIXABAN 5 MILLIGRAM(S): 2.5 TABLET, FILM COATED ORAL at 18:08

## 2023-01-01 RX ADMIN — Medication 1 TABLET(S): at 12:48

## 2023-01-01 RX ADMIN — SENNA PLUS 2 TABLET(S): 8.6 TABLET ORAL at 21:20

## 2023-01-01 RX ADMIN — ATORVASTATIN CALCIUM 40 MILLIGRAM(S): 80 TABLET, FILM COATED ORAL at 22:16

## 2023-01-01 RX ADMIN — SEVELAMER CARBONATE 800 MILLIGRAM(S): 2400 POWDER, FOR SUSPENSION ORAL at 12:08

## 2023-01-01 RX ADMIN — Medication 100 MILLIGRAM(S): at 13:55

## 2023-01-01 RX ADMIN — Medication 0.5 MILLIGRAM(S): at 10:21

## 2023-01-01 RX ADMIN — Medication 500 MILLIGRAM(S): at 09:46

## 2023-01-01 RX ADMIN — LEVETIRACETAM 500 MILLIGRAM(S): 250 TABLET, FILM COATED ORAL at 12:49

## 2023-01-01 RX ADMIN — OLANZAPINE 2.5 MILLIGRAM(S): 15 TABLET, FILM COATED ORAL at 12:15

## 2023-01-01 RX ADMIN — MUPIROCIN 1 APPLICATION(S): 20 OINTMENT TOPICAL at 05:38

## 2023-01-01 RX ADMIN — LEVETIRACETAM 250 MILLIGRAM(S): 250 TABLET, FILM COATED ORAL at 20:18

## 2023-01-01 RX ADMIN — CALAMINE AND ZINC OXIDE AND PHENOL 1 APPLICATION(S): 160; 10 LOTION TOPICAL at 13:25

## 2023-01-01 RX ADMIN — SEVELAMER CARBONATE 1600 MILLIGRAM(S): 2400 POWDER, FOR SUSPENSION ORAL at 06:15

## 2023-01-01 RX ADMIN — MUPIROCIN 1 APPLICATION(S): 20 OINTMENT TOPICAL at 05:51

## 2023-01-01 RX ADMIN — NYSTATIN CREAM 1 APPLICATION(S): 100000 CREAM TOPICAL at 05:01

## 2023-01-01 RX ADMIN — ATORVASTATIN CALCIUM 40 MILLIGRAM(S): 80 TABLET, FILM COATED ORAL at 00:13

## 2023-01-01 RX ADMIN — APIXABAN 5 MILLIGRAM(S): 2.5 TABLET, FILM COATED ORAL at 17:13

## 2023-01-01 RX ADMIN — ONDANSETRON 4 MILLIGRAM(S): 8 TABLET, FILM COATED ORAL at 06:34

## 2023-01-01 RX ADMIN — APIXABAN 2.5 MILLIGRAM(S): 2.5 TABLET, FILM COATED ORAL at 05:38

## 2023-01-01 RX ADMIN — Medication 0.75 MILLIGRAM(S): at 20:16

## 2023-01-01 RX ADMIN — Medication 50 MILLIGRAM(S): at 05:36

## 2023-01-01 RX ADMIN — OLANZAPINE 2.5 MILLIGRAM(S): 15 TABLET, FILM COATED ORAL at 11:20

## 2023-01-01 RX ADMIN — CHLORHEXIDINE GLUCONATE 1 APPLICATION(S): 213 SOLUTION TOPICAL at 12:12

## 2023-01-01 RX ADMIN — MEROPENEM 100 MILLIGRAM(S): 1 INJECTION INTRAVENOUS at 15:16

## 2023-01-01 RX ADMIN — Medication 10 MILLIGRAM(S): at 18:03

## 2023-01-01 RX ADMIN — SEVELAMER CARBONATE 1600 MILLIGRAM(S): 2400 POWDER, FOR SUSPENSION ORAL at 12:38

## 2023-01-01 RX ADMIN — MEROPENEM 100 MILLIGRAM(S): 1 INJECTION INTRAVENOUS at 20:07

## 2023-01-01 RX ADMIN — Medication 2.5 MILLIGRAM(S): at 17:21

## 2023-01-01 RX ADMIN — APIXABAN 5 MILLIGRAM(S): 2.5 TABLET, FILM COATED ORAL at 08:49

## 2023-01-01 RX ADMIN — LEVETIRACETAM 500 MILLIGRAM(S): 250 TABLET, FILM COATED ORAL at 14:22

## 2023-01-01 RX ADMIN — HYDROMORPHONE HYDROCHLORIDE 0.5 MILLIGRAM(S): 2 INJECTION INTRAMUSCULAR; INTRAVENOUS; SUBCUTANEOUS at 16:01

## 2023-01-01 RX ADMIN — SEVELAMER CARBONATE 800 MILLIGRAM(S): 2400 POWDER, FOR SUSPENSION ORAL at 17:45

## 2023-01-01 RX ADMIN — Medication 10 MILLIGRAM(S): at 06:01

## 2023-01-01 RX ADMIN — OLANZAPINE 2.5 MILLIGRAM(S): 15 TABLET, FILM COATED ORAL at 00:06

## 2023-01-01 RX ADMIN — Medication 0.5 MILLIGRAM(S): at 19:06

## 2023-01-01 RX ADMIN — Medication 100 MICROGRAM(S): at 05:15

## 2023-01-01 RX ADMIN — SEVELAMER CARBONATE 800 MILLIGRAM(S): 2400 POWDER, FOR SUSPENSION ORAL at 17:52

## 2023-01-01 RX ADMIN — APIXABAN 5 MILLIGRAM(S): 2.5 TABLET, FILM COATED ORAL at 17:50

## 2023-01-01 RX ADMIN — CALAMINE AND ZINC OXIDE AND PHENOL 1 APPLICATION(S): 160; 10 LOTION TOPICAL at 13:04

## 2023-01-01 RX ADMIN — Medication 50 MILLIGRAM(S): at 22:37

## 2023-01-01 RX ADMIN — Medication 10 MILLIGRAM(S): at 06:23

## 2023-01-01 RX ADMIN — Medication 100 MICROGRAM(S): at 05:11

## 2023-01-01 RX ADMIN — Medication 5 MILLIGRAM(S): at 18:16

## 2023-01-01 RX ADMIN — Medication 5 MILLIGRAM(S): at 18:01

## 2023-01-01 RX ADMIN — Medication 10 MILLIGRAM(S): at 07:14

## 2023-01-01 RX ADMIN — APIXABAN 2.5 MILLIGRAM(S): 2.5 TABLET, FILM COATED ORAL at 17:46

## 2023-01-01 RX ADMIN — TIOTROPIUM BROMIDE 2 PUFF(S): 18 CAPSULE ORAL; RESPIRATORY (INHALATION) at 09:31

## 2023-01-01 RX ADMIN — Medication 5 MILLIGRAM(S): at 17:25

## 2023-01-01 RX ADMIN — Medication 500 MILLIGRAM(S): at 12:52

## 2023-01-01 RX ADMIN — CHLORHEXIDINE GLUCONATE 1 APPLICATION(S): 213 SOLUTION TOPICAL at 13:24

## 2023-01-01 RX ADMIN — Medication 50 MILLIGRAM(S): at 14:02

## 2023-01-01 RX ADMIN — Medication 1 MILLIGRAM(S): at 01:20

## 2023-01-01 RX ADMIN — Medication 25 MILLIGRAM(S): at 17:01

## 2023-01-01 RX ADMIN — Medication 0.5 MILLIGRAM(S): at 13:07

## 2023-01-01 RX ADMIN — Medication 1 TABLET(S): at 16:38

## 2023-01-01 RX ADMIN — MIDODRINE HYDROCHLORIDE 5 MILLIGRAM(S): 2.5 TABLET ORAL at 11:23

## 2023-01-01 RX ADMIN — POLYETHYLENE GLYCOL 3350 17 GRAM(S): 17 POWDER, FOR SOLUTION ORAL at 13:14

## 2023-01-01 RX ADMIN — ATORVASTATIN CALCIUM 40 MILLIGRAM(S): 80 TABLET, FILM COATED ORAL at 22:56

## 2023-01-01 RX ADMIN — Medication 10 MILLIGRAM(S): at 05:02

## 2023-01-01 RX ADMIN — ISOSORBIDE MONONITRATE 30 MILLIGRAM(S): 60 TABLET, EXTENDED RELEASE ORAL at 12:48

## 2023-01-01 RX ADMIN — Medication 50 MILLIGRAM(S): at 15:34

## 2023-01-01 RX ADMIN — LEVETIRACETAM 500 MILLIGRAM(S): 250 TABLET, FILM COATED ORAL at 12:40

## 2023-01-01 RX ADMIN — Medication 0.5 MILLIGRAM(S): at 18:03

## 2023-01-01 RX ADMIN — HYDROMORPHONE HYDROCHLORIDE 0.5 MILLIGRAM(S): 2 INJECTION INTRAMUSCULAR; INTRAVENOUS; SUBCUTANEOUS at 02:18

## 2023-01-01 RX ADMIN — Medication 0.5 MILLIGRAM(S): at 05:17

## 2023-01-01 RX ADMIN — Medication 100 MICROGRAM(S): at 06:28

## 2023-01-01 RX ADMIN — CHLORHEXIDINE GLUCONATE 1 APPLICATION(S): 213 SOLUTION TOPICAL at 12:11

## 2023-01-01 RX ADMIN — Medication 10 MILLIGRAM(S): at 07:45

## 2023-01-01 RX ADMIN — APIXABAN 2.5 MILLIGRAM(S): 2.5 TABLET, FILM COATED ORAL at 22:00

## 2023-01-01 RX ADMIN — Medication 1 DROP(S): at 23:12

## 2023-01-01 RX ADMIN — SEVELAMER CARBONATE 800 MILLIGRAM(S): 2400 POWDER, FOR SUSPENSION ORAL at 12:42

## 2023-01-01 RX ADMIN — CALAMINE AND ZINC OXIDE AND PHENOL 1 APPLICATION(S): 160; 10 LOTION TOPICAL at 18:56

## 2023-01-01 RX ADMIN — Medication 10 MILLIGRAM(S): at 12:39

## 2023-01-01 RX ADMIN — OXYCODONE HYDROCHLORIDE 5 MILLIGRAM(S): 5 TABLET ORAL at 22:23

## 2023-01-01 RX ADMIN — TAMSULOSIN HYDROCHLORIDE 0.4 MILLIGRAM(S): 0.4 CAPSULE ORAL at 21:05

## 2023-01-01 RX ADMIN — ISOSORBIDE MONONITRATE 30 MILLIGRAM(S): 60 TABLET, EXTENDED RELEASE ORAL at 12:12

## 2023-01-01 RX ADMIN — Medication 10 MILLIGRAM(S): at 06:04

## 2023-01-01 RX ADMIN — TAMSULOSIN HYDROCHLORIDE 0.4 MILLIGRAM(S): 0.4 CAPSULE ORAL at 21:22

## 2023-01-01 RX ADMIN — Medication 0.75 MILLIGRAM(S): at 20:06

## 2023-01-01 RX ADMIN — CALAMINE AND ZINC OXIDE AND PHENOL 1 APPLICATION(S): 160; 10 LOTION TOPICAL at 23:41

## 2023-01-01 RX ADMIN — Medication 1 MILLIGRAM(S): at 13:56

## 2023-01-01 RX ADMIN — Medication 1 APPLICATION(S): at 06:43

## 2023-01-01 RX ADMIN — APIXABAN 5 MILLIGRAM(S): 2.5 TABLET, FILM COATED ORAL at 07:38

## 2023-01-01 RX ADMIN — Medication 1 MILLIGRAM(S): at 05:09

## 2023-01-01 RX ADMIN — SEVELAMER CARBONATE 800 MILLIGRAM(S): 2400 POWDER, FOR SUSPENSION ORAL at 10:22

## 2023-01-01 RX ADMIN — Medication 10 MILLIGRAM(S): at 06:15

## 2023-01-01 RX ADMIN — Medication 3 MILLIGRAM(S): at 23:05

## 2023-01-01 RX ADMIN — SEVELAMER CARBONATE 1600 MILLIGRAM(S): 2400 POWDER, FOR SUSPENSION ORAL at 11:27

## 2023-01-01 RX ADMIN — LEVETIRACETAM 250 MILLIGRAM(S): 250 TABLET, FILM COATED ORAL at 05:13

## 2023-01-01 RX ADMIN — Medication 50 MILLIGRAM(S): at 06:24

## 2023-01-01 RX ADMIN — Medication 0.5 MILLIGRAM(S): at 05:37

## 2023-01-01 RX ADMIN — Medication 1 TABLET(S): at 13:08

## 2023-01-01 RX ADMIN — Medication 100 MICROGRAM(S): at 05:50

## 2023-01-01 RX ADMIN — Medication 250 MILLIGRAM(S): at 22:33

## 2023-01-01 RX ADMIN — Medication 50 MILLIGRAM(S): at 07:46

## 2023-01-01 RX ADMIN — APIXABAN 2.5 MILLIGRAM(S): 2.5 TABLET, FILM COATED ORAL at 21:43

## 2023-01-01 RX ADMIN — SEVELAMER CARBONATE 1600 MILLIGRAM(S): 2400 POWDER, FOR SUSPENSION ORAL at 18:01

## 2023-01-01 RX ADMIN — MIDODRINE HYDROCHLORIDE 5 MILLIGRAM(S): 2.5 TABLET ORAL at 19:49

## 2023-01-01 RX ADMIN — SEVELAMER CARBONATE 1600 MILLIGRAM(S): 2400 POWDER, FOR SUSPENSION ORAL at 06:31

## 2023-01-01 RX ADMIN — OXYCODONE HYDROCHLORIDE 5 MILLIGRAM(S): 5 TABLET ORAL at 11:13

## 2023-01-01 RX ADMIN — POLYETHYLENE GLYCOL 3350 17 GRAM(S): 17 POWDER, FOR SOLUTION ORAL at 13:08

## 2023-01-01 RX ADMIN — Medication 1 TABLET(S): at 09:46

## 2023-01-01 RX ADMIN — HYDROMORPHONE HYDROCHLORIDE 0.5 MILLIGRAM(S): 2 INJECTION INTRAMUSCULAR; INTRAVENOUS; SUBCUTANEOUS at 14:25

## 2023-01-01 RX ADMIN — Medication 3 MILLIGRAM(S): at 22:04

## 2023-01-01 RX ADMIN — Medication 5 MILLIGRAM(S): at 18:44

## 2023-01-01 RX ADMIN — MUPIROCIN 1 APPLICATION(S): 20 OINTMENT TOPICAL at 21:05

## 2023-01-01 RX ADMIN — ERYTHROPOIETIN 4000 UNIT(S): 10000 INJECTION, SOLUTION INTRAVENOUS; SUBCUTANEOUS at 16:44

## 2023-01-01 RX ADMIN — Medication 25 MILLIGRAM(S): at 22:26

## 2023-01-01 RX ADMIN — Medication 0.75 MILLIGRAM(S): at 18:17

## 2023-01-01 RX ADMIN — Medication 10 MILLIGRAM(S): at 12:46

## 2023-01-01 RX ADMIN — Medication 0.5 MILLIGRAM(S): at 03:37

## 2023-01-01 RX ADMIN — Medication 5 MILLIGRAM(S): at 16:44

## 2023-01-01 RX ADMIN — Medication 1 APPLICATION(S): at 18:01

## 2023-01-01 RX ADMIN — APIXABAN 2.5 MILLIGRAM(S): 2.5 TABLET, FILM COATED ORAL at 07:46

## 2023-01-01 RX ADMIN — Medication 10 MILLIGRAM(S): at 05:50

## 2023-01-01 RX ADMIN — ISOSORBIDE MONONITRATE 30 MILLIGRAM(S): 60 TABLET, EXTENDED RELEASE ORAL at 13:06

## 2023-01-01 RX ADMIN — Medication 25 MILLIGRAM(S): at 11:09

## 2023-01-01 RX ADMIN — SENNA PLUS 2 TABLET(S): 8.6 TABLET ORAL at 21:40

## 2023-01-01 RX ADMIN — ISOSORBIDE MONONITRATE 30 MILLIGRAM(S): 60 TABLET, EXTENDED RELEASE ORAL at 19:06

## 2023-01-01 RX ADMIN — HYDROMORPHONE HYDROCHLORIDE 0.5 MILLIGRAM(S): 2 INJECTION INTRAMUSCULAR; INTRAVENOUS; SUBCUTANEOUS at 06:22

## 2023-01-01 RX ADMIN — APIXABAN 5 MILLIGRAM(S): 2.5 TABLET, FILM COATED ORAL at 17:48

## 2023-01-01 RX ADMIN — SEVELAMER CARBONATE 1600 MILLIGRAM(S): 2400 POWDER, FOR SUSPENSION ORAL at 11:33

## 2023-01-01 RX ADMIN — Medication 1 TABLET(S): at 11:48

## 2023-01-01 RX ADMIN — Medication 50 MILLIGRAM(S): at 12:56

## 2023-01-01 RX ADMIN — APIXABAN 5 MILLIGRAM(S): 2.5 TABLET, FILM COATED ORAL at 16:02

## 2023-01-01 RX ADMIN — Medication 1 APPLICATION(S): at 18:24

## 2023-01-01 RX ADMIN — MIDODRINE HYDROCHLORIDE 5 MILLIGRAM(S): 2.5 TABLET ORAL at 03:55

## 2023-01-01 RX ADMIN — LEVETIRACETAM 500 MILLIGRAM(S): 250 TABLET, FILM COATED ORAL at 11:10

## 2023-01-01 RX ADMIN — OLANZAPINE 2.5 MILLIGRAM(S): 15 TABLET, FILM COATED ORAL at 21:05

## 2023-01-01 RX ADMIN — ERYTHROPOIETIN 10000 UNIT(S): 10000 INJECTION, SOLUTION INTRAVENOUS; SUBCUTANEOUS at 13:20

## 2023-01-01 RX ADMIN — OLANZAPINE 2.5 MILLIGRAM(S): 15 TABLET, FILM COATED ORAL at 02:30

## 2023-01-01 RX ADMIN — APIXABAN 5 MILLIGRAM(S): 2.5 TABLET, FILM COATED ORAL at 18:16

## 2023-01-01 RX ADMIN — OLANZAPINE 2.5 MILLIGRAM(S): 15 TABLET, FILM COATED ORAL at 22:34

## 2023-01-01 RX ADMIN — Medication 0.5 MILLIGRAM(S): at 10:28

## 2023-01-01 RX ADMIN — Medication 10 MILLIGRAM(S): at 05:18

## 2023-01-01 RX ADMIN — Medication 0.25 MILLIGRAM(S): at 21:59

## 2023-01-01 RX ADMIN — Medication 5 MILLIGRAM(S): at 18:18

## 2023-01-01 RX ADMIN — Medication 40 MILLIGRAM(S): at 18:00

## 2023-01-01 RX ADMIN — Medication 1 TABLET(S): at 12:51

## 2023-01-01 RX ADMIN — SODIUM CHLORIDE 1000 MILLILITER(S): 9 INJECTION INTRAMUSCULAR; INTRAVENOUS; SUBCUTANEOUS at 13:45

## 2023-01-01 RX ADMIN — Medication 500 MILLIGRAM(S): at 11:12

## 2023-01-01 RX ADMIN — Medication 5 MILLIGRAM(S): at 07:32

## 2023-01-01 RX ADMIN — ERYTHROPOIETIN 4000 UNIT(S): 10000 INJECTION, SOLUTION INTRAVENOUS; SUBCUTANEOUS at 15:41

## 2023-01-01 RX ADMIN — CHLORHEXIDINE GLUCONATE 1 APPLICATION(S): 213 SOLUTION TOPICAL at 12:39

## 2023-01-01 RX ADMIN — APIXABAN 5 MILLIGRAM(S): 2.5 TABLET, FILM COATED ORAL at 17:12

## 2023-01-01 RX ADMIN — LEVETIRACETAM 500 MILLIGRAM(S): 250 TABLET, FILM COATED ORAL at 11:33

## 2023-01-01 RX ADMIN — Medication 500 MILLIGRAM(S): at 11:16

## 2023-01-01 RX ADMIN — Medication 0.75 MILLIGRAM(S): at 17:49

## 2023-01-01 RX ADMIN — SENNA PLUS 2 TABLET(S): 8.6 TABLET ORAL at 21:02

## 2023-01-01 RX ADMIN — APIXABAN 2.5 MILLIGRAM(S): 2.5 TABLET, FILM COATED ORAL at 19:05

## 2023-01-01 RX ADMIN — Medication 100 MICROGRAM(S): at 06:36

## 2023-01-01 RX ADMIN — LEVETIRACETAM 250 MILLIGRAM(S): 250 TABLET, FILM COATED ORAL at 15:34

## 2023-01-01 RX ADMIN — Medication 1 MILLIGRAM(S): at 16:25

## 2023-01-01 RX ADMIN — Medication 50 MILLIGRAM(S): at 09:00

## 2023-01-01 RX ADMIN — Medication 0.25 MILLIGRAM(S): at 05:02

## 2023-01-01 RX ADMIN — LEVETIRACETAM 500 MILLIGRAM(S): 250 TABLET, FILM COATED ORAL at 11:16

## 2023-01-01 RX ADMIN — Medication 3 MILLIGRAM(S): at 21:57

## 2023-01-01 RX ADMIN — Medication 500 MILLIGRAM(S): at 14:45

## 2023-01-01 RX ADMIN — CALAMINE AND ZINC OXIDE AND PHENOL 1 APPLICATION(S): 160; 10 LOTION TOPICAL at 13:29

## 2023-01-01 RX ADMIN — SEVELAMER CARBONATE 800 MILLIGRAM(S): 2400 POWDER, FOR SUSPENSION ORAL at 21:45

## 2023-01-01 RX ADMIN — MEROPENEM 100 MILLIGRAM(S): 1 INJECTION INTRAVENOUS at 16:20

## 2023-01-01 RX ADMIN — SENNA PLUS 2 TABLET(S): 8.6 TABLET ORAL at 21:56

## 2023-01-01 RX ADMIN — LEVETIRACETAM 500 MILLIGRAM(S): 250 TABLET, FILM COATED ORAL at 11:17

## 2023-01-01 RX ADMIN — OLANZAPINE 5 MILLIGRAM(S): 15 TABLET, FILM COATED ORAL at 07:59

## 2023-01-01 RX ADMIN — Medication 500 MILLIGRAM(S): at 13:22

## 2023-01-01 RX ADMIN — Medication 1 DROP(S): at 11:33

## 2023-01-01 RX ADMIN — Medication 5 MILLIGRAM(S): at 07:04

## 2023-01-01 RX ADMIN — Medication 100 MICROGRAM(S): at 05:05

## 2023-01-01 RX ADMIN — Medication 1 DROP(S): at 06:33

## 2023-01-01 RX ADMIN — APIXABAN 2.5 MILLIGRAM(S): 2.5 TABLET, FILM COATED ORAL at 17:26

## 2023-01-01 RX ADMIN — Medication 500 MILLIGRAM(S): at 13:08

## 2023-01-01 RX ADMIN — Medication 10 MILLIGRAM(S): at 07:49

## 2023-01-01 RX ADMIN — SEVELAMER CARBONATE 1600 MILLIGRAM(S): 2400 POWDER, FOR SUSPENSION ORAL at 05:41

## 2023-01-01 RX ADMIN — ATORVASTATIN CALCIUM 40 MILLIGRAM(S): 80 TABLET, FILM COATED ORAL at 22:23

## 2023-01-13 NOTE — ED ADULT NURSE NOTE - NSTOBACCO TYPE_GEN_A_CORE_RD
Cigarettes Valtrex Counseling: I discussed with the patient the risks of valacyclovir including but not limited to kidney damage, nausea, vomiting and severe allergy.  The patient understands that if the infection seems to be worsening or is not improving, they are to call.

## 2023-02-16 NOTE — ED ADULT TRIAGE NOTE - AS O2 DELIVERY
room air Qbrexza Counseling:  I discussed with the patient the risks of Qbrexza including but not limited to headache, mydriasis, blurred vision, dry eyes, nasal dryness, dry mouth, dry throat, dry skin, urinary hesitation, and constipation.  Local skin reactions including erythema, burning, stinging, and itching can also occur.

## 2023-03-03 NOTE — ED ADULT NURSE NOTE - EXTENSIONS OF SELF_ADULT
[facility]  [address] [place] [address]  [phone]      Nicol Worthy :1987 MRN:[id number]    3/3/2023 Time Session Began: 9:01 am  Time Session Ended: 9:25 am    This visit was performed via live interactive two-way Video visit with patient's verbal consent.   Clinician Location:Home.  Patient Location: Home.  Verified patient identity:  [x] Yes    Session Type: Therapy 16-37 minutes (64231)  Others Present:     Suicide/Homicide/Violence Ideation: Yes  If Yes, explain: Patient has thoughts of death with no plan, intent, urge for suicide action.    Need for Community Resources Assessed: Yes  Resources Needed: No  If Yes, what resources:     Current Outpatient Medications   Medication Sig   • escitalopram (Lexapro) 10 MG tablet Take 1/2 tablet by mouth daily for 8 days, then increase to 1 tablet by mouth daily.   • lamoTRIgine (LaMICtal) 25 MG tablet Take 2 tablets by mouth daily.   • traZODone (DESYREL) 50 MG tablet Take 1 tablet by mouth nightly.   • fluticasone-salmeterol (Advair Diskus) 250-50 MCG/ACT inhaler Inhale 1 puff into the lungs in the morning and 1 puff in the evening.   • losartan (COZAAR) 25 MG tablet Take 1 tablet by mouth daily.   • benzonatate (TESSALON PERLES) 100 MG capsule Take 1 capsule by mouth 3 times daily as needed for Cough.   • montelukast (SINGULAIR) 10 MG tablet Take 1 tablet by mouth nightly.   • Synthroid 75 MCG tablet TAKE ONE TABLET BY MOUTH DAILY   • Ventolin  (90 Base) MCG/ACT inhaler Inhale 2 puffs into the lungs every 4 hours as needed for Shortness of Breath or Wheezing.   • vitamin D3 (CHOLECALCIFEROL) 1.25 mg (50,000 units) capsule Take 1 capsule by mouth 1 day a week.   • albuterol (VENTOLIN) (2.5 MG/3ML) 0.083% nebulizer solution Take 3 mLs by nebulization every 6 hours as needed for Wheezing or Shortness of Breath.   • loratadine (CLARITIN) 10 MG tablet Take 10 mg by mouth daily.     No current  facility-administered medications for this visit.       Change in Medication(s) Reported: Yes    If Yes, explain: Started Lexapro.    Patient/Family Education Provided: Yes  Patient/Family Displays Understanding: Yes  If No, explain:     Chief complaint in patient's own words: \"My mood is all over.\"    Progress Note containing chief complaint and symptoms/problems related to the complaint:    Data: Patient reported mood has been a mixed of depressed and anxious with cycling mood. She is working with psychiatric provider on medication adjustments. I assisted patient in processing her stress with medication adjustments. I engaged patient in cognitive behavior therapy strength building activity. I assisted patient in identifying skills and social supports to manage mood changes with medication adjustment.  At the end of session, patient reported her primary takeaway was talking through things objectively.     Action of the provider: Patient was provided with support. Patient's report of cycling mood with anxiety and depression was processed and reframed to build insight. Cognitions shared by patient were acknowledged and exploration was encouraged. Provider reviewed skills and social supports to manage mood changes. Provider recommended patient work on skills from session. Intervention: Behavioral, Cognitive     Response of patient: Nicol was alert and oriented x4. Patient presented motivated. Patient presented as calm and cooperative. Mood appeared depressed and anxious with congruent affect. Eye contact was appropriate. Speech was normal in rate, tone, and volume. Motor activity was unremarkable. Thought process was future oriented and goal directed. Patient denied any homicidal ideation or self-harm ideation. Thoughts of death, as noted above.    Plan: Nicol  will return in 2-4 weeks or sooner if needed. Patient will practice skills and using social supports to manage mood changes discussed in session. Patient  will follow up with psychiatric provider on Lexapro effectiveness.     Goal:  \"I just want to level out\"     Diagnosis: Mood Disorder :  N/A  Diagnosis: Generalized Anxiety Disorder :  N/A     Treatment Plan: Unchanged     Discharge Plan:  N/A     Next Appointment: 2-4 weeks                           Star Anna  [employer] of [employer], Licensed Clinical    None

## 2023-03-10 NOTE — ED PROVIDER NOTE - PHYSICAL EXAMINATION
VS:  unremarkable    GEN - mild-mod distress pain, retching;   A+O x3   HEAD - NC/AT     ENT - PEERL, EOMI, mucous membranes  , no discharge      NECK: Neck supple, non-tender without lymphadenopathy, no masses, no JVD  PULM - CTA b/l,  symmetric breath sounds, R ACW permacath c/d/i.   COR -  normal heart sounds    ABD - , ND, NT, soft,  BACK - no CVA tenderness, nontender spine   Sacrum 5cm healed wound with surrounding redness  EXTREMS - no edema, (+)flexion contracture deformity of bilat hands, bilat feet with flexion contractures of ankles. , warm and well perfused.  Feet covered in dried blood bilat.  R foot mild redness and warmth surrounding heel, small exophytic mass about 2cm there with stigmata of bleeding   SKIN - no rash    or bruising    except as noted.   NEUROLOGIC - alert, face symmetric, speech fluent, sensation nl, motor diffusely weak 3/5 LE worse than UE.

## 2023-03-10 NOTE — ED ADULT TRIAGE NOTE - CHIEF COMPLAINT QUOTE
Pt became unresponsive during bleeding episode from right leg foot pt then awoke and said AICD fired.  Pt now alert, oriented X 2, telem>NSR. Pt @ [facility] for rehab, cellulitis of right leg, + candida aureas skin infection. PMH COPD, CHF,HTN, afib, HLD, hyperthyroidism, right foot wound, CVA

## 2023-03-10 NOTE — ED PROVIDER NOTE - NSICDXFAMILYHX_GEN_ALL_CORE_FT
FAMILY HISTORY:  Family history of chronic ischemic heart disease  Family history of lung cancer

## 2023-03-10 NOTE — ED PROVIDER NOTE - ATTENDING CONTRIBUTION TO CARE
62M COPD HTN HLD RCC s/p nephrectomy, CVA.  Pt at rehab for CVA x 2 years?  p/w LOC.  Pt has chronic sacral wound, pt started kicking his heel due to so much pain, heel wound opened, sight of blood led to LOC.  Pt reports ICD went off as well.  No chest pain.  Heel wrapped and sent in.  2x2cm mass to posterior heel, (+)heel ulcer with surrounding infection.  Will inspect sacrum - healed 5cm sacral ulcer with redness.  Multiple allergies - rx zyvox for skin infection.  No fall.  Pt on plavix/eliquis.  Plan check CXR, foot xray.  Rectal temp - afebrile.  Pt also reports his AICD went off.  Given syncope and AICD firing would admit.  Podiatry eval for ulceration and infection.  Pt unable to tolerate CT due to pain and vomiting.  No report of head injury will cancel.       VS:  unremarkable    GEN - mild-mod distress pain, retching;   A+O x3   HEAD - NC/AT     ENT - PEERL, EOMI, mucous membranes  , no discharge      NECK: Neck supple, non-tender without lymphadenopathy, no masses, no JVD  PULM - CTA b/l,  symmetric breath sounds, R ACW permacath c/d/i.   COR -  normal heart sounds    ABD - , ND, NT, soft,  BACK - no CVA tenderness, nontender spine   Sacrum 5cm healed wound with surrounding redness  EXTREMS - no edema, (+)flexion contracture deformity of bilat hands, bilat feet with flexion contractures of ankles. , warm and well perfused.  Feet covered in dried blood bilat.  R foot mild redness and warmth surrounding heel, small exophytic mass about 2cm there with stigmata of bleeding   SKIN - no rash    or bruising    except as noted.   NEUROLOGIC - alert, face symmetric, speech fluent, sensation nl, motor diffusely weak 3/5 LE worse than UE.

## 2023-03-10 NOTE — CONSULT NOTE ADULT - ASSESSMENT
62 M w Bilateral foot abraisons all to dermis  -Patient seen and evaluated  -Patient is afebrile, WBC 13.61, ESR/CRP (pending)  - Left foot submet 5 and sub cuboid  abrasions to dermis no pus, no drainage no malodor with left foot plantar forefoot hyperkeratotic skin. Right foot  medial ankle abrasion with periwound erythema. Right foot punctate ulcer to right 1st digit distal tip. Ringht foot punctate abrasions also noted to dorsum of digits 1-3 at the level of IPJs. Right foot plantar medial heel abrasion to dermis with periwound erythema, no pus, no drainage, no malodor with right foot lateral keloid.  onychomycotic nails 1-5 bilaterally with left foot 1st digit broken nail.   -Applied xeroform to all foot abraisons followed by 4x4 gauze, ABD and kerlix  -Rec admission to CDU  -Rec IV unassyn  -No culture taken will likely yield skin contaminant   -Right foot xray pending   -Ordered mupirocin  -ordered zflows to offload heels at all time while in bed  -Pod plan Local wound care pending erythema resolution/ xrays  - Discussed with attending  62 M w Bilateral foot abraisons all to dermis  -Patient seen and evaluated  -Patient is afebrile, WBC 13.61, ESR/CRP (pending)  - Left foot submet 5 and sub cuboid  abrasions to dermis no pus, no drainage no malodor with left foot plantar forefoot hyperkeratotic skin. Right foot  medial ankle abrasion with periwound erythema. Right foot punctate ulcer to right 1st digit distal tip. Ringht foot punctate abrasions also noted to dorsum of digits 1-3 at the level of IPJs with scant sanguinous drianage. Right foot plantar medial heel abrasion to dermis with periwound erythema, no pus, no drainage, no malodor with right foot lateral keloid.  onychomycotic nails 1-5 bilaterally with left foot 1st digit broken nail.   -Applied xeroform to all foot abrasions followed by 4x4 gauze, ABD and kerlix  -Patient is already being admitted to medicine  -Rec IV unassyn  -No culture taken will likely yield skin contaminant   -Right foot xray pending   -Ordered mupirocin  -ordered zflows to offload heels at all time while in bed  -Pod plan Local wound care pending erythema resolution/ xrays  - Discussed with attending  62 M w Bilateral foot abraisons all to dermis  -Patient seen and evaluated  -Patient is afebrile, WBC 13.61, ESR 59, CRP 4.36  - Left foot submet 5 and sub cuboid  abrasions to dermis no pus, no drainage no malodor with left foot plantar forefoot hyperkeratotic skin. Right foot  medial ankle abrasion with periwound erythema. Right foot punctate ulcer to right 1st digit distal tip. Ringht foot punctate abrasions also noted to dorsum of digits 1-3 at the level of IPJs with scant sanguinous drainage Right foot plantar medial heel abrasion to dermis with periwound erythema, no excess warmth, no pus, no drainage, no malodor with right foot lateral keloid.  onychomycotic nails 1-5 bilaterally with left foot 1st digit broken nail.   -Applied xeroform to all foot abrasions followed by 4x4 gauze, ABD and kerlix  -Patient is already being admitted to medicine  -Rec IV unassyn  -No culture taken will likely yield skin contaminant   -Right foot xray pending   -Ordered mupirocin  -ordered zflows to offload heels at all time while in bed  -Pod plan Local wound care pending erythema resolution/ xrays  - Discussed with attending  62 M w Bilateral foot abraisons all to dermis  -Patient seen and evaluated  -Patient is afebrile, WBC 13.61, ESR 59, CRP 4.36  - Left foot submet 5 and sub cuboid  abrasions to dermis no pus, no drainage no malodor with left foot plantar forefoot hyperkeratotic skin. Right foot  medial ankle abrasion with periwound erythema. Right foot punctate ulcer to right 1st digit distal tip. Ringht foot punctate abrasions also noted to dorsum of digits 1-3 at the level of IPJs with scant sanguinous drainage Right foot plantar medial heel abrasion to dermis with periwound erythema, no excess warmth, no pus, no drainage, no malodor with right foot lateral keloid.  onychomycotic nails 1-5 bilaterally with left foot 1st digit broken nail.   -Applied xeroform to all foot abrasions followed by 4x4 gauze, ABD and kerlix  -Patient is already being admitted to medicine  -Rec IV clindamycin 2/2 to allergies to penicillin  -No culture taken will likely yield skin contaminant   -Right foot xray pending   -Ordered mupirocin  -ordered zflows to offload heels at all time while in bed  -Pod plan Local wound care pending erythema resolution/ xrays  - Discussed with attending  62 M w Bilateral foot abraisons all to dermis  -Patient seen and evaluated  -Patient is afebrile, WBC 13.61, ESR 59, CRP 4.36  - Left foot submet 5 and sub cuboid  abrasions to dermis no pus, no drainage no malodor with left foot plantar forefoot hyperkeratotic skin. Right medial ankle abrasion with periwound erythema. Right foot punctate ulcer to right 1st digit distal tip. Ringht foot punctate abrasions also noted to dorsum of digits 1-3 at the level of IPJs with scant sanguinous drainage Right foot plantar medial heel abrasion to dermis with periwound erythema, no excess warmth, no pus, no drainage, no malodor with right foot lateral keloid. Onychomycotic nails 1-5 bilaterally with left foot 1st digit broken nail.   -Applied xeroform to all foot abrasions followed by 4x4 gauze, ABD and kerlix  -Patient is already being admitted to medicine  -Rec IV clindamycin 2/2 to allergies to penicillin  -No culture taken will likely yield skin contaminant   -Right foot xray pending   -Patient has an AICD which will need to be interrogated  -Ordered mupirocin  -ordered zflows to offload heels at all time while in bed  -Pod plan Local wound care pending erythema resolution/ xrays  - Discussed with attending

## 2023-03-10 NOTE — ED ADULT NURSE NOTE - OBJECTIVE STATEMENT
pt to room 25, a&ox4, ambulatory at baseline. pt reports episode of syncope s/p seeing blood from his foot ulcers. R foot presents bleeding with 1cm stage 2 pressure ulcer, 0.5cm stage 2 pressure ulcer, and 2cm growth. wounds cleaned and dried, +pedal pulses on palpation - further skin assessment shows healed 5cm pressure ulcer on sacrum. pt reports 9/10 pain in R foot and in sacrum. pt endorsing AICD firing after syncopal episodes. denies head strike, endorses daily use of aspirin. pt denies any current cp, sob, n/v/d, ha. pt on cardiac monitor in NSR. tran catheter in place, draining clear, yellow urine. pt neuro status intact. NAD. respirations even, nonlabored. labs drawn and sent, medicated as ordered.

## 2023-03-10 NOTE — ED PROVIDER NOTE - WR ORDER STATUS 1
Reason for Call:  Other prescription    Detailed comments: [facility]. A Rx of victoza was sent. Need more clarification on the quantity. Please call and advise.     Phone Number Patient can be reached at: Other phone number:  Number above.     Best Time:  Any     Can we leave a detailed message on this number? YES    Call taken on 5/16/2017 at 3:10 PM by Nancy Barger       Resulted

## 2023-03-10 NOTE — H&P ADULT - NSHPPHYSICALEXAM_GEN_ALL_CORE
KRISTY READ FOLLOWING MESSAGE:     HUB TO READ AND SHARE  Wanting to schedule patient for her annual wellness visit    SCHEDULED PATIENT FOR 4.5.2023 AT 2 PM    T(F): 98 (02-11-18 @ 06:59), Max: 98 (02-11-18 @ 06:59)  HR: 78 (02-11-18 @ 06:59) (78 - 88)  BP: 158/88 (02-11-18 @ 06:59) (158/88 - 186/87)  RR: 20 (02-11-18 @ 06:59) (18 - 20)  SpO2: 100% (02-11-18 @ 06:59) (100% - 100%)    GENERAL: NAD, appears chronically ill  EYES: EOMI, PERRLA, conjunctiva and sclera clear  ENMT: No tonsillar erythema, exudates, or enlargement; Moist mucous membranes, Good dentition  NECK: Supple, No JVD  NERVOUS SYSTEM:  Alert & Oriented X3, Motor Strength 5/5 B/L upper and lower extremities  CHEST/LUNG: bilateral wheezing in all lung fields  HEART: Regular rate and rhythm; No murmurs, rubs, or gallops  ABDOMEN: Soft, Nontender, Nondistended; Bowel sounds present  EXTREMITIES:  chronic venous stasis changes, warm extermities, intact peripheral pulses  LYMPH: No lymphadenopathy noted  SKIN: No rashes or lesions

## 2023-03-10 NOTE — ED PROVIDER NOTE - CLINICAL SUMMARY MEDICAL DECISION MAKING FREE TEXT BOX
MDM & Summary: 62 yom CVA ESRD chronic ulcers I/S/O PVD. No fevers. VS WNL. P/w foot wound and possible syncope.   DDx: lytes vs arrythmia vs cranial bld vs infection (PNA vs UTI vs bacteriemia), though less likely.    Labs: cbc cmp vbg cultures pt ptt ts flucovid UA UC   Imaging: CTH. Chest xray to r/out PNA   Tx: pain controll, likely gram + coverage for foot wound. Pt allergic to penicillins, will give linezolid   Consults/Resources: pods  Dispo: likely admit for syncope w/up and wound care     Triage note reviewed. VS reviewed. EKG reviewed and documented in "RESULTS" section, if possible at given time.     DDx in MDM includes the most likely ddx, but is not limited to solely what is listed. Progress notes written as needed, and are included in "PROGRESS NOTE" section below.       Medical, family, and social determinants of health reviewed and discussed w/ pt/family/caretaker, when allowable, and is incorporated into note above, whenever possible.

## 2023-03-10 NOTE — CONSULT NOTE ADULT - SUBJECTIVE AND OBJECTIVE BOX
Patient is a 62y old  Male who presents with a chief complaint of  bilateral foot ulcers    HPI:   · HPI Objective Statement: pt to room 25, a&ox4, ambulatory at baseline. pt reports episode of syncope s/p seeing blood from his foot ulcers. R foot presents bleeding with 1cm stage 2 pressure ulcer, 0.5cm stage 2 pressure ulcer, and 2cm growth. wounds cleaned and dried, +pedal pulses on palpation - further skin assessment shows healed 5cm pressure ulcer on sacrum. pt reports 9/10 pain in R foot and in sacrum. pt endorsing A[employer] firing after syncopal episodes. denies head strike, endorses daily use of aspirin. pt denies any current cp, sob, n/v/d, ha. pt on cardiac monitor in NSR. tran catheter in place, draining clear, yellow urine. pt neuro status intact. NAD. respirations even, nonlabored. labs drawn and sent, medicated as ordered.        PAST MEDICAL & SURGICAL HISTORY:  Chronic congestive heart failure, unspecified congestive heart failure type  rEF/pEF      Hep C w/o coma, chronic      HTN (hypertension)      AICD (automatic cardioverter/defibrillator) present  [employer]      Atrial fibrillation      CAD (coronary artery disease)  (s/p 2 stents in Sep 2017)      Hyperlipidemia      Renal cell carcinoma of left kidney  s/p partial nephrectomy in 2002  biopsy again in Sep 2017 showed RCC again in stage 2      COPD (chronic obstructive pulmonary disease)      Bladder cancer      Peripheral neuropathy      Prostate cancer  s/p radiation      Nephrolithiasis      Kidney stone      AICD (automatic cardioverter/defibrillator) present      S/P cholecystectomy  2015      H/O partial nephrectomy  2002      History of percutaneous coronary intervention      H/O transurethral destruction of bladder lesion      History of coronary artery stent placement          MEDICATIONS  (STANDING):    MEDICATIONS  (PRN):      Allergies    aspirin (Hives)  codeine (Rash)  codeine (Unknown)  Haldol (Unknown)  Motrin (Rash)  penicillin (Hives; Anaphylaxis)  Toradol (Rash)  Tylenol (Hives)    Intolerances        VITALS:    Vital Signs Last 24 Hrs  T(C): 36.4 (10 Mar 2023 19:07), Max: 36.9 (10 Mar 2023 18:25)  T(F): 97.6 (10 Mar 2023 19:07), Max: 98.4 (10 Mar 2023 18:25)  HR: 87 (10 Mar 2023 19:07) (87 - 94)  BP: 117/62 (10 Mar 2023 19:07) (101/60 - 117/62)  BP(mean): --  RR: 18 (10 Mar 2023 19:07) (16 - 18)  SpO2: 100% (10 Mar 2023 19:07) (99% - 100%)    Parameters below as of 10 Mar 2023 19:07  Patient On (Oxygen Delivery Method): room air        LABS:                          8.0    13.61 )-----------( 121      ( 10 Mar 2023 18:10 )             25.9       03-10    141  |  106  |  61<H>  ----------------------------<  126<H>  4.2   |  22  |  4.08<H>    Ca    8.6      10 Mar 2023 18:10    TPro  6.3  /  Alb  3.6  /  TBili  0.4  /  DBili  x   /  AST  31  /  ALT  43<H>  /  AlkPhos  365<H>  03-10      CAPILLARY BLOOD GLUCOSE              LOWER EXTREMITY PHYSICAL EXAM:    Vascular: DP/PT 2/4, B/L, CFT <3 seconds B/L, Temperature gradient warm to cool, B/L.   Neuro: Epicritic sensation intact to the level of digits, B/L.  Musculoskeletal/Ortho: no rakesh foot deformities noted  Skin: Left foot submet 5 and sub cuboid  abrasions to dermis no pus, no drainage no malodor with left foot plantar forefoot hyperkeratotic skin. Right foot  medial ankle abrasion with periwound erythema. Right foot punctate ulcer to right 1st digit distal tip. Ringht foot punctate abrasions also noted to dorsum of digits 1-3 at the level of IPJs. Right foot plantar medial heel abrasion to dermis with periwound erythema, no pus, no drainage, no malodor with right foot lateral keloid.  onychomycotic nails 1-5 bilaterally with left foot 1st digit broken nail.     RADIOLOGY & ADDITIONAL STUDIES:     Patient is a 62y old  Male who presents with a chief complaint of  bilateral foot ulcers    HPI:   · HPI Objective Statement: pt to room 25, a&ox4, ambulatory at baseline. pt reports episode of syncope s/p seeing blood from his foot ulcers. R foot presents bleeding with 1cm stage 2 pressure ulcer, 0.5cm stage 2 pressure ulcer, and 2cm growth. wounds cleaned and dried, +pedal pulses on palpation - further skin assessment shows healed 5cm pressure ulcer on sacrum. pt reports 9/10 pain in R foot and in sacrum. pt endorsing AICD firing after syncopal episodes. denies head strike, endorses daily use of aspirin. pt denies any current cp, sob, n/v/d, ha. pt on cardiac monitor in NSR. tran catheter in place, draining clear, yellow urine. pt neuro status intact. NAD. respirations even, nonlabored. labs drawn and sent, medicated as ordered.        PAST MEDICAL & SURGICAL HISTORY:  Chronic congestive heart failure, unspecified congestive heart failure type  rEF/pEF      Hep C w/o coma, chronic      HTN (hypertension)      AICD (automatic cardioverter/defibrillator) present  [employer]      Atrial fibrillation      CAD (coronary artery disease)  (s/p 2 stents in Sep 2017)      Hyperlipidemia      Renal cell carcinoma of left kidney  s/p partial nephrectomy in 2002  biopsy again in Sep 2017 showed RCC again in stage 2      COPD (chronic obstructive pulmonary disease)      Bladder cancer      Peripheral neuropathy      Prostate cancer  s/p radiation      Nephrolithiasis      Kidney stone      AICD (automatic cardioverter/defibrillator) present      S/P cholecystectomy  2015      H/O partial nephrectomy  2002      History of percutaneous coronary intervention      H/O transurethral destruction of bladder lesion      History of coronary artery stent placement          MEDICATIONS  (STANDING):    MEDICATIONS  (PRN):      Allergies    aspirin (Hives)  codeine (Rash)  codeine (Unknown)  Haldol (Unknown)  Motrin (Rash)  penicillin (Hives; Anaphylaxis)  Toradol (Rash)  Tylenol (Hives)    Intolerances        VITALS:    Vital Signs Last 24 Hrs  T(C): 36.4 (10 Mar 2023 19:07), Max: 36.9 (10 Mar 2023 18:25)  T(F): 97.6 (10 Mar 2023 19:07), Max: 98.4 (10 Mar 2023 18:25)  HR: 87 (10 Mar 2023 19:07) (87 - 94)  BP: 117/62 (10 Mar 2023 19:07) (101/60 - 117/62)  BP(mean): --  RR: 18 (10 Mar 2023 19:07) (16 - 18)  SpO2: 100% (10 Mar 2023 19:07) (99% - 100%)    Parameters below as of 10 Mar 2023 19:07  Patient On (Oxygen Delivery Method): room air        LABS:                          8.0    13.61 )-----------( 121      ( 10 Mar 2023 18:10 )             25.9       03-10    141  |  106  |  61<H>  ----------------------------<  126<H>  4.2   |  22  |  4.08<H>    Ca    8.6      10 Mar 2023 18:10    TPro  6.3  /  Alb  3.6  /  TBili  0.4  /  DBili  x   /  AST  31  /  ALT  43<H>  /  AlkPhos  365<H>  03-10      CAPILLARY BLOOD GLUCOSE              LOWER EXTREMITY PHYSICAL EXAM:    Vascular: DP/PT 2/4, B/L, CFT <3 seconds B/L, Temperature gradient warm to cool, B/L.   Neuro: Epicritic sensation intact to the level of digits, B/L.  Musculoskeletal/Ortho: no rakesh foot deformities noted  Skin: Left foot submet 5 and sub cuboid  abrasions to dermis no pus, no drainage no malodor with left foot plantar forefoot hyperkeratotic skin. Right foot  medial ankle abrasion with periwound erythema. Right foot punctate ulcer to right 1st digit distal tip. Ringht foot punctate abrasions also noted to dorsum of digits 1-3 at the level of IPJs with scant sanguinous drianage. Right foot plantar medial heel abrasion to dermis with periwound erythema, no pus, no drainage, no malodor with right foot lateral keloid.  onychomycotic nails 1-5 bilaterally with left foot 1st digit broken nail.     RADIOLOGY & ADDITIONAL STUDIES:     Patient is a 62y old  Male who presents with a chief complaint of  bilateral foot ulcers    HPI:   · HPI Objective Statement: pt to room 25, a&ox4, ambulatory at baseline. pt reports episode of syncope s/p seeing blood from his foot ulcers. R foot presents bleeding with 1cm stage 2 pressure ulcer, 0.5cm stage 2 pressure ulcer, and 2cm growth. wounds cleaned and dried, +pedal pulses on palpation - further skin assessment shows healed 5cm pressure ulcer on sacrum. pt reports 9/10 pain in R foot and in sacrum. pt endorsing A[employer] firing after syncopal episodes. denies head strike, endorses daily use of aspirin. pt denies any current cp, sob, n/v/d, ha. pt on cardiac monitor in NSR. tran catheter in place, draining clear, yellow urine. pt neuro status intact. NAD. respirations even, nonlabored. labs drawn and sent, medicated as ordered.        PAST MEDICAL & SURGICAL HISTORY:  Chronic congestive heart failure, unspecified congestive heart failure type  rEF/pEF      Hep C w/o coma, chronic      HTN (hypertension)      AICD (automatic cardioverter/defibrillator) present  [employer]      Atrial fibrillation      CAD (coronary artery disease)  (s/p 2 stents in Sep 2017)      Hyperlipidemia      Renal cell carcinoma of left kidney  s/p partial nephrectomy in 2002  biopsy again in Sep 2017 showed RCC again in stage 2      COPD (chronic obstructive pulmonary disease)      Bladder cancer      Peripheral neuropathy      Prostate cancer  s/p radiation      Nephrolithiasis      Kidney stone      AICD (automatic cardioverter/defibrillator) present      S/P cholecystectomy  2015      H/O partial nephrectomy  2002      History of percutaneous coronary intervention      H/O transurethral destruction of bladder lesion      History of coronary artery stent placement          MEDICATIONS  (STANDING):    MEDICATIONS  (PRN):      Allergies    aspirin (Hives)  codeine (Rash)  codeine (Unknown)  Haldol (Unknown)  Motrin (Rash)  penicillin (Hives; Anaphylaxis)  Toradol (Rash)  Tylenol (Hives)    Intolerances        VITALS:    Vital Signs Last 24 Hrs  T(C): 36.4 (10 Mar 2023 19:07), Max: 36.9 (10 Mar 2023 18:25)  T(F): 97.6 (10 Mar 2023 19:07), Max: 98.4 (10 Mar 2023 18:25)  HR: 87 (10 Mar 2023 19:07) (87 - 94)  BP: 117/62 (10 Mar 2023 19:07) (101/60 - 117/62)  BP(mean): --  RR: 18 (10 Mar 2023 19:07) (16 - 18)  SpO2: 100% (10 Mar 2023 19:07) (99% - 100%)    Parameters below as of 10 Mar 2023 19:07  Patient On (Oxygen Delivery Method): room air        LABS:                          8.0    13.61 )-----------( 121      ( 10 Mar 2023 18:10 )             25.9       03-10    141  |  106  |  61<H>  ----------------------------<  126<H>  4.2   |  22  |  4.08<H>    Ca    8.6      10 Mar 2023 18:10    TPro  6.3  /  Alb  3.6  /  TBili  0.4  /  DBili  x   /  AST  31  /  ALT  43<H>  /  AlkPhos  365<H>  03-10      CAPILLARY BLOOD GLUCOSE              LOWER EXTREMITY PHYSICAL EXAM:    Vascular: DP/PT 2/4, B/L, CFT <3 seconds B/L, Temperature gradient warm to cool, B/L.   Neuro: Epicritic sensation unable to assess 2/2 to patient unresponsiveness, B/L.  Musculoskeletal/Ortho: no rakesh foot deformities noted  Skin: Left foot submet 5 and sub cuboid  abrasions to dermis no pus, no drainage no malodor with left foot plantar forefoot hyperkeratotic skin. Right foot  medial ankle abrasion with periwound erythema. Right foot punctate ulcer to right 1st digit distal tip. Ringht foot punctate abrasions also noted to dorsum of digits 1-3 at the level of IPJs with scant sanguinous drainage Right foot plantar medial heel abrasion to dermis with periwound erythema, no excess warmth,  no pus, no drainage, no malodor with right foot lateral keloid.  onychomycotic nails 1-5 bilaterally with left foot 1st digit broken nail.     RADIOLOGY & ADDITIONAL STUDIES:

## 2023-03-10 NOTE — ED PROVIDER NOTE - NSICDXPASTMEDICALHX_GEN_ALL_CORE_FT
PAST MEDICAL HISTORY:  AICD (automatic cardioverter/defibrillator) present [employer]    Atrial fibrillation     Bladder cancer     CAD (coronary artery disease) (s/p 2 stents in Sep 2017)    Chronic congestive heart failure, unspecified congestive heart failure type rEF/pEF    COPD (chronic obstructive pulmonary disease)     Hep C w/o coma, chronic     HTN (hypertension)     Hyperlipidemia     Kidney stone     Nephrolithiasis     Peripheral neuropathy     Prostate cancer s/p radiation    Renal cell carcinoma of left kidney s/p partial nephrectomy in 2002  biopsy again in Sep 2017 showed RCC again in stage 2

## 2023-03-10 NOTE — ED ADULT NURSE NOTE - CHIEF COMPLAINT QUOTE
Pt became unresponsive during bleeding episode from right leg foot pt then awoke and said AICD fired.  Pt now alert, oriented X 2, telem>NSR. Pt @ [facility] for rehab, cellulitis of right leg, + candida aureas skin infection. PMH COPD, CHF,HTN, afib, HLD, hyperthyroidism, right foot wound, CVA

## 2023-03-10 NOTE — ED PROVIDER NOTE - OBJECTIVE STATEMENT
HPI & ROS: 62 yom hx of CVA, HTN, HLD afib, CAD, ESRD on dialysis, AICD, RCC s/p partial nephrotomy p/w LOC and bleeding. Pt has chronic painful healing sacral wound. Pt was in bed/wheelchair, then started kicking his feet into surface and opened an ulcer on bottom of R foot. Upon site of bld, pt fainted, does not remember, remembers AICD firing and ppl looking at him. Pt feelign well recently. No CP no SOB. No dsyuria. no fevers.      Exam as stated below:   CONSTITUTIONAL: In pain  SKIN: Healing sacral ulcer ~ stage 2. Pt w/ chronic wound on feet, notably R fresh bld from posterior heel. appears as small open skin ulcer. Noted fungal appearing growth on bridge of heel.    EYES: No scleral icterus. Conjunctiva pink.  ENT: Posterior pharynx with no erythema.  NECK: No ttp.    CARD: RRR. No murmurs.  RESP: Clear to ausculation b/l. No Crackles noted. No Wheezing noted.  ABD: Soft. Non-tender. Not distended.   MSK: No pedal edema. No calf tenderness.  NEURO: UE/LE grossly intact. Motor UE/LE sensation grossly intact.   PSYCH: Cooperative, appropriate.

## 2023-03-11 NOTE — H&P ADULT - HISTORY OF PRESENT ILLNESS
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 Pt has chronic painful healing sacral wound and bilateral heel ulcers. Pt was in bed/wheelchair, then started kicking his feet into surface and opened an ulcer on bottom of R foot. Upon sight of blood from his foot ulcers, he fainted and denies chest pain/sob. He remembers AICD firing and people looking at him. He denies fever/chill/dysuria/palpitation. In ED, he was seen by podiatry, on exam of  his right foot, he has a 1cm stage 2 pressure ulcer, 0.5cm stage 2 pressure ulcer, and 2cm growth. Wounds cleaned and dried, +pedal pulses on palpation - further skin assessment shows healed 5cm pressure ulcer on sacrum. pt reports 9/10 pain in R foot and in sacrum.   Labs WBC 13, ESR 59, CRP 43.6. He was given a dose of clindamycin due to PCN allergy. He's afebrile and hemodynamically stable. Tele shows bigeminy that lasted 30 seconds.

## 2023-03-11 NOTE — PATIENT PROFILE ADULT - FUNCTIONAL ASSESSMENT - BASIC MOBILITY 6.
2-calculated by average/Not able to assess (calculate score using [facility] averaging method)

## 2023-03-11 NOTE — H&P ADULT - PROBLEM SELECTOR PLAN 7
could be d/t ESRD   check iron panel, consider Epogen--defer to nephrology  -cont PPI  -pt had hospitalization requiring transfusion and GI w/u (endoscopy) which were negative for acute bleed  -transfuse prn to keep Hgb>7

## 2023-03-11 NOTE — H&P ADULT - NSVTERISKREFERASSESS_GEN_ALL_CORE
Voicemail message left relaying Dr Ridley's response.  Call back number given if further questions.  Norma Lin RN      Refer to the Assessment tab to view/cancel completed assessment.

## 2023-03-11 NOTE — PROCEDURE NOTE - ADDITIONAL PROCEDURE DETAILS
AT/AF >171: All Rx off  VF >207: ATP During charging, 35J x6  FVT via -250: Burst (2), 37Jx5  VF (detection is off) Burst (3), 207J 35J x4  Observations:  RV safety margin (2.5X) > programmed margin (2.0)  VF detection may be delayed (interval faster than 300 ms)  VT detection off but some VT therapies on      Events:  VF 0   FVT 0  VT (off)  AT/AF (monitor)    Monitored  VT: 0  VT-NS 0  High rate NS 0  SVT: 0  Time in AT/AF 0.0%    Pacing:   0.0%  AS-VS 99.3%  AS- 0.0%  AP-VS 0.7%  AP- 0.0%

## 2023-03-11 NOTE — CONSULT NOTE ADULT - SUBJECTIVE AND OBJECTIVE BOX
[facility] DIVISION OF KIDNEY DISEASES AND HYPERTENSION -- [phone]  -- INITIAL CONSULT NOTE  --------------------------------------------------------------------------------  History obtained from chart review, primary team and pts son Derick Capellan ([phone]) as pt is oriented to himself.     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 Nephrology team was consulted for ESRD/HD management.     Per son, Pt was recently started on HD in Jan 2023 in a hospital in [place]. pt currently received HD TIW (TTS) at [facility]. Last HD done on Thursday (3/8/23) via R IJ tunneled HD catheter. Per discussion with HD Rn at [facility] dialysis unit, Pt follows with Dr. Mercedes (Nephrologist). Pt seen and examined in ER. Pt awake oriented to self. Pt says he still makes urine. Doesnt know why he is in the hospital. Further ROS is limited.     PAST HISTORY  --------------------------------------------------------------------------------  PAST MEDICAL & SURGICAL HISTORY:  Chronic congestive heart failure, unspecified congestive heart failure type  rEF/pEF  Hep C w/o coma, chronic  HTN (hypertension)  AICD (automatic cardioverter/defibrillator) present  [employer]tr[employer]  Atrial fibrillation  CAD (coronary artery disease)  (s/p 2 stents in Sep 2017)  Hyperlipidemia  Renal cell carcinoma of left kidney  s/p partial nephrectomy in 2002  biopsy again in Sep 2017 showed RCC again in stage 2    COPD (chronic obstructive pulmonary disease)  Bladder cancer  Peripheral neuropathy  Prostate cancer  s/p radiation  Nephrolithiasis  Kidney stone  AICD (automatic cardioverter/defibrillator) present  S/P cholecystectomy  2015  H/O partial nephrectomy  2002  History of percutaneous coronary intervention  H/O transurethral destruction of bladder lesion  History of coronary artery stent placement    FAMILY HISTORY:  Family history of chronic ischemic heart disease    Family history of lung cancer    PAST SOCIAL HISTORY:    ALLERGIES & MEDICATIONS  --------------------------------------------------------------------------------  Allergies    aspirin (Hives)  codeine (Rash)  codeine (Unknown)  Haldol (Unknown)  Motrin (Rash)  penicillin (Hives; Anaphylaxis)  Toradol (Rash)  Tylenol (Hives)    Intolerances    Standing Inpatient Medications  apixaban 5 milliGRAM(s) Oral two times a day  artificial  tears Solution 1 Drop(s) Both EYES four times a day  ascorbic acid 500 milliGRAM(s) Oral daily  atorvastatin 40 milliGRAM(s) Oral at bedtime  baclofen 2.5 milliGRAM(s) Oral every 12 hours  cholecalciferol 1000 Unit(s) Oral daily  clindamycin IVPB 600 milliGRAM(s) IV Intermittent every 8 hours  lactobacillus acidophilus 1 Tablet(s) Oral three times a day with meals  levETIRAcetam 250 milliGRAM(s) Oral <User Schedule>  levETIRAcetam 500 milliGRAM(s) Oral daily  levothyroxine 112 MICROGram(s) Oral daily  metoprolol succinate ER 50 milliGRAM(s) Oral daily  mupirocin 2% Ointment 1 Application(s) Topical two times a day  Nephro-mirna 1 Tablet(s) Oral daily  pantoprazole    Tablet 40 milliGRAM(s) Oral before breakfast  polyethylene glycol 3350 17 Gram(s) Oral daily  senna 2 Tablet(s) Oral at bedtime  sevelamer carbonate 1600 milliGRAM(s) Oral three times a day with meals  tamsulosin 0.8 milliGRAM(s) Oral at bedtime  tiotropium 2.5 MICROgram(s) Inhaler 2 Puff(s) Inhalation daily  traZODone 25 milliGRAM(s) Oral at bedtime    PRN Inpatient Medications  bisacodyl Suppository 10 milliGRAM(s) Rectal daily PRN  melatonin 3 milliGRAM(s) Oral at bedtime PRN  ondansetron Injectable 4 milliGRAM(s) IV Push every 8 hours PRN  oxyCODONE    IR 5 milliGRAM(s) Oral every 6 hours PRN    REVIEW OF SYSTEMS  --------------------------------------------------------------------------------  Unable to obtain ROS     VITALS/PHYSICAL EXAM  --------------------------------------------------------------------------------  T(C): 36.8 (03-11-23 @ 11:53), Max: 37.2 (03-10-23 @ 23:03)  HR: 87 (03-11-23 @ 09:02) (69 - 94)  BP: 109/69 (03-11-23 @ 10:57) (98/65 - 132/66)  RR: 17 (03-11-23 @ 09:02) (12 - 18)  SpO2: 98% (03-11-23 @ 09:02) (97% - 100%)  Wt(kg): --    03-11-23 @ 06:01  -  03-11-23 @ 12:26  --------------------------------------------------------  IN: 0 mL / OUT: 1000 mL / NET: -1000 mL    Physical Exam:  	Gen: NAD  	HEENT: MMM  	Pulm: CTA B/L  	CV: S1S2  	Abd: Soft, +BS   	Ext: No LE edema B/L, Dressing evident on B/L foot  	Neuro: Awake  	Skin: Warm and dry  	Vascular access: RIJ tunneled HD catheter +    LABS/STUDIES  --------------------------------------------------------------------------------              8.0    13.61 >-----------<  121      [03-10-23 @ 18:10]              25.9     141  |  106  |  61  ----------------------------<  126      [03-10-23 @ 18:10]  4.2   |  22  |  4.08        Ca     8.6     [03-10-23 @ 18:10]      Mg     1.80     [03-10-23 @ 18:10]      Phos  5.5     [03-10-23 @ 18:10]    TPro  6.3  /  Alb  3.6  /  TBili  0.4  /  DBili  x   /  AST  31  /  ALT  43  /  AlkPhos  365  [03-10-23 @ 18:10]    Creatinine Trend:  SCr 4.08 [03-10 @ 18:10]    Urinalysis - [03-10-23 @ 18:29]      Color Yellow / Appearance Slightly Turbid / SG 1.014 / pH 6.5      Gluc Negative / Ketone Negative  / Bili Negative / Urobili <2 mg/dL       Blood Large / Protein 100 mg/dL / Leuk Est Large / Nitrite Negative      RBC >50 / WBC >50 / Hyaline  / Gran  / Sq Epi  / Non Sq Epi  / Bacteria Many [facility] DIVISION OF KIDNEY DISEASES AND HYPERTENSION -- [phone]  -- INITIAL CONSULT NOTE  --------------------------------------------------------------------------------  History obtained from chart review, primary team and pts son Derick Capellan ([phone]) as pt is oriented to himself.     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 Nephrology team was consulted for ESRD/HD management.     Per son, Pt was recently started on HD in Jan 2023 in a hospital in [place]. pt currently received HD TIW (TTS) at [facility]. Last HD done on Thursday (3/8/23) via R IJ tunneled HD catheter. Per discussion with HD Rn at [facility] dialysis unit, Pt follows with Dr. Mercedes (Nephrologist). Pt seen and examined in ER. Pt awake oriented to self. Pt says he still makes urine. Doesnt know why he is in the hospital. Further ROS is limited.     PAST HISTORY  --------------------------------------------------------------------------------  PAST MEDICAL & SURGICAL HISTORY:  Chronic congestive heart failure, unspecified congestive heart failure type  rEF/pEF  Hep C w/o coma, chronic  HTN (hypertension)  AICD (automatic cardioverter/defibrillator) present  [employer]  Atrial fibrillation  CAD (coronary artery disease)  (s/p 2 stents in Sep 2017)  Hyperlipidemia  Renal cell carcinoma of left kidney  s/p partial nephrectomy in 2002  biopsy again in Sep 2017 showed RCC again in stage 2    COPD (chronic obstructive pulmonary disease)  Bladder cancer  Peripheral neuropathy  Prostate cancer  s/p radiation  Nephrolithiasis  Kidney stone  AICD (automatic cardioverter/defibrillator) present  S/P cholecystectomy  2015  H/O partial nephrectomy  2002  History of percutaneous coronary intervention  H/O transurethral destruction of bladder lesion  History of coronary artery stent placement    FAMILY HISTORY:  Family history of chronic ischemic heart disease    Family history of lung cancer    PAST SOCIAL HISTORY:    ALLERGIES & MEDICATIONS  --------------------------------------------------------------------------------  Allergies    aspirin (Hives)  codeine (Rash)  codeine (Unknown)  Haldol (Unknown)  Motrin (Rash)  penicillin (Hives; Anaphylaxis)  Toradol (Rash)  Tylenol (Hives)    Intolerances    Standing Inpatient Medications  apixaban 5 milliGRAM(s) Oral two times a day  artificial  tears Solution 1 Drop(s) Both EYES four times a day  ascorbic acid 500 milliGRAM(s) Oral daily  atorvastatin 40 milliGRAM(s) Oral at bedtime  baclofen 2.5 milliGRAM(s) Oral every 12 hours  cholecalciferol 1000 Unit(s) Oral daily  clindamycin IVPB 600 milliGRAM(s) IV Intermittent every 8 hours  lactobacillus acidophilus 1 Tablet(s) Oral three times a day with meals  levETIRAcetam 250 milliGRAM(s) Oral <User Schedule>  levETIRAcetam 500 milliGRAM(s) Oral daily  levothyroxine 112 MICROGram(s) Oral daily  metoprolol succinate ER 50 milliGRAM(s) Oral daily  mupirocin 2% Ointment 1 Application(s) Topical two times a day  Nephro-mirna 1 Tablet(s) Oral daily  pantoprazole    Tablet 40 milliGRAM(s) Oral before breakfast  polyethylene glycol 3350 17 Gram(s) Oral daily  senna 2 Tablet(s) Oral at bedtime  sevelamer carbonate 1600 milliGRAM(s) Oral three times a day with meals  tamsulosin 0.8 milliGRAM(s) Oral at bedtime  tiotropium 2.5 MICROgram(s) Inhaler 2 Puff(s) Inhalation daily  traZODone 25 milliGRAM(s) Oral at bedtime    PRN Inpatient Medications  bisacodyl Suppository 10 milliGRAM(s) Rectal daily PRN  melatonin 3 milliGRAM(s) Oral at bedtime PRN  ondansetron Injectable 4 milliGRAM(s) IV Push every 8 hours PRN  oxyCODONE    IR 5 milliGRAM(s) Oral every 6 hours PRN    REVIEW OF SYSTEMS  --------------------------------------------------------------------------------  Unable to obtain ROS     VITALS/PHYSICAL EXAM  --------------------------------------------------------------------------------  T(C): 36.8 (03-11-23 @ 11:53), Max: 37.2 (03-10-23 @ 23:03)  HR: 87 (03-11-23 @ 09:02) (69 - 94)  BP: 109/69 (03-11-23 @ 10:57) (98/65 - 132/66)  RR: 17 (03-11-23 @ 09:02) (12 - 18)  SpO2: 98% (03-11-23 @ 09:02) (97% - 100%)  Wt(kg): --    03-11-23 @ 06:01  -  03-11-23 @ 12:26  --------------------------------------------------------  IN: 0 mL / OUT: 1000 mL / NET: -1000 mL    Physical Exam:  	Gen: NAD  	HEENT: MMM  	Pulm: CTA B/L  	CV: S1S2  	Abd: Soft, +BS   	Ext: No LE edema B/L, Dressing evident on B/L foot  	Neuro: Awake  	Skin: Warm and dry  	Vascular access: RIJ tunneled HD catheter +    LABS/STUDIES  --------------------------------------------------------------------------------              8.0    13.61 >-----------<  121      [03-10-23 @ 18:10]              25.9     141  |  106  |  61  ----------------------------<  126      [03-10-23 @ 18:10]  4.2   |  22  |  4.08        Ca     8.6     [03-10-23 @ 18:10]      Mg     1.80     [03-10-23 @ 18:10]      Phos  5.5     [03-10-23 @ 18:10]    TPro  6.3  /  Alb  3.6  /  TBili  0.4  /  DBili  x   /  AST  31  /  ALT  43  /  AlkPhos  365  [03-10-23 @ 18:10]    Creatinine Trend:  SCr 4.08 [03-10 @ 18:10]    Urinalysis - [03-10-23 @ 18:29]      Color Yellow / Appearance Slightly Turbid / SG 1.014 / pH 6.5      Gluc Negative / Ketone Negative  / Bili Negative / Urobili <2 mg/dL       Blood Large / Protein 100 mg/dL / Leuk Est Large / Nitrite Negative      RBC >50 / WBC >50 / Hyaline  / Gran  / Sq Epi  / Non Sq Epi  / Bacteria Many [facility] DIVISION OF KIDNEY DISEASES AND HYPERTENSION -- [phone]  -- INITIAL CONSULT NOTE  --------------------------------------------------------------------------------  History obtained from chart review, primary team and pts son Derick Capellan ([phone]) as pt is oriented to himself.     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 Nephrology team was consulted for ESRD/HD management.     Per son, Pt was recently started on HD in Jan 2023 in a hospital in [place]. pt currently received HD TIW (TTS) at [facility]. Last HD done on Thursday (3/8/23) via R IJ tunneled HD catheter. Per discussion with HD Rn at [facility] dialysis unit, Pt follows with Dr. Mercedes (Nephrologist). Pt seen and examined in ER. Pt awake oriented to self. Pt says he still makes urine. Doesnt know why he is in the hospital. Further ROS is limited.     PAST HISTORY  --------------------------------------------------------------------------------  PAST MEDICAL & SURGICAL HISTORY:  Chronic congestive heart failure, unspecified congestive heart failure type  rEF/pEF  Hep C w/o coma, chronic  HTN (hypertension)  AICD (automatic cardioverter/defibrillator) present  [employer]tr[employer]  Atrial fibrillation  CAD (coronary artery disease)  (s/p 2 stents in Sep 2017)  Hyperlipidemia  Renal cell carcinoma of left kidney  s/p partial nephrectomy in 2002  biopsy again in Sep 2017 showed RCC again in stage 2    COPD (chronic obstructive pulmonary disease)  Bladder cancer  Peripheral neuropathy  Prostate cancer  s/p radiation  Nephrolithiasis  Kidney stone  AICD (automatic cardioverter/defibrillator) present  S/P cholecystectomy  2015  H/O partial nephrectomy  2002  History of percutaneous coronary intervention  H/O transurethral destruction of bladder lesion  History of coronary artery stent placement    FAMILY HISTORY:  Family history of chronic ischemic heart disease    Family history of lung cancer    PAST SOCIAL HISTORY: No smoking, drugs or alcohol use    ALLERGIES & MEDICATIONS  --------------------------------------------------------------------------------  Allergies    aspirin (Hives)  codeine (Rash)  codeine (Unknown)  Haldol (Unknown)  Motrin (Rash)  penicillin (Hives; Anaphylaxis)  Toradol (Rash)  Tylenol (Hives)    Intolerances    Standing Inpatient Medications  apixaban 5 milliGRAM(s) Oral two times a day  artificial  tears Solution 1 Drop(s) Both EYES four times a day  ascorbic acid 500 milliGRAM(s) Oral daily  atorvastatin 40 milliGRAM(s) Oral at bedtime  baclofen 2.5 milliGRAM(s) Oral every 12 hours  cholecalciferol 1000 Unit(s) Oral daily  clindamycin IVPB 600 milliGRAM(s) IV Intermittent every 8 hours  lactobacillus acidophilus 1 Tablet(s) Oral three times a day with meals  levETIRAcetam 250 milliGRAM(s) Oral <User Schedule>  levETIRAcetam 500 milliGRAM(s) Oral daily  levothyroxine 112 MICROGram(s) Oral daily  metoprolol succinate ER 50 milliGRAM(s) Oral daily  mupirocin 2% Ointment 1 Application(s) Topical two times a day  Nephro-mirna 1 Tablet(s) Oral daily  pantoprazole    Tablet 40 milliGRAM(s) Oral before breakfast  polyethylene glycol 3350 17 Gram(s) Oral daily  senna 2 Tablet(s) Oral at bedtime  sevelamer carbonate 1600 milliGRAM(s) Oral three times a day with meals  tamsulosin 0.8 milliGRAM(s) Oral at bedtime  tiotropium 2.5 MICROgram(s) Inhaler 2 Puff(s) Inhalation daily  traZODone 25 milliGRAM(s) Oral at bedtime    PRN Inpatient Medications  bisacodyl Suppository 10 milliGRAM(s) Rectal daily PRN  melatonin 3 milliGRAM(s) Oral at bedtime PRN  ondansetron Injectable 4 milliGRAM(s) IV Push every 8 hours PRN  oxyCODONE    IR 5 milliGRAM(s) Oral every 6 hours PRN    REVIEW OF SYSTEMS  --------------------------------------------------------------------------------  Unable to obtain ROS     VITALS/PHYSICAL EXAM  --------------------------------------------------------------------------------  T(C): 36.8 (03-11-23 @ 11:53), Max: 37.2 (03-10-23 @ 23:03)  HR: 87 (03-11-23 @ 09:02) (69 - 94)  BP: 109/69 (03-11-23 @ 10:57) (98/65 - 132/66)  RR: 17 (03-11-23 @ 09:02) (12 - 18)  SpO2: 98% (03-11-23 @ 09:02) (97% - 100%)  Wt(kg): --    03-11-23 @ 06:01  -  03-11-23 @ 12:26  --------------------------------------------------------  IN: 0 mL / OUT: 1000 mL / NET: -1000 mL    Physical Exam:  	Gen: NAD  	HEENT: MMM  	Pulm: CTA B/L  	CV: S1S2  	Abd: Soft, +BS   	Ext: No LE edema B/L, Dressing evident on B/L foot  	Neuro: Awake  	Skin: Warm and dry  	Vascular access: RIJ tunneled HD catheter +    LABS/STUDIES  --------------------------------------------------------------------------------              8.0    13.61 >-----------<  121      [03-10-23 @ 18:10]              25.9     141  |  106  |  61  ----------------------------<  126      [03-10-23 @ 18:10]  4.2   |  22  |  4.08        Ca     8.6     [03-10-23 @ 18:10]      Mg     1.80     [03-10-23 @ 18:10]      Phos  5.5     [03-10-23 @ 18:10]    TPro  6.3  /  Alb  3.6  /  TBili  0.4  /  DBili  x   /  AST  31  /  ALT  43  /  AlkPhos  365  [03-10-23 @ 18:10]    Creatinine Trend:  SCr 4.08 [03-10 @ 18:10]    Urinalysis - [03-10-23 @ 18:29]      Color Yellow / Appearance Slightly Turbid / SG 1.014 / pH 6.5      Gluc Negative / Ketone Negative  / Bili Negative / Urobili <2 mg/dL       Blood Large / Protein 100 mg/dL / Leuk Est Large / Nitrite Negative      RBC >50 / WBC >50 / Hyaline  / Gran  / Sq Epi  / Non Sq Epi  / Bacteria Many

## 2023-03-11 NOTE — ED ADULT NURSE REASSESSMENT NOTE - NS ED NURSE REASSESS COMMENT FT1
Pt sent to [facility] 01, report given to Yamila.
informed by CT Tech pt. was endorsing "trouble breathing".  MD Mayorga and MD Solis aware. orders placed and followed.
pt resting comfortably at this time. pending results and dispo. NAD at this time. safety maintained. remains a&o4, denies cp, sob.

## 2023-03-11 NOTE — H&P ADULT - NSHPREVIEWOFSYSTEMS_GEN_ALL_CORE
CONSTITUTIONAL: No fever, weight loss, or fatigue  EYES: No eye pain, visual disturbances, or discharge  ENMT:  No difficulty hearing, tinnitus, vertigo; No sinus or throat pain  NECK: No pain or stiffness  BREASTS: No pain, masses, or nipple discharge  RESPIRATORY: No cough, wheezing, chills or hemoptysis; No shortness of breath  CARDIOVASCULAR: +LOC, No chest pain, palpitations, dizziness, or leg swelling  GASTROINTESTINAL: No abdominal or epigastric pain. No nausea, vomiting, or hematemesis; No diarrhea or constipation. No melena or hematochezia.  GENITOURINARY: No dysuria, frequency, hematuria, or incontinence  NEUROLOGICAL: No headaches, memory loss, loss of strength, numbness, or tremors  SKIN: No itching, burning, rashes, or lesions   LYMPH NODES: No enlarged glands  ENDOCRINE: No heat or cold intolerance; No hair loss  MUSCULOSKELETAL: right foot pain, chronic sacral ulcer and pain  PSYCHIATRIC: No depression, anxiety, mood swings, or difficulty sleeping  HEME/LYMPH: No easy bruising, or bleeding gums  ALLERGY AND IMMUNOLOGIC: No hives or eczema

## 2023-03-11 NOTE — H&P ADULT - PROBLEM SELECTOR PLAN 2
podiatry consult appreciated, pt with bilateral foot abrasions to dermis, with periwound erythema, no drainage/no malodor   -wound care per podiatry, also will consult wound care team with hx chronic sacral wound  -IV Clindamycin 600 mg q8h, probiotic (pt is allergic to PCN)  -R foot xray no evidence of osteomyelitis or bone erosion podiatry consult appreciated, pt with bilateral foot abrasions to dermis, with periwound erythema, no drainage/no malodor , ESR 59, CRAP 43.6  -wound care per podiatry, also will consult wound care team with hx chronic sacral wound  -IV Clindamycin 600 mg q8h, probiotic (pt is allergic to PCN)  -R foot xray no evidence of osteomyelitis or bone erosion podiatry consult appreciated, pt with bilateral foot abrasions to dermis, with periwound erythema, no drainage/no malodor , ESR 59, CRAP 43.6  -wound care per podiatry, also will consult wound care team with hx chronic sacral wound  -IV Clindamycin 600 mg q8h, probiotic (pt is allergic to PCN)  -R foot xray no evidence of osteomyelitis or bone erosion  -- Cont Mupirocin daily  - pt in Z-flow to bilateral lower extremities

## 2023-03-11 NOTE — CONSULT NOTE ADULT - PROBLEM SELECTOR RECOMMENDATION 2
Patient with anemia in the setting of ESRD. Hemoglobin below target range. Will need to determine if patient receives VIBHA. Blood transfusion per primary team. Monitor hemoglobin.

## 2023-03-11 NOTE — H&P ADULT - NSHPPHYSICALEXAM_GEN_ALL_CORE
Vital Signs Last 24 Hrs  T(C): 35.7 (11 Mar 2023 09:02), Max: 37.2 (10 Mar 2023 23:03)  T(F): 96.2 (11 Mar 2023 09:02), Max: 98.9 (10 Mar 2023 23:03)  HR: 87 (11 Mar 2023 09:02) (69 - 94)  BP: 117/88 (11 Mar 2023 09:02) (98/65 - 132/66)  BP(mean): 78 (11 Mar 2023 03:32) (78 - 78)  RR: 17 (11 Mar 2023 09:02) (12 - 18)  SpO2: 98% (11 Mar 2023 09:02) (97% - 100%)    Parameters below as of 11 Mar 2023 09:02  Patient On (Oxygen Delivery Method): room air      CAPILLARY BLOOD GLUCOSE        I&O's Summary      PHYSICAL EXAM:  GENERAL: NAD, well-developed  HEAD:  Atraumatic, Normocephalic  EYES: EOMI, PERRLA, conjunctiva and sclera clear  NECK: Supple, No JVD  CHEST/LUNG: Clear to auscultation bilaterally; No wheeze  RIJ permacath  HEART: Regular rate and rhythm; No murmurs, rubs, or gallops  ABDOMEN: suprapubic catheter in place, Soft, Nontender, Nondistended; Bowel sounds present  EXTREMITIES:  1+ Peripheral Pulses, No clubbing, cyanosis, or edema  bilateral foot ulcers in wound dressing, R foot  1cm stage 2  pressure ulcer, 0.5cm stage 2 pressure ulcer, and 2cm growth.   healed 5cm pressure ulcer on sacrum.  PSYCH: slowed speech  NEUROLOGY: quadriparesis   SKIN: No rashes or lesions

## 2023-03-11 NOTE — H&P ADULT - ASSESSMENT
61 yo male [facility] resident, hx of multiple medical problems including HTN, HLD, 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, seizures, Afib s/p ablation on AC (Eliquis), CAD s/p PCI, cardiomyopathy (EF 45% in 9/22), AICD in 2018, neurogenic bladder with SPC, COPD/BARRY on O2 at night w/o CPAP, hypothyroidism, anemia,  ESRD on HD (TTS) via RIJ P/C, RCC s/p left partial nephrotomy p/w LOC and bleeding from right foot ulcer, b/l foot abrasions with superimposing cellulitis

## 2023-03-11 NOTE — CONSULT NOTE ADULT - PROBLEM SELECTOR RECOMMENDATION 9
Pt. with ESRD on HD three times a week (TTS). Pt being admitted for LOC and bleeding from B/L heal ulcers. Last HD was done on Thursday(3/9/23) via RIJ tunneled HD catheter. Pt goes to [facility]  dialysis unit. Follows with Dr. Mercedes. Labs reviewed. Plan for HD today. HD consent obtained from Son Derick Capellan ([phone]) on phone as pt is oriented to himself. Will arrange or HD today. HD orders placed. Dose meds as per HD. Pt. with ESRD on HD three times a week (TTS). Pt being admitted for LOC and bleeding from B/L heel ulcers. Last HD was done on Thursday(3/9/23) via RIJ tunneled HD catheter. Pt goes to [facility]  dialysis unit. Follows with Dr. Mercedes. Labs reviewed. Plan for HD today. HD consent obtained from Son Derick Capellan ([phone]) on phone as pt is oriented to himself. Will arrange or HD today. HD orders placed. Dose meds as per HD.

## 2023-03-11 NOTE — H&P ADULT - NSHPLABSRESULTS_GEN_ALL_CORE
LABS:                        8.0    13.61 )-----------( 121      ( 10 Mar 2023 18:10 )             25.9     03-10    141  |  106  |  61<H>  ----------------------------<  126<H>  4.2   |  22  |  4.08<H>    Ca    8.6      10 Mar 2023 18:10  Phos  5.5     03-10  Mg     1.80     03-10    TPro  6.3  /  Alb  3.6  /  TBili  0.4  /  DBili  x   /  AST  31  /  ALT  43<H>  /  AlkPhos  365<H>  03-10    0310 @ 18:10 ESR 59 / CRP 43.6          Urinalysis Basic - ( 10 Mar 2023 18:29 )    Color: Yellow / Appearance: Slightly Turbid / S.014 / pH: x  Gluc: x / Ketone: Negative  / Bili: Negative / Urobili: <2 mg/dL   Blood: x / Protein: 100 mg/dL / Nitrite: Negative   Leuk Esterase: Large / RBC: >50 /HPF / WBC >50 /HPF   Sq Epi: x / Non Sq Epi: x / Bacteria: Many      EKG: reviewed, accelerated junctional rhythm    RADIOLOGY & ADDITIONAL TESTS:  < from: Xray Chest 2 Views PA/Lat (03.10.23 @ 23:39) >    IMPRESSION:  Clear lungs.      < from: Xray Foot AP + Lateral + Oblique, Right (03.10.23 @ 23:39) >    IMPRESSION:  No radiographic evidence for osteomyelitis. If concern persists, MRI can   be performed.

## 2023-03-11 NOTE — H&P ADULT - PROBLEM SELECTOR PLAN 6
hx Cardiomyopathy, CAD with PCI, HD with fluid removal as tolerated  last echo in Sept/2022 with EF 45%  avoid fluid overload  check TTE   Telemetry hx Cardiomyopathy, CAD with PCI, HD with fluid removal as tolerated, on admission, pro-, clear lungs on CXR  last echo in Sept/2022 with EF 45%  avoid fluid overload  check TTE   Telemetry

## 2023-03-11 NOTE — H&P ADULT - PROBLEM SELECTOR PLAN 4
HD (TTS) as scheduled, HD today  I called nephrology consult  c/w renvela, renal diet, vit D  check iron panels w/ anemia HD (TTS) as scheduled, HD today  I called nephrology consult  c/w renvela, renal diet, vit D  check iron panels w/ anemia  pt with indwelling SPT, UA positive likely colonization

## 2023-03-11 NOTE — PROGRESS NOTE ADULT - ASSESSMENT
62 M w Bilateral foot abrasions all to dermis  -Patient seen and evaluated  - Afebrile, WBC 13.61, ESR 59, CRP 4.36  - Left foot submet 5 and sub cuboid  abrasions to dermis no pus, no drainage no malodor with left foot plantar forefoot hyperkeratotic skin. Right medial ankle abrasion with periwound erythema. Right foot punctate ulcer to right 1st digit distal tip. Right foot punctate abrasions also noted to dorsum of digits 1-3 at the level of IPJs with scant sanguinous drainage Right foot plantar medial heel abrasion to dermis with periwound erythema, no excess warmth, no pus, no drainage, no malodor, R foot latearl heel wound to subQ, leatherfibrotic wound bed, no drainage. Onychomycotic nails 1-5 bilaterally with left foot 1st digit broken nail.   -Right foot xray: No radiographic evidence for osteomyelitis  -Patient has an AICD which will need to be interrogated  - Cont Mupirocin daily  - pt in Z-flow to bilateral lower extremities   - Stability of discharge from Podiatry standpoint is pending Erythema resolution  - follow up info in Discharge provider note.   - Discussed with attending

## 2023-03-11 NOTE — H&P ADULT - PROBLEM SELECTOR PLAN 1
-pt syncopized at the sight of blood from right foot ulcer, could be vasovagal syncope, however, hx of CAD/afib with AICD firing?  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

## 2023-03-11 NOTE — PATIENT PROFILE ADULT - LOCATION #1
Physical Therapy  Visit Type: treatment  Precautions:  Medical precautions:  fall risk; standard precautions.    Lines:     Basic: telemetry and capped IV  Safety Measures: chair alarm and bed alarm      SUBJECTIVE                                                                                                            Patient agreed to participate in therapy this date.    Patient is a 76 year old male admitted to [facility] for anemia, thrombocytopenia.   Pt PMH significant for STM deficits, COPD and hx of primary CNS lymphoma in remission.  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 Pt lives with spouse.  At baseline, patient is Independent with functional mobility tasks using no assistive device.  Uses cane intermittently outdoors, has walker but recently has not needed.     Patient / Family Goal: return to previous functional status and maximize function    Pain     Location: denies pain    At onset of session (out of 10): 0  RN informed on pain level      OBJECTIVE                                                                                                              Level of consciousness: alert    Oriented to person and place     Arousal alertness: generalized responses    Affect/Behavior: cooperative and pleasant  Patient activity tolerance: 1 to 1 activity to rest  Balance    Sitting: Static: supervision double lower extremity support, Dynamic: supervision double lower extremity support    Standing - Firm Surface - Eyes Open: Static: supervision and contact guard/touching/steadying assist double upper extremity support  Dynamic: contact guard/touching/steadying assist double upper extremity support    Transfers:    Assistive devices: 2-wheeled walker, 1 person and gait belt    Sit to stand: supervision and contact guard/touching/steadying assist    Stand to sit: supervision and contact guard/touching/steadying assist    Stand pivot: contact guard/touching/steadying assist  Training completed:    Tasks: sit to  stand, stand to sit and stand pivot    Education details: patient safety and patient requires additional training  Gait/Ambulation:     Assistance: contact guard/touching/steadying assist   Assistive device: 2-wheeled walker, 1 person and gait belt    Distance (ft): 25    Pattern: step through    Type: decreased du    Surface: even and tile  Training Completed:    Tasks: gait training on level surfaces    Education details: patient safety and patient requires additional training    Pt was just walking out of BR upon entering room, sat in chair to rest, then was able to walk with writer around bed and back with 2WW, on RA, O2 sats 88-91%        Interventions                                                                                                       Seated    Lower Extremity: Bilateral: seated hip flexion, toe raises, heel raises, knee extensions, hip adduction and hip abduction, AROM, 10 reps, 1 sets  Training provided: activity tolerance, gait training, HEP training, safety training and transfer training    Skilled input: Verbal instruction/cues and tactile instruction/cues  Verbal Consent: Writer verbally educated and received verbal consent for hand placement, positioning of patient, and techniques to be performed today from patient         ASSESSMENT                                                                                                                  Visit # since seen by PT:  1    Patient is displaying fair progress as evidenced by pt completing mobility with some encouragement needed, had just been up in BR with student RN.  Pt completed transfers, gait around bed and back with walker and sitting HEP x10.  Pt on RA during mobility O2 sats 88-91%.  Pt tired after session in chair with feet elevated.  At this time the patient continues to demonstrate decreased strength, decreased activity tolerance, decreased endurance which is limiting the completion of all functional mobility.  Further  skilled physical therapy is required to address these limitations in attempt to maximize the patient's independence.         Discharge Recommendations  Recommendation for Discharge: PT WI: Sub-acute nursing home, 24 Hour assist             PT/OT Mobility Equipment for Discharge: has cane and ww      PT Identified Barriers to Discharge: mobility intolerance     Skilled therapy is required to address these limitations in attempt to maximize the patient's independence.  Progress: progressing toward goals    Pain at end of session: , location: denies pain    End of Session:   Location: in chair  Safety measures: alarm system in place/re-engaged, lines intact and call light within reach  Handoff to: nurse            PLAN                                                                                                                            Suggestions for next session as indicated: Progress gait, strengthening, mobility with use of ww vs cane, endurance progression, monitor BP as needed with activity    PT Frequency: 5 days/week         Interventions: safety education, patient/family training, functional transfer training, strengthening, gait training, balance, endurance training and HEP train/position  Agreement to plan and goals: patient agrees with goals and treatment plan        GOALS:  Review Date: 2/22/2021  Long Term Goals: (to be met by time of discharge from hospital)  Sit to supine: Patient will complete sit to supine modified independent.  Status: progressing/ongoing  Supine to sit: Patient will complete supine to sit modified independent.  Status: progressing/ongoing  Sit to stand: Patient will complete sit to stand transfer with 2-wheeled walker, modified independent.   Status: progressing/ongoing  Stand to sit: Patient will complete stand to sit transfer with 2-wheeled walker, modified independent.   Status: progressing/ongoing  Stand pivot: Patient will complete stand pivot transfer with 2-wheeled  walker, modified independent.   Status: progressing/ongoing  Ambulation (even): Patient will ambulate on even surface for 100 feet with 2-wheeled walker, modified independent.   Status: progressing/ongoing    Pt will manage 2 stairs to enter home with modified indep using UE support as available         sacrum

## 2023-03-11 NOTE — PATIENT PROFILE ADULT - FALL HARM RISK - HARM RISK INTERVENTIONS
Assistance with ambulation/Assistance OOB with selected safe patient handling equipment/Communicate Risk of Fall with Harm to all staff/Discuss with provider need for PT consult/Monitor for mental status changes/Monitor gait and stability/Move patient closer to nurses' station/Reinforce activity limits and safety measures with patient and family/Reorient to person, place and time as needed/Sit up slowly, dangle for a short time, stand at bedside before walking/Tailored Fall Risk Interventions/Toileting schedule using arm’s reach rule for commode and bathroom/Use of alarms - bed, chair and/or voice tab/Visual Cue: Yellow wristband and red socks/Bed in lowest position, wheels locked, appropriate side rails in place/Call bell, personal items and telephone in reach/Instruct patient to call for assistance before getting out of bed or chair/Non-slip footwear when patient is out of bed/[place] to call system/Physically safe environment - no spills, clutter or unnecessary equipment/Purposeful Proactive Rounding/Room/bathroom lighting operational, light cord in reach

## 2023-03-11 NOTE — PROGRESS NOTE ADULT - SUBJECTIVE AND OBJECTIVE BOX
Patient is a 62y old  Male who presents with a chief complaint of syncope, foot infection (11 Mar 2023 09:34)       INTERVAL HPI/OVERNIGHT EVENTS:  Patient seen and evaluated at bedside.  Pt is resting comfortable in NAD. Denies N/V/F/C.    Allergies    aspirin (Hives)  codeine (Rash)  codeine (Unknown)  Haldol (Unknown)  Motrin (Rash)  penicillin (Hives; Anaphylaxis)  Toradol (Rash)  Tylenol (Hives)    Intolerances        Vital Signs Last 24 Hrs  T(C): 35.7 (11 Mar 2023 09:02), Max: 37.2 (10 Mar 2023 23:03)  T(F): 96.2 (11 Mar 2023 09:02), Max: 98.9 (10 Mar 2023 23:03)  HR: 87 (11 Mar 2023 09:02) (69 - 94)  BP: 117/88 (11 Mar 2023 09:02) (98/65 - 132/66)  BP(mean): 78 (11 Mar 2023 03:32) (78 - 78)  RR: 17 (11 Mar 2023 09:02) (12 - 18)  SpO2: 98% (11 Mar 2023 09:02) (97% - 100%)    Parameters below as of 11 Mar 2023 09:02  Patient On (Oxygen Delivery Method): room air        LABS:                        8.0    13.61 )-----------( 121      ( 10 Mar 2023 18:10 )             25.9     03-10    141  |  106  |  61<H>  ----------------------------<  126<H>  4.2   |  22  |  4.08<H>    Ca    8.6      10 Mar 2023 18:10  Phos  5.5     03-10  Mg     1.80     03-10    TPro  6.3  /  Alb  3.6  /  TBili  0.4  /  DBili  x   /  AST  31  /  ALT  43<H>  /  AlkPhos  365<H>  03-10      Urinalysis Basic - ( 10 Mar 2023 18:29 )    Color: Yellow / Appearance: Slightly Turbid / S.014 / pH: x  Gluc: x / Ketone: Negative  / Bili: Negative / Urobili: <2 mg/dL   Blood: x / Protein: 100 mg/dL / Nitrite: Negative   Leuk Esterase: Large / RBC: >50 /HPF / WBC >50 /HPF   Sq Epi: x / Non Sq Epi: x / Bacteria: Many      CAPILLARY BLOOD GLUCOSE          Lower Extremity Physical Exam:    Vascular: DP/PT 2/4, B/L, CFT <3 seconds B/L, Temperature gradient warm to cool, B/L.   Neuro: Epicritic sensation unable to assess 2/2 to patient unresponsiveness, B/L.  Musculoskeletal/Ortho: no rakesh foot deformities noted  Skin:  Left foot submet 5 and sub cuboid  abrasions to dermis no pus, no drainage no malodor with left foot plantar forefoot hyperkeratotic skin. Right medial ankle abrasion with periwound erythema. Right foot punctate ulcer to right 1st digit distal tip. Right foot punctate abrasions also noted to dorsum of digits 1-3 at the level of IPJs with scant sanguinous drainage Right foot plantar medial heel abrasion to dermis with periwound erythema, no excess warmth, no pus, no drainage, no malodor, R foot latearl heel wound to subQ, leatherfibrotic wound bed, no drainage. Onychomycotic nails 1-5 bilaterally with left foot 1st digit broken nail.       RADIOLOGY & ADDITIONAL TESTS:

## 2023-03-11 NOTE — CONSULT NOTE ADULT - PROBLEM SELECTOR RECOMMENDATION 3
Pt with hyperphosphatemia in the setting of ESRD. Pt. on phosphate binders with meals. Serum phos is within target range at5.5 today. Low phosphorus diet. Monitor serum phosphorus.     Plan discussed with attending on call.     If you have any questions, please feel free to contact me  Fawad Sargent  Nephrology Fellow  [phone]/ [employer] Teams(Preferred)  (After 5pm or on weekends please page the on-call fellow).

## 2023-03-12 NOTE — PROGRESS NOTE ADULT - PROBLEM SELECTOR PLAN 12
eliquis  senna/miralax, dc flomax given SPC  renal diet  dc likely back to NH Eliquis  senna/miralax, dc flomax given SPC  renal diet  dc likely back to NH Eliquis  senna/miralax, dc flomax given SPC  renal diet  dc likely back to NH/[facility]

## 2023-03-12 NOTE — PROGRESS NOTE ADULT - SUBJECTIVE AND OBJECTIVE BOX
Podiatry pager #: [phone] ([facility])/ [phone] ([facility])    Patient is a 62y old  Male who presents with a chief complaint of syncope, foot infection (12 Mar 2023 07:57)       INTERVAL HPI/OVERNIGHT EVENTS:  Patient seen and evaluated at bedside.  Pt is resting comfortable in NAD. Denies N/V/F/C.     Allergies    aspirin (Hives)  codeine (Rash)  codeine (Unknown)  Haldol (Unknown)  Motrin (Rash)  penicillin (Hives; Anaphylaxis)  Toradol (Rash)  Tylenol (Hives)    Intolerances        Vital Signs Last 24 Hrs  T(C): 36.9 (11 Mar 2023 23:22), Max: 37.1 (11 Mar 2023 19:20)  T(F): 98.4 (11 Mar 2023 23:22), Max: 98.7 (11 Mar 2023 19:20)  HR: 104 (12 Mar 2023 09:37) (84 - 104)  BP: 111/59 (12 Mar 2023 09:37) (102/67 - 129/62)  BP(mean): --  RR: 18 (12 Mar 2023 09:37) (17 - 18)  SpO2: 99% (12 Mar 2023 09:37) (99% - 100%)    Parameters below as of 12 Mar 2023 09:37  Patient On (Oxygen Delivery Method): room air        LABS:                        7.7    7.20  )-----------( 117      ( 12 Mar 2023 06:31 )             25.6     03-12    140  |  103  |  36<H>  ----------------------------<  79  3.7   |  23  |  3.44<H>    Ca    9.1      12 Mar 2023 06:31  Phos  4.2     03-12  Mg     2.00     03-12    TPro  6.3  /  Alb  3.6  /  TBili  0.4  /  DBili  x   /  AST  31  /  ALT  43<H>  /  AlkPhos  365<H>  03-10      Urinalysis Basic - ( 10 Mar 2023 18:29 )    Color: Yellow / Appearance: Slightly Turbid / S.014 / pH: x  Gluc: x / Ketone: Negative  / Bili: Negative / Urobili: <2 mg/dL   Blood: x / Protein: 100 mg/dL / Nitrite: Negative   Leuk Esterase: Large / RBC: >50 /HPF / WBC >50 /HPF   Sq Epi: x / Non Sq Epi: x / Bacteria: Many      CAPILLARY BLOOD GLUCOSE          Lower Extremity Physical Exam:  Vascular: DP/PT 2/4, B/L, CFT <3 seconds B/L, Temperature gradient warm to cool, B/L.   Neuro: Epicritic sensation unable to assess 2/2 to patient unresponsiveness, B/L.  Musculoskeletal/Ortho: no rakesh foot deformities noted  Skin:  Left foot submet 5 and sub cuboid  abrasions to dermis no pus, no drainage no malodor with left foot plantar forefoot hyperkeratotic skin. Right medial ankle abrasion with periwound resolving erythema. Right foot punctate ulcer to right 1st digit distal tip. Right foot punctate abrasions also noted to dorsum of digits 1-3 at the level of IPJs with scant sanguinous drainage Right foot plantar medial heel abrasion to dermis, no erythema,  no excess warmth, no pus, no drainage, no malodor, R foot latearl heel wound to subQ, leatherfibrotic wound bed, no drainage. Onychomycotic nails 1-5 bilaterally with left foot 1st digit broken nail. RADIOLOGY & ADDITIONAL TESTS:

## 2023-03-12 NOTE — CHART NOTE - NSCHARTNOTEFT_GEN_A_CORE
Provider was notified Provider was notified that patient was agitated and pulling on nephrostomy tube and IV.  Patient was sen at bedside in no acute distress, noted self inflicted scratches on upper extremities.  Post HD on 3/11, VSS.  B/L unsecured mittens ordered, Zyprexa 2.5mg IM administered, will continue to monitor.

## 2023-03-12 NOTE — PROGRESS NOTE ADULT - PROBLEM SELECTOR PLAN 4
- pending wound care eval - pending wound care eval  - can c/w clinda for now until eval reportedly healed, currently not allowing eval 2/2 agitation  - pending wound care eval

## 2023-03-12 NOTE — PROGRESS NOTE ADULT - PROBLEM SELECTOR PLAN 10
- c/w spirivia  - oxygen prn to keep O2 sat 88-93% - c/w Spiriva  - oxygen prn to keep O2 sat 88-93%

## 2023-03-12 NOTE — CHART NOTE - NSCHARTNOTEFT_GEN_A_CORE
Patient urine culture growing >100 Patient urine culture with >100,000 cfu/ml staphylococcus aureus. Will dose vancomycin 1000mg x 1 dose. Plan d/w hospitalist in charge. Will monitor patient closely and f/u sensitivities.

## 2023-03-12 NOTE — PROGRESS NOTE ADULT - PROBLEM SELECTOR PLAN 9
Likely 2/2 ESRD and bleeding   - c/w PPI  - pt had hospitalization requiring transfusion and GI w/u (endoscopy) which were negative for acute bleed  - transfuse prn to keep Hgb>7

## 2023-03-12 NOTE — DISCHARGE NOTE PROVIDER - NSDCMRMEDTOKEN_GEN_ALL_CORE_FT
Artificial Tears ophthalmic solution: 1 drop(s) to each affected eye 4 times a day  ascorbic acid 500 mg oral tablet: 1 tab(s) orally once a day  atorvastatin 40 mg oral tablet: 1 tab(s) orally once a day (at bedtime)  baclofen 10 mg oral tablet: 1 tab(s) orally 3 times a day  Benadryl 25 mg oral tablet: 1 tab(s) orally Tuesday, Thursday, Saturday  Dulcolax Laxative 10 mg rectal suppository: 1 suppository(ies) rectal once a day, As Needed  Eliquis 5 mg oral tablet: 1 tab(s) orally 2 times a day  Keppra 250 mg oral tablet: 1 tab(s) orally Tuesday, Thursday, Saturday  Keppra 500 mg oral tablet: 1 tab(s) orally once a day  Metoprolol Succinate ER 50 mg oral tablet, extended release: 1 tab(s) orally once a day  MiraLax oral powder for reconstitution: 17 gram(s) orally once a day, As Needed  omeprazole 20 mg oral delayed release capsule: 1 cap(s) orally once a day  Aster-Magalie oral tablet: 1 tab(s) orally once a day  Renvela 800 mg oral tablet: 2 tab(s) orally 3 times a day (with meals)  Senna 8.6 mg oral tablet: 2 tab(s) orally once a day (at bedtime)  Spiriva Respimat 1.25 mcg/inh inhalation aerosol: 2 puff(s) inhaled once a day  Synthroid 112 mcg (0.112 mg) oral tablet: 1 tab(s) orally once a day  tamsulosin 0.4 mg oral capsule: 2 cap(s) orally once a day (at bedtime)  traZODone 50 mg oral tablet: 0.5 tab(s) orally once a day (at bedtime)  Vitamin D3 25 mcg (1000 intl units) oral capsule: 1 cap(s) orally once a day   Artificial Tears ophthalmic solution: 1 drop(s) to each affected eye 4 times a day  ascorbic acid 500 mg oral tablet: 1 tab(s) orally once a day  atorvastatin 40 mg oral tablet: 1 tab(s) orally once a day (at bedtime)  baclofen 10 mg oral tablet: 1 tab(s) orally 3 times a day  Benadryl 25 mg oral tablet: 1 tab(s) orally Tuesday, Thursday, Saturday  Dulcolax Laxative 10 mg rectal suppository: 1 suppository(ies) rectal once a day, As Needed  Eliquis 5 mg oral tablet: 1 tab(s) orally 2 times a day  Keppra 250 mg oral tablet: 250mg after dialysis (three times per week)  Keppra 500 mg oral tablet: 1 tab(s) orally once a day  Metoprolol Succinate ER 50 mg oral tablet, extended release: 1 tab(s) orally once a day  MiraLax oral powder for reconstitution: 17 gram(s) orally once a day, As Needed  omeprazole 20 mg oral delayed release capsule: 1 cap(s) orally once a day  Aster-Magalie oral tablet: 1 tab(s) orally once a day  Renvela 800 mg oral tablet: 2 tab(s) orally 3 times a day (with meals)  Senna 8.6 mg oral tablet: 2 tab(s) orally once a day (at bedtime)  Spiriva Respimat 1.25 mcg/inh inhalation aerosol: 2 puff(s) inhaled once a day  Synthroid 112 mcg (0.112 mg) oral tablet: 1 tab(s) orally once a day  tamsulosin 0.4 mg oral capsule: 2 cap(s) orally once a day (at bedtime)  traZODone 50 mg oral tablet: 0.5 tab(s) orally once a day (at bedtime)  Vitamin D3 25 mcg (1000 intl units) oral capsule: 1 cap(s) orally once a day   ascorbic acid 500 mg oral tablet: 1 tab(s) orally once a day  atorvastatin 40 mg oral tablet: 1 tab(s) orally once a day (at bedtime)  baclofen: 2.5mg 1 tab(s) orally once a day  bisacodyl 10 mg rectal suppository: 1 suppository(ies) rectal once a day, As needed, Constipation  cholecalciferol oral tablet: 1000 unit(s) orally once a day  clobetasol 0.05% topical ointment: 1 application topically 2 times a day stop on 3/26   clonazePAM 0.5 mg oral tablet: 1 tab(s) orally 2 times a day  diphenhydrAMINE 50 mg oral capsule: 1 cap(s) orally once a day, As needed, 1 hour before dialysis  Eliquis 5 mg oral tablet: 1 tab(s) orally 2 times a day  epoetin marilin: 4000 unit(s) intravenous 3 times a week with dialysis   lactobacillus acidophilus oral capsule: 1 tab(s) orally 3 times a day  lactulose 10 g/15 mL oral syrup: 30 milliliter(s) orally every 6 hours, As needed, constipation  levETIRAcetam 250 mg oral tablet: 1 tab(s) orally 3 times a week  tue/thrus/sat at 6pm  levETIRAcetam 500 mg oral tablet: 1 tab(s) orally once a day  levothyroxine 100 mcg (0.1 mg) oral tablet: 1 tab(s) orally once a day  melatonin 3 mg oral tablet: 1 tab(s) orally once a day (at bedtime), As needed, Insomnia  metoprolol succinate 50 mg oral tablet, extended release: 1 tab(s) orally once a day  multivitamin: 1 tab(s) orally once a day  mupirocin 2% topical ointment: 1 application topically 2 times a day  ocular lubricant ophthalmic solution: 1 drop(s) to each affected eye 4 times a day  pantoprazole 40 mg oral delayed release tablet: 1 tab(s) orally once a day (before a meal)  polyethylene glycol 3350 oral powder for reconstitution: 17 gram(s) orally once a day  senna leaf extract oral tablet: 2 tab(s) orally once a day (at bedtime)  sevelamer carbonate 800 mg oral tablet: 2 tab(s) orally 3 times a day (with meals)  tiotropium 2.5 mcg/inh inhalation aerosol: 2 puff(s) inhaled once a day  traZODone: 25mg 1 tab(s) orally once a day (at bedtime)   ascorbic acid 500 mg oral tablet: 1 tab(s) orally once a day  atorvastatin 40 mg oral tablet: 1 tab(s) orally once a day (at bedtime)  baclofen: 1 tab(s) orally 2 times a day  bisacodyl 10 mg rectal suppository: 1 suppository(ies) rectal once a day, As needed, Constipation  cholecalciferol oral tablet: 1000 unit(s) orally once a day  clobetasol 0.05% topical ointment: 1 application topically 2 times a day stop on 3/26   clonazePAM 0.5 mg oral tablet: 1 tab(s) orally 2 times a day  diphenhydrAMINE 50 mg oral capsule: 1 cap(s) orally once a day, As needed, 1 hour before dialysis  Eliquis 5 mg oral tablet: 1 tab(s) orally 2 times a day  epoetin marilin: 4000 unit(s) intravenous 3 times a week with dialysis   lactobacillus acidophilus oral capsule: 1 tab(s) orally 3 times a day  lactulose 10 g/15 mL oral syrup: 30 milliliter(s) orally every 6 hours, As needed, constipation  levETIRAcetam 250 mg oral tablet: 1 tab(s) orally 3 times a week  tue/thrus/sat after dialysis.   levETIRAcetam 500 mg oral tablet: 1 tab(s) orally once a day  levothyroxine 100 mcg (0.1 mg) oral tablet: 1 tab(s) orally once a day  melatonin 3 mg oral tablet: 1 tab(s) orally once a day (at bedtime), As needed, Insomnia  metoprolol succinate 50 mg oral tablet, extended release: 1 tab(s) orally once a day  multivitamin: 1 tab(s) orally once a day  mupirocin 2% topical ointment: 1 application topically 2 times a day  ocular lubricant ophthalmic solution: 1 drop(s) to each affected eye 4 times a day  pantoprazole 40 mg oral delayed release tablet: 1 tab(s) orally once a day (before a meal)  polyethylene glycol 3350 oral powder for reconstitution: 17 gram(s) orally once a day  senna leaf extract oral tablet: 2 tab(s) orally once a day (at bedtime)  sevelamer carbonate 800 mg oral tablet: 2 tab(s) orally 3 times a day (with meals)  tiotropium 2.5 mcg/inh inhalation aerosol: 2 puff(s) inhaled once a day  traZODone: 25mg 1 tab(s) orally once a day (at bedtime)

## 2023-03-12 NOTE — PROGRESS NOTE ADULT - ASSESSMENT
62 M w Bilateral foot abrasions all to dermis  -Patient seen and evaluated  - Afebrile, no leukocytosis  - Left foot submet 5 and sub cuboid  abrasions to dermis no pus, no drainage no malodor with left foot plantar forefoot hyperkeratotic skin. Right medial ankle abrasion with periwound resolving erythema. Right foot punctate ulcer to right 1st digit distal tip. Right foot punctate abrasions also noted to dorsum of digits 1-3 at the level of IPJs with scant sanguinous drainage Right foot plantar medial heel abrasion to dermis, no erythema,  no excess warmth, no pus, no drainage, no malodor, R foot latearl heel wound to subQ, leatherfibrotic wound bed, no drainage. Onychomycotic nails 1-5 bilaterally with left foot 1st digit broken nail.   -Right foot xray: No radiographic evidence for osteomyelitis  - AICD interrogated 3/11  - Please continue with daily mupirocin application.  - Please have patient wear bilateral Z-flow to bilateral lower extremities at all times while in bed to offload heels  - Patient stable for discharge from a podiatry standpoint  - No further podiatric intervention needed  - follow up info in Discharge provider note under follow up  - -Please reconsult as needed  - Discussed with attending

## 2023-03-12 NOTE — PROGRESS NOTE ADULT - PROBLEM SELECTOR PLAN 7
- c/w Keppra 500mg qd and 250mg additional on dialysis days - c/w Keppra 500mg qd and 250mg additional on dialysis days; refusing meds, IV if refuses PO

## 2023-03-12 NOTE — PROGRESS NOTE ADULT - PROBLEM SELECTOR PLAN 8
hx Cardiomyopathy, CAD with PCI, HD with fluid removal as tolerated, on admission, pro-; TTE Sept/2022 with EF 45%  - CXR clear  - f/u TTE  - on tele Presume ICM due to, CAD with PCI, last EF 45%  - HD with fluid removal as tolerated  - CXR clear  - f/u TTE  - on tele Presume ICM due to, CAD with PCI, last EF 45%  - HD with fluid removal as tolerated  - CXR clear, pro-BNP 400s  - f/u TTE  - on tele

## 2023-03-12 NOTE — PROGRESS NOTE ADULT - PROBLEM SELECTOR PLAN 3
foot abrasions all to dermis  - WBC normalized 13-->7  - c/w mupirocin and heel elevators  - R foot x-ray: neg for OM  - local wound care: apply mupirocin to right foot wounds followed by 4x4 gauze, ABD and mckayla, daily; apply mupirocin to plantar wounds followed by 4x4 gauze, ABD and mckayla daily.  - podiatry saw, signed off, cleared for dc foot abrasions all to dermis  - WBC normalized 13-->7  - c/w mupirocin and heel elevators  - R foot x-ray: neg for OM  - local wound care: apply mupirocin to right foot wounds followed by 4x4 gauze, ABD and mckayla, daily; apply mupirocin to plantar wounds followed by 4x4 gauze, ABD and mckayla daily.  - podiatry saw, signed off, cleared for dc  - s/p clinda no mention of concern for infection, dc abx and monitor foot abrasions all to dermis  - WBC normalized 13-->7  - c/w mupirocin and heel elevators  - R foot x-ray: neg for OM  - local wound care: apply mupirocin to right foot wounds followed by 4x4 gauze, ABD and mckayla, daily; apply mupirocin to plantar wounds followed by 4x4 gauze, ABD and mckayla daily.  - podiatry consulted, signed off, cleared for dc  - s/p clinda no mention of concern for infection, dc abx and monitor

## 2023-03-12 NOTE — DISCHARGE NOTE PROVIDER - NSFOLLOWUPCLINICS_GEN_ALL_ED_FT
[facility]  Behavioral Health  [address] [place] [address]  Phone: [phone]  Fax:   Follow Up Time: 2 weeks

## 2023-03-12 NOTE — DISCHARGE NOTE PROVIDER - HOSPITAL COURSE
62M sent from [facility] with prior opiod dependence, HTN, HLD, hypothyroid, Hep C, h/o substance abuse, COPD/BARRY on O2 qhs (no CPAP), CVA 4/2021, seizures, Afib s/p ablation on eliquis, CAD s/p multiple PCI, HFpEF (EF 45% in 9/22), AICD in 2018, Bladder CA s/p CTX/Radiation/TURBT, RCC s/p CTX/Radiation/Partial Nephrectomy, Prostate CA s/p Radiation therapy, neurogenic bladder with SPC, recent ESRD on HD (TTS) via permcath,  presents with episode of syncope/LOC and bleeding from R foot ulcer due to slamming his feet on things.    Anemia likely 2/2 ESRD and bleeding/acute blood loss from foot wound. c/w PPI. Pt had hospitalization requiring transfusion and GI w/u (endoscopy) which were negative for acute bleed, s/p PRBC, anemia stabilized.    Syncope, reportedly syncopized at the sight of blood from right foot ulcer; per chart review many prior ED visits for "passing out" and L flank pain of unclear etiology. ICD interrogated on 3/11, d/w 35900, state no ICD firing noted on interrogation --> d/w EP device is pacing and sensing well. Tele SR with PVCs - d/c tele. Defer head CT as clinically stable. HsT 101-->86-->116, Pt denies CP. TTE: Pt not cooperative with echo  no other clear etiology, Pt did report large amount of blood.    Agitation, severe agitation suspect delirium in Pt with multiple risk factors; prior CTH suggestive of TBI. TSH <0.1, hold levothyroxine, FT4 1.6. Ativan PRN ordered, but not been requiring.    ESRD (end stage renal disease) on dialysis(TTS) - renal following. Iron studies c/w AOCD. C/w sevelamer 1600 TID, renal diet, vit D. Indwelling SPC, UA positive MRSA, likely colonization  hyperphos - on Renvela. MRSA nasal swab - mupirocin nasal oint 5D. c/w PO Benadryl 1-2h pre HD, d/w pt if develop sx during HD can reassess.    Abrasion, foot, all to dermis, WBC normalized 13-->7. c/w mupirocin and heel elevators. R foot x-ray negative for OM. Local wound care: apply mupirocin to right foot wounds followed by 4x4 gauze, ABD and mckayla, daily; apply mupirocin to plantar wounds followed by 4x4 gauze, ABD and mckayla daily. Podiatry consulted, signed off, cleared for dc. s/p clinda no mention of concern for infection, dc abx and monitor.    -Sacral wound, looks like healed wound, slight maceration from moisture - appreciate WC input - WC as rec.  -Atrial fibrillation, hx of ablation, c/w Eliquis 5 mg BID.  -Seizure disorder: c/w Keppra 500mg qd and 250mg additional on dialysis days T/Th/Sat.  -Chronic congestive heart failure, presumed ICM due to CAD with PCI, last EF 45%, HD with fluid removal as tolerated, CXR clear, pro-BNP 400s, not cooperative with echo, tele SR with PVCs.  -Chronic obstructive pulmonary disease (COPD): c/w Spiriva. Oxygen prn to keep O2 sat 88-93%.  -Hypothyroid was on levothyroxine, holding for suppressed: TSH, FT4 1.6.    Case discussed with Dr. Olmos, labs/vitals/medications, Pt medically cleared for discharge to home     62M sent from [facility], functional quadriplegia after stroke, HTN, HLD, hypothyroid, Hep C, h/o substance abuse, COPD/BARRY on O2 qhs (no CPAP), CVA 4/2021, seizures, Afib s/p ablation on eliquis, CAD s/p multiple PCI, HFpEF (EF 45% in 9/22), AICD in 2018, Bladder CA s/p CTX/Radiation/TURBT, RCC s/p CTX/Radiation/Partial Nephrectomy, Prostate CA s/p Radiation therapy, neurogenic bladder with SPC, ESRD on HD since January 2023 (TTS) via permcath,  presents with episode of syncope/LOC and bleeding from R foot ulcer due to slamming his feet on things.    Anemia likely 2/2 ESRD and bleeding/acute blood loss from foot wound. (Pt reportedly had previous hospitalization requiring transfusion and GI w/u (endoscopy) which were negative for acute bleed.)   Active bleeding from foot, abated on discharge. Hb 8.0 on admission, down to 7.0. He received total of 2 units PRBCs and Hb stabilized (Hb about 9). There was no evidence of active bleeding during admission.     Abrasion, foot, all to dermis, WBC normalized 13-->7. c/w mupirocin and heel elevators. R foot x-ray negative for OM. Local wound care: apply mupirocin to right foot wounds followed by 4x4 gauze, ABD and mckayla, daily; apply mupirocin to plantar wounds followed by 4x4 gauze, ABD and mckayla daily. Podiatry consulted, signed off, cleared for dc. s/p clinda no mention of concern for infection, dc abx and monitor.    Syncope, reportedly syncopized at the sight of blood from right foot ulcer; per chart review many prior ED visits for "passing out" and L flank pain of unclear etiology. ICD interrogated on 3/11 -> d/w EP device is pacing and sensing well. Tele SR with PVCs.  HsT 101-->86-->116 likely elevated d/t CKD. Pt declined head CT and echo.    Agitation, severe agitation suspect delirium in Pt with multiple risk factors; prior CTH suggestive of TBI. Required meds in ED, calm after.     ESRD on dialysis(TTS) - renal following. Iron studies c/w AOCD. C/w sevelamer 1600 TID, renal diet, vit D.   Indwelling SPC, UA positive MRSA, likely colonization; MRSA nasal swab - mupirocin nasal oint 5D.   hyperphos - on Renvela.   c/w PO Benadryl 1-2h pre HD, d/w pt if develop sx during HD can reassess.    Sacral wound, looks like healed wound, slight maceration from moisture - WC as rec.  Atrial fibrillation, hx of ablation, c/w Eliquis 5 mg BID.  Seizure disorder: c/w Keppra 500mg qd and 250mg additional on dialysis days T/Th/Sat.  Chronic congestive heart failure, presumed ICM due to CAD with PCI, last EF 45%, HD with fluid removal as tolerated, CXR clear, pro-BNP 400s, not cooperative with echo, tele SR with PVCs.    Hypothyroid was on [id number], TSH <.01, FT4 1.2 --> decreased levothyroxine to 100 qd, f/u TFTs outpt       62M sent from [facility], functional quadriplegia after stroke, HTN, HLD, hypothyroid, Hep C, h/o substance abuse, COPD/BARRY on O2 qhs (no CPAP), CVA 4/2021, seizures, Afib s/p ablation on eliquis, CAD s/p multiple PCI, HFpEF (EF 45% in 9/22), AICD in 2018, Bladder CA s/p CTX/Radiation/TURBT, RCC s/p CTX/Radiation/Partial Nephrectomy, Prostate CA s/p Radiation therapy, neurogenic bladder with SPC, ESRD on HD since January 2023 (TTS) via permcat,  presents with episode of syncope/LOC and bleeding from R foot ulcer due to slamming his feet on things.    Anemia likely 2/2 ESRD and bleeding/acute blood loss from foot wound. (Pt reportedly had previous hospitalization requiring transfusion and GI w/u (endoscopy) which were negative for acute bleed.)   Active bleeding from foot, abated on discharge. Hb 8.0 on admission, down to 7.0. He received total of 2 units PRBCs and Hb stabilized (Hb about 9). There was no evidence of active bleeding during admission.     Abrasion, foot, all to dermis, WBC normalized 13-->7. c/w mupirocin and heel elevators. R foot x-ray negative for OM. Local wound care: apply mupirocin to right foot wounds followed by 4x4 gauze, ABD and mckayla, daily; apply mupirocin to plantar wounds followed by 4x4 gauze, ABD and mckayla daily. Podiatry consulted, signed off, cleared for dc. s/p clinda no mention of concern for infection, dc abx and monitor.    Syncope, reportedly syncopized at the sight of blood from right foot ulcer; per chart review many prior ED visits for "passing out" and L flank pain of unclear etiology. ICD interrogated on 3/11 -> d/w EP device is pacing and sensing well. Tele SR with PVCs.  HsT 101-->86-->116 likely elevated d/t CKD. Pt declined head CT and echo.    Agitation, severe agitation suspect delirium in Pt with multiple risk factors; prior CTH suggestive of TBI. Required meds in ED. Later pt reporting feeling depressed and anxious. Seen by psychiatry, started on low dose Klonopin.     ESRD on dialysis(TTS) - renal following. Iron studies c/w AOCD. C/w sevelamer 1600 TID, renal diet, vit D.   Indwelling SPC, UA positive MRSA, likely colonization; MRSA nasal swab - mupirocin nasal oint 5D.   hyperphos - on Renvela.   c/w PO Benadryl 1-2h pre HD, d/w pt if develop sx during HD can reassess.    Sacral wound, looks like healed wound, slight maceration from moisture - WC as rec.  Atrial fibrillation, hx of ablation, c/w Eliquis 5 mg BID.  Seizure disorder: c/w Keppra 500mg qd and 250mg additional on dialysis days T/Th/Sat.  Chronic congestive heart failure, presumed ICM due to CAD with PCI, last EF 45%, HD with fluid removal as tolerated, CXR clear, pro-BNP 400s, not cooperative with echo, tele SR with PVCs.    Hypothyroid was on [id number], TSH <.01, FT4 1.2 --> decreased levothyroxine to 100 qd, f/u TFTs outpt

## 2023-03-12 NOTE — PROGRESS NOTE ADULT - PROBLEM SELECTOR PLAN 6
HD (TTS) as scheduled  - renal following for HD  - lytes acceptable  - iron studies c/w AOCD  - c/w sevelamer 1600 TID, renal diet, vit D  - indwelling SPC, UA positive likely colonization HD (TTS), recent   - renal following for HD  - lytes acceptable  - iron studies c/w AOCD  - c/w sevelamer 1600 TID, renal diet, vit D  - indwelling SPC, UA positive likely colonization

## 2023-03-12 NOTE — DISCHARGE NOTE PROVIDER - NSDCFUADDAPPT_GEN_ALL_CORE_FT
Podiatry Discharge Instructions:  Follow up: Please follow up with Dr. Dayron Lira within 1 week of discharge from the hospital, please call [phone] for appointment and discuss that you recently were seen in the hospital.  Wound Care: Please apply mupirocin to bilateral abrasions follow by 4x4 gauze, abd and mckayla daily.   Weight bearing: Please wear bilateral zflows to offload heels while in bed. Please weight bear as tolerated to right foot in a surgical shoe.  Antibiotics: Please continue as instructed. Podiatry Discharge Instructions:  Follow up: Please follow up with Dr. Dayron Lira within 1 week of discharge from the hospital, please call [phone] for appointment and discuss that you recently were seen in the hospital.  Wound Care: Please apply mupirocin to bilateral abrasions follow by 4x4 gauze, abd and mckayla daily.   Weight bearing: Please wear bilateral zflows to offload heels while in bed. Please weight bear as tolerated to right foot in a surgical shoe.  Antibiotics: Please continue as instructed.    2. It will be important for you to follow up with psychiatry for likely depression and anxiety. You may see a psychiatrist at [facility]. You may follow up at [facility] as needed.

## 2023-03-12 NOTE — PROGRESS NOTE ADULT - SUBJECTIVE AND OBJECTIVE BOX
Called to see patient for agitation. Nurse states that patient is becoming more agitated  and yelling, threatening staff . Patient also kicking his feet against bedrails.  Psych called for consult. Recommendations for Zyprexa. Patient placed on 1:1 observation and restraints ordered. Psych to follow up on consult. Continue to monitor

## 2023-03-12 NOTE — DISCHARGE NOTE PROVIDER - PROVIDER TOKENS
FREE:[LAST:[Silver],FIRST:[Dayron],PHONE:[(   )    -],FAX:[(   )    -],ADDRESS:[Podiatry],FOLLOWUP:[1 week]],FREE:[LAST:[PCP],PHONE:[(   )    -],FAX:[(   )    -],FOLLOWUP:[1 week]] PROVIDER:[TOKEN:[[id number]]]

## 2023-03-12 NOTE — CHART NOTE - NSCHARTNOTEFT_GEN_A_CORE
Patient presented with AMS, syncope, open foot wound. Had been behaviorally doing ok, till this morning when worsening aggression. Psychiatry called for prn reccs    Discussed case with ASAD Jimenez. Reviewed chart.     Resident MD Crespo briefly saw the patient- sleeping in [facility]. Zyprexa 2.5mg + 5mg IM had been given prior. Restraints+    CBC Full  -  ( 12 Mar 2023 06:31 )  WBC Count : 7.20 K/uL  RBC Count : 2.83 M/uL  Hemoglobin : 7.7 g/dL  Hematocrit : 25.6 %  Platelet Count - Automated : 117 K/uL  Mean Cell Volume : 90.5 fL  Mean Cell Hemoglobin : 27.2 pg  Mean Cell Hemoglobin Concentration : 30.1 gm/dL  Auto Neutrophil # : x  Auto Lymphocyte # : x  Auto Monocyte # : x  Auto Eosinophil # : x  Auto Basophil # : x  Auto Neutrophil % : x  Auto Lymphocyte % : x  Auto Monocyte % : x  Auto Eosinophil % : x  Auto Basophil % : x  03-12    140  |  103  |  36<H>  ----------------------------<  79  3.7   |  23  |  3.44<H>    Ca    9.1      12 Mar 2023 06:31  Phos  4.2     03-12  Mg     2.00     03-12    jct====514    TPro  6.3  /  Alb  3.6  /  TBili  0.4  /  DBili  x   /  AST  31  /  ALT  43<H>  /  AlkPhos  365<H>  03-10      Recommendations  - Continue 1:1CO. Use restraints if needed  - Delirium work up--- TSH, vitamin b12 level, folate level, UA/UC.   - Coordinate with NH, family  - Melatonin 3mg q 8PM PO  - Assess cause for elevated troponins.     - ---------IF ANTIPSYCHPRN FOR COMBATIVE BEHAVIORS---Zyprexa 5mg q 6hrs prn IM/PO. If behaviors not improved in 30mins, can give an additional 2.5mg IM.     - Monitor qtc. Patient presented with AMS, syncope, open foot wound. Had been behaviorally doing ok, till this morning when worsening aggression. Psychiatry called for prn reccs    Discussed case with ASAD Jimenez. Reviewed chart.     Resident MD Crespo briefly saw the patient- sleeping in [facility]. Zyprexa 2.5mg + 5mg IM had been given prior. Restraints+    CBC Full  -  ( 12 Mar 2023 06:31 )  WBC Count : 7.20 K/uL  RBC Count : 2.83 M/uL  Hemoglobin : 7.7 g/dL  Hematocrit : 25.6 %  Platelet Count - Automated : 117 K/uL  Mean Cell Volume : 90.5 fL  Mean Cell Hemoglobin : 27.2 pg  Mean Cell Hemoglobin Concentration : 30.1 gm/dL  Auto Neutrophil # : x  Auto Lymphocyte # : x  Auto Monocyte # : x  Auto Eosinophil # : x  Auto Basophil # : x  Auto Neutrophil % : x  Auto Lymphocyte % : x  Auto Monocyte % : x  Auto Eosinophil % : x  Auto Basophil % : x  03-12    140  |  103  |  36<H>  ----------------------------<  79  3.7   |  23  |  3.44<H>    Ca    9.1      12 Mar 2023 06:31  Phos  4.2     03-12  Mg     2.00     03-12    TPro  6.3  /  Alb  3.6  /  TBili  0.4  /  DBili  x   /  AST  31  /  ALT  43<H>  /  AlkPhos  365<H>  03-10      Recommendations  - Continue 1:1CO. Use restraints if needed  - Delirium work up--- TSH, vitamin b12 level, folate level, UA/UC.   - Coordinate with NH, family  - Melatonin 3mg q 8PM PO  - Troponin elevating, recent qtc 502. Concerns that his AICD might have fired prior to admission as per discussion with medicine team.  - Considering above, will recommend Ativan PRN for now FOR COMBATIVE BEHAVIORS====Can give Ativan 1mg q 6hrs prn IM/IV/PO----HOLD FOR RR<12, LETHARGY  - ** PLEASE NOTE THAT PARENTERAL ATIVAN CAN ONLY BE GIVEN AFTER 2HRS FROM LAST IM ZYPREXA----INCREASED RISK FOR RESPIRATORY DEPRESSION  - Monitor qtc    Above discussed with asad Garza f/u [place] Patient presented with AMS, syncope, open foot wound. Had been behaviorally doing ok, till this morning when worsening aggression. Psychiatry called for prn reccs    Discussed case with ASAD Jimenez. Reviewed chart.     Resident MD Crespo briefly saw the patient- sleeping in [facility]. Zyprexa 2.5mg + 5mg IM had been given prior. Restraints+    CBC Full  -  ( 12 Mar 2023 06:31 )  WBC Count : 7.20 K/uL  RBC Count : 2.83 M/uL  Hemoglobin : 7.7 g/dL  Hematocrit : 25.6 %  Platelet Count - Automated : 117 K/uL  Mean Cell Volume : 90.5 fL  Mean Cell Hemoglobin : 27.2 pg  Mean Cell Hemoglobin Concentration : 30.1 gm/dL  Auto Neutrophil # : x  Auto Lymphocyte # : x  Auto Monocyte # : x  Auto Eosinophil # : x  Auto Basophil # : x  Auto Neutrophil % : x  Auto Lymphocyte % : x  Auto Monocyte % : x  Auto Eosinophil % : x  Auto Basophil % : x  03-12    140  |  103  |  36<H>  ----------------------------<  79  3.7   |  23  |  3.44<H>    Ca    9.1      12 Mar 2023 06:31  Phos  4.2     03-12  Mg     2.00     03-12    TPro  6.3  /  Alb  3.6  /  TBili  0.4  /  DBili  x   /  AST  31  /  ALT  43<H>  /  AlkPhos  365<H>  03-10      Recommendations  - Continue 1:1CO. Use restraints if needed  - Delirium work up--- TSH, vitamin b12 level, folate level, UA/UC.   - Coordinate with NH, family  - Melatonin 3mg q 8PM PO  - Troponin elevating, recent qtc 502. Concerns that his AICD might have fired prior to admission as per discussion with medicine team.  - Considering above, will recommend Ativan PRN for now FOR COMBATIVE BEHAVIORS====Can give Ativan 1mg q 6hrs prn IM/IV/PO----HOLD FOR RR<12, LETHARGY  - ** PLEASE NOTE THAT PARENTERAL ATIVAN CAN ONLY BE GIVEN AFTER 2HRS FROM LAST IM ZYPREXA----INCREASED RISK FOR RESPIRATORY DEPRESSION  - Monitor qtc    Above discussed with asad ASHBY Formal consultation will be done tomorrow 3/13

## 2023-03-12 NOTE — PROGRESS NOTE ADULT - SUBJECTIVE AND OBJECTIVE BOX
Patient is a 62y old  Male who presents with a chief complaint of syncope, foot infection    SUBJECTIVE / OVERNIGHT EVENTS:    Patient is irrational and agitated, yelling  denies any pain, denies CP, SOB, nausea/vomiting  "I want to get the f**k out of here", when asked where he plans to go states "home" when asked where home is he is unable to respond  patient hard to redirect, often yelling again within a few min     was hitting and spitting at staff     MEDICATIONS  (STANDING):  apixaban 5 milliGRAM(s) Oral two times a day  artificial  tears Solution 1 Drop(s) Both EYES four times a day  ascorbic acid 500 milliGRAM(s) Oral daily  atorvastatin 40 milliGRAM(s) Oral at bedtime  baclofen 2.5 milliGRAM(s) Oral every 12 hours  chlorhexidine 2% Cloths 1 Application(s) Topical daily  cholecalciferol 1000 Unit(s) Oral daily  clindamycin IVPB 600 milliGRAM(s) IV Intermittent every 8 hours  lactobacillus acidophilus 1 Tablet(s) Oral three times a day with meals  levETIRAcetam 250 milliGRAM(s) Oral <User Schedule>  levETIRAcetam 500 milliGRAM(s) Oral daily  levothyroxine 112 MICROGram(s) Oral daily  metoprolol succinate ER 50 milliGRAM(s) Oral daily  mupirocin 2% Ointment 1 Application(s) Topical two times a day  Nephro-mirna 1 Tablet(s) Oral daily  pantoprazole    Tablet 40 milliGRAM(s) Oral before breakfast  polyethylene glycol 3350 17 Gram(s) Oral daily  senna 2 Tablet(s) Oral at bedtime  sevelamer carbonate 1600 milliGRAM(s) Oral three times a day with meals  tamsulosin 0.8 milliGRAM(s) Oral at bedtime  tiotropium 2.5 MICROgram(s) Inhaler 2 Puff(s) Inhalation daily  traZODone 25 milliGRAM(s) Oral at bedtime    MEDICATIONS  (PRN):  bisacodyl Suppository 10 milliGRAM(s) Rectal daily PRN Constipation  melatonin 3 milliGRAM(s) Oral at bedtime PRN Insomnia  OLANZapine Injectable 5 milliGRAM(s) IntraMuscular every 6 hours PRN agitation  ondansetron Injectable 4 milliGRAM(s) IV Push every 8 hours PRN Nausea and/or Vomiting  oxyCODONE    IR 5 milliGRAM(s) Oral every 6 hours PRN moderate to severe pain  sodium chloride 0.9% Bolus. 200 milliLiter(s) IV Bolus once PRN SBP LESS THAN or EQUAL to 90 mmHg    T(C): 36.9 (23 @ 23:22), Max: 37.1 (23 @ 19:20)  HR: 104 (23 @ 09:37) (84 - 104)  BP: 111/59 (23 @ 09:37) (102/67 - 129/62)  RR: 18 (23 @ 09:37) (18 - 18)  SpO2: 99% (23 @ 09:37) (99% - 99%)    I&O's Summary    11 Mar 2023 06:01  -  12 Mar 2023 07:00  --------------------------------------------------------  IN: 400 mL / OUT: 2200 mL / NET: -1800 mL    PHYSICAL EXAM:  GENERAL: NAD  CHEST/LUNG: Clear to auscultation bilaterally anteriorly; No wheeze  HEART: Regular rate and rhythm; No murmurs, rubs, or gallops  ABDOMEN: Soft, Nontender, Nondistended; Bowel sounds present  EXTREMITIES:   no edema  PSYCH: will not cooperate with orientation questions, agitated  NEUROLOGY: moving all ext, will no cooperate  SKIN: b/l heels wrapped c/d/i  + permcat     LABS:                        7.7    7.20  )-----------( 117      ( 12 Mar 2023 06:31 )             25.6     03-12    140  |  103  |  36<H>  ----------------------------<  79  3.7   |  23  |  3.44<H>    Ca    9.1      12 Mar 2023 06:31  Phos  4.2     03-12  Mg     2.00     03-12    TPro  6.3  /  Alb  3.6  /  TBili  0.4  /  DBili  x   /  AST  31  /  ALT  43<H>  /  AlkPhos  365<H>  03-10    Urinalysis Basic - ( 10 Mar 2023 18:29 )  Color: Yellow / Appearance: Slightly Turbid / S.014 / pH: x  Gluc: x / Ketone: Negative  / Bili: Negative / Urobili: <2 mg/dL   Blood: x / Protein: 100 mg/dL / Nitrite: Negative   Leuk Esterase: Large / RBC: >50 /HPF / WBC >50 /HPF   Sq Epi: x / Non Sq Epi: x / Bacteria: Many    Microbiology: Culture Results:   No growth to date. (03-10 @ 18:45)  Culture Results:   No growth to date. (03-10 @ 17:55)  Culture Results:   Culture in progress (03-10 @ 17:55)    VBG  @ 11:36  pH: 7.29/pCO2: 45/pO2: 34/HCO3: 22/lactate: 1.4    Care Discussed with Consultants/Other Providers:

## 2023-03-12 NOTE — PROGRESS NOTE ADULT - PROBLEM SELECTOR PLAN 1
Severe agitation suspect delrium in pt with multiple risk factors (prior CVA, multiple comorbities)   - s/p Zyprexa, c/w Zyprexa 5 mg q6h prn   - psych recs appreciated  - requiring restraints and 1:1 for safety--hitting feet on rails, spitting at staff Severe agitation suspect delirium in pt with multiple risk factors  - s/p Zyprexa, c/w Zyprexa 5 mg q6h prn   - psych recs appreciated  - check CTH as unclear if could have sustain head trauma while on ac  - requiring restraints and 1:1 for safety--hitting feet on rails, spitting at staff Severe agitation suspect delirium in pt with multiple risk factors; prior CTH suggestive of TBI  - s/p Zyprexa, c/w Zyprexa 5 mg q6h prn   - psych recs appreciated, needs official psych eval on 3/13  - check CTH as unclear if could have sustain head trauma while on ac  - TSH <0.1, hold levothyroxine, free t4 pending   - requiring restraints and 1:1 for safety--hitting feet on rails, spitting at staff

## 2023-03-12 NOTE — PROGRESS NOTE ADULT - PROBLEM SELECTOR PLAN 2
Reportedly syncopized at the sight of blood from right foot ulcer  - ICD interrogated on 3/11, d/w 34785, state no ICD firing noted  - c/w tele   - orthostatics if possible   - HsT 101-->86-->116, pt denies CP  - TTE pending Reportedly syncopized at the sight of blood from right foot ulcer  - ICD interrogated on 3/11, d/w 36733, state no ICD firing noted on interrogation   - c/w tele   - orthostatics if possible when cooperative. unclear if is able to stand/transfer  - HsT 101-->86-->116, pt denies CP  - TTE pending Reportedly syncopized at the sight of blood from right foot ulcer; per chart review many prior ED visits for "passing out" and L flank pain of unclear etiology  - ICD interrogated on 3/11, d/w [id number], state no ICD firing noted on interrogation   - c/w tele   - orthostatics if possible when cooperative. unclear if is able to stand/transfer  - HsT 101-->86-->116, pt denies CP  - TTE pending

## 2023-03-12 NOTE — PROGRESS NOTE ADULT - ASSESSMENT
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 62M sent from [facility] with prior opiod dependence, HTN, HLD, hypothyroid, Hep C, COPD/BARRY on O2 qhs (no CPAP), CVA 4/2021 (not seeing documentation), seizures, Afib s/p ablation on AC (Eliquis), CAD s/p multiple PCI, HFpEF (EF 45% in 9/22), AICD in 2018, Bladder CA s/p CTX/Radiation/TURBT, RCC s/p CTX/Radiation/Partial Nephrectomy, Prostate CA s/p Radiation therapy, neurogenic bladder with SPC, ESRD on HD (TTS) via RIJ P/C,  presents with episode of syncope/LOC and bleeding from R foot ulcer due to slamming his feet on things.       62M sent from [facility] with prior opiod dependence, HTN, HLD, hypothyroid, Hep C, COPD/BARRY on O2 qhs (no CPAP), CVA 4/2021, seizures, Afib s/p ablation on eliquis, CAD s/p multiple PCI, HFpEF (EF 45% in 9/22), AICD in 2018, Bladder CA s/p CTX/Radiation/TURBT, RCC s/p CTX/Radiation/Partial Nephrectomy, Prostate CA s/p Radiation therapy, neurogenic bladder with SPC, recent ESRD on HD (TTS) via permcath,  presents with episode of syncope/LOC and bleeding from R foot ulcer due to slamming his feet on things.

## 2023-03-12 NOTE — DISCHARGE NOTE PROVIDER - CARE PROVIDER_API CALL
Dayron Lira  Podiatry  Phone: (   )    -  Fax: (   )    -  Follow Up Time: 1 week    PCP,   Phone: (   )    -  Fax: (   )    -  Follow Up Time: 1 week   Silver, Dayron M (DPM)  Foot Surgery; [facility]  [address] [place] [address]  Phone: [phone]  Fax: [phone]  Follow Up Time:

## 2023-03-12 NOTE — DISCHARGE NOTE PROVIDER - NSDCCPCAREPLAN_GEN_ALL_CORE_FT
PRINCIPAL DISCHARGE DIAGNOSIS  Diagnosis: Cellulitis  Assessment and Plan of Treatment: complete your antiobiotics until 3/19. Please follow up with your primary care physician regarding your hospitalization        SECONDARY DISCHARGE DIAGNOSES  Diagnosis: ESRD (end stage renal disease) on dialysis  Assessment and Plan of Treatment: Please continue to follow your dialysis schedule and refer to your primary provider/nephrologist for further care/recommendations. Continue your medications and supplementation as directed.      Diagnosis: Seizure disorder  Assessment and Plan of Treatment: Continue keprpa as directed. Please follow up with your primary care physician regarding your hospitalization      Diagnosis: Atrial fibrillation  Assessment and Plan of Treatment: Please continue your medications as directed and follow-up with your primary provider/cardiologist to further manage your care. Monitor for signs/symptoms of uncontrolled atrial fibrillation, such as, increased heart rate, palpitations, chest pain, dizziness, or shortness of breath - Return to emergency room if these signs/symptoms are present.       PRINCIPAL DISCHARGE DIAGNOSIS  Diagnosis: Anemia  Assessment and Plan of Treatment: Likely chronic anemia from ESR, now with bleeding/acute blood loss from foot wound. Hb down to 7.0, transfused 2 untis PRBCs. No further bleeding noted.      SECONDARY DISCHARGE DIAGNOSES  Diagnosis: Abrasion, foot  Assessment and Plan of Treatment: Podiatry noted bilateral foot abrasions all to dermis  - Left foot submet 5 and sub cuboid  abrasions to dermis no pus, no drainage no malodor with left foot plantar forefoot hyperkeratotic skin. Right medial ankle abrasion with periwound resolving erythema. Right foot punctate ulcer to right 1st digit distal tip. Right foot punctate abrasions also noted to dorsum of digits 1-3 at the level of IPJs with scant sanguinous drainage Right foot plantar medial heel abrasion to dermis, no erythema,  no excess warmth, no pus, no drainage, no malodor, R foot latearl heel wound to subQ, leatherfibrotic wound bed, no drainage. Onychomycotic nails 1-5 bilaterally with left foot 1st digit broken nail.  Right foot xray: No radiographic evidence for osteomyelitis.   - Please continue with daily mupirocin application.  - Please have patient wear bilateral Z-flow to bilateral lower extremities at all times while in bed to offload heels      Diagnosis: Atrial fibrillation  Assessment and Plan of Treatment: Please continue your medications as directed and follow-up with your primary provider/cardiologist to further manage your care. Monitor for signs/symptoms of uncontrolled atrial fibrillation, such as, increased heart rate, palpitations, chest pain, dizziness, or shortness of breath - Return to emergency room if these signs/symptoms are present.      Diagnosis: ESRD (end stage renal disease) on dialysis  Assessment and Plan of Treatment: Please continue to follow your dialysis schedule and refer to your primary provider/nephrologist for further care/recommendations. Continue your medications and supplementation as directed.  The patient complains of itching around dialysis. He is given PO Benadryl prior to dialysis but required a few doses of Benadryl during dialysis.    Diagnosis: Seizure disorder  Assessment and Plan of Treatment: Continue keprpa as directed. You get an extra dose after dialysis. Please follow up with your primary care physician regarding your hospitalization      Diagnosis: Rash  Assessment and Plan of Treatment: Derm saw patient for pruritic papular rash on back. Started on clobetasol. Should not be on for greater than 2 weeks to avoid unwanted side effects such as atrophy, striae and teleangiectasias.   Biopsy "Superficial perivascular interstitial dermatitis with eosinophils, excoriated. Note:  Findings can be seen in an excoriated hypersensitivity state, such as a drug eruption, a response to arthropod assault or exposure to other exogenous allergens. No evidence of herpes/VZV is seen. PAS stain and deeper levels are pending."       PRINCIPAL DISCHARGE DIAGNOSIS  Diagnosis: Anemia  Assessment and Plan of Treatment: Likely chronic anemia from ESR, now with bleeding/acute blood loss from foot wound. Hb down to 7.0, transfused 2 untis PRBCs. No further bleeding noted.      SECONDARY DISCHARGE DIAGNOSES  Diagnosis: Abrasion, foot  Assessment and Plan of Treatment: Podiatry noted bilateral foot abrasions all to dermis  - Left foot submet 5 and sub cuboid  abrasions to dermis no pus, no drainage no malodor with left foot plantar forefoot hyperkeratotic skin. Right medial ankle abrasion with periwound resolving erythema. Right foot punctate ulcer to right 1st digit distal tip. Right foot punctate abrasions also noted to dorsum of digits 1-3 at the level of IPJs with scant sanguinous drainage Right foot plantar medial heel abrasion to dermis, no erythema,  no excess warmth, no pus, no drainage, no malodor, R foot latearl heel wound to subQ, leatherfibrotic wound bed, no drainage. Onychomycotic nails 1-5 bilaterally with left foot 1st digit broken nail.  Right foot xray: No radiographic evidence for osteomyelitis.   - Please continue with daily mupirocin application.  - Please have patient wear bilateral Z-flow to bilateral lower extremities at all times while in bed to offload heels      Diagnosis: Atrial fibrillation  Assessment and Plan of Treatment: Please continue your medications as directed and follow-up with your primary provider/cardiologist to further manage your care. Monitor for signs/symptoms of uncontrolled atrial fibrillation, such as, increased heart rate, palpitations, chest pain, dizziness, or shortness of breath - Return to emergency room if these signs/symptoms are present.      Diagnosis: ESRD (end stage renal disease) on dialysis  Assessment and Plan of Treatment: Please continue to follow your dialysis schedule and refer to your primary provider/nephrologist for further care/recommendations. Continue your medications and supplementation as directed.  The patient complains of itching around dialysis. He is given PO Benadryl prior to dialysis but required a few doses of Benadryl during dialysis.    Diagnosis: Seizure disorder  Assessment and Plan of Treatment: Continue keprpa as directed. You get an extra dose after dialysis. Please follow up with your primary care physician regarding your hospitalization      Diagnosis: Rash  Assessment and Plan of Treatment: Derm saw patient for pruritic papular rash on back. Started on clobetasol. Should not be on for greater than 2 weeks to avoid unwanted side effects such as atrophy, striae and teleangiectasias.   Biopsy "Superficial perivascular interstitial dermatitis with eosinophils, excoriated. Note:  Findings can be seen in an excoriated hypersensitivity state, such as a drug eruption, a response to arthropod assault or exposure to other exogenous allergens. No evidence of herpes/VZV is seen. PAS stain and deeper levels are pending."      Diagnosis: Anxiety  Assessment and Plan of Treatment: The patient was agitated on admission. He was given PRN medications and was calm. He did complain of feeling anxious. He was seen by psychiatry and started on Klonopin bid. He would benefit from seeing a psychiatrist at rehab.

## 2023-03-13 NOTE — BH CONSULTATION LIAISON ASSESSMENT NOTE - NSBHCHARTREVIEWVS_PSY_A_CORE FT
Vital Signs Last 24 Hrs  T(C): 36.4 (13 Mar 2023 04:59), Max: 37.5 (12 Mar 2023 14:00)  T(F): 97.6 (13 Mar 2023 04:59), Max: 99.5 (12 Mar 2023 14:00)  HR: 93 (13 Mar 2023 04:59) (93 - 115)  BP: 125/66 (13 Mar 2023 04:59) (110/58 - 140/73)  BP(mean): --  RR: 17 (13 Mar 2023 04:59) (17 - 18)  SpO2: 98% (13 Mar 2023 04:59) (96% - 100%)    Parameters below as of 13 Mar 2023 04:59  Patient On (Oxygen Delivery Method): room air

## 2023-03-13 NOTE — PROGRESS NOTE ADULT - PROBLEM SELECTOR PLAN 11
was on levothyroxine 112 mcg  - holding for suppressed, TSH, f/u free T4 was on levothyroxine 112 mcg  - holding levothy for suppressed, TSH, FT4 1.6

## 2023-03-13 NOTE — PROGRESS NOTE ADULT - SUBJECTIVE AND OBJECTIVE BOX
[facility] DIVISION OF KIDNEY DISEASES AND HYPERTENSION -- FOLLOW UP NOTE  --------------------------------------------------------------------------------  Chief Complaint: ESRD    24 hour events/subjective:  Pt without complaints today. No shortness of breath.       PAST HISTORY  --------------------------------------------------------------------------------  No significant changes to PMH, PSH, FHx, SHx, unless otherwise noted    ALLERGIES & MEDICATIONS  --------------------------------------------------------------------------------  Allergies    aspirin (Hives)  codeine (Rash)  codeine (Unknown)  Haldol (Unknown)  Motrin (Rash)  penicillin (Hives; Anaphylaxis)  Toradol (Rash)  Tylenol (Hives)    Intolerances      Standing Inpatient Medications  apixaban 5 milliGRAM(s) Oral two times a day  artificial  tears Solution 1 Drop(s) Both EYES four times a day  ascorbic acid 500 milliGRAM(s) Oral daily  atorvastatin 40 milliGRAM(s) Oral at bedtime  baclofen 2.5 milliGRAM(s) Oral every 12 hours  chlorhexidine 2% Cloths 1 Application(s) Topical daily  cholecalciferol 1000 Unit(s) Oral daily  lactobacillus acidophilus 1 Tablet(s) Oral three times a day with meals  levETIRAcetam 250 milliGRAM(s) Oral <User Schedule>  levETIRAcetam 500 milliGRAM(s) Oral daily  metoprolol succinate ER 50 milliGRAM(s) Oral daily  mupirocin 2% Ointment 1 Application(s) Topical two times a day  Nephro-mirna 1 Tablet(s) Oral daily  pantoprazole    Tablet 40 milliGRAM(s) Oral before breakfast  polyethylene glycol 3350 17 Gram(s) Oral daily  senna 2 Tablet(s) Oral at bedtime  sevelamer carbonate 1600 milliGRAM(s) Oral three times a day with meals  tiotropium 2.5 MICROgram(s) Inhaler 2 Puff(s) Inhalation daily  traZODone 25 milliGRAM(s) Oral at bedtime    PRN Inpatient Medications  bisacodyl Suppository 10 milliGRAM(s) Rectal daily PRN  levETIRAcetam  IVPB 500 milliGRAM(s) IV Intermittent daily PRN  melatonin 3 milliGRAM(s) Oral at bedtime PRN  oxyCODONE    IR 5 milliGRAM(s) Oral every 6 hours PRN  sodium chloride 0.9% Bolus. 200 milliLiter(s) IV Bolus once PRN        VITALS/PHYSICAL EXAM  --------------------------------------------------------------------------------  T(C): 36.4 (03-13-23 @ 04:59), Max: 37.5 (03-12-23 @ 14:00)  HR: 93 (03-13-23 @ 04:59) (93 - 115)  BP: 125/66 (03-13-23 @ 04:59) (110/58 - 140/73)  RR: 17 (03-13-23 @ 04:59) (17 - 18)  SpO2: 98% (03-13-23 @ 04:59) (96% - 100%)  Wt(kg): --    Weight (kg): 90.3 (03-13-23 @ 04:03)      Physical Exam:  	Gen: NAD  	HEENT: MMM  	Pulm: CTA B/L  	CV: S1S2  	Abd: Soft, +BS   	Ext: No LE edema B/L, Dressing evident on B/L foot  	Neuro: Awake  	Skin: Warm and dry  	Vascular access: RIJ tunneled HD catheter +      LABS/STUDIES  --------------------------------------------------------------------------------              7.7    7.20  >-----------<  117      [03-12-23 @ 06:31]              25.6     140  |  103  |  36  ----------------------------<  79      [03-12-23 @ 06:31]  3.7   |  23  |  3.44        Ca     9.1     [03-12-23 @ 06:31]      Mg     2.00     [03-12-23 @ 06:31]      Phos  4.2     [03-12-23 @ 06:31]            Creatinine Trend:  SCr 3.44 [03-12 @ 06:31]  SCr 4.08 [03-10 @ 18:10]    Urinalysis - [03-10-23 @ 18:29]      Color Yellow / Appearance Slightly Turbid / SG 1.014 / pH 6.5      Gluc Negative / Ketone Negative  / Bili Negative / Urobili <2 mg/dL       Blood Large / Protein 100 mg/dL / Leuk Est Large / Nitrite Negative      RBC >50 / WBC >50 / Hyaline  / Gran  / Sq Epi  / Non Sq Epi  / Bacteria Many      Iron 66, TIBC 206, %sat 32      [03-12-23 @ 06:31]  Ferritin 964      [03-12-23 @ 06:31]  HbA1c 6.2      [01-03-20 @ 13:57]  TSH <0.10      [03-12-23 @ 06:31]    HBsAb <3.0      [03-11-23 @ 19:25]  HBsAg Nonreact      [03-11-23 @ 19:25]  HBcAb Nonreact      [03-11-23 @ 19:25]  HCV 8.43, Reactive      [03-11-23 @ 19:25]

## 2023-03-13 NOTE — CONSULT NOTE ADULT - SUBJECTIVE AND OBJECTIVE BOX
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 Pt has chronic painful healing sacral wound and bilateral heel ulcers. Pt was in bed/wheelchair, then started kicking his feet into surface and opened an ulcer on bottom of R foot. Upon sight of blood from his foot ulcers, he fainted and denies chest pain/sob. He remembers AICD firing and people looking at him. He denies fever/chill/dysuria/palpitation. In ED, he was seen by podiatry, on exam of  his right foot, he has a 1cm stage 2 pressure ulcer, 0.5cm stage 2 pressure ulcer, and 2cm growth. Wounds cleaned and dried, +pedal pulses on palpation - further skin assessment shows healed 5cm pressure ulcer on sacrum. pt reports 9/10 pain in R foot and in sacrum.   Labs WBC 13, ESR 59, CRP 43.6. He was given a dose of clindamycin due to PCN allergy. He's afebrile and hemodynamically stable. Tele shows bigeminy that lasted 30 seconds.  (11 Mar 2023 09:34)      PAST MEDICAL & SURGICAL HISTORY:  Chronic congestive heart failure, unspecified congestive heart failure type  rEF/pEF      Hep C w/o coma, chronic      HTN (hypertension)      AICD (automatic cardioverter/defibrillator) present  [employer]      Atrial fibrillation      CAD (coronary artery disease)  (s/p 2 stents in Sep 2017)      Hyperlipidemia      Renal cell carcinoma of left kidney  s/p partial nephrectomy in 2002  biopsy again in Sep 2017 showed RCC again in stage 2      COPD (chronic obstructive pulmonary disease)      Bladder cancer      Peripheral neuropathy      Prostate cancer  s/p radiation      Nephrolithiasis      Kidney stone      AICD (automatic cardioverter/defibrillator) present      S/P cholecystectomy  2015      H/O partial nephrectomy  2002      History of percutaneous coronary intervention      H/O transurethral destruction of bladder lesion      History of coronary artery stent placement          REVIEW OF SYSTEMS      General:	    Skin/Breast:  	  Ophthalmologic:  	  ENMT:	    Respiratory and Thorax:  	  Cardiovascular:	    Gastrointestinal:	    Genitourinary:	    Musculoskeletal:	    Neurological:	    Psychiatric:	    Hematology/Lymphatics:	    Endocrine:	    Allergic/Immunologic:	    MEDICATIONS  (STANDING):  apixaban 5 milliGRAM(s) Oral two times a day  artificial  tears Solution 1 Drop(s) Both EYES four times a day  ascorbic acid 500 milliGRAM(s) Oral daily  atorvastatin 40 milliGRAM(s) Oral at bedtime  baclofen 2.5 milliGRAM(s) Oral every 12 hours  chlorhexidine 2% Cloths 1 Application(s) Topical daily  cholecalciferol 1000 Unit(s) Oral daily  lactobacillus acidophilus 1 Tablet(s) Oral three times a day with meals  levETIRAcetam 250 milliGRAM(s) Oral <User Schedule>  levETIRAcetam 500 milliGRAM(s) Oral daily  metoprolol succinate ER 50 milliGRAM(s) Oral daily  mupirocin 2% Ointment 1 Application(s) Topical two times a day  Nephro-mirna 1 Tablet(s) Oral daily  pantoprazole    Tablet 40 milliGRAM(s) Oral before breakfast  polyethylene glycol 3350 17 Gram(s) Oral daily  senna 2 Tablet(s) Oral at bedtime  sevelamer carbonate 1600 milliGRAM(s) Oral three times a day with meals  tiotropium 2.5 MICROgram(s) Inhaler 2 Puff(s) Inhalation daily  traZODone 25 milliGRAM(s) Oral at bedtime    MEDICATIONS  (PRN):  bisacodyl Suppository 10 milliGRAM(s) Rectal daily PRN Constipation  levETIRAcetam  IVPB 500 milliGRAM(s) IV Intermittent daily PRN if refuses PO  melatonin 3 milliGRAM(s) Oral at bedtime PRN Insomnia  oxyCODONE    IR 5 milliGRAM(s) Oral every 6 hours PRN moderate to severe pain  sodium chloride 0.9% Bolus. 200 milliLiter(s) IV Bolus once PRN SBP LESS THAN or EQUAL to 90 mmHg      Allergies    aspirin (Hives)  codeine (Rash)  codeine (Unknown)  Haldol (Unknown)  Motrin (Rash)  penicillin (Hives; Anaphylaxis)  Toradol (Rash)  Tylenol (Hives)    Intolerances        SOCIAL HISTORY:    FAMILY HISTORY:  Family history of chronic ischemic heart disease    Family history of lung cancer        Vital Signs Last 24 Hrs  T(C): 36.4 (13 Mar 2023 04:59), Max: 37.5 (12 Mar 2023 14:00)  T(F): 97.6 (13 Mar 2023 04:59), Max: 99.5 (12 Mar 2023 14:00)  HR: 93 (13 Mar 2023 04:59) (93 - 115)  BP: 125/66 (13 Mar 2023 04:59) (110/58 - 140/73)  BP(mean): --  RR: 17 (13 Mar 2023 04:59) (17 - 18)  SpO2: 98% (13 Mar 2023 04:59) (96% - 100%)    Parameters below as of 13 Mar 2023 04:59  Patient On (Oxygen Delivery Method): room air        PHYSICAL EXAM:      Constitutional:    Eyes:    ENMT:    Neck:    Breasts:    Back:    Respiratory:    Cardiovascular:    Gastrointestinal:    Genitourinary:    Rectal:    Extremities:    Vascular:    Neurological:    Skin:    Lymph Nodes:    Musculoskeletal:    Psychiatric:        LABS:                        7.7    7.20  )-----------( 117      ( 12 Mar 2023 06:31 )             25.6     03-12    140  |  103  |  36<H>  ----------------------------<  79  3.7   |  23  |  3.44<H>    Ca    9.1      12 Mar 2023 06:31  Phos  4.2     03-12  Mg     2.00     03-12            Culture - Blood (collected 10 Mar 2023 18:45)  Source: .Blood Blood  Preliminary Report (11 Mar 2023 23:02):    No growth to date.    Culture - Urine (collected 10 Mar 2023 17:55)  Source: Clean Catch Clean Catch (Midstream)  Preliminary Report (12 Mar 2023 19:40):    >100,000 CFU/ml Staphylococcus aureus    Culture - Blood (collected 10 Mar 2023 17:55)  Source: .Blood Blood  Preliminary Report (11 Mar 2023 23:02):    No growth to date.        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 Pt has chronic painful healing sacral wound and bilateral heel ulcers. Pt was in bed/wheelchair, then started kicking his feet into surface and opened an ulcer on bottom of R foot. Upon sight of blood from his foot ulcers, he fainted and denies chest pain/sob. He remembers AICD firing and people looking at him. He denies fever/chill/dysuria/palpitation. In ED, he was seen by podiatry, on exam of  his right foot, he has a 1cm stage 2 pressure ulcer, 0.5cm stage 2 pressure ulcer, and 2cm growth. Wounds cleaned and dried, +pedal pulses on palpation - further skin assessment shows healed 5cm pressure ulcer on sacrum. pt reports 9/10 pain in R foot and in sacrum.   Labs WBC 13, ESR 59, CRP 43.6. He was given a dose of clindamycin due to PCN allergy. He's afebrile and hemodynamically stable. Tele shows bigeminy that lasted 30 seconds.  (11 Mar 2023 09:34)    Dermatology consulted for rash on right back, very itchy. Pt says it has beet there for 3 months. Has used a cortisone cream. Endorses worse itch on the    PAST MEDICAL & SURGICAL HISTORY:  Chronic congestive heart failure, unspecified congestive heart failure type  rEF/pEF  Hep C w/o coma, chronic  HTN (hypertension)  AICD (automatic cardioverter/defibrillator) present  [employer]  Atrial fibrillation  CAD (coronary artery disease)  (s/p 2 stents in Sep 2017)  Hyperlipidemia  Renal cell carcinoma of left kidney  s/p partial nephrectomy in 2002  biopsy again in Sep 2017 showed RCC again in stage 2  COPD (chronic obstructive pulmonary disease)  Bladder cancer  Peripheral neuropathy  Prostate cancer  s/p radiation  Nephrolithiasis  Kidney stone  AICD (automatic cardioverter/defibrillator) present  S/P cholecystectomy  2015  H/O partial nephrectomy  2002  History of percutaneous coronary intervention  H/O transurethral destruction of bladder lesion  History of coronary artery stent placement      REVIEW OF SYSTEMS  General: denies f/c	  Skin/Breast: rash  Ophthalmologic: denies vision changes  ENMT:denies hearing changes, oral lesions  Respiratory and Thorax: denies worsening SOB or difficulty breathing  Cardiovascular:denies CP  Gastrointestinal:denies abd pain, n/v/d  Genitourinary: denies dysuria, hematuria  Musculoskeletal: denies joint pains, muscle aches  Neurological: denies HA, numbness, tingling      MEDICATIONS  (STANDING):  apixaban 5 milliGRAM(s) Oral two times a day  artificial  tears Solution 1 Drop(s) Both EYES four times a day  ascorbic acid 500 milliGRAM(s) Oral daily  atorvastatin 40 milliGRAM(s) Oral at bedtime  baclofen 2.5 milliGRAM(s) Oral every 12 hours  chlorhexidine 2% Cloths 1 Application(s) Topical daily  cholecalciferol 1000 Unit(s) Oral daily  lactobacillus acidophilus 1 Tablet(s) Oral three times a day with meals  levETIRAcetam 250 milliGRAM(s) Oral <User Schedule>  levETIRAcetam 500 milliGRAM(s) Oral daily  metoprolol succinate ER 50 milliGRAM(s) Oral daily  mupirocin 2% Ointment 1 Application(s) Topical two times a day  Nephro-mirna 1 Tablet(s) Oral daily  pantoprazole    Tablet 40 milliGRAM(s) Oral before breakfast  polyethylene glycol 3350 17 Gram(s) Oral daily  senna 2 Tablet(s) Oral at bedtime  sevelamer carbonate 1600 milliGRAM(s) Oral three times a day with meals  tiotropium 2.5 MICROgram(s) Inhaler 2 Puff(s) Inhalation daily  traZODone 25 milliGRAM(s) Oral at bedtime    MEDICATIONS  (PRN):  bisacodyl Suppository 10 milliGRAM(s) Rectal daily PRN Constipation  levETIRAcetam  IVPB 500 milliGRAM(s) IV Intermittent daily PRN if refuses PO  melatonin 3 milliGRAM(s) Oral at bedtime PRN Insomnia  oxyCODONE    IR 5 milliGRAM(s) Oral every 6 hours PRN moderate to severe pain  sodium chloride 0.9% Bolus. 200 milliLiter(s) IV Bolus once PRN SBP LESS THAN or EQUAL to 90 mmHg      Allergies    aspirin (Hives)  codeine (Rash)  codeine (Unknown)  Haldol (Unknown)  Motrin (Rash)  penicillin (Hives; Anaphylaxis)  Toradol (Rash)  Tylenol (Hives)    Intolerances        SOCIAL HISTORY:    FAMILY HISTORY:  Family history of chronic ischemic heart disease    Family history of lung cancer        Vital Signs Last 24 Hrs  T(C): 36.4 (13 Mar 2023 04:59), Max: 37.5 (12 Mar 2023 14:00)  T(F): 97.6 (13 Mar 2023 04:59), Max: 99.5 (12 Mar 2023 14:00)  HR: 93 (13 Mar 2023 04:59) (93 - 115)  BP: 125/66 (13 Mar 2023 04:59) (110/58 - 140/73)  BP(mean): --  RR: 17 (13 Mar 2023 04:59) (17 - 18)  SpO2: 98% (13 Mar 2023 04:59) (96% - 100%)    Parameters below as of 13 Mar 2023 04:59  Patient On (Oxygen Delivery Method): room air        PHYSICAL EXAM:  The patient was alert and oriented X 3, well nourished, and in no apparent distress. Oropharynx showed no ulcerations. There was no visible lymphadenopathy. Conjunctiva were non-injected. There was no clubbing or edema of extremities. The scalp, hair, face, eyebrows, lips, oropharynx , neck, chest, back, buttocks, extremities X 4, hands, feet, nails were examined. There was no hyperhidrosis or bromhidrosis.   The following lesions are noted:    pink papules on R back, few with central excoriation  ill defined pink plaques on the b/l forearms      LABS:                        7.7    7.20  )-----------( 117      ( 12 Mar 2023 06:31 )             25.6     03-12    140  |  103  |  36<H>  ----------------------------<  79  3.7   |  23  |  3.44<H>    Ca    9.1      12 Mar 2023 06:31  Phos  4.2     03-12  Mg     2.00     03-12            Culture - Blood (collected 10 Mar 2023 18:45)  Source: .Blood Blood  Preliminary Report (11 Mar 2023 23:02):    No growth to date.    Culture - Urine (collected 10 Mar 2023 17:55)  Source: Clean Catch Clean Catch (Midstream)  Preliminary Report (12 Mar 2023 19:40):    >100,000 CFU/ml Staphylococcus aureus    Culture - Blood (collected 10 Mar 2023 17:55)  Source: .Blood Blood  Preliminary Report (11 Mar 2023 23:02):    No growth to date.        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 Pt has chronic painful healing sacral wound and bilateral heel ulcers. Pt was in bed/wheelchair, then started kicking his feet into surface and opened an ulcer on bottom of R foot. Upon sight of blood from his foot ulcers, he fainted and denies chest pain/sob. He remembers AICD firing and people looking at him. He denies fever/chill/dysuria/palpitation. In ED, he was seen by podiatry, on exam of  his right foot, he has a 1cm stage 2 pressure ulcer, 0.5cm stage 2 pressure ulcer, and 2cm growth. Wounds cleaned and dried, +pedal pulses on palpation - further skin assessment shows healed 5cm pressure ulcer on sacrum. pt reports 9/10 pain in R foot and in sacrum.   Labs WBC 13, ESR 59, CRP 43.6. He was given a dose of clindamycin due to PCN allergy. He's afebrile and hemodynamically stable. Tele shows bigeminy that lasted 30 seconds.  (11 Mar 2023 09:34)    Dermatology HPI:  Dermatology consulted for rash on right back, very itchy. Pt says it has beet there for 3 months. Has used a cortisone cream. Endorses worse itch on the R side    PAST MEDICAL & SURGICAL HISTORY:  Chronic congestive heart failure, unspecified congestive heart failure type  rEF/pEF  Hep C w/o coma, chronic  HTN (hypertension)  AICD (automatic cardioverter/defibrillator) present  [employer]  Atrial fibrillation  CAD (coronary artery disease)  (s/p 2 stents in Sep 2017)  Hyperlipidemia  Renal cell carcinoma of left kidney  s/p partial nephrectomy in 2002  biopsy again in Sep 2017 showed RCC again in stage 2  COPD (chronic obstructive pulmonary disease)  Bladder cancer  Peripheral neuropathy  Prostate cancer  s/p radiation  Nephrolithiasis  Kidney stone  AICD (automatic cardioverter/defibrillator) present  S/P cholecystectomy  2015  H/O partial nephrectomy  2002  History of percutaneous coronary intervention  H/O transurethral destruction of bladder lesion  History of coronary artery stent placement      REVIEW OF SYSTEMS  General: denies f/c	  Skin/Breast: rash  Ophthalmologic: denies vision changes  ENMT:denies hearing changes, oral lesions  Respiratory and Thorax: denies worsening SOB or difficulty breathing  Cardiovascular:denies CP  Gastrointestinal:denies abd pain, n/v/d  Genitourinary: hx of neurogenic bladder  Musculoskeletal: tingling in L hand after restraings      MEDICATIONS  (STANDING):  apixaban 5 milliGRAM(s) Oral two times a day  artificial  tears Solution 1 Drop(s) Both EYES four times a day  ascorbic acid 500 milliGRAM(s) Oral daily  atorvastatin 40 milliGRAM(s) Oral at bedtime  baclofen 2.5 milliGRAM(s) Oral every 12 hours  chlorhexidine 2% Cloths 1 Application(s) Topical daily  cholecalciferol 1000 Unit(s) Oral daily  lactobacillus acidophilus 1 Tablet(s) Oral three times a day with meals  levETIRAcetam 250 milliGRAM(s) Oral <User Schedule>  levETIRAcetam 500 milliGRAM(s) Oral daily  metoprolol succinate ER 50 milliGRAM(s) Oral daily  mupirocin 2% Ointment 1 Application(s) Topical two times a day  Nephro-mirna 1 Tablet(s) Oral daily  pantoprazole    Tablet 40 milliGRAM(s) Oral before breakfast  polyethylene glycol 3350 17 Gram(s) Oral daily  senna 2 Tablet(s) Oral at bedtime  sevelamer carbonate 1600 milliGRAM(s) Oral three times a day with meals  tiotropium 2.5 MICROgram(s) Inhaler 2 Puff(s) Inhalation daily  traZODone 25 milliGRAM(s) Oral at bedtime    MEDICATIONS  (PRN):  bisacodyl Suppository 10 milliGRAM(s) Rectal daily PRN Constipation  levETIRAcetam  IVPB 500 milliGRAM(s) IV Intermittent daily PRN if refuses PO  melatonin 3 milliGRAM(s) Oral at bedtime PRN Insomnia  oxyCODONE    IR 5 milliGRAM(s) Oral every 6 hours PRN moderate to severe pain  sodium chloride 0.9% Bolus. 200 milliLiter(s) IV Bolus once PRN SBP LESS THAN or EQUAL to 90 mmHg      Allergies    aspirin (Hives)  codeine (Rash)  codeine (Unknown)  Haldol (Unknown)  Motrin (Rash)  penicillin (Hives; Anaphylaxis)  Toradol (Rash)  Tylenol (Hives)    Intolerances        SOCIAL HISTORY:  lives at [facility]    FAMILY HISTORY:  Family history of chronic ischemic heart disease    Family history of lung cancer        Vital Signs Last 24 Hrs  T(C): 36.4 (13 Mar 2023 04:59), Max: 37.5 (12 Mar 2023 14:00)  T(F): 97.6 (13 Mar 2023 04:59), Max: 99.5 (12 Mar 2023 14:00)  HR: 93 (13 Mar 2023 04:59) (93 - 115)  BP: 125/66 (13 Mar 2023 04:59) (110/58 - 140/73)  BP(mean): --  RR: 17 (13 Mar 2023 04:59) (17 - 18)  SpO2: 98% (13 Mar 2023 04:59) (96% - 100%)    Parameters below as of 13 Mar 2023 04:59  Patient On (Oxygen Delivery Method): room air        PHYSICAL EXAM:  The patient was alert and oriented X 3, well nourished, and in no apparent distress. Oropharynx showed no ulcerations. There was no visible lymphadenopathy. Conjunctiva were non-injected. There was no clubbing or edema of extremities. The scalp, hair, face, eyebrows, lips, oropharynx , neck, chest, back, buttocks, extremities X 4, hands, feet, nails were examined. There was no hyperhidrosis or bromhidrosis.   The following lesions are noted:    pink papules on R back, few with central excoriation  ill defined pink plaques with excoriations on the b/l forearms        LABS:                        7.7    7.20  )-----------( 117      ( 12 Mar 2023 06:31 )             25.6     03-12    140  |  103  |  36<H>  ----------------------------<  79  3.7   |  23  |  3.44<H>    Ca    9.1      12 Mar 2023 06:31  Phos  4.2     03-12  Mg     2.00     03-12            Culture - Blood (collected 10 Mar 2023 18:45)  Source: .Blood Blood  Preliminary Report (11 Mar 2023 23:02):    No growth to date.    Culture - Urine (collected 10 Mar 2023 17:55)  Source: Clean Catch Clean Catch (Midstream)  Preliminary Report (12 Mar 2023 19:40):    >100,000 CFU/ml Staphylococcus aureus    Culture - Blood (collected 10 Mar 2023 17:55)  Source: .Blood Blood  Preliminary Report (11 Mar 2023 23:02):    No growth to date.

## 2023-03-13 NOTE — PROGRESS NOTE ADULT - PROBLEM SELECTOR PLAN 2
Reportedly syncopized at the sight of blood from right foot ulcer; per chart review many prior ED visits for "passing out" and L flank pain of unclear etiology  - ICD interrogated on 3/11, d/w [id number], state no ICD firing noted on interrogation   - c/w tele   - orthostatics if possible when cooperative. unclear if is able to stand/transfer  - HsT 101-->86-->116, pt denies CP  - TTE pending Reportedly syncopized at the sight of blood from right foot ulcer; per chart review many prior ED visits for "passing out" and L flank pain of unclear etiology  - ICD interrogated on 3/11, d/w [id number], state no ICD firing noted on interrogation   - tele SR with PVCs...  - orthostatics if possible when cooperative. unclear if is able to stand/transfer  - HsT 101-->86-->116, pt denies CP  - TTE - pt not cooperative with echo Reportedly syncopized at the sight of blood from right foot ulcer; per chart review many prior ED visits for "passing out" and L flank pain of unclear etiology  - ICD interrogated on 3/11, d/w [id number], state no ICD firing noted on interrogation --> d/w EP to clarify findings...  - tele SR with PVCs...  - orthostatics if possible when cooperative. unclear if is able to stand/transfer  - HsT 101-->86-->116, pt denies CP  - TTE - pt not cooperative with echo

## 2023-03-13 NOTE — PROGRESS NOTE ADULT - ASSESSMENT
62M sent from [facility] with prior opiod dependence, HTN, HLD, hypothyroid, Hep C, COPD/BARRY on O2 qhs (no CPAP), CVA 4/2021, seizures, Afib s/p ablation on eliquis, CAD s/p multiple PCI, HFpEF (EF 45% in 9/22), AICD in 2018, Bladder CA s/p CTX/Radiation/TURBT, RCC s/p CTX/Radiation/Partial Nephrectomy, Prostate CA s/p Radiation therapy, neurogenic bladder with SPC, recent ESRD on HD (TTS) via permcath,  presents with episode of syncope/LOC and bleeding from R foot ulcer due to slamming his feet on things.       62M sent from [facility] with prior opiod dependence, HTN, HLD, hypothyroid, Hep C, h/o substance abuse, COPD/BARRY on O2 qhs (no CPAP), CVA 4/2021, seizures, Afib s/p ablation on eliquis, CAD s/p multiple PCI, HFpEF (EF 45% in 9/22), AICD in 2018, Bladder CA s/p CTX/Radiation/TURBT, RCC s/p CTX/Radiation/Partial Nephrectomy, Prostate CA s/p Radiation therapy, neurogenic bladder with SPC, recent ESRD on HD (TTS) via permcath,  presents with episode of syncope/LOC and bleeding from R foot ulcer due to slamming his feet on things.
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
## 2023-03-13 NOTE — CONSULT NOTE ADULT - ASSESSMENT
Pruritic papules, for months in a 63 y/o male multiple medical conditions. No signs of acute VZV infection today although VZV folliculitis could be considered. Columbus's disease is also possible. Punch biopsy performed to help establish diagnosis. HSV/VZV PCR obtained from erosion, low suspicion for these infections given lack of vesicles  While awaiting results, recommend:  -clobetasol propionate 0.05% ointment BID to AA. Avoid prolonged use (>2 weeks without breaks) to prevent unwanted side effects such as atrophy, striae and telangiectasias      Dermatology Punch Biopsy Procedure Note    After risks and benefits of procedure including bleeding, infection and scar were reviewed (consents including photo consent reviewed, signed and in chart), allergies were reviewed and time out performed. Written consent given by the patient.    Area cleaned with rubbing alcohol and anesthetized with lidocaine.  A 4mm punch biopsy was performed to R mid back, hemostasis achieved with a single dissolvable chromic gut suture with pressure dressing placed.   Wound care reviewed with patient and team. Biopsy site should remain covered with pressure bandage for 24-48 hours. Then apply Vaseline to biopsy site daily and cover with bandage until healed.    The patient's chart was reviewed in addition to being seen and examined at bedside with the dermatology attending Dr. Amador. Recommendations were communicated with the primary team.  Please page [phone] w/10 digit call back number for further related questions.    Bette Bañuelos MD  Resident Physician, PGY3  [facility] Dermatology  Pager: [phone]  Office: [phone]

## 2023-03-13 NOTE — PROGRESS NOTE ADULT - PROBLEM SELECTOR PLAN 1
Pt. with ESRD on HD three times a week (MWF per pt). Pt being admitted for LOC and bleeding from B/L heel ulcers. Last HD in outpatient facility was done on Thursday(3/9/23) via RIJ tunneled HD catheter. Pt goes to [facility]  dialysis unit. Follows with Dr. Mercedes. Labs reviewed. HD consent obtained from Son Derick Capellan ([phone]) on phone as pt is oriented to himself. Will arrange or HD today. HD orders placed. Dose meds as per HD.

## 2023-03-13 NOTE — BH CONSULTATION LIAISON ASSESSMENT NOTE - NSSUICPROTFACT_PSY_ALL_CORE
Identifies reasons for living/Supportive social network of family or friends/Fear of death or the actual act of killing self/Cultural, spiritual and/or moral attitudes against suicide

## 2023-03-13 NOTE — DIETITIAN INITIAL EVALUATION ADULT - PERTINENT MEDS FT
MEDICATIONS  (STANDING):  apixaban 5 milliGRAM(s) Oral two times a day  artificial  tears Solution 1 Drop(s) Both EYES four times a day  ascorbic acid 500 milliGRAM(s) Oral daily  atorvastatin 40 milliGRAM(s) Oral at bedtime  baclofen 2.5 milliGRAM(s) Oral every 12 hours  chlorhexidine 2% Cloths 1 Application(s) Topical daily  cholecalciferol 1000 Unit(s) Oral daily  lactobacillus acidophilus 1 Tablet(s) Oral three times a day with meals  levETIRAcetam 250 milliGRAM(s) Oral <User Schedule>  levETIRAcetam 500 milliGRAM(s) Oral daily  metoprolol succinate ER 50 milliGRAM(s) Oral daily  mupirocin 2% Ointment 1 Application(s) Topical two times a day  Nephro-mirna 1 Tablet(s) Oral daily  pantoprazole    Tablet 40 milliGRAM(s) Oral before breakfast  polyethylene glycol 3350 17 Gram(s) Oral daily  senna 2 Tablet(s) Oral at bedtime  sevelamer carbonate 1600 milliGRAM(s) Oral three times a day with meals  tiotropium 2.5 MICROgram(s) Inhaler 2 Puff(s) Inhalation daily  traZODone 25 milliGRAM(s) Oral at bedtime    MEDICATIONS  (PRN):  bisacodyl Suppository 10 milliGRAM(s) Rectal daily PRN Constipation  levETIRAcetam  IVPB 500 milliGRAM(s) IV Intermittent daily PRN if refuses PO  melatonin 3 milliGRAM(s) Oral at bedtime PRN Insomnia  oxyCODONE    IR 5 milliGRAM(s) Oral every 6 hours PRN moderate to severe pain  sodium chloride 0.9% Bolus. 200 milliLiter(s) IV Bolus once PRN SBP LESS THAN or EQUAL to 90 mmHg   Vit C, Lipitor, Dulcolax (PRN), Vit D3, Lactobacillus Acidophilus, Nephro-mirna, Protonix, Miralax, Senna, Renvela

## 2023-03-13 NOTE — PROGRESS NOTE ADULT - PROBLEM SELECTOR PLAN 4
reportedly healed, currently not allowing eval 2/2 agitation  - pending wound care eval looks like healed wound, ?slight maceration from moisture...

## 2023-03-13 NOTE — BH CONSULTATION LIAISON ASSESSMENT NOTE - SUMMARY
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 Psych consulted yesterday for agitation (recs provided and full consult done today). Pt appears to have been delirious but recovered well from this, and is back to baseline. He lacks significant psychiatric priorities at this time.     Plan:   - D/C 1:1 (no indication)  - Continue medical w/u as you are  - Continue Ativan PRN as needed for any breakthrough agitation  - Delirium precautions: monitor QTc; avoid anticholinergic/antihistaminic agents; keep Mg above 2 and K above 4  - No indication for inpatient psych care or standing psychopharmacologic intervention at this time.  - Will sign off as acuity is low; please call/re-consult as needed

## 2023-03-13 NOTE — PROGRESS NOTE ADULT - SUBJECTIVE AND OBJECTIVE BOX
[facility] Division of Hospital Medicine  Estefania Olmos MD  Pager (M-F, 8A-5P):  In-house pager [phone]; Long-range pager [phone]  Other Times:  Please page Hospitalist in Charge -  In-house pager [phone]    Patient is a 62y old  Male who presents with a chief complaint of Syncope and collapse   (13 Mar 2023 12:50)    SUBJECTIVE / OVERNIGHT EVENTS:  Pt required PRNs and restraints over weekend.  U cx >100K staph aureus, given dose vanco.  Pt seen earlier, resting in bed. Pleasant and interactive. C/o back itching, PCA at bedside reports rash that is developing new lesions since this morning.  Wounds examined.   Pt subsequently asking for "IV diluadid" for throbbing pain in his hands. Doesn't want the oral oxycodone that was offered. He is tolerating PO without a problem.   ADDITIONAL REVIEW OF SYSTEMS:    MEDICATIONS  (STANDING):  apixaban 5 milliGRAM(s) Oral two times a day  artificial  tears Solution 1 Drop(s) Both EYES four times a day  ascorbic acid 500 milliGRAM(s) Oral daily  atorvastatin 40 milliGRAM(s) Oral at bedtime  baclofen 2.5 milliGRAM(s) Oral every 12 hours  chlorhexidine 2% Cloths 1 Application(s) Topical daily  cholecalciferol 1000 Unit(s) Oral daily  lactobacillus acidophilus 1 Tablet(s) Oral three times a day with meals  levETIRAcetam 250 milliGRAM(s) Oral <User Schedule>  levETIRAcetam 500 milliGRAM(s) Oral daily  metoprolol succinate ER 50 milliGRAM(s) Oral daily  mupirocin 2% Ointment 1 Application(s) Topical two times a day  Nephro-mirna 1 Tablet(s) Oral daily  pantoprazole    Tablet 40 milliGRAM(s) Oral before breakfast  polyethylene glycol 3350 17 Gram(s) Oral daily  senna 2 Tablet(s) Oral at bedtime  sevelamer carbonate 1600 milliGRAM(s) Oral three times a day with meals  tiotropium 2.5 MICROgram(s) Inhaler 2 Puff(s) Inhalation daily  traZODone 25 milliGRAM(s) Oral at bedtime    MEDICATIONS  (PRN):  bisacodyl Suppository 10 milliGRAM(s) Rectal daily PRN Constipation  levETIRAcetam  IVPB 500 milliGRAM(s) IV Intermittent daily PRN if refuses PO  melatonin 3 milliGRAM(s) Oral at bedtime PRN Insomnia  oxyCODONE    IR 5 milliGRAM(s) Oral every 6 hours PRN moderate to severe pain  sodium chloride 0.9% Bolus. 200 milliLiter(s) IV Bolus once PRN SBP LESS THAN or EQUAL to 90 mmHg    PHYSICAL EXAM:  Vital Signs Last 24 Hrs  T(C): 36.4 (13 Mar 2023 04:59), Max: 37.5 (12 Mar 2023 16:00)  T(F): 97.6 (13 Mar 2023 04:59), Max: 99.5 (12 Mar 2023 16:00)  HR: 93 (13 Mar 2023 04:59) (93 - 115)  BP: 125/66 (13 Mar 2023 04:59) (110/58 - 140/73)  BP(mean): --  RR: 17 (13 Mar 2023 04:59) (17 - 18)  SpO2: 98% (13 Mar 2023 04:59) (96% - 100%)    Parameters below as of 13 Mar 2023 04:59  Patient On (Oxygen Delivery Method): room air    GENERAL: NAD  CHEST/LUNG: Clear to auscultation bilaterally anteriorly; No wheeze  HEART: Regular rate and rhythm; No murmurs, rubs, or gallops  ABDOMEN: Soft, Nontender, Nondistended; Bowel sounds present  EXTREMITIES:   no edema  PSYCH: calm, interactive --> became angry about IV pain meds  NEUROLOGY: moving all ext, will no cooperate  SKIN: B heel wounds (described in detail in podiatry note 3/12)  + R chest wall permcath   +SPT    LABS:                        7.7    7.20  )-----------( 117      ( 12 Mar 2023 06:31 )             25.6     03-12    140  |  103  |  36<H>  ----------------------------<  79  3.7   |  23  |  3.44<H>    Ca    9.1      12 Mar 2023 06:31  Phos  4.2     03-12  Mg     2.00     03-12    Culture - Blood (collected 10 Mar 2023 18:45)  Source: .Blood Blood  Preliminary Report (11 Mar 2023 23:02):    No growth to date.    Culture - Urine (collected 10 Mar 2023 17:55)  Source: Clean Catch Clean Catch (Midstream)  Preliminary Report (12 Mar 2023 19:40):    >100,000 CFU/ml Staphylococcus aureus    Culture - Blood (collected 10 Mar 2023 17:55)  Source: .Blood Blood  Preliminary Report (11 Mar 2023 23:02):    No growth to date.    RADIOLOGY & ADDITIONAL TESTS:  Results Reviewed:   Imaging Personally Reviewed:  Electrocardiogram Personally Reviewed:    COORDINATION OF CARE:  Care Discussed with Consultants/Other Providers [Y/N]: RN, SW, CM, ACP, psych re overall care   Prior or Outpatient Records Reviewed [Y/N]:

## 2023-03-13 NOTE — DIETITIAN INITIAL EVALUATION ADULT - DIET TYPE
regular/renal replacement pts:no protein restr,no conc K & phos, low sodium/supplement (specify)  Fluid restriction per MD discretion/regular/800ml/renal replacement pts:no protein restr,no conc K & phos, low sodium/supplement (specify)

## 2023-03-13 NOTE — DIETITIAN INITIAL EVALUATION ADULT - OTHER INFO
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     Pt awake, alert. Pt eats well reported. No report of chewing or swallowing difficulty. Pt asking for Regular consistency food/food items. Pt with pressure injury/wounds to Lower sacral spine/r heel, per nurse. Rec to add Nepro 1 can daily (~425 Kcal, ~19 gm Protein) Case discussed with nurse.    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     At time of visit, Pt awake, alert. Pt eats well reported. No report of chewing or swallowing difficulty; no report of nausea, vomiting or diarrhea @ this time. Per Pt his height: ~71" & his weight: ~205#. Per nurse, Pt with pressure injury/wounds to Lower sacral spine/r heel. Rec to add Nepro 1 can daily (~425 Kcal, ~19 gm Protein) to diet rx. RDN answered concerns related diet. RDN remains available.

## 2023-03-13 NOTE — BH CONSULTATION LIAISON ASSESSMENT NOTE - CURRENT MEDICATION
MEDICATIONS  (STANDING):  apixaban 5 milliGRAM(s) Oral two times a day  artificial  tears Solution 1 Drop(s) Both EYES four times a day  ascorbic acid 500 milliGRAM(s) Oral daily  atorvastatin 40 milliGRAM(s) Oral at bedtime  baclofen 2.5 milliGRAM(s) Oral every 12 hours  chlorhexidine 2% Cloths 1 Application(s) Topical daily  cholecalciferol 1000 Unit(s) Oral daily  lactobacillus acidophilus 1 Tablet(s) Oral three times a day with meals  levETIRAcetam 250 milliGRAM(s) Oral <User Schedule>  levETIRAcetam 500 milliGRAM(s) Oral daily  metoprolol succinate ER 50 milliGRAM(s) Oral daily  mupirocin 2% Ointment 1 Application(s) Topical two times a day  Nephro-mirna 1 Tablet(s) Oral daily  pantoprazole    Tablet 40 milliGRAM(s) Oral before breakfast  polyethylene glycol 3350 17 Gram(s) Oral daily  senna 2 Tablet(s) Oral at bedtime  sevelamer carbonate 1600 milliGRAM(s) Oral three times a day with meals  tiotropium 2.5 MICROgram(s) Inhaler 2 Puff(s) Inhalation daily  traZODone 25 milliGRAM(s) Oral at bedtime    MEDICATIONS  (PRN):  bisacodyl Suppository 10 milliGRAM(s) Rectal daily PRN Constipation  levETIRAcetam  IVPB 500 milliGRAM(s) IV Intermittent daily PRN if refuses PO  melatonin 3 milliGRAM(s) Oral at bedtime PRN Insomnia  oxyCODONE    IR 5 milliGRAM(s) Oral every 6 hours PRN moderate to severe pain  sodium chloride 0.9% Bolus. 200 milliLiter(s) IV Bolus once PRN SBP LESS THAN or EQUAL to 90 mmHg

## 2023-03-13 NOTE — PROGRESS NOTE ADULT - NSPROGADDITIONALINFOA_GEN_ALL_CORE
functional quadriplegia - s/p stroke, assist with all ADLs    king Derick Timi [phone] functional quadriplegia - s/p stroke, assist with all ADLs    d/w brother Derick Capellan [phone] - he is closest relative, pt was  and has a daughter but they are not involved. Pt has h/o cocaine abuse but brother reports has been clean for 18 years. Reviewed overall care, including avoiding IV pain meds and Benadryl and offering PO, brother concurs. functional quadriplegia - s/p stroke, assist with all ADLs  rash - appreciate derm input, low suspicion zoster, bx done, trial clobetasol (no greater than 2 weeks)    d/w brother Derick Capellan [phone] - he is closest relative, pt was  and has a daughter but they are not involved. Pt has h/o cocaine abuse but brother reports has been clean for 18 years. Reviewed overall care, including avoiding IV pain meds and Benadryl and offering PO, brother concurs.

## 2023-03-13 NOTE — DIETITIAN INITIAL EVALUATION ADULT - ORAL NUTRITION SUPPLEMENTS
Nepro 1 can daily (~425 Kcal, ~19 gm Protein)   Fluid restriction per MD discretion Nepro 1 can daily (~425 Kcal, ~19 gm Protein);   No Carb Prosource (1 pkg = 15 gms Protein) Qty per day: 1

## 2023-03-13 NOTE — DIETITIAN INITIAL EVALUATION ADULT - ADD RECOMMEND
1. Encourage & assist Pt with meals; Monitor PO diet tolerance; Honor food preferences;   3. Monitor labs, hydration status;  1. Encourage & assist Pt with meals; Monitor PO diet tolerance;   2. Honor food preferences;   3. Monitor labs, hydration status;

## 2023-03-13 NOTE — DIETITIAN INITIAL EVALUATION ADULT - PERTINENT LABORATORY DATA
03-12    140  |  103  |  36<H>  ----------------------------<  79  3.7   |  23  |  3.44<H>    Ca    9.1      12 Mar 2023 06:31  Phos  4.2     03-12  Mg     2.00     03-12

## 2023-03-13 NOTE — CONSULT NOTE ADULT - ATTENDING COMMENTS
Favor inflammatory over infectious etiologies, though papules are somewhat non-specific. Obtained a biopsy to help further assess the etiology. May use clobetasol if pruritic for now.     Sourav Amador MD, PharmD, MPH  Co-Director, Inpatient Dermatology Consultation Service, [facility]
ESRD on HD    S/p HD yesterday, volume status stable  Plan for next HD Tuesday

## 2023-03-13 NOTE — PROGRESS NOTE ADULT - PROBLEM SELECTOR PLAN 8
Presume ICM due to, CAD with PCI, last EF 45%  - HD with fluid removal as tolerated  - CXR clear, pro-BNP 400s  - f/u TTE  - on tele 18 Presume ICM due to, CAD with PCI, last EF 45%  - HD with fluid removal as tolerated  - CXR clear, pro-BNP 400s  - not cooperative with echo  - tele SR with PVCs
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
## 2023-03-13 NOTE — PROGRESS NOTE ADULT - PROBLEM SELECTOR PLAN 3
foot abrasions all to dermis  - WBC normalized 13-->7  - c/w mupirocin and heel elevators  - R foot x-ray: neg for OM  - local wound care: apply mupirocin to right foot wounds followed by 4x4 gauze, ABD and mckayla, daily; apply mupirocin to plantar wounds followed by 4x4 gauze, ABD and mckayla daily.  - podiatry consulted, signed off, cleared for dc  - s/p clinda no mention of concern for infection, dc abx and monitor

## 2023-03-13 NOTE — DIETITIAN INITIAL EVALUATION ADULT - NS FNS DIET ORDER
Easy to chew; DASH/TLC (cholesterol and sodium restricted);   1200mL Fluid Restriction ([id number])  For patients receiving Renal Replacement - No Protein Restr, No Conc K, No Conc Phos, Low Sodium (RENAL)   Easy to chew; DASH/TLC (cholesterol and sodium restricted); 1200mL Fluid Restriction ([id number])  For patients receiving Renal Replacement - No Protein Restr, No Conc K, No Conc Phos, Low Sodium (RENAL)

## 2023-03-13 NOTE — PROGRESS NOTE ADULT - PROBLEM SELECTOR PLAN 3
Pt with hyperphosphatemia in the setting of ESRD. Pt. on phosphate binders with meals. Serum phos is within target range at 4.2 today. Low phosphorus diet. Monitor serum phosphorus.       Please silvio with questions  Eloina Mancilla MD  Nephrology Attending  [phone]
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
## 2023-03-14 NOTE — ADVANCED PRACTICE NURSE CONSULT - RECOMMEDATIONS
Please contact Wound Care Service Line if we can be of further assistance (ext [phone]).  
Topical Recommendations    Sacrum/bilateral buttocks: Cleanse with NS, pat dry. Apply Liquid barrier film to periwound skin (allow to dry). Apply silicone foam with borders, change daily and PRN if soiled.    Continue low air loss bed therapy, continue heel elevation, continue to turn & reposition per protocol, soft pillow between bony prominences, continue moisture management with barrier creams & single breathable pad, continue measures to decrease friction/shear/pressure. Continue with nutritional support as per dietary/orders.    Plan of care discussed with primary RN Robbie and primary ACP Edith.     Please contact Wound Care Service Line if we can be of further assistance (ext [phone]).

## 2023-03-14 NOTE — PROGRESS NOTE ADULT - PROBLEM SELECTOR PLAN 9
Likely 2/2 ESRD and bleeding   - c/w PPI  - pt had hospitalization requiring transfusion and GI w/u (endoscopy) which were negative for acute bleed  - transfuse prn to keep Hgb>7 Presume ICM due to, CAD with PCI, last EF 45%  - HD with fluid removal as tolerated  - CXR clear, pro-BNP 400s  - not cooperative with echo  - tele SR with PVCs

## 2023-03-14 NOTE — PHYSICAL THERAPY INITIAL EVALUATION ADULT - PERTINENT HX OF CURRENT PROBLEM, REHAB EVAL
62 year old male presents with LOC and bleeding. Pt was in bed/wheelchair, then started kicking his feet into surface and opened an ulcer on bottom of right foot. Upon sight of blood from his foot ulcers, he fainted. X-ray right foot - No radiographic evidence for osteomyelitis.

## 2023-03-14 NOTE — PHYSICAL THERAPY INITIAL EVALUATION ADULT - ADDITIONAL COMMENTS
Pt uncooperative in providing prior level of function, gives minimal answers to questions. Pt states he does not walk, uses a wheelchair, needs assistance for ADLs. Unable to gather further information regarding prior level of function. Please follow case management and social work documentation for further information on prior level of function.    Following evaluation, pt was left semireclined in bed in no distress, all lines in tact. KARI dominguez.

## 2023-03-14 NOTE — PROGRESS NOTE ADULT - PROBLEM SELECTOR PLAN 1
Severe agitation suspect delirium in pt with multiple risk factors; prior CTH suggestive of TBI  - check CTH as unclear if could have sustain head trauma while on ac  - TSH <0.1, hold levothyroxine, FT4 1.6  - requiring restraints and 1:1 for safety--hitting feet on rails, spitting at staff --> s/p Zyprexa, c/w Zyprexa 5 mg q6h prn --> psych evaluated, c/w Ativan PRN agitation Likely 2/2 ESRD and bleeding/acute blood loss  - c/w PPI  - pt had hospitalization requiring transfusion and GI w/u (endoscopy) which were negative for acute bleed  - transfuse prn to keep Hgb>7  --> d/w renal just getting labs back while pt at HD - pt does still make some urine, will transfuse 1/2 units x2 with lasix 40 IV in between units, with lung checks to minimize chance of fluid overload

## 2023-03-14 NOTE — PROGRESS NOTE ADULT - PROBLEM SELECTOR PLAN 3
Pt with hyperphosphatemia in the setting of ESRD. Pt. on phosphate binders with meals. Last serum phos is within target range at 4.2. Low phosphorus diet. Monitor serum phosphorus.       Please silvio with questions  Eloina Mancilla MD  Nephrology Attending  [phone]

## 2023-03-14 NOTE — PROGRESS NOTE ADULT - SUBJECTIVE AND OBJECTIVE BOX
[facility] DIVISION OF KIDNEY DISEASES AND HYPERTENSION -- FOLLOW UP NOTE  --------------------------------------------------------------------------------  Chief Complaint: ESRD    24 hour events/subjective:  Patient without complaints. Yesterday he refused HD, no shortness of breath.       PAST HISTORY  --------------------------------------------------------------------------------  No significant changes to PMH, PSH, FHx, SHx, unless otherwise noted    ALLERGIES & MEDICATIONS  --------------------------------------------------------------------------------  Allergies    aspirin (Hives)  codeine (Rash)  codeine (Unknown)  Haldol (Unknown)  Motrin (Rash)  penicillin (Hives; Anaphylaxis)  Toradol (Rash)  Tylenol (Hives)    Intolerances      Standing Inpatient Medications  apixaban 5 milliGRAM(s) Oral two times a day  artificial  tears Solution 1 Drop(s) Both EYES four times a day  ascorbic acid 500 milliGRAM(s) Oral daily  atorvastatin 40 milliGRAM(s) Oral at bedtime  baclofen 2.5 milliGRAM(s) Oral every 12 hours  chlorhexidine 2% Cloths 1 Application(s) Topical daily  cholecalciferol 1000 Unit(s) Oral daily  clobetasol 0.05% Cream 1 Application(s) Topical two times a day  lactobacillus acidophilus 1 Tablet(s) Oral three times a day with meals  levETIRAcetam 250 milliGRAM(s) Oral <User Schedule>  levETIRAcetam 500 milliGRAM(s) Oral daily  metoprolol succinate ER 50 milliGRAM(s) Oral daily  mupirocin 2% Ointment 1 Application(s) Both Nostrils two times a day  mupirocin 2% Ointment 1 Application(s) Topical two times a day  Nephro-mirna 1 Tablet(s) Oral daily  pantoprazole    Tablet 40 milliGRAM(s) Oral before breakfast  polyethylene glycol 3350 17 Gram(s) Oral daily  senna 2 Tablet(s) Oral at bedtime  sevelamer carbonate 1600 milliGRAM(s) Oral three times a day with meals  tiotropium 2.5 MICROgram(s) Inhaler 2 Puff(s) Inhalation daily  traZODone 25 milliGRAM(s) Oral at bedtime    PRN Inpatient Medications  bisacodyl Suppository 10 milliGRAM(s) Rectal daily PRN  diphenhydrAMINE 50 milliGRAM(s) Oral daily PRN  diphenhydrAMINE 25 milliGRAM(s) Oral every 6 hours PRN  levETIRAcetam  IVPB 500 milliGRAM(s) IV Intermittent daily PRN  LORazepam     Tablet 1 milliGRAM(s) Oral every 6 hours PRN  LORazepam   Injectable 1 milliGRAM(s) IV Push every 6 hours PRN  LORazepam   Injectable 1 milliGRAM(s) IntraMuscular every 6 hours PRN  melatonin 3 milliGRAM(s) Oral at bedtime PRN  oxyCODONE    IR 5 milliGRAM(s) Oral every 6 hours PRN        VITALS/PHYSICAL EXAM  --------------------------------------------------------------------------------  T(C): 36.4 (03-14-23 @ 15:57), Max: 36.4 (03-13-23 @ 22:40)  HR: 80 (03-14-23 @ 15:57) (80 - 84)  BP: 117/78 (03-14-23 @ 15:57) (104/53 - 122/69)  RR: 17 (03-14-23 @ 15:57) (17 - 18)  SpO2: 99% (03-14-23 @ 13:28) (99% - 100%)  Wt(kg): --    Weight (kg): 90.3 (03-13-23 @ 04:03)      03-13-23 @ 07:01  -  03-14-23 @ 07:00  --------------------------------------------------------  IN: 0 mL / OUT: 1350 mL / NET: -1350 mL      Physical Exam:  	Gen: NAD  	HEENT: MMM  	Pulm: CTA B/L  	CV: S1S2  	Abd: Soft, +BS   	Ext: No LE edema B/L, Dressing evident on B/L foot  	Neuro: Awake  	Skin: Warm and dry  	Vascular access: RIJ tunneled HD catheter +    LABS/STUDIES  --------------------------------------------------------------------------------                Creatinine Trend:  SCr 3.44 [03-12 @ 06:31]  SCr 4.08 [03-10 @ 18:10]    Urinalysis - [03-10-23 @ 18:29]      Color Yellow / Appearance Slightly Turbid / SG 1.014 / pH 6.5      Gluc Negative / Ketone Negative  / Bili Negative / Urobili <2 mg/dL       Blood Large / Protein 100 mg/dL / Leuk Est Large / Nitrite Negative      RBC >50 / WBC >50 / Hyaline  / Gran  / Sq Epi  / Non Sq Epi  / Bacteria Many      Iron 66, TIBC 206, %sat 32      [03-12-23 @ 06:31]  Ferritin 964      [03-12-23 @ 06:31]  HbA1c 6.2      [01-03-20 @ 13:57]  TSH <0.10      [03-12-23 @ 06:31]    HBsAb <3.0      [03-11-23 @ 19:25]  HBsAg Nonreact      [03-11-23 @ 19:25]  HBcAb Nonreact      [03-11-23 @ 19:25]  HCV 8.43, Reactive      [03-11-23 @ 19:25]

## 2023-03-14 NOTE — PROGRESS NOTE ADULT - PROBLEM SELECTOR PLAN 8
Presume ICM due to, CAD with PCI, last EF 45%  - HD with fluid removal as tolerated  - CXR clear, pro-BNP 400s  - not cooperative with echo  - tele SR with PVCs - c/w Keppra 500mg qd and 250mg additional on dialysis days; refusing meds, IV if refuses PO

## 2023-03-14 NOTE — PROGRESS NOTE ADULT - PROBLEM SELECTOR PLAN 3
foot abrasions all to dermis  - WBC normalized 13-->7  - c/w mupirocin and heel elevators  - R foot x-ray: neg for OM  - local wound care: apply mupirocin to right foot wounds followed by 4x4 gauze, ABD and mckayla, daily; apply mupirocin to plantar wounds followed by 4x4 gauze, ABD and mckayla daily.  - podiatry consulted, signed off, cleared for dc  - s/p clinda no mention of concern for infection, dc abx and monitor Metabolic encephalopathy Severe agitation suspect delirium in pt with multiple risk factors; prior CTH suggestive of TBI  - check CTH as unclear if could have sustain head trauma while on ac  - TSH <0.1, hold levothyroxine, FT4 1.6  - requiring restraints and 1:1 for safety--hitting feet on rails, spitting at staff --> s/p Zyprexa, c/w Zyprexa 5 mg q6h prn --> psych evaluated, c/w Ativan PRN agitation

## 2023-03-14 NOTE — PROGRESS NOTE ADULT - PROBLEM SELECTOR PLAN 6
HD (TTS), recent   - renal following for HD  - lytes acceptable  - iron studies c/w AOCD  - c/w sevelamer 1600 TID, renal diet, vit D  - indwelling SPC, UA positive MRSA, likely colonization  hyperphos - on Renvela  MRSA nasal swab - mupirocin nasal oint 5d  pt asking for IV Benadryl for HD, d/w RN no indication for IV at this time, reviewed med rec uses PO Benadryl at [facility], c/w PO Benadryl before HD and PRN looks like healed wound, slight maceration from moisture - appreciate WC input - WC as rec

## 2023-03-14 NOTE — PROGRESS NOTE ADULT - PROBLEM SELECTOR PLAN 4
looks like healed wound, ?slight maceration from moisture... HD (TTS), recent   - renal following for HD  - lytes acceptable  - iron studies c/w AOCD  - c/w sevelamer 1600 TID, renal diet, vit D  - indwelling SPC, UA positive MRSA, likely colonization  hyperphos - on Renvela  MRSA nasal swab - mupirocin nasal oint 5d  - c/w PO Benadryl 1-2h pre HD, d/w pt if develop sx during HD can reassess

## 2023-03-14 NOTE — PROGRESS NOTE ADULT - ASSESSMENT
62M sent from [facility] with prior opiod dependence, HTN, HLD, hypothyroid, Hep C, h/o substance abuse, COPD/BARRY on O2 qhs (no CPAP), CVA 4/2021, seizures, Afib s/p ablation on eliquis, CAD s/p multiple PCI, HFpEF (EF 45% in 9/22), AICD in 2018, Bladder CA s/p CTX/Radiation/TURBT, RCC s/p CTX/Radiation/Partial Nephrectomy, Prostate CA s/p Radiation therapy, neurogenic bladder with SPC, recent ESRD on HD (TTS) via permcath,  presents with episode of syncope/LOC and bleeding from R foot ulcer due to slamming his feet on things.

## 2023-03-14 NOTE — CHART NOTE - NSCHARTNOTEFT_GEN_A_CORE
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  Today, routine bloodwork drawn in hemodialysis resulted with anemia of Hgb 7.0.  Two type and screens sent at that time, resulted.  Discussed with medicine attending and nephrology the need for transfusion due to extensive cardiac history.     Informed consent obtained by writer with patient who is AOx3 and has capacity; witnessed by RN; placed in chart. Pt endorses multiple historical transfusions without reaction.  Risks, benefits, and alternatives discussed, and all questions answered at that time.    Discussed plan with Medicine Dr. Olmos and Nephrology:   - Transfuse 1/2 unit PRBC over 3 hours, followed by physical exam and 40mg IVP Lasix x1;  then 1/2 unit PRBC over 3 hours followed by physical exam and further Lasix if needed.  (By time of Type & Screen results, pt had finished HD so unable to transfuse in HD.)   - C/w Eliquis for AFib  - Monitor I/o, VS, labs, pt closely  - Endorsed to Night ACP

## 2023-03-14 NOTE — PROGRESS NOTE ADULT - PROBLEM SELECTOR PLAN 1
Pt. with ESRD on HD three times a week (MWF per pt). Pt being admitted for LOC and bleeding from B/L heel ulcers. Last HD in outpatient facility was done on Thursday(3/9/23) via RIJ tunneled HD catheter. Pt goes to [facility]  dialysis unit. Follows with Dr. Mercedes. Labs reviewed. HD consent obtained from Son Derick Capellan ([phone]) on phone as pt is oriented to himself. Will attempt HD today. HD orders placed. Dose meds as per HD.

## 2023-03-14 NOTE — PROGRESS NOTE ADULT - SUBJECTIVE AND OBJECTIVE BOX
[facility] Division of Hospital Medicine  Estefania Olmos MD  Pager (M-F, 8A-5P):  In-house pager [phone]; Long-range pager [phone]  Other Times:  Please page Hospitalist in Charge -  In-house pager [phone]    Patient is a 62y old  Male who presents with a chief complaint of syncope, foot infection (13 Mar 2023 14:40)    SUBJECTIVE / OVERNIGHT EVENTS:  Refused dialysis and blood work yesterday.    ADDITIONAL REVIEW OF SYSTEMS:    MEDICATIONS  (STANDING):  apixaban 5 milliGRAM(s) Oral two times a day  artificial  tears Solution 1 Drop(s) Both EYES four times a day  ascorbic acid 500 milliGRAM(s) Oral daily  atorvastatin 40 milliGRAM(s) Oral at bedtime  baclofen 2.5 milliGRAM(s) Oral every 12 hours  chlorhexidine 2% Cloths 1 Application(s) Topical daily  cholecalciferol 1000 Unit(s) Oral daily  clobetasol 0.05% Cream 1 Application(s) Topical two times a day  lactobacillus acidophilus 1 Tablet(s) Oral three times a day with meals  levETIRAcetam 250 milliGRAM(s) Oral <User Schedule>  levETIRAcetam 500 milliGRAM(s) Oral daily  metoprolol succinate ER 50 milliGRAM(s) Oral daily  mupirocin 2% Ointment 1 Application(s) Both Nostrils two times a day  mupirocin 2% Ointment 1 Application(s) Topical two times a day  Nephro-mirna 1 Tablet(s) Oral daily  pantoprazole    Tablet 40 milliGRAM(s) Oral before breakfast  polyethylene glycol 3350 17 Gram(s) Oral daily  senna 2 Tablet(s) Oral at bedtime  sevelamer carbonate 1600 milliGRAM(s) Oral three times a day with meals  tiotropium 2.5 MICROgram(s) Inhaler 2 Puff(s) Inhalation daily  traZODone 25 milliGRAM(s) Oral at bedtime    MEDICATIONS  (PRN):  bisacodyl Suppository 10 milliGRAM(s) Rectal daily PRN Constipation  diphenhydrAMINE 25 milliGRAM(s) Oral every 6 hours PRN Rash and/or Itching  levETIRAcetam  IVPB 500 milliGRAM(s) IV Intermittent daily PRN if refuses PO  LORazepam     Tablet 1 milliGRAM(s) Oral every 6 hours PRN Agitation  LORazepam   Injectable 1 milliGRAM(s) IV Push every 6 hours PRN Agitation  LORazepam   Injectable 1 milliGRAM(s) IntraMuscular every 6 hours PRN Agitation  melatonin 3 milliGRAM(s) Oral at bedtime PRN Insomnia  oxyCODONE    IR 5 milliGRAM(s) Oral every 6 hours PRN moderate to severe pain    I&O's Summary    13 Mar 2023 07:01  -  14 Mar 2023 07:00  --------------------------------------------------------  IN: 0 mL / OUT: 1350 mL / NET: -1350 mL    PHYSICAL EXAM:  Vital Signs Last 24 Hrs  T(C): 36.3 (14 Mar 2023 03:37), Max: 36.4 (13 Mar 2023 22:40)  T(F): 97.4 (14 Mar 2023 03:37), Max: 97.6 (13 Mar 2023 22:40)  HR: 84 (14 Mar 2023 03:37) (80 - 84)  BP: 122/69 (14 Mar 2023 03:37) (104/53 - 122/69)  BP(mean): --  RR: 18 (14 Mar 2023 03:37) (17 - 18)  SpO2: 99% (14 Mar 2023 03:37) (99% - 100%)    Parameters below as of 14 Mar 2023 03:37  Patient On (Oxygen Delivery Method): room air    GENERAL: NAD  CHEST/LUNG: Clear to auscultation bilaterally anteriorly; No wheeze  HEART: Regular rate and rhythm; No murmurs, rubs, or gallops  ABDOMEN: Soft, Nontender, Nondistended; Bowel sounds present  EXTREMITIES:   no edema  PSYCH: calm, interactive --> became angry about IV pain meds  NEUROLOGY: moving all ext, will no cooperate  SKIN: B heel wounds (described in detail in podiatry note 3/12)  + R chest wall permcath   +SPT    LABS:    RADIOLOGY & ADDITIONAL TESTS:  Results Reviewed:   Imaging Personally Reviewed:  Electrocardiogram Personally Reviewed:    COORDINATION OF CARE:  Care Discussed with Consultants/Other Providers [Y/N]: RN, SW, CM, ACP, psych re overall care   Prior or Outpatient Records Reviewed [Y/N]:   [facility] Division of Hospital Medicine  Estefania Olmos MD  Pager (M-F, 8A-5P):  In-house pager [phone]; Long-range pager [phone]  Other Times:  Please page Hospitalist in Charge -  In-house pager [phone]    Patient is a 62y old  Male who presents with a chief complaint of syncope, foot infection (13 Mar 2023 14:40)    SUBJECTIVE / OVERNIGHT EVENTS:  Refused dialysis and blood work yesterday.  Pt seen earlier with team. We discussed trial PO Benadryl pre HD, willing to try. Rash on back less itchy, requested joaquim to not cover his back, just his buttocks.  Pt recounted that there was a lot of blood from his wound on admission, so much that the EMTs were stepping in the blood.  ADDITIONAL REVIEW OF SYSTEMS:    MEDICATIONS  (STANDING):  apixaban 5 milliGRAM(s) Oral two times a day  artificial  tears Solution 1 Drop(s) Both EYES four times a day  ascorbic acid 500 milliGRAM(s) Oral daily  atorvastatin 40 milliGRAM(s) Oral at bedtime  baclofen 2.5 milliGRAM(s) Oral every 12 hours  chlorhexidine 2% Cloths 1 Application(s) Topical daily  cholecalciferol 1000 Unit(s) Oral daily  clobetasol 0.05% Cream 1 Application(s) Topical two times a day  lactobacillus acidophilus 1 Tablet(s) Oral three times a day with meals  levETIRAcetam 250 milliGRAM(s) Oral <User Schedule>  levETIRAcetam 500 milliGRAM(s) Oral daily  metoprolol succinate ER 50 milliGRAM(s) Oral daily  mupirocin 2% Ointment 1 Application(s) Both Nostrils two times a day  mupirocin 2% Ointment 1 Application(s) Topical two times a day  Nephro-mirna 1 Tablet(s) Oral daily  pantoprazole    Tablet 40 milliGRAM(s) Oral before breakfast  polyethylene glycol 3350 17 Gram(s) Oral daily  senna 2 Tablet(s) Oral at bedtime  sevelamer carbonate 1600 milliGRAM(s) Oral three times a day with meals  tiotropium 2.5 MICROgram(s) Inhaler 2 Puff(s) Inhalation daily  traZODone 25 milliGRAM(s) Oral at bedtime    MEDICATIONS  (PRN):  bisacodyl Suppository 10 milliGRAM(s) Rectal daily PRN Constipation  diphenhydrAMINE 25 milliGRAM(s) Oral every 6 hours PRN Rash and/or Itching  levETIRAcetam  IVPB 500 milliGRAM(s) IV Intermittent daily PRN if refuses PO  LORazepam     Tablet 1 milliGRAM(s) Oral every 6 hours PRN Agitation  LORazepam   Injectable 1 milliGRAM(s) IV Push every 6 hours PRN Agitation  LORazepam   Injectable 1 milliGRAM(s) IntraMuscular every 6 hours PRN Agitation  melatonin 3 milliGRAM(s) Oral at bedtime PRN Insomnia  oxyCODONE    IR 5 milliGRAM(s) Oral every 6 hours PRN moderate to severe pain    I&O's Summary    13 Mar 2023 07:01  -  14 Mar 2023 07:00  --------------------------------------------------------  IN: 0 mL / OUT: 1350 mL / NET: -1350 mL    PHYSICAL EXAM:  Vital Signs Last 24 Hrs  T(C): 36.3 (14 Mar 2023 03:37), Max: 36.4 (13 Mar 2023 22:40)  T(F): 97.4 (14 Mar 2023 03:37), Max: 97.6 (13 Mar 2023 22:40)  HR: 84 (14 Mar 2023 03:37) (80 - 84)  BP: 122/69 (14 Mar 2023 03:37) (104/53 - 122/69)  BP(mean): --  RR: 18 (14 Mar 2023 03:37) (17 - 18)  SpO2: 99% (14 Mar 2023 03:37) (99% - 100%)    Parameters below as of 14 Mar 2023 03:37  Patient On (Oxygen Delivery Method): room air    GENERAL: NAD, resting in bed  CHEST/LUNG: Clear to auscultation bilaterally anteriorly; No wheeze  HEART: Regular rate and rhythm; No murmurs, rubs, or gallops  ABDOMEN: Soft, Nontender, Nondistended; Bowel sounds present  EXTREMITIES: LLE with hyperpigmentation; limited movement of hands bilaterally  PSYCH: calm, interactive --> became angry about IV pain meds  NEUROLOGY: moving all ext  SKIN: B heel wounds (described in detail in podiatry note 3/12)  + R chest wall permcath   +SPT    LABS:    RADIOLOGY & ADDITIONAL TESTS:  Results Reviewed:   Imaging Personally Reviewed:  Electrocardiogram Personally Reviewed:    COORDINATION OF CARE:  Care Discussed with Consultants/Other Providers [Y/N]: RN, SW, CM, ACP, psych re overall care; renal re renal status  Prior or Outpatient Records Reviewed [Y/N]:

## 2023-03-14 NOTE — PROGRESS NOTE ADULT - PROBLEM SELECTOR PLAN 5
hx of ablation,   - c/w Eliquis 5 mg BID foot abrasions all to dermis  - WBC normalized 13-->7  - c/w mupirocin and heel elevators  - R foot x-ray: neg for OM  - local wound care: apply mupirocin to right foot wounds followed by 4x4 gauze, ABD and mckayla, daily; apply mupirocin to plantar wounds followed by 4x4 gauze, ABD and mckayla daily.  - podiatry consulted, signed off, cleared for dc  - s/p clinda no mention of concern for infection, dc abx and monitor

## 2023-03-14 NOTE — ADVANCED PRACTICE NURSE CONSULT - REASON FOR CONSULT
Attempted to see patient on Wound Care Rounds, patient refusing assessment at this time. Will re-attempt when patient is more amendable. 
Patient seen on skin care rounds after wound care referral received for assessment of skin impairment and recommendations of topical management of the sacrum. 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 Pt has chronic painful healing sacral wound and bilateral heel ulcers. Pt was in bed/wheelchair, then started kicking his feet into surface and opened an ulcer on bottom of R foot. Upon sight of blood from his foot ulcers, he fainted and denies chest pain/sob. He remembers AICD firing and people looking at him. He denies fever/chill/dysuria/palpitation. In ED, he was seen by podiatry, on exam of  his right foot, he has a 1cm stage 2 pressure ulcer, 0.5cm stage 2 pressure ulcer, and 2cm growth. Wounds cleaned and dried, +pedal pulses on palpation - further skin assessment shows healed 5cm pressure ulcer on sacrum. pt reports 9/10 pain in R foot and in sacrum.     Patient is being consulted by dermatology for back rash, nephrology for ESRD on HD, podiatry in the ED for foot ulcers.    Chart reviewed: WBC: 7.20, H&H: 7.7/25.6, Platelets: 117, serum albumin: 3.6, Wt: 90.3kG [place] 14, patient with MRSA in urine 3/10/23.

## 2023-03-14 NOTE — PROGRESS NOTE ADULT - NSPROGADDITIONALINFOA_GEN_ALL_CORE
functional quadriplegia - s/p stroke, assist with all ADLs  rash - appreciate derm input, low suspicion zoster, bx done, trial clobetasol (no greater than 2 weeks)    d/w brother Derick Capellan [phone] - he is closest relative, pt was  and has a daughter but they are not involved. Pt has h/o cocaine abuse but brother reports has been clean for 18 years. Reviewed overall care, including avoiding IV pain meds and Benadryl and offering PO, brother concurs. functional quadriplegia - s/p stroke, assist with all ADLs  rash - appreciate derm input, low suspicion zoster, bx done, trial clobetasol (no greater than 2 weeks)    d/w brother Derick Capellan [phone] - he is closest relative, pt was  and has a daughter but they are not involved. Pt has h/o cocaine abuse but brother reports has been clean for 18 years. Reviewed overall care, including avoiding IV pain meds and Benadryl and offering PO, brother concurs.    aim for return to [place]

## 2023-03-14 NOTE — PROGRESS NOTE ADULT - PROBLEM SELECTOR PLAN 7
- c/w Keppra 500mg qd and 250mg additional on dialysis days; refusing meds, IV if refuses PO hx of ablation,   - c/w Eliquis 5 mg BID

## 2023-03-14 NOTE — PROGRESS NOTE ADULT - PROBLEM SELECTOR PLAN 2
Reportedly syncopized at the sight of blood from right foot ulcer; per chart review many prior ED visits for "passing out" and L flank pain of unclear etiology  - ICD interrogated on 3/11, d/w [id number], state no ICD firing noted on interrogation --> d/w EP to clarify findings...  - tele SR with PVCs...  - orthostatics if possible when cooperative. unclear if is able to stand/transfer  - HsT 101-->86-->116, pt denies CP  - TTE - pt not cooperative with echo Reportedly syncopized at the sight of blood from right foot ulcer; per chart review many prior ED visits for "passing out" and L flank pain of unclear etiology  - ICD interrogated on 3/11, d/w [id number], state no ICD firing noted on interrogation --> d/w EP device is pacing and sensing well  - tele SR with PVCs - d/c tele  - defer head CT as clincally stable  - HsT 101-->86-->116, pt denies CP  - TTE - pt not cooperative with echo  no other clear etiology, pt did report large amount of blood

## 2023-03-14 NOTE — ADVANCED PRACTICE NURSE CONSULT - ASSESSMENT
General: A&Ox3-4, wheelchair bound at baseline, patient received at bedside refusing full head to toe assessment, but allowing quick observation of buttocks. Patient educated on importance of full head to toe assessment, but still refusing. At this time, wishes respected and buttocks assessed.     Sacrum/bilateral buttocks: Healed pressure injury with moisture associated dermatitis as evidenced by maceration extending from gluteal cleft, hyper and hypopigmentation, blanchable erythema and skin contractures. Denuded epidermis noted to L buttocks in close proximity but not over marisabel prominence in natural anatomical laying position.

## 2023-03-15 NOTE — PROGRESS NOTE ADULT - PROBLEM SELECTOR PLAN 2
Patient with anemia in the setting of ESRD. S/p PRBCs 3/14/23. Hemoglobin below target range. Will need to determine if patient receives VIBHA. Blood transfusion per primary team. Monitor hemoglobin.

## 2023-03-15 NOTE — PROGRESS NOTE ADULT - SUBJECTIVE AND OBJECTIVE BOX
[facility] Division of Hospital Medicine  Estefania Olmos MD  Pager (M-F, 8A-5P):  In-house pager [phone]; Long-range pager [phone]  Other Times:  Please page Hospitalist in Charge -  In-house pager [phone]    Patient is a 62y old  Male who presents with a chief complaint of syncope, foot infection (14 Mar 2023 16:26)    SUBJECTIVE / OVERNIGHT EVENTS:  ADDITIONAL REVIEW OF SYSTEMS:    MEDICATIONS  (STANDING):  apixaban 5 milliGRAM(s) Oral two times a day  artificial  tears Solution 1 Drop(s) Both EYES four times a day  ascorbic acid 500 milliGRAM(s) Oral daily  atorvastatin 40 milliGRAM(s) Oral at bedtime  baclofen 2.5 milliGRAM(s) Oral every 12 hours  chlorhexidine 2% Cloths 1 Application(s) Topical daily  cholecalciferol 1000 Unit(s) Oral daily  clobetasol 0.05% Cream 1 Application(s) Topical two times a day  lactobacillus acidophilus 1 Tablet(s) Oral three times a day with meals  levETIRAcetam 250 milliGRAM(s) Oral <User Schedule>  levETIRAcetam 500 milliGRAM(s) Oral daily  metoprolol succinate ER 50 milliGRAM(s) Oral daily  mupirocin 2% Ointment 1 Application(s) Both Nostrils two times a day  mupirocin 2% Ointment 1 Application(s) Topical two times a day  Nephro-mirna 1 Tablet(s) Oral daily  pantoprazole    Tablet 40 milliGRAM(s) Oral before breakfast  polyethylene glycol 3350 17 Gram(s) Oral daily  senna 2 Tablet(s) Oral at bedtime  sevelamer carbonate 1600 milliGRAM(s) Oral three times a day with meals  tiotropium 2.5 MICROgram(s) Inhaler 2 Puff(s) Inhalation daily  traZODone 25 milliGRAM(s) Oral at bedtime    MEDICATIONS  (PRN):  bisacodyl Suppository 10 milliGRAM(s) Rectal daily PRN Constipation  diphenhydrAMINE 50 milliGRAM(s) Oral daily PRN 1 hour before dialysis  diphenhydrAMINE 25 milliGRAM(s) Oral every 6 hours PRN Rash and/or Itching  levETIRAcetam  IVPB 500 milliGRAM(s) IV Intermittent daily PRN if refuses PO  LORazepam     Tablet 1 milliGRAM(s) Oral every 6 hours PRN Agitation  LORazepam   Injectable 1 milliGRAM(s) IV Push every 6 hours PRN Agitation  LORazepam   Injectable 1 milliGRAM(s) IntraMuscular every 6 hours PRN Agitation  melatonin 3 milliGRAM(s) Oral at bedtime PRN Insomnia  oxyCODONE    IR 5 milliGRAM(s) Oral every 6 hours PRN moderate to severe pain    I&O's Summary    14 Mar 2023 07:01  -  15 Mar 2023 07:00  --------------------------------------------------------  IN: 700 mL / OUT: 2000 mL / NET: -1300 mL    PHYSICAL EXAM:  Vital Signs Last 24 Hrs  T(C): 36.7 (15 Mar 2023 05:55), Max: 36.9 (15 Mar 2023 04:24)  T(F): 98.1 (15 Mar 2023 05:55), Max: 98.5 (15 Mar 2023 04:24)  HR: 80 (15 Mar 2023 05:55) (80 - 88)  BP: 107/60 (15 Mar 2023 05:55) (107/60 - 118/76)  BP(mean): 2 (15 Mar 2023 05:55) (2 - 2)  RR: 18 (15 Mar 2023 05:55) (17 - 19)  SpO2: 100% (15 Mar 2023 05:55) (99% - 100%)    Parameters below as of 15 Mar 2023 05:55  Patient On (Oxygen Delivery Method): room air    GENERAL: NAD, resting in bed  CHEST/LUNG: Clear to auscultation bilaterally anteriorly; No wheeze  HEART: Regular rate and rhythm; No murmurs, rubs, or gallops  ABDOMEN: Soft, Nontender, Nondistended; Bowel sounds present  EXTREMITIES: LLE with hyperpigmentation; limited movement of hands bilaterally  PSYCH: calm, interactive --> became angry about IV pain meds  NEUROLOGY: moving all ext  SKIN: B heel wounds (described in detail in podiatry note 3/12)  + R chest wall permcath   +SPT    LABS:                        7.0    8.43  )-----------( 140      ( 14 Mar 2023 16:03 )             21.8     03-14    136  |  100  |  68<H>  ----------------------------<  95  4.4   |  21<L>  |  5.82<H>    Ca    8.3<L>      14 Mar 2023 16:03  Phos  5.5     03-14  Mg     2.20     03-14    RADIOLOGY & ADDITIONAL TESTS:  Results Reviewed:   Imaging Personally Reviewed:  Electrocardiogram Personally Reviewed:    COORDINATION OF CARE:  Care Discussed with Consultants/Other Providers [Y/N]: RN, SW, CM, ACP re overall care   Prior or Outpatient Records Reviewed [Y/N]:   [facility] Division of Hospital Medicine  Estefania Olmos MD  Pager (M-F, 8A-5P):  In-house pager [phone]; Long-range pager [phone]  Other Times:  Please page Hospitalist in Charge -  In-house pager [phone]    Patient is a 62y old  Male who presents with a chief complaint of syncope, foot infection (14 Mar 2023 16:26)    SUBJECTIVE / OVERNIGHT EVENTS:  Received 2 half units PRBCs with 40 IV lasix in between units, no problems noted.  Seen earlier, resting, says tired today. No c/o pain, SOB.   ADDITIONAL REVIEW OF SYSTEMS:    MEDICATIONS  (STANDING):  apixaban 5 milliGRAM(s) Oral two times a day  artificial  tears Solution 1 Drop(s) Both EYES four times a day  ascorbic acid 500 milliGRAM(s) Oral daily  atorvastatin 40 milliGRAM(s) Oral at bedtime  baclofen 2.5 milliGRAM(s) Oral every 12 hours  chlorhexidine 2% Cloths 1 Application(s) Topical daily  cholecalciferol 1000 Unit(s) Oral daily  clobetasol 0.05% Cream 1 Application(s) Topical two times a day  lactobacillus acidophilus 1 Tablet(s) Oral three times a day with meals  levETIRAcetam 250 milliGRAM(s) Oral <User Schedule>  levETIRAcetam 500 milliGRAM(s) Oral daily  metoprolol succinate ER 50 milliGRAM(s) Oral daily  mupirocin 2% Ointment 1 Application(s) Both Nostrils two times a day  mupirocin 2% Ointment 1 Application(s) Topical two times a day  Nephro-mirna 1 Tablet(s) Oral daily  pantoprazole    Tablet 40 milliGRAM(s) Oral before breakfast  polyethylene glycol 3350 17 Gram(s) Oral daily  senna 2 Tablet(s) Oral at bedtime  sevelamer carbonate 1600 milliGRAM(s) Oral three times a day with meals  tiotropium 2.5 MICROgram(s) Inhaler 2 Puff(s) Inhalation daily  traZODone 25 milliGRAM(s) Oral at bedtime    MEDICATIONS  (PRN):  bisacodyl Suppository 10 milliGRAM(s) Rectal daily PRN Constipation  diphenhydrAMINE 50 milliGRAM(s) Oral daily PRN 1 hour before dialysis  diphenhydrAMINE 25 milliGRAM(s) Oral every 6 hours PRN Rash and/or Itching  levETIRAcetam  IVPB 500 milliGRAM(s) IV Intermittent daily PRN if refuses PO  LORazepam     Tablet 1 milliGRAM(s) Oral every 6 hours PRN Agitation  LORazepam   Injectable 1 milliGRAM(s) IV Push every 6 hours PRN Agitation  LORazepam   Injectable 1 milliGRAM(s) IntraMuscular every 6 hours PRN Agitation  melatonin 3 milliGRAM(s) Oral at bedtime PRN Insomnia  oxyCODONE    IR 5 milliGRAM(s) Oral every 6 hours PRN moderate to severe pain    I&O's Summary    14 Mar 2023 07:01  -  15 Mar 2023 07:00  --------------------------------------------------------  IN: 700 mL / OUT: 2000 mL / NET: -1300 mL    PHYSICAL EXAM:  Vital Signs Last 24 Hrs  T(C): 36.7 (15 Mar 2023 05:55), Max: 36.9 (15 Mar 2023 04:24)  T(F): 98.1 (15 Mar 2023 05:55), Max: 98.5 (15 Mar 2023 04:24)  HR: 80 (15 Mar 2023 05:55) (80 - 88)  BP: 107/60 (15 Mar 2023 05:55) (107/60 - 118/76)  BP(mean): 2 (15 Mar 2023 05:55) (2 - 2)  RR: 18 (15 Mar 2023 05:55) (17 - 19)  SpO2: 100% (15 Mar 2023 05:55) (99% - 100%)    Parameters below as of 15 Mar 2023 05:55  Patient On (Oxygen Delivery Method): room air    GENERAL: NAD, resting in bed  CHEST/LUNG: Clear to auscultation bilaterally anteriorly; No wheeze  HEART: Regular rate and rhythm; No murmurs, rubs, or gallops  ABDOMEN: Soft, Nontender, Nondistended; Bowel sounds present  EXTREMITIES: LLE with hyperpigmentation; limited movement of hands bilaterally  PSYCH: calm, interactive --> became angry about IV pain meds  NEUROLOGY: moving all ext  SKIN: B heel wounds (described in detail in podiatry note 3/12)  + R chest wall permcath   +SPT    LABS:                        7.0    8.43  )-----------( 140      ( 14 Mar 2023 16:03 )             21.8     03-14    136  |  100  |  68<H>  ----------------------------<  95  4.4   |  21<L>  |  5.82<H>    Ca    8.3<L>      14 Mar 2023 16:03  Phos  5.5     03-14  Mg     2.20     03-14    RADIOLOGY & ADDITIONAL TESTS:  Results Reviewed:   Imaging Personally Reviewed:  Electrocardiogram Personally Reviewed:    COORDINATION OF CARE:  Care Discussed with Consultants/Other Providers [Y/N]: RN, SW, CM, ACP re overall care   Prior or Outpatient Records Reviewed [Y/N]:

## 2023-03-15 NOTE — PROGRESS NOTE ADULT - PROBLEM SELECTOR PLAN 8
- c/w Keppra 500mg qd and 250mg additional on dialysis days; refusing meds, IV if refuses PO - c/w Keppra 500mg qd and 250mg additional on dialysis days --> NEED TO TIME POST HD

## 2023-03-15 NOTE — PROGRESS NOTE ADULT - NSPROGADDITIONALINFOA_GEN_ALL_CORE
functional quadriplegia - s/p stroke, assist with all ADLs  rash - appreciate derm input, low suspicion zoster, bx done, trial clobetasol (no greater than 2 weeks)    d/w brother Derick Capellan [phone] - he is closest relative, pt was  and has a daughter but they are not involved. Pt has h/o cocaine abuse but brother reports has been clean for 18 years. Reviewed overall care, including avoiding IV pain meds and Benadryl and offering PO, brother concurs.    aim for return to [place]

## 2023-03-15 NOTE — PROGRESS NOTE ADULT - ASSESSMENT
62M sent from [facility] with prior opiod dependence, HTN, HLD, hypothyroid, Hep C, h/o substance abuse, COPD/BARRY on O2 qhs (no CPAP), CVA 4/2021, seizures, Afib s/p ablation on eliquis, CAD s/p multiple PCI, HFpEF (EF 45% in 9/22), AICD in 2018, Bladder CA s/p CTX/Radiation/TURBT, RCC s/p CTX/Radiation/Partial Nephrectomy, Prostate CA s/p Radiation therapy, neurogenic bladder with SPC, recent ESRD on HD (TTS) via permcath,  presents with episode of syncope/LOC and bleeding from R foot ulcer due to slamming his feet on things.

## 2023-03-15 NOTE — PROGRESS NOTE ADULT - SUBJECTIVE AND OBJECTIVE BOX
[facility] DIVISION OF KIDNEY DISEASES AND HYPERTENSION -- FOLLOW UP NOTE  --------------------------------------------------------------------------------  Chief Complaint: ESRD    24 hour events/subjective:  PT seen and examined, s/p HD yesterday without any issues. He received 1 u PRBCs with lasix 40mg IVP afterwards.       PAST HISTORY  --------------------------------------------------------------------------------  No significant changes to PMH, PSH, FHx, SHx, unless otherwise noted    ALLERGIES & MEDICATIONS  --------------------------------------------------------------------------------  Allergies    aspirin (Hives)  codeine (Rash)  codeine (Unknown)  Haldol (Unknown)  Motrin (Rash)  penicillin (Hives; Anaphylaxis)  Toradol (Rash)  Tylenol (Hives)    Intolerances      Standing Inpatient Medications  apixaban 5 milliGRAM(s) Oral two times a day  artificial  tears Solution 1 Drop(s) Both EYES four times a day  ascorbic acid 500 milliGRAM(s) Oral daily  atorvastatin 40 milliGRAM(s) Oral at bedtime  baclofen 2.5 milliGRAM(s) Oral every 12 hours  chlorhexidine 2% Cloths 1 Application(s) Topical daily  cholecalciferol 1000 Unit(s) Oral daily  clobetasol 0.05% Cream 1 Application(s) Topical two times a day  lactobacillus acidophilus 1 Tablet(s) Oral three times a day with meals  levETIRAcetam 250 milliGRAM(s) Oral <User Schedule>  levETIRAcetam 500 milliGRAM(s) Oral daily  metoprolol succinate ER 50 milliGRAM(s) Oral daily  mupirocin 2% Ointment 1 Application(s) Both Nostrils two times a day  mupirocin 2% Ointment 1 Application(s) Topical two times a day  Nephro-mirna 1 Tablet(s) Oral daily  pantoprazole    Tablet 40 milliGRAM(s) Oral before breakfast  polyethylene glycol 3350 17 Gram(s) Oral daily  senna 2 Tablet(s) Oral at bedtime  sevelamer carbonate 1600 milliGRAM(s) Oral three times a day with meals  tiotropium 2.5 MICROgram(s) Inhaler 2 Puff(s) Inhalation daily  traZODone 25 milliGRAM(s) Oral at bedtime    PRN Inpatient Medications  bisacodyl Suppository 10 milliGRAM(s) Rectal daily PRN  diphenhydrAMINE 50 milliGRAM(s) Oral daily PRN  diphenhydrAMINE 25 milliGRAM(s) Oral every 6 hours PRN  levETIRAcetam  IVPB 500 milliGRAM(s) IV Intermittent daily PRN  LORazepam     Tablet 1 milliGRAM(s) Oral every 6 hours PRN  LORazepam   Injectable 1 milliGRAM(s) IV Push every 6 hours PRN  LORazepam   Injectable 1 milliGRAM(s) IntraMuscular every 6 hours PRN  melatonin 3 milliGRAM(s) Oral at bedtime PRN  oxyCODONE    IR 5 milliGRAM(s) Oral every 6 hours PRN        VITALS/PHYSICAL EXAM  --------------------------------------------------------------------------------  T(C): 36.7 (03-15-23 @ 05:55), Max: 36.9 (03-15-23 @ 04:24)  HR: 80 (03-15-23 @ 05:55) (80 - 88)  BP: 107/60 (03-15-23 @ 05:55) (107/60 - 118/76)  RR: 18 (03-15-23 @ 05:55) (17 - 19)  SpO2: 100% (03-15-23 @ 05:55) (99% - 100%)  Wt(kg): --        03-14-23 @ 07:01  -  03-15-23 @ 07:00  --------------------------------------------------------  IN: 700 mL / OUT: 2000 mL / NET: -1300 mL      Physical Exam:	              Gen: NAD  	HEENT: MMM  	Pulm: CTA B/L  	CV: S1S2  	Abd: Soft, +BS   	Ext: No LE edema B/L, Dressing evident on B/L foot  	Neuro: Awake  	Skin: Warm and dry  	Vascular access: RIJ tunneled HD catheter +      LABS/STUDIES  --------------------------------------------------------------------------------              7.8    8.02  >-----------<  126      [03-15-23 @ 11:15]              24.7     139  |  103  |  55  ----------------------------<  124      [03-15-23 @ 11:15]  4.5   |  24  |  4.36        Ca     8.6     [03-15-23 @ 11:15]      Mg     1.90     [03-15-23 @ 11:15]      Phos  4.3     [03-15-23 @ 11:15]            Creatinine Trend:  SCr 4.36 [03-15 @ 11:15]  SCr 5.82 [03-14 @ 16:03]  SCr 3.44 [03-12 @ 06:31]  SCr 4.08 [03-10 @ 18:10]    Urinalysis - [03-10-23 @ 18:29]      Color Yellow / Appearance Slightly Turbid / SG 1.014 / pH 6.5      Gluc Negative / Ketone Negative  / Bili Negative / Urobili <2 mg/dL       Blood Large / Protein 100 mg/dL / Leuk Est Large / Nitrite Negative      RBC >50 / WBC >50 / Hyaline  / Gran  / Sq Epi  / Non Sq Epi  / Bacteria Many      Iron 66, TIBC 206, %sat 32      [03-12-23 @ 06:31]  Ferritin 964      [03-12-23 @ 06:31]  HbA1c 6.2      [01-03-20 @ 13:57]  TSH <0.10      [03-12-23 @ 06:31]    HBsAb <3.0      [03-11-23 @ 19:25]  HBsAg Nonreact      [03-11-23 @ 19:25]  HBcAb Nonreact      [03-11-23 @ 19:25]  HCV 8.43, Reactive      [03-11-23 @ 19:25]

## 2023-03-15 NOTE — PROGRESS NOTE ADULT - PROBLEM SELECTOR PLAN 1
Pt. with ESRD on HD three times a week (MWF per pt). Pt being admitted for LOC and bleeding from B/L heel ulcers. Last HD in outpatient facility was done on Thursday(3/9/23) via RIJ tunneled HD catheter. Pt goes to [facility]  dialysis unit. Follows with Dr. Mercedes. Labs reviewed. HD consent obtained from Son Derick Capellan ([phone]) on phone as pt is oriented to himself. Will plan for next HD tomorrow. HD orders placed. Dose meds as per HD.

## 2023-03-15 NOTE — PROGRESS NOTE ADULT - PROBLEM SELECTOR PLAN 1
Likely 2/2 ESRD and bleeding/acute blood loss  - c/w PPI  - pt had hospitalization requiring transfusion and GI w/u (endoscopy) which were negative for acute bleed  - transfuse prn to keep Hgb>7  --> d/w renal just getting labs back while pt at HD - pt does still make some urine, will transfuse 1/2 units x2 with lasix 40 IV in between units, with lung checks to minimize chance of fluid overload Likely 2/2 ESRD and bleeding/acute blood loss from foot wound  - c/w PPI  - pt had hospitalization requiring transfusion and GI w/u (endoscopy) which were negative for acute bleed  - transfuse prn to keep Hgb>7  --> s/p 1U PRBCs Hb 7.0 --> 7.8, acceptable response

## 2023-03-15 NOTE — PROGRESS NOTE ADULT - PROBLEM SELECTOR PLAN 9
Presume ICM due to, CAD with PCI, last EF 45%  - HD with fluid removal as tolerated  - CXR clear, pro-BNP 400s  - not cooperative with echo  - tele SR with PVCs

## 2023-03-15 NOTE — PROGRESS NOTE ADULT - PROBLEM SELECTOR PLAN 3
Pt with hyperphosphatemia in the setting of ESRD. Pt. on phosphate binders with meals. Last serum phos is within target range at 4.3. Low phosphorus diet. Monitor serum phosphorus.       Please call with questions  Eloina Mancilla MD  Nephrology Attending  [phone]

## 2023-03-15 NOTE — PROGRESS NOTE ADULT - PROBLEM SELECTOR PLAN 2
Reportedly syncopized at the sight of blood from right foot ulcer; per chart review many prior ED visits for "passing out" and L flank pain of unclear etiology  - ICD interrogated on 3/11, d/w 57804, state no ICD firing noted on interrogation --> d/w EP device is pacing and sensing well  - tele SR with PVCs - d/c tele  - defer head CT as clincally stable  - HsT 101-->86-->116, pt denies CP  - TTE - pt not cooperative with echo  no other clear etiology, pt did report large amount of blood

## 2023-03-15 NOTE — PROGRESS NOTE ADULT - PROBLEM SELECTOR PLAN 4
HD (TTS), recent   - renal following for HD  - lytes acceptable  - iron studies c/w AOCD  - c/w sevelamer 1600 TID, renal diet, vit D  - indwelling SPC, UA positive MRSA, likely colonization  hyperphos - on Renvela  MRSA nasal swab - mupirocin nasal oint 5d  - c/w PO Benadryl 1-2h pre HD, d/w pt if develop sx during HD can reassess HD (TTS) - HD per renal  - iron studies c/w AOCD  - c/w sevelamer 1600 TID, renal diet, vit D  - indwelling SPC, UA positive MRSA, likely colonization  hyperphos - on Renvela  MRSA nasal swab - mupirocin nasal oint 5d  - c/w PO Benadryl 1-2h pre HD, d/w pt if develop sx during HD can reassess

## 2023-03-15 NOTE — PROGRESS NOTE ADULT - PROBLEM SELECTOR PLAN 3
Severe agitation suspect delirium in pt with multiple risk factors; prior CTH suggestive of TBI  - check CTH as unclear if could have sustain head trauma while on ac  - TSH <0.1, hold levothyroxine, FT4 1.6  - requiring restraints and 1:1 for safety--hitting feet on rails, spitting at staff --> s/p Zyprexa, c/w Zyprexa 5 mg q6h prn --> psych evaluated, c/w Ativan PRN agitation Severe agitation suspect delirium in pt with multiple risk factors; prior CTH suggestive of TBI  - check CTH as unclear if could have sustain head trauma while on ac  - TSH <0.1, hold levothyroxine, FT4 1.6  -  c/w Ativan PRN agitation - not been requiring

## 2023-03-16 NOTE — PROGRESS NOTE ADULT - ASSESSMENT
62M sent from [facility] with prior opiod dependence, HTN, HLD, hypothyroid, Hep C, h/o substance abuse, COPD/BARRY on O2 qhs (no CPAP), CVA 4/2021, seizures, Afib s/p ablation on eliquis, CAD s/p multiple PCI, HFpEF (EF 45% in 9/22), AICD in 2018, Bladder CA s/p CTX/Radiation/TURBT, RCC s/p CTX/Radiation/Partial Nephrectomy, Prostate CA s/p Radiation therapy, neurogenic bladder with SPC, recent ESRD on HD (TTS) via permcath,  presents with episode of syncope/LOC and bleeding from R foot ulcer due to slamming his feet on things.

## 2023-03-16 NOTE — PROGRESS NOTE ADULT - PROBLEM SELECTOR PLAN 2
Patient with anemia in the setting of ESRD. S/p PRBCs 3/14/23 and 3/15. Hemoglobin below target range. will need to dose VIBHA.  Monitor CBC.

## 2023-03-16 NOTE — PROGRESS NOTE ADULT - NSPROGADDITIONALINFOA_GEN_ALL_CORE
functional quadriplegia - s/p stroke, assist with all ADLs  rash - appreciate derm input, low suspicion zoster, bx done, trial clobetasol (no greater than 2 weeks)    d/w brother Derick Capellan [phone] - he is closest relative, pt was  and has a daughter but they are not involved. Pt has h/o cocaine abuse but brother reports has been clean for 18 years. Reviewed overall care, including avoiding IV pain meds and Benadryl and offering PO, brother concurs.    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 Note:  Findings can be seen in an excoriated hypersensitivity state, such as a drug eruption, a response to arthropod assault or exposure to other  exogenous allergens. No evidence of herpes/VZV is seen. PAS stain and deeper levels are pending."    d/w brother Derick Capellan [phone] - he is closest relative, pt was  and has a daughter but they are not involved. Pt has h/o cocaine abuse but brother reports has been clean for 18 years. Reviewed overall care, including avoiding IV pain meds and Benadryl and offering PO, brother concurs.    aim for return to [place]

## 2023-03-16 NOTE — PROGRESS NOTE ADULT - PROBLEM SELECTOR PLAN 1
Pt. with ESRD on HD three times a week (MWF per pt). Pt was admitted for LOC and bleeding from B/L heel ulcers. Last HD in outpatient facility was done on Thursday (3/9/23) via RIJ tunneled HD catheter. Pt goes to [facility]  dialysis unit. Follows with Dr. Mercedes. Labs reviewed. Seen at initiation of HD today. Dose meds as per HD.

## 2023-03-16 NOTE — PROGRESS NOTE ADULT - PROBLEM SELECTOR PLAN 8
- c/w Keppra 500mg qd and 250mg additional on dialysis days --> NEED TO TIME POST HD - c/w Keppra 500mg qd and 250mg additional on dialysis days --> timed 6pm so should be after HD

## 2023-03-16 NOTE — PROVIDER CONTACT NOTE (OTHER) - REASON
Patient refusing all medications
notification HCV is reactive
Hypothermia
Patient uncooperative, agitated, combative. Refused medication and removed IV.
Patient is refusing dialysis treatment

## 2023-03-16 NOTE — PROVIDER CONTACT NOTE (OTHER) - ACTION/TREATMENT ORDERED:
As per Dr. Manclila and ASAD Fletcher, will try to reschedule HD treatment for another day
As per provider, RN to place warming blanket.
As per ACP Khadijah Gonzalez, awaiting orders
Will speak with patient at bedside.

## 2023-03-16 NOTE — PROGRESS NOTE ADULT - PROBLEM SELECTOR PLAN 1
Likely 2/2 ESRD and bleeding/acute blood loss from foot wound  - c/w PPI  - pt had hospitalization requiring transfusion and GI w/u (endoscopy) which were negative for acute bleed  - transfuse prn to keep Hgb>7  --> s/p 1U PRBCs Hb 7.0 --> 7.8, acceptable response Likely 2/2 ESRD and bleeding/acute blood loss from foot wound  - c/w PPI  - pt had hospitalization requiring transfusion and GI w/u (endoscopy) which were negative for acute bleed  - transfuse prn to keep Hgb>7  --> s/p 1U PRBCs Hb 7.0 --> 7.8, acceptable response --> [facility] requests tx to Hb >8 prior to transfer, repeat 1u

## 2023-03-16 NOTE — PROVIDER CONTACT NOTE (OTHER) - NAME OF MD/NP/PA/DO NOTIFIED:
ASAD Gonzalez
Leeanne Sanchez
Angela Yuan ([facility]) #[id number]
Dr. Eloina Mancilla, ASAD Dickens

## 2023-03-16 NOTE — PROVIDER CONTACT NOTE (OTHER) - BACKGROUND
Pt admitted post syncope.
Patient was admitted for syncope and collapse.
Patient admitted for change in LOC and right foot ulcer. Pt has pmh of ESRD, bladder cancer and COPD
Patient admitted for syncope and collapse.

## 2023-03-16 NOTE — PROGRESS NOTE ADULT - PROBLEM SELECTOR PLAN 4
HD (TTS) - HD per renal  - iron studies c/w AOCD  - c/w sevelamer 1600 TID, renal diet, vit D  - indwelling SPC, UA positive MRSA, likely colonization  hyperphos - on Renvela  MRSA nasal swab - mupirocin nasal oint 5d  - c/w PO Benadryl 1-2h pre HD, d/w pt if develop sx during HD can reassess

## 2023-03-16 NOTE — PROGRESS NOTE ADULT - PROBLEM SELECTOR PLAN 3
Severe agitation suspect delirium in pt with multiple risk factors; prior CTH suggestive of TBI  - check CTH as unclear if could have sustain head trauma while on ac  - TSH <0.1, hold levothyroxine, FT4 1.6  -  c/w Ativan PRN agitation - not been requiring Severe agitation suspect delirium in pt with multiple risk factors; prior CTH suggestive of TBI  - pt declined hCT, he has been clinically stable since admission  -  c/w Ativan PRN agitation/anxiety

## 2023-03-16 NOTE — PROGRESS NOTE ADULT - PROBLEM SELECTOR PLAN 5
foot abrasions all to dermis  - WBC normalized 13-->7  - c/w mupirocin and heel elevators  - R foot x-ray: neg for OM  - local wound care: apply mupirocin to right foot wounds followed by 4x4 gauze, ABD and mckayla, daily; apply mupirocin to plantar wounds followed by 4x4 gauze, ABD and mckayla daily.  - podiatry consulted, signed off, cleared for dc  - s/p clinda no mention of concern for infection, dc abx and monitor foot abrasions all to dermis  - WBC normalized 13-->7  - c/w mupirocin and heel elevators  - R foot x-ray: neg for OM  - local wound care: apply mupirocin to right foot wounds followed by 4x4 gauze, ABD and mckayla, daily; apply mupirocin to plantar wounds followed by 4x4 gauze, ABD and mckayla daily.  - podiatry consulted, signed off, cleared for dc  - s/p clinda no mention of concern for infection, monitoring off abx

## 2023-03-16 NOTE — PROVIDER CONTACT NOTE (OTHER) - RECOMMENDATIONS
Reschedule dialysis treatment
No recommendations.
lab  results cong CURIEL giving results
As per ACP Khadijah Gonzalez, awaiting orders
RN maintain safety.

## 2023-03-16 NOTE — PROGRESS NOTE ADULT - PROBLEM SELECTOR PLAN 3
Pt with hyperphosphatemia in the setting of ESRD. Pt. on phosphate binders with meals. Last serum phos is within target range at 4.3. Low phosphorus diet. Monitor serum phosphorus.     John Mcfadden MD   Division of Kidney Diseases and Hypertension  Office: [phone]  Cell: [phone]  Fellow Pager: [phone]

## 2023-03-16 NOTE — PROGRESS NOTE ADULT - PROBLEM SELECTOR PLAN 11
was on levothyroxine 112 mcg  - holding levothy for suppressed, TSH, FT4 1.6 was on levothyroxine 112 mcg  - TSH <0.1, FT4 1.6 --> decrease Synthroid 112 --> 100, f/u TFTs outpt

## 2023-03-16 NOTE — PROVIDER CONTACT NOTE (OTHER) - ASSESSMENT
Patient is A&Ox0-1, agitated, combative and uncooperative. Unable to do a full assessment. Patient is cursing and screaming at staff. Making statements like "You son of a bitch"
Pt denies chest pain and dizziness.
Patient is refusing dialysis treatment stating that he needs benadryl pre treatment to prevent itching
Patient is AO2-3, vss; no s/s of acute distress noted; uncooperative with medications.

## 2023-03-16 NOTE — PROGRESS NOTE ADULT - SUBJECTIVE AND OBJECTIVE BOX
[facility] Division of Hospital Medicine  Estefania Olmos MD  Pager (M-F, 8A-5P):  In-house pager [phone]; Long-range pager [phone]  Other Times:  Please page Hospitalist in Charge -  In-house pager [phone]    Patient is a 62y old  Male who presents with a chief complaint of syncope, foot infection (16 Mar 2023 07:04)    SUBJECTIVE / OVERNIGHT EVENTS:  ...    ADDITIONAL REVIEW OF SYSTEMS:    MEDICATIONS  (STANDING):  apixaban 5 milliGRAM(s) Oral two times a day  artificial  tears Solution 1 Drop(s) Both EYES four times a day  ascorbic acid 500 milliGRAM(s) Oral daily  atorvastatin 40 milliGRAM(s) Oral at bedtime  baclofen 2.5 milliGRAM(s) Oral every 12 hours  chlorhexidine 2% Cloths 1 Application(s) Topical daily  cholecalciferol 1000 Unit(s) Oral daily  clobetasol 0.05% Cream 1 Application(s) Topical two times a day  epoetin marilin-epbx (RETACRIT) Injectable 4000 Unit(s) IV Push <User Schedule>  lactobacillus acidophilus 1 Tablet(s) Oral three times a day with meals  levETIRAcetam 500 milliGRAM(s) Oral daily  levETIRAcetam 250 milliGRAM(s) Oral <User Schedule>  metoprolol succinate ER 50 milliGRAM(s) Oral daily  mupirocin 2% Ointment 1 Application(s) Both Nostrils two times a day  mupirocin 2% Ointment 1 Application(s) Topical two times a day  Nephro-mirna 1 Tablet(s) Oral daily  pantoprazole    Tablet 40 milliGRAM(s) Oral before breakfast  polyethylene glycol 3350 17 Gram(s) Oral daily  senna 2 Tablet(s) Oral at bedtime  sevelamer carbonate 1600 milliGRAM(s) Oral three times a day with meals  tiotropium 2.5 MICROgram(s) Inhaler 2 Puff(s) Inhalation daily  traZODone 25 milliGRAM(s) Oral at bedtime    MEDICATIONS  (PRN):  bisacodyl Suppository 10 milliGRAM(s) Rectal daily PRN Constipation  diphenhydrAMINE 50 milliGRAM(s) Oral daily PRN 1 hour before dialysis  diphenhydrAMINE 25 milliGRAM(s) Oral every 6 hours PRN Rash and/or Itching  levETIRAcetam  IVPB 500 milliGRAM(s) IV Intermittent daily PRN if refuses PO  LORazepam     Tablet 1 milliGRAM(s) Oral every 6 hours PRN Agitation  LORazepam   Injectable 1 milliGRAM(s) IV Push every 6 hours PRN Agitation  LORazepam   Injectable 1 milliGRAM(s) IntraMuscular every 6 hours PRN Agitation  melatonin 3 milliGRAM(s) Oral at bedtime PRN Insomnia  oxyCODONE    IR 5 milliGRAM(s) Oral every 6 hours PRN moderate to severe pain    I&O's Summary    15 Mar 2023 07:01  -  16 Mar 2023 07:00  --------------------------------------------------------  IN: 60 mL / OUT: 1900 mL / NET: -1840 mL    PHYSICAL EXAM:  Vital Signs Last 24 Hrs  T(C): 36.4 (16 Mar 2023 06:40), Max: 37 (16 Mar 2023 05:02)  T(F): 97.6 (16 Mar 2023 06:40), Max: 98.6 (16 Mar 2023 05:02)  HR: 72 (16 Mar 2023 06:40) (72 - 82)  BP: 138/84 (16 Mar 2023 06:40) (110/70 - 138/84)  BP(mean): --  RR: 18 (16 Mar 2023 06:40) (17 - 18)  SpO2: 99% (16 Mar 2023 05:02) (99% - 100%)    Parameters below as of 16 Mar 2023 06:40  Patient On (Oxygen Delivery Method): room air    GENERAL: NAD, resting in bed  CHEST/LUNG: Clear to auscultation bilaterally anteriorly; No wheeze  HEART: Regular rate and rhythm; No murmurs, rubs, or gallops  ABDOMEN: Soft, Nontender, Nondistended; Bowel sounds present  EXTREMITIES: LLE with hyperpigmentation; limited movement of hands bilaterally  PSYCH: calm, interactive --> became angry about IV pain meds  NEUROLOGY: moving all ext  SKIN: B heel wounds (described in detail in podiatry note 3/12)  + R chest wall permcath   +SPT    LABS:                        7.7    10.61 )-----------( 148      ( 16 Mar 2023 07:38 )             24.4     03-16    141  |  106  |  65<H>  ----------------------------<  86  4.4   |  21<L>  |  4.74<H>    Ca    9.0      16 Mar 2023 07:38  Phos  3.9     03-16  Mg     2.00     03-16    RADIOLOGY & ADDITIONAL TESTS:  Results Reviewed:   Imaging Personally Reviewed:  Electrocardiogram Personally Reviewed:    COORDINATION OF CARE:  Care Discussed with Consultants/Other Providers [Y/N]: RN, SW, CM, ACP re overall care   Prior or Outpatient Records Reviewed [Y/N]:   [facility] Division of Hospital Medicine  Estefania Olmos MD  Pager (M-F, 8A-5P):  In-house pager [phone]; Long-range pager [phone]  Other Times:  Please page Hospitalist in Charge -  In-house pager [phone]    Patient is a 62y old  Male who presents with a chief complaint of syncope, foot infection (16 Mar 2023 07:04)    SUBJECTIVE / OVERNIGHT EVENTS:  Pt went to HD early this am. He was given PO Benadryl before. Again c/o itching during HD, needed to be given dose IV Benadryl.  Seen later back in room, anxious, recounting losses in his life, including sentimental possessions and his dog. He is asking for IV Ativan, won't take PO because it didn't work before. RNs had to attempt multiple times to get a IV. D/w pt one time IV Ativan, but will start long acting anxiolytic (as d/w psych).  ADDITIONAL REVIEW OF SYSTEMS:    MEDICATIONS  (STANDING):  apixaban 5 milliGRAM(s) Oral two times a day  artificial  tears Solution 1 Drop(s) Both EYES four times a day  ascorbic acid 500 milliGRAM(s) Oral daily  atorvastatin 40 milliGRAM(s) Oral at bedtime  baclofen 2.5 milliGRAM(s) Oral every 12 hours  chlorhexidine 2% Cloths 1 Application(s) Topical daily  cholecalciferol 1000 Unit(s) Oral daily  clobetasol 0.05% Cream 1 Application(s) Topical two times a day  epoetin marilin-epbx (RETACRIT) Injectable 4000 Unit(s) IV Push <User Schedule>  lactobacillus acidophilus 1 Tablet(s) Oral three times a day with meals  levETIRAcetam 500 milliGRAM(s) Oral daily  levETIRAcetam 250 milliGRAM(s) Oral <User Schedule>  metoprolol succinate ER 50 milliGRAM(s) Oral daily  mupirocin 2% Ointment 1 Application(s) Both Nostrils two times a day  mupirocin 2% Ointment 1 Application(s) Topical two times a day  Nephro-mirna 1 Tablet(s) Oral daily  pantoprazole    Tablet 40 milliGRAM(s) Oral before breakfast  polyethylene glycol 3350 17 Gram(s) Oral daily  senna 2 Tablet(s) Oral at bedtime  sevelamer carbonate 1600 milliGRAM(s) Oral three times a day with meals  tiotropium 2.5 MICROgram(s) Inhaler 2 Puff(s) Inhalation daily  traZODone 25 milliGRAM(s) Oral at bedtime    MEDICATIONS  (PRN):  bisacodyl Suppository 10 milliGRAM(s) Rectal daily PRN Constipation  diphenhydrAMINE 50 milliGRAM(s) Oral daily PRN 1 hour before dialysis  diphenhydrAMINE 25 milliGRAM(s) Oral every 6 hours PRN Rash and/or Itching  levETIRAcetam  IVPB 500 milliGRAM(s) IV Intermittent daily PRN if refuses PO  LORazepam     Tablet 1 milliGRAM(s) Oral every 6 hours PRN Agitation  LORazepam   Injectable 1 milliGRAM(s) IV Push every 6 hours PRN Agitation  LORazepam   Injectable 1 milliGRAM(s) IntraMuscular every 6 hours PRN Agitation  melatonin 3 milliGRAM(s) Oral at bedtime PRN Insomnia  oxyCODONE    IR 5 milliGRAM(s) Oral every 6 hours PRN moderate to severe pain    I&O's Summary    15 Mar 2023 07:01  -  16 Mar 2023 07:00  --------------------------------------------------------  IN: 60 mL / OUT: 1900 mL / NET: -1840 mL    PHYSICAL EXAM:  Vital Signs Last 24 Hrs  T(C): 36.4 (16 Mar 2023 06:40), Max: 37 (16 Mar 2023 05:02)  T(F): 97.6 (16 Mar 2023 06:40), Max: 98.6 (16 Mar 2023 05:02)  HR: 72 (16 Mar 2023 06:40) (72 - 82)  BP: 138/84 (16 Mar 2023 06:40) (110/70 - 138/84)  BP(mean): --  RR: 18 (16 Mar 2023 06:40) (17 - 18)  SpO2: 99% (16 Mar 2023 05:02) (99% - 100%)    Parameters below as of 16 Mar 2023 06:40  Patient On (Oxygen Delivery Method): room air    GENERAL: WM, lying in bed, anxious, tearful  CHEST/LUNG: Clear to auscultation bilaterally anteriorly; No wheeze  HEART: Regular rate and rhythm; No murmurs, rubs, or gallops  ABDOMEN: Soft, Nontender, Nondistended; Bowel sounds present  EXTREMITIES: LLE with hyperpigmentation; limited movement of hands bilaterally  PSYCH: anxious, tearful  NEUROLOGY: moving all ext  SKIN: B heel wounds wrapped  + R chest wall permcath   +SPT    LABS:                        7.7    10.61 )-----------( 148      ( 16 Mar 2023 07:38 )             24.4     03-16    141  |  106  |  65<H>  ----------------------------<  86  4.4   |  21<L>  |  4.74<H>    Ca    9.0      16 Mar 2023 07:38  Phos  3.9     03-16  Mg     2.00     03-16    RADIOLOGY & ADDITIONAL TESTS:  Results Reviewed:   Imaging Personally Reviewed:  Electrocardiogram Personally Reviewed:    COORDINATION OF CARE:  Care Discussed with Consultants/Other Providers [Y/N]: RN, SW, CM, ACP re overall care   Prior or Outpatient Records Reviewed [Y/N]:

## 2023-03-16 NOTE — PROGRESS NOTE ADULT - PROBLEM SELECTOR PLAN 2
Reportedly syncopized at the sight of blood from right foot ulcer; per chart review many prior ED visits for "passing out" and L flank pain of unclear etiology  - ICD interrogated on 3/11, d/w 91805, state no ICD firing noted on interrogation --> d/w EP device is pacing and sensing well  - tele SR with PVCs - d/c tele  - defer head CT as clincally stable  - HsT 101-->86-->116, pt denies CP  - TTE - pt not cooperative with echo  no other clear etiology, pt did report large amount of blood

## 2023-03-16 NOTE — PROVIDER CONTACT NOTE (OTHER) - SITUATION
ACP
Patient is refusing dialysis treatment
PT refusing meds; requesting PRN IV ativan before taking any medications. Patient refusing to take PO ativan. Patient awaiting bed at [facility]; possible d/c this week.
Patient uncooperative, agitated, refused medication, and removed IV.
Rectal temp 95.5.

## 2023-03-16 NOTE — PROGRESS NOTE ADULT - SUBJECTIVE AND OBJECTIVE BOX
[facility] Division of Kidney Diseases & Hypertension  FOLLOW UP NOTE  --------------------------------------------------------------------------------    24 hour events/subjective:  PT seen and examined at initiation of dialysis today in dialysis unit.  reports no chest pain no shortness of breath no nausea vomiting.      PAST HISTORY  --------------------------------------------------------------------------------  No significant changes to PMH, PSH, FHx, SHx, unless otherwise noted    ALLERGIES & MEDICATIONS  --------------------------------------------------------------------------------  Allergies    aspirin (Hives)  codeine (Rash)  codeine (Unknown)  Haldol (Unknown)  Motrin (Rash)  penicillin (Hives; Anaphylaxis)  Toradol (Rash)  Tylenol (Hives)    Intolerances      Standing Inpatient Medications  apixaban 5 milliGRAM(s) Oral two times a day  artificial  tears Solution 1 Drop(s) Both EYES four times a day  ascorbic acid 500 milliGRAM(s) Oral daily  atorvastatin 40 milliGRAM(s) Oral at bedtime  baclofen 2.5 milliGRAM(s) Oral every 12 hours  chlorhexidine 2% Cloths 1 Application(s) Topical daily  cholecalciferol 1000 Unit(s) Oral daily  clobetasol 0.05% Cream 1 Application(s) Topical two times a day  lactobacillus acidophilus 1 Tablet(s) Oral three times a day with meals  levETIRAcetam 500 milliGRAM(s) Oral daily  metoprolol succinate ER 50 milliGRAM(s) Oral daily  mupirocin 2% Ointment 1 Application(s) Both Nostrils two times a day  mupirocin 2% Ointment 1 Application(s) Topical two times a day  Nephro-mirna 1 Tablet(s) Oral daily  pantoprazole    Tablet 40 milliGRAM(s) Oral before breakfast  polyethylene glycol 3350 17 Gram(s) Oral daily  senna 2 Tablet(s) Oral at bedtime  sevelamer carbonate 1600 milliGRAM(s) Oral three times a day with meals  tiotropium 2.5 MICROgram(s) Inhaler 2 Puff(s) Inhalation daily  traZODone 25 milliGRAM(s) Oral at bedtime    PRN Inpatient Medications  bisacodyl Suppository 10 milliGRAM(s) Rectal daily PRN  diphenhydrAMINE 50 milliGRAM(s) Oral daily PRN  diphenhydrAMINE 25 milliGRAM(s) Oral every 6 hours PRN  levETIRAcetam  IVPB 500 milliGRAM(s) IV Intermittent daily PRN  LORazepam     Tablet 1 milliGRAM(s) Oral every 6 hours PRN  LORazepam   Injectable 1 milliGRAM(s) IV Push every 6 hours PRN  LORazepam   Injectable 1 milliGRAM(s) IntraMuscular every 6 hours PRN  melatonin 3 milliGRAM(s) Oral at bedtime PRN  oxyCODONE    IR 5 milliGRAM(s) Oral every 6 hours PRN      REVIEW OF SYSTEMS  --------------------------------------------------------------------------------  Gen: no fever  Respiratory: no sob  CV: no cp  GI: no ab pain  : has suprapubic catheter  MSK: no pain    VITALS/PHYSICAL EXAM  --------------------------------------------------------------------------------  T(C): 37 (03-16-23 @ 05:02), Max: 37 (03-16-23 @ 05:02)  HR: 78 (03-16-23 @ 05:02) (78 - 82)  BP: 125/77 (03-16-23 @ 05:02) (110/70 - 125/77)  ABP: --  ABP(mean): --  RR: 18 (03-16-23 @ 05:02) (17 - 18)  SpO2: 99% (03-16-23 @ 05:02) (99% - 100%)  CVP(mm Hg): --        03-15-23 @ 07:01  -  03-16-23 @ 07:00  --------------------------------------------------------  IN: 60 mL / OUT: 1900 mL / NET: -1840 mL      Physical Exam:  	Gen: no distress  	HEENT: anicteric  	Pulm: CTA B/L  	CV: S1S2  	Abd: soft, obese  	: supra pubic catheter noted  	LE: no lower extremity   	Psych: Normal affect and mood  	Skin: dry  	Vascular access: right IJ tunneled HD catheter site is CDI.    LABS/STUDIES  --------------------------------------------------------------------------------              7.8    8.02  >-----------<  126      [03-15-23 @ 11:15]              24.7     139  |  103  |  55  ----------------------------<  124      [03-15-23 @ 11:15]  4.5   |  24  |  4.36        Ca     8.6     [03-15-23 @ 11:15]      Mg     1.90     [03-15-23 @ 11:15]      Phos  4.3     [03-15-23 @ 11:15]            Creatinine Trend:  SCr 4.36 [03-15 @ 11:15]  SCr 5.82 [03-14 @ 16:03]  SCr 3.44 [03-12 @ 06:31]  SCr 4.08 [03-10 @ 18:10]    Urinalysis - [03-10-23 @ 18:29]      Color Yellow / Appearance Slightly Turbid / SG 1.014 / pH 6.5      Gluc Negative / Ketone Negative  / Bili Negative / Urobili <2 mg/dL       Blood Large / Protein 100 mg/dL / Leuk Est Large / Nitrite Negative      RBC >50 / WBC >50 / Hyaline  / Gran  / Sq Epi  / Non Sq Epi  / Bacteria Many      Iron 66, TIBC 206, %sat 32      [03-12-23 @ 06:31]  Ferritin 964      [03-12-23 @ 06:31]  TSH <0.10      [03-12-23 @ 06:31]    HBsAb <3.0      [03-11-23 @ 19:25]  HBsAg Nonreact      [03-11-23 @ 19:25]  HBcAb Nonreact      [03-11-23 @ 19:25]  HCV 8.43, Reactive      [03-11-23 @ 19:25]

## 2023-03-16 NOTE — CHART NOTE - NSCHARTNOTEFT_GEN_A_CORE
Discharge planning with return back to [facility]. JOSE G reviewed case with [facility] and it was recommended to maintain Hgb >8 in setting of ESRD prior to returning to [facility]. Case discussed with Dr. Olmos, agrees to additional unit PRBC, ordered, will repeat CBC this evening to ensure >8.

## 2023-03-17 NOTE — PROGRESS NOTE ADULT - PROBLEM SELECTOR PROBLEM 10
Chronic obstructive pulmonary disease (COPD)

## 2023-03-17 NOTE — ED ADULT NURSE NOTE - NSSISCREENINGQ5_ED_A_ED
----- Message from Tori Hart RN sent at 3/17/2023  9:40 AM EDT -----  Regarding: FW: Procedure   Contact: [phone]    ----- Message -----  From: Pineda Panda  Sent: 3/17/2023   9:36 AM EDT  To: [facility]  Subject: Procedure                                        Hi Dr Torsten Cottrell, I have been trying to call the office but I believe the phones are down again, I would like to go through with the procedure option if someone could get ahold of me so I can get it figured out and scheduled!    Thank you
Pt is scheduled for in office procedure on 03/21/23  Pt is aware 
No

## 2023-03-17 NOTE — DISCHARGE NOTE NURSING/CASE MANAGEMENT/SOCIAL WORK - PATIENT PORTAL LINK FT
You can access the FollowMyHealth Patient Portal offered by [facility] by registering at the following website: [email or web address]. By joining [facility]’s FollowMyHealth portal, you will also be able to view your health information using other applications (apps) compatible with our system.

## 2023-03-17 NOTE — PROGRESS NOTE ADULT - PROBLEM SELECTOR PLAN 5
foot abrasions all to dermis  - WBC normalized 13-->7  - c/w mupirocin and heel elevators  - R foot x-ray: neg for OM  - local wound care: apply mupirocin to right foot wounds followed by 4x4 gauze, ABD and mckayla, daily; apply mupirocin to plantar wounds followed by 4x4 gauze, ABD and mckayla daily.  - podiatry consulted, signed off, cleared for dc  - s/p clinda no mention of concern for infection, monitoring off abx

## 2023-03-17 NOTE — PROGRESS NOTE ADULT - NSPROGADDITIONALINFOA_GEN_ALL_CORE
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 Note:  Findings can be seen in an excoriated hypersensitivity state, such as a drug eruption, a response to arthropod assault or exposure to other  exogenous allergens. No evidence of herpes/VZV is seen. PAS stain and deeper levels are pending."    d/w brother Derick Capellan [phone] - he is closest relative, pt was  and has a daughter but they are not involved. Pt has h/o cocaine abuse but brother reports has been clean for 18 years. Reviewed overall care, including avoiding IV pain meds and Benadryl and offering PO, brother concurs.    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 Note:  Findings can be seen in an excoriated hypersensitivity state, such as a drug eruption, a response to arthropod assault or exposure to other  exogenous allergens. No evidence of herpes/VZV is seen. PAS stain and deeper levels are pending."    d/w brother Derick Capellan [phone]    Spent 35 minutes counseling and coordinating discharge care.  Stable for return to [place]

## 2023-03-17 NOTE — BH CONSULTATION LIAISON PROGRESS NOTE - NSBHASSESSMENTFT_PSY_ALL_CORE
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 Psych consulted yesterday for agitation (recs provided and full consult done today). Pt appears to have been delirious but recovered well from this, and is back to baseline. He lacks significant psychiatric priorities at this time.     Plan:   - Continue medical w/u as you are  - C/W Klonopin 0.5mg PO BID  - Continue Ativan PRN as needed for any breakthrough anxiety, though assess for objective evidence of anxiety  - Delirium precautions: monitor QTc; avoid anticholinergic/antihistaminic agents; keep Mg above 2 and K above 4  - No indication for inpatient psych care.  - Will sign off as acuity is low; please call/re-consult as needed

## 2023-03-17 NOTE — DISCHARGE NOTE NURSING/CASE MANAGEMENT/SOCIAL WORK - NSDCFUADDAPPT_GEN_ALL_CORE_FT
Podiatry Discharge Instructions:  Follow up: Please follow up with Dr. Dayron Lira within 1 week of discharge from the hospital, please call [phone] for appointment and discuss that you recently were seen in the hospital.  Wound Care: Please apply mupirocin to bilateral abrasions follow by 4x4 gauze, abd and mckayla daily.   Weight bearing: Please wear bilateral zflows to offload heels while in bed. Please weight bear as tolerated to right foot in a surgical shoe.  Antibiotics: Please continue as instructed.

## 2023-03-17 NOTE — PROGRESS NOTE ADULT - PROBLEM SELECTOR PLAN 11
was on levothyroxine 112 mcg  - TSH <0.1, FT4 1.6 --> decrease Synthroid 112 --> 100, f/u TFTs outpt

## 2023-03-17 NOTE — PROGRESS NOTE ADULT - PROVIDER SPECIALTY LIST ADULT
Internal Medicine
Podiatry
Podiatry
Nephrology
Hospitalist

## 2023-03-17 NOTE — PROGRESS NOTE ADULT - PROBLEM SELECTOR PLAN 1
Likely 2/2 ESRD and bleeding/acute blood loss from foot wound  - c/w PPI  - pt had hospitalization requiring transfusion and GI w/u (endoscopy) which were negative for acute bleed  - transfuse prn to keep Hgb>7  --> s/p 1U PRBCs Hb 7.0 --> 7.8, acceptable response --> [facility] requests tx to Hb >8 prior to transfer, repeat 1u Likely 2/2 ESRD and bleeding/acute blood loss from foot wound  - c/w PPI  - pt had hospitalization requiring transfusion and GI w/u (endoscopy) which were negative for acute bleed  - transfuse prn to keep Hgb>7  --> s/p 1U PRBCs Hb 7.0 --> 7.8, s/p 2nd dose Hb 9.2  stable for transfer back to [facility]

## 2023-03-17 NOTE — BH CONSULTATION LIAISON PROGRESS NOTE - CURRENT MEDICATION
MEDICATIONS  (STANDING):  apixaban 5 milliGRAM(s) Oral two times a day  artificial  tears Solution 1 Drop(s) Both EYES four times a day  ascorbic acid 500 milliGRAM(s) Oral daily  atorvastatin 40 milliGRAM(s) Oral at bedtime  baclofen 2.5 milliGRAM(s) Oral every 12 hours  chlorhexidine 2% Cloths 1 Application(s) Topical daily  cholecalciferol 1000 Unit(s) Oral daily  clobetasol 0.05% Ointment 1 Application(s) Topical two times a day  clonazePAM  Tablet 0.5 milliGRAM(s) Oral two times a day  epoetin marilin-epbx (RETACRIT) Injectable 4000 Unit(s) IV Push <User Schedule>  lactobacillus acidophilus 1 Tablet(s) Oral three times a day with meals  levETIRAcetam 250 milliGRAM(s) Oral <User Schedule>  levETIRAcetam 500 milliGRAM(s) Oral daily  levothyroxine 100 MICROGram(s) Oral daily  metoprolol succinate ER 50 milliGRAM(s) Oral daily  mupirocin 2% Ointment 1 Application(s) Both Nostrils two times a day  mupirocin 2% Ointment 1 Application(s) Topical two times a day  Nephro-mirna 1 Tablet(s) Oral daily  pantoprazole    Tablet 40 milliGRAM(s) Oral before breakfast  polyethylene glycol 3350 17 Gram(s) Oral daily  senna 2 Tablet(s) Oral at bedtime  sevelamer carbonate 1600 milliGRAM(s) Oral three times a day with meals  tiotropium 2.5 MICROgram(s) Inhaler 2 Puff(s) Inhalation daily  traZODone 25 milliGRAM(s) Oral at bedtime    MEDICATIONS  (PRN):  bisacodyl Suppository 10 milliGRAM(s) Rectal daily PRN Constipation  diphenhydrAMINE 50 milliGRAM(s) Oral daily PRN 1 hour before dialysis  diphenhydrAMINE 25 milliGRAM(s) Oral every 6 hours PRN Rash and/or Itching  lactulose Syrup 20 Gram(s) Oral every 6 hours PRN constipation  levETIRAcetam  IVPB 500 milliGRAM(s) IV Intermittent daily PRN if refuses PO  LORazepam     Tablet 1 milliGRAM(s) Oral every 6 hours PRN Agitation  LORazepam   Injectable 1 milliGRAM(s) IV Push every 6 hours PRN Agitation  LORazepam   Injectable 1 milliGRAM(s) IntraMuscular every 6 hours PRN Agitation  melatonin 3 milliGRAM(s) Oral at bedtime PRN Insomnia  oxyCODONE    IR 5 milliGRAM(s) Oral every 6 hours PRN moderate to severe pain

## 2023-03-17 NOTE — PROGRESS NOTE ADULT - ASSESSMENT
62M sent from [facility] with prior opiod dependence, HTN, HLD, hypothyroid, Hep C, h/o substance abuse, COPD/BARRY on O2 qhs (no CPAP), CVA 4/2021, seizures, Afib s/p ablation on eliquis, CAD s/p multiple PCI, HFpEF (EF 45% in 9/22), AICD in 2018, Bladder CA s/p CTX/Radiation/TURBT, RCC s/p CTX/Radiation/Partial Nephrectomy, Prostate CA s/p Radiation therapy, neurogenic bladder with SPC, recent ESRD on HD (TTS) via permcath,  presents with episode of syncope/LOC and bleeding from R foot ulcer due to slamming his feet on things.

## 2023-03-17 NOTE — PROGRESS NOTE ADULT - SUBJECTIVE AND OBJECTIVE BOX
[facility] Division of Hospital Medicine  Estefania Olmos MD  Pager (M-F, 8A-5P):  In-house pager [phone]; Long-range pager [phone]  Other Times:  Please page Hospitalist in Charge -  In-house pager [phone]    Patient is a 62y old  Male who presents with a chief complaint of syncope, foot infection (16 Mar 2023 08:39)    SUBJECTIVE / OVERNIGHT EVENTS:  ....  ADDITIONAL REVIEW OF SYSTEMS:    MEDICATIONS  (STANDING):  apixaban 5 milliGRAM(s) Oral two times a day  artificial  tears Solution 1 Drop(s) Both EYES four times a day  ascorbic acid 500 milliGRAM(s) Oral daily  atorvastatin 40 milliGRAM(s) Oral at bedtime  baclofen 2.5 milliGRAM(s) Oral every 12 hours  chlorhexidine 2% Cloths 1 Application(s) Topical daily  cholecalciferol 1000 Unit(s) Oral daily  clobetasol 0.05% Ointment 1 Application(s) Topical two times a day  clonazePAM  Tablet 0.5 milliGRAM(s) Oral two times a day  epoetin marilin-epbx (RETACRIT) Injectable 4000 Unit(s) IV Push <User Schedule>  lactobacillus acidophilus 1 Tablet(s) Oral three times a day with meals  levETIRAcetam 250 milliGRAM(s) Oral <User Schedule>  levETIRAcetam 500 milliGRAM(s) Oral daily  levothyroxine 100 MICROGram(s) Oral daily  metoprolol succinate ER 50 milliGRAM(s) Oral daily  mupirocin 2% Ointment 1 Application(s) Both Nostrils two times a day  mupirocin 2% Ointment 1 Application(s) Topical two times a day  Nephro-mirna 1 Tablet(s) Oral daily  pantoprazole    Tablet 40 milliGRAM(s) Oral before breakfast  polyethylene glycol 3350 17 Gram(s) Oral daily  senna 2 Tablet(s) Oral at bedtime  sevelamer carbonate 1600 milliGRAM(s) Oral three times a day with meals  tiotropium 2.5 MICROgram(s) Inhaler 2 Puff(s) Inhalation daily  traZODone 25 milliGRAM(s) Oral at bedtime    MEDICATIONS  (PRN):  bisacodyl Suppository 10 milliGRAM(s) Rectal daily PRN Constipation  diphenhydrAMINE 50 milliGRAM(s) Oral daily PRN 1 hour before dialysis  diphenhydrAMINE 25 milliGRAM(s) Oral every 6 hours PRN Rash and/or Itching  lactulose Syrup 20 Gram(s) Oral every 6 hours PRN constipation  levETIRAcetam  IVPB 500 milliGRAM(s) IV Intermittent daily PRN if refuses PO  LORazepam     Tablet 1 milliGRAM(s) Oral every 6 hours PRN Agitation  LORazepam   Injectable 1 milliGRAM(s) IV Push every 6 hours PRN Agitation  LORazepam   Injectable 1 milliGRAM(s) IntraMuscular every 6 hours PRN Agitation  melatonin 3 milliGRAM(s) Oral at bedtime PRN Insomnia  oxyCODONE    IR 5 milliGRAM(s) Oral every 6 hours PRN moderate to severe pain    I&O's Summary    16 Mar 2023 07:01  -  17 Mar 2023 07:00  --------------------------------------------------------  IN: 400 mL / OUT: 1400 mL / NET: -1000 mL    PHYSICAL EXAM:  Vital Signs Last 24 Hrs  T(C): 36.8 (17 Mar 2023 04:30), Max: 37.3 (16 Mar 2023 21:43)  T(F): 98.3 (17 Mar 2023 04:30), Max: 99.1 (16 Mar 2023 21:43)  HR: 77 (17 Mar 2023 04:30) (77 - 98)  BP: 136/80 (17 Mar 2023 04:30) (104/77 - 136/80)  BP(mean): 93 (17 Mar 2023 04:30) (93 - 93)  RR: 18 (17 Mar 2023 04:30) (17 - 18)  SpO2: 99% (17 Mar 2023 04:30) (96% - 99%)    Parameters below as of 17 Mar 2023 04:30  Patient On (Oxygen Delivery Method): room air    GENERAL: WM, lying in bed, anxious, tearful  CHEST/LUNG: Clear to auscultation bilaterally anteriorly; No wheeze  HEART: Regular rate and rhythm; No murmurs, rubs, or gallops  ABDOMEN: Soft, Nontender, Nondistended; Bowel sounds present  EXTREMITIES: LLE with hyperpigmentation; limited movement of hands bilaterally  PSYCH: anxious, tearful  NEUROLOGY: moving all ext  SKIN: B heel wounds wrapped  + R chest wall permcath   +SPT    LABS:                        9.3    11.17 )-----------( 129      ( 17 Mar 2023 02:40 )             29.1     03-17    140  |  104  |  56<H>  ----------------------------<  102<H>  4.3   |  22  |  3.98<H>    Ca    9.2      17 Mar 2023 02:40  Phos  3.1     03-17  Mg     1.90     03-17    RADIOLOGY & ADDITIONAL TESTS:  Results Reviewed:   Imaging Personally Reviewed:  Electrocardiogram Personally Reviewed:    COORDINATION OF CARE:  Care Discussed with Consultants/Other Providers [Y/N]: RN, SW, CM, ACP, psych re overall care   Prior or Outpatient Records Reviewed [Y/N]:   [facility] Division of Hospital Medicine  Estefania Olmos MD  Pager (M-F, 8A-5P):  In-house pager [phone]; Long-range pager [phone]  Other Times:  Please page Hospitalist in Charge -  In-house pager [phone]    Patient is a 62y old  Male who presents with a chief complaint of syncope, foot infection (16 Mar 2023 08:39)    SUBJECTIVE / OVERNIGHT EVENTS:  No problems reported over night.   Pt seen earlier, sleeping in bed. Wakes to voice. Says he's depressed, asking for IV Ativan.   ADDITIONAL REVIEW OF SYSTEMS:    MEDICATIONS  (STANDING):  apixaban 5 milliGRAM(s) Oral two times a day  artificial  tears Solution 1 Drop(s) Both EYES four times a day  ascorbic acid 500 milliGRAM(s) Oral daily  atorvastatin 40 milliGRAM(s) Oral at bedtime  baclofen 2.5 milliGRAM(s) Oral every 12 hours  chlorhexidine 2% Cloths 1 Application(s) Topical daily  cholecalciferol 1000 Unit(s) Oral daily  clobetasol 0.05% Ointment 1 Application(s) Topical two times a day  clonazePAM  Tablet 0.5 milliGRAM(s) Oral two times a day  epoetin marilin-epbx (RETACRIT) Injectable 4000 Unit(s) IV Push <User Schedule>  lactobacillus acidophilus 1 Tablet(s) Oral three times a day with meals  levETIRAcetam 250 milliGRAM(s) Oral <User Schedule>  levETIRAcetam 500 milliGRAM(s) Oral daily  levothyroxine 100 MICROGram(s) Oral daily  metoprolol succinate ER 50 milliGRAM(s) Oral daily  mupirocin 2% Ointment 1 Application(s) Both Nostrils two times a day  mupirocin 2% Ointment 1 Application(s) Topical two times a day  Nephro-mirna 1 Tablet(s) Oral daily  pantoprazole    Tablet 40 milliGRAM(s) Oral before breakfast  polyethylene glycol 3350 17 Gram(s) Oral daily  senna 2 Tablet(s) Oral at bedtime  sevelamer carbonate 1600 milliGRAM(s) Oral three times a day with meals  tiotropium 2.5 MICROgram(s) Inhaler 2 Puff(s) Inhalation daily  traZODone 25 milliGRAM(s) Oral at bedtime    MEDICATIONS  (PRN):  bisacodyl Suppository 10 milliGRAM(s) Rectal daily PRN Constipation  diphenhydrAMINE 50 milliGRAM(s) Oral daily PRN 1 hour before dialysis  diphenhydrAMINE 25 milliGRAM(s) Oral every 6 hours PRN Rash and/or Itching  lactulose Syrup 20 Gram(s) Oral every 6 hours PRN constipation  levETIRAcetam  IVPB 500 milliGRAM(s) IV Intermittent daily PRN if refuses PO  LORazepam     Tablet 1 milliGRAM(s) Oral every 6 hours PRN Agitation  LORazepam   Injectable 1 milliGRAM(s) IV Push every 6 hours PRN Agitation  LORazepam   Injectable 1 milliGRAM(s) IntraMuscular every 6 hours PRN Agitation  melatonin 3 milliGRAM(s) Oral at bedtime PRN Insomnia  oxyCODONE    IR 5 milliGRAM(s) Oral every 6 hours PRN moderate to severe pain    I&O's Summary    16 Mar 2023 07:01  -  17 Mar 2023 07:00  --------------------------------------------------------  IN: 400 mL / OUT: 1400 mL / NET: -1000 mL    PHYSICAL EXAM:  Vital Signs Last 24 Hrs  T(C): 36.8 (17 Mar 2023 04:30), Max: 37.3 (16 Mar 2023 21:43)  T(F): 98.3 (17 Mar 2023 04:30), Max: 99.1 (16 Mar 2023 21:43)  HR: 77 (17 Mar 2023 04:30) (77 - 98)  BP: 136/80 (17 Mar 2023 04:30) (104/77 - 136/80)  BP(mean): 93 (17 Mar 2023 04:30) (93 - 93)  RR: 18 (17 Mar 2023 04:30) (17 - 18)  SpO2: 99% (17 Mar 2023 04:30) (96% - 99%)    Parameters below as of 17 Mar 2023 04:30  Patient On (Oxygen Delivery Method): room air    GENERAL: WM, lying in bed, sleepy  CHEST/LUNG: Clear to auscultation bilaterally anteriorly; No wheeze  HEART: Regular rate and rhythm; No murmurs, rubs, or gallops  ABDOMEN: Soft, Nontender, Nondistended; Bowel sounds present  EXTREMITIES: LLE with hyperpigmentation; limited movement of hands bilaterally  PSYCH: sleepy, withdrawn  NEUROLOGY: moving all ext  SKIN: B heel wounds wrapped  + R chest wall permcath   +SPT    LABS:                        9.3    11.17 )-----------( 129      ( 17 Mar 2023 02:40 )             29.1     03-17    140  |  104  |  56<H>  ----------------------------<  102<H>  4.3   |  22  |  3.98<H>    Ca    9.2      17 Mar 2023 02:40  Phos  3.1     03-17  Mg     1.90     03-17    RADIOLOGY & ADDITIONAL TESTS:  Results Reviewed:   Imaging Personally Reviewed:  Electrocardiogram Personally Reviewed:    COORDINATION OF CARE:  Care Discussed with Consultants/Other Providers [Y/N]: RN, SW, CM, ACP, psych re overall care   Prior or Outpatient Records Reviewed [Y/N]:

## 2023-03-17 NOTE — PROGRESS NOTE ADULT - PROBLEM SELECTOR PLAN 2
Reportedly syncopized at the sight of blood from right foot ulcer; per chart review many prior ED visits for "passing out" and L flank pain of unclear etiology  - ICD interrogated on 3/11, d/w 08771, state no ICD firing noted on interrogation --> d/w EP device is pacing and sensing well  - tele SR with PVCs - d/c tele  - defer head CT as clincally stable  - HsT 101-->86-->116, pt denies CP  - TTE - pt not cooperative with echo  no other clear etiology, pt did report large amount of blood

## 2023-03-17 NOTE — BH CONSULTATION LIAISON PROGRESS NOTE - NSBHCHARTREVIEWVS_PSY_A_CORE FT
Vital Signs Last 24 Hrs  T(C): 36.8 (17 Mar 2023 04:30), Max: 37.3 (16 Mar 2023 21:43)  T(F): 98.3 (17 Mar 2023 04:30), Max: 99.1 (16 Mar 2023 21:43)  HR: 77 (17 Mar 2023 04:30) (77 - 98)  BP: 136/80 (17 Mar 2023 04:30) (104/77 - 136/80)  BP(mean): 93 (17 Mar 2023 04:30) (93 - 93)  RR: 18 (17 Mar 2023 04:30) (17 - 18)  SpO2: 99% (17 Mar 2023 04:30) (96% - 99%)    Parameters below as of 17 Mar 2023 04:30  Patient On (Oxygen Delivery Method): room air     O-Z Plasty Text: The defect edges were debeveled with a #15 scalpel blade.  Given the location of the defect, shape of the defect and the proximity to free margins an O-Z plasty (double transposition flap) was deemed most appropriate.  Using a sterile surgical marker, the appropriate transposition flaps were drawn incorporating the defect and placing the expected incisions within the relaxed skin tension lines where possible.    The area thus outlined was incised deep to adipose tissue with a #15 scalpel blade.  The skin margins were undermined to an appropriate distance in all directions utilizing iris scissors.  Hemostasis was achieved with electrocautery.  The flaps were then transposed into place, one clockwise and the other counterclockwise, and anchored with interrupted buried subcutaneous sutures.

## 2023-03-17 NOTE — DISCHARGE NOTE NURSING/CASE MANAGEMENT/SOCIAL WORK - CAREGIVER NAME
Patient educated on importance of call bell when assistance is needed to avoid injury. Patient educa

## 2023-03-17 NOTE — BH CONSULTATION LIAISON PROGRESS NOTE - NSBHFUPINTERVALHXFT_PSY_A_CORE
Chart reviewed. Discussed in IDRs/with primary team MD. Agreed to transfusion overnight, Hgb improved. Medically cleared. Started Klonopin 0.5mg PO BID. On exam today, he is somnolent, drowsy, awakens but then falls asleep easily. He says he is "going through many changes" and feels depressed, but is also heavily affected by medication. He is oriented despite this. He denies SI, HI or psychosis/safety concerns. Keeps asking for IV Ativan in a somewhat provocative manner.

## 2023-03-17 NOTE — PROGRESS NOTE ADULT - PROBLEM SELECTOR PLAN 3
Severe agitation suspect delirium in pt with multiple risk factors; prior CTH suggestive of TBI  - pt declined hCT, he has been clinically stable since admission  -  c/w Ativan PRN agitation/anxiety

## 2023-03-17 NOTE — BH CONSULTATION LIAISON PROGRESS NOTE - MSE UNSTRUCTURED FT
On exam today, he is somnolent, drowsy, awakens but then falls asleep easily. He says he is "going through many changes" and feels depressed, but is also heavily affected by medication. He is oriented despite this. He denies SI, HI or psychosis/safety concerns. Keeps asking for IV Ativan in a somewhat provocative manner.

## 2023-03-17 NOTE — PROGRESS NOTE ADULT - REASON FOR ADMISSION
syncope, foot infection

## 2023-03-18 NOTE — PROGRESS NOTE ADULT - PROBLEM SELECTOR PROBLEM 6
[address] [place] [address], [address] [place] [address]                                OPERATIVE REPORT    PATIENT NAME: Jacquie Ames                     :        1966  MED REC NO:   [id number]                          ROOM:       [id number]  ACCOUNT NO:   [de-identified]                           ADMIT DATE: 2023  PROVIDER:     Priya Arroyo MD    DATE OF PROCEDURE:  2023    PREOPERATIVE DIAGNOSIS:  Right distal ureteral calculus. POSTOPERATIVE DIAGNOSIS:  Normal right ureter. OPERATION PERFORMED:  Cystoscopy, right retrograde pyelogram, and  right-sided ureteroscopy. SURGEON:  Priya Arroyo MD    ANESTHESIA IN THIS CASE:  General.    ESTIMATED BLOOD LOSS:  Minimal.    COMPLICATIONS:  None. DRAINS LEFT IN PLACE:  None. FINDINGS FOR THE CASE:  A 3-mm calculus noted at the UVJ. On CT, it was  not visible. Inflammation of the ureteral orifice suggests recent  passage. Prompt drainage of contrast with retrograde. No stent  inserted. INDICATIONS FOR THE PROCEDURE:  The patient is 48-year-old who has been  admitted for right-sided flank pain down to the distal ureter consistent  with a 3-mm calculus seen on CT that had migrated to that point. She  was seen preoperatively, still noticing some discomfort. CT suggested  the calculus is still present. She is still having pain, so was given  her options, requested uteroscopy, possible stone manipulation. Risks  and benefits of the surgery were discussed with her at length including  bleeding, infection, ureter injury, bladder injury, and need for  additional surgical procedures and she is agreeable to it. DETAILS OF PROCEDURE:  After induction of general anesthesia, placed in  low lithotomy position, prepping and draping according to sterile  technique. Time-out was taken. All plans were agreed upon. IV  antibiotics were given.   A 21-Pashto scope was inserted through the  urethra into the bladder. The bladder was filled and drained. The  bladder was grossly unremarkable. The ureteral orifices appeared to be  slightly enlarged but otherwise unremarkable. Retrograde pyelogram was  performed, which showed no calculus. There was mildly dilated ureter  all the way up to the renal pelvis. Upon watching, this seemed to drain  fairly well, but I wanted to make sure there was no stone. So, I  inserted a ZIPwire up and then placed the uteroscope over the wire. I  was able to place the ureteroscope all the way up to the pelvic brim. It showed no calculi but I did see inflammation to the ureteral orifice,  suggesting that the calculus had been there and it had since passed. Therefore, no stent was inserted. The patient's bladder was drained. She was taken back to the recovery room in good condition. She will be  discharged later in the day. We will recommend followup in one month  with a renal ultrasound.         Nestor Dc MD    D: 03/17/2023 16:11:06       T: 03/17/2023 19:25:29     [email or web address]  Job#: [id number]     Doc#: [id number]    CC: Sacral wound

## 2023-03-22 NOTE — PROGRESS NOTE ADULT - ASSESSMENT
Assessment/plan:    Chronic right heel ulceration: Stable, noninfected present on admission.  Left lateral foot ulceration: Superficial, stable, noninfected, present on admission.  Multiple superficial bilateral digital abrasions: Stable, noninfected present on admission    Recommend decubitus precautions and use of Z flow boots.  Recommend normal saline cleanse Medihoney and DSD to bilateral foot ulcerations daily.  Reviewed x-rays in full detail no clinical signs of osteomyelitis.  We will continue to monitor while in house for signs of infection and wound care recommendations.  Patient follow-up at [address] [place] [address] wound care center with Dr. Gong upon discharge.

## 2023-03-22 NOTE — PROGRESS NOTE ADULT - SUBJECTIVE AND OBJECTIVE BOX
Podiatry pager #: [phone]/ [phone]    Patient is a 62y old  Male who presents with a chief complaint of Fall (24 Mar 2023 12:20) Podiatry Consult today for bilateral foot ulcerations      HPI:  63 yo M pmh Hep C, HTN, AFib s/p AICD (Medtronic) on Eliquis and Plavix, CVA, CKD stage 5 (HD T/Thu/Sat) last HD Tues (2 days ago), diastolic CHF, seizure on keppra, hep C presenting from [facility] after rolling off bed w/ astd head trauma, LOC, AICD firing, and endorsing HA.   Patient at [facility] s/p CVA, ambulates w/wheelchair at baseline.  Has +chronic heel decubitus ulcers.      Denies CP, SOB, palpitation, N/V/D, fever, cough, chills, dizziness, abm pain, recent travel, sick contact, change in bowel and urinary habits     ED: Ceftriaxone 1g, Clindamycin 600mg, Dilaudid 1mg, Ativan 1mg x1, Ativan 2mg x1, Zofran       PMD: Dr Díaz ([facility])   Cardiologist: Dr Gipson ([facility])   Nephrologist: unsure  (23 Mar 2023 22:25)      PAST MEDICAL & SURGICAL HISTORY:  Hypertension      Chronic CHF      Atrial fibrillation      Hepatitis C          MEDICATIONS  (STANDING):  apixaban 5 milliGRAM(s) Oral every 12 hours  artificial tears (preservative free) Ophthalmic Solution 1 Drop(s) Both EYES three times a day  ascorbic acid 500 milliGRAM(s) Oral daily  atorvastatin 40 milliGRAM(s) Oral at bedtime  baclofen 10 milliGRAM(s) Oral every 12 hours  chlorhexidine 2% Cloths 1 Application(s) Topical <User Schedule>  clonazePAM  Tablet 0.5 milliGRAM(s) Oral two times a day  hydrocortisone 1% Ointment 1 Application(s) Topical two times a day  levETIRAcetam 500 milliGRAM(s) Oral daily  levothyroxine 100 MICROGram(s) Oral daily  metoprolol succinate ER 50 milliGRAM(s) Oral daily  mupirocin 2% Ointment 1 Application(s) Topical three times a day  Nephro-mirna 1 Tablet(s) Oral daily  pantoprazole    Tablet 40 milliGRAM(s) Oral before breakfast  senna 2 Tablet(s) Oral at bedtime  sevelamer carbonate 1600 milliGRAM(s) Oral three times a day  tamsulosin 0.4 milliGRAM(s) Oral at bedtime    MEDICATIONS  (PRN):  acetaminophen     Tablet .. 650 milliGRAM(s) Oral every 6 hours PRN Temp greater or equal to 38C (100.4F), Mild Pain (1 - 3)  aluminum hydroxide/magnesium hydroxide/simethicone Suspension 30 milliLiter(s) Oral every 4 hours PRN Dyspepsia  levETIRAcetam 250 milliGRAM(s) Oral daily PRN HD days  melatonin 3 milliGRAM(s) Oral at bedtime PRN Insomnia  ondansetron Injectable 4 milliGRAM(s) IV Push every 8 hours PRN Nausea and/or Vomiting      Allergies    No Known Allergies    Intolerances        VITALS:    Vital Signs Last 24 Hrs  T(C): 36.2 (24 Mar 2023 11:30), Max: 36.3 (23 Mar 2023 19:29)  T(F): 97.2 (24 Mar 2023 11:30), Max: 97.4 (23 Mar 2023 19:29)  HR: 77 (24 Mar 2023 11:30) (67 - 80)  BP: 119/69 (24 Mar 2023 11:30) (110/70 - 134/96)  BP(mean): --  RR: 18 (24 Mar 2023 11:30) (16 - 19)  SpO2: 96% (24 Mar 2023 11:30) (96% - 100%)    Parameters below as of 24 Mar 2023 11:30  Patient On (Oxygen Delivery Method): room air        LABS:                          7.8    9.14  )-----------( 125      ( 24 Mar 2023 06:12 )             24.5       03-24    143  |  106  |  58<H>  ----------------------------<  113<H>  3.9   |  24  |  4.43<H>    Ca    8.7      24 Mar 2023 06:10  Phos  4.3     03-23  Mg     2.2     03-24    TPro  6.4  /  Alb  3.7  /  TBili  0.4  /  DBili  x   /  AST  38  /  ALT  46<H>  /  AlkPhos  393<H>  03-24      CAPILLARY BLOOD GLUCOSE          PT/INR - ( 24 Mar 2023 06:12 )   PT: 18.0 sec;   INR: 1.54 ratio         PTT - ( 23 Mar 2023 14:51 )  PTT:39.3 sec    LOWER EXTREMITY PHYSICAL EXAM:    Vasular: DP/PT _1/4, B/L, CFT <_2 seconds B/L, Temperature gradient WNL_, B/L.   Neuro: Epicritic sensation Absent_ to the level of Toes_, B/L.  Skin: Multiple digital abrasions with superficial dry eschar without clinical signs of infection.    Wound #1:   Location :Right posterior/plantar heel  Size: 4 cm x 2.5 cm diameter.  Depth: 0.2 cm in depth  Wound bed: Red granular tissue  Drainage: No purulence fluctuance malodor.  Odor: None  Periwound: No clinical signs of infection  Etiology: Chronic present on admission    Wound #2:   Location: Plantar lateral aspect of left forefoot/midfoot  Size: 1 cm diameter  Depth: Superficial  Wound bed: Red granular tissue  Drainage: None  Odor:None  Periwound:No clinical signs of infection  Etiology: Present on admission.    RADIOLOGY & ADDITIONAL STUDIES:  < from: Xray Tibia + Fibula 2 Views, Right (03.23.23 @ 17:34) >  CLINICAL INDICATION: Ankle pain status post fall. Right heel with 4 cm   wound with serosanguineous drainage and erythema. Patient uses wheelchair   ambulate.    TECHNIQUE: 2 views of the right tibia fibula, 2 views of the right ankle    COMPARISON: There are no similar prior studies available for comparison..    FINDINGS:  At the knee, there are tricompartment osteophytes and there is mild   medial and patellofemoral compartment joint space narrowing. There is a   large focus of dystrophic calcification abutting the medial femoral   condyle which could be the sequela of remote ligamentous injury or could   represent calcific tendinosis. There is enthesopathy at the quadriceps   tendon insertion and at the patellar tendon origin.    At the ankle, there are moderate tibiotalar and midfoot joint   degenerative changes with prominent osteophyte formation. There is   mature-appearing periosteal reaction along the shafts of the distal   fibula and tibia which may relate to vascular insufficiency.    There is focal diminution of the soft tissues overlying the posterior   calcaneus consistent with ulceration, without underlying radiographic   evidence for osteomyelitis. There is Achilles insertional enthesopathy   and a plantar calcaneal spur.    IMPRESSION:  Soft tissue ulceration over the posterior calcaneus. No radiographic   evidence of osteomyelitis.    Extensive chronic/degenerative changes as above.    --- End of Report ---    < end of copied text >

## 2023-03-23 NOTE — ED PROVIDER NOTE - CARE PLAN
1 Principal Discharge DX:	AICD discharge  Secondary Diagnosis:	Cellulitis of right heel  Secondary Diagnosis:	Closed head injury

## 2023-03-23 NOTE — ED ADULT NURSE REASSESSMENT NOTE - NS ED NURSE REASSESS COMMENT FT1
MD Hernandez received call from CT scan that patient is nauseous and unable to tolerate laying flat. 4mg of zofran verbal order given by MD Hernandez. RN administered at CT scan.

## 2023-03-23 NOTE — ED PROCEDURE NOTE - PROCEDURE ADDITIONAL DETAILS
Peripheral IV access in the Emergency Department obtained under dynamic ultrasound guidance with dark nonpulsatile blood return.  Catheter (18 gauge in R forearm) was flushed afterwards without any resistance or resulting extravasation.  IV catheter confirmed in compressible vein after insertion.

## 2023-03-23 NOTE — H&P ADULT - PROBLEM SELECTOR PLAN 7
- Chronic ulcer on Rt heal/ankle   -  XR Rt LE: soft tissue ulceration over posterior calcaneus. No e/o OM radiographic   - Wound care consult  - Wound cx   - ? cellulitis   - C/w Ceftriaxone 1g, Clindamycin - Chronic ulcer on Rt heal/ankle, multiple scattered abbrasion on feet  -  XR Rt LE: soft tissue ulceration over posterior calcaneus. No e/o OM radiographic   - Wound care consult  - Wound cx   - C/w Ceftriaxone 1g, Clindamycin

## 2023-03-23 NOTE — ED PROVIDER NOTE - NS ED MD DISPO ISOLATION TYPES
Discharge Planner   Discharge Plans in progress: YES  Barriers to discharge plan: awaiting pre auth from insurance  Follow up plan: I talked with Taylor Mancia Liaison from [place].  She will pursue pre authorization through insurance.  Pt informed nursing that he received a $3,000 bill for transport the last time he went to [place].  He is requesting that his wife transport him.  His wife feels comfortable doing so.  Pt is able to regulate his own home 02 tank and he isn't on continuous bipap.         Entered by: Yolanda France 05/30/2019 11:30 AM          ADDENDUM 1525. [facility] has authorized pt for [place].  RN CTS will call [place] in AM to see if they have a bed.       Contact

## 2023-03-23 NOTE — H&P ADULT - PROBLEM SELECTOR PLAN 2
- AFib s/p AICD (Medtronic) on Eliquis and Plavix  - S/p episode of fall w/ firing of AICD  -  EKG rate controlled,  trop elevated I/S/O ckd5 but down trended.   - C/w telemetry  - C/w Eliquis & Plavix  - AICD interrogated in ED: 4.7yr remaining on battery, no VT/VF or therapies noted, no arrhythmia noted   - Cardio consult in AM   - Follows Cardiologist: Dr Gipson ([facility]) - AFib s/p AICD (Medtronic) on Eliquis and Plavix  - S/p episode of fall w/ firing of AICD  -  EKG rate controlled,  trop elevated I/S/O ckd5 but down trended.   - C/w telemetry  - C/w Eliquis, Metoprolol   - AICD interrogated in ED: 4.7yr remaining on battery, no VT/VF or therapies noted, no arrhythmia noted   - Cardio consult in AM   - Follows Cardiologist: Dr Gipson ([facility])

## 2023-03-23 NOTE — ED PROVIDER NOTE - CLINICAL SUMMARY MEDICAL DECISION MAKING FREE TEXT BOX
Jean Claude SUMMERS PGY-3: 63 yo M hx hep C, HTN, AFib s/p AICD on Eliquis and Plavix, CKD stage 5 (dialysis Tues Thurs Sat) last HD Tues 2 days ago, diastolic CHF, seizrue on keppra,  Presenting status post mechanical fall off bed with head injury and positive LOC.  Patient endorsing pain at the back of the head, otherwise denying any other complaints.  Patient is coming from [facility] post CVA, bedbound/wheelchair at baseline with stage II decubitus ulcer as well as chronic heel decubitus ulcers, exam concerning for  infected right heel ulcer given erythema and purulence.  + AICD fire, will consult EP for device interrogation.  Patient also with history of seizures, will obtain Keppra level,  possible breakthrough seizure given positive bowel incontinence.   We will also obtain CT of the head to evaluate for bleed given that patient on Plavix and Eliquis for A-fib.

## 2023-03-23 NOTE — H&P ADULT - PROBLEM SELECTOR PLAN 3
- CKD stage 5 (HD T/Thu/Sat) last HD Tues (2 days ago)  - P/w suprapubic catheter that was exchanged in ED  - Renal on board, apprec rec, plans for HD 3/24 - CKD stage 5 (HD T/Thu/Sat) last HD Tues (2 days ago)  - P/w suprapubic catheter that was exchanged in ED  - Aster- Magalie  - Sevelamer   - Renal on board, [facility] rec, plans for HD 3/24

## 2023-03-23 NOTE — H&P ADULT - PROBLEM SELECTOR PLAN 4
- Clinically euvolemic   - EKG:  : Afib rate controlled   - Trop: 106 > 101       - IV lasix     - I&O, daily wt   - telemetry   - check echo  - cardio consult to be called in AM   - C/w home meds: - Clinically euvolemic   - EKG: Afib rate controlled   - Trop: 106 > 101         - I&O, daily wt   - telemetry   - cardio consult to be called in AM   - C/w home meds: metoprolol, statin

## 2023-03-23 NOTE — H&P ADULT - PROBLEM SELECTOR PLAN 5
- c/w Keppra   - F/u Keppra level  - Seizure precaution - c/w Keppra 500mg qd &  Keppra 250mg on HD days( T/Thu/Sat) after HD   - F/u Keppra level  - Seizure precaution

## 2023-03-23 NOTE — ED PROVIDER NOTE - CONSIDERATION OF ADMISSION OBSERVATION
given multiple active medical issues as written above, likely admission Consideration of Admission/Observation

## 2023-03-23 NOTE — ED PROVIDER NOTE - ATTENDING CONTRIBUTION TO CARE
Dr. Sheffield: I have personally performed a face to face bedside history and physical examination of this patient. I have discussed the history, examination, review of systems, assessment and plan of management with the resident. I have reviewed the electronic medical record and amended it to reflect my history, review of systems, physical exam, assessment and plan.    Dr. Sheffield: 62-year-old male history of prior CVA unclear whether residual deficits, hep C, hypertension, A-fib on Eliquis, CAD on Plavix, status post Medtronic AICD in the past, ckd on dialysis Tuesday Thursday Saturday, last dialysis 2 days ago, has right anterior chest port for dialysis diastolic CHF, seizures on Keppra, wheelchair-bound at baseline, has chronic Jarquin last changed many days ago, sustained a mechanical fall at nursing home rolling off the bed, hit his head, loss consciousness, AICD fired today.  Patient states his AICD has fired 3 times in the past as well.  Denies fevers, chills, nausea, vomiting, shortness of breath, chest pain, abdominal pain.  Has chronic bilateral heel ulcers.    On exam patient is chronically ill-appearing, no acute distress, right anterior chest catheter clean dry and intact, irregularly irregular, abdomen soft nontender nondistended, no signs of trauma, no midline spinal tenderness to palpation, 1 cm healing incision site over right scapula with sutures in place c/d/i (patient states he had a biopsy performed last week cardiology) pelvis stable, 5-5 strength bilateral lower extremities, no pain with range of motion of bilateral hips.  Left heel with nondraining ulcer.  Right heel with 4 cm x 4 cm open wound with serosanguineous drainage and surrounding erythema and warmth.  Patient afebrile rectally.    Will check CT scans as ordered to rule out injury after the fall.  We will get x-rays of wound.  Will cover with antibiotics.  Will interrogate AICD.  Will likely need admission for telemetry monitoring, IV antibiotics for foot ulcer. Pt states he was on the floor for 4 hours, so will check CPK.

## 2023-03-23 NOTE — H&P ADULT - ASSESSMENT
61 yo M pmh Hep C, HTN, AFib s/p AICD (Medtronic) on Eliquis and Plavix, CVA, CKD stage 5 (HD T/Thu/Sat) last HD Tues (2 days ago), diastolic CHF, seizure on Keppra presenting from [facility] after rolling off bed w/ astd head trauma, LOC, AICD firing, admitted for further care       61 yo M pmh Hep C, HTN, AFib s/p AICD (Medtronic) on Eliquis and Plavix, CVA, CKD stage 5 (HD T/Thu/Sat) last HD Tues (2 days ago), diastolic CHF, seizure on Keppra, Hep C presenting from [facility] after rolling off bed w/ astd head trauma, LOC, AICD firing, admitted for further care       63 yo M pmh Hep C, HTN, AFib s/p AICD (Medtronic) on Eliquis and Plavix, CVA, CKD stage 5 (HD T/Thu/Sat) last HD Tues (2 days ago), diastolic CHF, seizure on Keppra, Hep C presenting from [facility] after rolling off bed w/ astd head trauma, LOC, AICD firing, admitted for further care

## 2023-03-23 NOTE — ED PROVIDER NOTE - SKIN, MLM
+heel decubitus ulcer, R ankle/foot ulcer with purulence and erythema, +stage 2 decubitus ulcer +heel decubitus ulcer, R ankle/foot ulcer with purulence and erythema, +stage 2 decubitus ulcer. +R chest port

## 2023-03-23 NOTE — ED ADULT NURSE REASSESSMENT NOTE - NS ED NURSE REASSESS COMMENT FT1
Report received from Deuce PIERCE in flora. Pt is A&Ox3, moving all extremities. Pt is bleeding from pressure injury on left heel. Unstageable noted on sacrum. MD Yohannes aware.

## 2023-03-23 NOTE — H&P ADULT - HISTORY OF PRESENT ILLNESS
63 yo M pmh Hep C, HTN, AFib s/p AICD (Medtronic) on Eliquis and Plavix, CVA, CKD stage 5 (HD T/Thu/Sat) last HD Tues (2 days ago), diastolic CHF, seizure on keppra, presenting from [facility] after rolling off bed w/ astd head trauma, LOC, AICD firing, and endorsing HA.   Patient at [facility] s/p CVA, ambulates w/wheelchair at baseline   Has +chronic heel decubitus ulcers.      Denies CP, SOB, palpitation, N/V/D, fever, cough, chills, dizziness, abm pain, recent travel, sick contact, change in bowel and urinary habits     ED: Ceftriaxone 1g, Clindamycin 600mg, Dilaudid 1mg, Ativan 1mg x1, Ativan 2mg x1, Zofran       PMD: Dr Díaz ([facility])   Cardiologist: Dr Gipson ([facility])   Nephrologist: unsure  61 yo M pmh Hep C, HTN, AFib s/p AICD (Medtronic) on Eliquis and Plavix, CVA, CKD stage 5 (HD T/Thu/Sat) last HD Tues (2 days ago), diastolic CHF, seizure on keppra, hep C presenting from [facility] after rolling off bed w/ astd head trauma, LOC, AICD firing, and endorsing HA.   Patient at [facility] s/p CVA, ambulates w/wheelchair at baseline   Has +chronic heel decubitus ulcers.      Denies CP, SOB, palpitation, N/V/D, fever, cough, chills, dizziness, abm pain, recent travel, sick contact, change in bowel and urinary habits     ED: Ceftriaxone 1g, Clindamycin 600mg, Dilaudid 1mg, Ativan 1mg x1, Ativan 2mg x1, Zofran       PMD: Dr Díaz ([facility])   Cardiologist: Dr Gipson ([facility])   Nephrologist: unsure  63 yo M pmh Hep C, HTN, AFib s/p AICD (Medtronic) on Eliquis and Plavix, CVA, CKD stage 5 (HD T/Thu/Sat) last HD Tues (2 days ago), diastolic CHF, seizure on keppra, hep C presenting from [facility] after rolling off bed w/ astd head trauma, LOC, AICD firing, and endorsing HA.   Patient at [facility] s/p CVA, ambulates w/wheelchair at baseline.  Has +chronic heel decubitus ulcers.      Denies CP, SOB, palpitation, N/V/D, fever, cough, chills, dizziness, abm pain, recent travel, sick contact, change in bowel and urinary habits     ED: Ceftriaxone 1g, Clindamycin 600mg, Dilaudid 1mg, Ativan 1mg x1, Ativan 2mg x1, Zofran       PMD: Dr Díaz ([facility])   Cardiologist: Dr Gipson ([facility])   Nephrologist: unsure

## 2023-03-23 NOTE — ED PROVIDER NOTE - PROGRESS NOTE DETAILS
Jean Claude SUMMERS PGY-3: EP consulted DAMARIS Jones (PGY-3) - work up with any acute findings, tropinemia likely 2/2 esrd, will rpt. Will cover nitrite positive urine for now, suprapubic cath was exchanged by prior team, pending culture. House nephro consulted for HD.

## 2023-03-23 NOTE — ED PROVIDER NOTE - OBJECTIVE STATEMENT
63 yo M hx hep C, HTN, AFib s/p AICD, CKD stage 5 (dialysis Tues Thurs Sat) last HD Tues 2 days ago, diastolic CHF, seizrue on keppra, presenting after rolling off bed. +AICD fired per triage note. Patient c/o headache, on ACs for AFib. Patietn coming in from [facility] s/p CVA, uses wheelchair at baseline to ambulate. No neck pain, chest pain, abdominal pain, sob, nausea/vomiting, fever/chills. +chronic heel decubitus ulcers. +LOC hit his head. +AICD fired after fall.     PMD: Dr Díaz ([place] [address]  cardiologist:  61 yo M hx hep C, HTN, AFib s/p AICD on Eliquis and Plavix, CKD stage 5 (dialysis Tues Thurs Sat) last HD Tues 2 days ago, diastolic CHF, seizrue on keppra, presenting after rolling off bed. e. Patient c/o headache, on ACs for AFib. Patietn coming in from [facility] s/p CVA, uses wheelchair at baseline to ambulate. No neck pain, chest pain, abdominal pain, sob, nausea/vomiting, fever/chills. +chronic heel decubitus ulcers. +LOC hit his head. +AICD fired after fall.     PMD: Dr Díaz ([facility])  cardiologist: Dr Gipson ([facility])  nephrologist: unsure 61 yo M hx hep C, HTN, AFib s/p AICD (Medtronic) on Eliquis and Plavix, CKD stage 5 (dialysis Tues Thurs Sat) last HD Tues 2 days ago, diastolic CHF, seizrue on keppra, presenting after rolling off bed. e. Patient c/o headache, on ACs for AFib. Patietn coming in from [facility] s/p CVA, uses wheelchair at baseline to ambulate. No neck pain, chest pain, abdominal pain, sob, nausea/vomiting, fever/chills. +chronic heel decubitus ulcers. +LOC hit his head. +AICD fired after fall.     PMD: Dr Díaz ([facility])  cardiologist: Dr Gipson ([facility])  nephrologist: unsure

## 2023-03-23 NOTE — ED ADULT NURSE NOTE - NSIMPLEMENTINTERV_GEN_ALL_ED
Implemented All Fall with Harm Risk Interventions:  [place] to call system. Call bell, personal items and telephone within reach. Instruct patient to call for assistance. Room bathroom lighting operational. Non-slip footwear when patient is off stretcher. Physically safe environment: no spills, clutter or unnecessary equipment. Stretcher in lowest position, wheels locked, appropriate side rails in place. Provide visual cue, wrist band, yellow gown, etc. Monitor gait and stability. Monitor for mental status changes and reorient to person, place, and time. Review medications for side effects contributing to fall risk. Reinforce activity limits and safety measures with patient and family. Provide visual clues: red socks.

## 2023-03-23 NOTE — H&P ADULT - PROBLEM SELECTOR PLAN 1
- S/p fall at NH with astd head trauma, LOC, AICD firing. Also endorsed HA  - Hx/o Afib on Plavix and Eliquis   -  On arrival VSS, NAD  - DDX: mechanical vs cardiac vs neuro vs infection vs electrolyte derangement   - Labs: H&H showing anemia, likely chronic ISO CKD5 ( but bL unclear), Chem overall consistent w/ CKD  Mg & Phos wnl, Trop 106 > 101  - EKG: Afib rate controlled   - CXR: clear    XR Rt LE: soft tissue ulceration over posterior calcaneus. No e/o OM radiographic   CTH & cervical spine: no ICH, no fx of cervical spine  - In ED: Ceftriaxone 1g, Clindamycin 600mg, Dilaudid 1mg, Ativan 1mg x1, Ativan 2mg x1, Zofran   - Fall likely from mechanical cause as pt endorses rolling out of bed, ambulates using W/C  - Cardiac etiology also possible given significant cardiac hx of CHF, Afib s/p AICD w/ astd firing of ICD  Cardiac eval thus far unrevealing: no CP,  EKG rate controlled,  trop elevated I/S/O ckd5 but down trended.   C/w telemetry,  S/p AICD interrogation, no events recorded   - Neuro etiology  can be considered given baseline Seizure d/o on keppra. C/w keppra & check level.   If other w/u unrevealing and pt reoccurrence of sx, consider EEG    - Infection: afebrile, no elevated WBC, however UA grossly+ and also has chronic ulcers present on admission. Possible UTI c/b cellulitis. S/p IV Ceftriaxone and Clinda in ED, will c/w. F/u Bcx, Ucx, Wound Cx, MRSA screen    - Review of electrolytes grossly unremarkable in background of CKD5, CTM - S/p fall at NH with astd head trauma, LOC, AICD firing. Also endorsed HA  - Hx/o Afib on Plavix and Eliquis   -  On arrival VSS, NAD  - DDX: mechanical vs cardiac vs neuro vs infection vs electrolyte derangement   - Labs: H&H showing anemia, likely chronic ISO CKD5 ( bL unclear), Chem overall consistent w/ CKD  Mg & Phos wnl, Trop 106 > 101  - EKG: Afib rate controlled   - CXR: clear     XR Rt LE: soft tissue ulceration over posterior calcaneus. No e/o OM  CTH & cervical spine: no ICH, no fx of cervical spine  - In ED: Ceftriaxone 1g, Clindamycin 600mg, Dilaudid 1mg, Ativan 1mg x1, Ativan 2mg x1, Zofran   - Fall likely from mechanical cause as pt endorses rolling out of bed, ambulates using W/C  - Cardiac etiology also possible given significant cardiac hx of CHF, Afib s/p AICD w/ astd firing of ICD  Cardiac eval thus far unrevealing: no CP,  EKG rate controlled,  trop elevated I/S/O ckd5 but down trended.   C/w telemetry,  S/p AICD interrogation, no events recorded   - Neuro etiology  can be considered given baseline Seizure d/o on keppra. C/w keppra & check level.   If other w/u unrevealing and pt reoccurrence of sx, consider EEG    - Infection: afebrile, no elevated WBC, however UA grossly+ and also has chronic ulcers present on admission. Possible UTI c/b cellulitis. S/p IV Ceftriaxone and Clinda in ED, will c/w. F/u Bcx, Ucx, Wound Cx, MRSA screen    - Review of electrolytes grossly unremarkable in background of CKD5, CTM

## 2023-03-23 NOTE — H&P ADULT - NSHPPHYSICALEXAM_GEN_ALL_CORE
T(C): 36.3 (03-23-23 @ 19:29), Max: 36.7 (03-23-23 @ 13:35)  HR: 67 (03-23-23 @ 19:29) (67 - 76)  BP: 132/61 (03-23-23 @ 19:29) (115/73 - 134/96)  RR: 18 (03-23-23 @ 19:29) (16 - 20)  SpO2: 97% (03-23-23 @ 19:29) (97% - 100%)    CONSTITUTIONAL: Well groomed, no apparent distress  EYES: PERRLA and symmetric, EOMI  ENMT: MMM. Normal dentition  RESP: No respiratory distress, no use of accessory muscles; CTA b/l, no WRR  CV: +S1S2, RRR, no MRG; no peripheral edema  GI: Soft, NTND, no RGR; no palpable masses  MSK: Normal gait; No digital clubbing or cyanosis; normal pain free ROM x4 extremities   SKIN: Ulcer on Rt ankle/foot present on admission, Rt chest port   NEURO: CN II-XII grossly intact; normal reflexes, sensation intact throughout   PSYCH: Appropriate insight/judgment; A+O x 3, mood and affect appropriate T(C): 36.3 (03-23-23 @ 19:29), Max: 36.7 (03-23-23 @ 13:35)  HR: 67 (03-23-23 @ 19:29) (67 - 76)  BP: 132/61 (03-23-23 @ 19:29) (115/73 - 134/96)  RR: 18 (03-23-23 @ 19:29) (16 - 20)  SpO2: 97% (03-23-23 @ 19:29) (97% - 100%)    CONSTITUTIONAL: Well groomed, no apparent distress  EYES: PERRLA and symmetric, EOMI  ENMT: MMM. Normal dentition  RESP: No respiratory distress, no use of accessory muscles; CTA b/l, no WRR  CV: +S1S2, RRR, no MRG; no peripheral edema  GI: Soft, NTND, no RGR; no palpable masses, suprapubic cath present   MSK: Normal pain free ROM x4 extremities, contractre of b/l hands R>L  SKIN: Ulcer on Rt ankle/foot present on admission,  scatterd abrasion on b/l feet, Rt chest port   NEURO: CN II-XII grossly intact; normal reflexes, sensation intact throughout   PSYCH: Appropriate insight/judgment; A+O x 3

## 2023-03-23 NOTE — H&P ADULT - PROBLEM SELECTOR PLAN 8
- DVT ppx:  on Eliquis & Plavix will c/w   - GI ppx: PPI   - Diet: DASH  - PT consult - DVT ppx:  on Eliquis will c/w   - GI ppx: PPI   - Diet: DASH  - PT consult

## 2023-03-23 NOTE — ED PROCEDURE NOTE - ATTENDING CONTRIBUTION TO CARE
Dr. Sheffield: I have personally performed a face to face bedside history and physical examination of this patient. I have discussed the history, examination, review of systems, assessment and plan of management with the resident. I have reviewed the electronic medical record and amended it to reflect my history, review of systems, physical exam, assessment and plan.

## 2023-03-23 NOTE — ED ADULT NURSE NOTE - OBJECTIVE STATEMENT
62Y male, A&Ox4, pmh of DM, stroke, AFIB (on Eliquis and Plavix), end stage renal disease, seizures (on Keppra), HTN, CHF. pt presents to the ED via ems c/o dif firing this morning post unwitnessed  fall. pt states he fell out of bed at 0300 this morning. pt endorses head strike and loc. pt states he was woken when his defibrillator fired. pt endorses pain in the back of his head. pt arrived with folly in place. pt states the last time it was changed was 90 days ago. skin break down noted to the back of both of the pt heals. skin break down noted to the pt coccyx. pt denies vision changes, chest pain, sob, back pain, abd pain.

## 2023-03-23 NOTE — PROCEDURE NOTE - ADDITIONAL PROCEDURE DETAILS
Indication: patient states he got ICD shock   No VT/VF episodes or therapies noted, no arrhythmia events noted

## 2023-03-23 NOTE — ED ADULT NURSE REASSESSMENT NOTE - NS ED NURSE REASSESS COMMENT FT1
MD Jones at bedside to wrap both feet in ace bandage and to change suprapubic catheter. UA and UC were sent. MD Miked at bedside to wrap both feet in ace bandage and to change suprapubic catheter. UA and UC were sent.

## 2023-03-24 NOTE — DISCHARGE NOTE PROVIDER - NSDCFUADDAPPT_GEN_ALL_CORE_FT
APPTS ARE READY TO BE MADE: [] YES    Best Family or Patient Contact (if needed):    Additional Information about above appointments (if needed):    1: Upon discharge f/u as outpatient at [facility] [address] [place] [address] [phone]  2: Nephrology for HD with Dr. Mercedes  3: Cardiology with Dr Gipson ([facility]).  4: follow-up at [address] [place] [address] wound [facility] with Dr. Gong upon discharge.  5: PCP upon discharge    Other comments or requests:

## 2023-03-24 NOTE — DISCHARGE NOTE PROVIDER - NSDCMRMEDTOKEN_GEN_ALL_CORE_FT
acetaminophen 325 mg oral tablet: 2 tab(s) orally every 6 hours, As needed, Temp greater or equal to 38C (100.4F), Mild Pain (1 - 3)  Artificial Tears ophthalmic solution: 1 drop(s) to each affected eye 4 times a day  ascorbic acid 500 mg oral capsule, extended release: 1 cap(s) orally once a day  atorvastatin 40 mg oral tablet: 1 tab(s) orally once a day  baclofen 10 mg oral tablet: 1 tab(s) orally 2 times a day  clonazePAM 0.5 mg oral tablet: 1 tab(s) orally 2 times a day  Eliquis 5 mg oral tablet: 1 tab(s) orally 2 times a day  Flomax 0.4 mg oral capsule: 1 cap(s) orally once a day  halobetasol 0.05% topical cream: Apply topically to affected area 2 times a day  Keppra 250 mg oral tablet: 1 tab(s) orally 3 times a week on HD days  Keppra 500 mg oral tablet: 1 tab(s) orally once a day  levothyroxine 100 mcg (0.1 mg) oral tablet: 1 tab(s) orally once a day  Melatonin 3 mg oral tablet: 1 tab(s) orally once a day (at bedtime)  metoprolol succinate 50 mg oral tablet, extended release: 1 tab(s) orally once a day  mupirocin 2% topical ointment: Apply topically to affected area 3 times a day  omeprazole 20 mg oral delayed release capsule: 1 cap(s) orally once a day  Aster-Magalie oral tablet: 1 tab(s) orally once a day  sevelamer carbonate 800 mg oral tablet: 2 tab(s) orally 3 times a day (with meals)

## 2023-03-24 NOTE — DISCHARGE NOTE PROVIDER - HOSPITAL COURSE
63 yo M pmh Hep C, HTN, AFib s/p AICD (Medtronic) on Eliquis and Plavix, CVA, CKD stage 5 (HD T/Thu/Sat) last HD Tues (2 days ago), diastolic CHF, seizure on Keppra, Hep C presenting from [facility] after rolling off bed w/ astd head trauma, LOC, AICD firing, admitted for further care     Falling. S/p fall at NH with astd head trauma, LOC, AICD firing. Also endorsed HA  - Hx/o Afib on Plavix and Eliquis   -  On arrival VSS, NAD  - DDX: mechanical vs cardiac vs neuro vs infection vs electrolyte derangement   - Labs: H&H showing anemia, likely chronic ISO CKD5 ( bL unclear), Chem overall consistent w/ CKD  Mg & Phos wnl, Trop 106 > 101  - EKG: Afib rate controlled   - CXR: clear     XR Rt LE: soft tissue ulceration over posterior calcaneus. No e/o OM  CTH & cervical spine: no ICH, no fx of cervical spine  - In ED: Ceftriaxone 1g, Clindamycin 600mg, Dilaudid 1mg, Ativan 1mg x1, Ativan 2mg x1, Zofran   - Fall likely from mechanical cause as pt endorses rolling out of bed, ambulates using W/C  - Cardiac etiology also possible given significant cardiac hx of CHF, Afib s/p AICD w/ astd firing of ICD  Cardiac eval thus far unrevealing: no CP,  EKG rate controlled,  trop elevated I/S/O ckd5 but down trended.   C/w telemetry,  S/p AICD interrogation, no events recorded   - Neuro etiology  can be considered given baseline Seizure d/o on keppra. C/w keppra & check level.   If other w/u unrevealing and pt reoccurrence of sx, consider EEG    - Infection: afebrile, no elevated WBC, however UA grossly+ and also has chronic ulcers present on admission. Possible UTI c/b cellulitis. S/p IV Ceftriaxone and Clinda in ED, will c/w. F/u Bcx, Ucx, Wound Cx, MRSA screen    - Review of electrolytes grossly unremarkable in background of CKD5, CTM.      Chronic atrial fibrillation. AFib s/p AICD (Medtronic) on Eliquis and Plavix  - S/p episode of fall w/ firing of AICD  -  EKG rate controlled,  trop elevated I/S/O ckd5 but down trended.   - C/w telemetry  - C/w Eliquis, Metoprolol   - AICD interrogated in ED: 4.7yr remaining on battery, no VT/VF or therapies noted, no arrhythmia noted   - Cardio consult in AM   - Follows Cardiologist: Dr Gipson ([facility]).      Stage 5 chronic kidney disease on dialysis. CKD stage 5 (HD T/Thu/Sat) last HD Tues (2 days ago)  - P/w suprapubic catheter that was exchanged in ED  - Aster- Magalie  - Sevelamer   - Renal on board, apprec rec, plans for HD 3/24.      Chronic diastolic congestive heart failure.  Clinically euvolemic   - EKG: Afib rate controlled   - Trop: 106 > 101         - I&O, daily wt   - telemetry   - cardio consult to be called in AM   - C/w home meds: metoprolol, statin.    Seizure. c/w Keppra 500mg qd &  Keppra 250mg on HD days( T/Thu/Sat) after HD   - F/u Keppra level  - Seizure precaution.    HTN (hypertension). Metoprolol.    Wound of skin. - Chronic ulcer on Rt heal/ankle, multiple scattered abrasion on feet  -  XR Rt LE: soft tissue ulceration over posterior calcaneus. No e/o OM radiographic   - Wound care consulted   - Wound cx   - s/p Ceftriaxone 1g, Clindamycin.    Patient medically cleared per Dr. Lizama. No antibiotics needed.    61 yo M pmh Hep C, HTN, AFib s/p AICD (Medtronic) on Eliquis and Plavix, CVA, CKD stage 5 (HD T/Thu/Sat) last HD Tues (2 days ago), diastolic CHF, seizure on Keppra, Hep C presenting from [facility] after rolling off bed w/ astd head trauma, LOC, AICD firing, admitted for further care      Falling. S/p fall at NH with astd head trauma, LOC, AICD firing. Also endorsed HA  - Hx/o Afib on Plavix and Eliquis   -  On arrival VSS, NAD  - DDX: mechanical vs cardiac vs neuro vs infection vs electrolyte derangement   - Labs: H&H showing anemia, likely chronic ISO CKD5 ( bL unclear), Chem overall consistent w/ CKD  Mg & Phos wnl, Trop 106 > 101  - EKG: Afib rate controlled   - CXR: clear     XR Rt LE: soft tissue ulceration over posterior calcaneus. No e/o OM  CTH & cervical spine: no ICH, no fx of cervical spine  - In ED: Ceftriaxone 1g, Clindamycin 600mg, Dilaudid 1mg, Ativan 1mg x1, Ativan 2mg x1, Zofran   - Fall likely from mechanical cause as pt endorses rolling out of bed, ambulates using W/C  - Cardiac etiology also possible given significant cardiac hx of CHF, Afib s/p AICD w/ astd firing of ICD  Cardiac eval thus far unrevealing: no CP,  EKG rate controlled,  trop elevated I/S/O ckd5 but down trended.   C/w telemetry,  S/p AICD interrogation, no events recorded   - Neuro etiology  can be considered given baseline Seizure d/o on keppra. C/w keppra & check level.   If other w/u unrevealing and pt reoccurrence of sx, consider EEG    - Infection: afebrile, no elevated WBC, however UA grossly+ and also has chronic ulcers present on admission. Possible UTI c/b cellulitis. S/p IV Ceftriaxone and Clinda in ED, will c/w. F/u Bcx, Ucx, Wound Cx, MRSA screen    - Review of electrolytes grossly unremarkable in background of CKD5, CTM.      Chronic atrial fibrillation. AFib s/p AICD (Medtronic) on Eliquis and Plavix  - S/p episode of fall w/ firing of AICD  -  EKG rate controlled,  trop elevated I/S/O ckd5 but down trended.   - C/w telemetry  - C/w Eliquis, Metoprolol   - AICD interrogated in ED: 4.7yr remaining on battery, no VT/VF or therapies noted, no arrhythmia noted   - Cardio consult in AM   - Follows Cardiologist: Dr Gipson ([facility]).      Stage 5 chronic kidney disease on dialysis. CKD stage 5 (HD T/Thu/Sat) last HD Tues (2 days ago)  - P/w suprapubic catheter that was exchanged in ED  - Aster- Magalie  - Sevelamer   - Renal on board, apprec rec, plans for HD 3/24.      Chronic diastolic congestive heart failure.  Clinically euvolemic   - EKG: Afib rate controlled   - Trop: 106 > 101         - I&O, daily wt   - telemetry   - cardio consult to be called in AM   - C/w home meds: metoprolol, statin.    Seizure. c/w Keppra 500mg qd &  Keppra 250mg on HD days( T/Thu/Sat) after HD   - F/u Keppra level  - Seizure precaution.    HTN (hypertension). Metoprolol.    Wound of skin. - Chronic ulcer on Rt heal/ankle, multiple scattered abrasion on feet  -  XR Rt LE: soft tissue ulceration over posterior calcaneus. No e/o OM radiographic   - Wound care consulted   - Wound cx   - s/p Ceftriaxone 1g, Clindamycin.    Patient medically cleared per Dr. Lizama. No antibiotics needed.    61 yo M pmh Hep C, HTN, AFib s/p AICD (Medtronic) on Eliquis and Plavix, CVA, CKD stage 5 (HD T/Thu/Sat) last HD Tues (2 days ago), diastolic CHF, seizure on Keppra, Hep C presenting from [facility] after rolling off bed w/ astd head trauma, LOC, AICD firing, admitted for further care       Falling. S/p fall at NH with astd head trauma, LOC, AICD firing. Also endorsed HA  - Hx/o Afib on Plavix and Eliquis   -  On arrival VSS, NAD  - DDX: mechanical vs cardiac vs neuro vs infection vs electrolyte derangement   - Labs: H&H showing anemia, likely chronic ISO CKD5 ( bL unclear), Chem overall consistent w/ CKD  Mg & Phos wnl, Trop 106 > 101  - EKG: Afib rate controlled   - CXR: clear     XR Rt LE: soft tissue ulceration over posterior calcaneus. No e/o OM  CTH & cervical spine: no ICH, no fx of cervical spine  - In ED: Ceftriaxone 1g, Clindamycin 600mg, Dilaudid 1mg, Ativan 1mg x1, Ativan 2mg x1, Zofran   - Fall likely from mechanical cause as pt endorses rolling out of bed, ambulates using W/C  - Cardiac etiology also possible given significant cardiac hx of CHF, Afib s/p AICD w/ astd firing of ICD  Cardiac eval thus far unrevealing: no CP,  EKG rate controlled,  trop elevated I/S/O ckd5 but down trended.   C/w telemetry,  S/p AICD interrogation, no events recorded   - Neuro etiology  can be considered given baseline Seizure d/o on keppra. C/w keppra & check level.   If other w/u unrevealing and pt reoccurrence of sx, consider EEG    - Infection: afebrile, no elevated WBC, however UA grossly+ and also has chronic ulcers present on admission. Possible UTI c/b cellulitis. S/p IV Ceftriaxone and Clinda in ED, will c/w. F/u Bcx, Ucx, Wound Cx, MRSA screen    - Review of electrolytes grossly unremarkable in background of CKD5, CTM.      Chronic atrial fibrillation. AFib s/p AICD (Medtronic) on Eliquis and Plavix  - S/p episode of fall w/ firing of AICD  -  EKG rate controlled,  trop elevated I/S/O ckd5 but down trended.   - C/w telemetry  - C/w Eliquis, Metoprolol   - AICD interrogated in ED: 4.7yr remaining on battery, no VT/VF or therapies noted, no arrhythmia noted   - Cardio consult in AM   - Follows Cardiologist: Dr Gipson ([facility]).      Stage 5 chronic kidney disease on dialysis. CKD stage 5 (HD T/Thu/Sat) last HD Tues (2 days ago)  - P/w suprapubic catheter that was exchanged in ED  - Asetr- Magalie  - Sevelamer   - Renal on board, apprec rec, plans for HD 3/24.      Chronic diastolic congestive heart failure.  Clinically euvolemic   - EKG: Afib rate controlled   - Trop: 106 > 101         - I&O, daily wt   - telemetry   - cardio consult to be called in AM   - C/w home meds: metoprolol, statin.    Seizure. c/w Keppra 500mg qd &  Keppra 250mg on HD days( T/Thu/Sat) after HD   - F/u Keppra level  - Seizure precaution.    HTN (hypertension). Metoprolol.    Wound of skin. - Chronic ulcer on Rt heal/ankle, multiple scattered abrasion on feet  -  XR Rt LE: soft tissue ulceration over posterior calcaneus. No e/o OM radiographic   - Wound care consulted   - Wound cx   - s/p Ceftriaxone 1g, Clindamycin.    Patient medically cleared per Dr. Lizama. No antibiotics needed.

## 2023-03-24 NOTE — DISCHARGE NOTE NURSING/CASE MANAGEMENT/SOCIAL WORK - PATIENT PORTAL LINK FT
You can access the FollowMyHealth Patient Portal offered by [facility] by registering at the following website: [email or web address]. By joining [facility] FollowMyHealth portal, you will also be able to view your health information using other applications (apps) compatible with our system.

## 2023-03-24 NOTE — PHYSICAL THERAPY INITIAL EVALUATION ADULT - TRANSFER TRAINING, PT EVAL
GOAL: Patient will perform sit to stand transfers with maxAx1 with least restrictive assistive device with proper hand placement in 2 weeks.

## 2023-03-24 NOTE — DISCHARGE NOTE PROVIDER - NSDCCPCAREPLAN_GEN_ALL_CORE_FT
PRINCIPAL DISCHARGE DIAGNOSIS  Diagnosis: Falling  Assessment and Plan of Treatment: Patient was admitted for a fall with LOC and AICD firing.   - AICD interrogated in ED: 4.7yr remaining on battery, no VT/VF or therapies noted, no arrhythmia noted   CTH and C spine negative. EKG afib rate controlled   Fall likely from mechanical cause as pt endorses rolling out of bed,   Afib s/p AICD w/ astd firing of ICD  Cardiac eval thus far unrevealing: no CP,  EKG rate controlled,  trop elevated I/S/O ckd5 but down trended.   No events on tele  UA positive, treated with IV antibiotics in the ED IV Ceftriaxone and Clinda in ED  Cultures pending   Please follow up with PCP while at rehab        SECONDARY DISCHARGE DIAGNOSES  Diagnosis: Cellulitis of right heel  Assessment and Plan of Treatment: Has chronic ulcer on heel   No antibiotics required on discharge  Seen by podiatry Chronic right heel ulceration: Stable, noninfected present on admission.  Left lateral foot ulceration: Superficial, stable, noninfected, present on admission.  Multiple superficial bilateral digital abrasions: Stable, noninfected present on admission  Recommend decubitus precautions and use of Z flow boots.  Recommend normal saline cleanse Medihoney and DSD to bilateral foot ulcerations daily.  Reviewed x-rays in full detail no clinical signs of osteomyelitis.  Patient follow-up at [address] [place] [address] wound care center with Dr. Gong upon discharge.      Diagnosis: Sacral wound  Assessment and Plan of Treatment: Wound Consult requested to assist w/ management of Sacral Healing stage 4 pressure injury:   Wound care orders  Buttocks/ Sacrum TRIAD BID and prn soiling        Continue w/ attends under pads and Pericare w/ Suprapubic cath maintenance as per protocol  BLE wounds as per DPM  BLE elevation & Compression  Abx per Medicine/ ID  Moisturize intact skin w/ SWEEN cream BID  Nutrition optimization        encourage high quality protein, mark/ prosource, MVI & Vit C to promote wound healing  Continue turning and positioning w/ offloading assistive devices as per protocol  Waffle Cushion to chair when oob to chair  Continue w/ low air loss pressure redistribution bed surface   Pt will need Group 2 mattress on hospital bed and ROHO cushion for wheel chair upon discharge home  Care as per medicine,  remain available as requested  Upon discharge f/u as outpatient at Wound Center [address] [place] [address] [phone]    Diagnosis: HTN (hypertension)  Assessment and Plan of Treatment: Continue blood pressure medication regimen as directed. Monitor for any visual changes, headaches or dizziness.  Monitor blood pressure regularly.  Follow up with your PCP for further management for high blood pressure. Continue to eat Low sodium and fat diet      Diagnosis: Stage 5 chronic kidney disease on dialysis  Assessment and Plan of Treatment: Continue HD  Follow up with Dr. Mercedes    Diagnosis: Chronic diastolic congestive heart failure  Assessment and Plan of Treatment: Continue regimen from  Follow heart healthy diet. Monitor weight. If you develop severe lower extremity swelling and shortness of breath please seek medial attention. Follow up with your PCP and cardiologist for further evaluation and managment. Please call to make an appointment.  Weigh yourself daily.  If you gain 3lbs in 3 days, or 5lbs in a week call your Health Care Provider.  Do not eat or drink foods containing more than 2000mg of salt (sodium) in your diet every day.  Call your Health Care Provider if you have any swelling or increased swelling in your feet, ankles, and/or stomach.  Take all of your medication as directed.  If you become dizzy call your Health Care Provider.  You are to continue your metoprolol, statin.      Diagnosis: Seizure  Assessment and Plan of Treatment: c/w Keppra 500mg qd &  Keppra 250mg on HD days( T/Thu/Sat) after HD    Diagnosis: Chronic atrial fibrillation  Assessment and Plan of Treatment: Please continue your medications as directed and follow-up with your primary provider/cardiologist to further manage your care. Monitor for signs/symptoms of uncontrolled atrial fibrillation, such as, increased heart rate, palpitations, chest pain, dizziness, or shortness of breath - Return to emergency room if these signs/symptoms are present.

## 2023-03-24 NOTE — CONSULT NOTE ADULT - SUBJECTIVE AND OBJECTIVE BOX
Wound SURGERY CONSULT NOTE    HPI:  63 yo M pmh Hep C, HTN, AFib s/p AICD (Medtr[employer]) on Eliquis and Plavix, CVA, CKD stage 5 (HD T/Thu/Sat) last HD Tues (2 days ago), diastolic CHF, seizure on keppra, hep C presenting from [facility] after rolling off bed w/ astd head trauma, LOC, AICD firing, and endorsing HA.   Patient at [facility] s/p CVA, ambulates w/wheelchair at baseline.  Has w/ h/o sacral and heel pressure injury.  Pt stated that he was hospitalized for a long time and in a comma when he developed these wounds.  Slow healing.  Denies CP, SOB, palpitation, N/V/D, fever, cough, chills, dizziness, abm pain, recent travel, sick contact, change in bowel and urinary habits. ED: Ceftriaxone 1g, Clindamycin 600mg, Dilaudid 1mg, Ativan 1mg x1, Ativan 2mg x1, Zofran     Wound consult requested by team to assist w/ management of sacral pressure injury. Pt w/o c/o pain, drainage, odor, color change,  or worsening swelling. Offloading and pericare initiated upon admission. Pt is Incontinent of urine & stool. (+)suprapubic cath.   No h/o bites, scratches, falls, trauma.   At [facility] they use a cream on his buttocks and foot doctor sees his feet.   Appetite good w/o weight loss. All questions asked and answered to pt's and expressed understanding and satisfaction.    Current Diet: Diet, DASH/TLC:   Sodium & Cholesterol Restricted  1500mL Fluid Restriction ([id number])     Special Instructions for Nursin milliLiter(s) to 2000 milliLiter(s) fluid restriction (23 @ 23:34)      PAST MEDICAL & SURGICAL HISTORY:  Hypertension    CVA    CKD/ ESRD on HD s/p Shiley     Chronic CHF    Atrial fibrillation    Hepatitis C    s/p pacemaker        REVIEW OF SYSTEMS: General/ Skin/Vasc/MSK: see HPI  All other systems negative    MEDICATIONS  (STANDING):  apixaban 5 milliGRAM(s) Oral every 12 hours  artificial tears (preservative free) Ophthalmic Solution 1 Drop(s) Both EYES three times a day  ascorbic acid 500 milliGRAM(s) Oral daily  atorvastatin 40 milliGRAM(s) Oral at bedtime  baclofen 10 milliGRAM(s) Oral every 12 hours  cefTRIAXone   IVPB 1000 milliGRAM(s) IV Intermittent every 24 hours  chlorhexidine 2% Cloths 1 Application(s) Topical <User Schedule>  clindamycin IVPB 600 milliGRAM(s) IV Intermittent every 8 hours  clonazePAM  Tablet 0.5 milliGRAM(s) Oral two times a day  hydrocortisone 1% Ointment 1 Application(s) Topical two times a day  levETIRAcetam 500 milliGRAM(s) Oral daily  levothyroxine 100 MICROGram(s) Oral daily  metoprolol succinate ER 50 milliGRAM(s) Oral daily  mupirocin 2% Ointment 1 Application(s) Topical three times a day  Nephro-mirna 1 Tablet(s) Oral daily  pantoprazole    Tablet 40 milliGRAM(s) Oral before breakfast  senna 2 Tablet(s) Oral at bedtime  sevelamer carbonate 1600 milliGRAM(s) Oral three times a day  tamsulosin 0.4 milliGRAM(s) Oral at bedtime    MEDICATIONS  (PRN):  acetaminophen Tablet 650 milliGRAM(s) Oral every 6 hours PRN Temp greater or equal to 38C (100.4F), Mild Pain (1 - 3)  aluminum hydroxide/magnesium hydroxide/simethicone Suspension 30 milliLiter(s) Oral every 4 hours PRN Dyspepsia  levETIRAcetam 250 milliGRAM(s) Oral daily PRN HD days  melatonin 3 milliGRAM(s) Oral at bedtime PRN Insomnia  ondansetron Injectable 4 milliGRAM(s) IV Push every 8 hours PRN Nausea and/or Vomiting      No Known Allergies      SOCIAL HISTORY: single; (lives in SNF; Former smoker, ETOH, drugs    FAMILY HISTORY: no h/o significant problems    PHYSICAL EXAM:  Vital Signs Last 24 Hrs  T(C): 36.2 (24 Mar 2023 11:30), Max: 36.7 (23 Mar 2023 13:35)  T(F): 97.2 (24 Mar 2023 11:30), Max: 98 (23 Mar 2023 13:35)  HR: 77 (24 Mar 2023 11:30) (67 - 80)  BP: 119/69 (24 Mar 2023 11:30) (110/70 - 134/96)  BP(mean): --  RR: 18 (24 Mar 2023 11:30) (16 - 20)  SpO2: 96% (24 Mar 2023 11:30) (96% - 100%)    Parameters below as of 24 Mar 2023 11:30  Patient On (Oxygen Delivery Method): room air      NAD,  A&Ox3,  frail, Disheveled  Versa Care P500 bed  HEENT:  NC/AT, EOMI, sclera clear, mucosa moist, throat clear, trachea midline, neck supple  Respiratory: nonlabored w/ equal chest rise  Gastrointestinal: soft NT/ND   : (+) suprapubic cath  Neurology:  weakened strength & sensation grossly intact  Psych: calm/ appropriate  Musculoskeletal: FROM, no deformities/ contractures  Vascular: BLE equally warm,  no cyanosis, clubbing, edema, nor acute ischemi          BLE DP pulses palpable, hemosiderin staining         BLE feet/ ankles dressing c/d/i as per dpm         (+)Rt sided shiley w/ dressing c/d/i  Skin: thin, dry, pale, frail,  ecchymosis w/o hematoma  Healing Sacral Stage 4 pressure injury  dry adherent flesh toned callous   No open skin, blistering  or drainage  No odor, erythema, increased warmth, tenderness, induration, fluctuance, nor crepitus    LABS/ CULTURES/ RADIOLOGY:                        7.8    9.14  )-----------( 125      ( 24 Mar 2023 06:12 )             24.5       143  |  106  |  58  ----------------------------<  113      [23 @ 06:10]  3.9   |  24  |  4.43        Ca     8.7     [23 @ 06:10]      Mg     2.2     [23 @ 06:10]      Phos  4.3     [23 @ 14:51]    TPro  6.4  /  Alb  3.7  /  TBili  0.4  /  DBili  x   /  AST  38  /  ALT  46  /  AlkPhos  393  [23 @ 06:10]    PT/INR: PT 18.0 , INR 1.54       [23 @ 06:12]  PTT: 39.3       [23 @ 14:51]

## 2023-03-24 NOTE — PHYSICAL THERAPY INITIAL EVALUATION ADULT - ADDITIONAL COMMENTS
Pt presents from [facility], w/c bound at baseline Pt presents from [facility], pt states he used a w/c and required assistance for all ADLs and functional mobility.

## 2023-03-24 NOTE — CONSULT NOTE ADULT - PROBLEM SELECTOR RECOMMENDATION 9
Pt. with ESRD on HD three times a week (TTS). Pt being admitted after mechanical fall. Last HD was done on  Tuesday (3/21/23) via RIJ tunneled HD catheter. Pt goes to [facility] dialysis unit. Follows with Dr. Mercedes. Labs reviewed. Plan for HD today. HD consent obtained and kept in pts chart. HD orders placed. Dose meds as Pt. with ESRD on HD three times a week (TTS). Pt being admitted after mechanical fall. Last HD was done on  Tuesday (3/21/23) via RIJ tunneled HD catheter. Pt goes to [facility] dialysis unit. Follows with Dr. Mercedes. Labs reviewed. Plan for HD tomorrow. HD consent obtained and kept in pts chart. HD orders placed. Dose meds as

## 2023-03-24 NOTE — CHART NOTE - NSCHARTNOTEFT_GEN_A_CORE
Pt with ESRD on HD TTS admitted with fall. Chart reviewed. Labs reviewed. Last HD done on Tuesday. CXR with clear lungs and pts saturating 97% on RA. BP in acceptable range. NO urgent indication for HD tonight. Will arrange for maintenance HD tomorrow (3/24/23).    Discussed with on call attending, Dr. Vora.
Medicine Attending - Discharge Day Note:  ================================================    # Mechanical fall - fell out of bed after a bowel movement at the facility. No fractures or bleeding. Medically stable for discharge back to the facility. Discussed with Renal - no urgent need for dialysis - can have HD tomorrow on his usual HD day.   # ESRD on HD  # chronic suprapubic catheter  # chronic atrial fibrillation   # Chronic diastolic congestive heart failure    The patient is medically optimized for discharge to [place].     Discharge time spent 50 minutes.     Toy Lizama MD, HILTON  Available on Microsoft Teams
Patient medically clear per Dr. Lizama.   D/C paperwork completed. [facility]rier sent per request of  for return to [facility].   Transportation set up for 5pm.   Hemodynamically stable for d/c.

## 2023-03-24 NOTE — PHYSICAL THERAPY INITIAL EVALUATION ADULT - ACTIVE RANGE OF MOTION EXAMINATION, REHAB EVAL
except RUE wrist ROM limited from previous stroke/bilateral upper extremity Active ROM was WFL (within functional limits)/bilateral  lower extremity Active ROM was WFL (within functional limits)

## 2023-03-24 NOTE — CONSULT NOTE ADULT - SUBJECTIVE AND OBJECTIVE BOX
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 Nephrology team was consulted for ESRD/HD management.    Per chart review, Pt was recently started on HD in Jan 2023 in a hospital in [place]. Pt currently received HD TIW (TTS) at [facility]. Last HD done on Tuesday (3/21/23) via R IJ tunneled HD catheter. Pt follows with Dr. Mercedes (Nephrologist). Pt seen and examined at bedside. Pt awake wishes to go home. Denies SOB, CP, HA, N/V, or dizziness.     PAST HISTORY  --------------------------------------------------------------------------------  PAST MEDICAL & SURGICAL HISTORY:  Hypertension      Chronic CHF      Atrial fibrillation      Hepatitis C    FAMILY HISTORY:    PAST SOCIAL HISTORY:    ALLERGIES & MEDICATIONS  --------------------------------------------------------------------------------  Allergies    No Known Allergies    Intolerances    Standing Inpatient Medications  apixaban 5 milliGRAM(s) Oral every 12 hours  artificial tears (preservative free) Ophthalmic Solution 1 Drop(s) Both EYES three times a day  ascorbic acid 500 milliGRAM(s) Oral daily  atorvastatin 40 milliGRAM(s) Oral at bedtime  baclofen 10 milliGRAM(s) Oral every 12 hours  cefTRIAXone   IVPB 1000 milliGRAM(s) IV Intermittent every 24 hours  clindamycin IVPB 600 milliGRAM(s) IV Intermittent every 8 hours  clonazePAM  Tablet 0.5 milliGRAM(s) Oral two times a day  hydrocortisone 1% Ointment 1 Application(s) Topical two times a day  levETIRAcetam 500 milliGRAM(s) Oral daily  levothyroxine 100 MICROGram(s) Oral daily  metoprolol succinate ER 50 milliGRAM(s) Oral daily  mupirocin 2% Ointment 1 Application(s) Topical three times a day  Nephro-mirna 1 Tablet(s) Oral daily  pantoprazole    Tablet 40 milliGRAM(s) Oral before breakfast  senna 2 Tablet(s) Oral at bedtime  sevelamer carbonate 1600 milliGRAM(s) Oral three times a day  tamsulosin 0.4 milliGRAM(s) Oral at bedtime    PRN Inpatient Medications  acetaminophen     Tablet .. 650 milliGRAM(s) Oral every 6 hours PRN  aluminum hydroxide/magnesium hydroxide/simethicone Suspension 30 milliLiter(s) Oral every 4 hours PRN  levETIRAcetam 250 milliGRAM(s) Oral daily PRN  melatonin 3 milliGRAM(s) Oral at bedtime PRN  ondansetron Injectable 4 milliGRAM(s) IV Push every 8 hours PRN    REVIEW OF SYSTEMS  --------------------------------------------------------------------------------  Gen: No fevers/chills  Skin: No rashes  Head/Eyes/Ears: No HA,   Respiratory: No dyspnea, cough  CV: No chest pain  GI: No abdominal pain, diarrhea  : No dysuria, hematuria  MSK: No  edema  Heme: No easy bruising or bleeding  Psych: No significant depression    All other systems were reviewed and are negative, except as noted.    VITALS/PHYSICAL EXAM  --------------------------------------------------------------------------------  T(C): 36.3 (03-24-23 @ 08:09), Max: 36.7 (03-23-23 @ 13:35)  HR: 80 (03-24-23 @ 08:09) (67 - 80)  BP: 116/72 (03-24-23 @ 08:09) (110/70 - 134/96)  RR: 18 (03-24-23 @ 08:09) (16 - 20)  SpO2: 98% (03-24-23 @ 08:09) (97% - 100%)  Wt(kg): --    Weight (kg): 86.2 (03-23-23 @ 13:03)    03-23-23 @ 07:01  -  03-24-23 @ 07:00  --------------------------------------------------------  IN: 460 mL / OUT: 720 mL / NET: -260 mL    Physical Exam:  	Gen: NAD  	HEENT: MMM  	Pulm: CTA B/L  	CV: S1S2  	Abd: Soft, +BS   	Ext: No LE edema B/L, R heel ulcer +  	Neuro: Awake  	Skin: Warm and dry  	Vascular access: RIJ tunneled HD catheter +    LABS/STUDIES  --------------------------------------------------------------------------------              7.8    9.14  >-----------<  125      [03-24-23 @ 06:12]              24.5     143  |  106  |  58  ----------------------------<  113      [03-24-23 @ 06:10]  3.9   |  24  |  4.43        Ca     8.7     [03-24-23 @ 06:10]      Mg     2.2     [03-24-23 @ 06:10]      Phos  4.3     [03-23-23 @ 14:51]    TPro  6.4  /  Alb  3.7  /  TBili  0.4  /  DBili  x   /  AST  38  /  ALT  46  /  AlkPhos  393  [03-24-23 @ 06:10]    PT/INR: PT 18.0 , INR 1.54       [03-24-23 @ 06:12]  PTT: 39.3       [03-23-23 @ 14:51]    CK 64      [03-23-23 @ 14:51]    Creatinine Trend:  SCr 4.43 [03-24 @ 06:10]  SCr 4.18 [03-23 @ 14:51]    Urinalysis - [03-23-23 @ 15:53]      Color Other / Appearance Slightly Turbid / SG 1.025 / pH 7.0      Gluc 100 mg/dL / Ketone Negative  / Bili Negative / Urobili Negative       Blood Large / Protein 300 mg/dL / Leuk Est Small / Nitrite Positive      RBC >50 / WBC 5 / Hyaline 0 / Gran  / Sq Epi  / Non Sq Epi 0 / Bacteria Few

## 2023-03-24 NOTE — CONSULT NOTE ADULT - ASSESSMENT
Pt with ESRD/HD
A/P:61 yo M pmh Hep C, HTN, AFib s/p AICD (Medtronic) on Eliquis and Plavix, CVA, CKD stage 5 (HD T/Thu/Sat) last HD Tues (2 days ago), diastolic CHF, seizure on Keppra, Hep C presenting from [facility] after rolling off bed w/ associatted head trauma, LOC, AICD firing, admitted for further care    Wound Consult requested to assist w/ management of Sacral Healing stage 4 pressure injury    Buttocks/ Sacrum TRIAD BID and prn soiling        Continue w/ attends under pads and Pericare w/ Suprapubic cath maintenance as per protocol  BLE wounds as per DPM  BLE elevation & Compression  Abx per Medicine/ ID  Moisturize intact skin w/ SWEEN cream BID  Nutrition optimization        encourage high quality protein, mark/ prosource, MVI & Vit C to promote wound healing  Continue turning and positioning w/ offloading assistive devices as per protocol  Waffle Cushion to chair when oob to chair  Continue w/ low air loss pressure redistribution bed surface   Pt will need Group 2 mattress on hospital bed and ROHO cushion for wheel chair upon discharge home  Care as per medicine,  remain available as requested  Upon discharge f/u as outpatient at [facility] [address] [facility] [phone]  D/w team & RN & attng  Thank you for this consult  Madison Shah PA-C CWS [id number]  Nights/ Weekends/ Holidays please call:  General Surgery Consult pager ([phone]) for emergencies  Wound PT for multilayer leg wrapping or VAC issues (x [phone])   I spent 40minutes face to face w/ this pt of which more than 50% of the time was spent counseling & coordinating care of this pt.

## 2023-03-24 NOTE — CONSULT NOTE ADULT - PROBLEM SELECTOR RECOMMENDATION 3
Pt with hyperphosphatemia in the setting of ESRD. Pt. on  phosphate binders with meals. Serum phos is within target range at 4.3 today. Low phosphorus diet. Monitor serum phosphorus.       If you have any questions, please feel free to contact me  Fawad Sargent  Nephrology Fellow  [phone]/ Microsoft Teams(Preferred)  (After 5pm or on weekends please page the on-call fellow).

## 2023-03-24 NOTE — DISCHARGE NOTE PROVIDER - CARE PROVIDER_API CALL
Sapphire Mercedes ()  Medicine  [id number]  [place] [address]  Phone: [phone]  Follow Up Time: 1 week    PHOENIX ISABEL  Internal Medicine  [address] [place] [address]  Phone: ()-  Fax: ()-  Follow Up Time:

## 2023-03-24 NOTE — CONSULT NOTE ADULT - ATTENDING COMMENTS
I have seen this patient with the fellow and agree with their assessment and plan. I was physically present for significant portions of the evaluation and management (E/M) service provided.  I agree with the above history, physical, and plan which I have reviewed and edited where appropriate.    No urgent need for HD today, labs stable  Next HD tomorrow if still admitted here else back at [facility] where he is getting Rehab    d.w with care manager and medicine attending    Myra Mercado MD  Pager   Office     Contact me directly via Microsoft Teams     (After 5 pm or on weekends please page the on-call fellow/attending, can check [email or web address] for schedule. Login is musa garcia, schedule under [facility] medicine, psych, derm)

## 2023-03-24 NOTE — CONSULT NOTE ADULT - PROBLEM SELECTOR RECOMMENDATION 2
Patient with anemia in the setting of ESRD. Hemoglobin below target range. Will need to determine if patient receives VIBHA. Blood transfusion per primary team. Monitor hemoglobin

## 2023-03-24 NOTE — DISCHARGE NOTE NURSING/CASE MANAGEMENT/SOCIAL WORK - NSDCFUADDAPPT_GEN_ALL_CORE_FT
APPTS ARE READY TO BE MADE: [] YES    Best Family or Patient Contact (if needed):    Additional Information about above appointments (if needed):    1: Upon discharge f/u as outpatient at [facility] [address] [place] [address] [phone]  2: Nephrology for HD with Dr. Mercedes  3: Cardiology with Dr Gipson ([facility]).  4: follow-up at [address] [place] [address] wound [facility] with Dr. Gong upon discharge.  5: PCP upon discharge    Other comments or requests:

## 2023-03-24 NOTE — PHYSICAL THERAPY INITIAL EVALUATION ADULT - PERTINENT HX OF CURRENT PROBLEM, REHAB EVAL
61 yo M pmh Hep C, HTN, AFib s/p AICD (Medtronic) on Eliquis and Plavix, CVA, CKD stage 5 (HD T/Thu/Sat) last HD Tues (2 days ago), diastolic CHF, seizure on keppra, hep C presenting from [facility] after rolling off bed w/ astd head trauma, LOC, AICD firing, and endorsing HA.   Patient at [facility] s/p CVA, ambulates w/wheelchair at baseline.  Has +chronic heel decubitus ulcers. Denies CP, SOB, palpitation, N/V/D, fever, cough, chills, dizziness, abm pain, recent travel, sick contact, change in bowel and urinary habits ED: Ceftriaxone 1g, Clindamycin 600mg, Dilaudid 1mg, Ativan 1mg x1, Ativan 2mg x1, Zofran 61 yo M pmh Hep C, HTN, AFib s/p AICD (Medtronic) on Eliquis and Plavix, CVA, CKD stage 5 (HD T/Thu/Sat) last HD Tues (2 days ago), diastolic CHF, seizure on keppra, hep C presenting from [facility] after rolling off bed w/ astd head trauma, LOC, AICD firing, and endorsing HA.   Patient at [facility] s/p CVA, ambulates w/wheelchair at baseline.  Has +chronic heel decubitus ulcers. Denies CP, SOB, palpitation, N/V/D, fever, cough, chills, dizziness, abm pain, recent travel, sick contact, change in bowel and urinary habits ED: Ceftriaxone 1g, Clindamycin 600mg, Dilaudid 1mg, Ativan 1mg x1, Ativan 2mg x1, Zofran.  3/23 CT C-spine/Brain: (-) 3/23 XR R Ankle/Tib-Fib: Soft tissue ulceration over the posterior calcaneus. No radiographic evidence of osteomyelitis. Extensive chronic/degenerative changes as above. 3/23 CXR: (-)

## 2023-03-25 NOTE — ED PROVIDER NOTE - PATIENT PORTAL LINK FT
You can access the FollowMyHealth Patient Portal offered by [facility] by registering at the following website: [email or web address]. By joining [facility]’s FollowMyHealth portal, you will also be able to view your health information using other applications (apps) compatible with our system.

## 2023-03-26 NOTE — ED PROVIDER NOTE - PHYSICAL EXAMINATION
General: well -appearing, no acute distress  Head: Atraumatic, normocephalic  Eyes: EOM grossly in tact, no scleral icterus, no discharge  Neurology: A&Ox 3, nonfocal, GONZALEZ x 4  Respiratory: CTAB, no wheezing, normal respiratory effort  CV: RRR, good s1/s2, no S3, Extremities warm and well perfused  Abdominal: Soft, non-distended, non-tender, no masses  Skin: warm and dry. hyperpigmentation of LE, 8 cm ulcer on R heel with serous fluid, clean base no purulence   No CVA tenderness

## 2023-03-26 NOTE — ED ADULT TRIAGE NOTE - CHIEF COMPLAINT QUOTE
Pt from [facility] brought in by EMS, c/o lower back pain. Pt was seen in here this morning for similar complaint. Arrived w/ tran. PMH HTN, COPD, Hep C, afib on eliquis, CVA, CHF, ESRD on diaysis T,Th,Sa, last dialysis this morning Pt from [facility] brought in by EMS, c/o lower back pain. Pt was seen in here this morning for similar complaint. Arrived w/ tran. PMH HTN, COPD, opoid dependence, Hep C, afib on eliquis, CVA, CHF, ESRD on diaysis T, Th, Sa, last dialysis this morning

## 2023-03-26 NOTE — PROVIDER CONTACT NOTE (OTHER) - ASSESSMENT
JOSE G was notified by medical provider that pt is cleared for discharge and transport via ambulance to return to [facility] . JOSE G called and spoke to Ms. Patel, Nurse Supervisor she confirmed that pt is a resident and is safe to return via ambulance. JOSE G contacted [facility] spoke with Ana Paula to arrange transport via ambulance (Invoice# [id number]) with [facility]; Trip# [id number]. JOSE G informed medical provider of this information.

## 2023-03-26 NOTE — ED ADULT NURSE NOTE - NSIMPLEMENTINTERV_GEN_ALL_ED
Implemented All Fall Risk Interventions:  [place] to call system. Call bell, personal items and telephone within reach. Instruct patient to call for assistance. Room bathroom lighting operational. Non-slip footwear when patient is off stretcher. Physically safe environment: no spills, clutter or unnecessary equipment. Stretcher in lowest position, wheels locked, appropriate side rails in place. Provide visual cue, wrist band, yellow gown, etc. Monitor gait and stability. Monitor for mental status changes and reorient to person, place, and time. Review medications for side effects contributing to fall risk. Reinforce activity limits and safety measures with patient and family.

## 2023-03-26 NOTE — ED PROVIDER NOTE - NSFOLLOWUPINSTRUCTIONS_ED_ALL_ED_FT
Please follow up with your primary care physician within 2-3 days.   Return to the ER for any new or concerning symptoms.   You may take 650 mg acetaminophen every eight hours as needed for pain.   Drink plenty of fluids and rest.    Back Pain    Back pain is very common in adults. The cause of back pain is rarely dangerous and the pain often gets better over time. The cause of your back pain may not be known and may include strain of muscles or ligaments, degeneration of the spinal disks, or arthritis. Occasionally the pain may radiate down your leg(s). Over-the-counter medicines to reduce pain and inflammation are often the most helpful. Stretching and remaining active frequently helps the healing process.     Contact a health care provider if:  Your back pain does not improve after 6 weeks of treatment.  Your symptoms get worse.  Get help right away if:  Your back pain is severe.  You are unable to stand or walk.  You develop pain in your legs.  You develop weakness in your buttocks or legs.  You have difficulty controlling when you urinate or when you have a bowel movement.  This information is not intended to replace advice given to you by your health care provider. Make sure you discuss any questions you have with your health care provider.      SEEK IMMEDIATE MEDICAL CARE IF YOU HAVE ANY OF THE FOLLOWING SYMPTOMS: bowel or bladder control problems, unusual weakness or numbness in your arms or legs, nausea or vomiting, abdominal pain, fever, dizziness/lightheadedness.

## 2023-03-26 NOTE — ED ADULT TRIAGE NOTE - SPO2 (%)
"[facility]   OB/GYN Clinic    CC: Return OB     Subjective:    Gibson is a 20 year old  at 29w6d who presents for return OB visit. She reports feeling well. Denies uterine cramping, vaginal bleeding or leaking, dysuria. +fetal movement.   ]  Objective:  /64 (BP Location: Right arm, Patient Position: Chair, Cuff Size: Adult Regular)   Pulse 72   Temp 98.6  F (37  C) (Tympanic)   Wt 102.1 kg (225 lb)   LMP 10/31/2021   Breastfeeding No   BMI 34.21 kg/m      Estimated body mass index is 34.21 kg/m  as calculated from the following:    Height as of 3/16/22: 1.727 m (5' 8\").    Weight as of this encounter: 102.1 kg (225 lb).    Physical Exam:  Gen: Pleasant, talkative female in no apparent distress   Respiratory: breathing comfortably on room air   Cardiac: Warm and well-perfused.   GI: Abd soft and non-tender, gravid  MSK: Grossly normal movement of all four extremities  Psych: mood and affect bright   Lower extremity: edema not present     Fetal dop tones: 150s bpm  Fundal height: 28    Assessment/Plan:   20 year old  at 29w6d who presents for follow-up OB visit.   1) third tri labs today  2) anatomy scan WNL  3) quad normal   4) hx of meth abuse, tobacco use (e cigs)- in substance abuse program  5) Medication review: no changes, continue prenatal vitamin   6) Immunizations: flu shot in the fall, Tdap next visit    Return to clinic in 2 weeks.     Eleni Buchanan MD   2022 10:59 AM     " 100

## 2023-03-26 NOTE — ED ADULT NURSE NOTE - OBJECTIVE STATEMENT
A&Ox4. PMH: ICD, HTN, and dialysis 3x TTS. Patient came for back pain. Patient denies dizziness, N/V, or headache. Respirations even and unlabored. Normal sinus on monitor. 22 gauge IV started in right shin. Medications given as per current care plan. Safety precautions in place, bed in lowest position, and oriented to call bell.

## 2023-03-26 NOTE — ED PROVIDER NOTE - OBJECTIVE STATEMENT
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 Seen yesterday for the same sx, states he went back to [facility] and had dialysis. He states he told the nurse about his pain but did not explicitly ask for pain medications.

## 2023-03-26 NOTE — ED PROVIDER NOTE - ATTENDING CONTRIBUTION TO CARE
62-year-old male with history of ESRD on hemodialysis Tuesday, Thursday, Saturday, COPD, stroke, paraplegic, atrial fibrillation on Eliquis, hypertension, diabetes, recent emergency department visit yesterday for bilateral flank/"kidney" pain in addition to dysuria at tip of penis after urination (patient has a chronic suprapubic catheter and does not urinate from penis often), presenting with same flank pain as yesterday which he states he has had intermittently since January 2023, says the only thing that relieves his pain is IV Dilaudid due to his allergies to codeine, Tylenol, and NSAIDs.  Denies any shortness of breath, vomiting, cough, fevers, trauma recently or for recent falls.  Of note, during last ED visit yesterday, patient had chest x-ray with labs and was given IV Dilaudid with improvement, was ultimately discharged back to [facility] after imaging and labs were at patient's baseline and received his hemodialysis treatment.  Patient states he had the pain at [facility] but did not request pain medications, says he thought none of the staff would give him pain meds so we did not request that.    Exam  No CVA tenderness bilaterally, no midline spinal tenderness  Lungs clear bilaterally  No significant peripheral edema  Abdomen soft nontender nondistended    Assessment/plan  Pain likely MSK in nature  Low suspicion for life-threatening pathology due to chronicity of symptoms and recent comprehensive work-up yesterday for same  We will give IV 0.5 mg Dilaudid as received yesterday with improvement then plan to discharge back to [facility]  Encouraged patient to ask for pain medications while he is at [facility] if he has pain

## 2023-03-26 NOTE — ED PROVIDER NOTE - CLINICAL SUMMARY MEDICAL DECISION MAKING FREE TEXT BOX
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 PT with chronic back pain, states he's had it for months. Workup yesteday including xr and lab work unremarkable. will tx symptomatically and likely discharge. 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 PT with chronic back pain, states he's had it for months. Workup yesterday including xr and lab work unremarkable. will tx symptomatically and likely discharge.

## 2023-03-26 NOTE — ED ADULT NURSE NOTE - CHIEF COMPLAINT QUOTE
Pt from [facility] brought in by EMS, c/o lower back pain. Pt was seen in here this morning for similar complaint. Arrived w/ tran. PMH HTN, COPD, opoid dependence, Hep C, afib on eliquis, CVA, CHF, ESRD on diaysis T, Th, Sa, last dialysis this morning

## 2023-03-26 NOTE — ED PROVIDER NOTE - PATIENT PORTAL LINK FT
You can access the FollowMyHealth Patient Portal offered by [facility] by registering at the following website: [email or web address]. By joining [employer]’s FollowMyHealth portal, you will also be able to view your health information using other applications (apps) compatible with our system.

## 2023-03-28 NOTE — H&P ADULT - NSHPLABSRESULTS_GEN_ALL_CORE
Labs personally reviewed:                          7.0    8.83  )-----------( 130      ( 28 Mar 2023 19:37 )             22.2     03-28    143  |  108  |  64<H>  ----------------------------<  99  4.6   |  22  |  5.12<H>    Ca    9.0      28 Mar 2023 19:37    TPro  6.7  /  Alb  3.6  /  TBili  0.3  /  DBili  x   /  AST  27  /  ALT  27  /  AlkPhos  329<H>  03-28        LIVER FUNCTIONS - ( 28 Mar 2023 19:37 )  Alb: 3.6 g/dL / Pro: 6.7 g/dL / ALK PHOS: 329 U/L / ALT: 27 U/L / AST: 27 U/L / GGT: x           PT/INR - ( 28 Mar 2023 21:00 )   PT: 20.4 sec;   INR: 1.76 ratio         PTT - ( 28 Mar 2023 21:00 )  PTT:39.9 sec    CAPILLARY BLOOD GLUCOSE          Imaging:  CTH and Cspine:  No significant change compared with 3/23/2023.    Head CT: No visualized displaced calvarial fracture or acute intracranial   hemorrhage. Additional findings above.    Cervical spine CT: No acute fracture or subluxation. Additional,   including degenerative changes, described in detail above.  CT AP:   Full examination of the intra-abdominal viscera and vasculature is   limited without the addition of IV contrast.    No CT evidence of acute traumatic injury in the chest, abdomen, or pelvis.    Probable fecal impaction and question of associated stercoral proctitis.    Mild ascending aortic dilatation 3.8 cm.    Additional findings as above.      EKG personally reviewed normal sinus rhythm at  73 bpm

## 2023-03-28 NOTE — H&P ADULT - ASSESSMENT
62M w/ pmh Hep C, HTN, AFib s/p AICD (Medtronic) on Eliquis and Plavix, b/l CVA quadriplegic s/p suprapubic cath, ESRD on HD ( T/Thu/Sat), diastolic CHF, seizure on keppra, hep C, chronic pressure ulcers, p/w fall , found to have worsening anemia

## 2023-03-28 NOTE — H&P ADULT - NSHPPHYSICALEXAM_GEN_ALL_CORE
Vital Signs Last 24 Hrs  T(C): 36.6 (28 Mar 2023 20:05), Max: 36.6 (28 Mar 2023 20:05)  T(F): 97.8 (28 Mar 2023 20:05), Max: 97.8 (28 Mar 2023 20:05)  HR: 80 (28 Mar 2023 20:05) (75 - 102)  BP: 130/68 (28 Mar 2023 20:05) (113/71 - 160/67)  BP(mean): 84 (28 Mar 2023 16:20) (84 - 84)  RR: 16 (28 Mar 2023 20:05) (16 - 20)  SpO2: 100% (28 Mar 2023 20:05) (95% - 100%)    Parameters below as of 28 Mar 2023 20:05  Patient On (Oxygen Delivery Method): room air Vital Signs Last 24 Hrs  T(C): 36.6 (28 Mar 2023 20:05), Max: 36.6 (28 Mar 2023 20:05)  T(F): 97.8 (28 Mar 2023 20:05), Max: 97.8 (28 Mar 2023 20:05)  HR: 80 (28 Mar 2023 20:05) (75 - 102)  BP: 130/68 (28 Mar 2023 20:05) (113/71 - 160/67)  BP(mean): 84 (28 Mar 2023 16:20) (84 - 84)  RR: 16 (28 Mar 2023 20:05) (16 - 20)  SpO2: 100% (28 Mar 2023 20:05) (95% - 100%)    Parameters below as of 28 Mar 2023 20:05  Patient On (Oxygen Delivery Method): room air    GENERAL: No acute distress,  quadriplegic , contracted on distal UE's , disheveled , unkempt   HEAD:  Atraumatic, Normocephalic  ENT: EOMI, PERRLA, conjunctiva and sclera clear,  moist mucosa no pharyngeal erythema or exudates   NECK: supple , no JVD   CHEST/LUNG: Clear to auscultation bilaterally; No wheeze, equal breath sounds bilaterally   BACK: No spinal tenderness,  No CVA tenderness   HEART: Regular rate and rhythm; No murmurs, rubs, or gallops  ABDOMEN: Soft, Nontender, Nondistended; Bowel sounds present  : suprapubic cath intact   EXTREMITIES:  No clubbing, cyanosis, race edema  MSK: No joint swelling or effusions, ROM intact   PSYCH: Normal behavior/affect  NEUROLOGY: AAOx2 oriented to self and place , not time , non-focal, cranial nerves intact, quadriplegic   SKIN:  toes w/  onychomycosis , b/l foot/ heel wounds in dressing , seborrheic dermatitis ,

## 2023-03-28 NOTE — ED ADULT NURSE NOTE - OBJECTIVE STATEMENT
63 Y/O M w/ PMH of CHF, renal dialysis, chronic a fib, chronic kidney disease, came w/ complaints of fall and head injury. Pt reports that he fell out of bed. Pt attempted to pick himself up and hit his head on a rail. Pt reports that he lost consciousness, and pain in his head and neck. Pt a/ox4, bandaged bilateral foot, tran noted, VS stable. MD at bedside performing neuro assessment. 61 Y/O M w/ PMH of CHF, renal dialysis, chronic a fib, chronic kidney disease, came w/ complaints of fall and head injury. Pt reports that he fell out of bed. Pt attempted to pick himself up and hit his head on a rail. Pt reports that he lost consciousness, and pain in his head and neck. Pt a/ox3, bilaterally feet wrapped by wound care at facility, skin otherwise intact, dialysis catheter located on right chest wall, supra-pubic catheter noted draining into drainage bag, ROM intact in all 4 extremities, VS stable. MD at bedside performing neuro assessment.

## 2023-03-28 NOTE — H&P ADULT - TIME BILLING
Chart review , case discussion with ED provider , transfer record review ,  examination of patient , answering questions and concerns , ordering labs and medications , and documentation

## 2023-03-28 NOTE — H&P ADULT - NSHPREVIEWOFSYSTEMS_GEN_ALL_CORE
CONSTITUTIONAL: No weakness, fevers or chills  EYES/ENT: No visual changes;  No dysphagia  NECK: No pain or stiffness  RESPIRATORY: No cough, wheezing, hemoptysis; No shortness of breath  CARDIOVASCULAR: No chest pain or palpitations; No lower extremity edema  EXTREMITIES: no le edema, cyanosis, clubbing  GASTROINTESTINAL: No abdominal or epigastric pain. +  nausea +  vomiting, no  hematemesis; No diarrhea or constipation. No melena or hematochezia.  BACK: No back pain  GENITOURINARY: No dysuria, frequency or hematuria  NEUROLOGICAL: quadriplegic , no new neuro deficits , no seizures   MSK: no joint swelling or pain  SKIN: No itching, burning, rashes, or lesions   PSYCH: no agitation  All other review of systems is negative unless indicated above.

## 2023-03-28 NOTE — ED CLERICAL - DIVISION
Mom requested to have rx sent to the [facility] on [address]     Called to pharmacy, Medication is expensive to order. They have The selenium lotion and shampoo available. Mom preferred the lotions.   Rx called into [facility] on [address] per moms request. [facility]...

## 2023-03-28 NOTE — H&P ADULT - HISTORY OF PRESENT ILLNESS
63 yo M pmh Hep C, HTN, AFib s/p AICD (Medtronic) on Eliquis and Plavix, CVA, CKD stage 5 (HD T/Thu/Sat) last HD Tues (2 days ago), diastolic CHF, seizure on keppra, hep C, chronic pressure ulcers, recent admission after fall in nursing home , now presenting for fall on day of admission  62 year old male w/ pmh Hep C, HTN, AFib s/p AICD (Medtronic) on Eliquis and Plavix, b/l CVA quadriplegic s/p suprapubic cath, ESRD on HD ( T/Thu/Sat), diastolic CHF, seizure on keppra, hep C, chronic pressure ulcers, recent admission after fall in nursing home , now presenting after fall on day of admission .     Patient reports he fell out of bed while reaching for a remote at his nursing facility [facility], struck the left side of his head on the handle of the side table, states that he lost consciousness, upon regaining consciousness called for nursing for help.  Feels that he is currently at his mental status baseline complaining of pain to his left head and left neck, without pain to any limbs, trunk, abdomen, pelvis.  States that he had no preceding  no chest pain or palpitations , no shortness of breath. He reports no blood in stool or urine.     ED course: Patient had 1 episode of NBNB emesis after CT scan

## 2023-03-28 NOTE — ED PROVIDER NOTE - PROGRESS NOTE DETAILS
Received signout on pt. As he was returning from CT scan, had 1 episode of NBNB emesis. Evaluated afterwards, states he feels better, no longer nauseous. Denies any HA, CP, or abd pain. AAOx2-3. Abd benign. Will give zofran, cardiac monitor Discussed with hospitalist, EKG unremarkable, will admit to medicine. Of note, on isolation for "r/o candida auris" since recent admission 3/23, unclear why, no mention in previous notes, was never seen by ID. Blood/wound cxs ngtd. To be seen by infection control in AM

## 2023-03-28 NOTE — H&P ADULT - PROBLEM SELECTOR PLAN 6
-  normal saline cleanse Medihoney and DSD to bilateral foot ulcerations daily.  - Podiatry consult - to be arranged by day team  - Z flow boots.

## 2023-03-28 NOTE — ED PROVIDER NOTE - ATTENDING CONTRIBUTION TO CARE
62-year-old male with history of A-fib status post AICD on Eliquis, CVA with residual right-sided weakness as well as bilateral contractures in the arms , ESRD on dialysis Tuesday Thursday Saturday presents to the emergency department after a fall when he reports he was reaching for remote and hit his head on the side table lost consciousness and called for help.  Patient states that he has pain in the left side of his head and neck denies any arm leg abdomen or pelvis pain.  Patient does have chronic low back pain.  On exam patient is awake alert oriented x3 he has clear lungs to auscultation bilaterally soft abdomen patient moving bilateral arms.  Patient with lower leg weakness. ? unchanged, plan for imaging and reassessment. findings c/f possible ICH, given known eliquis usage,

## 2023-03-28 NOTE — ED ADULT NURSE REASSESSMENT NOTE - NS ED NURSE REASSESS COMMENT FT1
Pt refused lidocaine patch stating, "That patch will do nothing. They don't known anything!" MD Banda notified.

## 2023-03-28 NOTE — H&P ADULT - PROBLEM SELECTOR PLAN 8
-levothyroxine -levothyroxine        Isolation precautions: patient has Isolation precautions  on chart  from 3/23 for candida species, unclear indication , will need to be clarified with infection control in am

## 2023-03-28 NOTE — H&P ADULT - NSHPOUTPATIENTPROVIDERS_GEN_ALL_CORE
Dr Díaz ([facility])   Cardiologist: Dr Gipson ([facility])   Nephrologist: unsure

## 2023-03-28 NOTE — ED PROVIDER NOTE - CLINICAL SUMMARY MEDICAL DECISION MAKING FREE TEXT BOX
Patient is a 62-year-old male past medical history significant for A-fib status post AICD on Eliquis, hypertension, CVA with residual right-sided weakness, CKD HD on Tuesday Thursday Saturday, diastolic CHF presenting for fall. Currently with vss, at neurologic baseline without new focal deficits, presenting for unwitnessed fall on ac with +HH and +LOC now with L head and L neck pain, no spinal midline tenderness or new neurological complaints. Given pt on ac with anemia and ckd on chart review will eval labs for change, ct to eval for ich vs cspine injury vs rp bleed vs abdominopelvic traumatic sequelae. Pt declines tylenol, lidocaine, other analgesia stating he is allergic.

## 2023-03-28 NOTE — ED ADULT NURSE REASSESSMENT NOTE - NS ED NURSE REASSESS COMMENT FT1
Pt verbally aggressive to RNs and Providers. Pt completely undressed and threw the call bell onto the floor. Offer blankets to cover up and refused to cover back up, and threw grown and blanket back onto the floor. MD Landis at bedside, speaking with pt.

## 2023-03-28 NOTE — ED PROVIDER NOTE - OBJECTIVE STATEMENT
Patient is a 62-year-old male past medical history significant for A-fib status post AICD on Eliquis, hypertension, CVA with residual right-sided weakness, CKD HD on Tuesday Thursday Saturday, diastolic CHF presenting for fall.  Patient approximately 3 to 4 hours ago fell out of bed while reaching for a remote that was slightly out of his reach at his nursing facility [facility], struck the left side of his head on the handle of the side table, states that he lost consciousness, upon regaining consciousness called for nursing for help.  Feels that he is currently at his mental status baseline complaining of pain to his left head and left neck, without pain to any limbs, trunk, abdomen, pelvis.  States that he had no symptoms prior to fall, feels that he was in good health before fall, currently without chest pain, shortness of breath, nausea, vomiting, vision change, abdominal pain, urine change, stool change.  Denies stool or urinary incontinence during event.  No palpitations or dizziness prior to event.

## 2023-03-28 NOTE — ED PROVIDER NOTE - PHYSICAL EXAMINATION
CONSTITUTIONAL: Well-developed; well-nourished; in no acute distress.   SKIN: warm, dry, no lacerations or abrasions  HEAD: Normocephalic; atraumatic, nontender to site of injury, no alonzo sign, rhinorrhea, otorrhea.  EYES: no conjunctival injection. PERRL. no scleral icterus. EOMI  ENT: No nasal discharge; airway clear.  NECK: Supple; no midline tenderness, L side paraspinal cervical tenderness  CARD: S1, S2 normal; no murmurs, gallops, or rubs. Regular rate and rhythm.   RESP: No wheezes, rales or rhonchi. Good air movement bilaterally.   ABD: soft ntnd, no guarding, no distention, no rigidity.   EXT: No cyanosis or edema. B/l feet wrapped in bandages. All extremities with full ROM, strength as below, no crepitus or ttp of long bones, clavicles, chest wall, hips/pelvis.  NEURO: alert, oriented to ppte, cn 2-12 intact, motor strength 5/5 to LLE, L arm and forearm, unable to  L hand at baseline; RUE diminished compared to L at baseline currently 4/5, sensation intact to BUE and diminished in BLE at baseline, coordination intact  PSYCH: Cooperative, appropriate.

## 2023-03-28 NOTE — H&P ADULT - PROBLEM SELECTOR PLAN 1
hgb decreased , suspect anemia 2/2 renal disease  may also have component of blood loss from open wounds on feet , no reported GI bleeding. currently HD stable , will hold anticoagulation until assessed to be stable   - monitor H/H   - transfuse PRBCs PRN for hgb less than 7  - hold Eliquis , can resume in am if hgb remains stable

## 2023-03-28 NOTE — ED ADULT NURSE REASSESSMENT NOTE - NS ED NURSE REASSESS COMMENT FT1
Multiple attempts by RNs to place IV. MD notified about attempts. Pt changed out of clothing and personal belongings bagged and placed behind pt on bed. Pt placed in position of comfort side rails up and call light within reach.

## 2023-03-28 NOTE — H&P ADULT - PROBLEM SELECTOR PLAN 7
- no indication for HD at this time , next session due 3/30 ,  renal consult for HD to be arranged by day team if patient to remain in hospital   - Sevelamer

## 2023-03-29 NOTE — PROGRESS NOTE ADULT - PROBLEM SELECTOR PLAN 6
-  normal saline cleanse Medihoney and DSD to bilateral foot ulcerations daily.  - Podiatry consult pending   - Z flow boots  - wound care consult

## 2023-03-29 NOTE — CONSULT NOTE ADULT - PROBLEM SELECTOR RECOMMENDATION 9
Pt. with ESRD on HD three times a week (TTS). Pt being admitted after mechanical fall. Last HD was done on  Tuesday (3/24/23) via RIJ tunneled HD catheter. Pt goes to [facility] dialysis unit. Follows with Dr. Mercedes. Labs reviewed. Plan for maintenance HD tomorrow. HD consent obtained and kept in pts chart. HD orders placed. Dose meds as per HD.

## 2023-03-29 NOTE — ED ADULT NURSE REASSESSMENT NOTE - NS ED NURSE REASSESS COMMENT FT1
Pt screaming for the doctor after inpatient MD was at bedside. Pt states that he was in pain and the doctor was going to put medications in. Medication orders placed but pending verification from pharmacy. Pt explained the wait time. Upon leaving the room Pt began to scream for the doctor and take off his cloths, tele wires and BP cuff. Pt then began to curse and throw tv remote onto the floor. Pt reeducated educated in the importance of vital sign monitoring and the need for them to stay on. Pt screaming for the doctor after inpatient MD was at bedside. Pt states that he was in pain and the doctor was going to put medications in. Medication orders placed but pending verification from pharmacy. Pt explained the wait time. Upon leaving the room Pt began to scream for the doctor and take off his cloths, tele wires and BP cuff. Pt then began to curse and throw tv remote onto the floor. Pt also refused melatonin and tamsulosin. Pt reeducated educated in the importance of vital sign monitoring and the need for them to stay on.

## 2023-03-29 NOTE — CHART NOTE - NSCHARTNOTEFT_GEN_A_CORE
MEDICINE PA     Called by the nurse to report that bruce fell out of bed. Patient seen at bedside, Denies SOB, CP, palpitations, dizziness, LOC, hip / back pain.    He is unable to state why he got out of bed.    Vital Signs Last 24 Hrs  T(C): 36.4 (29 Mar 2023 14:25), Max: 36.8 (29 Mar 2023 01:11)  T(F): 97.5 (29 Mar 2023 14:25), Max: 98.2 (29 Mar 2023 01:11)  HR: 73 (29 Mar 2023 14:25) (70 - 80)  BP: 109/72 (29 Mar 2023 14:25) (102/63 - 134/85)  BP(mean): --  RR: 18 (29 Mar 2023 14:25) (16 - 18)  SpO2: 100% (29 Mar 2023 14:25) (98% - 100%)    Parameters below as of 29 Mar 2023 14:25  Patient On (Oxygen Delivery Method): room air    Physical Exam  General: Found patient laying on the floor  Mental Status: A&O x1  Skin: Warm, dry, no rashes, lesions, ecchymosis, bruises noted left heel ulcer and B/l shin with brownish discoloration  Head: NCAT  Neuro: Non focal  Cardiac: S1/S2. No murmurs appreciated  Lungs: CTA b/l. No rales, wheezes, rhonchi appreciated b/l.   Abdomen: Soft, +BS, non distended   - tran cath in place  Extremities: No edema. Full ROM all 4 extremities. No deformities noted    A&P  HPI:  62 year old male w/ pmh Hep C, HTN, AFib s/p AICD (Medtronic) on Eliquis and Plavix, b/l CVA quadriplegic s/p suprapubic cath, ESRD on HD ( T/Thu/Sat), diastolic CHF, seizure on keppra, hep C, chronic pressure ulcers, recent admission after fall in nursing home , now presenting after fall on day of admission .     Unwitnessed  fall  - CT head ordered to r/o acute bleed.   - CT Cervix  - Xray B/L hip/pelvis  - neuro checks  - Constant observation  - Bed alarm  - fall precautions /bedrest  - case discussed with Dr Emmanuel  - Sister Delirosa Mayela      addendum  Patient refusing to take po meds or keep his gown on. Patient was assigned a bed on [address]. Will place wrist restraint for transfer to unit

## 2023-03-29 NOTE — CONSULT NOTE ADULT - ATTENDING COMMENTS
I have seen this patient with the fellow and agree with their assessment and plan. I was physically present for significant portions of the evaluation and management (E/M) service provided.  I agree with the above history, physical, and plan which I have reviewed and edited where appropriate.    ESRD here with fall  Had HD yesterday  No urgent indication today, makes urine- can give lasix if gets fluid overloaded with PRBCS  Lokelma for hyperkalemia( appears spurious today), rpt K  Plan for HD tomorrow am    Myra Mercado MD  Pager   Office     Contact me directly via Microsoft Teams     (After 5 pm or on weekends please page the on-call fellow/attending, can check [email or web address] for schedule. Login is musa garcia, schedule under [facility] medicine, psych, derm)

## 2023-03-29 NOTE — CONSULT NOTE ADULT - PROBLEM SELECTOR RECOMMENDATION 3
Patient with anemia in the setting of ESRD. Hemoglobin below target range. received 2 units PRBC. Will need to determine if patient receives VIBHA. Monitor hemoglobin.

## 2023-03-29 NOTE — PROGRESS NOTE ADULT - PROBLEM SELECTOR PLAN 7
-Renal consulted; plan for HD tomorrow   - Sevelamer    #HyperK  6.3 with moderate hemolysis. Given lokelma. Repeat 5.1   -f/u ekg, repeat PM BMP   -HD per renal

## 2023-03-29 NOTE — CONSULT NOTE ADULT - PROBLEM SELECTOR RECOMMENDATION 2
Initial labs in ER concerning for serum potassium elevated at 6.3 (moderately hemolyzed). received medical management- insulin, D50 with lokelma. Repeat labs on VBG showed serum potassium at 5.1. Low potassium diet advised. Monitor serum potassium. Initial labs in ER concerning for serum potassium elevated at 6.3 (moderately hemolyzed). received medical management- insulin, D50 with lokelma. Repeat labs on VBG showed serum potassium at 5.1. can give IV alsix 80 mg once today as he still makes urine. Low potassium diet advised. Monitor serum potassium.

## 2023-03-29 NOTE — PROGRESS NOTE ADULT - SUBJECTIVE AND OBJECTIVE BOX
SUBJECTIVE / OVERNIGHT EVENTS:  Patient seen and examined at bedside this morning. Oriented to self. Says no acute complaints; but poor historian. Unable to accurately obtain ROS.     MEDICATIONS  (STANDING):  artificial  tears Solution 1 Drop(s) Both EYES four times a day  atorvastatin 40 milliGRAM(s) Oral at bedtime  baclofen 10 milliGRAM(s) Oral every 12 hours  clobetasol 0.05% Cream 1 Application(s) Topical two times a day  clonazePAM  Tablet 0.5 milliGRAM(s) Oral two times a day  levETIRAcetam 500 milliGRAM(s) Oral daily  levETIRAcetam 250 milliGRAM(s) Oral <User Schedule>  levothyroxine 100 MICROGram(s) Oral daily  metoprolol succinate ER 50 milliGRAM(s) Oral daily  mupirocin 2% Ointment 1 Application(s) Topical three times a day  Nephro-mirna 1 Tablet(s) Oral daily  polyethylene glycol 3350 17 Gram(s) Oral daily  senna 2 Tablet(s) Oral at bedtime  sevelamer carbonate 1600 milliGRAM(s) Oral three times a day  tamsulosin 0.4 milliGRAM(s) Oral at bedtime    MEDICATIONS  (PRN):  acetaminophen     Tablet .. 650 milliGRAM(s) Oral every 6 hours PRN Temp greater or equal to 38C (100.4F), Mild Pain (1 - 3)  bisacodyl Suppository 10 milliGRAM(s) Rectal daily PRN Constipation  HYDROmorphone  Injectable 0.5 milliGRAM(s) IV Push every 6 hours PRN Severe Pain (7 - 10)  melatonin 3 milliGRAM(s) Oral at bedtime PRN Insomnia  ondansetron Injectable 4 milliGRAM(s) IV Push every 8 hours PRN Nausea and/or Vomiting      I&O's Summary      PHYSICAL EXAM:  Vital Signs Last 24 Hrs  T(C): 36.4 (29 Mar 2023 14:25), Max: 36.8 (29 Mar 2023 01:11)  T(F): 97.5 (29 Mar 2023 14:25), Max: 98.2 (29 Mar 2023 01:11)  HR: 73 (29 Mar 2023 14:25) (70 - 80)  BP: 109/72 (29 Mar 2023 14:25) (102/63 - 134/85)  BP(mean): --  RR: 18 (29 Mar 2023 14:25) (16 - 18)  SpO2: 100% (29 Mar 2023 14:25) (98% - 100%)    Parameters below as of 29 Mar 2023 14:25  Patient On (Oxygen Delivery Method): room air      GENERAL: No acute distress,  chronically ill appearing   HEAD:  Atraumatic, Normocephalic  ENT: EOMI, conjunctiva and sclera clear,  moist mucosa no pharyngeal erythema or exudates   NECK: supple , trachea midline   CHEST/LUNG: Clear to auscultation bilaterally; No wheeze, equal breath sounds bilaterally   HEART: Regular rate and rhythm; No murmurs  ABDOMEN: Soft, Nontender, Nondistended; Bowel sounds present  EXTREMITIES:  No clubbing, cyanosis, trace edema  NEUROLOGY: AAOx1 oriented to self, grossly non-focal, quadriplegic   SKIN:  toes w/  onychomycosis , b/l foot/ heel wounds in dressing , seborrheic dermatitis       LABS:                        7.2    10.35 )-----------( 167      ( 29 Mar 2023 06:52 )             24.2     03-29    142  |  109<H>  |  65<H>  ----------------------------<  76  6.3<HH>   |  20<L>  |  4.93<H>    Ca    9.1      29 Mar 2023 06:52    TPro  6.9  /  Alb  3.5  /  TBili  0.5  /  DBili  x   /  AST  71<H>  /  ALT  38  /  AlkPhos  424<H>  03-29    PT/INR - ( 28 Mar 2023 21:00 )   PT: 20.4 sec;   INR: 1.76 ratio         PTT - ( 28 Mar 2023 21:00 )  PTT:39.9 sec          COVID-19 PCR: NotDetec (24 Mar 2023 14:33)

## 2023-03-29 NOTE — PROGRESS NOTE ADULT - PROBLEM SELECTOR PLAN 1
hgb decreased , suspect anemia 2/2 renal disease  may also have component of blood loss from open wounds on feet , no reported GI bleeding. currently HD stable , will hold anticoagulation until assessed to be stable   - hold Eliquis, resume pending CBC trend   -1u pRBC today. F/u repeat CBC PM  -can give lasix if gets fluid overloaded with pRBCs  -F/u iron studies

## 2023-03-29 NOTE — CONSULT NOTE ADULT - PROBLEM SELECTOR RECOMMENDATION 4
Pt with hyperphosphatemia in the setting of ESRD. Pt. on phosphate binders with meals. Recommend checking serum phosphorus level. Low phosphorus diet. Monitor serum phosphorus.     If you have any questions, please feel free to contact me  Fawad Sargent  Nephrology Fellow  [phone]/ Microsoft Teams(Preferred)  (After 5pm or on weekends please page the on-call fellow).

## 2023-03-29 NOTE — CHART NOTE - NSCHARTNOTEFT_GEN_A_CORE
Medicine NP note    CC: Anemia                          7.2    10.35 )-----------( 167      ( 29 Mar 2023 06:52 )             24.2     A/P    63 yo M pmh Hep C, HTN, AFib s/p AICD (Medtronic) on Eliquis and Plavix, CVA, CKD stage 5 (HD T/Thu/Sat) last HD Tues (2 days ago), diastolic CHF, seizure on keppra, hep C, chronic pressure ulcers, recent admission after fall in nursing home , now presenting for  now presenting for fall on day of admission.    DX: S/P fall  : history c/w mechanical, no acute injuries noted on imaging.  PT eval     Anemia.hB/HCT 7.0/ 22.2  - transfuse PRBCs PRN for hgb less than 7    Pressure ulcer of foot.   normal saline cleanse Medihoney and DSD to bilateral foot ulcerations daily.    ESRD on dialysis.     Noted am hb 6.9  Next Hd 3/29 per report  No active bleeding, renal on board  Nephrology consult pending  PRBC 1.2 unit x 2 ordered  Pt with poor cognitive insight to condition, A&o 3  Attempted to reach contacts in system, unable to reach VM left to call back  Day team to follow up  Discussed with HICS  Signed out to day team    Jessica Fuentes FNP BC  [id number]

## 2023-03-29 NOTE — PROGRESS NOTE ADULT - PROBLEM SELECTOR PLAN 9
Alert and oriented to self only on encounter. Will confirm with family BS mental status.   -Send infectious w/u. Will hold abx as he is w/o leukocytosis, fevers   -F/u CTH

## 2023-03-30 NOTE — BH CONSULTATION LIAISON ASSESSMENT NOTE - SUMMARY
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 As per Patient; history of PTSD (1998), prescribed unknown medication,  Denies previous psychiatric admissions, SI/HI, SA, BSSI, AVH, paranoia. As per chart; pmh Hep C, HTN, AFib s/p AICD (Medtronic) on Eliquis and Plavix, b/l CVA quadriplegic s/p suprapubic cath, ESRD on HD ( T/Thu/Sat), diastolic CHF, seizure on keppra, hep C, chronic pressure ulcers, recent admission after fall in nursing home , now presenting after fall on day of admission. Psychiatry consulted for capacity to refuse dialysis and agitation.    Patient was irritable and cooperative, exhibits behavior that is abrasive and offensive to other with beliefs that others are actively and deliberately intending harm or ignoring his needs. The patient has no SI or depressive symptoms but displays actions that he is aware go against his treatment plan such as pulling out his IV and refusing dialysis. He is aware that not getting dialysis can result in severe consequences and offers an alternative by receiving dialysis at the nursing home. The patient does not appear to have full capacity and is verbally abusive to staff under the suspicion for paranoia with plan for treatment.     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 As per Patient; history of PTSD (1998), prescribed unknown medication,  Denies previous psychiatric admissions, SI/HI, SA, BSSI, AVH, paranoia. As per chart; pmh Hep C, HTN, AFib s/p AICD (Medtronic) on Eliquis and Plavix, b/l CVA quadriplegic s/p suprapubic cath, ESRD on HD ( T/Thu/Sat), diastolic CHF, seizure on keppra, hep C, chronic pressure ulcers, recent admission after fall in nursing home , now presenting after fall on day of admission. Psychiatry consulted for capacity to refuse dialysis and agitation.    Patient was irritable and cooperative, exhibits behavior that is abrasive and offensive to other with beliefs that others are actively and deliberately intending harm or ignoring his needs. The patient has no SI or depressive symptoms but displays actions that he is aware go against his treatment plan such as pulling out his IV and refusing dialysis. He is aware that not getting dialysis can result in severe consequences and offers an alternative by receiving dialysis at the nursing home. The patient does not appear to have full capacity and is verbally abusive to staff with paranoid ideation and may benefit from medication.  Recommend starting Zyprexa 2.5mg Qhs and as a prn q8hrs for paranoia or agitation   Continue care as per primary team

## 2023-03-30 NOTE — BH CONSULTATION LIAISON ASSESSMENT NOTE - CURRENT MEDICATION
MEDICATIONS  (STANDING):  artificial  tears Solution 1 Drop(s) Both EYES four times a day  atorvastatin 40 milliGRAM(s) Oral at bedtime  baclofen 10 milliGRAM(s) Oral every 12 hours  chlorhexidine 2% Cloths 1 Application(s) Topical <User Schedule>  clobetasol 0.05% Cream 1 Application(s) Topical two times a day  clonazePAM  Tablet 0.5 milliGRAM(s) Oral two times a day  furosemide   Injectable 80 milliGRAM(s) IV Push once  levETIRAcetam 500 milliGRAM(s) Oral daily  levETIRAcetam 250 milliGRAM(s) Oral <User Schedule>  levothyroxine 100 MICROGram(s) Oral daily  metoprolol succinate ER 50 milliGRAM(s) Oral daily  mupirocin 2% Ointment 1 Application(s) Topical three times a day  Nephro-mirna 1 Tablet(s) Oral daily  polyethylene glycol 3350 17 Gram(s) Oral daily  senna 2 Tablet(s) Oral at bedtime  sevelamer carbonate 1600 milliGRAM(s) Oral three times a day  tamsulosin 0.4 milliGRAM(s) Oral at bedtime    MEDICATIONS  (PRN):  acetaminophen     Tablet .. 650 milliGRAM(s) Oral every 6 hours PRN Temp greater or equal to 38C (100.4F), Mild Pain (1 - 3)  bisacodyl Suppository 10 milliGRAM(s) Rectal daily PRN Constipation  melatonin 3 milliGRAM(s) Oral at bedtime PRN Insomnia  ondansetron Injectable 4 milliGRAM(s) IV Push every 8 hours PRN Nausea and/or Vomiting

## 2023-03-30 NOTE — CHART NOTE - NSCHARTNOTESELECT_GEN_ALL_CORE
Agitatation/Event Note
Event Note
PSYCHIATRY PRN RECCS/Event Note
Transfusion Consent and Plan/Event Note

## 2023-03-30 NOTE — PROGRESS NOTE ADULT - PROBLEM SELECTOR PLAN 2
Initial labs in ER concerning for serum potassium elevated at 6.3 (moderately hemolyzed). received medical management- insulin, D50 with lokelma. Serum potassium is WNL at 5 today. Low potassium diet advised. Monitor serum potassium.

## 2023-03-30 NOTE — BH CONSULTATION LIAISON ASSESSMENT NOTE - NSBHCHARTREVIEWLAB_PSY_A_CORE FT
6.6    7.89  )-----------( 137      ( 30 Mar 2023 00:04 )             20.9   03-30    142  |  109<H>  |  64<H>  ----------------------------<  79  5.0   |  19<L>  |  5.17<H>    Ca    8.8      30 Mar 2023 00:04    TPro  6.9  /  Alb  3.5  /  TBili  0.5  /  DBili  x   /  AST  71<H>  /  ALT  38  /  AlkPhos  424<H>  03-29

## 2023-03-30 NOTE — PROGRESS NOTE ADULT - PROBLEM SELECTOR PLAN 3
Patient with anemia in the setting of ESRD. Hemoglobin below target range. Received 2 units PRBC. Hb is low at 6.6 today. Blood transfusion per primary team. Recommend giving IV lasix 80 mg post blood transfusion today. Will give VIBHA once HD. Monitor hemoglobin.

## 2023-03-30 NOTE — PROGRESS NOTE ADULT - PROBLEM SELECTOR PLAN 2
history c/w mechanical, no acute injuries noted on imaging. CTH, CT c-spine, XRay pelvis unremarkable for any acute pathology.

## 2023-03-30 NOTE — BH CONSULTATION LIAISON ASSESSMENT NOTE - VIOLENCE RISK FACTORS:
Violent ideation/threat/speech/Impulsivity/Lack of insight into violence risk/need for treatment/Irritability

## 2023-03-30 NOTE — PROGRESS NOTE ADULT - PROBLEM SELECTOR PLAN 1
hgb decreased , suspect anemia 2/2 renal disease  may also have component of blood loss from open wounds on feet , no reported GI bleeding. currently HD stable , will hold anticoagulation until assessed to be stable   - hold Eliquis, resume pending CBC trend   - Given difficult access; place midline 3/30, 1u pRBC today. F/u repeat CBC PM  -can give lasix if gets fluid overloaded with pRBCs  -F/u iron studies

## 2023-03-30 NOTE — PROGRESS NOTE ADULT - PROBLEM SELECTOR PLAN 1
Pt. with ESRD on HD three times a week (TTS). Pt being admitted after mechanical fall. Last HD was done on Tuesday (3/24/23) via RIJ tunneled HD catheter. Pt goes to [facility] dialysis unit. Follows with Dr. Mercedes. Labs reviewed. Plan for maintenance HD today, although pt refused HD today. No urgent indication for HD. Consider pych consult in view of pts depression. Dose meds as per HD. Pt. with ESRD on HD three times a week (TTS). Pt being admitted after mechanical fall. Last HD was done on Tuesday (3/24/23) via RIJ tunneled HD catheter.     Pt goes to [facility] dialysis unit. Follows with Dr. Mercedes. Labs reviewed. Plan for maintenance HD today, although pt refused HD today. No urgent indication for HD. Consider pych consult in view of pts depression. Dose meds as per HD.

## 2023-03-30 NOTE — BH CONSULTATION LIAISON ASSESSMENT NOTE - NSBHCHARTREVIEWVS_PSY_A_CORE FT
Vital Signs Last 24 Hrs  T(C): 36.6 (30 Mar 2023 03:58), Max: 36.6 (30 Mar 2023 03:58)  T(F): 97.9 (30 Mar 2023 03:58), Max: 97.9 (30 Mar 2023 03:58)  HR: 85 (30 Mar 2023 06:30) (73 - 85)  BP: 131/80 (30 Mar 2023 06:30) (109/72 - 151/89)  BP(mean): --  RR: 18 (30 Mar 2023 03:58) (18 - 19)  SpO2: 97% (30 Mar 2023 03:58) (97% - 100%)    Parameters below as of 29 Mar 2023 20:31  Patient On (Oxygen Delivery Method): room air

## 2023-03-30 NOTE — PROGRESS NOTE ADULT - PROBLEM SELECTOR PLAN 7
-Renal consulted; HD per renal; patient refused HD today. Monitor electrolytes. F/u repeat BMP for K  - Sevelamer    #HyperK  6.3 with moderate hemolysis. Given lokelma. Repeat 5.1   -f/u ekg, repeat PM BMP   -HD per renal

## 2023-03-30 NOTE — PATIENT PROFILE ADULT - NSPROPTRIGHTNOTIFY_GEN_A_NUR
information could not be obtained Hydroquinone Counseling:  Patient advised that medication may result in skin irritation, lightening (hypopigmentation), dryness, and burning.  In the event of skin irritation, the patient was advised to reduce the amount of the drug applied or use it less frequently.  Rarely, spots that are treated with hydroquinone can become darker (pseudoochronosis).  Should this occur, patient instructed to stop medication and call the office. The patient verbalized understanding of the proper use and possible adverse effects of hydroquinone.  All of the patient's questions and concerns were addressed.

## 2023-03-30 NOTE — PATIENT PROFILE ADULT - FALL HARM RISK - HARM RISK INTERVENTIONS
Assistance OOB with selected safe patient handling equipment/Communicate Risk of Fall with Harm to all staff/Discuss with provider need for PT consult/Monitor for mental status changes/Monitor gait and stability/Move patient closer to nurses' station/Provide patient with walking aids - walker, cane, crutches/Reinforce activity limits and safety measures with patient and family/Reorient to person, place and time as needed/Tailored Fall Risk Interventions/Toileting schedule using arm’s reach rule for commode and bathroom/Use of alarms - bed, chair and/or voice tab/Visual Cue: Yellow wristband and red socks/Bed in lowest position, wheels locked, appropriate side rails in place/Call bell, personal items and telephone in reach/Instruct patient to call for assistance before getting out of bed or chair/Non-slip footwear when patient is out of bed/[place] to call system/Physically safe environment - no spills, clutter or unnecessary equipment/Purposeful Proactive Rounding/Room/bathroom lighting operational, light cord in reach

## 2023-03-30 NOTE — CHART NOTE - NSCHARTNOTEFT_GEN_A_CORE
consulted to see pt w/ foot wound,  pt seen by dpm.  d/w team who is agreeable to defer to dpm.   remain available as requested.

## 2023-03-30 NOTE — PROGRESS NOTE ADULT - PROBLEM SELECTOR PLAN 9
Alert and oriented to self only on encounter. Will confirm with family BS mental status.   -Send infectious w/u. Will hold abx as he is w/o leukocytosis, fevers   -F/u CTH - unremarkable   -Psych eval given agitation, paranoia, and evaluation for capacity

## 2023-03-30 NOTE — CHART NOTE - NSCHARTNOTEFT_GEN_A_CORE
Received pt from ED with restraints, reevaluated and trial off restraints, but pt was aggressive. Restraints reapplied. Pt in supervised room to prevent fall. Anemia, blood transfusion was ordered during the day, but pt pulled the IV earlier during the day and because of difficult access unable to transfuse. Attempted to place the IV line when pt came to 3 mosher, without getting the IV which could support blood transfusion. ED provider contacted nephrology and pl will be scheduled for early HD in AM. Pt hemodynamically stable. No acute bleeding. Continue to monitor closely.    Trini Weiss NP, #[id number]

## 2023-03-30 NOTE — CHART NOTE - NSCHARTNOTEFT_GEN_A_CORE
I called the primary number on the patient's chart without response. I left a voicemail informing the patient of the biopsy results. This was my 3rd attempted and VM left.  Patient instructed to call the dermatology office with any questions/concerns.

## 2023-03-30 NOTE — BH CONSULTATION LIAISON ASSESSMENT NOTE - RISK ASSESSMENT
Risk factors include: single, male, helplessness, agitation, low frustration tolerance, noncompliant with treatment/not receiving treatment, poor social supports, recent inpatient discharge, recent loss, unable to care for self secondary to psychiatric illness.    Chronic risk factors for suicide include: chronic pain, dependence on others due to medical condition.   Protective factors include: denies SI/I/P, no history of suicide attempts, no history of NSSIB, identifies reasons for living.    Patient is at low risk for suicide and violence and does not require psychiatric admission at this time.

## 2023-03-30 NOTE — ADVANCED PRACTICE NURSE CONSULT - ASSESSMENT
Midline Catheter Insertion Note    Catheter type: 4F  : Bard  Power injectable: Yes  Ref#[id number]                                                                                                                                                                                                                        Procedure assisted by: REA Hester RN  Time out was preformed, confirming the patient's first and last name, date of birth, procedure, and correct site prior to state of procedure.    Patient was placed with HOB 30 degrees. Patient placement site was prepped with chlorhexidine solution, then draped using maximum sterile barrier protection. Using the Bard Site Rite 8, the catheter was placed using the Modified Seldinger Technique. Strict adherence to outline aseptic technique including handwashing, glove and gown, utilizing mask and cap, plus draping the patient with a sterile drape was observed. Upon completion of line placement, the insertion site was covered with a sterile occlusive CHG dressing. Pt tolerated procedure well.     All materials used for catheter insertion, including the intact guide wires, were accounted for at the end of the procedure.  Number of attempts: 1  Complications/Comments: None    Emergency Placement: No  Site: New  Anatomical Site of insertion: Left Basilic  Catheter size/length: 4F, 20cm  US guided Bard single lumen power midline placed

## 2023-03-30 NOTE — BH CONSULTATION LIAISON ASSESSMENT NOTE - NSBHATTESTCOMMENTATTENDFT_PSY_A_CORE
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 As per Patient; history of PTSD (1998), prescribed unknown medication,  Denies previous psychiatric admissions, SI/HI, SA, BSSI, AVH, paranoia. As per chart; pmh Hep C, HTN, AFib s/p AICD (Medtr[employer]) on Eliquis and Plavix, b/l CVA quadriplegic s/p suprapubic cath, ESRD on HD ( T/Thu/Sat), diastolic CHF, seizure on Keppra, hep C, chronic pressure ulcers, recent admission after fall in nursing home , now presenting after fall on day of admission. Psychiatry consulted for capacity to refuse dialysis and agitation.  Patient was irritable but cooperative, exhibits behavior that is abrasive and offensive to other with beliefs that others are actively and deliberately intending harm or ignoring his needs. The patient has no SI or depressive symptoms but displays actions that he is aware go against his treatment plan such as pulling out his IV and refusing dialysis. He is aware that not getting dialysis can result in severe consequences and is not refusing dialysis but wants to have it at the nursing home only not in the hospital because he believes that staff here are against him and do not want to help him. The patient does not appear to have full capacity and is verbally abusive to staff with paranoid ideation and may benefit from medication.  Agree with recommendations for Zyprexa 2.5mg qhs and as a prn as needed for paranoia.  (would recommend Zyprexa prior to the blood transfusion since pt has a hx of pulling out his IV because of paranoia.)

## 2023-03-30 NOTE — PROGRESS NOTE ADULT - PROBLEM SELECTOR PLAN 4
Pt with hyperphosphatemia in the setting of ESRD. Pt. on phosphate binders with meals. Recommend checking serum phosphorus level. Low phosphorus diet. Monitor serum phosphorus    If you have any questions, please feel free to contact me  Fawad Sargent  Nephrology Fellow  [phone]/ [employer] Teams(Preferred)  (After 5pm or on weekends please page the on-call fellow).

## 2023-03-30 NOTE — EEG REPORT - NS EEG TEXT BOX
RENEA AVILES [facility]-[id number]     Study Date: 03-30-23  Duration: 12 min  --------------------------------------------------------------------------------------------------  History:  CC/ HPI Patient is a 62y old  Male who presents with a chief complaint of worsening anemia (30 Mar 2023 16:31)    MEDICATIONS  (STANDING):  artificial  tears Solution 1 Drop(s) Both EYES four times a day  atorvastatin 40 milliGRAM(s) Oral at bedtime  baclofen 10 milliGRAM(s) Oral every 12 hours  chlorhexidine 2% Cloths 1 Application(s) Topical <User Schedule>  clobetasol 0.05% Cream 1 Application(s) Topical two times a day  clonazePAM  Tablet 0.5 milliGRAM(s) Oral two times a day  levETIRAcetam 500 milliGRAM(s) Oral daily  levETIRAcetam 250 milliGRAM(s) Oral <User Schedule>  levothyroxine 100 MICROGram(s) Oral daily  metoprolol succinate ER 50 milliGRAM(s) Oral daily  mupirocin 2% Ointment 1 Application(s) Topical three times a day  Nephro-mirna 1 Tablet(s) Oral daily  OLANZapine 2.5 milliGRAM(s) Oral at bedtime  polyethylene glycol 3350 17 Gram(s) Oral daily  senna 2 Tablet(s) Oral at bedtime  sevelamer carbonate 1600 milliGRAM(s) Oral three times a day  tamsulosin 0.4 milliGRAM(s) Oral at bedtime    --------------------------------------------------------------------------------------------------  Study Interpretation:    [[[Abbreviation Key:  PDR=alpha rhythm/posterior dominant rhythm. A-P=anterior posterior.  Amplitude: ‘very low’:<20; ‘low’:20-49; ‘medium’:; ‘high’:>150uV.  Persistence for periodic/rhythmic patterns (% of epoch) ‘rare’:<1%; ‘occasional’:1-10%; ‘frequent’:10-50%; ‘abundant’:50-90%; ‘continuous’:>90%.  Persistence for sporadic discharges: ‘rare’:<1/hr; ‘occasional’:1/min-1/hr; ‘frequent’:>1/min; ‘abundant’:>1/10 sec.  RPP=rhythmic and periodic patterns; GRDA=generalized rhythmic delta activity; FIRDA=frontal intermittent GRDA; LRDA=lateralized rhythmic delta activity; TIRDA=temporal intermittent rhythmic delta activity;  LPD=PLED=lateralized periodic discharges; GPD=generalized periodic discharges; BIPDs =bilateral independent periodic discharges; Mf=multifocal; SIRPDs=stimulus induced rhythmic, periodic, or ictal appearing discharges; BIRDs=brief potentially ictal rhythmic discharges >4 Hz, lasting .5-10s; PFA (paroxysmal bursts >13 Hz or =8 Hz <10s).  Modifiers: +F=with fast component; +S=with spike component; +R=with rhythmic component.  S-B=burst suppression pattern.  Max=maximal. N1-drowsy; N2-stage II sleep; N3-slow wave sleep. SSS/BETS=small sharp spikes/benign epileptiform transients of sleep. HV=hyperventilation; PS=photic stimulation]]]    Daily EEG Visual Analysis    FINDINGS:      Background:  Continuity: Continuous  Symmetry: Symmetric  Posterior dominant rhythm (PDR): 7 Hz, reactive to eye closure. Symmetric low-amplitude frontal beta in wakefulness.  State Change: Present  Voltage: Normal  Anterior Posterior Gradient: Present  Other background findings: Abundant diffuse polymorphic delta and theta slowing.  Breach: Absent    Background Slowing:  Generalized slowing: As above  Focal slowing: None    State Changes:   Drowsiness is characterized by slowing of the background activity and the appearance of generalized rhythmic delta activity (GRDA) at 1-1.5 Hz.  Stage 2 sleep is not captured.    Epileptiform Discharges:    Frequent generalized sharp-wave discharges with triphasic morphology, at times predominant on the right or the left.  These occasionally occur in bursts as generalized periodic discharges (GPDs) with triphasic morphology at 1 Hz.    Electrographic and Electroclinical seizures:  None    Other Clinical Events:  None    Activation Procedures:   Hyperventilation was not performed.    Photic stimulation was not performed.    Artifacts:  Intermittent myogenic and movement artifacts are present.    EKG:  Single-lead EKG shows irregular rhythm at 70-80 bpm.    EEG Classification / Summary:  Abnormal limited-duration EEG (12 minutes) in the awake and drowsy states due to:  -Frequent generalized sharp-wave discharges with triphasic morphology, occasionally in bursts at 1 Hz (GPDs)  -Moderate diffuse slowing    Clinical Impression:  Limited-duration study (12 minutes).  -Generalized discharges with triphasic morphology can be seen in toxic-metabolic encephalopathies and may indicate a risk of generalized seizures.  -Moderate diffuse cerebral dysfunction is nonspecific in etiology          -------------------------------------------------------------------------------------------------------  [facility] EEG Reading Room Ph#: [phone]  Epilepsy Answering Service after 5PM and before 8:30AM: Ph#: [phone]    Deanna Aldridge MD  Attending Physician, [facility]   RENEA AVILES [facility]-[id number]     Study Date: 03-30-23  Duration: 12 min  --------------------------------------------------------------------------------------------------  History:  CC/ HPI Patient is a 62y old  Male who presents with a chief complaint of worsening anemia (30 Mar 2023 16:31)    MEDICATIONS  (STANDING):  artificial  tears Solution 1 Drop(s) Both EYES four times a day  atorvastatin 40 milliGRAM(s) Oral at bedtime  baclofen 10 milliGRAM(s) Oral every 12 hours  chlorhexidine 2% Cloths 1 Application(s) Topical <User Schedule>  clobetasol 0.05% Cream 1 Application(s) Topical two times a day  clonazePAM  Tablet 0.5 milliGRAM(s) Oral two times a day  levETIRAcetam 500 milliGRAM(s) Oral daily  levETIRAcetam 250 milliGRAM(s) Oral <User Schedule>  levothyroxine 100 MICROGram(s) Oral daily  metoprolol succinate ER 50 milliGRAM(s) Oral daily  mupirocin 2% Ointment 1 Application(s) Topical three times a day  Nephro-mirna 1 Tablet(s) Oral daily  OLANZapine 2.5 milliGRAM(s) Oral at bedtime  polyethylene glycol 3350 17 Gram(s) Oral daily  senna 2 Tablet(s) Oral at bedtime  sevelamer carbonate 1600 milliGRAM(s) Oral three times a day  tamsulosin 0.4 milliGRAM(s) Oral at bedtime    --------------------------------------------------------------------------------------------------  Study Interpretation:    [[[Abbreviation Key:  PDR=alpha rhythm/posterior dominant rhythm. A-P=anterior posterior.  Amplitude: ‘very low’:<20; ‘low’:20-49; ‘medium’:; ‘high’:>150uV.  Persistence for periodic/rhythmic patterns (% of epoch) ‘rare’:<1%; ‘occasional’:1-10%; ‘frequent’:10-50%; ‘abundant’:50-90%; ‘continuous’:>90%.  Persistence for sporadic discharges: ‘rare’:<1/hr; ‘occasional’:1/min-1/hr; ‘frequent’:>1/min; ‘abundant’:>1/10 sec.  RPP=rhythmic and periodic patterns; GRDA=generalized rhythmic delta activity; FIRDA=frontal intermittent GRDA; LRDA=lateralized rhythmic delta activity; TIRDA=temporal intermittent rhythmic delta activity;  LPD=PLED=lateralized periodic discharges; GPD=generalized periodic discharges; BIPDs =bilateral independent periodic discharges; Mf=multifocal; SIRPDs=stimulus induced rhythmic, periodic, or ictal appearing discharges; BIRDs=brief potentially ictal rhythmic discharges >4 Hz, lasting .5-10s; PFA (paroxysmal bursts >13 Hz or =8 Hz <10s).  Modifiers: +F=with fast component; +S=with spike component; +R=with rhythmic component.  S-B=burst suppression pattern.  Max=maximal. N1-drowsy; N2-stage II sleep; N3-slow wave sleep. SSS/BETS=small sharp spikes/benign epileptiform transients of sleep. HV=hyperventilation; PS=photic stimulation]]]    Daily EEG Visual Analysis    FINDINGS:      Background:  Continuity: Continuous  Symmetry: Symmetric  Posterior dominant rhythm (PDR): 7 Hz, reactive to eye closure. Symmetric low-amplitude frontal beta in wakefulness.  State Change: Present  Voltage: Normal  Anterior Posterior Gradient: Present  Other background findings: Abundant diffuse polymorphic delta and theta slowing.  Breach: Absent    Background Slowing:  Generalized slowing: As above  Focal slowing: None    State Changes:   Drowsiness is characterized by slowing of the background activity and the appearance of generalized rhythmic delta activity (GRDA) at 1-1.5 Hz.  Stage 2 sleep is not captured.    Epileptiform Discharges:    Frequent generalized sharp-wave discharges with triphasic morphology, at times predominant on the right or the left.  These occasionally occur in bursts as generalized periodic discharges (GPDs) with triphasic morphology at 1 Hz.    Electrographic and Electroclinical seizures:  None    Other Clinical Events:  None    Activation Procedures:   Hyperventilation was not performed.    Photic stimulation was not performed.    Artifacts:  Intermittent myogenic and movement artifacts are present.    EKG:  Single-lead EKG shows irregular rhythm at 70-80 bpm.    EEG Classification / Summary:  Abnormal limited-duration EEG (12 minutes) in the awake and drowsy states due to:  -Frequent generalized sharp-wave discharges with triphasic morphology, occasionally in bursts at 1 Hz (GPDs)  -Moderate diffuse slowing    Clinical Impression:  -Limited-duration study (12 minutes).  -Generalized discharges with triphasic morphology can be seen in toxic-metabolic encephalopathies and may indicate a risk of generalized seizures.  -Moderate diffuse cerebral dysfunction is nonspecific in etiology  -Single-lead EKG incidentally shows irregular rhythm.          -------------------------------------------------------------------------------------------------------  [facility] EEG Reading Room Ph#: [phone]  Epilepsy Answering Service after 5PM and before 8:30AM: Ph#: [phone]    Deanna Aldridge MD  Attending Physician, [facility]

## 2023-03-30 NOTE — PROVIDER CONTACT NOTE (CRITICAL VALUE NOTIFICATION) - BACKGROUND
Pt admitted for anemia. Pt with 1/2 unit RBC pending for blood transfusion Pt admitted for anemia. Pt with 1/2 unit RBC x2 pending for blood transfusion

## 2023-03-30 NOTE — BH CONSULTATION LIAISON ASSESSMENT NOTE - NSBHCHARTREVIEWINVESTIGATE_PSY_A_CORE FT
< from: 12 Lead ECG (03.23.23 @ 13:28) >    Ventricular Rate 69 BPM    Atrial Rate 69 BPM    P-R Interval 142 ms    QRS Duration 92 ms    Q-T Interval 436 ms    QTC Calculation(Bazett) 467 ms    P Axis 62 degrees    R Axis 14 degrees    T Axis 40 degrees    Diagnosis Line SINUS RHYTHM WITH PREMATURE ATRIAL COMPLEXES  OTHERWISE NORMAL ECG  WHEN COMPARED WITH ECG OF 15-SEP-2005  PACs are now present  Confirmed by Eduardo Sarkar MD ([id number]) on 3/24/2023 2:33:34 PM    < end of copied text >

## 2023-03-30 NOTE — PROGRESS NOTE ADULT - PROBLEM SELECTOR PLAN 6
-  normal saline cleanse Medihoney and DSD to bilateral foot ulcerations daily.  - Podiatry consulted:  - 3/24 R ankle XR: negative for OM or soft tissue tracking air  - Recommend decubitus precautions and use of Z flow boot  - Recommend normal saline cleanse prior to dressing changes daily  - wound care consulted

## 2023-03-30 NOTE — PROGRESS NOTE ADULT - SUBJECTIVE AND OBJECTIVE BOX
SUBJECTIVE / OVERNIGHT EVENTS:  Patient seen and examined at bedside this morning. Oriented to self and location. Agitated, kept repeating he has "pain in his kidneys". Unable to accurately assess ROS.     MEDICATIONS  (STANDING):  artificial  tears Solution 1 Drop(s) Both EYES four times a day  atorvastatin 40 milliGRAM(s) Oral at bedtime  baclofen 10 milliGRAM(s) Oral every 12 hours  clobetasol 0.05% Cream 1 Application(s) Topical two times a day  clonazePAM  Tablet 0.5 milliGRAM(s) Oral two times a day  levETIRAcetam 500 milliGRAM(s) Oral daily  levETIRAcetam 250 milliGRAM(s) Oral <User Schedule>  levothyroxine 100 MICROGram(s) Oral daily  metoprolol succinate ER 50 milliGRAM(s) Oral daily  mupirocin 2% Ointment 1 Application(s) Topical three times a day  Nephro-mirna 1 Tablet(s) Oral daily  polyethylene glycol 3350 17 Gram(s) Oral daily  senna 2 Tablet(s) Oral at bedtime  sevelamer carbonate 1600 milliGRAM(s) Oral three times a day  tamsulosin 0.4 milliGRAM(s) Oral at bedtime    MEDICATIONS  (PRN):  acetaminophen     Tablet .. 650 milliGRAM(s) Oral every 6 hours PRN Temp greater or equal to 38C (100.4F), Mild Pain (1 - 3)  bisacodyl Suppository 10 milliGRAM(s) Rectal daily PRN Constipation  HYDROmorphone  Injectable 0.5 milliGRAM(s) IV Push every 6 hours PRN Severe Pain (7 - 10)  melatonin 3 milliGRAM(s) Oral at bedtime PRN Insomnia  ondansetron Injectable 4 milliGRAM(s) IV Push every 8 hours PRN Nausea and/or Vomiting      I&O's Summary      PHYSICAL EXAM:  Vital Signs Last 24 Hrs  T(C): 36.4 (29 Mar 2023 14:25), Max: 36.8 (29 Mar 2023 01:11)  T(F): 97.5 (29 Mar 2023 14:25), Max: 98.2 (29 Mar 2023 01:11)  HR: 73 (29 Mar 2023 14:25) (70 - 80)  BP: 109/72 (29 Mar 2023 14:25) (102/63 - 134/85)  BP(mean): --  RR: 18 (29 Mar 2023 14:25) (16 - 18)  SpO2: 100% (29 Mar 2023 14:25) (98% - 100%)    Parameters below as of 29 Mar 2023 14:25  Patient On (Oxygen Delivery Method): room air      GENERAL: No acute distress,  chronically ill appearing   HEAD:  Atraumatic, Normocephalic  ENT: EOMI, conjunctiva and sclera clear,  moist mucosa no pharyngeal erythema or exudates   NECK: supple , trachea midline   CHEST/LUNG: Clear to auscultation bilaterally; No wheeze, equal breath sounds bilaterally   HEART: Regular rate and rhythm; No murmurs  ABDOMEN: Soft, Nontender, Nondistended; Bowel sounds present  EXTREMITIES:  No clubbing, cyanosis, trace edema  NEUROLOGY: AAOx2 oriented to self and location, grossly non-focal, quadriplegic   SKIN:  toes w/  onychomycosis , b/l foot/ heel wounds in dressing , seborrheic dermatitis       LABS:                         6.6    7.89  )-----------( 137      ( 30 Mar 2023 00:04 )             20.9     03-30    142  |  109<H>  |  64<H>  ----------------------------<  79  5.0   |  19<L>  |  5.17<H>    Ca    8.8      30 Mar 2023 00:04    TPro  6.9  /  Alb  3.5  /  TBili  0.5  /  DBili  x   /  AST  71<H>  /  ALT  38  /  AlkPhos  424<H>  03-29    PT/INR - ( 28 Mar 2023 21:00 )   PT: 20.4 sec;   INR: 1.76 ratio         PTT - ( 28 Mar 2023 21:00 )  PTT:39.9 sec  Urinalysis Basic - ( 30 Mar 2023 00:04 )    Color: Light Yellow / Appearance: Clear / S.013 / pH: x  Gluc: x / Ketone: Negative  / Bili: Negative / Urobili: Negative   Blood: x / Protein: 100 mg/dL / Nitrite: Negative   Leuk Esterase: Large / RBC: 98 /hpf / WBC 27 /HPF   Sq Epi: x / Non Sq Epi: 0 /hpf / Bacteria: Negative

## 2023-03-30 NOTE — BH CONSULTATION LIAISON ASSESSMENT NOTE - DESCRIPTION
Patient is a former [place] [employer] currently domiciled at [facility] he is single with no children, his nephew and sister are the ones to normally help with his care but states they do not know he is in the nursing home.

## 2023-03-30 NOTE — CHART NOTE - NSCHARTNOTEFT_GEN_A_CORE
Medicine NP note    CC; Anemia.  patient with no Iv access  Notified by RN that unable to start PRBC as pt is difficult venous access  Hb 7.2-> 6.6  1 unit PRBc active to transfuse  Pt with no occult bleeding  Vitals stable  Multiple attempts by nurses, IV nurse and author to place Peripheral iv access unsuccessful, patient verya giatted, uncoopeartive also  Patient ESRD, Plan for HD am  Transfuse PRBC during  am [facility]  Will call MICU consult/RRT if pt decompensating  Discussed with HICS. agreeable for above   signed out to covering provider    Jessica Calvillo [facility]  [id number]

## 2023-03-30 NOTE — PROGRESS NOTE ADULT - SUBJECTIVE AND OBJECTIVE BOX
[facility] DIVISION OF KIDNEY DISEASES AND HYPERTENSION -- FOLLOW UP NOTE  --------------------------------------------------------------------------------  Chief Complaint: ESRD/ HD management    24 hour events/subjective:  Pt. seen and examined at bedside. Pt. reports feeling depressed, wishes to go home. Refuses hemodialysis. Denies any SOB, CP, HA, N/V, abdominal pain , urinary symptoms or dizziness.     PAST HISTORY  --------------------------------------------------------------------------------  No significant changes to PMH, PSH, FHx, SHx, unless otherwise noted    ALLERGIES & MEDICATIONS  --------------------------------------------------------------------------------  Allergies    acetaminophen (Rash)  aspirin (Rash)  bioflavonoids (Rash)  cefaclor (Rash)  ceftriaxone (Rash)  clindamycin (Rash)  Codeine Sulfate (Rash)  haloperidol (Rash)  ibuprofen (Rash)  iopamidol (Rash)  Onions (Rash)  penicillins (Rash)  tromethamine (Rash)    Intolerances    Standing Inpatient Medications  artificial  tears Solution 1 Drop(s) Both EYES four times a day  atorvastatin 40 milliGRAM(s) Oral at bedtime  baclofen 10 milliGRAM(s) Oral every 12 hours  chlorhexidine 2% Cloths 1 Application(s) Topical <User Schedule>  clobetasol 0.05% Cream 1 Application(s) Topical two times a day  clonazePAM  Tablet 0.5 milliGRAM(s) Oral two times a day  furosemide   Injectable 80 milliGRAM(s) IV Push once  levETIRAcetam 500 milliGRAM(s) Oral daily  levETIRAcetam 250 milliGRAM(s) Oral <User Schedule>  levothyroxine 100 MICROGram(s) Oral daily  metoprolol succinate ER 50 milliGRAM(s) Oral daily  mupirocin 2% Ointment 1 Application(s) Topical three times a day  Nephro-mirna 1 Tablet(s) Oral daily  polyethylene glycol 3350 17 Gram(s) Oral daily  senna 2 Tablet(s) Oral at bedtime  sevelamer carbonate 1600 milliGRAM(s) Oral three times a day  tamsulosin 0.4 milliGRAM(s) Oral at bedtime    PRN Inpatient Medications  acetaminophen     Tablet .. 650 milliGRAM(s) Oral every 6 hours PRN  bisacodyl Suppository 10 milliGRAM(s) Rectal daily PRN  melatonin 3 milliGRAM(s) Oral at bedtime PRN  ondansetron Injectable 4 milliGRAM(s) IV Push every 8 hours PRN    REVIEW OF SYSTEMS  --------------------------------------------------------------------------------  Gen: No fevers   Respiratory: No dyspnea  CV: No chest pain  GI: No abdominal pain  : No dysuria  MSK: No  edema  Neuro: no dizziness   All other systems were reviewed and are negative, except as noted.    VITALS/PHYSICAL EXAM  --------------------------------------------------------------------------------  T(C): 36.7 (03-30-23 @ 12:32), Max: 36.7 (03-30-23 @ 12:32)  HR: 72 (03-30-23 @ 12:32) (72 - 85)  BP: 112/63 (03-30-23 @ 12:32) (109/72 - 151/89)  RR: 18 (03-30-23 @ 12:32) (18 - 19)  SpO2: 95% (03-30-23 @ 12:32) (95% - 100%)  Wt(kg): --    Weight (kg): 95.3 (03-28-23 @ 15:46)    03-29-23 @ 07:01  -  03-30-23 @ 07:00  --------------------------------------------------------  IN: 120 mL / OUT: 950 mL / NET: -830 mL    Physical Exam:  	Gen: NAD  	HEENT: MMM  	Pulm: CTA B/L  	CV: S1S2  	Abd: Soft, +BS   	Ext: No LE edema B/L,  	Neuro: Awake  	Skin: Warm and dry  	Vascular access: RIJ tunneled HD catheter +    LABS/STUDIES  --------------------------------------------------------------------------------              6.6    7.89  >-----------<  137      [03-30-23 @ 00:04]              20.9     142  |  109  |  64  ----------------------------<  79      [03-30-23 @ 00:04]  5.0   |  19  |  5.17        Ca     8.8     [03-30-23 @ 00:04]    TPro  6.9  /  Alb  3.5  /  TBili  0.5  /  DBili  x   /  AST  71  /  ALT  38  /  AlkPhos  424  [03-29-23 @ 06:52]    PT/INR: PT 20.4 , INR 1.76       [03-28-23 @ 21:00]  PTT: 39.9       [03-28-23 @ 21:00]    Creatinine Trend:  SCr 5.17 [03-30 @ 00:04]  SCr 4.93 [03-29 @ 06:52]  SCr 5.12 [03-28 @ 19:37]  SCr 4.43 [03-24 @ 06:10]  SCr 4.18 [03-23 @ 14:51]    Urinalysis - [03-30-23 @ 00:04]      Color Light Yellow / Appearance Clear / SG 1.013 / pH 7.0      Gluc 100 mg/dL / Ketone Negative  / Bili Negative / Urobili Negative       Blood Large / Protein 100 mg/dL / Leuk Est Large / Nitrite Negative      RBC 98 / WBC 27 / Hyaline 11 / Gran  / Sq Epi  / Non Sq Epi 0 / Bacteria Negative    HCV 8.61, Reactive      [03-24-23 @ 06:12]

## 2023-03-30 NOTE — CHART NOTE - NSCHARTNOTEFT_GEN_A_CORE
Chart reviewed & discussed with nephrology; ok to place bedside midline given current renal function.    Melodie Stacy NP  Available on Teams

## 2023-03-30 NOTE — BH CONSULTATION LIAISON ASSESSMENT NOTE - HPI (INCLUDE ILLNESS QUALITY, SEVERITY, DURATION, TIMING, CONTEXT, MODIFYING FACTORS, ASSOCIATED SIGNS AND SYMPTOMS)
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 As per Patient; history of PTSD (1998), prescribed unknown medication,  Denies previous psychiatric admissions, SI/HI, SA, BSSI, AVH, paranoia. As per chart; pmh Hep C, HTN, AFib s/p AICD (Medtr[employer]) on Eliquis and Plavix, b/l CVA quadriplegic s/p suprapubic cath, ESRD on HD ( T/Thu/Sat), diastolic CHF, seizure on keppra, hep C, chronic pressure ulcers, recent admission after fall in nursing home , now presenting after fall on day of admission. Psychiatry consulted for capacity to refuse dialysis and agitation.     Patient laying in bed with 4-point restraints, patient is irritable and cooperative. Patient states that he is being treated poorly, not being given his pain medication in a timely manner and that the doctor "is taking his sweet time". States that it illegal for him to be in restraints and that [facility], states he is receiving the wrong food and that its being restricted from him. Admits to taking out his IV, stating "they put it in against my will and I didn't want it", states that he would attempt to pull out the midline if he was able to. He notes that he does not want dialysis at this hospital because of the care being given, he would prefer to have it done at the nursing home. Notes that he was able to go 9 weeks without dialysis 10 weeks ago at OSH for fall. Patient denies SI/HI, SA, AVH, anhedonia, paranoia, depression, and anxiety. Staff at bedside state that he has been verbally abusive and is uncooperative.  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 As per Patient; history of PTSD (1998), prescribed unknown medication,  Denies previous psychiatric admissions, SI/HI, SA, BSSI, AVH, paranoia. As per chart; pmh Hep C, HTN, AFib s/p AICD ([employer]tr[employer]) on Eliquis and Plavix, b/l CVA quadriplegic s/p suprapubic cath, ESRD on HD ( T/Thu/Sat), diastolic CHF, seizure on keppra, hep C, chronic pressure ulcers, recent admission after fall in nursing home , now presenting after fall on day of admission. Psychiatry consulted for capacity to refuse dialysis and agitation.     Patient laying in bed with 4-point restraints, patient is irritable and cooperative. Patient states that he is being treated poorly, not being given his pain medication in a timely manner and that the doctor "is taking his sweet time". States that it illegal for him to be in restraints and that [facility], states he is receiving the wrong food and that its being restricted from him. Admits to taking out his IV, stating "they put it in against my will and I didn't want it", states that he would attempt to pull out the midline if he was able to. He notes that he does not want dialysis at this hospital because of the care being given, he would prefer to have it done at the nursing home. Notes that he was able to go 9 weeks without dialysis 10 weeks ago at OSH for fall. Patient denies SI/HI, SA, AVH, anhedonia, paranoia, depression, and anxiety. Staff at bedside state that he has been verbally abusive and is uncooperative.

## 2023-03-31 NOTE — PROGRESS NOTE ADULT - SUBJECTIVE AND OBJECTIVE BOX
SUBJECTIVE / OVERNIGHT EVENTS:  Patient seen and examined at bedside this morning. Oriented to self and location. Agitated. Kept stating that he was in pain. Unable to accurately assess ROS.     MEDICATIONS  (STANDING):  artificial  tears Solution 1 Drop(s) Both EYES four times a day  atorvastatin 40 milliGRAM(s) Oral at bedtime  baclofen 10 milliGRAM(s) Oral every 12 hours  clobetasol 0.05% Cream 1 Application(s) Topical two times a day  clonazePAM  Tablet 0.5 milliGRAM(s) Oral two times a day  levETIRAcetam 500 milliGRAM(s) Oral daily  levETIRAcetam 250 milliGRAM(s) Oral <User Schedule>  levothyroxine 100 MICROGram(s) Oral daily  metoprolol succinate ER 50 milliGRAM(s) Oral daily  mupirocin 2% Ointment 1 Application(s) Topical three times a day  Nephro-mirna 1 Tablet(s) Oral daily  polyethylene glycol 3350 17 Gram(s) Oral daily  senna 2 Tablet(s) Oral at bedtime  sevelamer carbonate 1600 milliGRAM(s) Oral three times a day  tamsulosin 0.4 milliGRAM(s) Oral at bedtime    MEDICATIONS  (PRN):  acetaminophen     Tablet .. 650 milliGRAM(s) Oral every 6 hours PRN Temp greater or equal to 38C (100.4F), Mild Pain (1 - 3)  bisacodyl Suppository 10 milliGRAM(s) Rectal daily PRN Constipation  HYDROmorphone  Injectable 0.5 milliGRAM(s) IV Push every 6 hours PRN Severe Pain (7 - 10)  melatonin 3 milliGRAM(s) Oral at bedtime PRN Insomnia  ondansetron Injectable 4 milliGRAM(s) IV Push every 8 hours PRN Nausea and/or Vomiting      I&O's Summary      PHYSICAL EXAM:  Vital Signs Last 24 Hrs  T(C): 36.4 (29 Mar 2023 14:25), Max: 36.8 (29 Mar 2023 01:11)  T(F): 97.5 (29 Mar 2023 14:25), Max: 98.2 (29 Mar 2023 01:11)  HR: 73 (29 Mar 2023 14:25) (70 - 80)  BP: 109/72 (29 Mar 2023 14:25) (102/63 - 134/85)  BP(mean): --  RR: 18 (29 Mar 2023 14:25) (16 - 18)  SpO2: 100% (29 Mar 2023 14:25) (98% - 100%)    Parameters below as of 29 Mar 2023 14:25  Patient On (Oxygen Delivery Method): room air      GENERAL: No acute distress,  chronically ill appearing   HEAD:  Atraumatic, Normocephalic  ENT: EOMI, conjunctiva and sclera clear,  moist mucosa no pharyngeal erythema or exudates   NECK: supple , trachea midline   CHEST/LUNG: Clear to auscultation bilaterally; No wheeze, equal breath sounds bilaterally   HEART: Regular rate and rhythm; No murmurs  ABDOMEN: Soft, Nontender, Nondistended; Bowel sounds present  EXTREMITIES:  No clubbing, cyanosis, trace edema  NEUROLOGY: AAOx2 oriented to self and location, grossly non-focal  SKIN:  toes w/  onychomycosis , b/l foot/ heel wounds in dressing , seborrheic dermatitis     LABS:                         8.8    11.31 )-----------( 159      ( 31 Mar 2023 09:50 )             27.0     03-    141  |  107  |  64<H>  ----------------------------<  98  4.8   |  20<L>  |  5.40<H>    Ca    9.1      31 Mar 2023 09:50    TPro  7.1  /  Alb  3.6  /  TBili  0.9  /  DBili  x   /  AST  33  /  ALT  30  /  AlkPhos  410<H>  03-31      Urinalysis Basic - ( 30 Mar 2023 00:04 )    Color: Light Yellow / Appearance: Clear / S.013 / pH: x  Gluc: x / Ketone: Negative  / Bili: Negative / Urobili: Negative   Blood: x / Protein: 100 mg/dL / Nitrite: Negative   Leuk Esterase: Large / RBC: 98 /hpf / WBC 27 /HPF   Sq Epi: x / Non Sq Epi: 0 /hpf / Bacteria: Negative

## 2023-03-31 NOTE — DIETITIAN INITIAL EVALUATION ADULT - PERTINENT MEDS FT
MEDICATIONS  (STANDING):  artificial  tears Solution 1 Drop(s) Both EYES four times a day  atorvastatin 40 milliGRAM(s) Oral at bedtime  baclofen 10 milliGRAM(s) Oral every 12 hours  chlorhexidine 2% Cloths 1 Application(s) Topical <User Schedule>  clobetasol 0.05% Cream 1 Application(s) Topical two times a day  clonazePAM  Tablet 0.5 milliGRAM(s) Oral two times a day  levETIRAcetam 500 milliGRAM(s) Oral daily  levETIRAcetam 250 milliGRAM(s) Oral <User Schedule>  levothyroxine 100 MICROGram(s) Oral daily  metoprolol succinate ER 50 milliGRAM(s) Oral daily  mupirocin 2% Ointment 1 Application(s) Topical three times a day  Nephro-mirna 1 Tablet(s) Oral daily  OLANZapine 2.5 milliGRAM(s) Oral at bedtime  polyethylene glycol 3350 17 Gram(s) Oral daily  senna 2 Tablet(s) Oral at bedtime  sevelamer carbonate 1600 milliGRAM(s) Oral three times a day  tamsulosin 0.4 milliGRAM(s) Oral at bedtime    MEDICATIONS  (PRN):  acetaminophen     Tablet .. 650 milliGRAM(s) Oral every 6 hours PRN Temp greater or equal to 38C (100.4F), Mild Pain (1 - 3)  bisacodyl Suppository 10 milliGRAM(s) Rectal daily PRN Constipation  LORazepam     Tablet 1 milliGRAM(s) Oral every 6 hours PRN Anxiety(SEVERE)  melatonin 3 milliGRAM(s) Oral at bedtime PRN Insomnia  OLANZapine 2.5 milliGRAM(s) Oral every 6 hours PRN agitation/paranoid  ondansetron Injectable 4 milliGRAM(s) IV Push every 8 hours PRN Nausea and/or Vomiting

## 2023-03-31 NOTE — PROGRESS NOTE ADULT - PROBLEM SELECTOR PLAN 1
hgb decreased , suspect anemia 2/2 renal disease  may also have component of blood loss from open wounds on feet , no reported GI bleeding. currently HD stable , will hold anticoagulation until assessed to be stable   - hold Eliquis, resume pending CBC trend   - Given difficult access; place midline 3/30, 1u pRBC 3/30.   -can give lasix if gets fluid overloaded with pRBCs  -F/u iron studies

## 2023-03-31 NOTE — DIETITIAN INITIAL EVALUATION ADULT - SIGNS/SYMPTOMS
pressure ulcers , R heel st II, sacrum unstageable
Deterioration of Condition En Route/Death or Disability/Increased Pain/Transportation Risk (There is always a risk of traffic delays resulting in deterioration of condition.)

## 2023-03-31 NOTE — PROGRESS NOTE ADULT - PROBLEM SELECTOR PLAN 2
Initial labs in ER concerning for serum potassium elevated at 6.3 (moderately hemolyzed). received medical management- insulin, D50 with lokelma. Serum potassium is WNL at 4.8 today. Low potassium diet advised. Monitor serum potassium.

## 2023-03-31 NOTE — DIETITIAN INITIAL EVALUATION ADULT - ORAL INTAKE PTA/DIET HISTORY
Patient c/o diet, refusing Soft Bite sized, however per RN no diet upgrade; pt primarily lies on his back requiring safe swallow with Dysphagia diet, Patient demanding Cheese sandwiches and becomes and gry and ordering staff from his room with foul language. Pt offered Nepro supplement ., he refused demanding ensure, however Ensur is contraindicated with ESRD on HD. Patient also refused Jhonny protein supplement for wound healing, he then agreed to Ensure clears x2 daily.discussed with team, upgrade tp

## 2023-03-31 NOTE — DIETITIAN INITIAL EVALUATION ADULT - PROBLEM SELECTOR PLAN 8
-levothyroxine        Isolation precautions: patient has Isolation precautions  on chart  from 3/23 for candida species, unclear indication , will need to be clarified with infection control in am

## 2023-03-31 NOTE — PROGRESS NOTE ADULT - PROBLEM SELECTOR PLAN 7
#HyperK  -Renal consulted; HD per renal; patient refused HD today. Monitor electrolytes. F/u repeat BMP for K  - Sevelamer  - Monitor BMP   - HD per renal; patient refused HD 3/30. Agreeable to it today.

## 2023-03-31 NOTE — PROGRESS NOTE ADULT - PROBLEM SELECTOR PLAN 4
Pt with hyperphosphatemia in the setting of ESRD. Pt. on phosphate binders with meals. Recommend checking serum phosphorus level. Low phosphorus diet. Monitor serum phosphorus .    If you have any questions, please feel free to contact me  Fawad Sargent  Nephrology Fellow  [phone]/ [employer] Teams(Preferred)  (After 5pm or on weekends please page the on-call fellow).

## 2023-03-31 NOTE — DIETITIAN INITIAL EVALUATION ADULT - REASON INDICATOR FOR ASSESSMENT
Pressure Ulcer > st II, 61 yo M pmh Hep C, HTN, AFib s/p AICD ([employer]) on Eliquis and Plavix, CVA, CKD stage 5 (HD T/Thu/Sat) last HD Tues (2 days ago), diastolic CHF, seizure on keppra, hep C, chronic pressure ulcers, recent admission after fall in nursing home , now presenting for  now presenting for fall on day of admission.

## 2023-03-31 NOTE — PROGRESS NOTE ADULT - PROBLEM SELECTOR PLAN 6
-  normal saline cleanse Medihoney and DSD to bilateral foot ulcerations daily.  - Podiatry consulted:  - 3/24 R ankle XR: negative for OM or soft tissue tracking air  - Recommend decubitus precautions and use of Z flow boot  - Recommend normal saline cleanse prior to dressing changes daily

## 2023-03-31 NOTE — CHART NOTE - NSCHARTNOTEFT_GEN_A_CORE
Pt seen and evaluated again. Discussed with pt that he will need maintenance  HD today. After lengthy discussion pt agreed. Will arrange for HD today. Plan discussed with primary team. Dose meds as per HD.     If you have any questions, please feel free to contact me  Fawad Sargent  Nephrology Fellow  [phone]/ [employer] Teams(Preferred)  (After 5pm or on weekends please page the on-call fellow). Pt seen and evaluated again. Discussed with pt that he will need maintenance  HD today. After lengthy discussion pt agreed. Will arrange for HD today. Plan discussed with primary team. Dose meds as per HD.     If you have any questions, please feel free to contact me  Fawad Sargent  Nephrology Fellow  [phone]/ [employer] Teams(Preferred)  (After 5pm or on weekends please page the on-call fellow).    Myra Mercado MD  Pager   Office     Contact me directly via [employer] Teams     (After 5 pm or on weekends please page the on-call fellow/attending, can check [email or web address] for schedule. Login is musa garcia, schedule under [facility] medicine, psych, derm)

## 2023-03-31 NOTE — PROGRESS NOTE ADULT - PROBLEM SELECTOR PLAN 9
Alert and oriented to self only on encounter. Will confirm with family BS mental status.   -Send infectious w/u. Will hold abx as he is w/o leukocytosis, fevers   -F/u CTH - unremarkable   -Psych eval given agitation, paranoia, and evaluation for capacity - appreciate recs. Does not have capacity at this time. Trial zyprexa prn

## 2023-03-31 NOTE — PROGRESS NOTE ADULT - SUBJECTIVE AND OBJECTIVE BOX
[facility] DIVISION OF KIDNEY DISEASES AND HYPERTENSION -- FOLLOW UP NOTE  --------------------------------------------------------------------------------  Chief Complaint: ESRD/ HD management    24 hour events/subjective:  Pt. seen and examined at bedside. Pt. reports feeling depressed, wishes to go home. Refuses hemodialysis. Denies any SOB, CP, HA, N/V, abdominal pain , urinary symptoms or dizziness.     PAST HISTORY  --------------------------------------------------------------------------------  No significant changes to PMH, PSH, FHx, SHx, unless otherwise noted    ALLERGIES & MEDICATIONS  --------------------------------------------------------------------------------  Allergies    acetaminophen (Rash)  aspirin (Rash)  bioflavonoids (Rash)  cefaclor (Rash)  ceftriaxone (Rash)  clindamycin (Rash)  Codeine Sulfate (Rash)  haloperidol (Rash)  ibuprofen (Rash)  iopamidol (Rash)  Onions (Rash)  penicillins (Rash)  tromethamine (Rash)    Intolerances    Standing Inpatient Medications  artificial  tears Solution 1 Drop(s) Both EYES four times a day  atorvastatin 40 milliGRAM(s) Oral at bedtime  baclofen 10 milliGRAM(s) Oral every 12 hours  chlorhexidine 2% Cloths 1 Application(s) Topical <User Schedule>  clobetasol 0.05% Cream 1 Application(s) Topical two times a day  clonazePAM  Tablet 0.5 milliGRAM(s) Oral two times a day  levETIRAcetam 500 milliGRAM(s) Oral daily  levETIRAcetam 250 milliGRAM(s) Oral <User Schedule>  levothyroxine 100 MICROGram(s) Oral daily  metoprolol succinate ER 50 milliGRAM(s) Oral daily  mupirocin 2% Ointment 1 Application(s) Topical three times a day  Nephro-mirna 1 Tablet(s) Oral daily  OLANZapine 2.5 milliGRAM(s) Oral at bedtime  polyethylene glycol 3350 17 Gram(s) Oral daily  senna 2 Tablet(s) Oral at bedtime  sevelamer carbonate 1600 milliGRAM(s) Oral three times a day  tamsulosin 0.4 milliGRAM(s) Oral at bedtime    PRN Inpatient Medications  acetaminophen     Tablet .. 650 milliGRAM(s) Oral every 6 hours PRN  bisacodyl Suppository 10 milliGRAM(s) Rectal daily PRN  melatonin 3 milliGRAM(s) Oral at bedtime PRN  OLANZapine 2.5 milliGRAM(s) Oral every 6 hours PRN  ondansetron Injectable 4 milliGRAM(s) IV Push every 8 hours PRN    REVIEW OF SYSTEMS  --------------------------------------------------------------------------------  Gen: No fevers   Respiratory: No dyspnea  CV: No chest pain  GI: No abdominal pain  : No dysuria  MSK: No  edema  Neuro: no dizziness  All other systems were reviewed and are negative, except as noted.    VITALS/PHYSICAL EXAM  --------------------------------------------------------------------------------  T(C): 37.3 (03-31-23 @ 10:18), Max: 37.9 (03-30-23 @ 17:50)  HR: 85 (03-31-23 @ 10:18) (63 - 85)  BP: 140/82 (03-31-23 @ 10:18) (112/63 - 148/80)  RR: 18 (03-31-23 @ 10:18) (18 - 18)  SpO2: 98% (03-31-23 @ 10:18) (95% - 100%)  Wt(kg): --    03-30-23 @ 07:01  -  03-31-23 @ 07:00  --------------------------------------------------------  IN: 1440 mL / OUT: 1900 mL / NET: -460 mL    Physical Exam:  	Gen: NAD  	HEENT: MMM  	Pulm: CTA B/L  	CV: S1S2  	Abd: Soft, +BS   	Ext: No LE edema B/L,  	Neuro: Awake  	Skin: Warm and dry  	Vascular access: RIJ tunneled HD catheter +    LABS/STUDIES  --------------------------------------------------------------------------------              8.8    11.31 >-----------<  159      [03-31-23 @ 09:50]              27.0     141  |  107  |  64  ----------------------------<  98      [03-31-23 @ 09:50]  4.8   |  20  |  5.40        Ca     9.1     [03-31-23 @ 09:50]    TPro  7.1  /  Alb  3.6  /  TBili  0.9  /  DBili  x   /  AST  33  /  ALT  30  /  AlkPhos  410  [03-31-23 @ 09:50]    Creatinine Trend:  SCr 5.40 [03-31 @ 09:50]  SCr 5.17 [03-30 @ 00:04]  SCr 4.93 [03-29 @ 06:52]  SCr 5.12 [03-28 @ 19:37]  SCr 4.43 [03-24 @ 06:10]    Urinalysis - [03-30-23 @ 00:04]      Color Light Yellow / Appearance Clear / SG 1.013 / pH 7.0      Gluc 100 mg/dL / Ketone Negative  / Bili Negative / Urobili Negative       Blood Large / Protein 100 mg/dL / Leuk Est Large / Nitrite Negative      RBC 98 / WBC 27 / Hyaline 11 / Gran  / Sq Epi  / Non Sq Epi 0 / Bacteria Negative    HCV 8.61, Reactive      [03-24-23 @ 06:12]

## 2023-03-31 NOTE — DIETITIAN INITIAL EVALUATION ADULT - ADD RECOMMEND
Add ensure clears x2 daily for additional 16gm protein; revisit patient to reoffer protein supplement(mark, or LPS)  or Nepro when mental status improved (anger controlled) Add ensure clears x2 daily for additional 16gm protein; revisit patient to reoffer protein supplement(mark, or LPS)  or Nepro when mental status improved (anger controlled); continue Neprovite Renal vitamin daily

## 2023-03-31 NOTE — PROGRESS NOTE ADULT - PROBLEM SELECTOR PLAN 3
Patient with anemia in the setting of ESRD. Hemoglobin below target range. Received 2 units PRBC. Blood transfusion per primary team. Will give VIBHA with HD. Monitor hemoglobin.

## 2023-03-31 NOTE — PROGRESS NOTE ADULT - PROBLEM SELECTOR PLAN 1
Pt. with ESRD on HD three times a week (TTS). Pt being admitted after mechanical fall. Last HD was done on Tuesday (3/24/23) via RIJ tunneled HD catheter.     Pt goes to [facility] dialysis unit. Follows with Dr. Mercedes. Labs reviewed. Plan for maintenance HD today, although pt refused HD today again. No urgent indication for HD. Psych consult reviewed. Dose meds as per HD.

## 2023-03-31 NOTE — DIETITIAN INITIAL EVALUATION ADULT - PERTINENT LABORATORY DATA
03-31    141  |  107  |  64<H>  ----------------------------<  98  4.8   |  20<L>  |  5.40<H>    Ca    9.1      31 Mar 2023 09:50    TPro  7.1  /  Alb  3.6  /  TBili  0.9  /  DBili  x   /  AST  33  /  ALT  30  /  AlkPhos  410<H>  03-31

## 2023-04-01 NOTE — SWALLOW BEDSIDE ASSESSMENT ADULT - SWALLOW EVAL: CRITERIA FOR SKILLED INTERVENTION MET
no problems identified which require skilled intervention Also likely no change in management or patient wishes with further intervention. Please reconsult if clinically indicated./no problems identified which require skilled intervention

## 2023-04-01 NOTE — SWALLOW BEDSIDE ASSESSMENT ADULT - COMMENTS
3/29 pt fell out of bed, pt unable to state why he exited bed. CTH (-) and on constant observation. pt declining to take po meds or keep his gown on.  4/1 10/10 kidney pain, refusing care until he gets IVP dilaudid    Psychiatry consulted for capacity to refuse dialysis and agitation. Staff reporting pt verbally abusive and with poor cooperation with care. pt states belief that others are deliberately trying to harm or ignore his needs. Would prefer to have dialysis in the nursing home. "The patient does not appear to have full capacity and is verbally abusive to staff with paranoid ideation and may benefit from medication." Recommendations for Zyprexa    Dietary> Patient c/o diet, refusing Soft Bite sized, however per RN no diet upgrade; pt primarily lies on his back requiring safe swallow with Dysphagia diet, Patient demanding Cheese sandwiches and becomes and gry and ordering staff from his room with foul language. 0 = swallows foods/liquids without difficulty.     No prior SLP evaluations in [facility] or [facility] in PACS  Imaging 3/29   IMPRESSION: Brain CT: No change since 3/28/2023. Bifrontal encephalomalacia and gliosis have the appearance of old trauma. No acute hemorrhage.  Cervical spine: Mild degenerative changes. No acute fractures or dislocations.  CTchest 3/28 LUNGS AND LARGE AIRWAYS: Patent central airways. Bibasilar atelectasis. Motion artifact limits evaluation for fine pulmonary parenchymal detail.

## 2023-04-01 NOTE — PROGRESS NOTE ADULT - PROBLEM SELECTOR PLAN 7
#HyperK  -Renal consulted; HD per renal; patient agreed to HD yesterday. Tolerated it well   - Sevelamer  - Monitor BMP

## 2023-04-01 NOTE — SWALLOW BEDSIDE ASSESSMENT ADULT - H & P REVIEW
yes 62M w/ pmh Hep C, HTN, AFib s/p AICD ([employer]) on Eliquis and Plavix, b/l CVA quadriplegic s/p suprapubic cath, ESRD on HD ( T/Thu/Sat), diastolic CHF, seizure on keppra, hep C, chronic pressure ulcers, p/w fall , found to have worsening anemia. ED course: Patient had 1 episode of NBNB emesis after CT scan. Suspect anemia 2/2 rnal disease, may have component of blood loss from open wounds on feet , no reported GI bleeding. No acute injuries associated with fall. Noted with pressure ulcer of foot. Most recent HD session on 3/31./yes

## 2023-04-01 NOTE — SWALLOW BEDSIDE ASSESSMENT ADULT - SLP GENERAL OBSERVATIONS
Pt received upright at bedside, on room air, +b/l wrist restraints. Pt declined to answer orientation questions "you're already looking at the band, why do I gotta say it." Pt reports no hx of dysphagia. Able to follow all directives, verbally expressing all wants/needs. Of note, at end of exam, pt reporting 10/10 kidney pain; KARI Alicea made aware. Per KARI Alicea, pt completed 3x sandwiches with nightshift without difficulties.

## 2023-04-01 NOTE — SWALLOW BEDSIDE ASSESSMENT ADULT - ASPIRATION PRECAUTIONS
Monitor for s/s aspiration/laryngeal penetration. If noted:  D/C p.o. intake, provide non-oral nutrition/hydration/meds, and contact this service @ [phone]/yes

## 2023-04-01 NOTE — PROGRESS NOTE ADULT - PROBLEM SELECTOR PLAN 1
hgb decreased , suspect anemia 2/2 renal disease  may also have component of blood loss from open wounds on feet , no reported GI bleeding. currently HD stable.   - s/p 1u pRBC 3/30   - Monitor CBC

## 2023-04-01 NOTE — PROGRESS NOTE ADULT - SUBJECTIVE AND OBJECTIVE BOX
SUBJECTIVE / OVERNIGHT EVENTS:  Patient seen and examined at bedside this morning. Complaining of not receiving his breakfast yet. No other acute complaints.     MEDICATIONS  (STANDING):  artificial  tears Solution 1 Drop(s) Both EYES four times a day  atorvastatin 40 milliGRAM(s) Oral at bedtime  baclofen 10 milliGRAM(s) Oral every 12 hours  clobetasol 0.05% Cream 1 Application(s) Topical two times a day  clonazePAM  Tablet 0.5 milliGRAM(s) Oral two times a day  levETIRAcetam 500 milliGRAM(s) Oral daily  levETIRAcetam 250 milliGRAM(s) Oral <User Schedule>  levothyroxine 100 MICROGram(s) Oral daily  metoprolol succinate ER 50 milliGRAM(s) Oral daily  mupirocin 2% Ointment 1 Application(s) Topical three times a day  Nephro-mirna 1 Tablet(s) Oral daily  polyethylene glycol 3350 17 Gram(s) Oral daily  senna 2 Tablet(s) Oral at bedtime  sevelamer carbonate 1600 milliGRAM(s) Oral three times a day  tamsulosin 0.4 milliGRAM(s) Oral at bedtime    MEDICATIONS  (PRN):  acetaminophen     Tablet .. 650 milliGRAM(s) Oral every 6 hours PRN Temp greater or equal to 38C (100.4F), Mild Pain (1 - 3)  bisacodyl Suppository 10 milliGRAM(s) Rectal daily PRN Constipation  HYDROmorphone  Injectable 0.5 milliGRAM(s) IV Push every 6 hours PRN Severe Pain (7 - 10)  melatonin 3 milliGRAM(s) Oral at bedtime PRN Insomnia  ondansetron Injectable 4 milliGRAM(s) IV Push every 8 hours PRN Nausea and/or Vomiting      I&O's Summary      PHYSICAL EXAM:  Vital Signs Last 24 Hrs  T(C): 36.4 (29 Mar 2023 14:25), Max: 36.8 (29 Mar 2023 01:11)  T(F): 97.5 (29 Mar 2023 14:25), Max: 98.2 (29 Mar 2023 01:11)  HR: 73 (29 Mar 2023 14:25) (70 - 80)  BP: 109/72 (29 Mar 2023 14:25) (102/63 - 134/85)  BP(mean): --  RR: 18 (29 Mar 2023 14:25) (16 - 18)  SpO2: 100% (29 Mar 2023 14:25) (98% - 100%)    Parameters below as of 29 Mar 2023 14:25  Patient On (Oxygen Delivery Method): room air      GENERAL: No acute distress,  chronically ill appearing   HEAD:  Atraumatic, Normocephalic  ENT: EOMI, conjunctiva and sclera clear,  moist mucosa no pharyngeal erythema or exudates   NECK: supple , trachea midline   CHEST/LUNG: Clear to auscultation bilaterally; No wheeze, equal breath sounds bilaterally   HEART: Regular rate and rhythm; No murmurs  ABDOMEN: Soft, Nontender, Nondistended; Bowel sounds present  EXTREMITIES:  No clubbing, cyanosis, trace edema  NEUROLOGY: AAOx2 oriented to self and location, grossly non-focal  SKIN:  toes w/  onychomycosis , b/l foot/ heel wounds in dressing , seborrheic dermatitis     LABS:                         9.0    12.34 )-----------( 152      ( 01 Apr 2023 09:59 )             27.6     04-01    137  |  100  |  52<H>  ----------------------------<  126<H>  4.6   |  22  |  4.62<H>    Ca    8.9      01 Apr 2023 09:59    TPro  7.1  /  Alb  3.6  /  TBili  0.9  /  DBili  x   /  AST  33  /  ALT  30  /  AlkPhos  410<H>  03-31

## 2023-04-01 NOTE — SWALLOW BEDSIDE ASSESSMENT ADULT - SWALLOW EVAL: DIAGNOSIS
62M w/ pmhx Hep C, HTN, AFib s/p AICD, b/l CVA quadriplegic, ESRD on HD, diastolic CHF, seizure, hep C, chronic pressure ulcers, p/w fall, found to have worsening anemia. Pt p/w overtly functional swallow profile. Pt with functional mastication, timely pharyngeal swallow trigger, with no overt s/sx of aspiration/penetration with regular solids/thin liquids. Diet upgrade is recommended.

## 2023-04-01 NOTE — SWALLOW BEDSIDE ASSESSMENT ADULT - SLP PATIENT PROFILE REVIEW
62M w/ pmh Hep C, HTN, AFib s/p AICD ([employer]) on Eliquis and Plavix, b/l CVA quadriplegic s/p suprapubic cath, ESRD on HD ( T/Thu/Sat), diastolic CHF, seizure on keppra, hep C, chronic pressure ulcers, p/w fall , found to have worsening anemia. ED course: Patient had 1 episode of NBNB emesis after CT scan. Suspect anemia 2/2 rnal disease, may have component of blood loss from open wounds on feet , no reported GI bleeding. No acute injuries associated with fall. Noted with pressure ulcer of foot. Most recent HD session on 3/31. food allergies; onion/yes

## 2023-04-02 NOTE — PROGRESS NOTE ADULT - SUBJECTIVE AND OBJECTIVE BOX
SUBJECTIVE / OVERNIGHT EVENTS:  Patient seen and examined at bedside this morning. No other acute complaints. Denies CP, SOB.     MEDICATIONS  (STANDING):  artificial  tears Solution 1 Drop(s) Both EYES four times a day  atorvastatin 40 milliGRAM(s) Oral at bedtime  baclofen 10 milliGRAM(s) Oral every 12 hours  clobetasol 0.05% Cream 1 Application(s) Topical two times a day  clonazePAM  Tablet 0.5 milliGRAM(s) Oral two times a day  levETIRAcetam 500 milliGRAM(s) Oral daily  levETIRAcetam 250 milliGRAM(s) Oral <User Schedule>  levothyroxine 100 MICROGram(s) Oral daily  metoprolol succinate ER 50 milliGRAM(s) Oral daily  mupirocin 2% Ointment 1 Application(s) Topical three times a day  Nephro-mirna 1 Tablet(s) Oral daily  polyethylene glycol 3350 17 Gram(s) Oral daily  senna 2 Tablet(s) Oral at bedtime  sevelamer carbonate 1600 milliGRAM(s) Oral three times a day  tamsulosin 0.4 milliGRAM(s) Oral at bedtime    MEDICATIONS  (PRN):  acetaminophen     Tablet .. 650 milliGRAM(s) Oral every 6 hours PRN Temp greater or equal to 38C (100.4F), Mild Pain (1 - 3)  bisacodyl Suppository 10 milliGRAM(s) Rectal daily PRN Constipation  HYDROmorphone  Injectable 0.5 milliGRAM(s) IV Push every 6 hours PRN Severe Pain (7 - 10)  melatonin 3 milliGRAM(s) Oral at bedtime PRN Insomnia  ondansetron Injectable 4 milliGRAM(s) IV Push every 8 hours PRN Nausea and/or Vomiting      I&O's Summary      PHYSICAL EXAM:  Vital Signs Last 24 Hrs  T(C): 36.4 (29 Mar 2023 14:25), Max: 36.8 (29 Mar 2023 01:11)  T(F): 97.5 (29 Mar 2023 14:25), Max: 98.2 (29 Mar 2023 01:11)  HR: 73 (29 Mar 2023 14:25) (70 - 80)  BP: 109/72 (29 Mar 2023 14:25) (102/63 - 134/85)  BP(mean): --  RR: 18 (29 Mar 2023 14:25) (16 - 18)  SpO2: 100% (29 Mar 2023 14:25) (98% - 100%)    Parameters below as of 29 Mar 2023 14:25  Patient On (Oxygen Delivery Method): room air      GENERAL: No acute distress,  chronically ill appearing   HEAD:  Atraumatic, Normocephalic  ENT: EOMI, conjunctiva and sclera clear,  moist mucosa no pharyngeal erythema or exudates   NECK: supple , trachea midline   CHEST/LUNG: Clear to auscultation bilaterally; No wheeze, equal breath sounds bilaterally   HEART: Regular rate and rhythm; No murmurs  ABDOMEN: Soft, Nontender, Nondistended; Bowel sounds present  EXTREMITIES:  No clubbing, cyanosis, trace edema  NEUROLOGY: AAOx2 oriented to self and location, grossly non-focal  SKIN:  toes w/  onychomycosis , b/l foot/ heel wounds in dressing , seborrheic dermatitis     LABS:                         9.0    12.34 )-----------( 152      ( 01 Apr 2023 09:59 )             27.6     04-01    137  |  100  |  52<H>  ----------------------------<  126<H>  4.6   |  22  |  4.62<H>    Ca    8.9      01 Apr 2023 09:59    TPro  7.1  /  Alb  3.6  /  TBili  0.9  /  DBili  x   /  AST  33  /  ALT  30  /  AlkPhos  410<H>  03-31

## 2023-04-02 NOTE — PROGRESS NOTE ADULT - PROBLEM SELECTOR PLAN 9
Improved mental status since admission. A&Ox3 currently. CTH unremarkable.   -Psych eval given agitation, paranoia, and evaluation for capacity - appreciate recs. Does not have capacity at this time. Trial zyprexa prn Improved mental status since admission. A&Ox3 currently. CTH unremarkable.   -Psych eval given agitation, paranoia; appreciate recs. Does not have capacity at this time. Trial zypmaty harrisn

## 2023-04-02 NOTE — PROGRESS NOTE ADULT - PROBLEM SELECTOR PLAN 10
Mild leukocytosis. Possibly reactive. F/u infectious w/u. Currently afebrile. Hold abx for now. Monitor WBC, fever curve

## 2023-04-02 NOTE — PROGRESS NOTE ADULT - PROBLEM SELECTOR PLAN 7
#HyperK  -Renal consulted; HD per renal; patient had been refusing HD. Agreed to it on 3/31. Plan for HD 4/3   - Sevelamer  - Monitor BMP

## 2023-04-02 NOTE — PROGRESS NOTE ADULT - PROBLEM SELECTOR PLAN 1
Suspect anemia 2/2 renal disease  may also have component of blood loss from open wounds on feet , no reported GI bleeding. currently HD stable.   - s/p 1u pRBC 3/30   - Monitor CBC

## 2023-04-03 NOTE — PROGRESS NOTE ADULT - SUBJECTIVE AND OBJECTIVE BOX
[facility] DIVISION OF KIDNEY DISEASES AND HYPERTENSION   --------------------------------------------------------------------------------  Chief Complaint: ESRD/Ongoing hemodialysis requirement    24 hour events/subjective:    sleeping although earlier this am as per nurse more combative    PAST HISTORY  --------------------------------------------------------------------------------  No significant changes to PMH, PSH, FHx, SHx, unless otherwise noted    ALLERGIES & MEDICATIONS  --------------------------------------------------------------------------------  Allergies    acetaminophen (Rash)  aspirin (Rash)  bioflavonoids (Rash)  cefaclor (Rash)  ceftriaxone (Rash)  clindamycin (Rash)  Codeine Sulfate (Rash)  haloperidol (Rash)  ibuprofen (Rash)  iopamidol (Rash)  Onions (Rash)  penicillins (Rash)  tromethamine (Rash)    Intolerances      Standing Inpatient Medications  apixaban 5 milliGRAM(s) Oral two times a day  artificial  tears Solution 1 Drop(s) Both EYES four times a day  atorvastatin 40 milliGRAM(s) Oral at bedtime  baclofen 10 milliGRAM(s) Oral every 12 hours  chlorhexidine 2% Cloths 1 Application(s) Topical <User Schedule>  clobetasol 0.05% Cream 1 Application(s) Topical two times a day  clonazePAM  Tablet 0.5 milliGRAM(s) Oral two times a day  levETIRAcetam 500 milliGRAM(s) Oral daily  levETIRAcetam 250 milliGRAM(s) Oral <User Schedule>  levothyroxine 100 MICROGram(s) Oral daily  lidocaine   4% Patch 1 Patch Transdermal daily  metoprolol succinate ER 50 milliGRAM(s) Oral daily  mupirocin 2% Ointment 1 Application(s) Topical three times a day  Nephro-mirna 1 Tablet(s) Oral daily  nystatin Powder 1 Application(s) Topical three times a day  OLANZapine 2.5 milliGRAM(s) Oral at bedtime  polyethylene glycol 3350 17 Gram(s) Oral daily  senna 2 Tablet(s) Oral at bedtime  sevelamer carbonate 1600 milliGRAM(s) Oral three times a day  tamsulosin 0.4 milliGRAM(s) Oral at bedtime    PRN Inpatient Medications  acetaminophen     Tablet .. 650 milliGRAM(s) Oral every 6 hours PRN  bisacodyl Suppository 10 milliGRAM(s) Rectal daily PRN  LORazepam     Tablet 1 milliGRAM(s) Oral every 6 hours PRN  melatonin 3 milliGRAM(s) Oral at bedtime PRN  OLANZapine 2.5 milliGRAM(s) Oral every 6 hours PRN  ondansetron Injectable 4 milliGRAM(s) IV Push every 8 hours PRN      REVIEW OF SYSTEMS  --------------------------------------------------------------------------------    All other systems were reviewed and are negative, except as noted.    VITALS/PHYSICAL EXAM  --------------------------------------------------------------------------------  T(C): 36.8 (04-03-23 @ 09:14), Max: 37.1 (04-02-23 @ 21:26)  HR: 93 (04-03-23 @ 09:14) (81 - 93)  BP: 117/70 (04-03-23 @ 09:14) (116/68 - 130/79)  RR: 18 (04-03-23 @ 09:14) (18 - 18)  SpO2: 97% (04-03-23 @ 09:14) (96% - 97%)  Wt(kg): --        04-02-23 @ 07:01  -  04-03-23 @ 07:00  --------------------------------------------------------  IN: 2400 mL / OUT: 1675 mL / NET: 725 mL      Physical Exam:  	Gen: NAD,  	Pulm: CTA B/L  	CV: RRR, S1S2  	Abd: +BS, soft,   	LE: Warm, FROM,   	Skin: Warm, lower ext wound dressing  	Vascular access: cath    LABS/STUDIES  --------------------------------------------------------------------------------              7.7    18.50 >-----------<  166      [04-03-23 @ 07:03]              24.5     137  |  101  |  98  ----------------------------<  103      [04-03-23 @ 06:57]  4.8   |  18  |  6.71        Ca     8.9     [04-03-23 @ 06:57]              HCV 8.61, Reactive      [03-24-23 @ 06:12]

## 2023-04-03 NOTE — PROGRESS NOTE ADULT - PROBLEM SELECTOR PLAN 2
history c/w mechanical, no acute injuries noted on imaging. CTH, CT c-spine, XRay pelvis unremarkable for any acute pathology. - Uptrending leukocytosis   - + C aureus   - Check blood cx, ua, urine cx   - Check CT IV A/P and CT L spine   - ID eval - Uptrending leukocytosis   -Possible sources, suprapubic catheter and HD catheter   - + C aureus   - f/u blood cx, ua, urine cx   - f/u CT IV A/P and CT L spine   - ID eval - Uptrending leukocytosis   -Possible sources, suprapubic catheter and HD catheter. Also pt reported to be belligerent and agitated at times. High risk for aspiration. CT scan shows possible impaction and possible stercoral proctitis   - + C aureus   - f/u blood cx, ua, urine cx   - f/u CT IV A/P and CT L spine   - ID eval - Uptrending leukocytosis   - + C aureus   - f/u blood cx, ua, urine cx   - f/u CT IV A/P and CT L spine   - ID eval - Uptrending leukocytosis. Pt has suprapubic catheter, right sided tunneled HD catheter and CT images abdomen and pelvis showing possible impaction and possible stercoral colitis. Pending full work up with CT imaging with IV contrast.   - + C aureus   - f/u blood cx, ua, urine cx   - f/u CT IV A/P and CT L spine   - ID eval - Uptrending leukocytosis. Pt has suprapubic catheter, right sided tunneled HD catheter and CT images abdomen and pelvis showing possible impaction and possible stercoral colitis. Pending full work up with CT imaging with IV contrast.   - + C aureus   - f/u blood cx, ua, urine cx   - f/u CT IV A/P and CT L spine   - ID eval  -currently hemodynamically stable  will hold off on antibiotics pending work up  -recommend broad spectrum (zosyn and ceftaroline) coverage. Linezolid is another G+ coverage option but in the setting of multiple antipsychotics there is a risk for interactions.

## 2023-04-03 NOTE — PROGRESS NOTE ADULT - PROBLEM SELECTOR PLAN 4
Pt with hyperphosphatemia in the setting of ESRD. Pt. on phosphate binders with meals. Recommend checking serum phosphorus level. Low phosphorus diet. Monitor serum phosphorus .

## 2023-04-03 NOTE — PROGRESS NOTE ADULT - PROBLEM SELECTOR PLAN 7
#HyperK  -Renal consulted; HD per renal; patient had been refusing HD. Agreed to it on 3/31. Plan for HD 4/3   - Sevelamer  - Monitor BMP - continue keppra

## 2023-04-03 NOTE — PROGRESS NOTE ADULT - PROBLEM SELECTOR PLAN 6
-  normal saline cleanse Medihoney and DSD to bilateral foot ulcerations daily.  - Podiatry consulted:  - 3/24 R ankle XR: negative for OM or soft tissue tracking air  - Recommend decubitus precautions and use of Z flow boot  - Recommend normal saline cleanse prior to dressing changes daily - continue baclofen   - clonazepam

## 2023-04-03 NOTE — CONSULT NOTE ADULT - ASSESSMENT
Pt with ESRD/HD
62M with of ESRD on HD ( T/Thu/Sat), diastolic CHF, HTN, AFib s/p AICD ([employer]) on Eliquis and Plavix, b/l CVA quadriplegic s/p suprapubic cath,  seizure on keppra, , chronic pressure ulcers,  history of Hep C (3/24/23: Hep C RNA - UNDETECTABLE)  admitted 3/28/23 after fall.  Recently hospitalized 3/23 --> 3/24/32 after fall with AICD discharging, but on interrogation, no events/discharges noted.  Given Ceftriaxone/Clinda x 1. Cardiac evaluation non revealing.    He has had agitation  Colonized with Candida auris  no evidence of invasive candidal disease at present  Progressive Leukocytosis  history of Cephalosporin, PCN, Clinda allergy  - details unclear      3/23 X2 Blood cultures  NEG; 3/23 Urine Cx: Neg    Potential contributors to leukocytosis included possible stercoral colitis, tissue ischemia, foot wounds, soft tissue injury.    Suggest  Repeat Blood cultures x2  Ertapenem 500 mg IVSS daily  promote evacuation and decompression of rectum    discussed with PA  I will be out until 4/6  ID service to cover  
61 y/o M w/ bilateral foot wounds  - Pt seen and evaluated  - Afebrile, no leukocytosis  - 3/24 R ankle XR: negative for OM or soft tissue tracking air  - Recommend decubitus precautions and use of Z flow boot  - Recommend normal saline cleanse prior to dressing changes daily  - Please reconsult as needed  - F/u w/ Dr. Olu Gong at [address] [facility] wound care center [phone] for appointment  - Discussed with attending

## 2023-04-03 NOTE — PROGRESS NOTE ADULT - PROBLEM SELECTOR PLAN 1
Suspect anemia 2/2 renal disease  may also have component of blood loss from open wounds on feet , no reported GI bleeding. currently HD stable.   - s/p 1u pRBC 3/30   - Monitor CBC - Suspect anemia 2/2 renal disease  may also have component of blood loss from open wounds on feet , no reported GI bleeding. currently HD stable.   - s/p 1u pRBC 3/30   - started on retacrit   - Monitor CBC - Likely anemia 2/2 renal disease  may also have component of blood loss from open wounds on feet , no reported GI bleeding. currently HD stable. Most recent UA showed high count or RBCs no obvious hematuria noted   - s/p 1u pRBC 3/30   - started on retacrit   - Daily CBC - Likely anemia 2/2 renal disease may also have component of blood loss from open wounds on feet , no reported GI bleeding. currently HD stable. Most recent UA showed high count or RBCs no obvious hematuria noted   -no active blood loss reported   - s/p 1u pRBC 3/30   - started on retacrit   - Daily CBC

## 2023-04-03 NOTE — PROGRESS NOTE ADULT - PROBLEM SELECTOR PLAN 3
- Resumed on Eliquis given stable H/H over 24-48 hour period.   -continue Metoprolol - Improved mental status since admission. A&Ox3 currently. CTH unremarkable.   - Psych eval given agitation, paranoia; appreciate recs. Does not have capacity at this time. Trial zypmaty harrisn agitated this morning. CTH unremarkable.   - Psych eval given agitation, paranoia; appreciate recs. Does not have capacity at this time. Trial zyprexa prn and Lorazepam 1mg PRn for agitation and anxiety and Clonazepam

## 2023-04-03 NOTE — PROGRESS NOTE ADULT - PROBLEM SELECTOR PLAN 4
- continue baclofen   - clonazepam history c/w mechanical, no acute injuries noted on imaging. CTH, CT c-spine, XRay pelvis unremarkable for any acute pathology.

## 2023-04-03 NOTE — PROGRESS NOTE ADULT - PROBLEM SELECTOR PLAN 8
-levothyroxine -  normal saline cleanse Medihoney and DSD to bilateral foot ulcerations daily.  - Podiatry consulted:  - 3/24 R ankle XR: negative for OM or soft tissue tracking air  - Recommend decubitus precautions and use of Z flow boot  - Recommend normal saline cleanse prior to dressing changes daily - normal saline cleanse Medihoney and DSD to bilateral foot ulcerations daily.  - Podiatry consulted:  - 3/24 R ankle XR: negative for OM or soft tissue tracking air  - Recommend decubitus precautions and use of Z flow boot  - Recommend normal saline cleanse prior to dressing changes daily  - frequent pressure off set

## 2023-04-03 NOTE — PROGRESS NOTE ADULT - SUBJECTIVE AND OBJECTIVE BOX
PROGRESS NOTE:   Authored by Luisito Goetz DO    Patient is a 62y old  Male who presents with a chief complaint of worsening anemia (02 Apr 2023 13:37)      SUBJECTIVE / OVERNIGHT EVENTS:    ADDITIONAL REVIEW OF SYSTEMS:    MEDICATIONS  (STANDING):  apixaban 5 milliGRAM(s) Oral two times a day  artificial  tears Solution 1 Drop(s) Both EYES four times a day  atorvastatin 40 milliGRAM(s) Oral at bedtime  baclofen 10 milliGRAM(s) Oral every 12 hours  chlorhexidine 2% Cloths 1 Application(s) Topical <User Schedule>  clobetasol 0.05% Cream 1 Application(s) Topical two times a day  clonazePAM  Tablet 0.5 milliGRAM(s) Oral two times a day  levETIRAcetam 500 milliGRAM(s) Oral daily  levETIRAcetam 250 milliGRAM(s) Oral <User Schedule>  levothyroxine 100 MICROGram(s) Oral daily  lidocaine   4% Patch 1 Patch Transdermal daily  metoprolol succinate ER 50 milliGRAM(s) Oral daily  mupirocin 2% Ointment 1 Application(s) Topical three times a day  Nephro-mirna 1 Tablet(s) Oral daily  nystatin Powder 1 Application(s) Topical three times a day  OLANZapine 2.5 milliGRAM(s) Oral at bedtime  polyethylene glycol 3350 17 Gram(s) Oral daily  senna 2 Tablet(s) Oral at bedtime  sevelamer carbonate 1600 milliGRAM(s) Oral three times a day  tamsulosin 0.4 milliGRAM(s) Oral at bedtime    MEDICATIONS  (PRN):  acetaminophen     Tablet .. 650 milliGRAM(s) Oral every 6 hours PRN Temp greater or equal to 38C (100.4F), Mild Pain (1 - 3)  bisacodyl Suppository 10 milliGRAM(s) Rectal daily PRN Constipation  LORazepam     Tablet 1 milliGRAM(s) Oral every 6 hours PRN Anxiety(SEVERE)  melatonin 3 milliGRAM(s) Oral at bedtime PRN Insomnia  OLANZapine 2.5 milliGRAM(s) Oral every 6 hours PRN agitation/paranoid  ondansetron Injectable 4 milliGRAM(s) IV Push every 8 hours PRN Nausea and/or Vomiting      CAPILLARY BLOOD GLUCOSE        I&O's Summary    02 Apr 2023 07:01  -  03 Apr 2023 07:00  --------------------------------------------------------  IN: 2400 mL / OUT: 1675 mL / NET: 725 mL        PHYSICAL EXAM:  Vital Signs Last 24 Hrs  T(C): 36.8 (03 Apr 2023 09:14), Max: 37.1 (02 Apr 2023 21:26)  T(F): 98.3 (03 Apr 2023 09:14), Max: 98.8 (02 Apr 2023 21:26)  HR: 93 (03 Apr 2023 09:14) (81 - 93)  BP: 117/70 (03 Apr 2023 09:14) (116/68 - 130/79)  BP(mean): --  RR: 18 (03 Apr 2023 09:14) (18 - 18)  SpO2: 97% (03 Apr 2023 09:14) (96% - 97%)    Parameters below as of 03 Apr 2023 09:14  Patient On (Oxygen Delivery Method): room air        GENERAL: No acute distress, well-developed  HEAD:  Atraumatic, Normocephalic  EYES: EOMI, PERRLA, conjunctiva and sclera clear  NECK: Supple, no lymphadenopathy, no JVD  CHEST/LUNG: CTAB; No wheezes, rales, or rhonchi  HEART: Regular rate and rhythm; No murmurs, rubs, or gallops  ABDOMEN: Soft, non-tender, non-distended; normal bowel sounds, no organomegaly  EXTREMITIES:  2+ peripheral pulses b/l, No clubbing, cyanosis, or edema  NEUROLOGY: A&O x 3, no focal deficits  SKIN: No rashes or lesions    LABS:                        7.7    18.50 )-----------( 166      ( 03 Apr 2023 07:03 )             24.5     04-03    137  |  101  |  98<H>  ----------------------------<  103<H>  4.8   |  18<L>  |  6.71<H>    Ca    8.9      03 Apr 2023 06:57                  RADIOLOGY & ADDITIONAL TESTS:  Results Reviewed:   Imaging Personally Reviewed:  Electrocardiogram Personally Reviewed:    COORDINATION OF CARE:  Care Discussed with Consultants/Other Providers [Y/N]:  Prior or Outpatient Records Reviewed [Y/N]:   PROGRESS NOTE:   Authored by Luisito Goetz DO    Patient is a 62y old  Male who presents with a chief complaint of worsening anemia (02 Apr 2023 13:37)      SUBJECTIVE / OVERNIGHT EVENTS: reports he is having a lot of low back pain, no cp, sob, abd pain     ADDITIONAL REVIEW OF SYSTEMS:    MEDICATIONS  (STANDING):  apixaban 5 milliGRAM(s) Oral two times a day  artificial  tears Solution 1 Drop(s) Both EYES four times a day  atorvastatin 40 milliGRAM(s) Oral at bedtime  baclofen 10 milliGRAM(s) Oral every 12 hours  chlorhexidine 2% Cloths 1 Application(s) Topical <User Schedule>  clobetasol 0.05% Cream 1 Application(s) Topical two times a day  clonazePAM  Tablet 0.5 milliGRAM(s) Oral two times a day  levETIRAcetam 500 milliGRAM(s) Oral daily  levETIRAcetam 250 milliGRAM(s) Oral <User Schedule>  levothyroxine 100 MICROGram(s) Oral daily  lidocaine   4% Patch 1 Patch Transdermal daily  metoprolol succinate ER 50 milliGRAM(s) Oral daily  mupirocin 2% Ointment 1 Application(s) Topical three times a day  Nephro-mirna 1 Tablet(s) Oral daily  nystatin Powder 1 Application(s) Topical three times a day  OLANZapine 2.5 milliGRAM(s) Oral at bedtime  polyethylene glycol 3350 17 Gram(s) Oral daily  senna 2 Tablet(s) Oral at bedtime  sevelamer carbonate 1600 milliGRAM(s) Oral three times a day  tamsulosin 0.4 milliGRAM(s) Oral at bedtime    MEDICATIONS  (PRN):  acetaminophen     Tablet .. 650 milliGRAM(s) Oral every 6 hours PRN Temp greater or equal to 38C (100.4F), Mild Pain (1 - 3)  bisacodyl Suppository 10 milliGRAM(s) Rectal daily PRN Constipation  LORazepam     Tablet 1 milliGRAM(s) Oral every 6 hours PRN Anxiety(SEVERE)  melatonin 3 milliGRAM(s) Oral at bedtime PRN Insomnia  OLANZapine 2.5 milliGRAM(s) Oral every 6 hours PRN agitation/paranoid  ondansetron Injectable 4 milliGRAM(s) IV Push every 8 hours PRN Nausea and/or Vomiting      CAPILLARY BLOOD GLUCOSE        I&O's Summary    02 Apr 2023 07:01  -  03 Apr 2023 07:00  --------------------------------------------------------  IN: 2400 mL / OUT: 1675 mL / NET: 725 mL        PHYSICAL EXAM:  Vital Signs Last 24 Hrs  T(C): 36.8 (03 Apr 2023 09:14), Max: 37.1 (02 Apr 2023 21:26)  T(F): 98.3 (03 Apr 2023 09:14), Max: 98.8 (02 Apr 2023 21:26)  HR: 93 (03 Apr 2023 09:14) (81 - 93)  BP: 117/70 (03 Apr 2023 09:14) (116/68 - 130/79)  BP(mean): --  RR: 18 (03 Apr 2023 09:14) (18 - 18)  SpO2: 97% (03 Apr 2023 09:14) (96% - 97%)    Parameters below as of 03 Apr 2023 09:14  Patient On (Oxygen Delivery Method): room air      GENERAL: No acute distress,  chronically ill appearing   HEAD:  Atraumatic, Normocephalic  ENT: EOMI, conjunctiva and sclera clear,  moist mucosa no pharyngeal erythema or exudates   NECK: supple , trachea midline   CHEST/LUNG: Clear to auscultation bilaterally; No wheeze, equal breath sounds bilaterally   HEART: Regular rate and rhythm; No murmurs  ABDOMEN: Soft, Nontender, Nondistended; Bowel sounds present  EXTREMITIES:  No clubbing, cyanosis, trace edema  NEUROLOGY: AAOx2 oriented to self and location, grossly non-focal  SKIN:  toes w/  onychomycosis , b/l foot/ heel wounds in dressing , seborrheic dermatitis     LABS:                        7.7    18.50 )-----------( 166      ( 03 Apr 2023 07:03 )             24.5     04-03    137  |  101  |  98<H>  ----------------------------<  103<H>  4.8   |  18<L>  |  6.71<H>    Ca    8.9      03 Apr 2023 06:57                  RADIOLOGY & ADDITIONAL TESTS:  Results Reviewed:   Imaging Personally Reviewed:  Electrocardiogram Personally Reviewed:    COORDINATION OF CARE:  Care Discussed with Consultants/Other Providers [Y/N]:  Prior or Outpatient Records Reviewed [Y/N]:   PROGRESS NOTE:   Authored by Luisito Goetz DO    Patient is a 62y old  Male who presents with a chief complaint of worsening anemia (02 Apr 2023 13:37)      SUBJECTIVE / OVERNIGHT EVENTS: reports he is having a lot of low back pain, no cp, sob, abd pain     ADDITIONAL REVIEW OF SYSTEMS:    MEDICATIONS  (STANDING):  apixaban 5 milliGRAM(s) Oral two times a day  artificial  tears Solution 1 Drop(s) Both EYES four times a day  atorvastatin 40 milliGRAM(s) Oral at bedtime  baclofen 10 milliGRAM(s) Oral every 12 hours  chlorhexidine 2% Cloths 1 Application(s) Topical <User Schedule>  clobetasol 0.05% Cream 1 Application(s) Topical two times a day  clonazePAM  Tablet 0.5 milliGRAM(s) Oral two times a day  levETIRAcetam 500 milliGRAM(s) Oral daily  levETIRAcetam 250 milliGRAM(s) Oral <User Schedule>  levothyroxine 100 MICROGram(s) Oral daily  lidocaine   4% Patch 1 Patch Transdermal daily  metoprolol succinate ER 50 milliGRAM(s) Oral daily  mupirocin 2% Ointment 1 Application(s) Topical three times a day  Nephro-mirna 1 Tablet(s) Oral daily  nystatin Powder 1 Application(s) Topical three times a day  OLANZapine 2.5 milliGRAM(s) Oral at bedtime  polyethylene glycol 3350 17 Gram(s) Oral daily  senna 2 Tablet(s) Oral at bedtime  sevelamer carbonate 1600 milliGRAM(s) Oral three times a day  tamsulosin 0.4 milliGRAM(s) Oral at bedtime    MEDICATIONS  (PRN):  acetaminophen     Tablet .. 650 milliGRAM(s) Oral every 6 hours PRN Temp greater or equal to 38C (100.4F), Mild Pain (1 - 3)  bisacodyl Suppository 10 milliGRAM(s) Rectal daily PRN Constipation  LORazepam     Tablet 1 milliGRAM(s) Oral every 6 hours PRN Anxiety(SEVERE)  melatonin 3 milliGRAM(s) Oral at bedtime PRN Insomnia  OLANZapine 2.5 milliGRAM(s) Oral every 6 hours PRN agitation/paranoid  ondansetron Injectable 4 milliGRAM(s) IV Push every 8 hours PRN Nausea and/or Vomiting      CAPILLARY BLOOD GLUCOSE        I&O's Summary    02 Apr 2023 07:01  -  03 Apr 2023 07:00  --------------------------------------------------------  IN: 2400 mL / OUT: 1675 mL / NET: 725 mL        PHYSICAL EXAM:  Vital Signs Last 24 Hrs  T(C): 36.8 (03 Apr 2023 09:14), Max: 37.1 (02 Apr 2023 21:26)  T(F): 98.3 (03 Apr 2023 09:14), Max: 98.8 (02 Apr 2023 21:26)  HR: 93 (03 Apr 2023 09:14) (81 - 93)  BP: 117/70 (03 Apr 2023 09:14) (116/68 - 130/79)  BP(mean): --  RR: 18 (03 Apr 2023 09:14) (18 - 18)  SpO2: 97% (03 Apr 2023 09:14) (96% - 97%)    Parameters below as of 03 Apr 2023 09:14  Patient On (Oxygen Delivery Method): room air      GENERAL: No acute distress,  chronically ill appearing   HEAD:  Atraumatic, Normocephalic  ENT: EOMI, conjunctiva and sclera clear,  moist mucosa no pharyngeal erythema or exudates   NECK: supple , trachea midline   CHEST/LUNG: Clear to auscultation bilaterally; No wheeze, equal breath sounds bilaterally   HEART: Regular rate and rhythm; No murmurs  ABDOMEN: Soft, Nontender, Nondistended; Bowel sounds present  EXTREMITIES:  No clubbing, cyanosis, trace edema  NEUROLOGY: AAOx2 oriented to self and location, grossly non-focal  SKIN:  toes w/  onychomycosis , b/l foot/ heel wounds in dressing , seborrheic dermatitis     LABS:                        7.7    18.50 )-----------( 166      ( 03 Apr 2023 07:03 )             24.5     04-03    137  |  101  |  98<H>  ----------------------------<  103<H>  4.8   |  18<L>  |  6.71<H>    Ca    8.9      03 Apr 2023 06:57      < from: CT Cervical Spine No Cont (03.29.23 @ 19:04) >  IMPRESSION:    Brain CT: No change since 3/28/2023. 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 No acute hemorrhage.    Cervical spine: Mild degenerative changes. No acute fractures or   dislocations.        --- End of Report ---    < end of copied text >  < from: CT Head No Cont (03.29.23 @ 19:04) >    IMPRESSION:    Brain CT: No change since 3/28/2023. 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 No acute hemorrhage.    Cervical spine: Mild degenerative changes. No acute fractures or   dislocations.    < end of copied text >  < from: CT Abdomen and Pelvis No Cont (03.28.23 @ 19:58) >  ABDOMINAL WALL: Postsurgical changes.  BONES: Degenerative changes. Generalized osteopenia.    IMPRESSION:  Full examination of the intra-abdominal viscera and vasculature is   limited without the addition of IV contrast.    No CT evidence of acute traumatic injury in the chest, abdomen, or pelvis.    Probable fecal impaction and question of associated stercoral proctitis.    Mild ascending aortic dilatation 3.8 cm.    Additional findings as above.        --- End of Report ---      < end of copied text >              RADIOLOGY & ADDITIONAL TESTS:  Results Reviewed:   Imaging Personally Reviewed:  Electrocardiogram Personally Reviewed:    COORDINATION OF CARE:  Care Discussed with Consultants/Other Providers [Y/N]:  Prior or Outpatient Records Reviewed [Y/N]:   PROGRESS NOTE:   Authored by Luisito Goetz DO    Patient is a 62y old  Male who presents with a chief complaint of worsening anemia (02 Apr 2023 13:37)      SUBJECTIVE / OVERNIGHT EVENTS: reports he is having a lot of low back pain, no cp, sob, abd pain     ADDITIONAL REVIEW OF SYSTEMS:    MEDICATIONS  (STANDING):  apixaban 5 milliGRAM(s) Oral two times a day  artificial  tears Solution 1 Drop(s) Both EYES four times a day  atorvastatin 40 milliGRAM(s) Oral at bedtime  baclofen 10 milliGRAM(s) Oral every 12 hours  chlorhexidine 2% Cloths 1 Application(s) Topical <User Schedule>  clobetasol 0.05% Cream 1 Application(s) Topical two times a day  clonazePAM  Tablet 0.5 milliGRAM(s) Oral two times a day  levETIRAcetam 500 milliGRAM(s) Oral daily  levETIRAcetam 250 milliGRAM(s) Oral <User Schedule>  levothyroxine 100 MICROGram(s) Oral daily  lidocaine   4% Patch 1 Patch Transdermal daily  metoprolol succinate ER 50 milliGRAM(s) Oral daily  mupirocin 2% Ointment 1 Application(s) Topical three times a day  Nephro-mirna 1 Tablet(s) Oral daily  nystatin Powder 1 Application(s) Topical three times a day  OLANZapine 2.5 milliGRAM(s) Oral at bedtime  polyethylene glycol 3350 17 Gram(s) Oral daily  senna 2 Tablet(s) Oral at bedtime  sevelamer carbonate 1600 milliGRAM(s) Oral three times a day  tamsulosin 0.4 milliGRAM(s) Oral at bedtime    MEDICATIONS  (PRN):  acetaminophen     Tablet .. 650 milliGRAM(s) Oral every 6 hours PRN Temp greater or equal to 38C (100.4F), Mild Pain (1 - 3)  bisacodyl Suppository 10 milliGRAM(s) Rectal daily PRN Constipation  LORazepam     Tablet 1 milliGRAM(s) Oral every 6 hours PRN Anxiety(SEVERE)  melatonin 3 milliGRAM(s) Oral at bedtime PRN Insomnia  OLANZapine 2.5 milliGRAM(s) Oral every 6 hours PRN agitation/paranoid  ondansetron Injectable 4 milliGRAM(s) IV Push every 8 hours PRN Nausea and/or Vomiting      CAPILLARY BLOOD GLUCOSE    I&O's Summary    02 Apr 2023 07:01  -  03 Apr 2023 07:00  --------------------------------------------------------  IN: 2400 mL / OUT: 1675 mL / NET: 725 mL    PHYSICAL EXAM:  Vital Signs Last 24 Hrs  T(C): 36.8 (03 Apr 2023 09:14), Max: 37.1 (02 Apr 2023 21:26)  T(F): 98.3 (03 Apr 2023 09:14), Max: 98.8 (02 Apr 2023 21:26)  HR: 93 (03 Apr 2023 09:14) (81 - 93)  BP: 117/70 (03 Apr 2023 09:14) (116/68 - 130/79)  BP(mean): --  RR: 18 (03 Apr 2023 09:14) (18 - 18)  SpO2: 97% (03 Apr 2023 09:14) (96% - 97%)    Parameters below as of 03 Apr 2023 09:14  Patient On (Oxygen Delivery Method): room air      GENERAL: No acute distress,  chronically ill appearing   HEAD:  Atraumatic, Normocephalic  ENT: EOMI, conjunctiva and sclera clear,  moist mucosa no pharyngeal erythema or exudates   NECK: supple , trachea midline   CHEST/LUNG: Clear to auscultation bilaterally; No wheeze, equal breath sounds bilaterally   HEART: Regular rate and rhythm; No murmurs  ABDOMEN: Soft, Nontender, Nondistended; Bowel sounds present  EXTREMITIES:  No clubbing, cyanosis, trace edema  NEUROLOGY: AAOx2 oriented to self and location, grossly non-focal  SKIN:  toes w/  onychomycosis , b/l foot/ heel wounds in dressing , seborrheic dermatitis     LABS:                        7.7    18.50 )-----------( 166      ( 03 Apr 2023 07:03 )             24.5     04-03    137  |  101  |  98<H>  ----------------------------<  103<H>  4.8   |  18<L>  |  6.71<H>    Ca    8.9      03 Apr 2023 06:57      < from: CT Cervical Spine No Cont (03.29.23 @ 19:04) >  IMPRESSION:    Brain CT: No change since 3/28/2023. 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 No acute hemorrhage.    Cervical spine: Mild degenerative changes. No acute fractures or   dislocations.        --- End of Report ---    < end of copied text >  < from: CT Head No Cont (03.29.23 @ 19:04) >    IMPRESSION:    Brain CT: No change since 3/28/2023. 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 No acute hemorrhage.    Cervical spine: Mild degenerative changes. No acute fractures or   dislocations.    < end of copied text >  < from: CT Abdomen and Pelvis No Cont (03.28.23 @ 19:58) >  ABDOMINAL WALL: Postsurgical changes.  BONES: Degenerative changes. Generalized osteopenia.    IMPRESSION:  Full examination of the intra-abdominal viscera and vasculature is   limited without the addition of IV contrast.    No CT evidence of acute traumatic injury in the chest, abdomen, or pelvis.    Probable fecal impaction and question of associated stercoral proctitis.    Mild ascending aortic dilatation 3.8 cm.    Additional findings as above.        --- End of Report ---      < end of copied text >              RADIOLOGY & ADDITIONAL TESTS:  Results Reviewed:   Imaging Personally Reviewed:  Electrocardiogram Personally Reviewed:    COORDINATION OF CARE:  Care Discussed with Consultants/Other Providers [Y/N]:  Prior or Outpatient Records Reviewed [Y/N]:   PROGRESS NOTE:   Authored by Luisito Goetz DO    Patient is a 62y old  Male who presents with a chief complaint of worsening anemia (02 Apr 2023 13:37)      SUBJECTIVE / OVERNIGHT EVENTS: reports he is having a lot of low back pain, no cp, sob, abd pain   This morning pt was noted to be belligerent and agitated.     ADDITIONAL REVIEW OF SYSTEMS:    MEDICATIONS  (STANDING):  apixaban 5 milliGRAM(s) Oral two times a day  artificial  tears Solution 1 Drop(s) Both EYES four times a day  atorvastatin 40 milliGRAM(s) Oral at bedtime  baclofen 10 milliGRAM(s) Oral every 12 hours  chlorhexidine 2% Cloths 1 Application(s) Topical <User Schedule>  clobetasol 0.05% Cream 1 Application(s) Topical two times a day  clonazePAM  Tablet 0.5 milliGRAM(s) Oral two times a day  levETIRAcetam 500 milliGRAM(s) Oral daily  levETIRAcetam 250 milliGRAM(s) Oral <User Schedule>  levothyroxine 100 MICROGram(s) Oral daily  lidocaine   4% Patch 1 Patch Transdermal daily  metoprolol succinate ER 50 milliGRAM(s) Oral daily  mupirocin 2% Ointment 1 Application(s) Topical three times a day  Nephro-mirna 1 Tablet(s) Oral daily  nystatin Powder 1 Application(s) Topical three times a day  OLANZapine 2.5 milliGRAM(s) Oral at bedtime  polyethylene glycol 3350 17 Gram(s) Oral daily  senna 2 Tablet(s) Oral at bedtime  sevelamer carbonate 1600 milliGRAM(s) Oral three times a day  tamsulosin 0.4 milliGRAM(s) Oral at bedtime    MEDICATIONS  (PRN):  acetaminophen     Tablet .. 650 milliGRAM(s) Oral every 6 hours PRN Temp greater or equal to 38C (100.4F), Mild Pain (1 - 3)  bisacodyl Suppository 10 milliGRAM(s) Rectal daily PRN Constipation  LORazepam     Tablet 1 milliGRAM(s) Oral every 6 hours PRN Anxiety(SEVERE)  melatonin 3 milliGRAM(s) Oral at bedtime PRN Insomnia  OLANZapine 2.5 milliGRAM(s) Oral every 6 hours PRN agitation/paranoid  ondansetron Injectable 4 milliGRAM(s) IV Push every 8 hours PRN Nausea and/or Vomiting      CAPILLARY BLOOD GLUCOSE    I&O's Summary    02 Apr 2023 07:01  -  03 Apr 2023 07:00  --------------------------------------------------------  IN: 2400 mL / OUT: 1675 mL / NET: 725 mL    PHYSICAL EXAM:  Vital Signs Last 24 Hrs  T(C): 36.8 (03 Apr 2023 09:14), Max: 37.1 (02 Apr 2023 21:26)  T(F): 98.3 (03 Apr 2023 09:14), Max: 98.8 (02 Apr 2023 21:26)  HR: 93 (03 Apr 2023 09:14) (81 - 93)  BP: 117/70 (03 Apr 2023 09:14) (116/68 - 130/79)  BP(mean): --  RR: 18 (03 Apr 2023 09:14) (18 - 18)  SpO2: 97% (03 Apr 2023 09:14) (96% - 97%)    Parameters below as of 03 Apr 2023 09:14  Patient On (Oxygen Delivery Method): room air      GENERAL: somnolent and sleepy, patient started flixkering his upper and lower extremities uppon approaching to perform examination, chronically ill appearing   HEAD:  Atraumatic, Normocephalic  ENT:dry mucous membranes   NECK: supple, no apparent JVD   CHEST/LUNG: Clear to auscultation bilaterally; No wheeze,   HEART: unable to complete exam as patient started throwing his arms around   ABDOMEN: soft, some tenderness   EXTREMITIES:  there are multiple lower extremity scars with dried overlying crusting  NEUROLOGY: unable to assess as patient was somnolent and didnt cooperate   SKIN:  toes w/  onychomycosis , b/l foot/ heel wounds in dressing , seborrheic dermatitis     LABS:                        7.7    18.50 )-----------( 166      ( 03 Apr 2023 07:03 )             24.5     04-03    137  |  101  |  98<H>  ----------------------------<  103<H>  4.8   |  18<L>  |  6.71<H>    Ca    8.9      03 Apr 2023 06:57      < from: CT Cervical Spine No Cont (03.29.23 @ 19:04) >  IMPRESSION:    Brain CT: No change since 3/28/2023. 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 No acute hemorrhage.    Cervical spine: Mild degenerative changes. No acute fractures or   dislocations.        --- End of Report ---    < end of copied text >  < from: CT Head No Cont (03.29.23 @ 19:04) >    IMPRESSION:    Brain CT: No change since 3/28/2023. 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 No acute hemorrhage.    Cervical spine: Mild degenerative changes. No acute fractures or   dislocations.    < end of copied text >  < from: CT Abdomen and Pelvis No Cont (03.28.23 @ 19:58) >  ABDOMINAL WALL: Postsurgical changes.  BONES: Degenerative changes. Generalized osteopenia.    IMPRESSION:  Full examination of the intra-abdominal viscera and vasculature is   limited without the addition of IV contrast.    No CT evidence of acute traumatic injury in the chest, abdomen, or pelvis.    Probable fecal impaction and question of associated stercoral proctitis.    Mild ascending aortic dilatation 3.8 cm.    Additional findings as above.        --- End of Report ---      < end of copied text >              RADIOLOGY & ADDITIONAL TESTS:  Results Reviewed:   Imaging Personally Reviewed:  Electrocardiogram Personally Reviewed:    COORDINATION OF CARE:  Care Discussed with Consultants/Other Providers [Y/N]:  Prior or Outpatient Records Reviewed [Y/N]:

## 2023-04-03 NOTE — PROGRESS NOTE ADULT - PROBLEM SELECTOR PLAN 5
- continue keppra - Resumed on Eliquis given stable H/H over 24-48 hour period.   -continue Metoprolol - Resumed on Eliquis given stable H/H over 24-48 hour period.   -continue Metoprolol  EKG on admission unremarkable,

## 2023-04-03 NOTE — CONSULT NOTE ADULT - SUBJECTIVE AND OBJECTIVE BOX
Podiatry pager #: [phone]    Patient is a 62y old  Male who presents with a chief complaint of worsening anemia (29 Mar 2023 16:33)      HPI:  62 year old male w/ pmh Hep C, HTN, AFib s/p AICD ([employer]) on Eliquis and Plavix, b/l CVA quadriplegic s/p suprapubic cath, ESRD on HD ( T/Thu/Sat), diastolic CHF, seizure on keppra, hep C, chronic pressure ulcers, recent admission after fall in nursing home , now presenting after fall on day of admission .     Patient reports he fell out of bed while reaching for a remote at his nursing facility [facility], struck the left side of his head on the handle of the side table, states that he lost consciousness, upon regaining consciousness called for nursing for help.  Feels that he is currently at his mental status baseline complaining of pain to his left head and left neck, without pain to any limbs, trunk, abdomen, pelvis.  States that he had no preceding  no chest pain or palpitations , no shortness of breath. He reports no blood in stool or urine.     ED course: Patient had 1 episode of NBNB emesis after CT scan  (28 Mar 2023 23:23)      PAST MEDICAL & SURGICAL HISTORY:  Hypertension      Chronic CHF      Atrial fibrillation      Hepatitis C          MEDICATIONS  (STANDING):  artificial  tears Solution 1 Drop(s) Both EYES four times a day  atorvastatin 40 milliGRAM(s) Oral at bedtime  baclofen 10 milliGRAM(s) Oral every 12 hours  clobetasol 0.05% Cream 1 Application(s) Topical two times a day  clonazePAM  Tablet 0.5 milliGRAM(s) Oral two times a day  levETIRAcetam 500 milliGRAM(s) Oral daily  levETIRAcetam 250 milliGRAM(s) Oral <User Schedule>  levothyroxine 100 MICROGram(s) Oral daily  metoprolol succinate ER 50 milliGRAM(s) Oral daily  mupirocin 2% Ointment 1 Application(s) Topical three times a day  Nephro-mirna 1 Tablet(s) Oral daily  polyethylene glycol 3350 17 Gram(s) Oral daily  senna 2 Tablet(s) Oral at bedtime  sevelamer carbonate 1600 milliGRAM(s) Oral three times a day  tamsulosin 0.4 milliGRAM(s) Oral at bedtime    MEDICATIONS  (PRN):  acetaminophen     Tablet .. 650 milliGRAM(s) Oral every 6 hours PRN Temp greater or equal to 38C (100.4F), Mild Pain (1 - 3)  bisacodyl Suppository 10 milliGRAM(s) Rectal daily PRN Constipation  melatonin 3 milliGRAM(s) Oral at bedtime PRN Insomnia  ondansetron Injectable 4 milliGRAM(s) IV Push every 8 hours PRN Nausea and/or Vomiting      Allergies    acetaminophen (Rash)  aspirin (Rash)  bioflavonoids (Rash)  cefaclor (Rash)  ceftriaxone (Rash)  clindamycin (Rash)  Codeine Sulfate (Rash)  haloperidol (Rash)  ibuprofen (Rash)  iopamidol (Rash)  Onions (Rash)  penicillins (Rash)  tromethamine (Rash)    Intolerances        VITALS:    Vital Signs Last 24 Hrs  T(C): 36.4 (29 Mar 2023 14:25), Max: 36.8 (29 Mar 2023 01:11)  T(F): 97.5 (29 Mar 2023 14:25), Max: 98.2 (29 Mar 2023 01:11)  HR: 73 (29 Mar 2023 14:25) (70 - 80)  BP: 109/72 (29 Mar 2023 14:25) (102/63 - 134/85)  BP(mean): --  RR: 18 (29 Mar 2023 14:25) (16 - 18)  SpO2: 100% (29 Mar 2023 14:25) (98% - 100%)    Parameters below as of 29 Mar 2023 14:25  Patient On (Oxygen Delivery Method): room air        LABS:                          7.2    10.35 )-----------( 167      ( 29 Mar 2023 06:52 )             24.2       03-29    142  |  109<H>  |  65<H>  ----------------------------<  76  6.3<HH>   |  20<L>  |  4.93<H>    Ca    9.1      29 Mar 2023 06:52    TPro  6.9  /  Alb  3.5  /  TBili  0.5  /  DBili  x   /  AST  71<H>  /  ALT  38  /  AlkPhos  424<H>  03-29      CAPILLARY BLOOD GLUCOSE      POCT Blood Glucose.: 138 mg/dL (29 Mar 2023 13:03)  POCT Blood Glucose.: 86 mg/dL (29 Mar 2023 12:26)  POCT Blood Glucose.: 112 mg/dL (29 Mar 2023 10:43)      PT/INR - ( 28 Mar 2023 21:00 )   PT: 20.4 sec;   INR: 1.76 ratio         PTT - ( 28 Mar 2023 21:00 )  PTT:39.9 sec    LOWER EXTREMITY PHYSICAL EXAM:  Vasular: DP/PT _1/4, B/L, CFT <_2 seconds B/L, Temperature gradient WNL_, B/L.   Neuro: Epicritic sensation Absent_ to the level of Toes_, B/L.  Skin: Multiple digital abrasions with superficial dry eschar without clinical signs of infection.    Wound #1:   Location :Right posterior/plantar heel  Size: 4 cm x 2.5 cm diameter.  Depth: 0.2 cm in depth  Wound bed: Red granular tissue  Drainage: No purulence fluctuance malodor.  Odor: None  Periwound: No clinical signs of infection  Etiology: Chronic present on admission    Wound #2:   Location: Plantar lateral aspect of left forefoot/midfoot  Size: 1 cm diameter  Depth: Superficial  Wound bed: Red granular tissue  Drainage: None  Odor:None  Periwound:No clinical signs of infection  Etiology: Present on admission.  RADIOLOGY & ADDITIONAL STUDIES:    
Patient is a 62y old  Male who presents with a chief complaint of worsening anemia (03 Apr 2023 12:25)    HPI:  62 year old male w/ pmh Hep C, HTN, AFib s/p AICD ([employer]) on Eliquis and Plavix, b/l CVA quadriplegic s/p suprapubic cath, ESRD on HD ( T/Thu/Sat), diastolic CHF, seizure on keppra, hep C, chronic pressure ulcers, recent admission after fall in nursing home , admitted 3/28/23 after fall on day of admission .     Patient reports he fell out of bed while reaching for a remote at his nursing facility [facility], struck the left side of his head on the handle of the side table, states that he lost consciousness, upon regaining consciousness called for nursing for help.  Feels that he is currently at his mental status baseline complaining of pain to his left head and left neck, without pain to any limbs, trunk, abdomen, pelvis.  States that he had no preceding  no chest pain or palpitations , no shortness of breath. He reports no blood in stool or urine.     ED course: Patient had 1 episode of NBNB emesis after CT scan  (28 Mar 2023 23:23)  When seen earlier today, patient was asleep.  When seen later in afternoon he was agitated - threw lunch tray complaining about food and cursing others.  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, retired marine    FAMILY HISTORY:  n.c    REVIEW OF SYSTEMS:  unable to ascertain due to agitation    Allergies  acetaminophen (Rash)  aspirin (Rash)  bioflavonoids (Rash)  cefaclor (Rash)  ceftriaxone (Rash)  clindamycin (Rash)  Codeine Sulfate (Rash)  haloperidol (Rash)  ibuprofen (Rash)  iopamidol (Rash)  Onions (Rash)  penicillins (Rash)  tromethamine (Rash)    Antimicrobials:    Vital Signs Last 24 Hrs  T(C): 36.8 (03 Apr 2023 09:14), Max: 37.1 (02 Apr 2023 21:26)  T(F): 98.3 (03 Apr 2023 09:14), Max: 98.8 (02 Apr 2023 21:26)  HR: 93 (03 Apr 2023 09:14) (81 - 93)  BP: 117/70 (03 Apr 2023 09:14) (117/70 - 130/79)  BP(mean): --  RR: 18 (03 Apr 2023 09:14) (18 - 18)  SpO2: 97% (03 Apr 2023 09:14) (96% - 97%)    Parameters below as of 03 Apr 2023 09:14  Patient On (Oxygen Delivery Method): room air        PHYSICAL EXAM:  General: agitated, pale  Neurology: Awake,  Respiratory: no resp distress  Abdominal: , normal bowel sounds  Extremities: dressings on feet  - focal black eschars over joints  Line Sites: Clear  Skin: No rash                        7.7    18.50 )-----------( 166      ( 03 Apr 2023 07:03 )             24.5   WBC Count: 18.50 (04-03 @ 07:03)  WBC Count: 12.34 (04-01 @ 09:59)  WBC Count: 11.31 (03-31 @ 09:50)  WBC Count: 7.89 (03-30 @ 00:04)      04-03    137  |  101  |  98<H>  ----------------------------<  103<H>  4.8   |  18<L>  |  6.71<H>    Ca    8.9      03 Apr 2023 06:57      MICROBIOLOGY:  .Other Other  03-30-23   Few Candida auris  --  --      Wound Wound  03-24-23   No growth at 48 hours  --  --      Clean Catch Clean Catch (Midstream)  03-23-23   <10,000 CFU/mL Normal Urogenital Nicolasa  --  --      .Blood Blood-Venous  03-23-23   No Growth Final  --  --      .Blood Blood-Peripheral  03-23-23   No Growth Final  --  --    Radiology:  < from: Xray Hip w/ Pelvis 2 or 3 Views, Left (03.29.23 @ 19:15) >  IMPRESSION:  No hip fractures or dislocations.    Intact pelvic and obturator rings and symmetrically aligned and spaced SI   joints and pubic symphysis.    Lower lumbarspine degenerative change again apparent. Mild bilateral hip   osteoarthritis change.    Generalized mild osteopenia otherwise no discrete suspicious lytic or   blastic lesions.    Vertically oriented catheter overlies lower midpelvis.  Moderate rectal distention with formed stool elements.    < end of copied text >  < from: CT Cervical Spine No Cont (03.29.23 @ 19:04) >  Brain CT: No change since 3/28/2023. 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 No acute hemorrhage.    Cervical spine: Mild degenerative changes. No acute fractures or   dislocations.    < end of copied text >  < from: CT Abdomen and Pelvis No Cont (03.28.23 @ 19:58) >  CHEST:  LUNGS AND LARGE AIRWAYS: Patent central airways. Bibasilar atelectasis.   Motion artifact limits evaluation for fine pulmonary parenchymal detail.  PLEURA: No pleural effusion. No pneumothorax.  VESSELS: Coronary artery and aortic calcifications. Ascending aorta is   mildly dilated measuring 3.8 cm. The main pulmonary arteries of normal   caliber.  HEART: Heart size is normal. No pericardial effusion.  MEDIASTINUM AND LORNE: No lymphadenopathy.  CHEST WALL AND LOWER NECK: Right chest wall cardiac generator with leads   in the right atrium and ventricle. Left isthmic calcified nodule.    ABDOMEN AND PELVIS:  LIVER: Within normal limits.  BILE DUCTS: Normal caliber.  GALLBLADDER: Cholecystectomy.  SPLEEN: Within normal limits.  PANCREAS: Fatty replaced..  ADRENALS: Within normal limits.  KIDNEYS/URETERS: Within normallimits.    BLADDER: Urinary bladder is collapsed around a suprapubic catheter.  REPRODUCTIVE ORGANS: Prostate is enlarged with coarse internal   calcifications..    BOWEL: No bowel obstruction. Appendix is normal. The rectum is distended   with stoolwith associated wall thickening and perirectal and presacral   edema. Generalized large stool burden, compatible constipation.  PERITONEUM: No ascites. No free air.  VESSELS: Atherosclerotic changes.  RETROPERITONEUM/LYMPH NODES: No lymphadenopathy.  ABDOMINAL WALL: Postsurgical changes.  BONES: Degenerative changes. Generalized osteopenia.    IMPRESSION:  Full examination of the intra-abdominal viscera and vasculature is   limited without the addition of IV contrast.    No CT evidence of acute traumatic injury in the chest, abdomen, or pelvis.    Probable fecal impaction and question of associated stercoral proctitis.    Mild ascending aortic dilatation 3.8 cm.    < end of copied text >      Napoleon Moreland MD; Division of Infectious Disease; Pager: [phone]; nights and weekends: [phone]
[facility] DIVISION OF KIDNEY DISEASES AND HYPERTENSION -- [phone]  -- INITIAL CONSULT NOTE  --------------------------------------------------------------------------------  HPI: Pt is a 62-year-old Male with significant PMHx of ESRD on HD TIW (TTS- started In Jan 23), RCC s/p L partial nephrectomy, HTN, CLD, CVA (4/21) with quadriparesis, neurogenic bladder, Hx of seizures and Afib and rest as mentioned below who was transferred from [facility] to [facility] after a mechanical fall (fall from bed). Of note, pt recently presented for similar symptoms on 3/24 at [facility]. Nephrology team was consulted for ESRD/HD management.    Per chart review, Pt was recently started on HD in Jan 2023 in a hospital in [place]. Pt currently received HD TIW (TTS) at [facility]. Last HD done on Tuesday (3/28/23) via R IJ tunneled HD catheter. Pt follows with Dr. Mercedes (Nephrologist). Pt seen and examined at bedside in ER. Pt awake complaints of HA. Denies SOB, CP, N/V, or dizziness.     PAST HISTORY  --------------------------------------------------------------------------------  PAST MEDICAL & SURGICAL HISTORY:  Hypertension  Chronic CHF  Atrial fibrillation  Hepatitis C    FAMILY HISTORY:    PAST SOCIAL HISTORY:    ALLERGIES & MEDICATIONS  --------------------------------------------------------------------------------  Allergies    acetaminophen (Rash)  aspirin (Rash)  bioflavonoids (Rash)  cefaclor (Rash)  ceftriaxone (Rash)  clindamycin (Rash)  Codeine Sulfate (Rash)  haloperidol (Rash)  ibuprofen (Rash)  iopamidol (Rash)  Onions (Rash)  penicillins (Rash)  tromethamine (Rash)    Intolerances    Standing Inpatient Medications  artificial  tears Solution 1 Drop(s) Both EYES four times a day  atorvastatin 40 milliGRAM(s) Oral at bedtime  baclofen 10 milliGRAM(s) Oral every 12 hours  clobetasol 0.05% Cream 1 Application(s) Topical two times a day  clonazePAM  Tablet 0.5 milliGRAM(s) Oral two times a day  levETIRAcetam 500 milliGRAM(s) Oral daily  levETIRAcetam 250 milliGRAM(s) Oral <User Schedule>  levothyroxine 100 MICROGram(s) Oral daily  metoprolol succinate ER 50 milliGRAM(s) Oral daily  mupirocin 2% Ointment 1 Application(s) Topical three times a day  Nephro-mirna 1 Tablet(s) Oral daily  polyethylene glycol 3350 17 Gram(s) Oral daily  senna 2 Tablet(s) Oral at bedtime  sevelamer carbonate 1600 milliGRAM(s) Oral three times a day  tamsulosin 0.4 milliGRAM(s) Oral at bedtime    PRN Inpatient Medications  acetaminophen     Tablet .. 650 milliGRAM(s) Oral every 6 hours PRN  bisacodyl Suppository 10 milliGRAM(s) Rectal daily PRN  HYDROmorphone  Injectable 0.5 milliGRAM(s) IV Push every 6 hours PRN  melatonin 3 milliGRAM(s) Oral at bedtime PRN  ondansetron Injectable 4 milliGRAM(s) IV Push every 8 hours PRN    REVIEW OF SYSTEMS  --------------------------------------------------------------------------------  Gen: No fevers/chills  Skin: No rashes  Head/Eyes/Ears: Normal hearing,   Respiratory: No dyspnea, cough  CV: No chest pain  GI: No abdominal pain, diarrhea  : No dysuria, hematuria  MSK: No  edema  Heme: No easy bruising or bleeding  Psych: No significant depression    All other systems were reviewed and are negative, except as noted.    VITALS/PHYSICAL EXAM  --------------------------------------------------------------------------------  T(C): 36.5 (03-29-23 @ 11:40), Max: 36.8 (03-29-23 @ 01:11)  HR: 72 (03-29-23 @ 11:40) (70 - 102)  BP: 134/85 (03-29-23 @ 11:40) (102/63 - 160/67)  RR: 18 (03-29-23 @ 11:40) (16 - 20)  SpO2: 100% (03-29-23 @ 11:40) (95% - 100%)  Wt(kg): --    Weight (kg): 95.3 (03-28-23 @ 15:46)    Physical Exam:  	Gen: NAD  	HEENT: MMM  	Pulm: CTA B/L  	CV: S1S2  	Abd: Soft, +BS   	Ext: No LE edema B/L,  	Neuro: Awake  	Skin: Warm and dry  	Vascular access: RIJ tunneled HD catheter +    LABS/STUDIES  --------------------------------------------------------------------------------              7.2    10.35 >-----------<  167      [03-29-23 @ 06:52]              24.2     142  |  109  |  65  ----------------------------<  76      [03-29-23 @ 06:52]  6.3   |  20  |  4.93        Ca     9.1     [03-29-23 @ 06:52]    TPro  6.9  /  Alb  3.5  /  TBili  0.5  /  DBili  x   /  AST  71  /  ALT  38  /  AlkPhos  424  [03-29-23 @ 06:52]    PT/INR: PT 20.4 , INR 1.76       [03-28-23 @ 21:00]  PTT: 39.9       [03-28-23 @ 21:00]    Creatinine Trend:  SCr 4.93 [03-29 @ 06:52]  SCr 5.12 [03-28 @ 19:37]  SCr 4.43 [03-24 @ 06:10]  SCr 4.18 [03-23 @ 14:51]    Urinalysis - [03-23-23 @ 15:53]      Color Other / Appearance Slightly Turbid / SG 1.025 / pH 7.0      Gluc 100 mg/dL / Ketone Negative  / Bili Negative / Urobili Negative       Blood Large / Protein 300 mg/dL / Leuk Est Small / Nitrite Positive      RBC >50 / WBC 5 / Hyaline 0 / Gran  / Sq Epi  / Non Sq Epi 0 / Bacteria Few    HCV 8.61, Reactive      [03-24-23 @ 06:12]

## 2023-04-03 NOTE — PROGRESS NOTE ADULT - PROBLEM SELECTOR PLAN 10
Mild leukocytosis. Possibly reactive. F/u infectious w/u. Currently afebrile. Hold abx for now. Monitor WBC, fever curve -levothyroxine

## 2023-04-03 NOTE — PROGRESS NOTE ADULT - PROBLEM SELECTOR PLAN 9
Improved mental status since admission. A&Ox3 currently. CTH unremarkable.   -Psych eval given agitation, paranoia; appreciate recs. Does not have capacity at this time. Trial zypmaty harrisn #HyperK  -Renal consulted; HD per renal; patient had been refusing HD. Agreed to it on 3/31. Plan for HD 4/3   - Sevelamer  - Monitor BMP #HyperK secondary to ESRD and missing HD   -Renal consulted; HD per renal; patient had been refusing HD. Agreed to it on 3/31. Plan for HD 4/3   - Sevelamer  - Monitor daily BMP  -EKG on admission unremarkable  -in the setting of antipsychotics and electrolyte abnormalities patient requires close monitoring with more frequent EKGs or tele monitor.

## 2023-04-03 NOTE — PROGRESS NOTE ADULT - PROBLEM SELECTOR PLAN 1
Pt. with ESRD on HD three times a week (TTS). Pt being admitted after mechanical fall. Last outpt HD was done on Tuesday (3/24/23) via RIJ tunneled HD catheter.     Pt goes to [facility] dialysis unit. Follows with Dr. Mercedes. Labs reviewed.   Plan for maintenance HD today since last HD was friday   Psych consult reviewed. Dose meds as per HD.

## 2023-04-04 NOTE — PROGRESS NOTE ADULT - SUBJECTIVE AND OBJECTIVE BOX
[facility] Division of Kidney Diseases & Hypertension  FOLLOW UP NOTE  [phone]--------------------------------------------------------------------------------  Chief Complaint: ESRD/Ongoing hemodialysis requirement    24 hour events/subjective: Pt. refused HD treatment yesterday due to itching and pain, requesting Dialudid prior to HD treatments. Pt. was seen and evaluated at bedside this morning. He reports low back pain. Otherwise, feels well. He denied any headaches, fevers/chills, chest pain, SOB, and abdominal pain.      PAST HISTORY  --------------------------------------------------------------------------------  No significant changes to PMH, PSH, FHx, SHx, unless otherwise noted    ALLERGIES & MEDICATIONS  --------------------------------------------------------------------------------  Allergies    acetaminophen (Rash)  aspirin (Rash)  bioflavonoids (Rash)  cefaclor (Rash)  ceftriaxone (Rash)  clindamycin (Rash)  Codeine Sulfate (Rash)  haloperidol (Rash)  ibuprofen (Rash)  iopamidol (Rash)  Onions (Rash)  penicillins (Rash)  tromethamine (Rash)    Intolerances      Standing Inpatient Medications  acetaminophen   IVPB .. 1000 milliGRAM(s) IV Intermittent once  apixaban 5 milliGRAM(s) Oral two times a day  artificial  tears Solution 1 Drop(s) Both EYES four times a day  atorvastatin 40 milliGRAM(s) Oral at bedtime  baclofen 10 milliGRAM(s) Oral every 12 hours  chlorhexidine 2% Cloths 1 Application(s) Topical <User Schedule>  clobetasol 0.05% Cream 1 Application(s) Topical two times a day  clonazePAM  Tablet 0.5 milliGRAM(s) Oral two times a day  epoetin marilin-epbx (RETACRIT) Injectable 4000 Unit(s) IV Push <User Schedule>  ertapenem  IVPB 500 milliGRAM(s) IV Intermittent every 24 hours  levETIRAcetam 500 milliGRAM(s) Oral daily  levETIRAcetam 250 milliGRAM(s) Oral <User Schedule>  levothyroxine 100 MICROGram(s) Oral daily  lidocaine   4% Patch 1 Patch Transdermal daily  lidocaine   4% Patch 1 Patch Transdermal every 24 hours  metoprolol succinate ER 50 milliGRAM(s) Oral daily  mupirocin 2% Ointment 1 Application(s) Topical three times a day  Nephro-mirna 1 Tablet(s) Oral daily  nystatin Powder 1 Application(s) Topical three times a day  OLANZapine 2.5 milliGRAM(s) Oral at bedtime  polyethylene glycol 3350 17 Gram(s) Oral daily  senna 2 Tablet(s) Oral at bedtime  sevelamer carbonate 1600 milliGRAM(s) Oral three times a day  tamsulosin 0.4 milliGRAM(s) Oral at bedtime    PRN Inpatient Medications  acetaminophen     Tablet .. 650 milliGRAM(s) Oral every 6 hours PRN  bisacodyl Suppository 10 milliGRAM(s) Rectal daily PRN  diphenhydrAMINE 25 milliGRAM(s) Oral once PRN  HYDROmorphone  Injectable 0.5 milliGRAM(s) IV Push once PRN  HYDROmorphone  Injectable 0.5 milliGRAM(s) IV Push once PRN  LORazepam     Tablet 1 milliGRAM(s) Oral every 6 hours PRN  melatonin 3 milliGRAM(s) Oral at bedtime PRN  OLANZapine 2.5 milliGRAM(s) Oral every 6 hours PRN  ondansetron Injectable 4 milliGRAM(s) IV Push every 8 hours PRN      REVIEW OF SYSTEMS  --------------------------------------------------------------------------------  See HPI    VITALS/PHYSICAL EXAM  --------------------------------------------------------------------------------  T(C): 36.4 (04-04-23 @ 05:41), Max: 36.5 (04-03-23 @ 22:18)  HR: 80 (04-04-23 @ 10:05) (78 - 82)  BP: 128/74 (04-04-23 @ 10:05) (108/63 - 128/74)  RR: 18 (04-04-23 @ 10:05) (18 - 18)  SpO2: 98% (04-04-23 @ 10:05) (98% - 100%)  Wt(kg): --      04-03-23 @ 07:01  -  04-04-23 @ 07:00  --------------------------------------------------------  IN: 580 mL / OUT: 2300 mL / NET: -1720 mL      Physical Exam:  Gen: NAD,  Pulm: CTA B/L  CV: RRR, S1S2  Abd: +BS, soft,   LE: Warm, FROM,   Skin: Warm, lower ext wound dressing  Vascular access: cath    LABS/STUDIES  --------------------------------------------------------------------------------              7.7    18.50 >-----------<  166      [04-03-23 @ 07:03]              24.5     137  |  101  |  98  ----------------------------<  103      [04-03-23 @ 06:57]  4.8   |  18  |  6.71        Ca     8.9     [04-03-23 @ 06:57]    Creatinine Trend:  SCr 6.71 [04-03 @ 06:57]  SCr 4.62 [04-01 @ 09:59]  SCr 5.40 [03-31 @ 09:50]  SCr 5.17 [03-30 @ 00:04]  SCr 4.93 [03-29 @ 06:52]    Urinalysis - [04-03-23 @ 15:52]      Color Light Yellow / Appearance Clear / SG 1.011 / pH 6.0      Gluc 100 mg/dL / Ketone Negative  / Bili Negative / Urobili Negative       Blood Large / Protein 30 mg/dL / Leuk Est Negative / Nitrite Negative      RBC 76 / WBC 2 / Hyaline 3 / Gran  / Sq Epi  / Non Sq Epi 0 / Bacteria Negative

## 2023-04-04 NOTE — PHYSICAL THERAPY INITIAL EVALUATION ADULT - GENERAL OBSERVATIONS, REHAB EVAL
pt received in bed all siderails up HOB 30 degrees call bell,phone and table in reach , pt on 1:1 present ; bed in lowest position ; pt received with PCA eating rose crackers pca feed pt as pt has very limited use of B hands and rom ; dressing B lower legs and R foot/ankle c, d,i , sm scabs diffuse on feet / toes , pt log roll mod /max of 1 pt skin sacrum excoriated erythema w/ sloughing at proximal gluteal fold and sacrum , rn Lety in to see , PCA clean and apply barrier cream, + suprapubic tube intact ; phlebotomy  in to see and pt decline having blood drawn even though phlebotomy explain importance of

## 2023-04-04 NOTE — PROGRESS NOTE ADULT - PROBLEM SELECTOR PLAN 8
- normal saline cleanse Medihoney and DSD to bilateral foot ulcerations daily.  - Podiatry consulted:  - 3/24 R ankle XR: negative for OM or soft tissue tracking air  - Recommend decubitus precautions and use of Z flow boot  - Recommend normal saline cleanse prior to dressing changes daily  - frequent pressure off set

## 2023-04-04 NOTE — PHYSICAL THERAPY INITIAL EVALUATION ADULT - LEVEL OF INDEPENDENCE, REHAB EVAL
PCA c,lean + BM and barrier cream applied , sacrum excoriiated pt educ re importance of allow to turn every 1-2 hrs for pressure relief at first reluctant then allow/moderate assist (50% patients effort)/maximum assist (25% patients effort)

## 2023-04-04 NOTE — PHYSICAL THERAPY INITIAL EVALUATION ADULT - IMPAIRMENTS CONTRIBUTING IMPAIRED BED MOBILITY, REHAB EVAL
decrease endurance , sat on EOb x several minutes mod of 1 intermittent mod of1 and min of 1 work on pull to sit , pt report buttock bothering him , pt educ again re importance of pressure relief/impaired balance/cognition/decreased flexibility/impaired motor control/abnormal muscle tone/impaired postural control/decreased ROM/decreased sensation/impaired sensory feedback/decreased strength

## 2023-04-04 NOTE — PHYSICAL THERAPY INITIAL EVALUATION ADULT - PRECAUTIONS/LIMITATIONS, REHAB EVAL
on CONTACT PRECAUTIONS FOR [facility]/isolation precautions on CONTACT PRECAUTIONS FOR C [facility]/fall precautions/isolation precautions/seizure precautions

## 2023-04-04 NOTE — PROGRESS NOTE ADULT - PROBLEM SELECTOR PLAN 3
Patient with anemia in the setting of ESRD. Hemoglobin (7.7) below target range. Received 2 units PRBC yesterday. Blood transfusion per primary team. Will give VIBHA with HD. Monitor hemoglobin, goal: 10-11.

## 2023-04-04 NOTE — PROGRESS NOTE ADULT - SUBJECTIVE AND OBJECTIVE BOX
Patient is a 62y old  Male who presents with a chief complaint of worsening anemia (2023 13:10)      SUBJECTIVE / OVERNIGHT EVENTS: pt somewhat agitated, has some back pain, no cp, sob    MEDICATIONS  (STANDING):  acetaminophen   IVPB .. 1000 milliGRAM(s) IV Intermittent once  apixaban 5 milliGRAM(s) Oral two times a day  artificial  tears Solution 1 Drop(s) Both EYES four times a day  atorvastatin 40 milliGRAM(s) Oral at bedtime  baclofen 10 milliGRAM(s) Oral every 12 hours  chlorhexidine 2% Cloths 1 Application(s) Topical <User Schedule>  clobetasol 0.05% Cream 1 Application(s) Topical two times a day  clonazePAM  Tablet 0.5 milliGRAM(s) Oral two times a day  epoetin marilin-epbx (RETACRIT) Injectable 4000 Unit(s) IV Push <User Schedule>  ertapenem  IVPB 500 milliGRAM(s) IV Intermittent every 24 hours  levETIRAcetam 500 milliGRAM(s) Oral daily  levETIRAcetam 250 milliGRAM(s) Oral <User Schedule>  levothyroxine 100 MICROGram(s) Oral daily  lidocaine   4% Patch 1 Patch Transdermal daily  lidocaine   4% Patch 1 Patch Transdermal every 24 hours  metoprolol succinate ER 50 milliGRAM(s) Oral daily  mupirocin 2% Ointment 1 Application(s) Topical three times a day  Nephro-mirna 1 Tablet(s) Oral daily  nystatin Powder 1 Application(s) Topical three times a day  OLANZapine 2.5 milliGRAM(s) Oral at bedtime  polyethylene glycol 3350 17 Gram(s) Oral daily  senna 2 Tablet(s) Oral at bedtime  sevelamer carbonate 1600 milliGRAM(s) Oral three times a day  tamsulosin 0.4 milliGRAM(s) Oral at bedtime    MEDICATIONS  (PRN):  acetaminophen     Tablet .. 650 milliGRAM(s) Oral every 6 hours PRN Temp greater or equal to 38C (100.4F), Mild Pain (1 - 3)  bisacodyl Suppository 10 milliGRAM(s) Rectal daily PRN Constipation  diphenhydrAMINE 25 milliGRAM(s) Oral once PRN Rash and/or Itching  HYDROmorphone  Injectable 0.5 milliGRAM(s) IV Push once PRN please administer 30 min [id number]  HYDROmorphone  Injectable 0.5 milliGRAM(s) IV Push once PRN please administer before HD  LORazepam     Tablet 1 milliGRAM(s) Oral every 6 hours PRN Anxiety(SEVERE)  melatonin 3 milliGRAM(s) Oral at bedtime PRN Insomnia  OLANZapine 2.5 milliGRAM(s) Oral every 6 hours PRN agitation/paranoid  ondansetron Injectable 4 milliGRAM(s) IV Push every 8 hours PRN Nausea and/or Vomiting        CAPILLARY BLOOD GLUCOSE        I&O's Summary    2023 07:01  -  2023 07:00  --------------------------------------------------------  IN: 580 mL / OUT: 2300 mL / NET: -1720 mL        PHYSICAL EXAM:    GENERAL: somnolent and sleepy, patient started flixkering his upper and lower extremities uppon approaching to perform examination, chronically ill appearing   HEAD:  Atraumatic, Normocephalic  ENT:dry mucous membranes   NECK: supple, no apparent JVD   CHEST/LUNG: Clear to auscultation bilaterally; No wheeze,   HEART: unable to complete exam as patient started throwing his arms around   ABDOMEN: soft, some tenderness   EXTREMITIES:  there are multiple lower extremity scars with dried overlying crusting  NEUROLOGY: unable to assess as patient was somnolent and didnt cooperate   SKIN:  toes w/  onychomycosis , b/l foot/ heel wounds in dressing , seborrheic dermatitis     LABS:                        7.7    18.50 )-----------( 166      ( 2023 07:03 )             24.5     04-03    137  |  101  |  98<H>  ----------------------------<  103<H>  4.8   |  18<L>  |  6.71<H>    Ca    8.9      2023 06:57            Urinalysis Basic - ( 2023 15:52 )    Color: Light Yellow / Appearance: Clear / S.011 / pH: x  Gluc: x / Ketone: Negative  / Bili: Negative / Urobili: Negative   Blood: x / Protein: 30 mg/dL / Nitrite: Negative   Leuk Esterase: Negative / RBC: 76 /hpf / WBC 2 /HPF   Sq Epi: x / Non Sq Epi: 0 /hpf / Bacteria: Negative        RADIOLOGY & ADDITIONAL TESTS:    Imaging Personally Reviewed:    Consultant(s) Notes Reviewed:      Care Discussed with Consultants/Other Providers:

## 2023-04-04 NOTE — PROGRESS NOTE ADULT - PROBLEM SELECTOR PLAN 4
Pt with hyperphosphatemia in the setting of ESRD. Pt. on phosphate binders with meals. Recommend checking serum phosphorus level. Low phosphorus diet. Monitor serum phosphorus, goal: 3.5-5.5.    If you have any questions, please feel free to contact me  Kieran Lindo  Nephrology Fellow  [phone] / [employer] Teams(Preferred)  (After 5pm or on weekends please page the on-call fellow)

## 2023-04-04 NOTE — PROGRESS NOTE ADULT - PROBLEM SELECTOR PLAN 2
Pt. with hyperkalemia in the setting of ESRD. Serum potassium improved to 4.8 yesterday, no labs available for review today. Monitor serum potassium.

## 2023-04-04 NOTE — PROGRESS NOTE ADULT - PROBLEM SELECTOR PLAN 2
- Uptrending leukocytosis.   - + C aureus   - f/u blood cx, ua, urine cx   - start IV ertapenem 4/3--  - f/u CT IV A/P and CT L spine   - ID eval  - currently hemodynamically stable

## 2023-04-04 NOTE — PROGRESS NOTE ADULT - PROBLEM SELECTOR PLAN 3
- CTH unremarkable.   - Psych eval given agitation, paranoia; appreciate recs. Does not have capacity at this time. Trial zyprexa prn and Lorazepam 1mg PRn for agitation and anxiety and Clonazepam

## 2023-04-04 NOTE — PROGRESS NOTE ADULT - PROBLEM SELECTOR PLAN 1
Pt. with ESRD on HD three times a week (TTS). Pt being admitted after mechanical fall. Last HD treatment was on 3/31 via tunneled HD catheter. Pt. refused HD treatment yesterday due to itching and low back pain.      Pt goes to [facility] dialysis unit. Follows with Dr. Mercedes. Labs reviewed.   Plan for maintenance HD today since last HD was friday   Psych consult reviewed. Avoid nephrotoxins. Dose meds as per HD.

## 2023-04-04 NOTE — PHYSICAL THERAPY INITIAL EVALUATION ADULT - BALANCE DISTURBANCE, IDENTIFIED IMPAIRMENT CONTRIBUTE, REHAB EVAL
impaired coordination/impaired motor control/abnormal muscle tone/impaired postural control/decreased sensation/impaired sensory feedback/decreased strength

## 2023-04-04 NOTE — PHYSICAL THERAPY INITIAL EVALUATION ADULT - IMPAIRMENTS FOUND, PT EVAL
aerobic capacity/endurance/cognitive impairment/decreased midline orientation/gait, locomotion, and balance/gross motor/joint integrity and mobility/muscle strength/neuromotor development and sensory integration/ROM/sensory integrity

## 2023-04-04 NOTE — PROGRESS NOTE ADULT - PROBLEM SELECTOR PLAN 4
- history c/w mechanical, no acute injuries noted on imaging. CTH, CT c-spine, XRay pelvis unremarkable for any acute pathology.

## 2023-04-04 NOTE — PROGRESS NOTE ADULT - PROBLEM SELECTOR PLAN 5
- Resumed on Eliquis given stable H/H over 24-48 hour period.   -continue Metoprolol  -EKG on admission unremarkable,

## 2023-04-04 NOTE — PROGRESS NOTE ADULT - SUBJECTIVE AND OBJECTIVE BOX
Follow Up:      Interval History:    REVIEW OF SYSTEMS  [  ] ROS unobtainable because:    [  ] All other systems negative except as noted below    Constitutional:  [ ] fever [ ] chills  [ ] weight loss  [ ] weakness  Skin:  [ ] rash [ ] phlebitis	  Eyes: [ ] icterus [ ] pain  [ ] discharge	  ENMT: [ ] sore throat  [ ] thrush [ ] ulcers [ ] exudates  Respiratory: [ ] dyspnea [ ] hemoptysis [ ] cough [ ] sputum	  Cardiovascular:  [ ] chest pain [ ] palpitations [ ] edema	  Gastrointestinal:  [ ] nausea [ ] vomiting [ ] diarrhea [ ] constipation [ ] pain	  Genitourinary:  [ ] dysuria [ ] frequency [ ] hematuria [ ] discharge [ ] flank pain  [ ] incontinence  Musculoskeletal:  [ ] myalgias [ ] arthralgias [ ] arthritis  [ ] back pain  Neurological:  [ ] headache [ ] seizures  [ ] confusion/altered mental status    Allergies  acetaminophen (Rash)  aspirin (Rash)  bioflavonoids (Rash)  cefaclor (Rash)  ceftriaxone (Rash)  clindamycin (Rash)  Codeine Sulfate (Rash)  haloperidol (Rash)  ibuprofen (Rash)  iopamidol (Rash)  Onions (Rash)  penicillins (Rash)  tromethamine (Rash)        ANTIMICROBIALS:  ertapenem  IVPB 500 every 24 hours      OTHER MEDS:  MEDICATIONS  (STANDING):  acetaminophen     Tablet .. 650 every 6 hours PRN  acetaminophen   IVPB .. 1000 once  apixaban 5 two times a day  atorvastatin 40 at bedtime  baclofen 10 every 12 hours  bisacodyl Suppository 10 daily PRN  clonazePAM  Tablet 0.5 two times a day  diphenhydrAMINE 25 once PRN  epoetin marilin-epbx (RETACRIT) Injectable 4000 <User Schedule>  HYDROmorphone  Injectable 0.5 once PRN  levETIRAcetam 500 daily  levETIRAcetam 250 <User Schedule>  levothyroxine 100 daily  LORazepam     Tablet 1 every 6 hours PRN  melatonin 3 at bedtime PRN  metoprolol succinate ER 50 daily  OLANZapine 2.5 at bedtime  OLANZapine 2.5 every 6 hours PRN  ondansetron Injectable 4 every 8 hours PRN  polyethylene glycol 3350 17 daily  senna 2 at bedtime  tamsulosin 0.4 at bedtime      Vital Signs Last 24 Hrs  T(C): 36.3 (2023 14:55), Max: 36.5 (2023 22:18)  T(F): 97.3 (2023 14:55), Max: 97.7 (2023 22:18)  HR: 81 (2023 14:55) (78 - 85)  BP: 110/71 (2023 14:55) (108/63 - 128/74)  BP(mean): --  RR: 17 (2023 14:55) (17 - 18)  SpO2: 99% (2023 14:55) (98% - 100%)    Parameters below as of 2023 14:55  Patient On (Oxygen Delivery Method): room air        PHYSICAL EXAMINATION:  General: Alert and Awake, NAD  HEENT: PERRL, EOMI  Neck: Supple  Cardiac: RRR, No M/R/G  Resp: CTAB, No Wh/Rh/Ra  Abdomen: NBS, NT/ND, No HSM, No rigidity or guarding  MSK: No LE edema. No Calf tenderness  : No tran  Skin: No rashes or lesions. Skin is warm and dry to the touch.   Neuro: Alert and Awake. CN 2-12 Grossly intact. Moves all four extremities spontaneously.  Psych: Calm, Pleasant, Cooperative                          7.4    7.30  )-----------( 122      ( 2023 15:36 )             22.4       04-04    136  |  102  |  86<H>  ----------------------------<  129<H>  4.0   |  21<L>  |  4.80<H>    Ca    8.4      2023 16:00  Phos  4.0     04-04  Mg     2.2     04-04    TPro  6.3  /  Alb  3.4  /  TBili  0.3  /  DBili  x   /  AST  28  /  ALT  25  /  AlkPhos  348<H>  04-04      Urinalysis Basic - ( 2023 15:52 )    Color: Light Yellow / Appearance: Clear / S.011 / pH: x  Gluc: x / Ketone: Negative  / Bili: Negative / Urobili: Negative   Blood: x / Protein: 30 mg/dL / Nitrite: Negative   Leuk Esterase: Negative / RBC: 76 /hpf / WBC 2 /HPF   Sq Epi: x / Non Sq Epi: 0 /hpf / Bacteria: Negative        MICROBIOLOGY:  v  .Other Other  23   Few Candida auris  --  --      Wound Wound  23   No growth at 48 hours  --  --      Clean Catch Clean Catch (Midstream)  23   <10,000 CFU/mL Normal Urogenital Nicolasa  --  --      .Blood Blood-Venous  23   No Growth Final  --  --      .Blood Blood-Peripheral  23   No Growth Final  --  --                RADIOLOGY:    <The imaging below has been reviewed and visualized by me independently. Findings as detailed in report below> Follow Up:  leukocytosis    Interval History: was refusing labs and antibiotics over last 24 hours. now seen while on dialysis and was encephalopathic.     REVIEW OF SYSTEMS  [ x ] ROS unobtainable because:  encephalopathic  [  ] All other systems negative except as noted below    Constitutional:  [ ] fever [ ] chills  [ ] weight loss  [ ] weakness  Skin:  [ ] rash [ ] phlebitis	  Eyes: [ ] icterus [ ] pain  [ ] discharge	  ENMT: [ ] sore throat  [ ] thrush [ ] ulcers [ ] exudates  Respiratory: [ ] dyspnea [ ] hemoptysis [ ] cough [ ] sputum	  Cardiovascular:  [ ] chest pain [ ] palpitations [ ] edema	  Gastrointestinal:  [ ] nausea [ ] vomiting [ ] diarrhea [ ] constipation [ ] pain	  Genitourinary:  [ ] dysuria [ ] frequency [ ] hematuria [ ] discharge [ ] flank pain  [ ] incontinence  Musculoskeletal:  [ ] myalgias [ ] arthralgias [ ] arthritis  [ ] back pain  Neurological:  [ ] headache [ ] seizures  [ ] confusion/altered mental status    Allergies  acetaminophen (Rash)  aspirin (Rash)  bioflavonoids (Rash)  cefaclor (Rash)  ceftriaxone (Rash)  clindamycin (Rash)  Codeine Sulfate (Rash)  haloperidol (Rash)  ibuprofen (Rash)  iopamidol (Rash)  Onions (Rash)  penicillins (Rash)  tromethamine (Rash)        ANTIMICROBIALS:  ertapenem  IVPB 500 every 24 hours      OTHER MEDS:  MEDICATIONS  (STANDING):  acetaminophen     Tablet .. 650 every 6 hours PRN  acetaminophen   IVPB .. 1000 once  apixaban 5 two times a day  atorvastatin 40 at bedtime  baclofen 10 every 12 hours  bisacodyl Suppository 10 daily PRN  clonazePAM  Tablet 0.5 two times a day  diphenhydrAMINE 25 once PRN  epoetin marilin-epbx (RETACRIT) Injectable 4000 <User Schedule>  HYDROmorphone  Injectable 0.5 once PRN  levETIRAcetam 500 daily  levETIRAcetam 250 <User Schedule>  levothyroxine 100 daily  LORazepam     Tablet 1 every 6 hours PRN  melatonin 3 at bedtime PRN  metoprolol succinate ER 50 daily  OLANZapine 2.5 at bedtime  OLANZapine 2.5 every 6 hours PRN  ondansetron Injectable 4 every 8 hours PRN  polyethylene glycol 3350 17 daily  senna 2 at bedtime  tamsulosin 0.4 at bedtime      Vital Signs Last 24 Hrs  T(C): 36.3 (2023 14:55), Max: 36.5 (2023 22:18)  T(F): 97.3 (2023 14:55), Max: 97.7 (2023 22:18)  HR: 81 (2023 14:55) (78 - 85)  BP: 110/71 (2023 14:55) (108/63 - 128/74)  BP(mean): --  RR: 17 (2023 14:55) (17 - 18)  SpO2: 99% (2023 14:55) (98% - 100%)    Parameters below as of 2023 14:55  Patient On (Oxygen Delivery Method): room air    PHYSICAL EXAMINATION:  General: Awake but not alert.   Cardiac: RRR, No M/R/G  Resp: CTAB, No Wh/Rh/Ra  Abdomen: NBS, NT/ND, No HSM, No rigidity or guarding  MSK: 1-2+ LE edema. No Calf tenderness  Vasc: R Dialysis Catheter (No surrounding erythema, drainage or tenderness to palpation)  Skin: No rashes or lesions. Skin is warm and dry to the touch.   Neuro: Awake but not alert. . CN 2-12 Grossly intact. Moves all four extremities spontaneously.  Psych: Encephalopathic - unable to assess                          7.4    7.30  )-----------( 122      ( 2023 15:36 )             22.4       04-04    136  |  102  |  86<H>  ----------------------------<  129<H>  4.0   |  21<L>  |  4.80<H>    Ca    8.4      2023 16:00  Phos  4.0     04-04  Mg     2.2     04-04    TPro  6.3  /  Alb  3.4  /  TBili  0.3  /  DBili  x   /  AST  28  /  ALT  25  /  AlkPhos  348<H>  04-04      Urinalysis Basic - ( 2023 15:52 )    Color: Light Yellow / Appearance: Clear / S.011 / pH: x  Gluc: x / Ketone: Negative  / Bili: Negative / Urobili: Negative   Blood: x / Protein: 30 mg/dL / Nitrite: Negative   Leuk Esterase: Negative / RBC: 76 /hpf / WBC 2 /HPF   Sq Epi: x / Non Sq Epi: 0 /hpf / Bacteria: Negative        MICROBIOLOGY:  v  .Other Other  23   Few Candida auris  --  --      Wound Wound  23   No growth at 48 hours  --  --      Clean Catch Clean Catch (Midstream)  23   <10,000 CFU/mL Normal Urogenital Nicolasa  --  --      .Blood Blood-Venous  23   No Growth Final  --  --      .Blood Blood-Peripheral  23   No Growth Final  --  --                RADIOLOGY:    <The imaging below has been reviewed and visualized by me independently. Findings as detailed in report below>    < from: Xray Hip w/ Pelvis 2 or 3 Views, Left (23 @ 19:15) >  IMPRESSION:  No hip fractures or dislocations.    < end of copied text >

## 2023-04-04 NOTE — PHYSICAL THERAPY INITIAL EVALUATION ADULT - NSPTDISCHREC_GEN_A_CORE
with PT services to work toward being able to sit upright , strength , endurance , fall prevention education and pressure relief education continued/Long Term Care/SNF

## 2023-04-04 NOTE — PHYSICAL THERAPY INITIAL EVALUATION ADULT - ACTIVE RANGE OF MOTION EXAMINATION, REHAB EVAL
LLE hip/knee wfl's arom , L Df - approx -20 degrees prom , can PF ; R hip flex to 60 degrees , hip abd wfl's aarom , R knee flex to approx 50 degrees , knee extension to 0 degrees , R foot DF prom approx - 20 degrees can push down ; B shoulders wfl's arom , elbows L flex to 100 degrees , R to 90 degrees , wrists min arom approx 20 degrees , finger flex not quite half prom L and can prom extend L ; R hand finger flex 3/4 arom , prom extension half the rom/Left LE Active ROM was WFL (within functional limits)

## 2023-04-04 NOTE — PHYSICAL THERAPY INITIAL EVALUATION ADULT - SITTING BALANCE: STATIC
to poor plus need mod of 1 to sit upright intermittent min assist of another person in addition to mod of 1/poor balance

## 2023-04-04 NOTE — PHYSICAL THERAPY INITIAL EVALUATION ADULT - PERTINENT HX OF CURRENT PROBLEM, REHAB EVAL
62M with of ESRD on HD ( T/Thu/Sat), diastolic CHF, HTN, AFib s/p AICD ([employer]) on Eliquis and Plavix, b/l CVA quadriplegic s/p suprapubic cath,  seizure on keppra, , chronic pressure ulcers,  history of Hep C (3/24/23: Hep C RNA - UNDETECTABLE)  admitted 3/28/23 after fall. Recently hospitalized 3/23 --> 3/24/32 after fall with AICD discharging, but on interrogation, no events/discharges noted.  Given Ceftriaxone/ Clinda x 1. Cardiac evaluation non revealing. He has had agitation; Colonized with Candida auris  no evidence of invasive candidal disease at present ;Progressive Leukocytosis  XRAY ankle tibia and Fibula  R 3/23/23 :At the knee, there are tricompartment osteophytes and there is mild medial and patellofemoral compartment joint space narrowing. There is a large focus of dystrophic calcification abutting the medial femoral condyle which could be the sequela of remote ligamentous injury or could represent calcific tendinosis. There is enthesopathy at the quadriceps tendon insertion and at the patellar tendon origin. At the ankle, there are moderate tibiotalar and midfoot joint degenerative changes with prominent osteophyte formation. There is mature-appearing periosteal reaction along the shafts of the distal fibula and tibia which may relate to vascular insufficiency. There is focal diminution of the soft tissues overlying the posterior calcaneus consistent with ulceration, without underlying radiographic evidence for osteomyelitis. There is Achilles insertional enthesopathy and a plantar calcaneal spur.  3/29/23 Brain CT: No change since 3/28/2023. Bifrontal encephalomalacia and gliosis have the appearance of old trauma. No acute hemorrhage. Cervical spine: Mild degenerative changes. No acute fractures or dislocations.  3/29/23 XRAY L Hip : nofx or dislocation , mild OA B hips , lower L/S degenerative changes ;  WBC 18.50 and H/ H 7.7/24.5 on 4/3/23   4/4/23 pt refuse blood draws , HD , restless and agitated

## 2023-04-04 NOTE — PHYSICAL THERAPY INITIAL EVALUATION ADULT - MANUAL MUSCLE TESTING RESULTS, REHAB EVAL
shoulders 3- to 3/5 , elbows 3/5 , wrist 2-/5 , R hand flex 3-/5 , extend R fingers 2-/5 , L fingers flex 0/5 1+/5 extend ; pt is L handed ; hip L and knee 3-/5 , DF 0/5 , PF 2+/5 ; R df 0/5 r pf 3-/5 ; r HIP 2+/5 , r KNEE 2+/5

## 2023-04-04 NOTE — PROGRESS NOTE ADULT - PROBLEM SELECTOR PLAN 9
#HyperK secondary to ESRD and missing HD   -Renal consulted; HD per renal; patient had been refusing HD. Agreed to it on 3/31. Plan for HD 4/4   - Sevelamer  - Monitor daily BMP  -EKG on admission unremarkable

## 2023-04-04 NOTE — PHYSICAL THERAPY INITIAL EVALUATION ADULT - ADDITIONAL COMMENTS
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

## 2023-04-05 NOTE — PROGRESS NOTE ADULT - PROBLEM SELECTOR PROBLEM 8
Pressure ulcer of foot s/o at bedside/unable to assess Seizure disorder Cerebrovascular accident (CVA)

## 2023-04-05 NOTE — PROGRESS NOTE ADULT - SUBJECTIVE AND OBJECTIVE BOX
Follow Up:      Interval History:    REVIEW OF SYSTEMS  [  ] ROS unobtainable because:    [  ] All other systems negative except as noted below    Constitutional:  [ ] fever [ ] chills  [ ] weight loss  [ ] weakness  Skin:  [ ] rash [ ] phlebitis	  Eyes: [ ] icterus [ ] pain  [ ] discharge	  ENMT: [ ] sore throat  [ ] thrush [ ] ulcers [ ] exudates  Respiratory: [ ] dyspnea [ ] hemoptysis [ ] cough [ ] sputum	  Cardiovascular:  [ ] chest pain [ ] palpitations [ ] edema	  Gastrointestinal:  [ ] nausea [ ] vomiting [ ] diarrhea [ ] constipation [ ] pain	  Genitourinary:  [ ] dysuria [ ] frequency [ ] hematuria [ ] discharge [ ] flank pain  [ ] incontinence  Musculoskeletal:  [ ] myalgias [ ] arthralgias [ ] arthritis  [ ] back pain  Neurological:  [ ] headache [ ] seizures  [ ] confusion/altered mental status    Allergies  acetaminophen (Rash)  aspirin (Rash)  bioflavonoids (Rash)  cefaclor (Rash)  ceftriaxone (Rash)  clindamycin (Rash)  Codeine Sulfate (Rash)  haloperidol (Rash)  ibuprofen (Rash)  iopamidol (Rash)  Onions (Rash)  penicillins (Rash)  tromethamine (Rash)        ANTIMICROBIALS:  ertapenem  IVPB 500 every 24 hours      OTHER MEDS:  MEDICATIONS  (STANDING):  acetaminophen     Tablet .. 650 every 6 hours PRN  acetaminophen   IVPB .. 1000 once  atorvastatin 40 at bedtime  baclofen 10 every 12 hours  bisacodyl Suppository 10 daily PRN  diphenhydrAMINE 50 once  diphenhydrAMINE 25 once PRN  epoetin marilin-epbx (RETACRIT) Injectable 4000 <User Schedule>  HYDROmorphone  Injectable 0.25 once  levETIRAcetam 500 daily  levETIRAcetam 250 <User Schedule>  levothyroxine 100 daily  LORazepam     Tablet 1 every 6 hours PRN  LORazepam   Injectable 0.5 once  melatonin 3 at bedtime PRN  metoprolol succinate ER 50 daily  OLANZapine 2.5 at bedtime  OLANZapine 2.5 every 6 hours PRN  ondansetron Injectable 4 every 8 hours PRN  polyethylene glycol 3350 17 daily  senna 2 at bedtime  tamsulosin 0.4 at bedtime      Vital Signs Last 24 Hrs  T(C): 36.7 (05 Apr 2023 16:00), Max: 36.8 (05 Apr 2023 11:13)  T(F): 98 (05 Apr 2023 16:00), Max: 98.3 (05 Apr 2023 11:13)  HR: 82 (05 Apr 2023 16:00) (73 - 84)  BP: 125/80 (05 Apr 2023 16:00) (99/65 - 126/83)  BP(mean): --  RR: 18 (05 Apr 2023 16:00) (15 - 18)  SpO2: 98% (05 Apr 2023 16:00) (96% - 99%)    Parameters below as of 05 Apr 2023 16:00  Patient On (Oxygen Delivery Method): room air        PHYSICAL EXAMINATION:  General: Alert and Awake, NAD  HEENT: PERRL, EOMI  Neck: Supple  Cardiac: RRR, No M/R/G  Resp: CTAB, No Wh/Rh/Ra  Abdomen: NBS, NT/ND, No HSM, No rigidity or guarding  MSK: No LE edema. No Calf tenderness  : No tran  Skin: No rashes or lesions. Skin is warm and dry to the touch.   Neuro: Alert and Awake. CN 2-12 Grossly intact. Moves all four extremities spontaneously.  Psych: Calm, Pleasant, Cooperative                          7.1    11.48 )-----------( 130      ( 05 Apr 2023 10:14 )             21.9       04-05    138  |  101  |  77<H>  ----------------------------<  122<H>  4.3   |  21<L>  |  4.76<H>    Ca    8.7      05 Apr 2023 10:14  Phos  4.8     04-05  Mg     2.2     04-05    TPro  6.3  /  Alb  3.4  /  TBili  0.3  /  DBili  x   /  AST  28  /  ALT  25  /  AlkPhos  348<H>  04-04          MICROBIOLOGY:  v  .Other Other  03-30-23   Few Candida auris  --  --      Wound Wound  03-24-23   No growth at 48 hours  --  --      Clean Catch Clean Catch (Midstream)  03-23-23   <10,000 CFU/mL Normal Urogenital Nicolasa  --  --      .Blood Blood-Venous  03-23-23   No Growth Final  --  --      .Blood Blood-Peripheral  03-23-23   No Growth Final  --  --                RADIOLOGY:    <The imaging below has been reviewed and visualized by me independently. Findings as detailed in report below> Follow Up:  Leukocytosis    Interval History: appears more alert today. finally started antibiotics. still refusing interventions and may refuse upcoming CT.     REVIEW OF SYSTEMS  [  ] ROS unobtainable because:    [ x ] All other systems negative except as noted below    Constitutional:  [ ] fever [ ] chills  [ ] weight loss  [ ] weakness  Skin:  [ ] rash [ ] phlebitis	  Eyes: [ ] icterus [ ] pain  [ ] discharge	  ENMT: [ ] sore throat  [ ] thrush [ ] ulcers [ ] exudates  Respiratory: [ ] dyspnea [ ] hemoptysis [ ] cough [ ] sputum	  Cardiovascular:  [ ] chest pain [ ] palpitations [ ] edema	  Gastrointestinal:  [ ] nausea [ ] vomiting [ ] diarrhea [ ] constipation [ ] pain	  Genitourinary:  [ ] dysuria [ ] frequency [ ] hematuria [ ] discharge [ ] flank pain  [ ] incontinence  Musculoskeletal:  [ ] myalgias [ ] arthralgias [ ] arthritis  [ x] back pain  Neurological:  [ ] headache [ ] seizures  [ ] confusion/altered mental status    Allergies  acetaminophen (Rash)  aspirin (Rash)  bioflavonoids (Rash)  cefaclor (Rash)  ceftriaxone (Rash)  clindamycin (Rash)  Codeine Sulfate (Rash)  haloperidol (Rash)  ibuprofen (Rash)  iopamidol (Rash)  Onions (Rash)  penicillins (Rash)  tromethamine (Rash)        ANTIMICROBIALS:  ertapenem  IVPB 500 every 24 hours      OTHER MEDS:  MEDICATIONS  (STANDING):  acetaminophen     Tablet .. 650 every 6 hours PRN  acetaminophen   IVPB .. 1000 once  atorvastatin 40 at bedtime  baclofen 10 every 12 hours  bisacodyl Suppository 10 daily PRN  diphenhydrAMINE 50 once  diphenhydrAMINE 25 once PRN  epoetin marilin-epbx (RETACRIT) Injectable 4000 <User Schedule>  HYDROmorphone  Injectable 0.25 once  levETIRAcetam 500 daily  levETIRAcetam 250 <User Schedule>  levothyroxine 100 daily  LORazepam     Tablet 1 every 6 hours PRN  LORazepam   Injectable 0.5 once  melatonin 3 at bedtime PRN  metoprolol succinate ER 50 daily  OLANZapine 2.5 at bedtime  OLANZapine 2.5 every 6 hours PRN  ondansetron Injectable 4 every 8 hours PRN  polyethylene glycol 3350 17 daily  senna 2 at bedtime  tamsulosin 0.4 at bedtime      Vital Signs Last 24 Hrs  T(C): 36.7 (05 Apr 2023 16:00), Max: 36.8 (05 Apr 2023 11:13)  T(F): 98 (05 Apr 2023 16:00), Max: 98.3 (05 Apr 2023 11:13)  HR: 82 (05 Apr 2023 16:00) (73 - 84)  BP: 125/80 (05 Apr 2023 16:00) (99/65 - 126/83)  BP(mean): --  RR: 18 (05 Apr 2023 16:00) (15 - 18)  SpO2: 98% (05 Apr 2023 16:00) (96% - 99%)    Parameters below as of 05 Apr 2023 16:00  Patient On (Oxygen Delivery Method): room air    PHYSICAL EXAMINATION:  General: Alert and Awake, NAD  HEENT: Normocephalic / Atraumatic  Resp: No accessory muscles of respiration utilized  Abdomen: Not distended.  MSK: No LE edema.   : + tran  Skin: No rashes or lesions.    Neuro: Alert and Awake. CN 2-12 Grossly intact. Moves all four extremities spontaneously.  Psych: Calm, Pleasant, Cooperative                        7.1    11.48 )-----------( 130      ( 05 Apr 2023 10:14 )             21.9       04-05    138  |  101  |  77<H>  ----------------------------<  122<H>  4.3   |  21<L>  |  4.76<H>    Ca    8.7      05 Apr 2023 10:14  Phos  4.8     04-05  Mg     2.2     04-05    TPro  6.3  /  Alb  3.4  /  TBili  0.3  /  DBili  x   /  AST  28  /  ALT  25  /  AlkPhos  348<H>  04-04          MICROBIOLOGY:  v  .Other Other  03-30-23   Few Candida auris  --  --      Wound Wound  03-24-23   No growth at 48 hours  --  --      Clean Catch Clean Catch (Midstream)  03-23-23   <10,000 CFU/mL Normal Urogenital Nicolasa  --  --      .Blood Blood-Venous  03-23-23   No Growth Final  --  --      .Blood Blood-Peripheral  03-23-23   No Growth Final  --  --    RADIOLOGY:    <The imaging below has been reviewed and visualized by me independently. Findings as detailed in report below>    < from: Xray Chest 1 View- PORTABLE-Routine (Xray Chest 1 View- PORTABLE-Routine .) (04.05.23 @ 10:44) >  IMPRESSION:  No active pulmonary disease.    < end of copied text >

## 2023-04-05 NOTE — PROGRESS NOTE ADULT - PROBLEM SELECTOR PLAN 4
- history c/w mechanical, no acute injuries noted on imaging. admission imaging C[facility], CT c-spine, XRay pelvis unremarkable for any acute pathology. - CTH unremarkable.   - Psych eval given agitation, paranoia; appreciate recs. Does not have capacity at this time. Trial zyprexa prn and Lorazepam 1mg PRn for agitation and anxiety and Clonazepam - CTH unremarkable on admission however pt less arousable this morning and due to drop of Hg/plt and the fact that pt is on Eliquis will obtain additional CT head without contrast   - Psych eval given agitation, paranoia; appreciate recs. Does not have capacity at this time. Trial zyprexa prn and Lorazepam 1mg PRn for agitation and anxiety and Clonazepam Unsure regarding etiology, pt is not on any heparin products   No reported cirrhosis   May be secondary to chronic illness   Will continue to monitor.  Hold eliquis

## 2023-04-05 NOTE — PROGRESS NOTE ADULT - PROBLEM SELECTOR PLAN 8
- normal saline cleanse Medihoney and DSD to bilateral foot ulcerations daily.  - Podiatry consulted:  - 3/24 R ankle XR: negative for OM or soft tissue tracking air  - Recommend decubitus precautions and use of Z flow boot  - Recommend normal saline cleanse prior to dressing changes daily  - frequent pressure off set - continue keppra - continue baclofen   - clonazepam

## 2023-04-05 NOTE — PROGRESS NOTE ADULT - PROBLEM SELECTOR PLAN 3
- CTH unremarkable.   - Psych eval given agitation, paranoia; appreciate recs. Does not have capacity at this time. Trial zyprexa prn and Lorazepam 1mg PRn for agitation and anxiety and Clonazepam - most recent CBC showing normalisation of WBC which may be a lab error   - + C aureus   - f/u blood cx, ua, urine cx   -day 2 of IV ertapenem (started on 4/3--)  -pt has been refusing blood draws and additionally reported allergies to contrast   - f/u CT IV A/P and CT L spine. Pt to be pre-medicated to obtain imaging   - ID is following and appreciate their recommendations   - currently hemodynamically stable

## 2023-04-05 NOTE — CHART NOTE - NSCHARTNOTEFT_GEN_A_CORE
Patient reports allergies to contrast. He has CT IV A/P and CT L spine scheduled for 10pm tonight. Upon speaking to Radiologist, patient will be pre-medicated with IV Solumedrol 40mg 5 hours before, Benadryl 50mg  one hour before, and 0.25 Dilaudid 30 mins before imaging. Plan discussed with nurse.

## 2023-04-05 NOTE — PROGRESS NOTE ADULT - PROBLEM SELECTOR PLAN 5
- Resumed on Eliquis given stable H/H over 24-48 hour period.   -continue Metoprolol  -EKG on admission unremarkable,  - history c/w mechanical, no acute injuries noted on imaging. admission imaging [facility], CT c-spine, XRay pelvis unremarkable for any acute pathology.

## 2023-04-05 NOTE — PROGRESS NOTE ADULT - PROBLEM SELECTOR PLAN 1
- Likely anemia 2/2 renal disease may also have component of blood loss from open wounds on feet , no reported GI bleeding. currently HD stable. Most recent UA showed high count or RBCs no obvious hematuria noted   -no active blood loss reported   - s/p 1u pRBC 3/30   - started on retacrit   - Daily CBC - Likely anemia 2/2 renal disease may also have component of blood loss from open wounds on feet , no reported GI bleeding. currently HD stable. Most recent UA showed high count or RBCs no obvious hematuria noted   -no active blood loss reported   - s/p 1u pRBC 3/30   -may need additional unit due to drop of Hg 7.1  - started on retacrit   - Daily CBC - Likely anemia 2/2 renal disease may also have component of blood loss from open wounds on feet , no reported GI bleeding. currently HD stable. Most recent UA showed high count or RBCs no obvious hematuria noted   -no active blood loss reported   -s/p 1u pRBC 3/30   -started on retacrit   -Daily CBC  -check cbc tonight at 6pm, transfuse hgb <7  -hold eliquis for now

## 2023-04-05 NOTE — PROGRESS NOTE ADULT - PROBLEM SELECTOR PLAN 9
#HyperK secondary to ESRD and missing HD   -Renal consulted; HD per renal; patient had been refusing HD. Agreed to it on 3/31. Plan for HD 4/4   - Sevelamer  - Monitor daily BMP  -EKG on admission unremarkable - normal saline cleanse Medihoney and DSD to bilateral foot ulcerations daily.  - Podiatry consulted:  - 3/24 R ankle XR: negative for OM or soft tissue tracking air  - Recommend decubitus precautions and use of Z flow boot  - Recommend normal saline cleanse prior to dressing changes daily  - frequent pressure off set - continue keppra

## 2023-04-05 NOTE — PROGRESS NOTE ADULT - PROBLEM SELECTOR PLAN 2
- most recent CBC showing normalisation of WBC which may be a lab error   - + C aureus   - f/u blood cx, ua, urine cx   -day 2 of IV ertapenem (started on 4/3--)  -pt has been refusing blood draws and additionally reported allergies to contrast   - f/u CT IV A/P and CT L spine. Pt to be pre-medicated to obtain imaging   - ID is following and appreciate their recommendations   - currently hemodynamically stable Unsure regarding etiology, pt is not on any heparin products   No reported cirrhosis   May be secondary to chronic illness   Will continue to monitor.  Pt is on Eliquis and if PLT count continues to drop will hold AC - CTH unremarkable on admission however pt less arousable this morning and due to drop of Hg/plt and the fact that pt is on Eliquis will obtain additional CT head without contrast   - Psych eval given agitation, paranoia; appreciate recs. Does not have capacity at this time. Trial zyprexa prn   - D/w [facility] 4/5 will change to klonopin 0.25mg tid and give 1 dose of IV ativan 0.5mg x1 - CTH unremarkable on admission however pt less arousable this morning and due to drop of Hg/plt and the fact that pt is on Eliquis will obtain additional CT head without contrast   - Psych eval given agitation, paranoia; appreciate recs. Does not have capacity at this time. Trial zyprexa prn   - D/w [facility] 4/5 will change to klonopin 0.25mg tid and give 1 dose of IV ativan 0.5mg x1.

## 2023-04-05 NOTE — PROGRESS NOTE ADULT - PROBLEM SELECTOR PLAN 10
-levothyroxine #HyperK secondary to ESRD and missing HD. Resolved but Pt has been refusing HD and therefore is at risk of HyperK.   Pt goes to [facility] dialysis unit. Follows with Dr. Mercedes. HD on TTS  -Renal consulted; HD per renal; patient had been refusing HD.   - Sevelamer  - Monitor daily BMP  -EKG on admission unremarkable - normal saline cleanse Medihoney and DSD to bilateral foot ulcerations daily.  - Podiatry consulted:  - 3/24 R ankle XR: negative for OM or soft tissue tracking air  - Recommend decubitus precautions and use of Z flow boot  - Recommend normal saline cleanse prior to dressing changes daily  - frequent pressure off set

## 2023-04-05 NOTE — PROGRESS NOTE ADULT - PROBLEM SELECTOR PLAN 12
Eventual dispo is rajni  dvt ppx: eliquis Eventual dispo is rajni    D/w sister updated on full plan of care

## 2023-04-05 NOTE — PROGRESS NOTE ADULT - SUBJECTIVE AND OBJECTIVE BOX
PROGRESS NOTE:   Authored by Luisito Goetz DO    Patient is a 62y old  Male who presents with a chief complaint of worsening anemia (2023 17:32)      SUBJECTIVE / OVERNIGHT EVENTS: Examined patient at bedside. Pt sleepy and difficult to awaken. Was informed by staff that pt was aggressive and belligerent in the morning. Pt has been refusing treatement such as blood draws and imaging. Pt reported alergies to contrasts when he was sent for CT imagining which he had not reported previously.     ADDITIONAL REVIEW OF SYSTEMS:    MEDICATIONS  (STANDING):  acetaminophen   IVPB .. 1000 milliGRAM(s) IV Intermittent once  apixaban 5 milliGRAM(s) Oral two times a day  artificial  tears Solution 1 Drop(s) Both EYES four times a day  atorvastatin 40 milliGRAM(s) Oral at bedtime  baclofen 10 milliGRAM(s) Oral every 12 hours  chlorhexidine 2% Cloths 1 Application(s) Topical <User Schedule>  clobetasol 0.05% Cream 1 Application(s) Topical two times a day  clonazePAM  Tablet 0.5 milliGRAM(s) Oral two times a day  epoetin marilin-epbx (RETACRIT) Injectable 4000 Unit(s) IV Push <User Schedule>  ertapenem  IVPB 500 milliGRAM(s) IV Intermittent every 24 hours  levETIRAcetam 500 milliGRAM(s) Oral daily  levETIRAcetam 250 milliGRAM(s) Oral <User Schedule>  levothyroxine 100 MICROGram(s) Oral daily  lidocaine   4% Patch 1 Patch Transdermal every 24 hours  lidocaine   4% Patch 1 Patch Transdermal daily  metoprolol succinate ER 50 milliGRAM(s) Oral daily  mupirocin 2% Ointment 1 Application(s) Topical three times a day  Nephro-mirna 1 Tablet(s) Oral daily  nystatin Powder 1 Application(s) Topical three times a day  OLANZapine 2.5 milliGRAM(s) Oral at bedtime  polyethylene glycol 3350 17 Gram(s) Oral daily  senna 2 Tablet(s) Oral at bedtime  sevelamer carbonate 1600 milliGRAM(s) Oral three times a day  tamsulosin 0.4 milliGRAM(s) Oral at bedtime    MEDICATIONS  (PRN):  acetaminophen     Tablet .. 650 milliGRAM(s) Oral every 6 hours PRN Temp greater or equal to 38C (100.4F), Mild Pain (1 - 3)  bisacodyl Suppository 10 milliGRAM(s) Rectal daily PRN Constipation  diphenhydrAMINE 25 milliGRAM(s) Oral once PRN Rash and/or Itching  HYDROmorphone  Injectable 0.5 milliGRAM(s) IV Push once PRN please administer 30 [place] [id number]  LORazepam     Tablet 1 milliGRAM(s) Oral every 6 hours PRN Anxiety(SEVERE)  melatonin 3 milliGRAM(s) Oral at bedtime PRN Insomnia  OLANZapine 2.5 milliGRAM(s) Oral every 6 hours PRN agitation/paranoid  ondansetron Injectable 4 milliGRAM(s) IV Push every 8 hours PRN Nausea and/or Vomiting      CAPILLARY BLOOD GLUCOSE        I&O's Summary    2023 07:01  -  2023 07:00  --------------------------------------------------------  IN: 920 mL / OUT: 1900 mL / NET: -980 mL    PHYSICAL EXAM:  Vital Signs Last 24 Hrs  T(C): 36.8 (2023 11:13), Max: 36.8 (2023 11:13)  T(F): 98.3 (2023 11:13), Max: 98.3 (2023 11:13)  HR: 73 (2023 11:13) (73 - 90)  BP: 101/66 (2023 11:13) (99/65 - 126/83)  BP(mean): --  RR: 15 (2023 11:13) (15 - 18)  SpO2: 99% (2023 11:13) (96% - 99%)    Parameters below as of 2023 11:  Patient On (Oxygen Delivery Method): room air        GENERAL: No acute distress, well-developed  HEAD:  Atraumatic, Normocephalic  EYES: EOMI, PERRLA, conjunctiva and sclera clear  NECK: Supple, no lymphadenopathy, no JVD  CHEST/LUNG: CTAB; No wheezes, rales, or rhonchi  HEART: Regular rate and rhythm; No murmurs, rubs, or gallops  ABDOMEN: Soft, non-tender, non-distended; normal bowel sounds, no organomegaly  EXTREMITIES:  2+ peripheral pulses b/l, No clubbing, cyanosis, or edema  NEUROLOGY: A&O x 3, no focal deficits  SKIN: No rashes or lesions    LABS:                        7.1    11.48 )-----------( 130      ( 2023 10:14 )             21.9     04-05    138  |  101  |  77<H>  ----------------------------<  122<H>  4.3   |  21<L>  |  4.76<H>    Ca    8.7      2023 10:14  Phos  4.8     04-05  Mg     2.2     04-05    TPro  6.3  /  Alb  3.4  /  TBili  0.3  /  DBili  x   /  AST  28  /  ALT  25  /  AlkPhos  348<H>  04-04          Urinalysis Basic - ( 2023 15:52 )    Color: Light Yellow / Appearance: Clear / S.011 / pH: x  Gluc: x / Ketone: Negative  / Bili: Negative / Urobili: Negative   Blood: x / Protein: 30 mg/dL / Nitrite: Negative   Leuk Esterase: Negative / RBC: 76 /hpf / WBC 2 /HPF   Sq Epi: x / Non Sq Epi: 0 /hpf / Bacteria: Negative          RADIOLOGY & ADDITIONAL TESTS:  Results Reviewed:   Imaging Personally Reviewed:  Electrocardiogram Personally Reviewed:    COORDINATION OF CARE:  Care Discussed with Consultants/Other Providers [Y/N]:  Prior or Outpatient Records Reviewed [Y/N]:   PROGRESS NOTE:   Authored by Luisito Goetz DO    Patient is a 62y old  Male who presents with a chief complaint of worsening anemia (2023 17:32)      SUBJECTIVE / OVERNIGHT EVENTS: Examined patient at bedside. Pt sleepy and difficult to awaken. Was informed by staff that pt was aggressive and belligerent in the morning. Pt has been refusing treatement such as blood draws and imaging. Pt reported alergies to contrasts when he was sent for CT imagining which he had not reported previously. After multiple attempts blood draw was completed and CBC shows a WBC 11.48 and Hg 7.1. Down trending plts as well. Currently pt hemodynamically stable.     ADDITIONAL REVIEW OF SYSTEMS:    MEDICATIONS  (STANDING):  acetaminophen   IVPB .. 1000 milliGRAM(s) IV Intermittent once  apixaban 5 milliGRAM(s) Oral two times a day  artificial  tears Solution 1 Drop(s) Both EYES four times a day  atorvastatin 40 milliGRAM(s) Oral at bedtime  baclofen 10 milliGRAM(s) Oral every 12 hours  chlorhexidine 2% Cloths 1 Application(s) Topical <User Schedule>  clobetasol 0.05% Cream 1 Application(s) Topical two times a day  clonazePAM  Tablet 0.5 milliGRAM(s) Oral two times a day  epoetin marilin-epbx (RETACRIT) Injectable 4000 Unit(s) IV Push <User Schedule>  ertapenem  IVPB 500 milliGRAM(s) IV Intermittent every 24 hours  levETIRAcetam 500 milliGRAM(s) Oral daily  levETIRAcetam 250 milliGRAM(s) Oral <User Schedule>  levothyroxine 100 MICROGram(s) Oral daily  lidocaine   4% Patch 1 Patch Transdermal every 24 hours  lidocaine   4% Patch 1 Patch Transdermal daily  metoprolol succinate ER 50 milliGRAM(s) Oral daily  mupirocin 2% Ointment 1 Application(s) Topical three times a day  Nephro-mirna 1 Tablet(s) Oral daily  nystatin Powder 1 Application(s) Topical three times a day  OLANZapine 2.5 milliGRAM(s) Oral at bedtime  polyethylene glycol 3350 17 Gram(s) Oral daily  senna 2 Tablet(s) Oral at bedtime  sevelamer carbonate 1600 milliGRAM(s) Oral three times a day  tamsulosin 0.4 milliGRAM(s) Oral at bedtime    MEDICATIONS  (PRN):  acetaminophen     Tablet .. 650 milliGRAM(s) Oral every 6 hours PRN Temp greater or equal to 38C (100.4F), Mild Pain (1 - 3)  bisacodyl Suppository 10 milliGRAM(s) Rectal daily PRN Constipation  diphenhydrAMINE 25 milliGRAM(s) Oral once PRN Rash and/or Itching  HYDROmorphone  Injectable 0.5 milliGRAM(s) IV Push once PRN please administer 30 [place] [id number]  LORazepam     Tablet 1 milliGRAM(s) Oral every 6 hours PRN Anxiety(SEVERE)  melatonin 3 milliGRAM(s) Oral at bedtime PRN Insomnia  OLANZapine 2.5 milliGRAM(s) Oral every 6 hours PRN agitation/paranoid  ondansetron Injectable 4 milliGRAM(s) IV Push every 8 hours PRN Nausea and/or Vomiting  CAPILLARY BLOOD GLUCOSE    I&O's Summary    2023 07:01  -  2023 07:00  --------------------------------------------------------  IN: 920 mL / OUT: 1900 mL / NET: -980 mL    PHYSICAL EXAM:  Vital Signs Last 24 Hrs  T(C): 36.8 (2023 11:13), Max: 36.8 (2023 11:13)  T(F): 98.3 (2023 11:13), Max: 98.3 (2023 11:13)  HR: 73 (2023 11:13) (73 - 90)  BP: 101/66 (2023 11:13) (99/65 - 126/83)  BP(mean): --  RR: 15 (2023 11:13) (15 - 18)  SpO2: 99% (2023 11:13) (96% - 99%)    Parameters below as of 2023 11:13  Patient On (Oxygen Delivery Method): room air    GENERAL: No acute distress, pt somnolent and refuses to respond  HEAD:  Atraumatic, Normocephalic, dry mucous membranes   EYES: unable to assess, pt refuses to open his eyes   NECK:  no JVD, no obvious masses   CHEST/LUNG: CTAB; No wheezes, rales, or rhonchi  HEART: irregularly irregular rhythm, No murmurs, rubs, or gallops  ABDOMEN: Soft, non-tender, non-distended;   EXTREMITIES:  No clubbing, cyanosis, or edema, poor hygiene   NEUROLOGY: A&O x 3, no focal deficits  SKIN: multiple lower extremity skin ulcerations and lesions with overlying dry crust     LABS:                        7.1    11.48 )-----------( 130      ( 2023 10:14 )             21.9     04-05    138  |  101  |  77<H>  ----------------------------<  122<H>  4.3   |  21<L>  |  4.76<H>    Ca    8.7      2023 10:14  Phos  4.8     04-05  Mg     2.2     04-05    TPro  6.3  /  Alb  3.4  /  TBili  0.3  /  DBili  x   /  AST  28  /  ALT  25  /  AlkPhos  348<H>  04-04    Urinalysis Basic - ( 2023 15:52 )    Color: Light Yellow / Appearance: Clear / S.011 / pH: x  Gluc: x / Ketone: Negative  / Bili: Negative / Urobili: Negative   Blood: x / Protein: 30 mg/dL / Nitrite: Negative   Leuk Esterase: Negative / RBC: 76 /hpf / WBC 2 /HPF   Sq Epi: x / Non Sq Epi: 0 /hpf / Bacteria: Negative    RADIOLOGY & ADDITIONAL TESTS:  Results Reviewed:   Imaging Personally Reviewed:  Electrocardiogram Personally Reviewed:    COORDINATION OF CARE:  Care Discussed with Consultants/Other Providers [Y/N]:  Prior or Outpatient Records Reviewed [Y/N]:    < from: CT Cervical Spine No Cont (23 @ 19:04) >    IMPRESSION:    Brain CT: No change since 3/28/2023. 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 No acute hemorrhage.    Cervical spine: Mild degenerative changes. No acute fractures or   dislocations.    < end of copied text >  < from: CT Head No Cont (23 @ 19:04) >  IMPRESSION:    Brain CT: No change since 3/28/2023. 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 No acute hemorrhage.    Cervical spine: Mild degenerative changes. No acute fractures or   dislocations.    < end of copied text >  < from: CT Abdomen and Pelvis No Cont (23 @ 19:58) >  IMPRESSION:  Full examination of the intra-abdominal viscera and vasculature is   limited without the addition of IV contrast.    No CT evidence of acute traumatic injury in the chest, abdomen, or pelvis.    Probable fecal impaction and question of associated stercoral proctitis.    Mild ascending aortic dilatation 3.8 cm.    Additional findings as above.    < end of copied text >  < from: CT Chest No Cont (23 @ 19:57) >  IMPRESSION:  Full examination of the intra-abdominal viscera and vasculature is   limited without the addition of IV contrast.    No CT evidence of acute traumatic injury in the chest, abdomen, or pelvis.    Probable fecal impaction and question of associated stercoral proctitis.    Mild ascending aortic dilatation 3.8 cm.    Additional findings as above.

## 2023-04-05 NOTE — PROGRESS NOTE ADULT - PROBLEM SELECTOR PLAN 6
- continue baclofen   - clonazepam - Resumed on Eliquis given stable H/H over 24-48 hour period.   -continue Metoprolol  -EKG on admission unremarkable,  -Hold eliquis as above   -continue Metoprolol  -EKG on admission unremarkable,

## 2023-04-05 NOTE — PROGRESS NOTE ADULT - PROBLEM SELECTOR PLAN 11
Eventual dispo is rajni  dvt ppx: eliquis -levothyroxine #HyperK secondary to ESRD and missing HD. Resolved but Pt has been refusing HD and therefore is at risk of HyperK.   Pt goes to [facility] dialysis unit. Follows with Dr. Mercedes. HD on TTS  -Renal consulted; HD per renal; patient had been refusing HD.   - Sevelamer  - Monitor daily BMP  -EKG on admission unremarkable

## 2023-04-06 NOTE — BH CONSULTATION LIAISON PROGRESS NOTE - CURRENT MEDICATION
MEDICATIONS  (STANDING):  artificial  tears Solution 1 Drop(s) Both EYES four times a day  atorvastatin 40 milliGRAM(s) Oral at bedtime  baclofen 10 milliGRAM(s) Oral every 12 hours  chlorhexidine 2% Cloths 1 Application(s) Topical <User Schedule>  clobetasol 0.05% Cream 1 Application(s) Topical two times a day  clonazePAM  Tablet 0.5 milliGRAM(s) Oral two times a day  levETIRAcetam 500 milliGRAM(s) Oral daily  levETIRAcetam 250 milliGRAM(s) Oral <User Schedule>  levothyroxine 100 MICROGram(s) Oral daily  metoprolol succinate ER 50 milliGRAM(s) Oral daily  mupirocin 2% Ointment 1 Application(s) Topical three times a day  Nephro-mirna 1 Tablet(s) Oral daily  OLANZapine 2.5 milliGRAM(s) Oral at bedtime  polyethylene glycol 3350 17 Gram(s) Oral daily  senna 2 Tablet(s) Oral at bedtime  sevelamer carbonate 1600 milliGRAM(s) Oral three times a day  tamsulosin 0.4 milliGRAM(s) Oral at bedtime    MEDICATIONS  (PRN):  acetaminophen     Tablet .. 650 milliGRAM(s) Oral every 6 hours PRN Temp greater or equal to 38C (100.4F), Mild Pain (1 - 3)  bisacodyl Suppository 10 milliGRAM(s) Rectal daily PRN Constipation  hydrOXYzine hydrochloride 25 milliGRAM(s) Oral once PRN Itching  melatonin 3 milliGRAM(s) Oral at bedtime PRN Insomnia  OLANZapine 2.5 milliGRAM(s) Oral every 6 hours PRN agitation/paranoid  ondansetron Injectable 4 milliGRAM(s) IV Push every 8 hours PRN Nausea and/or Vomiting  
MEDICATIONS  (STANDING):  acetaminophen     Tablet .. 975 milliGRAM(s) Oral every 8 hours  acetaminophen   IVPB .. 1000 milliGRAM(s) IV Intermittent once  apixaban 5 milliGRAM(s) Oral two times a day  artificial  tears Solution 1 Drop(s) Both EYES four times a day  atorvastatin 40 milliGRAM(s) Oral at bedtime  baclofen 10 milliGRAM(s) Oral every 12 hours  chlorhexidine 2% Cloths 1 Application(s) Topical <User Schedule>  clobetasol 0.05% Cream 1 Application(s) Topical two times a day  clonazePAM  Tablet 0.5 milliGRAM(s) Oral <User Schedule>  epoetin marilin-epbx (RETACRIT) Injectable 4000 Unit(s) IV Push <User Schedule>  ertapenem  IVPB 500 milliGRAM(s) IV Intermittent every 24 hours  lactulose Syrup 20 Gram(s) Oral once  levETIRAcetam 500 milliGRAM(s) Oral daily  levETIRAcetam 250 milliGRAM(s) Oral <User Schedule>  levothyroxine 100 MICROGram(s) Oral daily  lidocaine   4% Patch 1 Patch Transdermal daily  lidocaine   4% Patch 1 Patch Transdermal every 24 hours  metoprolol succinate ER 50 milliGRAM(s) Oral daily  mupirocin 2% Ointment 1 Application(s) Topical three times a day  Nephro-mirna 1 Tablet(s) Oral daily  nystatin Powder 1 Application(s) Topical three times a day  OLANZapine 2.5 milliGRAM(s) Oral at bedtime  polyethylene glycol 3350 17 Gram(s) Oral daily  senna 2 Tablet(s) Oral at bedtime  sevelamer carbonate 1600 milliGRAM(s) Oral three times a day  tamsulosin 0.4 milliGRAM(s) Oral at bedtime    MEDICATIONS  (PRN):  bisacodyl Suppository 10 milliGRAM(s) Rectal daily PRN Constipation  diphenhydrAMINE 25 milliGRAM(s) Oral once PRN Rash and/or Itching  LORazepam     Tablet 1 milliGRAM(s) Oral every 6 hours PRN Anxiety(SEVERE)  melatonin 3 milliGRAM(s) Oral at bedtime PRN Insomnia  OLANZapine 2.5 milliGRAM(s) Oral every 6 hours PRN agitation/paranoid  ondansetron Injectable 4 milliGRAM(s) IV Push every 8 hours PRN Nausea and/or Vomiting  
MEDICATIONS  (STANDING):  acetaminophen   IVPB .. 1000 milliGRAM(s) IV Intermittent once  artificial  tears Solution 1 Drop(s) Both EYES four times a day  atorvastatin 40 milliGRAM(s) Oral at bedtime  baclofen 10 milliGRAM(s) Oral every 12 hours  chlorhexidine 2% Cloths 1 Application(s) Topical <User Schedule>  clobetasol 0.05% Cream 1 Application(s) Topical two times a day  clonazePAM  Tablet 0.5 milliGRAM(s) Oral two times a day  diphenhydrAMINE 50 milliGRAM(s) Oral once  epoetin marilin-epbx (RETACRIT) Injectable 4000 Unit(s) IV Push <User Schedule>  ertapenem  IVPB 500 milliGRAM(s) IV Intermittent every 24 hours  HYDROmorphone  Injectable 0.25 milliGRAM(s) IV Push once  levETIRAcetam 500 milliGRAM(s) Oral daily  levETIRAcetam 250 milliGRAM(s) Oral <User Schedule>  levothyroxine 100 MICROGram(s) Oral daily  lidocaine   4% Patch 1 Patch Transdermal every 24 hours  lidocaine   4% Patch 1 Patch Transdermal daily  LORazepam    IVPB 0.5 milliGRAM(s) IV Intermittent once  methylPREDNISolone sodium succinate Injectable 40 milliGRAM(s) IV Push once  metoprolol succinate ER 50 milliGRAM(s) Oral daily  mupirocin 2% Ointment 1 Application(s) Topical three times a day  Nephro-mirna 1 Tablet(s) Oral daily  nystatin Powder 1 Application(s) Topical three times a day  OLANZapine 2.5 milliGRAM(s) Oral at bedtime  polyethylene glycol 3350 17 Gram(s) Oral daily  senna 2 Tablet(s) Oral at bedtime  sevelamer carbonate 1600 milliGRAM(s) Oral three times a day  tamsulosin 0.4 milliGRAM(s) Oral at bedtime    MEDICATIONS  (PRN):  acetaminophen     Tablet .. 650 milliGRAM(s) Oral every 6 hours PRN Temp greater or equal to 38C (100.4F), Mild Pain (1 - 3)  bisacodyl Suppository 10 milliGRAM(s) Rectal daily PRN Constipation  diphenhydrAMINE 25 milliGRAM(s) Oral once PRN Rash and/or Itching  LORazepam     Tablet 1 milliGRAM(s) Oral every 6 hours PRN Anxiety(SEVERE)  melatonin 3 milliGRAM(s) Oral at bedtime PRN Insomnia  OLANZapine 2.5 milliGRAM(s) Oral every 6 hours PRN agitation/paranoid  ondansetron Injectable 4 milliGRAM(s) IV Push every 8 hours PRN Nausea and/or Vomiting

## 2023-04-06 NOTE — PROVIDER CONTACT NOTE (CRITICAL VALUE NOTIFICATION) - RECOMMENDATIONS
Administer 1 unit of RBC
Will continue to monitor patient. Remains with NO access, will transfuse pt when IV access is reestablish
continue meds as ordered

## 2023-04-06 NOTE — BH CONSULTATION LIAISON PROGRESS NOTE - NSBHCONSULTFOLLOWAFTERCARE_PSY_A_CORE FT
[facility] 
pt may be followed by the consulting psychiatrist at her [facility]. 
yes
pt may be followed by the consulting psychiatrist at her [facility].

## 2023-04-06 NOTE — BH CONSULTATION LIAISON PROGRESS NOTE - NSBHCHARTREVIEWVS_PSY_A_CORE FT
Vital Signs Last 24 Hrs  T(C): 36.2 (06 Apr 2023 12:15), Max: 36.7 (05 Apr 2023 16:00)  T(F): 97.2 (06 Apr 2023 12:15), Max: 98 (05 Apr 2023 16:00)  HR: 94 (06 Apr 2023 12:15) (73 - 94)  BP: 140/84 (06 Apr 2023 12:15) (110/66 - 155/88)  BP(mean): --  RR: 18 (06 Apr 2023 12:15) (18 - 18)  SpO2: 98% (06 Apr 2023 12:15) (98% - 100%)    Parameters below as of 06 Apr 2023 12:15  Patient On (Oxygen Delivery Method): room air    
Vital Signs Last 24 Hrs  T(C): 37.3 (31 Mar 2023 10:18), Max: 37.9 (30 Mar 2023 17:50)  T(F): 99.1 (31 Mar 2023 10:18), Max: 100.2 (30 Mar 2023 17:50)  HR: 85 (31 Mar 2023 10:18) (63 - 85)  BP: 140/82 (31 Mar 2023 10:18) (126/80 - 148/80)  BP(mean): --  RR: 18 (31 Mar 2023 10:18) (18 - 18)  SpO2: 98% (31 Mar 2023 10:18) (98% - 100%)    Parameters below as of 31 Mar 2023 10:18  Patient On (Oxygen Delivery Method): room air    
Vital Signs Last 24 Hrs  T(C): 36.8 (05 Apr 2023 11:13), Max: 36.8 (05 Apr 2023 11:13)  T(F): 98.3 (05 Apr 2023 11:13), Max: 98.3 (05 Apr 2023 11:13)  HR: 73 (05 Apr 2023 11:13) (73 - 90)  BP: 101/66 (05 Apr 2023 11:13) (99/65 - 126/83)  BP(mean): --  RR: 15 (05 Apr 2023 11:13) (15 - 18)  SpO2: 99% (05 Apr 2023 11:13) (96% - 99%)    Parameters below as of 05 Apr 2023 11:13  Patient On (Oxygen Delivery Method): room air

## 2023-04-06 NOTE — PROGRESS NOTE ADULT - PROBLEM SELECTOR PLAN 5
-history c/w mechanical, no acute injuries noted on imaging. admission imaging [facility], CT c-spine, XRay pelvis unremarkable for any acute pathology.

## 2023-04-06 NOTE — PROGRESS NOTE ADULT - PROBLEM SELECTOR PLAN 2
-CTH unremarkable  -Psych eval given agitation, paranoia; appreciate recs. Does not have capacity at this time. Trial zyprexa prn   -D/w [facility] 4/5 will change to klonopin 0.25mg tid and give 1 dose of IV ativan 0.5mg x1.

## 2023-04-06 NOTE — CHART NOTE - NSCHARTNOTEFT_GEN_A_CORE
62M w/ pmh Hep C, HTN, AFib s/p AICD ([employer]) on Eliquis and Plavix, b/l CVA quadriplegic s/p suprapubic cath, ESRD on HD ( T/Thu/Sat), diastolic CHF, seizure on keppra, hep C, chronic pressure ulcers, p/w fall , found to have worsening anemia.    Chronic pain consult placed; "per attending, patient stated pt gets dilaudid other hospital. keeps asking dilaudid, currently no indication".    Current pain management regimen: none  Current pain management provider: none  [facility] reference #[id number]    Chart reviewed.  Documentation of pain by nursing staff: 0/10, pt denying pain.  Currently not ordered pain medication.  Per primary team, pt asking for dilaudid with no indication.  Is not fully alert and oriented.  Behavioral Health consult 4/5/23- The patient does not appear to have full capacity and is verbally abusive to staff with paranoid ideation and may benefit from medication.  Is ordered a regular diet.    No indication at this time for opiate pain medication.  Can trial tylenol 650 mg Q 6 hours ATC.    Will hold on chronic pain consult.      Chronic Pain Service  [phone]

## 2023-04-06 NOTE — PROVIDER CONTACT NOTE (OTHER) - RECOMMENDATIONS
Continue to monitor.
Continue to maintain contact isolation at this time
Debbie Grace made aware
Patient requesting IVP dilauded.

## 2023-04-06 NOTE — PROGRESS NOTE ADULT - PROBLEM SELECTOR PLAN 4
Pt with hyperphosphatemia in the setting of ESRD. Pt. on phosphate binders with meals. Low phosphorus diet. Monitor serum phosphorus, goal: 3.5-5.5.    If you have any questions, please feel free to contact me  Kieran Lindo  Nephrology Fellow  [phone] / [employer] Teams(Preferred)  (After 5pm or on weekends please page the on-call fellow)

## 2023-04-06 NOTE — PROGRESS NOTE ADULT - PROBLEM SELECTOR PLAN 3
Patient with anemia in the setting of ESRD. Hemoglobin 6.8 - received PRBC  Blood transfusion per primary team. Will give VIBHA with HD. Monitor hemoglobin, goal: 10-11.

## 2023-04-06 NOTE — PROGRESS NOTE ADULT - SUBJECTIVE AND OBJECTIVE BOX
Patient is a 62y old  Male who presents with a chief complaint of worsening anemia (06 Apr 2023 10:37)      SUBJECTIVE / OVERNIGHT EVENTS: pt agitated, pain seeking     MEDICATIONS  (STANDING):  acetaminophen     Tablet .. 975 milliGRAM(s) Oral every 8 hours  acetaminophen   IVPB .. 1000 milliGRAM(s) IV Intermittent once  artificial  tears Solution 1 Drop(s) Both EYES four times a day  atorvastatin 40 milliGRAM(s) Oral at bedtime  baclofen 10 milliGRAM(s) Oral every 12 hours  chlorhexidine 2% Cloths 1 Application(s) Topical <User Schedule>  clobetasol 0.05% Cream 1 Application(s) Topical two times a day  clonazePAM  Tablet 0.25 milliGRAM(s) Oral three times a day  epoetin marilin-epbx (RETACRIT) Injectable 4000 Unit(s) IV Push <User Schedule>  ertapenem  IVPB 500 milliGRAM(s) IV Intermittent every 24 hours  lactulose Syrup 20 Gram(s) Oral once  levETIRAcetam 500 milliGRAM(s) Oral daily  levETIRAcetam 250 milliGRAM(s) Oral <User Schedule>  levothyroxine 100 MICROGram(s) Oral daily  lidocaine   4% Patch 1 Patch Transdermal every 24 hours  lidocaine   4% Patch 1 Patch Transdermal daily  metoprolol succinate ER 50 milliGRAM(s) Oral daily  mupirocin 2% Ointment 1 Application(s) Topical three times a day  Nephro-mirna 1 Tablet(s) Oral daily  nystatin Powder 1 Application(s) Topical three times a day  OLANZapine 2.5 milliGRAM(s) Oral at bedtime  polyethylene glycol 3350 17 Gram(s) Oral daily  senna 2 Tablet(s) Oral at bedtime  sevelamer carbonate 1600 milliGRAM(s) Oral three times a day  tamsulosin 0.4 milliGRAM(s) Oral at bedtime    MEDICATIONS  (PRN):  bisacodyl Suppository 10 milliGRAM(s) Rectal daily PRN Constipation  diphenhydrAMINE 25 milliGRAM(s) Oral once PRN Rash and/or Itching  LORazepam     Tablet 1 milliGRAM(s) Oral every 6 hours PRN Anxiety(SEVERE)  melatonin 3 milliGRAM(s) Oral at bedtime PRN Insomnia  OLANZapine 2.5 milliGRAM(s) Oral every 6 hours PRN agitation/paranoid  ondansetron Injectable 4 milliGRAM(s) IV Push every 8 hours PRN Nausea and/or Vomiting        CAPILLARY BLOOD GLUCOSE        I&O's Summary    05 Apr 2023 07:01  -  06 Apr 2023 07:00  --------------------------------------------------------  IN: 1260 mL / OUT: 1400 mL / NET: -140 mL        PHYSICAL EXAM:  GENERAL: No acute distress, pt somnolent and refuses to respond  HEAD:  Atraumatic, Normocephalic, dry mucous membranes   EYES: unable to assess, pt refuses to open his eyes   NECK:  no JVD, no obvious masses   CHEST/LUNG: CTAB; No wheezes, rales, or rhonchi  HEART: irregularly irregular rhythm, No murmurs, rubs, or gallops  ABDOMEN: Soft, non-tender, non-distended;   EXTREMITIES:  No clubbing, cyanosis, or edema, poor hygiene   NEUROLOGY: A&O x1-2  SKIN: multiple lower extremity skin ulcerations and lesions with overlying dry crust   LABS:                        8.2    12.68 )-----------( 165      ( 06 Apr 2023 10:57 )             25.1     04-06    132<L>  |  96  |  91<H>  ----------------------------<  132<H>  4.8   |  20<L>  |  5.25<H>    Ca    8.6      06 Apr 2023 10:57  Phos  4.8     04-05  Mg     2.2     04-05    TPro  6.7  /  Alb  3.6  /  TBili  0.4  /  DBili  x   /  AST  32  /  ALT  32  /  AlkPhos  375<H>  04-06              RADIOLOGY & ADDITIONAL TESTS:    Imaging Personally Reviewed:    Consultant(s) Notes Reviewed:      Care Discussed with Consultants/Other Providers:

## 2023-04-06 NOTE — BH CONSULTATION LIAISON PROGRESS NOTE - NSBHCHARTREVIEWINVESTIGATE_PSY_A_CORE FT
< from: 12 Lead ECG (03.23.23 @ 13:28) >    Ventricular Rate 69 BPM    Atrial Rate 69 BPM    P-R Interval 142 ms    QRS Duration 92 ms    Q-T Interval 436 ms    QTC Calculation(Bazett) 467 ms    P Axis 62 degrees    R Axis 14 degrees    T Axis 40 degrees    Diagnosis Line SINUS RHYTHM WITH PREMATURE ATRIAL COMPLEXES  OTHERWISE NORMAL ECG  WHEN COMPARED WITH ECG OF 15-SEP-2005  PACs are now present  Confirmed by Eduardo Sarkar MD ([id number]) on 3/24/2023 2:33:34 PM    < end of copied text >    
< from: 12 Lead ECG (03.23.23 @ 13:28) >    Ventricular Rate 69 BPM    Atrial Rate 69 BPM    P-R Interval 142 ms    QRS Duration 92 ms    Q-T Interval 436 ms    QTC Calculation(Bazett) 467 ms    P Axis 62 degrees    R Axis 14 degrees    T Axis 40 degrees    Diagnosis Line SINUS RHYTHM WITH PREMATURE ATRIAL COMPLEXES  OTHERWISE NORMAL ECG  WHEN COMPARED WITH ECG OF 15-SEP-2005  PACs are now present  Confirmed by Eduardo Sarkar MD ([id number]) on 3/24/2023 2:33:34 PM    < end of copied text >    
< from: 12 Lead ECG (03.23.23 @ 13:28) >    Ventricular Rate 69 BPM    Atrial Rate 69 BPM    P-R Interval 142 ms    QRS Duration 92 ms    Q-T Interval 436 ms    QTC Calculation(Bazett) 467 ms    P Axis 62 degrees    R Axis 14 degrees    T Axis 40 degrees    Diagnosis Line SINUS RHYTHM WITH PREMATURE ATRIAL COMPLEXES  OTHERWISE NORMAL ECG  WHEN COMPARED WITH ECG OF 15-SEP-2005  PACs are now present  Confirmed by Eduardo Sarkar MD ([id number]) on 3/24/2023 2:33:34 PM    < end of copied text >

## 2023-04-06 NOTE — PROGRESS NOTE ADULT - PROBLEM SELECTOR PLAN 1
-Likely anemia 2/2 renal disease  -no evidence of GI bleed   -no active blood loss reported   -s/p 1u pRBC 3/30, 4/5  -started on retacrit   -Daily CBC

## 2023-04-06 NOTE — BH CONSULTATION LIAISON PROGRESS NOTE - NSBHFUPINTERVALHXFT_PSY_A_CORE
Pt has been agitated intermittently and has been refusing treatment.  Discussed giving small doses of Ativan IV with his medical team and increasing the Clonazepam back to 0.5mg po BID but timing it later for 10am and 10pm. 
Pt is agitated and says that he does not want to go for a CT scan and does not want to have blood draws.  Discussed giving small doses of Ativan IV with his medical team and 
Pt received a prn of Zyprexa for agitation earlier but remains restless and complains of itching during his current dialysis at bedside. PT is requesting PO ativan to help with anxiety.  He does not want to increase Clonazepam and says that Ativan works better as a prn.

## 2023-04-06 NOTE — PROVIDER CONTACT NOTE (OTHER) - SITUATION
Patient was swabbed by RN providing care for patient- Lety.  Swab of nasal, axilla, and groin brought to lab for further testing.
Patient complaint of 10/10 "kidney pain" refusing care unless he gets IVP Dilaudid
Pt complaining of back pain 7/10
Pt refusing post blood transfusion CBC and BMP.
Patient complaint of back pain 10/10
Pt requesting to leave AMA
pt refusing meds and blood transfusion stating , " I'm not taking any meds of blood until I have "fucking "Diluadid."

## 2023-04-06 NOTE — BH CONSULTATION LIAISON PROGRESS NOTE - NSBHCONSULTMEDAGITATION_PSY_A_CORE FT
Zyprexa 2.5mg po q8hrs prn agitation

## 2023-04-06 NOTE — PROGRESS NOTE ADULT - PROBLEM SELECTOR PLAN 10
-HyperK secondary to ESRD and missing HD. Resolved but Pt has been refusing HD and therefore is at risk of HyperK.   -Pt goes to [facility] dialysis unit. Follows with Dr. Mercedes. HD on TTS  -Renal consulted; HD per renal  - Sevelamer  - Monitor daily BMP  -EKG on admission unremarkable

## 2023-04-06 NOTE — PROGRESS NOTE ADULT - PROBLEM SELECTOR PLAN 9
-normal saline cleanse Medihoney and DSD to bilateral foot ulcerations daily.  -Podiatry consulted:  -3/24 R ankle XR: negative for OM or soft tissue tracking air  -Recommend decubitus precautions and use of Z flow boot  -Recommend normal saline cleanse prior to dressing changes daily  -frequent pressure off set

## 2023-04-06 NOTE — PROGRESS NOTE ADULT - SUBJECTIVE AND OBJECTIVE BOX
[facility] DIVISION OF KIDNEY DISEASES AND HYPERTENSION --    24 hour events/subjective:    pt slightly agitated  Getting started on HD bedside    PAST HISTORY  --------------------------------------------------------------------------------  No significant changes to PMH, PSH, FHx, SHx, unless otherwise noted    ALLERGIES & MEDICATIONS  --------------------------------------------------------------------------------  Allergies    acetaminophen (Rash)  aspirin (Rash)  bioflavonoids (Rash)  cefaclor (Rash)  ceftriaxone (Rash)  clindamycin (Rash)  Codeine Sulfate (Rash)  haloperidol (Rash)  ibuprofen (Rash)  iopamidol (Rash)  Onions (Rash)  penicillins (Rash)  tromethamine (Rash)    Intolerances      Standing Inpatient Medications  acetaminophen   IVPB .. 1000 milliGRAM(s) IV Intermittent once  artificial  tears Solution 1 Drop(s) Both EYES four times a day  atorvastatin 40 milliGRAM(s) Oral at bedtime  baclofen 10 milliGRAM(s) Oral every 12 hours  chlorhexidine 2% Cloths 1 Application(s) Topical <User Schedule>  clobetasol 0.05% Cream 1 Application(s) Topical two times a day  clonazePAM  Tablet 0.25 milliGRAM(s) Oral three times a day  epoetin marilin-epbx (RETACRIT) Injectable 4000 Unit(s) IV Push <User Schedule>  ertapenem  IVPB 500 milliGRAM(s) IV Intermittent every 24 hours  levETIRAcetam 500 milliGRAM(s) Oral daily  levETIRAcetam 250 milliGRAM(s) Oral <User Schedule>  levothyroxine 100 MICROGram(s) Oral daily  lidocaine   4% Patch 1 Patch Transdermal every 24 hours  lidocaine   4% Patch 1 Patch Transdermal daily  metoprolol succinate ER 50 milliGRAM(s) Oral daily  mupirocin 2% Ointment 1 Application(s) Topical three times a day  Nephro-mirna 1 Tablet(s) Oral daily  nystatin Powder 1 Application(s) Topical three times a day  OLANZapine 2.5 milliGRAM(s) Oral at bedtime  polyethylene glycol 3350 17 Gram(s) Oral daily  senna 2 Tablet(s) Oral at bedtime  sevelamer carbonate 1600 milliGRAM(s) Oral three times a day  tamsulosin 0.4 milliGRAM(s) Oral at bedtime    PRN Inpatient Medications  acetaminophen     Tablet .. 650 milliGRAM(s) Oral every 6 hours PRN  bisacodyl Suppository 10 milliGRAM(s) Rectal daily PRN  diphenhydrAMINE 25 milliGRAM(s) Oral once PRN  LORazepam     Tablet 1 milliGRAM(s) Oral every 6 hours PRN  melatonin 3 milliGRAM(s) Oral at bedtime PRN  OLANZapine 2.5 milliGRAM(s) Oral every 6 hours PRN  ondansetron Injectable 4 milliGRAM(s) IV Push every 8 hours PRN      REVIEW OF SYSTEMS  --------------------------------------------------------------------------------  All other systems were reviewed and are negative, except as noted.    VITALS/PHYSICAL EXAM  --------------------------------------------------------------------------------  T(C): 36.3 (04-06-23 @ 10:00), Max: 36.8 (04-05-23 @ 11:13)  HR: 86 (04-06-23 @ 10:00) (73 - 86)  BP: 123/82 (04-06-23 @ 10:00) (101/66 - 155/88)  RR: 18 (04-06-23 @ 10:00) (15 - 18)  SpO2: 98% (04-06-23 @ 10:00) (98% - 100%)  Wt(kg): --    Weight (kg): 89.9 (04-05-23 @ 11:13)      04-05-23 @ 07:01  -  04-06-23 @ 07:00  --------------------------------------------------------  IN: 1260 mL / OUT: 1400 mL / NET: -140 mL      Physical Exam:  	Gen: NAD,  	Pulm: CTA B/L  	CV: RRR, S1S2  	Abd: +BS, soft,  	LE: Warm, FROM,  no edema  	Skin: excoriations  	Vascular access: tunnel cath    LABS/STUDIES  --------------------------------------------------------------------------------              6.8    9.47  >-----------<  139      [04-05-23 @ 18:46]              21.5     138  |  101  |  77  ----------------------------<  122      [04-05-23 @ 10:14]  4.3   |  21  |  4.76        Ca     8.7     [04-05-23 @ 10:14]      Mg     2.2     [04-05-23 @ 10:14]      Phos  4.8     [04-05-23 @ 10:14]    TPro  6.3  /  Alb  3.4  /  TBili  0.3  /  DBili  x   /  AST  28  /  ALT  25  /  AlkPhos  348  [04-04-23 @ 16:00]          Creatinine Trend:  SCr 4.76 [04-05 @ 10:14]  SCr 4.80 [04-04 @ 16:00]  SCr 6.71 [04-03 @ 06:57]  SCr 4.62 [04-01 @ 09:59]  SCr 5.40 [03-31 @ 09:50]    Urinalysis - [04-03-23 @ 15:52]      Color Light Yellow / Appearance Clear / SG 1.011 / pH 6.0      Gluc 100 mg/dL / Ketone Negative  / Bili Negative / Urobili Negative       Blood Large / Protein 30 mg/dL / Leuk Est Negative / Nitrite Negative      RBC 76 / WBC 2 / Hyaline 3 / Gran  / Sq Epi  / Non Sq Epi 0 / Bacteria Negative        HCV 8.61, Reactive      [03-24-23 @ 06:12]    Syphilis Screen (Treponema Pallidum Ab) Negative      [04-01-23 @ 09:59]

## 2023-04-06 NOTE — PROGRESS NOTE ADULT - PROBLEM SELECTOR PLAN 1
Pt. with ESRD on HD three times a week (TTS). Pt being admitted after mechanical fall. HD via tunneled HD catheter.     Pt goes to [facility] dialysis unit. Follows with Dr. Mercedes. Labs reviewed.   Plan for maintenance HD today  TTS schedule  Psych consult reviewed. Avoid nephrotoxins. Dose meds as per HD.

## 2023-04-06 NOTE — BH CONSULTATION LIAISON PROGRESS NOTE - NSBHASSESSMENTFT_PSY_ALL_CORE
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 As per Patient; history of PTSD (1998), prescribed unknown medication,  Denies previous psychiatric admissions, SI/HI, SA, BSSI, AVH, paranoia. As per chart; pmh Hep C, HTN, AFib s/p AICD (Medtronic) on Eliquis and Plavix, b/l CVA quadriplegic s/p suprapubic cath, ESRD on HD ( T/Thu/Sat), diastolic CHF, seizure on Keppra, hep C, chronic pressure ulcers, recent admission after fall in nursing home , now presenting after fall on day of admission. Psychiatry consulted for capacity to refuse dialysis and agitation.  Patient was irritable but cooperative, exhibits behavior that is abrasive and offensive to other with beliefs that others are actively and deliberately intending harm or ignoring his needs. The patient has no SI or depressive symptoms but displays actions that he is aware go against his treatment plan such as pulling out his IV and refusing dialysis. He is aware that not getting dialysis can result in severe consequences and is not refusing dialysis but wants to have it at the nursing home only not in the hospital because he believes that staff here are against him and do not want to help him. The patient does not appear to have full capacity and is verbally abusive to staff with paranoid ideation and may benefit from medication.  Agree with recommendations for Zyprexa 2.5mg qhs and as a prn as needed for paranoia.  (would recommend Zyprexa prior to the blood transfusion since pt has a hx of pulling out his IV because of paranoia.)  3/31/23 Pt refused his Zyprexa standing dose and had a prn dose today.  Continue PRN Zyprexa for agitation  Continue Clonazepam 0.5mg po BID (benzo dependent)  Ativan 1mg po q6hrs for acute anxiety (anxious and restless with severe itching secondary to renal disease) 
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 As per Patient; history of PTSD (1998), prescribed unknown medication,  Denies previous psychiatric admissions, SI/HI, SA, BSSI, AVH, paranoia. As per chart; pmh Hep C, HTN, AFib s/p AICD (Medtronic) on Eliquis and Plavix, b/l CVA quadriplegic s/p suprapubic cath, ESRD on HD ( T/Thu/Sat), diastolic CHF, seizure on Keppra, hep C, chronic pressure ulcers, recent admission after fall in nursing home , now presenting after fall on day of admission. Psychiatry consulted for capacity to refuse dialysis and agitation.  Patient was irritable but cooperative, exhibits behavior that is abrasive and offensive to other with beliefs that others are actively and deliberately intending harm or ignoring his needs. The patient has no SI or depressive symptoms but displays actions that he is aware go against his treatment plan such as pulling out his IV and refusing dialysis. He is aware that not getting dialysis can result in severe consequences and is not refusing dialysis but wants to have it at the nursing home only not in the hospital because he believes that staff here are against him and do not want to help him. The patient does not appear to have full capacity and is verbally abusive to staff with paranoid ideation and may benefit from medication.  Agree with recommendations for Zyprexa 2.5mg qhs and as a prn as needed for paranoia.  (would recommend Zyprexa prior to the blood transfusion since pt has a hx of pulling out his IV because of paranoia.)  3/31/23 Pt refused his Zyprexa standing dose and had a prn dose today.  Continue PRN Zyprexa for agitation  Continue Clonazepam 0.5mg po BID (benzo dependent)  Ativan 1mg po q6hrs for acute anxiety (anxious and restless with severe itching secondary to renal disease)   Ativan 0.5mg IV q6hrs prn agitation or prior to blood draws.
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 As per Patient; history of PTSD (1998), prescribed unknown medication,  Denies previous psychiatric admissions, SI/HI, SA, BSSI, AVH, paranoia. As per chart; pmh Hep C, HTN, AFib s/p AICD (Medtronic) on Eliquis and Plavix, b/l CVA quadriplegic s/p suprapubic cath, ESRD on HD ( T/Thu/Sat), diastolic CHF, seizure on Keppra, hep C, chronic pressure ulcers, recent admission after fall in nursing home , now presenting after fall on day of admission. Psychiatry consulted for capacity to refuse dialysis and agitation.  Patient was irritable but cooperative, exhibits behavior that is abrasive and offensive to other with beliefs that others are actively and deliberately intending harm or ignoring his needs. The patient has no SI or depressive symptoms but displays actions that he is aware go against his treatment plan such as pulling out his IV and refusing dialysis. He is aware that not getting dialysis can result in severe consequences and is not refusing dialysis but wants to have it at the nursing home only not in the hospital because he believes that staff here are against him and do not want to help him. The patient does not appear to have full capacity and is verbally abusive to staff with paranoid ideation and may benefit from medication.  Agree with recommendations for Zyprexa 2.5mg qhs and as a prn as needed for paranoia.  (would recommend Zyprexa prior to the blood transfusion since pt has a hx of pulling out his IV because of paranoia.)  Pt has been agitated intermittently and has been refusing treatment.  Discussed giving small doses of Ativan IV with his medical team and increasing the Clonazepam back to 0.5mg po BID but timing it later for 10am and 10pm.  Continue Clonazepam 0.5mg po BID (benzo dependent)  Ativan 1mg po q6hrs for acute anxiety (anxious and restless with severe itching secondary to renal disease)   Ativan 0.5mg IV q6hrs prn agitation or prior to blood draws.

## 2023-04-06 NOTE — PROVIDER CONTACT NOTE (OTHER) - ASSESSMENT
Patient alert and oriented x1-2. Patient refusing blood drawn and AM care unless he gets dilauded IVP. Denies chest pain, dizziness, SOB. VSS. Complaint of pain 10/10 to back and kidney per patient. Suprapubic cathter patent with good flow.
Patient alert and oriented x2-3. Complaint of back pain 10/10. Constant, throbbing, aching. Denies chest pain, dizziness, SOB. Patient states " toradol does not work for me and tylenol gives me a rash to my back"
Pt refusing post blood transfusion CBC and BMP
Pt A&O 2-3, does not have capacity to make AMA decision.
Pt complaining of back pain 7/10. says he will only take IV dilaudid
pt agitated and combative , remains in wrist restraints

## 2023-04-06 NOTE — PROGRESS NOTE ADULT - SUBJECTIVE AND OBJECTIVE BOX
Follow Up:  leukocytosis    Interval History/ROS:  describes pain in area of left kidney    Allergies  acetaminophen (Rash)  aspirin (Rash)  bioflavonoids (Rash)  cefaclor (Rash)  ceftriaxone (Rash)  clindamycin (Rash)  Codeine Sulfate (Rash)  haloperidol (Rash)  ibuprofen (Rash)  iopamidol (Rash)  Onions (Rash)  penicillins (Rash)  tromethamine (Rash)    ANTIMICROBIALS:  ertapenem  IVPB 500 every 24 hours      OTHER MEDS:  MEDICATIONS  (STANDING):  acetaminophen     Tablet .. 975 every 8 hours  acetaminophen   IVPB .. 1000 once  apixaban 5 two times a day  atorvastatin 40 at bedtime  baclofen 10 every 12 hours  bisacodyl Suppository 10 daily PRN  clonazePAM  Tablet 0.5 <User Schedule>  diphenhydrAMINE 25 once PRN  epoetin marilin-epbx (RETACRIT) Injectable 4000 <User Schedule>  lactulose Syrup 20 once  levETIRAcetam 500 daily  levETIRAcetam 250 <User Schedule>  levothyroxine 100 daily  LORazepam     Tablet 1 every 6 hours PRN  melatonin 3 at bedtime PRN  metoprolol succinate ER 50 daily  OLANZapine 2.5 at bedtime  OLANZapine 2.5 every 6 hours PRN  ondansetron Injectable 4 every 8 hours PRN  polyethylene glycol 3350 17 daily  senna 2 at bedtime  tamsulosin 0.4 at bedtime      Vital Signs Last 24 Hrs  T(C): 36.2 (06 Apr 2023 12:15), Max: 36.8 (05 Apr 2023 14:28)  T(F): 97.2 (06 Apr 2023 12:15), Max: 98.2 (05 Apr 2023 14:28)  HR: 94 (06 Apr 2023 12:15) (73 - 94)  BP: 140/84 (06 Apr 2023 12:15) (110/66 - 155/88)  BP(mean): --  RR: 18 (06 Apr 2023 12:15) (16 - 18)  SpO2: 98% (06 Apr 2023 12:15) (98% - 100%)    Parameters below as of 06 Apr 2023 12:15  Patient On (Oxygen Delivery Method): room air        PHYSICAL EXAM:  General: NAD, Non-toxic  Neurology: agitated  Respiratory: Clear to auscultation bilaterally  CV: RRR, S1S2, no murmurs, rubs or gallops  Abdominal: Soft, Non-tender, non-distended, normal bowel sounds  Extremities: No edema,  Line Sites: Clear  Skin: No rash                          8.2    12.68 )-----------( 165      ( 06 Apr 2023 10:57 )             25.1   WBC Count: 12.68 (04-06 @ 10:57)  WBC Count: 9.47 (04-05 @ 18:46)  WBC Count: 11.48 (04-05 @ 10:14)  WBC Count: 7.30 (04-04 @ 15:36)  WBC Count: 18.50 (04-03 @ 07:03)    04-06    132<L>  |  96  |  91<H>  ----------------------------<  132<H>  4.8   |  20<L>  |  5.25<H>    Ca    8.6      06 Apr 2023 10:57  Phos  4.8     04-05  Mg     2.2     04-05    TPro  6.7  /  Alb  3.6  /  TBili  0.4  /  DBili  x   /  AST  32  /  ALT  32  /  AlkPhos  375<H>  04-06      MICROBIOLOGY:  .Other Other  03-30-23   Few Candida auris  --  --      Wound Wound  03-24-23   No growth at 48 hours  --  --      Clean Catch Clean Catch (Midstream)  03-23-23   <10,000 CFU/mL Normal Urogenital Nicolasa  --  --      .Blood Blood-Venous  03-23-23   No Growth Final  --  --      .Blood Blood-Peripheral  03-23-23   No Growth Final  --  --    RADIOLOGY:  < from: CT Lumbar Spine Reform w/ IV Cont (04.06.23 @ 00:21) >  IMPRESSION: Stable noncontrast head CT.    Demineralization of bones is seen involving the lumbar spine region.    Degenerative change involving the lumbar spine region. If underlying   infectious of the lumbar spine is still clinically suspected contrast   enhanced MRI of the lumbar spinecan be done for further evaluation if   there are no contraindications.    < end of copied text >  < from: CT Abdomen and Pelvis w/ IV Cont (04.06.23 @ 00:21) >  FINDINGS:  LOWER CHEST: Cardiomegaly. Coronary artery calcifications. Partially   visualized defibrillator leads. Left lower lobe linear atelectasis.    LIVER: Within normal limits.  BILE DUCTS: Normal caliber.  GALLBLADDER: Cholecystectomy.  SPLEEN: Within normal limits.  PANCREAS: Within normal limits.  ADRENALS: Within normal limits.  KIDNEYS/URETERS: Bilaterally atrophic. Left renal cyst. No hydronephrosis.    BLADDER: Urinary bladder is collapsed around a suprapubic catheter.  REPRODUCTIVE ORGANS: Prostate is enlarged with coarse internal   calcifications.    BOWEL: Stomach is distended with fluid and ingested debris. No bowel   obstruction. The rectum is distended with a large amount of stool, with   associated mild wall thickening and adjacent fatty infiltration,   suggesting stercoral proctitis. Appendix is normal.  PERITONEUM: No ascites.  VESSELS: Atherosclerotic changes. Dictated IVC, anatomic variant.  RETROPERITONEUM/LYMPH NODES: No lymphadenopathy.  ABDOMINAL WALL: Within normal limits.  BONES: Degenerative changes. Diffusely demineralized.    IMPRESSION:  *  Rectum distended with stool, with possible stercoral proctitis.  *  Otherwise, no evidence of acute intra-abdominal pathology.    < end of copied text >      Napoleon Moreland MD; Division of Infectious Disease; Pager: [phone]; nights and weekends: [phone]

## 2023-04-06 NOTE — PROVIDER CONTACT NOTE (OTHER) - BACKGROUND
Patient admitted for Anemia
Patient admitted for chronic CHF
pt admitted for anemia
pt admitted 3/28 with fall and anemia
pt admitted for anemia
Pt admitted for anemia, s/p fall

## 2023-04-06 NOTE — PROGRESS NOTE ADULT - PROBLEM SELECTOR PLAN 3
-likely in the setting of stercoral colitis   -+ C aureus, colonized  -initial blood cx ngtd, pending repeat bleed cx    -C/w IV ertapenem (started on 4/3--)  -CT L spine negative for infection   -D/w ID, will follow up repeat blood cx, if negative then dc abx  -aggressive bowel regimen

## 2023-04-07 NOTE — PROGRESS NOTE ADULT - SUBJECTIVE AND OBJECTIVE BOX
PROGRESS NOTE:   Authored by Luisito Goetz DO    Patient is a 62y old  Male who presents with a chief complaint of worsening anemia (06 Apr 2023 14:01)      SUBJECTIVE / OVERNIGHT EVENTS:    ADDITIONAL REVIEW OF SYSTEMS:    MEDICATIONS  (STANDING):  acetaminophen     Tablet .. 975 milliGRAM(s) Oral every 8 hours  acetaminophen   IVPB .. 1000 milliGRAM(s) IV Intermittent once  apixaban 5 milliGRAM(s) Oral two times a day  artificial  tears Solution 1 Drop(s) Both EYES four times a day  atorvastatin 40 milliGRAM(s) Oral at bedtime  baclofen 10 milliGRAM(s) Oral every 12 hours  chlorhexidine 2% Cloths 1 Application(s) Topical <User Schedule>  clobetasol 0.05% Cream 1 Application(s) Topical two times a day  clonazePAM  Tablet 0.5 milliGRAM(s) Oral <User Schedule>  epoetin marilin-epbx (RETACRIT) Injectable 4000 Unit(s) IV Push <User Schedule>  ertapenem  IVPB 500 milliGRAM(s) IV Intermittent every 24 hours  levETIRAcetam 500 milliGRAM(s) Oral daily  levETIRAcetam 250 milliGRAM(s) Oral <User Schedule>  levothyroxine 100 MICROGram(s) Oral daily  lidocaine   4% Patch 1 Patch Transdermal daily  lidocaine   4% Patch 1 Patch Transdermal every 24 hours  metoprolol succinate ER 50 milliGRAM(s) Oral daily  mupirocin 2% Ointment 1 Application(s) Topical three times a day  Nephro-mirna 1 Tablet(s) Oral daily  nystatin Powder 1 Application(s) Topical three times a day  OLANZapine 2.5 milliGRAM(s) Oral at bedtime  polyethylene glycol 3350 17 Gram(s) Oral daily  senna 2 Tablet(s) Oral at bedtime  sevelamer carbonate 1600 milliGRAM(s) Oral three times a day  tamsulosin 0.4 milliGRAM(s) Oral at bedtime    MEDICATIONS  (PRN):  bisacodyl Suppository 10 milliGRAM(s) Rectal daily PRN Constipation  diphenhydrAMINE 25 milliGRAM(s) Oral once PRN Rash and/or Itching  LORazepam     Tablet 1 milliGRAM(s) Oral every 6 hours PRN Anxiety(SEVERE)  melatonin 3 milliGRAM(s) Oral at bedtime PRN Insomnia  OLANZapine 2.5 milliGRAM(s) Oral every 6 hours PRN agitation/paranoid  ondansetron Injectable 4 milliGRAM(s) IV Push every 8 hours PRN Nausea and/or Vomiting      CAPILLARY BLOOD GLUCOSE        I&O's Summary    06 Apr 2023 07:01  -  07 Apr 2023 07:00  --------------------------------------------------------  IN: 780 mL / OUT: 2451 mL / NET: -1671 mL        PHYSICAL EXAM:  Vital Signs Last 24 Hrs  T(C): 36.6 (07 Apr 2023 08:52), Max: 36.7 (06 Apr 2023 19:10)  T(F): 97.9 (07 Apr 2023 08:52), Max: 98 (06 Apr 2023 19:10)  HR: 70 (07 Apr 2023 08:52) (67 - 94)  BP: 103/62 (07 Apr 2023 08:52) (103/62 - 140/84)  BP(mean): --  RR: 18 (07 Apr 2023 08:52) (18 - 18)  SpO2: 98% (07 Apr 2023 08:52) (98% - 99%)    Parameters below as of 07 Apr 2023 08:52  Patient On (Oxygen Delivery Method): room air        GENERAL: No acute distress, well-developed  HEAD:  Atraumatic, Normocephalic  EYES: EOMI, PERRLA, conjunctiva and sclera clear  NECK: Supple, no lymphadenopathy, no JVD  CHEST/LUNG: CTAB; No wheezes, rales, or rhonchi  HEART: Regular rate and rhythm; No murmurs, rubs, or gallops  ABDOMEN: Soft, non-tender, non-distended; normal bowel sounds, no organomegaly  EXTREMITIES:  2+ peripheral pulses b/l, No clubbing, cyanosis, or edema  NEUROLOGY: A&O x 3, no focal deficits  SKIN: No rashes or lesions    LABS:                        8.3    16.13 )-----------( 123      ( 07 Apr 2023 09:06 )             25.8     04-07    137  |  99  |  78<H>  ----------------------------<  141<H>  4.1   |  21<L>  |  4.60<H>    Ca    8.4      07 Apr 2023 09:07    TPro  6.7  /  Alb  3.6  /  TBili  0.4  /  DBili  x   /  AST  32  /  ALT  32  /  AlkPhos  375<H>  04-06              Culture - Blood (collected 05 Apr 2023 12:00)  Source: .Blood Blood-Venous  Preliminary Report (06 Apr 2023 17:02):    No growth to date.        RADIOLOGY & ADDITIONAL TESTS:  Results Reviewed:   Imaging Personally Reviewed:  Electrocardiogram Personally Reviewed:    COORDINATION OF CARE:  Care Discussed with Consultants/Other Providers [Y/N]:  Prior or Outpatient Records Reviewed [Y/N]:   PROGRESS NOTE:   Authored by Luisito Goetz DO    Patient is a 62y old  Male who presents with a chief complaint of worsening anemia (06 Apr 2023 14:01)      SUBJECTIVE / OVERNIGHT EVENTS: examined pt at bedside Pt less agitated afte receiving the morning dose of clonopin did allow for physical examination but does not answer questions, falls asleep immediately.     ADDITIONAL REVIEW OF SYSTEMS:    MEDICATIONS  (STANDING):  acetaminophen     Tablet .. 975 milliGRAM(s) Oral every 8 hours  acetaminophen   IVPB .. 1000 milliGRAM(s) IV Intermittent once  apixaban 5 milliGRAM(s) Oral two times a day  artificial  tears Solution 1 Drop(s) Both EYES four times a day  atorvastatin 40 milliGRAM(s) Oral at bedtime  baclofen 10 milliGRAM(s) Oral every 12 hours  chlorhexidine 2% Cloths 1 Application(s) Topical <User Schedule>  clobetasol 0.05% Cream 1 Application(s) Topical two times a day  clonazePAM  Tablet 0.5 milliGRAM(s) Oral <User Schedule>  epoetin marilin-epbx (RETACRIT) Injectable 4000 Unit(s) IV Push <User Schedule>  ertapenem  IVPB 500 milliGRAM(s) IV Intermittent every 24 hours  levETIRAcetam 500 milliGRAM(s) Oral daily  levETIRAcetam 250 milliGRAM(s) Oral <User Schedule>  levothyroxine 100 MICROGram(s) Oral daily  lidocaine   4% Patch 1 Patch Transdermal daily  lidocaine   4% Patch 1 Patch Transdermal every 24 hours  metoprolol succinate ER 50 milliGRAM(s) Oral daily  mupirocin 2% Ointment 1 Application(s) Topical three times a day  Nephro-mirna 1 Tablet(s) Oral daily  nystatin Powder 1 Application(s) Topical three times a day  OLANZapine 2.5 milliGRAM(s) Oral at bedtime  polyethylene glycol 3350 17 Gram(s) Oral daily  senna 2 Tablet(s) Oral at bedtime  sevelamer carbonate 1600 milliGRAM(s) Oral three times a day  tamsulosin 0.4 milliGRAM(s) Oral at bedtime    MEDICATIONS  (PRN):  bisacodyl Suppository 10 milliGRAM(s) Rectal daily PRN Constipation  diphenhydrAMINE 25 milliGRAM(s) Oral once PRN Rash and/or Itching  LORazepam     Tablet 1 milliGRAM(s) Oral every 6 hours PRN Anxiety(SEVERE)  melatonin 3 milliGRAM(s) Oral at bedtime PRN Insomnia  OLANZapine 2.5 milliGRAM(s) Oral every 6 hours PRN agitation/paranoid  ondansetron Injectable 4 milliGRAM(s) IV Push every 8 hours PRN Nausea and/or Vomiting      CAPILLARY BLOOD GLUCOSE        I&O's Summary    06 Apr 2023 07:01  -  07 Apr 2023 07:00  --------------------------------------------------------  IN: 780 mL / OUT: 2451 mL / NET: -1671 mL        PHYSICAL EXAM:  Vital Signs Last 24 Hrs  T(C): 36.6 (07 Apr 2023 08:52), Max: 36.7 (06 Apr 2023 19:10)  T(F): 97.9 (07 Apr 2023 08:52), Max: 98 (06 Apr 2023 19:10)  HR: 70 (07 Apr 2023 08:52) (67 - 94)  BP: 103/62 (07 Apr 2023 08:52) (103/62 - 140/84)  BP(mean): --  RR: 18 (07 Apr 2023 08:52) (18 - 18)  SpO2: 98% (07 Apr 2023 08:52) (98% - 99%)    Parameters below as of 07 Apr 2023 08:52  Patient On (Oxygen Delivery Method): room air        GENERAL: sleeping after morning dose of clonopin less aggressive   HEAD:  Atraumatic, Normocephalic  EYES: pt sleeping, unable to assess   NECK:, no JVD or structural abnormalities   CHEST/LUNG: CTAB; No wheezes, rales, or rhonchi  HEART: Regular rate and rhythm; No murmurs  ABDOMEN: large abdomen Soft, non-tender, non-distended;   EXTREMITIES:  warm, dry and no edema   NEUROLOGY: pt asleep, refuses to awaken, once awake falls back to sleep   SKIN: there are several skin ulcerations of the lower extremities with overlying dry scabs, suprapubic catheter in place with small surrounding erythema surrounding the insertion site     LABS:                        8.3    16.13 )-----------( 123      ( 07 Apr 2023 09:06 )             25.8     04-07    137  |  99  |  78<H>  ----------------------------<  141<H>  4.1   |  21<L>  |  4.60<H>    Ca    8.4      07 Apr 2023 09:07    TPro  6.7  /  Alb  3.6  /  TBili  0.4  /  DBili  x   /  AST  32  /  ALT  32  /  AlkPhos  375<H>  04-06      Culture - Blood (collected 05 Apr 2023 12:00)  Source: .Blood Blood-Venous  Preliminary Report (06 Apr 2023 17:02):    No growth to date.    RADIOLOGY & ADDITIONAL TESTS:  Results Reviewed:   Imaging Personally Reviewed:  Electrocardiogram Personally Reviewed:    < from: CT Abdomen and Pelvis w/ IV Cont (04.06.23 @ 00:21) >  MPRESSION:  *  Rectum distended with stool, with possible stercoral proctitis.  *  Otherwise, no evidence of acute intra-abdominal pathology.    < end of copied text >  < from: CT Head No Cont (04.06.23 @ 00:15) >  IMPRESSION: Stable noncontrast head CT.    Demineralization of bones is seen involving the lumbar spine region.    Degenerative change involving the lumbar spine region. If underlying   infectious of the lumbar spine is still clinically suspected contrast   enhanced MRI of the lumbar spinecan be done for further evaluation if   there are no contraindications.    < end of copied text >      COORDINATION OF CARE:  Care Discussed with Consultants/Other Providers [Y/N]:  Prior or Outpatient Records Reviewed [Y/N]:

## 2023-04-07 NOTE — PROGRESS NOTE ADULT - PROBLEM SELECTOR PLAN 11
Eventual dispo is rajni    D/w sister updated on full plan of care Gee is [facility] rajni  Pending hep panel, ekg, cxr, covid   CM to facilitate discharge to [facility] pending above studies     D/w sister updated on full plan of care

## 2023-04-07 NOTE — PROVIDER CONTACT NOTE (MEDICATION) - REASON
pt requesting dilaudid for pain
Patient refuses all PM medications.
pt agitated and restless due to not receiving IV Dilaudid

## 2023-04-07 NOTE — PROVIDER CONTACT NOTE (MEDICATION) - BACKGROUND
pt admitted for anemia, on contact precautions, positive for c.,auris
pt admitted for anemia
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

## 2023-04-07 NOTE — PROVIDER CONTACT NOTE (OTHER) - ACTION/TREATMENT ORDERED:
EKG completed and shown to the PA. phlebotomy to come draw the stat blood.
awaiting further instruction
Ativan for agitation.
NP notified. x1 dose of IVP dilauded 0.5mg ordered and given. Will continue to monitor.
PA aware. As per PA, Iv dilaudid 0.5mg to be ordered.
PA aware. As per PA, explain to pt the importance of the CBC. After I explained importance pt states he wants to talk to the doctor first.
PA notified. X1 dose Dilauded 0.5mg IVP ordered and given. Will continue to monitor.

## 2023-04-07 NOTE — PROGRESS NOTE ADULT - PROBLEM SELECTOR PLAN 10
-HyperK secondary to ESRD and missing HD. Resolved but Pt has been refusing HD and therefore is at risk of HyperK.   -Pt goes to [facility] dialysis unit. Follows with Dr. Mercedes. HD on TTS  -Renal consulted; HD per renal  - Sevelamer  - Monitor daily BMP  -EKG on admission unremarkable -HyperK secondary to ESRD and missing HD. Resolved but Pt has been refusing HD and therefore is at risk of HyperK.   -Pt goes to [facility] dialysis unit. Follows with Dr. Mercedes. HD on TTS  -Renal consulted; HD per renal  -Sevelamer  -Monitor daily BMP  -EKG on admission unremarkable

## 2023-04-07 NOTE — PROGRESS NOTE ADULT - PROBLEM SELECTOR PLAN 3
-likely in the setting of stercoral colitis   -+ C aureus, colonized  -initial blood cx ngtd, pending repeat bleed cx    -C/w IV ertapenem (started on 4/3--)  -CT L spine negative for infection   -D/w ID, will follow up repeat blood cx, if negative then dc abx  -aggressive bowel regimen -likely in the setting of stercoral colitis   -+ C aureus, colonized  -blood cx 4/5 ngtd   -C/w IV ertapenem (started on 4/3--)  -CT L spine negative for infection   -aggressive bowel regimen, pt had bowel movement  -D/w ID, if blood cx negative and leukocytosis downtrending then dc abx 4/8

## 2023-04-07 NOTE — PROGRESS NOTE ADULT - SUBJECTIVE AND OBJECTIVE BOX
Follow Up:  leukocytosis    Interval History/ROS:  sedated when seen    Allergies  acetaminophen (Rash)  aspirin (Rash)  bioflavonoids (Rash)  cefaclor (Rash)  ceftriaxone (Rash)  clindamycin (Rash)  Codeine Sulfate (Rash)  haloperidol (Rash)  ibuprofen (Rash)  iopamidol (Rash)  Onions (Rash)  penicillins (Rash)  tromethamine (Rash)    ANTIMICROBIALS:  ertapenem  IVPB 500 every 24 hours      OTHER MEDS:  MEDICATIONS  (STANDING):  acetaminophen     Tablet .. 975 every 8 hours  apixaban 5 two times a day  atorvastatin 40 at bedtime  baclofen 10 every 12 hours  bisacodyl Suppository 10 daily PRN  clonazePAM  Tablet 0.5 <User Schedule>  diphenhydrAMINE 25 once PRN  epoetin marilin-epbx (RETACRIT) Injectable 4000 <User Schedule>  levETIRAcetam 500 daily  levETIRAcetam 250 <User Schedule>  levothyroxine 100 daily  LORazepam     Tablet 1 every 6 hours PRN  melatonin 3 at bedtime PRN  metoprolol succinate ER 50 daily  OLANZapine 2.5 at bedtime  OLANZapine 2.5 every 6 hours PRN  ondansetron Injectable 4 every 8 hours PRN  polyethylene glycol 3350 17 daily  senna 2 at bedtime  tamsulosin 0.4 at bedtime      Vital Signs Last 24 Hrs  T(C): 36.6 (07 Apr 2023 08:52), Max: 36.7 (06 Apr 2023 19:10)  T(F): 97.9 (07 Apr 2023 08:52), Max: 98 (06 Apr 2023 19:10)  HR: 70 (07 Apr 2023 08:52) (67 - 90)  BP: 103/62 (07 Apr 2023 08:52) (103/62 - 135/80)  BP(mean): --  RR: 18 (07 Apr 2023 08:52) (18 - 18)  SpO2: 98% (07 Apr 2023 08:52) (98% - 99%)    Parameters below as of 07 Apr 2023 08:52  Patient On (Oxygen Delivery Method): room air    PHYSICAL EXAM:  General: WN/WD NAD, Non-toxic  Neurology: A&Ox3, nonfocal  Respiratory: no resp distress  Abdominal: , non-distended,  Extremities: No edema,   Line Sites: Clear  Skin: No rash                        8.3    16.13 )-----------( 123      ( 07 Apr 2023 09:06 )             25.8   WBC Count: 16.13 (04-07 @ 09:06)  WBC Count: 12.68 (04-06 @ 10:57)  WBC Count: 9.47 (04-05 @ 18:46)  WBC Count: 11.48 (04-05 @ 10:14)  WBC Count: 7.30 (04-04 @ 15:36)  WBC Count: 18.50 (04-03 @ 07:03)    04-07    137  |  99  |  78<H>  ----------------------------<  141<H>  4.1   |  21<L>  |  4.60<H>    Ca    8.4      07 Apr 2023 09:07    TPro  6.7  /  Alb  3.6  /  TBili  0.4  /  DBili  x   /  AST  32  /  ALT  32  /  AlkPhos  375<H>  04-06      MICROBIOLOGY:  .Blood Blood-Venous  04-05-23   No growth to date.  --  --      .Other Other  03-30-23   Few Candida auris  --  --      Wound Wound  03-24-23   No growth at 48 hours  --  --      Clean Catch Clean Catch (Midstream)  03-23-23   <10,000 CFU/mL Normal Urogenital Nicolasa  --  --      .Blood Blood-Venous  03-23-23   No Growth Final  --  --      .Blood Blood-Peripheral  03-23-23   No Growth Final  --  --        .Blood Blood-Venous    v          RADIOLOGY:    Napoleon Moreland MD; Division of Infectious Disease; Pager: [phone]; nights and weekends: [phone]

## 2023-04-07 NOTE — PROGRESS NOTE ADULT - PROBLEM SELECTOR PLAN 1
-Likely anemia 2/2 renal disease  -no evidence of GI bleed   -no active blood loss reported   -s/p 1u pRBC 3/30, 4/5  -started on retacrit   -Daily CBC -Likely anemia 2/2 renal disease  -no evidence of GI bleed   -no active blood loss reported   -transfuse hgb<7  -s/p 1u pRBC 3/30, 4/5  -started on retacrit   -Daily CBC

## 2023-04-07 NOTE — PROVIDER CONTACT NOTE (MEDICATION) - ACTION/TREATMENT ORDERED:
awaiting new order
Provider notified. as per provider she will put in a 1x order for pt. care plan continues
education provided by RN. provider made aware. will continue to monitor patient and see if he is more agreeable later

## 2023-04-07 NOTE — PROGRESS NOTE ADULT - PROBLEM SELECTOR PLAN 5
-history c/w mechanical, no acute injuries noted on imaging. admission imaging [facility], CT c-spine, XRay pelvis unremarkable for any acute pathology. -history c/w mechanical, no acute injuries noted on imaging. admission imaging [facility], CT c-spine, XRay pelvis unremarkable for any acute pathology.  -Pt pain seeking, requesting IV dilaudid  -Seen by pain management, no role for opiates  -C/w standing tylenol  -PT-rajni

## 2023-04-07 NOTE — PROVIDER CONTACT NOTE (OTHER) - NAME OF MD/NP/PA/DO NOTIFIED:
lab receipt of swab
Donna Christy NP
MIKE Domínguez
MIKE Domínguez
MIKE ramirez
Torie Head
TAMMY Grace
Yashira Elizalde

## 2023-04-07 NOTE — PROGRESS NOTE ADULT - PROBLEM SELECTOR PLAN 2
-CTH unremarkable  -Psych eval given agitation, paranoia; appreciate recs. Does not have capacity at this time. Trial zyprexa prn   -D/w [facility] 4/5 will change to klonopin 0.25mg tid and give 1 dose of IV ativan 0.5mg x1. -CTH unremarkable  -Psych eval given agitation, paranoia; appreciate recs. Does not have capacity at this time. Trial zyprexa prn   -D/w pscyh, increase klonopin 0.25mg tid and continue with ativan prn

## 2023-04-07 NOTE — PROVIDER CONTACT NOTE (MEDICATION) - ASSESSMENT
pt a&ox3, hostile at this time once woken up by RN. refused all PM medications. insists on receiving IV dilaudid for his back pain. refuses to receive his PO tylenol & lidocaine patches claims he is allergic
pt A&Ox4, VSS, pt denies any other distress besides back pain, pt claims he will leave and not take any other meds until he receives IV Dilaudid
pt AOx3, c/o pain in his upper abdomen, pt screaming out and becoming agitated, VSS, in no acute distress, py lying comfortably in bed

## 2023-04-07 NOTE — PROVIDER CONTACT NOTE (OTHER) - REASON
EKG done
re: C. Auris confirmation
Patient complaint of 10/10 "kidney pain" refusing care unless he gets IVP Dilaudid
Patient complaint of back pain 10/10
Pt complaining of back pain 7/10
Pt refusing post blood transfusion CBC and BMP
pt refusing blood and meds at this time
Pt requesting to leave AMA

## 2023-04-07 NOTE — PROVIDER CONTACT NOTE (OTHER) - DATE AND TIME:
06-Apr-2023 06:00
31-Mar-2023 05:58
30-Mar-2023 15:09
01-Apr-2023 05:25
01-Apr-2023 23:40
07-Apr-2023 13:32
30-Mar-2023 20:37
30-Mar-2023 13:26

## 2023-04-07 NOTE — PROVIDER CONTACT NOTE (MEDICATION) - SITUATION
pt agitated and restless due to not receiving IV Dilaudid
pt refused all PM medications. insists of receiving iv dilaudid and does not want his lidocaine patch or tylenol
pt requesting dilaudid for pain

## 2023-04-08 NOTE — PROVIDER CONTACT NOTE (CRITICAL VALUE NOTIFICATION) - DATE AND TIME:
[Initial Evaluation] : an initial evaluation for 08-Apr-2023 13:08 [FreeTextEntry2] : nasal dryness 08-Apr-2023 12:27

## 2023-04-08 NOTE — PROGRESS NOTE ADULT - PROBLEM SELECTOR PLAN 5
-history c/w mechanical, no acute injuries noted on imaging. admission imaging [facility], CT c-spine, XRay pelvis unremarkable for any acute pathology.  -Pt requesting IV dilaudid  -Seen by pain management, no role for opiates  - pt requesting transfer to [facility] to his previous physicians bec we are not providing IV dilaudid. Risks of opiates d/w pt  -C/w standing tylenol  -PT-rajni

## 2023-04-08 NOTE — CHART NOTE - NSCHARTNOTEFT_GEN_A_CORE
Patient is scheduled for dialysis today but is refusing both blood work and dialysis. Explained to patient the importance of getting dialysis. States he will not go to dialysis until he receives IV Dilaudid and IV benadryl. IV Dilaudid .25 mg and IV benadryl 25 mg administered, and patient is now agreeable to plan. Discussed with attending.

## 2023-04-08 NOTE — PROGRESS NOTE ADULT - PROBLEM SELECTOR PLAN 1
Pt. with ESRD on HD three times a week (TTS). Pt being admitted after mechanical fall. HD via tunneled HD catheter.   Pt goes to [facility] dialysis unit. Follows with Dr. Mercedes. Labs reviewed. Plan for maintenance HD today. Psych consult reviewed. Avoid nephrotoxins. Dose meds as per HD.

## 2023-04-08 NOTE — PROGRESS NOTE ADULT - PROBLEM SELECTOR PLAN 11
Gee is [facility] rajni MUÑOZ to facilitate discharge to [facility] pending above studies     D/w sister updated on full plan of care

## 2023-04-08 NOTE — PROGRESS NOTE ADULT - PROBLEM SELECTOR PLAN 2
Pt. with hyperkalemia in the setting of ESRD. Serum potassium improved to 4.1 yesterday, no labs available for review today. Monitor serum potassium.

## 2023-04-08 NOTE — CHART NOTE - NSCHARTNOTESELECT_GEN_ALL_CORE
Event Note
Midline Clearance
Event Note
Nephrology/Event Note
Premedication/Event Note
chronic pain service/Event Note
refusing HD/Event Note
resume eliquis/Event Note
wound surgery/Event Note

## 2023-04-08 NOTE — PROGRESS NOTE ADULT - PROBLEM SELECTOR PLAN 3
-likely in the setting of stercoral colitis   -+ C aureus, colonized  -blood cx 4/5 ngtd   -C/w IV ertapenem (started on 4/3--)  -CT L spine negative for infection   -aggressive bowel regimen, pt had bowel movement  -D/w ID, if blood cx negative and leukocytosis downtrending then dc abx 4/8  -f/u WBC

## 2023-04-08 NOTE — PROVIDER CONTACT NOTE (CRITICAL VALUE NOTIFICATION) - ACTION/TREATMENT ORDERED:
PA on unit and made aware.
Lokelma given as order. Repeat VBG potassium 5.1
continue contact precautions
NP Attempted to reinsert IV several times. Unable.
Administer 1 unit of RBC

## 2023-04-08 NOTE — PROGRESS NOTE ADULT - PROBLEM SELECTOR PLAN 4
Pt with hyperphosphatemia in the setting of ESRD. Pt. on phosphate binders with meals. Low phosphorus diet. Monitor serum phosphorus, goal: 3.5-5.5.    If you have any questions, please feel free to contact me  Kieran Lindo  Nephrology Fellow  [phone] / [employer] Teams(Preferred)  (After 5pm or on weekends please page the on-call fellow)

## 2023-04-08 NOTE — PROVIDER CONTACT NOTE (CRITICAL VALUE NOTIFICATION) - ASSESSMENT
Pt alert and oriented x 1-2. Received from ED with no IV access. Floor RN attempted x2 and IV RN.
Pt A&O 2-3, reports no discomfort or pain.
Pt in no acute distress.
Pt denied chest pain or discomfort. VSS
pt lying in bed in nAD

## 2023-04-08 NOTE — PROGRESS NOTE ADULT - SUBJECTIVE AND OBJECTIVE BOX
[facility] Division of Kidney Diseases & Hypertension  FOLLOW UP NOTE  [phone]--------------------------------------------------------------------------------    Chief Complaint: ESRD/HD management      24 hour events/subjective: Pt. was seen and evaluated at bedside this morning. No complaints, although minimally conversive. ROS limited.        PAST HISTORY  --------------------------------------------------------------------------------  No significant changes to PMH, PSH, FHx, SHx, unless otherwise noted    ALLERGIES & MEDICATIONS  --------------------------------------------------------------------------------  Allergies    acetaminophen (Rash)  aspirin (Rash)  bioflavonoids (Rash)  cefaclor (Rash)  ceftriaxone (Rash)  clindamycin (Rash)  Codeine Sulfate (Rash)  haloperidol (Rash)  ibuprofen (Rash)  iopamidol (Rash)  Onions (Rash)  penicillins (Rash)  tromethamine (Rash)    Intolerances      Standing Inpatient Medications  acetaminophen     Tablet .. 975 milliGRAM(s) Oral every 8 hours  apixaban 5 milliGRAM(s) Oral two times a day  artificial  tears Solution 1 Drop(s) Both EYES four times a day  atorvastatin 40 milliGRAM(s) Oral at bedtime  baclofen 10 milliGRAM(s) Oral every 12 hours  chlorhexidine 2% Cloths 1 Application(s) Topical <User Schedule>  clobetasol 0.05% Cream 1 Application(s) Topical two times a day  clonazePAM  Tablet 0.5 milliGRAM(s) Oral <User Schedule>  epoetin marilin-epbx (RETACRIT) Injectable 4000 Unit(s) IV Push <User Schedule>  levETIRAcetam 500 milliGRAM(s) Oral daily  levETIRAcetam 250 milliGRAM(s) Oral <User Schedule>  levothyroxine 100 MICROGram(s) Oral daily  lidocaine   4% Patch 1 Patch Transdermal every 24 hours  lidocaine   4% Patch 1 Patch Transdermal daily  metoprolol succinate ER 50 milliGRAM(s) Oral daily  mupirocin 2% Ointment 1 Application(s) Topical three times a day  Nephro-mirna 1 Tablet(s) Oral daily  nystatin Powder 1 Application(s) Topical three times a day  OLANZapine 2.5 milliGRAM(s) Oral at bedtime  polyethylene glycol 3350 17 Gram(s) Oral daily  senna 2 Tablet(s) Oral at bedtime  sevelamer carbonate 1600 milliGRAM(s) Oral three times a day  tamsulosin 0.4 milliGRAM(s) Oral at bedtime    PRN Inpatient Medications  bisacodyl Suppository 10 milliGRAM(s) Rectal daily PRN  diphenhydrAMINE 25 milliGRAM(s) Oral once PRN  LORazepam   Injectable 0.25 milliGRAM(s) IV Push every 6 hours PRN  melatonin 3 milliGRAM(s) Oral at bedtime PRN  OLANZapine 2.5 milliGRAM(s) Oral every 6 hours PRN  ondansetron Injectable 4 milliGRAM(s) IV Push every 8 hours PRN      REVIEW OF SYSTEMS  --------------------------------------------------------------------------------  See HPI    VITALS/PHYSICAL EXAM  --------------------------------------------------------------------------------  T(C): 36.6 (04-08-23 @ 04:38), Max: 36.6 (04-07-23 @ 19:17)  HR: 64 (04-08-23 @ 04:38) (64 - 83)  BP: 112/75 (04-08-23 @ 04:38) (112/75 - 129/76)  RR: 19 (04-08-23 @ 04:38) (18 - 19)  SpO2: 100% (04-08-23 @ 04:38) (98% - 100%)  Wt(kg): --      04-07-23 @ 07:01  -  04-08-23 @ 07:00  --------------------------------------------------------  IN: 960 mL / OUT: 1000 mL / NET: -40 mL    04-08-23 @ 07:01  -  04-08-23 @ 13:49  --------------------------------------------------------  IN: 240 mL / OUT: 200 mL / NET: 40 mL    Physical Exam:  Gen: NAD,  Pulm: CTA B/L  CV: RRR, S1S2  Abd: +BS, soft,  LE: Warm, FROM,  no edema  Skin: excoriations  Vascular access: tunnel cath    LABS/STUDIES  --------------------------------------------------------------------------------              8.3    16.13 >-----------<  123      [04-07-23 @ 09:06]              25.8     137  |  99  |  78  ----------------------------<  141      [04-07-23 @ 09:07]  4.1   |  21  |  4.60        Ca     8.4     [04-07-23 @ 09:07]    Creatinine Trend:  SCr 4.60 [04-07 @ 09:07]  SCr 5.25 [04-06 @ 10:57]  SCr 4.76 [04-05 @ 10:14]  SCr 4.80 [04-04 @ 16:00]  SCr 6.71 [04-03 @ 06:57]    Urinalysis - [04-03-23 @ 15:52]      Color Light Yellow / Appearance Clear / SG 1.011 / pH 6.0      Gluc 100 mg/dL / Ketone Negative  / Bili Negative / Urobili Negative       Blood Large / Protein 30 mg/dL / Leuk Est Negative / Nitrite Negative      RBC 76 / WBC 2 / Hyaline 3 / Gran  / Sq Epi  / Non Sq Epi 0 / Bacteria Negative    HBsAg Nonreact      [04-07-23 @ 18:26]  HCV 9.25, Reactive      [04-07-23 @ 18:26]

## 2023-04-08 NOTE — PROGRESS NOTE ADULT - PROBLEM SELECTOR PLAN 1
-no evidence of GI bleed- brown stool   -no intra-abd bleed on CT  - check hemolysis labs  -transfuse hgb<7  -s/p 1u pRBC 3/30, 4/5  -started on retacrit   -Daily CBC  - will need outpt PRBC transfusions set up prior to discharge  - hematology eval if not sec to ESRD or if hemolyzing

## 2023-04-08 NOTE — PROGRESS NOTE ADULT - SUBJECTIVE AND OBJECTIVE BOX
Follow Up:  leukocytosis    Interval History/ROS:  resting comfortably    Allergies  acetaminophen (Rash)  aspirin (Rash)  bioflavonoids (Rash)  cefaclor (Rash)  ceftriaxone (Rash)  clindamycin (Rash)  Codeine Sulfate (Rash)  haloperidol (Rash)  ibuprofen (Rash)  iopamidol (Rash)  Onions (Rash)  penicillins (Rash)  tromethamine (Rash)    ANTIMICROBIALS:  ertapenem  IVPB 500 every 24 hours      OTHER MEDS:  MEDICATIONS  (STANDING):  acetaminophen     Tablet .. 975 every 8 hours  apixaban 5 two times a day  atorvastatin 40 at bedtime  baclofen 10 every 12 hours  bisacodyl Suppository 10 daily PRN  clonazePAM  Tablet 0.5 <User Schedule>  diphenhydrAMINE 25 once PRN  epoetin marilin-epbx (RETACRIT) Injectable 4000 <User Schedule>  levETIRAcetam 500 daily  levETIRAcetam 250 <User Schedule>  levothyroxine 100 daily  LORazepam   Injectable 0.25 every 6 hours PRN  melatonin 3 at bedtime PRN  metoprolol succinate ER 50 daily  OLANZapine 2.5 at bedtime  OLANZapine 2.5 every 6 hours PRN  ondansetron Injectable 4 every 8 hours PRN  polyethylene glycol 3350 17 daily  senna 2 at bedtime  tamsulosin 0.4 at bedtime      Vital Signs Last 24 Hrs  T(C): 36.6 (08 Apr 2023 04:38), Max: 36.6 (07 Apr 2023 19:17)  T(F): 97.9 (08 Apr 2023 04:38), Max: 97.9 (08 Apr 2023 04:38)  HR: 64 (08 Apr 2023 04:38) (64 - 83)  BP: 112/75 (08 Apr 2023 04:38) (112/75 - 129/76)  BP(mean): --  RR: 19 (08 Apr 2023 04:38) (18 - 19)  SpO2: 100% (08 Apr 2023 04:38) (98% - 100%)    Parameters below as of 08 Apr 2023 04:38  Patient On (Oxygen Delivery Method): room air        PHYSICAL EXAM:  General:  NAD, Non-toxic  Neurology: asleep  Respiratory: no resp distress  Abdominal: , non-distended  Skin: No rash                          8.3    16.13 )-----------( 123      ( 07 Apr 2023 09:06 )             25.8   WBC Count: 16.13 (04-07 @ 09:06)  WBC Count: 12.68 (04-06 @ 10:57)  WBC Count: 9.47 (04-05 @ 18:46)  WBC Count: 11.48 (04-05 @ 10:14)  WBC Count: 7.30 (04-04 @ 15:36)        04-07    137  |  99  |  78<H>  ----------------------------<  141<H>  4.1   |  21<L>  |  4.60<H>    Ca    8.4      07 Apr 2023 09:07      MICROBIOLOGY:  .Blood Blood-Venous  04-05-23   No growth to date.  --  --      .Other Other  03-30-23   Few Candida auris  --  --      Wound Wound  03-24-23   No growth at 48 hours  --  --      Clean Catch Clean Catch (Midstream)  03-23-23   <10,000 CFU/mL Normal Urogenital Nicolasa  --  --      .Blood Blood-Venous  03-23-23   No Growth Final  --  --      .Blood Blood-Peripheral  03-23-23   No Growth Final  --  --      RADIOLOGY:  < from: Xray Chest 1 View AP/PA (04.07.23 @ 15:01) >  IMPRESSION: Clear lungs.    < end of copied text >  < from: CT Lumbar Spine Reform w/ IV Cont (04.06.23 @ 00:21) >  IMPRESSION: Stable noncontrast head CT.    Demineralization of bones is seen involving the lumbar spine region.    Degenerative change involving the lumbar spine region. If underlying   infectious of the lumbar spine is still clinically suspected contrast   enhanced MRI of the lumbar spinecan be done for further evaluation if   there are no contraindications.    < end of copied text >  < from: CT Abdomen and Pelvis w/ IV Cont (04.06.23 @ 00:21) >  IMPRESSION:  *  Rectum distended with stool, with possible stercoral proctitis.  *  Otherwise, no evidence of acute intra-abdominal pathology.    < end of copied text >      Napoleon Moreland MD; Division of Infectious Disease; Pager: [phone]; nights and weekends: [phone]

## 2023-04-08 NOTE — PROGRESS NOTE ADULT - SUBJECTIVE AND OBJECTIVE BOX
Pro Ludwig   contact via pager or TEAMS        CC: Patient is a 62y old  Male who presents with a chief complaint of worsening anemia (07 Apr 2023 18:53)      SUBJECTIVE / OVERNIGHT EVENTS:    MEDICATIONS  (STANDING):  acetaminophen     Tablet .. 975 milliGRAM(s) Oral every 8 hours  apixaban 5 milliGRAM(s) Oral two times a day  artificial  tears Solution 1 Drop(s) Both EYES four times a day  atorvastatin 40 milliGRAM(s) Oral at bedtime  baclofen 10 milliGRAM(s) Oral every 12 hours  chlorhexidine 2% Cloths 1 Application(s) Topical <User Schedule>  clobetasol 0.05% Cream 1 Application(s) Topical two times a day  clonazePAM  Tablet 0.5 milliGRAM(s) Oral <User Schedule>  epoetin marilin-epbx (RETACRIT) Injectable 4000 Unit(s) IV Push <User Schedule>  ertapenem  IVPB 500 milliGRAM(s) IV Intermittent every 24 hours  levETIRAcetam 500 milliGRAM(s) Oral daily  levETIRAcetam 250 milliGRAM(s) Oral <User Schedule>  levothyroxine 100 MICROGram(s) Oral daily  lidocaine   4% Patch 1 Patch Transdermal daily  lidocaine   4% Patch 1 Patch Transdermal every 24 hours  metoprolol succinate ER 50 milliGRAM(s) Oral daily  mupirocin 2% Ointment 1 Application(s) Topical three times a day  Nephro-mirna 1 Tablet(s) Oral daily  nystatin Powder 1 Application(s) Topical three times a day  OLANZapine 2.5 milliGRAM(s) Oral at bedtime  polyethylene glycol 3350 17 Gram(s) Oral daily  senna 2 Tablet(s) Oral at bedtime  sevelamer carbonate 1600 milliGRAM(s) Oral three times a day  tamsulosin 0.4 milliGRAM(s) Oral at bedtime    MEDICATIONS  (PRN):  bisacodyl Suppository 10 milliGRAM(s) Rectal daily PRN Constipation  diphenhydrAMINE 25 milliGRAM(s) Oral once PRN Rash and/or Itching  LORazepam   Injectable 0.25 milliGRAM(s) IV Push every 6 hours PRN Agitation  melatonin 3 milliGRAM(s) Oral at bedtime PRN Insomnia  OLANZapine 2.5 milliGRAM(s) Oral every 6 hours PRN agitation/paranoid  ondansetron Injectable 4 milliGRAM(s) IV Push every 8 hours PRN Nausea and/or Vomiting      Vital Signs Last 24 Hrs  T(C): 36.6 (08 Apr 2023 04:38), Max: 36.6 (07 Apr 2023 19:17)  T(F): 97.9 (08 Apr 2023 04:38), Max: 97.9 (08 Apr 2023 04:38)  HR: 64 (08 Apr 2023 04:38) (64 - 83)  BP: 112/75 (08 Apr 2023 04:38) (112/75 - 129/76)  BP(mean): --  RR: 19 (08 Apr 2023 04:38) (18 - 19)  SpO2: 100% (08 Apr 2023 04:38) (98% - 100%)  CAPILLARY BLOOD GLUCOSE        I&O's Summary    07 Apr 2023 07:01  -  08 Apr 2023 07:00  --------------------------------------------------------  IN: 960 mL / OUT: 1000 mL / NET: -40 mL      tele:    PHYSICAL EXAM:    GENERAL: NAD   HEENT: EOMI, PERRL  PULM: Clear to auscultation bilaterally  CV: Regular rate and rhythm; nl S1, S2; No murmurs, rubs, or gallops  ABDOMEN: Soft, Nontender, Nondistended; Bowel sounds present  EXTREMITIES/MSK:  No edema, calf tenderness   PSYCH: AAOx3  NEUROLOGY: non-focal          LABS:                        8.3    16.13 )-----------( 123      ( 07 Apr 2023 09:06 )             25.8     04-07    137  |  99  |  78<H>  ----------------------------<  141<H>  4.1   |  21<L>  |  4.60<H>    Ca    8.4      07 Apr 2023 09:07    TPro  6.7  /  Alb  3.6  /  TBili  0.4  /  DBili  x   /  AST  32  /  ALT  32  /  AlkPhos  375<H>  04-06                Culture - Blood (collected 05 Apr 2023 12:00)  Source: .Blood Blood-Venous  Preliminary Report (06 Apr 2023 17:02):    No growth to date.      RADIOLOGY & ADDITIONAL TESTS:    Imaging Personally Reviewed:    Consultant(s) Notes Reviewed:      Care Discussed with Consultants/Other Providers:   Pro Ludwig   contact via pager or TEAMS        CC: Patient is a 62y old  Male who presents with a chief complaint of worsening anemia (07 Apr 2023 18:53)      SUBJECTIVE / OVERNIGHT EVENTS: +bm. no abd pain. no f/c/r. no cp/sob. want IV dilaudid for chronic back pain and IV Benadryl for pruritus.     MEDICATIONS  (STANDING):  acetaminophen     Tablet .. 975 milliGRAM(s) Oral every 8 hours  apixaban 5 milliGRAM(s) Oral two times a day  artificial  tears Solution 1 Drop(s) Both EYES four times a day  atorvastatin 40 milliGRAM(s) Oral at bedtime  baclofen 10 milliGRAM(s) Oral every 12 hours  chlorhexidine 2% Cloths 1 Application(s) Topical <User Schedule>  clobetasol 0.05% Cream 1 Application(s) Topical two times a day  clonazePAM  Tablet 0.5 milliGRAM(s) Oral <User Schedule>  epoetin marilin-epbx (RETACRIT) Injectable 4000 Unit(s) IV Push <User Schedule>  ertapenem  IVPB 500 milliGRAM(s) IV Intermittent every 24 hours  levETIRAcetam 500 milliGRAM(s) Oral daily  levETIRAcetam 250 milliGRAM(s) Oral <User Schedule>  levothyroxine 100 MICROGram(s) Oral daily  lidocaine   4% Patch 1 Patch Transdermal daily  lidocaine   4% Patch 1 Patch Transdermal every 24 hours  metoprolol succinate ER 50 milliGRAM(s) Oral daily  mupirocin 2% Ointment 1 Application(s) Topical three times a day  Nephro-mirna 1 Tablet(s) Oral daily  nystatin Powder 1 Application(s) Topical three times a day  OLANZapine 2.5 milliGRAM(s) Oral at bedtime  polyethylene glycol 3350 17 Gram(s) Oral daily  senna 2 Tablet(s) Oral at bedtime  sevelamer carbonate 1600 milliGRAM(s) Oral three times a day  tamsulosin 0.4 milliGRAM(s) Oral at bedtime    MEDICATIONS  (PRN):  bisacodyl Suppository 10 milliGRAM(s) Rectal daily PRN Constipation  diphenhydrAMINE 25 milliGRAM(s) Oral once PRN Rash and/or Itching  LORazepam   Injectable 0.25 milliGRAM(s) IV Push every 6 hours PRN Agitation  melatonin 3 milliGRAM(s) Oral at bedtime PRN Insomnia  OLANZapine 2.5 milliGRAM(s) Oral every 6 hours PRN agitation/paranoid  ondansetron Injectable 4 milliGRAM(s) IV Push every 8 hours PRN Nausea and/or Vomiting      Vital Signs Last 24 Hrs  T(C): 36.6 (08 Apr 2023 04:38), Max: 36.6 (07 Apr 2023 19:17)  T(F): 97.9 (08 Apr 2023 04:38), Max: 97.9 (08 Apr 2023 04:38)  HR: 64 (08 Apr 2023 04:38) (64 - 83)  BP: 112/75 (08 Apr 2023 04:38) (112/75 - 129/76)  BP(mean): --  RR: 19 (08 Apr 2023 04:38) (18 - 19)  SpO2: 100% (08 Apr 2023 04:38) (98% - 100%)  CAPILLARY BLOOD GLUCOSE        I&O's Summary    07 Apr 2023 07:01  -  08 Apr 2023 07:00  --------------------------------------------------------  IN: 960 mL / OUT: 1000 mL / NET: -40 mL          PHYSICAL EXAM:    GENERAL: NAD   HEENT: EOMI, PERRL  PULM: Clear to auscultation bilaterally  CV: Regular rate and rhythm; nl S1, S2; No murmurs, rubs, or gallops  ABDOMEN: Soft, Nontender, Nondistended; Bowel sounds present; +suprapubic cath  EXTREMITIES/MSK:  No edema, calf tenderness; foot ulcer dry with no surrounding erythema/induration  PSYCH: AAOx3  NEUROLOGY: b/l hand  contractures          LABS:                        8.3    16.13 )-----------( 123      ( 07 Apr 2023 09:06 )             25.8     04-07    137  |  99  |  78<H>  ----------------------------<  141<H>  4.1   |  21<L>  |  4.60<H>    Ca    8.4      07 Apr 2023 09:07    TPro  6.7  /  Alb  3.6  /  TBili  0.4  /  DBili  x   /  AST  32  /  ALT  32  /  AlkPhos  375<H>  04-06                Culture - Blood (collected 05 Apr 2023 12:00)  Source: .Blood Blood-Venous  Preliminary Report (06 Apr 2023 17:02):    No growth to date.      RADIOLOGY & ADDITIONAL TESTS:    Imaging Personally Reviewed:    Consultant(s) Notes Reviewed:      Care Discussed with Consultants/Other Providers: lorena

## 2023-04-08 NOTE — PROVIDER CONTACT NOTE (CRITICAL VALUE NOTIFICATION) - TEST AND RESULT REPORTED:
Potassium 6.3
He C - Reactive
Hemoglobin 6.9
fungus cx - 3/30 few candita auris from nares , groin and axilla swab
Hbg 6.8
Hemoglobin 6.6 Hematocrit 20.9

## 2023-04-08 NOTE — PROVIDER CONTACT NOTE (CRITICAL VALUE NOTIFICATION) - SITUATION
Pt hbg 6.8
pt with fungal cx sent 3/30/23 - result of few candida auris from  nares , axilla and groin
Hemoglobin 6.6 Hematocrit 20.9
Abnormal lab reported

## 2023-04-08 NOTE — PROGRESS NOTE ADULT - PROBLEM SELECTOR PLAN 10
-HyperK secondary to ESRD and missing HD. Resolved but Pt has been refusing HD and therefore is at risk of HyperK.   -Pt goes to [facility] dialysis unit. Follows with Dr. Mercedes. HD on TTS  -Renal consulted; HD per renal  -Sevelamer  -Monitor daily BMP

## 2023-04-08 NOTE — PROGRESS NOTE ADULT - PROBLEM SELECTOR PLAN 2
-CTH unremarkable  -Psych eval given agitation, paranoia; appreciate recs. Does not have capacity at this time. Trial zyprexa prn   -D/w pscyh, increase klonopin 0.25mg tid and continue with ativan prn

## 2023-04-09 NOTE — PROGRESS NOTE ADULT - PROBLEM SELECTOR PLAN 5
-history c/w mechanical, no acute injuries noted on imaging. admission imaging [facility], CT c-spine, XRay pelvis unremarkable for any acute pathology.  -Pt requesting IV dilaudid  -Seen by pain management, no role for opiates  - pt requesting transfer to [facility] to his previous physicians bec we are not providing IV dilaudid. Risks of opiates d/w pt. case presented to [facility] transfer center and case rejected  -C/w standing tylenol  -PT-rajni

## 2023-04-09 NOTE — PROGRESS NOTE ADULT - SUBJECTIVE AND OBJECTIVE BOX
Pro Ludwig   contact via pager or TEAMS        CC: Patient is a 62y old  Male who presents with a chief complaint of worsening anemia (08 Apr 2023 13:49)      SUBJECTIVE / OVERNIGHT EVENTS:    MEDICATIONS  (STANDING):  acetaminophen     Tablet .. 975 milliGRAM(s) Oral every 8 hours  apixaban 5 milliGRAM(s) Oral two times a day  artificial  tears Solution 1 Drop(s) Both EYES four times a day  atorvastatin 40 milliGRAM(s) Oral at bedtime  baclofen 10 milliGRAM(s) Oral every 12 hours  chlorhexidine 2% Cloths 1 Application(s) Topical <User Schedule>  clobetasol 0.05% Cream 1 Application(s) Topical two times a day  clonazePAM  Tablet 0.5 milliGRAM(s) Oral <User Schedule>  epoetin marilin-epbx (RETACRIT) Injectable 4000 Unit(s) IV Push <User Schedule>  levETIRAcetam 500 milliGRAM(s) Oral daily  levETIRAcetam 250 milliGRAM(s) Oral <User Schedule>  levothyroxine 100 MICROGram(s) Oral daily  lidocaine   4% Patch 1 Patch Transdermal every 24 hours  lidocaine   4% Patch 1 Patch Transdermal daily  metoprolol succinate ER 50 milliGRAM(s) Oral daily  mupirocin 2% Ointment 1 Application(s) Topical three times a day  Nephro-mirna 1 Tablet(s) Oral daily  nystatin Powder 1 Application(s) Topical three times a day  OLANZapine 2.5 milliGRAM(s) Oral at bedtime  polyethylene glycol 3350 17 Gram(s) Oral daily  senna 2 Tablet(s) Oral at bedtime  sevelamer carbonate 1600 milliGRAM(s) Oral three times a day  tamsulosin 0.4 milliGRAM(s) Oral at bedtime    MEDICATIONS  (PRN):  bisacodyl Suppository 10 milliGRAM(s) Rectal daily PRN Constipation  LORazepam   Injectable 0.25 milliGRAM(s) IV Push every 6 hours PRN Agitation  melatonin 3 milliGRAM(s) Oral at bedtime PRN Insomnia  OLANZapine 2.5 milliGRAM(s) Oral every 6 hours PRN agitation/paranoid  ondansetron Injectable 4 milliGRAM(s) IV Push every 8 hours PRN Nausea and/or Vomiting      Vital Signs Last 24 Hrs  T(C): 36.5 (09 Apr 2023 03:46), Max: 36.7 (08 Apr 2023 15:50)  T(F): 97.7 (09 Apr 2023 03:46), Max: 98.1 (08 Apr 2023 15:50)  HR: 70 (09 Apr 2023 03:46) (70 - 85)  BP: 120/81 (09 Apr 2023 03:46) (108/62 - 122/65)  BP(mean): --  RR: 17 (09 Apr 2023 03:46) (17 - 18)  SpO2: 100% (09 Apr 2023 03:46) (97% - 100%)  CAPILLARY BLOOD GLUCOSE        I&O's Summary    08 Apr 2023 07:01  -  09 Apr 2023 07:00  --------------------------------------------------------  IN: 600 mL / OUT: 1100 mL / NET: -500 mL      tele:    PHYSICAL EXAM:    GENERAL: NAD   HEENT: EOMI, PERRL  PULM: Clear to auscultation bilaterally  CV: Regular rate and rhythm; nl S1, S2; No murmurs, rubs, or gallops  ABDOMEN: Soft, Nontender, Nondistended; Bowel sounds present  EXTREMITIES/MSK:  No edema, calf tenderness   PSYCH: AAOx3  NEUROLOGY: non-focal          LABS:                        8.0    13.04 )-----------( 143      ( 08 Apr 2023 16:15 )             25.1     04-08    138  |  104  |  85<H>  ----------------------------<  113<H>  4.1   |  20<L>  |  5.12<H>    Ca    8.6      08 Apr 2023 16:15                  RADIOLOGY & ADDITIONAL TESTS:    Imaging Personally Reviewed:    Consultant(s) Notes Reviewed:      Care Discussed with Consultants/Other Providers:   Pro Ludwig   contact via pager or TEAMS        CC: Patient is a 62y old  Male who presents with a chief complaint of worsening anemia (08 Apr 2023 13:49)      SUBJECTIVE / OVERNIGHT EVENTS: wants IV dilaudid for "kidney cancer pain". no f/c/r. no n/v/abd pain.     MEDICATIONS  (STANDING):  acetaminophen     Tablet .. 975 milliGRAM(s) Oral every 8 hours  apixaban 5 milliGRAM(s) Oral two times a day  artificial  tears Solution 1 Drop(s) Both EYES four times a day  atorvastatin 40 milliGRAM(s) Oral at bedtime  baclofen 10 milliGRAM(s) Oral every 12 hours  chlorhexidine 2% Cloths 1 Application(s) Topical <User Schedule>  clobetasol 0.05% Cream 1 Application(s) Topical two times a day  clonazePAM  Tablet 0.5 milliGRAM(s) Oral <User Schedule>  epoetin marilin-epbx (RETACRIT) Injectable 4000 Unit(s) IV Push <User Schedule>  levETIRAcetam 500 milliGRAM(s) Oral daily  levETIRAcetam 250 milliGRAM(s) Oral <User Schedule>  levothyroxine 100 MICROGram(s) Oral daily  lidocaine   4% Patch 1 Patch Transdermal every 24 hours  lidocaine   4% Patch 1 Patch Transdermal daily  metoprolol succinate ER 50 milliGRAM(s) Oral daily  mupirocin 2% Ointment 1 Application(s) Topical three times a day  Nephro-mirna 1 Tablet(s) Oral daily  nystatin Powder 1 Application(s) Topical three times a day  OLANZapine 2.5 milliGRAM(s) Oral at bedtime  polyethylene glycol 3350 17 Gram(s) Oral daily  senna 2 Tablet(s) Oral at bedtime  sevelamer carbonate 1600 milliGRAM(s) Oral three times a day  tamsulosin 0.4 milliGRAM(s) Oral at bedtime    MEDICATIONS  (PRN):  bisacodyl Suppository 10 milliGRAM(s) Rectal daily PRN Constipation  LORazepam   Injectable 0.25 milliGRAM(s) IV Push every 6 hours PRN Agitation  melatonin 3 milliGRAM(s) Oral at bedtime PRN Insomnia  OLANZapine 2.5 milliGRAM(s) Oral every 6 hours PRN agitation/paranoid  ondansetron Injectable 4 milliGRAM(s) IV Push every 8 hours PRN Nausea and/or Vomiting      Vital Signs Last 24 Hrs  T(C): 36.5 (09 Apr 2023 03:46), Max: 36.7 (08 Apr 2023 15:50)  T(F): 97.7 (09 Apr 2023 03:46), Max: 98.1 (08 Apr 2023 15:50)  HR: 70 (09 Apr 2023 03:46) (70 - 85)  BP: 120/81 (09 Apr 2023 03:46) (108/62 - 122/65)  BP(mean): --  RR: 17 (09 Apr 2023 03:46) (17 - 18)  SpO2: 100% (09 Apr 2023 03:46) (97% - 100%)  CAPILLARY BLOOD GLUCOSE        I&O's Summary    08 Apr 2023 07:01  -  09 Apr 2023 07:00  --------------------------------------------------------  IN: 600 mL / OUT: 1100 mL / NET: -500 mL          PHYSICAL EXAM:    GENERAL: NAD   HEENT: EOMI, PERRL  PULM: Clear to auscultation bilaterally  CV: Regular rate and rhythm; nl S1, S2; No murmurs, rubs, or gallops  ABDOMEN: Soft, Nontender, Nondistended; Bowel sounds present  EXTREMITIES/MSK:  No edema, calf tenderness   PSYCH: AAOx3  NEUROLOGY: unchanged  Skin: b/l feet/leg ulcers/wound c no surrounding eyrthema/induration          LABS:                        8.0    13.04 )-----------( 143      ( 08 Apr 2023 16:15 )             25.1     04-08    138  |  104  |  85<H>  ----------------------------<  113<H>  4.1   |  20<L>  |  5.12<H>    Ca    8.6      08 Apr 2023 16:15                  RADIOLOGY & ADDITIONAL TESTS:    Imaging Personally Reviewed:    Consultant(s) Notes Reviewed:      Care Discussed with Consultants/Other Providers: neurosurg

## 2023-04-09 NOTE — PROGRESS NOTE ADULT - PROBLEM SELECTOR PLAN 2
-CTH unremarkable  -Psych eval given agitation, paranoia; appreciate recs. Does not have capacity at this time. Trial zyprexa prn   -D/w pscyh, increase klonopin 0.25mg tid and continue with ativan prn  - TSH checked per psych request- low. check free T4

## 2023-04-09 NOTE — PROGRESS NOTE ADULT - PROBLEM SELECTOR PLAN 4
-No reported cirrhosis   -will need eventual Hematology eval at some point  -Will continue to monitor.

## 2023-04-09 NOTE — PROGRESS NOTE ADULT - PROBLEM SELECTOR PLAN 11
Gee is [facility] rajni MUÑOZ to facilitate discharge to [facility] pending above studies     D/w sister updated on full plan of care

## 2023-04-09 NOTE — PROGRESS NOTE ADULT - PROBLEM SELECTOR PLAN 3
-improving but not normalized. received steroids 4/5. repeat set of blood cx  -+ C aureus, colonized  -blood cx 4/5 ngtd   -C/w IV ertapenem (started on 4/3--4/8)  -CT L spine negative for infection   -aggressive bowel regimen, pt had bowel movement  -f/u WBC

## 2023-04-09 NOTE — PROGRESS NOTE ADULT - PROBLEM SELECTOR PLAN 10
-HyperK secondary to ESRD and missing HD. Resolved but Pt has been refusing HD and therefore is at risk of HyperK.   -Pt goes to [facility] dialysis unit. Follows with Dr. Mercedes. HD on TTS  -Renal consulted; HD per renal  -Sevelamer  -Monitor daily BMP

## 2023-04-10 NOTE — PROGRESS NOTE ADULT - PROBLEM SELECTOR PLAN 3
-improving but not normalized. received steroids 4/5. repeat set of blood cx  -+ C aureus, colonized  -blood cx 4/5 ngtd   -C/w IV ertapenem (started on 4/3--4/8)  -CT L spine negative for infection   -aggressive bowel regimen, pt had bowel movement  -leukocytosis improving

## 2023-04-10 NOTE — PROGRESS NOTE ADULT - SUBJECTIVE AND OBJECTIVE BOX
Follow Up:  leukocyotsis    Interval History/ROS:  notes puritus on back  - wants IV Benadryl    Allergies  acetaminophen (Rash)  aspirin (Rash)  bioflavonoids (Rash)  cefaclor (Rash)  ceftriaxone (Rash)  clindamycin (Rash)  Codeine Sulfate (Rash)  haloperidol (Rash)  ibuprofen (Rash)  iopamidol (Rash)  Onions (Rash)  penicillins (Rash)  tromethamine (Rash)    ANTIMICROBIALS:      OTHER MEDS:  MEDICATIONS  (STANDING):  acetaminophen     Tablet .. 975 every 8 hours  apixaban 5 two times a day  atorvastatin 40 at bedtime  baclofen 10 every 12 hours  bisacodyl Suppository 10 daily PRN  clonazePAM  Tablet 0.5 <User Schedule>  epoetin marilin-epbx (RETACRIT) Injectable 4000 <User Schedule>  levETIRAcetam 500 daily  levETIRAcetam 250 <User Schedule>  levothyroxine 100 daily  LORazepam   Injectable 0.25 every 6 hours PRN  melatonin 3 at bedtime PRN  metoprolol succinate ER 50 daily  OLANZapine 2.5 at bedtime  OLANZapine 2.5 every 6 hours PRN  ondansetron Injectable 4 every 8 hours PRN  polyethylene glycol 3350 17 daily  senna 2 at bedtime  tamsulosin 0.4 at bedtime      Vital Signs Last 24 Hrs  T(C): 36.3 (10 Apr 2023 13:15), Max: 36.5 (10 Apr 2023 04:18)  T(F): 97.3 (10 Apr 2023 13:15), Max: 97.7 (10 Apr 2023 04:18)  HR: 83 (10 Apr 2023 13:15) (83 - 83)  BP: 134/88 (10 Apr 2023 13:15) (116/78 - 134/88)  BP(mean): --  RR: 18 (10 Apr 2023 13:15) (18 - 18)  SpO2: 100% (10 Apr 2023 13:15) (100% - 100%)    Parameters below as of 10 Apr 2023 13:15  Patient On (Oxygen Delivery Method): room air        PHYSICAL EXAM:  General: WN/WD NAD, Non-toxic  Neurology: A&Ox3, nonfocal  Respiratory: Clear to auscultation bilaterally  CV: RRR, S1S2, no murmurs, rubs or gallops  Abdominal: Soft, Non-tender, non-distended, normal bowel sounds  Extremities: No edema, + peripheral pulses  Line Sites: Clear  Skin: No rash, scattered small papules in pruritic area on back and left chest                        8.3    12.75 )-----------( 131      ( 10 Apr 2023 11:17 )             25.9   WBC Count: 12.75 (04-10 @ 11:17)  WBC Count: 13.04 (04-08 @ 16:15)  WBC Count: 16.13 (04-07 @ 09:06)  WBC Count: 12.68 (04-06 @ 10:57)  WBC Count: 9.47 (04-05 @ 18:46)      04-10    138  |  101  |  65<H>  ----------------------------<  145<H>  4.0   |  22  |  4.59<H>    Ca    8.0<L>      10 Apr 2023 11:18      MICROBIOLOGY:  .Blood Blood-Venous  04-05-23   No growth to date.  --  --      .Other Other  03-30-23   Few Candida auris  --  --      Wound Wound  03-24-23   No growth at 48 hours  --  --      Clean Catch Clean Catch (Midstream)  03-23-23   <10,000 CFU/mL Normal Urogenital Nicolasa  --  --      .Blood Blood-Venous  03-23-23   No Growth Final  --  --      .Blood Blood-Peripheral  03-23-23   No Growth Final  --  --      RADIOLOGY:  < from: Xray Chest 1 View AP/PA (04.07.23 @ 15:01) >  IMPRESSION: Clear lungs.    < end of copied text >      Napoleon Moreland MD; Division of Infectious Disease; Pager: [phone]; nights and weekends: [phone]

## 2023-04-10 NOTE — PROGRESS NOTE ADULT - SUBJECTIVE AND OBJECTIVE BOX
CC: Patient is a 62y old  Male who presents with a chief complaint of worsening anemia       SUBJECTIVE / OVERNIGHT EVENTS: seen and examined at bedside. Denies CP, SOB, n/v, abd pain.     MEDICATIONS  (STANDING):  acetaminophen     Tablet .. 975 milliGRAM(s) Oral every 8 hours  apixaban 5 milliGRAM(s) Oral two times a day  artificial  tears Solution 1 Drop(s) Both EYES four times a day  atorvastatin 40 milliGRAM(s) Oral at bedtime  baclofen 10 milliGRAM(s) Oral every 12 hours  chlorhexidine 2% Cloths 1 Application(s) Topical <User Schedule>  clobetasol 0.05% Cream 1 Application(s) Topical two times a day  clonazePAM  Tablet 0.5 milliGRAM(s) Oral <User Schedule>  epoetin marilin-epbx (RETACRIT) Injectable 4000 Unit(s) IV Push <User Schedule>  levETIRAcetam 500 milliGRAM(s) Oral daily  levETIRAcetam 250 milliGRAM(s) Oral <User Schedule>  levothyroxine 100 MICROGram(s) Oral daily  lidocaine   4% Patch 1 Patch Transdermal every 24 hours  lidocaine   4% Patch 1 Patch Transdermal daily  metoprolol succinate ER 50 milliGRAM(s) Oral daily  mupirocin 2% Ointment 1 Application(s) Topical three times a day  Nephro-mirna 1 Tablet(s) Oral daily  nystatin Powder 1 Application(s) Topical three times a day  OLANZapine 2.5 milliGRAM(s) Oral at bedtime  polyethylene glycol 3350 17 Gram(s) Oral daily  senna 2 Tablet(s) Oral at bedtime  sevelamer carbonate 1600 milliGRAM(s) Oral three times a day  tamsulosin 0.4 milliGRAM(s) Oral at bedtime    MEDICATIONS  (PRN):  bisacodyl Suppository 10 milliGRAM(s) Rectal daily PRN Constipation  LORazepam   Injectable 0.25 milliGRAM(s) IV Push every 6 hours PRN Agitation  melatonin 3 milliGRAM(s) Oral at bedtime PRN Insomnia  OLANZapine 2.5 milliGRAM(s) Oral every 6 hours PRN agitation/paranoid  ondansetron Injectable 4 milliGRAM(s) IV Push every 8 hours PRN Nausea and/or Vomiting      Vital Signs Last 24 Hrs  T(C): 36.5 (09 Apr 2023 03:46), Max: 36.7 (08 Apr 2023 15:50)  T(F): 97.7 (09 Apr 2023 03:46), Max: 98.1 (08 Apr 2023 15:50)  HR: 70 (09 Apr 2023 03:46) (70 - 85)  BP: 120/81 (09 Apr 2023 03:46) (108/62 - 122/65)  BP(mean): --  RR: 17 (09 Apr 2023 03:46) (17 - 18)  SpO2: 100% (09 Apr 2023 03:46) (97% - 100%)  CAPILLARY BLOOD GLUCOSE        I&O's Summary    08 Apr 2023 07:01  -  09 Apr 2023 07:00  --------------------------------------------------------  IN: 600 mL / OUT: 1100 mL / NET: -500 mL          PHYSICAL EXAM:    GENERAL: NAD   HEENT: EOMI  PULM: Clear to auscultation bilaterally  CV: Regular rate and rhythm; nl S1, S2; No murmurs, rubs, or gallops  ABDOMEN: Soft, Nontender, Nondistended; Bowel sounds present  EXTREMITIES/MSK:  No edema, calf tenderness   PSYCH: AAOx3          LABS:                         8.3    12.75 )-----------( 131      ( 10 Apr 2023 11:17 )             25.9     04-10    138  |  101  |  65<H>  ----------------------------<  145<H>  4.0   |  22  |  4.59<H>    Ca    8.0<L>      10 Apr 2023 11:18

## 2023-04-10 NOTE — PROGRESS NOTE ADULT - PROBLEM SELECTOR PLAN 5
-history c/w mechanical, no acute injuries noted on imaging. admission imaging [facility], CT c-spine, XRay pelvis unremarkable for any acute pathology.  -Pt requesting IV dilaudid  -Seen by pain management, no role for opiates  - pt requesting transfer to [facility] to his previous physicians bec we are not providing IV dilaudid. Pt understands risk of opiates. Previous provider discussed case presented to [facility] transfer center and case rejected  -C/w standing tylenol  -PT-NATHALIA

## 2023-04-10 NOTE — PROGRESS NOTE ADULT - PROBLEM SELECTOR PLAN 10
-HyperK secondary to ESRD and missing HD. Resolved but Pt has been refusing HD and therefore is at risk of HyperK.   -Pt goes to [facility] dialysis unit. Follows with Dr. Mercedes. HD on TTS  -Renal consulted; HD per renal  -Sevelamer  -Monitor daily BMP

## 2023-04-10 NOTE — PROGRESS NOTE ADULT - PROBLEM SELECTOR PLAN 11
Patient notifed to stop flexeril and zanaflex was sent to pharmacy   Gee is [facility] rajni MUÑOZ to facilitate discharge to [facility] pending above studies

## 2023-04-11 NOTE — DISCHARGE NOTE PROVIDER - NSDCFUADDAPPT_GEN_ALL_CORE_FT
APPTS ARE READY TO BE MADE: [ x] YES    Best Family or Patient Contact (if needed):    Additional Information about above appointments (if needed):    1: PCP and nephro in 1 week  2: follow up with your psychiatrist after discharge  3: podiatry follow up    Other comments or requests:    APPTS ARE READY TO BE MADE: [ x] YES    Best Family or Patient Contact (if needed):    Additional Information about above appointments (if needed):    1: PCP and nephro in 1 week  2: follow up with your psychiatrist after discharge  3: podiatry follow up    Other comments or requests:   Patient is being discharged to rehab. Patient/caregiver will arrange follow up care appointments.

## 2023-04-11 NOTE — DISCHARGE NOTE NURSING/CASE MANAGEMENT/SOCIAL WORK - NSDCPEFALRISK_GEN_ALL_CORE
For information on Fall & Injury Prevention, visit: [email or web address] OR  [email or web address] OR  https://www.cdc.gov/steadi/patient.html

## 2023-04-11 NOTE — DISCHARGE NOTE NURSING/CASE MANAGEMENT/SOCIAL WORK - NSDCFUADDAPPT_GEN_ALL_CORE_FT
APPTS ARE READY TO BE MADE: [ x] YES    Best Family or Patient Contact (if needed):    Additional Information about above appointments (if needed):    1: PCP and nephro in 1 week  2: follow up with your psychiatrist after discharge  3: podiatry follow up    Other comments or requests:

## 2023-04-11 NOTE — DISCHARGE NOTE PROVIDER - CARE PROVIDER_API CALL
PHOENIX ISABEL  Internal Medicine  [address] [place] [address]  Phone: ([phone]  Fax: ()-  Follow Up Time:     Sapphire Mercedes ()  Medicine  [address]  [place] [address]  Phone: [phone]  Follow Up Time:    PHOENIX ISABEL  Internal Medicine  [address] [place] [address]  Phone: ([phone]  Fax: ()-  Follow Up Time: 1 week    Sapphire Mercedes ()  Medicine  [address]  [place] [address]  Phone: [phone]  Follow Up Time: 1 week    Olu Gong (DPM)  Podiatric Medicine and Surgery  [address] [place] [address]  Phone: [phone]  Fax: [phone]  Follow Up Time: 2 weeks

## 2023-04-11 NOTE — PROGRESS NOTE ADULT - PROBLEM SELECTOR PLAN 3
Patient with anemia in the setting of ESRD. Hemoglobin 7.6 today. Blood transfusion per primary team. Will give VIBHA with HD. Monitor hemoglobin, goal: 10-11.

## 2023-04-11 NOTE — DISCHARGE NOTE PROVIDER - CARE PROVIDERS DIRECT ADDRESSES
,DirectAddress_Unknown,DirectAddress_Unknown ,DirectAddress_Unknown,DirectAddress_Unknown,[email or web address]

## 2023-04-11 NOTE — PROGRESS NOTE ADULT - PROBLEM SELECTOR PLAN 10
-HyperK secondary to ESRD and missing HD. Resolved but Pt has been refusing HD and therefore is at risk of HyperK.   -Pt goes to [facility] dialysis unit. Follows with Dr. Mercedes. HD on TTS  -Renal consulted; HD per renal  -Sevelamer  -Monitor daily BMP

## 2023-04-11 NOTE — PROGRESS NOTE ADULT - PROVIDER SPECIALTY LIST ADULT
Infectious Disease
Hospitalist
Infectious Disease
Hospitalist
Hospitalist
Infectious Disease
Nephrology
Internal Medicine
Hospitalist
Hospitalist
Nephrology
Nephrology
Internal Medicine
Nephrology
Internal Medicine
Hospitalist
Hospitalist
Internal Medicine
Internal Medicine
Hospitalist
Internal Medicine

## 2023-04-11 NOTE — PROGRESS NOTE ADULT - REASON FOR ADMISSION
worsening anemia

## 2023-04-11 NOTE — PROGRESS NOTE ADULT - ATTENDING COMMENTS
Mary Wei MD  Off: [phone]  contact me on teams    (After 5 pm or on weekends please page the on-call fellow/attending, can check [email or web address] for schedule. Login is musa garcia, schedule under [facility] medicine, psych, derm)
Mary Wei MD  Off: [phone]  contact me on teams    (After 5 pm or on weekends please page the on-call fellow/attending, can check [email or web address] for schedule. Login is musa garcia, schedule under [facility] medicine, psych, derm)
I have seen this patient with the fellow and agree with their assessment and plan. I was physically present for significant portions of the evaluation and management (E/M) service provided.  I agree with the above history, physical, and plan which I have reviewed and edited where appropriate.    plan for HD today if patient agrees    For weekend coverage, call Dr Dominic Smith( fellow) and Dr. Danae Maher( attending)  Dr Mary Wei will be starting service on 4/3/202    Myra Mercado MD  Pager   Office     Contact me directly via [employer] Teams     (After 5 pm or on weekends please page the on-call fellow/attending, can check [email or web address] for schedule. Login is musa agrcia, schedule under [facility] medicine, psych, derm)
I have seen this patient with the fellow and agree with their assessment and plan. I was physically present for significant portions of the evaluation and management (E/M) service provided.  I agree with the above history, physical, and plan which I have reviewed and edited where appropriate.    pt refusing HD  pt needs psych eval for ongoing HD as eventually will need it urgently    Myra Mercado MD  Pager   Office     Contact me directly via [employer] Teams     (After 5 pm or on weekends please page the on-call fellow/attending, can check [email or web address] for schedule. Login is musa garcia, schedule under [facility] medicine, psych, derm)
Mary Wei MD  Off: [phone]  contact me on teams    (After 5 pm or on weekends please page the on-call fellow/attending, can check [email or web address] for schedule. Login is musa garcia, schedule under [facility] medicine, psych, derm)
Mary Wei MD  Off: [phone]  contact me on teams    (After 5 pm or on weekends please page the on-call fellow/attending, can check [email or web address] for schedule. Login is musa garcia, schedule under [facility] medicine, psych, derm)
Pt. with ESRD on HD three times a week (TTS). Pt being admitted after mechanical fall. HD via tunneled HD catheter.   Pt goes to [facility] dialysis unit.   1.  ESRD--HD today, ordrs reviewed with fellow, RN HD  2.  Anemia--VIBHA goal hgb 10  3.  Hyperphosphatemia--goal P binder <5.5    discussed with med team  Aroldo Vora MD  contact me on TEAMS

## 2023-04-11 NOTE — DISCHARGE NOTE PROVIDER - NSDCCPCAREPLAN_GEN_ALL_CORE_FT
PRINCIPAL DISCHARGE DIAGNOSIS  Diagnosis: Anemia  Assessment and Plan of Treatment: -no evidence of GI bleed- brown stool   -no intra-abd bleed on CT  - check hemolysis labs  -transfuse hgb<7  -s/p 1u pRBC 3/30, 4/5  -started on retacrit   -Daily CBC  - will need outpt PRBC transfusions set up prior to discharge  - hematology eval if not sec to ESRD or if hemolyzing.      SECONDARY DISCHARGE DIAGNOSES  Diagnosis: Cerebrovascular accident (CVA)  Assessment and Plan of Treatment: continue baclofen   -clonazepam.    Diagnosis: Leukocytosis  Assessment and Plan of Treatment: improving but not normalized. received steroids 4/5.   -+ C aureus, colonized  -blood cx 4/5- negative   -s/p IV ertapenem (started on 4/3--4/8)  -CT L spine negative for infection   -aggressive bowel regimen, pt had bowel movement  Per ID ->Potential contributors to leukocytosis included possible stercoral colitis, tissue ischemia, foot wounds, soft tissue injury.  Leukocytosis now decreased OFF antibiotics  Had methylprednisolone 40 mg on 4/    Diagnosis: Hypothyroid  Assessment and Plan of Treatment: you do not make enough thyroid hormone  signs & symptoms of low levels of thyroid hormone - tired, getting cold easily, coarse or thin hair, constipation, shortness of breath, swelling, irregular periods  your doctor will do thyroid hormone blood tests at least once a year to monitor if medication dose is adequate  take your thyroid medicine as directed by your doctor & on empty stomach      Diagnosis: Pressure ulcer of foot  Assessment and Plan of Treatment: Normal saline cleanse Medihoney and DSD to bilateral foot ulcerations daily.  -Podiatry consulted:  -3/24 R ankle XR: negative for OM or soft tissue tracking air  -Recommend decubitus precautions and use of Z flow boot  -Recommend normal saline cleanse prior to dressing changes daily  -frequent pressure off set.    Diagnosis: Chronic atrial fibrillation  Assessment and Plan of Treatment: Atrial fibrillation is the most common heart rhythm problem.  The condition puts you at risk for has stroke and heart attack  It helps if you control your blood pressure, not drink more than 1-2 alcohol drinks per day, cut down on caffeine, getting treatment for over active thyroid gland, and get regular exercise  Call your doctor if you feel your heart racing or beating unusually, chest tightness or pain, lightheaded, faint, shortness of breath especially with exercise  It is important to take your heart medication as prescribed  You may be on anticoagulation which is very important to take as directed - you may need blood work to monitor drug levels      Diagnosis: Thrombocytopenia  Assessment and Plan of Treatment: Please folow up with your PCP to monitor your Platelets    Diagnosis: Metabolic encephalopathy  Assessment and Plan of Treatment: CTH unremarkable  -Psych eval given agitation, paranoia; appreciate recs. Does not have capacity at this time. Trial zyprexa prn   -D/w pscyh, increase klonopin 0.25mg tid and continue with ativan prn    Diagnosis: Fall  Assessment and Plan of Treatment: Causes of fall may be due to illness, change in the medicines you take, unsafe or unfamiliar setting and conditions that affect eyesight, hearing, muscle strength, or balance.                                                            Certain medicines used for sleep, anxiety, or depression can cause falls. Adding new medicines, or changing doses of some medicines, can also affect your risk of falling. Your doctor might switch you to a different medicine.                                        Prevent falls by make your home safer – To avoid falling at home, get rid of things that might make you trip or slip. This might include furniture, electrical cords, clutter, and loose rugs. Keep your home well lit to avoid falls or accidents. Avoid storing things in high places so you don't have to reach or climb.                             Wear sturdy shoes that fit well – Wearing shoes with high heels or slippery soles, or shoes that are too loose, can lead to falls.                                                                                                                       Take vitamin D pills which might lower the risk of falls in older people. This is because vitamin D helps make bones and muscles stronger.                                                                                                                   Use a cane, walker, and other safety devices as these devices help you avoid falling, too. These include grab bars or a sturdy seat for the shower, non-slip bath mats, and hand rails or treads for the stairs (to prevent slipping). If you worry that you could fall, there are also alarm buttons that let you call for help if you fall and can't get up.   It is important to tell your doctor about any times you have fallen or almost fallen.  Seek immediate help for pain or injury after a fall.      Diagnosis: ESRD on dialysis  Assessment and Plan of Treatment:      PRINCIPAL DISCHARGE DIAGNOSIS  Diagnosis: Anemia  Assessment and Plan of Treatment: -no evidence of GI bleed- brown stool   -no intra-abd bleed on CT  -s/p 1u pRBC 3/30, 4/5  -started on retacrit per renal  - Monitor blood counts as outpatient      SECONDARY DISCHARGE DIAGNOSES  Diagnosis: Chronic atrial fibrillation  Assessment and Plan of Treatment: Atrial fibrillation is the most common heart rhythm problem.  The condition puts you at risk for has stroke and heart attack  It helps if you control your blood pressure, not drink more than 1-2 alcohol drinks per day, cut down on caffeine, getting treatment for over active thyroid gland, and get regular exercise  Call your doctor if you feel your heart racing or beating unusually, chest tightness or pain, lightheaded, faint, shortness of breath especially with exercise  It is important to take your heart medication as prescribed  You may be on anticoagulation which is very important to take as directed - you may need blood work to monitor drug levels      Diagnosis: Cerebrovascular accident (CVA)  Assessment and Plan of Treatment: -continue baclofen   -clonazepam.      Diagnosis: Pressure ulcer of foot  Assessment and Plan of Treatment: -Normal saline cleanse Medihoney and DSD to bilateral foot ulcerations daily.  -Podiatry consulted:  -3/24 R ankle XR: negative for OM or soft tissue tracking air  -Recommend decubitus precautions and use of Z flow boot  -Recommend normal saline cleanse prior to dressing changes daily  -frequent pressure off set.    Diagnosis: ESRD on dialysis  Assessment and Plan of Treatment: - HD as per nephrology  -Pt goes to [facility] dialysis unit. Follows with Dr. Mercedes. HD on TTS      Diagnosis: Hypothyroid  Assessment and Plan of Treatment: you do not make enough thyroid hormone  signs & symptoms of low levels of thyroid hormone - tired, getting cold easily, coarse or thin hair, constipation, shortness of breath, swelling, irregular periods  your doctor will do thyroid hormone blood tests at least once a year to monitor if medication dose is adequate  take your thyroid medicine as directed by your doctor & on empty stomach      Diagnosis: Leukocytosis  Assessment and Plan of Treatment: improving but not normalized. received steroids 4/5.   + C aureus, colonized  blood culture were on 4/5- negative   -s/p IV ertapenem (started on 4/3--4/8)  -CT L spine negative for infection   -aggressive bowel regimen, pt had bowel movement  Per ID ->Potential contributors to leukocytosis included possible stercoral colitis, tissue ischemia, foot wounds, soft tissue injury.  Leukocytosis now decreased OFF antibiotics    Diagnosis: Thrombocytopenia  Assessment and Plan of Treatment: Please folow up with your PCP to monitor your Platelets    Diagnosis: Metabolic encephalopathy  Assessment and Plan of Treatment: CTH unremarkable  -Psych eval given agitation, paranoia; appreciate recs. Does not have capacity at this time. Trial zyprexa prn   -D/w pscyh, increase klonopin 0.25mg tid and continue with ativan prn    Diagnosis: Fall  Assessment and Plan of Treatment: Causes of fall may be due to illness, change in the medicines you take, unsafe or unfamiliar setting and conditions that affect eyesight, hearing, muscle strength, or balance.                                                            Certain medicines used for sleep, anxiety, or depression can cause falls. Adding new medicines, or changing doses of some medicines, can also affect your risk of falling. Your doctor might switch you to a different medicine.                                        Prevent falls by make your home safer – To avoid falling at home, get rid of things that might make you trip or slip. This might include furniture, electrical cords, clutter, and loose rugs. Keep your home well lit to avoid falls or accidents. Avoid storing things in high places so you don't have to reach or climb.                             Wear sturdy shoes that fit well – Wearing shoes with high heels or slippery soles, or shoes that are too loose, can lead to falls.                                                                                                                       Take vitamin D pills which might lower the risk of falls in older people. This is because vitamin D helps make bones and muscles stronger.                                                                                                                   Use a cane, walker, and other safety devices as these devices help you avoid falling, too. These include grab bars or a sturdy seat for the shower, non-slip bath mats, and hand rails or treads for the stairs (to prevent slipping). If you worry that you could fall, there are also alarm buttons that let you call for help if you fall and can't get up.   It is important to tell your doctor about any times you have fallen or almost fallen.  Seek immediate help for pain or injury after a fall.       PRINCIPAL DISCHARGE DIAGNOSIS  Diagnosis: Anemia  Assessment and Plan of Treatment: -no evidence of GI bleed- brown stool   -no intra-abd bleed on CT  -s/p 1u pRBC 3/30, 4/5  -started on retacrit per renal, continue retacrit  - Monitor blood counts as outpatient  - Outpatient nephro and pcp follow up      SECONDARY DISCHARGE DIAGNOSES  Diagnosis: Chronic atrial fibrillation  Assessment and Plan of Treatment: Atrial fibrillation is the most common heart rhythm problem.  The condition puts you at risk for has stroke and heart attack  It helps if you control your blood pressure, not drink more than 1-2 alcohol drinks per day, cut down on caffeine, getting treatment for over active thyroid gland, and get regular exercise  Call your doctor if you feel your heart racing or beating unusually, chest tightness or pain, lightheaded, faint, shortness of breath especially with exercise  It is important to take your heart medication as prescribed  You may be on anticoagulation which is very important to take as directed - you may need blood work to monitor drug levels      Diagnosis: Cerebrovascular accident (CVA)  Assessment and Plan of Treatment: -continue baclofen   -clonazepam.      Diagnosis: Pressure ulcer of foot  Assessment and Plan of Treatment: -Normal saline cleanse Medihoney and DSD to bilateral foot ulcerations daily.  -Podiatry consulted:  -3/24 R ankle XR: negative for OM or soft tissue tracking air  -Recommend decubitus precautions and use of Z flow boot  -Recommend normal saline cleanse prior to dressing changes daily  -frequent pressure off set.    Diagnosis: ESRD on dialysis  Assessment and Plan of Treatment: - HD as per nephrology  -Pt goes to [facility] dialysis unit. Follows with Dr. Mercedes. HD on TTS      Diagnosis: Hypothyroid  Assessment and Plan of Treatment: you do not make enough thyroid hormone  signs & symptoms of low levels of thyroid hormone - tired, getting cold easily, coarse or thin hair, constipation, shortness of breath, swelling, irregular periods  your doctor will do thyroid hormone blood tests at least once a year to monitor if medication dose is adequate  take your thyroid medicine as directed by your doctor & on empty stomach      Diagnosis: Leukocytosis  Assessment and Plan of Treatment: improving but not normalized. received steroids 4/5.   + C aureus, colonized  blood culture were on 4/5- negative   -s/p IV ertapenem (started on 4/3--4/8)  -CT L spine negative for infection   -aggressive bowel regimen, pt had bowel movement  Per ID ->Potential contributors to leukocytosis included possible stercoral colitis, tissue ischemia, foot wounds, soft tissue injury.  Leukocytosis now decreased OFF antibiotics    Diagnosis: Thrombocytopenia  Assessment and Plan of Treatment: Please folow up with your PCP to monitor your Platelets    Diagnosis: Metabolic encephalopathy  Assessment and Plan of Treatment: CTH unremarkable  -Psych eval given agitation, paranoia; appreciate recs. Does not have capacity at this time. Trial zyprexa prn   [facility], increase klonopin 0.25mg tid and continue with ativan prn    Diagnosis: Fall  Assessment and Plan of Treatment: Causes of fall may be due to illness, change in the medicines you take, unsafe or unfamiliar setting and conditions that affect eyesight, hearing, muscle strength, or balance.                                                            Certain medicines used for sleep, anxiety, or depression can cause falls. Adding new medicines, or changing doses of some medicines, can also affect your risk of falling. Your doctor might switch you to a different medicine.                                        Prevent falls by make your home safer – To avoid falling at home, get rid of things that might make you trip or slip. This might include furniture, electrical cords, clutter, and loose rugs. Keep your home well lit to avoid falls or accidents. Avoid storing things in high places so you don't have to reach or climb.                             Wear sturdy shoes that fit well – Wearing shoes with high heels or slippery soles, or shoes that are too loose, can lead to falls.                                                                                                                       Take vitamin D pills which might lower the risk of falls in older people. This is because vitamin D helps make bones and muscles stronger.                                                                                                                   Use a cane, walker, and other safety devices as these devices help you avoid falling, too. These include grab bars or a sturdy seat for the shower, non-slip bath mats, and hand rails or treads for the stairs (to prevent slipping). If you worry that you could fall, there are also alarm buttons that let you call for help if you fall and can't get up.   It is important to tell your doctor about any times you have fallen or almost fallen.  Seek immediate help for pain or injury after a fall.

## 2023-04-11 NOTE — PROGRESS NOTE ADULT - PROBLEM SELECTOR PLAN 1
-no evidence of GI bleed- brown stool   -no intra-abd bleed on CT  - check hemolysis labs - hapto normal, goes against hemolysis.   -transfuse hgb<7  -s/p 1u pRBC 3/30, 4/5  -started on retacrit, per renal. continue this.    -Daily CBC  -outpatient renal/pmd follow up for anemia associated with chronic ESRD.

## 2023-04-11 NOTE — DISCHARGE NOTE PROVIDER - PROVIDER TOKENS
PROVIDER:[TOKEN:[[id number]]],PROVIDER:[TOKEN:[[id number]]] PROVIDER:[TOKEN:[[id number]],FOLLOWUP:[1 week]],PROVIDER:[TOKEN:[[id number]],FOLLOWUP:[1 week]],PROVIDER:[TOKEN:[[id number]],FOLLOWUP:[2 weeks]]

## 2023-04-11 NOTE — PROGRESS NOTE ADULT - PROBLEM SELECTOR PLAN 4
Pt with hyperphosphatemia in the setting of ESRD. Pt. on phosphate binders with meals. Low phosphorus diet. Monitor serum phosphorus, goal: 3.5-5.5.    If you have any questions, please feel free to contact me  Kieran Lindo  Nephrology Fellow  [phone] / [employer] Teams(Preferred)  (After 5pm or on weekends please page the on-call fellow)

## 2023-04-11 NOTE — PROGRESS NOTE ADULT - PROBLEM SELECTOR PLAN 3
-improving but not normalized. received steroids 4/5. repeat set of blood cx  -+ C aureus, colonized  -blood cx 4/5 ngtd   -s/p IV ertapenem (started on 4/3--4/8)  -CT L spine negative for infection   -aggressive bowel regimen, pt had bowel movement  -leukocytosis improving

## 2023-04-11 NOTE — PROGRESS NOTE ADULT - PROBLEM SELECTOR PLAN 2
-CTH unremarkable  -Psych eval given agitation, paranoia; appreciate recs. Does not have capacity at this time. Trial zyprexa prn   -was D/w psych, increase klonopin 0.25mg tid and continue with ativan prn  - TSH checked per psych request- low. check free T4  -outpt f/u.  -topical sarna for itching.

## 2023-04-11 NOTE — PROGRESS NOTE ADULT - PROBLEM SELECTOR PLAN 4
-No reported cirrhosis   -will need eventual Hematology eval at some point. -outpt  -Will continue to monitor.

## 2023-04-11 NOTE — PROGRESS NOTE ADULT - PROBLEM SELECTOR PROBLEM 1
Anemia
ESRD on dialysis
Anemia
ESRD on dialysis
Anemia
ESRD on dialysis
Anemia
ESRD on dialysis
Anemia
Anemia
ESRD on dialysis
Anemia

## 2023-04-11 NOTE — DISCHARGE NOTE PROVIDER - HOSPITAL COURSE
62M w/ pmh Hep C, HTN, AFib s/p AICD (Medtr[employer]) on Eliquis and Plavix, b/l CVA quadriplegic s/p suprapubic cath, ESRD on HD ( T/Thu/Sat), diastolic CHF, seizure on keppra, hep C, chronic pressure ulcers, p/w fall , found to have worsening anemia     Anemia.   ·  Plan: -no evidence of GI bleed- brown stool   -no intra-abd bleed on CT  - check hemolysis labs  -transfuse hgb<7  -s/p 1u pRBC 3/30, 4/5  -started on retacrit   -Daily CBC  - will need outpt PRBC transfusions set up prior to discharge  - hematology eval if not sec to ESRD or if hemolyzing.      Metabolic encephalopathy.   ·  Plan: -CTH unremarkable  -Psych eval given agitation, paranoia; appreciate recs. Does not have capacity at this time. Trial zyprexa prn   -D/w pscyh, increase klonopin 0.25mg tid and continue with ativan prn  - TSH checked per psych request- low. check free T4.      Leukocytosis.   ·  Plan: -improving but not normalized. received steroids 4/5. repeat set of blood cx  -+ C aureus, colonized  -blood cx 4/5 ngtd   -C/w IV ertapenem (started on 4/3--4/8)  -CT L spine negative for infection   -aggressive bowel regimen, pt had bowel movement  Per ID ->Potential contributors to leukocytosis included possible stercoral colitis, tissue ischemia, foot wounds, soft tissue injury.  Leukocytosis now decreased OFF antibiotics  Had methylprednisolone 40 mg on 4/5  pruritic papules  ?candidiasis  Suggest    topical zinc oxide   mobilization oob if feasible      Thrombocytopenia.   ·  Plan: -No reported cirrhosis   -will need eventual Hematology eval at some point  -Will continue to monitor.       Fall.   ·  Plan: -history c/w mechanical, no acute injuries noted on imaging. admission imaging CTH, CT c-spine, XRay pelvis unremarkable for any acute pathology.  -Pt requesting IV dilaudid  -Seen by pain management, no role for opiates  - pt requesting transfer to [facility] to his previous physicians bec we are not providing IV dilaudid. Pt understands risk of opiates. Previous provider discussed case presented to [facility] transfer center and case rejected  -C/w standing tylenol  -PT-NATHALIA.       Chronic atrial fibrillation.   ·  Plan: -c/w eliquis   -continue Metoprolol.      Cerebrovascular accident (CVA).   ·  Plan: -continue baclofen   -clonazepam.    Seizure disorder.   ·  Plan: -continue keppra.     Pressure ulcer of foot.   ·  Plan: -normal saline cleanse Medihoney and DSD to bilateral foot ulcerations daily.  -Podiatry consulted:  -3/24 R ankle XR: negative for OM or soft tissue tracking air  -Recommend decubitus precautions and use of Z flow boot  -Recommend normal saline cleanse prior to dressing changes daily  -frequent pressure off set.       ESRD on dialysis.   ·  Plan; -HyperK secondary to ESRD and missing HD. Resolved but Pt has been refusing HD and therefore is at risk of HyperK.   -Pt goes to [facility] dialysis unit. Follows with Dr. Mercedes. HD on TTS  -Renal consulted; HD per renal  -Sevelamer  -Monitor daily BMP.    CM to facilitate discharge to [facility]        62M w/ pmh Hep C, HTN, AFib s/p AICD (Medtronic) on Eliquis and Plavix, b/l CVA quadriplegic s/p suprapubic cath, ESRD on HD ( T/Thu/Sat), diastolic CHF, seizure on keppra, hep C, chronic pressure ulcers, p/w fall , found to have worsening anemia     Anemia.   ·  Plan: -no evidence of GI bleed- brown stool   -no intra-abd bleed on CT  - check hemolysis labs  -transfuse hgb<7  -s/p 1u pRBC 3/30, 4/5  -started on retacrit   -Daily CBC  - will need outpt PRBC transfusions set up prior to discharge  - hematology eval if not sec to ESRD or if hemolyzing.      Metabolic encephalopathy.   ·  Plan: -CTH unremarkable  -Psych eval given agitation, paranoia; appreciate recs. Does not have capacity at this time. Trial zyprexa prn   -D/w pscyh, increase klonopin 0.25mg tid and continue with ativan prn  - TSH checked per psych request- low. check free T4.      Leukocytosis.   ·  Plan: -improving but not normalized. received steroids 4/5. repeat set of blood cx  -+ C aureus, colonized  -blood cx 4/5 ngtd   -C/w IV ertapenem (started on 4/3--4/8)  -CT L spine negative for infection   -aggressive bowel regimen, pt had bowel movement  Per ID ->Potential contributors to leukocytosis included possible stercoral colitis, tissue ischemia, foot wounds, soft tissue injury.  Leukocytosis now decreased OFF antibiotics  Had methylprednisolone 40 mg on 4/5  pruritic papules  ?candidiasis  Suggest    topical zinc oxide   mobilization oob if feasible      Thrombocytopenia.   ·  Plan: -No reported cirrhosis   -will need eventual Hematology eval at some point  -Will continue to monitor.       Fall.   ·  Plan: -history c/w mechanical, no acute injuries noted on imaging. admission imaging CTH, CT c-spine, XRay pelvis unremarkable for any acute pathology.  -Pt requesting IV dilaudid  -Seen by pain management, no role for opiates  - pt requesting transfer to [facility] to his previous physicians bec we are not providing IV dilaudid. Pt understands risk of opiates. Previous provider discussed case presented to [facility] transfer center and case rejected  -C/w standing tylenol  -PT-NATHALIA.       Chronic atrial fibrillation.   ·  Plan: -c/w eliquis   -continue Metoprolol.      Cerebrovascular accident (CVA).   ·  Plan: -continue baclofen   -clonazepam.    Seizure disorder.   ·  Plan: -continue keppra.     Pressure ulcer of foot.   ·  Plan: -normal saline cleanse Medihoney and DSD to bilateral foot ulcerations daily.  -Podiatry consulted:  -3/24 R ankle XR: negative for OM or soft tissue tracking air  -Recommend decubitus precautions and use of Z flow boot  -Recommend normal saline cleanse prior to dressing changes daily  -frequent pressure off set.       ESRD on dialysis.   ·  Plan; -HyperK secondary to ESRD and missing HD. Resolved but Pt has been refusing HD and therefore is at risk of HyperK.   -[facility]. Follows with Dr. Mercedes. HD on TTS  -Renal consulted; HD per renal  -Sevelamer  -Monitor daily BMP.    Patient is medically cleared by attending to [facility] note and med rec reviewed  with attending.

## 2023-04-11 NOTE — PROGRESS NOTE ADULT - PROBLEM SELECTOR PLAN 11
[facility] is [facility]     TONI to facilitate discharge to [facility] today. D/w ASAD Gregorio. -36 minutes spent on the DC process.

## 2023-04-11 NOTE — PROGRESS NOTE ADULT - SUBJECTIVE AND OBJECTIVE BOX
[facility] Division of Kidney Diseases & Hypertension  FOLLOW UP NOTE  [phone]--------------------------------------------------------------------------------    Chief Complaint: ESRD/HD management    24 hour events/subjective: Pt. was seen and evaluated at bedside this morning. He reports itching, requesting benadryl. No complaints otherwise. ROS limited.      PAST HISTORY  --------------------------------------------------------------------------------  No significant changes to PMH, PSH, FHx, SHx, unless otherwise noted    ALLERGIES & MEDICATIONS  --------------------------------------------------------------------------------  Allergies    acetaminophen (Rash)  aspirin (Rash)  bioflavonoids (Rash)  cefaclor (Rash)  ceftriaxone (Rash)  clindamycin (Rash)  Codeine Sulfate (Rash)  haloperidol (Rash)  ibuprofen (Rash)  iopamidol (Rash)  Onions (Rash)  penicillins (Rash)  tromethamine (Rash)    Intolerances      Standing Inpatient Medications  acetaminophen     Tablet .. 975 milliGRAM(s) Oral every 8 hours  apixaban 5 milliGRAM(s) Oral two times a day  artificial  tears Solution 1 Drop(s) Both EYES four times a day  atorvastatin 40 milliGRAM(s) Oral at bedtime  baclofen 10 milliGRAM(s) Oral every 12 hours  chlorhexidine 2% Cloths 1 Application(s) Topical <User Schedule>  clobetasol 0.05% Cream 1 Application(s) Topical two times a day  clonazePAM  Tablet 0.5 milliGRAM(s) Oral <User Schedule>  epoetin marilin-epbx (RETACRIT) Injectable 4000 Unit(s) IV Push <User Schedule>  levETIRAcetam 500 milliGRAM(s) Oral daily  levETIRAcetam 250 milliGRAM(s) Oral <User Schedule>  levothyroxine 100 MICROGram(s) Oral daily  lidocaine   4% Patch 1 Patch Transdermal daily  lidocaine   4% Patch 1 Patch Transdermal every 24 hours  metoprolol succinate ER 50 milliGRAM(s) Oral daily  mupirocin 2% Ointment 1 Application(s) Topical three times a day  Nephro-mirna 1 Tablet(s) Oral daily  nystatin Powder 1 Application(s) Topical three times a day  OLANZapine 2.5 milliGRAM(s) Oral at bedtime  polyethylene glycol 3350 17 Gram(s) Oral daily  senna 2 Tablet(s) Oral at bedtime  sevelamer carbonate 1600 milliGRAM(s) Oral three times a day  tamsulosin 0.4 milliGRAM(s) Oral at bedtime    PRN Inpatient Medications  bisacodyl Suppository 10 milliGRAM(s) Rectal daily PRN  LORazepam   Injectable 0.25 milliGRAM(s) IV Push every 6 hours PRN  melatonin 3 milliGRAM(s) Oral at bedtime PRN  OLANZapine 2.5 milliGRAM(s) Oral every 6 hours PRN  ondansetron Injectable 4 milliGRAM(s) IV Push every 8 hours PRN      REVIEW OF SYSTEMS  --------------------------------------------------------------------------------  See HPI    VITALS/PHYSICAL EXAM  --------------------------------------------------------------------------------  T(C): 36.6 (04-11-23 @ 10:15), Max: 36.7 (04-11-23 @ 04:22)  HR: 80 (04-11-23 @ 10:15) (75 - 83)  BP: 98/61 (04-11-23 @ 10:15) (98/61 - 134/88)  RR: 18 (04-11-23 @ 10:15) (18 - 18)  SpO2: 98% (04-11-23 @ 10:15) (98% - 100%)  Wt(kg): --      04-10-23 @ 07:01  -  04-11-23 @ 07:00  --------------------------------------------------------  IN: 640 mL / OUT: 900 mL / NET: -260 mL      Physical Exam:  Gen: NAD,  Pulm: CTA B/L  CV: RRR, S1S2  Abd: +BS, soft,  LE: Warm, FROM,  no edema  Skin: excoriations  Vascular access: tunnel cath    LABS/STUDIES  --------------------------------------------------------------------------------              7.6    11.72 >-----------<  144      [04-11-23 @ 11:09]              24.0     138  |  101  |  65  ----------------------------<  145      [04-10-23 @ 11:18]  4.0   |  22  |  4.59        Ca     8.0     [04-10-23 @ 11:18]    Creatinine Trend:  SCr 4.59 [04-10 @ 11:18]  SCr 5.12 [04-08 @ 16:15]  SCr 4.60 [04-07 @ 09:07]  SCr 5.25 [04-06 @ 10:57]  SCr 4.76 [04-05 @ 10:14]    TSH 0.02      [04-08-23 @ 16:15]    HBsAg Nonreact      [04-07-23 @ 18:26]  HCV 9.25, Reactive      [04-07-23 @ 18:26]

## 2023-04-11 NOTE — DISCHARGE NOTE PROVIDER - NSDCMRMEDTOKEN_GEN_ALL_CORE_FT
Artificial Tears ophthalmic solution: 1 drop(s) to each affected eye 4 times a day  ascorbic acid 500 mg oral tablet: 1 tab(s) orally once a day  atorvastatin 40 mg oral tablet: 1 tab(s) orally once a day  baclofen 10 mg oral tablet: 1 tab(s) orally 2 times a day  bisacodyl 10 mg rectal suppository: 1 suppository(ies) rectally once a day as needed for 24 hrs after miralax dose if no BM  clonazePAM 0.5 mg oral tablet: 1 tab(s) orally 2 times a day  diphenhydrAMINE 25 mg oral capsule: 2 cap(s) orally 3 times per week 1 hour before dialysis  Duoderm Hydroactive topical gel:   Eliquis 5 mg oral tablet: 1 tab(s) orally 2 times a day  Flomax 0.4 mg oral capsule: 1 cap(s) orally once a day  halobetasol 0.05% topical cream: Apply topically to affected area 2 times a day  Keppra 250 mg oral tablet: 1 tab(s) orally 3 times per week (Tuesday/Thursday/Saturday post HD)  Keppra 500 mg oral tablet: 1 tab(s) orally once a day  levothyroxine 100 mcg (0.1 mg) oral tablet: 1 tab(s) orally once a day  Melatonin 3 mg oral tablet: 1 tab(s) orally once a day (at bedtime)  metoprolol succinate 50 mg oral tablet, extended release: 1 tab(s) orally once a day  MiraLax oral powder for reconstitution: 17 gram(s) orally once a day as needed if no BM after 2 days  mupirocin 2% topical ointment: Apply topically to affected area 3 times a day  Probiotic Formula (Bacillus Coagulans) oral capsule: 1 cap(s) orally 3 times a day  Aster-Magalie oral tablet: 1 tab(s) orally once a day  senna (sennosides) 8.6 mg oral tablet: 2 tab(s) orally once a day (at bedtime)  sevelamer carbonate 800 mg oral tablet: 2 tab(s) orally 3 times a day (with meals)  Vaseline jelly, topical: Apply by topical route 2 times per day all over the body  Zofran 4 mg oral tablet: 1 tab(s) orally every 8 hours as needed   apixaban 5 mg oral tablet: 1 tab(s) orally 2 times a day  Artificial Tears ophthalmic solution: 1 drop(s) to each affected eye 4 times a day  ascorbic acid 500 mg oral tablet: 1 tab(s) orally once a day  atorvastatin 40 mg oral tablet: 1 tab(s) orally once a day  baclofen 10 mg oral tablet: 1 tab(s) orally 2 times a day  bisacodyl 10 mg rectal suppository: 1 suppository(ies) rectal once a day As needed Constipation  camphor-menthol 0.5%-0.5% topical lotion: 1 Apply topically to affected area 2 times a day As needed itchiness  clonazePAM 0.5 mg oral tablet: 1 tab(s) orally 2 times a day  diphenhydrAMINE 25 mg oral capsule: 2 cap(s) orally 3 times per week 1 hour before dialysis  epoetin marilin: 4,000 unit(s) subcutaneous 3 times a week during HD  halobetasol 0.05% topical cream: Apply topically to affected area 2 times a day  Keppra 250 mg oral tablet: 1 tab(s) orally 3 times per week (Tuesday/Thursday/Saturday post HD)  Keppra 500 mg oral tablet: 1 tab(s) orally once a day  levothyroxine 100 mcg (0.1 mg) oral tablet: 1 tab(s) orally once a day  lidocaine 4% topical film: Apply topically to affected area once a day  Melatonin 3 mg oral tablet: 1 tab(s) orally once a day (at bedtime)  metoprolol succinate 50 mg oral tablet, extended release: 1 tab(s) orally once a day  mupirocin 2% topical ointment: Apply topically to affected area 3 times a day  nystatin 100,000 units/g topical powder: 1 Apply topically to affected area 3 times a day  OLANZapine 2.5 mg oral tablet: 1 tab(s) orally once a day (at bedtime)  OLANZapine 2.5 mg oral tablet: 1 tab(s) orally every 6 hours As needed agitation/paranoid  polyethylene glycol 3350 oral powder for reconstitution: 17 gram(s) orally once a day  Probiotic Formula (Bacillus Coagulans) oral capsule: 1 cap(s) orally 3 times a day  Aster-Magalie oral tablet: 1 tab(s) orally once a day  senna leaf extract oral tablet: 2 tab(s) orally once a day (at bedtime)  sevelamer carbonate 800 mg oral tablet: 2 tab(s) orally 3 times a day (with meals)  tamsulosin 0.4 mg oral capsule: 1 cap(s) orally once a day (at bedtime)  Zofran 4 mg oral tablet: 1 tab(s) orally every 8 hours as needed

## 2023-04-11 NOTE — PROGRESS NOTE ADULT - PROBLEM SELECTOR PLAN 5
-history c/w mechanical, no acute injuries noted on imaging. admission imaging [facility], CT c-spine, XRay pelvis unremarkable for any acute pathology.  -Pt requesting IV dilaudid  -Seen by pain management, no role for opiates  - pt requesting transfer to [facility] to his previous physicians bec we are not providing IV dilaudid. Pt understands risk of opiates. Previous provider discussed case presented to [facility] transfer center and case rejected  -C/w standing tylenol  -PT-NATHALIA

## 2023-04-11 NOTE — PROGRESS NOTE ADULT - ASSESSMENT
62M with of ESRD on HD ( T/Thu/Sat), diastolic CHF, HTN, AFib s/p AICD (Medtronic) on Eliquis and Plavix, b/l CVA quadriplegic s/p suprapubic cath,  seizure on keppra, , chronic pressure ulcers,  history of Hep C (3/24/23: Hep C RNA - UNDETECTABLE)  admitted 3/28/23 after fall.  Recently hospitalized 3/23 --> 3/24/32 after fall with AICD discharging, but on interrogation, no events/discharges noted.  Given Ceftriaxone/Clinda x 1. Cardiac evaluation non revealing.    He has had agitation  Colonized with Candida auris  no evidence of invasive candidal disease  history of Cephalosporin, PCN, Clinda allergy  - details unclear    3/23 X2 Blood cultures  NEG; 3/23 Urine Cx: Neg  4/5 Bld Cx  NGTD    S/P Ertapenem 500 mg IVSS daily was started 4/5   --> 4/8    Potential contributors to leukocytosis included possible stercoral colitis, tissue ischemia, foot wounds, soft tissue injury.  Leukocytosis now decreased OFF antibiotics  Had methylprednisolone 40 mg on 4/5  pruritic papules  ?candidiasis    Suggest    topical zinc oxide   mobilization oob if feasible    signing off case   --- please reconsult ID if needed  
62M with of ESRD on HD ( T/Thu/Sat), diastolic CHF, HTN, AFib s/p AICD (Medtronic) on Eliquis and Plavix, b/l CVA quadriplegic s/p suprapubic cath,  seizure on keppra, , chronic pressure ulcers,  history of Hep C (3/24/23: Hep C RNA - UNDETECTABLE)  admitted 3/28/23 after fall.  Recently hospitalized 3/23 --> 3/24/32 after fall with AICD discharging, but on interrogation, no events/discharges noted.  Given Ceftriaxone/Clinda x 1. Cardiac evaluation non revealing.    He has had agitation  Colonized with Candida auris  no evidence of invasive candidal disease at present  Progressive Leukocytosis  history of Cephalosporin, PCN, Clinda allergy  - details unclear      3/23 X2 Blood cultures  NEG; 3/23 Urine Cx: Neg    Potential contributors to leukocytosis included possible stercoral colitis, tissue ischemia, foot wounds, soft tissue injury.  Had methylprednisolone 40 mg on 4/5 4/5 Blood culture NGTD  passed large bowel movement    Suggest  Ertapenem 500 mg IVSS daily was started 4/5  as pt initially declined  Trend WBC      unless new findings, will  stop Ertapenem 4/8    discussed with primary medical attending      
Pt with ESRD/HD
62M w/ pmh Hep C, HTN, AFib s/p AICD (Medtronic) on Eliquis and Plavix, b/l CVA quadriplegic s/p suprapubic cath, ESRD on HD ( T/Thu/Sat), diastolic CHF, seizure on keppra, hep C, chronic pressure ulcers, p/w fall , found to have worsening anemia 
Pt with ESRD/HD.
62M w/ pmh Hep C, HTN, AFib s/p AICD (Medtronic) on Eliquis and Plavix, b/l CVA quadriplegic s/p suprapubic cath, ESRD on HD ( T/Thu/Sat), diastolic CHF, seizure on keppra, hep C, chronic pressure ulcers, p/w fall , found to have worsening anemia 
62M with of ESRD on HD ( T/Thu/Sat), diastolic CHF, HTN, AFib s/p AICD (Medtronic) on Eliquis and Plavix, b/l CVA quadriplegic s/p suprapubic cath,  seizure on keppra, , chronic pressure ulcers,  history of Hep C (3/24/23: Hep C RNA - UNDETECTABLE)  admitted 3/28/23 after fall.  Recently hospitalized 3/23 --> 3/24/32 after fall with AICD discharging, but on interrogation, no events/discharges noted.  Given Ceftriaxone/Clinda x 1. Cardiac evaluation non revealing.    He has had agitation  Colonized with Candida auris  no evidence of invasive candidal disease at present  Progressive Leukocytosis  history of Cephalosporin, PCN, Clinda allergy  - details unclear      3/23 X2 Blood cultures  NEG; 3/23 Urine Cx: Neg    Potential contributors to leukocytosis included possible stercoral colitis, tissue ischemia, foot wounds, soft tissue injury.  Had methylprednisolone 40 mg on 4/5    Suggest  Repeat  4/5 Blood cultures x2 in lab  Ertapenem 500 mg IVSS daily was started 4/5  as pt initially declined  promote evacuation and decompression of rectum  Trend WBC      unless new findings, can stop Ertapenem  in 1-2 days  discussed with primary medical attending  
62M with of ESRD on HD ( T/Thu/Sat), diastolic CHF, HTN, AFib s/p AICD (Medtronic) on Eliquis and Plavix, b/l CVA quadriplegic s/p suprapubic cath,  seizure on keppra, , chronic pressure ulcers,  history of Hep C (3/24/23: Hep C RNA - UNDETECTABLE)  admitted 3/28/23 after fall.  Recently hospitalized 3/23 --> 3/24/32 after fall with AICD discharging, but on interrogation, no events/discharges noted.  Given Ceftriaxone/Clinda x 1. Cardiac evaluation non revealing.    He has had agitation  Colonized with Candida auris  no evidence of invasive candidal disease at present  Progressive Leukocytosis  history of Cephalosporin, PCN, Clinda allergy  - details unclear      3/23 X2 Blood cultures  NEG; 3/23 Urine Cx: Neg  4/5 Bld Cx  NGTD    Potential contributors to leukocytosis included possible stercoral colitis, tissue ischemia, foot wounds, soft tissue injury.  Had methylprednisolone 40 mg on 4/5  no localizing sources except possible sterocoral colitis, He is passing bowel movements now  foot wound non infected    Suggest  STOP Ertapenem 500 mg IVSS daily was started 4/5   --> 4/8  (I ordered)  
62M w/ pmh Hep C, HTN, AFib s/p AICD (Medtronic) on Eliquis and Plavix, b/l CVA quadriplegic s/p suprapubic cath, ESRD on HD ( T/Thu/Sat), diastolic CHF, seizure on keppra, hep C, chronic pressure ulcers, p/w fall , found to have worsening anemia 
62M with of ESRD on HD ( T/Thu/Sat), diastolic CHF, HTN, AFib s/p AICD (Medtronic) on Eliquis and Plavix, b/l CVA quadriplegic s/p suprapubic cath,  seizure on keppra, , chronic pressure ulcers,  history of Hep C (3/24/23: Hep C RNA - UNDETECTABLE)  admitted 3/28/23 after fall.  Recently hospitalized 3/23 --> 3/24/32 after fall with AICD discharging, but on interrogation, no events/discharges noted.  Given Ceftriaxone/Clinda x 1. Cardiac evaluation non revealing.    He has had agitation  Colonized with Candida auris  no evidence of invasive candidal disease at present  Progressive Leukocytosis  history of Cephalosporin, PCN, Clinda allergy  - details unclear      3/23 X2 Blood cultures  NEG; 3/23 Urine Cx: Neg    Potential contributors to leukocytosis included possible stercoral colitis, tissue ischemia, foot wounds, soft tissue injury.    Suggest  Was refusing labwork and antibiotics over last 24 hours. Now too encephalopathic to refuse  Please obtain CBC with diff, CMP, BCx  Check CXR  Start Ertapenem 500 mg IVSS daily  promote evacuation and decompression of rectum    I will continue to follow. Please feel free to contact me with any further questions.    Jovon iKmball M.D.  [facility] Division of Infectious Disease  8AM-5PM Monday - Friday: Available on [employer] Teams  After Hours and Holidays (or if no response on [employer]iftee Teams): Please contact the Infectious Diseases Office at [phone]    The above assessment and plan were discussed with medicine NP
62M with of ESRD on HD ( T/Thu/Sat), diastolic CHF, HTN, AFib s/p AICD (Medtronic) on Eliquis and Plavix, b/l CVA quadriplegic s/p suprapubic cath,  seizure on keppra, , chronic pressure ulcers,  history of Hep C (3/24/23: Hep C RNA - UNDETECTABLE)  admitted 3/28/23 after fall.  Recently hospitalized 3/23 --> 3/24/32 after fall with AICD discharging, but on interrogation, no events/discharges noted.  Given Ceftriaxone/Clinda x 1. Cardiac evaluation non revealing.    He has had agitation  Colonized with Candida auris  no evidence of invasive candidal disease at present  Progressive Leukocytosis  history of Cephalosporin, PCN, Clinda allergy  - details unclear    3/23 X2 Blood cultures  NEG; 3/23 Urine Cx: Neg    Potential contributors to leukocytosis included possible stercoral colitis, tissue ischemia, foot wounds, soft tissue injury.    UA (4/3) 2 WBC  CXR (4/5) Clear    Suggest  Agree with CT A/P and L Spine  Continue Ertapenem 500 mg IVSS daily (only started today as patient was refusing until now)  promote evacuation and decompression of rectum  follow prelim BCx    Jovon Kimball M.D.  [facility] Division of Infectious Disease  8AM-5PM Monday - Friday: Available on [employer] Teams  After Hours and Holidays (or if no response on [employer] Teams): Please contact the Infectious Diseases Office at [phone] 
62M w/ pmh Hep C, HTN, AFib s/p AICD (Medtronic) on Eliquis and Plavix, b/l CVA quadriplegic s/p suprapubic cath, ESRD on HD ( T/Thu/Sat), diastolic CHF, seizure on keppra, hep C, chronic pressure ulcers, p/w fall , found to have worsening anemia 
Pt with ESRD/HD.
62M w/ pmh Hep C, HTN, AFib s/p AICD (Medtronic) on Eliquis and Plavix, b/l CVA quadriplegic s/p suprapubic cath, ESRD on HD ( T/Thu/Sat), diastolic CHF, seizure on keppra, hep C, chronic pressure ulcers, p/w fall , found to have worsening anemia 

## 2023-04-11 NOTE — PROGRESS NOTE ADULT - SUBJECTIVE AND OBJECTIVE BOX
Patient is a 62y old  Male who presents with a chief complaint of worsening anemia (11 Apr 2023 14:11)        SUBJECTIVE / OVERNIGHT EVENTS: denies any complaints. getting HD      MEDICATIONS  (STANDING):    MEDICATIONS  (PRN):      Vital Signs Last 24 Hrs  T(C): 36.7 (11 Apr 2023 13:30), Max: 36.7 (11 Apr 2023 13:30)  T(F): 98 (11 Apr 2023 13:30), Max: 98 (11 Apr 2023 13:30)  HR: 89 (11 Apr 2023 13:30) (89 - 89)  BP: 104/67 (11 Apr 2023 13:30) (104/67 - 104/67)  BP(mean): --  RR: 18 (11 Apr 2023 13:30) (18 - 18)  SpO2: 99% (11 Apr 2023 13:30) (99% - 99%)    Parameters below as of 11 Apr 2023 13:30  Patient On (Oxygen Delivery Method): room air      CAPILLARY BLOOD GLUCOSE        I&O's Summary    11 Apr 2023 07:01  -  12 Apr 2023 07:00  --------------------------------------------------------  IN: 1280 mL / OUT: 2225 mL / NET: -945 mL          PHYSICAL EXAM:   GENERAL: NAD,    HEAD:  Atraumatic, Normocephalic  EYES:  conjunctiva and sclera clear  NECK: Supple,    CHEST/LUNG: distant bs  HEART: S1S2 normal. Regular rate and rhythm; No murmurs, rubs, or gallops  ABDOMEN: Soft, Nontender, Nondistended; Bowel sounds present  EXTREMITIES:  No clubbing, cyanosis, or edema  PSYCH/Neuro: AAOx3. Non-focal.   SKIN: abrasions on toes      LABS:                        7.6    11.72 )-----------( 144      ( 11 Apr 2023 11:09 )             24.0     04-11    138  |  102  |  77<H>  ----------------------------<  140<H>  4.2   |  21<L>  |  5.32<H>    Ca    8.4      11 Apr 2023 11:09                  RADIOLOGY & ADDITIONAL TESTS:    Imaging Personally Reviewed:  Consultant(s) Notes Reviewed:  renal  Care Discussed with Consultants/Other Providers:

## 2023-04-11 NOTE — PROGRESS NOTE ADULT - PROBLEM SELECTOR PLAN 1
Pt. with ESRD on HD three times a week (TTS). Pt being admitted after mechanical fall. HD via tunneled HD catheter.   Pt goes to [facility] dialysis unit. Follows with Dr. Mercedes. Labs reviewed. Plan for maintenance HD today. Psych consult reviewed. Avoid nephrotoxins. Dose meds as per HD.

## 2023-04-11 NOTE — PROGRESS NOTE ADULT - PROBLEM SELECTOR PLAN 2
Pt. with hyperkalemia in the setting of ESRD. Serum potassium improved to 4.0 this morning. Monitor serum potassium.

## 2023-04-20 NOTE — H&P ADULT - HISTORY OF PRESENT ILLNESS
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 Patient missed his dialysis yesterday after slipping out of his wheelchair and subsequently going to [facility] for further care, however apparently signed out AMA because it took too long to be seen?.     PAST MEDICAL & SURGICAL HISTORY:  Chronic congestive heart failure, unspecified congestive heart failure type  HFrEF/pEF  Hep C w/o coma, chronic  HTN (hypertension)  AICD (automatic cardioverter/defibrillator) present [employer]  Atrial fibrillation  CAD (coronary artery disease) (s/p 2 stents in Sep 2017)  Hyperlipidemia  Renal cell carcinoma of left kidney  s/p partial nephrectomy in   biopsy again in Sep 2017 showed RCC again in stage 2  COPD (chronic obstructive pulmonary disease)  Bladder cancer  Peripheral neuropathy  Prostate cancer  s/p radiation  Nephrolithiasis  Kidney stone  AICD (automatic cardioverter/defibrillator) present  S/P cholecystectomy   H/O partial [id number]  History of percutaneous coronary intervention  H/O transurethral destruction of bladder lesion  History of coronary artery stent placement          Review of Systems:   CONSTITUTIONAL: No fever, weight loss, or fatigue  EYES: No eye pain, visual disturbances,   ENMT:  No difficulty hearing, tinnitus, vertigo;   NECK: No pain or stiffness  RESPIRATORY: No cough, wheezing, chills or hemoptysis; No shortness of breath  CARDIOVASCULAR: as per hpi  GASTROINTESTINAL: No abdominal or epigastric pain. No nausea, vomiting, or hematemesis; No diarrhea or constipation. No melena or hematochezia.  GENITOURINARY: on HD  NEUROLOGICAL: No headaches, memory loss, loss of strength, numbness, or tremors  SKIN: No itching, burning, rashes, or lesions   LYMPH NODES: No enlarged glands  ENDOCRINE: No heat or cold intolerance; No hair loss  MUSCULOSKELETAL: wheelchair bound  PSYCHIATRIC: No depression, anxiety, mood swings, or difficulty sleeping  HEME/LYMPH: No easy bruising, or bleeding gums  ALLERGY AND IMMUNOLOGIC: No hives or eczema    Allergies    codeine (Unknown)  codeine (Rash)  Haldol (Unknown)  penicillin (Hives; Anaphylaxis)  aspirin (Hives)  Motrin (Rash)  Tylenol (Hives)  Toradol (Rash)    Intolerances        Social History:     FAMILY HISTORY:  Family history of chronic ischemic heart disease    Family history of lung cancer        MEDICATIONS  (STANDING):  HYDROmorphone  Injectable 1 milliGRAM(s) IV Push Once    MEDICATIONS  (PRN):      T(C): 36.4 (23 @ 06:21), Max: 36.4 (23 @ 06:21)  HR: 75 (23 @ 09:56) (71 - 75)  BP: 106/73 (23 @ 09:56) (106/73 - 131/61)  RR: 16 (23 @ 09:56) (16 - 16)  SpO2: 100% (23 @ 09:56) (100% - 100%)    CAPILLARY BLOOD GLUCOSE      POCT Blood Glucose.: 66 mg/dL (2023 06:22)    I&O's Summary      PHYSICAL EXAM:    GENERAL: NAD, resting in bed  CHEST/LUNG: Clear to auscultation bilaterally anteriorly; No wheeze  HEART: Regular rate and rhythm; No murmurs, rubs, or gallops  ABDOMEN: Soft, Nontender, Nondistended; Bowel sounds present  EXTREMITIES: LLE with hyperpigmentation; limited movement of hands bilaterally  PSYCH: calm, interactive --> became angry about IV pain meds  NEUROLOGY: moving all ext  SKIN: B heel wounds, serous fluid no purulence  + R chest wall permcath   +SPT      LABS:                        7.1    9.48  )-----------( 171      ( 2023 07:33 )             22.7     04    137  |  106  |  63<H>  ----------------------------<  92  5.1   |  19<L>  |  4.92<H>    Ca    8.6      2023 09:50  Phos  4.1     -20  Mg     2.10     -20    TPro  6.2  /  Alb  3.2<L>  /  TBili  0.6  /  DBili  x   /  AST  77<H>  /  ALT  58<H>  /  AlkPhos  556<H>  -20    PT/INR - ( 2023 07:33 )   PT: 15.6 sec;   INR: 1.34 ratio         PTT - ( 2023 07:33 )  PTT:39.2 sec      Urinalysis Basic - ( 2023 07:57 )    Color: Light Yellow / Appearance: Slightly Turbid / S.014 / pH: x  Gluc: x / Ketone: Negative  / Bili: Negative / Urobili: <2 mg/dL   Blood: x / Protein: 100 mg/dL / Nitrite: Negative   Leuk Esterase: Large / RBC: 5-10 /HPF / WBC >50 /HPF   Sq Epi: x / Non Sq Epi: x / Bacteria: Negative          RADIOLOGY & ADDITIONAL TESTS:    ECG Personally Reviewed -     Imaging Personally Reviewed:    Consultant(s) Notes Reviewed:      Care Discussed with Consultants/Other Providers: D/W psychiatry team, follow up consult recommendations.    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) because he feels like his AICD fired which has been causing chest pain radiating to his left arm associated with blurry vision. Patient missed his dialysis yesterday after slipping out of his wheelchair and subsequently going to [facility] for further care, however apparently signed out AMA because it took too long to be seen?.     PAST MEDICAL & SURGICAL HISTORY:  Chronic congestive heart failure, unspecified congestive heart failure type  HFrEF/pEF  Hep C w/o coma, chronic  HTN (hypertension)  AICD (automatic cardioverter/defibrillator) present [employer]  Atrial fibrillation  CAD (coronary artery disease) (s/p 2 stents in Sep 2017)  Hyperlipidemia  Renal cell carcinoma of left kidney  s/p partial nephrectomy in   biopsy again in Sep 2017 showed RCC again in stage 2  COPD (chronic obstructive pulmonary disease)  Bladder cancer  Peripheral neuropathy  Prostate cancer  s/p radiation  Nephrolithiasis  Kidney stone  AICD (automatic cardioverter/defibrillator) present  S/P cholecystectomy   H/O partial [id number]  History of percutaneous coronary intervention  H/O transurethral destruction of bladder lesion  History of coronary artery stent placement          Review of Systems:   CONSTITUTIONAL: No fever, weight loss, or fatigue  EYES: No eye pain, visual disturbances,   ENMT:  No difficulty hearing, tinnitus, vertigo;   NECK: No pain or stiffness  RESPIRATORY: No cough, wheezing, chills or hemoptysis; No shortness of breath  CARDIOVASCULAR: as per hpi  GASTROINTESTINAL: No abdominal or epigastric pain. No nausea, vomiting, or hematemesis; No diarrhea or constipation. No melena or hematochezia.  GENITOURINARY: on HD  NEUROLOGICAL: No headaches, memory loss, loss of strength, numbness, or tremors  SKIN: b/l LE wounds,   LYMPH NODES: No enlarged glands  ENDOCRINE: No heat or cold intolerance; No hair loss  MUSCULOSKELETAL: wheelchair bound  PSYCHIATRIC: No depression, anxiety, mood swings, or difficulty sleeping  HEME/LYMPH: No easy bruising, or bleeding gums  ALLERGY AND IMMUNOLOGIC: No hives or eczema    Allergies    codeine (Unknown)  codeine (Rash)  Haldol (Unknown)  penicillin (Hives; Anaphylaxis)  aspirin (Hives)  Motrin (Rash)  Tylenol (Hives)  Toradol (Rash)    Intolerances        Social History:     FAMILY HISTORY:  Family history of chronic ischemic heart disease    Family history of lung cancer        MEDICATIONS  (STANDING):  HYDROmorphone  Injectable 1 milliGRAM(s) IV Push Once    MEDICATIONS  (PRN):      T(C): 36.4 (23 @ 06:21), Max: 36.4 (23 @ 06:21)  HR: 75 (23 @ 09:56) (71 - 75)  BP: 106/73 (23 @ 09:56) (106/73 - 131/61)  RR: 16 (23 @ 09:56) (16 - 16)  SpO2: 100% (23 @ 09:56) (100% - 100%)    CAPILLARY BLOOD GLUCOSE      POCT Blood Glucose.: 66 mg/dL (2023 06:22)    I&O's Summary      PHYSICAL EXAM:    GENERAL: NAD, resting in bed  CHEST/LUNG: Clear to auscultation bilaterally anteriorly; No wheeze  HEART: Regular rate and rhythm; No murmurs, rubs, or gallops  ABDOMEN: Soft, Nontender, Nondistended; Bowel sounds present  EXTREMITIES: LLE with hyperpigmentation; limited movement of hands bilaterally, fingers somewhat contracted  PSYCH: calm, interactive --> became angry about IV pain meds  NEUROLOGY: moving all ext  SKIN: B/L heel wounds, serous fluid no purulence, permacath      LABS:                        7.1    9.48  )-----------( 171      ( 2023 07:33 )             22.7         137  |  106  |  63<H>  ----------------------------<  92  5.1   |  19<L>  |  4.92<H>    Ca    8.6      2023 09:50  Phos  4.1     04-20  Mg     2.10     -20    TPro  6.2  /  Alb  3.2<L>  /  TBili  0.6  /  DBili  x   /  AST  77<H>  /  ALT  58<H>  /  AlkPhos  556<H>  -20    PT/INR - ( 2023 07:33 )   PT: 15.6 sec;   INR: 1.34 ratio         PTT - ( 2023 07:33 )  PTT:39.2 sec      Urinalysis Basic - ( 2023 07:57 )    Color: Light Yellow / Appearance: Slightly Turbid / S.014 / pH: x  Gluc: x / Ketone: Negative  / Bili: Negative / Urobili: <2 mg/dL   Blood: x / Protein: 100 mg/dL / Nitrite: Negative   Leuk Esterase: Large / RBC: 5-10 /HPF / WBC >50 /HPF   Sq Epi: x / Non Sq Epi: x / Bacteria: Negative          RADIOLOGY & ADDITIONAL TESTS:    ECG Personally Reviewed -     Imaging Personally Reviewed:    Consultant(s) Notes Reviewed:      Care Discussed with Consultants/Other Providers: D/W psychiatry team, follow up consult recommendations. D/W nephrology, Dr. Mcfadden for dialysis today.

## 2023-04-20 NOTE — ED ADULT NURSE NOTE - OBJECTIVE STATEMENT
pt received rm8. pt A&Ox4 respirations even unlabored completing full sentences. pt c/o L sided CP that jolted his body from rest and SOB. pt states he has a defibrillator and felt like he was shocked x2 by his defibrillator at 0510 AM. pt also endorses L arm numbness. pt sensation, strength intact. pt states has had defibrillator go off in the past and states it felt the same. pt also states he was in his wheelchair, slid and fell out of it hitting his head yesterday, states was seen at a different hospital after the fall but left AMA because of the wait time. pt endorses h/a since fall. pt has R sided port for HD, MWF. pt has chronic tran, draining yellow urine. pt states has chronic ulcers to bilateral feet, unchanged from prior. pt pmhx CVA, COPD, CAD, AICD on blood thinners. pt denies dizziness/lightheadedness, n/v/d, abd pain, gu sx, fevers/chills, sick contacts. USIV placement needed, in progress. stretcher lowest position siderails up safety maintained.

## 2023-04-20 NOTE — ED PROVIDER NOTE - PROGRESS NOTE DETAILS
Spencer Jj MD PGY-1: EP consulted for AICD interrogation, aware Spencer Jj MD PGY-1: Paged nephro for dialysis. Awaiting call back.    Gets dialysis at [facility], follows with Dr. Mercedes Spencer Jj MD PGY-1: Nephro aware, will see patient

## 2023-04-20 NOTE — ED PROVIDER NOTE - CLINICAL SUMMARY MEDICAL DECISION MAKING FREE TEXT BOX
62yM on eliquis w/extensive hx (see hpi) from nursing home presents for chest pain, also slipped out of wheelchair and bumped head yesterday. Exam shows no signs of head trauma, normal S1, S2, lungs clear, abd nontender, no LE peripheral edema. Will check basic labs, vbg, trop, bnp, cxr, ct head noncon, ekg, pain control. Dispo pending results.

## 2023-04-20 NOTE — H&P ADULT - TIME BILLING
Time reflective of charting, documentation and face to face evaluation    Extensive amounts of home medications, medication reconciliation time consuming. Care coordinated with psychiatry and nephrology team.

## 2023-04-20 NOTE — CONSULT NOTE ADULT - ATTENDING COMMENTS
Agree with above.  Patient has no chest pain at present time resting comfortable..  agreeable for dialysis today.

## 2023-04-20 NOTE — CONSULT NOTE ADULT - PROBLEM SELECTOR RECOMMENDATION 2
Patient with anemia in the setting of ESRD. Hemoglobin below target range.  Will need to order epo once weight is entered. Will give X1 of 5k epo today. Blood transfusion per primary team. Monitor hemoglobin.

## 2023-04-20 NOTE — CONSULT NOTE ADULT - PROBLEM SELECTOR RECOMMENDATION 3
Pt with hyperphosphatemia in the setting of ESRD. Pt. on phosphate binders with meals. Serum phos is within target range at 4.1 today. Low phosphorus diet. Monitor serum phosphorus.    If you have any questions, please feel free to contact me  Sergio Gonzalez  Nephrology Fellow  [phone]; Prefer [employer] TEAMS  (After 5pm or on weekends please page the on-call fellow).

## 2023-04-20 NOTE — ED ADULT NURSE NOTE - CHIEF COMPLAINT QUOTE
Pt. states he got shocked x2 by his defibrillator at 0510 AM. endorses left-sided chest pain radiating to arm with numbness. States he also fell and hit his head yesterday, states was seen at a different hospital after the fall.  PHx CVA, COPD, CAD, AICD on blood thinners, ESRD on HD M,W,F. missed dialysis yesterday. FS 65 patient provided with apple juice. EKG in progress

## 2023-04-20 NOTE — ED PROVIDER NOTE - NS ED ROS FT
REVIEW OF SYSTEMS:    CONSTITUTIONAL: No fever, weight loss, or fatigue  EYES: No eye pain, visual disturbances, or discharge  ENMT:  No difficulty hearing, tinnitus, vertigo; No sinus or throat pain  NECK: No pain or stiffness  RESPIRATORY: No cough, wheezing, chills or hemoptysis; No shortness of breath  CARDIOVASCULAR: + chest pain, no palpitations, +dizziness, no leg swelling  GASTROINTESTINAL: No abdominal or epigastric pain. No nausea, vomiting, or hematemesis; No diarrhea or constipation. No melena or hematochezia.  GENITOURINARY: No dysuria, frequency, hematuria, or incontinence  NEUROLOGICAL: No headaches, memory loss, loss of strength, numbness, or tremors  SKIN: No itching, burning, rashes, or lesions

## 2023-04-20 NOTE — CONSULT NOTE ADULT - SUBJECTIVE AND OBJECTIVE BOX
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 Feels like his AICD fired. Pt missed HD on Wed, 4/19.Nephrology team was consulted for ESRD/HD management.     Pt currently received HD TIW (TTS) at [facility]. Last HD done on Mond (4/17/23) via     Pt follows with Dr. Mercedes (Nephrologist). Pt seen and examined in ER. Pt awake oriented to self. Pt says he still makes urine.          PAST HISTORY  --------------------------------------------------------------------------------  PAST MEDICAL & SURGICAL HISTORY:  Chronic congestive heart failure, unspecified congestive heart failure type  rEF/pEF      Hep C w/o coma, chronic      HTN (hypertension)      AICD (automatic cardioverter/defibrillator) present  [employer]tr[employer]      Atrial fibrillation      CAD (coronary artery disease)  (s/p 2 stents in Sep 2017)      Hyperlipidemia      Renal cell carcinoma of left kidney  s/p partial nephrectomy in 2002  biopsy again in Sep 2017 showed RCC again in stage 2      COPD (chronic obstructive pulmonary disease)      Bladder cancer      Peripheral neuropathy      Prostate cancer  s/p radiation      Nephrolithiasis      Kidney stone      AICD (automatic cardioverter/defibrillator) present      S/P cholecystectomy  2015      H/O partial nephrectomy  2002      History of percutaneous coronary intervention      H/O transurethral destruction of bladder lesion      History of coronary artery stent placement        FAMILY HISTORY:  Family history of chronic ischemic heart disease    Family history of lung cancer      PAST SOCIAL HISTORY:    ALLERGIES & MEDICATIONS  --------------------------------------------------------------------------------  Allergies    codeine (Unknown)  codeine (Rash)  Haldol (Unknown)  penicillin (Hives; Anaphylaxis)  aspirin (Hives)  Motrin (Rash)  Tylenol (Hives)  Toradol (Rash)    Intolerances      Standing Inpatient Medications  HYDROmorphone  Injectable 1 milliGRAM(s) IV Push Once    PRN Inpatient Medications      REVIEW OF SYSTEMS  --------------------------------------------------------------------------------  Gen: No fevers/chills  Skin: No rashes  Head/Eyes/Ears: Normal hearing,   Respiratory: No dyspnea, cough  CV: No chest pain  GI: No abdominal pain, diarrhea  : No dysuria, hematuria  MSK: No  edema  Heme: No easy bruising or bleeding  Psych: No significant depression    All other systems were reviewed and are negative, except as noted.    VITALS/PHYSICAL EXAM  --------------------------------------------------------------------------------  T(C): 36.4 (04-20-23 @ 06:21), Max: 36.4 (04-20-23 @ 06:21)  HR: 75 (04-20-23 @ 09:56) (71 - 75)  BP: 106/73 (04-20-23 @ 09:56) (106/73 - 131/61)  RR: 16 (04-20-23 @ 09:56) (16 - 16)  SpO2: 100% (04-20-23 @ 09:56) (100% - 100%)  Wt(kg): --        Physical Exam:  	Gen: NAD  	HEENT: MMM  	Pulm: CTA B/L  	CV: S1S2  	Abd: Soft, +BS   	Ext: No LE edema B/L  	Neuro: Awake  	Skin: Warm and dry  	Vascular access:    LABS/STUDIES  --------------------------------------------------------------------------------              7.1    9.48  >-----------<  171      [04-20-23 @ 07:33]              22.7     137  |  106  |  63  ----------------------------<  92      [04-20-23 @ 09:50]  5.1   |  19  |  4.92        Ca     8.6     [04-20-23 @ 09:50]      Mg     2.10     [04-20-23 @ 07:33]      Phos  4.1     [04-20-23 @ 07:33]    TPro  6.2  /  Alb  3.2  /  TBili  0.6  /  DBili  x   /  AST  77  /  ALT  58  /  AlkPhos  556  [04-20-23 @ 09:50]    PT/INR: PT 15.6 , INR 1.34       [04-20-23 @ 07:33]  PTT: 39.2       [04-20-23 @ 07:33]      Creatinine Trend:  SCr 4.92 [04-20 @ 09:50]  SCr 5.19 [04-20 @ 07:33]  SCr 4.75 [03-25 @ 09:25]    Urinalysis - [04-20-23 @ 07:57]      Color Light Yellow / Appearance Slightly Turbid / SG 1.014 / pH 7.0      Gluc 500 mg/dL / Ketone Negative  / Bili Negative / Urobili <2 mg/dL       Blood Large / Protein 100 mg/dL / Leuk Est Large / Nitrite Negative      RBC 5-10 / WBC >50 / Hyaline 1 / Gran  / Sq Epi  / Non Sq Epi  / Bacteria Negative      Iron 66, TIBC 206, %sat 32      [03-12-23 @ 06:31]  Ferritin 964      [03-12-23 @ 06:31]  HbA1c 6.2      [01-03-20 @ 13:57]  TSH <0.10      [03-16-23 @ 07:38]    HBsAb <3.0      [03-11-23 @ 19:25]  HBsAg Nonreact      [03-11-23 @ 19:25]  HBcAb Nonreact      [03-11-23 @ 19:25]  HCV 8.43, Reactive      [03-11-23 @ 19:25]     [facility] DIVISION OF KIDNEY DISEASES AND HYPERTENSION -- [phone]  -- INITIAL CONSULT NOTE  --------------------------------------------------------------------------------  HPI:  Pt is a 62-year-old Male with significant PMHx of ESRD on HD TIW (TTS- started In Jan 23), RCC s/p L partial nephrectomy, HTN, CLD, 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 Feels like his AICD fired. Pt missed HD on Wed, 4/19.Nephrology team was consulted for ESRD/HD management.     Pt currently received HD TIW (TTS) at [facility]. Follows with Dr. Mercedes. Last HD done on Mond (4/17/23) via his right tunneled IJ catheter. Pt seen and examined in ER. Pt awake oriented to self. Pt says he still makes urine. States he fell two days ago from his wheelchair and was sent to [facility] for treatment yesterday but left AMA waiting in ER for 10 hours and returned to [facility]. This morning he experienced CP and was brought to [facility]. patient does not endorse CP currently, denies SOB, N/V, dizziness or LE swelling.       PAST HISTORY  --------------------------------------------------------------------------------  PAST MEDICAL & SURGICAL HISTORY:  Chronic congestive heart failure, unspecified congestive heart failure type  rEF/pEF      Hep C w/o coma, chronic      HTN (hypertension)      AICD (automatic cardioverter/defibrillator) present  [employer]tr[employer]      Atrial fibrillation      CAD (coronary artery disease)  (s/p 2 stents in Sep 2017)      Hyperlipidemia      Renal cell carcinoma of left kidney  s/p partial nephrectomy in 2002  biopsy again in Sep 2017 showed RCC again in stage 2      COPD (chronic obstructive pulmonary disease)      Bladder cancer      Peripheral neuropathy      Prostate cancer  s/p radiation      Nephrolithiasis      Kidney stone      AICD (automatic cardioverter/defibrillator) present      S/P cholecystectomy  2015      H/O partial nephrectomy  2002      History of percutaneous coronary intervention      H/O transurethral destruction of bladder lesion      History of coronary artery stent placement        FAMILY HISTORY:  Family history of chronic ischemic heart disease    Family history of lung cancer      PAST SOCIAL HISTORY:    ALLERGIES & MEDICATIONS  --------------------------------------------------------------------------------  Allergies    codeine (Unknown)  codeine (Rash)  Haldol (Unknown)  penicillin (Hives; Anaphylaxis)  aspirin (Hives)  Motrin (Rash)  Tylenol (Hives)  Toradol (Rash)    Intolerances      Standing Inpatient Medications  HYDROmorphone  Injectable 1 milliGRAM(s) IV Push Once    PRN Inpatient Medications      REVIEW OF SYSTEMS  --------------------------------------------------------------------------------  Gen: No fevers/chills  Skin: No rashes  Head/Eyes/Ears: Normal hearing,   Respiratory: No dyspnea, cough  CV: No chest pain  GI: No abdominal pain, diarrhea  : No dysuria, hematuria  MSK: No  edema  Heme: No easy bruising or bleeding  Psych: No significant depression    All other systems were reviewed and are negative, except as noted.    VITALS/PHYSICAL EXAM  --------------------------------------------------------------------------------  T(C): 36.4 (04-20-23 @ 06:21), Max: 36.4 (04-20-23 @ 06:21)  HR: 75 (04-20-23 @ 09:56) (71 - 75)  BP: 106/73 (04-20-23 @ 09:56) (106/73 - 131/61)  RR: 16 (04-20-23 @ 09:56) (16 - 16)  SpO2: 100% (04-20-23 @ 09:56) (100% - 100%)  Wt(kg): --        Physical Exam:  	Gen: NAD; disheveled   	HEENT: MMM  	Pulm: CTA B/L  	CV: S1S2  	Abd: Soft, +BS   	Ext: No LE edema B/L  	Neuro: Awake  	Skin: Warm and dry  	Vascular access: right nontunneled cath    LABS/STUDIES  --------------------------------------------------------------------------------              7.1    9.48  >-----------<  171      [04-20-23 @ 07:33]              22.7     137  |  106  |  63  ----------------------------<  92      [04-20-23 @ 09:50]  5.1   |  19  |  4.92        Ca     8.6     [04-20-23 @ 09:50]      Mg     2.10     [04-20-23 @ 07:33]      Phos  4.1     [04-20-23 @ 07:33]    TPro  6.2  /  Alb  3.2  /  TBili  0.6  /  DBili  x   /  AST  77  /  ALT  58  /  AlkPhos  556  [04-20-23 @ 09:50]    PT/INR: PT 15.6 , INR 1.34       [04-20-23 @ 07:33]  PTT: 39.2       [04-20-23 @ 07:33]      Creatinine Trend:  SCr 4.92 [04-20 @ 09:50]  SCr 5.19 [04-20 @ 07:33]  SCr 4.75 [03-25 @ 09:25]    Urinalysis - [04-20-23 @ 07:57]      Color Light Yellow / Appearance Slightly Turbid / SG 1.014 / pH 7.0      Gluc 500 mg/dL / Ketone Negative  / Bili Negative / Urobili <2 mg/dL       Blood Large / Protein 100 mg/dL / Leuk Est Large / Nitrite Negative      RBC 5-10 / WBC >50 / Hyaline 1 / Gran  / Sq Epi  / Non Sq Epi  / Bacteria Negative      Iron 66, TIBC 206, %sat 32      [03-12-23 @ 06:31]  Ferritin 964      [03-12-23 @ 06:31]  HbA1c 6.2      [01-03-20 @ 13:57]  TSH <0.10      [03-16-23 @ 07:38]    HBsAb <3.0      [03-11-23 @ 19:25]  HBsAg Nonreact      [03-11-23 @ 19:25]  HBcAb Nonreact      [03-11-23 @ 19:25]  HCV 8.43, Reactive      [03-11-23 @ 19:25]     [facility] DIVISION OF KIDNEY DISEASES AND HYPERTENSION -- [phone]  -- INITIAL CONSULT NOTE  --------------------------------------------------------------------------------  HPI:  Pt is a 62-year-old Male with significant PMHx of ESRD on HD TIW (TTS- started In Jan 23), RCC s/p L partial nephrectomy, HTN, CLD, 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 Feels like his AICD fired. Pt missed HD on Wed, 4/19.Nephrology team was consulted for ESRD/HD management.     Pt currently received HD TIW (TTS) at [facility]. Follows with Dr. Mercedes. Last HD done on Mond (4/17/23) via his right tunneled IJ catheter. Pt seen and examined in ER. Pt awake oriented to self. Pt says he still makes urine. States he fell two days ago from his wheelchair and was [facility] yesterday but left AMA waiting in ER for 10 hours and returned to [facility]. This morning he experienced CP and was brought to [facility]. patient does not endorse CP currently, denies SOB, N/V, dizziness or LE swelling.       PAST HISTORY  --------------------------------------------------------------------------------  PAST MEDICAL & SURGICAL HISTORY:  Chronic congestive heart failure, unspecified congestive heart failure type  rEF/pEF      Hep C w/o coma, chronic      HTN (hypertension)      AICD (automatic cardioverter/defibrillator) present  [employer]tr[employer]      Atrial fibrillation      CAD (coronary artery disease)  (s/p 2 stents in Sep 2017)      Hyperlipidemia      Renal cell carcinoma of left kidney  s/p partial nephrectomy in 2002  biopsy again in Sep 2017 showed RCC again in stage 2      COPD (chronic obstructive pulmonary disease)      Bladder cancer      Peripheral neuropathy      Prostate cancer  s/p radiation      Nephrolithiasis      Kidney stone      AICD (automatic cardioverter/defibrillator) present      S/P cholecystectomy  2015      H/O partial nephrectomy  2002      History of percutaneous coronary intervention      H/O transurethral destruction of bladder lesion      History of coronary artery stent placement        FAMILY HISTORY:  Family history of chronic ischemic heart disease    Family history of lung cancer      PAST SOCIAL HISTORY: presently resides at [facility]    [employer] & MEDICATIONS  --------------------------------------------------------------------------------  Allergies    codeine (Unknown)  codeine (Rash)  Haldol (Unknown)  penicillin (Hives; Anaphylaxis)  aspirin (Hives)  Motrin (Rash)  Tylenol (Hives)  Toradol (Rash)    Intolerances      Standing Inpatient Medications  HYDROmorphone  Injectable 1 milliGRAM(s) IV Push Once    PRN Inpatient Medications      REVIEW OF SYSTEMS  --------------------------------------------------------------------------------  Gen: No fevers/chills  Skin: No rashes  Head/Eyes/Ears: Normal hearing,   Respiratory: No dyspnea, cough  CV: No chest pain  GI: No abdominal pain, diarrhea  : No dysuria, hematuria  MSK: quadriplegic    VITALS/PHYSICAL EXAM  --------------------------------------------------------------------------------  T(C): 36.4 (04-20-23 @ 06:21), Max: 36.4 (04-20-23 @ 06:21)  HR: 75 (04-20-23 @ 09:56) (71 - 75)  BP: 106/73 (04-20-23 @ 09:56) (106/73 - 131/61)  RR: 16 (04-20-23 @ 09:56) (16 - 16)  SpO2: 100% (04-20-23 @ 09:56) (100% - 100%)  Wt(kg): --        Physical Exam:  	Gen: NAD; disheveled   	HEENT: MMM  	Pulm: CTA B/L  	CV: S1S2  	Abd: Soft, +BS   	Ext: No LE edema B/L  	Neuro: Awake  	Skin: Warm and dry  	Vascular access: right nontunneled cath    LABS/STUDIES  --------------------------------------------------------------------------------              7.1    9.48  >-----------<  171      [04-20-23 @ 07:33]              22.7     137  |  106  |  63  ----------------------------<  92      [04-20-23 @ 09:50]  5.1   |  19  |  4.92        Ca     8.6     [04-20-23 @ 09:50]      Mg     2.10     [04-20-23 @ 07:33]      Phos  4.1     [04-20-23 @ 07:33]    TPro  6.2  /  Alb  3.2  /  TBili  0.6  /  DBili  x   /  AST  77  /  ALT  58  /  AlkPhos  556  [04-20-23 @ 09:50]    PT/INR: PT 15.6 , INR 1.34       [04-20-23 @ 07:33]  PTT: 39.2       [04-20-23 @ 07:33]      Creatinine Trend:  SCr 4.92 [04-20 @ 09:50]  SCr 5.19 [04-20 @ 07:33]  SCr 4.75 [03-25 @ 09:25]    Urinalysis - [04-20-23 @ 07:57]      Color Light Yellow / Appearance Slightly Turbid / SG 1.014 / pH 7.0      Gluc 500 mg/dL / Ketone Negative  / Bili Negative / Urobili <2 mg/dL       Blood Large / Protein 100 mg/dL / Leuk Est Large / Nitrite Negative      RBC 5-10 / WBC >50 / Hyaline 1 / Gran  / Sq Epi  / Non Sq Epi  / Bacteria Negative      Iron 66, TIBC 206, %sat 32      [03-12-23 @ 06:31]  Ferritin 964      [03-12-23 @ 06:31]  HbA1c 6.2      [01-03-20 @ 13:57]  TSH <0.10      [03-16-23 @ 07:38]    HBsAb <3.0      [03-11-23 @ 19:25]  HBsAg Nonreact      [03-11-23 @ 19:25]  HBcAb Nonreact      [03-11-23 @ 19:25]  HCV 8.43, Reactive      [03-11-23 @ 19:25]

## 2023-04-20 NOTE — PATIENT PROFILE ADULT - FUNCTIONAL ASSESSMENT - BASIC MOBILITY 6.
1-calculated by average/Not able to assess (calculate score using [facility] averaging method)

## 2023-04-20 NOTE — H&P ADULT - ASSESSMENT
61 yo gentlemen with h/o HTN, HLD, 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, seizures, Afib s/p ablation on AC (Eliquis), CAD s/p PCI, cardiomyopathy (EF 45% in 9/22), AICD in 2018, neurogenic bladder with SPC, COPD/BARRY on O2 at night w/o CPAP, hypothyroidism, anemia,  ESRD on HD (M, W,F) via RIJ P/C, RCC s/p left partial nephrotomy, bladder CA s/p TURBT, chronic foot pressure ulcers, wheelchair bound p/w from [place] ([facility]) because he feels like his AICD fired which has been causing chest pain radiating to his left arm associated with blurry vision.     Yesterday patient went to the ED at [facility] after "slipping out of his wheelchair", however left AMA because he was not seen within the timeline that he expected. Jose also requesting psychiatric evaluation b/o possible malingering.

## 2023-04-20 NOTE — PATIENT PROFILE ADULT - NSPROIMPLANTSMEDDEV_GEN_A_NUR
defibrillator, cardiac stents, HD catheter R chest./Automatic Implantable Cardioverter Defibrillator/Vascular stents/Clips

## 2023-04-20 NOTE — CONSULT NOTE ADULT - PROBLEM SELECTOR RECOMMENDATION 9
INCOMPLETE NOTE Pt. with ESRD on HD three times a week (MWF). Pt being admitted for CP from [facility]. Last HD was done on Mon 4/17/23 via RIJ tunneled HD catheter.  Missed iHD on Wed, 4/19. Labs reviewed. Plan for HD today. HD consent verbally obtained from patient as he has bilateral hand contractions. consent placed in chart. HD orders placed. Dose meds as per HD.

## 2023-04-20 NOTE — ED PROCEDURE NOTE - ATTENDING CONTRIBUTION TO CARE
I performed a face-to-face evaluation of the patient and performed a history and physical examination. I agree with the history and physical examination. If this was a PA visit, I personally saw the patient with the PA and performed a substantive portion of the visit including all aspects of the medical decision making.    Noel: I was present during the critical part of the procedure.

## 2023-04-20 NOTE — H&P ADULT - PROBLEM SELECTOR PLAN 4
Continue home meds  Blood pressure will be monitored and if needed medications will be adjusted accordingly

## 2023-04-20 NOTE — PROCEDURE NOTE - ADDITIONAL PROCEDURE DETAILS
Appropriate pacing and sensing. Capture threshold tested via iterative testing. NO events corresponding to this admission. No reprogramming done

## 2023-04-20 NOTE — PATIENT PROFILE ADULT - FALL HARM RISK - HARM RISK INTERVENTIONS

## 2023-04-20 NOTE — PROCEDURE NOTE - INTERROGATION NOTE: UNDERLYING RHYTHM
Cologaurd - stool test or colonoscopy  Please follow up with Gastroentrology [phone]    Lipitor increase 20mg    Restart pioglitazone    Combine jardiance and metformin  = synjardy (empagliflozin-metformin er 12.5-1000) 1 tab twice a day   Sinus rhythm with APCs

## 2023-04-20 NOTE — ED PROVIDER NOTE - PHYSICAL EXAMINATION
GENERAL: disheveled appearing male in no acute distress, non-toxic appearing  HEAD: normocephalic, atraumatic, no hematomas or lacerations noted  HEENT: normal conjunctiva, oral mucosa moist, no JVD  CARDIAC: regular rate and rhythm, normal S1S2, no appreciable murmurs, 2+ pulses in UE b/l  PULM: normal breath sounds, clear to ascultation bilaterally, no rales, rhonchi, wheezing  GI: abdomen nondistended, soft, nontender, no guarding, rebound tenderness  : suprapubic catheter in place, no CVA tenderness b/l, no suprapubic tenderness  NEURO: CN2-12 grossly intact, normal speech, PERRL, EOMI, AAOx3  MSK: no peripheral edema  SKIN: right IJ P/C in place without surrounding erythema, well-perfused, extremities warm, chronic foot pressure ulcers b/l   PSYCH: appropriate mood and affect GENERAL: disheveled appearing male in no acute distress, non-toxic appearing  HEAD: normocephalic, atraumatic, no hematomas or lacerations noted  HEENT: normal conjunctiva, oral mucosa moist, no JVD  CARDIAC: regular rate and rhythm, normal S1S2, no appreciable murmurs, 2+ pulses in UE b/l  PULM: normal breath sounds, clear to ascultation bilaterally, no rales, rhonchi, wheezing  GI: abdomen nondistended, soft, nontender, no guarding, rebound tenderness  : suprapubic catheter in place, no CVA tenderness b/l, no suprapubic tenderness  NEURO: CN2-12 grossly intact, normal speech, PERRL, EOMI, AAOx3  MSK: no hip tenderness, legs equal length neither rotated, no peripheral edema  SKIN: right IJ P/C in place without surrounding erythema, well-perfused, extremities warm, chronic foot pressure ulcers b/l   PSYCH: appropriate mood and affect

## 2023-04-21 NOTE — PROVIDER CONTACT NOTE (OTHER) - ACTION/TREATMENT ORDERED:
As per MD, ruddy to dialyze pt. Pt to be given PO midodrine and HD started. No need for further intervention or assessment required by RN at this [facility]. As per MD, ruddy to dialyze pt. MIKE Kee notified and Pt to be given PO midodrine and HD started. No need for further intervention or assessment required by RN at this [facility].

## 2023-04-21 NOTE — ADVANCED PRACTICE NURSE CONSULT - REASON FOR CONSULT
Attempted to see patient on Wound Care Rounds, patient refusing at current time. Will follow up with patient for wound care recommendations. Please contact Wound/Ostomy Care Service Line if we can be of further assistance (ext [phone]).  Attempted to see patient on Wound Care Rounds, patient refusing assessment at current time. Will follow up with patient for wound care recommendations. Please contact Wound/Ostomy Care Service Line if we can be of further assistance (ext [phone]).  Attempted to see patient on Wound Care Rounds, patient is A&Ox4 and is refusing assessment at current time. Will follow up with patient for wound care recommendations. Please contact Wound/Ostomy Care Service Line if we can be of further assistance (ext [phone]).

## 2023-04-21 NOTE — ADVANCED PRACTICE NURSE CONSULT - ASSESSMENT
General: A&Ox4, able to turn with 2x assistance, bedbound, incontinent of stool. Incontinence care provided during assessment. Patient has suprapubic catheter with yellow urine in bag. Skin warm, dry with increased moisture in intertriginous folds, scattered areas of hyperpigmentation and hypopigmentation, scattered areas of ecchymosis without hematoma on bilateral upper extremities. Generalized dried, serosanguinous scabs noted to b/l lower extremities.     Vascular of b/l lower extremities: Bilateral lower extremities with scattered areas of hyper and hypopigmentation. Hemosiderin staining to b/l lower extremities. Toe deformities noted to b/l feet.  Dry, flaky skin. Thickened-yellow hyperpigmented overgrown toenails. Multiple wounds present. Increased coolness from midfoot to distal toes. +1 Edema. Capillary refill less than 3 seconds. +1 palpable DP/PT pulses. Left lateral plantar with dry, stable callous measuring [id number].     Multiple mixed etiology wounds noted to b/l feet.   Right great toe: Area of eschar measuring 0.8igb8ava1kg.   Right 2nd toe distal: 0.5cmx0.4uzp3pg of dry, stable eschar.   Right 2nd toe proximal: 0.3cmx0.3tia9cr of dry, stable eschar.   Right 5th toe distal: 0.3cmx0.3fyq6lo of dry, stable eschar.   Right 5th toe proximal: 0.3cmx0.1rlm0xn of dry, stable eschar.   Right dorsum of foot: 3bhy0tgg3.2cm. Presenting as 100% moist, red dermis. Periwound with blanchable erythema not extending more than 2cm. No increased warmth, no induration, no edema, no overt signs of infection noted at this time.   Left lateral midfoot: 1cmx1.5cmx0.1cm of dry, pink dermis.   Left 3rd toe: 0.5cmx0.2szy0av of dry, stable eschar.   No increased warmth, no drainage, no erythema, no induration, no edema, no overt signs of infection noted at this time to b/l feet wounds.     Right Heel: Stage 3 pressure injury. Measuring 5cmx5.5cmx0.3cm. Presenting as 90% moist, pink agranular and 10% moist, red granular. Scant serosanguinous drainage, no odor. Periwound hyperpigmentation and calloused. No increased warmth, no fluctuance, no erythema, no induration, no edema, no overt signs of infection noted at this time. Goals of care: monitor for tissue type changes and continue measures to decrease friction/shear/pressure.     Sacrum/bilateral buttocks: Healing pressure injury with moisture/incontinence associated dermatitis. Presenting as maceration extending from gluteal cleft to b/l buttocks, hyper and hypopigmentation, blanchable erythema and skin contractures. Denudation from sacrum to right buttock noted to be 0.4ftr5gcb3.1cm presenting as 100% red, moist dermis; able to be visualized in natural anatomical position. Impaired skin with irregular borders, exposing red-moist dermis. Does not overly marisabel prominences.

## 2023-04-21 NOTE — DISCHARGE NOTE PROVIDER - CARE PROVIDER_API CALL
Silver, Dayron M (DPM)  Foot Surgery; Recon RearfootAnkle Surgery  [address] [place] [address]  Phone: [phone]  Fax: [phone]  Follow Up Time:     Medical Staff at [facility],   Phone: (   )    -  Fax: (   )    -  Follow Up Time:

## 2023-04-21 NOTE — ADVANCED PRACTICE NURSE CONSULT - RECOMMEDATIONS
Recommending Podiatry consult for multiple foot wounds.     Topical recommendations:     Sacrum/bilateral buttocks: Cleanse with skin cleanser, pat dry. Apply Critic-aid moisture barrier cream twice a day and PRN with incontinent episodes.     Left lateral foot, 3rd toe, Right foot toes: Cleanse with NS, pat dry. Apply betadine and allow to dry. Apply daily and PRN if soiled.     Right heel, right dorsum of foot: Cleanse with NS, pat dry. Apply Liquid barrier film to periwound skin (allow to dry). Cover with silicone foam with border. Change daily and PRN if soiled. Monitor for tissue type changes.     Apply Sween 24 Moisturizer to b/l UE and LE daily, avoiding in between the toes.     Continue low air loss bed therapy, continue heel elevation, continue to turn & reposition as per protocol, soft pillow between bony prominences, continue moisture management with barrier creams & single breathable pad, continue measures to decrease friction/shear/pressure. Continue with nutritional support as per dietary/orders.     Plan of care discussed with Primary RN Melvina at bedside and     Please contact Wound/Ostomy Care Service Line if we can be of further assistance (ext [phone]).  Recommending Podiatry consult for multiple foot wounds.     Topical recommendations:     Sacrum/bilateral buttocks: Cleanse with skin cleanser, pat dry. Apply Critic-aid moisture barrier cream twice a day and PRN with incontinent episodes.     Left lateral foot, 3rd toe, Right foot toes: Cleanse with NS, pat dry. Apply betadine and allow to dry. Apply daily and PRN if soiled.     Right heel, right dorsum of foot: Cleanse with NS, pat dry. Apply Liquid barrier film to periwound skin (allow to dry). Cover with silicone foam with border. Change daily and PRN if soiled. Monitor for tissue type changes.     Apply Sween 24 Moisturizer to b/l UE and LE daily, avoiding in between the toes.     Continue low air loss bed therapy, continue heel elevation, continue to turn & reposition as per protocol, soft pillow between bony prominences, continue moisture management with barrier creams & single breathable pad, continue measures to decrease friction/shear/pressure. Continue with nutritional support as per dietary/orders.     Plan of care discussed with Primary KARI Hein at bedside and Roger Clayton.    Please contact Wound/Ostomy Care Service Line if we can be of further assistance (ext [phone]).

## 2023-04-21 NOTE — BH CONSULTATION LIAISON ASSESSMENT NOTE - NSBHATTESTCOMMENTATTENDFT_PSY_A_CORE
Chart reviewed. Patient seen and examined with Dr. Ruano. I agree with her history, MSE, A/P.  In brief, patient describes recent anxiety as well as frustration with chronic functional debility s/p stroke. With that said he denies SI/HI.   His thought process is linear. His attention is wnl. While he can present as david with some of his answers, and he is frustrated with his treatment at various nursing homes, cannot say whether he is malingering to get out of his nursing home experiences. 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    At this point, agree with plan per above.  If there is any concern that he is manipulating his hospital stay in a factitious manner- can place patient on 1:1.  Furthermore, to minimize splitting, would strongly encourage the entire team (ACP, attending, RN, PCA, rn manager, SW) to see the patient as a unit to ensure everyone is on the same page and to answer any questions he has.

## 2023-04-21 NOTE — DISCHARGE NOTE PROVIDER - PROVIDER TOKENS
PROVIDER:[TOKEN:[[id number]]],FREE:[LAST:[Medical Staff at \[facility]\""],PHONE:[(   )    -],FAX:[(   )    -]]

## 2023-04-21 NOTE — PROVIDER CONTACT NOTE (OTHER) - ASSESSMENT
Patient A&Ox4, breathing nonlabored on RA. no s/s of chest pain noted. patient resting in bed. Patient asymptomatic.

## 2023-04-21 NOTE — BH CONSULTATION LIAISON ASSESSMENT NOTE - NSBHATTESTBILLING_PSY_A_CORE
Cimzia Counseling:  I discussed with the patient the risks of Cimzia including but not limited to immunosuppression, allergic reactions and infections.  The patient understands that monitoring is required including a PPD at baseline and must alert us or the primary physician if symptoms of infection or other concerning signs are noted. [id number] diagnostic evaluation with medical services

## 2023-04-21 NOTE — DISCHARGE NOTE PROVIDER - HOSPITAL COURSE
Pt is a 63 yo M with PMH CVA (WC bound, quadriparesis), COPD, CAD (AICD), ESRD (T/R/S), HTN, HLD, seizure d/o, a-fib (s/p ablation, eliquis), neurogenic bladder (SPC), BARRY, hypothyroidism, RCC (s/p partial nephrectomy), CHF, prostate CA (s/p radiation) p/w c/o 2x defib shock from AICD and L CP with radiation to LUE. Also with recent fall from WC, evaluated at OSH but left AMA prior to imaging. Missed HD with last session 4/17. CTH neg, EKG NSR without ischemic changes, renal consulted for HD and EP interrogated AICD without events. Psych following with c/f malingering. Pt with capacity to leave AMA.        Problem/Plan - 1:  ·  Problem: Chest pain.   ·  Plan: Patient is not in acute distress at this time, chest pain subsided  EKG is non ischemic, NSR, possibly poor R wave progression  Elevation in troponin is expected in patient with ESRD on HD  Evaluated by EP, ICD w/o events.   no need for tele  psych consulted for evaluation of malingering, recommending c/w home klonopin  pt with capacity to leave AMA.     Problem/Plan - 2:  ·  Problem: ESRD on dialysis.   ·  Plan: Nephrology on board  Patient to be dialyzed accordingly -- 4/20 and 4/21.     Problem/Plan - 3:  ·  Problem: Anemia.   ·  Plan: Likely anemia of ESRD.     Problem/Plan - 4:  ·  Problem: Hypertension.   ·  Plan: Continue home meds.     Problem/Plan - 5:  ·  Problem: Permanent atrial fibrillation.   ·  Plan: Continue Eliquis  Monitor, no events on AICD.     Problem/Plan - 6:  ·  Problem: CAD (coronary artery disease).   ·  Plan: Continue home meds.     Problem/Plan - 7:  ·  Problem: Advanced COPD.   ·  Plan: Continue home meds  stable, not in acute exacerbation  Duonebs if needed.     Problem/Plan - 8:  ·  Problem: Hypothyroidism.   ·  Plan: Continue levothyroxine   f/u TSH.     Problem/Plan - 9:  ·  Problem: Prophylactic measure.   ·  Plan: therapeutically anticoagulated. Pt is a 61 yo M with PMH CVA (WC bound, quadriparesis), COPD, CAD (AICD), ESRD (T/R/S), HTN, HLD, seizure d/o, a-fib (s/p ablation, eliquis), neurogenic bladder (SPC), BARRY, hypothyroidism, RCC (s/p partial nephrectomy), CHF, prostate CA (s/p radiation) p/w c/o 2x defib shock from AICD and L CP with radiation to LUE. Also with recent fall from WC, evaluated at OSH but left AMA prior to imaging. Missed HD with last session 4/17. CTH neg, EKG NSR without ischemic changes, renal consulted for HD and EP interrogated AICD without events. Psych following with c/f malingering. Pt with capacity to leave AMA.      Problem/Plan - 1:  ·  Problem: Chest pain.   ·  Plan: Patient is not in acute distress at this time, chest pain subsided  EKG is non ischemic, NSR, possibly poor R wave progression  Elevation in troponin is expected in patient with ESRD on HD  Evaluated by EP, ICD w/o events.   no need for tele  psych consulted for evaluation of malingering, recommending c/w home klonopin  pt with capacity to leave AMA.     Problem/Plan - 2:  ·  Problem: ESRD on dialysis.   ·  Plan: Nephrology on board  Patient to be dialyzed accordingly -- 4/20 and 4/21.     Problem/Plan - 3:  ·  Problem: Anemia.   ·  Plan: Likely anemia of ESRD.     Problem/Plan - 4:  ·  Problem: Hypertension.   ·  Plan: Continue home meds.     Problem/Plan - 5:  ·  Problem: Permanent atrial fibrillation.   ·  Plan: Continue Eliquis  Monitor, no events on AICD.     Problem/Plan - 6:  ·  Problem: CAD (coronary artery disease).   ·  Plan: Continue home meds.     Problem/Plan - 7:  ·  Problem: Advanced COPD.   ·  Plan: Continue home meds  stable, not in acute exacerbation  Duonebs if needed.     Problem/Plan - 8:  ·  Problem: Hypothyroidism.   ·  Plan: Continue levothyroxine   f/u TSH.     Problem/Plan - 9:  ·  Problem: Prophylactic measure.   ·  Plan: therapeutically anticoagulated. 62M PMH of CVA (WC bound, quadriparesis), COPD, CAD (AICD), ESRD (T/R/S), HTN, HLD, seizure d/o, a-fib (s/p ablation, eliquis), neurogenic bladder (SPC), BARRY, hypothyroidism, RCC (s/p partial nephrectomy), CHF, prostate CA (s/p radiation) p/w c/o 2x defib shock from AICD and L CP with radiation to LUE. Also with recent fall from , evaluated at OSH but left AMA prior to imaging. Missed HD with last session 4/17. CTH neg, EKG NSR without ischemic changes, renal consulted for HD and EP interrogated AICD without events. Psych consulted for c/f malingering, recommends to continue home klonopin. Patient with Hgb of 6.7 on 4/24, s/p 1U PRBC 4/24 during dialysis with appropriate response to 8.8. Patient is medically stable and optimized for discharge.

## 2023-04-21 NOTE — BH CONSULTATION LIAISON ASSESSMENT NOTE - NSBHCONSULTFOLLOWAFTERCARE_PSY_A_CORE FT
[facility]  [address] [place] [id number] [phone]

## 2023-04-21 NOTE — BH CONSULTATION LIAISON ASSESSMENT NOTE - NSBHCHARTREVIEWVS_PSY_A_CORE FT
Vital Signs Last 24 Hrs  T(C): 37.1 (21 Apr 2023 11:24), Max: 37.1 (20 Apr 2023 16:27)  T(F): 98.7 (21 Apr 2023 11:24), Max: 98.8 (20 Apr 2023 16:27)  HR: 75 (21 Apr 2023 11:24) (75 - 99)  BP: 107/67 (21 Apr 2023 11:24) (107/67 - 130/72)  BP(mean): --  RR: 16 (21 Apr 2023 11:24) (12 - 18)  SpO2: 100% (21 Apr 2023 11:24) (99% - 100%)    Parameters below as of 21 Apr 2023 11:24  Patient On (Oxygen Delivery Method): room air

## 2023-04-21 NOTE — PROGRESS NOTE ADULT - SUBJECTIVE AND OBJECTIVE BOX
[facility] Division of Kidney Diseases & Hypertension  FOLLOW UP NOTE  [phone]--------------------------------------------------------------------------------    Chief Complaint: ESRD MWF    Pt is a 62-year-old Male with significant PMHx of ESRD on HD TIW (TTS- started In Jan 23), RCC s/p L partial nephrectomy, HTN, CLD, CVA (4/21) with quadriparesis, neurogenic bladder, Hx of seizures and Afib who was transferred from [facility] to [facility] on 4/20/23 for left sided CP. Feels like his AICD fired. Pt missed HD on Wed, 4/19. Last HD done was 4/17/23. Nephrology team was consulted for ESRD/HD management.     24 hour events/subjective: Patient seen this morning. Received HD last night. Will plan for maintenance HD today. Patient does not endorse CP currently, denies SOB, N/V, dizziness or LE swelling.      PAST HISTORY  --------------------------------------------------------------------------------  No significant changes to PMH, PSH, FHx, SHx, unless otherwise noted    ALLERGIES & MEDICATIONS  --------------------------------------------------------------------------------  Allergies    codeine (Unknown)  codeine (Rash)  Haldol (Unknown)  penicillin (Hives; Anaphylaxis)  aspirin (Hives)  Motrin (Rash)  Tylenol (Hives)  Toradol (Rash)    Intolerances      Standing Inpatient Medications  apixaban 2.5 milliGRAM(s) Oral two times a day  ascorbic acid 500 milliGRAM(s) Oral daily  atorvastatin 40 milliGRAM(s) Oral at bedtime  baclofen 10 milliGRAM(s) Oral every 12 hours  chlorhexidine 2% Cloths 1 Application(s) Topical daily  clonazePAM  Tablet 0.5 milliGRAM(s) Oral two times a day  coronavirus bivalent (EUA) Booster Vaccine (PFIZER) 0.3 milliLiter(s) IntraMuscular once  diphenhydrAMINE Elixir 50 milliGRAM(s) Oral <User Schedule>  isosorbide   mononitrate ER Tablet (IMDUR) 30 milliGRAM(s) Oral daily  lactobacillus acidophilus 1 Tablet(s) Oral three times a day with meals  levETIRAcetam 250 milliGRAM(s) Oral <User Schedule>  levETIRAcetam 500 milliGRAM(s) Oral daily  levothyroxine 100 MICROGram(s) Oral daily  metoprolol succinate ER 50 milliGRAM(s) Oral daily  multivitamin 1 Tablet(s) Oral daily  mupirocin 2% Ointment 1 Application(s) Topical two times a day  OLANZapine 2.5 milliGRAM(s) Oral daily  polyethylene glycol 3350 17 Gram(s) Oral daily  senna 2 Tablet(s) Oral at bedtime  sevelamer carbonate 800 milliGRAM(s) Oral three times a day with meals  tiotropium 2.5 MICROgram(s) Inhaler 2 Puff(s) Inhalation daily    PRN Inpatient Medications  bisacodyl Suppository 10 milliGRAM(s) Rectal daily PRN  melatonin 3 milliGRAM(s) Oral at bedtime PRN  ondansetron    Tablet 4 milliGRAM(s) Oral every 8 hours PRN  sodium chloride 0.9% Bolus 100 milliLiter(s) IV Bolus once PRN      REVIEW OF SYSTEMS  --------------------------------------------------------------------------------  Gen: No fevers/chills  Skin: No rashes  Head/Eyes/Ears: Normal hearing,   Respiratory: No dyspnea, cough  CV: No chest pain  GI: No abdominal pain, diarrhea  : No dysuria, hematuria  MSK: quadriplegic    VITALS/PHYSICAL EXAM  --------------------------------------------------------------------------------  T(C): 37.1 (04-21-23 @ 11:24), Max: 37.1 (04-20-23 @ 16:27)  HR: 75 (04-21-23 @ 11:24) (75 - 99)  BP: 107/67 (04-21-23 @ 11:24) (107/67 - 130/72)  RR: 16 (04-21-23 @ 11:24) (12 - 18)  SpO2: 100% (04-21-23 @ 11:24) (99% - 100%)  Wt(kg): --  Height (cm): 180.3 (04-20-23 @ 16:50)  Weight (kg): 81.647 (04-20-23 @ 16:50)  BMI (kg/m2): 25.1 (04-20-23 @ 16:50)  BSA (m2): 2.02 (04-20-23 @ 16:50)      04-20-23 @ 07:01  -  04-21-23 @ 07:00  --------------------------------------------------------  IN: 500 mL / OUT: 4200 mL / NET: -3700 mL      Physical Exam:  	Gen: NAD; disheveled   	HEENT: MMM  	Pulm: CTA B/L  	CV: S1S2  	Abd: Soft, +BS   	Ext: No LE edema B/L  	Neuro: Awake  	Skin: Warm and dry  	Vascular access: right nontunneled cath    LABS/STUDIES  --------------------------------------------------------------------------------              7.1    9.48  >-----------<  171      [04-20-23 @ 07:33]              22.7     137  |  106  |  63  ----------------------------<  92      [04-20-23 @ 09:50]  5.1   |  19  |  4.92        Ca     8.6     [04-20-23 @ 09:50]      Mg     2.10     [04-20-23 @ 07:33]      Phos  4.1     [04-20-23 @ 07:33]    TPro  6.2  /  Alb  3.2  /  TBili  0.6  /  DBili  x   /  AST  77  /  ALT  58  /  AlkPhos  556  [04-20-23 @ 09:50]    PT/INR: PT 15.6 , INR 1.34       [04-20-23 @ 07:33]  PTT: 39.2       [04-20-23 @ 07:33]      Creatinine Trend:  SCr 4.92 [04-20 @ 09:50]  SCr 5.19 [04-20 @ 07:33]  SCr 4.75 [03-25 @ 09:25]    Urinalysis - [04-20-23 @ 07:57]      Color Light Yellow / Appearance Slightly Turbid / SG 1.014 / pH 7.0      Gluc 500 mg/dL / Ketone Negative  / Bili Negative / Urobili <2 mg/dL       Blood Large / Protein 100 mg/dL / Leuk Est Large / Nitrite Negative      RBC 5-10 / WBC >50 / Hyaline 1 / Gran  / Sq Epi  / Non Sq Epi  / Bacteria Negative           [facility] Division of Kidney Diseases & Hypertension  FOLLOW UP NOTE  [phone]--------------------------------------------------------------------------------    Chief Complaint: ESRD MWF    Pt is a 62-year-old Male with significant PMHx of ESRD on HD TIW (TTS- started In Jan 23), RCC s/p L partial nephrectomy, HTN, CLD, CVA (4/21) with quadriparesis, neurogenic bladder, Hx of seizures and Afib who was transferred from [facility] to [facility] on 4/20/23 for left sided CP. Feels like his AICD fired. Pt missed HD on Wed, 4/19. Last HD done was 4/17/23. Nephrology team was consulted for ESRD/HD management.     24 hour events/subjective: Patient seen this morning. Received HD last night. Will plan for maintenance HD today. Patient does not endorse CP currently, denies SOB, N/V, dizziness or LE swelling.    PAST HISTORY  --------------------------------------------------------------------------------  No significant changes to PMH, PSH, FHx, SHx, unless otherwise noted    ALLERGIES & MEDICATIONS  --------------------------------------------------------------------------------  Allergies    codeine (Unknown)  codeine (Rash)  Haldol (Unknown)  penicillin (Hives; Anaphylaxis)  aspirin (Hives)  Motrin (Rash)  Tylenol (Hives)  Toradol (Rash)    Intolerances      Standing Inpatient Medications  apixaban 2.5 milliGRAM(s) Oral two times a day  ascorbic acid 500 milliGRAM(s) Oral daily  atorvastatin 40 milliGRAM(s) Oral at bedtime  baclofen 10 milliGRAM(s) Oral every 12 hours  chlorhexidine 2% Cloths 1 Application(s) Topical daily  clonazePAM  Tablet 0.5 milliGRAM(s) Oral two times a day  coronavirus bivalent (EUA) Booster Vaccine (PFIZER) 0.3 milliLiter(s) IntraMuscular once  diphenhydrAMINE Elixir 50 milliGRAM(s) Oral <User Schedule>  isosorbide   mononitrate ER Tablet (IMDUR) 30 milliGRAM(s) Oral daily  lactobacillus acidophilus 1 Tablet(s) Oral three times a day with meals  levETIRAcetam 250 milliGRAM(s) Oral <User Schedule>  levETIRAcetam 500 milliGRAM(s) Oral daily  levothyroxine 100 MICROGram(s) Oral daily  metoprolol succinate ER 50 milliGRAM(s) Oral daily  multivitamin 1 Tablet(s) Oral daily  mupirocin 2% Ointment 1 Application(s) Topical two times a day  OLANZapine 2.5 milliGRAM(s) Oral daily  polyethylene glycol 3350 17 Gram(s) Oral daily  senna 2 Tablet(s) Oral at bedtime  sevelamer carbonate 800 milliGRAM(s) Oral three times a day with meals  tiotropium 2.5 MICROgram(s) Inhaler 2 Puff(s) Inhalation daily    PRN Inpatient Medications  bisacodyl Suppository 10 milliGRAM(s) Rectal daily PRN  melatonin 3 milliGRAM(s) Oral at bedtime PRN  ondansetron    Tablet 4 milliGRAM(s) Oral every 8 hours PRN  sodium chloride 0.9% Bolus 100 milliLiter(s) IV Bolus once PRN      REVIEW OF SYSTEMS  --------------------------------------------------------------------------------  Gen: No fevers/chills  Skin: No rashes  Head/Eyes/Ears: Normal hearing,   Respiratory: No dyspnea, cough  CV: No chest pain  GI: No abdominal pain, diarrhea  : suprapubic catheter  MSK: quadriplegic    VITALS/PHYSICAL EXAM  --------------------------------------------------------------------------------  T(C): 37.1 (04-21-23 @ 11:24), Max: 37.1 (04-20-23 @ 16:27)  HR: 75 (04-21-23 @ 11:24) (75 - 99)  BP: 107/67 (04-21-23 @ 11:24) (107/67 - 130/72)  RR: 16 (04-21-23 @ 11:24) (12 - 18)  SpO2: 100% (04-21-23 @ 11:24) (99% - 100%)  Wt(kg): --  Height (cm): 180.3 (04-20-23 @ 16:50)  Weight (kg): 81.647 (04-20-23 @ 16:50)  BMI (kg/m2): 25.1 (04-20-23 @ 16:50)  BSA (m2): 2.02 (04-20-23 @ 16:50)      04-20-23 @ 07:01  -  04-21-23 @ 07:00  --------------------------------------------------------  IN: 500 mL / OUT: 4200 mL / NET: -3700 mL      Physical Exam:  	Gen: NAD; disheveled   	HEENT: MMM  	Pulm: CTA B/L  	CV: S1S2  	Abd: Soft, +BS   	Ext: No LE edema B/L  	Neuro: Awake  	Skin: Warm and dry  	Vascular access: right non-tunneled cath    LABS/STUDIES  --------------------------------------------------------------------------------              7.1    9.48  >-----------<  171      [04-20-23 @ 07:33]              22.7     137  |  106  |  63  ----------------------------<  92      [04-20-23 @ 09:50]  5.1   |  19  |  4.92        Ca     8.6     [04-20-23 @ 09:50]      Mg     2.10     [04-20-23 @ 07:33]      Phos  4.1     [04-20-23 @ 07:33]    TPro  6.2  /  Alb  3.2  /  TBili  0.6  /  DBili  x   /  AST  77  /  ALT  58  /  AlkPhos  556  [04-20-23 @ 09:50]    PT/INR: PT 15.6 , INR 1.34       [04-20-23 @ 07:33]  PTT: 39.2       [04-20-23 @ 07:33]      Creatinine Trend:  SCr 4.92 [04-20 @ 09:50]  SCr 5.19 [04-20 @ 07:33]  SCr 4.75 [03-25 @ 09:25]    Urinalysis - [04-20-23 @ 07:57]      Color Light Yellow / Appearance Slightly Turbid / SG 1.014 / pH 7.0      Gluc 500 mg/dL / Ketone Negative  / Bili Negative / Urobili <2 mg/dL       Blood Large / Protein 100 mg/dL / Leuk Est Large / Nitrite Negative      RBC 5-10 / WBC >50 / Hyaline 1 / Gran  / Sq Epi  / Non Sq Epi  / Bacteria Negative

## 2023-04-21 NOTE — PROVIDER CONTACT NOTE (OTHER) - ASSESSMENT
Patient A&Ox4, breathing nonlabored on RA. no s/s of chest pain noted. Patient acting aggressive and is verbally aggressive to primary RN - refusing all assessment.

## 2023-04-21 NOTE — BH CONSULTATION LIAISON ASSESSMENT NOTE - NSBHCHARTREVIEWLAB_PSY_A_CORE FT
7.1    9.48  )-----------( 171      ( 20 Apr 2023 07:33 )             22.7     04-20    137  |  106  |  63<H>  ----------------------------<  92  5.1   |  19<L>  |  4.92<H>    Ca    8.6      20 Apr 2023 09:50  Phos  4.1     04-20  Mg     2.10     04-20    TPro  6.2  /  Alb  3.2<L>  /  TBili  0.6  /  DBili  x   /  AST  77<H>  /  ALT  58<H>  /  AlkPhos  556<H>  04-20

## 2023-04-21 NOTE — BH CONSULTATION LIAISON ASSESSMENT NOTE - CURRENT MEDICATION
MEDICATIONS  (STANDING):  apixaban 2.5 milliGRAM(s) Oral two times a day  ascorbic acid 500 milliGRAM(s) Oral daily  atorvastatin 40 milliGRAM(s) Oral at bedtime  baclofen 10 milliGRAM(s) Oral every 12 hours  chlorhexidine 2% Cloths 1 Application(s) Topical daily  clonazePAM  Tablet 0.5 milliGRAM(s) Oral two times a day  coronavirus bivalent (EUA) Booster Vaccine (PFIZER) 0.3 milliLiter(s) IntraMuscular once  diphenhydrAMINE Elixir 50 milliGRAM(s) Oral <User Schedule>  isosorbide   mononitrate ER Tablet (IMDUR) 30 milliGRAM(s) Oral daily  lactobacillus acidophilus 1 Tablet(s) Oral three times a day with meals  levETIRAcetam 250 milliGRAM(s) Oral <User Schedule>  levETIRAcetam 500 milliGRAM(s) Oral daily  levothyroxine 100 MICROGram(s) Oral daily  metoprolol succinate ER 50 milliGRAM(s) Oral daily  multivitamin 1 Tablet(s) Oral daily  mupirocin 2% Ointment 1 Application(s) Topical two times a day  OLANZapine 2.5 milliGRAM(s) Oral daily  polyethylene glycol 3350 17 Gram(s) Oral daily  senna 2 Tablet(s) Oral at bedtime  sevelamer carbonate 800 milliGRAM(s) Oral three times a day with meals  tiotropium 2.5 MICROgram(s) Inhaler 2 Puff(s) Inhalation daily    MEDICATIONS  (PRN):  bisacodyl Suppository 10 milliGRAM(s) Rectal daily PRN Constipation  melatonin 3 milliGRAM(s) Oral at bedtime PRN Insomnia  ondansetron    Tablet 4 milliGRAM(s) Oral every 8 hours PRN for nausea  sodium chloride 0.9% Bolus 100 milliLiter(s) IV Bolus once PRN hypotension

## 2023-04-21 NOTE — BH CONSULTATION LIAISON ASSESSMENT NOTE - HPI (INCLUDE ILLNESS QUALITY, SEVERITY, DURATION, TIMING, CONTEXT, MODIFYING FACTORS, ASSOCIATED SIGNS AND SYMPTOMS)
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, seizures, Afib s/p ablation on AC (Eliquis), CAD s/p PCI, cardiomyopathy (EF 45% in 9/22), AICD in 2018, neurogenic bladder with SPC, COPD/BARRY on O2 at night w/o CPAP, hypothyroidism, anemia,  ESRD on HD (M, W,F) via RIJ P/C, RCC s/p left partial nephrotomy, bladder CA s/p TURBT, chronic foot pressure ulcers, wheelchair bound p/w from [place] ([facility]), PPH of PTSD, no psych hospitalization, previous in rehab for alcohol use disorder, no current substance use, hx of cigarette smoking, no outpatient psychiatrist, remote hx of therapy for PTSD no current, BIBEMS due to feeling his AICD fired which has been causing chest pain radiating to his left arm associated with blurry vision. Refused dialysis yesterday. Psychiatry consulted to r/o malingering.    Patient was seen at bedside on the phone. He was initially guarded, stating he was asking for psychiatry since yesterday. Said he has been feeling anxious and felt he may have been experiencing a panic attack in which he feels very panicky. He has been experiencing it about 2x/week with no specific triggers. He has some depressed mood but no suicidal thoughts. He states feeling mostly frustrated about experience from nursing homes and describes going to multiple with same poor experience. States he had stroke in 2021 and prior was fully self- sufficient and now is dependent. When asked about the interrogation of AICD, patient states he feels glad there was no issues and thinks it may be coming from nerves. He states the Ativan has been helpful but wants to be mindful of not using it too much. He mentioned not being aware of being on Klonopin at nursing home but is agreeable to continuing it for anxiety. Declined any antidepressant.     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 States afterwards, developed "consciousness" and became aware of traumas during serving. States he had develop alcohol use disorder for about 6 months, drinking 2 pints of Narinder John every day but then went into detox facility and rehab. He denies any relapse. He also saw therapist during that time.     He denies any SI, HI, AVH, or paranoia.

## 2023-04-21 NOTE — DISCHARGE NOTE PROVIDER - NSDCCPCAREPLAN_GEN_ALL_CORE_FT
PRINCIPAL DISCHARGE DIAGNOSIS  Diagnosis: Chest pain  Assessment and Plan of Treatment: You came to the hospital with chest pain and a missed dialysis setting. Your EKG was normal and did not show signs of heart attack. You received dialysis and your symptoms improved. It is also possible some of your symptoms were related to anxiety, for which psych evaluated you and recommended continuing your home klonopin as prescribed.      SECONDARY DISCHARGE DIAGNOSES  Diagnosis: ESRD needing dialysis  Assessment and Plan of Treatment: While in the hospital you received dialysis 4/20 and 4/21. Please continue to follow your regularly scheduled dialysis.     PRINCIPAL DISCHARGE DIAGNOSIS  Diagnosis: Chest pain  Assessment and Plan of Treatment: You came to the hospital with chest pain and a missed dialysis setting. Your EKG was normal and did not show signs of heart attack. You received dialysis and your symptoms improved. It is also possible some of your symptoms were related to anxiety, for which psych evaluated you and recommended continuing your home klonopin as prescribed.      SECONDARY DISCHARGE DIAGNOSES  Diagnosis: ESRD needing dialysis  Assessment and Plan of Treatment: While in the hospital you received dialysis. Please continue to follow your regularly scheduled dialysis.     PRINCIPAL DISCHARGE DIAGNOSIS  Diagnosis: Chest pain  Assessment and Plan of Treatment: You came to the hospital with chest pain and a missed dialysis session. Your EKG was normal and did not show signs of heart attack. We also performed an evaluation of your cardiac device and no arrhythmias or cardiac events were recorded. You received dialysis and your symptoms improved. It is also possible some of your symptoms were related to anxiety, for which psych evaluated you and recommended continuing your home klonopin as prescribed. Please continue with your regular dialysis sessions as scheduled.      SECONDARY DISCHARGE DIAGNOSES  Diagnosis: ESRD needing dialysis  Assessment and Plan of Treatment: While in the hospital you received dialysis. Please continue to follow your regularly scheduled dialysis. Due to your kidney disease, your blood counts also tend to be lower than normal. You were given a blood transfusion in the hospital to help improve your blood counts. Please follow up with your kidney specialist for further monitoring.

## 2023-04-21 NOTE — DISCHARGE NOTE PROVIDER - NSDCFUADDINST_GEN_ALL_CORE_FT
Topical Wound Care Recommendations:   - Sacrum/bilateral buttocks: Cleanse with skin cleanser, pat dry. Apply Critic-aid moisture barrier cream twice a day and PRN with incontinent episodes.   - Apply Sween 24 Moisturizer to b/l UE and LE daily, avoiding in between the toes.   - Continue low air loss bed therapy, continue heel elevation, continue to turn & reposition as per protocol, soft pillow between bony prominences, continue moisture management with barrier creams & single breathable pad, continue measures to decrease friction/shear/pressure. Continue with nutritional support.

## 2023-04-21 NOTE — ADVANCED PRACTICE NURSE CONSULT - REASON FOR CONSULT
Patient seen on skin care rounds after wound care referral received for assessment of skin impairment and recommendations of topical management. 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 Chart reviewed: WBC 9.48, H/H 7.1/22.7 platelets 171, INR 1.34, Serum albumin 3.2, BMI 25.1, [place] 14.

## 2023-04-21 NOTE — DISCHARGE NOTE PROVIDER - ATTENDING DISCHARGE PHYSICAL EXAMINATION:
Patient seen and examined at bedside on day of discharge (4/27/23). Patient appearing comfortable, watching TV. Reports that he feels fine. Eager to leave the hospital. Patient denies any acute complaints. VSS. Exam unremarkable. No recent labs as patient refusing blood draws. Most recent labs showing stable Hgb and improvement after most recent 1U PRBC transfusion on 4/24. Overall patient admitted for chest pain and reported defibrillator shock. Cardiac workup and device interrogation negative. Patient otherwise HD stable and medically optimized for return to his LTC facility with continued maintenance HD sessions.

## 2023-04-21 NOTE — PROVIDER CONTACT NOTE (OTHER) - ASSESSMENT
Pt's BP 95/66 from right upper arm. Pt denies any pain, lightheaded or dizziness. No s/sx of acute distress noted currently. Pt's BP 95/66 from right upper arm. Pt denies any pain, lightheaded or dizziness. No s/sx of acute distress noted currently. MD Gonzalez notified and as per MD, okay to dialyze pt and if SBP <90 okay to end HD session.

## 2023-04-21 NOTE — BH CONSULTATION LIAISON ASSESSMENT NOTE - SUMMARY
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, seizures, Afib s/p ablation on AC (Eliquis), CAD s/p PCI, cardiomyopathy (EF 45% in 9/22), AICD in 2018, neurogenic bladder with SPC, COPD/BARRY on O2 at night w/o CPAP, hypothyroidism, anemia,  ESRD on HD (M, W,F) via RIJ P/C, RCC s/p left partial nephrotomy, bladder CA s/p TURBT, chronic foot pressure ulcers, wheelchair bound p/w from [place] ([facility]), PPH of PTSD, no psych hospitalization, previous in rehab for alcohol use disorder, no current substance use, hx of cigarette smoking, no outpatient psychiatrist, remote hx of therapy for PTSD no current, BIBEMS due to feeling his AICD fired which has been causing chest pain radiating to his left arm associated with blurry vision. Refused dialysis yesterday. Psychiatry consulted to r/o malingering.    On assessment, patient was initially guarded but was cooperative and forthcoming. Patient endorses feelings of anxiety with no specific trigger and some feels of depressed mood given his circumstances (mentioning multiple times being independent and feelings he gets inadequate care from nursing home) but otherwise functioning at his baseline. He denies any SI, HI or psychotic symptoms. Per chart and team, patient does have some personality traits such as splitting, learning helplessness, affective dysregulation. Most likely patient with complex PTSD. Discussed with patient and he does not want to start antidepressant at this time. He is agreeable to continuing with Klonopin 0.5 mg BID and Ativan 1 mg QD PRN for anxiety.      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), PPH of PTSD, no psych hospitalization, previous in rehab for alcohol use disorder, no current substance use, hx of cigarette smoking, no outpatient psychiatrist, remote hx of therapy for PTSD no current, BIBEMS due to feeling his AICD fired which has been causing chest pain radiating to his left arm associated with blurry vision. Refused dialysis yesterday. Psychiatry consulted to r/o malingering.    On assessment, patient was initially guarded but was cooperative and forthcoming. Patient endorses feelings of anxiety with no specific trigger and some feels of depressed mood given his circumstances (mentioning multiple times being independent and feelings he gets inadequate care from nursing home) but otherwise functioning at his baseline. He denies any SI, HI or psychotic symptoms. Per chart and team, patient does have some personality traits such as splitting, learning helplessness, affective dysregulation. Most likely patient with complex PTSD. Discussed with patient and he does not want to start antidepressant at this time. He is agreeable to continuing with Klonopin 0.5 mg BID and Ativan 1 mg QD PRN for anxiety.      RECOMMENDATIONS:  - Routine Obs, no safety concerns- if team is concerned that there may be secondary gain, can place patient on 1:1  - Furthermore, to minimize splitting, would strongly encourage the entire team (ACP, attending, RN, PCA, rn manager, SW) to see the patient as a unit to ensure everyone is on the same page and to answer any questions he has.  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, seizures, Afib s/p ablation on AC (Eliquis), CAD s/p PCI, cardiomyopathy (EF 45% in 9/22), AICD in 2018, neurogenic bladder with SPC, COPD/BARRY on O2 at night w/o CPAP, hypothyroidism, anemia,  ESRD on HD (M, W,F) via RIJ P/C, RCC s/p left partial nephrotomy, bladder CA s/p TURBT, chronic foot pressure ulcers, wheelchair bound p/w from [place] ([facility]), PPH of PTSD, no psych hospitalization, previous in rehab for alcohol use disorder, no current substance use, hx of cigarette smoking, no outpatient psychiatrist, remote hx of therapy for PTSD no current, BIBEMS due to feeling his AICD fired which has been causing chest pain radiating to his left arm associated with blurry vision. Refused dialysis yesterday. Psychiatry consulted to r/o malingering.    On assessment, patient was initially guarded but was cooperative and forthcoming. Patient endorses feelings of anxiety with no specific trigger and some feels of depressed mood given his circumstances (mentioning multiple times being independent and feelings he gets inadequate care from nursing home) but otherwise functioning at his baseline. He denies any SI, HI or psychotic symptoms. Per chart and team, patient does have some personality traits such as splitting, learning helplessness, affective dysregulation. Most likely patient with complex PTSD. Discussed with patient and he does not want to start antidepressant at this time. He is agreeable to continuing with Klonopin 0.5 mg BID and Ativan 1 mg QD PRN for anxiety.      RECOMMENDATIONS:  - Routine Obs, no safety concerns- if team is concerned that there may be secondary gain, can place patient on 1:1  - Furthermore, to minimize splitting, would strongly encourage the entire team (ACP, attending, RN, PCA, RN manager, SW) to see the patient as a unit to ensure everyone is on the same page and to answer any questions he has.  - Continue Klonopin 0.5 mg BID for anxiety  - Stop zyprexa- unclear why this medication was initiated. was not on med list when seen by our team 1 month ago  - PRN: Ativan 1 mg QD PO/IV/IM PRN for anxiety   - SW consult to work with patient regarding disposition

## 2023-04-21 NOTE — DISCHARGE NOTE PROVIDER - NSDCFUADDAPPT_GEN_ALL_CORE_FT
Podiatry Discharge Instructions:  Follow up: Please follow up with Dr. Dayron Lira within 1 week of discharge from the hospital, please call [phone] for appointment and discuss that you recently were seen in the hospital.  Wound Care: Please apply mupirocin to right heel wound and betadine to the scabs on both feet daily followed by 4x4 gauze and kerlix.   Weight bearing: Please wear BL z-flows at all times while in bed.  Antibiotics: Please continue as instructed. Podiatry Discharge Instructions:  Follow up: Please follow up with Dr. Dayron Lira within 1 week of discharge from the hospital, please call [phone] for appointment and discuss that you recently were seen in the hospital.  Wound Care: Please apply mupirocin to right heel wound and betadine to the scabs on both feet daily followed by 4x4 gauze and kerlix.   Weight bearing: Please wear BL z-flows at all times while in bed.

## 2023-04-21 NOTE — BH CONSULTATION LIAISON ASSESSMENT NOTE - RISK ASSESSMENT
Acute: irritability, anxiety   Chronic: Functional impairment, chronic medication conditions, trauma, hx of PTSD, hx of AUD   Protective: no current SI, no psychotic symptoms, domiciled, supportive friends, identifies reason to live.

## 2023-04-21 NOTE — CONSULT NOTE ADULT - SUBJECTIVE AND OBJECTIVE BOX
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 Patient missed his dialysis yesterday after slipping out of his wheelchair and subsequently going to [facility] for further care, however apparently signed out AMA because it took too long to be seen?.     PAST MEDICAL & SURGICAL HISTORY:  Chronic congestive heart failure, unspecified congestive heart failure type  HFrEF/pEF  Hep C w/o coma, chronic  HTN (hypertension)  AICD (automatic cardioverter/defibrillator) present [employer]  Atrial fibrillation  CAD (coronary artery disease) (s/p 2 stents in Sep 2017)  Hyperlipidemia  Renal cell carcinoma of left kidney  s/p partial nephrectomy in   biopsy again in Sep 2017 showed RCC again in stage 2  COPD (chronic obstructive pulmonary disease)  Bladder cancer  Peripheral neuropathy  Prostate cancer  s/p radiation  Nephrolithiasis  Kidney stone  AICD (automatic cardioverter/defibrillator) present  S/P cholecystectomy   H/O partial [id number]  History of percutaneous coronary intervention  H/O transurethral destruction of bladder lesion  History of coronary artery stent placement          Review of Systems:   CONSTITUTIONAL: No fever, weight loss, or fatigue  EYES: No eye pain, visual disturbances,   ENMT:  No difficulty hearing, tinnitus, vertigo;   NECK: No pain or stiffness  RESPIRATORY: No cough, wheezing, chills or hemoptysis; No shortness of breath  CARDIOVASCULAR: as per hpi  GASTROINTESTINAL: No abdominal or epigastric pain. No nausea, vomiting, or hematemesis; No diarrhea or constipation. No melena or hematochezia.  GENITOURINARY: on HD  NEUROLOGICAL: No headaches, memory loss, loss of strength, numbness, or tremors  SKIN: b/l LE wounds,   LYMPH NODES: No enlarged glands  ENDOCRINE: No heat or cold intolerance; No hair loss  MUSCULOSKELETAL: wheelchair bound  PSYCHIATRIC: No depression, anxiety, mood swings, or difficulty sleeping  HEME/LYMPH: No easy bruising, or bleeding gums  ALLERGY AND IMMUNOLOGIC: No hives or eczema    Allergies    codeine (Unknown)  codeine (Rash)  Haldol (Unknown)  penicillin (Hives; Anaphylaxis)  aspirin (Hives)  Motrin (Rash)  Tylenol (Hives)  Toradol (Rash)    Intolerances        Social History:     FAMILY HISTORY:  Family history of chronic ischemic heart disease    Family history of lung cancer        MEDICATIONS  (STANDING):  HYDROmorphone  Injectable 1 milliGRAM(s) IV Push Once    MEDICATIONS  (PRN):      T(C): 36.4 (23 @ 06:21), Max: 36.4 (23 @ 06:21)  HR: 75 (23 @ 09:56) (71 - 75)  BP: 106/73 (23 @ 09:56) (106/73 - 131/61)  RR: 16 (23 @ 09:56) (16 - 16)  SpO2: 100% (23 @ 09:56) (100% - 100%)    CAPILLARY BLOOD GLUCOSE      POCT Blood Glucose.: 66 mg/dL (2023 06:22)    I&O's Summary      PHYSICAL EXAM:    GENERAL: NAD, resting in bed  CHEST/LUNG: Clear to auscultation bilaterally anteriorly; No wheeze  HEART: Regular rate and rhythm; No murmurs, rubs, or gallops  ABDOMEN: Soft, Nontender, Nondistended; Bowel sounds present  EXTREMITIES: LLE with hyperpigmentation; limited movement of hands bilaterally, fingers somewhat contracted  PSYCH: calm, interactive --> became angry about IV pain meds  NEUROLOGY: moving all ext  SKIN: B/L heel wounds, serous fluid no purulence, permacath      LABS:                        7.1    9.48  )-----------( 171      ( 2023 07:33 )             22.7     04-20    137  |  106  |  63<H>  ----------------------------<  92  5.1   |  19<L>  |  4.92<H>    Ca    8.6      2023 09:50  Phos  4.1     -20  Mg     2.10     -20    TPro  6.2  /  Alb  3.2<L>  /  TBili  0.6  /  DBili  x   /  AST  77<H>  /  ALT  58<H>  /  AlkPhos  556<H>  -20    PT/INR - ( 2023 07:33 )   PT: 15.6 sec;   INR: 1.34 ratio         PTT - ( 2023 07:33 )  PTT:39.2 sec      Urinalysis Basic - ( 2023 07:57 )    Color: Light Yellow / Appearance: Slightly Turbid / S.014 / pH: x  Gluc: x / Ketone: Negative  / Bili: Negative / Urobili: <2 mg/dL   Blood: x / Protein: 100 mg/dL / Nitrite: Negative   Leuk Esterase: Large / RBC: 5-10 /HPF / WBC >50 /HPF   Sq Epi: x / Non Sq Epi: x / Bacteria: Negative          RADIOLOGY & ADDITIONAL TESTS:    ECG Personally Reviewed -     Imaging Personally Reviewed:    Consultant(s) Notes Reviewed:      Care Discussed with Consultants/Other Providers: D/W psychiatry team, follow up consult recommendations. D/W nephrology, Dr. Mcfadden for dialysis today.    (2023 12:40)      PAST MEDICAL & SURGICAL HISTORY:  Chronic congestive heart failure, unspecified congestive heart failure type  rEF/pEF      Hep C w/o coma, chronic      HTN (hypertension)      AICD (automatic cardioverter/defibrillator) present  [employer]      Atrial fibrillation      CAD (coronary artery disease)  (s/p 2 stents in Sep 2017)      Hyperlipidemia      Renal cell carcinoma of left kidney  s/p partial nephrectomy in   biopsy again in Sep 2017 showed RCC again in stage 2      COPD (chronic obstructive pulmonary disease)      Bladder cancer      Peripheral neuropathy      Prostate cancer  s/p radiation      Nephrolithiasis      Kidney stone      AICD (automatic cardioverter/defibrillator) present      S/P cholecystectomy        H/O partial nephrectomy        History of percutaneous coronary intervention      H/O transurethral destruction of bladder lesion      History of coronary artery stent placement          MEDICATIONS  (STANDING):  apixaban 2.5 milliGRAM(s) Oral two times a day  ascorbic acid 500 milliGRAM(s) Oral daily  atorvastatin 40 milliGRAM(s) Oral at bedtime  baclofen 10 milliGRAM(s) Oral every 12 hours  chlorhexidine 2% Cloths 1 Application(s) Topical daily  clonazePAM  Tablet 0.5 milliGRAM(s) Oral two times a day  coronavirus bivalent (EUA) Booster Vaccine (PFIZER) 0.3 milliLiter(s) IntraMuscular once  diphenhydrAMINE Elixir 50 milliGRAM(s) Oral <User Schedule>  epoetin marilin-epbx (RETACRIT) Injectable 4000 Unit(s) IV Push <User Schedule>  isosorbide   mononitrate ER Tablet (IMDUR) 30 milliGRAM(s) Oral daily  lactobacillus acidophilus 1 Tablet(s) Oral three times a day with meals  levETIRAcetam 250 milliGRAM(s) Oral <User Schedule>  levETIRAcetam 500 milliGRAM(s) Oral daily  levothyroxine 100 MICROGram(s) Oral daily  metoprolol succinate ER 50 milliGRAM(s) Oral daily  multivitamin 1 Tablet(s) Oral daily  mupirocin 2% Ointment 1 Application(s) Topical two times a day  OLANZapine 2.5 milliGRAM(s) Oral daily  polyethylene glycol 3350 17 Gram(s) Oral daily  senna 2 Tablet(s) Oral at bedtime  sevelamer carbonate 800 milliGRAM(s) Oral three times a day with meals  tiotropium 2.5 MICROgram(s) Inhaler 2 Puff(s) Inhalation daily    MEDICATIONS  (PRN):  bisacodyl Suppository 10 milliGRAM(s) Rectal daily PRN Constipation  melatonin 3 milliGRAM(s) Oral at bedtime PRN Insomnia  ondansetron    Tablet 4 milliGRAM(s) Oral every 8 hours PRN for nausea  sodium chloride 0.9% Bolus 100 milliLiter(s) IV Bolus once PRN hypotension      Allergies    codeine (Unknown)  codeine (Rash)  Haldol (Unknown)  penicillin (Hives; Anaphylaxis)  aspirin (Hives)  Motrin (Rash)  Tylenol (Hives)  Toradol (Rash)    Intolerances        VITALS:    Vital Signs Last 24 Hrs  T(C): 36.4 (2023 12:40), Max: 37.1 (2023 16:27)  T(F): 97.6 (2023 12:40), Max: 98.8 (2023 16:27)  HR: 76 (2023 12:40) (75 - 99)  BP: 101/60 (2023 12:40) (101/60 - 130/72)  BP(mean): --  RR: 17 (2023 12:40) (12 - 18)  SpO2: 100% (2023 12:40) (99% - 100%)    Parameters below as of 2023 12:40  Patient On (Oxygen Delivery Method): room air        LABS:                          7.1    9.48  )-----------( 171      ( 2023 07:33 )             22.7       04-20    137  |  106  |  63<H>  ----------------------------<  92  5.1   |  19<L>  |  4.92<H>    Ca    8.6      2023 09:50  Phos  4.1     04-20  Mg     2.10     04-20    TPro  6.2  /  Alb  3.2<L>  /  TBili  0.6  /  DBili  x   /  AST  77<H>  /  ALT  58<H>  /  AlkPhos  556<H>  04-20      CAPILLARY BLOOD GLUCOSE          PT/INR - ( 2023 07:33 )   PT: 15.6 sec;   INR: 1.34 ratio         PTT - ( 2023 07:33 )  PTT:39.2 sec    LOWER EXTREMITY PHYSICAL EXAM:  Vascular: DP/PT 2/4, B/L, CFT <3 seconds B/L, Temperature gradient warm to cool, B/L.   Neuro: Epicritic sensation unable to assess 2/2 to patient unresponsiveness, B/L.  Musculoskeletal/Ortho: Unremarkable.  Skin: Right foot posterior heel wound to dermis and anterior medial ankle wound to dermis, no erythema, no pus, no drainage, no acute signs of infection. BL pinpoint eschars on BL feet with no acute signs of infection.    RADIOLOGY & ADDITIONAL STUDIES:

## 2023-04-21 NOTE — PROGRESS NOTE ADULT - SUBJECTIVE AND OBJECTIVE BOX
[facility] Department of Hospital Medicine  Tarah Jade DO  Available on MS Teams  Pager: [phone]    Patient is a 62y old  Male who presents with a chief complaint of Feeling like his AICD is firing causing chest pain (2023 11:54)    Subjective:  Pt seen and examined at bedside in no acute distress. Says he feels okay but has generalized pain. Says he "does not want to be a pill junkie" but needs "1mg IV dilaudid" and that that's what works in the past and what he "gets in the ER". Explained there is no role currently for IV opioids and offered a 1x dose of oxycodone - which he accepted. Earlier refusing to go to HD and requesting to leave AMA, now amenable to staying and going to HD. Denies any other concerns or complaints. Waiting to see psych.     REVIEW OF SYSTEMS:    CONSTITUTIONAL: No weakness, fevers or chills.   EYES/ENT: No visual changes;  No vertigo or throat pain   NECK: No pain or stiffness  RESPIRATORY: No cough, wheezing, hemoptysis; No shortness of breath  CARDIOVASCULAR: No chest pain or palpitations  GASTROINTESTINAL: No abdominal or epigastric pain. No nausea, vomiting, or hematemesis; No diarrhea or constipation. No melena or hematochezia.  GENITOURINARY: No dysuria, frequency or hematuria  NEUROLOGICAL: No numbness or weakness  SKIN: No itching, burning, rashes, or lesions   All other review of systems is negative unless indicated above.    Vital Signs Last 24 Hrs  T(C): 36.4 (2023 12:40), Max: 37.1 (2023 16:27)  T(F): 97.6 (2023 12:40), Max: 98.8 (2023 16:27)  HR: 76 (2023 12:40) (75 - 99)  BP: 101/60 (2023 12:40) (101/60 - 130/72)  BP(mean): --  RR: 17 (2023 12:40) (12 - 18)  SpO2: 100% (2023 12:40) (99% - 100%)    Parameters below as of 2023 12:40  Patient On (Oxygen Delivery Method): room air    PHYSICAL EXAM:    GENERAL: NAD, resting in bed  CHEST/LUNG: Clear to auscultation bilaterally anteriorly; No wheeze  HEART: Regular rate and rhythm; No murmurs, rubs, or gallops  ABDOMEN: Soft, Nontender, Nondistended; Bowel sounds present  EXTREMITIES: LLE with hyperpigmentation; limited movement of hands bilaterally, fingers somewhat contracted  PSYCH: calm   NEUROLOGY: moving all extremities   SKIN: B/L heel wounds, serous fluid no purulence, permacath    MEDICATIONS  (STANDING):  apixaban 2.5 milliGRAM(s) Oral two times a day  ascorbic acid 500 milliGRAM(s) Oral daily  atorvastatin 40 milliGRAM(s) Oral at bedtime  baclofen 10 milliGRAM(s) Oral every 12 hours  chlorhexidine 2% Cloths 1 Application(s) Topical daily  clonazePAM  Tablet 0.5 milliGRAM(s) Oral two times a day  coronavirus bivalent (EUA) Booster Vaccine (PFIZER) 0.3 milliLiter(s) IntraMuscular once  diphenhydrAMINE Elixir 50 milliGRAM(s) Oral <User Schedule>  epoetin marilin-epbx (RETACRIT) Injectable 4000 Unit(s) IV Push <User Schedule>  isosorbide   mononitrate ER Tablet (IMDUR) 30 milliGRAM(s) Oral daily  lactobacillus acidophilus 1 Tablet(s) Oral three times a day with meals  levETIRAcetam 250 milliGRAM(s) Oral <User Schedule>  levETIRAcetam 500 milliGRAM(s) Oral daily  levothyroxine 100 MICROGram(s) Oral daily  metoprolol succinate ER 50 milliGRAM(s) Oral daily  multivitamin 1 Tablet(s) Oral daily  mupirocin 2% Ointment 1 Application(s) Topical two times a day  OLANZapine 2.5 milliGRAM(s) Oral daily  polyethylene glycol 3350 17 Gram(s) Oral daily  senna 2 Tablet(s) Oral at bedtime  sevelamer carbonate 800 milliGRAM(s) Oral three times a day with meals  tiotropium 2.5 MICROgram(s) Inhaler 2 Puff(s) Inhalation daily    MEDICATIONS  (PRN):  bisacodyl Suppository 10 milliGRAM(s) Rectal daily PRN Constipation  melatonin 3 milliGRAM(s) Oral at bedtime PRN Insomnia  ondansetron    Tablet 4 milliGRAM(s) Oral every 8 hours PRN for nausea  sodium chloride 0.9% Bolus 100 milliLiter(s) IV Bolus once PRN hypotension    LABS:                        7.1    9.48  )-----------( 171      ( 2023 07:33 )             22.7     04-20    137  |  106  |  63<H>  ----------------------------<  92  5.1   |  19<L>  |  4.92<H>    Ca    8.6      2023 09:50  Phos  4.1     -  Mg     2.10     -    TPro  6.2  /  Alb  3.2<L>  /  TBili  0.6  /  DBili  x   /  AST  77<H>  /  ALT  58<H>  /  AlkPhos  556<H>  04-20    PT/INR - ( 2023 07:33 )   PT: 15.6 sec;   INR: 1.34 ratio         PTT - ( 2023 07:33 )  PTT:39.2 sec  Urinalysis Basic - ( 2023 07:57 )    Color: Light Yellow / Appearance: Slightly Turbid / S.014 / pH: x  Gluc: x / Ketone: Negative  / Bili: Negative / Urobili: <2 mg/dL   Blood: x / Protein: 100 mg/dL / Nitrite: Negative   Leuk Esterase: Large / RBC: 5-10 /HPF / WBC >50 /HPF   Sq Epi: x / Non Sq Epi: x / Bacteria: Negative      CAPILLARY BLOOD GLUCOSE      POCT Blood Glucose.: 66 mg/dL (2023 06:22)      RADIOLOGY & ADDITIONAL TESTS: Reviewed.

## 2023-04-21 NOTE — BH CONSULTATION LIAISON ASSESSMENT NOTE - OTHER PAST PSYCHIATRIC HISTORY (INCLUDE DETAILS REGARDING ONSET, COURSE OF ILLNESS, INPATIENT/OUTPATIENT TREATMENT)
PTSD, no psych hospitalization, no SA, Alcohol use disorder but currently sober. Hx of reheb x 1. no current outpatient or therapy.

## 2023-04-21 NOTE — DISCHARGE NOTE PROVIDER - NSDCMRMEDTOKEN_GEN_ALL_CORE_FT
ascorbic acid 500 mg oral tablet: 1 tab(s) orally once a day  atorvastatin 40 mg oral tablet: 1 tab(s) orally once a day (at bedtime)  baclofen: 1 tab(s) orally 2 times a day  bisacodyl 10 mg rectal suppository: 1 suppository(ies) rectal once a day, As needed, Constipation  clonazePAM 0.5 mg oral tablet: 1 tab(s) orally 2 times a day  diphenhydrAMINE 50 mg oral capsule: 1 cap(s) orally once a day, As needed, 1 hour before dialysis  Eliquis 5 mg oral tablet: 1 tab(s) orally 2 times a day  Imdur 30 mg oral tablet, extended release: 1 orally once a day  lactobacillus acidophilus oral capsule: 1 tab(s) orally 3 times a day  levETIRAcetam 250 mg oral tablet: 1 tab(s) orally 3 times a week  tue/thrus/sat after dialysis.   levETIRAcetam 500 mg oral tablet: 1 tab(s) orally once a day  levothyroxine 100 mcg (0.1 mg) oral tablet: 1 tab(s) orally once a day  melatonin 3 mg oral tablet: 1 tab(s) orally once a day (at bedtime), As needed, Insomnia  metoprolol succinate 50 mg oral tablet, extended release: 1 tab(s) orally once a day  multivitamin: 1 tab(s) orally once a day  mupirocin 2% topical ointment: 1 application topically 2 times a day  OLANZapine 2.5 mg oral tablet: 1 orally once a day  polyethylene glycol 3350 oral powder for reconstitution: 17 gram(s) orally once a day  senna leaf extract oral tablet: 2 tab(s) orally once a day (at bedtime)  sevelamer carbonate 800 mg oral tablet: 2 tab(s) orally 3 times a day (with meals)  tiotropium 2.5 mcg/inh inhalation aerosol: 2 puff(s) inhaled once a day  Zofran 4 mg oral tablet: 1 orally every 8 hours as needed for  nausea   apixaban 2.5 mg oral tablet: 1 tab(s) orally 2 times a day  ascorbic acid 500 mg oral tablet: 1 tab(s) orally once a day  atorvastatin 40 mg oral tablet: 1 tab(s) orally once a day (at bedtime)  baclofen 10 mg oral tablet: 1 tab(s) orally every 12 hours  bisacodyl 10 mg rectal suppository: 1 suppository(ies) rectal once a day, As needed, Constipation  clonazePAM 0.5 mg oral tablet: 1 tab(s) orally 2 times a day  diphenhydrAMINE 50 mg oral capsule: 1 cap(s) orally once a day, As needed, 1 hour before dialysis  Imdur 30 mg oral tablet, extended release: 1 orally once a day  lactobacillus acidophilus oral capsule: 1 tab(s) orally 3 times a day  levETIRAcetam 250 mg oral tablet: 1 tab(s) orally 3 times a week  tue/thrus/sat after dialysis.   levETIRAcetam 500 mg oral tablet: 1 tab(s) orally once a day  levothyroxine 100 mcg (0.1 mg) oral tablet: 1 tab(s) orally once a day  melatonin 3 mg oral tablet: 1 tab(s) orally once a day (at bedtime), As needed, Insomnia  metoprolol succinate 50 mg oral tablet, extended release: 1 tab(s) orally once a day  multivitamin: 1 tab(s) orally once a day  mupirocin 2% topical ointment: 1 application topically 2 times a day  polyethylene glycol 3350 oral powder for reconstitution: 17 gram(s) orally once a day  senna leaf extract oral tablet: 2 tab(s) orally once a day (at bedtime)  sevelamer carbonate 800 mg oral tablet: 2 tab(s) orally 3 times a day (with meals)  tiotropium 2.5 mcg/inh inhalation aerosol: 2 puff(s) inhaled once a day  Zofran 4 mg oral tablet: 1 orally every 8 hours as needed for  nausea   apixaban 2.5 mg oral tablet: 1 tab(s) orally 2 times a day  ascorbic acid 500 mg oral tablet: 1 tab(s) orally once a day  atorvastatin 40 mg oral tablet: 1 tab(s) orally once a day (at bedtime)  baclofen 10 mg oral tablet: 1 tab(s) orally every 12 hours  bisacodyl 10 mg rectal suppository: 1 suppository(ies) rectal once a day, As needed, Constipation  calamine topical lotion: 1 Apply topically to affected area 3 times a day  clonazePAM 0.5 mg oral tablet: 1 tab(s) orally 2 times a day  diphenhydrAMINE 50 mg oral capsule: 1 cap(s) orally once a day, As needed, 1 hour before dialysis  Imdur 30 mg oral tablet, extended release: 1 orally once a day  lactobacillus acidophilus oral capsule: 1 tab(s) orally 3 times a day  levETIRAcetam 250 mg oral tablet: 1 tab(s) orally 3 times a week  tue/thrus/sat after dialysis.   levETIRAcetam 500 mg oral tablet: 1 tab(s) orally once a day  levothyroxine 100 mcg (0.1 mg) oral tablet: 1 tab(s) orally once a day  melatonin 3 mg oral tablet: 1 tab(s) orally once a day (at bedtime), As needed, Insomnia  metoprolol succinate 50 mg oral tablet, extended release: 1 tab(s) orally once a day  multivitamin: 1 tab(s) orally once a day  mupirocin 2% topical ointment: 1 application topically 2 times a day  polyethylene glycol 3350 oral powder for reconstitution: 17 gram(s) orally once a day  senna leaf extract oral tablet: 2 tab(s) orally once a day (at bedtime)  sevelamer carbonate 800 mg oral tablet: 2 tab(s) orally 3 times a day (with meals)  tiotropium 2.5 mcg/inh inhalation aerosol: 2 puff(s) inhaled once a day  Zofran 4 mg oral tablet: 1 orally every 8 hours as needed for  nausea

## 2023-04-22 NOTE — PROVIDER CONTACT NOTE (OTHER) - ASSESSMENT
Pt appears asleep. PCA and RN attempt to awaken pt, Pt irritable. Pt refusing VS at this time. Pt refusing PM meds at this time.

## 2023-04-22 NOTE — PROGRESS NOTE ADULT - SUBJECTIVE AND OBJECTIVE BOX
[facility] Division of Kidney Diseases & Hypertension  FOLLOW UP NOTE  [phone]--------------------------------------------------------------------------------    Chief Complaint: ESRD MWF    Pt is a 62-year-old Male with significant PMHx of ESRD on HD TIW (TTS- started In Jan 23), RCC s/p L partial nephrectomy, HTN, CLD, CVA (4/21) with quadriparesis, neurogenic bladder, Hx of seizures and Afib who was transferred from [facility] to [facility] on 4/20/23 for left sided CP. Feels like his AICD fired. Pt missed HD on Wed, 4/19. Last HD done was 4/17/23. Nephrology team was consulted for ESRD/HD management.     24 hour events/subjective: Patient seen this morning. Received HD last night, but limited due to persistent hypotension. Patient does not endorse CP currently, denies SOB, N/V, dizziness or LE swelling.      PAST HISTORY  --------------------------------------------------------------------------------  No significant changes to PMH, PSH, FHx, SHx, unless otherwise noted    ALLERGIES & MEDICATIONS  --------------------------------------------------------------------------------  Allergies    codeine (Unknown)  codeine (Rash)  Haldol (Unknown)  penicillin (Hives; Anaphylaxis)  aspirin (Hives)  Motrin (Rash)  Tylenol (Hives)  Toradol (Rash)    Intolerances      Standing Inpatient Medications  apixaban 2.5 milliGRAM(s) Oral two times a day  ascorbic acid 500 milliGRAM(s) Oral daily  atorvastatin 40 milliGRAM(s) Oral at bedtime  baclofen 10 milliGRAM(s) Oral every 12 hours  chlorhexidine 2% Cloths 1 Application(s) Topical daily  clonazePAM  Tablet 0.5 milliGRAM(s) Oral two times a day  coronavirus bivalent (EUA) Booster Vaccine (PFIZER) 0.3 milliLiter(s) IntraMuscular once  diphenhydrAMINE Elixir 50 milliGRAM(s) Oral <User Schedule>  epoetin marilin-epbx (RETACRIT) Injectable 4000 Unit(s) IV Push <User Schedule>  isosorbide   mononitrate ER Tablet (IMDUR) 30 milliGRAM(s) Oral daily  lactobacillus acidophilus 1 Tablet(s) Oral three times a day with meals  levETIRAcetam 250 milliGRAM(s) Oral <User Schedule>  levETIRAcetam 500 milliGRAM(s) Oral daily  levothyroxine 100 MICROGram(s) Oral daily  metoprolol succinate ER 50 milliGRAM(s) Oral daily  multivitamin 1 Tablet(s) Oral daily  mupirocin 2% Ointment 1 Application(s) Topical two times a day  mupirocin 2% Ointment 1 Application(s) Topical two times a day  polyethylene glycol 3350 17 Gram(s) Oral daily  senna 2 Tablet(s) Oral at bedtime  sevelamer carbonate 800 milliGRAM(s) Oral three times a day with meals  tiotropium 2.5 MICROgram(s) Inhaler 2 Puff(s) Inhalation daily    PRN Inpatient Medications  bisacodyl Suppository 10 milliGRAM(s) Rectal daily PRN  melatonin 3 milliGRAM(s) Oral at bedtime PRN  ondansetron    Tablet 4 milliGRAM(s) Oral every 8 hours PRN  sodium chloride 0.9% Bolus 100 milliLiter(s) IV Bolus once PRN      REVIEW OF SYSTEMS  --------------------------------------------------------------------------------  Gen: No fevers/chills  Skin: No rashes  Head/Eyes/Ears: Normal hearing,   Respiratory: No dyspnea, cough  CV: No chest pain  GI: No abdominal pain, diarrhea  : suprapubic catheter  MSK: quadriplegic      VITALS/PHYSICAL EXAM  --------------------------------------------------------------------------------  T(C): 36.6 (04-22-23 @ 10:00), Max: 37.1 (04-21-23 @ 11:24)  HR: 69 (04-22-23 @ 10:00) (69 - 86)  BP: 123/69 (04-22-23 @ 10:00) (95/66 - 123/69)  RR: 18 (04-22-23 @ 10:00) (16 - 18)  SpO2: 100% (04-22-23 @ 10:00) (100% - 100%)  Wt(kg): --  Height (cm): 180.3 (04-20-23 @ 16:50)  Weight (kg): 81.647 (04-20-23 @ 16:50)  BMI (kg/m2): 25.1 (04-20-23 @ 16:50)  BSA (m2): 2.02 (04-20-23 @ 16:50)      04-21-23 @ 07:01  -  04-22-23 @ 07:00  --------------------------------------------------------  IN: 0 mL / OUT: 700 mL / NET: -700 mL      Physical Exam:  	Gen: NAD; disheveled; laying flat   	HEENT: MMM  	Pulm: CTA B/L  	CV: S1S2  	Abd: Soft, +BS   	Ext: No LE edema B/L  	Neuro: Awake  	Skin: Warm and dry  	Vascular access: right non-tunneled cath    LABS/STUDIES  --------------------------------------------------------------------------------              7.1    7.66  >-----------<  166      [04-21-23 @ 16:15]              22.6     140  |  102  |  33  ----------------------------<  113      [04-21-23 @ 16:15]  3.6   |  24  |  2.84        Ca     8.6     [04-21-23 @ 16:15]    TPro  6.0  /  Alb  3.3  /  TBili  0.3  /  DBili  x   /  AST  44  /  ALT  53  /  AlkPhos  503  [04-21-23 @ 16:15]          Creatinine Trend:  SCr 2.84 [04-21 @ 16:15]  SCr 4.92 [04-20 @ 09:50]  SCr 5.19 [04-20 @ 07:33]  SCr 4.75 [03-25 @ 09:25]    Urinalysis - [04-20-23 @ 07:57]      Color Light Yellow / Appearance Slightly Turbid / SG 1.014 / pH 7.0      Gluc 500 mg/dL / Ketone Negative  / Bili Negative / Urobili <2 mg/dL       Blood Large / Protein 100 mg/dL / Leuk Est Large / Nitrite Negative      RBC 5-10 / WBC >50 / Hyaline 1 / Gran  / Sq Epi  / Non Sq Epi  / Bacteria Negative      TSH <0.10      [04-21-23 @ 16:15]  Lipid: chol 107, TG 68, HDL 42, LDL --      [04-21-23 @ 16:15]    HBsAb <3.0      [04-20-23 @ 20:30]  HBsAg Nonreact      [04-20-23 @ 20:30]  HCV 9.49, Reactive      [04-20-23 @ 20:30]

## 2023-04-22 NOTE — PROGRESS NOTE ADULT - ATTENDING COMMENTS
Patient had 2 kg UF on thursday.  Could not tolerate more than 1 hour of HD friday due to hypotension.  Patient's blood pressure still somewhat low and does not have urgent HD needs at the moment however must check blood work today.  Will tentatively plan for next HD monday provided no emergent need arises.
seen and evaluated plan for maintenance HD today.

## 2023-04-22 NOTE — PROGRESS NOTE ADULT - SUBJECTIVE AND OBJECTIVE BOX
[facility] Department of Hospital Medicine  Tarah Jade DO  Available on MS Teams  Pager: [phone]    Patient is a 62y old  Male who presents with a chief complaint of Feeling like his AICD is firing causing chest pain (21 Apr 2023 11:54)    Subjective:  Pt seen and examined at bedside in no acute distress, sleeping and easily arousable. Both ACP and writer bedside. Pt refusing to discuss with team, saying "you are not my doctors", and "I won't talk to you until I get IV pain meds". States "you are making me a pill head". Explained we are continuing all home meds and in order to provide IV ativan (as he is requesting), he should first try home med klonopin as suggested by psych. Pt then requesting team leave the room.    REVIEW OF SYSTEMS:    CONSTITUTIONAL: No weakness, fevers or chills.   EYES/ENT: No visual changes;  No vertigo or throat pain   NECK: No pain or stiffness  RESPIRATORY: No cough, wheezing, hemoptysis; No shortness of breath  CARDIOVASCULAR: No chest pain or palpitations  GASTROINTESTINAL: No abdominal or epigastric pain. No nausea, vomiting, or hematemesis; No diarrhea or constipation. No melena or hematochezia.  GENITOURINARY: No dysuria, frequency or hematuria  NEUROLOGICAL: No numbness or weakness  SKIN: No itching, burning, rashes, or lesions   All other review of systems is negative unless indicated above.    Vital Signs Last 24 Hrs  T(C): 36.6 (22 Apr 2023 10:00), Max: 36.7 (21 Apr 2023 21:38)  T(F): 97.8 (22 Apr 2023 10:00), Max: 98.1 (21 Apr 2023 21:38)  HR: 69 (22 Apr 2023 10:00) (69 - 86)  BP: 123/69 (22 Apr 2023 10:00) (95/66 - 123/69)  BP(mean): --  RR: 18 (22 Apr 2023 10:00) (17 - 18)  SpO2: 100% (22 Apr 2023 10:00) (100% - 100%)    Parameters below as of 22 Apr 2023 10:00  Patient On (Oxygen Delivery Method): room air    PHYSICAL EXAM:    GENERAL: NAD, resting in bed  CHEST/LUNG: Clear to auscultation bilaterally anteriorly; No wheeze  HEART: Regular rate and rhythm; No murmurs, rubs, or gallops  ABDOMEN: Soft, Nontender, Nondistended; Bowel sounds present  EXTREMITIES: LLE with hyperpigmentation; limited movement of hands bilaterally, fingers somewhat contracted  PSYCH: calm   NEUROLOGY: moving all extremities   SKIN: B/L heel wounds, serous fluid no purulence, permacath    MEDICATIONS  (STANDING):  apixaban 2.5 milliGRAM(s) Oral two times a day  ascorbic acid 500 milliGRAM(s) Oral daily  atorvastatin 40 milliGRAM(s) Oral at bedtime  baclofen 10 milliGRAM(s) Oral every 12 hours  chlorhexidine 2% Cloths 1 Application(s) Topical daily  clonazePAM  Tablet 0.5 milliGRAM(s) Oral two times a day  coronavirus bivalent (EUA) Booster Vaccine (PFIZER) 0.3 milliLiter(s) IntraMuscular once  diphenhydrAMINE Elixir 50 milliGRAM(s) Oral <User Schedule>  epoetin marilin-epbx (RETACRIT) Injectable 4000 Unit(s) IV Push <User Schedule>  isosorbide   mononitrate ER Tablet (IMDUR) 30 milliGRAM(s) Oral daily  lactobacillus acidophilus 1 Tablet(s) Oral three times a day with meals  levETIRAcetam 250 milliGRAM(s) Oral <User Schedule>  levETIRAcetam 500 milliGRAM(s) Oral daily  levothyroxine 100 MICROGram(s) Oral daily  metoprolol succinate ER 50 milliGRAM(s) Oral daily  multivitamin 1 Tablet(s) Oral daily  mupirocin 2% Ointment 1 Application(s) Topical two times a day  OLANZapine 2.5 milliGRAM(s) Oral daily  polyethylene glycol 3350 17 Gram(s) Oral daily  senna 2 Tablet(s) Oral at bedtime  sevelamer carbonate 800 milliGRAM(s) Oral three times a day with meals  tiotropium 2.5 MICROgram(s) Inhaler 2 Puff(s) Inhalation daily    MEDICATIONS  (PRN):  bisacodyl Suppository 10 milliGRAM(s) Rectal daily PRN Constipation  melatonin 3 milliGRAM(s) Oral at bedtime PRN Insomnia  ondansetron    Tablet 4 milliGRAM(s) Oral every 8 hours PRN for nausea  sodium chloride 0.9% Bolus 100 milliLiter(s) IV Bolus once PRN hypotension    LABS:    declined

## 2023-04-22 NOTE — PROVIDER CONTACT NOTE (MEDICATION) - ASSESSMENT
no s/s of distress, VSS.  Pt educated on indication of taking Klonopin. PT requesting IV medications. Pt has no problems swallowing food or liquids. pt educated on oral route indication for medication administration. Pt refusing all meds at this time.

## 2023-04-22 NOTE — PROVIDER CONTACT NOTE (OTHER) - ACTION/TREATMENT ORDERED:
ACP Deana Kaur notified, no  new orders at this time. ACP Deana Kaur notified, reattempt to administer Klonopin in 1 hr. ACP Deana Kaur notified, reattempt to administer Klonopin in 1 hr.  Pt refused second attempt at 2230, ACP aware, okay to michelle as refused or attempt to administer once pt awaken own his own.

## 2023-04-22 NOTE — PROVIDER CONTACT NOTE (OTHER) - ASSESSMENT
Pt c/o pain 10/10 in [place] LE d/t ulcers  Pt requesting Dilaudid IVP, although providers have discussed the use of alternative pain medication

## 2023-04-23 NOTE — PROGRESS NOTE ADULT - SUBJECTIVE AND OBJECTIVE BOX
[facility] Department of Hospital Medicine  Tarah Jade DO  Available on MS Teams  Pager: [phone]    Patient is a 62y old  Male who presents with a chief complaint of Feeling like his AICD is firing causing chest pain (21 Apr 2023 11:54)    Subjective:  Pt seen and examined at bedside in no acute distress. Complaining of care thus far, berating ACPs and nurses for ignoring him and not giving him IV meds. Explained that there is no medical indication for IV dilaudid or IV ativan at present. Encouraged to take PO klonopin and reassess need for IV meds. Requested IV be removed - RN performed removal. Refusing keppra and says he will leave the hospital as soon as he can. Explained that we are waiting for [facility] to accept pt back. Requested to be left alone.    REVIEW OF SYSTEMS:    CONSTITUTIONAL: No weakness, fevers or chills.   EYES/ENT: No visual changes;  No vertigo or throat pain   NECK: No pain or stiffness  RESPIRATORY: No cough, wheezing, hemoptysis; No shortness of breath  CARDIOVASCULAR: No chest pain or palpitations  GASTROINTESTINAL: No abdominal or epigastric pain. No nausea, vomiting, or hematemesis; No diarrhea or constipation. No melena or hematochezia.  GENITOURINARY: No dysuria, frequency or hematuria  NEUROLOGICAL: No numbness or weakness  SKIN: No itching, burning, rashes, or lesions   All other review of systems is negative unless indicated above.    Vital Signs Last 24 Hrs  T(C): 36.8 (23 Apr 2023 12:55), Max: 37.1 (23 Apr 2023 05:05)  T(F): 98.3 (23 Apr 2023 12:55), Max: 98.7 (23 Apr 2023 05:05)  HR: 73 (23 Apr 2023 12:55) (73 - 76)  BP: 130/63 (23 Apr 2023 12:55) (105/74 - 130/63)  BP(mean): --  RR: 18 (23 Apr 2023 12:55) (17 - 18)  SpO2: 100% (23 Apr 2023 12:55) (99% - 100%)    Parameters below as of 23 Apr 2023 12:55  Patient On (Oxygen Delivery Method): room air    PHYSICAL EXAM:    GENERAL: NAD, resting in bed  CHEST/LUNG: Clear to auscultation bilaterally anteriorly; No wheeze  HEART: Regular rate and rhythm; No murmurs, rubs, or gallops  ABDOMEN: Soft, Nontender, Nondistended; Bowel sounds present  EXTREMITIES: LLE with hyperpigmentation; limited movement of hands bilaterally, fingers somewhat contracted  PSYCH: calm   NEUROLOGY: moving all extremities, AOx3  SKIN: B/L heel wounds, serous fluid no purulence, permacath    MEDICATIONS  (STANDING):  apixaban 2.5 milliGRAM(s) Oral two times a day  ascorbic acid 500 milliGRAM(s) Oral daily  atorvastatin 40 milliGRAM(s) Oral at bedtime  baclofen 10 milliGRAM(s) Oral every 12 hours  chlorhexidine 2% Cloths 1 Application(s) Topical daily  clonazePAM  Tablet 0.5 milliGRAM(s) Oral two times a day  coronavirus bivalent (EUA) Booster Vaccine (PFIZER) 0.3 milliLiter(s) IntraMuscular once  diphenhydrAMINE Elixir 50 milliGRAM(s) Oral <User Schedule>  epoetin marilin-epbx (RETACRIT) Injectable 4000 Unit(s) IV Push <User Schedule>  isosorbide   mononitrate ER Tablet (IMDUR) 30 milliGRAM(s) Oral daily  lactobacillus acidophilus 1 Tablet(s) Oral three times a day with meals  levETIRAcetam 250 milliGRAM(s) Oral <User Schedule>  levETIRAcetam 500 milliGRAM(s) Oral daily  levothyroxine 100 MICROGram(s) Oral daily  metoprolol succinate ER 50 milliGRAM(s) Oral daily  multivitamin 1 Tablet(s) Oral daily  mupirocin 2% Ointment 1 Application(s) Topical two times a day  OLANZapine 2.5 milliGRAM(s) Oral daily  polyethylene glycol 3350 17 Gram(s) Oral daily  senna 2 Tablet(s) Oral at bedtime  sevelamer carbonate 800 milliGRAM(s) Oral three times a day with meals  tiotropium 2.5 MICROgram(s) Inhaler 2 Puff(s) Inhalation daily    MEDICATIONS  (PRN):  bisacodyl Suppository 10 milliGRAM(s) Rectal daily PRN Constipation  melatonin 3 milliGRAM(s) Oral at bedtime PRN Insomnia  ondansetron    Tablet 4 milliGRAM(s) Oral every 8 hours PRN for nausea  sodium chloride 0.9% Bolus 100 milliLiter(s) IV Bolus once PRN hypotension    LABS:    declined

## 2023-04-23 NOTE — PROVIDER CONTACT NOTE (OTHER) - ACTION/TREATMENT ORDERED:
ACP made aware. Attending came to bedside to educate patient, still refusing.  No new orders. Will attempt to give medications again.

## 2023-04-23 NOTE — PROVIDER CONTACT NOTE (OTHER) - ASSESSMENT
VSS. Pt agitated and uncooperative. Pt educated on importance and indication on taking medication regimen. Pt still refusing.

## 2023-04-24 NOTE — PROGRESS NOTE ADULT - SUBJECTIVE AND OBJECTIVE BOX
[facility] Department of Hospital Medicine  Humphrey Orourke MD  Available on MS Teams  Pager: [phone]    Patient is a 62y old  Male who presents with a chief complaint of Feeling like his AICD is firing causing chest pain (24 Apr 2023 11:12)    OVERNIGHT EVENTS: No acute events overnight.    SUBJECTIVE: Pt seen and examined at bedside this morning. Reports feeling well. Denies any complaints this morning. Requesting if he can have benadryl prior to HD session. Patient eager to return to [place]. Amenable to labs and blood transfusion with HD.    ADDITIONAL REVIEW OF SYSTEMS: as above.    MEDICATIONS  (STANDING):  apixaban 2.5 milliGRAM(s) Oral two times a day  ascorbic acid 500 milliGRAM(s) Oral daily  atorvastatin 40 milliGRAM(s) Oral at bedtime  baclofen 10 milliGRAM(s) Oral every 12 hours  chlorhexidine 2% Cloths 1 Application(s) Topical daily  clonazePAM  Tablet 0.5 milliGRAM(s) Oral two times a day  coronavirus bivalent (EUA) Booster Vaccine (PFIZER) 0.3 milliLiter(s) IntraMuscular once  diphenhydrAMINE Elixir 50 milliGRAM(s) Oral <User Schedule>  diphenhydrAMINE Injectable 50 milliGRAM(s) IV Push once  epoetin marilin-epbx (RETACRIT) Injectable 4000 Unit(s) IV Push <User Schedule>  isosorbide   mononitrate ER Tablet (IMDUR) 30 milliGRAM(s) Oral daily  lactobacillus acidophilus 1 Tablet(s) Oral three times a day with meals  levETIRAcetam 500 milliGRAM(s) Oral daily  levETIRAcetam 250 milliGRAM(s) Oral <User Schedule>  levothyroxine 100 MICROGram(s) Oral daily  metoprolol succinate ER 50 milliGRAM(s) Oral daily  multivitamin 1 Tablet(s) Oral daily  mupirocin 2% Ointment 1 Application(s) Topical two times a day  mupirocin 2% Ointment 1 Application(s) Topical two times a day  polyethylene glycol 3350 17 Gram(s) Oral daily  senna 2 Tablet(s) Oral at bedtime  sevelamer carbonate 800 milliGRAM(s) Oral three times a day with meals  tiotropium 2.5 MICROgram(s) Inhaler 2 Puff(s) Inhalation daily    MEDICATIONS  (PRN):  bisacodyl Suppository 10 milliGRAM(s) Rectal daily PRN Constipation  melatonin 3 milliGRAM(s) Oral at bedtime PRN Insomnia  ondansetron    Tablet 4 milliGRAM(s) Oral every 8 hours PRN for nausea  sodium chloride 0.9% Bolus 100 milliLiter(s) IV Bolus once PRN hypotension    CAPILLARY BLOOD GLUCOSE    I&O's Summary    23 Apr 2023 07:01  -  24 Apr 2023 07:00  --------------------------------------------------------  IN: 450 mL / OUT: 900 mL / NET: -450 mL    PHYSICAL EXAM:  Vital Signs Last 24 Hrs  T(C): 36.3 (24 Apr 2023 05:51), Max: 36.3 (24 Apr 2023 05:51)  T(F): 97.3 (24 Apr 2023 05:51), Max: 97.3 (24 Apr 2023 05:51)  HR: 71 (24 Apr 2023 05:51) (71 - 88)  BP: 113/66 (24 Apr 2023 05:51) (113/66 - 122/66)  BP(mean): --  RR: 16 (24 Apr 2023 05:51) (16 - 18)  SpO2: 100% (24 Apr 2023 05:51) (100% - 100%)    Parameters below as of 24 Apr 2023 05:51  Patient On (Oxygen Delivery Method): room air    CONSTITUTIONAL: NAD, well-developed  HEAD: Normocephalic, atraumatic  EYES: EOMI, PERRL  ENT: no rhinorrhea, no pharyngeal erythema  RESPIRATORY: No increased work of breathing, CTAB, no wheezes or crackles appreciated  CARDIOVASCULAR: RRR, S1 and S2 present, no m/r/g  ABDOMEN: soft, NT, ND, bowel sounds present  EXTREMITIES: limited movement of fingers/hands  MUSCULOSKELETAL: no joint swelling, no tenderness to palpation  NEURO: A&Ox3, moving all extremities    LABS:    RADIOLOGY & ADDITIONAL TESTS:    Results Reviewed:   Imaging Personally Reviewed:  Electrocardiogram Personally Reviewed:    COORDINATION OF CARE:  Care Discussed with Consultants/Other Providers [Y/N]:  Prior or Outpatient Records Reviewed [Y/N]:

## 2023-04-24 NOTE — PROVIDER CONTACT NOTE (OTHER) - ASSESSMENT
Pt refused to have 02 sat and temp done with bedtime vitals and pt refused bedtime med meds and pt refused to have an IV placed

## 2023-04-24 NOTE — PROVIDER CONTACT NOTE (OTHER) - REASON
Pt refused to have 02 sat and temp done with bedtime vitals and pt refused bed Pt refused to have 02 sat and temp done with bedtime vitals and pt refused bedtime meds and pt refused to have an  IV placed

## 2023-04-24 NOTE — PROGRESS NOTE ADULT - SUBJECTIVE AND OBJECTIVE BOX
[facility] Division of Kidney Diseases & Hypertension  FOLLOW UP NOTE  --------------------------------------------------------------------------------    Chief Complaint: ESRD MWF    Pt is a 62-year-old Male with significant PMHx of ESRD on HD TIW (TTS- started In Jan 23), RCC s/p L partial nephrectomy, HTN, CLD, CVA (4/21) with quadriparesis, neurogenic bladder, Hx of seizures and Afib who was transferred from [facility] to [facility] on 4/20/23 for left sided CP. Feels like his AICD fired. Pt missed HD on Wed, 4/19. Last HD done was 4/17/23. Nephrology team was consulted for ESRD/HD management.     24 hour events/subjective: Patient seen this morning. Received HD last night, but limited due to persistent hypotension. Patient does not endorse CP currently, denies SOB, N/V, dizziness or LE swelling.  last HD was friday for only one hour.      PAST HISTORY  --------------------------------------------------------------------------------  No significant changes to PMH, PSH, FHx, SHx, unless otherwise noted    ALLERGIES & MEDICATIONS  --------------------------------------------------------------------------------  Allergies    codeine (Unknown)  codeine (Rash)  Haldol (Unknown)  penicillin (Hives; Anaphylaxis)  aspirin (Hives)  Motrin (Rash)  Tylenol (Hives)  Toradol (Rash)    Intolerances      Standing Inpatient Medications  apixaban 2.5 milliGRAM(s) Oral two times a day  ascorbic acid 500 milliGRAM(s) Oral daily  atorvastatin 40 milliGRAM(s) Oral at bedtime  baclofen 10 milliGRAM(s) Oral every 12 hours  chlorhexidine 2% Cloths 1 Application(s) Topical daily  clonazePAM  Tablet 0.5 milliGRAM(s) Oral two times a day  coronavirus bivalent (EUA) Booster Vaccine (PFIZER) 0.3 milliLiter(s) IntraMuscular once  diphenhydrAMINE Elixir 50 milliGRAM(s) Oral <User Schedule>  diphenhydrAMINE Injectable 25 milliGRAM(s) IV Push Once  epoetin marilin-epbx (RETACRIT) Injectable 4000 Unit(s) IV Push <User Schedule>  isosorbide   mononitrate ER Tablet (IMDUR) 30 milliGRAM(s) Oral daily  lactobacillus acidophilus 1 Tablet(s) Oral three times a day with meals  levETIRAcetam 250 milliGRAM(s) Oral <User Schedule>  levETIRAcetam 500 milliGRAM(s) Oral daily  levothyroxine 100 MICROGram(s) Oral daily  metoprolol succinate ER 50 milliGRAM(s) Oral daily  multivitamin 1 Tablet(s) Oral daily  mupirocin 2% Ointment 1 Application(s) Topical two times a day  mupirocin 2% Ointment 1 Application(s) Topical two times a day  polyethylene glycol 3350 17 Gram(s) Oral daily  senna 2 Tablet(s) Oral at bedtime  sevelamer carbonate 800 milliGRAM(s) Oral three times a day with meals  tiotropium 2.5 MICROgram(s) Inhaler 2 Puff(s) Inhalation daily    PRN Inpatient Medications  bisacodyl Suppository 10 milliGRAM(s) Rectal daily PRN  melatonin 3 milliGRAM(s) Oral at bedtime PRN  ondansetron    Tablet 4 milliGRAM(s) Oral every 8 hours PRN  sodium chloride 0.9% Bolus 100 milliLiter(s) IV Bolus once PRN      REVIEW OF SYSTEMS  --------------------------------------------------------------------------------  Gen: no fever  Respiratory: no sob  CV: no current chest pain  GI: no ab pain  :  has suprapubic catheter  MSK: no pain    VITALS/PHYSICAL EXAM  --------------------------------------------------------------------------------  T(C): 36.3 (04-24-23 @ 05:51), Max: 36.8 (04-23-23 @ 12:55)  HR: 71 (04-24-23 @ 05:51) (71 - 88)  BP: 113/66 (04-24-23 @ 05:51) (113/66 - 130/63)  ABP: --  ABP(mean): --  RR: 16 (04-24-23 @ 05:51) (16 - 18)  SpO2: 100% (04-24-23 @ 05:51) (100% - 100%)  CVP(mm Hg): --        04-23-23 @ 07:01  -  04-24-23 @ 07:00  --------------------------------------------------------  IN: 450 mL / OUT: 900 mL / NET: -450 mL    Physical Exam:  	Gen: NAD; disheveled; laying flat   	HEENT: MMM  	Pulm: CTA B/L  	CV: S1S2  	Abd: Soft, +BS   	Ext: No LE edema B/L  	Neuro: Awake  	Skin: Warm and dry  	Vascular access: right tunneled cath              : + suprapubic catheter    LABS/STUDIES  --------------------------------------------------------------------------------              8.6    8.88  >-----------<  183      [04-22-23 @ 13:45]              28.4     140  |  105  |  54  ----------------------------<  88      [04-22-23 @ 13:45]  4.4   |  22  |  4.55        Ca     9.2     [04-22-23 @ 13:45]      Mg     2.10     [04-22-23 @ 13:45]      Phos  4.4     [04-22-23 @ 13:45]            Creatinine Trend:  SCr 4.55 [04-22 @ 13:45]  SCr 2.84 [04-21 @ 16:15]  SCr 4.92 [04-20 @ 09:50]  SCr 5.19 [04-20 @ 07:33]    Urinalysis - [04-20-23 @ 07:57]      Color Light Yellow / Appearance Slightly Turbid / SG 1.014 / pH 7.0      Gluc 500 mg/dL / Ketone Negative  / Bili Negative / Urobili <2 mg/dL       Blood Large / Protein 100 mg/dL / Leuk Est Large / Nitrite Negative      RBC 5-10 / WBC >50 / Hyaline 1 / Gran  / Sq Epi  / Non Sq Epi  / Bacteria Negative      TSH <0.10      [04-21-23 @ 16:15]  Lipid: chol 107, TG 68, HDL 42, LDL --      [04-21-23 @ 16:15]    HBsAb <3.0      [04-20-23 @ 20:30]  HBsAg Nonreact      [04-20-23 @ 20:30]  HCV 9.49, Reactive      [04-20-23 @ 20:30]

## 2023-04-25 NOTE — CHART NOTE - NSCHARTNOTEFT_GEN_A_CORE
Notified by RN pt stating he is having a panic attack and needs IV ativan.  Pt seen and evaluated at bedside.  Prior to entering room, door was closed over as pt was being cleaned by staff. After pt was finished being cleaned, he was observed from hallway prior to entering room. Pt appeared calm, NAD, no increased work of breathing, no restlessness.   Upon entering the room, pt raised his voice demanding IV ativan. Explained to pt, psych recommended PRN ativan once per day and he already received that. Additionally explained that in general there is no indication for IV ativan and/or other IV pain medications as pt has an oral route. Will continue with current medication regimen.   Pt then stated he wants his IV taken out and he wants to be discharged today. Informed pt that provider will f/u with SW in regards to discharge back to [facility] as he is medically cleared. Informed RN ok for IV to be removed as per pt request.
Provider requested that dialysis RN place patient on schedule for either 1st or 2nd shift HD for tomorrow pending discharge back to [place].

## 2023-04-25 NOTE — PROGRESS NOTE ADULT - SUBJECTIVE AND OBJECTIVE BOX
[facility] Department of Hospital Medicine  Humphrey Orourke MD  Available on MS Teams  Pager: [phone]    Patient is a 62y old  Male who presents with a chief complaint of Feeling like his AICD is firing causing chest pain (24 Apr 2023 15:44)    OVERNIGHT EVENTS: No acute events overnight.    SUBJECTIVE: Pt seen and examined at bedside this morning. Alert and cooperative. Eager to go back to [facility]. Denies any complaints this morning. Tolerated HD well on 4/24.    ADDITIONAL REVIEW OF SYSTEMS: as above.    MEDICATIONS  (STANDING):  apixaban 2.5 milliGRAM(s) Oral two times a day  ascorbic acid 500 milliGRAM(s) Oral daily  atorvastatin 40 milliGRAM(s) Oral at bedtime  baclofen 10 milliGRAM(s) Oral every 12 hours  chlorhexidine 2% Cloths 1 Application(s) Topical daily  clonazePAM  Tablet 0.5 milliGRAM(s) Oral two times a day  coronavirus bivalent (EUA) Booster Vaccine (PFIZER) 0.3 milliLiter(s) IntraMuscular once  diphenhydrAMINE Elixir 50 milliGRAM(s) Oral <User Schedule>  epoetin marilin-epbx (RETACRIT) Injectable 4000 Unit(s) IV Push <User Schedule>  isosorbide   mononitrate ER Tablet (IMDUR) 30 milliGRAM(s) Oral daily  lactobacillus acidophilus 1 Tablet(s) Oral three times a day with meals  levETIRAcetam 250 milliGRAM(s) Oral <User Schedule>  levETIRAcetam 500 milliGRAM(s) Oral daily  levothyroxine 100 MICROGram(s) Oral daily  metoprolol succinate ER 50 milliGRAM(s) Oral daily  multivitamin 1 Tablet(s) Oral daily  mupirocin 2% Ointment 1 Application(s) Topical two times a day  mupirocin 2% Ointment 1 Application(s) Topical two times a day  polyethylene glycol 3350 17 Gram(s) Oral daily  senna 2 Tablet(s) Oral at bedtime  sevelamer carbonate 800 milliGRAM(s) Oral three times a day with meals  tiotropium 2.5 MICROgram(s) Inhaler 2 Puff(s) Inhalation daily    MEDICATIONS  (PRN):  bisacodyl Suppository 10 milliGRAM(s) Rectal daily PRN Constipation  melatonin 3 milliGRAM(s) Oral at bedtime PRN Insomnia  ondansetron    Tablet 4 milliGRAM(s) Oral every 8 hours PRN for nausea  sodium chloride 0.9% Bolus 100 milliLiter(s) IV Bolus once PRN hypotension    CAPILLARY BLOOD GLUCOSE    I&O's Summary    24 Apr 2023 07:01  -  25 Apr 2023 07:00  --------------------------------------------------------  IN: 700 mL / OUT: 1700 mL / NET: -1000 mL    25 Apr 2023 07:01  -  25 Apr 2023 15:19  --------------------------------------------------------  IN: 0 mL / OUT: 800 mL / NET: -800 mL    PHYSICAL EXAM:  Vital Signs Last 24 Hrs  T(C): 36.5 (25 Apr 2023 13:04), Max: 36.9 (25 Apr 2023 05:50)  T(F): 97.7 (25 Apr 2023 13:04), Max: 98.4 (25 Apr 2023 05:50)  HR: 66 (25 Apr 2023 13:04) (66 - 98)  BP: 109/62 (25 Apr 2023 13:04) (109/62 - 156/85)  BP(mean): --  RR: 18 (25 Apr 2023 13:04) (16 - 18)  SpO2: 100% (25 Apr 2023 13:04) (100% - 100%)    Parameters below as of 25 Apr 2023 13:04  Patient On (Oxygen Delivery Method): room air    CONSTITUTIONAL: NAD, well-developed  HEAD: Normocephalic, atraumatic  EYES: EOMI, PERRL  ENT: no rhinorrhea, no pharyngeal erythema  CHEST: right chest wall permacath present, dressing c/d/i  RESPIRATORY: No increased work of breathing, CTAB, no wheezes or crackles appreciated  CARDIOVASCULAR: RRR, S1 and S2 present, no m/r/g  ABDOMEN: soft, NT, ND, bowel sounds present  EXTREMITIES: limited movement of fingers/hands  MUSCULOSKELETAL: no joint swelling, no tenderness to palpation  NEURO: A&Ox3, moving all extremities    LABS:                        8.8    10.65 )-----------( 181      ( 25 Apr 2023 05:49 )             27.6     RADIOLOGY & ADDITIONAL TESTS:    Results Reviewed:   Imaging Personally Reviewed:  Electrocardiogram Personally Reviewed:    COORDINATION OF CARE:  Care Discussed with Consultants/Other Providers [Y/N]:  Prior or Outpatient Records Reviewed [Y/N]:
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
## 2023-04-26 NOTE — PROVIDER CONTACT NOTE (OTHER) - RECOMMENDATIONS
Hold baclofen and Klonopin
ACP should speak to pt
Continue to monitor patient for s/s of chest pain and reapproach assessment later.
Dr Gonzalez made aware. ACP Abimbola Marlow made aware
PO midodrine administration.
reschedule dialysis later in the day
Give PO ativan
ordered IV ativan
ACP should speak to pt

## 2023-04-26 NOTE — PROGRESS NOTE ADULT - PROBLEM SELECTOR PROBLEM 5
Permanent atrial fibrillation

## 2023-04-26 NOTE — PROGRESS NOTE ADULT - PROVIDER SPECIALTY LIST ADULT
Nephrology
Nephrology
Hospitalist
Nephrology
Nephrology
Hospitalist

## 2023-04-26 NOTE — PROGRESS NOTE ADULT - REASON FOR ADMISSION
Feeling like his AICD is firing causing chest pain

## 2023-04-26 NOTE — PROGRESS NOTE ADULT - PROBLEM SELECTOR PLAN 8
Continue levothyroxine   f/u TSH

## 2023-04-26 NOTE — PROGRESS NOTE ADULT - PROBLEM SELECTOR PLAN 7
Continue home meds  stable, not in acute exacerbation  Duonebs if needed

## 2023-04-26 NOTE — PROGRESS NOTE ADULT - SUBJECTIVE AND OBJECTIVE BOX
[facility] Department of Hospital Medicine  Humphrey Orourke MD  Available on MS Teams  Pager: [phone]    Patient is a 62y old  Male who presents with a chief complaint of Feeling like his AICD is firing causing chest pain (26 Apr 2023 11:09)    OVERNIGHT EVENTS: No acute events overnight.    SUBJECTIVE: Pt seen and examined at bedside this morning. Denies any acute complaints. Eager to return to [place].    ADDITIONAL REVIEW OF SYSTEMS: as above.    MEDICATIONS  (STANDING):  apixaban 2.5 milliGRAM(s) Oral two times a day  ascorbic acid 500 milliGRAM(s) Oral daily  atorvastatin 40 milliGRAM(s) Oral at bedtime  baclofen 10 milliGRAM(s) Oral every 12 hours  chlorhexidine 2% Cloths 1 Application(s) Topical daily  clonazePAM  Tablet 0.5 milliGRAM(s) Oral two times a day  coronavirus bivalent (EUA) Booster Vaccine (PFIZER) 0.3 milliLiter(s) IntraMuscular once  diphenhydrAMINE Elixir 50 milliGRAM(s) Oral <User Schedule>  epoetin marilin-epbx (RETACRIT) Injectable 4000 Unit(s) IV Push <User Schedule>  isosorbide   mononitrate ER Tablet (IMDUR) 30 milliGRAM(s) Oral daily  lactobacillus acidophilus 1 Tablet(s) Oral three times a day with meals  levETIRAcetam 500 milliGRAM(s) Oral daily  levETIRAcetam 250 milliGRAM(s) Oral <User Schedule>  levothyroxine 100 MICROGram(s) Oral daily  metoprolol succinate ER 50 milliGRAM(s) Oral daily  multivitamin 1 Tablet(s) Oral daily  mupirocin 2% Ointment 1 Application(s) Topical two times a day  mupirocin 2% Ointment 1 Application(s) Topical two times a day  polyethylene glycol 3350 17 Gram(s) Oral daily  senna 2 Tablet(s) Oral at bedtime  sevelamer carbonate 800 milliGRAM(s) Oral three times a day with meals  tiotropium 2.5 MICROgram(s) Inhaler 2 Puff(s) Inhalation daily    MEDICATIONS  (PRN):  bisacodyl Suppository 10 milliGRAM(s) Rectal daily PRN Constipation  melatonin 3 milliGRAM(s) Oral at bedtime PRN Insomnia  ondansetron    Tablet 4 milliGRAM(s) Oral every 8 hours PRN for nausea  sodium chloride 0.9% Bolus 100 milliLiter(s) IV Bolus once PRN hypotension    CAPILLARY BLOOD GLUCOSE    I&O's Summary    25 Apr 2023 07:01  -  26 Apr 2023 07:00  --------------------------------------------------------  IN: 0 mL / OUT: 1800 mL / NET: -1800 mL    PHYSICAL EXAM:  Vital Signs Last 24 Hrs  T(C): 36.4 (26 Apr 2023 05:19), Max: 36.5 (25 Apr 2023 13:04)  T(F): 97.5 (26 Apr 2023 05:19), Max: 97.7 (25 Apr 2023 13:04)  HR: 71 (26 Apr 2023 05:19) (66 - 71)  BP: 112/74 (26 Apr 2023 05:19) (109/62 - 112/74)  BP(mean): --  RR: 17 (26 Apr 2023 05:19) (17 - 18)  SpO2: 99% (26 Apr 2023 05:19) (97% - 100%)    Parameters below as of 26 Apr 2023 05:19  Patient On (Oxygen Delivery Method): room air    CONSTITUTIONAL: NAD, well-developed  HEAD: Normocephalic, atraumatic  EYES: EOMI, PERRL  ENT: no rhinorrhea, no pharyngeal erythema  CHEST: right chest wall permacath present, dressing c/d/i  RESPIRATORY: No increased work of breathing, CTAB, no wheezes or crackles appreciated  CARDIOVASCULAR: RRR, S1 and S2 present, no m/r/g  ABDOMEN: soft, NT, ND, bowel sounds present  EXTREMITIES: limited movement of fingers/hands  MUSCULOSKELETAL: no joint swelling, no tenderness to palpation  NEURO: A&Ox3, moving all extremities    LABS:                        8.8    10.65 )-----------( 181      ( 25 Apr 2023 05:49 )             27.6     RADIOLOGY & ADDITIONAL TESTS:    Results Reviewed:   Imaging Personally Reviewed:  Electrocardiogram Personally Reviewed:    COORDINATION OF CARE:  Care Discussed with Consultants/Other Providers [Y/N]:  Prior or Outpatient Records Reviewed [Y/N]:

## 2023-04-26 NOTE — PROGRESS NOTE ADULT - PROBLEM SELECTOR PLAN 9
therapeutically anticoagulated
therapeutically anticoagulated
therapeutically anticoagulated  Dispo: pending return to [facility] ([facility]). Patient received authorization but is currently pending private room as patient is on contact precautions at [facility]. Otherwise medically optimized for discharge back to [facility] once room available.
therapeutically anticoagulated
therapeutically anticoagulated
therapeutically anticoagulated  Dispo: pending return to [facility] ([facility]). Patient received authorization but is currently pending private room as patient is on contact precautions at [facility]. Otherwise medically optimized for discharge back to [facility] once room available.

## 2023-04-26 NOTE — PROVIDER CONTACT NOTE (OTHER) - DATE AND TIME:
24-Apr-2023 03:26
21-Apr-2023 04:57
22-Apr-2023 21:37
26-Apr-2023 17:10
21-Apr-2023 00:36
21-Apr-2023 16:00
22-Apr-2023 04:04
23-Apr-2023 02:42
23-Apr-2023 10:00
21-Apr-2023 06:40
23-Apr-2023 07:29
21-Apr-2023 03:19
21-Apr-2023 19:15

## 2023-04-26 NOTE — PROVIDER CONTACT NOTE (OTHER) - ACTION/TREATMENT ORDERED:
Dr Gonzalez- ok to terminate dialysis after 1 hour due to patient refusing. ok to send back to unit.

## 2023-04-26 NOTE — PROVIDER CONTACT NOTE (OTHER) - NAME OF MD/NP/PA/DO NOTIFIED:
lorena yancey
Dr Gonzalez
Dr Gonzalez nephrology
acp jhonny carmichael
ACP [id number] in ED & ACP Harriett Pagan
ACP Deana Kaur
ACP [place], Janelle
Dr. Gonzalez
Roger Kee
lorena yancey
ACP Deana Kaur
Kasey Weston
ACP

## 2023-04-26 NOTE — PROGRESS NOTE ADULT - PROBLEM SELECTOR PROBLEM 1
Chest pain
ESRD on dialysis
Chest pain
ESRD on dialysis
ESRD on dialysis
Chest pain
ESRD on dialysis
Chest pain

## 2023-04-26 NOTE — PROGRESS NOTE ADULT - PROBLEM SELECTOR PROBLEM 2
Anemia
ESRD on dialysis
Anemia
ESRD on dialysis

## 2023-04-26 NOTE — PROGRESS NOTE ADULT - PROBLEM SELECTOR PROBLEM 3
Hyperphosphatemia
Anemia
Hyperphosphatemia
Anemia

## 2023-04-26 NOTE — PROVIDER CONTACT NOTE (OTHER) - ASSESSMENT
patient only received 1 hour hemodialysis- patient is refusing treatment and being noncompliant with care. patient was educated on important of dialysis and is refusing against medical advice.

## 2023-04-26 NOTE — PROGRESS NOTE ADULT - PROBLEM SELECTOR PLAN 3
Pt with hyperphosphatemia in the setting of ESRD. Pt. on phosphate binders with meals. Serum phos is within target range at 4.1 on 4/20/23. Low phosphorus diet. Monitor serum phosphorus.    If you have any questions, please feel free to contact me  Sergio Gonzalez  Nephrology Fellow  [phone]; Prefer [employer] TEAMS  (After 5pm or on weekends please page the on-call fellow).
Likely anemia of ESRD
Likely anemia of ESRD  Patient reports he has received blood transfusions in the past  Patient planned for d/c to [facility], however facility requesting that Hgb be above 8 prior to transfer  Plan for CBC and transfusion of 1 unit PRBC with HD on 4/24 in preparation for possible d/c back to [facility] on 4/25  Consent completed with patient and placed in chart
Likely anemia of ESRD
Pt with hyperphosphatemia in the setting of ESRD. Pt. on phosphate binders with meals. Serum phos is within target range at 4.4 on. Low phosphorus diet. Monitor serum phosphorus.
Likely anemia of ESRD
Pt with hyperphosphatemia in the setting of ESRD. Pt. on phosphate binders with meals. Serum phos is within target range  Low phosphorus diet. Monitor serum phosphorus.
Pt with hyperphosphatemia in the setting of ESRD. Pt. on phosphate binders with meals. Serum phos is within target range at 4.1 today. Low phosphorus diet. Monitor serum phosphorus.    If you have any questions, please feel free to contact me  Sergio Gonzalez  Nephrology Fellow  [phone]; Prefer [employer] TEAMS  (After 5pm or on weekends please page the on-call fellow).
Likely anemia of ESRD  Patient reports he has received blood transfusions in the past  Patient planned for d/c to [facility] facility requesting that Hgb be above 8 prior to transfer  s/p 1 U PRBC with HD on 4/24  Hgb 8.8 on 4/25
Likely anemia of ESRD  Patient reports he has received blood transfusions in the past  Patient planned for d/c to [facility] facility requesting that Hgb be above 8 prior to transfer  s/p 1 U PRBC with HD on 4/24  Hgb 8.8 on 4/25

## 2023-04-26 NOTE — PROGRESS NOTE ADULT - SUBJECTIVE AND OBJECTIVE BOX
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 Feels like his AICD fired. Pt missed HD on Wed, 4/19. Last HD done was 4/17/23. Nephrology team was consulted for ESRD/HD management.     24 hour events/subjective: Patient seen this morning. Patient does not endorse CP currently, denies SOB, N/V, dizziness or LE swelling.       PAST HISTORY  --------------------------------------------------------------------------------  No significant changes to PMH, PSH, FHx, SHx, unless otherwise noted    ALLERGIES & MEDICATIONS  --------------------------------------------------------------------------------  Allergies    codeine (Unknown)  codeine (Rash)  Haldol (Unknown)  penicillin (Hives; Anaphylaxis)  aspirin (Hives)  Motrin (Rash)  Tylenol (Hives)  Toradol (Rash)    Intolerances      Standing Inpatient Medications  apixaban 2.5 milliGRAM(s) Oral two times a day  ascorbic acid 500 milliGRAM(s) Oral daily  atorvastatin 40 milliGRAM(s) Oral at bedtime  baclofen 10 milliGRAM(s) Oral every 12 hours  chlorhexidine 2% Cloths 1 Application(s) Topical daily  clonazePAM  Tablet 0.5 milliGRAM(s) Oral two times a day  coronavirus bivalent (EUA) Booster Vaccine (PFIZER) 0.3 milliLiter(s) IntraMuscular once  diphenhydrAMINE Elixir 50 milliGRAM(s) Oral <User Schedule>  epoetin marilin-epbx (RETACRIT) Injectable 4000 Unit(s) IV Push <User Schedule>  isosorbide   mononitrate ER Tablet (IMDUR) 30 milliGRAM(s) Oral daily  lactobacillus acidophilus 1 Tablet(s) Oral three times a day with meals  levETIRAcetam 250 milliGRAM(s) Oral <User Schedule>  levETIRAcetam 500 milliGRAM(s) Oral daily  levothyroxine 100 MICROGram(s) Oral daily  metoprolol succinate ER 50 milliGRAM(s) Oral daily  multivitamin 1 Tablet(s) Oral daily  mupirocin 2% Ointment 1 Application(s) Topical two times a day  mupirocin 2% Ointment 1 Application(s) Topical two times a day  polyethylene glycol 3350 17 Gram(s) Oral daily  senna 2 Tablet(s) Oral at bedtime  sevelamer carbonate 800 milliGRAM(s) Oral three times a day with meals  tiotropium 2.5 MICROgram(s) Inhaler 2 Puff(s) Inhalation daily    PRN Inpatient Medications  bisacodyl Suppository 10 milliGRAM(s) Rectal daily PRN  melatonin 3 milliGRAM(s) Oral at bedtime PRN  ondansetron    Tablet 4 milliGRAM(s) Oral every 8 hours PRN  sodium chloride 0.9% Bolus 100 milliLiter(s) IV Bolus once PRN      REVIEW OF SYSTEMS  --------------------------------------------------------------------------------  Gen: no fever  Respiratory: no sob  CV: no cp  GI: no ab pain  : suprapubic catheter  MSK: no chest pain    VITALS/PHYSICAL EXAM  --------------------------------------------------------------------------------  T(C): 36.4 (04-26-23 @ 05:19), Max: 36.5 (04-25-23 @ 13:04)  HR: 71 (04-26-23 @ 05:19) (66 - 71)  BP: 112/74 (04-26-23 @ 05:19) (109/62 - 112/74)  ABP: --  ABP(mean): --  RR: 17 (04-26-23 @ 05:19) (17 - 18)  SpO2: 99% (04-26-23 @ 05:19) (97% - 100%)  CVP(mm Hg): --        04-25-23 @ 07:01  -  04-26-23 @ 07:00  --------------------------------------------------------  IN: 0 mL / OUT: 1800 mL / NET: -1800 mL    Physical Exam:  	Gen: NAD; disheveled; laying flat   	HEENT: MMM  	Pulm: CTA B/L  	CV: S1S2  	Abd: Soft, +BS   	Ext: No LE edema B/L  	Neuro: Awake  	Skin: Warm and dry  	Vascular access: right tunneled cath              : + suprapubic catheter    LABS/STUDIES  --------------------------------------------------------------------------------              8.8    10.65 >-----------<  181      [04-25-23 @ 05:49]              27.6     Creatinine Trend:  SCr 4.55 [04-22 @ 13:45]  SCr 2.84 [04-21 @ 16:15]  SCr 4.92 [04-20 @ 09:50]  SCr 5.19 [04-20 @ 07:33]    Urinalysis - [04-20-23 @ 07:57]      Color Light Yellow / Appearance Slightly Turbid / SG 1.014 / pH 7.0      Gluc 500 mg/dL / Ketone Negative  / Bili Negative / Urobili <2 mg/dL       Blood Large / Protein 100 mg/dL / Leuk Est Large / Nitrite Negative      RBC 5-10 / WBC >50 / Hyaline 1 / Gran  / Sq Epi  / Non Sq Epi  / Bacteria Negative      TSH <0.10      [04-21-23 @ 16:15]  Lipid: chol 107, TG 68, HDL 42, LDL --      [04-21-23 @ 16:15]    HBsAb <3.0      [04-20-23 @ 20:30]  HBsAg Nonreact      [04-20-23 @ 20:30]  HCV 9.49, Reactive      [04-20-23 @ 20:30]

## 2023-04-26 NOTE — PROGRESS NOTE ADULT - PROBLEM SELECTOR PLAN 5
Continue Eliquis  Monitor, no events on AICD

## 2023-04-26 NOTE — PROGRESS NOTE ADULT - ASSESSMENT
ESRD on HD TIW
Pt is a 61 yo M with PMH CVA (WC bound, quadriparesis), COPD, CAD (AICD), ESRD (T/R/S), HTN, HLD, seizure d/o, a-fib (s/p ablation, eliquis), neurogenic bladder (SPC), BARRY, hypothyroidism, RCC (s/p partial nephrectomy), CHF, prostate CA (s/p radiation) p/w c/o 2x defib shock from AICD and L CP with radiation to LUE. Also with recent fall from , evaluated at OSH but left AMA prior to imaging. Missed HD with last session 4/17. CTH neg, EKG NSR without ischemic changes, renal consulted for HD and EP interrogated AICD without events. Psych following with c/f malingering. Pt with capacity to leave AMA. 
ESRD on HD TIW
Pt is a 63 yo M with PMH CVA (WC bound, quadriparesis), COPD, CAD (AICD), ESRD (T/R/S), HTN, HLD, seizure d/o, a-fib (s/p ablation, eliquis), neurogenic bladder (SPC), BARRY, hypothyroidism, RCC (s/p partial nephrectomy), CHF, prostate CA (s/p radiation) p/w c/o 2x defib shock from AICD and L CP with radiation to LUE. Also with recent fall from , evaluated at OSH but left AMA prior to imaging. Missed HD with last session 4/17. CTH neg, EKG NSR without ischemic changes, renal consulted for HD and EP interrogated AICD without events. Psych following with c/f malingering. Pt with capacity to leave AMA. 
ESRD on HD TIW
Pt is a 61 yo M with PMH CVA (WC bound, quadriparesis), COPD, CAD (AICD), ESRD (T/R/S), HTN, HLD, seizure d/o, a-fib (s/p ablation, eliquis), neurogenic bladder (SPC), BARRY, hypothyroidism, RCC (s/p partial nephrectomy), CHF, prostate CA (s/p radiation) p/w c/o 2x defib shock from AICD and L CP with radiation to LUE. Also with recent fall from , evaluated at OSH but left AMA prior to imaging. Missed HD with last session 4/17. CTH neg, EKG NSR without ischemic changes, renal consulted for HD and EP interrogated AICD without events. Psych following with c/f malingering. Pt with capacity to leave AMA. 
Pt is a 61 yo M with PMH CVA (WC bound, quadriparesis), COPD, CAD (AICD), ESRD (T/R/S), HTN, HLD, seizure d/o, a-fib (s/p ablation, eliquis), neurogenic bladder (SPC), BARRY, hypothyroidism, RCC (s/p partial nephrectomy), CHF, prostate CA (s/p radiation) p/w c/o 2x defib shock from AICD and L CP with radiation to LUE. Also with recent fall from , evaluated at OSH but left AMA prior to imaging. Missed HD with last session 4/17. CTH neg, EKG NSR without ischemic changes, renal consulted for HD and EP interrogated AICD without events. Psych following with c/f malingering. Pt with capacity to leave AMA. 
ESRD on HD TIW
Pt is a 61 yo M with PMH CVA (WC bound, quadriparesis), COPD, CAD (AICD), ESRD (T/R/S), HTN, HLD, seizure d/o, a-fib (s/p ablation, eliquis), neurogenic bladder (SPC), BARRY, hypothyroidism, RCC (s/p partial nephrectomy), CHF, prostate CA (s/p radiation) p/w c/o 2x defib shock from AICD and L CP with radiation to LUE. Also with recent fall from , evaluated at OSH but left AMA prior to imaging. Missed HD with last session 4/17. CTH neg, EKG NSR without ischemic changes, renal consulted for HD and EP interrogated AICD without events. Psych following with c/f malingering. Pt with capacity to leave AMA. 
Pt is a 61 yo M with PMH CVA (WC bound, quadriparesis), COPD, CAD (AICD), ESRD (T/R/S), HTN, HLD, seizure d/o, a-fib (s/p ablation, eliquis), neurogenic bladder (SPC), BARRY, hypothyroidism, RCC (s/p partial nephrectomy), CHF, prostate CA (s/p radiation) p/w c/o 2x defib shock from AICD and L CP with radiation to LUE. Also with recent fall from , evaluated at OSH but left AMA prior to imaging. Missed HD with last session 4/17. CTH neg, EKG NSR without ischemic changes, renal consulted for HD and EP interrogated AICD without events. Psych following with c/f malingering. Pt with capacity to leave AMA.

## 2023-04-26 NOTE — PROVIDER CONTACT NOTE (OTHER) - BACKGROUND
62 year old male with chest pain, seizures, afib, ESRD on HD. patient has quadriparesis, Patient unable to  hands but is able to flex elbows and move his arms.
Admitted for Chest pain
62 year old male with chest pain, seizures, afib, ESRD on HD. patient has quadriparesis, Patient unable to  hands but is able to flex elbows and move his arms.
Admitted for chest pain
Admitted for chest pain
Pt admitted for chest pain, hx of ESRD, HTN.
62 year old male with chest pain, seizures, afib, ESRD on HD. patient has quadriparesis, Patient unable to  hands but is able to flex elbows and move his arms.
Pt was admitted with chest pain
62 year old male with chest pain, seizures, afib, ESRD on HD. patient has quadriparesis, Patient unable to  hands but is able to flex elbows and move his arms.
Pt was admitted with chest pain
Pt was admitted with chest pain

## 2023-04-26 NOTE — PROGRESS NOTE ADULT - PROBLEM SELECTOR PLAN 1
Patient is not in acute distress at this time, chest pain subsided  EKG is non ischemic, NSR, possibly poor R wave progression  Elevation in troponin is expected in patient with ESRD on HD  Evaluated by EP, ICD w/o events.   no need for tele  psych consulted for evaluation of malingering, recommending c/w home klonopin  pt with capacity to leave AMA
Pt. with ESRD on HD three times a week (MWF). Pt being admitted for CP from [facility]. Missed iHD on Wed, 4/19. Received HD last night and will plan for HD per his usual schedule today. Labs and vitals. Patient is nonoliguric. HD consent verbally obtained from patient as he has bilateral hand contractions. Consent placed in chart. Dose meds as per HD.
Pt. with ESRD on HD three times a week (MWF). Pt being admitted for CP from [facility]. Missed iHD on Wed, 4/19. HD consent verbally obtained from patient as he has bilateral hand contractions. Consent placed in chart.    Plan for maintenance HD today..
Patient is not in acute distress at this time, chest pain subsided  EKG is non ischemic, NSR, possibly poor R wave progression  Elevation in troponin is expected in patient with ESRD on HD  Evaluated by EP, ICD w/o events.   no need for tele  psych consulted for evaluation of malingering, recommending c/w home klonopin  pt with capacity to leave AMA
Pt. with ESRD on HD three times a week (MWF). Pt being admitted for CP from [facility]. Missed iHD on Wed, 4/19. HD consent verbally obtained from patient as he has bilateral hand contractions. Consent placed in chart.  Received a complete session of HD on 4/20/23. On 4/21, HD cut short to an hour due to hypotension. Labs and vitals. Patient is nonoliguric. No plan for HD today. Will plan for iHD on Monday. Dose meds as per HD.
Pt. with ESRD on HD three times a week (MWF). Pt being admitted for CP from [facility]. Missed iHD on Wed, 4/19. HD consent verbally obtained from patient as he has bilateral hand contractions. Consent placed in chart.  Received a complete session of HD on 4/20/23. On 4/21, HD cut short to an hour due to hypotension. Labs and vitals. Patient is nonoliguric. Was stable off HD throughout the weekend.  Plan for maintenance HD today..
Patient is not in acute distress at this time, chest pain subsided  EKG is non ischemic, NSR, possibly poor R wave progression  Elevation in troponin is expected in patient with ESRD on HD  Evaluated by EP, ICD w/o events.   no need for tele  psych consulted for evaluation of malingering, recommending c/w home klonopin  pt with capacity to leave AMA

## 2023-04-26 NOTE — PROGRESS NOTE ADULT - PROBLEM SELECTOR PLAN 6
Continue home meds
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
## 2023-04-27 NOTE — DISCHARGE NOTE NURSING/CASE MANAGEMENT/SOCIAL WORK - NSDCFUADDAPPT_GEN_ALL_CORE_FT
Podiatry Discharge Instructions:  Follow up: Please follow up with Dr. Dayron Lira within 1 week of discharge from the hospital, please call [phone] for appointment and discuss that you recently were seen in the hospital.  Wound Care: Please apply mupirocin to right heel wound and betadine to the scabs on both feet daily followed by 4x4 gauze and kerlix.   Weight bearing: Please wear BL z-flows at all times while in bed.
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
## 2023-04-28 NOTE — ED PROVIDER NOTE - PHYSICAL EXAMINATION
PHYSICAL EXAM:  CONSTITUTIONAL: Elderly appearing, awake, alert, oriented to person, place, time/situation and in no apparent distress.  HEAD: Atraumatic  EYES: Clear bilaterally, pupils equal, round and reactive to light.  ENMT: Airway patent, Nasal mucosa clear. Mouth with normal mucosa. Uvula is midline.   CARDIAC: Normal rate, regular rhythm. +S1/S2. No murmurs, rubs or gallops.  RESPIRATORY: Breathing unlabored. Breath sounds clear and equal bilaterally.  ABDOMEN:  Soft, nontender, nondistended. No rebound tenderness or guarding.  NEUROLOGICAL: Alert and oriented, no focal deficits, no motor or sensory deficits.   MSK: No clubbing, cyanosis, or edema.   SKIN: Skin warm and dry. No evidence of rashes or lesions. Chronic ulcers over B/L lower heels, no surrounding purulence. Dried blood, no active bleeding.
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
## 2023-04-28 NOTE — H&P ADULT - PROBLEM SELECTOR PLAN 1
Pt verbally abusive to staff in ED, refusing meds, food and blood work  Pt recently discharged from [facility] on 4/27/23   Combative behavior possibly 2/2 malingering   seen by  team on last admission, reconsulted for this admission - appreciate recs  Monitor ,if becomes combative can place on 1:1   Continue Klonopin 0.5 mg BID for anxiety  Avoid IV pain medications   SW consult for disposition- patient does not want to return to [place]   Follow up with EKG for baseline QTc Pt verbally abusive to staff in ED, refusing meds, food and blood work  Pt recently discharged from [facility] on 4/27/23   Combative behavior possibly 2/2 malingering   seen by  team on last admission, reconsulted for this admission - appreciate recs  Monitor ,if becomes combative can place on 1:1   Continue Klonopin 0.5 mg BID for anxiety  Avoid IV pain medications   SW consult for disposition- patient does not want to return to [place]   Follow up with EKG for baseline QTc  R/o infectious etiologies for combative behavior - f.up with blood cultures when pt amenable for blood work
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
## 2023-04-28 NOTE — PATIENT PROFILE ADULT - FALL HARM RISK - HARM RISK INTERVENTIONS

## 2023-04-28 NOTE — CONSULT NOTE ADULT - PROBLEM SELECTOR RECOMMENDATION 9
Pt. with ESRD on HD three times a week (MWF). Pt being admitted for CP for placement to another facility. Last HD was done on Wed 4/26/23 via RIJ tunneled HD catheter for only one hour due behavioral concerns. No labs done today. Will plan for maintenance iHD today. HD consent verbally obtained from patient as he has bilateral hand contractions. consent placed in chart. HD orders placed. Dose meds as per HD.    For anemia and hyperphosphatemia management, we will need labs.   INCOMPLETE NOTE    If you have any questions, please feel free to contact me  Sergio Gonzalez  Nephrology Fellow  [phone]; Prefer [employer] TEAMS  (After 5pm or on weekends please page the on-call fellow). Pt. with ESRD on HD three times a week (MWF). Pt being admitted for CP for placement to another facility. Last HD was done on Wed 4/26/23 via RIJ tunneled HD catheter for only one hour due behavioral concerns. No labs done today. Will plan for maintenance iHD today. HD consent verbally obtained from patient as he has bilateral hand contractions. consent placed in chart. HD orders placed. Dose meds as per HD.    For anemia and hyperphosphatemia management, we will need labs.     If you have any questions, please feel free to contact me  Sergio Gonzalez  Nephrology Fellow  [phone]; Prefer [employer] TEAMS  (After 5pm or on weekends please page the on-call fellow).

## 2023-04-28 NOTE — H&P ADULT - PROBLEM SELECTOR PLAN 9
on Levothyroxine 100mcg QD at home    converted to 75mcg IV as patient refusing medications, resume PO when patient amenable for PO medications

## 2023-04-28 NOTE — PROVIDER CONTACT NOTE (OTHER) - ASSESSMENT
Patient wants to leave AMA, but per provider, patient needs to received dialysis. Patient also states he wants no PO medication and only medication through IV, provider aware, patient will not get medication through IV at this moment. Patient refused PO Dilaudid that provider ordered for his 10/10 bilateral feet pain, provider aware. Patient states "get me a wheelchair, I will leave myself", provider made aware. Per provider patient cannot leave due to placement issues as well.

## 2023-04-28 NOTE — PROVIDER CONTACT NOTE (OTHER) - ASSESSMENT
JOSE G was notified by medical provider that pt is cleared for discharge and transport via ambulance is needed [facility]. JOSE G contacted [facility] spoke with Nursing Supervisor Ms. Sydney Bryan [phone] she confirmed that pt is a resident; SW informed her of pt discharge and transport to return she agreed. JOSE G arrange transport with [facility]; spoke with Ana Paula Invoice# [phone]. Vendor [facility]; Trip# [id number] to  by 5AM.   UPDATE: Medical provider informed JOSE G that discharged is being placed on hold; advised SW to cancel the transport and to inform [facility] staff of this change. JOSE G spoke with Angeli at [facility] to cancelled the transport and spoke with Ms. Bryan at [facility] informing her that the discharge as been placed on hold as per medical provider.
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
## 2023-04-28 NOTE — H&P ADULT - ASSESSMENT
62 year old male with hx of CVA (WC bound, quadriparesis), COPD, CAD (AICD), ESRD (T/R/S), HTN, HLD, seizure d/o, a-fib (s/p ablation, eliquis), neurogenic bladder (SPC), BARRY, hypothyroidism, RCC (s/p partial nephrectomy), CHF, prostate CA (s/p radiation) presents from [place] for combative behavior. Patient was discharged from [facility] on 4/27/23 and then started kicking his feet in the ambulance. Patient was combative with staff upon arrival and sent back to [facility].
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
## 2023-04-28 NOTE — CHART NOTE - NSCHARTNOTEFT_GEN_A_CORE
Notified by RN that pt refused to go down for xray unless he can receive IV pain medication. Provider went to speak to him. Pt continued to complain of foot pain that is only relieved by the IV medication. As PO medications "take time to go down the stomach" and it "doesn't work" for him, pt continued to demand only IV pain medication. Provider explained that it is best to keep it as PO medications for now as we are continuing to plan for discharge and that both PO and IV medications are same dilaudid although IV can work faster. Pt continued to curse using foul languages and yell and wouldn't let provider finish talking and demanded IV line to be taken out and continued to yell by saying that he will refuse all treatments at this time including dialysis. Provider explained that medical team will continue to recommend and offer further medical care. Pt understands and continues to refuse all treatments at this time as he is not getting IV pain medication as he wishes. Case was discussed with Dr. Bland.

## 2023-04-28 NOTE — BH CONSULTATION LIAISON ASSESSMENT NOTE - MSE UNSTRUCTURED FT
Mental Status Exam:  Melissa: well groomed, fair hygiene     Behavior: calm, cooperative, no psychomotor retardation/agitation  Motor: no tremors, EPS, or rigidity  Gait: did not assess, pt in bed  Speech: normal rate, rhythm, prosody and volume   Mood: "okay"  Affect: euthymic, congruent  Thought process: clear, goal directed, linear   Thought Content: denies paranoia, delusions; talks about not wanting to go back to [place]  Perception: denies AH/VH  SI: denies  HI: denies  Insight: limited  Judgment: limited    Cognitive Exam:  Orientation: AOx3  Recall: intact  Attention: intact  Abstraction: intact

## 2023-04-28 NOTE — ED ADULT NURSE REASSESSMENT NOTE - NS ED NURSE REASSESS COMMENT FT1
Patient refusing medication and X-Ray. patient yelling at staff, stating foul language and slurs. MD made aware that patient is refusing X-Ray and medications. Awaiting further orders.

## 2023-04-28 NOTE — PATIENT PROFILE ADULT - FUNCTIONAL ASSESSMENT - BASIC MOBILITY 6.
2-calculated by average/Not able to assess (calculate score using [facility] averaging method)

## 2023-04-28 NOTE — ED PROVIDER NOTE - PROGRESS NOTE DETAILS
Dorina Warren, DO PGY-2  Discussed with [facility] staff Sydney Patten who reports that they do not want to accept the patient back to their facility as he does not want to be there and is abusive towards staff. Dorina Warren,  PGY-2  Recommended XR to evaluate hand pain and pain meds offered however patient currently declining.  Multiple attempts made with social work discussing disposition however patient refuses to return to [place], states that he would rather "crawl on the street" then go there. Dorina Warren, DO PGY-2  Discussed with hospitalist, patient will be admitted for disposition/social work. Will require dialysis.

## 2023-04-28 NOTE — H&P ADULT - HISTORY OF PRESENT ILLNESS
62 year old male with hx of CVA (WC bound, quadriparesis), COPD, CAD (AICD), ESRD (T/R/S), HTN, HLD, seizure d/o, a-fib (s/p ablation, eliquis), neurogenic bladder (SPC), BARRY, hypothyroidism, RCC (s/p partial nephrectomy), CHF, prostate CA (s/p radiation) presents from [place] for combative behavior. Patient was discharged from [facility] on 4/27/23 and then started kicking his feet in the ambulance. Patient was combative with staff upon arrival and sent back to [facility]. Pt very drowsy when seen by writer , as per staff patient was verbally abusive prior to writers examination. Pt refusing blood work , medications and food.

## 2023-04-28 NOTE — BH CONSULTATION LIAISON ASSESSMENT NOTE - SUMMARY
63 yo male, single, disabled, living in [facility] with PMH of HTN, HLD, 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, seizures, Afib s/p ablation on AC (Eliquis), CAD s/p PCI, cardiomyopathy (EF 45% in 9/22), AICD in 2018, neurogenic bladder with SPC, COPD/BARRY on O2 at night w/o CPAP, hypothyroidism, anemia,  ESRD on HD (M, W,F) via [place] P/C, RCC s/p left partial nephrotomy, bladder CA s/p TURBT, chronic foot pressure ulcers, wheelchair bound p/w from [place] (SNF), PPH of PTSD, no psych hospitalization, previous in rehab for alcohol use disorder, no current substance use, hx of cigarette smoking, no outpatient psychiatrist, remote hx of therapy for PTSD- with hospitalization at [facility] from 4/20-4/27.  Psych CL saw patient last admission to rule out malingering- at that point in time, it did not appear that there was a malingering component, although patient likely had a personality disorder on top of complex PTSD.  Of note, patient was discharged to [place] yesterday, however refused to get off the stretcher at [place] and was immediately brought back to [facility].  Psych CL consulted to assist in behavioral management.    Patient does not appear to be acutely psychotic, manic or depressed. He adamantly denies SI/HI. He advocates for himself. He is help seeking and help rejecting and showing manipulative behavior. 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 Treating said personality disorders is extremely difficult especially when the patient does not see there to be a problem.    [] If there is concern for any sabotaging of his care- patient should be placed on 1:1 (of note, this is not for SI/SA- this would be if there is concern for malingering or factitious disorder)  [] To avoid splittting- would strongly encourage team (attending, RN, ACP, RN manager, SW) to see patient as a unit to ensure that both his questions are answered in front of everyone, while at the same time addressing teams plans  - Continue Klonopin 0.5 mg BID for anxiety  - PRN: Ativan 1 mg QD PO/IV/IM PRN for anxiety   [] SW consult to work with patient regarding disposition

## 2023-04-28 NOTE — BH CONSULTATION LIAISON ASSESSMENT NOTE - NSBHCONSULTFOLLOWAFTERCARE_PSY_A_CORE FT
[facility]  [address] [place] [id number] [phone]

## 2023-04-28 NOTE — BH CONSULTATION LIAISON ASSESSMENT NOTE - NSBHCHARTREVIEWVS_PSY_A_CORE FT
Vital Signs Last 24 Hrs  T(C): 36.4 (28 Apr 2023 12:47), Max: 36.6 (28 Apr 2023 05:30)  T(F): 97.6 (28 Apr 2023 12:47), Max: 97.9 (28 Apr 2023 05:30)  HR: 63 (28 Apr 2023 12:47) (60 - 87)  BP: 114/74 (28 Apr 2023 12:47) (108/62 - 127/76)  BP(mean): --  RR: 20 (28 Apr 2023 12:47) (17 - 20)  SpO2: 97% (28 Apr 2023 12:47) (97% - 100%)    Parameters below as of 28 Apr 2023 12:47  Patient On (Oxygen Delivery Method): room air

## 2023-04-28 NOTE — BH CONSULTATION LIAISON ASSESSMENT NOTE - CURRENT MEDICATION
MEDICATIONS  (STANDING):  apixaban 2.5 milliGRAM(s) Oral every 12 hours  ascorbic acid 500 milliGRAM(s) Oral daily  atorvastatin 40 milliGRAM(s) Oral at bedtime  baclofen 10 milliGRAM(s) Oral every 12 hours  calamine/zinc oxide Lotion 1 Application(s) Topical three times a day  clonazePAM  Tablet 0.5 milliGRAM(s) Oral two times a day  HYDROmorphone   Tablet 2 milliGRAM(s) Oral once  isosorbide   mononitrate ER Tablet (IMDUR) 30 milliGRAM(s) Oral daily  levETIRAcetam 250 milliGRAM(s) Oral <User Schedule>  levETIRAcetam  IVPB 500 milliGRAM(s) IV Intermittent daily  levothyroxine Injectable 75 MICROGram(s) IV Push at bedtime  metoprolol succinate ER 50 milliGRAM(s) Oral daily  multivitamin 1 Tablet(s) Oral daily  polyethylene glycol 3350 17 Gram(s) Oral daily  senna 2 Tablet(s) Oral at bedtime  sevelamer carbonate 800 milliGRAM(s) Oral three times a day with meals  tiotropium 2.5 MICROgram(s) Inhaler 2 Puff(s) Inhalation daily    MEDICATIONS  (PRN):  bisacodyl Suppository 10 milliGRAM(s) Rectal daily PRN Constipation  melatonin 3 milliGRAM(s) Oral at bedtime PRN Insomnia

## 2023-04-28 NOTE — CONSULT NOTE ADULT - SUBJECTIVE AND OBJECTIVE BOX
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 Nephrology team was consulted for ESRD/HD management. Pt currently received HD TIW (TTS) at [facility]. Follows with Dr. Mercedes. Last HD done on Wed (4/4/26/23) via his right tunneled IJ catheter at [facility]. Pt seen and examined at bedside. Pt awake oriented to self. Refusing labs. Patient does not endorse CP currently, denies SOB, N/V, dizziness or LE swelling.       PAST HISTORY  --------------------------------------------------------------------------------  PAST MEDICAL & SURGICAL HISTORY:  Chronic congestive heart failure, unspecified congestive heart failure type  rEF/pEF      Hep C w/o coma, chronic      HTN (hypertension)      AICD (automatic cardioverter/defibrillator) present  [employer]      Atrial fibrillation      CAD (coronary artery disease)  (s/p 2 stents in Sep 2017)      Hyperlipidemia      Renal cell carcinoma of left kidney  s/p partial nephrectomy in 2002  biopsy again in Sep 2017 showed RCC again in stage 2      COPD (chronic obstructive pulmonary disease)      Bladder cancer      Peripheral neuropathy      Prostate cancer  s/p radiation      Nephrolithiasis      Kidney stone      AICD (automatic cardioverter/defibrillator) present      S/P cholecystectomy  2015      H/O partial nephrectomy  2002      History of percutaneous coronary intervention      H/O transurethral destruction of bladder lesion      History of coronary artery stent placement        FAMILY HISTORY:  Family history of chronic ischemic heart disease    Family history of lung cancer      PAST SOCIAL HISTORY:    ALLERGIES & MEDICATIONS  --------------------------------------------------------------------------------  Allergies    codeine (Rash)  Haldol (Unknown)  Motrin (Rash)  penicillin (Hives; Anaphylaxis)  Toradol (Rash)  Tylenol (Hives)  aspirin (Hives)  codeine (Unknown)    Intolerances      Standing Inpatient Medications  apixaban 2.5 milliGRAM(s) Oral every 12 hours  ascorbic acid 500 milliGRAM(s) Oral daily  atorvastatin 40 milliGRAM(s) Oral at bedtime  baclofen 10 milliGRAM(s) Oral every 12 hours  calamine/zinc oxide Lotion 1 Application(s) Topical three times a day  clonazePAM  Tablet 0.5 milliGRAM(s) Oral two times a day  HYDROmorphone   Tablet 2 milliGRAM(s) Oral once  isosorbide   mononitrate ER Tablet (IMDUR) 30 milliGRAM(s) Oral daily  levETIRAcetam 250 milliGRAM(s) Oral <User Schedule>  levETIRAcetam  IVPB 500 milliGRAM(s) IV Intermittent daily  levothyroxine Injectable 75 MICROGram(s) IV Push at bedtime  metoprolol succinate ER 50 milliGRAM(s) Oral daily  multivitamin 1 Tablet(s) Oral daily  polyethylene glycol 3350 17 Gram(s) Oral daily  senna 2 Tablet(s) Oral at bedtime  sevelamer carbonate 800 milliGRAM(s) Oral three times a day with meals  tiotropium 2.5 MICROgram(s) Inhaler 2 Puff(s) Inhalation daily    PRN Inpatient Medications  bisacodyl Suppository 10 milliGRAM(s) Rectal daily PRN  melatonin 3 milliGRAM(s) Oral at bedtime PRN      REVIEW OF SYSTEMS  --------------------------------------------------------------------------------  Gen: No fevers/chills  Skin: No rashes  Head/Eyes/Ears: Normal hearing,   Respiratory: No dyspnea, cough  CV: No chest pain  GI: No abdominal pain, diarrhea  : No dysuria, hematuria  MSK: No  edema  Heme: No easy bruising or bleeding  Psych: No significant depression    All other systems were reviewed and are negative, except as noted.    VITALS/PHYSICAL EXAM  --------------------------------------------------------------------------------  T(C): 36.4 (04-28-23 @ 12:47), Max: 36.6 (04-28-23 @ 05:30)  HR: 63 (04-28-23 @ 12:47) (60 - 87)  BP: 114/74 (04-28-23 @ 12:47) (113/76 - 127/76)  RR: 20 (04-28-23 @ 12:47) (17 - 20)  SpO2: 97% (04-28-23 @ 12:47) (97% - 100%)  Wt(kg): --  Height (cm): 180.3 (04-28-23 @ 05:53)  Weight (kg): 81.647 (04-28-23 @ 05:53)  BMI (kg/m2): 25.1 (04-28-23 @ 05:53)  BSA (m2): 2.02 (04-28-23 @ 05:53)      04-27-23 @ 07:01  -  04-28-23 @ 07:00  --------------------------------------------------------  IN: 0 mL / OUT: 750 mL / NET: -750 mL      Physical Exam:  	Gen: NAD  	HEENT: MMM  	Pulm: CTA B/L  	CV: S1S2  	Abd: Soft, +BS   	Ext: No LE edema B/L  	Neuro: Awake  	Skin: Warm and dry  	Vascular access:    LABS/STUDIES  --------------------------------------------------------------------------------                Creatinine Trend:  SCr 4.55 [04-22 @ 13:45]  SCr 2.84 [04-21 @ 16:15]  SCr 4.92 [04-20 @ 09:50]  SCr 5.19 [04-20 @ 07:33]    Urinalysis - [04-20-23 @ 07:57]      Color Light Yellow / Appearance Slightly Turbid / SG 1.014 / pH 7.0      Gluc 500 mg/dL / Ketone Negative  / Bili Negative / Urobili <2 mg/dL       Blood Large / Protein 100 mg/dL / Leuk Est Large / Nitrite Negative      RBC 5-10 / WBC >50 / Hyaline 1 / Gran  / Sq Epi  / Non Sq Epi  / Bacteria Negative      Iron 66, TIBC 206, %sat 32      [03-12-23 @ 06:31]  Ferritin 964      [03-12-23 @ 06:31]  HbA1c 6.2      [01-03-20 @ 13:57]  TSH <0.10      [04-21-23 @ 16:15]  Lipid: chol 107, TG 68, HDL 42, LDL --      [04-21-23 @ 16:15]    HBsAb <3.0      [04-20-23 @ 20:30]  HBsAg Nonreact      [04-20-23 @ 20:30]  HBcAb Nonreact      [03-11-23 @ 19:25]  HCV 9.49, Reactive      [04-20-23 @ 20:30]     [facility] DIVISION OF KIDNEY DISEASES AND HYPERTENSION -- [phone]  -- INITIAL CONSULT NOTE  --------------------------------------------------------------------------------  HPI:  Pt is a 62-year-old Male with significant PMHx of ESRD on HD TIW (TTS- started In Jan 23), RCC s/p L partial nephrectomy, HTN, CLD, 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 on 4/28/23 for placement to another facility. Nephrology team was consulted for ESRD/HD management. Pt currently received HD TIW (TTS) at [facility]. Follows with Dr. Mercedes. Last HD done on Wed (4/4/26/23) via his right tunneled IJ catheter at [facility]. Pt seen and examined at bedside. Pt awake oriented to self. Refusing labs. Patient does not endorse CP currently, denies SOB, N/V, dizziness or LE swelling.     Recently admitted and discharge on 4/27.     PAST HISTORY  --------------------------------------------------------------------------------  PAST MEDICAL & SURGICAL HISTORY:  Chronic congestive heart failure, unspecified congestive heart failure type  rEF/pEF      Hep C w/o coma, chronic      HTN (hypertension)      AICD (automatic cardioverter/defibrillator) present  [employer]      Atrial fibrillation      CAD (coronary artery disease)  (s/p 2 stents in Sep 2017)      Hyperlipidemia      Renal cell carcinoma of left kidney  s/p partial nephrectomy in 2002  biopsy again in Sep 2017 showed RCC again in stage 2      COPD (chronic obstructive pulmonary disease)      Bladder cancer      Peripheral neuropathy      Prostate cancer  s/p radiation      Nephrolithiasis      Kidney stone      AICD (automatic cardioverter/defibrillator) present      S/P cholecystectomy  2015      H/O partial nephrectomy  2002      History of percutaneous coronary intervention      H/O transurethral destruction of bladder lesion      History of coronary artery stent placement        FAMILY HISTORY:  Family history of chronic ischemic heart disease    Family history of lung cancer      PAST SOCIAL HISTORY:    ALLERGIES & MEDICATIONS  --------------------------------------------------------------------------------  Allergies    codeine (Rash)  Haldol (Unknown)  Motrin (Rash)  penicillin (Hives; Anaphylaxis)  Toradol (Rash)  Tylenol (Hives)  aspirin (Hives)  codeine (Unknown)    Intolerances      Standing Inpatient Medications  apixaban 2.5 milliGRAM(s) Oral every 12 hours  ascorbic acid 500 milliGRAM(s) Oral daily  atorvastatin 40 milliGRAM(s) Oral at bedtime  baclofen 10 milliGRAM(s) Oral every 12 hours  calamine/zinc oxide Lotion 1 Application(s) Topical three times a day  clonazePAM  Tablet 0.5 milliGRAM(s) Oral two times a day  HYDROmorphone   Tablet 2 milliGRAM(s) Oral once  isosorbide   mononitrate ER Tablet (IMDUR) 30 milliGRAM(s) Oral daily  levETIRAcetam 250 milliGRAM(s) Oral <User Schedule>  levETIRAcetam  IVPB 500 milliGRAM(s) IV Intermittent daily  levothyroxine Injectable 75 MICROGram(s) IV Push at bedtime  metoprolol succinate ER 50 milliGRAM(s) Oral daily  multivitamin 1 Tablet(s) Oral daily  polyethylene glycol 3350 17 Gram(s) Oral daily  senna 2 Tablet(s) Oral at bedtime  sevelamer carbonate 800 milliGRAM(s) Oral three times a day with meals  tiotropium 2.5 MICROgram(s) Inhaler 2 Puff(s) Inhalation daily    PRN Inpatient Medications  bisacodyl Suppository 10 milliGRAM(s) Rectal daily PRN  melatonin 3 milliGRAM(s) Oral at bedtime PRN      REVIEW OF SYSTEMS  --------------------------------------------------------------------------------  Gen: No fevers/chills  Skin: dry skin   Head/Eyes/Ears: Normal hearing,   Respiratory: No dyspnea, cough  CV: No chest pain  GI: No abdominal pain, diarrhea  : No dysuria, hematuria  MSK: No  edema  Heme: No easy bruising or bleeding  Psych: No significant depression    All other systems were reviewed and are negative, except as noted.    VITALS/PHYSICAL EXAM  --------------------------------------------------------------------------------  T(C): 36.4 (04-28-23 @ 12:47), Max: 36.6 (04-28-23 @ 05:30)  HR: 63 (04-28-23 @ 12:47) (60 - 87)  BP: 114/74 (04-28-23 @ 12:47) (113/76 - 127/76)  RR: 20 (04-28-23 @ 12:47) (17 - 20)  SpO2: 97% (04-28-23 @ 12:47) (97% - 100%)  Wt(kg): --  Height (cm): 180.3 (04-28-23 @ 05:53)  Weight (kg): 81.647 (04-28-23 @ 05:53)  BMI (kg/m2): 25.1 (04-28-23 @ 05:53)  BSA (m2): 2.02 (04-28-23 @ 05:53)      04-27-23 @ 07:01  -  04-28-23 @ 07:00  --------------------------------------------------------  IN: 0 mL / OUT: 750 mL / NET: -750 mL      Physical Exam:  	Gen: NAD; disheveled;  	HEENT: MMM  	Pulm: CTA B/L  	CV: S1S2  	Abd: Soft, +BS   	Ext: No LE edema B/L  	Neuro: Awake  	Skin: Warm and dry  	Vascular access: right tunneled cath              : + suprapubic catheter      LABS/STUDIES  --------------------------------------------------------------------------------                Creatinine Trend:  SCr 4.55 [04-22 @ 13:45]  SCr 2.84 [04-21 @ 16:15]  SCr 4.92 [04-20 @ 09:50]  SCr 5.19 [04-20 @ 07:33]    Urinalysis - [04-20-23 @ 07:57]      Color Light Yellow / Appearance Slightly Turbid / SG 1.014 / pH 7.0      Gluc 500 mg/dL / Ketone Negative  / Bili Negative / Urobili <2 mg/dL       Blood Large / Protein 100 mg/dL / Leuk Est Large / Nitrite Negative      RBC 5-10 / WBC >50 / Hyaline 1 / Gran  / Sq Epi  / Non Sq Epi  / Bacteria Negative      Iron 66, TIBC 206, %sat 32      [03-12-23 @ 06:31]  Ferritin 964      [03-12-23 @ 06:31]  HbA1c 6.2      [01-03-20 @ 13:57]  TSH <0.10      [04-21-23 @ 16:15]  Lipid: chol 107, TG 68, HDL 42, LDL --      [04-21-23 @ 16:15]    HBsAb <3.0      [04-20-23 @ 20:30]  HBsAg Nonreact      [04-20-23 @ 20:30]  HBcAb Nonreact      [03-11-23 @ 19:25]  HCV 9.49, Reactive      [04-20-23 @ 20:30]     [facility] DIVISION OF KIDNEY DISEASES AND HYPERTENSION -- [phone]  -- INITIAL CONSULT NOTE  --------------------------------------------------------------------------------  HPI:  Pt is a 62-year-old Male with significant PMHx of ESRD on HD TIW (TTS- started In Jan 23), RCC s/p L partial nephrectomy, HTN, CLD, 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 Nephrology team was consulted for ESRD/HD management. Pt currently received HD TIW (TTS) at [facility]. Follows with Dr. Mercedes. Last HD done on Wed (4/4/26/23) via his right tunneled IJ catheter at [facility]. Pt seen and examined at bedside. Pt awake oriented to self. Refusing labs. Patient does not endorse CP currently, denies SOB, N/V, dizziness or LE swelling.     Recently admitted and discharge on 4/27.     PAST HISTORY  --------------------------------------------------------------------------------  PAST MEDICAL & SURGICAL HISTORY:  Chronic congestive heart failure, unspecified congestive heart failure type  rEF/pEF      Hep C w/o coma, chronic      HTN (hypertension)      AICD (automatic cardioverter/defibrillator) present  [employer]      Atrial fibrillation      CAD (coronary artery disease)  (s/p 2 stents in Sep 2017)      Hyperlipidemia      Renal cell carcinoma of left kidney  s/p partial nephrectomy in 2002  biopsy again in Sep 2017 showed RCC again in stage 2      COPD (chronic obstructive pulmonary disease)      Bladder cancer      Peripheral neuropathy      Prostate cancer  s/p radiation      Nephrolithiasis      Kidney stone      AICD (automatic cardioverter/defibrillator) present      S/P cholecystectomy  2015      H/O partial nephrectomy  2002      History of percutaneous coronary intervention      H/O transurethral destruction of bladder lesion      History of coronary artery stent placement        FAMILY HISTORY:  Family history of chronic ischemic heart disease    Family history of lung cancer      PAST SOCIAL HISTORY: No smoking, drugs or alcohol use.    ALLERGIES & MEDICATIONS  --------------------------------------------------------------------------------  Allergies    codeine (Rash)  Haldol (Unknown)  Motrin (Rash)  penicillin (Hives; Anaphylaxis)  Toradol (Rash)  Tylenol (Hives)  aspirin (Hives)  codeine (Unknown)    Intolerances      Standing Inpatient Medications  apixaban 2.5 milliGRAM(s) Oral every 12 hours  ascorbic acid 500 milliGRAM(s) Oral daily  atorvastatin 40 milliGRAM(s) Oral at bedtime  baclofen 10 milliGRAM(s) Oral every 12 hours  calamine/zinc oxide Lotion 1 Application(s) Topical three times a day  clonazePAM  Tablet 0.5 milliGRAM(s) Oral two times a day  HYDROmorphone   Tablet 2 milliGRAM(s) Oral once  isosorbide   mononitrate ER Tablet (IMDUR) 30 milliGRAM(s) Oral daily  levETIRAcetam 250 milliGRAM(s) Oral <User Schedule>  levETIRAcetam  IVPB 500 milliGRAM(s) IV Intermittent daily  levothyroxine Injectable 75 MICROGram(s) IV Push at bedtime  metoprolol succinate ER 50 milliGRAM(s) Oral daily  multivitamin 1 Tablet(s) Oral daily  polyethylene glycol 3350 17 Gram(s) Oral daily  senna 2 Tablet(s) Oral at bedtime  sevelamer carbonate 800 milliGRAM(s) Oral three times a day with meals  tiotropium 2.5 MICROgram(s) Inhaler 2 Puff(s) Inhalation daily    PRN Inpatient Medications  bisacodyl Suppository 10 milliGRAM(s) Rectal daily PRN  melatonin 3 milliGRAM(s) Oral at bedtime PRN      REVIEW OF SYSTEMS  --------------------------------------------------------------------------------  Gen: No fevers/chills  Skin: dry skin   Head/Eyes/Ears: Normal hearing,   Respiratory: No dyspnea, cough  CV: No chest pain  GI: No abdominal pain, diarrhea  : No dysuria, hematuria  MSK: No  edema  Heme: No easy bruising or bleeding  Psych: No significant depression    All other systems were reviewed and are negative, except as noted.    VITALS/PHYSICAL EXAM  --------------------------------------------------------------------------------  T(C): 36.4 (04-28-23 @ 12:47), Max: 36.6 (04-28-23 @ 05:30)  HR: 63 (04-28-23 @ 12:47) (60 - 87)  BP: 114/74 (04-28-23 @ 12:47) (113/76 - 127/76)  RR: 20 (04-28-23 @ 12:47) (17 - 20)  SpO2: 97% (04-28-23 @ 12:47) (97% - 100%)  Wt(kg): --  Height (cm): 180.3 (04-28-23 @ 05:53)  Weight (kg): 81.647 (04-28-23 @ 05:53)  BMI (kg/m2): 25.1 (04-28-23 @ 05:53)  BSA (m2): 2.02 (04-28-23 @ 05:53)      04-27-23 @ 07:01  -  04-28-23 @ 07:00  --------------------------------------------------------  IN: 0 mL / OUT: 750 mL / NET: -750 mL      Physical Exam:  	Gen: NAD; disheveled;  	HEENT: MMM  	Pulm: CTA B/L  	CV: S1S2  	Abd: Soft, +BS   	Ext: No LE edema B/L  	Neuro: Awake  	Skin: Warm and dry  	Vascular access: right tunneled cath              : + suprapubic catheter      LABS/STUDIES  --------------------------------------------------------------------------------                Creatinine Trend:  SCr 4.55 [04-22 @ 13:45]  SCr 2.84 [04-21 @ 16:15]  SCr 4.92 [04-20 @ 09:50]  SCr 5.19 [04-20 @ 07:33]    Urinalysis - [04-20-23 @ 07:57]      Color Light Yellow / Appearance Slightly Turbid / SG 1.014 / pH 7.0      Gluc 500 mg/dL / Ketone Negative  / Bili Negative / Urobili <2 mg/dL       Blood Large / Protein 100 mg/dL / Leuk Est Large / Nitrite Negative      RBC 5-10 / WBC >50 / Hyaline 1 / Gran  / Sq Epi  / Non Sq Epi  / Bacteria Negative      Iron 66, TIBC 206, %sat 32      [03-12-23 @ 06:31]  Ferritin 964      [03-12-23 @ 06:31]  HbA1c 6.2      [01-03-20 @ 13:57]  TSH <0.10      [04-21-23 @ 16:15]  Lipid: chol 107, TG 68, HDL 42, LDL --      [04-21-23 @ 16:15]    HBsAb <3.0      [04-20-23 @ 20:30]  HBsAg Nonreact      [04-20-23 @ 20:30]  HBcAb Nonreact      [03-11-23 @ 19:25]  HCV 9.49, Reactive      [04-20-23 @ 20:30]

## 2023-04-28 NOTE — H&P ADULT - PROBLEM SELECTOR PLAN 8
continue with home Keppra ( converted to IV as patient refusing medications, resume PO when patient amenable for PO medications)

## 2023-04-28 NOTE — H&P ADULT - NSHPPHYSICALEXAM_GEN_ALL_CORE
VITAL SIGNS:  T(F): 97.9 (04-28-23 @ 05:30), Max: 97.9 (04-28-23 @ 05:30)  HR: 62 (04-28-23 @ 09:06) (60 - 87)  BP: 113/76 (04-28-23 @ 09:06) (108/62 - 127/76)  BP(mean): --  RR: 18 (04-28-23 @ 09:06) (17 - 18)  SpO2: 100% (04-28-23 @ 09:06) (100% - 100%)    PHYSICAL EXAM:    GENERAL: NAD, resting in bed  CHEST/LUNG: Clear to auscultation bilaterally anteriorly; No wheeze  HEART: Regular rate and rhythm; No murmurs, rubs, or gallops  ABDOMEN: Soft, Nontender, Nondistended; Bowel sounds present  : suprapubic catheter in place , non-tender to palpation   EXTREMITIES: LLE with hyperpigmentation; limited movement of hands bilaterally, fingers somewhat contracted  PSYCH: drowsy , refusing all medications, exam   NEUROLOGY: drowsy, withdrawing from any light touch   SKIN: R heel wounds, serous fluid no purulence, permacath on R chest (cdi)

## 2023-04-28 NOTE — H&P ADULT - PROBLEM SELECTOR PLAN 3
Pt on HD MWF  Nephro consulted  c/w home sevelamer (monitor phos levels)  dose medications as per HD     #Anemia  2/2 ESRD   monitor hgb  transfuse for hgb < 7

## 2023-04-28 NOTE — H&P ADULT - PROBLEM SELECTOR PLAN 10
c/w home Imdur and metoprolol c/w home Imdur and metoprolol    #multiple wounds on R foot/ toes  seen by podiatry on previous admission - no role for surgical intervention  follow up with wound care consult maye

## 2023-04-29 NOTE — PROGRESS NOTE ADULT - PROBLEM SELECTOR PLAN 1
Pt verbally abusive to staff in ED, refusing meds, food and blood work  Pt recently discharged from [facility] on 4/27/23   Combative behavior possibly 2/2 malingering as patients admits this was his motive  seen by  team on last admission, reconsulted for this admission - appreciate recs  Monitor ,if becomes combative can place on 1:1   Continue Klonopin 0.5 mg BID for anxiety  Avoid IV pain medications   SW consult for disposition- patient does not want to return to [place]

## 2023-04-29 NOTE — PROGRESS NOTE ADULT - PROBLEM SELECTOR PLAN 10
c/w home Imdur and metoprolol    #multiple wounds on R foot/ toes  seen by podiatry on previous admission - no role for surgical intervention  follow up with wound care consult maye

## 2023-04-29 NOTE — PROGRESS NOTE ADULT - ASSESSMENT
62 year old male with hx of CVA (WC bound, quadriparesis), COPD, CAD (AICD), ESRD (T/R/S), HTN, HLD, seizure d/o, a-fib (s/p ablation, eliquis), neurogenic bladder (SPC), BARRY, hypothyroidism, RCC (s/p partial nephrectomy), CHF, prostate CA (s/p radiation) presents from [place] for combative behavior. Patient was discharged from [facility] on 4/27/23 and then started kicking his feet in the ambulance. Patient was combative with staff upon arrival and sent back to [facility].

## 2023-04-29 NOTE — PROGRESS NOTE ADULT - SUBJECTIVE AND OBJECTIVE BOX
Patient is a 62y old  Male who presents with a chief complaint of combative behavior (28 Apr 2023 15:56)    Charles Rush MD   [facility] Division of Hospital Medicine   Pager [phone]  Reachable on [employer] Teams     SUBJECTIVE / OVERNIGHT EVENTS:  Patient seen and examined this morning. States that he acted up at [place] to come back to the hospital, states that nothing has changes since when he left. He states that he sipmply does not like it at [place] and did not want to stay there.     MEDICATIONS  (STANDING):  apixaban 2.5 milliGRAM(s) Oral every 12 hours  ascorbic acid 500 milliGRAM(s) Oral daily  atorvastatin 40 milliGRAM(s) Oral at bedtime  baclofen 10 milliGRAM(s) Oral every 12 hours  calamine/zinc oxide Lotion 1 Application(s) Topical three times a day  chlorhexidine 2% Cloths 1 Application(s) Topical daily  clonazePAM  Tablet 0.5 milliGRAM(s) Oral two times a day  HYDROmorphone   Tablet 2 milliGRAM(s) Oral once  isosorbide   mononitrate ER Tablet (IMDUR) 30 milliGRAM(s) Oral daily  levETIRAcetam 250 milliGRAM(s) Oral <User Schedule>  levETIRAcetam  IVPB 500 milliGRAM(s) IV Intermittent daily  levothyroxine 100 MICROGram(s) Oral daily  metoprolol succinate ER 50 milliGRAM(s) Oral daily  multivitamin 1 Tablet(s) Oral daily  polyethylene glycol 3350 17 Gram(s) Oral daily  senna 2 Tablet(s) Oral at bedtime  sevelamer carbonate 800 milliGRAM(s) Oral three times a day with meals  tiotropium 2.5 MICROgram(s) Inhaler 2 Puff(s) Inhalation daily    MEDICATIONS  (PRN):  bisacodyl Suppository 10 milliGRAM(s) Rectal daily PRN Constipation  melatonin 3 milliGRAM(s) Oral at bedtime PRN Insomnia      Vital Signs Last 24 Hrs  T(C): 36.4 (29 Apr 2023 05:00), Max: 36.7 (28 Apr 2023 22:39)  T(F): 97.6 (29 Apr 2023 05:00), Max: 98 (28 Apr 2023 22:39)  HR: 86 (29 Apr 2023 05:00) (62 - 86)  BP: 103/60 (29 Apr 2023 05:00) (103/60 - 147/81)  BP(mean): --  RR: 20 (29 Apr 2023 05:00) (18 - 20)  SpO2: 100% (29 Apr 2023 05:00) (97% - 100%)  CAPILLARY BLOOD GLUCOSE        I&O's Summary    28 Apr 2023 07:01  -  29 Apr 2023 07:00  --------------------------------------------------------  IN: 400 mL / OUT: 3150 mL / NET: -2750 mL        General: middle aged man sitting up in bed appears well in NAD, awake and alert  HENMT: MMM   Respiratory: No respiratory distress, CTABL, No rales, rhonchi, wheezing.  Cardiovascular: S1,S2; Regular rate and rhythm; No m/g/r.   Gastrointestinal: Soft, Nontender, Nondistended; +BS.   : suprapubic catheter in place, sediment in the urine.   Extremities: No c/c/e; warm to touch  Psych:  appropriate mood and affect    LABS:                        10.8   11.31 )-----------( 162      ( 29 Apr 2023 05:46 )             34.6     04-29    139  |  103  |  50<H>  ----------------------------<  93  4.6   |  18<L>  |  4.01<H>    Ca    9.0      29 Apr 2023 05:46  Phos  3.8     04-29  Mg     2.30     04-29    TPro  7.2  /  Alb  3.4  /  TBili  0.4  /  DBili  x   /  AST  37  /  ALT  29  /  AlkPhos  420<H>  04-29              RADIOLOGY & ADDITIONAL TESTS:    Imaging Personally Reviewed:    Consultant(s) Notes Reviewed:      Care Discussed with Consultants/Other Providers:

## 2023-04-30 NOTE — PROGRESS NOTE ADULT - ASSESSMENT
62 year old male with hx of CVA (WC bound, quadriparesis), COPD, CAD (AICD), ESRD (T/R/S), HTN, HLD, seizure d/o, a-fib (s/p ablation, eliquis), neurogenic bladder (SPC), BARRY, hypothyroidism, RCC (s/p partial nephrectomy), CHF, prostate CA (s/p radiation) presents from [place] for combative behavior. Patient was discharged from [facility] on 4/27/23 and then started kicking his feet in the ambulance. Patient was combative with staff upon arrival and sent back to [facility].

## 2023-04-30 NOTE — PROGRESS NOTE ADULT - PROBLEM SELECTOR PLAN 1
Pt verbally abusive to staff in ED, refusing meds, food and blood work  Pt recently discharged from [facility] on 4/27/23   Combative behavior possibly 2/2 malingering as patients admits this was his motive  seen by  team on last admission, reconsulted for this admission - appreciate recs  Monitor ,if becomes combative can place on 1:1   Continue Klonopin 0.5 mg BID for anxiety  Avoid IV pain medications   SW consult for disposition- patient does not want to return to [place]

## 2023-04-30 NOTE — PROGRESS NOTE ADULT - SUBJECTIVE AND OBJECTIVE BOX
Patient is a 62y old  Male who presents with a chief complaint of combative behavior (29 Apr 2023 12:16)    Charles Rush MD   [facility] Division of Hospital Medicine   Pager [phone]  Reachable on [employer] Teams     SUBJECTIVE / OVERNIGHT EVENTS:  Patient seen and examined this morning. No events overnight.  No chest pain, shortness of breath.     MEDICATIONS  (STANDING):  apixaban 2.5 milliGRAM(s) Oral every 12 hours  ascorbic acid 500 milliGRAM(s) Oral daily  atorvastatin 40 milliGRAM(s) Oral at bedtime  baclofen 10 milliGRAM(s) Oral every 12 hours  calamine/zinc oxide Lotion 1 Application(s) Topical three times a day  chlorhexidine 2% Cloths 1 Application(s) Topical daily  clonazePAM  Tablet 0.5 milliGRAM(s) Oral two times a day  HYDROmorphone   Tablet 2 milliGRAM(s) Oral once  isosorbide   mononitrate ER Tablet (IMDUR) 30 milliGRAM(s) Oral daily  levETIRAcetam 500 milliGRAM(s) Oral daily  levETIRAcetam 250 milliGRAM(s) Oral <User Schedule>  levothyroxine 100 MICROGram(s) Oral daily  metoprolol succinate ER 50 milliGRAM(s) Oral daily  multivitamin 1 Tablet(s) Oral daily  mupirocin 2% Ointment 1 Application(s) Topical two times a day  polyethylene glycol 3350 17 Gram(s) Oral daily  senna 2 Tablet(s) Oral at bedtime  sevelamer carbonate 800 milliGRAM(s) Oral three times a day with meals  tiotropium 2.5 MICROgram(s) Inhaler 2 Puff(s) Inhalation daily    MEDICATIONS  (PRN):  bisacodyl Suppository 10 milliGRAM(s) Rectal daily PRN Constipation  melatonin 3 milliGRAM(s) Oral at bedtime PRN Insomnia      Vital Signs Last 24 Hrs  T(C): 36.7 (30 Apr 2023 06:00), Max: 36.7 (30 Apr 2023 06:00)  T(F): 98.1 (30 Apr 2023 06:00), Max: 98.1 (30 Apr 2023 06:00)  HR: 62 (30 Apr 2023 06:00) (62 - 62)  BP: 102/61 (30 Apr 2023 06:00) (102/61 - 102/61)  BP(mean): --  RR: 18 (30 Apr 2023 06:00) (18 - 18)  SpO2: 99% (30 Apr 2023 06:00) (99% - 99%)  CAPILLARY BLOOD GLUCOSE        I&O's Summary      General: middle aged man sitting up in bed appears well in NAD, awake and alert  HENMT: MMM   Respiratory: No respiratory distress, CTABL, No rales, rhonchi, wheezing.  Cardiovascular: S1,S2; Regular rate and rhythm; No m/g/r.   Gastrointestinal: Soft, Nontender, Nondistended; +BS.   : suprapubic catheter in place, sediment in the urine.   Extremities: No c/c/e; warm to touch  Psych:  appropriate mood and affect    LABS:                        10.8   11.31 )-----------( 162      ( 29 Apr 2023 05:46 )             34.6     04-29    139  |  103  |  50<H>  ----------------------------<  93  4.6   |  18<L>  |  4.01<H>    Ca    9.0      29 Apr 2023 05:46  Phos  3.8     04-29  Mg     2.30     04-29    TPro  7.2  /  Alb  3.4  /  TBili  0.4  /  DBili  x   /  AST  37  /  ALT  29  /  AlkPhos  420<H>  04-29              RADIOLOGY & ADDITIONAL TESTS:    Imaging Personally Reviewed:    Consultant(s) Notes Reviewed:      Care Discussed with Consultants/Other Providers:

## 2023-05-01 NOTE — PROGRESS NOTE ADULT - SUBJECTIVE AND OBJECTIVE BOX
PROGRESS NOTE:   Authored by Dr. Majo Casillas MD, pager [phone]     Patient is a 62y old  Male who presents with a chief complaint of combative behavior (01 May 2023 09:24)      SUBJECTIVE / OVERNIGHT EVENTS:  Patient is somnolent this morning. States he does not feel well. When informed it is likely the Benadryl making him sedated, he states "it is not the Benadryl," but does not answer any further questions. Later this afternoon, pt was once again alert.     ADDITIONAL REVIEW OF SYSTEMS:  Unable to obtain due to somnolence.     MEDICATIONS  (STANDING):  apixaban 2.5 milliGRAM(s) Oral every 12 hours  ascorbic acid 500 milliGRAM(s) Oral daily  atorvastatin 40 milliGRAM(s) Oral at bedtime  baclofen 10 milliGRAM(s) Oral every 12 hours  calamine/zinc oxide Lotion 1 Application(s) Topical three times a day  chlorhexidine 2% Cloths 1 Application(s) Topical daily  clonazePAM  Tablet 0.5 milliGRAM(s) Oral two times a day  isosorbide   mononitrate ER Tablet (IMDUR) 30 milliGRAM(s) Oral daily  levETIRAcetam 250 milliGRAM(s) Oral <User Schedule>  levETIRAcetam 500 milliGRAM(s) Oral daily  levothyroxine 100 MICROGram(s) Oral daily  metoprolol succinate ER 50 milliGRAM(s) Oral daily  multivitamin 1 Tablet(s) Oral daily  mupirocin 2% Ointment 1 Application(s) Topical two times a day  polyethylene glycol 3350 17 Gram(s) Oral daily  senna 2 Tablet(s) Oral at bedtime  sevelamer carbonate 800 milliGRAM(s) Oral three times a day with meals  tiotropium 2.5 MICROgram(s) Inhaler 2 Puff(s) Inhalation daily    MEDICATIONS  (PRN):  bisacodyl Suppository 10 milliGRAM(s) Rectal daily PRN Constipation  melatonin 3 milliGRAM(s) Oral at bedtime PRN Insomnia      CAPILLARY BLOOD GLUCOSE        I&O's Summary    30 Apr 2023 07:01  -  01 May 2023 07:00  --------------------------------------------------------  IN: 0 mL / OUT: 2100 mL / NET: -2100 mL    01 May 2023 07:01  -  01 May 2023 15:23  --------------------------------------------------------  IN: 700 mL / OUT: 526 mL / NET: 174 mL        PHYSICAL EXAM:  Vital Signs Last 24 Hrs  T(C): 36.3 (01 May 2023 13:27), Max: 36.6 (01 May 2023 06:54)  T(F): 97.3 (01 May 2023 13:27), Max: 97.8 (01 May 2023 06:54)  HR: 69 (01 May 2023 13:27) (68 - 93)  BP: 125/55 (01 May 2023 13:27) (117/71 - 130/77)  BP(mean): --  RR: 17 (01 May 2023 06:54) (17 - 20)  SpO2: 100% (01 May 2023 13:27) (100% - 100%)    Parameters below as of 01 May 2023 13:27  Patient On (Oxygen Delivery Method): room air        CONSTITUTIONAL: NAD, well-developed  RESPIRATORY: Normal respiratory effort; lungs are clear to auscultation bilaterally  CARDIOVASCULAR: Regular rate and rhythm, normal S1 and S2, no murmur/rub/gallop; No lower extremity edema; Peripheral pulses are 2+ bilaterally  ABDOMEN: Nontender to palpation, normoactive bowel sounds, no rebound/guarding; No hepatosplenomegaly  MUSCULOSKELETAL: no clubbing or cyanosis of digits; no joint swelling or tenderness to palpation. Feet wrapped in bandages.   PSYCH: Somnolent    LABS:                        8.9    9.80  )-----------( 161      ( 01 May 2023 06:37 )             28.3     05-01    135  |  103  |  89<H>  ----------------------------<  80  5.0   |  17<L>  |  5.18<H>    Ca    8.6      01 May 2023 06:37  Phos  6.3     05-01  Mg     2.30     05-01    TPro  6.9  /  Alb  3.6  /  TBili  0.4  /  DBili  x   /  AST  25  /  ALT  27  /  AlkPhos  358<H>  05-01              Culture - Blood (collected 28 Apr 2023 17:40)  Source: .Blood Blood-Peripheral  Preliminary Report (29 Apr 2023 22:01):    No growth to date.        RADIOLOGY & ADDITIONAL TESTS:  Results Reviewed:   Imaging Personally Reviewed:  Electrocardiogram Personally Reviewed:    COORDINATION OF CARE:  Care Discussed with Consultants/Other Providers [Y/N]:  Prior or Outpatient Records Reviewed [Y/N]:

## 2023-05-01 NOTE — CHART NOTE - NSCHARTNOTEFT_GEN_A_CORE
Provider notified by dialysis RN that patient refusing to complete dialysis as he is itchy. Patient received 50mg IV benadryl, however, continues to refuse further dialysis. Will continue to monitor.

## 2023-05-01 NOTE — ADVANCED PRACTICE NURSE CONSULT - ASSESSMENT
General: A&Ox4, able to turn with 1x assistance, bedbound, incontinent of stool. Patient with suprapubic catheter with yellow urine in bag. Skin warm, dry with increased moisture in intertriginous folds, scattered areas of hyperpigmentation and hypopigmentation, scattered areas of ecchymosis without hematoma on bilateral upper extremities. Generalized dried stable scabs noted to b/l lower extremities.     Vascular: Bilateral lower extremities with scattered areas of hyper and hypopigmentation. Hemosiderin staining to b/l lower extremities. Toe deformities noted to b/l feet.  Dry, flaky skin. Thickened-yellow hyperpigmented overgrown toenails. Increased coolness from midfoot to distal toes. Capillary refill less than 3 seconds. +1 palpable DP/PT pulses. Left lateral plantar with dry, stable callouses.     Bilateral feet with multiple mixed etiology wounds 2/2 vascular insufficiency, diabetic neuropathy:    Right heel - stage 3 pressure injury [id number], 100% fibrogranular tissue, scant fibrinous drainage, no odor. Periwound macerated with no erythema, no increased warmth, no edema, no induration, no fluctuance no signs or symptoms of overt skin infection. Goals of care: offload pressure, protect from friction and shear, monitor for tissue type changes, provide antimicrobial support.     Sacrum - healing full thickness pressure injury (unable to determine if stage 3 vs 4) area of soft tissue contracture with dry stable scab [id number], open area to 3 o clock measuring 0.5cmx0.5cmx0.3cm exposing 100% red moist granular tissue, no drainage, no odor. Wound edges macerated, no erythema, no increased warmth, no edema, no induration, no fluctuance no signs or symptoms of overt skin infection. Goals of care: offload pressure, protect from friction and shear, monitor for tissue type changes.     Patient educated on importance of offloading heels and protecting wounds from friction, shear and offloading. Patient refusing complete CAIR boots but agreeable to using z float pillows to offload heels. Patient reports he is allergic to incontinence pads and underpad but agreeable to foam dressing over sacrum.    General: A&Ox4, able to turn with 1x assistance, bedbound, incontinent of stool. Patient with suprapubic catheter with yellow urine in bag. Skin warm, dry with increased moisture in intertriginous folds, scattered areas of hyperpigmentation and hypopigmentation, scattered areas of ecchymosis without hematoma on bilateral upper extremities. Generalized dried stable scabs noted to b/l lower extremities.     Vascular: Bilateral lower extremities with scattered areas of hyper and hypopigmentation. Hemosiderin staining to b/l lower extremities. Toe deformities noted to b/l feet.  Dry, flaky skin. Thickened-yellow hyperpigmented overgrown toenails. Increased coolness from midfoot to distal toes. Capillary refill less than 3 seconds. +1 palpable DP/PT pulses. Right dorsal foot and left lateral plantar foot with dry, stable callouses.     Bilateral feet with multiple mixed etiology wounds 2/2 vascular insufficiency, diabetic neuropathy:  Right great toe - area of purple maroon discoloration measuring [id number], no drainage, no odor.  Right 3rd and 5th lateral toes with areas of dry stable eschar, 0.2cmx0.7xxd6ro, no drainage, no odor.   Left great toe, plantar aspect, dry stable eschar 0.5cmx0.8xez7ip, no drainage, no odor.   Periwound with no erythema, no increased warmth, no edema, no induration, no fluctuance no signs or symptoms of overt skin infection. Goals of care: maintain dry stable eschar, offload pressure, protect from friction and shear, monitor for tissue type changes.     Right heel - stage 3 pressure injury 9xpn0qip8.2cm, 100% fibrogranular tissue, scant fibrinous drainage, no odor. Periwound macerated with no erythema, no increased warmth, no edema, no induration, no fluctuance no signs or symptoms of overt skin infection. Goals of care: offload pressure, protect from friction and shear, monitor for tissue type changes, provide antimicrobial support.     Sacrum - healing full thickness pressure injury (unable to determine if stage 3 vs 4) area of soft tissue contracture with dry stable scab 1.2k0bvp4rv, open area to 3 o clock measuring 0.5cmx0.5cmx0.3cm exposing 100% red moist granular tissue, no drainage, no odor. Wound edges macerated, no erythema, no increased warmth, no edema, no induration, no fluctuance no signs or symptoms of overt skin infection. Goals of care: offload pressure, protect from friction and shear, monitor for tissue type changes.     Patient educated on importance of offloading heels and protecting wounds from friction, shear and offloading. Patient refusing complete CAIR boots but agreeable to using z float pillows to offload heels. Patient reports he is allergic to incontinence pads and underpad but agreeable to foam dressing over sacrum.    General: A&Ox4, able to turn with 1x assistance, bedbound, incontinent of stool. Patient with suprapubic catheter with yellow urine in bag. Skin warm, dry with increased moisture in intertriginous folds, scattered areas of hyperpigmentation and hypopigmentation, scattered areas of ecchymosis without hematoma on bilateral upper extremities. Generalized dried stable scabs noted to b/l lower extremities.     Vascular: Bilateral lower extremities with scattered areas of hyper and hypopigmentation. Hemosiderin staining to b/l lower extremities. Toe deformities noted to b/l feet.  Dry, flaky skin. Thickened-yellow hyperpigmented overgrown toenails. Increased coolness from midfoot to distal toes. Capillary refill less than 3 seconds. +1 palpable DP/PT pulses. Right dorsal foot and left lateral plantar foot with dry, stable callouses.     Bilateral feet with multiple mixed etiology wounds 2/2 vascular insufficiency, diabetic neuropathy:  Right great toe - area of purple maroon discoloration measuring 0.7avb1gvf4ov, no drainage, no odor.  Right 3rd and 5th lateral toes with areas of dry stable scabs, 0.2cmx0.2yvy7hq, no drainage, no odor.   Left great toe, plantar aspect, dry stable eschar 0.5cmx0.7rva2aa, no drainage, no odor.   Periwound with no erythema, no increased warmth, no edema, no induration, no fluctuance no signs or symptoms of overt skin infection. Goals of care: maintain dry stable eschar, offload pressure, protect from friction and shear, monitor for tissue type changes.     Right heel - stage 3 pressure injury 2cpy8ien0.2cm, 100% fibrogranular tissue, scant fibrinous drainage, no odor. Periwound macerated with no erythema, no increased warmth, no edema, no induration, no fluctuance no signs or symptoms of overt skin infection. Goals of care: offload pressure, protect from friction and shear, monitor for tissue type changes, provide antimicrobial support.     Sacrum - healing full thickness pressure injury (unable to determine if stage 3 vs 4) area of soft tissue contracture with dry stable scab 1.8c7ppr6gx, open area to 3 o clock measuring 0.5cmx0.5cmx0.3cm exposing 100% red moist granular tissue, no drainage, no odor. Wound edges macerated, no erythema, no increased warmth, no edema, no induration, no fluctuance no signs or symptoms of overt skin infection. Goals of care: offload pressure, protect from friction and shear, monitor for tissue type changes.     Patient educated on importance of offloading heels and protecting wounds from friction, shear and offloading. Patient refusing complete CAIR boots but agreeable to using z float pillows to offload heels. Patient reports he is allergic to incontinence pads and underpad but agreeable to foam dressing over sacrum.

## 2023-05-01 NOTE — PROGRESS NOTE ADULT - PROBLEM SELECTOR PLAN 1
Pt. with ESRD on HD three times a week (MWF). Last HD was done on Wed 4/28/23 patient insisted to come off machine today after 30 minutes despite medical advice.  Was concerned due to itching.  Labs reviewed.  No emergent HD indication for today but will need to do HD tomorrow.  ca/mg/p/k and BP trend reviewed. Pt. with ESRD on HD three times a week (MWF). Last HD was done on Wed 4/28/23 patient insisted to come off machine today after 30 minutes despite medical advice.  Was concerned due to itching.  Labs reviewed.  No emergent HD indication for today but will need to do HD tomorrow.  ca/mg/p/k and BP trend reviewed.  Will attempt HD tomorrow with hypoallergenic dialyzer with double rinsing.

## 2023-05-01 NOTE — PROGRESS NOTE ADULT - PROBLEM SELECTOR PLAN 1
Pt verbally abusive to staff in ED, refusing meds, food and blood work  Pt recently discharged from [facility] on 4/27/23   Combative behavior possibly 2/2 malingering as patients admits this was his motive  seen by  team on last admission, reconsulted for this admission - appreciate recs  Monitor ,if becomes combative can place on 1:1   Continue Klonopin 0.5 mg BID for anxiety  Avoid IV pain medications   SW consult for disposition- patient does not want to return to [place]

## 2023-05-01 NOTE — PROGRESS NOTE ADULT - ASSESSMENT
62 year old male with hx of CVA (WC bound, quadriparesis), COPD, CAD (AICD), ESRD (T/R/S), HTN, HLD, seizure d/o, a-fib (s/p ablation, eliquis), neurogenic bladder (SPC), BARRY, hypothyroidism, RCC (s/p partial nephrectomy), CHF, prostate CA (s/p radiation) presents from [place] for combative behavior. Patient was discharged from [facility] on 4/27/23 and then started kicking his feet in the ambulance. Patient was combative with staff upon arrival and sent back to [facility].

## 2023-05-01 NOTE — ADVANCED PRACTICE NURSE CONSULT - RECOMMEDATIONS
Plan discussed with patient and primary RN Mihaela Gaspar.  Recommend podiatry follow up for R heel full thickness wound, patient requesting podiatry for management of fungal toenails.     Plan discussed with patient and primary RN Mihaela Gaspar.  Recommend podiatry follow up for R heel full thickness wound, patient requesting podiatry for management of fungal toenails.     Topical recommendations:     Sacrum - Cleanse with NS, pat dry. Apply Liquid barrier film to periwound skin (allow to dry). Cover with silicone foam with border. Change daily and PRN if soiled.     Left heel - Cleanse with NS, pat dry. Apply Liquid barrier film to periwound skin (allow to dry). Cut Medihoney Hydrocolloid dressing (#[id number]) so that the dressing completely covers wound area, apply and smooth all edges and corners to ensure that dressing is making contact with the wound and margins. Cover with silicone foam with border. Secure with kerlix wrap. Change daily and PRN if soiled.     Bilateral feet - Cleanse with NS, pat dry, apply betadine to areas of scabs, callouses and eschar daily. Apply liquid barrier film to area of purple maroon discoloration daily.     Continue low air loss bed therapy, continue heel elevation, continue to turn & reposition per protocol, soft pillow between bony prominences, continue moisture management with barrier creams & single breathable pad, continue measures to decrease friction/shear/pressure.    Plan discussed with patient and primary RN Mihaela Gaspar.     Please contact Wound/Ostomy Care Service Line if we can be of further assistance (ext [phone]). Please reconsult if changes to tissue type noted.

## 2023-05-01 NOTE — ADVANCED PRACTICE NURSE CONSULT - REASON FOR CONSULT
Patient seen on skin care rounds after wound care referral received for assessment of skin impairment and recommendations of topical management of sacral and BLE wounds. Patient is a 62M PMH of HTN, CVA, seizures, neurogenic bladder, COPD, hypothyroidism, ESRD on HD (MWF) presented from [facility] for aggressive behavior towards others and self injurious activity. Nephrology following for ESRD. Patient known to [facility] service line last seen on 4/21/2023. Patient consulted by podiatry during previous admission 4/21. on Chart reviewed: WBC 9.80, H/H 8.9/28.3, INR 1.34, Platelets 161, hA1c 4.6, BMI 25.1, ambrocio 14.  Patient seen on skin care rounds after wound care referral received for assessment of skin impairment and recommendations of topical management of sacral and BLE wounds. Patient is a 62M PMH of HTN, CVA, seizures, neurogenic bladder, COPD, hypothyroidism, ESRD on HD (MWF) presented from [facility] for aggressive behavior towards others and self injurious activity. Nephrology following for ESRD. Patient known to [facility] service line last seen on 4/21/2023. Patient consulted by podiatry during previous admission 4/21. Chart reviewed: WBC 9.80, H/H 8.9/28.3, INR 1.34, Platelets 161, hA1c 4.6, BMI 25.1, ambrocio 14.

## 2023-05-01 NOTE — PROGRESS NOTE ADULT - SUBJECTIVE AND OBJECTIVE BOX
[facility], [place] Division of Kidney Diseases & Hypertension  FOLLOW UP NOTE  --------------------------------------------------------------------------------  Chief Complaint: ESRD on ongoing HD requirement    24 hour events/subjective: Patient was seen and evaluated at bedside this morning.  He was at HD earlier today and came off after only 30 minutes because he said he had extreme itching.  No chest pain no shortness of breath no nausea vomiting.  Still makes urine through suprapubic catheter.  Was given 50 IV benadryl.    PAST HISTORY  --------------------------------------------------------------------------------  No significant changes to PMH, PSH, FHx, SHx, unless otherwise noted    ALLERGIES & MEDICATIONS  --------------------------------------------------------------------------------  Allergies    codeine (Rash)  Haldol (Unknown)  Motrin (Rash)  penicillin (Hives; Anaphylaxis)  Toradol (Rash)  Tylenol (Hives)  aspirin (Hives)  codeine (Unknown)    Intolerances      Standing Inpatient Medications  apixaban 2.5 milliGRAM(s) Oral every 12 hours  ascorbic acid 500 milliGRAM(s) Oral daily  atorvastatin 40 milliGRAM(s) Oral at bedtime  baclofen 10 milliGRAM(s) Oral every 12 hours  calamine/zinc oxide Lotion 1 Application(s) Topical three times a day  chlorhexidine 2% Cloths 1 Application(s) Topical daily  clonazePAM  Tablet 0.5 milliGRAM(s) Oral two times a day  isosorbide   mononitrate ER Tablet (IMDUR) 30 milliGRAM(s) Oral daily  levETIRAcetam 250 milliGRAM(s) Oral <User Schedule>  levETIRAcetam 500 milliGRAM(s) Oral daily  levothyroxine 100 MICROGram(s) Oral daily  metoprolol succinate ER 50 milliGRAM(s) Oral daily  multivitamin 1 Tablet(s) Oral daily  mupirocin 2% Ointment 1 Application(s) Topical two times a day  polyethylene glycol 3350 17 Gram(s) Oral daily  senna 2 Tablet(s) Oral at bedtime  sevelamer carbonate 800 milliGRAM(s) Oral three times a day with meals  tiotropium 2.5 MICROgram(s) Inhaler 2 Puff(s) Inhalation daily    PRN Inpatient Medications  bisacodyl Suppository 10 milliGRAM(s) Rectal daily PRN  melatonin 3 milliGRAM(s) Oral at bedtime PRN      REVIEW OF SYSTEMS  --------------------------------------------------------------------------------  Gen: no fever  Respiratory: no sob  CV: no cp  GI: no ab pain  : no complaints  MSK: no pain   SKin: +itching    VITALS/PHYSICAL EXAM  --------------------------------------------------------------------------------  T(C): 36.6 (05-01-23 @ 06:54), Max: 36.6 (05-01-23 @ 06:54)  HR: 68 (05-01-23 @ 06:54) (68 - 93)  BP: 130/77 (05-01-23 @ 06:54) (117/71 - 130/77)  ABP: --  ABP(mean): --  RR: 17 (05-01-23 @ 06:54) (17 - 20)  SpO2: 100% (05-01-23 @ 05:43) (100% - 100%)  CVP(mm Hg): --        04-30-23 @ 07:01  -  05-01-23 @ 07:00  --------------------------------------------------------  IN: 0 mL / OUT: 2100 mL / NET: -2100 mL    05-01-23 @ 07:01  -  05-01-23 @ 09:25  --------------------------------------------------------  IN: 400 mL / OUT: 526 mL / NET: -126 mL      Physical Exam:  	Gen: NAD; disheveled;  	HEENT: MMM  	Pulm: CTA B/L  	CV: S1S2  	Abd: Soft, +BS   	Ext: No LE edema B/L  	Neuro: Awake  	Skin: Warm and dry  	Vascular access: right tunneled cath              : + suprapubic catheter      LABS/STUDIES  --------------------------------------------------------------------------------              8.9    9.80  >-----------<  161      [05-01-23 @ 06:37]              28.3     135  |  103  |  89  ----------------------------<  80      [05-01-23 @ 06:37]  5.0   |  17  |  5.18        Ca     8.6     [05-01-23 @ 06:37]      Mg     2.30     [05-01-23 @ 06:37]      Phos  6.3     [05-01-23 @ 06:37]    TPro  6.9  /  Alb  3.6  /  TBili  0.4  /  DBili  x   /  AST  25  /  ALT  27  /  AlkPhos  358  [05-01-23 @ 06:37]          Creatinine Trend:  SCr 5.18 [05-01 @ 06:37]  SCr 4.01 [04-29 @ 05:46]  SCr 4.77 [04-28 @ 17:40]  SCr 4.55 [04-22 @ 13:45]  SCr 2.84 [04-21 @ 16:15]

## 2023-05-02 NOTE — PROGRESS NOTE ADULT - SUBJECTIVE AND OBJECTIVE BOX
PROGRESS NOTE:   Authored by Dr. Majo Casillas MD, pager [phone]     Patient is a 62y old  Male who presents with a chief complaint of combative behavior (02 May 2023 10:58)      SUBJECTIVE / OVERNIGHT EVENTS:  Patient is lying in bed. Shakes his head when asked if he is currently itching or if he has pain. Per nephrology note and RN report, agreeable to HD today.     ADDITIONAL REVIEW OF SYSTEMS:  Unable to obtain due to somnolence.     MEDICATIONS  (STANDING):  apixaban 2.5 milliGRAM(s) Oral every 12 hours  ascorbic acid 500 milliGRAM(s) Oral daily  atorvastatin 40 milliGRAM(s) Oral at bedtime  baclofen 10 milliGRAM(s) Oral every 12 hours  calamine/zinc oxide Lotion 1 Application(s) Topical three times a day  chlorhexidine 2% Cloths 1 Application(s) Topical daily  clonazePAM  Tablet 0.5 milliGRAM(s) Oral two times a day  epoetin marilin-epbx (RETACRIT) Injectable 24160 Unit(s) IV Push once  isosorbide   mononitrate ER Tablet (IMDUR) 30 milliGRAM(s) Oral daily  levETIRAcetam 250 milliGRAM(s) Oral <User Schedule>  levETIRAcetam 500 milliGRAM(s) Oral daily  levothyroxine 100 MICROGram(s) Oral daily  loratadine 10 milliGRAM(s) Oral daily  metoprolol succinate ER 50 milliGRAM(s) Oral daily  multivitamin 1 Tablet(s) Oral daily  mupirocin 2% Ointment 1 Application(s) Topical two times a day  polyethylene glycol 3350 17 Gram(s) Oral daily  senna 2 Tablet(s) Oral at bedtime  sevelamer carbonate 800 milliGRAM(s) Oral three times a day with meals  tiotropium 2.5 MICROgram(s) Inhaler 2 Puff(s) Inhalation daily    MEDICATIONS  (PRN):  bisacodyl Suppository 10 milliGRAM(s) Rectal daily PRN Constipation  melatonin 3 milliGRAM(s) Oral at bedtime PRN Insomnia      CAPILLARY BLOOD GLUCOSE        I&O's Summary    01 May 2023 07:01  -  02 May 2023 07:00  --------------------------------------------------------  IN: 1300 mL / OUT: 1226 mL / NET: 74 mL        PHYSICAL EXAM:  Vital Signs Last 24 Hrs  T(C): 36.8 (02 May 2023 13:24), Max: 37 (01 May 2023 20:56)  T(F): 98.2 (02 May 2023 13:24), Max: 98.6 (01 May 2023 20:56)  HR: 84 (02 May 2023 13:24) (84 - 87)  BP: 140/82 (02 May 2023 13:24) (117/74 - 140/82)  BP(mean): --  RR: 18 (02 May 2023 05:44) (18 - 18)  SpO2: 100% (02 May 2023 13:24) (100% - 100%)    Parameters below as of 02 May 2023 13:24  Patient On (Oxygen Delivery Method): room air        CONSTITUTIONAL: NAD, well-developed  RESPIRATORY: Normal respiratory effort; lungs are clear to auscultation bilaterally  CARDIOVASCULAR: Regular rate and rhythm, normal S1 and S2, no murmur/rub/gallop; No lower extremity edema; Peripheral pulses are 2+ bilaterally  ABDOMEN: Nontender to palpation, normoactive bowel sounds, no rebound/guarding; No hepatosplenomegaly  MUSCULOSKELETAL: no clubbing or cyanosis of digits; no joint swelling or tenderness to palpation. Feet wrapped in bandages  PSYCH: Somnolent, responds to questions but keeps eyes closed    LABS:                        8.9    9.80  )-----------( 161      ( 01 May 2023 06:37 )             28.3     05-01    135  |  103  |  89<H>  ----------------------------<  80  5.0   |  17<L>  |  5.18<H>    Ca    8.6      01 May 2023 06:37  Phos  6.3     05-01  Mg     2.30     05-01    TPro  6.9  /  Alb  3.6  /  TBili  0.4  /  DBili  x   /  AST  25  /  ALT  27  /  AlkPhos  358<H>  05-01                RADIOLOGY & ADDITIONAL TESTS:  Results Reviewed:   Imaging Personally Reviewed:  Electrocardiogram Personally Reviewed:    COORDINATION OF CARE:  Care Discussed with Consultants/Other Providers [Y/N]:  Prior or Outpatient Records Reviewed [Y/N]:

## 2023-05-02 NOTE — PROGRESS NOTE ADULT - SUBJECTIVE AND OBJECTIVE BOX
[facility] DIVISION OF KIDNEY DISEASES AND HYPERTENSION --   --------------------------------------------------------------------------------  Chief Complaint: ESRD/Ongoing hemodialysis requirement    24 hour events/subjective: Pt. seen and examined today. Pt. feels well and offered no complaints during rounds. No fever, CP, SOB, HA or dizziness during rounds.    PAST HISTORY  --------------------------------------------------------------------------------  No significant changes to PMH, PSH, FHx, SHx, unless otherwise noted    ALLERGIES & MEDICATIONS  --------------------------------------------------------------------------------  Allergies    codeine (Rash)  Haldol (Unknown)  Motrin (Rash)  penicillin (Hives; Anaphylaxis)  Toradol (Rash)  Tylenol (Hives)  aspirin (Hives)  codeine (Unknown)    Intolerances    Standing Inpatient Medications  apixaban 2.5 milliGRAM(s) Oral every 12 hours  ascorbic acid 500 milliGRAM(s) Oral daily  atorvastatin 40 milliGRAM(s) Oral at bedtime  baclofen 10 milliGRAM(s) Oral every 12 hours  calamine/zinc oxide Lotion 1 Application(s) Topical three times a day  chlorhexidine 2% Cloths 1 Application(s) Topical daily  clonazePAM  Tablet 0.5 milliGRAM(s) Oral two times a day  isosorbide   mononitrate ER Tablet (IMDUR) 30 milliGRAM(s) Oral daily  levETIRAcetam 250 milliGRAM(s) Oral <User Schedule>  levETIRAcetam 500 milliGRAM(s) Oral daily  levothyroxine 100 MICROGram(s) Oral daily  loratadine 10 milliGRAM(s) Oral daily  metoprolol succinate ER 50 milliGRAM(s) Oral daily  multivitamin 1 Tablet(s) Oral daily  mupirocin 2% Ointment 1 Application(s) Topical two times a day  polyethylene glycol 3350 17 Gram(s) Oral daily  senna 2 Tablet(s) Oral at bedtime  sevelamer carbonate 800 milliGRAM(s) Oral three times a day with meals  tiotropium 2.5 MICROgram(s) Inhaler 2 Puff(s) Inhalation daily    PRN Inpatient Medications  bisacodyl Suppository 10 milliGRAM(s) Rectal daily PRN  melatonin 3 milliGRAM(s) Oral at bedtime PRN    REVIEW OF SYSTEMS  --------------------------------------------------------------------------------  Gen: No fever  Respiratory: No dyspnea  CV: No chest pain  GI: No abdominal pain  MSK: No edema  Neuro: No dizziness    VITALS/PHYSICAL EXAM  --------------------------------------------------------------------------------  T(C): 36.4 (05-02-23 @ 05:44), Max: 37 (05-01-23 @ 20:56)  HR: 84 (05-02-23 @ 05:44) (69 - 87)  BP: 120/71 (05-02-23 @ 05:44) (117/74 - 125/55)  RR: 18 (05-02-23 @ 05:44) (18 - 18)  SpO2: 100% (05-02-23 @ 05:44) (100% - 100%)  Wt(kg): --    05-01-23 @ 07:01  -  05-02-23 @ 07:00  --------------------------------------------------------  IN: 1300 mL / OUT: 1226 mL / NET: 74 mL    Physical Exam:  	Gen: resting, NAD  	HEENT: No JVD  	Pulm: CTA B/L  	CV: S1S2+  	Abd: Soft  	LE: Right foot dressing seen, no LLE edema  	Vascular access: Right IJ HD catheter site: no bleeding    LABS/STUDIES  --------------------------------------------------------------------------------              8.9    9.80  >-----------<  161      [05-01-23 @ 06:37]              28.3     135  |  103  |  89  ----------------------------<  80      [05-01-23 @ 06:37]  5.0   |  17  |  5.18        Ca     8.6     [05-01-23 @ 06:37]      Mg     2.30     [05-01-23 @ 06:37]      Phos  6.3     [05-01-23 @ 06:37]    TPro  6.9  /  Alb  3.6  /  TBili  0.4  /  DBili  x   /  AST  25  /  ALT  27  /  AlkPhos  358  [05-01-23 @ 06:37]    HBsAb <3.0      [04-20-23 @ 20:30]  HBsAg Nonreact      [04-20-23 @ 20:30]  HCV 9.49, Reactive      [04-20-23 @ 20:30]

## 2023-05-02 NOTE — PROGRESS NOTE ADULT - PROBLEM SELECTOR PLAN 3
Pt on HD MWF  Nephro consulted  c/w home sevelamer (monitor phos levels)  dose medications as per HD   initiated on claritin for uremic pruritis     #Anemia  2/2 ESRD   monitor hgb  transfuse for hgb < 7

## 2023-05-02 NOTE — PROGRESS NOTE ADULT - ASSESSMENT
62 year old male with hx of CVA (WC bound, quadriparesis), COPD, CAD (AICD), ESRD (T/R/S), HTN, HLD, seizure d/o, a-fib (s/p ablation, eliquis), neurogenic bladder (SPC), BARRY, hypothyroidism, RCC (s/p partial nephrectomy), CHF, prostate CA (s/p radiation) presents from [place] for combative behavior. Patient was discharged from [facility] on 4/27/23 and then started kicking his feet in the ambulance. Patient was combative with staff upon arrival and sent back to [facility]. Pending placement at facility.

## 2023-05-02 NOTE — PROGRESS NOTE ADULT - PROBLEM SELECTOR PLAN 1
Pt. with ESRD on HD TIW. Pt. did not complete his HD treatment yesterday as he had itching. Pt. clinically stable. Labs reviewed. Pt. scheduled for HD today. Pt. agreeable for HD treatment today. Hemoglobin below target range yesterday. Pt. to receive VIBHA with HD today. Pt. with hyperphosphatemia, on oral Sevelamer therapy. Serum phosphorus above target range (6.3) yesterday. Low phosphorus diet. Monitor BP and labs.

## 2023-05-02 NOTE — PROGRESS NOTE ADULT - PROBLEM SELECTOR PLAN 10
c/w home Imdur and metoprolol    #multiple wounds on R foot/ toes  seen by podiatry on previous admission - no role for surgical intervention  wound care following; recs appreciated

## 2023-05-03 NOTE — PROGRESS NOTE ADULT - SUBJECTIVE AND OBJECTIVE BOX
[facility] Division of Kidney Diseases & Hypertension  FOLLOW UP NOTE  --------------------------------------------------------------------------------  Chief Complaint: ESRD/Ongoing hemodialysis requirement    24 hour events/subjective: Pt. seen and examined today. has refused dialysis.      PAST HISTORY  --------------------------------------------------------------------------------  No significant changes to PMH, PSH, FHx, SHx, unless otherwise noted    ALLERGIES & MEDICATIONS  --------------------------------------------------------------------------------  Allergies    codeine (Rash)  Haldol (Unknown)  Motrin (Rash)  penicillin (Hives; Anaphylaxis)  Toradol (Rash)  Tylenol (Hives)  aspirin (Hives)  codeine (Unknown)    Intolerances      Standing Inpatient Medications  apixaban 2.5 milliGRAM(s) Oral every 12 hours  ascorbic acid 500 milliGRAM(s) Oral daily  atorvastatin 40 milliGRAM(s) Oral at bedtime  baclofen 10 milliGRAM(s) Oral every 12 hours  calamine/zinc oxide Lotion 1 Application(s) Topical three times a day  chlorhexidine 2% Cloths 1 Application(s) Topical daily  clonazePAM  Tablet 0.5 milliGRAM(s) Oral two times a day  epoetin marilin-epbx (RETACRIT) Injectable 95197 Unit(s) IV Push once  isosorbide   mononitrate ER Tablet (IMDUR) 30 milliGRAM(s) Oral daily  levETIRAcetam 250 milliGRAM(s) Oral <User Schedule>  levETIRAcetam 500 milliGRAM(s) Oral daily  levothyroxine 100 MICROGram(s) Oral daily  loratadine 10 milliGRAM(s) Oral daily  metoprolol succinate ER 50 milliGRAM(s) Oral daily  multivitamin 1 Tablet(s) Oral daily  mupirocin 2% Ointment 1 Application(s) Topical two times a day  polyethylene glycol 3350 17 Gram(s) Oral daily  senna 2 Tablet(s) Oral at bedtime  sevelamer carbonate 800 milliGRAM(s) Oral three times a day with meals  tiotropium 2.5 MICROgram(s) Inhaler 2 Puff(s) Inhalation daily    PRN Inpatient Medications  bisacodyl Suppository 10 milliGRAM(s) Rectal daily PRN  melatonin 3 milliGRAM(s) Oral at bedtime PRN      REVIEW OF SYSTEMS  --------------------------------------------------------------------------------  Gen: no fever  Respiratory: no sob   CV: no cp  GI: no ab pain  : no complaints  MSK: no pain    VITALS/PHYSICAL EXAM  --------------------------------------------------------------------------------  T(C): 36.3 (05-03-23 @ 06:08), Max: 36.8 (05-02-23 @ 13:24)  HR: 87 (05-03-23 @ 06:08) (83 - 87)  BP: 131/74 (05-03-23 @ 06:08) (131/71 - 140/82)  ABP: --  ABP(mean): --  RR: 18 (05-03-23 @ 06:08) (18 - 18)  SpO2: 100% (05-03-23 @ 06:08) (100% - 100%)  CVP(mm Hg): --        05-02-23 @ 07:01  -  05-03-23 @ 07:00  --------------------------------------------------------  IN: 900 mL / OUT: 2100 mL / NET: -1200 mL      Physical Exam:  	Gen: resting, NAD  	HEENT: No JVD  	Pulm: CTA B/L  	CV: S1S2+  	Abd: Soft  	LE: Right foot dressing seen, no LLE edema  	Vascular access: Right IJ HD catheter site: no bleeding    LABS/STUDIES  --------------------------------------------------------------------------------                Creatinine Trend:  SCr 5.18 [05-01 @ 06:37]  SCr 4.01 [04-29 @ 05:46]  SCr 4.77 [04-28 @ 17:40]  SCr 4.55 [04-22 @ 13:45]  SCr 2.84 [04-21 @ 16:15]

## 2023-05-03 NOTE — PROGRESS NOTE ADULT - PROBLEM SELECTOR PLAN 1
Pt verbally abusive to staff in ED, refusing meds, food and blood work  Pt recently discharged from [facility] on 4/27/23   Combative behavior possibly 2/2 malingering as patients admits this was his motive  seen by  team on last admission, reconsulted for this admission - appreciate recs  Monitor ,if becomes combative can place on 1:1   Continue Klonopin 0.5 mg BID for anxiety  Avoid IV pain medications   SW consult for disposition- patient does not want to return to [place]

## 2023-05-03 NOTE — PROGRESS NOTE ADULT - ASSESSMENT
62 year old male with hx of CVA (WC bound, quadriparesis), COPD, CAD (AICD), ESRD (T/R/S), HTN, HLD, seizure d/o, a-fib (s/p ablation, eliquis), neurogenic bladder (SPC), BARRY, hypothyroidism, RCC (s/p partial nephrectomy), CHF, prostate CA (s/p radiation) presents from [place] for combative behavior. Patient was discharged from [facility] on 4/27/23 and then started kicking his feet in the ambulance. Patient was combative with staff upon arrival and sent back to [facility]. Pending placement at facility.

## 2023-05-03 NOTE — PROVIDER CONTACT NOTE (OTHER) - ASSESSMENT
Patient vitals were stable, received benadryl 25mg pre dialysis treatment and continues to complain itching. Refusing to check blood pressure and treatment.

## 2023-05-03 NOTE — PROGRESS NOTE ADULT - SUBJECTIVE AND OBJECTIVE BOX
PROGRESS NOTE:   Authored by Dr. Majo Casillas MD, pager [phone]     Patient is a 62y old  Male who presents with a chief complaint of combative behavior (03 May 2023 10:56)      SUBJECTIVE / OVERNIGHT EVENTS:  Patient is sitting up in bed. Refused HD yesterday as he was ordered for PO instead of IV Benadryl. He became verbally and physically aggressive and HD session was cancelled. This morning, pt denies pain. Denies pruritis; states this is because he has not had HD. Agrees to HD today. No fevers, chills, N/V/D, CP, SOB, dysuria, or hematuria.     ADDITIONAL REVIEW OF SYSTEMS:    MEDICATIONS  (STANDING):  apixaban 2.5 milliGRAM(s) Oral every 12 hours  ascorbic acid 500 milliGRAM(s) Oral daily  atorvastatin 40 milliGRAM(s) Oral at bedtime  baclofen 10 milliGRAM(s) Oral every 12 hours  calamine/zinc oxide Lotion 1 Application(s) Topical three times a day  chlorhexidine 2% Cloths 1 Application(s) Topical daily  clonazePAM  Tablet 0.5 milliGRAM(s) Oral two times a day  epoetin marilin-epbx (RETACRIT) Injectable 70689 Unit(s) IV Push once  isosorbide   mononitrate ER Tablet (IMDUR) 30 milliGRAM(s) Oral daily  levETIRAcetam 250 milliGRAM(s) Oral <User Schedule>  levETIRAcetam 500 milliGRAM(s) Oral daily  levothyroxine 100 MICROGram(s) Oral daily  loratadine 10 milliGRAM(s) Oral daily  metoprolol succinate ER 50 milliGRAM(s) Oral daily  multivitamin 1 Tablet(s) Oral daily  mupirocin 2% Ointment 1 Application(s) Topical two times a day  polyethylene glycol 3350 17 Gram(s) Oral daily  senna 2 Tablet(s) Oral at bedtime  sevelamer carbonate 800 milliGRAM(s) Oral three times a day with meals  tiotropium 2.5 MICROgram(s) Inhaler 2 Puff(s) Inhalation daily    MEDICATIONS  (PRN):  bisacodyl Suppository 10 milliGRAM(s) Rectal daily PRN Constipation  diphenhydrAMINE Injectable 25 milliGRAM(s) IV Push once PRN Prior to HD  melatonin 3 milliGRAM(s) Oral at bedtime PRN Insomnia      CAPILLARY BLOOD GLUCOSE        I&O's Summary    02 May 2023 07:01  -  03 May 2023 07:00  --------------------------------------------------------  IN: 900 mL / OUT: 2100 mL / NET: -1200 mL        PHYSICAL EXAM:  Vital Signs Last 24 Hrs  T(C): 36.4 (03 May 2023 12:09), Max: 36.4 (03 May 2023 12:09)  T(F): 97.5 (03 May 2023 12:09), Max: 97.5 (03 May 2023 12:09)  HR: 79 (03 May 2023 12:09) (79 - 87)  BP: 123/68 (03 May 2023 12:09) (123/68 - 131/74)  BP(mean): --  RR: 18 (03 May 2023 12:09) (18 - 18)  SpO2: 100% (03 May 2023 12:09) (100% - 100%)    Parameters below as of 03 May 2023 12:09  Patient On (Oxygen Delivery Method): room air        CONSTITUTIONAL: NAD, well-developed  RESPIRATORY: Normal respiratory effort; lungs are clear to auscultation bilaterally  CARDIOVASCULAR: Regular rate and rhythm, normal S1 and S2, no murmur/rub/gallop; No lower extremity edema; Peripheral pulses are 2+ bilaterally  ABDOMEN: Nontender to palpation, normoactive bowel sounds, no rebound/guarding; No hepatosplenomegaly  MUSCULOSKELETAL: no clubbing or cyanosis of digits; no joint swelling or tenderness to palpation  PSYCH: Alert and cooperative    LABS:                      RADIOLOGY & ADDITIONAL TESTS:  Results Reviewed:   Imaging Personally Reviewed:  Electrocardiogram Personally Reviewed:    COORDINATION OF CARE:  Care Discussed with Consultants/Other Providers [Y/N]:  Prior or Outpatient Records Reviewed [Y/N]:

## 2023-05-03 NOTE — PROGRESS NOTE ADULT - PROBLEM SELECTOR PLAN 1
Pt. with ESRD on HD TIW. Pt. did not complete his HD treatment yesterday (again). Pt. clinically stable. Labs reviewed. Pt. scheduled for HD today. Pt. agreeable for HD treatment today. Hemoglobin below target range yesterday. Pt. to receive VIBHA  with HD treatment. Pt. with hyperphosphatemia, on oral Sevelamer therapy. Serum phosphorus above target range (6.3) yesterday. Low phosphorus diet. Monitor BP and labs.  Will attempt HD today with hypoallergenic dialyzer.

## 2023-05-04 NOTE — PROGRESS NOTE ADULT - SUBJECTIVE AND OBJECTIVE BOX
[facility] Division of Kidney Diseases & Hypertension  FOLLOW UP NOTE  --------------------------------------------------------------------------------  Chief Complaint: ESRD/Ongoing hemodialysis requirement    24 hour events/subjective: Pt. seen and examined today. he refused dialysis again yesterday.  On evaluation this morning he still complains of itching but no sob no chest pain.  I explained to him that he is putting himself at risk by refusing dialysis.  He understood and agreed that he will get dialysis today.    PAST HISTORY  --------------------------------------------------------------------------------  No significant changes to PMH, PSH, FHx, SHx, unless otherwise noted    ALLERGIES & MEDICATIONS  --------------------------------------------------------------------------------  Allergies    codeine (Rash)  Haldol (Unknown)  Motrin (Rash)  penicillin (Hives; Anaphylaxis)  Toradol (Rash)  Tylenol (Hives)  aspirin (Hives)  codeine (Unknown)    Intolerances      Standing Inpatient Medications  apixaban 2.5 milliGRAM(s) Oral every 12 hours  ascorbic acid 500 milliGRAM(s) Oral daily  atorvastatin 40 milliGRAM(s) Oral at bedtime  baclofen 10 milliGRAM(s) Oral every 12 hours  calamine/zinc oxide Lotion 1 Application(s) Topical three times a day  chlorhexidine 2% Cloths 1 Application(s) Topical daily  clonazePAM  Tablet 0.5 milliGRAM(s) Oral two times a day  epoetin marilin-epbx (RETACRIT) Injectable 46123 Unit(s) IV Push once  isosorbide   mononitrate ER Tablet (IMDUR) 30 milliGRAM(s) Oral daily  levETIRAcetam 250 milliGRAM(s) Oral <User Schedule>  levETIRAcetam 500 milliGRAM(s) Oral daily  levothyroxine 100 MICROGram(s) Oral daily  loratadine 10 milliGRAM(s) Oral daily  metoprolol succinate ER 50 milliGRAM(s) Oral daily  multivitamin 1 Tablet(s) Oral daily  polyethylene glycol 3350 17 Gram(s) Oral daily  senna 2 Tablet(s) Oral at bedtime  sevelamer carbonate 800 milliGRAM(s) Oral three times a day with meals  tiotropium 2.5 MICROgram(s) Inhaler 2 Puff(s) Inhalation daily    PRN Inpatient Medications  bisacodyl Suppository 10 milliGRAM(s) Rectal daily PRN  melatonin 3 milliGRAM(s) Oral at bedtime PRN      REVIEW OF SYSTEMS  --------------------------------------------------------------------------------  Gen: no fever  Respiratory: no sob  CV: no cp  GI: no ab pain  : suprapubic catheter  MSK: no pain  SKIN: itching    VITALS/PHYSICAL EXAM  --------------------------------------------------------------------------------  T(C): 36.5 (05-04-23 @ 06:44), Max: 36.5 (05-04-23 @ 06:44)  HR: 79 (05-04-23 @ 06:44) (79 - 81)  BP: 128/73 (05-04-23 @ 06:44) (123/68 - 128/73)  ABP: --  ABP(mean): --  RR: 18 (05-04-23 @ 06:44) (18 - 18)  SpO2: 100% (05-04-23 @ 06:44) (100% - 100%)  CVP(mm Hg): --        05-03-23 @ 07:01  -  05-04-23 @ 07:00  --------------------------------------------------------  IN: 0 mL / OUT: 600 mL / NET: -600 mL      Physical Exam:  	Gen: no distress  	HEENT: anicteric  	Pulm: wheezing  	CV: s1 and s2  	Abd: obese non tender  	: suprapubic catheter noted  	LE: no edema   	Neuro: awake  	Psych: Normal affect and mood  	Skin: dry excoriations noted  	Vascular access: right IJ tunneled HD catheter    LABS/STUDIES  --------------------------------------------------------------------------------                Creatinine Trend:  SCr 5.18 [05-01 @ 06:37]  SCr 4.01 [04-29 @ 05:46]  SCr 4.77 [04-28 @ 17:40]  SCr 4.55 [04-22 @ 13:45]  SCr 2.84 [04-21 @ 16:15]

## 2023-05-04 NOTE — PROGRESS NOTE ADULT - PROBLEM SELECTOR PLAN 1
Pt. with ESRD on HD TIW. Pt. did not complete his HD treatment yesterday (again). Pt. clinically stable although now has wheezing on examination possibly fluid overload as he has not had HD in nearly a week. Labs reviewed from 5/1 I reordered for today and notified the RN. Pt. scheduled for HD today. Pt. agreeable for HD treatment today. Hemoglobin below target range yesterday. Pt. to receive VIBHA  with HD treatment. Pt. with hyperphosphatemia, on oral Sevelamer therapy. Serum phosphorus above target range (6.3).  Low phosphorus diet. Monitor BP and labs.  Will attempt HD today with hypoallergenic dialyzer.  Plan for 2 kg UF.

## 2023-05-04 NOTE — CHART NOTE - NSCHARTNOTEFT_GEN_A_CORE
Notified by RN, Pt noted to be lethargic. Pt seen and examined, Pt lethargic, with verbal and eye opening response to tactile/pain stimulus. Pt was treated earlier with Insulin + D50W for hyperkalemia 5.7, post Blood glucose 133, repeated, 126. VSS.  Pupil b/l 3mm brisk reaction, Pt moves all extremity in response to pain.  Pt on Klonopin  BID, last dose @ 7am. ABG done, noted metabolic acidosis as shown below. K+ 5.2 on ABG. Pt lethargy likely due to uremia, Pt last successful HD 4/28, pt has been refusing or only allowing partial treatment since then. Pt is planned for HD @1530 as per dialysis dept. Will continue to monitor.    ABG - ( 04 May 2023 12:25 )  pH, Arterial: 7.27  pH, Blood: x     /  pCO2: 32    /  pO2: 164   / HCO3: 15    / Base Excess: -11.1 /  SaO2: 99.2      Above discussed w/Dr. Casillas.

## 2023-05-04 NOTE — CHART NOTE - NSCHARTNOTEFT_GEN_A_CORE
Notified by the floor RN present with Pt at hemodialysis unit that, HD RN is concern that pt is restless, is uncooperative while receiving HD, concern that he may not be able to complete session. Pt was given ativan 1mg but without improvement in his behavior. Pt evaluated pt was awake, screaming "the ativan is not working", kicking his right leg against the bed. Pt had completed 1.5hrs of the session at this point. Pt was asked what can be done for him to complete the session. Pt states he wants to go to his room and does not want to continue the dialysis. Pt was asked if he understood what stopping dialysis means, Pt states "I am going die". When asked why he felt this way, Pt reports feeling hopelessness over limited use of this hand, particularly the left, which is contracted. then pt went to say he wants his clothes from [place] today, screaming "get me my clothes from [employer]". Pt denies "SI/HI". Pt was offered additional ativan 0.5mg to help calm him down to attempt to complete the session. Psych called for f/u to assist with degree of capacity, behavioral management moving forward as his behavior has been making it a challenge to achieve a full session of HD, last successful completion of HD was on 4/28.

## 2023-05-04 NOTE — PROGRESS NOTE ADULT - SUBJECTIVE AND OBJECTIVE BOX
PROGRESS NOTE:   Authored by Dr. Majo Casillas MD, pager [phone]     Patient is a 62y old  Male who presents with a chief complaint of combative behavior (04 May 2023 08:27)      SUBJECTIVE / OVERNIGHT EVENTS:    ADDITIONAL REVIEW OF SYSTEMS:    MEDICATIONS  (STANDING):  apixaban 2.5 milliGRAM(s) Oral every 12 hours  ascorbic acid 500 milliGRAM(s) Oral daily  atorvastatin 40 milliGRAM(s) Oral at bedtime  baclofen 10 milliGRAM(s) Oral every 12 hours  calamine/zinc oxide Lotion 1 Application(s) Topical three times a day  chlorhexidine 2% Cloths 1 Application(s) Topical daily  clonazePAM  Tablet 0.5 milliGRAM(s) Oral two times a day  epoetin marilin-epbx (RETACRIT) Injectable 15433 Unit(s) IV Push once  isosorbide   mononitrate ER Tablet (IMDUR) 30 milliGRAM(s) Oral daily  levETIRAcetam 250 milliGRAM(s) Oral <User Schedule>  levETIRAcetam 500 milliGRAM(s) Oral daily  levothyroxine 100 MICROGram(s) Oral daily  loratadine 10 milliGRAM(s) Oral daily  metoprolol succinate ER 50 milliGRAM(s) Oral daily  multivitamin 1 Tablet(s) Oral daily  polyethylene glycol 3350 17 Gram(s) Oral daily  senna 2 Tablet(s) Oral at bedtime  sevelamer carbonate 800 milliGRAM(s) Oral three times a day with meals  tiotropium 2.5 MICROgram(s) Inhaler 2 Puff(s) Inhalation daily    MEDICATIONS  (PRN):  bisacodyl Suppository 10 milliGRAM(s) Rectal daily PRN Constipation  diphenhydrAMINE Injectable 25 milliGRAM(s) IV Push once PRN to be given prior to dialysis  LORazepam     Tablet 1 milliGRAM(s) Oral daily PRN Anxiety  LORazepam   Injectable 1 milliGRAM(s) IV Push daily PRN Anxiety  melatonin 3 milliGRAM(s) Oral at bedtime PRN Insomnia      CAPILLARY BLOOD GLUCOSE      POCT Blood Glucose.: 101 mg/dL (04 May 2023 13:50)  POCT Blood Glucose.: 126 mg/dL (04 May 2023 12:09)  POCT Blood Glucose.: 133 mg/dL (04 May 2023 11:50)    I&O's Summary    03 May 2023 07:01  -  04 May 2023 07:00  --------------------------------------------------------  IN: 0 mL / OUT: 600 mL / NET: -600 mL    04 May 2023 07:01  -  04 May 2023 15:01  --------------------------------------------------------  IN: 300 mL / OUT: 350 mL / NET: -50 mL        PHYSICAL EXAM:  Vital Signs Last 24 Hrs  T(C): 36.4 (04 May 2023 12:00), Max: 36.5 (04 May 2023 06:44)  T(F): 97.5 (04 May 2023 12:00), Max: 97.7 (04 May 2023 06:44)  HR: 69 (04 May 2023 12:00) (69 - 81)  BP: 152/80 (04 May 2023 12:00) (127/67 - 152/80)  BP(mean): --  RR: 19 (04 May 2023 12:00) (18 - 19)  SpO2: 100% (04 May 2023 12:00) (100% - 100%)    Parameters below as of 04 May 2023 12:00  Patient On (Oxygen Delivery Method): room air        CONSTITUTIONAL: NAD, well-developed  RESPIRATORY: Normal respiratory effort; lungs are clear to auscultation bilaterally  CARDIOVASCULAR: Regular rate and rhythm, normal S1 and S2, no murmur/rub/gallop; No lower extremity edema; Peripheral pulses are 2+ bilaterally  ABDOMEN: Nontender to palpation, normoactive bowel sounds, no rebound/guarding; No hepatosplenomegaly  MUSCULOSKELETAL: no clubbing or cyanosis of digits; no joint swelling or tenderness to palpation  PSYCH: Somnolent    LABS:                        9.8    11.22 )-----------( 172      ( 04 May 2023 08:15 )             31.4     05-04    139  |  107  |  91<H>  ----------------------------<  104<H>  5.7<H>   |  19<L>  |  4.95<H>    Ca    9.0      04 May 2023 08:15  Phos  5.6     05-04    TPro  x   /  Alb  3.6  /  TBili  x   /  DBili  x   /  AST  x   /  ALT  x   /  AlkPhos  x   05-04                RADIOLOGY & ADDITIONAL TESTS:  Results Reviewed:   Imaging Personally Reviewed:  Electrocardiogram Personally Reviewed:    COORDINATION OF CARE:  Care Discussed with Consultants/Other Providers [Y/N]:  Prior or Outpatient Records Reviewed [Y/N]:

## 2023-05-04 NOTE — PROGRESS NOTE ADULT - ASSESSMENT
62 year old male with hx of CVA (WC bound, quadriparesis), COPD, CAD (AICD), ESRD (T/R/S), HTN, HLD, seizure d/o, a-fib (s/p ablation, eliquis), neurogenic bladder (SPC), BARRY, hypothyroidism, RCC (s/p partial nephrectomy), CHF, prostate CA (s/p radiation) presents from [place] for combative behavior. Patient was discharged from [facility] on 4/27/23 and then started kicking his feet in the ambulance. Patient was combative with staff upon arrival and sent back to [facility] Pending placement at facility.

## 2023-05-04 NOTE — PROGRESS NOTE ADULT - PROBLEM SELECTOR PLAN 1
Pt verbally abusive to staff in ED, refusing meds, food and blood work  Pt recently discharged from [facility] on 4/27/23   Combative behavior possibly 2/2 malingering as patients admits this was his motive  seen by  team on last admission, reconsulted for this admission - appreciate recs  Monitor ,if becomes combative can place on 1:1   Continue Klonopin 0.5 mg BID for anxiety  Avoid IV pain medications   SW consult for disposition- patient does not want to return to [place]

## 2023-05-04 NOTE — PROGRESS NOTE ADULT - PROBLEM SELECTOR PLAN 3
Pt on HD MWF  Nephro following  has been refusing HD over the past week; plan for HD today  c/w home sevelamer (monitor phos levels)  dose medications as per HD   initiated on claritin for uremic pruritis     #Anemia  2/2 ESRD   monitor hgb  transfuse for hgb < 7

## 2023-05-05 NOTE — PROGRESS NOTE ADULT - PROBLEM SELECTOR PLAN 1
Pt. with ESRD on HD TIW. Pt. had his HD treatment yesterday.  Pt. clinically stable although now somewhat lethargic.  Unclear if related to medications or to uremia as he went nearly a week without dialysis. Labs reviewed from 5/4.  Recommend another session of HD today.  Patient aware and agrees.  Dialysis RN agrees. Hemoglobin below target range yesterday.  Patient go 10 k retacrit once yesterday will resume elle next week. Pt. with hyperphosphatemia, on oral Sevelamer therapy. Serum phosphorus above target range (5.6).  Low phosphorus diet. Monitor BP and labs.  Will do HD today with hypoallergenic dialyzer.  Plan for 1 kg UF.

## 2023-05-05 NOTE — PROVIDER CONTACT NOTE (OTHER) - ASSESSMENT
Patient is A&O x4, agitated for waiting to get benadryl IV push. Once patient received benadryl, was able to start tx. 45 mins into treatment, patient become aggressive with staff yelling refusing to take him off the machine

## 2023-05-05 NOTE — PROGRESS NOTE ADULT - ASSESSMENT
62 year old male with hx of CVA (WC bound, quadriparesis), COPD, CAD (AICD), ESRD (T/R/S), HTN, HLD, seizure d/o, a-fib (s/p ablation, eliquis), neurogenic bladder (SPC), BARRY, hypothyroidism, RCC (s/p partial nephrectomy), CHF, prostate CA (s/p radiation) presents from [place] for combative behavior. Patient was discharged from [facility] on 4/27/23 and then started kicking his feet in the ambulance. Patient was combative with staff upon arrival and sent back to [facility]. Pending placement at facility.

## 2023-05-05 NOTE — PROGRESS NOTE ADULT - SUBJECTIVE AND OBJECTIVE BOX
PROGRESS NOTE:   Authored by Dr. Majo Casillas MD, pager [phone]     Patient is a 62y old  Male who presents with a chief complaint of combative behavior (05 May 2023 07:20)      SUBJECTIVE / OVERNIGHT EVENTS:  Has worsening cough today. Somnolent on exam. Nods when asked if he has pruritis. No fevers, chills, N/V/D, dysuria, hematuria, CP, or SOB. ROS + for cough.  ADDITIONAL REVIEW OF SYSTEMS:    MEDICATIONS  (STANDING):  albuterol/ipratropium for Nebulization 3 milliLiter(s) Nebulizer every 6 hours  apixaban 2.5 milliGRAM(s) Oral every 12 hours  ascorbic acid 500 milliGRAM(s) Oral daily  atorvastatin 40 milliGRAM(s) Oral at bedtime  baclofen 10 milliGRAM(s) Oral every 12 hours  calamine/zinc oxide Lotion 1 Application(s) Topical three times a day  chlorhexidine 2% Cloths 1 Application(s) Topical daily  clonazePAM  Tablet 0.5 milliGRAM(s) Oral <User Schedule>  clonazePAM  Tablet 0.75 milliGRAM(s) Oral <User Schedule>  isosorbide   mononitrate ER Tablet (IMDUR) 30 milliGRAM(s) Oral daily  levETIRAcetam 250 milliGRAM(s) Oral <User Schedule>  levETIRAcetam 500 milliGRAM(s) Oral daily  levothyroxine 100 MICROGram(s) Oral daily  loratadine 10 milliGRAM(s) Oral daily  metoprolol succinate ER 50 milliGRAM(s) Oral daily  multivitamin 1 Tablet(s) Oral daily  polyethylene glycol 3350 17 Gram(s) Oral daily  senna 2 Tablet(s) Oral at bedtime  sevelamer carbonate 800 milliGRAM(s) Oral three times a day with meals    MEDICATIONS  (PRN):  bisacodyl Suppository 10 milliGRAM(s) Rectal daily PRN Constipation  LORazepam     Tablet 1 milliGRAM(s) Oral daily PRN Anxiety  LORazepam   Injectable 1 milliGRAM(s) IV Push daily PRN Anxiety  melatonin 3 milliGRAM(s) Oral at bedtime PRN Insomnia      CAPILLARY BLOOD GLUCOSE        I&O's Summary    04 May 2023 07:01  -  05 May 2023 07:00  --------------------------------------------------------  IN: 300 mL / OUT: 750 mL / NET: -450 mL        PHYSICAL EXAM:  Vital Signs Last 24 Hrs  T(C): 36.5 (05 May 2023 11:46), Max: 36.7 (05 May 2023 04:30)  T(F): 97.7 (05 May 2023 11:46), Max: 98 (05 May 2023 04:30)  HR: 72 (05 May 2023 16:07) (72 - 99)  BP: 111/82 (05 May 2023 11:46) (111/82 - 123/83)  BP(mean): --  RR: 18 (05 May 2023 11:46) (17 - 20)  SpO2: 97% (05 May 2023 16:07) (97% - 100%)    Parameters below as of 05 May 2023 16:07  Patient On (Oxygen Delivery Method): room air        CONSTITUTIONAL: NAD, well-developed  RESPIRATORY: Coarse breath sounds; lungs are clear to auscultation bilaterally  CARDIOVASCULAR: Regular rate and rhythm, normal S1 and S2, no murmur/rub/gallop; No lower extremity edema; Peripheral pulses are 2+ bilaterally  ABDOMEN: Nontender to palpation, normoactive bowel sounds, no rebound/guarding; No hepatosplenomegaly  MUSCULOSKELETAL: no clubbing or cyanosis of digits; no joint swelling or tenderness to palpation  PSYCH: Somnolent    LABS:                        9.8    11.22 )-----------( 172      ( 04 May 2023 08:15 )             31.4     05-04    139  |  107  |  91<H>  ----------------------------<  104<H>  5.7<H>   |  19<L>  |  4.95<H>    Ca    9.0      04 May 2023 08:15  Phos  5.6     05-04    TPro  x   /  Alb  3.6  /  TBili  x   /  DBili  x   /  AST  x   /  ALT  x   /  AlkPhos  x   05-04                RADIOLOGY & ADDITIONAL TESTS:  Results Reviewed:   Imaging Personally Reviewed:  Electrocardiogram Personally Reviewed:    COORDINATION OF CARE:  Care Discussed with Consultants/Other Providers [Y/N]:  Prior or Outpatient Records Reviewed [Y/N]:

## 2023-05-05 NOTE — PROGRESS NOTE ADULT - PROBLEM SELECTOR PLAN 1
Pt verbally abusive to staff in ED, refusing meds, food and blood work  Pt recently discharged from [facility] on 4/27/23   Combative behavior possibly 2/2 malingering as patients admits this was his motive  seen by  team on last admission, reconsulted for this admission - appreciate recs  Monitor ,if becomes combative can place on 1:1   Will switch klonopin dosing to 0.5 mg qam and 0.75 mg qpm  Avoid IV pain medications   SW consult for disposition- patient does not want to return to [place]

## 2023-05-05 NOTE — PROGRESS NOTE ADULT - SUBJECTIVE AND OBJECTIVE BOX
[facility] Division of Kidney Diseases & Hypertension  FOLLOW UP NOTE  --------------------------------------------------------------------------------  Chief Complaint: ESRD/Ongoing hemodialysis requirement    24 hour events/subjective: Pt. seen and examined today. ultimately was able to complete dialysis yesterday following medication administration.  On evaluation this morning he appears lethargic.  However he says that his itching is better.      PAST HISTORY  --------------------------------------------------------------------------------  No significant changes to PMH, PSH, FHx, SHx, unless otherwise noted    ALLERGIES & MEDICATIONS  --------------------------------------------------------------------------------  Allergies    codeine (Rash)  Haldol (Unknown)  Motrin (Rash)  penicillin (Hives; Anaphylaxis)  Toradol (Rash)  Tylenol (Hives)  aspirin (Hives)  codeine (Unknown)    Intolerances      Standing Inpatient Medications  apixaban 2.5 milliGRAM(s) Oral every 12 hours  ascorbic acid 500 milliGRAM(s) Oral daily  atorvastatin 40 milliGRAM(s) Oral at bedtime  baclofen 10 milliGRAM(s) Oral every 12 hours  calamine/zinc oxide Lotion 1 Application(s) Topical three times a day  chlorhexidine 2% Cloths 1 Application(s) Topical daily  clonazePAM  Tablet 0.5 milliGRAM(s) Oral two times a day  isosorbide   mononitrate ER Tablet (IMDUR) 30 milliGRAM(s) Oral daily  levETIRAcetam 250 milliGRAM(s) Oral <User Schedule>  levETIRAcetam 500 milliGRAM(s) Oral daily  levothyroxine 100 MICROGram(s) Oral daily  loratadine 10 milliGRAM(s) Oral daily  metoprolol succinate ER 50 milliGRAM(s) Oral daily  multivitamin 1 Tablet(s) Oral daily  polyethylene glycol 3350 17 Gram(s) Oral daily  senna 2 Tablet(s) Oral at bedtime  sevelamer carbonate 800 milliGRAM(s) Oral three times a day with meals  tiotropium 2.5 MICROgram(s) Inhaler 2 Puff(s) Inhalation daily    PRN Inpatient Medications  bisacodyl Suppository 10 milliGRAM(s) Rectal daily PRN  LORazepam     Tablet 1 milliGRAM(s) Oral daily PRN  LORazepam   Injectable 1 milliGRAM(s) IV Push daily PRN  melatonin 3 milliGRAM(s) Oral at bedtime PRN      REVIEW OF SYSTEMS  --------------------------------------------------------------------------------  Gen: no fever  Respiratory: no sob  CV: no cp  GI: no ab pain  : still making urine  MSK: no pain    VITALS/PHYSICAL EXAM  --------------------------------------------------------------------------------  T(C): 36.7 (05-05-23 @ 04:30), Max: 36.7 (05-05-23 @ 04:30)  HR: 99 (05-05-23 @ 04:30) (68 - 99)  BP: 122/72 (05-05-23 @ 04:30) (120/81 - 152/80)  ABP: --  ABP(mean): --  RR: 20 (05-05-23 @ 04:30) (17 - 20)  SpO2: 99% (05-05-23 @ 04:30) (99% - 100%)  CVP(mm Hg): --        05-04-23 @ 07:01  -  05-05-23 @ 07:00  --------------------------------------------------------  IN: 300 mL / OUT: 750 mL / NET: -450 mL      Physical Exam:  	Gen: no distress  	HEENT: anicteric  	Pulm: clear today  	CV: s1 and s2  	Abd: obese non tender  	: suprapubic catheter noted  	LE: no edema   	Neuro: awake  	Psych: Normal affect and mood  	Skin: dry excoriations noted  	Vascular access: right IJ tunneled HD catheter    LABS/STUDIES  --------------------------------------------------------------------------------              9.8    11.22 >-----------<  172      [05-04-23 @ 08:15]              31.4     139  |  107  |  91  ----------------------------<  104      [05-04-23 @ 08:15]  5.7   |  19  |  4.95        Ca     9.0     [05-04-23 @ 08:15]      Phos  5.6     [05-04-23 @ 08:15]    TPro  x   /  Alb  3.6  /  TBili  x   /  DBili  x   /  AST  x   /  ALT  x   /  AlkPhos  x   [05-04-23 @ 08:15]          Creatinine Trend:  SCr 4.95 [05-04 @ 08:15]  SCr 5.18 [05-01 @ 06:37]  SCr 4.01 [04-29 @ 05:46]  SCr 4.77 [04-28 @ 17:40]  SCr 4.55 [04-22 @ 13:45]

## 2023-05-06 NOTE — DISCHARGE NOTE PROVIDER - NSDCFUADDAPPT_GEN_ALL_CORE_FT
Podiatry Discharge Instructions:  Follow up: Please follow up with Dr. Dayron Lira within 1 week of discharge from the hospital, please call [phone] for appointment and discuss that you recently were seen in the hospital.  Wound Care: Please apply aquacell directly to right heel wound followed by 4x4 gauze and kerlix daily.   Weight bearing: Please wear z-flows to both heels at all times while in bed.  Antibiotics: Please continue as instructed.

## 2023-05-06 NOTE — DISCHARGE NOTE PROVIDER - NSDCCPCAREPLAN_GEN_ALL_CORE_FT
PRINCIPAL DISCHARGE DIAGNOSIS  Diagnosis: ESRD on dialysis  Assessment and Plan of Treatment: You refused to complete dialysis sessions a few times. Please continue dialysis treatments as directed.      SECONDARY DISCHARGE DIAGNOSES  Diagnosis: Combative behavior  Assessment and Plan of Treatment: Your dose of clonazepam was increased in order to help your anxiety.     PRINCIPAL DISCHARGE DIAGNOSIS  Diagnosis: ESRD on dialysis  Assessment and Plan of Treatment: You refused to complete dialysis sessions a few times. Please continue dialysis treatments as directed.      SECONDARY DISCHARGE DIAGNOSES  Diagnosis: Combative behavior  Assessment and Plan of Treatment: Your dose of clonazepam was increased in order to help your anxiety. You will take klonopin 0.5mg qam and 0.75mg qPM.

## 2023-05-06 NOTE — PROGRESS NOTE ADULT - ASSESSMENT
62 year old male with hx of CVA (WC bound, quadriparesis), COPD, CAD (AICD), ESRD (T/R/S), HTN, HLD, seizure d/o, a-fib (s/p ablation, eliquis), neurogenic bladder (SPC), BARRY, hypothyroidism, RCC (s/p partial nephrectomy), CHF, prostate CA (s/p radiation) presents from [place] for combative behavior. Patient was discharged from [facility] on 4/27/23 and then started kicking his feet in the ambulance. Patient was combative with staff upon arrival and sent back to [facility]. Pending placement at facility.

## 2023-05-06 NOTE — PROGRESS NOTE ADULT - SUBJECTIVE AND OBJECTIVE BOX
PROGRESS NOTE:   Authored by Dr. Majo Casillas MD, pager [phone]     Patient is a 62y old  Male who presents with a chief complaint of combative behavior (05 May 2023 16:58)      SUBJECTIVE / OVERNIGHT EVENTS:  Patient did not complete HD last night as he demanded the session to be aborted. This morning, he is somnolent.     ADDITIONAL REVIEW OF SYSTEMS:  Unable to obtain due to somnolence     MEDICATIONS  (STANDING):  albuterol/ipratropium for Nebulization 3 milliLiter(s) Nebulizer every 6 hours  apixaban 2.5 milliGRAM(s) Oral every 12 hours  ascorbic acid 500 milliGRAM(s) Oral daily  atorvastatin 40 milliGRAM(s) Oral at bedtime  baclofen 10 milliGRAM(s) Oral every 12 hours  calamine/zinc oxide Lotion 1 Application(s) Topical three times a day  chlorhexidine 2% Cloths 1 Application(s) Topical daily  clonazePAM  Tablet 0.5 milliGRAM(s) Oral <User Schedule>  clonazePAM  Tablet 0.75 milliGRAM(s) Oral <User Schedule>  isosorbide   mononitrate ER Tablet (IMDUR) 30 milliGRAM(s) Oral daily  levETIRAcetam 500 milliGRAM(s) Oral daily  levETIRAcetam 250 milliGRAM(s) Oral <User Schedule>  levothyroxine 100 MICROGram(s) Oral daily  loratadine 10 milliGRAM(s) Oral daily  metoprolol succinate ER 50 milliGRAM(s) Oral daily  multivitamin 1 Tablet(s) Oral daily  polyethylene glycol 3350 17 Gram(s) Oral daily  senna 2 Tablet(s) Oral at bedtime  sevelamer carbonate 800 milliGRAM(s) Oral three times a day with meals    MEDICATIONS  (PRN):  bisacodyl Suppository 10 milliGRAM(s) Rectal daily PRN Constipation  LORazepam     Tablet 1 milliGRAM(s) Oral daily PRN Anxiety  LORazepam   Injectable 1 milliGRAM(s) IV Push daily PRN Anxiety  melatonin 3 milliGRAM(s) Oral at bedtime PRN Insomnia      CAPILLARY BLOOD GLUCOSE        I&O's Summary    05 May 2023 07:01  -  06 May 2023 07:00  --------------------------------------------------------  IN: 400 mL / OUT: 400 mL / NET: 0 mL        PHYSICAL EXAM:  Vital Signs Last 24 Hrs  T(C): 36.4 (06 May 2023 11:10), Max: 36.5 (05 May 2023 22:24)  T(F): 97.5 (06 May 2023 11:10), Max: 97.7 (05 May 2023 22:24)  HR: 72 (06 May 2023 11:10) (72 - 100)  BP: 113/71 (06 May 2023 11:10) (113/71 - 156/75)  BP(mean): --  RR: 18 (06 May 2023 11:10) (16 - 18)  SpO2: 100% (06 May 2023 11:10) (97% - 100%)    Parameters below as of 06 May 2023 11:10  Patient On (Oxygen Delivery Method): room air        CONSTITUTIONAL: NAD, well-developed  RESPIRATORY: Normal respiratory effort; lungs are clear to auscultation bilaterally  CARDIOVASCULAR: Regular rate and rhythm, normal S1 and S2, no murmur/rub/gallop; No lower extremity edema; Peripheral pulses are 2+ bilaterally  ABDOMEN: Nontender to palpation, normoactive bowel sounds, no rebound/guarding; No hepatosplenomegaly  MUSCULOSKELETAL: no clubbing or cyanosis of digits; no joint swelling or tenderness to palpation  PSYCH: Somnolent    LABS:                        10.7   12.05 )-----------( 190      ( 05 May 2023 18:10 )             33.6     05-05    137  |  101  |  75<H>  ----------------------------<  78  5.2   |  20<L>  |  4.89<H>    Ca    9.1      05 May 2023 18:10  Phos  6.5     05-05  Mg     2.10     05-05                  RADIOLOGY & ADDITIONAL TESTS:  Results Reviewed:   Imaging Personally Reviewed:  Electrocardiogram Personally Reviewed:    COORDINATION OF CARE:  Care Discussed with Consultants/Other Providers [Y/N]:  Prior or Outpatient Records Reviewed [Y/N]:  6

## 2023-05-06 NOTE — PROGRESS NOTE ADULT - PROBLEM SELECTOR PLAN 1
Pt recently discharged from [facility] on 4/27/23   Combative behavior possibly 2/2 malingering as patients admits this was his motive  Becomes agitated during HD. During conversation with ACP, was able to articulate that missing HD can be fatal. Unable to  have conversation this morning due to somnolence, but will need to discuss with pt whether he agrees to ongoing HD. If not, would then address conversation re hospice   seen by  team on last admission, reconsulted for this admission - appreciate recs  Will switch klonopin dosing to 0.5 mg qam and 0.75 mg qpm  Avoid IV pain medications   SW consult for disposition- patient does not want to return to [facility]

## 2023-05-06 NOTE — DISCHARGE NOTE PROVIDER - NSDCMRMEDTOKEN_GEN_ALL_CORE_FT
apixaban 2.5 mg oral tablet: 1 tab(s) orally 2 times a day  ascorbic acid 500 mg oral tablet: 1 tab(s) orally once a day  atorvastatin 40 mg oral tablet: 1 tab(s) orally once a day (at bedtime)  baclofen 10 mg oral tablet: 1 tab(s) orally every 12 hours  bisacodyl 10 mg rectal suppository: 1 suppository(ies) rectal once a day, As needed, Constipation  calamine topical lotion: 1 Apply topically to affected area 3 times a day  clonazePAM 0.5 mg oral tablet: 1 tab(s) orally 2 times a day  diphenhydrAMINE 50 mg oral capsule: 1 cap(s) orally once a day, As needed, 1 hour before dialysis  Imdur 30 mg oral tablet, extended release: 1 orally once a day  lactobacillus acidophilus oral capsule: 1 tab(s) orally 3 times a day  levETIRAcetam 250 mg oral tablet: 1 tab(s) orally 3 times a week  tue/thrus/sat after dialysis.   levETIRAcetam 500 mg oral tablet: 1 tab(s) orally once a day  levothyroxine 100 mcg (0.1 mg) oral tablet: 1 tab(s) orally once a day  melatonin 3 mg oral tablet: 1 tab(s) orally once a day (at bedtime), As needed, Insomnia  metoprolol succinate 50 mg oral tablet, extended release: 1 tab(s) orally once a day  multivitamin: 1 tab(s) orally once a day  mupirocin 2% topical ointment: 1 application topically 2 times a day  polyethylene glycol 3350 oral powder for reconstitution: 17 gram(s) orally once a day  senna leaf extract oral tablet: 2 tab(s) orally once a day (at bedtime)  sevelamer carbonate 800 mg oral tablet: 2 tab(s) orally 3 times a day (with meals)  tiotropium 2.5 mcg/inh inhalation aerosol: 2 puff(s) inhaled once a day  Zofran 4 mg oral tablet: 1 orally every 8 hours as needed for  nausea

## 2023-05-06 NOTE — DISCHARGE NOTE PROVIDER - HOSPITAL COURSE
62 year old male with hx of CVA (WC bound, quadriparesis), COPD, CAD (AICD), ESRD (T/R/S), HTN, HLD, seizure d/o, a-fib (s/p ablation, eliquis), neurogenic bladder (SPC), BARRY, hypothyroidism, RCC (s/p partial nephrectomy), CHF, prostate CA (s/p radiation) presents from [place] for combative behavior. Patient was discharged from [facility] on 4/27/23 and then started kicking his feet in the ambulance. Patient was combative with staff upon arrival and sent back to [facility]. Patient complained of HD associated pruritis and often refused HD altogether or could only tolerate shortened sessions. Pending placement at facility. 62 year old male with hx of CVA (WC bound, quadriparesis), COPD, CAD (AICD), ESRD (T/R/S), HTN, HLD, seizure d/o, a-fib (s/p ablation, eliquis), neurogenic bladder (SPC), BARRY, hypothyroidism, RCC (s/p partial nephrectomy), CHF, prostate CA (s/p radiation) presents from [place] for combative behavior. Patient was discharged from [facility] on 4/27/23 and then started kicking his feet in the ambulance. Patient was combative with staff upon arrival and sent back to [facility]. Patient complained of HD associated pruritis and often refused HD altogether or could only tolerate shortened sessions. Pt. had anemia and received 1UPRBC. No signs of bleeding. Hgb has been 9-10. Refused wound care of his lower extremities. Pending placement at facility.

## 2023-05-07 NOTE — PROGRESS NOTE ADULT - PROBLEM SELECTOR PLAN 1
Pt recently discharged from [facility] on 4/27/23   Combative behavior possibly 2/2 malingering as patients admits this was his motive  intermittently refusing labs  Becomes agitated during HD. During conversation with ACP, was able to articulate that missing HD can be fatal. Unable to  have conversation this morning due to somnolence, but will need to discuss with pt whether he agrees to ongoing HD. If not, would then address conversation re hospice   seen by  team on last admission, reconsulted for this admission - appreciate recs  Will switch klonopin dosing to 0.5 mg qam and 0.75 mg qpm  Avoid IV pain medications   SW consult for disposition- patient does not want to return to [place]

## 2023-05-07 NOTE — PROGRESS NOTE ADULT - PROBLEM SELECTOR PLAN 11
dvt ppx: on Eliquis  #Dispo: Pending placement at [facility]

## 2023-05-07 NOTE — PROGRESS NOTE ADULT - SUBJECTIVE AND OBJECTIVE BOX
PROGRESS NOTE:   Authored by Dr. Majo Casillas MD, pager [phone]     Patient is a 62y old  Male who presents with a chief complaint of combative behavior (06 May 2023 18:36)      SUBJECTIVE / OVERNIGHT EVENTS:  Patient has eyes closed. Becomes upset when mentioned that he may need to return to [place]. Nods when asked if he intends to continue HD. No fevers, chills, N/V/D, dysuria, hematuria. Currently denies pain or pruritis.     ADDITIONAL REVIEW OF SYSTEMS:    MEDICATIONS  (STANDING):  albuterol/ipratropium for Nebulization 3 milliLiter(s) Nebulizer every 6 hours  apixaban 2.5 milliGRAM(s) Oral every 12 hours  ascorbic acid 500 milliGRAM(s) Oral daily  atorvastatin 40 milliGRAM(s) Oral at bedtime  baclofen 10 milliGRAM(s) Oral every 12 hours  calamine/zinc oxide Lotion 1 Application(s) Topical three times a day  chlorhexidine 2% Cloths 1 Application(s) Topical daily  clonazePAM  Tablet 0.5 milliGRAM(s) Oral <User Schedule>  clonazePAM  Tablet 0.75 milliGRAM(s) Oral <User Schedule>  isosorbide   mononitrate ER Tablet (IMDUR) 30 milliGRAM(s) Oral daily  levETIRAcetam 500 milliGRAM(s) Oral daily  levETIRAcetam 250 milliGRAM(s) Oral <User Schedule>  levothyroxine 100 MICROGram(s) Oral daily  loratadine 10 milliGRAM(s) Oral daily  metoprolol succinate ER 50 milliGRAM(s) Oral daily  multivitamin 1 Tablet(s) Oral daily  polyethylene glycol 3350 17 Gram(s) Oral daily  senna 2 Tablet(s) Oral at bedtime  sevelamer carbonate 800 milliGRAM(s) Oral three times a day with meals  sodium zirconium cyclosilicate 10 Gram(s) Oral once    MEDICATIONS  (PRN):  bisacodyl Suppository 10 milliGRAM(s) Rectal daily PRN Constipation  LORazepam     Tablet 1 milliGRAM(s) Oral daily PRN Anxiety  LORazepam   Injectable 1 milliGRAM(s) IV Push daily PRN Anxiety  melatonin 3 milliGRAM(s) Oral at bedtime PRN Insomnia      CAPILLARY BLOOD GLUCOSE        I&O's Summary    06 May 2023 07:01  -  07 May 2023 07:00  --------------------------------------------------------  IN: 0 mL / OUT: 600 mL / NET: -600 mL        PHYSICAL EXAM:  Vital Signs Last 24 Hrs  T(C): 36.3 (07 May 2023 12:54), Max: 36.6 (07 May 2023 07:07)  T(F): 97.4 (07 May 2023 12:54), Max: 97.8 (07 May 2023 07:07)  HR: 71 (07 May 2023 12:54) (71 - 82)  BP: 114/72 (07 May 2023 12:54) (100/65 - 114/72)  BP(mean): --  RR: 18 (07 May 2023 12:54) (18 - 18)  SpO2: 98% (07 May 2023 12:54) (97% - 98%)    Parameters below as of 07 May 2023 12:54  Patient On (Oxygen Delivery Method): room air        CONSTITUTIONAL: NAD, well-developed  RESPIRATORY: Normal respiratory effort; lungs are clear to auscultation bilaterally  CARDIOVASCULAR: Regular rate and rhythm, normal S1 and S2, no murmur/rub/gallop; No lower extremity edema; Peripheral pulses are 2+ bilaterally  ABDOMEN: Nontender to palpation, normoactive bowel sounds, no rebound/guarding; No hepatosplenomegaly  MUSCULOSKELETAL: no clubbing or cyanosis of digits; no joint swelling or tenderness to palpation  PSYCH: Somnolent but easily arousable    LABS:                        10.7   12.05 )-----------( 190      ( 05 May 2023 18:10 )             33.6     05-05    137  |  101  |  75<H>  ----------------------------<  78  5.2   |  20<L>  |  4.89<H>    Ca    9.1      05 May 2023 18:10  Phos  6.5     05-05  Mg     2.10     05-05                  RADIOLOGY & ADDITIONAL TESTS:  Results Reviewed:   Imaging Personally Reviewed:  Electrocardiogram Personally Reviewed:    COORDINATION OF CARE:  Care Discussed with Consultants/Other Providers [Y/N]:  Prior or Outpatient Records Reviewed [Y/N]:

## 2023-05-07 NOTE — PROGRESS NOTE ADULT - ASSESSMENT
62 year old male with hx of CVA (WC bound, quadriparesis), COPD, CAD (AICD), ESRD (T/R/S), HTN, HLD, seizure d/o, a-fib (s/p ablation, eliquis), neurogenic bladder (SPC), BARRY, hypothyroidism, RCC (s/p partial nephrectomy), CHF, prostate CA (s/p radiation) presents from [place] for combative behavior. Patient was discharged from [facility] on 4/27/23 and then started kicking his feet in the ambulance. Patient was combative with staff upon arrival and sent back to [facility]. Pending placement at facility.

## 2023-05-07 NOTE — CHART NOTE - NSCHARTNOTEFT_GEN_A_CORE
Pt refusing lab draws. Discussed with patient in the presence of attending the importance of blood draws as patient did not complete dialysis. Writer   explained that if there are electrolyte derangement, we can not catch it and it can cause harm and lead to possible death. Pt demonstrated understanding however,  still refused. Ordered lokelma in efforts to temporize situation and prior lab K was uptrending. Will continue to encourage to pt to allow blood draw.

## 2023-05-08 NOTE — DIETITIAN INITIAL EVALUATION ADULT - PROBLEM SELECTOR PLAN 1
Pt verbally abusive to staff in ED, refusing meds, food and blood work  Pt recently discharged from [facility] on 4/27/23   Combative behavior possibly 2/2 malingering   seen by  team on last admission, reconsulted for this admission - appreciate recs  Monitor ,if becomes combative can place on 1:1   Continue Klonopin 0.5 mg BID for anxiety  Avoid IV pain medications   SW consult for disposition- patient does not want to return to [place]   Follow up with EKG for baseline QTc  R/o infectious etiologies for combative behavior - f.up with blood cultures when pt amenable for blood work

## 2023-05-08 NOTE — DIETITIAN INITIAL EVALUATION ADULT - REASON FOR ADMISSION
Encounter for general adult medical examination without abnormal findings    Per chart review, 62 year old male with hx of CVA (WC bound, quadriparesis), COPD, CAD (AICD), ESRD (T/R/S), HTN, HLD, seizure d/o, a-fib (s/p ablation, eliquis), neurogenic bladder (SPC), BARRY, hypothyroidism, RCC (s/p partial nephrectomy), CHF, prostate CA (s/p radiation) presents from [place] for combative behavior. Patient was discharged from [facility] on 4/27/23 and then started kicking his feet in the ambulance. Patient was combative with staff upon arrival and sent back to [facility]. Pending placement at facility.

## 2023-05-08 NOTE — DIETITIAN INITIAL EVALUATION ADULT - NS FNS DIET ORDER
Diet, Renal Restrictions:   For patients receiving Renal Replacement - No Protein Restr, No Conc K, No Conc Phos, Low Sodium (04-28-23 @ 09:08)

## 2023-05-08 NOTE — PROGRESS NOTE ADULT - PROBLEM SELECTOR PLAN 1
Pt. with ESRD on HD TIW. Pt. had his HD treatment friday but truncated he refused and treatment was cut short.  Pt. clinically stable. Recommend another session of HD today.  Patient aware and agrees I stressed the importance of completing his HD sessions.  He understands that going without adequate dialysis is life threatening in his case.   Hemoglobin below target range yesterday.  Patient go 10 k retacrit once 5/4 will resume elle but please check HGB. Has hyperphosphatemia, on oral Sevelamer therapy. Serum phosphorus above target range (6.5).  Low phosphorus diet. Monitor BP and labs.  Will do HD today with hypoallergenic dialyzer.  Recommend pyschiatry re evaluated to help with patient's mood and dialysis refusal.

## 2023-05-08 NOTE — PROGRESS NOTE ADULT - ASSESSMENT
62 year old male with hx of CVA (WC bound, quadriparesis), COPD, CAD (AICD), ESRD (T/R/S), HTN, HLD, seizure d/o, a-fib (s/p ablation, eliquis), neurogenic bladder (SPC), BARRY, hypothyroidism, RCC (s/p partial nephrectomy), CHF, prostate CA (s/p radiation) presents from [place] for combative behavior. Patient was discharged from [facility] on 4/27/23 and then started kicking his feet in the ambulance. Patient was combative with staff upon arrival and sent back to [facility]. Pending placement at facility.

## 2023-05-08 NOTE — PROGRESS NOTE ADULT - PROBLEM SELECTOR PLAN 1
Pt recently discharged from [facility] on 4/27/23   Combative behavior possibly 2/2 malingering as patients admits this was his motive  intermittently refusing labs  Becomes agitated during HD. During conversation with ACP, was able to articulate that missing HD can be fatal. Unable to  have conversation this morning due to somnolence, but will need to discuss with pt whether he agrees to ongoing HD. If not, would then address conversation re hospice   seen by  team on last admission, reconsulted for this admission - appreciate recs  Will switch klonopin dosing to 0.5 mg qam and 0.75 mg qpm  Avoid IV pain medications   SW consult for disposition- patient does not want to return to [place] Pt recently discharged from [facility] on 4/27/23   Combative behavior possibly 2/2 malingering as patients admits this was his motive  intermittently refusing labs  Becomes agitated during HD. During conversation with ACP, was able to articulate that missing HD can be fatal. Pt was agreeable to HD this morning, however, became agitated and refused HD later in the afternoon (please see ACP noted from today). Will need to have GOC discussion regarding HD and if not, would then discuss possible hospice.   Seen by  team on last admission, reconsulted for this admission   Continue klonopin 0.5 mg qam and 0.75 mg qpm  Avoid IV pain medications   SW consult for disposition- patient does not want to return to [place]

## 2023-05-08 NOTE — DIETITIAN INITIAL EVALUATION ADULT - OTHER INFO
Patient did not participate in interview. Collateral obtained from nursing staff and extensive chart review. Patient with excellent appetite and PO intake reported. Consuming 100% of meals. Patient denies any nausea/vomiting/diarrhea/constipation or difficulty chewing and swallowing. NKFA. Patient w/right and left great toe wound and right heel stage III pressure injury. Would consider addition of PO supplement Nepro for added calories and protein to promote wound healing. Patient is also on hemodialysis.

## 2023-05-08 NOTE — PROGRESS NOTE ADULT - PROBLEM SELECTOR PLAN 10
c/w home Imdur and metoprolol    #multiple wounds on R foot/ toes  seen by podiatry on previous admission - no role for surgical intervention  wound care following; recs appreciated c/w home Imdur and metoprolol    #multiple wounds on R foot/ toes  seen by podiatry on previous admission - no role for surgical intervention  wound care following; recs appreciated however, pt refusing wound care

## 2023-05-08 NOTE — PROVIDER CONTACT NOTE (OTHER) - ASSESSMENT
Patient is A&O x4, patient is combative and aggressive. Pt is kicking the bed, screaming racial slurs at dialysis staff and very combative. Unable to dialyze patient at this time. Security to escort patient back to room.

## 2023-05-08 NOTE — CHART NOTE - NSCHARTNOTEFT_GEN_A_CORE
Patient refusing HD. Pt was aggressive, violent, kicking and spitting at HD staff even with security. Attempted to deescalate patient multiple times and attempted to find alternatives to help with compliance with HD. Pt aware of the risks of not complying with HD schedule or lab work monitoring, including death. Pt continues to refuse blood work, HD or wound care dressing changes at this time. Patient has capacity and continues to refuse HD. MD Villavicencio aware.     Concepcion Longoria NP  [phone]

## 2023-05-08 NOTE — CHART NOTE - NSCHARTNOTEFT_GEN_A_CORE
Notified by RN that patient noted to have bleeding from the left heel as patient was kicking  his left foot on the end of the bed. Recommended RN to apply pressure.  RRT was heard overhead before the writer arrived on the unit to evaluate the patient. RRT was called by RN for unresponsiveness. Upon arrival patient is unresponsive to verbal stimuli and noted to be responsive to sternal rub.      62 year old male with hx of CVA (WC bound, quadriparesis), COPD, CAD (AICD), ESRD (T/R/S), HTN, HLD, seizure d/o, a-fib (s/p ablation, eliquis), neurogenic bladder (SPC), BARRY, hypothyroidism, RCC (s/p partial nephrectomy), CHF, prostate CA (s/p radiation) presents from [place] for combative behavior.    As per RN patient noted to significant amount of bleeding form the heel with blood clots. Dressing as applied by the RN and no active bleeding  noted upon arrival. As per patient has patient stage 3 pressure ulcer on the left heel.   Patient has been refusing labs and Dialysis for the past 3 days. Explained to the patient about the importance of doing blood work, However, patient still refuses to do and agreed to do  blood work from the peripheral IV line. Stat CBC, BMP. VBG and type and screen sent. During the assessment patient noted to have intermittent periods of lethargy. . Lethargy likely secondary to  missed dialysis. Notified by RN that patient noted to have bleeding from the left heel as patient was kicking  his left foot on the end of the bed. Recommended RN to apply pressure.  RRT was heard overhead before the writer arrived on the unit to evaluate the patient. RRT was called by RN for unresponsiveness. Upon arrival patient is unresponsive to verbal stimuli and noted to be responsive to sternal rub.      62 year old male with hx of CVA (WC bound, quadriparesis), COPD, CAD (AICD), ESRD (T/R/S), HTN, HLD, seizure d/o, a-fib (s/p ablation, eliquis), neurogenic bladder (SPC), BARRY, hypothyroidism, RCC (s/p partial nephrectomy), CHF, prostate CA (s/p radiation) presents from [place] for combative behavior.    As per RN patient noted to significant amount of bleeding form the heel with blood clots. Dressing as applied by the RN and no active bleeding  noted upon arrival. As per patient has patient stage 3 pressure ulcer on the left heel.   Patient has been refusing labs and Dialysis for the past 3 days. Explained to the patient about the importance of doing blood work, However, patient still refuses to do and agreed to do  blood work from the peripheral IV line. Stat CBC, BMP. VBG and type and screen sent. During the assessment patient noted to have intermittent periods of lethargy. Lethargy likely secondary to  missed dialysis.  Discussed case with Hospitalist Dr. Reinoso. Notified by RN that patient noted to have bleeding from the left heel as patient was kicking  his left foot on the end of the bed. Recommended RN to apply pressure.  RRT was heard overhead before the writer arrived on the unit to evaluate the patient. RRT was called by RN for unresponsiveness. Upon arrival patient is unresponsive to verbal stimuli and noted to be responsive to sternal rub.      62 year old male with hx of CVA (WC bound, quadriparesis), COPD, CAD (AICD), ESRD (T/R/S), HTN, HLD, seizure d/o, a-fib (s/p ablation, eliquis), neurogenic bladder (SPC), BARRY, hypothyroidism, RCC (s/p partial nephrectomy), CHF, prostate CA (s/p radiation) presents from [place] for combative behavior.    As per RN patient noted to significant amount of bleeding form the heel with blood clots. Dressing as applied by the RN and no active bleeding  noted upon arrival. As per patient has patient stage 3 pressure ulcer on the left heel.   Patient has been refusing labs and Dialysis for the past 3 days. Explained to the patient about the importance of doing blood work, However, patient still refuses to do and agreed to do  blood work from the peripheral IV line. Stat CBC, BMP. VBG and type and screen sent. During the assessment patient noted to have intermittent periods of lethargy. Lethargy likely secondary to  missed dialysis.  Discussed case with Hospitalist Dr. Reinoso who recommended EKG.

## 2023-05-08 NOTE — DIETITIAN INITIAL EVALUATION ADULT - PERTINENT MEDS FT
MEDICATIONS  (STANDING):  albuterol/ipratropium for Nebulization 3 milliLiter(s) Nebulizer every 6 hours  apixaban 2.5 milliGRAM(s) Oral every 12 hours  ascorbic acid 500 milliGRAM(s) Oral daily  atorvastatin 40 milliGRAM(s) Oral at bedtime  baclofen 10 milliGRAM(s) Oral every 12 hours  calamine/zinc oxide Lotion 1 Application(s) Topical three times a day  chlorhexidine 2% Cloths 1 Application(s) Topical daily  clonazePAM  Tablet 0.5 milliGRAM(s) Oral <User Schedule>  clonazePAM  Tablet 0.75 milliGRAM(s) Oral <User Schedule>  isosorbide   mononitrate ER Tablet (IMDUR) 30 milliGRAM(s) Oral daily  levETIRAcetam 250 milliGRAM(s) Oral <User Schedule>  levETIRAcetam 500 milliGRAM(s) Oral daily  levothyroxine 100 MICROGram(s) Oral daily  loratadine 10 milliGRAM(s) Oral daily  metoprolol succinate ER 50 milliGRAM(s) Oral daily  multivitamin 1 Tablet(s) Oral daily  polyethylene glycol 3350 17 Gram(s) Oral daily  senna 2 Tablet(s) Oral at bedtime  sevelamer carbonate 800 milliGRAM(s) Oral three times a day with meals    MEDICATIONS  (PRN):  bisacodyl Suppository 10 milliGRAM(s) Rectal daily PRN Constipation  LORazepam     Tablet 1 milliGRAM(s) Oral daily PRN Anxiety  LORazepam   Injectable 1 milliGRAM(s) IV Push daily PRN Anxiety  melatonin 3 milliGRAM(s) Oral at bedtime PRN Insomnia

## 2023-05-08 NOTE — PROGRESS NOTE ADULT - SUBJECTIVE AND OBJECTIVE BOX
Patient is a 62y old  Male who presents with a chief complaint of Combative behavior    (08 May 2023 13:31)      SUBJECTIVE / OVERNIGHT EVENTS: No acute events overnight. Pt has no new complaints     ADDITIONAL REVIEW OF SYSTEMS:    MEDICATIONS  (STANDING):  albuterol/ipratropium for Nebulization 3 milliLiter(s) Nebulizer every 6 hours  apixaban 2.5 milliGRAM(s) Oral every 12 hours  ascorbic acid 500 milliGRAM(s) Oral daily  atorvastatin 40 milliGRAM(s) Oral at bedtime  baclofen 10 milliGRAM(s) Oral every 12 hours  calamine/zinc oxide Lotion 1 Application(s) Topical three times a day  chlorhexidine 2% Cloths 1 Application(s) Topical daily  clonazePAM  Tablet 0.5 milliGRAM(s) Oral <User Schedule>  clonazePAM  Tablet 0.75 milliGRAM(s) Oral <User Schedule>  isosorbide   mononitrate ER Tablet (IMDUR) 30 milliGRAM(s) Oral daily  levETIRAcetam 250 milliGRAM(s) Oral <User Schedule>  levETIRAcetam 500 milliGRAM(s) Oral daily  levothyroxine 100 MICROGram(s) Oral daily  loratadine 10 milliGRAM(s) Oral daily  metoprolol succinate ER 50 milliGRAM(s) Oral daily  multivitamin 1 Tablet(s) Oral daily  polyethylene glycol 3350 17 Gram(s) Oral daily  senna 2 Tablet(s) Oral at bedtime  sevelamer carbonate 800 milliGRAM(s) Oral three times a day with meals    MEDICATIONS  (PRN):  bisacodyl Suppository 10 milliGRAM(s) Rectal daily PRN Constipation  LORazepam     Tablet 1 milliGRAM(s) Oral daily PRN Anxiety  LORazepam   Injectable 1 milliGRAM(s) IV Push daily PRN Anxiety  melatonin 3 milliGRAM(s) Oral at bedtime PRN Insomnia      CAPILLARY BLOOD GLUCOSE        I&O's Summary    07 May 2023 07:01  -  08 May 2023 07:00  --------------------------------------------------------  IN: 900 mL / OUT: 1000 mL / NET: -100 mL        PHYSICAL EXAM:  Vital Signs Last 24 Hrs  T(C): 36.3 (08 May 2023 13:01), Max: 36.7 (08 May 2023 05:56)  T(F): 97.3 (08 May 2023 13:01), Max: 98.1 (08 May 2023 05:56)  HR: 68 (08 May 2023 13:01) (68 - 94)  BP: 128/69 (08 May 2023 13:01) (100/66 - 128/69)  BP(mean): --  RR: 18 (08 May 2023 13:01) (18 - 18)  SpO2: 100% (08 May 2023 13:01) (100% - 100%)    Parameters below as of 08 May 2023 13:01  Patient On (Oxygen Delivery Method): room air      CONSTITUTIONAL: NAD, well-developed  RESPIRATORY: Normal respiratory effort; lungs are clear to auscultation bilaterally  CARDIOVASCULAR: Regular rate and rhythm, normal S1 and S2, no murmur/rub/gallop; No lower extremity edema; Peripheral pulses are 2+ bilaterally  ABDOMEN: Nontender to palpation, normoactive bowel sounds, no rebound/guarding; No hepatosplenomegaly  MUSCULOSKELETAL: no clubbing or cyanosis of digits; no joint swelling or tenderness to palpation  PSYCH: Somnolent but easily arousable    LABS:                      RADIOLOGY & ADDITIONAL TESTS:  Results Reviewed:   Imaging Personally Reviewed:  Electrocardiogram Personally Reviewed:    COORDINATION OF CARE:  Care Discussed with Consultants/Other Providers [Y/N]:  Prior or Outpatient Records Reviewed [Y/N]:

## 2023-05-08 NOTE — PROGRESS NOTE ADULT - SUBJECTIVE AND OBJECTIVE BOX
[facility] Division of Kidney Diseases & Hypertension  FOLLOW UP NOTE  --------------------------------------------------------------------------------  Chief Complaint: ESRD     24 hour events/subjective: Patient was seen and evaluated at bedside this morning.  He reports itching is improved.  No chest pain no difficulty breathing.        PAST HISTORY  --------------------------------------------------------------------------------  No significant changes to PMH, PSH, FHx, SHx, unless otherwise noted    ALLERGIES & MEDICATIONS  --------------------------------------------------------------------------------  Allergies    codeine (Rash)  Haldol (Unknown)  Motrin (Rash)  penicillin (Hives; Anaphylaxis)  Toradol (Rash)  Tylenol (Hives)  aspirin (Hives)  codeine (Unknown)    Intolerances      Standing Inpatient Medications  albuterol/ipratropium for Nebulization 3 milliLiter(s) Nebulizer every 6 hours  apixaban 2.5 milliGRAM(s) Oral every 12 hours  ascorbic acid 500 milliGRAM(s) Oral daily  atorvastatin 40 milliGRAM(s) Oral at bedtime  baclofen 10 milliGRAM(s) Oral every 12 hours  calamine/zinc oxide Lotion 1 Application(s) Topical three times a day  chlorhexidine 2% Cloths 1 Application(s) Topical daily  clonazePAM  Tablet 0.5 milliGRAM(s) Oral <User Schedule>  clonazePAM  Tablet 0.75 milliGRAM(s) Oral <User Schedule>  isosorbide   mononitrate ER Tablet (IMDUR) 30 milliGRAM(s) Oral daily  levETIRAcetam 500 milliGRAM(s) Oral daily  levETIRAcetam 250 milliGRAM(s) Oral <User Schedule>  levothyroxine 100 MICROGram(s) Oral daily  loratadine 10 milliGRAM(s) Oral daily  metoprolol succinate ER 50 milliGRAM(s) Oral daily  multivitamin 1 Tablet(s) Oral daily  polyethylene glycol 3350 17 Gram(s) Oral daily  senna 2 Tablet(s) Oral at bedtime  sevelamer carbonate 800 milliGRAM(s) Oral three times a day with meals    PRN Inpatient Medications  bisacodyl Suppository 10 milliGRAM(s) Rectal daily PRN  LORazepam     Tablet 1 milliGRAM(s) Oral daily PRN  LORazepam   Injectable 1 milliGRAM(s) IV Push daily PRN  melatonin 3 milliGRAM(s) Oral at bedtime PRN      REVIEW OF SYSTEMS  --------------------------------------------------------------------------------  Gen: no fever  Respiratory: no sob  CV: no cp  GI: no ab pain  : has suprapubic catheter  MSK: no pain    VITALS/PHYSICAL EXAM  --------------------------------------------------------------------------------  T(C): 36.7 (05-08-23 @ 05:56), Max: 36.7 (05-08-23 @ 05:56)  HR: 91 (05-08-23 @ 07:40) (71 - 94)  BP: 122/83 (05-08-23 @ 07:40) (100/66 - 122/83)  ABP: --  ABP(mean): --  RR: 18 (05-08-23 @ 05:56) (18 - 18)  SpO2: 100% (05-08-23 @ 05:56) (98% - 100%)  CVP(mm Hg): --        05-07-23 @ 07:01  -  05-08-23 @ 07:00  --------------------------------------------------------  IN: 900 mL / OUT: 1000 mL / NET: -100 mL      Physical Exam:  	Gen: disheveled  	HEENT: anicteric  	Pulm: CTA B/L  	CV: RRR, S1S2; no rub  	Abd: soft  	: suprapubic catheter  	LE: no edema  	Neuro: upper extremity contractures  	Psych: Normal affect and mood  	Skin: dry  	Vascular access: right tunneled HD catheter    LABS/STUDIES  --------------------------------------------------------------------------------                Creatinine Trend:  SCr 4.89 [05-05 @ 18:10]  SCr 4.95 [05-04 @ 08:15]  SCr 5.18 [05-01 @ 06:37]  SCr 4.01 [04-29 @ 05:46]  SCr 4.77 [04-28 @ 17:40]

## 2023-05-09 NOTE — PROGRESS NOTE ADULT - PROBLEM SELECTOR PLAN 1
Pt. with ESRD on HD TIW. Pt. had his HD treatment friday 5/5 but truncated he refused and treatment was cut short.  Recommended another session 5/8 but patient refused and subsequently agreed for this morning.  Seen and evaluated on HD this morning.  Catheter functioning well.  Tolerating treatment.  Hemoglobin below target range. Patient go 10 k retacrit once 5/4 and now resumed elle today. Has hyperphosphatemia, on oral Sevelamer therapy. Serum phosphorus above target range.  Low phosphorus diet. Monitor BP and labs.  Will do HD again on thursday with hypoallergenic dialyzer.

## 2023-05-09 NOTE — PROGRESS NOTE ADULT - PROBLEM SELECTOR PLAN 10
c/w home Imdur and metoprolol    #multiple wounds on R foot/ toes  seen by podiatry on previous admission - no role for surgical intervention  wound care following; recs appreciated however, pt refusing wound care

## 2023-05-09 NOTE — BH CONSULTATION LIAISON PROGRESS NOTE - NSBHCONSULTFOLLOWAFTERCARE_PSY_A_CORE FT
[facility]  [address] [place] [id number] [phone]   [facility] Outpatient services  For acute crisis: [facility]  [address] [place] [address], [address] Floor, [place] [address]  [phone]  [email or web address]    [facility] [phone]

## 2023-05-09 NOTE — BH CONSULTATION LIAISON PROGRESS NOTE - ATTENDING COMMENTS
Chart reviewed, pt. seen/evaluated with Donna Kirk NP, I agree with above assessment/plan. Patient AAOX2, calm, not agitated, giving short answers, denies SI and HI. Plan as above, case d/w Dr. Villavicencio.

## 2023-05-09 NOTE — PROGRESS NOTE ADULT - PROBLEM SELECTOR PLAN 3
Pt on HD MWF  Nephro following- continue HD  c/w home sevelamer (monitor phos levels)  dose medications as per HD   initiated on claritin for uremic pruritis     #Anemia  2/2 ESRD   monitor hgb  transfuse for hgb < 7

## 2023-05-09 NOTE — BH CONSULTATION LIAISON PROGRESS NOTE - NSBHCHARTREVIEWLAB_PSY_A_CORE FT
9.3    12.67 )-----------( 197      ( 09 May 2023 06:47 )             29.0   05-09    139  |  104  |  105<H>  ----------------------------<  90  4.5   |  16<L>  |  5.51<H>    Ca    8.7      09 May 2023 06:47  Phos  7.1     05-09  Mg     2.40     05-09

## 2023-05-09 NOTE — PROGRESS NOTE ADULT - SUBJECTIVE AND OBJECTIVE BOX
Patient is a 62y old  Male who presents with a chief complaint of combative behavior (09 May 2023 08:37)      SUBJECTIVE / OVERNIGHT EVENTS: No acute events overnight. Pt has no new complaints. Appears confused today, AAOx2 to person and place but with bizarre behavior- took off all his clothes. Cannot say why he is in the hospital     ADDITIONAL REVIEW OF SYSTEMS:    MEDICATIONS  (STANDING):  albuterol/ipratropium for Nebulization 3 milliLiter(s) Nebulizer every 6 hours  apixaban 2.5 milliGRAM(s) Oral every 12 hours  ascorbic acid 500 milliGRAM(s) Oral daily  atorvastatin 40 milliGRAM(s) Oral at bedtime  baclofen 10 milliGRAM(s) Oral every 12 hours  calamine/zinc oxide Lotion 1 Application(s) Topical three times a day  chlorhexidine 2% Cloths 1 Application(s) Topical daily  clonazePAM  Tablet 0.5 milliGRAM(s) Oral <User Schedule>  clonazePAM  Tablet 0.75 milliGRAM(s) Oral <User Schedule>  epoetin marilin-epbx (RETACRIT) Injectable 4000 Unit(s) IV Push <User Schedule>  isosorbide   mononitrate ER Tablet (IMDUR) 30 milliGRAM(s) Oral daily  levETIRAcetam 250 milliGRAM(s) Oral <User Schedule>  levETIRAcetam 500 milliGRAM(s) Oral daily  levothyroxine 100 MICROGram(s) Oral daily  loratadine 10 milliGRAM(s) Oral daily  metoprolol succinate ER 50 milliGRAM(s) Oral daily  multivitamin 1 Tablet(s) Oral daily  polyethylene glycol 3350 17 Gram(s) Oral daily  senna 2 Tablet(s) Oral at bedtime  sevelamer carbonate 800 milliGRAM(s) Oral three times a day with meals    MEDICATIONS  (PRN):  bisacodyl Suppository 10 milliGRAM(s) Rectal daily PRN Constipation  LORazepam     Tablet 1 milliGRAM(s) Oral daily PRN Anxiety  LORazepam   Injectable 1 milliGRAM(s) IV Push daily PRN Anxiety  melatonin 3 milliGRAM(s) Oral at bedtime PRN Insomnia  OLANZapine Injectable 2.5 milliGRAM(s) IntraMuscular every 6 hours PRN agitation      CAPILLARY BLOOD GLUCOSE      POCT Blood Glucose.: 111 mg/dL (08 May 2023 20:53)    I&O's Summary    09 May 2023 07:01  -  09 May 2023 14:06  --------------------------------------------------------  IN: 400 mL / OUT: 1400 mL / NET: -1000 mL        PHYSICAL EXAM:  Vital Signs Last 24 Hrs  T(C): 36.6 (09 May 2023 09:00), Max: 37.2 (09 May 2023 00:08)  T(F): 97.8 (09 May 2023 09:00), Max: 99 (09 May 2023 00:08)  HR: 83 (09 May 2023 09:00) (69 - 83)  BP: 130/90 (09 May 2023 09:00) (108/70 - 142/77)  BP(mean): --  RR: 16 (09 May 2023 09:00) (16 - 18)  SpO2: 100% (09 May 2023 05:10) (100% - 100%)    Parameters below as of 09 May 2023 09:00  Patient On (Oxygen Delivery Method): room air      CONSTITUTIONAL: NAD, well-developed  RESPIRATORY: Normal respiratory effort; lungs are clear to auscultation bilaterally  CARDIOVASCULAR: Regular rate and rhythm, normal S1 and S2, no murmur/rub/gallop; No lower extremity edema; Peripheral pulses are 2+ bilaterally  ABDOMEN: Nontender to palpation, normoactive bowel sounds, no rebound/guarding; No hepatosplenomegaly  MUSCULOSKELETAL: no clubbing or cyanosis of digits; no joint swelling or tenderness to palpation  PSYCH: Somnolent but easily arousable    LABS:                        9.3    12.67 )-----------( 197      ( 09 May 2023 06:47 )             29.0     05-09    139  |  104  |  105<H>  ----------------------------<  90  4.5   |  16<L>  |  5.51<H>    Ca    8.7      09 May 2023 06:47  Phos  7.1     05-09  Mg     2.40     05-09                  RADIOLOGY & ADDITIONAL TESTS:  Results Reviewed:   Imaging Personally Reviewed:  Electrocardiogram Personally Reviewed:    COORDINATION OF CARE:  Care Discussed with Consultants/Other Providers [Y/N]:  Prior or Outpatient Records Reviewed [Y/N]:

## 2023-05-09 NOTE — BH CONSULTATION LIAISON PROGRESS NOTE - NSBHCONSULTPRIMARYDISCUSSYES_PSY_A_CORE FT
dr bravo aware - discussed w dr meraz in detail  dr Villavicencio aware - discussed w dr meraz in detail

## 2023-05-09 NOTE — PROGRESS NOTE ADULT - PROBLEM SELECTOR PLAN 1
Pt recently discharged from [facility] on 4/27/23   Combative behavior possibly 2/2 malingering as patients admits this was his motive  intermittently refusing labs  Becomes agitated during HD. During conversation with ACP, was able to articulate that missing HD can be fatal. Pt was agreeable to HD this morning, however, became agitated and refused HD later in the afternoon (please see ACP noted from today). Will need to have GOC discussion regarding HD and if not, would then discuss possible hospice.   Seen by  team on last admission, reconsulted for this admission   Continue klonopin 0.5 mg qam and 0.75 mg qpm  Avoid IV pain medications   Discussed with psych, possible delerium. Rec start Zyprexa 2.5 q6 prn agitation. Check QTc  SW consult for disposition- patient does not want to return to [place]

## 2023-05-09 NOTE — PROGRESS NOTE ADULT - ASSESSMENT
62 year old male with hx of CVA (WC bound, quadriparesis), COPD, CAD (AICD), ESRD (T/R/S), HTN, HLD, seizure d/o, a-fib (s/p ablation, eliquis), neurogenic bladder (SPC), BARRY, hypothyroidism, RCC (s/p partial nephrectomy), CHF, prostate CA (s/p radiation) presents from [place] for combative behavior. Patient was discharged from [facility] on 4/27/23 and then started kicking his feet in the ambulance. Patient was combative with staff upon arrival and sent back to [facility]. Pending placement at facility.

## 2023-05-09 NOTE — BH CONSULTATION LIAISON PROGRESS NOTE - NSBHFUPINTERVALHXFT_PSY_A_CORE
Patient was seen and assessed at bedside for f/u  Patient is alert, oriented to name, "hospital" - unable to say which or the date. Patient minimally interactive but does greet writer and answer some direct questions. Patient says yes to being in pain, but then cannot show or tell provider where pain is occurring. States he feels safe, has no SI or HI. Eating well, sleeping well. NO complaints.   NO cogwheeling noted on exam.   poor hygeine Patient was seen and assessed at bedside for f/u  Patient is alert, oriented to name, "hospital" - unable to say which or the date. Patient minimally interactive but does greet writer and answer some direct questions. Patient says yes to being in pain, but then cannot show or tell provider where pain is occurring. States he feels safe, has no SI or HI. Eating well, sleeping well. NO complaints. No AVH.   NO cogwheeling noted on exam.   poor hygiene

## 2023-05-09 NOTE — BH CONSULTATION LIAISON PROGRESS NOTE - NSBHCHARTREVIEWVS_PSY_A_CORE FT
Vital Signs Last 24 Hrs  T(C): 36.6 (09 May 2023 09:00), Max: 37.2 (09 May 2023 00:08)  T(F): 97.8 (09 May 2023 09:00), Max: 99 (09 May 2023 00:08)  HR: 83 (09 May 2023 09:00) (69 - 83)  BP: 130/90 (09 May 2023 09:00) (108/70 - 142/77)  BP(mean): --  RR: 16 (09 May 2023 09:00) (16 - 18)  SpO2: 100% (09 May 2023 05:10) (100% - 100%)    Parameters below as of 09 May 2023 09:00  Patient On (Oxygen Delivery Method): room air

## 2023-05-09 NOTE — PROGRESS NOTE ADULT - SUBJECTIVE AND OBJECTIVE BOX
[facility] Division of Kidney Diseases & Hypertension  FOLLOW UP NOTE  --------------------------------------------------------------------------------  Chief Complaint: ESRD    24 hour events/subjective: Patient seen and evaluated on dialysis this morning.  resting comfortably.  tolerating treatment.    PAST HISTORY  --------------------------------------------------------------------------------  No significant changes to PMH, PSH, FHx, SHx, unless otherwise noted    ALLERGIES & MEDICATIONS  --------------------------------------------------------------------------------  Allergies    codeine (Rash)  Haldol (Unknown)  Motrin (Rash)  penicillin (Hives; Anaphylaxis)  Toradol (Rash)  Tylenol (Hives)  aspirin (Hives)  codeine (Unknown)    Intolerances      Standing Inpatient Medications  albuterol/ipratropium for Nebulization 3 milliLiter(s) Nebulizer every 6 hours  apixaban 2.5 milliGRAM(s) Oral every 12 hours  ascorbic acid 500 milliGRAM(s) Oral daily  atorvastatin 40 milliGRAM(s) Oral at bedtime  baclofen 10 milliGRAM(s) Oral every 12 hours  calamine/zinc oxide Lotion 1 Application(s) Topical three times a day  chlorhexidine 2% Cloths 1 Application(s) Topical daily  clonazePAM  Tablet 0.5 milliGRAM(s) Oral <User Schedule>  clonazePAM  Tablet 0.75 milliGRAM(s) Oral <User Schedule>  epoetin marilin-epbx (RETACRIT) Injectable 4000 Unit(s) IV Push <User Schedule>  isosorbide   mononitrate ER Tablet (IMDUR) 30 milliGRAM(s) Oral daily  levETIRAcetam 500 milliGRAM(s) Oral daily  levETIRAcetam 250 milliGRAM(s) Oral <User Schedule>  levothyroxine 100 MICROGram(s) Oral daily  loratadine 10 milliGRAM(s) Oral daily  metoprolol succinate ER 50 milliGRAM(s) Oral daily  multivitamin 1 Tablet(s) Oral daily  polyethylene glycol 3350 17 Gram(s) Oral daily  senna 2 Tablet(s) Oral at bedtime  sevelamer carbonate 800 milliGRAM(s) Oral three times a day with meals    PRN Inpatient Medications  bisacodyl Suppository 10 milliGRAM(s) Rectal daily PRN  LORazepam     Tablet 1 milliGRAM(s) Oral daily PRN  LORazepam   Injectable 1 milliGRAM(s) IV Push daily PRN  melatonin 3 milliGRAM(s) Oral at bedtime PRN      REVIEW OF SYSTEMS  --------------------------------------------------------------------------------  as per hpi resting comfortably on dialysis    VITALS/PHYSICAL EXAM  --------------------------------------------------------------------------------  T(C): 36.5 (05-09-23 @ 06:50), Max: 37.2 (05-09-23 @ 00:08)  HR: 70 (05-09-23 @ 06:50) (68 - 77)  BP: 142/77 (05-09-23 @ 06:50) (108/70 - 142/77)  ABP: --  ABP(mean): --  RR: 16 (05-09-23 @ 06:50) (16 - 18)  SpO2: 100% (05-09-23 @ 05:10) (100% - 100%)  CVP(mm Hg): --        05-09-23 @ 07:01  -  05-09-23 @ 08:37  --------------------------------------------------------  IN: 400 mL / OUT: 1400 mL / NET: -1000 mL      Physical Exam:  	Gen: disheveled  	HEENT: anicteric  	Pulm: CTA B/L  	CV: RRR, S1S2; no rub  	Abd: soft  	: suprapubic catheter  	LE: no edema  	Neuro: upper extremity contractures  	Psych: Normal affect and mood  	Skin: dry  	Vascular access: right tunneled HD catheter    LABS/STUDIES  --------------------------------------------------------------------------------              8.9    10.29 >-----------<  195      [05-08-23 @ 21:00]              27.0     139  |  103  |  106  ----------------------------<  103      [05-08-23 @ 21:00]  4.4   |  18  |  5.47        Ca     7.8     [05-08-23 @ 21:00]      Mg     2.20     [05-08-23 @ 21:00]      Phos  7.3     [05-08-23 @ 21:00]            Creatinine Trend:  SCr 5.47 [05-08 @ 21:00]  SCr 4.89 [05-05 @ 18:10]  SCr 4.95 [05-04 @ 08:15]  SCr 5.18 [05-01 @ 06:37]  SCr 4.01 [04-29 @ 05:46]

## 2023-05-09 NOTE — BH CONSULTATION LIAISON PROGRESS NOTE - MSE UNSTRUCTURED FT
Mental Status Exam:  Melissa: poorly groomed  Behavior: calm, cooperative, no psychomotor retardation/agitation  Motor: no tremors, EPS, or rigidity  Gait: did not assess, pt in bed  Speech: few words   Mood: cannot elaborate, but no complaints   Affect: grimaces at times ?  Thought process: unable to assess  Thought Content: limited  Perception: denies AH/VH  SI: denies  HI: denies  Insight: limited  Judgment: limited    Cognitive Exam:  Orientation: AOx2     Mental Status Exam:  Melissa: poorly groomed  Behavior: calm, cooperative, no psychomotor retardation/agitation  Motor: no tremors, EPS, or rigidity  Gait: did not assess, pt in bed  Speech: few words   Mood: cannot elaborate, but no complaints   Affect: grimaces at times ?  Thought process: concrete   Thought Content: limited  Perception: denies AH/VH  SI: denies  HI: denies  Insight: limited  Judgment: limited    Cognitive Exam:  Orientation: AOx2

## 2023-05-09 NOTE — BH CONSULTATION LIAISON PROGRESS NOTE - CURRENT MEDICATION
MEDICATIONS  (STANDING):  albuterol/ipratropium for Nebulization 3 milliLiter(s) Nebulizer every 6 hours  apixaban 2.5 milliGRAM(s) Oral every 12 hours  ascorbic acid 500 milliGRAM(s) Oral daily  atorvastatin 40 milliGRAM(s) Oral at bedtime  baclofen 10 milliGRAM(s) Oral every 12 hours  calamine/zinc oxide Lotion 1 Application(s) Topical three times a day  chlorhexidine 2% Cloths 1 Application(s) Topical daily  clonazePAM  Tablet 0.5 milliGRAM(s) Oral <User Schedule>  clonazePAM  Tablet 0.75 milliGRAM(s) Oral <User Schedule>  epoetin marilin-epbx (RETACRIT) Injectable 4000 Unit(s) IV Push <User Schedule>  isosorbide   mononitrate ER Tablet (IMDUR) 30 milliGRAM(s) Oral daily  levETIRAcetam 500 milliGRAM(s) Oral daily  levETIRAcetam 250 milliGRAM(s) Oral <User Schedule>  levothyroxine 100 MICROGram(s) Oral daily  loratadine 10 milliGRAM(s) Oral daily  metoprolol succinate ER 50 milliGRAM(s) Oral daily  multivitamin 1 Tablet(s) Oral daily  polyethylene glycol 3350 17 Gram(s) Oral daily  senna 2 Tablet(s) Oral at bedtime  sevelamer carbonate 800 milliGRAM(s) Oral three times a day with meals    MEDICATIONS  (PRN):  bisacodyl Suppository 10 milliGRAM(s) Rectal daily PRN Constipation  LORazepam     Tablet 1 milliGRAM(s) Oral daily PRN Anxiety  LORazepam   Injectable 1 milliGRAM(s) IV Push daily PRN Anxiety  melatonin 3 milliGRAM(s) Oral at bedtime PRN Insomnia  OLANZapine Injectable 2.5 milliGRAM(s) IntraMuscular every 6 hours PRN agitation

## 2023-05-09 NOTE — BH CONSULTATION LIAISON PROGRESS NOTE - NSBHASSESSMENTFT_PSY_ALL_CORE
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  Psych CL saw patient last admission to rule out malingering- at that point in time, it did not appear that there was a malingering component, although patient likely had a personality disorder on top of complex PTSD.  Of note, patient was discharged to [facility] yesterday, however refused to get off the stretcher at [facility] and was immediately brought back to [facility].  Psych CL consulted to assist in behavioral management.    Patient does not appear to be acutely psychotic, manic or depressed. He adamantly denies SI/HI. He advocates for himself. He is help seeking and help rejecting and showing manipulative behavior. 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 Treating said personality disorders is extremely difficult especially when the patient does not see there to be a problem.    5/9: patient AOx2, limited interview due to few words. pleasant with no complaints offered. r/o delirium?    PLAN    [] If there is concern for any sabotaging of his care- patient should be placed on 1:1 (of note, this is not for SI/SA- this would be if there is concern for malingering or factitious disorder)  [] To avoid splittting- would strongly encourage team (attending, RN, ACP, RN manager, SW) to see patient as a unit to ensure that both his questions are answered in front of everyone, while at the same time addressing teams plans  - Continue Klonopin 0.5mg in am  and 0.75 in pm as ordered  - PRN for agitation: Zyprexa 2.5mg q6hrs  - DC ativan PRN to limit benzos - as can worsen delirium/ confusion  - ALLERGY listed to haldol - DO NOT GIVE  [] SW consult to work with patient regarding disposition     63 yo male, single, disabled, living in [facility] with PMH of HTN, HLD, 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, seizures, Afib s/p ablation on AC (Eliquis), CAD s/p PCI, cardiomyopathy (EF 45% in 9/22), AICD in 2018, neurogenic bladder with SPC, COPD/BARRY on O2 at night w/o CPAP, hypothyroidism, anemia,  ESRD on HD (M, W,F) via [facility] P/C, RCC s/p left partial nephrotomy, bladder CA s/p TURBT, chronic foot pressure ulcers, wheelchair bound p/w from [facility] (SNF), PPH of PTSD, no psych hospitalization, previous in rehab for alcohol use disorder, no current substance use, hx of cigarette smoking, no outpatient psychiatrist, remote hx of therapy for PTSD- with hospitalization at [facility] from 4/20-4/27.  Psych CL saw patient last admission to rule out malingering- at that point in time, it did not appear that there was a malingering component, although patient likely had a personality disorder on top of complex PTSD.  Of note, patient was discharged to [facility] yesterday, however refused to get off the stretcher at [facility] and was immediately brought back to [facility].  Psych CL consulted to assist in behavioral management.    Patient does not appear to be acutely psychotic, manic or depressed. He adamantly denies SI/HI. He advocates for himself. He is help seeking and help rejecting and showing manipulative behavior. 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 Treating said personality disorders is extremely difficult especially when the patient does not see there to be a problem.    5/9: patient AOx2, limited interview due to few words. pleasant with no complaints offered. Denies SI and Hi. r/o delirium?    PLAN    [] If there is concern for any sabotaging of his care- patient should be placed on 1:1 (of note, this is not for SI/SA- this would be if there is concern for malingering or factitious disorder)  [] To avoid splitting- would strongly encourage team (attending, RN, ACP, RN manager, SW) to see patient as a unit to ensure that both his questions are answered in front of everyone, while at the same time addressing teams plans  - Continue Klonopin 0.5mg in am  and 0.75mg in pm as ordered  - PRN for agitation: Zyprexa 2.5mg q6hrs Po/IM, may give additional 2.5mg for refractory/severe agitation  - DC ativan PRN to limit benzos - as can worsen delirium/ confusion  - ALLERGY listed to haldol - DO NOT GIVE  [] SW consult to work with patient regarding disposition    DO NOT GIVE Ativan IV/IM within 2hrs of IM Zyprexa due to risk of sedation and or respiratory depression

## 2023-05-10 NOTE — PROGRESS NOTE ADULT - PROBLEM SELECTOR PLAN 1
Pt recently discharged from [facility] on 4/27/23   Combative behavior possibly 2/2 malingering as patients admits this was his motive  intermittently refusing labs  Becomes agitated during HD. During conversation with ACP, was able to articulate that missing HD can be fatal. Pt was agreeable to HD this morning, however, became agitated and refused HD later in the afternoon (please see ACP noted from today). Will need to have GOC discussion regarding HD and if not, would then discuss possible hospice.   Seen by  team on last admission, reconsulted for this admission   Continue klonopin 0.5 mg qam and 0.75 mg qpm  Avoid IV pain medications   Discussed with psych, possible delerium. Rec start Zyprexa 2.5 q6 prn agitation. Check QTc  SW consult for disposition- patient does not want to return to [place]

## 2023-05-10 NOTE — PROGRESS NOTE ADULT - SUBJECTIVE AND OBJECTIVE BOX
[facility] Division of Kidney Diseases & Hypertension  FOLLOW UP NOTE  --------------------------------------------------------------------------------  Chief Complaint: ESRD on HD    24 hour events/subjective: Patient seen and evaluated this morning.  was very lethargic.  would nod yes / no. nodded no to itching no do dyspnea.  otherwise difficult to assess ROS.        PAST HISTORY  --------------------------------------------------------------------------------  No significant changes to PMH, PSH, FHx, SHx, unless otherwise noted    ALLERGIES & MEDICATIONS  --------------------------------------------------------------------------------  Allergies    codeine (Rash)  Haldol (Unknown)  Motrin (Rash)  penicillin (Hives; Anaphylaxis)  Toradol (Rash)  Tylenol (Hives)  aspirin (Hives)  codeine (Unknown)    Intolerances      Standing Inpatient Medications  albuterol/ipratropium for Nebulization 3 milliLiter(s) Nebulizer every 6 hours  apixaban 2.5 milliGRAM(s) Oral every 12 hours  ascorbic acid 500 milliGRAM(s) Oral daily  atorvastatin 40 milliGRAM(s) Oral at bedtime  baclofen 10 milliGRAM(s) Oral every 12 hours  calamine/zinc oxide Lotion 1 Application(s) Topical three times a day  chlorhexidine 2% Cloths 1 Application(s) Topical daily  clonazePAM  Tablet 0.5 milliGRAM(s) Oral <User Schedule>  clonazePAM  Tablet 0.75 milliGRAM(s) Oral <User Schedule>  diphenhydrAMINE Injectable 25 milliGRAM(s) IV Push once  epoetin marilin-epbx (RETACRIT) Injectable 4000 Unit(s) IV Push <User Schedule>  isosorbide   mononitrate ER Tablet (IMDUR) 30 milliGRAM(s) Oral daily  levETIRAcetam 250 milliGRAM(s) Oral <User Schedule>  levETIRAcetam 500 milliGRAM(s) Oral daily  levothyroxine 100 MICROGram(s) Oral daily  loratadine 10 milliGRAM(s) Oral daily  metoprolol succinate ER 50 milliGRAM(s) Oral daily  multivitamin 1 Tablet(s) Oral daily  polyethylene glycol 3350 17 Gram(s) Oral daily  senna 2 Tablet(s) Oral at bedtime  sevelamer carbonate 800 milliGRAM(s) Oral three times a day with meals    PRN Inpatient Medications  bisacodyl Suppository 10 milliGRAM(s) Rectal daily PRN  melatonin 3 milliGRAM(s) Oral at bedtime PRN  OLANZapine Injectable 2.5 milliGRAM(s) IntraMuscular every 6 hours PRN      REVIEW OF SYSTEMS  --------------------------------------------------------------------------------  limites as above    VITALS/PHYSICAL EXAM  --------------------------------------------------------------------------------  T(C): 36.7 (05-10-23 @ 06:20), Max: 37.2 (05-09-23 @ 12:15)  HR: 92 (05-10-23 @ 06:20) (73 - 107)  BP: 110/82 (05-10-23 @ 06:20) (110/82 - 120/70)  ABP: --  ABP(mean): --  RR: 18 (05-10-23 @ 06:20) (17 - 18)  SpO2: 98% (05-10-23 @ 06:20) (95% - 100%)  CVP(mm Hg): --        05-09-23 @ 07:01  -  05-10-23 @ 07:00  --------------------------------------------------------  IN: 400 mL / OUT: 1600 mL / NET: -1200 mL      Physical Exam:  	Gen: disheveled  	HEENT: anicteric  	Pulm: CTA B/L  	CV: RRR, S1S2; no rub  	Abd: soft  	: suprapubic catheter  	LE: no edema  	Neuro: upper extremity contractures  	Psych: lethargic  	Skin: dry  	Vascular access: right tunneled HD catheter      LABS/STUDIES  --------------------------------------------------------------------------------              9.3    12.67 >-----------<  197      [05-09-23 @ 06:47]              29.0     139  |  104  |  105  ----------------------------<  90      [05-09-23 @ 06:47]  4.5   |  16  |  5.51        Ca     8.7     [05-09-23 @ 06:47]      Mg     2.40     [05-09-23 @ 06:47]      Phos  7.1     [05-09-23 @ 06:47]            Creatinine Trend:  SCr 5.51 [05-09 @ 06:47]  SCr 5.47 [05-08 @ 21:00]  SCr 4.89 [05-05 @ 18:10]  SCr 4.95 [05-04 @ 08:15]  SCr 5.18 [05-01 @ 06:37]

## 2023-05-10 NOTE — PROGRESS NOTE ADULT - PROBLEM SELECTOR PLAN 1
Pt. with ESRD on HD TIW. Pt. had his HD treatment friday 5/5 but truncated he refused and treatment was cut short.  Recommended another session 5/8 but patient refused and subsequently agreed for HD 5/9.  Seen and evaluated this morning more lethargic than usual.  Could be uremia could be medication effect.  Will plan for HD again today.  Consider tapering down baclofen dose.  Hemoglobin below target range. Patient go 10 k retacrit once 5/4 and now resumed elle 4k TIW.  Has hyperphosphatemia, on oral Sevelamer therapy. Serum phosphorus above target range.  Low phosphorus diet. Monitor BP and labs.  Will do HD again today with hypoallergenic dialyzer - itching improved.  Discussed with ACP

## 2023-05-10 NOTE — PROGRESS NOTE ADULT - PROBLEM SELECTOR PLAN 3
Pt on HD MWF  Nephro following- continue HD  c/w home sevelamer (monitor phos levels)  Pt more lethargic today. Possibly secondary to uremia- Plan for HD today     #Anemia  2/2 ESRD   monitor hgb  transfuse for hgb < 7

## 2023-05-10 NOTE — PROGRESS NOTE ADULT - SUBJECTIVE AND OBJECTIVE BOX
Patient is a 62y old  Male who presents with a chief complaint of combative behavior (10 May 2023 09:41)      SUBJECTIVE / OVERNIGHT EVENTS: No acute events overnight. Pt has no new complaints. Lethargic today and minimally verbal     ADDITIONAL REVIEW OF SYSTEMS:    MEDICATIONS  (STANDING):  albuterol/ipratropium for Nebulization 3 milliLiter(s) Nebulizer every 6 hours  apixaban 2.5 milliGRAM(s) Oral every 12 hours  ascorbic acid 500 milliGRAM(s) Oral daily  atorvastatin 40 milliGRAM(s) Oral at bedtime  baclofen 10 milliGRAM(s) Oral every 12 hours  calamine/zinc oxide Lotion 1 Application(s) Topical three times a day  chlorhexidine 2% Cloths 1 Application(s) Topical daily  clonazePAM  Tablet 0.5 milliGRAM(s) Oral <User Schedule>  clonazePAM  Tablet 0.75 milliGRAM(s) Oral <User Schedule>  epoetin marilin-epbx (RETACRIT) Injectable 4000 Unit(s) IV Push <User Schedule>  isosorbide   mononitrate ER Tablet (IMDUR) 30 milliGRAM(s) Oral daily  levETIRAcetam 250 milliGRAM(s) Oral <User Schedule>  levETIRAcetam 500 milliGRAM(s) Oral daily  levothyroxine 100 MICROGram(s) Oral daily  loratadine 10 milliGRAM(s) Oral daily  metoprolol succinate ER 50 milliGRAM(s) Oral daily  multivitamin 1 Tablet(s) Oral daily  polyethylene glycol 3350 17 Gram(s) Oral daily  senna 2 Tablet(s) Oral at bedtime  sevelamer carbonate 800 milliGRAM(s) Oral three times a day with meals    MEDICATIONS  (PRN):  bisacodyl Suppository 10 milliGRAM(s) Rectal daily PRN Constipation  melatonin 3 milliGRAM(s) Oral at bedtime PRN Insomnia  OLANZapine Injectable 2.5 milliGRAM(s) IntraMuscular every 6 hours PRN agitation      CAPILLARY BLOOD GLUCOSE      POCT Blood Glucose.: 156 mg/dL (10 May 2023 06:12)    I&O's Summary    09 May 2023 07:01  -  10 May 2023 07:00  --------------------------------------------------------  IN: 400 mL / OUT: 1600 mL / NET: -1200 mL    10 May 2023 07:01  -  10 May 2023 14:10  --------------------------------------------------------  IN: 400 mL / OUT: 900 mL / NET: -500 mL        PHYSICAL EXAM:  Vital Signs Last 24 Hrs  T(C): 36.4 (10 May 2023 11:25), Max: 37 (09 May 2023 21:18)  T(F): 97.5 (10 May 2023 11:25), Max: 98.6 (09 May 2023 21:18)  HR: 78 (10 May 2023 11:25) (78 - 107)  BP: 151/87 (10 May 2023 11:25) (110/82 - 151/87)  BP(mean): --  RR: 18 (10 May 2023 11:25) (18 - 18)  SpO2: 98% (10 May 2023 06:20) (95% - 100%)    Parameters below as of 10 May 2023 11:25  Patient On (Oxygen Delivery Method): room air      CONSTITUTIONAL: NAD  EYES: PERRLA; conjunctiva and sclera clear  ENMT: Moist oral mucosa, no pharyngeal injection or exudates; normal dentition  NECK: Supple, no palpable masses; no thyromegaly  RESPIRATORY: Normal respiratory effort; lungs are clear to auscultation bilaterally  CARDIOVASCULAR: Regular rate and rhythm, normal S1 and S2, no murmur/rub/gallop; No lower extremity edema; Peripheral pulses are 2+ bilaterally  ABDOMEN: Nontender to palpation, normoactive bowel sounds, no rebound/guarding; No hepatosplenomegaly  MUSCULOSKELETAL:  Normal gait; no clubbing or cyanosis of digits; no joint swelling or tenderness to palpation  PSYCH: A+O to person, place, and time; affect appropriate  NEUROLOGY: CN 2-12 are intact and symmetric; no gross sensory deficits   SKIN: No rashes; no palpable lesions    LABS:                        10.4   12.10 )-----------( 188      ( 10 May 2023 11:18 )             32.3     05-10    137  |  99  |  91<H>  ----------------------------<  115<H>  3.9   |  20<L>  |  5.30<H>    Ca    9.3      10 May 2023 11:18  Phos  6.6     05-10  Mg     2.40     05-10                  RADIOLOGY & ADDITIONAL TESTS:  Results Reviewed:   Imaging Personally Reviewed:  Electrocardiogram Personally Reviewed:    COORDINATION OF CARE:  Care Discussed with Consultants/Other Providers [Y/N]:  Prior or Outpatient Records Reviewed [Y/N]:

## 2023-05-10 NOTE — PROGRESS NOTE ADULT - ASSESSMENT
62 year old male with hx of CVA (WC bound, quadriparesis), COPD, CAD (AICD), ESRD (T/R/S), HTN, HLD, seizure d/o, a-fib (s/p ablation, eliquis), neurogenic bladder (SPC), BARRY, hypothyroidism, RCC (s/p partial nephrectomy), CHF, prostate CA (s/p radiation) presents from [place] for combative behavior. Patient was discharged from [facility] on 4/27/23 and then started kicking his feet in the ambulance. Patient was combative with staff upon arrival and sent back to [facility]. Pending placement at facility.

## 2023-05-11 NOTE — PROVIDER CONTACT NOTE (OTHER) - ASSESSMENT
Pt AxO4 at this time, answers questions appropriately. Pt refusing tele monitoring. Pt educated on risk of not wearing tele monitoring including staff being unaware of potentially harmful cardiac events. Pt verbalized understanding and continues to refuse

## 2023-05-11 NOTE — PROGRESS NOTE ADULT - PROBLEM SELECTOR PLAN 1
Pt recently discharged from [facility] on 4/27/23   Combative behavior possibly 2/2 malingering as patients admits this was his motive  intermittently refusing labs  Becomes agitated during HD  Seen by  team on last admission, reconsulted for this admission   Continue klonopin 0.5 mg qam and 0.75 mg qpm  Avoid IV pain medications   Discussed with psych, possible delerium. Rec start Zyprexa 2.5 q6 prn agitation. Check QTc  SW consult for disposition- patient does not want to return to [place]

## 2023-05-11 NOTE — PROGRESS NOTE ADULT - SUBJECTIVE AND OBJECTIVE BOX
[facility] Division of Kidney Diseases & Hypertension  FOLLOW UP NOTE  --------------------------------------------------------------------------------  Chief Complaint: ESKD    24 hour events/subjective: Patient had RRTX2.  Seen and evaluated this morning was weak and lethargic but awake and able to participate with ROS.  no cp no sob itching improved.  tolerated HD yesterday.        PAST HISTORY  --------------------------------------------------------------------------------  No significant changes to PMH, PSH, FHx, SHx, unless otherwise noted    ALLERGIES & MEDICATIONS  --------------------------------------------------------------------------------  Allergies    codeine (Rash)  Haldol (Unknown)  Motrin (Rash)  penicillin (Hives; Anaphylaxis)  Toradol (Rash)  Tylenol (Hives)  aspirin (Hives)  codeine (Unknown)    Intolerances      Standing Inpatient Medications  albuterol/ipratropium for Nebulization 3 milliLiter(s) Nebulizer every 6 hours  apixaban 2.5 milliGRAM(s) Oral every 12 hours  ascorbic acid 500 milliGRAM(s) Oral daily  atorvastatin 40 milliGRAM(s) Oral at bedtime  baclofen 5 milliGRAM(s) Oral every 12 hours  calamine/zinc oxide Lotion 1 Application(s) Topical three times a day  chlorhexidine 2% Cloths 1 Application(s) Topical daily  clonazePAM  Tablet 0.5 milliGRAM(s) Oral <User Schedule>  clonazePAM  Tablet 0.75 milliGRAM(s) Oral <User Schedule>  epoetin marilin-epbx (RETACRIT) Injectable 4000 Unit(s) IV Push <User Schedule>  isosorbide   mononitrate ER Tablet (IMDUR) 30 milliGRAM(s) Oral daily  levETIRAcetam 500 milliGRAM(s) Oral daily  levETIRAcetam 250 milliGRAM(s) Oral <User Schedule>  levothyroxine 100 MICROGram(s) Oral daily  loratadine 10 milliGRAM(s) Oral daily  metoprolol succinate ER 50 milliGRAM(s) Oral daily  multivitamin 1 Tablet(s) Oral daily  polyethylene glycol 3350 17 Gram(s) Oral daily  senna 2 Tablet(s) Oral at bedtime  sevelamer carbonate 800 milliGRAM(s) Oral three times a day with meals    PRN Inpatient Medications  baclofen 5 milliGRAM(s) Oral once PRN  bisacodyl Suppository 10 milliGRAM(s) Rectal daily PRN  melatonin 3 milliGRAM(s) Oral at bedtime PRN  OLANZapine Injectable 2.5 milliGRAM(s) IntraMuscular every 6 hours PRN      REVIEW OF SYSTEMS  --------------------------------------------------------------------------------  Gen: no fever  Respiratory: no sob  CV: no cp  GI: no ab pain  : no complaints  MSK: no pain    VITALS/PHYSICAL EXAM  --------------------------------------------------------------------------------  T(C): 36.4 (05-11-23 @ 06:25), Max: 37.5 (05-11-23 @ 02:23)  HR: 130 (05-11-23 @ 09:05) (58 - 137)  BP: 91/58 (05-11-23 @ 09:05) (72/50 - 120/85)  ABP: --  ABP(mean): --  RR: 18 (05-11-23 @ 06:25) (18 - 19)  SpO2: 97% (05-11-23 @ 06:25) (97% - 100%)  CVP(mm Hg): --  Height (cm): 180.3 (05-11-23 @ 05:34)  Weight (kg): 81.9 (05-11-23 @ 05:34)  BMI (kg/m2): 25.2 (05-11-23 @ 05:34)  BSA (m2): 2.02 (05-11-23 @ 05:34)      05-10-23 @ 07:01  -  05-11-23 @ 07:00  --------------------------------------------------------  IN: 400 mL / OUT: 1100 mL / NET: -700 mL    Physical Exam:  	Gen: disheveled  	HEENT: anicteric  	Pulm: CTA B/L  	CV: RRR, S1S2; no rub  	Abd: soft  	: suprapubic catheter  	LE: no edema  	Neuro: upper extremity contractures  	Psych: lethargic  	Skin: dry  	Vascular access: right tunneled HD catheter      LABS/STUDIES  --------------------------------------------------------------------------------              8.9    12.25 >-----------<  145      [05-11-23 @ 11:34]              26.9     137  |  95  |  68  ----------------------------<  162      [05-11-23 @ 00:25]  3.8   |  22  |  4.91        Ca     9.0     [05-11-23 @ 00:25]      Mg     2.10     [05-11-23 @ 00:25]      Phos  4.9     [05-11-23 @ 00:25]            Creatinine Trend:  SCr 4.91 [05-11 @ 00:25]  SCr 5.30 [05-10 @ 11:18]  SCr 5.51 [05-09 @ 06:47]  SCr 5.47 [05-08 @ 21:00]  SCr 4.89 [05-05 @ 18:10]

## 2023-05-11 NOTE — PROGRESS NOTE ADULT - PROBLEM SELECTOR PLAN 1
Pt. with ESRD on HD TIW. Pt. had his HD treatment friday 5/5 but truncated he refused and treatment was cut short.  Recommended another session 5/8 but patient refused and subsequently agreed for HD 5/9 and again yesterday 5/10.  Seen and evaluated this morning with improved lethargy but was hypotensive.   Hemoglobin below target range. Patient go 10 k retacrit once 5/4 and now resumed elle 4k TIW.  Has hyperphosphatemia, on oral Sevelamer therapy. Serum phosphorus above target range.  Low phosphorus diet. Monitor BP and labs.  Will do HD again likely tomorrow with hypoallergenic dialyzer - itching improved.

## 2023-05-11 NOTE — CHART NOTE - NSCHARTNOTEFT_GEN_A_CORE
Notified by RN troponin is 178, discussed with  likely due to underlying ESRD. informed no need to trend, patient without CP.

## 2023-05-11 NOTE — PROGRESS NOTE ADULT - SUBJECTIVE AND OBJECTIVE BOX
Patient is a 62y old  Male who presents with a chief complaint of combative behavior (11 May 2023 12:23)      SUBJECTIVE / OVERNIGHT EVENTS: Rapid response team called twice overnight fo hypotension and tachycardia. Pt found to be in A fib this morning. He remains lethargic but has no new complaints     ADDITIONAL REVIEW OF SYSTEMS:    MEDICATIONS  (STANDING):  albuterol/ipratropium for Nebulization 3 milliLiter(s) Nebulizer every 6 hours  apixaban 2.5 milliGRAM(s) Oral every 12 hours  ascorbic acid 500 milliGRAM(s) Oral daily  atorvastatin 40 milliGRAM(s) Oral at bedtime  baclofen 5 milliGRAM(s) Oral every 12 hours  calamine/zinc oxide Lotion 1 Application(s) Topical three times a day  chlorhexidine 2% Cloths 1 Application(s) Topical daily  clonazePAM  Tablet 0.5 milliGRAM(s) Oral <User Schedule>  clonazePAM  Tablet 0.75 milliGRAM(s) Oral <User Schedule>  epoetin marilin-epbx (RETACRIT) Injectable 4000 Unit(s) IV Push <User Schedule>  isosorbide   mononitrate ER Tablet (IMDUR) 30 milliGRAM(s) Oral daily  levETIRAcetam 500 milliGRAM(s) Oral daily  levETIRAcetam 250 milliGRAM(s) Oral <User Schedule>  levothyroxine 100 MICROGram(s) Oral daily  loratadine 10 milliGRAM(s) Oral daily  metoprolol succinate ER 50 milliGRAM(s) Oral daily  multivitamin 1 Tablet(s) Oral daily  polyethylene glycol 3350 17 Gram(s) Oral daily  senna 2 Tablet(s) Oral at bedtime  sevelamer carbonate 800 milliGRAM(s) Oral three times a day with meals    MEDICATIONS  (PRN):  baclofen 5 milliGRAM(s) Oral once PRN Musculoskeletal Pain  bisacodyl Suppository 10 milliGRAM(s) Rectal daily PRN Constipation  melatonin 3 milliGRAM(s) Oral at bedtime PRN Insomnia  OLANZapine Injectable 2.5 milliGRAM(s) IntraMuscular every 6 hours PRN agitation      CAPILLARY BLOOD GLUCOSE      POCT Blood Glucose.: 131 mg/dL (11 May 2023 05:04)  POCT Blood Glucose.: 167 mg/dL (11 May 2023 00:15)    I&O's Summary    10 May 2023 07:01  -  11 May 2023 07:00  --------------------------------------------------------  IN: 400 mL / OUT: 1100 mL / NET: -700 mL        PHYSICAL EXAM:  Vital Signs Last 24 Hrs  T(C): 36.4 (11 May 2023 12:24), Max: 37.5 (11 May 2023 02:23)  T(F): 97.6 (11 May 2023 12:24), Max: 99.5 (11 May 2023 02:23)  HR: 66 (11 May 2023 12:24) (58 - 137)  BP: 92/65 (11 May 2023 12:24) (72/50 - 120/85)  BP(mean): --  RR: 18 (11 May 2023 12:24) (18 - 19)  SpO2: 98% (11 May 2023 12:24) (97% - 100%)    Parameters below as of 11 May 2023 06:25  Patient On (Oxygen Delivery Method): room air      CONSTITUTIONAL: NAD  RESPIRATORY: Normal respiratory effort; lungs are clear to auscultation bilaterally  CARDIOVASCULAR: Regular rate and rhythm, normal S1 and S2, no murmur/rub/gallop; No lower extremity edema; Peripheral pulses are 2+ bilaterally  ABDOMEN: Nontender to palpation, normoactive bowel sounds, no rebound/guarding; No hepatosplenomegaly  MUSCULOSKELETAL: no clubbing or cyanosis of digits; no joint swelling or tenderness to palpation  PSYCH: Somnolent but easily arousable    LABS:                        8.9    12.25 )-----------( 145      ( 11 May 2023 11:34 )             26.9     05-11    136  |  99  |  78<H>  ----------------------------<  139<H>  3.5   |  21<L>  |  5.47<H>    Ca    8.4      11 May 2023 11:34  Phos  6.0     05-11  Mg     2.00     05-11        CARDIAC MARKERS ( 11 May 2023 00:25 )  x     / x     / 85 U/L / x     / 2.7 ng/mL            RADIOLOGY & ADDITIONAL TESTS:  Results Reviewed:   Imaging Personally Reviewed:  Electrocardiogram Personally Reviewed:    COORDINATION OF CARE:  Care Discussed with Consultants/Other Providers [Y/N]:  Prior or Outpatient Records Reviewed [Y/N]:

## 2023-05-11 NOTE — PROCEDURE NOTE - ADDITIONAL PROCEDURE DETAILS
Appropriate pacing and sensing. Capture threshold not tested as patient's VR 130s. Patient is in atrial tachycardia/atrial flutter since ~12:50 pm on 5/11/23. No ventricular arrhythmia noted. No reprogramming done

## 2023-05-11 NOTE — CONSULT NOTE ADULT - ASSESSMENT
62M PMH of HTN, CVA, seizures, neurogenic bladder, COPD, hypothyroidism, ESRD on HD (MWF) presented from [facility] for aggressive behavior towards others and self injurious activity. Admitted for agitation and evaluation of missed dialysis while inpatient 2/2 pt refusing. Had RRT x2 5/11 for hypotension, MICU consulted for the same.     #Hypotension  #Atach/flutter   Noted to be hypotensive and tachycardic few hours after HD with 500cc fluid removed. BP's had improved with IVF, received total of 750cc with improvement in SBPs to low 90s. However, still not rate-controlled, HR sustaining in 130-140s  - potentially volume depleted, can try giving additional 500cc IVF slowly as tolerated, currently saturating 100% on RA  - rate control per EP recs, suspect BP tachycardia driven   - rest of evaluation for potential underlying causes per primary team     Pt is not a MICU candidate at this time. Please re-consult PRN.

## 2023-05-11 NOTE — CONSULT NOTE ADULT - ASSESSMENT
62 year old male with hx of CVA (WC bound, quadriparesis), COPD, CAD (AICD), ESRD (T/R/S), HTN, HLD, seizure d/o, a-fib (s/p ablation, Eliquis), neurogenic bladder (SPC), BARRY, hypothyroidism, RCC (s/p partial nephrectomy), CHF, prostate CA (s/p radiation) presented from [place] for combative behavior. Patient was discharged from [facility] on 4/27/23 and then started kicking his feet in the ambulance and developed wounds on his right foot. Patient was combative with staff upon arrival and sent back to [facility]. EP was called today as telemetry revealed atrial tachycardia/atrial flutter with VR 130s. ICD interrogation reveals atrial tachycardia flutter with 2:1 conduction with VR 130s since around 12:50pm yesterday in the setting of possible sepsis. He is on metoprolol ER 50 mg daily and on apixaban for AC.  - Continue to monitor on telemetry  - Maintain K+~4.0 and Mg++~2.0  - Continue metoprolol and increase dose as BP tolerates  - Continue apixaban and increase dose to 5mg BID( Weight 81.9 kg and age 62)  - Blood cultures x2 sent  - Treat underlying cause  - Will follow up

## 2023-05-11 NOTE — CONSULT NOTE ADULT - NS ATTEND AMEND GEN_ALL_CORE FT
62 year old male with hx of CVA (WC bound, quadriparesis), COPD, CAD (AICD), ESRD (T/R/S), HTN, HLD, seizure d/o, a-fib (s/p ablation, Eliquis), neurogenic bladder (SPC), BARRY, hypothyroidism, RCC (s/p partial nephrectomy), CHF, prostate CA (s/p radiation) presented from [place] for combative behavior. Patient was discharged from [facility] on 4/27/23 and then started kicking his feet in the ambulance and developed wounds on his right foot. Patient was combative with staff upon arrival and sent back to [facility]. EP was called today as telemetry revealed atrial tachycardia/atrial flutter with VR 130s. ICD interrogation reveals atrial tachycardia flutter with 2:1 conduction with VR 130s since around 12:50pm yesterday.    #AT/AFL  #AF s/p ablation  #Behavioral Issues  - Poorly controlled heart rates at this time, AT/AFL with 2:1 AV block  - Likely will be difficult to rate control. Not a good rhythm management candidate at this time  - BB difficult to use in hypotension, if able to tolerate, would give  - Consider one time Dig load, may provide good control despite renal dysfunction  - Maintain K+~4.0 and Mg++~2.0  - Continue apixaban and increase dose to 5mg BID( Weight 81.9 kg and age 62)

## 2023-05-11 NOTE — PROGRESS NOTE ADULT - PROBLEM SELECTOR PLAN 2
- Pt now in rapid A fib and hypotensive  - Transferred to telemetry floor overnight  - EP consulted  - Currently on Metoprolol  - Will check troponin, TSH  - MICU consulted

## 2023-05-11 NOTE — PROVIDER CONTACT NOTE (OTHER) - ASSESSMENT
Pt BP 98/46   VS T 99.5F RR 18 Sat O2  Pt Alert, agitated and  refusing to answer orientation questions

## 2023-05-11 NOTE — PROGRESS NOTE ADULT - ASSESSMENT
62 year old male with hx of CVA (WC bound, quadriparesis), COPD, CAD (AICD), ESRD (T/R/S), HTN, HLD, seizure d/o, a-fib (s/p ablation, eliquis), neurogenic bladder (SPC), BARRY, hypothyroidism, RCC (s/p partial nephrectomy), CHF, prostate CA (s/p radiation) presents from [place] for combative behavior. Patient was discharged from [facility] on 4/27/23 and then started kicking his feet in the ambulance. Patient was combative with staff upon arrival and sent back to [facility]. Pending placement at facility.

## 2023-05-11 NOTE — CONSULT NOTE ADULT - SUBJECTIVE AND OBJECTIVE BOX
CHIEF COMPLAINT: hypotension     HPI:  Patient is a 62M PMH of HTN, CVA, seizures, neurogenic bladder, COPD, hypothyroidism, ESRD on HD (MWF) presented from [facility] for aggressive behavior towards others and self injurious activity. Admitted for agitation and evaluation of missed dialysis while inpatient 2/2 pt refusing.     Had RRT x2 last night for hypotension to SBP 70s, see RRT notes for full history.   Pt had went to dialysis earlier that afternoon with a net negative of 500cc removed. During RRT he was noted to be tachycardic to 140s and AAOx1. Afebrile. In the first RRT, was given midodrine 5mg and then 500cc IVF with improvement in BP. Second RRT called for same thing, was given additional midodrine 5mg and 250cc IVF then with improvement in BP.     MICU consulted this AM for hypotension. Pt lethargic on interview, only nodding yes or no to simple questioning.     PAST MEDICAL & SURGICAL HISTORY:  Chronic congestive heart failure, unspecified congestive heart failure type  Hep C w/o coma, chronic  HTN (hypertension)  AICD (automatic cardioverter/defibrillator) present, [employer]  Atrial fibrillation  CAD (coronary artery disease)- (s/p 2 stents in Sep 2017)  Hyperlipidemia  Renal cell carcinoma of left kidney- s/p partial nephrectomy in 2002, biopsy again in Sep 2017 showed RCC again in stage 2  COPD (chronic obstructive pulmonary disease)  Bladder cancer  Peripheral neuropathy  Prostate cancer-s/p radiation  Nephrolithiasis  Kidney stone  AICD (automatic cardioverter/defibrillator) present    S/P cholecystectomy, 2015  H/O partial nephrectomy, 2002  History of percutaneous coronary intervention  H/O transurethral destruction of bladder lesion  History of coronary artery stent placement    FAMILY HISTORY:  Family history of chronic ischemic heart disease  Family history of lung cancer    SOCIAL HISTORY:    Allergies:  codeine (Rash)  Haldol (Unknown)  Motrin (Rash)  penicillin (Hives; Anaphylaxis)  Toradol (Rash)  Tylenol (Hives)  aspirin (Hives)  codeine (Unknown)    HOME MEDICATIONS: Reviewed     REVIEW OF SYSTEMS:  Cannot assess due to lethargy     OBJECTIVE:  ICU Vital Signs Last 24 Hrs  T(C): 36.4 (11 May 2023 12:24), Max: 37.5 (11 May 2023 02:23)  T(F): 97.6 (11 May 2023 12:24), Max: 99.5 (11 May 2023 02:23)  HR: 132 (11 May 2023 12:24) (103 - 137)  BP: 92/65 (11 May 2023 12:24) (72/50 - 118/81)  BP(mean): --  ABP: --  ABP(mean): --  RR: 18 (11 May 2023 12:24) (18 - 19)  SpO2: 98% (11 May 2023 12:24) (97% - 100%)    O2 Parameters below as of 11 May 2023 06:25  Patient On (Oxygen Delivery Method): room air      05-10 @ 07:01  -  05-11 @ 07:00  --------------------------------------------------------  IN: 400 mL / OUT: 1100 mL / NET: -700 mL    CAPILLARY BLOOD GLUCOSE    POCT Blood Glucose.: 131 mg/dL (11 May 2023 05:04)    PHYSICAL EXAM:  General: NAD, lethargic appearing  HEENT: PERRL, no scleral icterus  CV: RRR, normal S1 and S2  Lungs: normal respiratory effort on RA, CTAB  Abd: soft, nontender, nondistended  Ext: no edema, warm, well perfused   Pysch: AAOx3, appropriate affect   Neuro: nodding head yes/no to questioning, following simple commands like open eyes, take deep breaths   Skin: no rashes or lesions     HOSPITAL MEDICATIONS:  MEDICATIONS  (STANDING):  albuterol/ipratropium for Nebulization 3 milliLiter(s) Nebulizer every 6 hours  apixaban 2.5 milliGRAM(s) Oral every 12 hours  ascorbic acid 500 milliGRAM(s) Oral daily  atorvastatin 40 milliGRAM(s) Oral at bedtime  baclofen 5 milliGRAM(s) Oral every 12 hours  calamine/zinc oxide Lotion 1 Application(s) Topical three times a day  chlorhexidine 2% Cloths 1 Application(s) Topical daily  clonazePAM  Tablet 0.5 milliGRAM(s) Oral <User Schedule>  clonazePAM  Tablet 0.75 milliGRAM(s) Oral <User Schedule>  epoetin marilin-epbx (RETACRIT) Injectable 4000 Unit(s) IV Push <User Schedule>  isosorbide   mononitrate ER Tablet (IMDUR) 30 milliGRAM(s) Oral daily  levETIRAcetam 500 milliGRAM(s) Oral daily  levETIRAcetam 250 milliGRAM(s) Oral <User Schedule>  levothyroxine 100 MICROGram(s) Oral daily  loratadine 10 milliGRAM(s) Oral daily  meropenem Injectable 500 milliGRAM(s) IV Push every 24 hours  metoprolol succinate ER 50 milliGRAM(s) Oral daily  multivitamin 1 Tablet(s) Oral daily  mupirocin 2% Ointment 1 Application(s) Topical two times a day  polyethylene glycol 3350 17 Gram(s) Oral daily  senna 2 Tablet(s) Oral at bedtime  sevelamer carbonate 800 milliGRAM(s) Oral three times a day with meals    MEDICATIONS  (PRN):  baclofen 5 milliGRAM(s) Oral once PRN Musculoskeletal Pain  bisacodyl Suppository 10 milliGRAM(s) Rectal daily PRN Constipation  melatonin 3 milliGRAM(s) Oral at bedtime PRN Insomnia  OLANZapine Injectable 2.5 milliGRAM(s) IntraMuscular every 6 hours PRN agitation      LABS:                        8.9    12.25 )-----------( 145      ( 11 May 2023 11:34 )             26.9     05-11    136  |  99  |  78<H>  ----------------------------<  139<H>  3.5   |  21<L>  |  5.47<H>    Ca    8.4      11 May 2023 11:34  Phos  6.0     05-11  Mg     2.00     05-11            Venous Blood Gas:  05-11 @ 11:34  --/--/--/--/--  VBG Lactate: 1.6  Venous Blood Gas:  05-11 @ 00:25  7.41/44/29/28/42.5  VBG Lactate: 3.0  Venous Blood Gas:  05-10 @ 07:43  7.32/39/51/20/81.7  VBG Lactate: 1.1      MICROBIOLOGY:     RADIOLOGY: Reviewed

## 2023-05-11 NOTE — CHART NOTE - NSCHARTNOTEFT_GEN_A_CORE
Informed by RN that patient is hypotensive to 80/52 post midodrine 5mg. Additional 250cc IVF ordered with repeat systolic BP of 76. RRT called overhead for hypotension/tachycardia likely in setting of sepsis. Pt reports feeling lightheaded still with pain at site of sacral ulcers. Does not appear in any distress. Lungs CTA bilaterally. Abdomen soft but mildly distended likely 2/2 fluids. +Tachycardia. No oozing noted to foot wounds. +Warmth to touch.     T(C): 37.4 (05-11-23 @ 04:22), Max: 37.5 (05-11-23 @ 02:23)  HR: 137 (05-11-23 @ 04:22) (58 - 137)  BP: 80/52 (05-11-23 @ 04:22) (72/50 - 151/87)  RR: 18 (05-11-23 @ 04:22) (18 - 19)  SpO2: 97% (05-11-23 @ 04:22) (97% - 100%)    Plan:  - Additional 500cc IVF given during RRT with improvement of BP to 109/56. Patient with axillary temp of 99.4 deg F and pt is refusing rectal temp.  - Ice packs placed to reduce temperature but pt refusing. Unable to give Tylenol or Motrin due to allergy/ESRD.   - Cooling blanket ordered. Labs ordered including cardiac enzymes and d-dimer to r/o PE.  - Tachycardia likely in setting of sepsis however will monitor with telemetry for now.   - ICU consulted during rapid. Will follow up recommendations.  - Will continue to monitor patient and convey events to day team for further continuity of care.

## 2023-05-11 NOTE — CHART NOTE - NSCHARTNOTEFT_GEN_A_CORE
Informed by RN that patient is hypotensive to the 70s. Pt seen at bedside and midodrine 5mg ordered. Patient states he feels lightheaded but denies any other symptoms. Is A&Ox1.   Pt placed on Trendelenburg position however pt refused to stay. Post midodrine, pt's BP noted to be 60s systolic with HR 140s. RRT called overhead for symptomatic hypotension.   Upon arrival of RRT, BP improved to 104/85 however pt still tachy to 140s. ECG obtained shows sinus tachycardia and no acute ischemic changes. Sepsis workup ordered during RRT. One time dose of meropenem ordered during RRT for empiric prophylaxis since pt is allergic to penicillins. 500cc of NS given in RRT as well.     T(C): 37.3 (05-10-23 @ 23:23), Max: 37.3 (05-10-23 @ 23:23)  HR: 103 (05-10-23 @ 23:23) (58 - 104)  BP: 72/50 (05-10-23 @ 23:23) (72/50 - 151/87)  RR: 18 (05-10-23 @ 23:23) (18 - 18)  SpO2: 97% (05-10-23 @ 23:23) (97% - 100%)    RRT labs reviewed. Lactate 1.1 --> 3.0. WBC 12.10 --> 13.98.   Will continue to monitor patient overnight and relay events to day team for continuity of care. Patient is a 62M PMH of HTN, CVA, seizures, neurogenic bladder, COPD, hypothyroidism, ESRD on HD (MWF) presented from [facility] for aggressive behavior towards others and self injurious activity. Admitted for agitation and evaluation of missed dialysis while inpatient 2/2 pt refusing.     Informed by RN that patient is hypotensive to the 70s. Patient went for dialysis earlier this afternoon (unsure of how much fluid was removed). Pt seen at bedside and midodrine 5mg ordered. Patient states he feels lightheaded but denies any other symptoms. Is A&Ox1. Pt placed on Trendelenburg position however pt refused to stay. Post midodrine, pt's BP noted to be 60s systolic with HR 140s. RRT called overhead for symptomatic hypotension.     Upon arrival of RRT, BP improved to 104/85 however pt still tachy to 140s. ECG obtained shows sinus tachycardia and no acute ischemic changes. Sepsis workup ordered during RRT. One time dose of meropenem ordered during RRT for empiric prophylaxis since pt is allergic to penicillins. 500cc of NS given in RRT as well.     T(C): 37.3 (05-10-23 @ 23:23), Max: 37.3 (05-10-23 @ 23:23)  HR: 103 (05-10-23 @ 23:23) (58 - 104)  BP: 72/50 (05-10-23 @ 23:23) (72/50 - 151/87)  RR: 18 (05-10-23 @ 23:23) (18 - 18)  SpO2: 97% (05-10-23 @ 23:23) (97% - 100%)    RRT labs reviewed. Lactate 1.1 --> 3.0. WBC 12.10 --> 13.98.   Will follow up CXR, UA, and remainder of labs.   Will continue to monitor patient overnight and relay events to day team for continuity of care. Patient is a 62M PMH of HTN, CVA, seizures, neurogenic bladder, COPD, hypothyroidism, ESRD on HD (MWF) presented from [facility] for aggressive behavior towards others and self injurious activity. Admitted for agitation and evaluation of missed dialysis while inpatient 2/2 pt refusing.     Informed by RN that patient is hypotensive to the 70s. Patient went for dialysis earlier this afternoon (unsure of how much fluid was removed). Pt seen at bedside and midodrine 5mg ordered. Patient states he feels lightheaded but denies any other symptoms. Is A&Ox1. Pt placed on Trendelenburg position however pt refused to stay. Post midodrine, pt's BP noted to be 60s systolic with HR 140s. RRT called overhead for symptomatic hypotension.     Upon arrival of RRT, BP improved to 104/85 however pt still tachy to 140s. ECG obtained shows sinus tachycardia and no acute ischemic changes. Sepsis workup ordered during RRT. One time dose of meropenem ordered during RRT for empiric prophylaxis since pt is allergic to penicillins. 500cc of NS given in RRT as well. Tylenol/Motrin not given since pt reports having allergy to meds.      T(C): 37.3 (05-10-23 @ 23:23), Max: 37.3 (05-10-23 @ 23:23)  HR: 103 (05-10-23 @ 23:23) (58 - 104)  BP: 72/50 (05-10-23 @ 23:23) (72/50 - 151/87)  RR: 18 (05-10-23 @ 23:23) (18 - 18)  SpO2: 97% (05-10-23 @ 23:23) (97% - 100%)    RRT labs reviewed. Lactate 1.1 --> 3.0. WBC 12.10 --> 13.98.   Will follow up CXR, UA, and remainder of labs.   Will continue to monitor patient overnight and relay events to day team for continuity of care.

## 2023-05-11 NOTE — PROGRESS NOTE ADULT - PROBLEM SELECTOR PLAN 3
Pt on HD MWF  Nephro following- continue HD  c/w home sevelamer (monitor phos levels)  Uremia improving    #Anemia  2/2 ESRD   monitor hgb  transfuse for hgb < 7

## 2023-05-11 NOTE — CONSULT NOTE ADULT - SUBJECTIVE AND OBJECTIVE BOX
CHIEF COMPLAINT:  Called to evaluate patient with atrial flutter/atrial tachycardia with VR 120s-130s    HISTORY OF PRESENT ILLNESS:  62 year old male with hx of CVA (wheel chair bound, quadriparesis), COPD, CAD (AICD), ESRD (T/R/S), HTN, HLD, seizure, atrial fibrillation (s/p ablation, Eliquis), neurogenic bladder (SPC), BARRY, hypothyroidism, RCC (s/p partial nephrectomy), CHF, prostate CA (s/p radiation) presented from [place] for combative behavior. Patient was discharged from [facility] on 23 and then started kicking his feet in the ambulance. Patient was combative with staff upon arrival and sent back to [facility]. Patient is very lethargic at this time and information obtained from medical records. Patient was admitted for management of combative behavior and multiple wounds on right foot. EP was called today as telemetry revealed atrial tachycardia/atrial flutter with VR 130s. ICD interrogation reveals atrial tachycardia flutter with 2:1 conduction with VR 130s since around 12:50pm yesterday. He is on metoprolol ER 50 mg daily and on apixaban for AC. Rapid response team called twice overnight for hypotension and tachycardia. He was given midodrine for hypotension and BP improved. Patient is with elevated WBC and lactate level 3.3 and now improved to 1.6. Patient had low grade fever axillary and refused rectal temps. He received vancomycin IV. Telemetry shows atrial tachycardia/atrial flutter with VR 130s. He remains lethargic but has no new complaints.   PAST MEDICAL & SURGICAL HISTORY:  Chronic congestive heart failure, unspecified congestive heart failure type  rEF/pEF  Hep C w/o coma, chronic  HTN (hypertension)  AICD (automatic cardioverter/defibrillator) present  [employer]tr[employer]  Atrial fibrillation  CAD (coronary artery disease)  (s/p 2 stents in Sep 2017)  Hyperlipidemia  Renal cell carcinoma of left kidney  s/p partial nephrectomy in   biopsy again in Sep 2017 showed RCC again in stage 2  COPD (chronic obstructive pulmonary disease)  Bladder cancer  Peripheral neuropathy  Prostate cancer  s/p radiation  Nephrolithiasis  Kidney stone  AICD (automatic cardioverter/defibrillator) present  S/P cholecystectomy    H/O partial nephrectomy    History of percutaneous coronary intervention  H/O transurethral destruction of bladder lesion  History of coronary artery stent placement    PREVIOUS DIAGNOSTIC TESTING:    Echocardiogram:  Pending    MEDICATIONS:  apixaban 2.5 milliGRAM(s) Oral every 12 hours  isosorbide   mononitrate ER Tablet (IMDUR) 30 milliGRAM(s) Oral daily  metoprolol succinate ER 50 milliGRAM(s) Oral daily  albuterol/ipratropium for Nebulization 3 milliLiter(s) Nebulizer every 6 hours  loratadine 10 milliGRAM(s) Oral daily  baclofen 5 milliGRAM(s) Oral every 12 hours  baclofen 5 milliGRAM(s) Oral once PRN  clonazePAM  Tablet 0.5 milliGRAM(s) Oral <User Schedule>  clonazePAM  Tablet 0.75 milliGRAM(s) Oral <User Schedule>  levETIRAcetam 250 milliGRAM(s) Oral <User Schedule>  levETIRAcetam 500 milliGRAM(s) Oral daily  melatonin 3 milliGRAM(s) Oral at bedtime PRN  OLANZapine Injectable 2.5 milliGRAM(s) IntraMuscular every 6 hours PRN  bisacodyl Suppository 10 milliGRAM(s) Rectal daily PRN  polyethylene glycol 3350 17 Gram(s) Oral daily  senna 2 Tablet(s) Oral at bedtime  atorvastatin 40 milliGRAM(s) Oral at bedtime  levothyroxine 100 MICROGram(s) Oral daily  ascorbic acid 500 milliGRAM(s) Oral daily  calamine/zinc oxide Lotion 1 Application(s) Topical three times a day  chlorhexidine 2% Cloths 1 Application(s) Topical daily  epoetin marilin-epbx (RETACRIT) Injectable 4000 Unit(s) IV Push <User Schedule>  multivitamin 1 Tablet(s) Oral daily      FAMILY HISTORY:  Family history of chronic ischemic heart disease    Family history of lung cancer    SOCIAL HISTORY:      [-] Smoker  [-] Alcohol  [-] Drugs    Allergies    codeine (Rash)  Haldol (Unknown)  Motrin (Rash)  penicillin (Hives; Anaphylaxis)  Toradol (Rash)  Tylenol (Hives)  aspirin (Hives)  codeine (Unknown)    Intolerances    	    REVIEW OF SYSTEMS:  CONSTITUTIONAL: No fever, weight loss, + fatigue  EYES: No eye pain, visual disturbances, or discharge  ENMT:  No difficulty hearing, tinnitus, vertigo; No sinus or throat pain  NECK: No pain or stiffness  RESPIRATORY: No cough, wheezing, chills or hemoptysis; No Shortness of Breath  CARDIOVASCULAR: No chest pain, palpitations, passing out, dizziness, or leg swelling  GASTROINTESTINAL: No abdominal or epigastric pain. No nausea, vomiting, or hematemesis; No diarrhea or constipation. No melena or hematochezia.  GENITOURINARY: No dysuria, frequency, hematuria, or incontinence  NEUROLOGICAL: No headaches, memory loss, loss of strength, numbness, or tremors  SKIN: No itching, burning, rashes, or lesions   LYMPH Nodes: No enlarged glands  ENDOCRINE: No heat or cold intolerance; No hair loss  MUSCULOSKELETAL: No joint pain or swelling; No muscle, back, or extremity pain  PSYCHIATRIC: + Combative on admission  HEME/LYMPH: No easy bruising, or bleeding gums  ALLERY AND IMMUNOLOGIC: No hives or eczema	    [x] All others negative	  [ ] Unable to obtain    PHYSICAL EXAM:  T(C): 36.4 (23 @ 12:24), Max: 37.5 (23 @ 02:23)  HR: 66 (23 @ :24) (58 - 137)  BP: 92/65 (23 @ 12:24) (72/50 - 120/85)  RR: 18 (23 @ 12:24) (18 - 19)  SpO2: 98% (23 @ 12:24) (97% - 100%)  Wt(kg): --  I&O's Summary    10 May 2023 07:01  -  11 May 2023 07:00  --------------------------------------------------------  IN: 400 mL / OUT: 1100 mL / NET: -700 mL        Appearance: Normal	  Cardiovascular: Tachycardia, No JVD, No murmurs, No edema  Respiratory: Lungs clear to auscultation	  Psychiatry: A & O x 3, Mood & affect appropriate  Gastrointestinal:  Soft, Non-tender, + BS	  Skin: + right foot wounds	  Neurologic: Lethargic  Extremities: Normal range of motion, No clubbing, cyanosis or edema      TELEMETRY: Atrial tachycardia/atrial flutter with VR 130s    EC2023: Atrial flutter with  bpm;  5/10/23: Sinus rhythm with HR 99 bpm; PVCs; Qtc 467 ms; DESTINY; 140 ms; Qs 100ms	  RADIOLOGY:  OTHER: 	  	  LABS:	 	    CARDIAC MARKERS:                          8.9    12. )-----------( 145      ( 11 May 2023 11:34 )             26.9         136  |  99  |  78<H>  ----------------------------<  139<H>  3.5   |  21<L>  |  5.47<H>    Ca    8.4      11 May 2023 11:34  Phos  6.0       Mg     2.00           TSH: Thyroid Stimulating Hormone, Serum: <0.10 uIU/mL ( @ 00:25)

## 2023-05-12 NOTE — PROGRESS NOTE ADULT - PROBLEM SELECTOR PLAN 2
- complicated RVR and hypotension  - Transferred to telemetry floor overnight  - EP consulted, suspect due to sepsis or other underlying cause. Also may be hypovolemic  - Continue Metoprolol, IVFs prn. Monitor volume status   - MICU consulted, not a candidate   - Monitor on tele

## 2023-05-12 NOTE — PROGRESS NOTE ADULT - SUBJECTIVE AND OBJECTIVE BOX
[facility] Division of Kidney Diseases & Hypertension  FOLLOW UP NOTE  --------------------------------------------------------------------------------  Chief Complaint: ESKD    24 hour events/subjective: seen and evaluated at bedside this morning.  Denied chest pain sob nvd.  reports itching has improved.  had tachycardia and hypotension over night which are now improving.    PAST HISTORY  --------------------------------------------------------------------------------  No significant changes to PMH, PSH, FHx, SHx, unless otherwise noted    ALLERGIES & MEDICATIONS  --------------------------------------------------------------------------------  Allergies    codeine (Rash)  Haldol (Unknown)  Motrin (Rash)  penicillin (Hives; Anaphylaxis)  Toradol (Rash)  Tylenol (Hives)  aspirin (Hives)  codeine (Unknown)    Intolerances      Standing Inpatient Medications  albuterol/ipratropium for Nebulization 3 milliLiter(s) Nebulizer every 6 hours  apixaban 5 milliGRAM(s) Oral two times a day  ascorbic acid 500 milliGRAM(s) Oral daily  atorvastatin 40 milliGRAM(s) Oral at bedtime  baclofen 5 milliGRAM(s) Oral every 12 hours  calamine/zinc oxide Lotion 1 Application(s) Topical three times a day  chlorhexidine 2% Cloths 1 Application(s) Topical daily  clonazePAM  Tablet 0.75 milliGRAM(s) Oral <User Schedule>  clonazePAM  Tablet 0.5 milliGRAM(s) Oral <User Schedule>  epoetin marilin-epbx (RETACRIT) Injectable 4000 Unit(s) IV Push <User Schedule>  isosorbide   mononitrate ER Tablet (IMDUR) 30 milliGRAM(s) Oral daily  levETIRAcetam 250 milliGRAM(s) Oral <User Schedule>  levETIRAcetam 500 milliGRAM(s) Oral daily  levothyroxine 100 MICROGram(s) Oral daily  loratadine 10 milliGRAM(s) Oral daily  meropenem  IVPB 500 milliGRAM(s) IV Intermittent every 24 hours  metoprolol succinate ER 50 milliGRAM(s) Oral daily  multivitamin 1 Tablet(s) Oral daily  mupirocin 2% Ointment 1 Application(s) Topical two times a day  polyethylene glycol 3350 17 Gram(s) Oral daily  senna 2 Tablet(s) Oral at bedtime  sevelamer carbonate 800 milliGRAM(s) Oral three times a day with meals    PRN Inpatient Medications  baclofen 5 milliGRAM(s) Oral once PRN  bisacodyl Suppository 10 milliGRAM(s) Rectal daily PRN  melatonin 3 milliGRAM(s) Oral at bedtime PRN  OLANZapine Injectable 2.5 milliGRAM(s) IntraMuscular every 6 hours PRN      REVIEW OF SYSTEMS  --------------------------------------------------------------------------------  Gen: no fever  Respiratory: no sob  CV: no cp  GI: no ab pain  : no complaints  MSK: no pain    VITALS/PHYSICAL EXAM  --------------------------------------------------------------------------------  T(C): 36.9 (05-12-23 @ 13:02), Max: 37.1 (05-11-23 @ 20:00)  HR: 126 (05-12-23 @ 13:02) (108 - 134)  BP: 106/69 (05-12-23 @ 13:02) (73/40 - 111/59)  ABP: --  ABP(mean): --  RR: 18 (05-12-23 @ 13:02) (18 - 18)  SpO2: 98% (05-12-23 @ 13:02) (98% - 99%)  CVP(mm Hg): --  Height (cm): 180.3 (05-11-23 @ 05:34)  Weight (kg): 81.9 (05-11-23 @ 05:34)  BMI (kg/m2): 25.2 (05-11-23 @ 05:34)  BSA (m2): 2.02 (05-11-23 @ 05:34)      05-12-23 @ 07:01  -  05-12-23 @ 13:17  --------------------------------------------------------  IN: 0 mL / OUT: 250 mL / NET: -250 mL    Physical Exam:  	Gen: disheveled  	HEENT: anicteric  	Pulm: CTA B/L  	CV: tachy  	Abd: soft  	: suprapubic catheter  	LE: no edema  	Neuro: upper extremity contractures  	Psych: lethargic  	Skin: dry  	Vascular access: right tunneled HD catheter    LABS/STUDIES  --------------------------------------------------------------------------------              8.9    12.25 >-----------<  145      [05-11-23 @ 11:34]              26.9     136  |  99  |  78  ----------------------------<  139      [05-11-23 @ 11:34]  3.5   |  21  |  5.47        Ca     8.4     [05-11-23 @ 11:34]      Mg     2.00     [05-11-23 @ 11:34]      Phos  6.0     [05-11-23 @ 11:34]          CK 85      [05-11-23 @ 00:25]    Creatinine Trend:  SCr 5.47 [05-11 @ 11:34]  SCr 4.91 [05-11 @ 00:25]  SCr 5.30 [05-10 @ 11:18]  SCr 5.51 [05-09 @ 06:47]  SCr 5.47 [05-08 @ 21:00]        TSH <0.10      [05-11-23 @ 00:25]

## 2023-05-12 NOTE — PROGRESS NOTE ADULT - PROBLEM SELECTOR PLAN 1
Pt. with ESRD on HD TIW. Pt. had his HD treatment friday 5/5 but truncated he refused and treatment was cut short.  Recommended another session 5/8 but patient refused and subsequently agreed for HD 5/9 and again 5/10.  Plan for HD again today with minimal UF and low temp given relative hypotension. Hemoglobin below target range. Patient go 10 k retacrit once 5/4 and now resumed elle 4k TIW.  Has hyperphosphatemia, on oral Sevelamer therapy. Serum phosphorus above target range.  Low phosphorus diet. Monitor BP and labs.  Will use hypoallergenic dialyzer - itching improved.

## 2023-05-12 NOTE — PROGRESS NOTE ADULT - ASSESSMENT
62 year old male with hx of CVA (WC bound, quadriparesis), COPD, CAD (AICD), ESRD (T/R/S), HTN, HLD, seizure d/o, a-fib (s/p ablation, eliquis), neurogenic bladder (SPC), BARRY, hypothyroidism, RCC (s/p partial nephrectomy), CHF, prostate CA (s/p radiation) presents from [place] for combative behavior. Patient was discharged from [facility] on 4/27/23 and then started kicking his feet in the ambulance. Patient was combative with staff upon arrival and sent back to [facility]. Pending placement at facility.

## 2023-05-12 NOTE — PROGRESS NOTE ADULT - SUBJECTIVE AND OBJECTIVE BOX
Patient is a 62y old  Male who presents with a chief complaint of combative behavior (12 May 2023 09:22)      SUBJECTIVE / OVERNIGHT EVENTS: No acute events overnight. Reports L sided back pain. More awake and alert today    ADDITIONAL REVIEW OF SYSTEMS:    MEDICATIONS  (STANDING):  albuterol/ipratropium for Nebulization 3 milliLiter(s) Nebulizer every 6 hours  apixaban 5 milliGRAM(s) Oral two times a day  ascorbic acid 500 milliGRAM(s) Oral daily  atorvastatin 40 milliGRAM(s) Oral at bedtime  baclofen 5 milliGRAM(s) Oral every 12 hours  calamine/zinc oxide Lotion 1 Application(s) Topical three times a day  chlorhexidine 2% Cloths 1 Application(s) Topical daily  clonazePAM  Tablet 0.75 milliGRAM(s) Oral <User Schedule>  clonazePAM  Tablet 0.5 milliGRAM(s) Oral <User Schedule>  epoetin marilin-epbx (RETACRIT) Injectable 4000 Unit(s) IV Push <User Schedule>  isosorbide   mononitrate ER Tablet (IMDUR) 30 milliGRAM(s) Oral daily  levETIRAcetam 500 milliGRAM(s) Oral daily  levETIRAcetam 250 milliGRAM(s) Oral <User Schedule>  levothyroxine 100 MICROGram(s) Oral daily  loratadine 10 milliGRAM(s) Oral daily  meropenem  IVPB 500 milliGRAM(s) IV Intermittent every 24 hours  metoprolol succinate ER 50 milliGRAM(s) Oral daily  multivitamin 1 Tablet(s) Oral daily  mupirocin 2% Ointment 1 Application(s) Topical two times a day  polyethylene glycol 3350 17 Gram(s) Oral daily  senna 2 Tablet(s) Oral at bedtime  sevelamer carbonate 800 milliGRAM(s) Oral three times a day with meals    MEDICATIONS  (PRN):  baclofen 5 milliGRAM(s) Oral once PRN Musculoskeletal Pain  bisacodyl Suppository 10 milliGRAM(s) Rectal daily PRN Constipation  melatonin 3 milliGRAM(s) Oral at bedtime PRN Insomnia  OLANZapine Injectable 2.5 milliGRAM(s) IntraMuscular every 6 hours PRN agitation      CAPILLARY BLOOD GLUCOSE        I&O's Summary      PHYSICAL EXAM:  Vital Signs Last 24 Hrs  T(C): 36.9 (12 May 2023 08:42), Max: 37.1 (11 May 2023 20:00)  T(F): 98.4 (12 May 2023 08:42), Max: 98.7 (11 May 2023 20:00)  HR: 122 (12 May 2023 08:42) (108 - 134)  BP: 111/59 (12 May 2023 08:42) (73/40 - 111/59)  BP(mean): --  RR: 18 (12 May 2023 08:42) (18 - 18)  SpO2: 98% (12 May 2023 08:42) (98% - 99%)    Parameters below as of 12 May 2023 08:42  Patient On (Oxygen Delivery Method): room air      CONSTITUTIONAL: NAD, lying in bed   RESPIRATORY: Normal respiratory effort; lungs are clear to auscultation bilaterally  CARDIOVASCULAR: Regular rate and rhythm, normal S1 and S2, no murmur/rub/gallop; No lower extremity edema; Peripheral pulses are 2+ bilaterally  ABDOMEN: Nontender to palpation, normoactive bowel sounds, no rebound/guarding; No hepatosplenomegaly  MUSCULOSKELETAL: no clubbing or cyanosis of digits; no joint swelling or tenderness to palpation  EXTREMITY: Foot wounds, with gauze bandage, blood noted on gauze. Pt not allowing further examination   PSYCH: AAOx3    LABS:                        8.9    12.25 )-----------( 145      ( 11 May 2023 11:34 )             26.9     05-11    136  |  99  |  78<H>  ----------------------------<  139<H>  3.5   |  21<L>  |  5.47<H>    Ca    8.4      11 May 2023 11:34  Phos  6.0     05-11  Mg     2.00     05-11        CARDIAC MARKERS ( 11 May 2023 00:25 )  x     / x     / 85 U/L / x     / 2.7 ng/mL          Culture - Blood (collected 10 May 2023 00:25)  Source: .Blood Blood-Peripheral  Preliminary Report (12 May 2023 10:02):    No growth to date.        RADIOLOGY & ADDITIONAL TESTS:  Results Reviewed:   Imaging Personally Reviewed:  Electrocardiogram Personally Reviewed:    COORDINATION OF CARE:  Care Discussed with Consultants/Other Providers [Y/N]:  Prior or Outpatient Records Reviewed [Y/N]:

## 2023-05-12 NOTE — PROGRESS NOTE ADULT - ASSESSMENT
62 year old male with hx of CVA (WC bound, quadriparesis), COPD, CAD (AICD), ESRD (T/R/S), HTN, HLD, seizure d/o, a-fib (s/p ablation, Eliquis), neurogenic bladder (SPC), BARRY, hypothyroidism, RCC (s/p partial nephrectomy), CHF, prostate CA (s/p radiation) presented from [place] for combative behavior. Patient was discharged from [facility] on 4/27/23 and then started kicking his feet in the ambulance and developed wounds on his right foot. Patient was combative with staff upon arrival and sent back to [facility]. EP was called as telemetry revealed atrial tachycardia/atrial flutter with VR 130s. ICD interrogation reveals atrial tachycardia/ aflutter with 2:1 conduction with VR 130s since around 12:50pm on 5/10/23. He is on metoprolol ER 50 mg daily and on apixaban for AC. Patient refusing telemetry however his heart rate is elevated  - Continue to monitor on telemetry  - Maintain K+~4.0 and Mg++~2.0  - Continue metoprolol and increase dose as BP tolerates  - Continue apixaban 5mg BID  - F/U blood culture results  - Treat underlying cause, on IV antibiotics  - Will follow up

## 2023-05-12 NOTE — PROGRESS NOTE ADULT - NS ATTEND AMEND GEN_ALL_CORE FT
62 year old male with hx of CVA (WC bound, quadriparesis), COPD, CAD (AICD), ESRD (T/R/S), HTN, HLD, seizure d/o, a-fib (s/p ablation, Eliquis), neurogenic bladder (SPC), BARRY, hypothyroidism, RCC (s/p partial nephrectomy), CHF, prostate CA (s/p radiation) presented from [place] for combative behavior. Patient was discharged from [facility] on 4/27/23 and then started kicking his feet in the ambulance and developed wounds on his right foot. Patient was combative with staff upon arrival and sent back to [facility]. EP was called today as telemetry revealed atrial tachycardia/atrial flutter with VR 130s. ICD interrogation reveals atrial tachycardia flutter with 2:1 conduction with VR 130s since around 12:50pm yesterday.    #AT/AFL  #AF s/p ablation  #Behavioral Issues  - Poorly controlled heart rates AT/AFL with 2:1 AV block  - Refusing tele  - Unable to evaluate what patients rates are, though clinically he looks stable  - Refusing any EP intervention  - Continue BB  - Continue apixaban 5mg BID( Weight 81.9 kg and age 62)  - EP to sign off

## 2023-05-12 NOTE — PROGRESS NOTE ADULT - SUBJECTIVE AND OBJECTIVE BOX
Patient is seen and examined. He is awake and alert, confused and denies any chest pain, SOB, palpitations or dizziness.    PAST MEDICAL & SURGICAL HISTORY:  Pacemaker    Atrial fibrillation, unspecified type    Chronic congestive heart failure, unspecified congestive heart failure type  rEF/pEF    Secondary hypertension    Hep C w/o coma, chronic    Carcinoma of kidney, unspecified laterality    HTN (hypertension)    HLD (hyperlipidemia)    AICD (automatic cardioverter/defibrillator) present  [employer]tr[employer]    Atrial fibrillation    CAD (coronary artery disease)  (s/p 2 stents in Sep 2017)    Hypertension    Hyperlipidemia    Renal cell carcinoma of left kidney  s/p partial nephrectomy in 2002  biopsy again in Sep 2017 showed RCC again in stage 2    Calcium kidney stones    Opioid dependence    COPD (chronic obstructive pulmonary disease)    Bladder cancer    Peripheral neuropathy    Prostate cancer  s/p radiation    Nephrolithiasis    Kidney stone    AICD (automatic cardioverter/defibrillator) present    S/P cholecystectomy  2015    H/O partial nephrectomy  2002    Calcium kidney stone    History of percutaneous coronary intervention    H/O transurethral destruction of bladder lesion    Presence of inferior vena cava filter    History of coronary artery stent placement        MEDICATIONS  (STANDING):  albuterol/ipratropium for Nebulization 3 milliLiter(s) Nebulizer every 6 hours  apixaban 5 milliGRAM(s) Oral two times a day  ascorbic acid 500 milliGRAM(s) Oral daily  atorvastatin 40 milliGRAM(s) Oral at bedtime  baclofen 5 milliGRAM(s) Oral every 12 hours  calamine/zinc oxide Lotion 1 Application(s) Topical three times a day  chlorhexidine 2% Cloths 1 Application(s) Topical daily  clonazePAM  Tablet 0.75 milliGRAM(s) Oral <User Schedule>  clonazePAM  Tablet 0.5 milliGRAM(s) Oral <User Schedule>  epoetin marilin-epbx (RETACRIT) Injectable 4000 Unit(s) IV Push <User Schedule>  isosorbide   mononitrate ER Tablet (IMDUR) 30 milliGRAM(s) Oral daily  levETIRAcetam 250 milliGRAM(s) Oral <User Schedule>  levETIRAcetam 500 milliGRAM(s) Oral daily  levothyroxine 100 MICROGram(s) Oral daily  loratadine 10 milliGRAM(s) Oral daily  meropenem  IVPB 500 milliGRAM(s) IV Intermittent every 24 hours  metoprolol succinate ER 50 milliGRAM(s) Oral daily  multivitamin 1 Tablet(s) Oral daily  mupirocin 2% Ointment 1 Application(s) Topical two times a day  polyethylene glycol 3350 17 Gram(s) Oral daily  senna 2 Tablet(s) Oral at bedtime  sevelamer carbonate 800 milliGRAM(s) Oral three times a day with meals    MEDICATIONS  (PRN):  baclofen 5 milliGRAM(s) Oral once PRN Musculoskeletal Pain  bisacodyl Suppository 10 milliGRAM(s) Rectal daily PRN Constipation  melatonin 3 milliGRAM(s) Oral at bedtime PRN Insomnia  OLANZapine Injectable 2.5 milliGRAM(s) IntraMuscular every 6 hours PRN agitation    Vital Signs Last 24 Hrs  T(C): 36.9 (12 May 2023 08:42), Max: 37.1 (11 May 2023 20:00)  T(F): 98.4 (12 May 2023 08:42), Max: 98.7 (11 May 2023 20:00)  HR: 122 (12 May 2023 08:42) (108 - 134)  BP: 111/59 (12 May 2023 08:42) (73/40 - 111/59)  BP(mean): --  RR: 18 (12 May 2023 08:42) (18 - 18)  SpO2: 98% (12 May 2023 08:42) (98% - 99%)    Parameters below as of 12 May 2023 08:42  Patient On (Oxygen Delivery Method): room air    INTERPRETATION OF TELEMETRY: Refusing tele    LABS:                        8.9    12.25 )-----------( 145      ( 11 May 2023 11:34 )             26.9     05-11    136  |  99  |  78<H>  ----------------------------<  139<H>  3.5   |  21<L>  |  5.47<H>    Ca    8.4      11 May 2023 11:34  Phos  6.0     05-11  Mg     2.00     05-11      CARDIAC MARKERS ( 11 May 2023 00:25 )  x     / x     / 85 U/L / x     / 2.7 ng/mL        PHYSICAL EXAM:    GENERAL: In no apparent distress, confused  HEART: Regular rate and rhythm; No murmurs, rubs, or gallops.  PULMONARY: Clear to auscultation and percussion.  No rales, wheezing, or rhonchi bilaterally.  ABDOMEN: Soft, Nontender, Nondistended; Bowel sounds present  EXTREMITIES:  Right LE wound with dressing

## 2023-05-12 NOTE — PROGRESS NOTE ADULT - PROBLEM SELECTOR PLAN 11
dvt ppx: on Eliquis  #Dispo: Pending placement at [facility]

## 2023-05-12 NOTE — PROGRESS NOTE ADULT - PROBLEM SELECTOR PLAN 1
Pt recently discharged from [facility] on 4/27/23   Combative behavior possibly 2/2 malingering as patients admits this was his motive  intermittently refusing labs  Becomes agitated during HD  Seen by  team on last admission, reconsulted for this admission   Continue klonopin 0.5 mg qam and 0.75 mg qpm  Avoid IV pain medications   Discussed with psych, possible delerium. Rec start Zyprexa 2.5 q6 prn agitation. Check QTc  SW consult for disposition- patient does not want to return to [place]

## 2023-05-13 NOTE — PROGRESS NOTE ADULT - SUBJECTIVE AND OBJECTIVE BOX
Patient is a 62y old  Male who presents with a chief complaint of combative behavior (12 May 2023 13:16)      SUBJECTIVE / OVERNIGHT EVENTS: No acute events overnight. Pt has no new complaints     ADDITIONAL REVIEW OF SYSTEMS:    MEDICATIONS  (STANDING):  albuterol/ipratropium for Nebulization 3 milliLiter(s) Nebulizer every 6 hours  apixaban 5 milliGRAM(s) Oral two times a day  ascorbic acid 500 milliGRAM(s) Oral daily  atorvastatin 40 milliGRAM(s) Oral at bedtime  baclofen 5 milliGRAM(s) Oral every 12 hours  calamine/zinc oxide Lotion 1 Application(s) Topical three times a day  chlorhexidine 2% Cloths 1 Application(s) Topical daily  clonazePAM  Tablet 0.75 milliGRAM(s) Oral <User Schedule>  clonazePAM  Tablet 0.5 milliGRAM(s) Oral <User Schedule>  epoetin marilin-epbx (RETACRIT) Injectable 4000 Unit(s) IV Push <User Schedule>  isosorbide   mononitrate ER Tablet (IMDUR) 30 milliGRAM(s) Oral daily  levETIRAcetam 500 milliGRAM(s) Oral daily  levETIRAcetam 250 milliGRAM(s) Oral <User Schedule>  levothyroxine 100 MICROGram(s) Oral daily  loratadine 10 milliGRAM(s) Oral daily  meropenem  IVPB 500 milliGRAM(s) IV Intermittent every 24 hours  metoprolol succinate ER 50 milliGRAM(s) Oral daily  multivitamin 1 Tablet(s) Oral daily  mupirocin 2% Ointment 1 Application(s) Topical two times a day  polyethylene glycol 3350 17 Gram(s) Oral daily  senna 2 Tablet(s) Oral at bedtime  sevelamer carbonate 800 milliGRAM(s) Oral three times a day with meals    MEDICATIONS  (PRN):  baclofen 5 milliGRAM(s) Oral once PRN Musculoskeletal Pain  bisacodyl Suppository 10 milliGRAM(s) Rectal daily PRN Constipation  melatonin 3 milliGRAM(s) Oral at bedtime PRN Insomnia  OLANZapine Injectable 2.5 milliGRAM(s) IntraMuscular every 6 hours PRN agitation      CAPILLARY BLOOD GLUCOSE        I&O's Summary    12 May 2023 07:01  -  13 May 2023 07:00  --------------------------------------------------------  IN: 400 mL / OUT: 1050 mL / NET: -650 mL        PHYSICAL EXAM:  Vital Signs Last 24 Hrs  T(C): 36.8 (13 May 2023 13:11), Max: 37 (13 May 2023 05:10)  T(F): 98.3 (13 May 2023 13:11), Max: 98.6 (13 May 2023 05:10)  HR: 134 (13 May 2023 13:11) (52 - 137)  BP: 84/59 (13 May 2023 13:11) (84/59 - 134/80)  BP(mean): --  RR: 18 (13 May 2023 13:11) (17 - 18)  SpO2: 100% (13 May 2023 13:11) (98% - 100%)    Parameters below as of 13 May 2023 13:11  Patient On (Oxygen Delivery Method): room air      CONSTITUTIONAL: NAD, lying in bed   RESPIRATORY: Normal respiratory effort; lungs are clear to auscultation bilaterally  CARDIOVASCULAR: Regular rate and rhythm, normal S1 and S2, no murmur/rub/gallop; No lower extremity edema; Peripheral pulses are 2+ bilaterally  ABDOMEN: Nontender to palpation, normoactive bowel sounds, no rebound/guarding; No hepatosplenomegaly  MUSCULOSKELETAL: no clubbing or cyanosis of digits; no joint swelling or tenderness to palpation  EXTREMITY: Foot wounds, with gauze bandage, blood noted on gauze. Pt not allowing further examination   PSYCH: AAOx3    LABS:                        7.7    10.24 )-----------( 133      ( 12 May 2023 18:33 )             24.1     05-12    138  |  101  |  90<H>  ----------------------------<  111<H>  3.8   |  22  |  6.04<H>    Ca    8.1<L>      12 May 2023 17:51  Phos  5.5     05-12  Mg     2.10     05-12                Culture - Blood (collected 11 May 2023 11:55)  Source: .Blood Blood-Peripheral  Preliminary Report (12 May 2023 16:01):    No growth to date.    Culture - Blood (collected 11 May 2023 11:34)  Source: .Blood Blood-Peripheral  Preliminary Report (12 May 2023 16:01):    No growth to date.        RADIOLOGY & ADDITIONAL TESTS:  Results Reviewed:   Imaging Personally Reviewed:  Electrocardiogram Personally Reviewed:    COORDINATION OF CARE:  Care Discussed with Consultants/Other Providers [Y/N]:  Prior or Outpatient Records Reviewed [Y/N]:

## 2023-05-13 NOTE — PROGRESS NOTE ADULT - ASSESSMENT
62 year old male with hx of CVA (WC bound, quadriparesis), COPD, CAD (AICD), ESRD (T/R/S), HTN, HLD, seizure d/o, a-fib (s/p ablation, eliquis), neurogenic bladder (SPC), BARRY, hypothyroidism, RCC (s/p partial nephrectomy), CHF, prostate CA (s/p radiation) presents from [place] for combative behavior. Patient was discharged from [facility] on 4/27/23 and then started kicking his feet in the ambulance. Patient was combative with staff upon arrival and sent back to [facility]. Pending placement at facility.

## 2023-05-13 NOTE — PROGRESS NOTE ADULT - PROBLEM SELECTOR PLAN 1
Pt recently discharged from [facility] on 4/27/23   Combative behavior possibly 2/2 malingering as patients admits this was his motive  intermittently refusing labs  Becomes agitated during HD  Seen by  team on last admission, reconsulted for this admission   Continue klonopin 0.5 mg qam and 0.75 mg qpm  Avoid IV pain medications   Discussed with psych, possible delerium. Rec start Zyprexa 2.5 q6 prn agitation. Check QTc  SW consult for disposition- patient does not want to return to [place]

## 2023-05-13 NOTE — PROGRESS NOTE ADULT - PROBLEM SELECTOR PLAN 2
- complicated RVR and hypotension  - Transferred to telemetry floor  - EP consulted, suspect due to sepsis or other underlying cause. Also may be hypovolemic  - Continue Metoprolol, IVFs prn. Monitor volume status   - MICU consulted, not a candidate   - Monitor on tele

## 2023-05-14 NOTE — PROGRESS NOTE ADULT - PROBLEM SELECTOR PLAN 1
Pt recently discharged from [facility] on 4/27/23   Combative behavior possibly 2/2 malingering as patients admits this was his motive  intermittently refusing labs  Becomes agitated during HD  Seen by  team on last admission, reconsulted for this admission   Continue klonopin 0.5 mg qam and 0.75 mg qpm  Avoid IV pain medications   Discussed with psych, possible delerium. Rec start Zyprexa 2.5 q6 prn agitation. Check QTc  SW consult for disposition- patient does not want to return to [place]

## 2023-05-14 NOTE — PROGRESS NOTE ADULT - SUBJECTIVE AND OBJECTIVE BOX
Patient is a 62y old  Male who presents with a chief complaint of combative behavior (13 May 2023 14:07)      SUBJECTIVE / OVERNIGHT EVENTS: No acute events overnight. Pt has no new complaints. Seems a little lethargic but AAOx3 and answering questions    ADDITIONAL REVIEW OF SYSTEMS:    MEDICATIONS  (STANDING):  albuterol/ipratropium for Nebulization 3 milliLiter(s) Nebulizer every 6 hours  apixaban 5 milliGRAM(s) Oral two times a day  ascorbic acid 500 milliGRAM(s) Oral daily  atorvastatin 40 milliGRAM(s) Oral at bedtime  baclofen 5 milliGRAM(s) Oral every 12 hours  calamine/zinc oxide Lotion 1 Application(s) Topical three times a day  chlorhexidine 2% Cloths 1 Application(s) Topical daily  clonazePAM  Tablet 0.75 milliGRAM(s) Oral <User Schedule>  clonazePAM  Tablet 0.5 milliGRAM(s) Oral <User Schedule>  epoetin marilin-epbx (RETACRIT) Injectable 4000 Unit(s) IV Push <User Schedule>  isosorbide   mononitrate ER Tablet (IMDUR) 30 milliGRAM(s) Oral daily  levETIRAcetam 500 milliGRAM(s) Oral daily  levETIRAcetam 250 milliGRAM(s) Oral <User Schedule>  levothyroxine 100 MICROGram(s) Oral daily  loratadine 10 milliGRAM(s) Oral daily  meropenem  IVPB 500 milliGRAM(s) IV Intermittent every 24 hours  metoprolol succinate ER 50 milliGRAM(s) Oral daily  multivitamin 1 Tablet(s) Oral daily  mupirocin 2% Ointment 1 Application(s) Topical two times a day  polyethylene glycol 3350 17 Gram(s) Oral daily  senna 2 Tablet(s) Oral at bedtime  sevelamer carbonate 800 milliGRAM(s) Oral three times a day with meals    MEDICATIONS  (PRN):  baclofen 5 milliGRAM(s) Oral once PRN Musculoskeletal Pain  bisacodyl Suppository 10 milliGRAM(s) Rectal daily PRN Constipation  melatonin 3 milliGRAM(s) Oral at bedtime PRN Insomnia  OLANZapine Injectable 2.5 milliGRAM(s) IntraMuscular every 6 hours PRN agitation      CAPILLARY BLOOD GLUCOSE        I&O's Summary    13 May 2023 07:01  -  14 May 2023 07:00  --------------------------------------------------------  IN: 300 mL / OUT: 800 mL / NET: -500 mL        PHYSICAL EXAM:  Vital Signs Last 24 Hrs  T(C): 36.4 (14 May 2023 12:33), Max: 37.1 (13 May 2023 21:00)  T(F): 97.5 (14 May 2023 12:33), Max: 98.7 (13 May 2023 21:00)  HR: 84 (14 May 2023 12:33) (84 - 139)  BP: 91/64 (14 May 2023 12:33) (91/64 - 108/70)  BP(mean): --  RR: 18 (14 May 2023 12:33) (17 - 18)  SpO2: 100% (14 May 2023 12:33) (100% - 100%)    Parameters below as of 14 May 2023 12:33  Patient On (Oxygen Delivery Method): room air      CONSTITUTIONAL: NAD, lying in bed   RESPIRATORY: Normal respiratory effort; lungs are clear to auscultation bilaterally  CARDIOVASCULAR: Regular rate and rhythm, normal S1 and S2, no murmur/rub/gallop; No lower extremity edema; Peripheral pulses are 2+ bilaterally  ABDOMEN: Nontender to palpation, normoactive bowel sounds, no rebound/guarding; No hepatosplenomegaly  MUSCULOSKELETAL: no clubbing or cyanosis of digits; no joint swelling or tenderness to palpation  EXTREMITY: Foot wounds, with gauze bandage, blood noted on gauze. Pt not allowing further examination   PSYCH: AAOx3, lethargic     LABS:                        7.7    10.24 )-----------( 133      ( 12 May 2023 18:33 )             24.1     05-12    138  |  101  |  90<H>  ----------------------------<  111<H>  3.8   |  22  |  6.04<H>    Ca    8.1<L>      12 May 2023 17:51  Phos  5.5     05-12  Mg     2.10     05-12                  RADIOLOGY & ADDITIONAL TESTS:  Results Reviewed:   Imaging Personally Reviewed:  Electrocardiogram Personally Reviewed:    COORDINATION OF CARE:  Care Discussed with Consultants/Other Providers [Y/N]:  Prior or Outpatient Records Reviewed [Y/N]:

## 2023-05-14 NOTE — PROVIDER CONTACT NOTE (OTHER) - ASSESSMENT
Pt. A&Ox4. Was asleep at this time and woken up to attempt vitals and labs. Pt. extremely frustrated he was woken up and refused labs and vitals. Pt. went back to sleep

## 2023-05-14 NOTE — PROGRESS NOTE ADULT - ASSESSMENT
62 year old male with hx of CVA (WC bound, quadriparesis), COPD, CAD (AICD), ESRD (T/R/S), HTN, HLD, seizure d/o, a-fib (s/p ablation, eliquis), neurogenic bladder (SPC), BARRY, hypothyroidism, RCC (s/p partial nephrectomy), CHF, prostate CA (s/p radiation) presents from [place] for combative behavior. Patient was discharged from [facility] on 4/27/23 and then started kicking his feet in the ambulance. Patient was combative with staff upon arrival and sent back to [facility]. Pending placement at facility.

## 2023-05-14 NOTE — PROGRESS NOTE ADULT - PROBLEM SELECTOR PLAN 5
Here with parents    Tiffany is a 5 month old year old female who presents to the Immediate Care for crying.  She is been fussy and crying since yesterday afternoon.  Her parents did notice a low-grade fever so they gave her some Tylenol.  She was better for several hours after that but then started crying again overnight.  She has had a slight runny nose and cough for the last day.  There is no rashes.  She had one episode of emesis after Tylenol but otherwise has been fine.  She is still voiding normally.  She is taking n.p.o. normally.    PMH:   none  Alg:      ALLERGIES: no known allergies.  Meds:  None  Birth History: Full term, , no complications    Immunizations are up to date      OBJECTIVE:  Visit Vitals  Pulse 156   Temp (!) 100 °F (37.8 °C)   Resp 42   Wt 7.167 kg (15 lb 12.8 oz)   SpO2 99%     Gen: alert, comfortable and not crying           anterior fontanelle is soft and flat   HEENT: anicteric, pink conjunctiva         No photophobia         no TM/Pharyngeal erythema         mucous membranes moist         nares are patent and normal   Neck:  Supple, no masses or thyromegaly          No lymphadenopathy; no meningismus  Lungs: Clear to auscultation; good air entry           No use of accessory muscles  CV:  Regular rate and rhythm; no S3/S4; no murmur  Abdomen:  Soft, non-tender, no masses or HSM  Skin:  Good turgor; cap refill less than 3 seconds; no cyanosis          No rashes               No hair tourniquets noted    ASSESSMENT:/PLAN:  Fussy  Fever    I discussed this with the patient's parent(s).  I suspect this is due to a viral etiology.  She was comfortable and not crying when I came into the room.  Recommended over the counter symptomatic treatments.  Reassurance given  The patient indicates understanding of these issues and agrees with the plan.  Patient is to follow-up if symptoms persist or worsen over the next day.       Electronically signed by:  Keith E Weiler, MD on 2019    -initiated duonebs given worsening cough and course breath sounds

## 2023-05-15 NOTE — PROGRESS NOTE ADULT - SUBJECTIVE AND OBJECTIVE BOX
[facility] Division of Kidney Diseases & Hypertension  FOLLOW UP NOTE  --------------------------------------------------------------------------------  Chief Complaint: ESKD    24 hour events/subjective: seen and evaluated at bedside this morning.  Denied chest pain sob nvd.  reports itching has resolved.  tolerated last HD session friday 9/12      PAST HISTORY  --------------------------------------------------------------------------------  No significant changes to PMH, PSH, FHx, SHx, unless otherwise noted    ALLERGIES & MEDICATIONS  --------------------------------------------------------------------------------  Allergies    codeine (Rash)  Haldol (Unknown)  Motrin (Rash)  penicillin (Hives; Anaphylaxis)  Toradol (Rash)  Tylenol (Hives)  aspirin (Hives)  codeine (Unknown)    Intolerances      Standing Inpatient Medications  albuterol/ipratropium for Nebulization 3 milliLiter(s) Nebulizer every 6 hours  apixaban 5 milliGRAM(s) Oral two times a day  ascorbic acid 500 milliGRAM(s) Oral daily  atorvastatin 40 milliGRAM(s) Oral at bedtime  baclofen 5 milliGRAM(s) Oral every 12 hours  calamine/zinc oxide Lotion 1 Application(s) Topical three times a day  chlorhexidine 2% Cloths 1 Application(s) Topical daily  clonazePAM  Tablet 0.75 milliGRAM(s) Oral <User Schedule>  clonazePAM  Tablet 0.5 milliGRAM(s) Oral <User Schedule>  diphenhydrAMINE 25 milliGRAM(s) Oral once  epoetin marilin-epbx (RETACRIT) Injectable 4000 Unit(s) IV Push <User Schedule>  levETIRAcetam 250 milliGRAM(s) Oral <User Schedule>  levETIRAcetam 500 milliGRAM(s) Oral daily  levothyroxine 100 MICROGram(s) Oral daily  loratadine 10 milliGRAM(s) Oral daily  meropenem  IVPB 500 milliGRAM(s) IV Intermittent every 24 hours  metoprolol succinate ER 50 milliGRAM(s) Oral daily  multivitamin 1 Tablet(s) Oral daily  mupirocin 2% Ointment 1 Application(s) Topical two times a day  polyethylene glycol 3350 17 Gram(s) Oral daily  senna 2 Tablet(s) Oral at bedtime  sevelamer carbonate 800 milliGRAM(s) Oral three times a day with meals    PRN Inpatient Medications  bisacodyl Suppository 10 milliGRAM(s) Rectal daily PRN  melatonin 3 milliGRAM(s) Oral at bedtime PRN  OLANZapine Injectable 2.5 milliGRAM(s) IntraMuscular every 6 hours PRN      REVIEW OF SYSTEMS  --------------------------------------------------------------------------------  Gen: no fever  Respiratory: no sob  CV: no cp  GI: no ab pain  : no complaints  MSK: no complaints    VITALS/PHYSICAL EXAM  --------------------------------------------------------------------------------  T(C): 36.8 (05-15-23 @ 09:00), Max: 36.8 (05-14-23 @ 16:33)  HR: 130 (05-15-23 @ 09:00) (84 - 130)  BP: 125/70 (05-15-23 @ 09:00) (90/59 - 125/70)  ABP: --  ABP(mean): --  RR: 17 (05-15-23 @ 09:00) (17 - 18)  SpO2: 100% (05-15-23 @ 09:00) (100% - 100%)  CVP(mm Hg): --        05-14-23 @ 07:01  -  05-15-23 @ 07:00  --------------------------------------------------------  IN: 300 mL / OUT: 900 mL / NET: -600 mL      Physical Exam:  	Gen: disheveled  	HEENT: anicteric  	Pulm: CTA B/L  	CV: tachy  	Abd: soft  	: suprapubic catheter  	LE: no edema  	Neuro: upper extremity contractures  	Psych: lethargic  	Skin: dry  	Vascular access: right tunneled HD catheter    LABS/STUDIES  --------------------------------------------------------------------------------                Creatinine Trend:  SCr 6.04 [05-12 @ 17:51]  SCr 5.47 [05-11 @ 11:34]  SCr 4.91 [05-11 @ 00:25]  SCr 5.30 [05-10 @ 11:18]  SCr 5.51 [05-09 @ 06:47]        TSH <0.10      [05-11-23 @ 00:25]

## 2023-05-15 NOTE — PROGRESS NOTE ADULT - SUBJECTIVE AND OBJECTIVE BOX
[facility] Division of Hospital Medicine  Wilmar Trent MD  Pager [phone]    Patient is a 62y old  Male who presents with a chief complaint of combative behavior (15 May 2023 11:10)      SUBJECTIVE / OVERNIGHT EVENTS: Denies c/o      MEDICATIONS  (STANDING):  albuterol/ipratropium for Nebulization 3 milliLiter(s) Nebulizer every 6 hours  apixaban 5 milliGRAM(s) Oral two times a day  ascorbic acid 500 milliGRAM(s) Oral daily  atorvastatin 40 milliGRAM(s) Oral at bedtime  baclofen 5 milliGRAM(s) Oral every 12 hours  calamine/zinc oxide Lotion 1 Application(s) Topical three times a day  chlorhexidine 2% Cloths 1 Application(s) Topical daily  clonazePAM  Tablet 0.75 milliGRAM(s) Oral <User Schedule>  clonazePAM  Tablet 0.5 milliGRAM(s) Oral <User Schedule>  diphenhydrAMINE 25 milliGRAM(s) Oral once  epoetin marilin-epbx (RETACRIT) Injectable 4000 Unit(s) IV Push <User Schedule>  levETIRAcetam 500 milliGRAM(s) Oral daily  levETIRAcetam 250 milliGRAM(s) Oral <User Schedule>  levothyroxine 100 MICROGram(s) Oral daily  loratadine 10 milliGRAM(s) Oral daily  meropenem  IVPB 500 milliGRAM(s) IV Intermittent every 24 hours  metoprolol succinate ER 50 milliGRAM(s) Oral daily  multivitamin 1 Tablet(s) Oral daily  mupirocin 2% Ointment 1 Application(s) Topical two times a day  polyethylene glycol 3350 17 Gram(s) Oral daily  senna 2 Tablet(s) Oral at bedtime  sevelamer carbonate 800 milliGRAM(s) Oral three times a day with meals    MEDICATIONS  (PRN):  bisacodyl Suppository 10 milliGRAM(s) Rectal daily PRN Constipation  melatonin 3 milliGRAM(s) Oral at bedtime PRN Insomnia  OLANZapine Injectable 2.5 milliGRAM(s) IntraMuscular every 6 hours PRN agitation      CAPILLARY BLOOD GLUCOSE        I&O's Summary    14 May 2023 07:01  -  15 May 2023 07:00  --------------------------------------------------------  IN: 300 mL / OUT: 900 mL / NET: -600 mL        PHYSICAL EXAM:  Vital Signs Last 24 Hrs  T(C): 36.7 (15 May 2023 13:00), Max: 36.8 (14 May 2023 16:33)  T(F): 98.1 (15 May 2023 13:00), Max: 98.2 (14 May 2023 16:33)  HR: 125 (15 May 2023 13:00) (106 - 130)  BP: 102/61 (15 May 2023 13:00) (90/59 - 125/70)  BP(mean): --  RR: 17 (15 May 2023 13:00) (17 - 18)  SpO2: 100% (15 May 2023 13:00) (100% - 100%)    Parameters below as of 15 May 2023 13:00  Patient On (Oxygen Delivery Method): room air      CONSTITUTIONAL: NAD  EYES: EOMI, conjunctiva and sclera clear  ENMT: Moist oral mucosa  NECK: Supple  RESPIRATORY: Breathing unlabored, CTAB  CARDIOVASCULAR: S1S2 no MRG  ABDOMEN: Nontender to palpation, normoactive bowel sounds, no rebound/guarding  MUSCULOSKELETAL: no clubbing or cyanosis of digits  NEUROLOGY: bedbound  SKIN: No rashes or lesions    LABS:                CODE: FULL

## 2023-05-15 NOTE — PROGRESS NOTE ADULT - PROBLEM SELECTOR PLAN 11
dvt ppx: on Eliquis  #Dispo: Pending placement at [facility]

## 2023-05-15 NOTE — PROGRESS NOTE ADULT - PROBLEM SELECTOR PLAN 1
Speaking Coherently Pt recently discharged from [facility] on 4/27/23   Agitation possibly 2/2 malingering as patients admits this was his motive  intermittently refusing labs  Seen by  team on last admission, reconsulted for this admission   Continue klonopin 0.5 mg qam and 0.75 mg qpm  Avoid IV pain medications   Rec start Zyprexa 2.5 q6 prn agitation.   SW consult for disposition- patient does not want to return to [place]

## 2023-05-15 NOTE — PROGRESS NOTE ADULT - PROBLEM SELECTOR PLAN 1
Pt. with ESRD on HD TIW. Last HD was 5/12 via tunneled HD catheter.  Plan for HD again today with hypoallogenic dialyzer.  Hemoglobin below target range.  elle 4k TIW recommend monitory hgb. Has hyperphosphatemia, on oral Sevelamer therapy. Serum phosphorus above target range.  Low phosphorus diet. Monitor BP and labs.

## 2023-05-15 NOTE — CONSULT NOTE ADULT - PROBLEM SELECTOR RECOMMENDATION 3
See goc above, pt wishes to continue with all medical care, discussed other options of conservative medical care. At this time pt not interested in this option.

## 2023-05-15 NOTE — CONSULT NOTE ADULT - CONVERSATION DETAILS
Met with patient at bedside, mostly non-engaging, able to answer yes/no questions. Pt shared that he is not refusing tx, he just doesn't like to get his blood drawn and this can be done while he's getting HD. Discussed with patient that if at any point pt felt that the current medical plan was too aggressive and he would not want to pursue that, he could share that with us and we could discuss the option of no further aggressive medical care and a comfort approach. Pt indicated that at this time he is still interested in continuing all medical care. We discussed that this has to be consistent and he has to collaborate with the providers in order to give appropriate and timely medical care. Pt indicated understanding.

## 2023-05-15 NOTE — CONSULT NOTE ADULT - ASSESSMENT
62 year old male with hx of CVA (WC bound, quadriparesis), COPD, CAD (AICD), ESRD (T/R/S), HTN, HLD, seizure d/o, a-fib (s/p ablation, eliquis), neurogenic bladder (SPC), BARRY, hypothyroidism, RCC (s/p partial nephrectomy), CHF, prostate CA (s/p radiation) presents from [place] for combative behavior. Patient was discharged from [facility] on 4/27/23 and then started kicking his feet in the ambulance. Patient was combative with staff upon arrival and sent back to [facility]. Pt very drowsy when seen by writer , as per staff patient was verbally abusive prior to writers examination. Pt refusing blood work , medications and food.

## 2023-05-15 NOTE — PROGRESS NOTE ADULT - PROBLEM SELECTOR PLAN 2
- complicated RVR and hypotension  - Transferred to telemetry floor  - EP consulted, suspect due  underlying cause. Also may be hypovolemic  - Continue Metoprolol, IVFs prn. Monitor volume status   - MICU consulted, not a candidate   - Monitor on tele  - no signs sepsis, cx negativem d/c abx

## 2023-05-15 NOTE — PROGRESS NOTE ADULT - ASSESSMENT
62 year old male with hx of CVA (WC bound, quadriparesis), COPD, CAD (AICD), ESRD (T/R/S), HTN, HLD, seizure d/o, a-fib (s/p ablation, eliquis), neurogenic bladder (SPC), BARRY, hypothyroidism, RCC (s/p partial nephrectomy), CHF, prostate CA (s/p radiation) presents from [facility] agitation

## 2023-05-15 NOTE — CONSULT NOTE ADULT - PROBLEM SELECTOR RECOMMENDATION 4
Pt is full code  Surrogate is brother as above  At this time goals are established  Will sign off, reconsult as needed.

## 2023-05-15 NOTE — CONSULT NOTE ADULT - SUBJECTIVE AND OBJECTIVE BOX
HPI:  62 year old male with hx of CVA (WC bound, quadriparesis), COPD, CAD (AICD), ESRD (T/R/S), HTN, HLD, seizure d/o, a-fib (s/p ablation, eliquis), neurogenic bladder (SPC), BARRY, hypothyroidism, RCC (s/p partial nephrectomy), CHF, prostate CA (s/p radiation) presents from [place] for combative behavior. Patient was discharged from [facility] on 4/27/23 and then started kicking his feet in the ambulance. Patient was combative with staff upon arrival and sent back to [facility]. Pt very drowsy when seen by writer , as per staff patient was verbally abusive prior to writers examination. Pt refusing blood work , medications and food.  (28 Apr 2023 11:01)    PERTINENT PM/SXH:   Pacemaker    Atrial fibrillation, unspecified type    Chronic congestive heart failure, unspecified congestive heart failure type    Secondary hypertension    Hep C w/o coma, chronic    Carcinoma of kidney, unspecified laterality    HTN (hypertension)    HLD (hyperlipidemia)    AICD (automatic cardioverter/defibrillator) present    Atrial fibrillation    CAD (coronary artery disease)    Hypertension    Hyperlipidemia    Renal cell carcinoma of left kidney    Calcium kidney stones    Opioid dependence    COPD (chronic obstructive pulmonary disease)    Bladder cancer    Peripheral neuropathy    Prostate cancer    Nephrolithiasis    Kidney stone    AICD (automatic cardioverter/defibrillator) present    S/P cholecystectomy    H/O partial nephrectomy    Calcium kidney stone    History of percutaneous coronary intervention    H/O transurethral destruction of bladder lesion    Presence of inferior vena cava filter    History of coronary artery stent placement    FAMILY HISTORY:  Family history of chronic ischemic heart disease    Family history of lung cancer    Family Hx substance abuse [ ]yes [ ]no  ITEMS NOT CHECKED ARE NOT PRESENT    SOCIAL HISTORY:   Significant other/partner[ ]  Children[ ]  Pentecostalism/Spirituality:  Substance hx:  [ ]   Tobacco hx:  [ ]   Alcohol hx: [ ]   Home Opioid hx:  [ ] I-Stop Reference No:  Living Situation: [ ]Home  [ ]Long term care  [ x]Rehab [ ]Other    ADVANCE DIRECTIVES:    DNR/MOLST  [ ]  Living Will  [ ]   DECISION MAKER(s):  [ ] Health Care Proxy(s)  [ x] Surrogate(s)  [ ] Guardian           Name(s): Phone Number(s):  Brother - Derick Foster #[phone]    BASELINE (I)ADL(s) (prior to admission):  [place]: [ ]Total  [ x] Moderate [ ]Dependent    Allergies    codeine (Rash)  Haldol (Unknown)  Motrin (Rash)  penicillin (Hives; Anaphylaxis)  Toradol (Rash)  Tylenol (Hives)  aspirin (Hives)  codeine (Unknown)    Intolerances    MEDICATIONS  (STANDING):  albuterol/ipratropium for Nebulization 3 milliLiter(s) Nebulizer every 6 hours  apixaban 5 milliGRAM(s) Oral two times a day  ascorbic acid 500 milliGRAM(s) Oral daily  atorvastatin 40 milliGRAM(s) Oral at bedtime  baclofen 5 milliGRAM(s) Oral every 12 hours  calamine/zinc oxide Lotion 1 Application(s) Topical three times a day  chlorhexidine 2% Cloths 1 Application(s) Topical daily  clonazePAM  Tablet 0.75 milliGRAM(s) Oral <User Schedule>  clonazePAM  Tablet 0.5 milliGRAM(s) Oral <User Schedule>  epoetin marilin-epbx (RETACRIT) Injectable 4000 Unit(s) IV Push <User Schedule>  levETIRAcetam 250 milliGRAM(s) Oral <User Schedule>  levETIRAcetam 500 milliGRAM(s) Oral daily  levothyroxine 100 MICROGram(s) Oral daily  loratadine 10 milliGRAM(s) Oral daily  metoprolol succinate ER 50 milliGRAM(s) Oral daily  multivitamin 1 Tablet(s) Oral daily  mupirocin 2% Ointment 1 Application(s) Topical two times a day  polyethylene glycol 3350 17 Gram(s) Oral daily  senna 2 Tablet(s) Oral at bedtime  sevelamer carbonate 800 milliGRAM(s) Oral three times a day with meals    MEDICATIONS  (PRN):  bisacodyl Suppository 10 milliGRAM(s) Rectal daily PRN Constipation  melatonin 3 milliGRAM(s) Oral at bedtime PRN Insomnia  OLANZapine Injectable 2.5 milliGRAM(s) IntraMuscular every 6 hours PRN agitation    PRESENT SYMPTOMS: [ ]Unable to self-report  [ ] CPOT [ ] PAINADs [ ] RDOS  Source if other than patient:  [ ]Family   [ ]Team     Pain: [ ]yes [ x]no  QOL impact -   Location -                    Aggravating factors -  Quality -  Radiation -  Timing-  Severity (0-10 scale):  Minimal acceptable level (0-10 scale):     CPOT:    https://www.[facility].org/getattachment/[email or web address]/Critical-Care-Pain-Observation-Tool-(CPOT)    PAIN AD Score:   http://geriatrictoolkit.[facility]/cog/painad.pdf (press ctrl +  left click to view)    Dyspnea:                           [ ]Mild [ ]Moderate [ ]Severe    RDOS:  0 to 2  minimal or no respiratory distress   3  mild distress  4 to 6 moderate distress  >7 severe distress  [email or web address]/handouts/hen/Respiratory_Distress_Observation_Scale.pdf (Ctrl +  left click to view)     Anxiety:                             [ ]Mild [ ]Moderate [ ]Severe  Fatigue:                             [ ]Mild [ ]Moderate [ ]Severe  Nausea:                             [ ]Mild [ ]Moderate [ ]Severe  Loss of appetite:              [ ]Mild [ ]Moderate [ ]Severe  Constipation:                    [ ]Mild [ ]Moderate [ ]Severe    PCSSQ[Palliative Care Spiritual Screening Question]   Severity (0-10): deferred  Score of 4 or > indicate consideration of Chaplaincy referral.    Chaplaincy Referral: [ ] yes [ ] refused [ ] following [x] deferred    Other Symptoms:  [ x]All other review of systems negative     Palliative Performance Status Version 2: 40 %    http://npcrc.org/files/news/palliative_performance_scale_ppsv2.pdf    PHYSICAL EXAM:  Vital Signs Last 24 Hrs  T(C): 36.7 (15 May 2023 13:00), Max: 36.8 (14 May 2023 16:33)  T(F): 98.1 (15 May 2023 13:00), Max: 98.2 (14 May 2023 16:33)  HR: 125 (15 May 2023 13:00) (106 - 130)  BP: 102/61 (15 May 2023 13:00) (90/59 - 125/70)  BP(mean): --  RR: 17 (15 May 2023 13:00) (17 - 18)  SpO2: 100% (15 May 2023 13:00) (100% - 100%)    Parameters below as of 15 May 2023 13:00  Patient On (Oxygen Delivery Method): room air     I&O's Summary    14 May 2023 07:01  -  15 May 2023 07:00  --------------------------------------------------------  IN: 300 mL / OUT: 900 mL / NET: -600 mL      GENERAL: [ ]Cachexia    [ x]Alert  [x ]Oriented x   [ ]Lethargic  [ ]Unarousable  [ x]Verbal  [ ]Non-Verbal  Behavioral:   [ ] Anxiety  [ ] Delirium [ ] Agitation [ ] Other  HEENT:  [ ]Normal   [x ]Dry mouth   [ ]ET Tube/Trach  [ ]Oral lesions  PULMONARY:   [ ]Clear [ ]Tachypnea  [ ]Audible excessive secretions   [x ]Rhonchi        [ ]Right [ ]Left [ ]Bilateral  [ ]Crackles        [ ]Right [ ]Left [ ]Bilateral  [ ]Wheezing     [ ]Right [ ]Left [ ]Bilateral  [ ]Diminished breath sounds [ ]right [ ]left [ ]bilateral  CARDIOVASCULAR:    [ ]Regular [x ]Irregular [ ]Tachy  [ ]Cesar [ ]Murmur [ ]Other  GASTROINTESTINAL:  [ x]Soft  [ ]Distended   [x ]+BS  [x ]Non tender [ ]Tender  [ ]Other [ ]PEG [ ]OGT/ NGT  Last BM:  GENITOURINARY:  [ ]Normal [ ] Incontinent   [ x]Oliguria/Anuria   [ ]Jarquin  MUSCULOSKELETAL:   [ ]Normal   [ x]Weakness  [ ]Bed/Wheelchair bound [ ]Edema  NEUROLOGIC:   [ ]No focal deficits  [ ]Cognitive impairment  [ ]Dysphagia [ ]Dysarthria [ ]Paresis [ ]Other   SKIN: LE wounds  [ ]Normal  [ ]Rash  [ ]Other  [ ]Pressure ulcer(s)       Present on admission [ ]y [ ]n    CRITICAL CARE:  [ ] Shock Present  [ ]Septic [ ]Cardiogenic [ ]Neurologic [ ]Hypovolemic  [ ]  Vasopressors [ ]  Inotropes   [ ]Respiratory failure present [ ]Mechanical ventilation [ ]Non-invasive ventilatory support [ ]High flow    [ ]Acute  [ ]Chronic [ ]Hypoxic  [ ]Hypercarbic [ ]Other  [ ]Other organ failure     LABS: reviewed    RADIOLOGY & ADDITIONAL STUDIES:  < from: CT Head No Cont (04.20.23 @ 09:42) >  IMPRESSION:  No CT evidence of acute intracranial pathology.    PROTEIN CALORIE MALNUTRITION PRESENT: [ ]mild [ ]moderate [ ]severe [ ]underweight [ ]morbid obesity  https://www.andeal.org/vault/2440/web/files/ONC/Table_Clinical%20Characteristics%20to%20Document%20Malnutrition-White%20JV%20et%20al%202012.pdf    Height (cm): 180.3 (05-11-23 @ 05:34), 180.3 (04-20-23 @ 16:50)  Weight (kg): 81.9 (05-11-23 @ 05:34), 81.647 (04-20-23 @ 16:50), 90.3 (03-13-23 @ 04:03)  BMI (kg/m2): 25.2 (05-11-23 @ 05:34), 25.1 (04-20-23 @ 16:50)    [ ]PPSV2 < or = to 30% [ ]significant weight loss  [ ]poor nutritional intake  [ ]anasarca[ ]Artificial Nutrition      Other REFERRALS:  [ ]Hospice  [ ]Child Life  [ ]Social Work  [ ]Case management [ ]Holistic Therapy     Goals of Care Document:  HPI:  62 year old male with hx of CVA (WC bound, quadriparesis), COPD, CAD (AICD), ESRD (T/R/S), HTN, HLD, seizure d/o, a-fib (s/p ablation, eliquis), neurogenic bladder (SPC), BARRY, hypothyroidism, RCC (s/p partial nephrectomy), CHF, prostate CA (s/p radiation) presents from [place] for combative behavior. Patient was discharged from [facility] on 4/27/23 and then started kicking his feet in the ambulance. Patient was combative with staff upon arrival and sent back to [facility]. Pt very drowsy when seen by writer , as per staff patient was verbally abusive prior to writers examination. Pt refusing blood work , medications and food.  (28 Apr 2023 11:01)    PERTINENT PM/SXH:   Pacemaker    Atrial fibrillation, unspecified type    Chronic congestive heart failure, unspecified congestive heart failure type    Secondary hypertension    Hep C w/o coma, chronic    Carcinoma of kidney, unspecified laterality    HTN (hypertension)    HLD (hyperlipidemia)    AICD (automatic cardioverter/defibrillator) present    Atrial fibrillation    CAD (coronary artery disease)    Hypertension    Hyperlipidemia    Renal cell carcinoma of left kidney    Calcium kidney stones    Opioid dependence    COPD (chronic obstructive pulmonary disease)    Bladder cancer    Peripheral neuropathy    Prostate cancer    Nephrolithiasis    Kidney stone    AICD (automatic cardioverter/defibrillator) present    S/P cholecystectomy    H/O partial nephrectomy    Calcium kidney stone    History of percutaneous coronary intervention    H/O transurethral destruction of bladder lesion    Presence of inferior vena cava filter    History of coronary artery stent placement    FAMILY HISTORY:  Family history of chronic ischemic heart disease    Family history of lung cancer    Family Hx substance abuse [ ]yes [ ]no  ITEMS NOT CHECKED ARE NOT PRESENT    SOCIAL HISTORY:   Significant other/partner[ ]  Children[ ]  Yazidi/Spirituality:  Substance hx:  [ ]   Tobacco hx:  [ ]   Alcohol hx: [ ]   Home Opioid hx:  [ ] I-Stop Reference No:  Living Situation: [ ]Home  [ ]Long term care  [ x]Rehab [ ]Other    ADVANCE DIRECTIVES:    DNR/MOLST  [ ]  Living Will  [ ]   DECISION MAKER(s):  [ ] Health Care Proxy(s)  [ x] Surrogate(s)  [ ] Guardian           Name(s): Phone Number(s):  Brother - Derick Foster #[phone]    BASELINE (I)ADL(s) (prior to admission):  [place]: [ ]Total  [ x] Moderate [ ]Dependent    Allergies    codeine (Rash)  Haldol (Unknown)  Motrin (Rash)  penicillin (Hives; Anaphylaxis)  Toradol (Rash)  Tylenol (Hives)  aspirin (Hives)  codeine (Unknown)    Intolerances    MEDICATIONS  (STANDING):  albuterol/ipratropium for Nebulization 3 milliLiter(s) Nebulizer every 6 hours  apixaban 5 milliGRAM(s) Oral two times a day  ascorbic acid 500 milliGRAM(s) Oral daily  atorvastatin 40 milliGRAM(s) Oral at bedtime  baclofen 5 milliGRAM(s) Oral every 12 hours  calamine/zinc oxide Lotion 1 Application(s) Topical three times a day  chlorhexidine 2% Cloths 1 Application(s) Topical daily  clonazePAM  Tablet 0.75 milliGRAM(s) Oral <User Schedule>  clonazePAM  Tablet 0.5 milliGRAM(s) Oral <User Schedule>  epoetin marilin-epbx (RETACRIT) Injectable 4000 Unit(s) IV Push <User Schedule>  levETIRAcetam 250 milliGRAM(s) Oral <User Schedule>  levETIRAcetam 500 milliGRAM(s) Oral daily  levothyroxine 100 MICROGram(s) Oral daily  loratadine 10 milliGRAM(s) Oral daily  metoprolol succinate ER 50 milliGRAM(s) Oral daily  multivitamin 1 Tablet(s) Oral daily  mupirocin 2% Ointment 1 Application(s) Topical two times a day  polyethylene glycol 3350 17 Gram(s) Oral daily  senna 2 Tablet(s) Oral at bedtime  sevelamer carbonate 800 milliGRAM(s) Oral three times a day with meals    MEDICATIONS  (PRN):  bisacodyl Suppository 10 milliGRAM(s) Rectal daily PRN Constipation  melatonin 3 milliGRAM(s) Oral at bedtime PRN Insomnia  OLANZapine Injectable 2.5 milliGRAM(s) IntraMuscular every 6 hours PRN agitation    PRESENT SYMPTOMS: [ ]Unable to self-report  [ ] CPOT [ ] PAINADs [ ] RDOS  Source if other than patient:  [ ]Family   [ ]Team     Pain: [ ]yes [ x]no  QOL impact -   Location -                    Aggravating factors -  Quality -  Radiation -  Timing-  Severity (0-10 scale):  Minimal acceptable level (0-10 scale):     CPOT:    https://www.[facility].org/getattachment/[email or web address]/Critical-Care-Pain-Observation-Tool-(CPOT)    PAIN AD Score:   http://geriatrictoolkit.[facility]/cog/painad.pdf (press ctrl +  left click to view)    Dyspnea:                           [ ]Mild [ ]Moderate [ ]Severe    RDOS:  0 to 2  minimal or no respiratory distress   3  mild distress  4 to 6 moderate distress  >7 severe distress  [email or web address]_Distress_Observation_Scale.pdf (Ctrl +  left click to view)     Anxiety:                             [ ]Mild [ ]Moderate [ ]Severe  Fatigue:                             [ ]Mild [ ]Moderate [ ]Severe  Nausea:                             [ ]Mild [ ]Moderate [ ]Severe  Loss of appetite:              [ ]Mild [ ]Moderate [ ]Severe  Constipation:                    [ ]Mild [ ]Moderate [ ]Severe    PCSSQ[Palliative Care Spiritual Screening Question]   Severity (0-10): deferred  Score of 4 or > indicate consideration of Chaplaincy referral.    Chaplaincy Referral: [ ] yes [ ] refused [ ] following [x] deferred    Other Symptoms:  [ x]All other review of systems negative     Palliative Performance Status Version 2: 40 %    http://npcrc.org/files/news/palliative_performance_scale_ppsv2.pdf    PHYSICAL EXAM:  Vital Signs Last 24 Hrs  T(C): 36.7 (15 May 2023 13:00), Max: 36.8 (14 May 2023 16:33)  T(F): 98.1 (15 May 2023 13:00), Max: 98.2 (14 May 2023 16:33)  HR: 125 (15 May 2023 13:00) (106 - 130)  BP: 102/61 (15 May 2023 13:00) (90/59 - 125/70)  BP(mean): --  RR: 17 (15 May 2023 13:00) (17 - 18)  SpO2: 100% (15 May 2023 13:00) (100% - 100%)    Parameters below as of 15 May 2023 13:00  Patient On (Oxygen Delivery Method): room air     I&O's Summary    14 May 2023 07:01  -  15 May 2023 07:00  --------------------------------------------------------  IN: 300 mL / OUT: 900 mL / NET: -600 mL    GENERAL: [ ]Cachexia    [ x]Alert  [x ]Oriented x   [ ]Lethargic  [ ]Unarousable  [ x]Verbal  [ ]Non-Verbal  Behavioral:   [ ] Anxiety  [ ] Delirium [ ] Agitation [ ] Other  HEENT:  [ ]Normal   [x ]Dry mouth   [ ]ET Tube/Trach  [ ]Oral lesions  PULMONARY:   [ ]Clear [ ]Tachypnea  [ ]Audible excessive secretions   [x ]Rhonchi        [ ]Right [ ]Left [ ]Bilateral  [ ]Crackles        [ ]Right [ ]Left [ ]Bilateral  [ ]Wheezing     [ ]Right [ ]Left [ ]Bilateral  [ ]Diminished breath sounds [ ]right [ ]left [ ]bilateral  CARDIOVASCULAR:    [ ]Regular [x ]Irregular [ ]Tachy  [ ]Cesar [ ]Murmur [ ]Other  GASTROINTESTINAL:  [ x]Soft  [ ]Distended   [x ]+BS  [x ]Non tender [ ]Tender  [ ]Other [ ]PEG [ ]OGT/ NGT   GENITOURINARY:  [ ]Normal [ ] Incontinent   [ x]Oliguria/Anuria   [ ]Jarquin  MUSCULOSKELETAL:   [ ]Normal   [ x]Weakness  [ ]Bed/Wheelchair bound [ ]Edema  NEUROLOGIC:   [ ]No focal deficits  [ ]Cognitive impairment  [ ]Dysphagia [ ]Dysarthria [ ]Paresis [ ]Other   SKIN: LE wounds  [ ]Normal  [ ]Rash  [ ]Other  [ ]Pressure ulcer(s)       Present on admission [ ]y [ ]n    CRITICAL CARE:  [ ] Shock Present  [ ]Septic [ ]Cardiogenic [ ]Neurologic [ ]Hypovolemic  [ ]  Vasopressors [ ]  Inotropes   [ ]Respiratory failure present [ ]Mechanical ventilation [ ]Non-invasive ventilatory support [ ]High flow    [ ]Acute  [ ]Chronic [ ]Hypoxic  [ ]Hypercarbic [ ]Other  [ ]Other organ failure     LABS: reviewed    RADIOLOGY & ADDITIONAL STUDIES:  < from: CT Head No Cont (04.20.23 @ 09:42) >  IMPRESSION:  No CT evidence of acute intracranial pathology.    PROTEIN CALORIE MALNUTRITION PRESENT: [ ]mild [ ]moderate [ ]severe [ ]underweight [ ]morbid obesity  https://www.andeal.org/vault/2440/web/files/ONC/Table_Clinical%20Characteristics%20to%20Document%20Malnutrition-White%20JV%20et%20al%202012.pdf    Height (cm): 180.3 (05-11-23 @ 05:34), 180.3 (04-20-23 @ 16:50)  Weight (kg): 81.9 (05-11-23 @ 05:34), 81.647 (04-20-23 @ 16:50), 90.3 (03-13-23 @ 04:03)  BMI (kg/m2): 25.2 (05-11-23 @ 05:34), 25.1 (04-20-23 @ 16:50)    [ ]PPSV2 < or = to 30% [ ]significant weight loss  [ ]poor nutritional intake  [ ]anasarca[ ]Artificial Nutrition      Other REFERRALS:  [ ]Hospice  [ ]Child Life  [ ]Social Work  [ ]Case management [ ]Holistic Therapy     Goals of Care Document:

## 2023-05-16 NOTE — PROGRESS NOTE ADULT - PROBLEM SELECTOR PLAN 1
Pt. with ESRD on HD TIW. Last HD was 5/13 via tunneled HD catheter but treatment limited as patient insisted to stop  treatment.  Recommend to check basic metabolic panel today.  If stable will plan for dialysis tomorrow.  Will continue to use hypoallogenic dialyzer as he seems to have less itching with this.   Monitor BP and labs.

## 2023-05-16 NOTE — PROGRESS NOTE ADULT - SUBJECTIVE AND OBJECTIVE BOX
[facility] Division of Hospital Medicine  Wilmar Trent MD  Pager [phone]    Patient is a 62y old  Male who presents with a chief complaint of combative behavior (16 May 2023 08:30)      SUBJECTIVE / OVERNIGHT EVENTS: Denies complaints      MEDICATIONS  (STANDING):  albuterol/ipratropium for Nebulization 3 milliLiter(s) Nebulizer every 6 hours  apixaban 5 milliGRAM(s) Oral two times a day  ascorbic acid 500 milliGRAM(s) Oral daily  atorvastatin 40 milliGRAM(s) Oral at bedtime  baclofen 5 milliGRAM(s) Oral every 12 hours  calamine/zinc oxide Lotion 1 Application(s) Topical three times a day  chlorhexidine 2% Cloths 1 Application(s) Topical daily  clonazePAM  Tablet 0.75 milliGRAM(s) Oral <User Schedule>  clonazePAM  Tablet 0.5 milliGRAM(s) Oral <User Schedule>  epoetin marilin-epbx (RETACRIT) Injectable 4000 Unit(s) IV Push <User Schedule>  levETIRAcetam 500 milliGRAM(s) Oral daily  levETIRAcetam 250 milliGRAM(s) Oral <User Schedule>  levothyroxine 100 MICROGram(s) Oral daily  loratadine 10 milliGRAM(s) Oral daily  metoprolol succinate ER 50 milliGRAM(s) Oral daily  multivitamin 1 Tablet(s) Oral daily  mupirocin 2% Ointment 1 Application(s) Topical two times a day  polyethylene glycol 3350 17 Gram(s) Oral daily  senna 2 Tablet(s) Oral at bedtime  sevelamer carbonate 800 milliGRAM(s) Oral three times a day with meals    MEDICATIONS  (PRN):  bisacodyl Suppository 10 milliGRAM(s) Rectal daily PRN Constipation  melatonin 3 milliGRAM(s) Oral at bedtime PRN Insomnia  OLANZapine Injectable 2.5 milliGRAM(s) IntraMuscular every 6 hours PRN agitation      CAPILLARY BLOOD GLUCOSE        I&O's Summary    15 May 2023 07:01  -  16 May 2023 07:00  --------------------------------------------------------  IN: 400 mL / OUT: 400 mL / NET: 0 mL    16 May 2023 07:01  -  16 May 2023 13:35  --------------------------------------------------------  IN: 0 mL / OUT: 500 mL / NET: -500 mL        PHYSICAL EXAM:  Vital Signs Last 24 Hrs  T(C): 36.6 (16 May 2023 02:00), Max: 36.8 (15 May 2023 22:00)  T(F): 97.8 (16 May 2023 02:00), Max: 98.2 (15 May 2023 22:00)  HR: 84 (16 May 2023 10:00) (84 - 129)  BP: 116/61 (16 May 2023 10:00) (105/67 - 124/71)  BP(mean): --  RR: 18 (16 May 2023 10:00) (17 - 18)  SpO2: 100% (16 May 2023 10:00) (100% - 100%)    Parameters below as of 16 May 2023 10:00  Patient On (Oxygen Delivery Method): room air      CONSTITUTIONAL: NAD  EYES: EOMI, conjunctiva and sclera clear  ENMT: Moist oral mucosa  NECK: Supple  RESPIRATORY: Breathing unlabored, CTAB  CARDIOVASCULAR: S1S2 no MRG  ABDOMEN: Nontender to palpation, normoactive bowel sounds, no rebound/guarding  MUSCULOSKELETAL: no clubbing or cyanosis of digits  NEUROLOGY: bedbound  SKIN: eschars on toes    LABS:                        7.1    8.15  )-----------( 142      ( 15 May 2023 17:10 )             22.9     05-15    138  |  104  |  74<H>  ----------------------------<  81  4.7   |  21<L>  |  5.14<H>    Ca    8.0<L>      15 May 2023 17:10  Phos  6.1     05-15  Mg     1.90     05-15            CODE: FULL

## 2023-05-16 NOTE — PROGRESS NOTE ADULT - PROBLEM SELECTOR PLAN 2
- complicated RVR and hypotension  - Transferred to telemetry floor  - EP consulted, suspect due  underlying cause. Also may be hypovolemic  - Continue Metoprolol, IVFs prn. Monitor volume status   - MICU consulted, not a candidate   - Monitor on tele  - no signs sepsis, cx negative d/yumiko abx  - clinically improved

## 2023-05-16 NOTE — PROGRESS NOTE ADULT - ASSESSMENT
62 year old male with hx of CVA (WC bound, quadriparesis), COPD, CAD (AICD), ESRD (T/R/S), HTN, HLD, seizure d/o, a-fib (s/p ablation, eliquis), neurogenic bladder (SPC), BARRY, hypothyroidism, RCC (s/p partial nephrectomy), CHF, prostate CA (s/p radiation) presents from [facility] agitation

## 2023-05-16 NOTE — PROGRESS NOTE ADULT - SUBJECTIVE AND OBJECTIVE BOX
[facility] Division of Kidney Diseases & Hypertension  FOLLOW UP NOTE  --------------------------------------------------------------------------------    24 hour events/subjective: seen and evaluated at bedside this morning.  Denied chest pain sob nvd.  reports itching has improved.  tolerated approximately one hour of HD yesterday but insisted to come off machine - was verbally aggressive toward staff as per RN.      PAST HISTORY  --------------------------------------------------------------------------------  No significant changes to PMH, PSH, FHx, SHx, unless otherwise noted    ALLERGIES & MEDICATIONS  --------------------------------------------------------------------------------  Allergies    codeine (Rash)  Haldol (Unknown)  Motrin (Rash)  penicillin (Hives; Anaphylaxis)  Toradol (Rash)  Tylenol (Hives)  aspirin (Hives)  codeine (Unknown)    Intolerances      Standing Inpatient Medications  albuterol/ipratropium for Nebulization 3 milliLiter(s) Nebulizer every 6 hours  apixaban 5 milliGRAM(s) Oral two times a day  ascorbic acid 500 milliGRAM(s) Oral daily  atorvastatin 40 milliGRAM(s) Oral at bedtime  baclofen 5 milliGRAM(s) Oral every 12 hours  calamine/zinc oxide Lotion 1 Application(s) Topical three times a day  chlorhexidine 2% Cloths 1 Application(s) Topical daily  clonazePAM  Tablet 0.75 milliGRAM(s) Oral <User Schedule>  clonazePAM  Tablet 0.5 milliGRAM(s) Oral <User Schedule>  epoetin marilin-epbx (RETACRIT) Injectable 4000 Unit(s) IV Push <User Schedule>  levETIRAcetam 500 milliGRAM(s) Oral daily  levETIRAcetam 250 milliGRAM(s) Oral <User Schedule>  levothyroxine 100 MICROGram(s) Oral daily  loratadine 10 milliGRAM(s) Oral daily  metoprolol succinate ER 50 milliGRAM(s) Oral daily  multivitamin 1 Tablet(s) Oral daily  mupirocin 2% Ointment 1 Application(s) Topical two times a day  polyethylene glycol 3350 17 Gram(s) Oral daily  senna 2 Tablet(s) Oral at bedtime  sevelamer carbonate 800 milliGRAM(s) Oral three times a day with meals    PRN Inpatient Medications  bisacodyl Suppository 10 milliGRAM(s) Rectal daily PRN  melatonin 3 milliGRAM(s) Oral at bedtime PRN  OLANZapine Injectable 2.5 milliGRAM(s) IntraMuscular every 6 hours PRN      REVIEW OF SYSTEMS  --------------------------------------------------------------------------------  Gen: no fever  Respiratory: no sob  CV: no cp  GI: no ab pain  : no complaints  MSK:  no pain    VITALS/PHYSICAL EXAM  --------------------------------------------------------------------------------  T(C): 36.6 (05-16-23 @ 02:00), Max: 36.8 (05-15-23 @ 09:00)  HR: 115 (05-16-23 @ 02:00) (111 - 130)  BP: 111/62 (05-16-23 @ 02:00) (102/61 - 125/70)  ABP: --  ABP(mean): --  RR: 17 (05-16-23 @ 02:00) (17 - 18)  SpO2: 100% (05-16-23 @ 02:00) (100% - 100%)  CVP(mm Hg): --        05-15-23 @ 07:01  -  05-16-23 @ 07:00  --------------------------------------------------------  IN: 400 mL / OUT: 400 mL / NET: 0 mL      Physical Exam:  	Gen: resting comfortable  	HEENT: anicteric  	Pulm: CTA B/L  	CV: RRR, S1S2; no rub  	Abd: soft  	: suprapubic catheter present  	LE: without edema  	Neuro: upper extremity contractures  	Psych: Normal affect and mood  	Skin: dry   	Vascular access: right chest wall tunneled HD catheter site is C/D/I    LABS/STUDIES  --------------------------------------------------------------------------------              7.1    8.15  >-----------<  142      [05-15-23 @ 17:10]              22.9     138  |  104  |  74  ----------------------------<  81      [05-15-23 @ 17:10]  4.7   |  21  |  5.14        Ca     8.0     [05-15-23 @ 17:10]      Mg     1.90     [05-15-23 @ 17:10]      Phos  6.1     [05-15-23 @ 17:10]            Creatinine Trend:  SCr 5.14 [05-15 @ 17:10]  SCr 6.04 [05-12 @ 17:51]  SCr 5.47 [05-11 @ 11:34]  SCr 4.91 [05-11 @ 00:25]  SCr 5.30 [05-10 @ 11:18]        TSH <0.10      [05-11-23 @ 00:25]

## 2023-05-16 NOTE — PROGRESS NOTE ADULT - PROBLEM SELECTOR PLAN 1
Pt recently discharged from [facility] on 4/27/23   Agitation possibly 2/2 malingering as patients admits this was his motive  intermittently refusing labs  Seen by  team on last admission, reconsulted for this admission   Continue klonopin 0.5 mg qam and 0.75 mg qpm  Avoid IV pain medications   Rec start Zyprexa 2.5 q6 prn agitation.   Stable for return to SNF

## 2023-05-16 NOTE — PROGRESS NOTE ADULT - PROBLEM SELECTOR PLAN 3
Has hyperphosphatemia, on oral Sevelamer therapy. Serum phosphorus above target range.  Low phosphorus diet.

## 2023-05-17 NOTE — PROGRESS NOTE ADULT - PROBLEM SELECTOR PLAN 2
- complicated RVR and hypotension  - Transferred to telemetry floor  - EP consulted, suspect due  underlying cause. Also may be hypovolemic  - Continue Metoprolol, IVFs prn. Monitor volume status   - MICU consulted, not a candidate   - no signs sepsis, cx negative d/yumiko abx  - clinically improved  - can discontinue telemetry

## 2023-05-17 NOTE — PROVIDER CONTACT NOTE (OTHER) - ASSESSMENT
A&Ox4. Breathing unlabored on room air. v/s as charted. Denies pain, SOB, numbness, tingling, lightheadedness. Patient states "I'm feeling very anxious and I'm scared." RN to sit with patient to provide comfort and support at this time.

## 2023-05-17 NOTE — PROVIDER CONTACT NOTE (OTHER) - ASSESSMENT
A&Ox4. Breathing unlabored on room air. v/s as charted. Denies pain, SOB, numbness, tingling, lightheadedness.   Patient was in dialysis when transfusion ordered. No Blood transfusion consent in chart upon arrival back to the unit.  Once transfusion consent was obtained, patient was then refusing blood transfusion unless he got ativan. ACP Ame de los santos was made aware of patient refusal at [id number] via TEAMS.

## 2023-05-17 NOTE — PROGRESS NOTE ADULT - ASSESSMENT
62 year old male with hx of CVA (WC bound, quadriparesis), COPD, CAD (AICD), ESRD (T/R/S), HTN, HLD, seizure d/o, a-fib (s/p ablation, eliquis), neurogenic bladder (SPC), BARRY, hypothyroidism, RCC (s/p partial nephrectomy), CHF, prostate CA (s/p radiation) presents from [facility] agitation

## 2023-05-17 NOTE — PROGRESS NOTE ADULT - PROBLEM SELECTOR PLAN 1
Pt recently discharged from [facility] on 4/27/23   Agitation possibly 2/2 malingering as patients admits this was his motive  intermittently refusing labs  Seen by  team on last admission, reconsulted for this admission   Continue klonopin 0.5 mg qam and 0.75 mg qpm  Avoid IV pain medications   Rec start Zyprexa 2.5 q6 prn agitation.   Stable for return to SNF

## 2023-05-17 NOTE — PROGRESS NOTE ADULT - SUBJECTIVE AND OBJECTIVE BOX
[facility] Division of Hospital Medicine  Wilmar Trent MD  Pager [phone]    Patient is a 62y old  Male who presents with a chief complaint of agitation (16 May 2023 13:24)      SUBJECTIVE / OVERNIGHT EVENTS: no events       MEDICATIONS  (STANDING):  albuterol/ipratropium for Nebulization 3 milliLiter(s) Nebulizer every 6 hours  apixaban 5 milliGRAM(s) Oral two times a day  ascorbic acid 500 milliGRAM(s) Oral daily  atorvastatin 40 milliGRAM(s) Oral at bedtime  baclofen 5 milliGRAM(s) Oral every 12 hours  calamine/zinc oxide Lotion 1 Application(s) Topical three times a day  chlorhexidine 2% Cloths 1 Application(s) Topical daily  clonazePAM  Tablet 0.75 milliGRAM(s) Oral <User Schedule>  clonazePAM  Tablet 0.5 milliGRAM(s) Oral <User Schedule>  epoetin marilin-epbx (RETACRIT) Injectable 4000 Unit(s) IV Push <User Schedule>  levETIRAcetam 500 milliGRAM(s) Oral daily  levETIRAcetam 250 milliGRAM(s) Oral <User Schedule>  levothyroxine 100 MICROGram(s) Oral daily  loratadine 10 milliGRAM(s) Oral daily  metoprolol succinate ER 50 milliGRAM(s) Oral daily  multivitamin 1 Tablet(s) Oral daily  mupirocin 2% Ointment 1 Application(s) Topical two times a day  polyethylene glycol 3350 17 Gram(s) Oral daily  senna 2 Tablet(s) Oral at bedtime  sevelamer carbonate 800 milliGRAM(s) Oral three times a day with meals    MEDICATIONS  (PRN):  bisacodyl Suppository 10 milliGRAM(s) Rectal daily PRN Constipation  melatonin 3 milliGRAM(s) Oral at bedtime PRN Insomnia  OLANZapine Injectable 2.5 milliGRAM(s) IntraMuscular every 6 hours PRN agitation      CAPILLARY BLOOD GLUCOSE        I&O's Summary    16 May 2023 07:01  -  17 May 2023 07:00  --------------------------------------------------------  IN: 0 mL / OUT: 500 mL / NET: -500 mL        PHYSICAL EXAM:  Vital Signs Last 24 Hrs  T(C): 36.7 (17 May 2023 10:55), Max: 36.8 (17 May 2023 00:50)  T(F): 98.1 (17 May 2023 10:55), Max: 98.3 (17 May 2023 00:50)  HR: 109 (17 May 2023 10:55) (89 - 109)  BP: 105/73 (17 May 2023 10:55) (105/73 - 123/100)  BP(mean): --  RR: 18 (17 May 2023 10:55) (18 - 18)  SpO2: 95% (17 May 2023 09:00) (95% - 100%)    Parameters below as of 17 May 2023 10:55  Patient On (Oxygen Delivery Method): room air      CONSTITUTIONAL: NAD  EYES: EOMI, conjunctiva and sclera clear  ENMT: Moist oral mucosa  NECK: Supple  RESPIRATORY: Breathing unlabored, CTAB  CARDIOVASCULAR: S1S2 no MRG  ABDOMEN: Nontender to palpation, normoactive bowel sounds, no rebound/guarding  MUSCULOSKELETAL: no clubbing or cyanosis of digits  NEUROLOGY: No focal deficits   SKIN: No rashes or lesions    LABS:                        7.4    8.67  )-----------( 158      ( 17 May 2023 11:19 )             23.9     05-17    140  |  104  |  73<H>  ----------------------------<  99  4.2   |  21<L>  |  5.18<H>    Ca    7.9<L>      17 May 2023 11:19  Phos  6.4     05-17  Mg     2.00     05-17            CODE: FULL

## 2023-05-17 NOTE — PROGRESS NOTE ADULT - PROBLEM SELECTOR PLAN 3
Pt on HD MWF  Nephro following- continue HD  c/w home sevelamer (monitor phos levels)  Uremia improving    #Anemia  2/2 ESRD   monitor hgb  Hb 7.4, l.ik[place] nephrogenic, no signs bleeding.  Will transfuse 1 U PRBC (PJI requires Hb>9)

## 2023-05-17 NOTE — CHART NOTE - NSCHARTNOTEFT_GEN_A_CORE
Discussed with Dr. Trent hmg of 7.4 informed to transfuse 1 unit. Discussed with patient , patient agrees for transfusion.

## 2023-05-17 NOTE — PROGRESS NOTE ADULT - PROBLEM SELECTOR PLAN 1
Pt. with ESRD on HD TIW. seen on dialysis today- tolerating well. Will continue to use hypoallogenic dialyzer as he seems to have less itching with this.   Monitor BP and labs.

## 2023-05-17 NOTE — PROGRESS NOTE ADULT - SUBJECTIVE AND OBJECTIVE BOX
[facility] Division of Kidney Diseases & Hypertension  FOLLOW UP NOTE  --------------------------------------------------------------------------------  Chief Complaint:    24 hour events/subjective:    seen on dialysis.   earlier was wanting to get off due to itching but is much more calm now and sleeping         PAST HISTORY  --------------------------------------------------------------------------------  No significant changes to PMH, PSH, FHx, SHx, unless otherwise noted    ALLERGIES & MEDICATIONS  --------------------------------------------------------------------------------  Allergies    codeine (Rash)  Haldol (Unknown)  Motrin (Rash)  penicillin (Hives; Anaphylaxis)  Toradol (Rash)  Tylenol (Hives)  aspirin (Hives)  codeine (Unknown)    Intolerances      Standing Inpatient Medications  albuterol/ipratropium for Nebulization 3 milliLiter(s) Nebulizer every 6 hours  apixaban 5 milliGRAM(s) Oral two times a day  ascorbic acid 500 milliGRAM(s) Oral daily  atorvastatin 40 milliGRAM(s) Oral at bedtime  baclofen 5 milliGRAM(s) Oral every 12 hours  calamine/zinc oxide Lotion 1 Application(s) Topical three times a day  chlorhexidine 2% Cloths 1 Application(s) Topical daily  clonazePAM  Tablet 0.75 milliGRAM(s) Oral <User Schedule>  clonazePAM  Tablet 0.5 milliGRAM(s) Oral <User Schedule>  epoetin marilin-epbx (RETACRIT) Injectable 4000 Unit(s) IV Push <User Schedule>  levETIRAcetam 500 milliGRAM(s) Oral daily  levETIRAcetam 250 milliGRAM(s) Oral <User Schedule>  levothyroxine 100 MICROGram(s) Oral daily  loratadine 10 milliGRAM(s) Oral daily  metoprolol succinate ER 50 milliGRAM(s) Oral daily  multivitamin 1 Tablet(s) Oral daily  mupirocin 2% Ointment 1 Application(s) Topical two times a day  polyethylene glycol 3350 17 Gram(s) Oral daily  senna 2 Tablet(s) Oral at bedtime  sevelamer carbonate 800 milliGRAM(s) Oral three times a day with meals    PRN Inpatient Medications  bisacodyl Suppository 10 milliGRAM(s) Rectal daily PRN  melatonin 3 milliGRAM(s) Oral at bedtime PRN  OLANZapine Injectable 2.5 milliGRAM(s) IntraMuscular every 6 hours PRN      VITALS/PHYSICAL EXAM  --------------------------------------------------------------------------------  T(C): 36.8 (05-17-23 @ 14:18), Max: 36.8 (05-17-23 @ 00:50)  HR: 100 (05-17-23 @ 14:18) (89 - 109)  BP: 105/69 (05-17-23 @ 14:18) (105/69 - 123/100)  RR: 18 (05-17-23 @ 14:18) (18 - 18)  SpO2: 95% (05-17-23 @ 09:00) (95% - 100%)  Wt(kg): --        05-16-23 @ 07:01  -  05-17-23 @ 07:00  --------------------------------------------------------  IN: 0 mL / OUT: 500 mL / NET: -500 mL    05-17-23 @ 07:01  -  05-17-23 @ 14:54  --------------------------------------------------------  IN: 400 mL / OUT: 1200 mL / NET: -800 mL      Physical Exam:  	Gen: NAD  	HEENT: supple neck  	Pulm: CTA B/L  	CV: RRR, S1S2; no rub  	Vascular access: right Permcath    LABS/STUDIES  --------------------------------------------------------------------------------              7.4    8.67  >-----------<  158      [05-17-23 @ 11:19]              23.9     140  |  104  |  73  ----------------------------<  99      [05-17-23 @ 11:19]  4.2   |  21  |  5.18        Ca     7.9     [05-17-23 @ 11:19]      Mg     2.00     [05-17-23 @ 11:19]      Phos  6.4     [05-17-23 @ 11:19]            Creatinine Trend:  SCr 5.18 [05-17 @ 11:19]  SCr 5.14 [05-15 @ 17:10]  SCr 6.04 [05-12 @ 17:51]  SCr 5.47 [05-11 @ 11:34]  SCr 4.91 [05-11 @ 00:25]        TSH <0.10      [05-11-23 @ 00:25]

## 2023-05-18 NOTE — PROGRESS NOTE ADULT - PROBLEM SELECTOR PLAN 3
Pt on HD MWF  Nephro following- continue HD  c/w home sevelamer (monitor phos levels)  Uremia improving    #Anemia  2/2 ESRD   monitor hgb  Hb 7.4, [place] nephrogenic, no signs bleeding.  s/p 1 U PRBC 5/17.

## 2023-05-18 NOTE — PROGRESS NOTE ADULT - ASSESSMENT
62 year old male with hx of CVA (WC bound, quadriparesis), COPD, CAD (AICD), ESRD (T/R/S), HTN, HLD, seizure d/o, a-fib (s/p ablation, eliquis), neurogenic bladder (SPC), BARRY, hypothyroidism, RCC (s/p partial nephrectomy), CHF, prostate CA (s/p radiation) presents from [facility] agitation

## 2023-05-18 NOTE — PROGRESS NOTE ADULT - SUBJECTIVE AND OBJECTIVE BOX
[facility] Division of Hospital Medicine  Wilmar Trent MD  Pager [phone]    Patient is a 62y old  Male who presents with a chief complaint of combative behavior (17 May 2023 14:54)      SUBJECTIVE / OVERNIGHT EVENTS: no events      MEDICATIONS  (STANDING):  apixaban 5 milliGRAM(s) Oral two times a day  ascorbic acid 500 milliGRAM(s) Oral daily  atorvastatin 40 milliGRAM(s) Oral at bedtime  baclofen 5 milliGRAM(s) Oral every 12 hours  calamine/zinc oxide Lotion 1 Application(s) Topical three times a day  chlorhexidine 2% Cloths 1 Application(s) Topical daily  clonazePAM  Tablet 0.5 milliGRAM(s) Oral <User Schedule>  clonazePAM  Tablet 0.75 milliGRAM(s) Oral <User Schedule>  epoetin marilin-epbx (RETACRIT) Injectable 4000 Unit(s) IV Push <User Schedule>  levETIRAcetam 500 milliGRAM(s) Oral daily  levETIRAcetam 250 milliGRAM(s) Oral <User Schedule>  levothyroxine 100 MICROGram(s) Oral daily  loratadine 10 milliGRAM(s) Oral daily  metoprolol succinate ER 50 milliGRAM(s) Oral daily  multivitamin 1 Tablet(s) Oral daily  mupirocin 2% Ointment 1 Application(s) Topical two times a day  polyethylene glycol 3350 17 Gram(s) Oral daily  senna 2 Tablet(s) Oral at bedtime  sevelamer carbonate 800 milliGRAM(s) Oral three times a day with meals    MEDICATIONS  (PRN):  albuterol/ipratropium for Nebulization 3 milliLiter(s) Nebulizer every 6 hours PRN Shortness of Breath and/or Wheezing  bisacodyl Suppository 10 milliGRAM(s) Rectal daily PRN Constipation  melatonin 3 milliGRAM(s) Oral at bedtime PRN Insomnia  OLANZapine Injectable 2.5 milliGRAM(s) IntraMuscular every 6 hours PRN agitation      CAPILLARY BLOOD GLUCOSE        I&O's Summary    17 May 2023 07:01  -  18 May 2023 07:00  --------------------------------------------------------  IN: 400 mL / OUT: 2400 mL / NET: -2000 mL        PHYSICAL EXAM:  Vital Signs Last 24 Hrs  T(C): 36.8 (18 May 2023 09:00), Max: 37.3 (17 May 2023 21:35)  T(F): 98.3 (18 May 2023 09:00), Max: 99.2 (17 May 2023 21:35)  HR: 65 (18 May 2023 09:00) (50 - 100)  BP: 140/80 (18 May 2023 09:00) (100/60 - 140/80)  BP(mean): --  RR: 18 (18 May 2023 09:00) (17 - 18)  SpO2: 96% (18 May 2023 09:00) (96% - 100%)    Parameters below as of 18 May 2023 09:00  Patient On (Oxygen Delivery Method): room air      CONSTITUTIONAL: NAD  EYES: EOMI, conjunctiva and sclera clear  ENMT: Moist oral mucosa  NECK: Supple  RESPIRATORY: Breathing unlabored, CTAB  CARDIOVASCULAR: S1S2 no MRG  ABDOMEN: Nontender to palpation, normoactive bowel sounds, no rebound/guarding  MUSCULOSKELETAL: no clubbing or cyanosis of digits  NEUROLOGY: bedbound  SKIN: eschars on toes    LABS:                        8.6    9.28  )-----------( 148      ( 18 May 2023 00:16 )             27.7     05-17    140  |  104  |  73<H>  ----------------------------<  99  4.2   |  21<L>  |  5.18<H>    Ca    7.9<L>      17 May 2023 11:19  Phos  6.4     05-17  Mg     2.00     05-17        CODE: FULL

## 2023-05-18 NOTE — PROVIDER CONTACT NOTE (OTHER) - ASSESSMENT
A&Ox4. Breathing unlabored on room air. v/s as charted. Denies pain, SOB, numbness, tingling, lightheadedness. Apical pulse regular.  Patient inadvertently pulled IV out. Both RN and ANM attempted multiple times to get IV access. Was unable to. Patient not receiving anything through the IV at this time.

## 2023-05-18 NOTE — PROGRESS NOTE ADULT - PROBLEM SELECTOR PLAN 1
Pt recently discharged from [facility] on 4/27/23   Agitation possibly 2/2 malingering as patients admits this was his motive  intermittently refusing labs  Seen by  team on last admission, reconsulted for this admission   Continue klonopin 0.5 mg qam and 0.75 mg qpm  Avoid IV pain medications   Rec start Zyprexa 2.5 q6 prn agitation.   Stable for return to SNF

## 2023-05-19 NOTE — PROGRESS NOTE ADULT - SUBJECTIVE AND OBJECTIVE BOX
[facility] Division of Hospital Medicine  Wilmar Trent MD  Pager [phone]    Patient is a 62y old  Male who presents with a chief complaint of combative behavior (19 May 2023 14:40)      SUBJECTIVE / OVERNIGHT EVENTS:      MEDICATIONS  (STANDING):  apixaban 5 milliGRAM(s) Oral two times a day  ascorbic acid 500 milliGRAM(s) Oral daily  atorvastatin 40 milliGRAM(s) Oral at bedtime  baclofen 5 milliGRAM(s) Oral every 12 hours  calamine/zinc oxide Lotion 1 Application(s) Topical three times a day  chlorhexidine 2% Cloths 1 Application(s) Topical daily  clonazePAM  Tablet 0.5 milliGRAM(s) Oral <User Schedule>  clonazePAM  Tablet 0.75 milliGRAM(s) Oral <User Schedule>  epoetin marilin-epbx (RETACRIT) Injectable 4000 Unit(s) IV Push <User Schedule>  levETIRAcetam 250 milliGRAM(s) Oral <User Schedule>  levETIRAcetam 500 milliGRAM(s) Oral daily  levothyroxine 100 MICROGram(s) Oral daily  loratadine 10 milliGRAM(s) Oral daily  metoprolol succinate ER 50 milliGRAM(s) Oral daily  multivitamin 1 Tablet(s) Oral daily  mupirocin 2% Ointment 1 Application(s) Topical two times a day  polyethylene glycol 3350 17 Gram(s) Oral daily  senna 2 Tablet(s) Oral at bedtime  sevelamer carbonate 800 milliGRAM(s) Oral three times a day with meals    MEDICATIONS  (PRN):  albuterol/ipratropium for Nebulization 3 milliLiter(s) Nebulizer every 6 hours PRN Shortness of Breath and/or Wheezing  bisacodyl Suppository 10 milliGRAM(s) Rectal daily PRN Constipation  melatonin 3 milliGRAM(s) Oral at bedtime PRN Insomnia  OLANZapine Injectable 2.5 milliGRAM(s) IntraMuscular every 6 hours PRN agitation      CAPILLARY BLOOD GLUCOSE        I&O's Summary      PHYSICAL EXAM:  Vital Signs Last 24 Hrs  T(C): 36.6 (19 May 2023 15:00), Max: 37.3 (18 May 2023 21:10)  T(F): 97.8 (19 May 2023 15:00), Max: 99.1 (18 May 2023 21:10)  HR: 73 (19 May 2023 15:00) (60 - 112)  BP: 98/64 (19 May 2023 15:00) (98/64 - 135/72)  BP(mean): --  RR: 17 (19 May 2023 15:00) (17 - 18)  SpO2: 99% (19 May 2023 15:00) (96% - 100%)    Parameters below as of 19 May 2023 15:00  Patient On (Oxygen Delivery Method): room air      CONSTITUTIONAL: NAD  EYES: EOMI, conjunctiva and sclera clear  ENMT: Moist oral mucosa  NECK: Supple  RESPIRATORY: Breathing unlabored, CTAB  CARDIOVASCULAR: S1S2 no MRG  ABDOMEN: Nontender to palpation, normoactive bowel sounds, no rebound/guarding  MUSCULOSKELETAL: no clubbing or cyanosis of digits  NEUROLOGY: bedbound  SKIN: eschars on toes    LABS:                        8.6    9.28  )-----------( 148      ( 18 May 2023 00:16 )             27.7               CODE: FULL

## 2023-05-19 NOTE — PROVIDER CONTACT NOTE (OTHER) - ASSESSMENT
Pt is AOx4; very noncompliant w/ medical treatment. Pt refused VS, AM labs, and 6 AM meds despite education.

## 2023-05-19 NOTE — PROGRESS NOTE ADULT - ASSESSMENT
62 year old male with hx of CVA (WC bound, quadriparesis), COPD, CAD (AICD), ESRD (T/R/S), HTN, HLD, seizure d/o, a-fib (s/p ablation, eliquis), neurogenic bladder (SPC), BARRY, hypothyroidism, RCC (s/p partial nephrectomy), CHF, prostate CA (s/p radiation) presents from [facility] agitation

## 2023-05-19 NOTE — PROGRESS NOTE ADULT - SUBJECTIVE AND OBJECTIVE BOX
[facility] Division of Kidney Diseases & Hypertension  FOLLOW UP NOTE  --------------------------------------------------------------------------------  Chief Complaint:    24 hour events/subjective:    was calm today   for dialysis tomorrow         PAST HISTORY  --------------------------------------------------------------------------------  No significant changes to PMH, PSH, FHx, SHx, unless otherwise noted    ALLERGIES & MEDICATIONS  --------------------------------------------------------------------------------  Allergies    codeine (Rash)  Haldol (Unknown)  Motrin (Rash)  penicillin (Hives; Anaphylaxis)  Toradol (Rash)  Tylenol (Hives)  aspirin (Hives)  codeine (Unknown)    Intolerances      Standing Inpatient Medications  albuterol/ipratropium for Nebulization 3 milliLiter(s) Nebulizer every 6 hours  apixaban 5 milliGRAM(s) Oral two times a day  ascorbic acid 500 milliGRAM(s) Oral daily  atorvastatin 40 milliGRAM(s) Oral at bedtime  baclofen 5 milliGRAM(s) Oral every 12 hours  calamine/zinc oxide Lotion 1 Application(s) Topical three times a day  chlorhexidine 2% Cloths 1 Application(s) Topical daily  clonazePAM  Tablet 0.75 milliGRAM(s) Oral <User Schedule>  clonazePAM  Tablet 0.5 milliGRAM(s) Oral <User Schedule>  epoetin marilin-epbx (RETACRIT) Injectable 4000 Unit(s) IV Push <User Schedule>  levETIRAcetam 500 milliGRAM(s) Oral daily  levETIRAcetam 250 milliGRAM(s) Oral <User Schedule>  levothyroxine 100 MICROGram(s) Oral daily  loratadine 10 milliGRAM(s) Oral daily  metoprolol succinate ER 50 milliGRAM(s) Oral daily  multivitamin 1 Tablet(s) Oral daily  mupirocin 2% Ointment 1 Application(s) Topical two times a day  polyethylene glycol 3350 17 Gram(s) Oral daily  senna 2 Tablet(s) Oral at bedtime  sevelamer carbonate 800 milliGRAM(s) Oral three times a day with meals    PRN Inpatient Medications  bisacodyl Suppository 10 milliGRAM(s) Rectal daily PRN  melatonin 3 milliGRAM(s) Oral at bedtime PRN  OLANZapine Injectable 2.5 milliGRAM(s) IntraMuscular every 6 hours PRN      VITALS/PHYSICAL EXAM  --------------------------------------------------------------------------------  T(C): 36.8 (05-17-23 @ 14:18), Max: 36.8 (05-17-23 @ 00:50)  HR: 100 (05-17-23 @ 14:18) (89 - 109)  BP: 105/69 (05-17-23 @ 14:18) (105/69 - 123/100)  RR: 18 (05-17-23 @ 14:18) (18 - 18)  SpO2: 95% (05-17-23 @ 09:00) (95% - 100%)  Wt(kg): --        05-16-23 @ 07:01  -  05-17-23 @ 07:00  --------------------------------------------------------  IN: 0 mL / OUT: 500 mL / NET: -500 mL    05-17-23 @ 07:01  -  05-17-23 @ 14:54  --------------------------------------------------------  IN: 400 mL / OUT: 1200 mL / NET: -800 mL      Physical Exam:  	Gen: NAD  	HEENT: supple neck  	Pulm: CTA B/L  	CV: RRR, S1S2; no rub  	Vascular access: right Permcath    LABS/STUDIES  --------------------------------------------------------------------------------              7.4    8.67  >-----------<  158      [05-17-23 @ 11:19]              23.9     140  |  104  |  73  ----------------------------<  99      [05-17-23 @ 11:19]  4.2   |  21  |  5.18        Ca     7.9     [05-17-23 @ 11:19]      Mg     2.00     [05-17-23 @ 11:19]      Phos  6.4     [05-17-23 @ 11:19]            Creatinine Trend:  SCr 5.18 [05-17 @ 11:19]  SCr 5.14 [05-15 @ 17:10]  SCr 6.04 [05-12 @ 17:51]  SCr 5.47 [05-11 @ 11:34]  SCr 4.91 [05-11 @ 00:25]        TSH <0.10      [05-11-23 @ 00:25]

## 2023-05-19 NOTE — PROGRESS NOTE ADULT - PROBLEM SELECTOR PLAN 3
Pt on HD MWF  Nephro following- continue HD  c/w home sevelamer (monitor phos levels)  Uremia improving    #Anemia  2/2 ESRD   monitor hgb  Hb 7.4, likely nephrogenic, no signs bleeding.  s/p 1 U PRBC 5/17, appropriate response

## 2023-05-19 NOTE — PROGRESS NOTE ADULT - PROBLEM SELECTOR PLAN 1
Pt recently discharged from [facility] on 4/27/23   Agitation possibly 2/2 malingering as patients admits this was his motive  intermittently refusing labs  Seen by  team on last admission, reconsulted for this admission   Continue klonopin 0.5 mg qam and 0.75 mg qpm  Avoid IV pain medications   Rec start Zyprexa 2.5 q6 prn agitation.   Stable for return to SNF

## 2023-05-19 NOTE — CHART NOTE - NSCHARTNOTEFT_GEN_A_CORE
Source: Patient [x]      other [x] nurse, medical chart   Diet rx: Renal Restrictions: For patients receiving Renal Replacement - No Protein Restr, No Conc K, No Conc Phos, Low Sodium  Supplement Feeding Modality:  Oral   [place] Cans or Servings Per Day:  1       Frequency:  Daily (05-09-23 @ 08:49) [Active]    Pt 63 yo male with PMHx of CVA (WC bound, quadriparesis), COPD, CAD (AICD), ESRD (T/R/S), HTN, HLD, seizure d/o, a-fib (s/p ablation), neurogenic bladder, BARRY, hypothyroidism, RCC (s/p partial nephrectomy), CHF, prostate CA (s/p radiation) from [facility] for agitation - per chart review.     At time of visit, Pt awake, alert. Per Pt, his appetite not that well; food preferences discussed. No report of chewing/swallowing difficulty; no report of vomiting or diarrhea @ this time. +Last BM (5/17) fecal incontinence, per flow sheet. Pt with wounds/ulcers. Rec to add Prosource - 2x daily to aide in wound healing. RDN answered concerns related to diet. Case discussed with nurse. RDN remains available.      Pt's height: 71" (5/11)     IBW: 172#+/-10%     Pt's weights: 86.6 kg (5/12), 81.6 kg (4/28)   Pertinent Medications: Dulcolax Suppository (PRN), Vit C, Multivitamin, Miralax, Senna, Lipitor, Renvela   Pertinent Labs: (5/18) H/H 8.6/27.7 L,  L;    (5/17) BUN 73 H, Creat 5.18 H, phosphorus 6.4H   Skin: Pt with pressure injury to R heel - stage III; sacrum ulcer - healing; wounds to R+L great toe    Estimated Needs: [x] no change since previous assessment  Previous Nutrition Diagnosis: [x] Increased Nutrient Needs for wound healing   Nutrition Diagnosis is [x] ongoing     Nutrition Interventions/Recommendations:  1. Add No Carb Prosource (1 pkg = 15 gms Protein) Qty per day: 2 -> to aide in wound healing;   2. Encourage & assist Pt with meals; Monitor PO diet tolerance;   3. Change Multivitamin to Nephro-mirna 1 cap daily for micronutrient coverage;   4. Bowel regimen per MD discretion;   5. Monitor labs, daily weights, hydration status;

## 2023-05-19 NOTE — PROGRESS NOTE ADULT - PROBLEM SELECTOR PLAN 1
Pt. with ESRD on HD TIW. for dialysis tomorrow.. Will continue to use hypoallogenic dialyzer as he seems to have less itching with this.   Monitor BP and labs.

## 2023-05-19 NOTE — PROGRESS NOTE ADULT - TIME BILLING
Review of laboratory data, radiology results, consultants' recommendations, documentation in [place], discussion with patient/house staff and interdisciplinary staff (such as , social workers, etc). Interventions were performed as documented above.
Review of laboratory data, radiology results, consultants' recommendations, documentation in [place], discussion with patient/house staff and interdisciplinary staff (such as , social workers, etc). Interventions were performed as documented above.
Review of laboratory data, radiology results, consultants' recommendations, documentation in [place], discussion with patient/house staff and interdisciplinary staff (such as , social workers, etc). Interventions were performed as documented above.
Review of laboratory data, radiology results, consultants' recommendations, documentation in [place], discussion with patient/house staff and interdisciplinary staff (such as , social workers, etc). Interventions were performed as documented above.
Review of laboratory data, radiology results, consultants' recommendations, documentation in [place], discussion with patient/house staff and interdisciplinary staff (such as , social workers, etc). Interventions were performed as documented above.
Time spent includes direct patient care  (interview and examination of patient), discussion with other providers, support staff and/or patient's family members, review of medical records, ordering diagnostic tests and analyzing results, and documentation.
Review of laboratory data, radiology results, consultants' recommendations, documentation in [place], discussion with patient/house staff and interdisciplinary staff (such as , social workers, etc). Interventions were performed as documented above.
Time spent includes direct patient care  (interview and examination of patient), discussion with other providers, support staff and/or patient's family members, review of medical records, ordering diagnostic tests and analyzing results, and documentation.
Review of laboratory data, radiology results, consultants' recommendations, documentation in [place], discussion with patient/house staff and interdisciplinary staff (such as , social workers, etc). Interventions were performed as documented above.

## 2023-05-20 NOTE — CHART NOTE - NSCHARTNOTEFT_GEN_A_CORE
Notified by RN patient complaining of chest pain. EKG performed. Pt seen an assessed at bedside. Pt c/o of 9/10 left sided pressure-like chest pain with arm radiation. Associated with vomiting. States he has1 episode of vomiting. Pt states it feels "like my heart attack I had before".     T(C): 36.8 (05-20-23 @ 19:16), Max: 37.1 (05-19-23 @ 22:15)  HR: 118 (05-20-23 @ 19:16) (67 - 123)  BP: 92/56 (05-20-23 @ 19:16) (92/56 - 128/78)  RR: 16 (05-20-23 @ 19:16) (16 - 18)  SpO2: 100% (05-20-23 @ 19:16) (100% - 100%)    EKG: Afib, no TWI or NIKKI    Physical Exam:  Gen: NAD  CV: RRR, normal S1 + S2, no m/r/g  Lungs: CTAB  Abd: soft, non-tender, +BS   Ext: No edema    A/P    - EKG w/ Afib, no TWI or NIKKI  - hST ; CK/CKMB ordered   - Serial EKGs and cardiac enzymes PRN  - Telemetry monitoring Notified by RN patient complaining of chest pain. EKG performed. Pt seen an assessed at bedside. Pt appears comfortable lying in bed. Pt c/o of 9/10 constant left sided pressure-like chest pain with arm radiation. Associated with vomiting. States he has1 episode of vomiting. Pt states it feels "like my heart attack I had before".     T(C): 36.8 (05-20-23 @ 19:16), Max: 37.1 (05-19-23 @ 22:15)  HR: 118 (05-20-23 @ 19:16) (67 - 123)  BP: 92/56 (05-20-23 @ 19:16) (92/56 - 128/78)  RR: 16 (05-20-23 @ 19:16) (16 - 18)  SpO2: 100% (05-20-23 @ 19:16) (100% - 100%)    EKG: Afib, no TWI or NIKKI    Physical Exam:  Gen: NAD  CV: RRR, normal S1 + S2, no m/r/g  Lungs: CTAB  Abd: soft, non-tender, +BS   Ext: No edema    Chest pain   - EKG w/ Afib, no TWI or NIKKI  - hST ; CK/CKMB ordered; However pt refusing lab draw. Pt educated on the importance of obtaining these lab values. Pt still refusing.  - Telemetry monitoring- pt also refusing.   -Will continue to monitor

## 2023-05-20 NOTE — PROGRESS NOTE ADULT - ASSESSMENT
62 year old male with hx of CVA (WC bound, quadriparesis), COPD, CAD (AICD), ESRD (T/R/S), HTN, HLD, seizure d/o, a-fib (s/p ablation, eliquis), neurogenic bladder (SPC), BARRY, hypothyroidism, RCC (s/p partial nephrectomy), CHF, prostate CA (s/p radiation) presents from [facility] agitation

## 2023-05-20 NOTE — PROGRESS NOTE ADULT - PROBLEM SELECTOR PLAN 1
Pt recently discharged from [facility] on 4/27/23   Agitation possibly 2/2 malingering as patients admits this was his motive  intermittently refusing labs  Seen by  team on last admission, reconsulted for this admission   Continue klonopin 0.5 mg qam and 0.75 mg qpm  Avoid IV pain medications   Rec start Zyprexa 2.5 q6 prn agitation.   Stable for return to SNF

## 2023-05-20 NOTE — PROGRESS NOTE ADULT - PROBLEM SELECTOR PLAN 2
- complicated RVR and hypotension  - Transferred to telemetry floor  - EP consulted, suspect due  underlying cause. Also may be hypovolemic  - Continue Metoprolol, IVFs prn. Monitor volume status   - no signs sepsis, cx negative d/yumiko abx  - clinically improved  - can discontinue telemetry

## 2023-05-20 NOTE — PROGRESS NOTE ADULT - SUBJECTIVE AND OBJECTIVE BOX
[facility] Division of Hospital Medicine  Wilmar Trent MD  Pager [phone]    Patient is a 62y old  Male who presents with a chief complaint of combative behavior (19 May 2023 15:38)      SUBJECTIVE / OVERNIGHT EVENTS: no events      MEDICATIONS  (STANDING):  apixaban 5 milliGRAM(s) Oral two times a day  ascorbic acid 500 milliGRAM(s) Oral daily  atorvastatin 40 milliGRAM(s) Oral at bedtime  baclofen 5 milliGRAM(s) Oral every 12 hours  calamine/zinc oxide Lotion 1 Application(s) Topical three times a day  chlorhexidine 2% Cloths 1 Application(s) Topical daily  clonazePAM  Tablet 0.75 milliGRAM(s) Oral <User Schedule>  clonazePAM  Tablet 0.5 milliGRAM(s) Oral <User Schedule>  epoetin marilin-epbx (RETACRIT) Injectable 4000 Unit(s) IV Push <User Schedule>  levETIRAcetam 500 milliGRAM(s) Oral daily  levETIRAcetam 250 milliGRAM(s) Oral <User Schedule>  levothyroxine 100 MICROGram(s) Oral daily  loratadine 10 milliGRAM(s) Oral daily  metoprolol succinate ER 50 milliGRAM(s) Oral daily  multivitamin 1 Tablet(s) Oral daily  mupirocin 2% Ointment 1 Application(s) Topical two times a day  Nephro-mirna 1 Tablet(s) Oral daily  polyethylene glycol 3350 17 Gram(s) Oral daily  senna 2 Tablet(s) Oral at bedtime  sevelamer carbonate 800 milliGRAM(s) Oral three times a day with meals    MEDICATIONS  (PRN):  albuterol/ipratropium for Nebulization 3 milliLiter(s) Nebulizer every 6 hours PRN Shortness of Breath and/or Wheezing  bisacodyl Suppository 10 milliGRAM(s) Rectal daily PRN Constipation  melatonin 3 milliGRAM(s) Oral at bedtime PRN Insomnia  OLANZapine Injectable 2.5 milliGRAM(s) IntraMuscular every 6 hours PRN agitation      CAPILLARY BLOOD GLUCOSE        I&O's Summary    19 May 2023 07:01  -  20 May 2023 07:00  --------------------------------------------------------  IN: 700 mL / OUT: 2200 mL / NET: -1500 mL        PHYSICAL EXAM:  Vital Signs Last 24 Hrs  T(C): 36.9 (20 May 2023 09:45), Max: 37.1 (19 May 2023 18:10)  T(F): 98.5 (20 May 2023 09:45), Max: 98.7 (19 May 2023 18:10)  HR: 67 (20 May 2023 09:45) (67 - 123)  BP: 100/64 (20 May 2023 09:45) (97/61 - 137/73)  BP(mean): --  RR: 16 (20 May 2023 09:45) (16 - 18)  SpO2: 100% (20 May 2023 09:45) (100% - 100%)    Parameters below as of 20 May 2023 09:45  Patient On (Oxygen Delivery Method): room air      CONSTITUTIONAL: NAD  EYES: EOMI, conjunctiva and sclera clear  ENMT: Moist oral mucosa  NECK: Supple  RESPIRATORY: Breathing unlabored, CTAB  CARDIOVASCULAR: S1S2 no MRG  ABDOMEN: Nontender to palpation, normoactive bowel sounds, no rebound/guarding  MUSCULOSKELETAL: no clubbing or cyanosis of digits  NEUROLOGY: No focal deficits   SKIN: No rashes or lesions    LABS:                        10.5   9.56  )-----------( 183      ( 19 May 2023 21:05 )             32.1     05-19    138  |  104  |  51<H>  ----------------------------<  113<H>  4.1   |  20<L>  |  4.60<H>    Ca    8.3<L>      19 May 2023 18:14  Phos  4.5     05-19  Mg     1.70     05-19            CODE: FULL

## 2023-05-21 NOTE — PROGRESS NOTE ADULT - ASSESSMENT
62 year old male with hx of CVA (WC bound, quadriparesis), COPD, CAD (AICD), ESRD (T/R/S), HTN, HLD, seizure d/o, a-fib (s/p ablation, eliquis), neurogenic bladder (SPC), BARRY, hypothyroidism, RCC (s/p partial nephrectomy), CHF, prostate CA (s/p radiation) presents from [facility] agitation

## 2023-05-21 NOTE — PROGRESS NOTE ADULT - SUBJECTIVE AND OBJECTIVE BOX
[facility] Division of Hospital Medicine  Wilmar Trent MD  Pager [phone]    Patient is a 62y old  Male who presents with a chief complaint of combative behavior (20 May 2023 15:37)      SUBJECTIVE / OVERNIGHT EVENTS: no events      MEDICATIONS  (STANDING):  apixaban 5 milliGRAM(s) Oral two times a day  ascorbic acid 500 milliGRAM(s) Oral daily  atorvastatin 40 milliGRAM(s) Oral at bedtime  baclofen 5 milliGRAM(s) Oral every 12 hours  calamine/zinc oxide Lotion 1 Application(s) Topical three times a day  chlorhexidine 2% Cloths 1 Application(s) Topical daily  clonazePAM  Tablet 0.75 milliGRAM(s) Oral <User Schedule>  clonazePAM  Tablet 0.5 milliGRAM(s) Oral <User Schedule>  epoetin marilin-epbx (RETACRIT) Injectable 4000 Unit(s) IV Push <User Schedule>  levETIRAcetam 500 milliGRAM(s) Oral daily  levETIRAcetam 250 milliGRAM(s) Oral <User Schedule>  levothyroxine 100 MICROGram(s) Oral daily  loratadine 10 milliGRAM(s) Oral daily  metoprolol succinate ER 50 milliGRAM(s) Oral daily  multivitamin 1 Tablet(s) Oral daily  mupirocin 2% Ointment 1 Application(s) Topical two times a day  Nephro-mirna 1 Tablet(s) Oral daily  polyethylene glycol 3350 17 Gram(s) Oral daily  senna 2 Tablet(s) Oral at bedtime  sevelamer carbonate 800 milliGRAM(s) Oral three times a day with meals    MEDICATIONS  (PRN):  albuterol/ipratropium for Nebulization 3 milliLiter(s) Nebulizer every 6 hours PRN Shortness of Breath and/or Wheezing  bisacodyl Suppository 10 milliGRAM(s) Rectal daily PRN Constipation  melatonin 3 milliGRAM(s) Oral at bedtime PRN Insomnia  OLANZapine Injectable 2.5 milliGRAM(s) IntraMuscular every 6 hours PRN agitation      CAPILLARY BLOOD GLUCOSE        I&O's Summary    20 May 2023 07:01  -  21 May 2023 07:00  --------------------------------------------------------  IN: 0 mL / OUT: 500 mL / NET: -500 mL        PHYSICAL EXAM:  Vital Signs Last 24 Hrs  T(C): 36.5 (21 May 2023 09:30), Max: 37.1 (21 May 2023 05:00)  T(F): 97.7 (21 May 2023 09:30), Max: 98.7 (21 May 2023 05:00)  HR: 74 (21 May 2023 09:30) (74 - 118)  BP: 98/63 (21 May 2023 09:30) (92/56 - 104/68)  BP(mean): 70 (21 May 2023 09:30) (70 - 70)  RR: 18 (21 May 2023 09:30) (16 - 18)  SpO2: 100% (21 May 2023 09:30) (100% - 100%)    Parameters below as of 21 May 2023 09:30  Patient On (Oxygen Delivery Method): room air      CONSTITUTIONAL: NAD  EYES: EOMI, conjunctiva and sclera clear  ENMT: Moist oral mucosa  NECK: Supple  RESPIRATORY: Breathing unlabored, CTAB  CARDIOVASCULAR: S1S2 no MRG  ABDOMEN: Nontender to palpation, normoactive bowel sounds, no rebound/guarding  MUSCULOSKELETAL: no clubbing or cyanosis of digits  NEUROLOGY: No focal deficits   SKIN: No rashes or lesions    LABS:                        10.5   9.56  )-----------( 183      ( 19 May 2023 21:05 )             32.1     05-19    138  |  104  |  51<H>  ----------------------------<  113<H>  4.1   |  20<L>  |  4.60<H>    Ca    8.3<L>      19 May 2023 18:14  Phos  4.5     05-19  Mg     1.70     05-19          CODE: FULL

## 2023-05-21 NOTE — PROGRESS NOTE ADULT - PROBLEM SELECTOR PLAN 1
Pt recently discharged from [facility] on 4/27/23   Agitation possibly 2/2 malingering as patients admits this was his motive  intermittently refusing labs  Seen by  team on last admission, reconsulted for this admission   Continue klonopin 0.5 mg qam and 0.75 mg qpm  Avoid IV pain medications   Rec start Zyprexa 2.5 q6 prn agitation.   Stable for return to SNF

## 2023-05-22 NOTE — PROGRESS NOTE ADULT - SUBJECTIVE AND OBJECTIVE BOX
Hospitalist Progress Note  Nicky Beyer MD Pager # [phone]    OVERNIGHT EVENTS: BRANDEE    SUBJECTIVE / INTERVAL HPI: Patient seen and examined at bedside. Patient reports no pain/discomfort. He says he will go back to [place] if they allow him to use his phone so that he is not isolated from friends and family.     VITAL SIGNS:  Vital Signs Last 24 Hrs  T(C): 36.8 (22 May 2023 12:56), Max: 36.9 (22 May 2023 02:15)  T(F): 98.2 (22 May 2023 12:56), Max: 98.5 (22 May 2023 02:15)  HR: 109 (22 May 2023 12:56) (51 - 109)  BP: 122/72 (22 May 2023 12:56) (98/61 - 122/72)  BP(mean): 82 (21 May 2023 18:45) (70 - 82)  RR: 18 (22 May 2023 12:56) (17 - 18)  SpO2: 99% (22 May 2023 12:56) (97% - 100%)    Parameters below as of 22 May 2023 12:56  Patient On (Oxygen Delivery Method): room air        PHYSICAL EXAM:    General: Comfortable appearing male resting in bed   HEENT: NC/AT; PERRL, anicteric sclera; MMM  Neck: supple  Cardiovascular: +S1/S2; RRR  Respiratory: CTA B/L; no W/R/R  Gastrointestinal: soft, NT/ND; +BSx4  Extremities: WWP; no edema, clubbing or cyanosis  Vascular: 2+ radial, DP/PT pulses B/L  Neurological: AAOx3; no focal deficits    MEDICATIONS:  MEDICATIONS  (STANDING):  apixaban 5 milliGRAM(s) Oral two times a day  ascorbic acid 500 milliGRAM(s) Oral daily  atorvastatin 40 milliGRAM(s) Oral at bedtime  baclofen 5 milliGRAM(s) Oral every 12 hours  calamine/zinc oxide Lotion 1 Application(s) Topical three times a day  chlorhexidine 2% Cloths 1 Application(s) Topical daily  clonazePAM  Tablet 0.75 milliGRAM(s) Oral <User Schedule>  clonazePAM  Tablet 0.5 milliGRAM(s) Oral <User Schedule>  epoetin marilin-epbx (RETACRIT) Injectable 4000 Unit(s) IV Push <User Schedule>  levETIRAcetam 500 milliGRAM(s) Oral daily  levETIRAcetam 250 milliGRAM(s) Oral <User Schedule>  levothyroxine 100 MICROGram(s) Oral daily  loratadine 10 milliGRAM(s) Oral daily  metoprolol succinate ER 50 milliGRAM(s) Oral daily  multivitamin 1 Tablet(s) Oral daily  mupirocin 2% Ointment 1 Application(s) Topical two times a day  Nephro-mirna 1 Tablet(s) Oral daily  polyethylene glycol 3350 17 Gram(s) Oral daily  senna 2 Tablet(s) Oral at bedtime  sevelamer carbonate 800 milliGRAM(s) Oral three times a day with meals    MEDICATIONS  (PRN):  albuterol/ipratropium for Nebulization 3 milliLiter(s) Nebulizer every 6 hours PRN Shortness of Breath and/or Wheezing  bisacodyl Suppository 10 milliGRAM(s) Rectal daily PRN Constipation  hydrocortisone 1% Ointment 1 Application(s) Topical three times a day PRN Rash and/or Itching  melatonin 3 milliGRAM(s) Oral at bedtime PRN Insomnia  OLANZapine Injectable 2.5 milliGRAM(s) IntraMuscular every 6 hours PRN agitation      ALLERGIES:  Allergies    codeine (Rash)  Haldol (Unknown)  Motrin (Rash)  penicillin (Hives; Anaphylaxis)  Toradol (Rash)  Tylenol (Hives)  aspirin (Hives)  codeine (Unknown)    Intolerances        LABS:              CAPILLARY BLOOD GLUCOSE          RADIOLOGY & ADDITIONAL TESTS: Reviewed.    ASSESSMENT:    PLAN:

## 2023-05-22 NOTE — PROGRESS NOTE ADULT - ASSESSMENT
62 year old male with hx of CVA (WC bound, quadriparesis), COPD, CAD (AICD), ESRD (T/R/S), HTN, HLD, seizure d/o, a-fib (s/p ablation, eliquis), neurogenic bladder (SPC), BARRY, hypothyroidism, RCC (s/p partial nephrectomy), CHF, prostate CA (s/p radiation) presents from [facility] agitation not applicable

## 2023-05-22 NOTE — PROGRESS NOTE ADULT - PROBLEM SELECTOR PLAN 3
Pt on HD MWF  Nephro following- continue HD  c/w home sevelamer (monitor phos levels)  Uremia improving    #Anemia  2/2 ESRD   monitor hgb  Hb 7.4, likely nephrogenic, no signs bleeding.  s/p 1 U PRBC 5/17, appropriate response. Hgb now 10.5 and stable.

## 2023-05-22 NOTE — PROGRESS NOTE ADULT - PROBLEM SELECTOR PLAN 1
Pt recently discharged from [facility] on 4/27/23   Agitation possibly 2/2 malingering as patients admits this was his motive  intermittently refusing labs  Seen by  team on last admission, reconsulted for this admission   Continue klonopin 0.5 mg qam and 0.75 mg qpm  Avoid IV pain medications   Rec start Zyprexa 2.5 q6 prn agitation.   Stable for return to SNF

## 2023-05-22 NOTE — PROGRESS NOTE ADULT - SUBJECTIVE AND OBJECTIVE BOX
[facility] Division of Kidney Diseases & Hypertension  FOLLOW UP NOTE  --------------------------------------------------------------------------------  Chief Complaint:    24 hour events/subjective:    seen on dialysis today. c/o itching. bp was borderline        PAST HISTORY  --------------------------------------------------------------------------------  No significant changes to PMH, PSH, FHx, SHx, unless otherwise noted    ALLERGIES & MEDICATIONS  --------------------------------------------------------------------------------  Allergies    codeine (Rash)  Haldol (Unknown)  Motrin (Rash)  penicillin (Hives; Anaphylaxis)  Toradol (Rash)  Tylenol (Hives)  aspirin (Hives)  codeine (Unknown)    Intolerances      Standing Inpatient Medications  albuterol/ipratropium for Nebulization 3 milliLiter(s) Nebulizer every 6 hours  apixaban 5 milliGRAM(s) Oral two times a day  ascorbic acid 500 milliGRAM(s) Oral daily  atorvastatin 40 milliGRAM(s) Oral at bedtime  baclofen 5 milliGRAM(s) Oral every 12 hours  calamine/zinc oxide Lotion 1 Application(s) Topical three times a day  chlorhexidine 2% Cloths 1 Application(s) Topical daily  clonazePAM  Tablet 0.75 milliGRAM(s) Oral <User Schedule>  clonazePAM  Tablet 0.5 milliGRAM(s) Oral <User Schedule>  epoetin marilin-epbx (RETACRIT) Injectable 4000 Unit(s) IV Push <User Schedule>  levETIRAcetam 500 milliGRAM(s) Oral daily  levETIRAcetam 250 milliGRAM(s) Oral <User Schedule>  levothyroxine 100 MICROGram(s) Oral daily  loratadine 10 milliGRAM(s) Oral daily  metoprolol succinate ER 50 milliGRAM(s) Oral daily  multivitamin 1 Tablet(s) Oral daily  mupirocin 2% Ointment 1 Application(s) Topical two times a day  polyethylene glycol 3350 17 Gram(s) Oral daily  senna 2 Tablet(s) Oral at bedtime  sevelamer carbonate 800 milliGRAM(s) Oral three times a day with meals    PRN Inpatient Medications  bisacodyl Suppository 10 milliGRAM(s) Rectal daily PRN  melatonin 3 milliGRAM(s) Oral at bedtime PRN  OLANZapine Injectable 2.5 milliGRAM(s) IntraMuscular every 6 hours PRN      VITALS/PHYSICAL EXAM  --------------------------------------------------------------------------------  T(C): 36.8 (05-17-23 @ 14:18), Max: 36.8 (05-17-23 @ 00:50)  HR: 100 (05-17-23 @ 14:18) (89 - 109)  BP: 105/69 (05-17-23 @ 14:18) (105/69 - 123/100)  RR: 18 (05-17-23 @ 14:18) (18 - 18)  SpO2: 95% (05-17-23 @ 09:00) (95% - 100%)  Wt(kg): --        05-16-23 @ 07:01  -  05-17-23 @ 07:00  --------------------------------------------------------  IN: 0 mL / OUT: 500 mL / NET: -500 mL    05-17-23 @ 07:01  -  05-17-23 @ 14:54  --------------------------------------------------------  IN: 400 mL / OUT: 1200 mL / NET: -800 mL      Physical Exam:  	Gen: NAD  	HEENT: supple neck  	Pulm: CTA B/L  	CV: RRR, S1S2; no rub  	Vascular access: right Permcath    LABS/STUDIES  --------------------------------------------------------------------------------              7.4    8.67  >-----------<  158      [05-17-23 @ 11:19]              23.9     140  |  104  |  73  ----------------------------<  99      [05-17-23 @ 11:19]  4.2   |  21  |  5.18        Ca     7.9     [05-17-23 @ 11:19]      Mg     2.00     [05-17-23 @ 11:19]      Phos  6.4     [05-17-23 @ 11:19]            Creatinine Trend:  SCr 5.18 [05-17 @ 11:19]  SCr 5.14 [05-15 @ 17:10]  SCr 6.04 [05-12 @ 17:51]  SCr 5.47 [05-11 @ 11:34]  SCr 4.91 [05-11 @ 00:25]        TSH <0.10      [05-11-23 @ 00:25]

## 2023-05-22 NOTE — PROGRESS NOTE ADULT - PROBLEM SELECTOR PLAN 1
Pt. with ESRD on HD TIW. seen on dialysis. Will continue to use hypoallogenic dialyzer as he seems to have less itching with this.   Monitor BP and labs.

## 2023-05-23 NOTE — PROGRESS NOTE ADULT - SUBJECTIVE AND OBJECTIVE BOX
Hospitalist Progress Note  Nicky Beyer MD Pager # [phone]    OVERNIGHT EVENTS: BRANDEE    SUBJECTIVE / INTERVAL HPI: Patient seen and examined at bedside. Pt. reports no aches/pain or discomfort. He is sleeping comfortably and does not want to be disturbed.     VITAL SIGNS:  Vital Signs Last 24 Hrs  T(C): 36.2 (23 May 2023 11:29), Max: 37.4 (23 May 2023 05:34)  T(F): 97.2 (23 May 2023 11:29), Max: 99.4 (23 May 2023 05:34)  HR: 52 (23 May 2023 11:29) (52 - 110)  BP: 94/64 (23 May 2023 11:29) (94/64 - 128/66)  BP(mean): --  RR: 18 (23 May 2023 11:29) (17 - 20)  SpO2: 100% (23 May 2023 11:29) (99% - 100%)    Parameters below as of 23 May 2023 11:29  Patient On (Oxygen Delivery Method): room air        PHYSICAL EXAM:    General: WDWN  HEENT: NC/AT; PERRL, anicteric sclera; MMM  Neck: supple  Cardiovascular: +S1/S2; RRR  Respiratory: CTA B/L; no W/R/R  Gastrointestinal: soft, NT/ND; +BSx4  Extremities: WWP; no edema, clubbing or cyanosis. Bilateral lower extremity lesions - wrapped.   Vascular: 2+ radial, DP/PT pulses B/L  Neurological: AAOx3; no focal deficits    MEDICATIONS:  MEDICATIONS  (STANDING):  apixaban 5 milliGRAM(s) Oral two times a day  ascorbic acid 500 milliGRAM(s) Oral daily  atorvastatin 40 milliGRAM(s) Oral at bedtime  baclofen 5 milliGRAM(s) Oral every 12 hours  calamine/zinc oxide Lotion 1 Application(s) Topical three times a day  chlorhexidine 2% Cloths 1 Application(s) Topical daily  clonazePAM  Tablet 0.75 milliGRAM(s) Oral <User Schedule>  clonazePAM  Tablet 0.5 milliGRAM(s) Oral <User Schedule>  epoetin marilin-epbx (RETACRIT) Injectable 4000 Unit(s) IV Push <User Schedule>  levETIRAcetam 250 milliGRAM(s) Oral <User Schedule>  levETIRAcetam 500 milliGRAM(s) Oral daily  levothyroxine 100 MICROGram(s) Oral daily  loratadine 10 milliGRAM(s) Oral daily  metoprolol succinate ER 50 milliGRAM(s) Oral daily  multivitamin 1 Tablet(s) Oral daily  mupirocin 2% Ointment 1 Application(s) Topical two times a day  Nephro-mirna 1 Tablet(s) Oral daily  polyethylene glycol 3350 17 Gram(s) Oral daily  senna 2 Tablet(s) Oral at bedtime  sevelamer carbonate 800 milliGRAM(s) Oral three times a day with meals    MEDICATIONS  (PRN):  albuterol/ipratropium for Nebulization 3 milliLiter(s) Nebulizer every 6 hours PRN Shortness of Breath and/or Wheezing  bisacodyl Suppository 10 milliGRAM(s) Rectal daily PRN Constipation  hydrocortisone 1% Ointment 1 Application(s) Topical three times a day PRN Rash and/or Itching  melatonin 3 milliGRAM(s) Oral at bedtime PRN Insomnia  OLANZapine Injectable 2.5 milliGRAM(s) IntraMuscular every 6 hours PRN agitation      ALLERGIES:  Allergies    codeine (Rash)  Haldol (Unknown)  Motrin (Rash)  penicillin (Hives; Anaphylaxis)  Toradol (Rash)  Tylenol (Hives)  aspirin (Hives)  codeine (Unknown)    Intolerances        LABS:                        9.0    3.58  )-----------( 184      ( 22 May 2023 16:25 )             29.1     05-22    139  |  103  |  44<H>  ----------------------------<  106<H>  3.9   |  25  |  4.00<H>    Ca    8.2<L>      22 May 2023 16:25  Phos  4.1     05-22  Mg     1.90     05-22          CAPILLARY BLOOD GLUCOSE          RADIOLOGY & ADDITIONAL TESTS: Reviewed.    ASSESSMENT:    PLAN:

## 2023-05-23 NOTE — PROGRESS NOTE ADULT - ASSESSMENT
62 year old male with hx of CVA (WC bound, quadriparesis), COPD, CAD (AICD), ESRD (T/R/S), HTN, HLD, seizure d/o, a-fib (s/p ablation, eliquis), neurogenic bladder (SPC), BARRY, hypothyroidism, RCC (s/p partial nephrectomy), CHF, prostate CA (s/p radiation) presents from [facility] agitation

## 2023-05-23 NOTE — PROGRESS NOTE ADULT - PROBLEM SELECTOR PLAN 1
Pt recently discharged from [facility] on 4/27/23   Agitation possibly 2/2 malingering as patients admits this was his motive  intermittently refusing labs  Seen by  team on last admission, reconsulted for this admission   Continue klonopin 0.5 mg qam and 0.75 mg qpm  Avoid IV pain medications   Rec start Zyprexa 2.5 q6 prn agitation.   Stable for return to SNF

## 2023-05-24 NOTE — PROGRESS NOTE ADULT - SUBJECTIVE AND OBJECTIVE BOX
Hospitalist Progress Note  Nicky Beyer MD Pager # [phone]    OVERNIGHT EVENTS: BRANDEE    SUBJECTIVE / INTERVAL HPI: Patient seen and examined at bedside. Patient reports feeling tired. He wants benadryl before he goes to dialysis.     VITAL SIGNS:  Vital Signs Last 24 Hrs  T(C): 37 (24 May 2023 12:47), Max: 37.1 (23 May 2023 20:20)  T(F): 98.6 (24 May 2023 12:47), Max: 98.7 (23 May 2023 20:20)  HR: 56 (24 May 2023 12:47) (56 - 98)  BP: 96/61 (24 May 2023 12:47) (94/64 - 102/64)  BP(mean): 73 (24 May 2023 04:40) (73 - 73)  RR: 18 (24 May 2023 12:47) (16 - 18)  SpO2: 100% (24 May 2023 12:47) (99% - 100%)    Parameters below as of 24 May 2023 04:40  Patient On (Oxygen Delivery Method): room air        PHYSICAL EXAM:    General: Elderly male resting in bed   HEENT: NC/AT; PERRL, anicteric sclera; MMM  Neck: supple  Cardiovascular: +S1/S2; RRR  Respiratory: CTA B/L; no W/R/R  Gastrointestinal: soft, NT/ND; +BSx4  Extremities: WWP; no edema, clubbing or cyanosis. Bilateral lower extremity wounds wrapped.   Vascular: 2+ radial, DP/PT pulses B/L  Neurological: AAOx3; no focal deficits    MEDICATIONS:  MEDICATIONS  (STANDING):  apixaban 5 milliGRAM(s) Oral two times a day  ascorbic acid 500 milliGRAM(s) Oral daily  atorvastatin 40 milliGRAM(s) Oral at bedtime  baclofen 5 milliGRAM(s) Oral every 12 hours  calamine/zinc oxide Lotion 1 Application(s) Topical three times a day  chlorhexidine 2% Cloths 1 Application(s) Topical daily  clonazePAM  Tablet 0.75 milliGRAM(s) Oral <User Schedule>  clonazePAM  Tablet 0.5 milliGRAM(s) Oral <User Schedule>  epoetin marilin-epbx (RETACRIT) Injectable 4000 Unit(s) IV Push <User Schedule>  levETIRAcetam 250 milliGRAM(s) Oral <User Schedule>  levETIRAcetam 500 milliGRAM(s) Oral daily  levothyroxine 100 MICROGram(s) Oral daily  loratadine 10 milliGRAM(s) Oral daily  metoprolol succinate ER 50 milliGRAM(s) Oral daily  multivitamin 1 Tablet(s) Oral daily  mupirocin 2% Ointment 1 Application(s) Topical two times a day  Nephro-mirna 1 Tablet(s) Oral daily  polyethylene glycol 3350 17 Gram(s) Oral daily  senna 2 Tablet(s) Oral at bedtime  sevelamer carbonate 800 milliGRAM(s) Oral three times a day with meals    MEDICATIONS  (PRN):  albuterol/ipratropium for Nebulization 3 milliLiter(s) Nebulizer every 6 hours PRN Shortness of Breath and/or Wheezing  bisacodyl Suppository 10 milliGRAM(s) Rectal daily PRN Constipation  hydrocortisone 1% Ointment 1 Application(s) Topical three times a day PRN Rash and/or Itching  melatonin 3 milliGRAM(s) Oral at bedtime PRN Insomnia  OLANZapine Injectable 2.5 milliGRAM(s) IntraMuscular every 6 hours PRN agitation      ALLERGIES:  Allergies    codeine (Rash)  Haldol (Unknown)  Motrin (Rash)  penicillin (Hives; Anaphylaxis)  Toradol (Rash)  Tylenol (Hives)  aspirin (Hives)  codeine (Unknown)    Intolerances        LABS:                        9.0    3.58  )-----------( 184      ( 22 May 2023 16:25 )             29.1     05-22    139  |  103  |  44<H>  ----------------------------<  106<H>  3.9   |  25  |  4.00<H>    Ca    8.2<L>      22 May 2023 16:25  Phos  4.1     05-22  Mg     1.90     05-22          CAPILLARY BLOOD GLUCOSE          RADIOLOGY & ADDITIONAL TESTS: Reviewed.    ASSESSMENT:    PLAN:

## 2023-05-24 NOTE — PROVIDER CONTACT NOTE (CRITICAL VALUE NOTIFICATION) - ASSESSMENT
Pt resting comfortably in bed at this time, no s/sx of chest pain/sob. VS as documented. Tele monitoring shows afib RVR in 130s. Tele ACP Ame Norris and Attending MD Abe Villavicencio aware.
pt denies feeling lightheaded or dizzy
Pt denies any chest pain
Pt is AOx4; denies chest pain, SOB, or pain.

## 2023-05-24 NOTE — PROGRESS NOTE ADULT - ASSESSMENT
62 year old male with hx of CVA (WC bound, quadriparesis), COPD, CAD (AICD), ESRD (T/R/S), HTN, HLD, seizure d/o, a-fib (s/p ablation, eliquis), neurogenic bladder (SPC), BARRY, hypothyroidism, RCC (s/p partial nephrectomy), CHF, prostate CA (s/p radiation) presents from [facility] agitation

## 2023-05-24 NOTE — PROGRESS NOTE ADULT - SUBJECTIVE AND OBJECTIVE BOX
[facility] Division of Kidney Diseases & Hypertension  FOLLOW UP NOTE  --------------------------------------------------------------------------------  Chief Complaint:    24 hour events/subjective:    For dialysis today         PAST HISTORY  --------------------------------------------------------------------------------  No significant changes to PMH, PSH, FHx, SHx, unless otherwise noted    ALLERGIES & MEDICATIONS  --------------------------------------------------------------------------------  Allergies    codeine (Rash)  Haldol (Unknown)  Motrin (Rash)  penicillin (Hives; Anaphylaxis)  Toradol (Rash)  Tylenol (Hives)  aspirin (Hives)  codeine (Unknown)    Intolerances      Standing Inpatient Medications  albuterol/ipratropium for Nebulization 3 milliLiter(s) Nebulizer every 6 hours  apixaban 5 milliGRAM(s) Oral two times a day  ascorbic acid 500 milliGRAM(s) Oral daily  atorvastatin 40 milliGRAM(s) Oral at bedtime  baclofen 5 milliGRAM(s) Oral every 12 hours  calamine/zinc oxide Lotion 1 Application(s) Topical three times a day  chlorhexidine 2% Cloths 1 Application(s) Topical daily  clonazePAM  Tablet 0.75 milliGRAM(s) Oral <User Schedule>  clonazePAM  Tablet 0.5 milliGRAM(s) Oral <User Schedule>  epoetin marilin-epbx (RETACRIT) Injectable 4000 Unit(s) IV Push <User Schedule>  levETIRAcetam 500 milliGRAM(s) Oral daily  levETIRAcetam 250 milliGRAM(s) Oral <User Schedule>  levothyroxine 100 MICROGram(s) Oral daily  loratadine 10 milliGRAM(s) Oral daily  metoprolol succinate ER 50 milliGRAM(s) Oral daily  multivitamin 1 Tablet(s) Oral daily  mupirocin 2% Ointment 1 Application(s) Topical two times a day  polyethylene glycol 3350 17 Gram(s) Oral daily  senna 2 Tablet(s) Oral at bedtime  sevelamer carbonate 800 milliGRAM(s) Oral three times a day with meals    PRN Inpatient Medications  bisacodyl Suppository 10 milliGRAM(s) Rectal daily PRN  melatonin 3 milliGRAM(s) Oral at bedtime PRN  OLANZapine Injectable 2.5 milliGRAM(s) IntraMuscular every 6 hours PRN      VITALS/PHYSICAL EXAM  --------------------------------------------------------------------------------  T(C): 36.8 (05-17-23 @ 14:18), Max: 36.8 (05-17-23 @ 00:50)  HR: 100 (05-17-23 @ 14:18) (89 - 109)  BP: 105/69 (05-17-23 @ 14:18) (105/69 - 123/100)  RR: 18 (05-17-23 @ 14:18) (18 - 18)  SpO2: 95% (05-17-23 @ 09:00) (95% - 100%)  Wt(kg): --        05-16-23 @ 07:01  -  05-17-23 @ 07:00  --------------------------------------------------------  IN: 0 mL / OUT: 500 mL / NET: -500 mL    05-17-23 @ 07:01  -  05-17-23 @ 14:54  --------------------------------------------------------  IN: 400 mL / OUT: 1200 mL / NET: -800 mL      Physical Exam:  	Gen: NAD  	HEENT: supple neck  	Pulm: CTA B/L  	CV: RRR, S1S2; no rub  	Vascular access: right Permcath    LABS/STUDIES  --------------------------------------------------------------------------------              7.4    8.67  >-----------<  158      [05-17-23 @ 11:19]              23.9     140  |  104  |  73  ----------------------------<  99      [05-17-23 @ 11:19]  4.2   |  21  |  5.18        Ca     7.9     [05-17-23 @ 11:19]      Mg     2.00     [05-17-23 @ 11:19]      Phos  6.4     [05-17-23 @ 11:19]            Creatinine Trend:  SCr 5.18 [05-17 @ 11:19]  SCr 5.14 [05-15 @ 17:10]  SCr 6.04 [05-12 @ 17:51]  SCr 5.47 [05-11 @ 11:34]  SCr 4.91 [05-11 @ 00:25]        TSH <0.10      [05-11-23 @ 00:25]

## 2023-05-24 NOTE — PROVIDER CONTACT NOTE (CRITICAL VALUE NOTIFICATION) - ACTION/TREATMENT ORDERED:
will continue to monitor and awaiting call back will continue to monitor and  Orders noted and completed.

## 2023-05-24 NOTE — PROVIDER CONTACT NOTE (CRITICAL VALUE NOTIFICATION) - BACKGROUND
61 y/o M w/ PMHx of HTN, CVA, seizures, neurogenic bladder, COPD, hypothyroidism, ESRD on HD, presented from [facility] for aggressive behavior towards others and self injurious activity.
Pt admitted with behavioral issues and foot ulcer. PMH CKD, peripheral neuropathy, afib, CAD, HLD, HTN, HepC, bladder cancer
pt has refused labs, tele monitoring and IV access.

## 2023-05-24 NOTE — PROGRESS NOTE ADULT - PROBLEM SELECTOR PLAN 1
Pt. with ESRD on HD TIW. For dialysis today. Will continue to use hypoallogenic dialyzer as he seems to have less itching with this.   Monitor BP and labs.

## 2023-05-24 NOTE — PROGRESS NOTE ADULT - PROBLEM SELECTOR PLAN 1
Pt recently discharged from [facility] on 4/27/23   Agitation possibly 2/2 malingering as patients admits this was his motive  intermittently refusing labs  Seen by  team on last admission, reconsulted for this admission   Continue klonopin 0.5 mg qam and 0.75 mg qpm  Avoid IV pain medications   Rec start Zyprexa 2.5 q6 prn agitation.   Stable for return to SNF

## 2023-05-25 NOTE — PROGRESS NOTE ADULT - PROBLEM SELECTOR PLAN 1
Pt recently discharged from [facility] on 4/27/23   Agitation possibly 2/2 malingering as patients admits this was his motive  intermittently refusing labs  Seen by  team on last admission, reconsulted for this admission   Continue klonopin 0.5 mg qam and 0.75 mg qpm  Avoid IV pain medications   Rec start Zyprexa 2.5 q6 prn agitation.   Stable for return to SNF

## 2023-05-25 NOTE — PROGRESS NOTE ADULT - ASSESSMENT
62 year old male with hx of CVA (WC bound, quadriparesis), COPD, CAD (AICD), ESRD (T/R/S), HTN, HLD, seizure d/o, a-fib (s/p ablation, eliquis), neurogenic bladder (SPC), BARRY, hypothyroidism, RCC (s/p partial nephrectomy), CHF, prostate CA (s/p radiation) presents from [facility] agitation

## 2023-05-25 NOTE — PROGRESS NOTE ADULT - SUBJECTIVE AND OBJECTIVE BOX
Hospitalist Progress Note  Nicky Beyer MD Pager # [phone]    OVERNIGHT EVENTS: BRANDEE    SUBJECTIVE / INTERVAL HPI: Patient seen and examined at bedside. Patient reports feeling okay. No complaints.     VITAL SIGNS:  Vital Signs Last 24 Hrs  T(C): 36.8 (25 May 2023 12:06), Max: 37 (24 May 2023 22:30)  T(F): 98.3 (25 May 2023 12:06), Max: 98.6 (24 May 2023 22:30)  HR: 126 (25 May 2023 12:06) (80 - 126)  BP: 92/64 (25 May 2023 12:06) (92/64 - 137/69)  BP(mean): 72 (25 May 2023 06:10) (72 - 84)  RR: 18 (25 May 2023 12:06) (18 - 19)  SpO2: 100% (25 May 2023 12:06) (100% - 100%)    Parameters below as of 25 May 2023 06:10  Patient On (Oxygen Delivery Method): nasal cannula  O2 Flow (L/min): 2      PHYSICAL EXAM:    General: Weak appearing male  HEENT: NC/AT; PERRL, anicteric sclera; MMM  Neck: supple  Cardiovascular: +S1/S2; RRR  Respiratory: CTA B/L; no W/R/R  Gastrointestinal: soft, NT/ND; +BSx4  Extremities: WWP; no edema, clubbing or cyanosis  Vascular: 2+ radial, DP/PT pulses B/L  Neurological: AAOx3; quadriplegic    MEDICATIONS:  MEDICATIONS  (STANDING):  apixaban 5 milliGRAM(s) Oral two times a day  ascorbic acid 500 milliGRAM(s) Oral daily  atorvastatin 40 milliGRAM(s) Oral at bedtime  baclofen 5 milliGRAM(s) Oral every 12 hours  calamine/zinc oxide Lotion 1 Application(s) Topical three times a day  chlorhexidine 2% Cloths 1 Application(s) Topical daily  clonazePAM  Tablet 0.75 milliGRAM(s) Oral <User Schedule>  epoetin marilin-epbx (RETACRIT) Injectable 4000 Unit(s) IV Push <User Schedule>  levETIRAcetam 250 milliGRAM(s) Oral <User Schedule>  levETIRAcetam 500 milliGRAM(s) Oral daily  levothyroxine 100 MICROGram(s) Oral daily  loratadine 10 milliGRAM(s) Oral daily  metoprolol succinate ER 50 milliGRAM(s) Oral daily  multivitamin 1 Tablet(s) Oral daily  Nephro-mirna 1 Tablet(s) Oral daily  polyethylene glycol 3350 17 Gram(s) Oral daily  senna 2 Tablet(s) Oral at bedtime  sevelamer carbonate 800 milliGRAM(s) Oral three times a day with meals    MEDICATIONS  (PRN):  albuterol/ipratropium for Nebulization 3 milliLiter(s) Nebulizer every 6 hours PRN Shortness of Breath and/or Wheezing  bisacodyl Suppository 10 milliGRAM(s) Rectal daily PRN Constipation  hydrocortisone 1% Ointment 1 Application(s) Topical three times a day PRN Rash and/or Itching  melatonin 3 milliGRAM(s) Oral at bedtime PRN Insomnia  OLANZapine Injectable 2.5 milliGRAM(s) IntraMuscular every 6 hours PRN agitation      ALLERGIES:  Allergies    codeine (Rash)  Haldol (Unknown)  Motrin (Rash)  penicillin (Hives; Anaphylaxis)  Toradol (Rash)  Tylenol (Hives)  aspirin (Hives)  codeine (Unknown)    Intolerances        LABS:                        9.6    8.96  )-----------( 183      ( 25 May 2023 11:52 )             30.3     05-25    140  |  104  |  30<H>  ----------------------------<  99  4.2   |  26  |  3.25<H>    Ca    8.6      25 May 2023 11:52  Phos  3.4     05-25  Mg     1.80     05-25          CAPILLARY BLOOD GLUCOSE          RADIOLOGY & ADDITIONAL TESTS: Reviewed.    ASSESSMENT:    PLAN:

## 2023-05-26 NOTE — PROVIDER CONTACT NOTE (CRITICAL VALUE NOTIFICATION) - SITUATION
post dialysis labs
HCV Interpretation is reactive
pt presents with sickle cell crisis
Call received from lab regarding critical Troponin result (108). Call placed to ACP Jadencia and made him aware of result. No orders received. Will continue to monitor.
Troponin 178

## 2023-05-26 NOTE — PROVIDER CONTACT NOTE (CHANGE IN STATUS NOTIFICATION) - SITUATION
Pt BP 73/ 40 and HR:134 at bedtime VS. Pt asymptomatic.
Pt reporting of 9/10 chest pain radiating to left arm. VSS. ASAD Gardiner made aware.

## 2023-05-26 NOTE — PROVIDER CONTACT NOTE (CHANGE IN STATUS NOTIFICATION) - ACTION/TREATMENT ORDERED:
EKG completed. Vital signs taken. VSS. Cardiac workup labs drawn. Will continue to monitor.
ASAD Roque made aware. Orders received to give 1x 250mL bolus over 30 minutes and a 1x PO midodrine administration. Repeat BP at 2110 was 97/ 65. Verbal orders from ASAD Roque to continue to monitor as scheduled Q4h.

## 2023-05-26 NOTE — PROGRESS NOTE ADULT - ASSESSMENT
62 year old male with hx of CVA (WC bound, quadriparesis), COPD, CAD (AICD), ESRD (T/R/S), HTN, HLD, seizure d/o, a-fib (s/p ablation, eliquis), neurogenic bladder (SPC), BARRY, hypothyroidism, RCC (s/p partial nephrectomy), CHF, prostate CA (s/p radiation) presents from [facility] agitation

## 2023-05-26 NOTE — CHART NOTE - NSCHARTNOTEFT_GEN_A_CORE
MEDICINE ACP NIGHT COVERAGE MEDICINE ACP NIGHT COVERAGE    Notified by RN that patient complaining of chest pain. Patient seen and assessed at bedside complaining of 8/10 left sided chest pressure. Patient states he has no associated symptoms and that the pain began after HD this evening. Patient denies radiation, pleuritic chest pain, SOB, palpitations, N/V/D and abdominal pain.     Vital Signs Last 24 Hrs  T(C): 36.7 (26 May 2023 20:30), Max: 36.9 (26 May 2023 06:00)  T(F): 98.1 (26 May 2023 20:30), Max: 98.4 (26 May 2023 06:00)  HR: 103 (26 May 2023 20:30) (83 - 123)  BP: 102/71 (26 May 2023 20:30) (93/69 - 104/77)  BP(mean): 75 (26 May 2023 06:00) (75 - 75)  RR: 19 (26 May 2023 20:30) (18 - 19)  SpO2: 100% (26 May 2023 20:30) (99% - 100%)  Patient On (Oxygen Delivery Method): nasal cannula  O2 Flow (L/min): 2      PHYSICAL EXAM:  GENERAL: No acute distress, well-developed  HEAD:  Atraumatic, Normocephalic  EYES: PERRLA, conjunctiva and sclera clear  NECK: Supple no JVD  CHEST/LUNG: CTAB; No wheezes, rales, or rhonchi  HEART: No murmurs, rubs, or gallops  ABDOMEN: Soft, non-tender, non-distended  EXTREMITIES:  2+ peripheral pulses b/l, No clubbing, cyanosis, or edema  NEUROLOGY: A&O x 3    - Vital signs stable   - EKG w/ Afib, no TWI or NIKKI  - Will obtain cardiac enzymes, expect troponin with elevated baseline in setting of ESRD  - Will attempt pain control   - Will continue to monitor patient    Ralf Gardiner PA-C  Medicine ACP  [id number] MEDICINE [facility] NIGHT COVERAGE    Notified by RN that patient complaining of chest pain. Patient seen and assessed at bedside complaining of 8/10 left sided chest pressure. Patient states he has no associated symptoms and that the pain began after HD this evening. Patient denies radiation, pleuritic chest pain, SOB, palpitations, N/V/D and abdominal pain.     Vital Signs Last 24 Hrs  T(C): 36.7 (26 May 2023 20:30), Max: 36.9 (26 May 2023 06:00)  T(F): 98.1 (26 May 2023 20:30), Max: 98.4 (26 May 2023 06:00)  HR: 103 (26 May 2023 20:30) (83 - 123)  BP: 102/71 (26 May 2023 20:30) (93/69 - 104/77)  BP(mean): 75 (26 May 2023 06:00) (75 - 75)  RR: 19 (26 May 2023 20:30) (18 - 19)  SpO2: 100% (26 May 2023 20:30) (99% - 100%)  Patient On (Oxygen Delivery Method): nasal cannula  O2 Flow (L/min): 2      PHYSICAL EXAM:  GENERAL: No acute distress, well-developed  HEAD:  Atraumatic, Normocephalic  EYES: PERRLA, conjunctiva and sclera clear  NECK: Supple no JVD  CHEST/LUNG: CTAB; No wheezes, rales, or rhonchi  HEART: No murmurs, rubs, or gallops  ABDOMEN: Soft, non-tender, non-distended  EXTREMITIES:  2+ peripheral pulses b/l, No clubbing, cyanosis, or edema  NEUROLOGY: A&O x 3    - Vital signs stable   - EKG w/ Afib, no TWI or NIKKI  - Will obtain cardiac enzymes, expect troponin with elevated baseline in setting of ESRD  - Will attempt pain control   - Will continue to monitor patient      UPDATE 0231    Patient noted with troponin of 108, in line with past baseline. Patient continues to complain of chest/ left shoulder pain and tingling in his arm. Patient assessed again at bedside. VSS. Alert and oriented, with nonfocal neuro exam not deviated from baseline given residuals from past CVA. Pulse, motor and sensory function in tact in all four exterminates. Will give lidocaine patch, repeat EKG, cardiac enzymes and AM labs now. Will continue to monitor.     Ralf Gardiner PA-C  [facility] ACP  [id number]

## 2023-05-26 NOTE — PROGRESS NOTE ADULT - PROBLEM SELECTOR PLAN 1
Pt recently discharged from [facility] on 4/27/23   Agitation possibly 2/2 malingering as patients admits this was his motive  intermittently refusing labs  Seen by  team on last admission, reconsulted for this admission   Continue klonopin 0.5 mg qam and 0.75 mg qpm  Avoid IV pain medications   Rec start Zyprexa 2.5 q6 prn agitation.   Stable for return to SNF

## 2023-05-26 NOTE — PROGRESS NOTE ADULT - SUBJECTIVE AND OBJECTIVE BOX
Hospitalist Progress Note  Nicky Beyer MD Pager # [phone]    OVERNIGHT EVENTS: BRANDEE    SUBJECTIVE / INTERVAL HPI: Patient seen and examined at bedside. Patient reports feeling well but he does not like the bed and he would prefer something that feels like an air mattress.     VITAL SIGNS:  Vital Signs Last 24 Hrs  T(C): 36.9 (26 May 2023 12:31), Max: 36.9 (26 May 2023 06:00)  T(F): 98.4 (26 May 2023 12:31), Max: 98.4 (26 May 2023 06:00)  HR: 83 (26 May 2023 12:31) (83 - 109)  BP: 103/74 (26 May 2023 12:31) (93/69 - 103/74)  BP(mean): 75 (26 May 2023 06:00) (75 - 77)  RR: 18 (26 May 2023 12:31) (18 - 18)  SpO2: 100% (26 May 2023 12:31) (100% - 100%)    Parameters below as of 26 May 2023 06:00  Patient On (Oxygen Delivery Method): nasal cannula  O2 Flow (L/min): 2      PHYSICAL EXAM:    General: Comfortable appearing male resting comfortably in bed   HEENT: NC/AT; PERRL, anicteric sclera; MMM  Neck: supple  Cardiovascular: +S1/S2; RRR  Respiratory: CTA B/L; no W/R/R  Gastrointestinal: soft, NT/ND; +BSx4  Extremities: WWP; no edema, clubbing or cyanosis - bilateral lower extremity wounds wrapped.   Vascular: 2+ radial, DP/PT pulses B/L  Neurological: AAOx3; no focal deficits    MEDICATIONS:  MEDICATIONS  (STANDING):  apixaban 5 milliGRAM(s) Oral two times a day  ascorbic acid 500 milliGRAM(s) Oral daily  atorvastatin 40 milliGRAM(s) Oral at bedtime  baclofen 5 milliGRAM(s) Oral every 12 hours  calamine/zinc oxide Lotion 1 Application(s) Topical three times a day  chlorhexidine 2% Cloths 1 Application(s) Topical daily  clonazePAM  Tablet 0.75 milliGRAM(s) Oral <User Schedule>  clonazePAM  Tablet 0.5 milliGRAM(s) Oral <User Schedule>  epoetin marilin-epbx (RETACRIT) Injectable 4000 Unit(s) IV Push <User Schedule>  levETIRAcetam 250 milliGRAM(s) Oral <User Schedule>  levETIRAcetam 500 milliGRAM(s) Oral daily  levothyroxine 100 MICROGram(s) Oral daily  loratadine 10 milliGRAM(s) Oral daily  metoprolol succinate ER 50 milliGRAM(s) Oral daily  multivitamin 1 Tablet(s) Oral daily  Nephro-mirna 1 Tablet(s) Oral daily  polyethylene glycol 3350 17 Gram(s) Oral daily  senna 2 Tablet(s) Oral at bedtime  sevelamer carbonate 800 milliGRAM(s) Oral three times a day with meals    MEDICATIONS  (PRN):  albuterol/ipratropium for Nebulization 3 milliLiter(s) Nebulizer every 6 hours PRN Shortness of Breath and/or Wheezing  bisacodyl Suppository 10 milliGRAM(s) Rectal daily PRN Constipation  hydrocortisone 1% Ointment 1 Application(s) Topical three times a day PRN Rash and/or Itching  melatonin 3 milliGRAM(s) Oral at bedtime PRN Insomnia  midodrine. 5 milliGRAM(s) Oral <User Schedule> PRN pre-dialysis  OLANZapine Injectable 2.5 milliGRAM(s) IntraMuscular every 6 hours PRN agitation      ALLERGIES:  Allergies    codeine (Rash)  Haldol (Unknown)  Motrin (Rash)  penicillin (Hives; Anaphylaxis)  Toradol (Rash)  Tylenol (Hives)  aspirin (Hives)  codeine (Unknown)    Intolerances        LABS:                        9.5    7.74  )-----------( 167      ( 26 May 2023 06:55 )             30.6     05-26    138  |  105  |  40<H>  ----------------------------<  113<H>  4.3   |  20<L>  |  3.71<H>    Ca    8.8      26 May 2023 06:55  Phos  4.1     05-26  Mg     2.00     05-26          CAPILLARY BLOOD GLUCOSE          RADIOLOGY & ADDITIONAL TESTS: Reviewed.    ASSESSMENT:    PLAN:

## 2023-05-26 NOTE — PROGRESS NOTE ADULT - SUBJECTIVE AND OBJECTIVE BOX
[facility] Division of Kidney Diseases & Hypertension  FOLLOW UP NOTE  --------------------------------------------------------------------------------  Chief Complaint:    24 hour events/subjective:    For dialysis today         PAST HISTORY  --------------------------------------------------------------------------------  No significant changes to PMH, PSH, FHx, SHx, unless otherwise noted    ALLERGIES & MEDICATIONS  --------------------------------------------------------------------------------  Allergies    codeine (Rash)  Haldol (Unknown)  Motrin (Rash)  penicillin (Hives; Anaphylaxis)  Toradol (Rash)  Tylenol (Hives)  aspirin (Hives)  codeine (Unknown)    Intolerances      Standing Inpatient Medications  albuterol/ipratropium for Nebulization 3 milliLiter(s) Nebulizer every 6 hours  apixaban 5 milliGRAM(s) Oral two times a day  ascorbic acid 500 milliGRAM(s) Oral daily  atorvastatin 40 milliGRAM(s) Oral at bedtime  baclofen 5 milliGRAM(s) Oral every 12 hours  calamine/zinc oxide Lotion 1 Application(s) Topical three times a day  chlorhexidine 2% Cloths 1 Application(s) Topical daily  clonazePAM  Tablet 0.75 milliGRAM(s) Oral <User Schedule>  clonazePAM  Tablet 0.5 milliGRAM(s) Oral <User Schedule>  epoetin marilin-epbx (RETACRIT) Injectable 4000 Unit(s) IV Push <User Schedule>  levETIRAcetam 500 milliGRAM(s) Oral daily  levETIRAcetam 250 milliGRAM(s) Oral <User Schedule>  levothyroxine 100 MICROGram(s) Oral daily  loratadine 10 milliGRAM(s) Oral daily  metoprolol succinate ER 50 milliGRAM(s) Oral daily  multivitamin 1 Tablet(s) Oral daily  mupirocin 2% Ointment 1 Application(s) Topical two times a day  polyethylene glycol 3350 17 Gram(s) Oral daily  senna 2 Tablet(s) Oral at bedtime  sevelamer carbonate 800 milliGRAM(s) Oral three times a day with meals    PRN Inpatient Medications  bisacodyl Suppository 10 milliGRAM(s) Rectal daily PRN  melatonin 3 milliGRAM(s) Oral at bedtime PRN  OLANZapine Injectable 2.5 milliGRAM(s) IntraMuscular every 6 hours PRN      VITALS/PHYSICAL EXAM  --------------------------------------------------------------------------------  T(C): 36.8 (05-17-23 @ 14:18), Max: 36.8 (05-17-23 @ 00:50)  HR: 100 (05-17-23 @ 14:18) (89 - 109)  BP: 105/69 (05-17-23 @ 14:18) (105/69 - 123/100)  RR: 18 (05-17-23 @ 14:18) (18 - 18)  SpO2: 95% (05-17-23 @ 09:00) (95% - 100%)  Wt(kg): --        05-16-23 @ 07:01  -  05-17-23 @ 07:00  --------------------------------------------------------  IN: 0 mL / OUT: 500 mL / NET: -500 mL    05-17-23 @ 07:01  -  05-17-23 @ 14:54  --------------------------------------------------------  IN: 400 mL / OUT: 1200 mL / NET: -800 mL      Physical Exam:  	Gen: NAD  	HEENT: supple neck  	Pulm: CTA B/L  	CV: RRR, S1S2; no rub  	Vascular access: right Permcath    LABS/STUDIES  --------------------------------------------------------------------------------              7.4    8.67  >-----------<  158      [05-17-23 @ 11:19]              23.9     140  |  104  |  73  ----------------------------<  99      [05-17-23 @ 11:19]  4.2   |  21  |  5.18        Ca     7.9     [05-17-23 @ 11:19]      Mg     2.00     [05-17-23 @ 11:19]      Phos  6.4     [05-17-23 @ 11:19]            Creatinine Trend:  SCr 5.18 [05-17 @ 11:19]  SCr 5.14 [05-15 @ 17:10]  SCr 6.04 [05-12 @ 17:51]  SCr 5.47 [05-11 @ 11:34]  SCr 4.91 [05-11 @ 00:25]        TSH <0.10      [05-11-23 @ 00:25]

## 2023-05-27 NOTE — PROGRESS NOTE ADULT - PROBLEM SELECTOR PLAN 2
Spiked a fever on 5/27 : 100.9F  Follow up with BCx, CXR   UA + , follow up with Urine Cx  ID consult in am as patient allergic to pencillin , rocephin  Hx of MRSA in UC in March 2023

## 2023-05-27 NOTE — PROGRESS NOTE ADULT - ASSESSMENT
62 year old male with hx of CVA (WC bound, quadriparesis), COPD, CAD (AICD), ESRD (T/R/S), HTN, HLD, seizure d/o, a-fib (s/p ablation, eliquis), neurogenic bladder (SPC), BARRY, hypothyroidism, RCC (s/p partial nephrectomy), CHF, prostate CA (s/p radiation) presents from [facility] agitation

## 2023-05-27 NOTE — PROGRESS NOTE ADULT - PROBLEM SELECTOR PLAN 1
Pt recently discharged from [facility] on 4/27/23   Agitation possibly 2/2 malingering as patients admits this was his motive  intermittently refusing labs  Seen by  team on last admission, reconsulted for this admission   Continue klonopin 0.5 mg qam and 0.75 mg qpm  Avoid IV pain medications   Rec start Zyprexa 2.5 q6 prn agitation.

## 2023-05-27 NOTE — PROGRESS NOTE ADULT - SUBJECTIVE AND OBJECTIVE BOX
[facility] Division of Hospital Medicine  Sylvia Bland MD  Hospitalist   Pager [phone]  Avaliable via MS teams     SUBJECTIVE / OVERNIGHT EVENTS: Pt seen and examined. Patient spiked a fever this morning. States he has chest discomfort and would like IV Dilaudid.  Denies any chills, shortness of breath, dysuria.     ADDITIONAL REVIEW OF SYSTEMS:    MEDICATIONS  (STANDING):  apixaban 5 milliGRAM(s) Oral two times a day  ascorbic acid 500 milliGRAM(s) Oral daily  atorvastatin 40 milliGRAM(s) Oral at bedtime  baclofen 5 milliGRAM(s) Oral every 12 hours  calamine/zinc oxide Lotion 1 Application(s) Topical three times a day  chlorhexidine 2% Cloths 1 Application(s) Topical daily  clonazePAM  Tablet 0.75 milliGRAM(s) Oral <User Schedule>  clonazePAM  Tablet 0.5 milliGRAM(s) Oral <User Schedule>  epoetin marilin-epbx (RETACRIT) Injectable 4000 Unit(s) IV Push <User Schedule>  levETIRAcetam 500 milliGRAM(s) Oral daily  levETIRAcetam 250 milliGRAM(s) Oral <User Schedule>  levothyroxine 100 MICROGram(s) Oral daily  loratadine 10 milliGRAM(s) Oral daily  metoprolol succinate ER 50 milliGRAM(s) Oral daily  multivitamin 1 Tablet(s) Oral daily  Nephro-mirna 1 Tablet(s) Oral daily  polyethylene glycol 3350 17 Gram(s) Oral daily  senna 2 Tablet(s) Oral at bedtime  sevelamer carbonate 800 milliGRAM(s) Oral three times a day with meals    MEDICATIONS  (PRN):  albuterol/ipratropium for Nebulization 3 milliLiter(s) Nebulizer every 6 hours PRN Shortness of Breath and/or Wheezing  bisacodyl Suppository 10 milliGRAM(s) Rectal daily PRN Constipation  hydrocortisone 1% Ointment 1 Application(s) Topical three times a day PRN Rash and/or Itching  lidocaine   4% Patch 1 Patch Transdermal daily PRN left shoulder/trap  melatonin 3 milliGRAM(s) Oral at bedtime PRN Insomnia  midodrine. 5 milliGRAM(s) Oral <User Schedule> PRN pre-dialysis  OLANZapine Injectable 2.5 milliGRAM(s) IntraMuscular every 6 hours PRN agitation      I&O's Summary    26 May 2023 07:01  -  27 May 2023 07:00  --------------------------------------------------------  IN: 400 mL / OUT: 1650 mL / NET: -1250 mL    27 May 2023 07:01  -  27 May 2023 17:17  --------------------------------------------------------  IN: 0 mL / OUT: 700 mL / NET: -700 mL        PHYSICAL EXAM:  Vital Signs Last 24 Hrs  T(C): 37.2 (27 May 2023 17:09), Max: 38.3 (27 May 2023 10:13)  T(F): 99 (27 May 2023 17:09), Max: 100.9 (27 May 2023 10:13)  HR: 120 (27 May 2023 17:09) (67 - 122)  BP: 100/68 (27 May 2023 17:09) (97/69 - 105/65)  BP(mean): --  RR: 18 (27 May 2023 17:09) (18 - 20)  SpO2: 100% (27 May 2023 17:09) (100% - 100%)    Parameters below as of 27 May 2023 17:09  Patient On (Oxygen Delivery Method): nasal cannula  O2 Flow (L/min): 2    General: Comfortable appearing male resting comfortably in bed   HEENT: NC/AT; PERRL, anicteric sclera; MMM  Neck: supple  Cardiovascular: +S1/S2; RRR  Respiratory: CTA B/L; no W/R/R  Gastrointestinal: soft, NT/ND; +BSx4  : SPC in place  Extremities: WWP; no edema, clubbing or cyanosis - bilateral lower extremity wounds wrapped.   Vascular: 2+ radial, DP/PT pulses B/L  Neurological: AAOx3; no focal deficits      LABS:                        11.6   9.22  )-----------( 180      ( 27 May 2023 03:15 )             37.3     05-    135  |  101  |  27<H>  ----------------------------<  88  4.5   |  23  |  2.81<H>    Ca    8.8      27 May 2023 03:15  Phos  2.9       Mg     2.00             CARDIAC MARKERS ( 27 May 2023 03:15 )  x     / x     / 28 U/L / x     / 1.2 ng/mL  CARDIAC MARKERS ( 26 May 2023 21:43 )  x     / x     / 29 U/L / x     / 1.5 ng/mL      Urinalysis Basic - ( 27 May 2023 12:45 )    Color: Light Orange / Appearance: Turbid / S.017 / pH: x  Gluc: x / Ketone: Negative  / Bili: Negative / Urobili: <2 mg/dL   Blood: x / Protein: 300 mg/dL / Nitrite: Negative   Leuk Esterase: Large / RBC: 494 /HPF / WBC >720 /HPF   Sq Epi: x / Non Sq Epi: x / Bacteria: Few        SARS-CoV-2: NotDetec (27 May 2023 12:45)  COVID-19 PCR: NotDetec (22 May 2023 21:49)  COVID-19 PCR: NotDetec (2023 05:17)  COVID-19 PCR: NotDetec (2023 04:52)  COVID-19 PCR: NotDetec (15 Mar 2023 11:22)  COVID-19 PCR: NotDetec (10 Mar 2023 16:20)

## 2023-05-28 NOTE — CONSULT NOTE ADULT - SUBJECTIVE AND OBJECTIVE BOX
Patient is a 62y old  Male who presents with a chief complaint of combative behavior (28 May 2023 14:44)      HPI:  62 year old male with hx of CVA (WC bound, quadriparesis), COPD, CAD (AICD), ESRD (T/R/S), HTN, HLD, seizure d/o, a-fib (s/p ablation, eliquis), neurogenic bladder (SPC), BARRY, hypothyroidism, RCC (s/p partial nephrectomy), CHF, prostate CA (s/p radiation) presents from [place] for combative behavior. Patient was discharged from [facility] on 4/27/23 and then started kicking his feet in the ambulance. Patient was combative with staff upon arrival and sent back to [facility]. Pt very drowsy when seen by writer , as per staff patient was verbally abusive prior to writers examination. Pt refusing blood work , medications and food.  (28 Apr 2023 11:01)      PAST MEDICAL & SURGICAL HISTORY:  Chronic congestive heart failure, unspecified congestive heart failure type  rEF/pEF      Hep C w/o coma, chronic      HTN (hypertension)      AICD (automatic cardioverter/defibrillator) present  [employer]      Atrial fibrillation      CAD (coronary artery disease)  (s/p 2 stents in Sep 2017)      Hyperlipidemia      Renal cell carcinoma of left kidney  s/p partial nephrectomy in 2002  biopsy again in Sep 2017 showed RCC again in stage 2      COPD (chronic obstructive pulmonary disease)      Bladder cancer      Peripheral neuropathy      Prostate cancer  s/p radiation      Nephrolithiasis      Kidney stone      AICD (automatic cardioverter/defibrillator) present      S/P cholecystectomy  2015      H/O partial nephrectomy  2002      History of percutaneous coronary intervention      H/O transurethral destruction of bladder lesion      History of coronary artery stent placement          MEDICATIONS  (STANDING):  apixaban 5 milliGRAM(s) Oral two times a day  ascorbic acid 500 milliGRAM(s) Oral daily  atorvastatin 40 milliGRAM(s) Oral at bedtime  baclofen 5 milliGRAM(s) Oral every 12 hours  calamine/zinc oxide Lotion 1 Application(s) Topical three times a day  chlorhexidine 2% Cloths 1 Application(s) Topical daily  clonazePAM  Tablet 0.75 milliGRAM(s) Oral <User Schedule>  clonazePAM  Tablet 0.5 milliGRAM(s) Oral <User Schedule>  epoetin marilin-epbx (RETACRIT) Injectable 4000 Unit(s) IV Push <User Schedule>  levETIRAcetam 250 milliGRAM(s) Oral <User Schedule>  levETIRAcetam 500 milliGRAM(s) Oral daily  levothyroxine 100 MICROGram(s) Oral daily  loratadine 10 milliGRAM(s) Oral daily  meropenem  IVPB 500 milliGRAM(s) IV Intermittent every 24 hours  metoprolol succinate ER 50 milliGRAM(s) Oral daily  multivitamin 1 Tablet(s) Oral daily  Nephro-mirna 1 Tablet(s) Oral daily  polyethylene glycol 3350 17 Gram(s) Oral daily  senna 2 Tablet(s) Oral at bedtime  sevelamer carbonate 800 milliGRAM(s) Oral three times a day with meals  vancomycin  IVPB 1000 milliGRAM(s) IV Intermittent once    MEDICATIONS  (PRN):  albuterol/ipratropium for Nebulization 3 milliLiter(s) Nebulizer every 6 hours PRN Shortness of Breath and/or Wheezing  bisacodyl Suppository 10 milliGRAM(s) Rectal daily PRN Constipation  hydrocortisone 1% Ointment 1 Application(s) Topical three times a day PRN Rash and/or Itching  lidocaine   4% Patch 1 Patch Transdermal daily PRN left shoulder/trap  melatonin 3 milliGRAM(s) Oral at bedtime PRN Insomnia  midodrine. 5 milliGRAM(s) Oral <User Schedule> PRN pre-dialysis  OLANZapine Injectable 2.5 milliGRAM(s) IntraMuscular every 6 hours PRN agitation      Allergies    codeine (Rash)  Haldol (Unknown)  Motrin (Rash)  penicillin (Hives; Anaphylaxis)  Toradol (Rash)  Tylenol (Hives)  aspirin (Hives)  codeine (Unknown)    Intolerances        VITALS:    Vital Signs Last 24 Hrs  T(C): 37.2 (28 May 2023 14:39), Max: 37.2 (27 May 2023 17:09)  T(F): 99 (28 May 2023 14:39), Max: 99 (27 May 2023 17:09)  HR: 100 (28 May 2023 15:31) (72 - 128)  BP: 96/56 (28 May 2023 15:31) (87/58 - 105/74)  BP(mean): --  RR: 17 (28 May 2023 15:31) (17 - 18)  SpO2: 100% (28 May 2023 15:31) (100% - 100%)    Parameters below as of 28 May 2023 15:31  Patient On (Oxygen Delivery Method): nasal cannula        LABS:                          11.6   9.22  )-----------( 180      ( 27 May 2023 03:15 )             37.3       05-27    135  |  101  |  27<H>  ----------------------------<  88  4.5   |  23  |  2.81<H>    Ca    8.8      27 May 2023 03:15  Phos  2.9     05-27  Mg     2.00     05-27        CAPILLARY BLOOD GLUCOSE              LOWER EXTREMITY PHYSICAL EXAM:    Vascular: DP/PT 0/4, B/L, CFT <3 seconds B/L, Temperature gradient warm to cool, B/L.   Neuro: Epicritic sensation intact to the level of heel  B/L.  Musculoskeletal/Ortho: bed bound  Skin: R foot heel wound largely granular with central partial thickness eschar, proximal wound edge underming at the level of subQ and probe to bone, mild serosanguineous drainage, no active signs of infection. L foot         RADIOLOGY & ADDITIONAL STUDIES:     Patient is a 62y old  Male who presents with a chief complaint of combative behavior (28 May 2023 14:44)      HPI:  62 year old male with hx of CVA (WC bound, quadriparesis), COPD, CAD (AICD), ESRD (T/R/S), HTN, HLD, seizure d/o, a-fib (s/p ablation, eliquis), neurogenic bladder (SPC), BARRY, hypothyroidism, RCC (s/p partial nephrectomy), CHF, prostate CA (s/p radiation) presents from [place] for combative behavior. Patient was discharged from [facility] on 4/27/23 and then started kicking his feet in the ambulance. Patient was combative with staff upon arrival and sent back to [facility]. Pt very drowsy when seen by writer , as per staff patient was verbally abusive prior to writers examination. Pt refusing blood work , medications and food.  (28 Apr 2023 11:01)      PAST MEDICAL & SURGICAL HISTORY:  Chronic congestive heart failure, unspecified congestive heart failure type  rEF/pEF      Hep C w/o coma, chronic      HTN (hypertension)      AICD (automatic cardioverter/defibrillator) present  [employer]      Atrial fibrillation      CAD (coronary artery disease)  (s/p 2 stents in Sep 2017)      Hyperlipidemia      Renal cell carcinoma of left kidney  s/p partial nephrectomy in 2002  biopsy again in Sep 2017 showed RCC again in stage 2      COPD (chronic obstructive pulmonary disease)      Bladder cancer      Peripheral neuropathy      Prostate cancer  s/p radiation      Nephrolithiasis      Kidney stone      AICD (automatic cardioverter/defibrillator) present      S/P cholecystectomy  2015      H/O partial nephrectomy  2002      History of percutaneous coronary intervention      H/O transurethral destruction of bladder lesion      History of coronary artery stent placement          MEDICATIONS  (STANDING):  apixaban 5 milliGRAM(s) Oral two times a day  ascorbic acid 500 milliGRAM(s) Oral daily  atorvastatin 40 milliGRAM(s) Oral at bedtime  baclofen 5 milliGRAM(s) Oral every 12 hours  calamine/zinc oxide Lotion 1 Application(s) Topical three times a day  chlorhexidine 2% Cloths 1 Application(s) Topical daily  clonazePAM  Tablet 0.75 milliGRAM(s) Oral <User Schedule>  clonazePAM  Tablet 0.5 milliGRAM(s) Oral <User Schedule>  epoetin marilin-epbx (RETACRIT) Injectable 4000 Unit(s) IV Push <User Schedule>  levETIRAcetam 250 milliGRAM(s) Oral <User Schedule>  levETIRAcetam 500 milliGRAM(s) Oral daily  levothyroxine 100 MICROGram(s) Oral daily  loratadine 10 milliGRAM(s) Oral daily  meropenem  IVPB 500 milliGRAM(s) IV Intermittent every 24 hours  metoprolol succinate ER 50 milliGRAM(s) Oral daily  multivitamin 1 Tablet(s) Oral daily  Nephro-mirna 1 Tablet(s) Oral daily  polyethylene glycol 3350 17 Gram(s) Oral daily  senna 2 Tablet(s) Oral at bedtime  sevelamer carbonate 800 milliGRAM(s) Oral three times a day with meals  vancomycin  IVPB 1000 milliGRAM(s) IV Intermittent once    MEDICATIONS  (PRN):  albuterol/ipratropium for Nebulization 3 milliLiter(s) Nebulizer every 6 hours PRN Shortness of Breath and/or Wheezing  bisacodyl Suppository 10 milliGRAM(s) Rectal daily PRN Constipation  hydrocortisone 1% Ointment 1 Application(s) Topical three times a day PRN Rash and/or Itching  lidocaine   4% Patch 1 Patch Transdermal daily PRN left shoulder/trap  melatonin 3 milliGRAM(s) Oral at bedtime PRN Insomnia  midodrine. 5 milliGRAM(s) Oral <User Schedule> PRN pre-dialysis  OLANZapine Injectable 2.5 milliGRAM(s) IntraMuscular every 6 hours PRN agitation      Allergies    codeine (Rash)  Haldol (Unknown)  Motrin (Rash)  penicillin (Hives; Anaphylaxis)  Toradol (Rash)  Tylenol (Hives)  aspirin (Hives)  codeine (Unknown)    Intolerances        VITALS:    Vital Signs Last 24 Hrs  T(C): 37.2 (28 May 2023 14:39), Max: 37.2 (27 May 2023 17:09)  T(F): 99 (28 May 2023 14:39), Max: 99 (27 May 2023 17:09)  HR: 100 (28 May 2023 15:31) (72 - 128)  BP: 96/56 (28 May 2023 15:31) (87/58 - 105/74)  BP(mean): --  RR: 17 (28 May 2023 15:31) (17 - 18)  SpO2: 100% (28 May 2023 15:31) (100% - 100%)    Parameters below as of 28 May 2023 15:31  Patient On (Oxygen Delivery Method): nasal cannula        LABS:                          11.6   9.22  )-----------( 180      ( 27 May 2023 03:15 )             37.3       05-27    135  |  101  |  27<H>  ----------------------------<  88  4.5   |  23  |  2.81<H>    Ca    8.8      27 May 2023 03:15  Phos  2.9     05-27  Mg     2.00     05-27        CAPILLARY BLOOD GLUCOSE              LOWER EXTREMITY PHYSICAL EXAM:    Vascular: DP/PT 0/4, B/L, CFT <3 seconds B/L, Temperature gradient warm to cool, B/L.   Neuro: Epicritic sensation intact to the level of heel  B/L.  Musculoskeletal/Ortho: bed bound  Skin: R foot heel wound largely granular with central partial thickness eschar, proximal wound edge underming at the level of subQ and probe to bone, mild serosanguineous drainage, no active signs of infection, R foot anterior ankle area wound to dermis, circumferential periwound erythema, no drainage.  L foot no acute signs of infection.         RADIOLOGY & ADDITIONAL STUDIES:

## 2023-05-28 NOTE — CONSULT NOTE ADULT - SUBJECTIVE AND OBJECTIVE BOX
INFECTIOUS DISEASE CONSULT NOTE    Patient is a 62y old  Male who presents with a chief complaint of combative behavior (27 May 2023 17:13)    HPI:  62 year old male with hx of CVA (WC bound, quadriparesis), COPD, CAD (AICD), ESRD (T/R/S), HTN, HLD, seizure d/o, a-fib (s/p ablation, eliquis), neurogenic bladder (SPC), BARRY, hypothyroidism, RCC (s/p partial nephrectomy), CHF, prostate CA (s/p radiation) presents from [place] for combative behavior. Patient was discharged from [facility] on 23 and then started kicking his feet in the ambulance. Patient was combative with staff upon arrival and sent back to [facility]. Pt very drowsy when seen by writer , as per staff patient was verbally abusive prior to writers examination. Pt refusing blood work , medications and food.  (2023 11:01)         Prior hospital charts reviewed [Yes]  Primary team notes reviewed [Yes]  Other consultant notes reviewed [Yes]    REVIEW OF SYSTEMS:  CONSTITUTIONAL: No fever or chills  HEENT: No sore throat  RESPIRATORY: No cough, no shortness of breath  CARDIOVASCULAR: No chest pain or palpitations  GASTROINTESTINAL: No abdominal or epigastric pain  GENITOURINARY: No dysuria  NEUROLOGICAL: No headache/dizziness  MSK: No joint pain, erythema, or swelling; no back pain  SKIN: No itching, rashes  All other ROS negative except noted above    PAST MEDICAL & SURGICAL HISTORY:  Chronic congestive heart failure, unspecified congestive heart failure type  rEF/pEF      Hep C w/o coma, chronic      HTN (hypertension)      AICD (automatic cardioverter/defibrillator) present  Medtronic      Atrial fibrillation      CAD (coronary artery disease)  (s/p 2 stents in Sep 2017)      Hyperlipidemia      Renal cell carcinoma of left kidney  s/p partial nephrectomy in   biopsy again in Sep 2017 showed RCC again in stage 2      COPD (chronic obstructive pulmonary disease)      Bladder cancer      Peripheral neuropathy      Prostate cancer  s/p radiation      Nephrolithiasis      Kidney stone      AICD (automatic cardioverter/defibrillator) present      S/P cholecystectomy        H/O partial nephrectomy        History of percutaneous coronary intervention      H/O transurethral destruction of bladder lesion      History of coronary artery stent placement          SOCIAL HISTORY:  - Born in [place], [place] in 19XX  - Currently working as / Retired  - Lives with _____; no pets  - No recent travel  - Denies tobacco use  - Denies alcohol use  - Denies illicit drug use  - Currently sexually active, has one male/female sexual partner    FAMILY HISTORY:  Family history of chronic ischemic heart disease    Family history of lung cancer        Allergies:  codeine (Rash)  Haldol (Unknown)  Motrin (Rash)  penicillin (Hives; Anaphylaxis)  Toradol (Rash)  Tylenol (Hives)  aspirin (Hives)  codeine (Unknown)      ANTIMICROBIALS:  aztreonam  IVPB 1000 <User Schedule>      ANTIMICROBIALS (past 90 days):  MEDICATIONS  (STANDING):    meropenem  IVPB   100 mL/Hr IV Intermittent (23 @ 01:23)    meropenem  IVPB   100 mL/Hr IV Intermittent (23 @ 22:35)   100 mL/Hr IV Intermittent (23 @ 23:00)    vancomycin  IVPB   250 mL/Hr IV Intermittent (23 @ 12:15)        OTHER MEDS:   MEDICATIONS  (STANDING):  albuterol/ipratropium for Nebulization 3 every 6 hours PRN  apixaban 5 two times a day  atorvastatin 40 at bedtime  baclofen 5 every 12 hours  bisacodyl Suppository 10 daily PRN  clonazePAM  Tablet 0.75 <User Schedule>  clonazePAM  Tablet 0.5 <User Schedule>  epoetin marilin-epbx (RETACRIT) Injectable 4000 <User Schedule>  levETIRAcetam 500 daily  levETIRAcetam 250 <User Schedule>  levothyroxine 100 daily  loratadine 10 daily  melatonin 3 at bedtime PRN  metoprolol succinate ER 50 daily  midodrine. 5 <User Schedule> PRN  OLANZapine Injectable 2.5 every 6 hours PRN  polyethylene glycol 3350 17 daily  senna 2 at bedtime      VITALS:  Vital Signs Last 24 Hrs  T(F): 98.8 (23 @ 05:00), Max: 100.9 (23 @ 10:13)    Vital Signs Last 24 Hrs  HR: 110 (23 @ 05:00) (110 - 128)  BP: 102/72 (23 @ 05:00) (100/68 - 105/74)  RR: 18 (23 @ 05:00)  SpO2: 100% (23 @ 05:00) (100% - 100%)  Wt(kg): --    EXAM:  GENERAL: NAD, lying in bed  HEAD: No head lesions  EYES: Conjunctiva pink and cornea white  ENT: Normal external ears and nose, no discharges; moist mucous membranes  NECK: Supple, nontender to palpation; no JVD  CHEST/LUNG: Clear to auscultation bilaterally  HEART: S1 S2  ABDOMEN: Soft, nontender, nondistended; normoactive bowel sounds  EXTREMITIES: No clubbing, cyanosis, or petal edema  NERVOUS SYSTEM: Alert and oriented to person, time, place and situation, speech clear. No focal deficits   MSK: No joint erythema, swelling or pain  SKIN: No rashes or lesions, no superficial thrombophlebitis  PSYCH: Normal affect    Labs:                        11.6   9.22  )-----------( 180      ( 27 May 2023 03:15 )             37.3         135  |  101  |  27<H>  ----------------------------<  88  4.5   |  23  |  2.81<H>    Ca    8.8      27 May 2023 03:15  Phos  2.9       Mg     2.00             WBC Trend:  WBC Count: 9.22 (23 @ 03:15)  WBC Count: 9.72 (23 @ 21:43)  WBC Count: 7.74 (23 @ 06:55)  WBC Count: 8.96 (23 @ 11:52)      Auto Neutrophil #: 7.50 K/uL (23 @ 07:33)  Auto Neutrophil #: 7.37 K/uL (23 @ 09:25)  Auto Neutrophil #: 8.06 K/uL (23 @ 02:40)  Auto Neutrophil #: 5.66 K/uL (03-15-23 @ 11:15)  Auto Neutrophil #: 11.44 K/uL (03-10-23 @ 18:10)      Creatine Trend:  Creatinine, Serum: 2.81 ()  Creatinine, Serum: 2.24 ()  Creatinine, Serum: 3.71 ()  Creatinine, Serum: 3.25 ()      Liver Biochemical Testing Trend:  Alanine Aminotransferase (ALT/SGPT): 27 (-)  Alanine Aminotransferase (ALT/SGPT): 29 ()  Alanine Aminotransferase (ALT/SGPT): 28 ()  Alanine Aminotransferase (ALT/SGPT): 53 *H* ()  Alanine Aminotransferase (ALT/SGPT): 58 *H* ()  Aspartate Aminotransferase (AST/SGOT): 25 (23 @ 06:37)  Aspartate Aminotransferase (AST/SGOT): 37 (23 @ 05:46)  Aspartate Aminotransferase (AST/SGOT): 22 (23 @ 17:40)  Aspartate Aminotransferase (AST/SGOT): 44 (23 @ 16:15)  Aspartate Aminotransferase (AST/SGOT): 77 (23 @ 09:50)  Bilirubin Total, Serum: 0.4 ()  Bilirubin Total, Serum: 0.4 ()  Bilirubin Total, Serum: 0.4 ()  Bilirubin Total, Serum: 0.3 ()  Bilirubin Total, Serum: 0.6 ()      Trend LDH          Urinalysis Basic - ( 27 May 2023 12:45 )    Color: Light Orange / Appearance: Turbid / S.017 / pH: x  Gluc: x / Ketone: Negative  / Bili: Negative / Urobili: <2 mg/dL   Blood: x / Protein: 300 mg/dL / Nitrite: Negative   Leuk Esterase: Large / RBC: 494 /HPF / WBC >720 /HPF   Sq Epi: x / Non Sq Epi: x / Bacteria: Few        MICROBIOLOGY:  Vancomycin Level, Random: 5.3 ( @ 20:20)  Vancomycin Level, Random: 10.0 ( @ 17:51)    MRSA PCR Result.: Detected (05-10-23 @ 07:30)  MRSA PCR Result.: Detected (23 @ 20:15)  MRSA PCR Result.: Detected (23 @ 20:30)  MRSA PCR Result.: Detected (23 @ 22:10)      Culture - Urine (collected 27 May 2023 12:00)  Source: Catheterized Catheterized  Final Report (28 May 2023 09:21):    >=3 organisms. Probable collection contamination.                              Rapid RVP Result: NotDetec ( @ 12:45)    COVID- PCR: NotDetec (23 @ 21:49)  COVID-19 PCR: NotDetec (23 @ 05:17)  COVID- PCR: NotDetec (23 @ 04:52)                    Troponin T, High Sensitivity Result: 120 ()  Troponin T, High Sensitivity Result: 108 ()      A1C with Estimated Average Glucose Result: 4.6 % (23 @ 16:15)      RADIOLOGY:  imaging below personally reviewed    < from: Xray Chest 1 View- PORTABLE-Urgent (Xray Chest 1 View- PORTABLE-Urgent .) (23 @ 12:31) >  IMPRESSION: Clear lungs without source for fever.    < end of copied text >   INFECTIOUS DISEASE CONSULT NOTE    Patient is a 62y old  Male who presents with a chief complaint of combative behavior (27 May 2023 17:13)    HPI:  62 year old male with hx of CVA (WC bound, quadriparesis), COPD, CAD (AICD), ESRD (T/R/S), HTN, HLD, seizure d/o, a-fib (s/p ablation, eliquis), neurogenic bladder (SPC), BARRY, hypothyroidism, RCC (s/p partial nephrectomy), CHF, prostate CA (s/p radiation) presents from [place] for combative behavior. Patient was discharged from [facility] on 23 and then started kicking his feet in the ambulance. Patient was combative with staff upon arrival and sent back to [facility]. Pt very drowsy when seen by writer , as per staff patient was verbally abusive prior to writers examination. Pt refusing blood work , medications and food.  (2023 11:01)         Prior hospital charts reviewed [Yes]  Primary team notes reviewed [Yes]  Other consultant notes reviewed [Yes]    REVIEW OF SYSTEMS:  Limited but able to reports feeling nauseous and vomited, + abdominal pain    PAST MEDICAL & SURGICAL HISTORY:  Chronic congestive heart failure, unspecified congestive heart failure type  rEF/pEF      Hep C w/o coma, chronic      HTN (hypertension)      AICD (automatic cardioverter/defibrillator) present  Medtronic      Atrial fibrillation      CAD (coronary artery disease)  (s/p 2 stents in Sep 2017)      Hyperlipidemia      Renal cell carcinoma of left kidney  s/p partial nephrectomy in   biopsy again in Sep 2017 showed RCC again in stage 2      COPD (chronic obstructive pulmonary disease)      Bladder cancer      Peripheral neuropathy      Prostate cancer  s/p radiation      Nephrolithiasis      Kidney stone      AICD (automatic cardioverter/defibrillator) present      S/P cholecystectomy        H/O partial nephrectomy        History of percutaneous coronary intervention      H/O transurethral destruction of bladder lesion      History of coronary artery stent placement          SOCIAL HISTORY:  Unable to obtain due to lethargy    FAMILY HISTORY:  Family history of chronic ischemic heart disease    Family history of lung cancer    Allergies:  codeine (Rash)  Haldol (Unknown)  Motrin (Rash)  penicillin (Hives; Anaphylaxis)  Toradol (Rash)  Tylenol (Hives)  aspirin (Hives)  codeine (Unknown)      ANTIMICROBIALS:  aztreonam  IVPB 1000 <User Schedule>      ANTIMICROBIALS (past 90 days):  MEDICATIONS  (STANDING):    meropenem  IVPB   100 mL/Hr IV Intermittent (23 @ 01:23)    meropenem  IVPB   100 mL/Hr IV Intermittent (23 @ 22:35)   100 mL/Hr IV Intermittent (23 @ 23:00)    vancomycin  IVPB   250 mL/Hr IV Intermittent (23 @ 12:15)        OTHER MEDS:   MEDICATIONS  (STANDING):  albuterol/ipratropium for Nebulization 3 every 6 hours PRN  apixaban 5 two times a day  atorvastatin 40 at bedtime  baclofen 5 every 12 hours  bisacodyl Suppository 10 daily PRN  clonazePAM  Tablet 0.75 <User Schedule>  clonazePAM  Tablet 0.5 <User Schedule>  epoetin marilin-epbx (RETACRIT) Injectable 4000 <User Schedule>  levETIRAcetam 500 daily  levETIRAcetam 250 <User Schedule>  levothyroxine 100 daily  loratadine 10 daily  melatonin 3 at bedtime PRN  metoprolol succinate ER 50 daily  midodrine. 5 <User Schedule> PRN  OLANZapine Injectable 2.5 every 6 hours PRN  polyethylene glycol 3350 17 daily  senna 2 at bedtime      VITALS:  Vital Signs Last 24 Hrs  T(F): 98.8 (23 @ 05:00), Max: 100.9 (23 @ 10:13)    Vital Signs Last 24 Hrs  HR: 110 (23 @ 05:00) (110 - 128)  BP: 102/72 (23 @ 05:00) (100/68 - 105/74)  RR: 18 (23 @ 05:00)  SpO2: 100% (23 @ 05:00) (100% - 100%)  Wt(kg): --    EXAM:  GENERAL: lying in bed, very lethargic  HEAD: No head lesions  EYES: Conjunctiva pink and cornea white  ENT: Normal external ears and nose, no discharges; dry mucous membranes  NECK: Supple, nontender to palpation; no JVD  CHEST/LUNG: Decreased bibasilar breath sounds; right chest permacath is clean. right chest AICD nontender to touch  HEART: S1 S2  ABDOMEN: Soft, nontender, nondistended; normoactive bowel sounds  EXTREMITIES: No clubbing, cyanosis, or petal edema  NERVOUS SYSTEM: Alert and oriented to person, time, place and situation, speech clear. No focal deficits   MSK: No joint erythema, swelling or pain  SKIN: Right heel wound with periwound erythema and drainage, No rashes or lesions, no superficial thrombophlebitis  PSYCH: Normal affect    Labs:                        11.6   9.22  )-----------( 180      ( 27 May 2023 03:15 )             37.3         135  |  101  |  27<H>  ----------------------------<  88  4.5   |  23  |  2.81<H>    Ca    8.8      27 May 2023 03:15  Phos  2.9       Mg     2.00             WBC Trend:  WBC Count: 9.22 (23 @ 03:15)  WBC Count: 9.72 (23 @ 21:43)  WBC Count: 7.74 (23 @ 06:55)  WBC Count: 8.96 (23 @ 11:52)      Auto Neutrophil #: 7.50 K/uL (23 @ 07:33)  Auto Neutrophil #: 7.37 K/uL (23 @ 09:25)  Auto Neutrophil #: 8.06 K/uL (23 @ 02:40)  Auto Neutrophil #: 5.66 K/uL (03-15-23 @ 11:15)  Auto Neutrophil #: 11.44 K/uL (03-10-23 @ 18:10)      Creatine Trend:  Creatinine, Serum: 2.81 ()  Creatinine, Serum: 2.24 ()  Creatinine, Serum: 3.71 ()  Creatinine, Serum: 3.25 ()      Liver Biochemical Testing Trend:  Alanine Aminotransferase (ALT/SGPT): 27 ()  Alanine Aminotransferase (ALT/SGPT): 29 ()  Alanine Aminotransferase (ALT/SGPT): 28 ()  Alanine Aminotransferase (ALT/SGPT): 53 *H* ()  Alanine Aminotransferase (ALT/SGPT): 58 *H* ()  Aspartate Aminotransferase (AST/SGOT): 25 (23 @ 06:37)  Aspartate Aminotransferase (AST/SGOT): 37 (23 @ 05:46)  Aspartate Aminotransferase (AST/SGOT): 22 (23 @ 17:40)  Aspartate Aminotransferase (AST/SGOT): 44 (23 @ 16:15)  Aspartate Aminotransferase (AST/SGOT): 77 (23 @ 09:50)  Bilirubin Total, Serum: 0.4 ()  Bilirubin Total, Serum: 0.4 ()  Bilirubin Total, Serum: 0.4 ()  Bilirubin Total, Serum: 0.3 ()  Bilirubin Total, Serum: 0.6 ()      Trend LDH          Urinalysis Basic - ( 27 May 2023 12:45 )    Color: Light Orange / Appearance: Turbid / S.017 / pH: x  Gluc: x / Ketone: Negative  / Bili: Negative / Urobili: <2 mg/dL   Blood: x / Protein: 300 mg/dL / Nitrite: Negative   Leuk Esterase: Large / RBC: 494 /HPF / WBC >720 /HPF   Sq Epi: x / Non Sq Epi: x / Bacteria: Few        MICROBIOLOGY:  Vancomycin Level, Random: 5.3 ( @ 20:20)  Vancomycin Level, Random: 10.0 ( @ 17:51)    MRSA PCR Result.: Detected (05-10-23 @ 07:30)  MRSA PCR Result.: Detected (23 @ 20:15)  MRSA PCR Result.: Detected (23 @ 20:30)  MRSA PCR Result.: Detected (23 @ 22:10)      Culture - Urine (collected 27 May 2023 12:00)  Source: Catheterized Catheterized  Final Report (28 May 2023 09:21):    >=3 organisms. Probable collection contamination.                              Rapid RVP Result: NotDetec ( @ 12:45)    COVID-19 PCR: NotDetec (23 @ 21:49)  COVID-19 PCR: NotDetec (23 @ 05:17)  COVID-19 PCR: NotDetec (23 @ 04:52)                    Troponin T, High Sensitivity Result: 120 ()  Troponin T, High Sensitivity Result: 108 ()      A1C with Estimated Average Glucose Result: 4.6 % (23 @ 16:15)      RADIOLOGY:  imaging below personally reviewed    < from: Xray Chest 1 View- PORTABLE-Urgent (Xray Chest 1 View- PORTABLE-Urgent .) (23 @ 12:31) >  IMPRESSION: Clear lungs without source for fever.    < end of copied text >   INFECTIOUS DISEASE CONSULT NOTE    Patient is a 62y old  Male who presents with a chief complaint of combative behavior (27 May 2023 17:13)    HPI:  62 year old male with hx of CVA (WC bound, quadriparesis), COPD, CAD (AICD), ESRD (T/R/S), HTN, HLD, seizure d/o, a-fib (s/p ablation, eliquis), neurogenic bladder (SPC), BARRY, hypothyroidism, RCC (s/p partial nephrectomy), CHF, prostate CA (s/p radiation) presents from [place] for combative behavior. Patient was discharged from [facility] on 23 and then started kicking his feet in the ambulance. Patient was combative with staff upon arrival and sent back to [facility]. Pt very drowsy when seen by writer , as per staff patient was verbally abusive prior to writers examination. Pt refusing blood work , medications and food.  (2023 11:01)         Prior hospital charts reviewed [Yes]  Primary team notes reviewed [Yes]  Other consultant notes reviewed [Yes]    REVIEW OF SYSTEMS:    general: +fever and feeling weak  head: no trauma  eyes: no eye pain or change in the vision  ENT: sore throat  chest: no cough  abd: +pain and vomiting  : has a suprapubic cath but urinated normally and had dysuria  skin: heel wound   MSK: no joint pain  neuro: no headache  psych: no agitation now    PAST MEDICAL & SURGICAL HISTORY:  Chronic congestive heart failure, unspecified congestive heart failure type  rEF/pEF      Hep C w/o coma, chronic      HTN (hypertension)      AICD (automatic cardioverter/defibrillator) present  [employer]tr[employer]      Atrial fibrillation      CAD (coronary artery disease)  (s/p 2 stents in Sep 2017)      Hyperlipidemia      Renal cell carcinoma of left kidney  s/p partial nephrectomy in   biopsy again in Sep 2017 showed RCC again in stage 2      COPD (chronic obstructive pulmonary disease)      Bladder cancer      Peripheral neuropathy      Prostate cancer  s/p radiation      Nephrolithiasis      Kidney stone      AICD (automatic cardioverter/defibrillator) present      S/P cholecystectomy        H/O partial nephrectomy        History of percutaneous coronary intervention      H/O transurethral destruction of bladder lesion      History of coronary artery stent placement          SOCIAL HISTORY:  lives at [place], no recent travel    FAMILY HISTORY:  Family history of chronic ischemic heart disease    Family history of lung cancer    Allergies:  codeine (Rash)  Haldol (Unknown)  Motrin (Rash)  penicillin (Hives; Anaphylaxis)  Toradol (Rash)  Tylenol (Hives)  aspirin (Hives)  codeine (Unknown)      ANTIMICROBIALS:  aztreonam  IVPB 1000 <User Schedule>      ANTIMICROBIALS (past 90 days):  MEDICATIONS  (STANDING):    meropenem  IVPB   100 mL/Hr IV Intermittent (23 @ 01:23)    meropenem  IVPB   100 mL/Hr IV Intermittent (23 @ 22:35)   100 mL/Hr IV Intermittent (23 @ 23:00)    vancomycin  IVPB   250 mL/Hr IV Intermittent (23 @ 12:15)        OTHER MEDS:   MEDICATIONS  (STANDING):  albuterol/ipratropium for Nebulization 3 every 6 hours PRN  apixaban 5 two times a day  atorvastatin 40 at bedtime  baclofen 5 every 12 hours  bisacodyl Suppository 10 daily PRN  clonazePAM  Tablet 0.75 <User Schedule>  clonazePAM  Tablet 0.5 <User Schedule>  epoetin marilin-epbx (RETACRIT) Injectable 4000 <User Schedule>  levETIRAcetam 500 daily  levETIRAcetam 250 <User Schedule>  levothyroxine 100 daily  loratadine 10 daily  melatonin 3 at bedtime PRN  metoprolol succinate ER 50 daily  midodrine. 5 <User Schedule> PRN  OLANZapine Injectable 2.5 every 6 hours PRN  polyethylene glycol 3350 17 daily  senna 2 at bedtime      VITALS:  Vital Signs Last 24 Hrs  T(F): 98.8 (23 @ 05:00), Max: 100.9 (23 @ 10:13)    Vital Signs Last 24 Hrs  HR: 110 (23 @ 05:00) (110 - 128)  BP: 102/72 (23 @ 05:00) (100/68 - 105/74)  RR: 18 (23 @ 05:00)  SpO2: 100% (23 @ 05:00) (100% - 100%)  Wt(kg): --    EXAM:  GENERAL: lying in bed, very lethargic  HEAD: No head lesions  EYES: Conjunctiva pink and cornea white  ENT: Normal external ears and nose, no discharges; dry mucous membranes  NECK: Supple, nontender to palpation; no JVD  CHEST/LUNG: Decreased bibasilar breath sounds; right chest permacath is clean. right chest AICD nontender to touch  HEART: S1 S2  ABDOMEN: Soft, nontender, nondistended; normoactive bowel sounds  EXTREMITIES: No clubbing, cyanosis, or petal edema  NERVOUS SYSTEM: Alert and oriented to person, time, place and situation, speech clear. No focal deficits   MSK: No joint erythema, swelling or pain  SKIN: Right heel wound with periwound erythema and drainage, No rashes or lesions, no superficial thrombophlebitis  PSYCH: Normal affect    Labs:                        11.6   9.22  )-----------( 180      ( 27 May 2023 03:15 )             37.3         135  |  101  |  27<H>  ----------------------------<  88  4.5   |  23  |  2.81<H>    Ca    8.8      27 May 2023 03:15  Phos  2.9       Mg     2.00             WBC Trend:  WBC Count: 9.22 (23 @ 03:15)  WBC Count: 9.72 (23 @ 21:43)  WBC Count: 7.74 (23 @ 06:55)  WBC Count: 8.96 (23 @ 11:52)      Auto Neutrophil #: 7.50 K/uL (23 @ 07:33)  Auto Neutrophil #: 7.37 K/uL (23 @ 09:25)  Auto Neutrophil #: 8.06 K/uL (23 @ 02:40)  Auto Neutrophil #: 5.66 K/uL (03-15-23 @ 11:15)  Auto Neutrophil #: 11.44 K/uL (03-10-23 @ 18:10)      Creatine Trend:  Creatinine, Serum: 2.81 ()  Creatinine, Serum: 2.24 ()  Creatinine, Serum: 3.71 ()  Creatinine, Serum: 3.25 ()      Liver Biochemical Testing Trend:  Alanine Aminotransferase (ALT/SGPT): 27 ()  Alanine Aminotransferase (ALT/SGPT): 29 ()  Alanine Aminotransferase (ALT/SGPT): 28 ()  Alanine Aminotransferase (ALT/SGPT): 53 *H* ()  Alanine Aminotransferase (ALT/SGPT): 58 *H* ()  Aspartate Aminotransferase (AST/SGOT): 25 (23 @ 06:37)  Aspartate Aminotransferase (AST/SGOT): 37 (23 @ 05:46)  Aspartate Aminotransferase (AST/SGOT): 22 (23 @ 17:40)  Aspartate Aminotransferase (AST/SGOT): 44 (23 @ 16:15)  Aspartate Aminotransferase (AST/SGOT): 77 (23 @ 09:50)  Bilirubin Total, Serum: 0.4 ()  Bilirubin Total, Serum: 0.4 ()  Bilirubin Total, Serum: 0.4 ()  Bilirubin Total, Serum: 0.3 ()  Bilirubin Total, Serum: 0.6 ()      Trend LDH          Urinalysis Basic - ( 27 May 2023 12:45 )    Color: Light Orange / Appearance: Turbid / S.017 / pH: x  Gluc: x / Ketone: Negative  / Bili: Negative / Urobili: <2 mg/dL   Blood: x / Protein: 300 mg/dL / Nitrite: Negative   Leuk Esterase: Large / RBC: 494 /HPF / WBC >720 /HPF   Sq Epi: x / Non Sq Epi: x / Bacteria: Few        MICROBIOLOGY:  Vancomycin Level, Random: 5.3 ( @ 20:20)  Vancomycin Level, Random: 10.0 ( @ 17:51)    MRSA PCR Result.: Detected (05-10-23 @ 07:30)  MRSA PCR Result.: Detected (23 @ 20:15)  MRSA PCR Result.: Detected (23 @ 20:30)  MRSA PCR Result.: Detected (23 @ 22:10)      Culture - Urine (collected 27 May 2023 12:00)  Source: Catheterized Catheterized  Final Report (28 May 2023 09:21):    >=3 organisms. Probable collection contamination.                              Rapid RVP Result: NotDetec ( @ 12:45)    COVID-19 PCR: NotDetec (23 @ 21:49)  COVID-19 PCR: NotDetec (23 @ 05:17)  COVID-19 PCR: NotDetec (23 @ 04:52)                    Troponin T, High Sensitivity Result: 120 ()  Troponin T, High Sensitivity Result: 108 ()      A1C with Estimated Average Glucose Result: 4.6 % (23 @ 16:15)      RADIOLOGY:  imaging below personally reviewed    < from: Xray Chest 1 View- PORTABLE-Urgent (Xray Chest 1 View- PORTABLE-Urgent .) (23 @ 12:31) >  IMPRESSION: Clear lungs without source for fever.    < end of copied text >

## 2023-05-28 NOTE — PROGRESS NOTE ADULT - PROBLEM SELECTOR PLAN 2
Spiked a fever on 5/27 : 100.9F  Started on aztreonam   Follow up with [facility]  Urine Cx w/ likely contaminant   ID consulted given patient allergic to pencillin , rocephin  Hx of MRSA in UC in March 2023 Spiked a fever on 5/27 : 100.9F  Discussed w. ID  Recs IV meropenem  Vanc 1000mg x 1  Podiatry eval for Heel wound  CT A/P   Follow up with [facility]  Urine Cx w/ likely contaminant   ID consulted given patient allergic to pencillin , rocephin  Hx of MRSA in UC in March 2023  Appreciate ID recs

## 2023-05-28 NOTE — CONSULT NOTE ADULT - ASSESSMENT
62 year old male with hx of CVA (WC bound, quadriparesis), COPD, CAD (AICD), ESRD (T/R/S), HTN, HLD, seizure d/o, a-fib (s/p ablation, eliquis), neurogenic bladder (SPC), BARRY, hypothyroidism, RCC (s/p partial nephrectomy), CHF, prostate CA (s/p radiation) presents from [facility] for combative behavior.    ID is consulted for fever  Febrile on 5/27, no leukocytosis  Today he is more lethargic compared to his baseline  Reports abdominal pain and vomiting  UA showing pyuria, UCx is contaminated, likely collected from existing SPC  RVP negative  BCx (5/27) NGTD  CXR no PNA  Right heel wound has drainage, periwound erythema  Podiatry does not think it is infected. Wound is probe to bone, right foot xray ordered  Right permacath site looks clean, no tenderness appreciated at AICD site    Antibiotics:  Aztreonam 5/27 ->    Patient is toxic appearing with fever  Will need to rule out underlying infection, right heel vs intraabdominal  Pending right foot xray  Will need CT Ab/pelvis  High risk for bacteremia with permacath, AICD and open foot wound  Will cover broadly for now pending culture and work up      IMPRESSION:  Fever  Right heel wound  Pyuria    RECOMMENDATIONS:  - D/C aztreonam, give IV vancomycin 1000mg x 1 and start IV meropenem 500mg q24hrs (give post-HD in HD days)  - Follow up BCx  - CT Ab/pelvis  - Podiatry follow up for right heel wound  - Trend WBC, fever curve, transaminases, creatinine daily  - Will continue to follow      Patient is seen and examined with attending and case is discussed with primary team      Shanat Brito D.O.  PGY-5 Infectious Diseases Fellow  Please contact me via page or text through [employer] Teams  If after 5PM or on weekends, please call [phone]

## 2023-05-28 NOTE — PROGRESS NOTE ADULT - ASSESSMENT
62 year old male with hx of CVA (WC bound, quadriparesis), COPD, CAD (AICD), ESRD (T/R/S), HTN, HLD, seizure d/o, a-fib (s/p ablation, eliquis), neurogenic bladder (SPC), BARRY, hypothyroidism, RCC (s/p partial nephrectomy), CHF, prostate CA (s/p radiation) presents from [facility] agitation

## 2023-05-28 NOTE — CONSULT NOTE ADULT - ASSESSMENT
62y Male with R foot heel wound to bone.   - Patient seen and evaluated  - T(F): 99, WBC 9.22, ESR CRP ordered and pending  - Skin: R foot heel wound largely granular with central partial thickness eschar, proximal wound edge underming at the level of subQ and probe to bone, mild serosanguineous drainage, no active signs of infection.   - R foot Xray ordered  - STRICT decubitus precautions, Z- FLOWS boots at all time when in bed.   - Extensive discussion held with patient regarding the importance and necessity of proper offloading and using Z-flow for wound healing and limb salvage given the worsening progress of the R foot heel wound.  Risks and benefits were explained to the pt including but not limited to non healing wound, posing risk to wound infection, proximal spreading of infection and possible proximal procedures, limb loss, and life threatening conditions.  Patient demonstrated verbal understanding, and maintained decisions above.   - Pod plan local wound care pending new Xray result.   - recommended Vasc consult.  - Discussed with attending. 62y Male with R foot heel wound to bone, R foot anterior ankle wound to dermis  - Patient seen and evaluated  - T(F): 99, WBC 9.22, ESR CRP ordered and pending  - Skin: R foot heel wound largely granular with central partial thickness eschar, proximal wound edge underming at the level of subQ and probe to bone, mild serosanguineous drainage, no active signs of infection, R foot anterior ankle area wound to dermis, circumferential periwound erythema, no drainage.  L foot no acute signs of infection.   - R foot Xray ordered  - STRICT decubitus precautions, Z- FLOWS boots at all time when in bed.   - Extensive discussion held with patient regarding the importance and necessity of proper offloading and using Z-flow for wound healing and limb salvage given the worsening progress of the R foot heel wound.  Risks and benefits were explained to the pt including but not limited to non healing wound, posing risk to wound infection, proximal spreading of infection and possible proximal procedures, limb loss, and life threatening conditions.  Patient demonstrated verbal understanding, and maintained decisions above.   - Pod plan local wound care pending new Xray result.   - recommended Vasc consult.  - Discussed with attending.

## 2023-05-28 NOTE — PROGRESS NOTE ADULT - PROBLEM SELECTOR PLAN 1
Pt recently discharged from [facility] on 4/27/23   Agitation possibly 2/2 malingering as patients admits this was his motive  intermittently refusing labs  Seen by  team on last admission, reconsulted for this admission   Continue klonopin 0.5 mg qam and 0.75 mg qpm  Avoid IV pain medications   c/w Zyprexa 2.5 q6 prn agitation.

## 2023-05-28 NOTE — PROGRESS NOTE ADULT - SUBJECTIVE AND OBJECTIVE BOX
Leyla Oquendo        Patient is a 62y old  Male who presents with a chief complaint of combative behavior (28 May 2023 11:24)      SUBJECTIVE / OVERNIGHT EVENTS: No acute overnight events. No complaints this morning.     MEDICATIONS  (STANDING):  apixaban 5 milliGRAM(s) Oral two times a day  ascorbic acid 500 milliGRAM(s) Oral daily  atorvastatin 40 milliGRAM(s) Oral at bedtime  aztreonam  IVPB 1000 milliGRAM(s) IV Intermittent <User Schedule>  baclofen 5 milliGRAM(s) Oral every 12 hours  calamine/zinc oxide Lotion 1 Application(s) Topical three times a day  chlorhexidine 2% Cloths 1 Application(s) Topical daily  clonazePAM  Tablet 0.75 milliGRAM(s) Oral <User Schedule>  clonazePAM  Tablet 0.5 milliGRAM(s) Oral <User Schedule>  epoetin marilin-epbx (RETACRIT) Injectable 4000 Unit(s) IV Push <User Schedule>  levETIRAcetam 500 milliGRAM(s) Oral daily  levETIRAcetam 250 milliGRAM(s) Oral <User Schedule>  levothyroxine 100 MICROGram(s) Oral daily  loratadine 10 milliGRAM(s) Oral daily  metoprolol succinate ER 50 milliGRAM(s) Oral daily  multivitamin 1 Tablet(s) Oral daily  Nephro-mirna 1 Tablet(s) Oral daily  polyethylene glycol 3350 17 Gram(s) Oral daily  senna 2 Tablet(s) Oral at bedtime  sevelamer carbonate 800 milliGRAM(s) Oral three times a day with meals    MEDICATIONS  (PRN):  albuterol/ipratropium for Nebulization 3 milliLiter(s) Nebulizer every 6 hours PRN Shortness of Breath and/or Wheezing  bisacodyl Suppository 10 milliGRAM(s) Rectal daily PRN Constipation  hydrocortisone 1% Ointment 1 Application(s) Topical three times a day PRN Rash and/or Itching  lidocaine   4% Patch 1 Patch Transdermal daily PRN left shoulder/trap  melatonin 3 milliGRAM(s) Oral at bedtime PRN Insomnia  midodrine. 5 milliGRAM(s) Oral <User Schedule> PRN pre-dialysis  OLANZapine Injectable 2.5 milliGRAM(s) IntraMuscular every 6 hours PRN agitation    Allergies    codeine (Rash)  Haldol (Unknown)  Motrin (Rash)  penicillin (Hives; Anaphylaxis)  Toradol (Rash)  Tylenol (Hives)  aspirin (Hives)  codeine (Unknown)    Intolerances        Vital Signs Last 24 Hrs  T(C): 37.2 (28 May 2023 14:39), Max: 37.2 (27 May 2023 17:09)  T(F): 99 (28 May 2023 14:39), Max: 99 (27 May 2023 17:09)  HR: 72 (28 May 2023 14:39) (72 - 128)  BP: 87/58 (28 May 2023 14:39) (87/58 - 105/74)  BP(mean): --  RR: 18 (28 May 2023 14:39) (18 - 18)  SpO2: 100% (28 May 2023 14:39) (100% - 100%)    Parameters below as of 28 May 2023 14:39  Patient On (Oxygen Delivery Method): nasal cannula      Daily     Daily   CAPILLARY BLOOD GLUCOSE        I&O's Summary    27 May 2023 07:01  -  28 May 2023 07:00  --------------------------------------------------------  IN: 0 mL / OUT: 700 mL / NET: -700 mL        PHYSICAL EXAM:  General: NAD, appears comfortable   HEENT: NC/AT; PERRL, anicteric sclera; MMM  Neck: supple  Cardiovascular: +S1/S2; RRR  Respiratory: CTA B/L; no W/R/R  Gastrointestinal: soft, NT/ND; +BSx4  Extremities:  no edema- bilateral lower extremity wounds wrapped in dressing.   Psych: Awake and alert     LABS:                        11.6   9.22  )-----------( 180      ( 27 May 2023 03:15 )             37.3     Hgb Trend: 11.6<--, 10.8<--, 9.5<--, 9.6<--, 9.0<--  05-27    135  |  101  |  27<H>  ----------------------------<  88  4.5   |  23  |  2.81<H>    Ca    8.8      27 May 2023 03:15  Phos  2.9     -  Mg     2.00     -      Creatinine Trend: 2.81<--, 2.24<--, 3.71<--, 3.25<--, 1.63<--, 4.00<--      CARDIAC MARKERS ( 27 May 2023 03:15 )  x     / x     / 28 U/L / x     / 1.2 ng/mL  CARDIAC MARKERS ( 26 May 2023 21:43 )  x     / x     / 29 U/L / x     / 1.5 ng/mL      Urinalysis Basic - ( 27 May 2023 12:45 )    Color: Light Orange / Appearance: Turbid / S.017 / pH: x  Gluc: x / Ketone: Negative  / Bili: Negative / Urobili: <2 mg/dL   Blood: x / Protein: 300 mg/dL / Nitrite: Negative   Leuk Esterase: Large / RBC: 494 /HPF / WBC >720 /HPF   Sq Epi: x / Non Sq Epi: x / Bacteria: Few        RADIOLOGY & ADDITIONAL TESTS:    Imaging Personally Reviewed.    Consultant(s) Notes Reviewed.    Care Discussed with Consultants/Other Providers.

## 2023-05-29 NOTE — PROGRESS NOTE ADULT - ASSESSMENT
62y Male with R foot heel wound to bone, R foot anterior ankle wound to dermis  - Patient seen and evaluated  - Afebrile, WBC no leukocytosis, ordered ESR/CRP  - R foot heel wound largely granular with central partial thickness eschar, proximal wound edge undermining probes to bone, mild serosanguineous drainage, no active signs of infection, R foot anterior ankle area wound to dermis, circumferential periwound erythema, no drainage.  L foot no acute signs of infection.   - R foot Xray pending  - Please have patient wear Z- FLOWS boots at all time when in bed.   - Extensive discussion held with patient regarding the importance and necessity of proper offloading and using Z-flow for wound healing and limb salvage given the worsening progress of the R foot heel wound which was previosuly to dermis at previous admission and now probing to bone.  Risks and benefits were explained to the pt including but not limited to non healing wound, posing risk to wound infection, proximal spreading of infection and possible proximal procedures, limb loss, and life threatening conditions.  Patient demonstrated verbal understanding.  - Recommendeded Vasc consult.  - Pod plan local wound care pending xray/vasc recs   - Discussed with attending.

## 2023-05-29 NOTE — PROGRESS NOTE ADULT - ASSESSMENT
62 year old male with hx of CVA (WC bound, quadriparesis), COPD, CAD (AICD), ESRD (T/R/S), HTN, HLD, seizure d/o, a-fib (s/p ablation, eliquis), neurogenic bladder (SPC), BARRY, hypothyroidism, RCC (s/p partial nephrectomy), CHF, prostate CA (s/p radiation) presents from [facility] agitation

## 2023-05-29 NOTE — PROVIDER CONTACT NOTE (OTHER) - ASSESSMENT
Patient is hypotensive with manual blood pressure of 82/50 and is bradycardic with heart rate of 50. pt is asymptomatic, Patient denies chest pain, dizziness, pain, or s/s of bleeding at this time. Pt also refusing morning labs, a new IV and the telemetry monitoring. Patient is A&Ox4, hypotensive with manual blood pressure of 82/50 and is bradycardic with heart rate of 50. pt is asymptomatic, Patient denies chest pain, dizziness, pain, or s/s of bleeding at this time. Pt also refusing morning labs, a new IV and the telemetry monitoring.

## 2023-05-29 NOTE — PROVIDER CONTACT NOTE (OTHER) - ASSESSMENT
Patient is A&Ox4, patient refusing to go to CT of abd and pelvis. Patient is agitated, yelling and aggressive at times. Patient denies chest pain, dizziness, or general pain. continuously refusing telemetry monitoring and throwing monitor on floor.

## 2023-05-29 NOTE — PROGRESS NOTE ADULT - PROBLEM SELECTOR PLAN 2
Spiked a fever on 5/27 : 100.9F. Now T ~99.   Discussed w. ID  Recs IV meropenem  Vanc 1000mg x 1  Podiatry eval for Heel wound  CT A/P   Follow up with BCx - NGTD   Urine Cx w/ likely contaminant   ID consulted given patient allergic to pencillin , rocephin  Hx of MRSA in UC in March 2023  Appreciate ID recs

## 2023-05-29 NOTE — PROGRESS NOTE ADULT - PROBLEM SELECTOR PROBLEM 2
Anemia Otezla Counseling: The side effects of Otezla were discussed with the patient, including but not limited to worsening or new depression, weight loss, diarrhea, nausea, upper respiratory tract infection, and headache. Patient instructed to call the office should any adverse effect occur.  The patient verbalized understanding of the proper use and possible adverse effects of Otezla.  All the patient's questions and concerns were addressed.

## 2023-05-29 NOTE — PROGRESS NOTE ADULT - PROBLEM SELECTOR PLAN 3
Pt. with hyperphosphatemia in the setting of ESRD, on oral Sevelamer therapy. Serum phosphorus (2.9) below goal for ESRD. Recommend to hold sevelamer, can resume when serum phosphorus >5.5. Monitor serum phosphorus, goal: 3.5-5.5.    If you have any questions, please feel free to contact me  Kieran Lindo  Nephrology Fellow  [phone] / [employer] Teams(Preferred)  (After 5pm or on weekends please page the on-call fellow)

## 2023-05-29 NOTE — CHART NOTE - NSCHARTNOTEFT_GEN_A_CORE
Called by Radiology resident patient ordered for a CT abd/pelvis w/ IV contrast, as per chart review patient w/ hx of contrast allergy. Confirmed with patient, patient has a rash w/ contrast therefore d/w radiology resident will premedicate patient with Methylprednisolone 40mg IV x 1 dose 5 hrs prior to contrast administration and benadryl 50mg IVP  1 hrs before contrast administration. Radiology tech aware plan to call for patient at 8pm tonight.

## 2023-05-29 NOTE — PROGRESS NOTE ADULT - SUBJECTIVE AND OBJECTIVE BOX
Hospitalist Progress Note  Nicky Beyer MD Pager # [phone]    OVERNIGHT EVENTS: BRANDEE    SUBJECTIVE / INTERVAL HPI: Patient seen and examined at bedside. Patient does not participate in ROS when asked ROS questions.     VITAL SIGNS:  Vital Signs Last 24 Hrs  T(C): 37.2 (29 May 2023 06:15), Max: 37.2 (28 May 2023 14:39)  T(F): 99 (29 May 2023 06:15), Max: 99 (28 May 2023 14:39)  HR: 53 (29 May 2023 07:44) (52 - 100)  BP: 85/45 (29 May 2023 07:44) (82/50 - 96/56)  BP(mean): --  RR: 17 (29 May 2023 06:15) (17 - 18)  SpO2: 99% (29 May 2023 06:15) (99% - 100%)    Parameters below as of 29 May 2023 06:15  Patient On (Oxygen Delivery Method): room air        PHYSICAL EXAM:    General: Weak appearing male resting in bed   HEENT: NC/AT; PERRL, anicteric sclera; MMM  Neck: supple  Cardiovascular: +S1/S2; RRR  Respiratory: CTA B/L; no W/R/R  Gastrointestinal: soft, NT/ND; +BSx4. Suprapubic catheter in place with dried blood insertion site.   Extremities: WWP; no edema, clubbing or cyanosis. Wrapped ankle wounds  Vascular: 2+ radial, DP/PT pulses B/L  Neurological: AAOx3; no focal deficits    MEDICATIONS:  MEDICATIONS  (STANDING):  apixaban 5 milliGRAM(s) Oral two times a day  ascorbic acid 500 milliGRAM(s) Oral daily  atorvastatin 40 milliGRAM(s) Oral at bedtime  baclofen 5 milliGRAM(s) Oral every 12 hours  calamine/zinc oxide Lotion 1 Application(s) Topical three times a day  chlorhexidine 2% Cloths 1 Application(s) Topical daily  clonazePAM  Tablet 0.75 milliGRAM(s) Oral <User Schedule>  clonazePAM  Tablet 0.5 milliGRAM(s) Oral <User Schedule>  epoetin marilin-epbx (RETACRIT) Injectable 4000 Unit(s) IV Push <User Schedule>  levETIRAcetam 500 milliGRAM(s) Oral daily  levETIRAcetam 250 milliGRAM(s) Oral <User Schedule>  levothyroxine 100 MICROGram(s) Oral daily  loratadine 10 milliGRAM(s) Oral daily  meropenem  IVPB 500 milliGRAM(s) IV Intermittent every 24 hours  metoprolol succinate ER 50 milliGRAM(s) Oral daily  multivitamin 1 Tablet(s) Oral daily  Nephro-mirna 1 Tablet(s) Oral daily  polyethylene glycol 3350 17 Gram(s) Oral daily  senna 2 Tablet(s) Oral at bedtime  sevelamer carbonate 800 milliGRAM(s) Oral three times a day with meals    MEDICATIONS  (PRN):  albuterol/ipratropium for Nebulization 3 milliLiter(s) Nebulizer every 6 hours PRN Shortness of Breath and/or Wheezing  bisacodyl Suppository 10 milliGRAM(s) Rectal daily PRN Constipation  hydrocortisone 1% Ointment 1 Application(s) Topical three times a day PRN Rash and/or Itching  lidocaine   4% Patch 1 Patch Transdermal daily PRN left shoulder/trap  melatonin 3 milliGRAM(s) Oral at bedtime PRN Insomnia  midodrine. 5 milliGRAM(s) Oral <User Schedule> PRN pre-dialysis  OLANZapine Injectable 2.5 milliGRAM(s) IntraMuscular every 6 hours PRN agitation      ALLERGIES:  Allergies    codeine (Rash)  Haldol (Unknown)  Motrin (Rash)  penicillin (Hives; Anaphylaxis)  Toradol (Rash)  Tylenol (Hives)  aspirin (Hives)  codeine (Unknown)    Intolerances        LABS:            Urinalysis Basic - ( 27 May 2023 12:45 )    Color: Light Orange / Appearance: Turbid / S.017 / pH: x  Gluc: x / Ketone: Negative  / Bili: Negative / Urobili: <2 mg/dL   Blood: x / Protein: 300 mg/dL / Nitrite: Negative   Leuk Esterase: Large / RBC: 494 /HPF / WBC >720 /HPF   Sq Epi: x / Non Sq Epi: x / Bacteria: Few      CAPILLARY BLOOD GLUCOSE          RADIOLOGY & ADDITIONAL TESTS: Reviewed.    ASSESSMENT:    PLAN:

## 2023-05-29 NOTE — PROGRESS NOTE ADULT - PROBLEM SELECTOR PLAN 1
Contrast: Isovue. Contrast concentration: 370. Amount: 129 mL. Pt. with ESRD on HD TIW. Last HD treatment was on 5/26/23. Pt. persistently hypotensive to 80s systolic despite IVF administration. Labs reviewed. No urgent indication for HD currently. Will hold off on HD today given hypotension. Plan for HD treatment tomorrow (5/30). Will continue to use hypoallogenic dialyzer as he seems to have less itching with this. Monitor BP and labs. Dose medications to HD.

## 2023-05-29 NOTE — PROGRESS NOTE ADULT - PROBLEM SELECTOR PLAN 1
Pt recently discharged from [facility] on 4/27/23   Agitation possibly 2/2 malingering as patients admits this was his motive  intermittently refusing labs  Seen by  team on last admission, reconsulted for this admission   Continue klonopin 0.5 mg qam and 0.75 mg qpm  Avoid IV pain medications   c/w Zyprexa 2.5 q6 prn agitation.

## 2023-05-29 NOTE — PROGRESS NOTE ADULT - SUBJECTIVE AND OBJECTIVE BOX
Podiatry pager #: [phone] ([facility])/ [phone] ([facility])    Patient is a 62y old  Male who presents with a chief complaint of combative behavior (29 May 2023 11:41)       INTERVAL HPI/OVERNIGHT EVENTS:  Patient seen and evaluated at bedside.  Pt is resting comfortable in NAD. Denies N/V/F/C.     Allergies    codeine (Rash)  Haldol (Unknown)  Motrin (Rash)  penicillin (Hives; Anaphylaxis)  Toradol (Rash)  contrast media (iodine-based) (Rash)  Tylenol (Hives)  aspirin (Hives)  codeine (Unknown)    Intolerances        Vital Signs Last 24 Hrs  T(C): 36.9 (29 May 2023 16:52), Max: 37.2 (29 May 2023 06:15)  T(F): 98.5 (29 May 2023 16:52), Max: 99 (29 May 2023 06:15)  HR: 91 (29 May 2023 16:52) (52 - 91)  BP: 111/60 (29 May 2023 16:52) (82/50 - 111/60)  BP(mean): --  RR: 18 (29 May 2023 16:52) (17 - 19)  SpO2: 98% (29 May 2023 16:52) (97% - 99%)    Parameters below as of 29 May 2023 16:52  Patient On (Oxygen Delivery Method): room air        LABS:                        10.1   8.55  )-----------( 161      ( 29 May 2023 16:45 )             32.6     05-29    136  |  99  |  75<H>  ----------------------------<  92  5.0   |  19<L>  |  5.53<H>    Ca    8.6      29 May 2023 16:45  Phos  5.6     05-29  Mg     2.30     05-29          CAPILLARY BLOOD GLUCOSE          Lower Extremity Physical Exam:  Vascular: DP/PT 0/4, B/L, CFT <3 seconds B/L, Temperature gradient warm to cool, B/L.   Neuro: Epicritic sensation intact to the level of heel  B/L.  Musculoskeletal/Ortho: bed bound  Skin: R foot heel wound largely granular with central partial thickness eschar, proximal wound edge underming at the level of subQ and probe to bone, mild serosanguineous drainage, no active signs of infection, R foot anterior ankle area wound to dermis, circumferential periwound erythema, no drainage.  L foot no acute signs of infection.   RADIOLOGY & ADDITIONAL TESTS:

## 2023-05-29 NOTE — PROGRESS NOTE ADULT - SUBJECTIVE AND OBJECTIVE BOX
[facility] Division of Kidney Diseases & Hypertension  FOLLOW UP NOTE  [phone]--------------------------------------------------------------------------------  Chief Complaint: ESRD on HD TIW    24 hour events/subjective: Pt. was seen and evaluated at bedside this morning. Pt. persistently hypotensive to 80s systolic despite IVF administration. He reports feeling well, endorses no complaints. Román any headaches, dizziness/lightheadedness, fevers/chills, chest pain, SOB, and LE edema.      PAST HISTORY  --------------------------------------------------------------------------------  No significant changes to PMH, PSH, FHx, SHx, unless otherwise noted    ALLERGIES & MEDICATIONS  --------------------------------------------------------------------------------  Allergies    codeine (Rash)  Haldol (Unknown)  Motrin (Rash)  penicillin (Hives; Anaphylaxis)  Toradol (Rash)  Tylenol (Hives)  aspirin (Hives)  codeine (Unknown)    Intolerances      Standing Inpatient Medications  apixaban 5 milliGRAM(s) Oral two times a day  ascorbic acid 500 milliGRAM(s) Oral daily  atorvastatin 40 milliGRAM(s) Oral at bedtime  baclofen 5 milliGRAM(s) Oral every 12 hours  calamine/zinc oxide Lotion 1 Application(s) Topical three times a day  chlorhexidine 2% Cloths 1 Application(s) Topical daily  clonazePAM  Tablet 0.75 milliGRAM(s) Oral <User Schedule>  clonazePAM  Tablet 0.5 milliGRAM(s) Oral <User Schedule>  epoetin marilin-epbx (RETACRIT) Injectable 4000 Unit(s) IV Push <User Schedule>  levETIRAcetam 250 milliGRAM(s) Oral <User Schedule>  levETIRAcetam 500 milliGRAM(s) Oral daily  levothyroxine 100 MICROGram(s) Oral daily  loratadine 10 milliGRAM(s) Oral daily  meropenem  IVPB 500 milliGRAM(s) IV Intermittent every 24 hours  metoprolol succinate ER 50 milliGRAM(s) Oral daily  multivitamin 1 Tablet(s) Oral daily  Nephro-mirna 1 Tablet(s) Oral daily  polyethylene glycol 3350 17 Gram(s) Oral daily  senna 2 Tablet(s) Oral at bedtime  sevelamer carbonate 800 milliGRAM(s) Oral three times a day with meals    PRN Inpatient Medications  albuterol/ipratropium for Nebulization 3 milliLiter(s) Nebulizer every 6 hours PRN  bisacodyl Suppository 10 milliGRAM(s) Rectal daily PRN  hydrocortisone 1% Ointment 1 Application(s) Topical three times a day PRN  lidocaine   4% Patch 1 Patch Transdermal daily PRN  melatonin 3 milliGRAM(s) Oral at bedtime PRN  midodrine. 5 milliGRAM(s) Oral <User Schedule> PRN  OLANZapine Injectable 2.5 milliGRAM(s) IntraMuscular every 6 hours PRN      REVIEW OF SYSTEMS  --------------------------------------------------------------------------------  See HPI    VITALS/PHYSICAL EXAM  --------------------------------------------------------------------------------  T(C): 37.2 (05-29-23 @ 06:15), Max: 37.2 (05-28-23 @ 14:39)  HR: 53 (05-29-23 @ 07:44) (52 - 100)  BP: 85/45 (05-29-23 @ 07:44) (82/50 - 96/56)  RR: 17 (05-29-23 @ 06:15) (17 - 18)  SpO2: 99% (05-29-23 @ 06:15) (99% - 100%)  Wt(kg): --      05-28-23 @ 07:01  -  05-29-23 @ 07:00  --------------------------------------------------------  IN: 0 mL / OUT: 700 mL / NET: -700 mL      Physical Exam:  Gen: NAD  HEENT: supple neck  Pulm: CTA B/L  CV: RRR, S1S2; no rub  Vascular access: right Permcath    LABS/STUDIES  --------------------------------------------------------------------------------  Creatinine Trend:  SCr 2.81 [05-27 @ 03:15]  SCr 2.24 [05-26 @ 21:43]  SCr 3.71 [05-26 @ 06:55]  SCr 3.25 [05-25 @ 11:52]  SCr 1.63 [05-24 @ 21:23]    Urinalysis - [05-27-23 @ 12:45]      Color Light Orange / Appearance Turbid / SG 1.017 / pH 6.5      Gluc Negative / Ketone Negative  / Bili Negative / Urobili <2 mg/dL       Blood Large / Protein 300 mg/dL / Leuk Est Large / Nitrite Negative       / WBC >720 / Hyaline 62 / Gran 0 - 2 / Sq Epi  / Non Sq Epi  / Bacteria Few        HBsAg Nonreact      [05-22-23 @ 16:25]  HCV 8.30, Reactive      [05-22-23 @ 16:25]

## 2023-05-30 NOTE — PROGRESS NOTE ADULT - PROBLEM SELECTOR PLAN 1
Pt recently discharged from [facility] on 4/27/23   Agitation possibly 2/2 malingering as patients admits this was his motive  intermittently refusing labs  Seen by  team on last admission, reconsulted for this admission   Continue klonopin 0.5 mg qam and 0.75 mg qpm  Avoid IV pain medications   c/w Zyprexa 2.5 q6 prn agitation.

## 2023-05-30 NOTE — PROGRESS NOTE ADULT - ASSESSMENT
62 year old male with hx of CVA (WC bound, quadriparesis), COPD, CAD (AICD), ESRD (T/R/S), HTN, HLD, seizure d/o, a-fib (s/p ablation, eliquis), neurogenic bladder (SPC), BARRY, hypothyroidism, RCC (s/p partial nephrectomy), CHF, prostate CA (s/p radiation) presents from [facility] agitation

## 2023-05-30 NOTE — PROGRESS NOTE ADULT - SUBJECTIVE AND OBJECTIVE BOX
[facility] Division of Kidney Diseases & Hypertension  FOLLOW UP NOTE  [phone]--------------------------------------------------------------------------------  Chief Complaint: ESRD on HD TIW    24 hour events/subjective: Pt. was seen and evaluated at bedside. sleeping. BP is running low although improved from yesterday. infectious work up in progress.     PAST HISTORY  --------------------------------------------------------------------------------  No significant changes to PMH, PSH, FHx, SHx, unless otherwise noted    ALLERGIES & MEDICATIONS  --------------------------------------------------------------------------------  Allergies    codeine (Rash)  Haldol (Unknown)  Motrin (Rash)  penicillin (Hives; Anaphylaxis)  Toradol (Rash)  Tylenol (Hives)  aspirin (Hives)  codeine (Unknown)    Intolerances      Standing Inpatient Medications  apixaban 5 milliGRAM(s) Oral two times a day  ascorbic acid 500 milliGRAM(s) Oral daily  atorvastatin 40 milliGRAM(s) Oral at bedtime  baclofen 5 milliGRAM(s) Oral every 12 hours  calamine/zinc oxide Lotion 1 Application(s) Topical three times a day  chlorhexidine 2% Cloths 1 Application(s) Topical daily  clonazePAM  Tablet 0.75 milliGRAM(s) Oral <User Schedule>  clonazePAM  Tablet 0.5 milliGRAM(s) Oral <User Schedule>  epoetin marilin-epbx (RETACRIT) Injectable 4000 Unit(s) IV Push <User Schedule>  levETIRAcetam 250 milliGRAM(s) Oral <User Schedule>  levETIRAcetam 500 milliGRAM(s) Oral daily  levothyroxine 100 MICROGram(s) Oral daily  loratadine 10 milliGRAM(s) Oral daily  meropenem  IVPB 500 milliGRAM(s) IV Intermittent every 24 hours  metoprolol succinate ER 50 milliGRAM(s) Oral daily  multivitamin 1 Tablet(s) Oral daily  Nephro-mirna 1 Tablet(s) Oral daily  polyethylene glycol 3350 17 Gram(s) Oral daily  senna 2 Tablet(s) Oral at bedtime  sevelamer carbonate 800 milliGRAM(s) Oral three times a day with meals    PRN Inpatient Medications  albuterol/ipratropium for Nebulization 3 milliLiter(s) Nebulizer every 6 hours PRN  bisacodyl Suppository 10 milliGRAM(s) Rectal daily PRN  hydrocortisone 1% Ointment 1 Application(s) Topical three times a day PRN  lidocaine   4% Patch 1 Patch Transdermal daily PRN  melatonin 3 milliGRAM(s) Oral at bedtime PRN  midodrine. 5 milliGRAM(s) Oral <User Schedule> PRN  OLANZapine Injectable 2.5 milliGRAM(s) IntraMuscular every 6 hours PRN      REVIEW OF SYSTEMS  --------------------------------------------------------------------------------  See HPI    VITALS/PHYSICAL EXAM  --------------------------------------------------------------------------------  T(C): 37.2 (05-29-23 @ 06:15), Max: 37.2 (05-28-23 @ 14:39)  HR: 53 (05-29-23 @ 07:44) (52 - 100)  BP: 85/45 (05-29-23 @ 07:44) (82/50 - 96/56)  RR: 17 (05-29-23 @ 06:15) (17 - 18)  SpO2: 99% (05-29-23 @ 06:15) (99% - 100%)  Wt(kg): --      05-28-23 @ 07:01  -  05-29-23 @ 07:00  --------------------------------------------------------  IN: 0 mL / OUT: 700 mL / NET: -700 mL      Physical Exam:  Gen: NAD  HEENT: supple neck  Pulm: CTA B/L  CV: RRR, S1S2; no rub  Vascular access: right Permcath    LABS/STUDIES  --------------------------------------------------------------------------------  Creatinine Trend:  SCr 2.81 [05-27 @ 03:15]  SCr 2.24 [05-26 @ 21:43]  SCr 3.71 [05-26 @ 06:55]  SCr 3.25 [05-25 @ 11:52]  SCr 1.63 [05-24 @ 21:23]    Urinalysis - [05-27-23 @ 12:45]      Color Light Orange / Appearance Turbid / SG 1.017 / pH 6.5      Gluc Negative / Ketone Negative  / Bili Negative / Urobili <2 mg/dL       Blood Large / Protein 300 mg/dL / Leuk Est Large / Nitrite Negative       / WBC >720 / Hyaline 62 / Gran 0 - 2 / Sq Epi  / Non Sq Epi  / Bacteria Few        HBsAg Nonreact      [05-22-23 @ 16:25]  HCV 8.30, Reactive      [05-22-23 @ 16:25]

## 2023-05-30 NOTE — PROGRESS NOTE ADULT - PROBLEM SELECTOR PLAN 1
Pt. with ESRD on HD TIW. did not get dialyzed 5/29 due to hypotension. Will attempt dialysis today with no fluid removal. infectious work up in progress.    alfredo rodriguez  nephrology attending   please contact me on TEAMS   Office- [phone]

## 2023-05-30 NOTE — PROGRESS NOTE ADULT - SUBJECTIVE AND OBJECTIVE BOX
Follow Up:  fever, lethargy    Interval History/ROS: no more fever, no foot pain but has a heel ulcer, no diarrhea, had dysuria          Allergies  codeine (Rash)  Haldol (Unknown)  Motrin (Rash)  penicillin (Hives; Anaphylaxis)  Toradol (Rash)  contrast media (iodine-based) (Rash)  Tylenol (Hives)  aspirin (Hives)  codeine (Unknown)        ANTIMICROBIALS:  meropenem  IVPB 500 every 24 hours      OTHER MEDS:  albuterol/ipratropium for Nebulization 3 milliLiter(s) Nebulizer every 6 hours PRN  apixaban 5 milliGRAM(s) Oral two times a day  ascorbic acid 500 milliGRAM(s) Oral daily  atorvastatin 40 milliGRAM(s) Oral at bedtime  baclofen 5 milliGRAM(s) Oral every 12 hours  bisacodyl Suppository 10 milliGRAM(s) Rectal daily PRN  calamine/zinc oxide Lotion 1 Application(s) Topical three times a day  chlorhexidine 2% Cloths 1 Application(s) Topical daily  clonazePAM  Tablet 0.75 milliGRAM(s) Oral <User Schedule>  clonazePAM  Tablet 0.5 milliGRAM(s) Oral <User Schedule>  epoetin marilin-epbx (RETACRIT) Injectable 4000 Unit(s) IV Push <User Schedule>  hydrocortisone 1% Ointment 1 Application(s) Topical three times a day PRN  levETIRAcetam 250 milliGRAM(s) Oral <User Schedule>  levETIRAcetam 500 milliGRAM(s) Oral daily  levothyroxine 100 MICROGram(s) Oral daily  lidocaine   4% Patch 1 Patch Transdermal daily PRN  loratadine 10 milliGRAM(s) Oral daily  melatonin 3 milliGRAM(s) Oral at bedtime PRN  metoprolol succinate ER 50 milliGRAM(s) Oral daily  midodrine. 5 milliGRAM(s) Oral <User Schedule> PRN  multivitamin 1 Tablet(s) Oral daily  Nephro-mirna 1 Tablet(s) Oral daily  OLANZapine Injectable 2.5 milliGRAM(s) IntraMuscular every 6 hours PRN  polyethylene glycol 3350 17 Gram(s) Oral daily  senna 2 Tablet(s) Oral at bedtime      Vital Signs Last 24 Hrs  T(C): 36.6 (30 May 2023 16:47), Max: 36.6 (30 May 2023 16:47)  T(F): 97.9 (30 May 2023 16:47), Max: 97.9 (30 May 2023 16:47)  HR: 122 (30 May 2023 16:47) (78 - 126)  BP: 106/62 (30 May 2023 16:47) (89/56 - 106/62)  BP(mean): --  RR: 18 (30 May 2023 16:47) (18 - 18)  SpO2: 98% (30 May 2023 16:47) (98% - 100%)    Parameters below as of 30 May 2023 16:47  Patient On (Oxygen Delivery Method): room air        Physical Exam:  General:    NAD,  non toxic  Cardio:    AICD with no tenderness or erythema  Respiratory:    comfortable on RA  abd:     soft,     no tenderness  :   no CVAT,   suprapubic cath  Musculoskeletal:   heel ulcer but no purulnece  vascular: no phlebitis  Skin:    no rash                          10.1   8.55  )-----------( 161      ( 29 May 2023 16:45 )             32.6       05-29    136  |  99  |  75<H>  ----------------------------<  92  5.0   |  19<L>  |  5.53<H>    Ca    8.6      29 May 2023 16:45  Phos  5.6     05-29  Mg     2.30     05-29            MICROBIOLOGY:  v  .Blood Blood-Venous  05-27-23   No growth to date.  --  --      .Blood Blood-Peripheral  05-27-23   No growth to date.  --  --      Catheterized Catheterized  05-27-23   >=3 organisms. Probable collection contamination.  --  --      .Blood Blood-Peripheral  05-11-23   No Growth Final  --  --      .Blood Blood-Peripheral  05-11-23   No Growth Final  --  --      .Blood Blood-Peripheral  05-10-23   No Growth Final  --  --          Rapid RVP Result: NotDetec (05-27 @ 12:45)        RADIOLOGY:  Images independently visualized and reviewed personally, findings as below  < from: Xray Foot AP + Lateral + Oblique, Right (05.29.23 @ 22:50) >  IMPRESSION:  Deficiency of the skin overlying the left heel may reflect the known   ulcer.  Sclerosis and areas of demineralization along theposterior calcaneus. In   the setting of a known ulcer, findings are concerning for osteomyelitis.   Further evaluation with MRI can be performed.      < end of copied text >  < from: Xray Chest 1 View- PORTABLE-Urgent (Xray Chest 1 View- PORTABLE-Urgent .) (05.27.23 @ 12:31) >  IMPRESSION: Clear lungs without source for fever.    < end of copied text >

## 2023-05-30 NOTE — PROGRESS NOTE ADULT - PROBLEM SELECTOR PLAN 11
c/w home Imdur and metoprolol    #multiple wounds on R foot/ toes  seen by podiatry on previous admission - no role for surgical intervention  wound care following; recs appreciated however, pt refusing wound care Hb 7.4, likely nephrogenic, no signs bleeding.  s/p 1 U PRBC 5/17, appropriate response. Hgb now 10.5 and stable  likely 2/2 ESRD     - monitor hgb  - Keep hgb>7

## 2023-05-30 NOTE — PROGRESS NOTE ADULT - SUBJECTIVE AND OBJECTIVE BOX
Patient is a 62y old  Male who presents with a chief complaint of combative behavior (29 May 2023 14:06)       INTERVAL HPI/OVERNIGHT EVENTS:  Patient seen and evaluated at bedside.  Pt is resting comfortable in NAD. Denies N/V/F/C.  Pain rated at X/10    Allergies    codeine (Rash)  Haldol (Unknown)  Motrin (Rash)  penicillin (Hives; Anaphylaxis)  Toradol (Rash)  contrast media (iodine-based) (Rash)  Tylenol (Hives)  aspirin (Hives)  codeine (Unknown)    Intolerances        Vital Signs Last 24 Hrs  T(C): 36.4 (30 May 2023 08:57), Max: 37.1 (29 May 2023 12:00)  T(F): 97.5 (30 May 2023 08:57), Max: 98.7 (29 May 2023 12:00)  HR: 126 (30 May 2023 08:57) (78 - 126)  BP: 89/59 (30 May 2023 08:57) (87/46 - 111/60)  BP(mean): --  RR: 18 (30 May 2023 08:57) (18 - 19)  SpO2: 98% (30 May 2023 08:57) (97% - 100%)    Parameters below as of 30 May 2023 08:57  Patient On (Oxygen Delivery Method): room air        LABS:                        10.1   8.55  )-----------( 161      ( 29 May 2023 16:45 )             32.6     05-29    136  |  99  |  75<H>  ----------------------------<  92  5.0   |  19<L>  |  5.53<H>    Ca    8.6      29 May 2023 16:45  Phos  5.6     05-29  Mg     2.30     05-29          CAPILLARY BLOOD GLUCOSE          Lower Extremity Physical Exam:    Lower Extremity Physical Exam:  Vascular: DP/PT 0/4, B/L, CFT <3 seconds B/L, Temperature gradient warm to cool, B/L.   Neuro: Epicritic sensation intact to the level of heel  B/L.  Musculoskeletal/Ortho: bed bound  Skin: R foot heel wound largely granular with central partial thickness eschar, proximal wound edge underming at the level of subQ and probe to bone, mild serosanguineous drainage, no active signs of infection, R foot anterior ankle area wound to dermis, circumferential periwound erythema, no drainage.  L foot no acute signs of infection.       RADIOLOGY & ADDITIONAL TESTS:      ACC: [id number] EXAM:  XR FOOT COMP MIN 3 VIEWS RT   ORDERED BY: NEHAL KELLER     PROCEDURE DATE:  05/29/2023          INTERPRETATION:  INDICATION: heel ulcer    COMPARISON: Foot radiographs dated 3/10/2023    FINDING: Three views of the right foot demonstrates sclerosis along the   posterior aspect of the calcaneus. There is deficiency of the soft   tissues overlying the posterior calcaneus. Plantar calcaneal spur.   Demineralization along the inferior posterior aspect of the calcaneus.   Enthesopathy at the Achilles insertion. Productive changes at the midfoot   joint. Narrowing at the IP joint.    IMPRESSION:  Deficiency of the skin overlying the left heel may reflect the known   ulcer.  Sclerosis and areas of demineralization along theposterior calcaneus. In   the setting of a known ulcer, findings are concerning for osteomyelitis.   Further evaluation with MRI can be performed.    --- End of Report ---            SUPA VERNON MD; Attending Radiologist  This document has been electronically signed. May 30 2023  9:37AM

## 2023-05-30 NOTE — PROGRESS NOTE ADULT - ASSESSMENT
62 m with HTN, CAD, a-fib, CHFAICD, CVA (WC bound, quadriparesis), COPD,  ESRD (T/R/S), , seizure d/o, neurogenic bladder (SPC), BARRY, hypothyroidism, RCC (s/p partial nephrectomy),  prostate CA (s/p radiation) presents from [place] for combative behavior.  Febrile on 5/27, no leukocytosis but now lethargic with vomiting and abd pain, has a suprapubic cath but stated that urinated normally and had dysuria  UA showing pyuria, UCx is contaminated, likely collected from existing SPC  RVP negative  BCx (5/27) NGTD  CXR no PNA  Right heel wound has drainage, periwound erythema  Podiatry does not think it is infected. Wound is probe to bone, right foot xray ordered  Right permacath site looks clean, no tenderness appreciated at AICD site      fever, lethargy, unclear source but had vomiting and abd pain with a suprapubic cath and dysuria, also ESRD  pen allergy  blood cx negative, urine from suprapubic >3 organisms  heel ulcer which appears to be a decubitus, no purulence but Xray s/o osteo, I am not convinced this is the source likely UTI as pt c/o dysuria    * c/w meropenem 500mg q24hrs (give post-HD in HD days)  * check a urine cx via straight cath or clean catch  * CT Ab/pelvis with contrast  * Podiatry follow up for right heel wound  * monitor CBC/diff and temp curve    The above assessment and plan was discussed with the primary team    Heavenly Kiran MD  contact on teams  After 5pm and on weekends call [phone]

## 2023-05-30 NOTE — PROGRESS NOTE ADULT - SUBJECTIVE AND OBJECTIVE BOX
Patient is a 62y old  Male who presents with a chief complaint of combative behavior (30 May 2023 12:36)      SUBJECTIVE / OVERNIGHT EVENTS:    No events overnight. This AM, patient without n/v/d/cp/sob.      MEDICATIONS  (STANDING):  apixaban 5 milliGRAM(s) Oral two times a day  ascorbic acid 500 milliGRAM(s) Oral daily  atorvastatin 40 milliGRAM(s) Oral at bedtime  baclofen 5 milliGRAM(s) Oral every 12 hours  calamine/zinc oxide Lotion 1 Application(s) Topical three times a day  chlorhexidine 2% Cloths 1 Application(s) Topical daily  clonazePAM  Tablet 0.75 milliGRAM(s) Oral <User Schedule>  clonazePAM  Tablet 0.5 milliGRAM(s) Oral <User Schedule>  diphenhydrAMINE Injectable 50 milliGRAM(s) IV Push once  epoetin marilin-epbx (RETACRIT) Injectable 4000 Unit(s) IV Push <User Schedule>  levETIRAcetam 500 milliGRAM(s) Oral daily  levETIRAcetam 250 milliGRAM(s) Oral <User Schedule>  levothyroxine 100 MICROGram(s) Oral daily  loratadine 10 milliGRAM(s) Oral daily  meropenem  IVPB 500 milliGRAM(s) IV Intermittent every 24 hours  metoprolol succinate ER 50 milliGRAM(s) Oral daily  midodrine. 5 milliGRAM(s) Oral once  multivitamin 1 Tablet(s) Oral daily  Nephro-mirna 1 Tablet(s) Oral daily  polyethylene glycol 3350 17 Gram(s) Oral daily  senna 2 Tablet(s) Oral at bedtime    MEDICATIONS  (PRN):  albuterol/ipratropium for Nebulization 3 milliLiter(s) Nebulizer every 6 hours PRN Shortness of Breath and/or Wheezing  bisacodyl Suppository 10 milliGRAM(s) Rectal daily PRN Constipation  hydrocortisone 1% Ointment 1 Application(s) Topical three times a day PRN Rash and/or Itching  lidocaine   4% Patch 1 Patch Transdermal daily PRN left shoulder/trap  melatonin 3 milliGRAM(s) Oral at bedtime PRN Insomnia  midodrine. 5 milliGRAM(s) Oral <User Schedule> PRN pre-dialysis  OLANZapine Injectable 2.5 milliGRAM(s) IntraMuscular every 6 hours PRN agitation      PHYSICAL EXAM:  T(C): 36.5 (05-30-23 @ 12:57), Max: 36.9 (05-29-23 @ 16:52)  HR: 119 (05-30-23 @ 12:57) (78 - 126)  BP: 89/56 (05-30-23 @ 12:57) (89/56 - 111/60)  RR: 18 (05-30-23 @ 12:57) (18 - 18)  SpO2: 98% (05-30-23 @ 12:57) (98% - 100%)  I&O's Summary    GENERAL: NAD, well-developed  HEAD:  Atraumatic, Normocephalic, MMM  CHEST/LUNG: No use of accessory muscles, CTAB, breathing non-labored  COR: RR, no mrcg  ABD: Soft, ND/NT, +BS  PSYCH: AAOx3  NEUROLOGY: CN II-XII grossly intact, moving all extremities  SKIN: No rashes or lesions  EXT: wwp, no cce    LABS:  CAPILLARY BLOOD GLUCOSE                              10.1   8.55  )-----------( 161      ( 29 May 2023 16:45 )             32.6     05-29    136  |  99  |  75<H>  ----------------------------<  92  5.0   |  19<L>  |  5.53<H>    Ca    8.6      29 May 2023 16:45  Phos  5.6     05-29  Mg     2.30     05-29                  RADIOLOGY & ADDITIONAL TESTS:    Telemetry Personally Reviewed -     Imaging Personally Reviewed -     Imaging Reviewed -     Consultant(s) Notes Reviewed -       Care Discussed with Consultants/Other Providers -  Patient is a 62y old  Male who presents with a chief complaint of combative behavior (30 May 2023 12:36)      SUBJECTIVE / OVERNIGHT EVENTS:    No events overnight. This AM, patient without n/v/d/cp/sob.  Patient reports feeling well and denies any acute complaints. Noted to be eating     MEDICATIONS  (STANDING):  apixaban 5 milliGRAM(s) Oral two times a day  ascorbic acid 500 milliGRAM(s) Oral daily  atorvastatin 40 milliGRAM(s) Oral at bedtime  baclofen 5 milliGRAM(s) Oral every 12 hours  calamine/zinc oxide Lotion 1 Application(s) Topical three times a day  chlorhexidine 2% Cloths 1 Application(s) Topical daily  clonazePAM  Tablet 0.75 milliGRAM(s) Oral <User Schedule>  clonazePAM  Tablet 0.5 milliGRAM(s) Oral <User Schedule>  diphenhydrAMINE Injectable 50 milliGRAM(s) IV Push once  epoetin marilin-epbx (RETACRIT) Injectable 4000 Unit(s) IV Push <User Schedule>  levETIRAcetam 500 milliGRAM(s) Oral daily  levETIRAcetam 250 milliGRAM(s) Oral <User Schedule>  levothyroxine 100 MICROGram(s) Oral daily  loratadine 10 milliGRAM(s) Oral daily  meropenem  IVPB 500 milliGRAM(s) IV Intermittent every 24 hours  metoprolol succinate ER 50 milliGRAM(s) Oral daily  midodrine. 5 milliGRAM(s) Oral once  multivitamin 1 Tablet(s) Oral daily  Nephro-mirna 1 Tablet(s) Oral daily  polyethylene glycol 3350 17 Gram(s) Oral daily  senna 2 Tablet(s) Oral at bedtime    MEDICATIONS  (PRN):  albuterol/ipratropium for Nebulization 3 milliLiter(s) Nebulizer every 6 hours PRN Shortness of Breath and/or Wheezing  bisacodyl Suppository 10 milliGRAM(s) Rectal daily PRN Constipation  hydrocortisone 1% Ointment 1 Application(s) Topical three times a day PRN Rash and/or Itching  lidocaine   4% Patch 1 Patch Transdermal daily PRN left shoulder/trap  melatonin 3 milliGRAM(s) Oral at bedtime PRN Insomnia  midodrine. 5 milliGRAM(s) Oral <User Schedule> PRN pre-dialysis  OLANZapine Injectable 2.5 milliGRAM(s) IntraMuscular every 6 hours PRN agitation      PHYSICAL EXAM:  T(C): 36.5 (05-30-23 @ 12:57), Max: 36.9 (05-29-23 @ 16:52)  HR: 119 (05-30-23 @ 12:57) (78 - 126)  BP: 89/56 (05-30-23 @ 12:57) (89/56 - 111/60)  RR: 18 (05-30-23 @ 12:57) (18 - 18)  SpO2: 98% (05-30-23 @ 12:57) (98% - 100%)  I&O's Summary    GENERAL: NAD, elderly male resting in bed   HEAD:  Atraumatic, Normocephalic, MMM  CHEST/LUNG: No use of accessory muscles, CTAB, breathing non-labored  COR: RR, no mrcg  ABD: Soft, ND/NT, +BS, Suprapubic catheter in place   PSYCH: AAOx3  NEUROLOGY: unable to move extremities, able to converse   SKIN: multiple wounds on R foot and abrasions noted on feet   EXT: not able to move extremities     LABS:  CAPILLARY BLOOD GLUCOSE                              10.1   8.55  )-----------( 161      ( 29 May 2023 16:45 )             32.6     05-29    136  |  99  |  75<H>  ----------------------------<  92  5.0   |  19<L>  |  5.53<H>    Ca    8.6      29 May 2023 16:45  Phos  5.6     05-29  Mg     2.30     05-29                  RADIOLOGY & ADDITIONAL TESTS:    Telemetry Personally Reviewed -     Imaging Personally Reviewed -     Imaging Reviewed -     Consultant(s) Notes Reviewed -       Care Discussed with Consultants/Other Providers -

## 2023-05-30 NOTE — PROGRESS NOTE ADULT - ASSESSMENT
62y Male with R foot heel wound to bone, R foot anterior ankle wound to dermis  - Patient seen and evaluated  - Afebrile, WBC no leukocytosis, pending ESR/CRP  - R foot xray: demineralization along posterior calcaneus, possible OM.   - R foot heel wound largely granular with central partial thickness eschar, proximal wound edge undermining probes to bone, mild serosanguineous drainage, no active signs of infection, R foot anterior ankle area wound to dermis, circumferential periwound erythema, no drainage.  L foot no acute signs of infection.   - Please have patient wear Z- FLOWS boots at all time when in bed.   - Extensive discussion held with patient regarding the importance and necessity of proper offloading and using Z-flow for wound healing and limb salvage given the worsening progress of the R foot heel wound which was previously to dermis at previous admission and now probing to bone.   - Recommend Vascular  consult.  - Recommend L foot MRI without contrast (pacemaker conditional)   - Pod plan local wound care pending xray/vasc recs   - Discussed with attending.   62y Male with R foot heel wound to bone, R foot anterior ankle wound to dermis  - Patient seen and evaluated  - Afebrile, WBC no leukocytosis, pending ESR/CRP  - R foot xray: demineralization along posterior calcaneus, possible OM.   - R foot heel wound largely granular with central partial thickness eschar, proximal wound edge undermining probes to bone, mild serosanguineous drainage, no active signs of infection, R foot anterior ankle area wound to dermis, circumferential periwound erythema, no drainage.  L foot no acute signs of infection.   - Please have patient wear Z- FLOWS boots at all time when in bed.   - Extensive discussion held with patient regarding the importance and necessity of proper offloading and using Z-flow for wound healing and limb salvage given the worsening progress of the R foot heel wound which was previously to dermis at previous admission and now probing to bone. Patient again states that he understood the risks but does not want to comply with Z flows.   - Pending ESR  - Recommend Vascular  consult.  - Recommend L foot MRI without contrast if ESR elevated (pacemaker conditional)   - Pod plan local wound care pending xray/vasc recs   - Discussed with attending.

## 2023-05-30 NOTE — PROGRESS NOTE ADULT - PROBLEM SELECTOR PLAN 12
dvt ppx: on Eliquis  #Dispo: Pending placement at [facility] - c/w home Imdur and metoprolol  DASH diet     #multiple wounds on R foot/ toes  seen by podiatry on previous admission - no role for surgical intervention  wound care following; recs appreciated however, pt refusing wound care

## 2023-05-30 NOTE — PROGRESS NOTE ADULT - PROBLEM SELECTOR PLAN 3
- complicated RVR and hypotension  - Transferred to telemetry floor  - EP consulted, suspect due  underlying cause. Also may be hypovolemic  - Continue Metoprolol, IVFs prn. Monitor volume status   - no signs sepsis, cx negative d/yumiko abx  - clinically improved  - can discontinue telemetry complicated RVR and hypotension  Transferred to telemetry floor  EP consulted, suspect due  underlying cause. Also may be hypovolemic    - Continue Metoprolol, IVFs prn. Monitor volume status   - no signs sepsis, cx negative d/yumiko abx  - clinically improved  - can discontinue telemetry

## 2023-05-30 NOTE — PROVIDER CONTACT NOTE (OTHER) - ASSESSMENT
Pt resting comfortably in bed at this time. Pt eating breakfast with staff assistance. Pt denies shortness of breath or chest pain, no dizziness.

## 2023-05-30 NOTE — PROGRESS NOTE ADULT - PROBLEM SELECTOR PLAN 2
Spiked a fever on 5/27 : 100.9F. Now T ~99.   Discussed w. ID  Recs IV meropenem  Vanc 1000mg x 1  Podiatry eval for Heel wound  CT A/P   Follow up with BCx - NGTD   Urine Cx w/ likely contaminant   ID consulted given patient allergic to pencillin , rocephin  Hx of MRSA in UC in March 2023  Appreciate ID recs Spiked a fever on 5/27 : 100.9F. Now T ~99. -> s/p Vanc 1000mg x 1  Hx of MRSA in UC in March 2023  Follow up with [facility]x - NGTD   Urine Cx w/ likely contaminant     - Started on IV meropenem and   - Podiatry eval for Heel wound  - CT A/P ordered and pending (since pt needs to be pre-medicated and refused last night). will attempt again in am   -ID consulted given patient allergic to pencillin , rocephin and appreciate ID recs

## 2023-05-30 NOTE — PROGRESS NOTE ADULT - PROBLEM SELECTOR PLAN 4
Pt on HD MWF  Nephro following- continue HD  c/w home sevelamer (monitor phos levels)  Uremia improving    #Anemia  2/2 ESRD   monitor hgb  Hb 7.4, likely nephrogenic, no signs bleeding.  s/p 1 U PRBC 5/17, appropriate response. Hgb now 10.5 and stable. Pt on HD MWF  - Nephro following- continue HD  - c/w home sevelamer (monitor phos levels)  - Uremia improving

## 2023-05-30 NOTE — CHART NOTE - NSCHARTNOTEFT_GEN_A_CORE
ICD device is not MRI conditional    Generator: Medtronic ICD Dual chamber XT  [id number] implanted 8/3/2018     RA lead: Medtronic [id number] Serial # [id number] Implanted 8/3/2018    RV defibrillator lead: Medtronic 6945/65 Serial #[id number] Implanted 9/11/2000 --> not compatible

## 2023-05-31 NOTE — PROGRESS NOTE ADULT - SUBJECTIVE AND OBJECTIVE BOX
Patient is a 62y old  Male who presents with a chief complaint of combative behavior (30 May 2023 18:31)       INTERVAL HPI/OVERNIGHT EVENTS:  Patient seen and evaluated at bedside.  Pt is resting comfortable in NAD. Denies N/V/F/C.  Pain rated at X/10    Allergies    codeine (Rash)  Haldol (Unknown)  Motrin (Rash)  penicillin (Hives; Anaphylaxis)  Toradol (Rash)  contrast media (iodine-based) (Rash)  Tylenol (Hives)  aspirin (Hives)  codeine (Unknown)    Intolerances        Vital Signs Last 24 Hrs  T(C): 36.8 (31 May 2023 06:00), Max: 37.1 (31 May 2023 01:15)  T(F): 98.2 (31 May 2023 06:00), Max: 98.7 (31 May 2023 01:15)  HR: 126 (31 May 2023 06:00) (63 - 140)  BP: 105/65 (31 May 2023 06:00) (89/56 - 116/77)  BP(mean): --  RR: 18 (31 May 2023 06:00) (18 - 18)  SpO2: 99% (31 May 2023 06:00) (98% - 100%)    Parameters below as of 31 May 2023 06:00  Patient On (Oxygen Delivery Method): nasal cannula  O2 Flow (L/min): 2      LABS:                        9.2    10.44 )-----------( 224      ( 30 May 2023 22:00 )             28.9     05-30    138  |  98  |  92<H>  ----------------------------<  173<H>  4.6   |  21<L>  |  6.06<H>    Ca    8.5      30 May 2023 22:00  Phos  4.7     05-30  Mg     2.40     05-30    TPro  6.0  /  Alb  3.2<L>  /  TBili  0.2  /  DBili  x   /  AST  21  /  ALT  19  /  AlkPhos  300<H>  05-30        CAPILLARY BLOOD GLUCOSE          Lower Extremity Physical Exam:    Vascular: DP/PT 0/4, B/L, CFT <3 seconds B/L, Temperature gradient warm to cool, B/L.   Neuro: Epicritic sensation intact to the level of heel  B/L.  Musculoskeletal/Ortho: bed bound  Skin: R foot heel wound largely granular with central partial thickness eschar, proximal wound edge underming at the level of subQ and probe to bone, mild serosanguineous drainage, no active signs of infection, R foot anterior ankle area wound to dermis, circumferential periwound erythema, no drainage.  L foot no acute signs of infection.       RADIOLOGY & ADDITIONAL TESTS:      Patient name: RENEA AVILES  Date of test: 5/30/2023  MR#: [id number]  [facility] #: [id number]    Location: [facility] Physician(s): , Ruthie Fang MD  Interpreted by: Chester Farrar MD, [facility], ZULMA, [facility]  Tech: Gonzales Guerra [facility]  Type of Test: LE Arterial: BRODY/Segm.  ------------------------------------------------------------------------  Procedure: Segmental Pressure/Waveform - complete  noninvasive physiologic studies of the bilateral lower  extremity arteries.  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)  ------------------------------------------------------------------------  PRESSURE (mm Hg):  ------------------------------------------------------------------------  RIGHT (BP):  Brachial: 91  High Thigh:  Low Thigh: 132  Calf: 123  Ankle-PT: 125  Ankle-DP: 114  Greate Toe: 100  BRODY: 1.37  ------------------------------------------------------------------------  LEFT (BP):  Brachial: 88  High Thigh:  Low Thigh: 121  Calf: 132  Ankle-PT: 112  Ankle-DP: 115  Greate Toe: 96  BRODY: 1.26  ------------------------------------------------------------------------  WAVEFORM:  ------------------------------------------------------------------------  RIGHT:  CFA:  Popliteal:  Ankle-PT:  Ankle-DP:  ------------------------------------------------------------------------  LEFT:  CFA:  Popliteal:  Ankle-PT:  Ankle-DP:  ------------------------------------------------------------------------  PSA/AVF:  ------------------------------------------------------------------------  RIGHT:  Pseudoaneurysm:  A-V fistula:  TBI: 1.10  ------------------------------------------------------------------------  LEFT:  Pseudoaneurysm:  A-V fistula:  TBI: 1.05  ------------------------------------------------------------------------  Right Findings: The ankle-brachial index (BRODY) on the  right is normal (1.37).  The toe-brachial index (TBI) on the right is normal  (1.10).  The right toe pressure is 100 mmHg.  Pulse volume recording (PVR) waveforms appear triphasic  with normal amplitudes throughout the right lower  extremity.  There is no significant decrease in segmental pressures  between arterial segments.  Left Findings: The ankle-brachial index (BRODY) on the left  is normal (1.26).  The toe-brachial index (TBI) on the left is normal (1.05).   The left toe pressure is 96 mmHg.  Pulse volume recording (PVR) waveforms appear triphasic  with normal amplitudes throughout the left lower  extremity.  There is no significant decrease in segmental pressures  between arterialsegments.  ------------------------------------------------------------------------  Summary/Impressions:  Normal BRODY/PVR study.  ------------------------------------------------------------------------  Confirmed on  5/30/2023 - 8:41 PM by Chester Farrar MD, FACC,  ZULMA, RPVI  By signing this report, the attending physician certifies  that he or she has personally supervised and interpreted  the vascular study and has reviewed and or edited and  agrees with the written comments contained within the  report.   Patient is a 62y old  Male who presents with a chief complaint of combative behavior (30 May 2023 18:31)       INTERVAL HPI/OVERNIGHT EVENTS:  Patient seen and evaluated at bedside.  Pt is resting comfortable in NAD. Denies N/V/F/C.  Pain rated at X/10    Allergies    codeine (Rash)  Haldol (Unknown)  Motrin (Rash)  penicillin (Hives; Anaphylaxis)  Toradol (Rash)  contrast media (iodine-based) (Rash)  Tylenol (Hives)  aspirin (Hives)  codeine (Unknown)    Intolerances        Vital Signs Last 24 Hrs  T(C): 36.8 (31 May 2023 06:00), Max: 37.1 (31 May 2023 01:15)  T(F): 98.2 (31 May 2023 06:00), Max: 98.7 (31 May 2023 01:15)  HR: 126 (31 May 2023 06:00) (63 - 140)  BP: 105/65 (31 May 2023 06:00) (89/56 - 116/77)  BP(mean): --  RR: 18 (31 May 2023 06:00) (18 - 18)  SpO2: 99% (31 May 2023 06:00) (98% - 100%)    Parameters below as of 31 May 2023 06:00  Patient On (Oxygen Delivery Method): nasal cannula  O2 Flow (L/min): 2      LABS:                        9.2    10.44 )-----------( 224      ( 30 May 2023 22:00 )             28.9     05-30    138  |  98  |  92<H>  ----------------------------<  173<H>  4.6   |  21<L>  |  6.06<H>    Ca    8.5      30 May 2023 22:00  Phos  4.7     05-30  Mg     2.40     05-30    TPro  6.0  /  Alb  3.2<L>  /  TBili  0.2  /  DBili  x   /  AST  21  /  ALT  19  /  AlkPhos  300<H>  05-30        CAPILLARY BLOOD GLUCOSE          Lower Extremity Physical Exam:    Vascular: DP/PT 0/4, B/L, CFT <3 seconds B/L, Temperature gradient warm to cool, B/L.   Neuro: Epicritic sensation intact to the level of heel  B/L.  Musculoskeletal/Ortho: bed bound  Skin: R foot heel wound largely granular with central partial thickness eschar, proximal wound edge undermining,  mild serosanguineous drainage, no active signs of infection, R foot anterior ankle area wound to dermis, circumferential periwound erythema, no drainage.  L foot no acute signs of infection.       RADIOLOGY & ADDITIONAL TESTS:      Patient name: RENEA AVILES  Date of test: 5/30/2023  MR#: [id number]  [facility] #: [id number]    Location: [facility] Physician(s): , Ruthie Fang MD  Interpreted by: Chester Farrar MD, [facility], [facility], [facility]  Tech: Gonzales Guerra, [facility]  Type of Test: LE Arterial: BRODY/Segm.  ------------------------------------------------------------------------  Procedure: Segmental Pressure/Waveform - complete  noninvasive physiologic studies of the bilateral lower  extremity arteries.  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)  ------------------------------------------------------------------------  PRESSURE (mm Hg):  ------------------------------------------------------------------------  RIGHT (BP):  Brachial: 91  High Thigh:  Low Thigh: 132  Calf: 123  Ankle-PT: 125  Ankle-DP: 114  Greate Toe: 100  BRODY: 1.37  ------------------------------------------------------------------------  LEFT (BP):  Brachial: 88  High Thigh:  Low Thigh: 121  Calf: 132  Ankle-PT: 112  Ankle-DP: 115  Greate Toe: 96  BRODY: 1.26  ------------------------------------------------------------------------  WAVEFORM:  ------------------------------------------------------------------------  RIGHT:  CFA:  Popliteal:  Ankle-PT:  Ankle-DP:  ------------------------------------------------------------------------  LEFT:  CFA:  Popliteal:  Ankle-PT:  Ankle-DP:  ------------------------------------------------------------------------  PSA/AVF:  ------------------------------------------------------------------------  RIGHT:  Pseudoaneurysm:  A-V fistula:  TBI: 1.10  ------------------------------------------------------------------------  LEFT:  Pseudoaneurysm:  A-V fistula:  TBI: 1.05  ------------------------------------------------------------------------  Right Findings: The ankle-brachial index (BRODY) on the  right is normal (1.37).  The toe-brachial index (TBI) on the right is normal  (1.10).  The right toe pressure is 100 mmHg.  Pulse volume recording (PVR) waveforms appear triphasic  with normal amplitudes throughout the right lower  extremity.  There is no significant decrease in segmental pressures  between arterial segments.  Left Findings: The ankle-brachial index (BRODY) on the left  is normal (1.26).  The toe-brachial index (TBI) on the left is normal (1.05).   The left toe pressure is 96 mmHg.  Pulse volume recording (PVR) waveforms appear triphasic  with normal amplitudes throughout the left lower  extremity.  There is no significant decrease in segmental pressures  between arterialsegments.  ------------------------------------------------------------------------  Summary/Impressions:  Normal BRODY/PVR study.  ------------------------------------------------------------------------  Confirmed on  5/30/2023 - 8:41 PM by Chester Farrar MD, FACTIFFANIE,  ZULMA, OMI  By signing this report, the attending physician certifies  that he or she has personally supervised and interpreted  the vascular study and has reviewed and or edited and  agrees with the written comments contained within the  report.

## 2023-05-31 NOTE — PROGRESS NOTE ADULT - PROBLEM SELECTOR PLAN 4
Pt on HD MWF  - Nephro following- continue HD  - c/w home sevelamer (monitor phos levels)  - Uremia improving

## 2023-05-31 NOTE — PROGRESS NOTE ADULT - PROBLEM SELECTOR PLAN 2
Spiked a fever on 5/27 : 100.9F. Now T ~99. -> s/p Vanc 1000mg x 1  Hx of MRSA in UC in March 2023  Follow up with BCx - NGTD   Urine Cx w/ likely contaminant     - Started on IV meropenem and   - Podiatry eval for Heel wound  - CT A/P ordered and pending (since pt needs to be pre-medicated and refused last night). will attempt again in am   -ID consulted given patient allergic to pencillin , rocephin and appreciate ID recs Spiked a fever on 5/27 : 100.9F. Now T ~99. -> s/p Vanc 1000mg x 1  Hx of MRSA in UC in March 2023  Follow up with BCx - NGTD   Urine Cx w/ likely contaminant     - Started on IV meropenem   - Podiatry eval for Heel wound  - CT A/P ordered and pending  -ID consulted given patient allergic to pencillin , rocephin and appreciate ID recs

## 2023-05-31 NOTE — PROVIDER CONTACT NOTE (OTHER) - ASSESSMENT
Patient A&Ox4, Patient just came back from dialysis, pt denies chest pain, palpitations, shortness of breath, or pain. Patient is in afib RVR to the 140s. other VS as documented.

## 2023-05-31 NOTE — PROGRESS NOTE ADULT - PROBLEM SELECTOR PLAN 3
complicated RVR and hypotension  Transferred to telemetry floor  EP consulted, suspect due  underlying cause. Also may be hypovolemic    - Continue Metoprolol, IVFs prn. Monitor volume status   - no signs sepsis, cx negative d/yumiko abx  - clinically improved  - can discontinue telemetry

## 2023-05-31 NOTE — PROGRESS NOTE ADULT - PROBLEM SELECTOR PLAN 11
Hb 7.4, likely nephrogenic, no signs bleeding.  s/p 1 U PRBC 5/17, appropriate response. Hgb now 10.5 and stable  likely 2/2 ESRD     - monitor hgb  - Keep hgb>7

## 2023-05-31 NOTE — CHART NOTE - NSCHARTNOTEFT_GEN_A_CORE
RN notified that patient's HR is 110-130s after returning from HD. Patient with hx of afib with RVR. Patient with elevated HR throughout the day and did not receive his metoprolol in AM secondary to ?soft BP. Patient seen at bedside. Patient appears comfortable. Denies chest pain, SOB or palpitations.   Afib with RVR    -Advised RN to monitor given patient just returned from HD.   BP: 100/65        ADDENDUM  Patient's HR continued to be elevated. BP showed: 115/77.  metoprolol 2.5mg IVP x 1 given. Will monitor closely

## 2023-05-31 NOTE — PROGRESS NOTE ADULT - ASSESSMENT
62y Male with R foot heel wound to bone, R foot anterior ankle wound to dermis  - Patient seen and evaluated  - Afebrile, WBC no leukocytosis, pending ESR 77, CRP 10.76  - R foot xray: demineralization along posterior calcaneus, possible OM.   - R foot heel wound largely granular with central partial thickness eschar, proximal wound edge undermining probes to bone, mild serosanguineous drainage, no active signs of infection, R foot anterior ankle area wound to dermis, circumferential periwound erythema, no drainage.  L foot no acute signs of infection.   - Please have patient wear Z- FLOWS boots at all time when in bed.   - Extensive discussion held with patient regarding the importance and necessity of proper offloading and using Z-flow for wound healing and limb salvage given the worsening progress of the R foot heel wound which was previously to dermis at previous admission and now probing to bone. Patient again states that he understood the risks but does not want to comply with Z flows.   - BRODY/PVR noted   - Recommend Vascular  consult due to non palpable pulses  - Recommend L foot MRI without contrast   - Pod plan local wound care pending MRI  - Discussed with attending.   62y Male with R foot heel wound to bone, R foot anterior ankle wound to dermis  - Patient seen and evaluated  - Afebrile, WBC no leukocytosis, pending ESR 77, CRP 10.76  - R foot xray: demineralization along posterior calcaneus, possible OM.   - R foot heel wound largely granular with central partial thickness eschar, proximal wound edge undermining,  mild serosanguineous drainage, no active signs of infection, R foot anterior ankle area wound to dermis, circumferential periwound erythema, no drainage.  L foot no acute signs of infection.   - Please have patient wear Z- FLOWS boots at all time when in bed.   - No culture obtained as no active signs of infection   - Extensive discussion held with patient regarding the importance and necessity of proper offloading and using Z-flow for wound healing and limb salvage given the worsening progress of the R foot heel wound which was previously to dermis at previous admission and now probing to bone. Patient again states that he understood the risks but does not want to comply with Z flows.   - BRODY/PVR noted   - Recommend Vascular  consult due to non palpable pulses  - Recommend L foot MRI without contrast   - Pod plan local wound care pending MRI  - Discussed with attending.

## 2023-05-31 NOTE — PROGRESS NOTE ADULT - PROBLEM SELECTOR PLAN 1
Pt recently discharged from [facility] on 4/27/23   Agitation possibly 2/2 malingering as patients admits this was his motive  intermittently refusing labs  Seen by  team on last admission, reconsulted for this admission   Continue klonopin 0.5 mg qam and 0.75 mg qpm  Avoid IV pain medications   c/w Zyprexa 2.5 q6 prn agitation.

## 2023-05-31 NOTE — PROGRESS NOTE ADULT - SUBJECTIVE AND OBJECTIVE BOX
Patient is a 62y old  Male who presents with a chief complaint of combative behavior (31 May 2023 11:45)      SUBJECTIVE / OVERNIGHT EVENTS:    No events overnight. This AM, patient without n/v/d/cp/sob.      MEDICATIONS  (STANDING):  apixaban 5 milliGRAM(s) Oral two times a day  ascorbic acid 500 milliGRAM(s) Oral daily  atorvastatin 40 milliGRAM(s) Oral at bedtime  baclofen 5 milliGRAM(s) Oral every 12 hours  calamine/zinc oxide Lotion 1 Application(s) Topical three times a day  chlorhexidine 2% Cloths 1 Application(s) Topical daily  clonazePAM  Tablet 0.75 milliGRAM(s) Oral <User Schedule>  clonazePAM  Tablet 0.5 milliGRAM(s) Oral <User Schedule>  epoetin marilin-epbx (RETACRIT) Injectable 4000 Unit(s) IV Push <User Schedule>  levETIRAcetam 250 milliGRAM(s) Oral <User Schedule>  levETIRAcetam 500 milliGRAM(s) Oral daily  levothyroxine 100 MICROGram(s) Oral daily  loratadine 10 milliGRAM(s) Oral daily  meropenem  IVPB 500 milliGRAM(s) IV Intermittent every 24 hours  metoprolol succinate ER 50 milliGRAM(s) Oral daily  metoprolol tartrate 25 milliGRAM(s) Oral once  multivitamin 1 Tablet(s) Oral daily  Nephro-mirna 1 Tablet(s) Oral daily  polyethylene glycol 3350 17 Gram(s) Oral daily  senna 2 Tablet(s) Oral at bedtime    MEDICATIONS  (PRN):  albuterol/ipratropium for Nebulization 3 milliLiter(s) Nebulizer every 6 hours PRN Shortness of Breath and/or Wheezing  bisacodyl Suppository 10 milliGRAM(s) Rectal daily PRN Constipation  hydrocortisone 1% Ointment 1 Application(s) Topical three times a day PRN Rash and/or Itching  lidocaine   4% Patch 1 Patch Transdermal daily PRN left shoulder/trap  melatonin 3 milliGRAM(s) Oral at bedtime PRN Insomnia  midodrine. 5 milliGRAM(s) Oral <User Schedule> PRN pre-dialysis  OLANZapine Injectable 2.5 milliGRAM(s) IntraMuscular every 6 hours PRN agitation      PHYSICAL EXAM:  T(C): 36.7 (05-31-23 @ 12:44), Max: 37.1 (05-31-23 @ 01:15)  HR: 111 (05-31-23 @ 12:44) (63 - 140)  BP: 93/59 (05-31-23 @ 12:44) (93/59 - 116/77)  RR: 18 (05-31-23 @ 12:44) (18 - 18)  SpO2: 100% (05-31-23 @ 12:44) (99% - 100%)  I&O's Summary    30 May 2023 07:01  -  31 May 2023 07:00  --------------------------------------------------------  IN: 400 mL / OUT: 400 mL / NET: 0 mL    31 May 2023 07:01  -  31 May 2023 17:59  --------------------------------------------------------  IN: 0 mL / OUT: 600 mL / NET: -600 mL      GENERAL: NAD, well-developed  HEAD:  Atraumatic, Normocephalic, MMM  CHEST/LUNG: No use of accessory muscles, CTAB, breathing non-labored  COR: RR, no mrcg  ABD: Soft, ND/NT, +BS  PSYCH: AAOx3  NEUROLOGY: CN II-XII grossly intact, moving all extremities  SKIN: No rashes or lesions  EXT: wwp, no cce    LABS:  CAPILLARY BLOOD GLUCOSE                              9.2    10.44 )-----------( 224      ( 30 May 2023 22:00 )             28.9     05-30    138  |  98  |  92<H>  ----------------------------<  173<H>  4.6   |  21<L>  |  6.06<H>    Ca    8.5      30 May 2023 22:00  Phos  4.7     05-30  Mg     2.40     05-30    TPro  6.0  /  Alb  3.2<L>  /  TBili  0.2  /  DBili  x   /  AST  21  /  ALT  19  /  AlkPhos  300<H>  05-30                RADIOLOGY & ADDITIONAL TESTS:    Telemetry Personally Reviewed -     Imaging Personally Reviewed -     Imaging Reviewed -     Consultant(s) Notes Reviewed -       Care Discussed with Consultants/Other Providers -  Patient is a 62y old  Male who presents with a chief complaint of combative behavior (31 May 2023 11:45)      SUBJECTIVE / OVERNIGHT EVENTS:    No events overnight. This AM, patient without n/v/d/cp/sob.  Patient reports feeling well and denies any acute complaints.     MEDICATIONS  (STANDING):  apixaban 5 milliGRAM(s) Oral two times a day  ascorbic acid 500 milliGRAM(s) Oral daily  atorvastatin 40 milliGRAM(s) Oral at bedtime  baclofen 5 milliGRAM(s) Oral every 12 hours  calamine/zinc oxide Lotion 1 Application(s) Topical three times a day  chlorhexidine 2% Cloths 1 Application(s) Topical daily  clonazePAM  Tablet 0.75 milliGRAM(s) Oral <User Schedule>  clonazePAM  Tablet 0.5 milliGRAM(s) Oral <User Schedule>  epoetin marilin-epbx (RETACRIT) Injectable 4000 Unit(s) IV Push <User Schedule>  levETIRAcetam 250 milliGRAM(s) Oral <User Schedule>  levETIRAcetam 500 milliGRAM(s) Oral daily  levothyroxine 100 MICROGram(s) Oral daily  loratadine 10 milliGRAM(s) Oral daily  meropenem  IVPB 500 milliGRAM(s) IV Intermittent every 24 hours  metoprolol succinate ER 50 milliGRAM(s) Oral daily  metoprolol tartrate 25 milliGRAM(s) Oral once  multivitamin 1 Tablet(s) Oral daily  Nephro-mirna 1 Tablet(s) Oral daily  polyethylene glycol 3350 17 Gram(s) Oral daily  senna 2 Tablet(s) Oral at bedtime    MEDICATIONS  (PRN):  albuterol/ipratropium for Nebulization 3 milliLiter(s) Nebulizer every 6 hours PRN Shortness of Breath and/or Wheezing  bisacodyl Suppository 10 milliGRAM(s) Rectal daily PRN Constipation  hydrocortisone 1% Ointment 1 Application(s) Topical three times a day PRN Rash and/or Itching  lidocaine   4% Patch 1 Patch Transdermal daily PRN left shoulder/trap  melatonin 3 milliGRAM(s) Oral at bedtime PRN Insomnia  midodrine. 5 milliGRAM(s) Oral <User Schedule> PRN pre-dialysis  OLANZapine Injectable 2.5 milliGRAM(s) IntraMuscular every 6 hours PRN agitation      PHYSICAL EXAM:  T(C): 36.7 (05-31-23 @ 12:44), Max: 37.1 (05-31-23 @ 01:15)  HR: 111 (05-31-23 @ 12:44) (63 - 140)  BP: 93/59 (05-31-23 @ 12:44) (93/59 - 116/77)  RR: 18 (05-31-23 @ 12:44) (18 - 18)  SpO2: 100% (05-31-23 @ 12:44) (99% - 100%)  I&O's Summary    30 May 2023 07:01  -  31 May 2023 07:00  --------------------------------------------------------  IN: 400 mL / OUT: 400 mL / NET: 0 mL    31 May 2023 07:01  -  31 May 2023 17:59  --------------------------------------------------------  IN: 0 mL / OUT: 600 mL / NET: -600 mL      GENERAL: NAD, elderly male resting in bed   HEAD:  Atraumatic, Normocephalic, MMM  CHEST/LUNG: No use of accessory muscles, CTAB, breathing non-labored  COR: RR, no mrcg  ABD: Soft, ND/NT, +BS, Suprapubic catheter in place   PSYCH: AAOx3  NEUROLOGY: unable to move extremities, able to converse   SKIN: multiple wounds on R foot and abrasions noted on feet   EXT: not able to move extremities    LABS:  CAPILLARY BLOOD GLUCOSE                              9.2    10.44 )-----------( 224      ( 30 May 2023 22:00 )             28.9     05-30    138  |  98  |  92<H>  ----------------------------<  173<H>  4.6   |  21<L>  |  6.06<H>    Ca    8.5      30 May 2023 22:00  Phos  4.7     05-30  Mg     2.40     05-30    TPro  6.0  /  Alb  3.2<L>  /  TBili  0.2  /  DBili  x   /  AST  21  /  ALT  19  /  AlkPhos  300<H>  05-30                RADIOLOGY & ADDITIONAL TESTS:    Telemetry Personally Reviewed -     Imaging Personally Reviewed -     Imaging Reviewed -     Consultant(s) Notes Reviewed -       Care Discussed with Consultants/Other Providers -

## 2023-05-31 NOTE — PROVIDER CONTACT NOTE (OTHER) - ASSESSMENT
Patient is A&Ox4, patient refusing new IV and morning blood draw. No acute s/s of distress noted at this time. Patient had labs done last night at hemodialysis.

## 2023-06-01 NOTE — PROGRESS NOTE ADULT - NSPROGADDITIONALINFOA_GEN_ALL_CORE
ANy questions, please contact me    Eloina Mancilla MD  Nephrology Attending  [phone]  After 5pm and on weekends please contact renal fellow on call

## 2023-06-01 NOTE — PROGRESS NOTE ADULT - SUBJECTIVE AND OBJECTIVE BOX
Podiatry pager #: [phone] ([place])/ [phone] ([facility])    Patient is a 62y old  Male who presents with a chief complaint of combative behavior (31 May 2023 15:52)       INTERVAL HPI/OVERNIGHT EVENTS:  Patient seen and evaluated at bedside.  Pt is resting comfortable in NAD. Denies N/V/F/C.     Allergies    codeine (Rash)  Haldol (Unknown)  Motrin (Rash)  penicillin (Hives; Anaphylaxis)  Toradol (Rash)  contrast media (iodine-based) (Rash)  Tylenol (Hives)  aspirin (Hives)  codeine (Unknown)    Intolerances        Vital Signs Last 24 Hrs  T(C): 36.6 (01 Jun 2023 05:09), Max: 37.2 (31 May 2023 21:45)  T(F): 97.9 (01 Jun 2023 05:09), Max: 98.9 (31 May 2023 21:45)  HR: 90 (01 Jun 2023 05:09) (90 - 133)  BP: 104/77 (01 Jun 2023 05:09) (93/59 - 104/77)  BP(mean): --  RR: 20 (01 Jun 2023 05:09) (18 - 20)  SpO2: 100% (01 Jun 2023 05:09) (99% - 100%)    Parameters below as of 01 Jun 2023 09:21  Patient On (Oxygen Delivery Method): nasal cannula        LABS:                        9.2    10.44 )-----------( 224      ( 30 May 2023 22:00 )             28.9     05-30    138  |  98  |  92<H>  ----------------------------<  173<H>  4.6   |  21<L>  |  6.06<H>    Ca    8.5      30 May 2023 22:00  Phos  4.7     05-30  Mg     2.40     05-30    TPro  6.0  /  Alb  3.2<L>  /  TBili  0.2  /  DBili  x   /  AST  21  /  ALT  19  /  AlkPhos  300<H>  05-30        CAPILLARY BLOOD GLUCOSE          Lower Extremity Physical Exam:  Vascular: DP/PT 0/4, B/L, CFT <3 seconds B/L, Temperature gradient warm to cool, B/L.   Neuro: Epicritic sensation intact to the level of heel  B/L.  Musculoskeletal/Ortho: bed bound  Skin: R foot heel wound largely granular with central partial thickness eschar, proximal wound edge undermining,  mild serosanguineous drainage, no active signs of infection, R foot anterior ankle area wound to dermis, circumferential periwound erythema, no drainage.  L foot no acute signs of infection.   RADIOLOGY & ADDITIONAL TESTS:

## 2023-06-01 NOTE — PROGRESS NOTE ADULT - SUBJECTIVE AND OBJECTIVE BOX
Patient is a 62y old  Male who presents with a chief complaint of combative behavior (01 Jun 2023 12:25)      SUBJECTIVE / OVERNIGHT EVENTS:    No events overnight. This AM, patient without n/v/d/cp/sob.      MEDICATIONS  (STANDING):  apixaban 5 milliGRAM(s) Oral two times a day  ascorbic acid 500 milliGRAM(s) Oral daily  atorvastatin 40 milliGRAM(s) Oral at bedtime  baclofen 5 milliGRAM(s) Oral every 12 hours  calamine/zinc oxide Lotion 1 Application(s) Topical three times a day  chlorhexidine 2% Cloths 1 Application(s) Topical daily  clonazePAM  Tablet 0.5 milliGRAM(s) Oral <User Schedule>  clonazePAM  Tablet 0.75 milliGRAM(s) Oral <User Schedule>  epoetin marilin-epbx (RETACRIT) Injectable 4000 Unit(s) IV Push <User Schedule>  levETIRAcetam 500 milliGRAM(s) Oral daily  levETIRAcetam 250 milliGRAM(s) Oral <User Schedule>  levothyroxine 100 MICROGram(s) Oral daily  loratadine 10 milliGRAM(s) Oral daily  meropenem  IVPB 500 milliGRAM(s) IV Intermittent every 24 hours  metoprolol succinate ER 50 milliGRAM(s) Oral daily  multivitamin 1 Tablet(s) Oral daily  Nephro-mirna 1 Tablet(s) Oral daily  polyethylene glycol 3350 17 Gram(s) Oral daily  senna 2 Tablet(s) Oral at bedtime    MEDICATIONS  (PRN):  albuterol/ipratropium for Nebulization 3 milliLiter(s) Nebulizer every 6 hours PRN Shortness of Breath and/or Wheezing  bisacodyl Suppository 10 milliGRAM(s) Rectal daily PRN Constipation  hydrocortisone 1% Ointment 1 Application(s) Topical three times a day PRN Rash and/or Itching  lidocaine   4% Patch 1 Patch Transdermal daily PRN left shoulder/trap  melatonin 3 milliGRAM(s) Oral at bedtime PRN Insomnia  midodrine. 5 milliGRAM(s) Oral <User Schedule> PRN pre-dialysis  OLANZapine Injectable 2.5 milliGRAM(s) IntraMuscular every 6 hours PRN agitation      PHYSICAL EXAM:  T(C): 36.8 (06-01-23 @ 12:26), Max: 37.2 (05-31-23 @ 21:45)  HR: 104 (06-01-23 @ 12:26) (90 - 133)  BP: 101/59 (06-01-23 @ 12:26) (99/50 - 104/77)  RR: 18 (06-01-23 @ 12:26) (18 - 20)  SpO2: 100% (06-01-23 @ 12:26) (99% - 100%)  I&O's Summary    31 May 2023 07:01  -  01 Jun 2023 07:00  --------------------------------------------------------  IN: 0 mL / OUT: 1150 mL / NET: -1150 mL    01 Jun 2023 07:01  -  01 Jun 2023 14:50  --------------------------------------------------------  IN: 0 mL / OUT: 300 mL / NET: -300 mL      GENERAL: NAD, well-developed  HEAD:  Atraumatic, Normocephalic, MMM  CHEST/LUNG: No use of accessory muscles, CTAB, breathing non-labored  COR: RR, no mrcg  ABD: Soft, ND/NT, +BS  PSYCH: AAOx3  NEUROLOGY: CN II-XII grossly intact, moving all extremities  SKIN: No rashes or lesions  EXT: wwp, no cce    LABS:  CAPILLARY BLOOD GLUCOSE                              9.2    10.44 )-----------( 224      ( 30 May 2023 22:00 )             28.9     05-30    138  |  98  |  92<H>  ----------------------------<  173<H>  4.6   |  21<L>  |  6.06<H>    Ca    8.5      30 May 2023 22:00  Phos  4.7     05-30  Mg     2.40     05-30    TPro  6.0  /  Alb  3.2<L>  /  TBili  0.2  /  DBili  x   /  AST  21  /  ALT  19  /  AlkPhos  300<H>  05-30                RADIOLOGY & ADDITIONAL TESTS:    Telemetry Personally Reviewed -     Imaging Personally Reviewed -     Imaging Reviewed -     Consultant(s) Notes Reviewed -       Care Discussed with Consultants/Other Providers -  Patient is a 62y old  Male who presents with a chief complaint of combative behavior (01 Jun 2023 12:25)      SUBJECTIVE / OVERNIGHT EVENTS:    No events overnight. This AM, patient without n/v/d/cp/sob.  pt reports feeling better and denies any acute complaints.     MEDICATIONS  (STANDING):  apixaban 5 milliGRAM(s) Oral two times a day  ascorbic acid 500 milliGRAM(s) Oral daily  atorvastatin 40 milliGRAM(s) Oral at bedtime  baclofen 5 milliGRAM(s) Oral every 12 hours  calamine/zinc oxide Lotion 1 Application(s) Topical three times a day  chlorhexidine 2% Cloths 1 Application(s) Topical daily  clonazePAM  Tablet 0.5 milliGRAM(s) Oral <User Schedule>  clonazePAM  Tablet 0.75 milliGRAM(s) Oral <User Schedule>  epoetin marilin-epbx (RETACRIT) Injectable 4000 Unit(s) IV Push <User Schedule>  levETIRAcetam 500 milliGRAM(s) Oral daily  levETIRAcetam 250 milliGRAM(s) Oral <User Schedule>  levothyroxine 100 MICROGram(s) Oral daily  loratadine 10 milliGRAM(s) Oral daily  meropenem  IVPB 500 milliGRAM(s) IV Intermittent every 24 hours  metoprolol succinate ER 50 milliGRAM(s) Oral daily  multivitamin 1 Tablet(s) Oral daily  Nephro-mirna 1 Tablet(s) Oral daily  polyethylene glycol 3350 17 Gram(s) Oral daily  senna 2 Tablet(s) Oral at bedtime    MEDICATIONS  (PRN):  albuterol/ipratropium for Nebulization 3 milliLiter(s) Nebulizer every 6 hours PRN Shortness of Breath and/or Wheezing  bisacodyl Suppository 10 milliGRAM(s) Rectal daily PRN Constipation  hydrocortisone 1% Ointment 1 Application(s) Topical three times a day PRN Rash and/or Itching  lidocaine   4% Patch 1 Patch Transdermal daily PRN left shoulder/trap  melatonin 3 milliGRAM(s) Oral at bedtime PRN Insomnia  midodrine. 5 milliGRAM(s) Oral <User Schedule> PRN pre-dialysis  OLANZapine Injectable 2.5 milliGRAM(s) IntraMuscular every 6 hours PRN agitation      PHYSICAL EXAM:  T(C): 36.8 (06-01-23 @ 12:26), Max: 37.2 (05-31-23 @ 21:45)  HR: 104 (06-01-23 @ 12:26) (90 - 133)  BP: 101/59 (06-01-23 @ 12:26) (99/50 - 104/77)  RR: 18 (06-01-23 @ 12:26) (18 - 20)  SpO2: 100% (06-01-23 @ 12:26) (99% - 100%)  I&O's Summary    31 May 2023 07:01  -  01 Jun 2023 07:00  --------------------------------------------------------  IN: 0 mL / OUT: 1150 mL / NET: -1150 mL    01 Jun 2023 07:01  -  01 Jun 2023 14:50  --------------------------------------------------------  IN: 0 mL / OUT: 300 mL / NET: -300 mL      HEAD:  Atraumatic, Normocephalic, MMM  CHEST/LUNG: No use of accessory muscles, CTAB, breathing non-labored  COR: RR, no mrcg  ABD: Soft, ND/NT, +BS, Suprapubic catheter in place   PSYCH: AAOx3  NEUROLOGY: unable to move extremities, able to converse   SKIN: multiple wounds on R foot and abrasions noted on feet   EXT: able to move all extremities      LABS:  CAPILLARY BLOOD GLUCOSE                              9.2    10.44 )-----------( 224      ( 30 May 2023 22:00 )             28.9     05-30    138  |  98  |  92<H>  ----------------------------<  173<H>  4.6   |  21<L>  |  6.06<H>    Ca    8.5      30 May 2023 22:00  Phos  4.7     05-30  Mg     2.40     05-30    TPro  6.0  /  Alb  3.2<L>  /  TBili  0.2  /  DBili  x   /  AST  21  /  ALT  19  /  AlkPhos  300<H>  05-30                RADIOLOGY & ADDITIONAL TESTS:    Telemetry Personally Reviewed -     Imaging Personally Reviewed -     Imaging Reviewed -     Consultant(s) Notes Reviewed -       Care Discussed with Consultants/Other Providers -

## 2023-06-01 NOTE — PROGRESS NOTE ADULT - PROBLEM SELECTOR PLAN 2
Spiked a fever on 5/27 : 100.9F. Now T ~99. -> s/p Vanc 1000mg x 1  Hx of MRSA in UC in March 2023  Follow up with [facility] - NGTD   Urine Cx w/ likely contaminant     - Started on IV meropenem   - Podiatry eval for Heel wound  - CT A/P ordered and pending  -ID consulted given patient allergic to pencillin , rocephin and appreciate ID recs Spiked a fever on 5/27 : 100.9F. Now T ~99. -> s/p Vanc 1000mg x 1  Hx of MRSA in UC in March 2023  Follow up with [facility] - NGTD   Urine Cx w/ likely contaminant     - Started on IV meropenem   - Podiatry eval for Heel wound  - CT A/P ordered and pending  -ID consulted given patient allergic to pencillin , rocephin and appreciate ID recs  - follow up repeat Urine Cx

## 2023-06-01 NOTE — PROGRESS NOTE ADULT - PROBLEM SELECTOR PLAN 12
c/w home Imdur and metoprolol  DASH diet     #multiple wounds on R foot/ toes  seen by podiatry on previous admission - no role for surgical intervention  wound care following; recs appreciated however, pt refusing wound care

## 2023-06-01 NOTE — PROGRESS NOTE ADULT - SUBJECTIVE AND OBJECTIVE BOX
[facility] DIVISION OF KIDNEY DISEASES AND HYPERTENSION -- FOLLOW UP NOTE  --------------------------------------------------------------------------------  Chief Complaint:    24 hour events/subjective:        PAST HISTORY  --------------------------------------------------------------------------------  No significant changes to PMH, PSH, FHx, SHx, unless otherwise noted    ALLERGIES & MEDICATIONS  --------------------------------------------------------------------------------  Allergies    codeine (Rash)  Haldol (Unknown)  Motrin (Rash)  penicillin (Hives; Anaphylaxis)  Toradol (Rash)  contrast media (iodine-based) (Rash)  Tylenol (Hives)  aspirin (Hives)  codeine (Unknown)    Intolerances      Standing Inpatient Medications  apixaban 5 milliGRAM(s) Oral two times a day  ascorbic acid 500 milliGRAM(s) Oral daily  atorvastatin 40 milliGRAM(s) Oral at bedtime  baclofen 5 milliGRAM(s) Oral every 12 hours  calamine/zinc oxide Lotion 1 Application(s) Topical three times a day  chlorhexidine 2% Cloths 1 Application(s) Topical daily  clonazePAM  Tablet 0.75 milliGRAM(s) Oral <User Schedule>  clonazePAM  Tablet 0.5 milliGRAM(s) Oral <User Schedule>  epoetin amrilin-epbx (RETACRIT) Injectable 4000 Unit(s) IV Push <User Schedule>  levETIRAcetam 500 milliGRAM(s) Oral daily  levETIRAcetam 250 milliGRAM(s) Oral <User Schedule>  levothyroxine 100 MICROGram(s) Oral daily  loratadine 10 milliGRAM(s) Oral daily  meropenem  IVPB 500 milliGRAM(s) IV Intermittent every 24 hours  metoprolol succinate ER 50 milliGRAM(s) Oral daily  multivitamin 1 Tablet(s) Oral daily  Nephro-mirna 1 Tablet(s) Oral daily  polyethylene glycol 3350 17 Gram(s) Oral daily  senna 2 Tablet(s) Oral at bedtime    PRN Inpatient Medications  albuterol/ipratropium for Nebulization 3 milliLiter(s) Nebulizer every 6 hours PRN  bisacodyl Suppository 10 milliGRAM(s) Rectal daily PRN  hydrocortisone 1% Ointment 1 Application(s) Topical three times a day PRN  lidocaine   4% Patch 1 Patch Transdermal daily PRN  melatonin 3 milliGRAM(s) Oral at bedtime PRN  midodrine. 5 milliGRAM(s) Oral <User Schedule> PRN  OLANZapine Injectable 2.5 milliGRAM(s) IntraMuscular every 6 hours PRN        VITALS/PHYSICAL EXAM  --------------------------------------------------------------------------------  T(C): 36.6 (06-01-23 @ 05:09), Max: 37.2 (05-31-23 @ 21:45)  HR: 90 (06-01-23 @ 05:09) (90 - 133)  BP: 104/77 (06-01-23 @ 05:09) (93/59 - 104/77)  RR: 20 (06-01-23 @ 05:09) (18 - 20)  SpO2: 100% (06-01-23 @ 05:09) (99% - 100%)  Wt(kg): --        05-31-23 @ 07:01  -  06-01-23 @ 07:00  --------------------------------------------------------  IN: 0 mL / OUT: 1150 mL / NET: -1150 mL      Physical Exam:  Gen: NAD  HEENT: supple neck  Pulm: CTA B/L  CV: RRR, S1S2; no rub  LE: No edema in lower ext bilaterally  Vascular access: right Permcath, dressing intact no bleeding    LABS/STUDIES  --------------------------------------------------------------------------------              9.2    10.44 >-----------<  224      [05-30-23 @ 22:00]              28.9     138  |  98  |  92  ----------------------------<  173      [05-30-23 @ 22:00]  4.6   |  21  |  6.06        Ca     8.5     [05-30-23 @ 22:00]      Mg     2.40     [05-30-23 @ 22:00]      Phos  4.7     [05-30-23 @ 22:00]    TPro  6.0  /  Alb  3.2  /  TBili  0.2  /  DBili  x   /  AST  21  /  ALT  19  /  AlkPhos  300  [05-30-23 @ 22:00]          Creatinine Trend:  SCr 6.06 [05-30 @ 22:00]  SCr 5.53 [05-29 @ 16:45]  SCr 2.81 [05-27 @ 03:15]  SCr 2.24 [05-26 @ 21:43]  SCr 3.71 [05-26 @ 06:55]    Urinalysis - [05-27-23 @ 12:45]      Color Light Orange / Appearance Turbid / SG 1.017 / pH 6.5      Gluc Negative / Ketone Negative  / Bili Negative / Urobili <2 mg/dL       Blood Large / Protein 300 mg/dL / Leuk Est Large / Nitrite Negative       / WBC >720 / Hyaline 62 / Gran 0 - 2 / Sq Epi  / Non Sq Epi  / Bacteria Few      Iron 66, TIBC 206, %sat 32      [03-12-23 @ 06:31]  Ferritin 964      [03-12-23 @ 06:31]  HbA1c 6.2      [01-03-20 @ 13:57]  TSH <0.10      [05-11-23 @ 00:25]  Lipid: chol 107, TG 68, HDL 42, LDL --      [04-21-23 @ 16:15]    HBsAg Nonreact      [05-22-23 @ 16:25]  HCV 8.30, Reactive      [05-22-23 @ 16:25]

## 2023-06-01 NOTE — PROGRESS NOTE ADULT - SUBJECTIVE AND OBJECTIVE BOX
Follow Up:  fever, lethargy    Interval History/ROS: no more fever, no foot pain but has a heel ulcer, no diarrhea, had dysuria but now resolved and feels well            Allergies  codeine (Rash)  Haldol (Unknown)  Motrin (Rash)  penicillin (Hives; Anaphylaxis)  Toradol (Rash)  contrast media (iodine-based) (Rash)  Tylenol (Hives)  aspirin (Hives)  codeine (Unknown)        ANTIMICROBIALS:  meropenem  IVPB 500 every 24 hours      OTHER MEDS:  albuterol/ipratropium for Nebulization 3 milliLiter(s) Nebulizer every 6 hours PRN  apixaban 5 milliGRAM(s) Oral two times a day  ascorbic acid 500 milliGRAM(s) Oral daily  atorvastatin 40 milliGRAM(s) Oral at bedtime  baclofen 5 milliGRAM(s) Oral every 12 hours  bisacodyl Suppository 10 milliGRAM(s) Rectal daily PRN  calamine/zinc oxide Lotion 1 Application(s) Topical three times a day  chlorhexidine 2% Cloths 1 Application(s) Topical daily  clonazePAM  Tablet 0.75 milliGRAM(s) Oral <User Schedule>  clonazePAM  Tablet 0.5 milliGRAM(s) Oral <User Schedule>  epoetin marilin-epbx (RETACRIT) Injectable 4000 Unit(s) IV Push <User Schedule>  hydrocortisone 1% Ointment 1 Application(s) Topical three times a day PRN  levETIRAcetam 500 milliGRAM(s) Oral daily  levETIRAcetam 250 milliGRAM(s) Oral <User Schedule>  levothyroxine 100 MICROGram(s) Oral daily  lidocaine   4% Patch 1 Patch Transdermal daily PRN  loratadine 10 milliGRAM(s) Oral daily  melatonin 3 milliGRAM(s) Oral at bedtime PRN  metoprolol succinate ER 50 milliGRAM(s) Oral daily  midodrine. 5 milliGRAM(s) Oral <User Schedule> PRN  multivitamin 1 Tablet(s) Oral daily  Nephro-mirna 1 Tablet(s) Oral daily  OLANZapine Injectable 2.5 milliGRAM(s) IntraMuscular every 6 hours PRN  polyethylene glycol 3350 17 Gram(s) Oral daily  senna 2 Tablet(s) Oral at bedtime      Vital Signs Last 24 Hrs  T(C): 36.8 (01 Jun 2023 12:26), Max: 37.2 (31 May 2023 21:45)  T(F): 98.3 (01 Jun 2023 12:26), Max: 98.9 (31 May 2023 21:45)  HR: 104 (01 Jun 2023 12:26) (90 - 133)  BP: 101/59 (01 Jun 2023 12:26) (99/50 - 104/77)  BP(mean): --  RR: 18 (01 Jun 2023 12:26) (18 - 20)  SpO2: 100% (01 Jun 2023 12:26) (99% - 100%)    Parameters below as of 01 Jun 2023 09:21  Patient On (Oxygen Delivery Method): nasal cannula        Physical Exam:  General:    NAD,  non toxic  Cardio:    AICD with no tenderness or erythema  Respiratory:    comfortable on RA  abd:     soft,     no tenderness  :   no CVAT,   suprapubic cath  Musculoskeletal:   heel ulcer but no purulnece  vascular: no phlebitis  Skin:    no rash                          9.2    10.44 )-----------( 224      ( 30 May 2023 22:00 )             28.9       05-30    138  |  98  |  92<H>  ----------------------------<  173<H>  4.6   |  21<L>  |  6.06<H>    Ca    8.5      30 May 2023 22:00  Phos  4.7     05-30  Mg     2.40     05-30    TPro  6.0  /  Alb  3.2<L>  /  TBili  0.2  /  DBili  x   /  AST  21  /  ALT  19  /  AlkPhos  300<H>  05-30          MICROBIOLOGY:  v  .Blood Blood-Venous  05-27-23   No Growth Final  --  --      .Blood Blood-Peripheral  05-27-23   No Growth Final  --  --      Catheterized Catheterized  05-27-23   >=3 organisms. Probable collection contamination.  --  --      .Blood Blood-Peripheral  05-11-23   No Growth Final  --  --      .Blood Blood-Peripheral  05-11-23   No Growth Final  --  --      .Blood Blood-Peripheral  05-10-23   No Growth Final  --  --          Rapid RVP Result: Iaintec (05-27 @ 12:45)        RADIOLOGY:  Images independently visualized and reviewed personally, findings as below  < from: CT Abdomen and Pelvis w/ IV Cont (05.31.23 @ 17:13) >  IMPRESSION:  Moderate to large rectal stool burden.    Trace bilateral pleural effusions and small pericardial effusion.    Sacral decubitus ulcer with erosion of the coccyx, concerning for   osteomyelitis.    < end of copied text >  < from: Xray Foot AP + Lateral + Oblique, Right (05.29.23 @ 22:50) >  IMPRESSION:  Deficiency of the skin overlying the left heel may reflect the known   ulcer.  Sclerosis and areas of demineralization along theposterior calcaneus. In   the setting of a known ulcer, findings are concerning for osteomyelitis.   Further evaluation with MRI can be performed.    < end of copied text >

## 2023-06-01 NOTE — PROGRESS NOTE ADULT - ASSESSMENT
62 m with HTN, CAD, a-fib, CHFAICD, CVA (WC bound, quadriparesis), COPD,  ESRD (T/R/S), , seizure d/o, neurogenic bladder (SPC), BARRY, hypothyroidism, RCC (s/p partial nephrectomy),  prostate CA (s/p radiation) presents from [place] for combative behavior.  Febrile on 5/27, no leukocytosis but now lethargic with vomiting and abd pain, has a suprapubic cath but stated that urinated normally and had dysuria  UA showing pyuria, UCx is contaminated, likely collected from existing SPC  RVP negative  BCx (5/27) NGTD  CXR no PNA  Right heel wound has drainage, periwound erythema  Podiatry does not think it is infected. 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

## 2023-06-01 NOTE — CHART NOTE - NSCHARTNOTEFT_GEN_A_CORE
UCx positive for candida auris. Assuming kidney involvement given fever. Started on fluconazole 800mg IVPB x1 loading s/p HD. 400mg IVPB TIW post-HD days.     Robert Rubio PA-C  Department of Internal Medicine  In-House beeper number [id number]

## 2023-06-01 NOTE — PROGRESS NOTE ADULT - PROBLEM SELECTOR PLAN 3
complicated RVR and hypotension  Transferred to telemetry floor  EP consulted, suspect due  underlying cause. Also may be hypovolemic    - Continue Metoprolol, IVFs prn. Monitor volume status   - no signs sepsis, cx negative d/yumiko abx  - clinically improved  - can discontinue telemetry complicated RVR and hypotension  Transferred to telemetry floor  EP consulted, suspect due  underlying cause. Also may be hypovolemic      - Continue Metoprolol, IVFs prn. Monitor volume status   - no signs sepsis, cx negative d/yumiko abx  - clinically improved  -  continue telemetry

## 2023-06-01 NOTE — PROGRESS NOTE ADULT - PROBLEM SELECTOR PLAN 1
Pt recently discharged from [facility] on 4/27/23   Agitation possibly 2/2 malingering as patients admits this was his motive  intermittently refusing labs  Seen by  team on last admission, reconsulted for this admission   Continue klonopin 0.5 mg qam and 0.75 mg qpm  Avoid IV pain medications   c/w Zyprexa 2.5 q6 prn agitation.

## 2023-06-01 NOTE — PROGRESS NOTE ADULT - PROBLEM SELECTOR PLAN 1
Pt. with ESRD on HD TIW. did not get dialyzed 5/29 due to hypotension. Will attempt dialysis again today with no fluid removal. Infectious work up in progress.

## 2023-06-01 NOTE — PROGRESS NOTE ADULT - ASSESSMENT
62y Male with R foot heel wound to bone, R foot anterior ankle wound to dermis  - Patient seen and evaluated  - Afebrile, WBC 10.44  - R foot xray: demineralization along posterior calcaneus, possible OM.   - R foot heel wound largely granular with central partial thickness eschar, proximal wound edge undermining,  mild serosanguineous drainage, no active signs of infection, R foot anterior ankle area wound to dermis, circumferential periwound erythema, no drainage.  L foot no acute signs of infection.   - Please have patient wear Z- FLOWS boots at all time when in bed.   - No culture obtained as no active signs of infection   - Extensive discussion held with patient regarding the importance and necessity of proper offloading and using Z-flow for wound healing and limb salvage given the worsening progress of the R foot heel wound which was previously to dermis at previous admission and now probing to bone. Patient again states that he understood the risks but does not want to comply with Z flows.   - BRODY/PVR noted   - Recommended Vascular  consult due to non palpable pulses  - Recommend long term IV antibiotics   - Pod plan local wound care pending ID recs   - Discussed with attending.     62y Male with R foot heel wound to bone, R foot anterior ankle wound to dermis  - Patient seen and evaluated  - Afebrile, WBC 10.44  - R foot xray: demineralization along posterior calcaneus, possible OM.   - R foot heel wound largely granular with central partial thickness eschar, proximal wound edge undermining,  mild serosanguineous drainage, no active signs of infection, R foot anterior ankle area wound to dermis, circumferential periwound erythema, no drainage.  L foot no acute signs of infection.   - Please have patient wear Z- FLOWS boots at all time when in bed.   - No culture obtained as no active signs of infection   - Extensive discussion held with patient regarding the importance and necessity of proper offloading and using Z-flow for wound healing and limb salvage given the worsening progress of the R foot heel wound which was previously to dermis at previous admission and now probing to bone. Patient again states that he understood the risks but does not want to comply with Z flows.   - BRODY/PVR noted   - Recommended Vascular  consult due to non palpable pulses  - Recommend long term IV antibiotics   - Pt is stable for discharge from the podiatry standpoint, pending ID and vasc recs.  - Discussed with attending.     62y Male with R foot heel wound to bone, R foot anterior ankle wound to dermis  - Patient seen and evaluated  - Afebrile, WBC 10.44  - R foot xray: demineralization along posterior calcaneus, possible OM.   - R foot heel wound largely granular with central partial thickness eschar, proximal wound edge undermining,  mild serosanguineous drainage, no active signs of infection, R foot anterior ankle area wound to dermis, circumferential periwound erythema, no drainage.  L foot no acute signs of infection.   - Please have patient wear Z- FLOWS boots at all time when in bed.   - No culture obtained as no active signs of infection   - Extensive discussion held with patient regarding the importance and necessity of proper offloading and using Z-flow for wound healing and limb salvage given the worsening progress of the R foot heel wound which was previously to dermis at previous admission and now probing to bone. Patient again states that he understood the risks but does not want to comply with Z flows.   - BRODY/PVR noted   - Recommended Vascular  consult due to non palpable pulses  - Recommend long term IV antibiotics   - No further podiatric intervention needed. Please re-consult as necessary.  - Wound care instructions and followup information listed in discharge provider note.  - Discussed with attending.

## 2023-06-02 NOTE — CONSULT NOTE ADULT - ASSESSMENT
62 year old male with hx of CVA (WC bound, quadriparesis), COPD, CAD (AICD), ESRD (T/R/S), HTN, HLD, seizure d/o, a-fib (s/p ablation, eliquis), neurogenic bladder (SPC), BARRY, hypothyroidism, RCC (s/p partial nephrectomy), CHF, prostate CA (s/p radiation) presents from [place] for combative behavior. Patient found to have R foot wound. Vascular consulted for recommendations. Patient is bedbound and unable to transfer. Normal BRODY/PVR     Plan:  - No acute vascular surgery intervention   - Continue LWC per wound care and podiatry   - Given patient's bedbound status, no indication for revascularization. If wound worsens and is deemed non-salvagable, only surgical option from vascular perspective would be above the knee amputation if compatible with patient's GOC  - Rest of care per primary team   - Vascular will sign off, please recall with question.

## 2023-06-02 NOTE — PROGRESS NOTE ADULT - ASSESSMENT
62 m with HTN, CAD, a-fib, CHFAICD, CVA (WC bound, quadriparesis), COPD,  ESRD (T/R/S), , seizure d/o, neurogenic bladder (SPC), BARRY, hypothyroidism, RCC (s/p partial nephrectomy),  prostate CA (s/p radiation) presents from [place] for combative behavior.  Febrile on 5/27, no leukocytosis but now lethargic with vomiting and abd pain, has a suprapubic cath but stated that urinated normally and had dysuria  UA showing pyuria, UCx is contaminated, likely collected from existing SPC  RVP negative  BCx (5/27) NGTD  CXR no PNA  Right heel wound has drainage, periwound erythema  Podiatry does not think it is infected. 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

## 2023-06-02 NOTE — PROGRESS NOTE ADULT - SUBJECTIVE AND OBJECTIVE BOX
[facility] DIVISION OF KIDNEY DISEASES AND HYPERTENSION -- HEMODIALYSIS NOTE   Nehrology Office [phone]  available on [employer] teams--> Yolanda Kim   --------------------------------------------------------------------------------  Chief Complaint: ESRD/Ongoing hemodialysis requirement  Patient was examined this am at bedside. reports no complaints   BP has been soft   on antibiotics. ID on board   24 hour events/subjective:      ALLERGIES & MEDICATIONS  --------------------------------------------------------------------------------  Allergies    codeine (Rash)  Haldol (Unknown)  Motrin (Rash)  penicillin (Hives; Anaphylaxis)  Toradol (Rash)  contrast media (iodine-based) (Rash)  Tylenol (Hives)  aspirin (Hives)  codeine (Unknown)    Intolerances      Standing Inpatient Medications  apixaban 5 milliGRAM(s) Oral two times a day  ascorbic acid 500 milliGRAM(s) Oral daily  atorvastatin 40 milliGRAM(s) Oral at bedtime  baclofen 5 milliGRAM(s) Oral every 12 hours  calamine/zinc oxide Lotion 1 Application(s) Topical three times a day  chlorhexidine 2% Cloths 1 Application(s) Topical daily  clonazePAM  Tablet 0.75 milliGRAM(s) Oral <User Schedule>  clonazePAM  Tablet 0.5 milliGRAM(s) Oral <User Schedule>  epoetin marilin-epbx (RETACRIT) Injectable 4000 Unit(s) IV Push <User Schedule>  levETIRAcetam 250 milliGRAM(s) Oral <User Schedule>  levETIRAcetam 500 milliGRAM(s) Oral daily  levothyroxine 100 MICROGram(s) Oral daily  loratadine 10 milliGRAM(s) Oral daily  meropenem  IVPB 500 milliGRAM(s) IV Intermittent every 24 hours  metoprolol succinate ER 50 milliGRAM(s) Oral daily  multivitamin 1 Tablet(s) Oral daily  Nephro-mirna 1 Tablet(s) Oral daily  polyethylene glycol 3350 17 Gram(s) Oral daily  senna 2 Tablet(s) Oral at bedtime    PRN Inpatient Medications  albuterol/ipratropium for Nebulization 3 milliLiter(s) Nebulizer every 6 hours PRN  bisacodyl Suppository 10 milliGRAM(s) Rectal daily PRN  hydrocortisone 1% Ointment 1 Application(s) Topical three times a day PRN  lidocaine   4% Patch 1 Patch Transdermal daily PRN  melatonin 3 milliGRAM(s) Oral at bedtime PRN  midodrine. 5 milliGRAM(s) Oral <User Schedule> PRN  OLANZapine Injectable 2.5 milliGRAM(s) IntraMuscular every 6 hours PRN      --------------------------------------------------------------------------------  T(C): 36.4 (06-02-23 @ 13:10), Max: 37.3 (06-02-23 @ 04:59)  HR: 92 (06-02-23 @ 13:10) (70 - 92)  BP: 100/64 (06-02-23 @ 13:10) (94/60 - 142/69)  RR: 16 (06-02-23 @ 13:10) (16 - 20)  SpO2: 96% (06-02-23 @ 13:10) (96% - 100%)  Wt(kg): --        06-01-23 @ 07:01  -  06-02-23 @ 07:00  --------------------------------------------------------  IN: 400 mL / OUT: 1700 mL / NET: -1300 mL      Physical Exam:  	Gen chronically ill.   	HEENT: no JVD  	Pulm: CTABL  	CV: S1S2,  	Abd: Soft,   	Ext:  + edema B/L LE   	Neuro: Awake and alert  	Skin: Warm and dry          Vascular:  R IJ tunneled HD catheter, R foot dressing     LABS/STUDIES  --------------------------------------------------------------------------------              10.5   15.47 >-----------<  219      [06-02-23 @ 13:06]              34.4     140  |  103  |  53  ----------------------------<  108      [06-02-23 @ 13:06]  4.1   |  23  |  3.54        Ca     8.2     [06-02-23 @ 13:06]      Mg     2.00     [06-02-23 @ 13:06]      Phos  3.8     [06-02-23 @ 13:06]            Iron 66, TIBC 206, %sat 32      [03-12-23 @ 06:31]  Ferritin 964      [03-12-23 @ 06:31]  HbA1c 6.2      [01-03-20 @ 13:57]  TSH <0.10      [05-11-23 @ 00:25]  Lipid: chol 107, TG 68, HDL 42, LDL --      [04-21-23 @ 16:15]    HBsAg Nonreact      [05-22-23 @ 16:25]  HCV 8.30, Reactive      [05-22-23 @ 16:25]

## 2023-06-02 NOTE — PROGRESS NOTE ADULT - SUBJECTIVE AND OBJECTIVE BOX
Follow Up:  fever, lethargy    Interval History/ROS: no more fever, no foot pain but has a heel ulcer, no diarrhea, no dysuria or bladder pain now, then urine cx came back with candida auris          Allergies  codeine (Rash)  Haldol (Unknown)  Motrin (Rash)  penicillin (Hives; Anaphylaxis)  Toradol (Rash)  contrast media (iodine-based) (Rash)  Tylenol (Hives)  aspirin (Hives)  codeine (Unknown)        ANTIMICROBIALS:  meropenem  IVPB 500 every 24 hours      OTHER MEDS:  albuterol/ipratropium for Nebulization 3 milliLiter(s) Nebulizer every 6 hours PRN  apixaban 5 milliGRAM(s) Oral two times a day  ascorbic acid 500 milliGRAM(s) Oral daily  atorvastatin 40 milliGRAM(s) Oral at bedtime  baclofen 5 milliGRAM(s) Oral every 12 hours  bisacodyl Suppository 10 milliGRAM(s) Rectal daily PRN  calamine/zinc oxide Lotion 1 Application(s) Topical three times a day  chlorhexidine 2% Cloths 1 Application(s) Topical daily  clonazePAM  Tablet 0.75 milliGRAM(s) Oral <User Schedule>  clonazePAM  Tablet 0.5 milliGRAM(s) Oral <User Schedule>  epoetin marilin-epbx (RETACRIT) Injectable 4000 Unit(s) IV Push <User Schedule>  hydrocortisone 1% Ointment 1 Application(s) Topical three times a day PRN  levETIRAcetam 500 milliGRAM(s) Oral daily  levETIRAcetam 250 milliGRAM(s) Oral <User Schedule>  levothyroxine 100 MICROGram(s) Oral daily  lidocaine   4% Patch 1 Patch Transdermal daily PRN  loratadine 10 milliGRAM(s) Oral daily  melatonin 3 milliGRAM(s) Oral at bedtime PRN  metoprolol succinate ER 50 milliGRAM(s) Oral daily  midodrine. 5 milliGRAM(s) Oral <User Schedule> PRN  multivitamin 1 Tablet(s) Oral daily  Nephro-mirna 1 Tablet(s) Oral daily  OLANZapine Injectable 2.5 milliGRAM(s) IntraMuscular every 6 hours PRN  polyethylene glycol 3350 17 Gram(s) Oral daily  senna 2 Tablet(s) Oral at bedtime      Vital Signs Last 24 Hrs  T(C): 37 (02 Jun 2023 09:00), Max: 37.3 (02 Jun 2023 04:59)  T(F): 98.6 (02 Jun 2023 09:00), Max: 99.2 (02 Jun 2023 04:59)  HR: 74 (02 Jun 2023 09:00) (70 - 90)  BP: 102/65 (02 Jun 2023 09:00) (94/60 - 142/69)  BP(mean): --  RR: 16 (02 Jun 2023 09:00) (16 - 20)  SpO2: 98% (02 Jun 2023 09:00) (98% - 100%)    Parameters below as of 02 Jun 2023 09:00  Patient On (Oxygen Delivery Method): room air        Physical Exam:  General:    NAD,  non toxic  Cardio:    AICD with no tenderness or erythema  Respiratory:    comfortable on RA  abd:     soft,     no tenderness  :   no CVAT,   suprapubic cath  Musculoskeletal:   heel ulcer but no purulnece  vascular: no phlebitis  Skin:    no rash                            9.4    11.73 )-----------( 203      ( 01 Jun 2023 17:26 )             30.2       06-01    136  |  101  |  75<H>  ----------------------------<  124<H>  4.1   |  21<L>  |  4.52<H>    Ca    8.0<L>      01 Jun 2023 17:26  Phos  5.0     06-01  Mg     2.10     06-01            MICROBIOLOGY:  v  Catheterized Catheterized  05-31-23   >100,000 CFU/ml Candida auris  Referred to Mycology  --  --      .Blood Blood-Venous  05-27-23   No Growth Final  --  --      .Blood Blood-Peripheral  05-27-23   No Growth Final  --  --      Catheterized Catheterized  05-27-23   >=3 organisms. Probable collection contamination.  --  --      .Blood Blood-Peripheral  05-11-23   No Growth Final  --  --      .Blood Blood-Peripheral  05-11-23   No Growth Final  --  --      .Blood Blood-Peripheral  05-10-23   No Growth Final  --  --          Rapid RVP Result: Erasto (05-27 @ 12:45)        RADIOLOGY:  Images independently visualized and reviewed personally, findings as below  < from: CT Abdomen and Pelvis w/ IV Cont (05.31.23 @ 17:13) >  IMPRESSION:  Moderate to large rectal stool burden.    Trace bilateral pleural effusions and small pericardial effusion.    Sacral decubitus ulcer with erosion of the coccyx, concerning for   osteomyelitis.    < end of copied text >  < from: Xray Foot AP + Lateral + Oblique, Right (05.29.23 @ 22:50) >  IMPRESSION:  Deficiency of the skin overlying the left heel may reflect the known   ulcer.  Sclerosis and areas of demineralization along theposterior calcaneus. In   the setting of a known ulcer, findings are concerning for osteomyelitis.   Further evaluation with MRI can be performed.    < end of copied text >

## 2023-06-02 NOTE — CONSULT NOTE ADULT - CONSULT REASON
hypotension
R foot wound
Fever
Complex decision making in the setting of advanced illness.
R foot heel wound
Tachycardia
ESRD on HD TIW

## 2023-06-02 NOTE — CONSULT NOTE ADULT - REASON FOR ADMISSION
combative behavior

## 2023-06-02 NOTE — PROGRESS NOTE ADULT - SUBJECTIVE AND OBJECTIVE BOX
Patient is a 62y old  Male who presents with a chief complaint of combative behavior (02 Jun 2023 14:37)      SUBJECTIVE / OVERNIGHT EVENTS:    No events overnight. This AM, patient without n/v/d/cp/sob.      MEDICATIONS  (STANDING):  apixaban 5 milliGRAM(s) Oral two times a day  ascorbic acid 500 milliGRAM(s) Oral daily  atorvastatin 40 milliGRAM(s) Oral at bedtime  baclofen 5 milliGRAM(s) Oral every 12 hours  calamine/zinc oxide Lotion 1 Application(s) Topical three times a day  chlorhexidine 2% Cloths 1 Application(s) Topical daily  clonazePAM  Tablet 0.5 milliGRAM(s) Oral <User Schedule>  clonazePAM  Tablet 0.75 milliGRAM(s) Oral <User Schedule>  diphenhydrAMINE Injectable 25 milliGRAM(s) IV Push <User Schedule>  epoetin marilin-epbx (RETACRIT) Injectable 4000 Unit(s) IV Push <User Schedule>  levETIRAcetam 500 milliGRAM(s) Oral daily  levETIRAcetam 250 milliGRAM(s) Oral <User Schedule>  levothyroxine 100 MICROGram(s) Oral daily  loratadine 10 milliGRAM(s) Oral daily  meropenem  IVPB 500 milliGRAM(s) IV Intermittent every 24 hours  metoprolol succinate ER 50 milliGRAM(s) Oral daily  multivitamin 1 Tablet(s) Oral daily  Nephro-mirna 1 Tablet(s) Oral daily  polyethylene glycol 3350 17 Gram(s) Oral daily  senna 2 Tablet(s) Oral at bedtime    MEDICATIONS  (PRN):  albuterol/ipratropium for Nebulization 3 milliLiter(s) Nebulizer every 6 hours PRN Shortness of Breath and/or Wheezing  bisacodyl Suppository 10 milliGRAM(s) Rectal daily PRN Constipation  hydrocortisone 1% Ointment 1 Application(s) Topical three times a day PRN Rash and/or Itching  lidocaine   4% Patch 1 Patch Transdermal daily PRN left shoulder/trap  melatonin 3 milliGRAM(s) Oral at bedtime PRN Insomnia  midodrine. 10 milliGRAM(s) Oral three times a day PRN pre-dialysis  OLANZapine Injectable 2.5 milliGRAM(s) IntraMuscular every 6 hours PRN agitation      PHYSICAL EXAM:  T(C): 36.4 (06-02-23 @ 13:10), Max: 37.3 (06-02-23 @ 04:59)  HR: 92 (06-02-23 @ 13:10) (70 - 92)  BP: 100/64 (06-02-23 @ 13:10) (94/60 - 142/69)  RR: 16 (06-02-23 @ 13:10) (16 - 20)  SpO2: 96% (06-02-23 @ 13:10) (96% - 100%)  I&O's Summary    01 Jun 2023 07:01  -  02 Jun 2023 07:00  --------------------------------------------------------  IN: 400 mL / OUT: 1700 mL / NET: -1300 mL      GENERAL: NAD, well-developed  HEAD:  Atraumatic, Normocephalic, MMM  CHEST/LUNG: No use of accessory muscles, CTAB, breathing non-labored  COR: RR, no mrcg  ABD: Soft, ND/NT, +BS  PSYCH: AAOx3  NEUROLOGY: CN II-XII grossly intact, moving all extremities  SKIN: No rashes or lesions  EXT: wwp, no cce    LABS:  CAPILLARY BLOOD GLUCOSE                              10.5   15.47 )-----------( 219      ( 02 Jun 2023 13:06 )             34.4     06-02    140  |  103  |  53<H>  ----------------------------<  108<H>  4.1   |  23  |  3.54<H>    Ca    8.2<L>      02 Jun 2023 13:06  Phos  3.8     06-02  Mg     2.00     06-02                Culture - Urine (collected 31 May 2023 01:30)  Source: Catheterized Catheterized  Preliminary Report (01 Jun 2023 19:01):    >100,000 CFU/ml Candida auris    Referred to Mycology        RADIOLOGY & ADDITIONAL TESTS:    Telemetry Personally Reviewed -     Imaging Personally Reviewed -     Imaging Reviewed -     Consultant(s) Notes Reviewed -       Care Discussed with Consultants/Other Providers -  Patient is a 62y old  Male who presents with a chief complaint of combative behavior (02 Jun 2023 14:37)      SUBJECTIVE / OVERNIGHT EVENTS:    No events overnight. This AM, patient without n/v/d/cp/sob.  Patient reports feeling the same and denies any acute complaints.     MEDICATIONS  (STANDING):  apixaban 5 milliGRAM(s) Oral two times a day  ascorbic acid 500 milliGRAM(s) Oral daily  atorvastatin 40 milliGRAM(s) Oral at bedtime  baclofen 5 milliGRAM(s) Oral every 12 hours  calamine/zinc oxide Lotion 1 Application(s) Topical three times a day  chlorhexidine 2% Cloths 1 Application(s) Topical daily  clonazePAM  Tablet 0.5 milliGRAM(s) Oral <User Schedule>  clonazePAM  Tablet 0.75 milliGRAM(s) Oral <User Schedule>  diphenhydrAMINE Injectable 25 milliGRAM(s) IV Push <User Schedule>  epoetin marilin-epbx (RETACRIT) Injectable 4000 Unit(s) IV Push <User Schedule>  levETIRAcetam 500 milliGRAM(s) Oral daily  levETIRAcetam 250 milliGRAM(s) Oral <User Schedule>  levothyroxine 100 MICROGram(s) Oral daily  loratadine 10 milliGRAM(s) Oral daily  meropenem  IVPB 500 milliGRAM(s) IV Intermittent every 24 hours  metoprolol succinate ER 50 milliGRAM(s) Oral daily  multivitamin 1 Tablet(s) Oral daily  Nephro-mirna 1 Tablet(s) Oral daily  polyethylene glycol 3350 17 Gram(s) Oral daily  senna 2 Tablet(s) Oral at bedtime    MEDICATIONS  (PRN):  albuterol/ipratropium for Nebulization 3 milliLiter(s) Nebulizer every 6 hours PRN Shortness of Breath and/or Wheezing  bisacodyl Suppository 10 milliGRAM(s) Rectal daily PRN Constipation  hydrocortisone 1% Ointment 1 Application(s) Topical three times a day PRN Rash and/or Itching  lidocaine   4% Patch 1 Patch Transdermal daily PRN left shoulder/trap  melatonin 3 milliGRAM(s) Oral at bedtime PRN Insomnia  midodrine. 10 milliGRAM(s) Oral three times a day PRN pre-dialysis  OLANZapine Injectable 2.5 milliGRAM(s) IntraMuscular every 6 hours PRN agitation      PHYSICAL EXAM:  T(C): 36.4 (06-02-23 @ 13:10), Max: 37.3 (06-02-23 @ 04:59)  HR: 92 (06-02-23 @ 13:10) (70 - 92)  BP: 100/64 (06-02-23 @ 13:10) (94/60 - 142/69)  RR: 16 (06-02-23 @ 13:10) (16 - 20)  SpO2: 96% (06-02-23 @ 13:10) (96% - 100%)  I&O's Summary    01 Jun 2023 07:01  -  02 Jun 2023 07:00  --------------------------------------------------------  IN: 400 mL / OUT: 1700 mL / NET: -1300 mL      HEAD:  Atraumatic, Normocephalic, MMM  CHEST/LUNG: No use of accessory muscles, CTAB, breathing non-labored  COR: RR, no mrcg  ABD: Soft, ND/NT, +BS, Suprapubic catheter in place   PSYCH: AAOx3  NEUROLOGY: unable to move extremities, able to converse   SKIN: multiple wounds on R foot and abrasions noted on feet   EXT: able to move all extremities    LABS:  CAPILLARY BLOOD GLUCOSE                              10.5   15.47 )-----------( 219      ( 02 Jun 2023 13:06 )             34.4     06-02    140  |  103  |  53<H>  ----------------------------<  108<H>  4.1   |  23  |  3.54<H>    Ca    8.2<L>      02 Jun 2023 13:06  Phos  3.8     06-02  Mg     2.00     06-02                Culture - Urine (collected 31 May 2023 01:30)  Source: Catheterized Catheterized  Preliminary Report (01 Jun 2023 19:01):    >100,000 CFU/ml Candida auris    Referred to Mycology        RADIOLOGY & ADDITIONAL TESTS:    Telemetry Personally Reviewed -     Imaging Personally Reviewed -     Imaging Reviewed -     Consultant(s) Notes Reviewed -       Care Discussed with Consultants/Other Providers -

## 2023-06-02 NOTE — CHART NOTE - NSCHARTNOTEFT_GEN_A_CORE
Vascular consulted per podiatry recommendations for patient with poorly palpable pulses. Appreciate recommendations.

## 2023-06-02 NOTE — PROGRESS NOTE ADULT - PROBLEM SELECTOR PLAN 1
Pt recently discharged from [facility] on 4/27/23   Agitation possibly 2/2 malingering as patients admits this was his motive  intermittently refusing labs  Seen by  team on last admission, reconsulted for this admission   Continue klonopin 0.5 mg qam and 0.75 mg qpm  Avoid IV pain medications   c/w Zyprexa 2.5 q6 prn agitation. Pt recently discharged from [facility] on 4/27/23   Agitation possibly 2/2 malingering as patients admits this was his motive  intermittently refusing labs and refusing tele   Seen by  team on last admission, reconsulted for this admission   Continue klonopin 0.5 mg qam and 0.75 mg qpm  Avoid IV pain medications   c/w Zyprexa 2.5 q6 prn agitation.

## 2023-06-02 NOTE — CONSULT NOTE ADULT - ATTENDING COMMENTS
as above
62 m with HTN, CAD, a-fib, CHFAICD, CVA (WC bound, quadriparesis), COPD,  ESRD (T/R/S), , seizure d/o, neurogenic bladder (SPC), BARRY, hypothyroidism, RCC (s/p partial nephrectomy),  prostate CA (s/p radiation) presents from [place] for combative behavior.  Febrile on 5/27, no leukocytosis but now lethargic with vomiting and abd pain, has a suprapubic cath but stated that urinated normally and had dysuria  UA showing pyuria, UCx is contaminated, likely collected from existing SPC  RVP negative  BCx (5/27) NGTD  CXR no PNA  Right heel wound has drainage, periwound erythema  Podiatry does not think it is infected. Wound is probe to bone, right foot xray ordered  Right permacath site looks clean, no tenderness appreciated at AICD site      fever, lethargy, unclear source but had vomiting and abd pain with a suprapubic cath and dysuria, also ESRD r/o bacteremia  pen allergy      * D/C aztreonam, give IV vancomycin 1000mg x 1 and start IV meropenem 500mg q24hrs (give post-HD in HD days)  * Follow up BCx  * CT Ab/pelvis with contrast  * Podiatry follow up for right heel wound  * Trend WBC, fever curve, transaminases, creatinine daily    The above assessment and plan was discussed with the primary team    Heavenly Kiran MD  contact on teams  After 5pm and on weekends call [phone]
ESRD on HD    HD yesterday. Seen today and volume status stable  Plan for HD again on Monday.

## 2023-06-02 NOTE — CONSULT NOTE ADULT - CONSULT REQUESTED DATE/TIME
11-May-2023 12:30
02-Jun-2023 19:00
28-May-2023 11:34
11-May-2023 12:00
28-Apr-2023 15:56
28-May-2023 16:55
15-May-2023 16:02

## 2023-06-02 NOTE — PROGRESS NOTE ADULT - PROBLEM SELECTOR PLAN 2
Spiked a fever on 5/27 : 100.9F. Now T ~99. -> s/p Vanc 1000mg x 1  Hx of MRSA in UC in March 2023  Follow up with BCx - NGTD   Urine Cx w/ likely contaminant     - Started on IV meropenem   - Podiatry eval for Heel wound  - CT A/P ordered and pending  -ID consulted given patient allergic to pencillin , rocephin and appreciate ID recs  - follow up repeat Urine Cx Spiked a fever on 5/27 : 100.9F. Now T ~99. -> s/p Vanc 1000mg x 1  Hx of MRSA in UC in March 2023  Follow up with BCx - NGTD   Urine Cx w/ likely contaminant     - Started on IV meropenem   - Podiatry eval for Heel wound  - CT A/P ordered and pending  -ID consulted given patient allergic to pencillin , rocephin and appreciate ID recs  - follow up repeat Urine Cx- showed C.auris and as per ID it is likely colonization. will hold off on treating it but will continue contact precautions. Repeat UA/urine culture sent to lab which was NOT take from the bag and taken directly. will follow up.

## 2023-06-02 NOTE — CONSULT NOTE ADULT - SUBJECTIVE AND OBJECTIVE BOX
GENERAL SURGERY CONSULT NOTE  Consulting surgical team: C team surgery  Consulting attending: Dr. Cook     HPI:  HPI:  62 year old male with hx of CVA (WC bound, quadriparesis), COPD, CAD (AICD), ESRD (T/R/S), HTN, HLD, seizure d/o, a-fib (s/p ablation, eliquis), neurogenic bladder (SPC), BARRY, hypothyroidism, RCC (s/p partial nephrectomy), CHF, prostate CA (s/p radiation) presents from [place] for combative behavior. Patient was discharged from [facility] on 4/27/23 and then started kicking his feet in the ambulance. Patient was combative with staff upon arrival and sent back to [facility]. Pt very drowsy when seen by writer , as per staff patient was verbally abusive prior to writers examination. Pt refusing blood work , medications and food.  (28 Apr 2023 11:01)    Vascular consulted for R foot wound. Patient is bedbound and not able to transfer.     PAST MEDICAL HISTORY:  Pacemaker    Atrial fibrillation, unspecified type    Chronic congestive heart failure, unspecified congestive heart failure type    Secondary hypertension    Hep C w/o coma, chronic    Carcinoma of kidney, unspecified laterality    HTN (hypertension)    HLD (hyperlipidemia)    AICD (automatic cardioverter/defibrillator) present    Atrial fibrillation    CAD (coronary artery disease)    Hypertension    Hyperlipidemia    Renal cell carcinoma of left kidney    Calcium kidney stones    Opioid dependence    COPD (chronic obstructive pulmonary disease)    Bladder cancer    Peripheral neuropathy    Prostate cancer    Nephrolithiasis    Kidney stone        PAST SURGICAL HISTORY:  AICD (automatic cardioverter/defibrillator) present    S/P cholecystectomy    H/O partial nephrectomy    Calcium kidney stone    History of percutaneous coronary intervention    H/O transurethral destruction of bladder lesion    Presence of inferior vena cava filter    History of coronary artery stent placement        SOCIAL HISTORY:  - Denies EtOH abuse, smoking, IVDA    MEDICATIONS:  albuterol/ipratropium for Nebulization 3 milliLiter(s) Nebulizer every 6 hours PRN  apixaban 5 milliGRAM(s) Oral two times a day  ascorbic acid 500 milliGRAM(s) Oral daily  atorvastatin 40 milliGRAM(s) Oral at bedtime  baclofen 5 milliGRAM(s) Oral every 12 hours  bisacodyl Suppository 10 milliGRAM(s) Rectal daily PRN  calamine/zinc oxide Lotion 1 Application(s) Topical three times a day  chlorhexidine 2% Cloths 1 Application(s) Topical daily  clonazePAM  Tablet 0.75 milliGRAM(s) Oral <User Schedule>  clonazePAM  Tablet 0.5 milliGRAM(s) Oral <User Schedule>  diphenhydrAMINE Injectable 25 milliGRAM(s) IV Push <User Schedule>  epoetin marilin-epbx (RETACRIT) Injectable 4000 Unit(s) IV Push <User Schedule>  hydrocortisone 1% Ointment 1 Application(s) Topical three times a day PRN  levETIRAcetam 250 milliGRAM(s) Oral <User Schedule>  levETIRAcetam 500 milliGRAM(s) Oral daily  levothyroxine 100 MICROGram(s) Oral daily  lidocaine   4% Patch 1 Patch Transdermal daily PRN  loratadine 10 milliGRAM(s) Oral daily  melatonin 3 milliGRAM(s) Oral at bedtime PRN  meropenem  IVPB 500 milliGRAM(s) IV Intermittent every 24 hours  metoprolol succinate ER 50 milliGRAM(s) Oral daily  midodrine. 10 milliGRAM(s) Oral three times a day PRN  multivitamin 1 Tablet(s) Oral daily  Nephro-mirna 1 Tablet(s) Oral daily  OLANZapine Injectable 2.5 milliGRAM(s) IntraMuscular every 6 hours PRN  polyethylene glycol 3350 17 Gram(s) Oral daily  senna 2 Tablet(s) Oral at bedtime      ALLERGIES:  codeine (Rash)  Haldol (Unknown)  Motrin (Rash)  penicillin (Hives; Anaphylaxis)  Toradol (Rash)  contrast media (iodine-based) (Rash)  Tylenol (Hives)  aspirin (Hives)  codeine (Unknown)      VITALS & I/Os:  Vital Signs Last 24 Hrs  T(C): 36.3 (02 Jun 2023 18:29), Max: 37.3 (02 Jun 2023 04:59)  T(F): 97.4 (02 Jun 2023 18:29), Max: 99.2 (02 Jun 2023 04:59)  HR: 86 (02 Jun 2023 18:29) (70 - 92)  BP: 108/67 (02 Jun 2023 18:29) (94/60 - 142/69)  BP(mean): --  RR: 16 (02 Jun 2023 18:29) (16 - 20)  SpO2: 95% (02 Jun 2023 18:29) (95% - 100%)    Parameters below as of 02 Jun 2023 18:29  Patient On (Oxygen Delivery Method): room air        I&O's Summary    01 Jun 2023 07:01  -  02 Jun 2023 07:00  --------------------------------------------------------  IN: 400 mL / OUT: 1700 mL / NET: -1300 mL        PHYSICAL EXAM:  GEN: resting comfortably in bed, in NAD  RESP: no acute respiratory distress, breathing comfortably   ABD: soft, non-distended, non-tender   EXT:  R heel pressure ulcer. b/l dp/pt signals. b/l palpable femoral pulses   NEURO:  no focal neuro deficits     LABS:                        10.5   15.47 )-----------( 219      ( 02 Jun 2023 13:06 )             34.4     06-02    140  |  103  |  53<H>  ----------------------------<  108<H>  4.1   |  23  |  3.54<H>    Ca    8.2<L>      02 Jun 2023 13:06  Phos  3.8     06-02  Mg     2.00     06-02      Lactate:                  IMAGING:

## 2023-06-02 NOTE — PROGRESS NOTE ADULT - PROBLEM SELECTOR PLAN 3
complicated RVR and hypotension  Transferred to telemetry floor  EP consulted, suspect due  underlying cause. Also may be hypovolemic      - Continue Metoprolol, IVFs prn. Monitor volume status   - no signs sepsis, cx negative d/yumiko abx  - clinically improved  -  continue telemetry complicated RVR and hypotension  Transferred to telemetry floor  EP consulted, suspect due  underlying cause. Also may be hypovolemic    - Continue Metoprolol, IVFs prn. Monitor volume status   - no signs sepsis, cx negative d/yumiko abx  - clinically improved  - dc telemetry since pt keeps refusing it. no events noted on tele

## 2023-06-02 NOTE — PROGRESS NOTE ADULT - NSPROGADDITIONALINFOA_GEN_ALL_CORE
Bebeto Kim MD, FACP  Attending Nephrologist  Office: [phone]  Pager: [phone]  Available on [employer] teams-->Yolanda Kim

## 2023-06-02 NOTE — PROGRESS NOTE ADULT - ASSESSMENT
62 year old male with hx of CVA (WC bound, quadriparesis), COPD, CAD (AICD), ESRD (T/R/S), HTN, HLD, seizure d/o, a-fib (s/p ablation, eliquis), neurogenic bladder (SPC), BARRY, hypothyroidism, RCC (s/p partial nephrectomy), CHF, prostate CA (s/p radiation) presents from [place] for agitation. issues with Low BP preventing Hd at times,    Assessment and Plan: Pt with ESRD on HD TIW     Problem/Plan- ESRD on hemodialysis.   TThS .no plans today will arrange for tomorrow.orders placed today.  Problem/Plan- Hypotension. increase Midodrine to 10 mg with hold parameters. Na modeling during HD  Problem/Plan - Anemia secondary to renal failure.  Hgb low at goal   check iron studies   continue VIBHA.      Problem/Plan - chronic kidney disease-mineral and bone disorder.    Calcium is lower<8.4. increase calcium bath to 3.5.     Phosphorus controlled.  on antibiotics per ID   dose meds per eGFR<10   Dilip Garcia (:  1959) is a 58 y.o. male,Established patient, here for evaluation of the following chief complaint(s): Blood Sugar Problem (SUGAR  SUGAR IS NORMALLY GOOD) and Other (HOW LONG IS HE CONTAGIOUS WITH SHINGLES AND FACE IS RED )      Dilip Garcia, was evaluated through a synchronous (real-time) audio-video encounter. The patient (or guardian if applicable) is aware that this is a billable service, which includes applicable co-pays. This Virtual Visit was conducted with patient's (and/or legal guardian's) consent. The visit was conducted pursuant to the emergency declaration under the [address] [place] [address] and the [employer] and [employer] Act. Patient identification was verified, and a caregiver was present when appropriate. The patient was located at home in a state where the provider was licensed to provide care. Patient identification was verified at the start of the visit: Yes    ASSESSMENT/PLAN:  1. Herpes zoster with complication  -     gabapentin (NEURONTIN) 100 MG capsule; Take 1 capsule by mouth in the morning and 1 capsule before bedtime. Do all this for 30 days. Take until nerve pain from shingles improves and then stop., Disp-60 capsule, R-1Normal   Continue on medrol pack and valcycylovir   Discussed shingles and how contagious they are and shingles vaccine and discuss cost with insurance and when to get the shot   Discussed pain, No NSAIDs (ibuprofen, motrin, aleve, advil, naproxen, etc) while on steroid    Agreeable to try gabapentin, at least at night to help with pain and get some sleep   Information printed and reviewed  2.  Hyperglycemia   Continue on janument  mg BID, consider adding metformin 500 mg BID to help with elevated glucose   Discussed as long as feeling generally OK, the hyperglycemia with steroids is transient and will resolve within a few weeks on its own, Discussed contact and droplet precautions. Discussed shingles vaccine. Review of Systems   Constitutional:  Negative for activity change, appetite change, fatigue, fever and unexpected weight change. HENT:  Negative for congestion, ear pain, sinus pressure and sinus pain. Eyes:  Negative for discharge and visual disturbance. Respiratory:  Negative for cough, chest tightness and shortness of breath. Cardiovascular:  Negative for chest pain, palpitations and leg swelling. Gastrointestinal:  Negative for abdominal distention, abdominal pain, constipation, diarrhea and nausea. Endocrine: Negative for cold intolerance, heat intolerance, polydipsia, polyphagia and polyuria. Genitourinary:  Negative for decreased urine volume, difficulty urinating, dysuria, flank pain, frequency and urgency. Musculoskeletal:  Negative for arthralgias, back pain, gait problem, joint swelling, myalgias and neck pain. Skin:  Positive for rash (hip and buttocks and back and waist). Negative for color change and wound. Allergic/Immunologic: Negative for food allergies and immunocompromised state. Neurological:  Negative for dizziness, tremors, speech difficulty, weakness, light-headedness, numbness and headaches. Hematological:  Negative for adenopathy. Does not bruise/bleed easily. Psychiatric/Behavioral:  Negative for confusion, decreased concentration, self-injury, sleep disturbance and suicidal ideas. The patient is not nervous/anxious. No flowsheet data found.          [INSTRUCTIONS:  \"[x]\" Indicates a positive item  \"[]\" Indicates a negative item  -- DELETE ALL ITEMS NOT EXAMINED]    Constitutional: [x] Appears well-developed and well-nourished [x] No apparent distress      [] Abnormal -     Mental status: [x] Alert and awake  [x] Oriented to person/place/time [x] Able to follow commands    [] Abnormal -     Eyes:   EOM    [x]  Normal    [] Abnormal -   Sclera  [x]  Normal    [] Abnormal - Discharge [x]  None visible   [] Abnormal -     HENT: [x] Normocephalic, atraumatic  [] Abnormal -   [x] Mouth/Throat: Mucous membranes are moist    External Ears [x] Normal  [] Abnormal -    Neck: [x] No visualized mass [] Abnormal -     Pulmonary/Chest: [x] Respiratory effort normal   [x] No visualized signs of difficulty breathing or respiratory distress        [] Abnormal -      Musculoskeletal:   [x] Normal gait with no signs of ataxia         [x] Normal range of motion of neck        [] Abnormal -     Neurological:        [x] No Facial Asymmetry (Cranial nerve 7 motor function) (limited exam due to video visit)          [x] No gaze palsy        [] Abnormal -          Skin:        [x] No significant exanthematous lesions or discoloration noted on facial skin         [] Abnormal -            Psychiatric:       [x] Normal Affect [] Abnormal -        [x] No Hallucinations    Other pertinent observable physical exam findings:-      On this date 7/18/2022 I have spent 22 minutes reviewing previous notes, test results and face to face (virtual) with the patient discussing the diagnosis and importance of compliance with the treatment plan as well as documenting on the day of the visit. Care Gaps Addressed  PNA vaccine recommended  Annual eye exam recommended for diabetic retinal exam, please ask eye doctor to fax us the report  [facility] Fax [phone]  Call insurance company to discuss coverage for shingles vaccine (Shingrix) 2 dose series   TDAP vaccine recommended- call insurance to discuss coverage  COVID booster recommended    I have reviewed patient's pertinent medical history, relevant laboratory and imaging studies, and past/future health maintenance. Discussed with the patient the importance of adhering to their current medication regimen as directed.  Advised the patient that they should continue to work on eating a healthy balanced diet and staying active by exercising within their personal limits. Orders as listed above. Patient was advised to keep future appointments with their respective specialty care team(s). Patient had the opportunity to ask questions, all of which were answered to the best of my ability and with patient satisfaction. Patient understands and is agreeable with the care plan following today's visit. Patient is to schedule an appointment for any new or worsening symptoms. Go to ER for significant shortness of breath, chest pain, or uncontrolled pain or fever. I discussed with patient the risk and benefits of any medications that were prescribed today. I verified that the patient understands their medications, labs, and/or procedures. The patient is doing well with current medication regimen and does not have any barriers to adherence. The patient's self-management abilities are good. Follow Up in 6 Months for Diabetes    Cynthia Jones is a 58 y.o. male being evaluated by a Virtual Visit (video visit) encounter to address concerns as mentioned above. A caregiver was present when appropriate. Due to this being a TeleHealth encounter (During [facility] health emergency), evaluation of the following organ systems was limited: Vitals/Constitutional/EENT/Resp/CV/GI//MS/Neuro/Skin/Heme-Lymph-Imm. Pursuant to the emergency declaration under the [address] [place] [address], [address] [place] [address] waiver authority and the [employer] and [employer] Act, this Virtual Visit was conducted with patient's (and/or legal guardian's) consent, to reduce the patient's risk of exposure to COVID-19 and provide necessary medical care. The patient (and/or legal guardian) has also been advised to contact this office for worsening conditions or problems, and seek emergency medical treatment and/or call 911 if deemed necessary. Services were provided through a video synchronous discussion virtually to substitute for in-person clinic visit. Patient was located at home and provider was located in office or at home. An electronic signature was used to authenticate this note.     --Tianna Gaston, PRAVIN - CNP 62 year old male with hx of CVA (WC bound, quadriparesis), COPD, CAD (AICD), ESRD (T/R/S), HTN, HLD, seizure d/o, a-fib (s/p ablation, eliquis), neurogenic bladder (SPC), BARRY, hypothyroidism, RCC (s/p partial nephrectomy), CHF, prostate CA (s/p radiation) presents from [place] for agitation. issues with Low BP preventing Hd at times,    Assessment and Plan: Pt with ESRD on HD TIW     Problem/Plan- ESRD on hemodialysis.   TThS .no plans today will arrange for tomorrow.orders placed today. itching during Hd. we are using Cellentia dialyzer as it is the most hypoallogenic. will add antihistamine before Hd   Problem/Plan- Hypotension. increase Midodrine to 10 mg with hold parameters. Na modeling during HD  Problem/Plan - Anemia secondary to renal failure.  Hgb low at goal   check iron studies   continue VIBHA.      Problem/Plan - chronic kidney disease-mineral and bone disorder.    Calcium is lower<8.4. increase calcium bath to 3.5.     Phosphorus controlled.  on antibiotics per ID   dose meds per eGFR<10

## 2023-06-02 NOTE — PROGRESS NOTE ADULT - ASSESSMENT
62 year old male with hx of CVA (WC bound, quadriparesis), COPD, CAD (AICD), ESRD (T/R/S), HTN, HLD, seizure d/o, a-fib (s/p ablation, eliquis), neurogenic bladder (SPC), BARRY, hypothyroidism, RCC (s/p partial nephrectomy), CHF, prostate CA (s/p radiation) presents from [facility] agitation 62 year old male with hx of CVA (WC bound, quadriparesis), COPD, CAD (AICD), ESRD (T/R/S), HTN, HLD, seizure d/o, a-fib (s/p ablation, eliquis), neurogenic bladder (SPC), BARRY, hypothyroidism, RCC (s/p partial nephrectomy), CHF, prostate CA (s/p radiation) presents from [place] for agitation.     admitted for evaluation.

## 2023-06-03 NOTE — PROGRESS NOTE ADULT - ATTENDING COMMENTS
Patient examined and ROS  reviewed. A case of  ESRD on HD with anemia. Advised HD with UF today.
ESRD on HD  Hypotension    Will hold HD today given low BP  Will plan for HD tomorrow if BP is controlled

## 2023-06-03 NOTE — PROGRESS NOTE ADULT - PROBLEM SELECTOR PLAN 3
complicated RVR and hypotension  Transferred to telemetry floor  EP consulted, suspect due  underlying cause. Also may be hypovolemic    - Continue Metoprolol, IVFs prn. Monitor volume status   - no signs sepsis, cx negative d/yumiko abx  - clinically improved  - dc telemetry since pt keeps refusing it. no events noted on tele

## 2023-06-03 NOTE — PROGRESS NOTE ADULT - PROBLEM SELECTOR PLAN 1
Pt recently discharged from [facility] on 4/27/23   Agitation possibly 2/2 malingering as patients admits this was his motive  intermittently refusing labs and refusing tele   Seen by  team on last admission, reconsulted for this admission   Continue klonopin 0.5 mg qam and 0.75 mg qpm  Avoid IV pain medications   c/w Zyprexa 2.5 q6 prn agitation.

## 2023-06-03 NOTE — PROGRESS NOTE ADULT - PROBLEM SELECTOR PLAN 1
Pt. with ESRD on HD TIW (TTS). Labs reviewed. Plan for maintenance HD today. Anti histamine prior to HD. Use of hypoallergenic dialyzer during HD treatment. Pt with hx of intra dialytic hypotension on Midodrine 10 mg TID prn. Monitor BP and labs.

## 2023-06-03 NOTE — PROGRESS NOTE ADULT - PROBLEM SELECTOR PLAN 12
c/w home Imdur and metoprolol  DASH diet     #multiple wounds on R foot/ toes  seen by podiatry on previous admission - no role for surgical intervention  wound care following; recs appreciated however, pt refusing wound care c/w home Imdur and metoprolol  DASH diet     #multiple wounds on R foot/ toes  seen by podiatry on previous admission - no role for surgical intervention  wound care following; recs appreciated however, pt refusing wound care and states he doesn't care

## 2023-06-03 NOTE — PROGRESS NOTE ADULT - PROBLEM SELECTOR PLAN 2
Spiked a fever on 5/27 : 100.9F. Now T ~99. -> s/p Vanc 1000mg x 1  Hx of MRSA in UC in March 2023  Follow up with [facility] - NGTD   Urine Cx w/ likely contaminant     - Started on IV meropenem   - Podiatry eval for Heel wound  - CT A/P ordered and pending  -ID consulted given patient allergic to pencillin , rocephin and appreciate ID recs  - follow up repeat Urine Cx- showed C.auris and as per ID it is likely colonization. will hold off on treating it but will continue contact precautions. Repeat UA/urine culture sent to lab which was NOT take from the bag and taken directly. will follow up. Spiked a fever on 5/27 : 100.9F. Now T ~99. -> s/p Vanc 1000mg x 1  Hx of MRSA in UC in March 2023, Follow up with BCx - NGTD   Urine Cx w/ likely contaminant   CT A/P ordered and shows Moderate to large rectal stool burden. Trace bilateral pleural effusions and small pericardial effusion. Sacral decubitus ulcer with erosion of the coccyx, concerning for   osteomyelitis.    - Started on IV meropenem and ID Recommends completing course until 6/5     - Podiatry eval for Heel wound and recs include:  Z- FLOWS boots at all time when in bed.   No culture obtained as no active signs of infection. recommend wound care and long term antibiotics.     -ID consulted given patient allergic to pencillin , rocephin and ID recs noted. Recommend:   c/w meropenem 500mg q24hrs (give post-HD in HD days), will complete a 10 day course through 6/6    - follow up repeat Urine Cx- showed C.auris and as per ID it is likely colonization. will hold off on treating it but will continue contact precautions. Repeat UA/urine culture sent to lab which was NOT take from the bag and taken directly. will follow up.

## 2023-06-03 NOTE — PROGRESS NOTE ADULT - ASSESSMENT
62 year old male with hx of CVA (WC bound, quadriparesis), COPD, CAD (AICD), ESRD (T/R/S), HTN, HLD, seizure d/o, a-fib (s/p ablation, eliquis), neurogenic bladder (SPC), BARRY, hypothyroidism, RCC (s/p partial nephrectomy), CHF, prostate CA (s/p radiation) presents from [place] for agitation.     admitted for evaluation.

## 2023-06-03 NOTE — PROGRESS NOTE ADULT - SUBJECTIVE AND OBJECTIVE BOX
Patient is a 62y old  Male who presents with a chief complaint of combative behavior (03 Jun 2023 12:20)      SUBJECTIVE / OVERNIGHT EVENTS:    No events overnight. This AM, patient without n/v/d/cp/sob.      MEDICATIONS  (STANDING):  apixaban 5 milliGRAM(s) Oral two times a day  ascorbic acid 500 milliGRAM(s) Oral daily  atorvastatin 40 milliGRAM(s) Oral at bedtime  baclofen 5 milliGRAM(s) Oral every 12 hours  calamine/zinc oxide Lotion 1 Application(s) Topical three times a day  chlorhexidine 2% Cloths 1 Application(s) Topical daily  clonazePAM  Tablet 0.75 milliGRAM(s) Oral <User Schedule>  clonazePAM  Tablet 0.5 milliGRAM(s) Oral <User Schedule>  diphenhydrAMINE Injectable 25 milliGRAM(s) IV Push <User Schedule>  epoetin marilin-epbx (RETACRIT) Injectable 4000 Unit(s) IV Push <User Schedule>  levETIRAcetam 500 milliGRAM(s) Oral daily  levETIRAcetam 250 milliGRAM(s) Oral <User Schedule>  levothyroxine 100 MICROGram(s) Oral daily  loratadine 10 milliGRAM(s) Oral daily  meropenem  IVPB 500 milliGRAM(s) IV Intermittent every 24 hours  metoprolol succinate ER 50 milliGRAM(s) Oral daily  multivitamin 1 Tablet(s) Oral daily  Nephro-mirna 1 Tablet(s) Oral daily  polyethylene glycol 3350 17 Gram(s) Oral daily  senna 2 Tablet(s) Oral at bedtime    MEDICATIONS  (PRN):  albuterol/ipratropium for Nebulization 3 milliLiter(s) Nebulizer every 6 hours PRN Shortness of Breath and/or Wheezing  bisacodyl Suppository 10 milliGRAM(s) Rectal daily PRN Constipation  diphenhydrAMINE Injectable 25 milliGRAM(s) IV Push once PRN Rash and/or Itching before hD  hydrocortisone 1% Ointment 1 Application(s) Topical three times a day PRN Rash and/or Itching  lidocaine   4% Patch 1 Patch Transdermal daily PRN left shoulder/trap  melatonin 3 milliGRAM(s) Oral at bedtime PRN Insomnia  midodrine. 10 milliGRAM(s) Oral three times a day PRN pre-dialysis  OLANZapine Injectable 2.5 milliGRAM(s) IntraMuscular every 6 hours PRN agitation      PHYSICAL EXAM:  T(C): 36.8 (06-03-23 @ 13:22), Max: 37.1 (06-03-23 @ 01:40)  HR: 93 (06-03-23 @ 13:22) (86 - 109)  BP: 110/63 (06-03-23 @ 13:22) (95/71 - 140/90)  RR: 18 (06-03-23 @ 13:22) (16 - 18)  SpO2: 100% (06-03-23 @ 13:22) (95% - 100%)  I&O's Summary    03 Jun 2023 07:01  -  03 Jun 2023 16:24  --------------------------------------------------------  IN: 0 mL / OUT: 600 mL / NET: -600 mL      GENERAL: NAD, well-developed  HEAD:  Atraumatic, Normocephalic, MMM  CHEST/LUNG: No use of accessory muscles, CTAB, breathing non-labored  COR: RR, no mrcg  ABD: Soft, ND/NT, +BS  PSYCH: AAOx3  NEUROLOGY: CN II-XII grossly intact, moving all extremities  SKIN: No rashes or lesions  EXT: wwp, no cce    LABS:  CAPILLARY BLOOD GLUCOSE                              10.5   15.47 )-----------( 219      ( 02 Jun 2023 13:06 )             34.4     06-02    140  |  103  |  53<H>  ----------------------------<  108<H>  4.1   |  23  |  3.54<H>    Ca    8.2<L>      02 Jun 2023 13:06  Phos  3.8     06-02  Mg     2.00     06-02                  RADIOLOGY & ADDITIONAL TESTS:    Telemetry Personally Reviewed -     Imaging Personally Reviewed -     Imaging Reviewed -     Consultant(s) Notes Reviewed -       Care Discussed with Consultants/Other Providers -  Patient is a 62y old  Male who presents with a chief complaint of combative behavior (03 Jun 2023 12:20)      SUBJECTIVE / OVERNIGHT EVENTS:    No events overnight. This AM, patient without n/v/d/cp/sob.  Patient states he feels the same and denies any complaints. States he wants to go to [place].     MEDICATIONS  (STANDING):  apixaban 5 milliGRAM(s) Oral two times a day  ascorbic acid 500 milliGRAM(s) Oral daily  atorvastatin 40 milliGRAM(s) Oral at bedtime  baclofen 5 milliGRAM(s) Oral every 12 hours  calamine/zinc oxide Lotion 1 Application(s) Topical three times a day  chlorhexidine 2% Cloths 1 Application(s) Topical daily  clonazePAM  Tablet 0.75 milliGRAM(s) Oral <User Schedule>  clonazePAM  Tablet 0.5 milliGRAM(s) Oral <User Schedule>  diphenhydrAMINE Injectable 25 milliGRAM(s) IV Push <User Schedule>  epoetin marilin-epbx (RETACRIT) Injectable 4000 Unit(s) IV Push <User Schedule>  levETIRAcetam 500 milliGRAM(s) Oral daily  levETIRAcetam 250 milliGRAM(s) Oral <User Schedule>  levothyroxine 100 MICROGram(s) Oral daily  loratadine 10 milliGRAM(s) Oral daily  meropenem  IVPB 500 milliGRAM(s) IV Intermittent every 24 hours  metoprolol succinate ER 50 milliGRAM(s) Oral daily  multivitamin 1 Tablet(s) Oral daily  Nephro-mirna 1 Tablet(s) Oral daily  polyethylene glycol 3350 17 Gram(s) Oral daily  senna 2 Tablet(s) Oral at bedtime    MEDICATIONS  (PRN):  albuterol/ipratropium for Nebulization 3 milliLiter(s) Nebulizer every 6 hours PRN Shortness of Breath and/or Wheezing  bisacodyl Suppository 10 milliGRAM(s) Rectal daily PRN Constipation  diphenhydrAMINE Injectable 25 milliGRAM(s) IV Push once PRN Rash and/or Itching before hD  hydrocortisone 1% Ointment 1 Application(s) Topical three times a day PRN Rash and/or Itching  lidocaine   4% Patch 1 Patch Transdermal daily PRN left shoulder/trap  melatonin 3 milliGRAM(s) Oral at bedtime PRN Insomnia  midodrine. 10 milliGRAM(s) Oral three times a day PRN pre-dialysis  OLANZapine Injectable 2.5 milliGRAM(s) IntraMuscular every 6 hours PRN agitation      PHYSICAL EXAM:  T(C): 36.8 (06-03-23 @ 13:22), Max: 37.1 (06-03-23 @ 01:40)  HR: 93 (06-03-23 @ 13:22) (86 - 109)  BP: 110/63 (06-03-23 @ 13:22) (95/71 - 140/90)  RR: 18 (06-03-23 @ 13:22) (16 - 18)  SpO2: 100% (06-03-23 @ 13:22) (95% - 100%)  I&O's Summary    03 Jun 2023 07:01  -  03 Jun 2023 16:24  --------------------------------------------------------  IN: 0 mL / OUT: 600 mL / NET: -600 mL      HEAD:  Atraumatic, Normocephalic, MMM  CHEST/LUNG: No use of accessory muscles, CTAB, breathing non-labored  COR: RR, no mrcg  ABD: Soft, ND/NT, +BS, Suprapubic catheter in place   PSYCH: AAOx3  NEUROLOGY :  alert and oriented to self/place and time. Able to converse   SKIN: multiple wounds on R foot and abrasions noted on feet   EXT: able to move all extremities    LABS:  CAPILLARY BLOOD GLUCOSE                              10.5   15.47 )-----------( 219      ( 02 Jun 2023 13:06 )             34.4     06-02    140  |  103  |  53<H>  ----------------------------<  108<H>  4.1   |  23  |  3.54<H>    Ca    8.2<L>      02 Jun 2023 13:06  Phos  3.8     06-02  Mg     2.00     06-02                  RADIOLOGY & ADDITIONAL TESTS:    Telemetry Personally Reviewed -     Imaging Personally Reviewed -     Imaging Reviewed -     Consultant(s) Notes Reviewed -       Care Discussed with Consultants/Other Providers -

## 2023-06-03 NOTE — PROGRESS NOTE ADULT - NSPROGADDITIONALINFOA_GEN_ALL_CORE
DVT ppx: on Eliquis  PT eval pending   Diet- renal DVT ppx: on Eliquis  PT eval pending   Diet- renal  Dispo: return to [facility] once medically optimized

## 2023-06-03 NOTE — PROGRESS NOTE ADULT - SUBJECTIVE AND OBJECTIVE BOX
[facility] DIVISION OF KIDNEY DISEASES AND HYPERTENSION   FOLLOW UP NOTE    --------------------------------------------------------------------------------  Chief Complaint: ESRD on HD TIW    24 hour events/subjective: Pt. was seen and examined today, not in distress. Pt somnolent unable to obtain ROS      PAST HISTORY  --------------------------------------------------------------------------------  No significant changes to PMH, PSH, FHx, SHx, unless otherwise noted    ALLERGIES & MEDICATIONS  --------------------------------------------------------------------------------  Allergies    codeine (Rash)  Haldol (Unknown)  Motrin (Rash)  penicillin (Hives; Anaphylaxis)  Toradol (Rash)  contrast media (iodine-based) (Rash)  Tylenol (Hives)  aspirin (Hives)  codeine (Unknown)    Intolerances    Standing Inpatient Medications  apixaban 5 milliGRAM(s) Oral two times a day  ascorbic acid 500 milliGRAM(s) Oral daily  atorvastatin 40 milliGRAM(s) Oral at bedtime  baclofen 5 milliGRAM(s) Oral every 12 hours  calamine/zinc oxide Lotion 1 Application(s) Topical three times a day  chlorhexidine 2% Cloths 1 Application(s) Topical daily  clonazePAM  Tablet 0.75 milliGRAM(s) Oral <User Schedule>  clonazePAM  Tablet 0.5 milliGRAM(s) Oral <User Schedule>  diphenhydrAMINE Injectable 25 milliGRAM(s) IV Push <User Schedule>  epoetin marilin-epbx (RETACRIT) Injectable 4000 Unit(s) IV Push <User Schedule>  levETIRAcetam 500 milliGRAM(s) Oral daily  levETIRAcetam 250 milliGRAM(s) Oral <User Schedule>  levothyroxine 100 MICROGram(s) Oral daily  loratadine 10 milliGRAM(s) Oral daily  meropenem  IVPB 500 milliGRAM(s) IV Intermittent every 24 hours  metoprolol succinate ER 50 milliGRAM(s) Oral daily  multivitamin 1 Tablet(s) Oral daily  Nephro-mirna 1 Tablet(s) Oral daily  polyethylene glycol 3350 17 Gram(s) Oral daily  senna 2 Tablet(s) Oral at bedtime    PRN Inpatient Medications  albuterol/ipratropium for Nebulization 3 milliLiter(s) Nebulizer every 6 hours PRN  bisacodyl Suppository 10 milliGRAM(s) Rectal daily PRN  diphenhydrAMINE Injectable 25 milliGRAM(s) IV Push once PRN  hydrocortisone 1% Ointment 1 Application(s) Topical three times a day PRN  lidocaine   4% Patch 1 Patch Transdermal daily PRN  melatonin 3 milliGRAM(s) Oral at bedtime PRN  midodrine. 10 milliGRAM(s) Oral three times a day PRN  OLANZapine Injectable 2.5 milliGRAM(s) IntraMuscular every 6 hours PRN      REVIEW OF SYSTEMS  --------------------------------------------------------------------------------  Unable to obtain as pt was somnolent    VITALS/PHYSICAL EXAM  --------------------------------------------------------------------------------  T(C): 36.7 (06-03-23 @ 07:47), Max: 37.1 (06-03-23 @ 01:40)  HR: 90 (06-03-23 @ 07:47) (86 - 109)  BP: 107/68 (06-03-23 @ 07:47) (95/71 - 140/90)  RR: 18 (06-03-23 @ 07:47) (16 - 18)  SpO2: 100% (06-03-23 @ 07:47) (95% - 100%)  Wt(kg): --      06-03-23 @ 07:01  -  06-03-23 @ 12:20  --------------------------------------------------------  IN: 0 mL / OUT: 600 mL / NET: -600 mL    Physical Exam:  	Gen chronically ill.   	HEENT: no JVD  	Pulm: CTABL  	CV: S1S2,  	Abd: Soft,   	Ext:  + edema B/L LE (trace)  	Neuro: sleeping  	Skin: Warm and dry              Vascular:  R IJ tunneled HD catheter, R foot dressing     LABS/STUDIES  --------------------------------------------------------------------------------              10.5   15.47 >-----------<  219      [06-02-23 @ 13:06]              34.4     140  |  103  |  53  ----------------------------<  108      [06-02-23 @ 13:06]  4.1   |  23  |  3.54        Ca     8.2     [06-02-23 @ 13:06]      Mg     2.00     [06-02-23 @ 13:06]      Phos  3.8     [06-02-23 @ 13:0    Creatinine Trend:  SCr 3.54 [06-02 @ 13:06]  SCr 4.52 [06-01 @ 17:26]  SCr 6.06 [05-30 @ 22:00]  SCr 5.53 [05-29 @ 16:45]  SCr 2.81 [05-27 @ 03:15]    Urinalysis - [05-27-23 @ 12:45]      Color Light Orange / Appearance Turbid / SG 1.017 / pH 6.5      Gluc Negative / Ketone Negative  / Bili Negative / Urobili <2 mg/dL       Blood Large / Protein 300 mg/dL / Leuk Est Large / Nitrite Negative       / WBC >720 / Hyaline 62 / Gran 0 - 2 / Sq Epi  / Non Sq Epi  / Bacteria Few    HBsAg Nonreact      [05-22-23 @ 16:25]  HCV 8.30, Reactive      [05-22-23 @ 16:25]

## 2023-06-04 NOTE — PROGRESS NOTE ADULT - ASSESSMENT
62 year old male with hx of CVA (WC bound, quadriparesis), COPD, CAD (AICD), ESRD (T/R/S), HTN, HLD, seizure d/o, a-fib (s/p ablation, eliquis), neurogenic bladder (SPC), BARRY, hypothyroidism, RCC (s/p partial nephrectomy), CHF, prostate CA (s/p radiation) presents from [place] for agitation.     admitted for evaluation.  62 year old male with hx of CVA (WC bound, quadriparesis), COPD, CAD (AICD), ESRD (T/R/S), HTN, HLD, seizure d/o, a-fib (s/p ablation, eliquis), neurogenic bladder (SPC), BARRY, hypothyroidism, RCC (s/p partial nephrectomy), CHF, prostate CA (s/p radiation) presents from [place] for agitation.     admitted for evaluation.

## 2023-06-04 NOTE — PROGRESS NOTE ADULT - PROBLEM SELECTOR PLAN 1
Pt recently discharged from [facility] on 4/27/23   Agitation possibly 2/2 malingering as patients admits this was his motive  intermittently refusing labs and refusing tele   Seen by  team on last admission, reconsulted for this admission   Continue klonopin 0.5 mg qam and 0.75 mg qpm  Avoid IV pain medications   c/w Zyprexa 2.5 q6 prn agitation. Noted to have fever on 5/27 : 100.9F. Now T ~99. -> s/p Vanc 1000mg x 1  Hx of MRSA in UC in March 2023,  BCx - negative, initial UCx- >3 organisms and repeat Urine Cx w/ C.auris likely contaminant and colonization as per ID but will still keep on contact precautions-> did have two doses of Fluconazole   heel ulcer unlikely osteo as per ID  fever, lethargy, unclear source but had vomiting and abd pain with a suprapubic cath and dysuria  Likely source UTI?   CT A/P ordered and shows Moderate to large rectal stool burden. Trace bilateral pleural effusions and small pericardial effusion. Sacral decubitus ulcer with erosion of the coccyx, concerning for   osteomyelitis.    -  Started on IV meropenem and ID Recommends completing 10 day course until 6/5   - Podiatry eval for Heel wound and recs include:  Z- FLOWS boots at all time when in bed.   No culture obtained as no active signs of infection. recommend wound care and long term antibiotics.   -ID consulted given patient allergic to pencillin , rocephin and ID recs noted. Recommend:   c/w meropenem 500mg q24hrs (give post-HD in HD days), will complete a 10 day course through 6/6  - follow up repeat Urine Cx- showed C.auris and as per ID it is likely colonization. will hold off on treating it but will continue contact precautions. Repeat UA/urine culture sent to lab which was NOT take from the bag and taken directly. will follow up.

## 2023-06-04 NOTE — PROVIDER CONTACT NOTE (CRITICAL VALUE NOTIFICATION) - PERSON GIVING RESULT:
roosevelt jay
TYLOR Kauffman
SUNSHINE Bunn from Lab
SURESH Kauffman from Chem lab
CHRISTINA [facility]
ROYCE Arzola.
JORJE Yen

## 2023-06-04 NOTE — PROGRESS NOTE ADULT - PROBLEM SELECTOR PLAN 2
Spiked a fever on 5/27 : 100.9F. Now T ~99. -> s/p Vanc 1000mg x 1  Hx of MRSA in UC in March 2023, Follow up with BCx - NGTD   Urine Cx w/ likely contaminant   CT A/P ordered and shows Moderate to large rectal stool burden. Trace bilateral pleural effusions and small pericardial effusion. Sacral decubitus ulcer with erosion of the coccyx, concerning for   osteomyelitis.    - Started on IV meropenem and ID Recommends completing course until 6/5     - Podiatry eval for Heel wound and recs include:  Z- FLOWS boots at all time when in bed.   No culture obtained as no active signs of infection. recommend wound care and long term antibiotics.     -ID consulted given patient allergic to pencillin , rocephin and ID recs noted. Recommend:   c/w meropenem 500mg q24hrs (give post-HD in HD days), will complete a 10 day course through 6/6    - follow up repeat Urine Cx- showed C.auris and as per ID it is likely colonization. will hold off on treating it but will continue contact precautions. Repeat UA/urine culture sent to lab which was NOT take from the bag and taken directly. will follow up. Pt recently discharged from [facility] on 4/27/23. possibly 2/2 malingering as patients admits this was his motive  intermittently refusing labs and refused tele and HD at times   Seen by  team on last admission, reconsulted for this admission       Continue klonopin 0.5 mg q am and 0.75 mg q pm  Avoid IV pain medications   PRN for agitation:   Zyprexa 2.5mg q6hrs Po/IM, may give additional 2.5mg for refractory/severe agitation  avoid ativan PRN to limit benzos - as can worsen delirium/ confusion  ALLERGY listed to haldol - DO NOT GIVE   also recommends the following:   If there is concern for any sabotaging of his care- patient should be placed on 1:1 (is not for SI/SA- this would be if there is concern for malingering /factitious disorder)

## 2023-06-04 NOTE — PROVIDER CONTACT NOTE (CRITICAL VALUE NOTIFICATION) - TEST AND RESULT REPORTED:
trop 134  trop 134
urine culture from 5/31 came back positive for candida Auris
potassium 2.8
Hgb 6.8 Hct 19.6
Troponin: 108
HCV interpretation is reactive
Trop 178

## 2023-06-04 NOTE — PROGRESS NOTE ADULT - PROBLEM SELECTOR PLAN 4
Pt on HD MWF  - Nephro following- continue HD  - c/w home sevelamer (monitor phos levels)  - Uremia improving Hb 7.4, likely nephrogenic, no signs bleeding.  s/p 1 U PRBC 5/17, appropriate response. Hgb now 10.5 and stable  likely 2/2 ESRD     - monitor hgb  - Keep hgb>7

## 2023-06-04 NOTE — PROGRESS NOTE ADULT - PROBLEM SELECTOR PLAN 12
c/w home Imdur and metoprolol  DASH diet     #multiple wounds on R foot/ toes  seen by podiatry on previous admission - no role for surgical intervention  wound care following; recs appreciated however, pt refusing wound care and states he doesn't care continue with home Keppra  seizure precautions  aspiration precautions

## 2023-06-04 NOTE — PROVIDER CONTACT NOTE (CRITICAL VALUE NOTIFICATION) - NAME OF MD/NP/PA/DO NOTIFIED:
Robert Rubio
ASAD Gonzalez
ACP [place]
TAMMY gustafson
ASAD ROLDAN
[facility] Guadalupe Judgeachukwu
Tele ASAD Norris

## 2023-06-04 NOTE — CHART NOTE - NSCHARTNOTEFT_GEN_A_CORE
Hospitalist Medicine NP    CC: Called by RN to evaluate pt with c/o "chest pain."    HPI: 62 year old male with hx of CVA (WC bound, quadriparesis), COPD, CAD (AICD), ESRD (T/R/S), HTN, HLD, seizure d/o, a-fib (s/p ablation, eliquis), neurogenic bladder (SPC), BARRY, hypothyroidism, RCC (s/p partial nephrectomy), CHF, prostate CA (s/p radiation) presents from [place] for combative behavior. Patient was discharged from [facility] on 4/27/23 and then started kicking his feet in the ambulance. Patient was combative with staff upon arrival and sent back to [facility]. Pt very drowsy when seen by writer , as per staff patient was verbally abusive prior to writers examination. Pt refusing blood work , medications and food.  (28 Apr 2023 11:01)    Pt seen and examined at bedside. Pt c/o pressure under his neck "where the trach was" that radiates to the right shoulder/chest area, just below the permacath insertion site.     REVIEW OF SYSTEMS:  Respiratory: Reports cough but denies wheezing, chills, hemoptysis, shortness of breath, difficulty breathing  Cardiovascular: Denies chest pain, palpitations, dizziness  Gastrointestinal: Denies abdominal pain, nausea, vomiting, hematemesis, diarrhea, constipation, melena  Genitourinary: Denies dysuria or hematuria  Neurological: Denies headaches, memory loss, loss of strength, numbness, tremors, but endorses feeling "anxious"    VITALS:  T(C): 36.8 (06-04-23 @ 05:30), Max: 36.9 (06-03-23 @ 18:50)  HR: 90 (06-04-23 @ 09:21) (65 - 93)  BP: 113/74 (06-04-23 @ 09:21) (109/58 - 126/65)  RR: 19 (06-04-23 @ 09:21) (18 - 19)  SpO2: 100% (06-04-23 @ 09:21) (100% - 100%)      PHYSICAL EXAM:  General: chronically ill appearing male, in no acute distress, with non-toxic appearance   Eyes: PERRL, Conjunctiva and sclera clear.  Lungs: Airway patent. CTA B/L. Normal breath sounds. No wheezes, rales, rhonchi.  Heart: irregular Normal S1/S2. No murmurs appreciated.  Abdomen: Soft, NT/ND. Normoactive BS x 4 quadrants. Suprapubic catheter in place   Neurological:  AAOx3. otherwise, non-focal exam   Extremities:  2+ B/L peripheral pulses. No clubbing, cyanosis, or edema.  SKIN: multiple wounds on R foot and abrasions noted on feet       LABS:  CAPILLARY BLOOD GLUCOSE                          9.7    8.29  )-----------( 219      ( 03 Jun 2023 19:29 )             31.6     06-03    138  |  103  |  61<H>  ----------------------------<  101<H>  4.1   |  20<L>  |  3.68<H>    Ca    8.1<L>      03 Jun 2023 19:29  Phos  4.7     06-03  Mg     1.90     06-03                                 ASSESSMENT:   62y male with a pmhx of CVA (WC bound, quadriparesis), seizure d/o, Afib (on Eliquis with IVC filter in situ) , HTN, ESRD (T/R/S), HLD, CHF, s/p AICD, CAD with stents, COPD, neurogenic bladder (SPC), BARRY, hypothyroidism, bladder/prostate ca (s/p radiation), renal carcinoma of left kidney, s/p partial nephrectomy, Hep C w/o coma, chronic s/p cholecystectomy, and Opioid dependence, admitted from [facility] for combative behavior. Patient was discharged from [facility] on 4/27/23 and then started kicking his feet in the ambulance. Patient was combative with staff upon arrival and sent back to [facility]. Now with complaints of neck, right shoulder and chest wall discomfort, likely non cardiac in nature.       PLAN:  - 12 lead ekg reviewed- afib rate 124 bpm- received Toprol 50 mg this AM- repeat manual HR 88-90bpm  - AM labs sent, add on troponin  - klonopin 0.5 mg po x 1 addtl. dose now  - Continue current treatment  - Follow up labs/tests  - D/w Dr. Fang aware and agree with the plan  - Will continue to follow up    Time spent on encounter 40 minutes. Hospitalist Medicine NP    CC: Called by RN to evaluate pt with c/o "chest pain."    HPI: 62 year old male with hx of CVA (WC bound, quadriparesis), COPD, CAD (AICD), ESRD (T/R/S), HTN, HLD, seizure d/o, a-fib (s/p ablation, eliquis), neurogenic bladder (SPC), BARRY, hypothyroidism, RCC (s/p partial nephrectomy), CHF, prostate CA (s/p radiation) presents from [place] for combative behavior. Patient was discharged from [facility] on 4/27/23 and then started kicking his feet in the ambulance. Patient was combative with staff upon arrival and sent back to [facility]. Pt very drowsy when seen by writer , as per staff patient was verbally abusive prior to writers examination. Pt refusing blood work , medications and food.  (28 Apr 2023 11:01)    Pt seen and examined at bedside. Pt c/o pressure under his neck "where the trach was" that radiates to the right shoulder/chest area, just below the permacath insertion site.     REVIEW OF SYSTEMS:  Respiratory: Reports cough but denies wheezing, chills, hemoptysis, shortness of breath, difficulty breathing  Cardiovascular: Denies chest pain, palpitations, dizziness  Gastrointestinal: Denies abdominal pain, nausea, vomiting, hematemesis, diarrhea, constipation, melena  Genitourinary: Denies dysuria or hematuria  Neurological: Denies headaches, memory loss, loss of strength, numbness, tremors, but endorses feeling "anxious"    VITALS:  T(C): 36.8 (06-04-23 @ 05:30), Max: 36.9 (06-03-23 @ 18:50)  HR: 90 (06-04-23 @ 09:21) (65 - 93)  BP: 113/74 (06-04-23 @ 09:21) (109/58 - 126/65)  RR: 19 (06-04-23 @ 09:21) (18 - 19)  SpO2: 100% (06-04-23 @ 09:21) (100% - 100%)      PHYSICAL EXAM:  General: chronically ill appearing male, in no acute distress, with non-toxic appearance   HEENT: PERRL, Conjunctiva and sclera clear. NCAT, healed trach stoma without erythema, or warmth noted, oral mucosa moist, no lesions noted.   Lungs: Airway patent. CTA B/L. Normal breath sounds. No wheezes, rales, rhonchi.  Heart: irregular Normal S1/S2. No murmurs appreciated.  Abdomen: Soft, NT/ND. Normoactive BS x 4 quadrants. Suprapubic catheter in place   Neurological:  AAOx3. otherwise, non-focal exam   Extremities:  2+ B/L peripheral pulses. No clubbing, cyanosis, or edema.  SKIN: multiple wounds on R foot and abrasions noted on feet, RIGHT chest wall permacath site wnl, no erythema, ttp, or drainage noted       LABS:  CAPILLARY BLOOD GLUCOSE                          9.7    8.29  )-----------( 219      ( 03 Jun 2023 19:29 )             31.6     06-03    138  |  103  |  61<H>  ----------------------------<  101<H>  4.1   |  20<L>  |  3.68<H>    Ca    8.1<L>      03 Jun 2023 19:29  Phos  4.7     06-03  Mg     1.90     06-03                                 ASSESSMENT:   62y male with a pmhx of CVA (WC bound, quadriparesis), seizure d/o, Afib (on Eliquis with IVC filter in situ) , HTN, ESRD (T/R/S), HLD, CHF, s/p AICD, CAD with stents, COPD, neurogenic bladder (SPC), BARRY, hypothyroidism, bladder/prostate ca (s/p radiation), renal carcinoma of left kidney, s/p partial nephrectomy, Hep C w/o coma, chronic s/p cholecystectomy, and Opioid dependence, admitted from [facility] for combative behavior. Patient was discharged from [facility] on 4/27/23 and then started kicking his feet in the ambulance. Patient was combative with staff upon arrival and sent back to [facility]. Now with complaints of neck, right shoulder and chest wall discomfort, likely non cardiac in nature.       PLAN:  - 12 lead ekg reviewed- afib rate 124 bpm- received Toprol 50 mg this AM- repeat manual HR 88-90bpm  - AM labs sent, add on troponin  - klonopin 0.5 mg po x 1 addtl. dose now  - Continue current treatment  - Follow up labs/tests  - D/w Dr. Fang aware and agree with the plan  - Will continue to follow up    Time spent on encounter 40 minutes.

## 2023-06-04 NOTE — PROGRESS NOTE ADULT - NSPROGADDITIONALINFOA_GEN_ALL_CORE
DVT ppx: on Eliquis  PT eval pending   Diet- renal  Dispo: return to [facility] once medically optimized DVT ppx: on Eliquis  Diet- renal  Dispo: return to [facility] once medically optimized

## 2023-06-04 NOTE — PROGRESS NOTE ADULT - PROBLEM SELECTOR PLAN 11
Hb 7.4, likely nephrogenic, no signs bleeding.  s/p 1 U PRBC 5/17, appropriate response. Hgb now 10.5 and stable  likely 2/2 ESRD     - monitor hgb  - Keep hgb>7 c/w home Imdur and metoprolol  DASH diet       #multiple wounds on R foot/ toes  seen by podiatry on previous admission - no role for surgical intervention  wound care following; recs appreciated however, pt refusing wound care and states he doesn't care

## 2023-06-04 NOTE — PROGRESS NOTE ADULT - PROBLEM SELECTOR PLAN 5
s/p Suprapubic cath  c/w home baclofen Pt on HD MWF    - Nephro following- continue HD  - c/w home sevelamer (monitor phos levels)  - Midodrine prn on HD days  - Uremia improving

## 2023-06-05 NOTE — PROGRESS NOTE ADULT - SUBJECTIVE AND OBJECTIVE BOX
Dr. Keila Greenfield  Pager [phone]    PROGRESS NOTE:     Patient is a 62y old  Male who presents with a chief complaint of combative behavior (05 Jun 2023 11:55)      SUBJECTIVE / OVERNIGHT EVENTS: getting dialysis this morning  ADDITIONAL REVIEW OF SYSTEMS: no acute complaints of chest pain/sob    MEDICATIONS  (STANDING):  apixaban 5 milliGRAM(s) Oral two times a day  ascorbic acid 500 milliGRAM(s) Oral daily  atorvastatin 40 milliGRAM(s) Oral at bedtime  baclofen 5 milliGRAM(s) Oral every 12 hours  calamine/zinc oxide Lotion 1 Application(s) Topical three times a day  chlorhexidine 2% Cloths 1 Application(s) Topical daily  clonazePAM  Tablet 0.5 milliGRAM(s) Oral <User Schedule>  clonazePAM  Tablet 0.75 milliGRAM(s) Oral <User Schedule>  diphenhydrAMINE Injectable 25 milliGRAM(s) IV Push <User Schedule>  levETIRAcetam 500 milliGRAM(s) Oral daily  levETIRAcetam 250 milliGRAM(s) Oral <User Schedule>  levothyroxine 100 MICROGram(s) Oral daily  loratadine 10 milliGRAM(s) Oral daily  meropenem  IVPB 500 milliGRAM(s) IV Intermittent every 24 hours  meropenem  IVPB      metoprolol succinate ER 50 milliGRAM(s) Oral daily  midodrine. 10 milliGRAM(s) Oral three times a day  multivitamin 1 Tablet(s) Oral daily  Nephro-mirna 1 Tablet(s) Oral daily  polyethylene glycol 3350 17 Gram(s) Oral daily  senna 2 Tablet(s) Oral at bedtime    MEDICATIONS  (PRN):  albuterol/ipratropium for Nebulization 3 milliLiter(s) Nebulizer every 6 hours PRN Shortness of Breath and/or Wheezing  bisacodyl Suppository 10 milliGRAM(s) Rectal daily PRN Constipation  hydrocortisone 1% Ointment 1 Application(s) Topical three times a day PRN Rash and/or Itching  lidocaine   4% Patch 1 Patch Transdermal daily PRN left shoulder/trap  melatonin 3 milliGRAM(s) Oral at bedtime PRN Insomnia  OLANZapine Injectable 2.5 milliGRAM(s) IntraMuscular every 6 hours PRN agitation      CAPILLARY BLOOD GLUCOSE        I&O's Summary    05 Jun 2023 07:01  -  05 Jun 2023 15:43  --------------------------------------------------------  IN: 400 mL / OUT: 1400 mL / NET: -1000 mL        PHYSICAL EXAM:  Vital Signs Last 24 Hrs  T(C): 36.6 (05 Jun 2023 11:16), Max: 36.6 (05 Jun 2023 11:16)  T(F): 97.9 (05 Jun 2023 11:16), Max: 97.9 (05 Jun 2023 11:16)  HR: 71 (05 Jun 2023 11:16) (71 - 97)  BP: 115/66 (05 Jun 2023 11:16) (104/56 - 115/66)  BP(mean): --  RR: 19 (05 Jun 2023 11:16) (19 - 19)  SpO2: 100% (05 Jun 2023 06:35) (100% - 100%)    Parameters below as of 05 Jun 2023 11:16  Patient On (Oxygen Delivery Method): room air      HEAD:  Atraumatic, Normocephalic, MMM,  Neck: RIJ tunneled HD catheter   CHEST/LUNG: No use of accessory muscles, CTAB, breathing non-labored  COR: RR, no mrcg  ABD: Soft, ND/NT, +BS, Suprapubic catheter in place   PSYCH: AAOx3  NEUROLOGY :  alert and oriented to self/place and time. Able to converse/follow commands   SKIN: multiple wounds on R foot and abrasions noted on feet   EXT: able to move all extremities     LABS:                        9.5    9.22  )-----------( 204      ( 05 Jun 2023 11:30 )             31.0     06-05    133<L>  |  97<L>  |  56<H>  ----------------------------<  183<H>  3.9   |  24  |  3.67<H>    Ca    8.2<L>      05 Jun 2023 11:30  Phos  5.4     06-05  Mg     2.00     06-05        RADIOLOGY & ADDITIONAL TESTS:  Results Reviewed:   Imaging Personally Reviewed:  < from: Xray Chest 1 View- PORTABLE-Urgent (Xray Chest 1 View- PORTABLE-Urgent .) (06.04.23 @ 21:19) >  Left infiltrate/effusion.      < from: CT Abdomen and Pelvis w/ IV Cont (05.31.23 @ 17:13) >  IMPRESSION:  Moderate to large rectal stool burden.    Trace bilateral pleural effusions and small pericardial effusion.    Sacral decubitus ulcer with erosion of the coccyx, concerning for   osteomyelitis.    Electrocardiogram Personally Reviewed:    COORDINATION OF CARE:  Care Discussed with Consultants/Other Providers [Y/N]: nephro Dr. Kim, cont dialysis  Prior or Outpatient Records Reviewed [Y/N]:

## 2023-06-05 NOTE — PROGRESS NOTE ADULT - SUBJECTIVE AND OBJECTIVE BOX
[facility] DIVISION OF KIDNEY DISEASES AND HYPERTENSION -- HEMODIALYSIS NOTE   Nehrology Office [phone]  available on [employer] teams--> Yolanda Kim   --------------------------------------------------------------------------------  Chief Complaint: ESRD/Ongoing hemodialysis requirement  pt Patient was examined this am at bedside. no complaints   HD being et up on the floor to  isolation ( Candidemia)  BP had been soft   24 hour events/subjective:      ALLERGIES & MEDICATIONS  --------------------------------------------------------------------------------  Allergies    codeine (Rash)  Haldol (Unknown)  Motrin (Rash)  penicillin (Hives; Anaphylaxis)  Toradol (Rash)  contrast media (iodine-based) (Rash)  Tylenol (Hives)  aspirin (Hives)  codeine (Unknown)    Intolerances      Standing Inpatient Medications  apixaban 5 milliGRAM(s) Oral two times a day  ascorbic acid 500 milliGRAM(s) Oral daily  atorvastatin 40 milliGRAM(s) Oral at bedtime  baclofen 5 milliGRAM(s) Oral every 12 hours  calamine/zinc oxide Lotion 1 Application(s) Topical three times a day  chlorhexidine 2% Cloths 1 Application(s) Topical daily  clonazePAM  Tablet 0.5 milliGRAM(s) Oral <User Schedule>  clonazePAM  Tablet 0.75 milliGRAM(s) Oral <User Schedule>  diphenhydrAMINE Injectable 25 milliGRAM(s) IV Push <User Schedule>  diphenhydrAMINE Injectable 25 milliGRAM(s) IV Push once  epoetin marilin-epbx (RETACRIT) Injectable 4000 Unit(s) IV Push <User Schedule>  levETIRAcetam 500 milliGRAM(s) Oral daily  levETIRAcetam 250 milliGRAM(s) Oral <User Schedule>  levothyroxine 100 MICROGram(s) Oral daily  loratadine 10 milliGRAM(s) Oral daily  meropenem  IVPB      meropenem  IVPB 500 milliGRAM(s) IV Intermittent every 24 hours  metoprolol succinate ER 50 milliGRAM(s) Oral daily  midodrine. 10 milliGRAM(s) Oral three times a day  multivitamin 1 Tablet(s) Oral daily  Nephro-mirna 1 Tablet(s) Oral daily  polyethylene glycol 3350 17 Gram(s) Oral daily  senna 2 Tablet(s) Oral at bedtime    PRN Inpatient Medications  albuterol/ipratropium for Nebulization 3 milliLiter(s) Nebulizer every 6 hours PRN  bisacodyl Suppository 10 milliGRAM(s) Rectal daily PRN  hydrocortisone 1% Ointment 1 Application(s) Topical three times a day PRN  lidocaine   4% Patch 1 Patch Transdermal daily PRN  melatonin 3 milliGRAM(s) Oral at bedtime PRN  OLANZapine Injectable 2.5 milliGRAM(s) IntraMuscular every 6 hours PRN        VITALS/PHYSICAL EXAM  --------------------------------------------------------------------------------  T(C): 36.6 (06-05-23 @ 11:16), Max: 36.6 (06-05-23 @ 11:16)  HR: 71 (06-05-23 @ 11:16) (71 - 97)  BP: 115/66 (06-05-23 @ 11:16) (104/56 - 115/66)  RR: 19 (06-05-23 @ 11:16) (19 - 19)  SpO2: 100% (06-05-23 @ 06:35) (100% - 100%)  Wt(kg): --        Physical Exam:  	Gen NAD  	HEENT: no JVD  	Pulm: CTABL  	CV: S1S2,  	Abd: Soft,   	Ext:  +1 edema B/L LE   	Neuro: Awake and alert  	Skin: Warm and dry          Vascular:  IJ tunneled HD catheter,  LABS/STUDIES  --------------------------------------------------------------------------------              11.8   18.29 >-----------<  208      [06-04-23 @ 09:50]              37.9     132  |  100  |  44  ----------------------------<  197      [06-04-23 @ 09:50]  4.6   |  17  |  3.04        Ca     8.4     [06-04-23 @ 09:50]      Mg     1.90     [06-04-23 @ 09:50]      Phos  4.2     [06-04-23 @ 09:50]            Iron 66, TIBC 206, %sat 32      [03-12-23 @ 06:31]  Ferritin 964      [03-12-23 @ 06:31]  HbA1c 6.2      [01-03-20 @ 13:57]  TSH <0.10      [05-11-23 @ 00:25]  Lipid: chol 107, TG 68, HDL 42, LDL --      [04-21-23 @ 16:15]    HBsAg Nonreact      [05-22-23 @ 16:25]  HCV 8.30, Reactive      [05-22-23 @ 16:25]

## 2023-06-05 NOTE — PROGRESS NOTE ADULT - SUBJECTIVE AND OBJECTIVE BOX
Follow Up:  fever, lethargy    Interval History/ROS:   afebrile   states would like to go back to [place]            Allergies  codeine (Rash)  Haldol (Unknown)  Motrin (Rash)  penicillin (Hives; Anaphylaxis)  Toradol (Rash)  contrast media (iodine-based) (Rash)  Tylenol (Hives)  aspirin (Hives)  codeine (Unknown)        ANTIMICROBIALS:  meropenem  IVPB 500 every 24 hours  meropenem  IVPB          OTHER MEDS:  albuterol/ipratropium for Nebulization 3 milliLiter(s) Nebulizer every 6 hours PRN  apixaban 5 milliGRAM(s) Oral two times a day  ascorbic acid 500 milliGRAM(s) Oral daily  atorvastatin 40 milliGRAM(s) Oral at bedtime  baclofen 5 milliGRAM(s) Oral every 12 hours  bisacodyl Suppository 10 milliGRAM(s) Rectal daily PRN  calamine/zinc oxide Lotion 1 Application(s) Topical three times a day  chlorhexidine 2% Cloths 1 Application(s) Topical daily  clonazePAM  Tablet 0.75 milliGRAM(s) Oral <User Schedule>  clonazePAM  Tablet 0.5 milliGRAM(s) Oral <User Schedule>  epoetin marilin-epbx (RETACRIT) Injectable 4000 Unit(s) IV Push <User Schedule>  hydrocortisone 1% Ointment 1 Application(s) Topical three times a day PRN  levETIRAcetam 500 milliGRAM(s) Oral daily  levETIRAcetam 250 milliGRAM(s) Oral <User Schedule>  levothyroxine 100 MICROGram(s) Oral daily  lidocaine   4% Patch 1 Patch Transdermal daily PRN  loratadine 10 milliGRAM(s) Oral daily  melatonin 3 milliGRAM(s) Oral at bedtime PRN  metoprolol succinate ER 50 milliGRAM(s) Oral daily  midodrine. 5 milliGRAM(s) Oral <User Schedule> PRN  multivitamin 1 Tablet(s) Oral daily  Nephro-mirna 1 Tablet(s) Oral daily  OLANZapine Injectable 2.5 milliGRAM(s) IntraMuscular every 6 hours PRN  polyethylene glycol 3350 17 Gram(s) Oral daily  senna 2 Tablet(s) Oral at bedtime      Vital Signs Last 24 Hrs  T(F): 97.9 (06-05-23 @ 11:16), Max: 97.9 (06-05-23 @ 11:16)  HR: 71 (06-05-23 @ 11:16)  BP: 115/66 (06-05-23 @ 11:16)  RR: 19 (06-05-23 @ 11:16)  SpO2: 100% (06-05-23 @ 06:35) (100% - 100%)      Physical Exam:  General:    non toxic, listening headphones   Cardio:    AICD with no tenderness or erythema  Respiratory:    comfortable on RA  abd:     soft,     no tenderness  :     suprapubic cath  Musculoskeletal:   heel right with dressing   Skin:    no rash                            9.5    9.22  )-----------( 204      ( 05 Jun 2023 11:30 )             31.0 06-05    133  |  97  |  56  ----------------------------<  183  3.9   |  24  |  3.67  Ca    8.2      05 Jun 2023 11:30Phos  5.4     06-05Mg     2.00     06-05          MICROBIOLOGY:    Culture - Urine in AM (06.02.23 @ 12:41)   Specimen Source: Kidney Suprapubic  Culture Results:   Few Candida auris    Catheterized Catheterized  05-31-23   >100,000 CFU/ml Candida auris  Referred to Mycology  --  --      .Blood Blood-Venous  05-27-23   No Growth Final  --  --      .Blood Blood-Peripheral  05-27-23   No Growth Final  --  --      Catheterized Catheterized  05-27-23   >=3 organisms. Probable collection contamination.  --  --      .Blood Blood-Peripheral  05-11-23   No Growth Final  --  --      .Blood Blood-Peripheral  05-11-23   No Growth Final  --  --      .Blood Blood-Peripheral  05-10-23   No Growth Final  --  --          Rapid RVP Result: NotDetec (05-27 @ 12:45)        RADIOLOGY:  Images independently visualized and reviewed personally, findings as below  < from: CT Abdomen and Pelvis w/ IV Cont (05.31.23 @ 17:13) >  IMPRESSION:  Moderate to large rectal stool burden.    Trace bilateral pleural effusions and small pericardial effusion.    Sacral decubitus ulcer with erosion of the coccyx, concerning for   osteomyelitis.    < end of copied text >  < from: Xray Foot AP + Lateral + Oblique, Right (05.29.23 @ 22:50) >  IMPRESSION:  Deficiency of the skin overlying the left heel may reflect the known   ulcer.  Sclerosis and areas of demineralization along theposterior calcaneus. In   the setting of a known ulcer, findings are concerning for osteomyelitis.   Further evaluation with MRI can be performed.    < end of copied text >

## 2023-06-05 NOTE — PROGRESS NOTE ADULT - PROBLEM SELECTOR PLAN 5
Pt on HD MWF    - Nephro following- continue HD  - c/w home sevelamer (monitor phos levels)  - Midodrine prn on HD days  - Uremia improving

## 2023-06-05 NOTE — PROGRESS NOTE ADULT - PROBLEM SELECTOR PLAN 11
c/w home Imdur and metoprolol  DASH diet       #multiple wounds on R foot/ toes  seen by podiatry on previous admission - no role for surgical intervention  wound care following; recs appreciated however, pt refusing wound care and states he doesn't care

## 2023-06-05 NOTE — PROGRESS NOTE ADULT - PROBLEM SELECTOR PLAN 1
Noted to have fever on 5/27 : 100.9F. Now T ~99. -> s/p Vanc 1000mg x 1  Hx of MRSA in UC in March 2023,  BCx - negative, initial UCx- >3 organisms and repeat Urine Cx w/ C.auris likely contaminant and colonization as per ID but will still keep on contact precautions-> did have two doses of Fluconazole   heel ulcer unlikely osteo as per ID  fever, lethargy, unclear source but had vomiting and abd pain with a suprapubic cath and dysuria  Likely source UTI?   CT A/P ordered and shows Moderate to large rectal stool burden. Trace bilateral pleural effusions and small pericardial effusion. Sacral decubitus ulcer with erosion of the coccyx, concerning for   osteomyelitis.    -  Started on IV meropenem and ID Recommends completing 10 day course through 6/6  - Podiatry eval for Heel wound and recs include:  Z- FLOWS boots at all time when in bed.   No culture obtained as no active signs of infection. recommend wound care and long term antibiotics.   -ID consulted given patient allergic to pencillin , rocephin and ID recs noted. Recommend:   c/w meropenem 500mg q24hrs (give post-HD in HD days), will complete a 10 day course through 6/6  - follow up repeat Urine Cx- showed C.auris and as per ID it is likely colonization. will hold off on treating it but will continue contact precautions. Repeat UA/urine culture sent to lab which was NOT take from the bag and taken directly. will follow up.

## 2023-06-05 NOTE — PROGRESS NOTE ADULT - ASSESSMENT
62 year old male with hx of CVA (WC bound, quadriparesis), COPD, CAD (AICD), ESRD (T/R/S), HTN, HLD, seizure d/o, a-fib (s/p ablation, eliquis), neurogenic bladder (SPC), BARRY, hypothyroidism, RCC (s/p partial nephrectomy), CHF, prostate CA (s/p radiation) presents from [place] for agitation. issues with Low BP preventing Hd at times,    Assessment and Plan: Pt with ESRD on HD TIW     Problem/Plan- ESRD on hemodialysis.   TThS .he refused Hd on Saturday. HD being arranged for today. itching during Hd. we are using Cellentia dialyzer as it is the most hypoallogenic. will add antihistamine before Hd   Problem/Plan- Hypotension. increase Midodrine to 10 mg with hold parameters. Na modeling during HD  Problem/Plan - Anemia secondary to renal failure.  Hgb >11 today. will hold VIBHA  check iron studies       Problem/Plan - chronic kidney disease-mineral and bone disorder.    Calcium is lower<8.4. increase calcium bath to 3.5.     Phosphorus controlled.  on antibiotics per ID   dose meds per eGFR<10

## 2023-06-05 NOTE — PROGRESS NOTE ADULT - ASSESSMENT
62 year old male with hx of CVA (WC bound, quadriparesis), COPD, CAD (AICD), ESRD (T/R/S), HTN, HLD, seizure d/o, a-fib (s/p ablation, eliquis), neurogenic bladder (SPC), BARRY, hypothyroidism, RCC (s/p partial nephrectomy), CHF, prostate CA (s/p radiation) presents from [place] for agitation.     admitted for evaluation.

## 2023-06-05 NOTE — PROGRESS NOTE ADULT - ASSESSMENT
62 m with CHF AICD, CVA (WC bound, quadriparesis), COPD,  ESRD (T/R/S), , seizure d/o, neurogenic bladder, suprapubic catheter, RCC (s/p partial nephrectomy),  prostate CA (s/p radiation) presented 4/28 from [place] for combative behavior.  Blood cultures 4/28 no growth  Blood cultures 5/10, 5/11 no growth  Developed fever 100.9 on 5/27   BCx (5/27) no growth x 2   UA >720 WBC  urine culture > 3 organisms   Received empiric vanco and meropenem   Subsequently urine culture 5/31 grew C auris and suprapubic catheter culture 5/31 grew C auris   Afebrile   WBC 9K     the candida auris may represent a colonizer at this point  - afebrile, normal WBC    receiving a course of meropenem through 6/6   CXR 6/4 LL infiltrate possible PNA - meropenem should cover    right foot ulcer    sacral ulcer - ?chronic om    would complete meropenem tomorrow   if fever repeat blood culture   strict contact precautions for C auris in urine

## 2023-06-05 NOTE — PROGRESS NOTE ADULT - NSPROGADDITIONALINFOA_GEN_ALL_CORE
Bebeto Kim MD, FACP  Attending Nephrologist  Office: [phone]  Pager: [phone]  Available on [employer] teams-->Yolanda Kim

## 2023-06-05 NOTE — PROGRESS NOTE ADULT - PROBLEM SELECTOR PLAN 2
Pt recently discharged from [facility] on 4/27/23. possibly 2/2 malingering as patients admits this was his motive  intermittently refusing labs and refused tele and HD at times   Seen by  team on last admission, reconsulted for this admission       Continue klonopin 0.5 mg q am and 0.75 mg q pm  Avoid IV pain medications   PRN for agitation:   Zyprexa 2.5mg q6hrs Po/IM, may give additional 2.5mg for refractory/severe agitation  avoid ativan PRN to limit benzos - as can worsen delirium/ confusion  ALLERGY listed to haldol - DO NOT GIVE   also recommends the following:   If there is concern for any sabotaging of his care- patient should be placed on 1:1 (is not for SI/SA- this would be if there is concern for malingering /factitious disorder)

## 2023-06-06 NOTE — CHART NOTE - NSCHARTNOTEFT_GEN_A_CORE
Reason for Follow-Up Assessment: Follow-Up    Source: [ ] Patient [ ] Family [ ] RN [ ] Chart      Diet, Renal Restrictions:   For patients receiving Renal Replacement - No Protein Restr, No Conc K, No Conc Phos, Low Sodium  No Carb Prosource (1pkg = 15gms Protein)     Qty per Day:  2  Supplement Feeding Modality:  Oral  Nepro Cans or Servings Per Day:  1       Frequency:  Daily (05-20-23 @ 11:07)      GI: WDL. Last BM noted on [  ]    PO intake:  [ ] Poor < 50%  [ ] Fair 50-75% [ ] Good  % [ ] Inconsistent PO intake    Enteral /Parenteral Nutrition:       [  ] n/a    Anthropometrics: Height (cm): 180.3 (05-11), 180.3 (04-20)  Weight (kg): 81.9 (05-11), 81.647 (04-20), 90.3 (03-13)  BMI (kg/m2): 25.2 (05-11), 25.1 (04-20)    Edema:    Pressure Injuries:    _______________ Pertinent Medications_______________  MEDICATIONS  (STANDING):  apixaban 5 milliGRAM(s) Oral two times a day  ascorbic acid 500 milliGRAM(s) Oral daily  atorvastatin 40 milliGRAM(s) Oral at bedtime  baclofen 5 milliGRAM(s) Oral every 12 hours  calamine/zinc oxide Lotion 1 Application(s) Topical three times a day  chlorhexidine 2% Cloths 1 Application(s) Topical daily  clonazePAM  Tablet 0.5 milliGRAM(s) Oral <User Schedule>  clonazePAM  Tablet 0.75 milliGRAM(s) Oral <User Schedule>  diphenhydrAMINE Injectable 25 milliGRAM(s) IV Push <User Schedule>  levETIRAcetam 250 milliGRAM(s) Oral <User Schedule>  levETIRAcetam 500 milliGRAM(s) Oral daily  levothyroxine 100 MICROGram(s) Oral daily  loratadine 10 milliGRAM(s) Oral daily  meropenem  IVPB 500 milliGRAM(s) IV Intermittent every 24 hours  meropenem  IVPB      metoprolol succinate ER 50 milliGRAM(s) Oral daily  midodrine. 10 milliGRAM(s) Oral three times a day  multivitamin 1 Tablet(s) Oral daily  Nephro-mirna 1 Tablet(s) Oral daily  polyethylene glycol 3350 17 Gram(s) Oral two times a day  senna 2 Tablet(s) Oral at bedtime    MEDICATIONS  (PRN):  albuterol/ipratropium for Nebulization 3 milliLiter(s) Nebulizer every 6 hours PRN Shortness of Breath and/or Wheezing  bisacodyl Suppository 10 milliGRAM(s) Rectal daily PRN Constipation  hydrocortisone 1% Ointment 1 Application(s) Topical three times a day PRN Rash and/or Itching  lidocaine   4% Patch 1 Patch Transdermal daily PRN left shoulder/trap  melatonin 3 milliGRAM(s) Oral at bedtime PRN Insomnia  OLANZapine Injectable 2.5 milliGRAM(s) IntraMuscular every 6 hours PRN agitation      __________________ Pertinent Labs__________________   06-05 Na133 mmol/L<L> Glu 183 mg/dL<H> K+ 3.9 mmol/L Cr  3.67 mg/dL<H> BUN 56 mg/dL<H> 06-05 Phos 5.4 mg/dL<H> 05-30 Alb 3.2 g/dL<L>            Estimated Needs:   silvio/d  gm pro/d    [ ] no change since previous assessment  [ ] recalculated:     Previous Nutrition Diagnosis:     Nutrition Diagnosis is [ ] ongoing  [ ] resolved [ ] not applicable     New Nutrition Diagnosis:    Monitoring and Evaluation:     [ x ] Tolerance to diet prescription / adequacy of meal intake  [ x ] Weight trends   [ x ] Pertinent labs  [ x ] Other:    Recommendations:  1) Diet / Supplement Changes:  2) Obtain and record current weights to best monitor for acute changes in nutritional status.  3) Please consistently document % meal intake in nursing flowsheets. Reason for Follow-Up Assessment: Follow-Up    Patient transferred from . Chart reviewed.  (6/5 Hospitalist attending) --- 62 year old male with hx of CVA (WC bound, quadriparesis), COPD, CAD (AICD), ESRD (T/R/S), HTN, HLD, seizure d/o, a-fib (s/p ablation, eliquis), neurogenic bladder (SPC), BARRY, hypothyroidism, RCC (s/p partial nephrectomy), CHF, prostate CA (s/p radiation) presents from [place] for agitation. Patient previously noted w. fever, patient with C. auris in urine, Sacral decubitus ulcer with erosion of the coccyx, concerning for osteomyelitis.  Patient receiving IV ABTx. ID following patient.     Patient also with agitation, noted to intermittently refuse labs, tele and HD at times.  Patient also noted to be uncooperative with answering questions. Chart reviewed, Patient with variable PO intake per flowsheets, assisted with feeding PRN.  Weights with inconsistencies. noted bedscale issues per weight record in nursing flowsheets; on 6/5, it was noted that bedscale not calibrated and therefore, weight cannot be obtained. At time of last nutrition f/u 5/19, ProSource No Carb 2 packets daily recommended to help w/ wound healing efforts, provides 120kcal/30gm protein/day if 100% consumed. Nutrition also optimized with Nepro 1x daily (425 kcals, 19.1g protein).  No noted GI distress i.e. nausea, vomiting, diarrhea.    Source: [ ] Patient [ ] Family [ ] RN [ X ] Chart      Diet, Renal Restrictions:   For patients receiving Renal Replacement - No Protein Restr, No Conc K, No Conc Phos, Low Sodium  No Carb Prosource (1pkg = 15gms Protein)     Qty per Day:  2  Supplement Feeding Modality:  Oral  Nepro Cans or Servings Per Day:  1       Frequency:  Daily (05-20-23 @ 11:07)    GI: Fecal incontinence noted Last BM noted on [ 6/2 ]    PO intake:  [ ] Poor < 50%  [ ] Fair 50-75% [ ] Good  % [X] Inconsistent/variable PO intake    Enteral /Parenteral Nutrition:       [X] n/a    Weight Hx:  73.5 / 74.5kg (6/1)      86.6 kg (5/12)      81.6 kg (4/28)  ? true weight loss vs. weight discrepancy from bedscale errors/malfunction vs. fluid related from HD    Edema: Generalized 1+ edema noted, previously as much as generalized 2+    Pressure Injuries: Sacral and Rt heel pressure injuries noted - see nursing flowsheets    _______________ Pertinent Medications_______________  MEDICATIONS  (STANDING):  apixaban 5 milliGRAM(s) Oral two times a day  ascorbic acid 500 milliGRAM(s) Oral daily  atorvastatin 40 milliGRAM(s) Oral at bedtime  baclofen 5 milliGRAM(s) Oral every 12 hours  calamine/zinc oxide Lotion 1 Application(s) Topical three times a day  chlorhexidine 2% Cloths 1 Application(s) Topical daily  clonazePAM  Tablet 0.5 milliGRAM(s) Oral <User Schedule>  clonazePAM  Tablet 0.75 milliGRAM(s) Oral <User Schedule>  diphenhydrAMINE Injectable 25 milliGRAM(s) IV Push <User Schedule>  levETIRAcetam 250 milliGRAM(s) Oral <User Schedule>  levETIRAcetam 500 milliGRAM(s) Oral daily  levothyroxine 100 MICROGram(s) Oral daily  loratadine 10 milliGRAM(s) Oral daily  meropenem  IVPB 500 milliGRAM(s) IV Intermittent every 24 hours  meropenem  IVPB      metoprolol succinate ER 50 milliGRAM(s) Oral daily  midodrine. 10 milliGRAM(s) Oral three times a day  multivitamin 1 Tablet(s) Oral daily  Nephro-mirna 1 Tablet(s) Oral daily  polyethylene glycol 3350 17 Gram(s) Oral two times a day  senna 2 Tablet(s) Oral at bedtime    MEDICATIONS  (PRN):  albuterol/ipratropium for Nebulization 3 milliLiter(s) Nebulizer every 6 hours PRN Shortness of Breath and/or Wheezing  bisacodyl Suppository 10 milliGRAM(s) Rectal daily PRN Constipation  hydrocortisone 1% Ointment 1 Application(s) Topical three times a day PRN Rash and/or Itching  lidocaine   4% Patch 1 Patch Transdermal daily PRN left shoulder/trap  melatonin 3 milliGRAM(s) Oral at bedtime PRN Insomnia  OLANZapine Injectable 2.5 milliGRAM(s) IntraMuscular every 6 hours PRN agitation      __________________ Pertinent Labs__________________   06-05 Na133 mmol/L<L> Glu 183 mg/dL<H> K+ 3.9 mmol/L Cr  3.67 mg/dL<H> BUN 56 mg/dL<H> 06-05 Phos 5.4 mg/dL<H> 05-30 Alb 3.2 g/dL<L>    Estimated Needs:   [ ] no change since previous assessment  [X] recalculated: IBW 78kg given Pt with pressure injuries / HD tx.  30-35kcal/ow=[id number]  1.25-1.5gm/kg=98-117gm protein    Previous Nutrition Diagnosis:  Increased Nutrient Needs    Nutrition Diagnosis is [X] ongoing  [ ] resolved [ ] not applicable     New Nutrition Diagnosis: n/a      Monitoring and Evaluation:     [ x ] Tolerance to diet prescription / adequacy of meal intake  [ x ] Weight trends   [ x ] Pertinent labs    Recommendations:  1) Diet / Supplement Changes:  Continue diet and nutritional supplements as ordered.  2) Obtain and record current weights to best monitor for acute changes in nutritional status.  3) Please consistently document % meal intake in nursing flowsheets.      Teresa Sotelo, MS, RDN, CDN  Pager [phone]  Also available on MS Teams Reason for Follow-Up Assessment: Follow-Up    Patient transferred from . Chart reviewed.  (6/5 Hospitalist attending) --- 62 year old male with hx of CVA (WC bound, quadriparesis), COPD, CAD (AICD), ESRD (T/R/S), HTN, HLD, seizure d/o, a-fib (s/p ablation, eliquis), neurogenic bladder (SPC), BARRY, hypothyroidism, RCC (s/p partial nephrectomy), CHF, prostate CA (s/p radiation) presents from [place] for agitation. Patient previously noted w. fever, patient with C. auris in urine, Sacral decubitus ulcer with erosion of the coccyx, concerning for osteomyelitis.  Patient receiving IV ABTx. ID following patient.     Patient also with agitation, noted to intermittently refuse labs, tele and HD at times.  Patient also noted to be uncooperative with answering questions. Chart reviewed, Patient with variable PO intake per flowsheets, assisted with feeding PRN.  Weights with inconsistencies. noted bedscale issues per weight record in nursing flowsheets; on 6/5, it was noted that bedscale not calibrated and therefore, weight cannot be obtained. At time of last nutrition f/u 5/19, ProSource No Carb 2 packets daily recommended to help w/ wound healing efforts, provides 120kcal/30gm protein/day if 100% consumed. Nutrition also optimized with Nepro 1x daily (425 kcals, 19.1g protein).  No noted GI distress i.e. nausea, vomiting, diarrhea. Spoke with RN for collateral who reported patient eating without issue and tolerating prescribed diet.    Source: [ ] Patient [ ] Family [ X ] RN [ X ] Chart      Diet, Renal Restrictions:   For patients receiving Renal Replacement - No Protein Restr, No Conc K, No Conc Phos, Low Sodium  No Carb Prosource (1pkg = 15gms Protein)     Qty per Day:  2  Supplement Feeding Modality:  Oral  Nepro Cans or Servings Per Day:  1       Frequency:  Daily (05-20-23 @ 11:07)    GI: Fecal incontinence noted Last BM noted on [ 6/2 ]    PO intake:  [ ] Poor < 50%  [ ] Fair 50-75% [ ] Good  % [X] Inconsistent/variable PO intake    Enteral /Parenteral Nutrition:       [X] n/a    Weight Hx:  73.5 / 74.5kg (6/1)      86.6 kg (5/12)      81.6 kg (4/28)  ? true weight loss vs. weight discrepancy from bedscale errors/malfunction vs. fluid related from HD    Edema: Generalized 1+ edema noted, previously as much as generalized 2+    Pressure Injuries: Sacral and Rt heel pressure injuries noted - see nursing flowsheets    _______________ Pertinent Medications_______________  MEDICATIONS  (STANDING):  apixaban 5 milliGRAM(s) Oral two times a day  ascorbic acid 500 milliGRAM(s) Oral daily  atorvastatin 40 milliGRAM(s) Oral at bedtime  baclofen 5 milliGRAM(s) Oral every 12 hours  calamine/zinc oxide Lotion 1 Application(s) Topical three times a day  chlorhexidine 2% Cloths 1 Application(s) Topical daily  clonazePAM  Tablet 0.5 milliGRAM(s) Oral <User Schedule>  clonazePAM  Tablet 0.75 milliGRAM(s) Oral <User Schedule>  diphenhydrAMINE Injectable 25 milliGRAM(s) IV Push <User Schedule>  levETIRAcetam 250 milliGRAM(s) Oral <User Schedule>  levETIRAcetam 500 milliGRAM(s) Oral daily  levothyroxine 100 MICROGram(s) Oral daily  loratadine 10 milliGRAM(s) Oral daily  meropenem  IVPB 500 milliGRAM(s) IV Intermittent every 24 hours  meropenem  IVPB      metoprolol succinate ER 50 milliGRAM(s) Oral daily  midodrine. 10 milliGRAM(s) Oral three times a day  multivitamin 1 Tablet(s) Oral daily  Nephro-mirna 1 Tablet(s) Oral daily  polyethylene glycol 3350 17 Gram(s) Oral two times a day  senna 2 Tablet(s) Oral at bedtime    MEDICATIONS  (PRN):  albuterol/ipratropium for Nebulization 3 milliLiter(s) Nebulizer every 6 hours PRN Shortness of Breath and/or Wheezing  bisacodyl Suppository 10 milliGRAM(s) Rectal daily PRN Constipation  hydrocortisone 1% Ointment 1 Application(s) Topical three times a day PRN Rash and/or Itching  lidocaine   4% Patch 1 Patch Transdermal daily PRN left shoulder/trap  melatonin 3 milliGRAM(s) Oral at bedtime PRN Insomnia  OLANZapine Injectable 2.5 milliGRAM(s) IntraMuscular every 6 hours PRN agitation      __________________ Pertinent Labs__________________   06-05 Na133 mmol/L<L> Glu 183 mg/dL<H> K+ 3.9 mmol/L Cr  3.67 mg/dL<H> BUN 56 mg/dL<H> 06-05 Phos 5.4 mg/dL<H> 05-30 Alb 3.2 g/dL<L>    Estimated Needs:   [ ] no change since previous assessment  [X] recalculated: IBW 78kg given Pt with pressure injuries / HD tx.  30-35kcal/nt=[id number]  1.25-1.5gm/kg=98-117gm protein    Previous Nutrition Diagnosis:  Increased Nutrient Needs    Nutrition Diagnosis is [X] ongoing  [ ] resolved [ ] not applicable     New Nutrition Diagnosis: n/a      Monitoring and Evaluation:     [ x ] Tolerance to diet prescription / adequacy of meal intake  [ x ] Weight trends   [ x ] Pertinent labs    Recommendations:  1) Diet / Supplement Changes:  Continue diet and nutritional supplements as ordered.  2) Obtain and record current weights to best monitor for acute changes in nutritional status.  3) Please consistently document % meal intake in nursing flowsheets.      Teresa Sotelo, MS, RDN, CDN  Pager [phone]  Also available on MS Teams Reason for Follow-Up Assessment: Follow-Up    Patient transferred from . Chart reviewed.  (6/5 Hospitalist attending) --- 62 year old male with hx of CVA (WC bound, quadriparesis), COPD, CAD (AICD), ESRD (T/R/S), HTN, HLD, seizure d/o, a-fib (s/p ablation, eliquis), neurogenic bladder (SPC), BARRY, hypothyroidism, RCC (s/p partial nephrectomy), CHF, prostate CA (s/p radiation) presents from [place] for agitation. Patient previously noted w. fever, patient with C. auris in urine, Sacral decubitus ulcer with erosion of the coccyx, concerning for osteomyelitis.  Patient receiving IV ABTx. ID following patient.     Patient also with agitation, noted to intermittently refuse labs, tele and HD at times.  Patient also noted to be uncooperative with answering questions. Chart reviewed, Patient with variable PO intake per flowsheets, assisted with feeding PRN.  Weights with inconsistencies. noted bedscale issues per weight record in nursing flowsheets; on 6/5, it was noted that bedscale not calibrated and therefore, weight cannot be obtained. At time of last nutrition f/u 5/19, ProSource No Carb 2 packets daily recommended to help w/ wound healing efforts, provides 120kcal/30gm protein/day if 100% consumed. Nutrition also optimized with Nepro 1x daily (425 kcals, 19.1g protein).  No noted GI distress i.e. nausea, vomiting, diarrhea. Spoke with RN for collateral who reported patient eating without issue and tolerating prescribed diet.    Source: [ ] Patient [ ] Family [ X ] RN [ X ] Chart      Diet, Renal Restrictions:   For patients receiving Renal Replacement - No Protein Restr, No Conc K, No Conc Phos, Low Sodium  No Carb Prosource (1pkg = 15gms Protein)     Qty per Day:  2  Supplement Feeding Modality:  Oral  Nepro Cans or Servings Per Day:  1       Frequency:  Daily (05-20-23 @ 11:07)    GI: Fecal incontinence noted Last BM noted on [ 6/2 ]    PO intake:  [ ] Poor < 50%  [ ] Fair 50-75% [ ] Good  % [X] Inconsistent/variable PO intake    Enteral /Parenteral Nutrition:       [X] n/a    Weight Hx:  73.5 / 74.5kg (6/1)      86.6 kg (5/12)      81.6 kg (4/28)  ? true weight loss vs. weight discrepancy from bedscale errors/malfunction vs. fluid related from HD    Edema: Generalized 1+ edema noted, previously as much as generalized 2+    Pressure Injuries: Sacral and Rt heel pressure injuries noted - see nursing flowsheets    _______________ Pertinent Medications_______________  MEDICATIONS  (STANDING):  apixaban 5 milliGRAM(s) Oral two times a day  ascorbic acid 500 milliGRAM(s) Oral daily  atorvastatin 40 milliGRAM(s) Oral at bedtime  baclofen 5 milliGRAM(s) Oral every 12 hours  calamine/zinc oxide Lotion 1 Application(s) Topical three times a day  chlorhexidine 2% Cloths 1 Application(s) Topical daily  clonazePAM  Tablet 0.5 milliGRAM(s) Oral <User Schedule>  clonazePAM  Tablet 0.75 milliGRAM(s) Oral <User Schedule>  diphenhydrAMINE Injectable 25 milliGRAM(s) IV Push <User Schedule>  levETIRAcetam 250 milliGRAM(s) Oral <User Schedule>  levETIRAcetam 500 milliGRAM(s) Oral daily  levothyroxine 100 MICROGram(s) Oral daily  loratadine 10 milliGRAM(s) Oral daily  meropenem  IVPB 500 milliGRAM(s) IV Intermittent every 24 hours  meropenem  IVPB      metoprolol succinate ER 50 milliGRAM(s) Oral daily  midodrine. 10 milliGRAM(s) Oral three times a day  multivitamin 1 Tablet(s) Oral daily  Nephro-mirna 1 Tablet(s) Oral daily  polyethylene glycol 3350 17 Gram(s) Oral two times a day  senna 2 Tablet(s) Oral at bedtime    MEDICATIONS  (PRN):  albuterol/ipratropium for Nebulization 3 milliLiter(s) Nebulizer every 6 hours PRN Shortness of Breath and/or Wheezing  bisacodyl Suppository 10 milliGRAM(s) Rectal daily PRN Constipation  hydrocortisone 1% Ointment 1 Application(s) Topical three times a day PRN Rash and/or Itching  lidocaine   4% Patch 1 Patch Transdermal daily PRN left shoulder/trap  melatonin 3 milliGRAM(s) Oral at bedtime PRN Insomnia  OLANZapine Injectable 2.5 milliGRAM(s) IntraMuscular every 6 hours PRN agitation      __________________ Pertinent Labs__________________   06-05 Na133 mmol/L<L> Glu 183 mg/dL<H> K+ 3.9 mmol/L Cr  3.67 mg/dL<H> BUN 56 mg/dL<H> 06-05 Phos 5.4 mg/dL<H> 05-30 Alb 3.2 g/dL<L>    Estimated Needs:   [ ] no change since previous assessment  [X] recalculated: IBW 78kg given Pt with pressure injuries / HD tx.  30-35kcal/hf=[id number]  1.25-1.5gm/kg=98-117gm protein    Previous Nutrition Diagnosis:  Increased Nutrient Needs    Nutrition Diagnosis is [X] ongoing  [ ] resolved [ ] not applicable     New Nutrition Diagnosis: n/a      Diet Education:  [  ] Given (date / education provided)  [  ] Given on previous assessment by RD  [ X ] Not applicable at this time  [  ] Patient deferred / refused   [  ] Not applicable due to current medical status / prognosis     Monitoring and Evaluation:     [ x ] Tolerance to diet prescription / adequacy of meal intake  [ x ] Weight trends   [ x ] Pertinent labs    Recommendations:  1) Diet / Supplement Changes:  Continue diet and nutritional supplements as ordered.  2) Obtain and record current weights to best monitor for acute changes in nutritional status.  3) Please consistently document % meal intake in nursing flowsheets.      Teresa Sotelo, MS, RDN, CDN  Pager [phone]  Also available on MS Teams

## 2023-06-06 NOTE — PROGRESS NOTE ADULT - SUBJECTIVE AND OBJECTIVE BOX
Dr. Keila Greenfield  Pager [phone]    PROGRESS NOTE:     Patient is a 62y old  Male who presents with a chief complaint of combative behavior (05 Jun 2023 18:22)      SUBJECTIVE / OVERNIGHT EVENTS: pt denies chest pain or sob   ADDITIONAL REVIEW OF SYSTEMS: afebrile     MEDICATIONS  (STANDING):  apixaban 5 milliGRAM(s) Oral two times a day  ascorbic acid 500 milliGRAM(s) Oral daily  atorvastatin 40 milliGRAM(s) Oral at bedtime  baclofen 5 milliGRAM(s) Oral every 12 hours  calamine/zinc oxide Lotion 1 Application(s) Topical three times a day  chlorhexidine 2% Cloths 1 Application(s) Topical daily  clonazePAM  Tablet 0.5 milliGRAM(s) Oral <User Schedule>  clonazePAM  Tablet 0.75 milliGRAM(s) Oral <User Schedule>  diphenhydrAMINE Injectable 25 milliGRAM(s) IV Push <User Schedule>  levETIRAcetam 500 milliGRAM(s) Oral daily  levETIRAcetam 250 milliGRAM(s) Oral <User Schedule>  levothyroxine 100 MICROGram(s) Oral daily  loratadine 10 milliGRAM(s) Oral daily  meropenem  IVPB 500 milliGRAM(s) IV Intermittent every 24 hours  meropenem  IVPB      metoprolol succinate ER 50 milliGRAM(s) Oral daily  midodrine. 10 milliGRAM(s) Oral three times a day  multivitamin 1 Tablet(s) Oral daily  Nephro-mirna 1 Tablet(s) Oral daily  polyethylene glycol 3350 17 Gram(s) Oral two times a day  senna 2 Tablet(s) Oral at bedtime    MEDICATIONS  (PRN):  albuterol/ipratropium for Nebulization 3 milliLiter(s) Nebulizer every 6 hours PRN Shortness of Breath and/or Wheezing  bisacodyl Suppository 10 milliGRAM(s) Rectal daily PRN Constipation  hydrocortisone 1% Ointment 1 Application(s) Topical three times a day PRN Rash and/or Itching  lidocaine   4% Patch 1 Patch Transdermal daily PRN left shoulder/trap  melatonin 3 milliGRAM(s) Oral at bedtime PRN Insomnia  OLANZapine Injectable 2.5 milliGRAM(s) IntraMuscular every 6 hours PRN agitation      CAPILLARY BLOOD GLUCOSE        I&O's Summary    05 Jun 2023 07:01  -  06 Jun 2023 07:00  --------------------------------------------------------  IN: 400 mL / OUT: 1400 mL / NET: -1000 mL        PHYSICAL EXAM:  Vital Signs Last 24 Hrs  T(C): 36.4 (06 Jun 2023 08:40), Max: 37.1 (06 Jun 2023 02:15)  T(F): 97.6 (06 Jun 2023 08:40), Max: 98.7 (06 Jun 2023 02:15)  HR: 84 (06 Jun 2023 08:40) (80 - 96)  BP: 115/90 (06 Jun 2023 08:40) (106/44 - 115/90)  BP(mean): --  RR: 18 (06 Jun 2023 08:40) (18 - 18)  SpO2: 100% (06 Jun 2023 08:40) (100% - 100%)    Parameters below as of 06 Jun 2023 08:40  Patient On (Oxygen Delivery Method): room air        HEAD:  Atraumatic, Normocephalic, MMM,  Neck: RIJ tunneled HD catheter   CHEST/LUNG: No use of accessory muscles, CTAB, breathing non-labored  COR: RR, no mrcg  ABD: Soft, ND/NT, +BS, Suprapubic catheter in place   PSYCH: AAOx3  NEUROLOGY :  alert and oriented to self/place . Able to converse/follow commands   SKIN: multiple wounds on R foot and abrasions noted on feet , sacral decub  EXT: able to move all extremities     LABS:                        9.5    9.22  )-----------( 204      ( 05 Jun 2023 11:30 )             31.0     06-05    133<L>  |  97<L>  |  56<H>  ----------------------------<  183<H>  3.9   |  24  |  3.67<H>    Ca    8.2<L>      05 Jun 2023 11:30  Phos  5.4     06-05  Mg     2.00     06-05          RADIOLOGY & ADDITIONAL TESTS:  Results Reviewed:   Imaging Personally Reviewed:    Electrocardiogram Personally Reviewed:    COORDINATION OF CARE:  Care Discussed with Consultants/Other Providers [Y/N]: lorena Hargrove dc planning after completing 7 day course of meropenem today  Prior or Outpatient Records Reviewed [Y/N]:

## 2023-06-06 NOTE — PROGRESS NOTE ADULT - ASSESSMENT
62 year old male with hx of CVA (WC bound, quadriparesis), COPD, CAD (AICD), ESRD (T/R/S), HTN, HLD, seizure d/o, a-fib (s/p ablation, eliquis), neurogenic bladder (SPC), BARRY, hypothyroidism, RCC (s/p partial nephrectomy), CHF, prostate CA (s/p radiation) presents from [place] for agitation.     admitted for evaluation.

## 2023-06-06 NOTE — PROGRESS NOTE ADULT - PROBLEM SELECTOR PLAN 2
Pt recently discharged from [facility] on 4/27/23. possibly 2/2 malingering as patients admits this was his motive  intermittently refusing labs and refused tele and HD at times   Seen by  team on last admission, reconsulted for this admission       Continue klonopin 0.5 mg q am and 0.75 mg q pm  Avoid IV pain medications   PRN for agitation:   Zyprexa 2.5mg q6hrs Po/IM, may give additional 2.5mg for refractory/severe agitation  avoid ativan PRN to limit benzos - as can worsen delirium/ confusion  ALLERGY listed to haldol - DO NOT GIVE   also recommends the following:   If there is concern for any sabotaging of his care- patient should be placed on 1:1 (is not for SI/SA- this would be if there is concern for malingering /factitious disorder)

## 2023-06-06 NOTE — PROGRESS NOTE ADULT - SUBJECTIVE AND OBJECTIVE BOX
[facility] DIVISION OF KIDNEY DISEASES AND HYPERTENSION -- HEMODIALYSIS NOTE   Nehrology Office [phone]  available on [employer] teams--> Yolanda Kim   --------------------------------------------------------------------------------  Chief Complaint: ESRD/Ongoing hemodialysis requirement  Patient was examined this am at bedside. no complaints   24 hour events/subjective:  s/p HD yesterday. uneventful     ALLERGIES & MEDICATIONS  --------------------------------------------------------------------------------  Allergies    codeine (Rash)  Haldol (Unknown)  Motrin (Rash)  penicillin (Hives; Anaphylaxis)  Toradol (Rash)  contrast media (iodine-based) (Rash)  Tylenol (Hives)  aspirin (Hives)  codeine (Unknown)    Intolerances      Standing Inpatient Medications  apixaban 5 milliGRAM(s) Oral two times a day  ascorbic acid 500 milliGRAM(s) Oral daily  atorvastatin 40 milliGRAM(s) Oral at bedtime  baclofen 5 milliGRAM(s) Oral every 12 hours  calamine/zinc oxide Lotion 1 Application(s) Topical three times a day  chlorhexidine 2% Cloths 1 Application(s) Topical daily  clonazePAM  Tablet 0.75 milliGRAM(s) Oral <User Schedule>  clonazePAM  Tablet 0.5 milliGRAM(s) Oral <User Schedule>  diphenhydrAMINE Injectable 25 milliGRAM(s) IV Push <User Schedule>  levETIRAcetam 250 milliGRAM(s) Oral <User Schedule>  levETIRAcetam 500 milliGRAM(s) Oral daily  levothyroxine 100 MICROGram(s) Oral daily  loratadine 10 milliGRAM(s) Oral daily  meropenem  IVPB      meropenem  IVPB 500 milliGRAM(s) IV Intermittent every 24 hours  metoprolol succinate ER 50 milliGRAM(s) Oral daily  midodrine. 10 milliGRAM(s) Oral three times a day  multivitamin 1 Tablet(s) Oral daily  Nephro-mirna 1 Tablet(s) Oral daily  polyethylene glycol 3350 17 Gram(s) Oral two times a day  senna 2 Tablet(s) Oral at bedtime    PRN Inpatient Medications  albuterol/ipratropium for Nebulization 3 milliLiter(s) Nebulizer every 6 hours PRN  bisacodyl Suppository 10 milliGRAM(s) Rectal daily PRN  hydrocortisone 1% Ointment 1 Application(s) Topical three times a day PRN  lidocaine   4% Patch 1 Patch Transdermal daily PRN  melatonin 3 milliGRAM(s) Oral at bedtime PRN  OLANZapine Injectable 2.5 milliGRAM(s) IntraMuscular every 6 hours PRN      VITALS/PHYSICAL EXAM  --------------------------------------------------------------------------------  T(C): 36.4 (06-06-23 @ 08:40), Max: 37.1 (06-06-23 @ 02:15)  HR: 84 (06-06-23 @ 08:40) (80 - 96)  BP: 115/90 (06-06-23 @ 08:40) (106/44 - 115/90)  RR: 18 (06-06-23 @ 08:40) (18 - 18)  SpO2: 100% (06-06-23 @ 08:40) (100% - 100%)  Wt(kg): --        06-05-23 @ 07:01  -  06-06-23 @ 07:00  --------------------------------------------------------  IN: 400 mL / OUT: 1400 mL / NET: -1000 mL      Physical Exam:  	Gen NAD  	HEENT: no JVD  	Pulm: CTABL  	CV: S1S2,  	Abd: Soft,   	Ext:  +1 edema B/L LE   	Neuro: Awake and alert  	Skin: Warm and dry          Vascular:  RIJ tunneled HD catheter    LABS/STUDIES  --------------------------------------------------------------------------------              9.5    9.22  >-----------<  204      [06-05-23 @ 11:30]              31.0     133  |  97  |  56  ----------------------------<  183      [06-05-23 @ 11:30]  3.9   |  24  |  3.67        Ca     8.2     [06-05-23 @ 11:30]      Mg     2.00     [06-05-23 @ 11:30]      Phos  5.4     [06-05-23 @ 11:30]            Iron 66, TIBC 206, %sat 32      [03-12-23 @ 06:31]  Ferritin 964      [03-12-23 @ 06:31]  HbA1c 6.2      [01-03-20 @ 13:57]  TSH <0.10      [05-11-23 @ 00:25]  Lipid: chol 107, TG 68, HDL 42, LDL --      [04-21-23 @ 16:15]    HBsAg Nonreact      [05-22-23 @ 16:25]  HCV 8.30, Reactive      [05-22-23 @ 16:25]

## 2023-06-06 NOTE — PROGRESS NOTE ADULT - PROVIDER SPECIALTY LIST ADULT
Infectious Disease
Nephrology
Podiatry
Nephrology
Electrophysiology
Hospitalist
Infectious Disease
Nephrology
Nephrology
Podiatry
Podiatry
Infectious Disease
Infectious Disease
Nephrology
Podiatry
Hospitalist
Nephrology
Hospitalist
Nephrology
Hospitalist
Internal Medicine
Hospitalist

## 2023-06-06 NOTE — PROGRESS NOTE ADULT - REASON FOR ADMISSION
combative behavior
agitation
combative behavior

## 2023-06-06 NOTE — PROGRESS NOTE ADULT - PROBLEM SELECTOR PLAN 1
Noted to have fever on 5/27 : 100.9F. Now T ~99. -> s/p Vanc 1000mg x 1  Hx of MRSA in UC in March 2023,  BCx - negative, initial UCx- >3 organisms and repeat Urine Cx w/ C.auris likely contaminant and colonization as per ID but will still keep on contact precautions-> did have two doses of Fluconazole   heel ulcer unlikely osteo as per ID  fever, lethargy, unclear source but had vomiting and abd pain with a suprapubic cath and dysuria  Likely source UTI?   CT A/P ordered and shows Moderate to large rectal stool burden. Trace bilateral pleural effusions and small pericardial effusion. Sacral decubitus ulcer with erosion of the coccyx, concerning for   osteomyelitis.    -  Started on IV meropenem and ID Recommends completing 10 day course through 6/6  - Podiatry eval for Heel wound and recs include:  Z- FLOWS boots at all time when in bed.   No culture obtained as no active signs of infection. recommend wound care and long term antibiotics.   -ID consulted given patient allergic to pencillin , rocephin and ID recs noted. Recommend:   c/w meropenem 500mg q24hrs (give post-HD in HD days), will complete a 10 day course through 6/6  - follow up repeat Urine Cx- showed C.auris and as per ID it is likely colonization. will hold off on treating it but will continue contact precautions. Repeat UA/urine culture sent to lab which was NOT take from the bag and taken directly. will follow up.  Dispo: PREETI planning back to [place] after completing abx course today, f/u CM/SW

## 2023-06-06 NOTE — PROGRESS NOTE ADULT - ASSESSMENT
62 year old male with hx of CVA (WC bound, quadriparesis), COPD, CAD (AICD), ESRD (T/R/S), HTN, HLD, seizure d/o, a-fib (s/p ablation, eliquis), neurogenic bladder (SPC), BARRY, hypothyroidism, RCC (s/p partial nephrectomy), CHF, prostate CA (s/p radiation) presents from [place] for agitation. issues with Low BP preventing Hd at times,    Assessment and Plan: Pt with ESRD on HD TIW     Problem/Plan- ESRD on hemodialysis.  s/p HD yesterday.   itching during Hd. we are using Cellentia dialyzer as it is the most hypoallergenic.  added antihistamine before Hd. plan for HD tomorrow with UF as able   Problem/Plan- Hypotension. continue Midodrine to 10 mg with hold parameters. Na modeling during HD  Problem/Plan - Anemia secondary to renal failure.  Hgb down again to <10.  will resume old VIBHA  check iron studies . will order       Problem/Plan - chronic kidney disease-mineral and bone disorder.    Calcium is lower<8.4. keep on  calcium bath to 3.5.     Phosphorus controlled.  on antibiotics per ID   dose meds per eGFR<10

## 2023-06-06 NOTE — PROVIDER CONTACT NOTE (OTHER) - ASSESSMENT
Patients electronic blood pressure 82/48, rechecked to 85/68 and then manual BP 80/64. Patient temp axillary 97.9, HR 83, O2 100. Pt denies SOB, dizziness or any distress. Provider contacted at [phone].

## 2023-06-06 NOTE — PROGRESS NOTE ADULT - PROBLEM SELECTOR PROBLEM 10
HTN (hypertension)
HTN (hypertension)
Hypothyroidism
HTN (hypertension)
HTN (hypertension)
Hypothyroidism
Hypothyroidism
HTN (hypertension)
Hypothyroidism
HTN (hypertension)
HTN (hypertension)
Hypothyroidism
Hypothyroidism
HTN (hypertension)
Hypothyroidism
HTN (hypertension)
Hypothyroidism
Hypothyroidism
HTN (hypertension)
Hypothyroidism
HTN (hypertension)
Hypothyroidism

## 2023-06-07 NOTE — DISCHARGE NOTE FOR THE EXPIRED PATIENT - OTHER SIGNIFICANT FINDINGS
Code blue was called, On arrival patient with return of spontaneous circulation with approximately 30seconds of compressions. BP difficult to obtain, SpO2 sat difficult to obtain. Mental status was regained, and patient reported difficulty breathing. HR sustaining 130s irregular and then developed bradycardia with loss of pulse. Chest compressions started. Code blue called again overhead. No labs obtained yesterday for review. Overnight BPs noted to be low on chart check. 6 rounds of CPR were performed. At second pulse check, rhythm noted to be v fib, patient defibrillated at 200J. Compressions restarted. Perfusion palpated during compressions. At additional 3 rounds of pulse checks patient with rhythm but without pulses consistent with PEA. Anesthesia intubated during code with 7.5 ETT, 22cm at the lip. In total patient received 6 rounds of CPR, 5 pushes of epinephrine and 2 amps of bicarb. Patient pronounced  at 748AM on 23.   Code blue was called, On arrival patient with return of spontaneous circulation with approximately 30seconds of compressions. BP difficult to obtain, SpO2 sat difficult to obtain. Mental status was regained, and patient reported difficulty breathing. HR sustaining 130s irregular and then developed bradycardia with loss of pulse. Chest compressions started. Code blue called again overhead. No labs obtained yesterday for review. Overnight BPs noted to be low on chart check. 6 rounds of CPR were performed. At second pulse check, rhythm noted to be v fib, patient defibrillated at 200J. Compressions restarted. Perfusion palpated during compressions. At additional 3 rounds of pulse checks patient with rhythm but without pulses consistent with PEA. Anesthesia intubated during code with 7.5 ETT, 22cm at the lip. In total patient received 6 rounds of CPR, 5 pushes of epinephrine and 2 amps of bicarb. Patient pronounced  at 748AM on 23. Dr. Greenfield informed the passing of patient to Mr. Derick Navas, his next of kin, though he says he's close friend, not relative.

## 2023-06-07 NOTE — DISCHARGE NOTE FOR THE EXPIRED PATIENT - SECONDARY DIAGNOSIS.
Combative behavior COPD (chronic obstructive pulmonary disease) CAD (coronary artery disease) Anemia Hypothyroidism Seizure disorder Atrial fibrillation Neurogenic bladder HTN (hypertension) Hyperlipidemia

## 2023-06-07 NOTE — DISCHARGE NOTE FOR THE EXPIRED PATIENT - HOSPITAL COURSE
62M PMH of HTN, CVA, seizures, neurogenic bladder, COPD, hypothyroidism, ESRD on HD (MWF) presented from [facility] for aggressive behavior towards others and self injurious activity. Pt began kicking a stretcher and open foot ulcers. Pt arrived with gauzed wrapped feet that are saturated by blood.  Pt had fever on 5/27 : 100.9F. Now T ~99. -> s/p Vanc 1000mg x 1  Hx of MRSA in UC in March 2023,  BCx - negative, initial UCx- >3 organisms and repeat Urine Cx w/ C.auris likely contaminant and colonization as per ID but will still keep on contact precautions-> did have two doses of Fluconazole   heel ulcer unlikely osteo as per ID  fever, lethargy, unclear source but had vomiting and abd pain with a suprapubic cath and dysuria  Likely source UTI?   CT A/P ordered and shows Moderate to large rectal stool burden. Trace bilateral pleural effusions and small pericardial effusion. Sacral decubitus ulcer with erosion of the coccyx, concerning for   osteomyelitis.  Started on IV meropenem and ID Recommends completing 10 day course through 6/6   Podiatry eval for Heel wound and recs include:  Z- FLOWS boots at all time when in bed.   No culture obtained as no active signs of infection. recommend wound care and long term antibiotics.   -ID consulted given patient allergic to pencillin , rocephin and ID recs noted. Recommend:   c/w meropenem 500mg q24hrs (give post-HD in HD days), will complete a 10 day course through 6/6  - follow up repeat Urine Cx- showed C.auris and as per ID it is likely colonization. will hold off on treating it but will continue contact precautions. Repeat UA/urine culture sent to lab which was NOT take from the bag and taken directly.   Pt recently discharged from [facility] on 4/27/23. possibly 2/2 malingering as patients admits this was his motive  intermittently refusing labs and refused tele and HD at times   Seen by  team on last admission, reconsulted for this admission .Continue klonopin 0.5 mg q am and 0.75 mg q pm  Avoid IV pain medications   PRN for agitation:   Zyprexa 2.5mg q6hrs Po/IM, may give additional 2.5mg for refractory/severe agitation  avoid ativan PRN to limit benzos - as can worsen delirium/ confusion  ALLERGY listed to haldol - DO NOT GIVE   also recommends the following:   If there is concern for any sabotaging of his care- patient should be placed on 1:1 (is not for SI/SA- this would be if there is concern for malingering /factitious disorder).  Atrial fibrillation- complicated RVR and hypotension  Transferred to telemetry floor-EP consulted, suspect due  underlying cause. Also may be hypovolemic  Continue Metoprolol, IVFs prn. Monitor volume status   -no signs sepsis, cx negative d/yumiko abx, so dc telemetry since pt keeps refusing it. no events noted on tele.   Anemia. Hb 7.4, likely nephrogenic, no signs bleeding.  s/p 1 U PRBC 5/17, appropriate response. Hgb now 10.5 and stable  likely 2/2 ESRD    ESRD on dialysis.   ·  Plan: Pt on HD MWF   continue HD, c/w home sevelamer (monitor phos levels)  - Midodrine prn on HD days  - Uremia improving.    Problem: Neurogenic bladder.   ·  Plan: s/p Suprapubic cath  c/w home baclofen.   COPD (chronic obstructive pulmonary disease).   duoneb prn  monitor spO2.  Hyperlipidemia and CAD  c.w home atorvastatin.  · Hypothyroidism.    c/w home Levothyroxine 100mcg QD.   HTN (hypertension).   c/w home Imdur and metoprolol  DASH diet   #multiple wounds on R foot/ toes  seen by podiatry on previous admission - no role for surgical intervention  wound care following; recs appreciated however, pt refusing wound care and states he doesn't care.  Seizure disorder, continue with home Keppra, seizure precautions, aspiration precautions.   Neurogenic bladder.    s/p Suprapubic cath  c/w home baclofen.

## 2023-06-07 NOTE — PROVIDER CONTACT NOTE (OTHER) - NAME OF MD/NP/PA/DO NOTIFIED:
ACP David Alonzo
ASAD Geiger
ASAD Marlow
Cele luo NP
ACP Ame Santillan via TEAMS and in person
Jeanette Turk
MIKE Reyna
Nephrologist Belgica Alcaraz
Shekhar Turk
Uriel Magana ACP
Concepcion Longoria
ISMA Gonzalez NP
Khadijah Gonzalez
Nephrologist Arie Celestin
Shekhar Galdamez
ASAD Gonzalez
ASAD Roque
Abimbola Marlow
Dr Bogdan Montgomery
Dr. Bland
Pattie Cutler
ACP David Alonzo
Belgica Alcaraz MD
Shekhar Turk
Shekhar Turk
Tele [facility] Uriel Magana
ASAD Cutler
ASAD Norris
Khadijah Gonzalez
Sam Rick
[facility] [id number]
ASAD Cutler
MIKE Mendoza
Sam Rick
Tele ASAD Norris

## 2023-06-07 NOTE — PROVIDER CONTACT NOTE (OTHER) - DATE AND TIME:
05-Jun-2023 04:03
06-Jun-2023 21:30
29-May-2023 09:55
08-May-2023 16:30
08-May-2023 21:20
09-May-2023 06:58
10-May-2023 05:25
13-May-2023 06:00
28-Apr-2023 05:39
29-May-2023 21:30
30-May-2023 08:59
01-May-2023 07:16
11-May-2023 04:23
28-Apr-2023 06:46
28-Apr-2023 10:00
03-May-2023 18:00
11-May-2023 03:15
06-Jun-2023 23:11
09-May-2023 14:36
12-May-2023 05:00
17-May-2023 14:50
18-May-2023 19:03
19-May-2023 05:55
02-May-2023 16:50
05-May-2023 19:15
10-May-2023 23:25
14-May-2023 09:17
20-May-2023 19:19
29-May-2023 06:20
30-May-2023 23:45
31-May-2023 06:56
31-May-2023 11:14
04-May-2023 19:26
11-May-2023 02:23
11-May-2023 18:50
17-May-2023 14:00

## 2023-06-07 NOTE — DISCHARGE NOTE FOR THE EXPIRED PATIENT - REASON FOR ADMISSION
62 year old male with hx of CVA (WC bound, quadriparesis), COPD, CAD (AICD), ESRD (T/R/S), HTN, HLD, seizure d/o, a-fib (s/p ablation, eliquis), neurogenic bladder (SPC), BARRY, hypothyroidism, RCC (s/p partial nephrectomy), CHF, prostate CA (s/p radiation) presents from [place] for agitation.

## 2023-06-07 NOTE — PROVIDER CONTACT NOTE (OTHER) - ACTION/TREATMENT ORDERED:
ACP made aware. Verbal orders received to attempt in one hour. Will continue to monitor.
Provider notified and came to bedside to assess patient, retook VS in an hour when pt settled in as per provider instructions, awaiting further orders at this time
Provider notified, educated patient on risks of not removing/placing new IV and on risks of not getting labs drawn. Will continue to re-attempt and encourage compliance. Awaiting further orders.
no intervention at this time
As per Dr. Lindo, will reschedule HD
Ativan administerd by Primary RN as per order. Nephrologist MD Sargent notified of status.
Provider notified, 250 NS bolus to be given,pt educated on risks of not getting labs drawn and on risks of refusing telemetry monitoring,  awaiting further orders from provider at this time.
Provider notified, educated patient on risks of refusing to go down to get CT of abd/pelvis and of refusing telemetry monitoring, will continue to encourage use of telemetry monitor.
ACP come to speak with patient at the bedside.
ACP notified
As per ACP Concepcion, okay to bring patient back to unit if not able to dialyze pt due to behavior
As per Nephrologist Arie, okay to use dialysis order from today for tomorrow 5/9
Provider Aware; patient is admitted and admitting team will consult with patient. No current interventions needed.
no intervention at this time
ACP notifed. New orders placed for IV placement and Midodrine PO ordered. ACP assessed pt at bedside. Pt tachycardic at time, Rapid response called. See rapid response sheet.
ACP notified.
COntinue plan of care
Continue to monitor VSq4. Continue to encourage compliance with tele monitoring.
No interventions ordered at this time.
Patient educated on risks of refusal of vital signs. Provider made aware.
Treatment terminated Dr Mcfadden aware  Refused post treatment vital signs Baylee MCKEON   Report given to Gregorio
ACP Denice notified. As per ACP, recheck BP in 30-45 mins, prior to transfer off unit and administer Metoprolol if SBP>90.
ACP notified. No new orders.
Continue to educate patient on importance of tele monitoring.
Provider notified and ordered fluids 1190
Shekhar Turk notified and assessed pt at bedside. IVF 250cc, Pt placed on bedside cardiac monitoring. Pt remained hypotensive and tachycardic. RRT called. IVF 500cc given. Pt transferred to 7N.
consent obtained.  patient willing to get blood transfusion after getting his scheduled dose of clonazepam, blood transfusion started.
Continue to monitor and educate pt and reassess later
Dialysis treatment reschedule for 5/4/23.
EKG VS awaiting provider assessment.
Pt refusing treatment ,Nephrologist aware of patient`s refusal. Transfer back to unit in stable condition.
Shekhar Turk notified. Order for midodrine placed and administered.
Provider notified. Pt. educated on importance of labs and vitals

## 2023-06-07 NOTE — RAPID RESPONSE TEAM SUMMARY - NSADDTLFINDINGSRRT_GEN_ALL_CORE
On arrival patient with return of spontaneous circulation with approximately 30seconds of compressions. BP difficult to obtain, SpO2 sat difficult to obtain. Mental status was regained, and patient reported difficulty breathing. HR sustaining 130s irregular and then developed bradycardia with loss of pulse. Chest compressions started. Code blue called again overhead. No labs obtained yesterday for review. Overnight BPs noted to be low on chart check. 6 rounds of CPR were performed. At second pulse check, rhythm noted to be v fib, patient defibrillated at 200J. Compressions restarted. Pulses palpated during compressions. At additional 3 rounds of pulse checks patient with rhythm but without pulses consistent with PEA. Anesthesia intubated during code with 7.5 ETT, 22cm at the lip. In total patient received 6 rounds of CPR, 5 pushes of epinephrine and 2 amps of bicarb. Patient pronounced  at 748AM on 23. On arrival patient with return of spontaneous circulation with approximately 30seconds of compressions. BP difficult to obtain, SpO2 sat difficult to obtain. Mental status was regained, and patient reported difficulty breathing. HR sustaining 130s irregular and then developed bradycardia with loss of pulse. Chest compressions started. Code blue called again overhead. No labs obtained yesterday for review. Overnight BPs noted to be low on chart check. 6 rounds of CPR were performed. At second pulse check, rhythm noted to be v fib, patient defibrillated at 200J. Compressions restarted. Perfusion palpated during compressions. At additional 3 rounds of pulse checks patient with rhythm but without pulses consistent with PEA. Anesthesia intubated during code with 7.5 ETT, 22cm at the lip. In total patient received 6 rounds of CPR, 5 pushes of epinephrine and 2 amps of bicarb. Patient pronounced  at 748AM on 23.

## 2023-06-07 NOTE — PROVIDER CONTACT NOTE (OTHER) - BACKGROUND
Admitted for Encounter for general adult  medical examination w/o abnormal findings.  Present from [facility] for aggressive behavior towards
Admitted for aggression towards EMS at [facility]
Noted to be in Afib with RVR after aggressive behavior noted at nursing home.
Patient admitted for aggressive behavior. Pt has pmh of ESRD.
Patient admitted for aggressive behavior. Pt has pmh of ESRD.
Patient has refused labs during this hospital admission
Pt admitted with behavioral issues and foot ulcers. PMH HepC HTN, renal cell carcinoma of L kidney, COPD, Nephrolithiasis, CKD
62M PMH of HTN, CVA, seizures, neurogenic bladder, COPD, hypothyroidism, ESRD on HD (MWF) presented from [facility] for aggressive behavior towards others and self injurious activity.
63 yo male with PMH of HTN, CVA, seizures, neurogenic bladder, COPD, ESRD.
Admitted for agitation and opened foot ulcers.
Pt known ESRD
Pt. admitted with wound
Admitted for Encounter for general adult medical examination w/o abnormal findings
Admitted for aggression towards EMS at [facility]
Pt admitted with ESRD, PMH CKD, HD, bladder cancer, CHF, COPD
61 yo male with PMH of HTN, CVA, seizures, neurogenic bladder, COPD, ESRD
Admitted for Encounter for general adult medical examination without abnormal findings
Admitted for aggression towards EMS at [facility]
Dx: Agitation
ESRD
Patient admitted for aggressive behavior from [place]. Pt has pmh of ESRD
Pt. admitting dx, "encounter for general adult medical examination without abnormal findings." PMH of CVA, seizures neurogenic bladder, ESRD, and COPD
cont. ---off the machine". When asked if he can wait for the provider he agrees. Provider
encounter for general adult medical examination without abnormal findings
Admitted for Encounter for general adult medical examination without abnormal finding
PMH HTN CVA Seizures COPD ESRD on HD
63 y/o M w/ PMHx of HTN, A.Fib, CVA, seizures, neurogenic bladder, COPD, hypothyroidism, ESRD on HD. Pt presented from [facility] for aggressive behavior towards others and self injurious activity.
Patient aggressive and does not cooperate.
Pt admitted with ESRD, PMH CKD, HD, bladder cancer, CHF, COPD
61 yo male with PMH of HTN, CVA, seizures, neurogenic bladder, COPD, ESRD
encounter for general adult medical examination without abnormal findings

## 2023-06-07 NOTE — PROVIDER CONTACT NOTE (OTHER) - SITUATION
Patients BP 80/64
Pt BP 80/85 
blood transfusion started late
Pt BP 78/56 
Pt refused AM medications despite education provided. ACP Carlos made aware.
Pt refused AM vital signs, AM medications, and AM blood draw despite education provided. Pt continues to refuse and mentioned he wants to be left alone.
Pt was asking Benadryl IVP prior to HD treatment,referred to provider and ordered PO Benadryl.  Pt adamant in getting the IV and refusing to start HD unless he gets the meds.
Patient is hypotensive with manual blood pressure of 82/50 and is bradycardic with heart rate of 50. Pt also refusing morning labs, a new IV and the telemetry monitoring.
Patient is in afib RVR on telemetry monitor
Patient refusing Q4 vital signs
Pt is refusing VS to be taken as ordered, AM labs to be drawn, and to take 6 AM meds despite education.
Pt refusing labs. Refusing new IV. Refusing Tele and . Provider informed. Provider informed of bp 85/45 HR 53 after bolus. Plan for HD today. Will continue to monitor.
Patient screaming upset about clothes not being brought to him from [facility]. Patient verbaly aggressive at staff and disrupting other patients. Patient states " I want to come
Patient still refusing new IV, patient also refusing AM labs
Pt BP 98/46 
Pt refusing tele monitoring.
As per report from day RN, patient requested "comfort O2".
Patient is refusing AM labs this morning
Patient refusing to go down for CT of abd/pelvis at this time. Patient also repeatedly refusing telemetry monitor and throwing it on the floor
Patient wants to leave AMA
patient with no IV access
After 40 minutes on the machine pt insisted on coming off Pt received 25mg of IV Benadryl twice at treatment onset Pt c/o itch refuses any other treatment for itch
Patient refused all of his morning medications.
Patient refusing EKG to be done.
Patient stating he feels anxious and scared and wants ativan
Patient unable to receive dialysis due to behavior
Patient unable to receive dialysis due to behavior
Pt VS abnormal, tachycardic and hypotensive at this time. Pt also refusing tele monitoring
Pt manual BP 72/50
Pt. refusing his morning STAT lab order and his vitals q4 check at 0915
Pt. refusing labs and blood culture
Patient had 45 mins of dialysis treatment and refused to finish
Patient on scheduled for dialysis and refusing treatment
Pt refusing solumedrol IV prior to CT. Will only take benadryl at this time, despite education.
patient c/o chest pain radiating to left arm

## 2023-06-07 NOTE — RAPID RESPONSE TEAM SUMMARY - NSSITUATIONBACKGROUNDRRT_GEN_ALL_CORE
Patient is a 62M PMH of HTN, CVA, seizures, neurogenic bladder, COPD, hypothyroidism, ESRD on HD (MWF) presented from [facility] for aggressive behavior towards others and self injurious activity. Admitted for agitation and evaluation of missed dialysis while inpatient 2/2 pt refusing.   RRT called for hypotension to SBP 60-70. Patient had HD earlier today (unknown how much fluid removed), BP WNL after. Midodrine had just been given by primary team. Patient's BP improved to SBP  at start of RRT, however, HR 130s-140s. Axillary temperature 98, patient refusing rectal. POCT glucose 160. EKG obtained, sinus tachycardia, no evidence of ischemia. Concern for hypotension and tachycardia 2/2 sepsis (uptrending white count) vs fluid removal at HD. Given 500cc fluid (as patient is ESRD). Also given meropenem x1 (patient with penicillin allergy- anaphylaxis. Unsure if ever received cephalosporin, none seen on chart review). CBC. BMP, VBG w/ lytes, blood cultures and UA collected. Patient endorsing allergy to tylenol and Motrin so unable to give. BP remained stable throughout RRT. 
62 year old male with hx of CVA (WC bound, quadriparesis), COPD, CAD (AICD), ESRD (T/R/S), HTN, HLD, seizure d/o, a-fib (s/p ablation, eliquis), neurogenic bladder (SPC), BARRY, hypothyroidism, RCC (s/p partial nephrectomy), CHF, prostate CA (s/p radiation) presents from [place] for combative behavior. Patient was discharged from [facility] on 4/27/23 and then started kicking his feet in the ambulance. Patient was combative with staff upon arrival and sent back to [facility]. Pending placement at facility.   RRT called for change in mental status. Upon arrival at RRT, patient yelling. Per nurse, was unarousable, however, soon returned to his baseline mental status. Patient was also kicking the end of the bed and caused significant amount of bleeding in foot. Patient with ESRD on HD, has not had HD In 3 days (refusing) and refusing blood work. Discussed with patient the importance of checking CBC given blood loss from foot, as well as checking electrolytes ISO missed HD. Patient still refusing. Per prior notes, patient with capacity to refuse interventions. All vitals WNL, patient at baseline mental status. RRT ended. 
See prior RRT note for background.   Repeat RRT called for hypotension to SBP 70s. Prior to RRT, patient received additional 5mg midodrine and 250cc IVF. Upon arrival to RRT, SBP 90, repeat . Remained tachycardic (sinus) to 130s. Mentating well. Additional 500cc IVF given. Continued to refuse rectal temperature, also refusing tylenol even if given with benedryl to prevent allergic reaction. Refusing ice packs. Has capacity. Concern for sepsis given lactate of 3, increase in WBC to 13.98. BP remained stable, RRT ended. 
62 year old male with hx of CVA (WC bound, quadriparesis), COPD, CAD (AICD), ESRD (T/R/S), HTN, HLD, seizure d/o, a-fib (s/p ablation, eliquis), neurogenic bladder (SPC), BARRY, hypothyroidism, RCC (s/p partial nephrectomy), CHF, prostate CA (s/p radiation) originally presented for agitation for [facility]. Admitted since April 28. Course c/b candida auris from suprapubic catheter. Code blue called overhead today.

## 2023-06-07 NOTE — PROVIDER CONTACT NOTE (OTHER) - REASON
Patient refused morning medications
Patient refusing to go down for CT abd/pelvis and refusing telemetry and 
Patient still refusing new IV and morning labs
Pt BP 98/46 
Pt is refusing VS, AM labs, and 6 AM meds.
Pt refusal of AM medications
Refusal of HD treatment
Refusal of treatment
Refusing dialysis treatment
VS/Pt refusing tele monitoring
patient with no IV access
BP 80/64
Patient refusing Q4 vital signs
Pt refusal of AM VS, AM medications, and AM blood draw.
oxygen order
Chest Pain
Patient in afib RVR
Pt  manual BP 72/50
Pt BP 78/56 
Pt refuses tele monitoring
Pt. refusing morning labs and vitals q4 check
Refusing labs
Transportation to Rehab (Update)
blood transfusion started late
Patient had 45 mins of dialysis treatment and refused to finish
Patient unable to receive dialysis due to behavior
Patient unable to receive dialysis due to behavior
Patient wants to leave AMA
Pt. refusing labs and blood culture
Pt BP 80/85 
Pt refusing solumedrol IV prior to CT, despite education.
Agitation
Patient refusing EKG to be done.
Patient stating he feels anxious and scared and wants ativan
Pt hypotensive and bradycardic
Refusing AM labs

## 2023-06-07 NOTE — PROVIDER CONTACT NOTE (OTHER) - RECOMMENDATIONS
ACP notified. No new orders.
Continue to monitor and educate pt and reassess later
EKG VS
no new order for IV Benadryl.
ACP notified.
Continue to educate patient on importance of tele monitoring.
ACP come to speak with patient at the bedside.
ACP notified
Notify provider and attempt at a later time
Notify provider, give NS bolus, educate patient on risks of not getting labs drawn and on risks of refusing new iv and telemetry monitoring, await further orders at this time.
Provider notified and came to bedside to assess patient, retook VS in an hour when pt settled in as per provider instructions to possibly administer metropolol, awaiting further orders
obtain consent and give 1 time dose of ativan
1 time dose of PO ativan
Ativan ordered for patient. agitation persisted. Provider NP Cele then assessed patient at bedside and ordered more ativan for agitation. Patient now appears to be sleeping in bed tolerating HD
Consult patient about behavior and reschedule dialysis
Patient was sent back to the floor and re-schedule treatment.
Reschedule dialysis for tomorrow
terminate treatment
Notify provider, educate patient on risks of refusing to go down to get CT of abd/pelvis and of refusing telemetry monitoring, will continue to encourage use of telemetry monitor.
Provider notified.
Reschedule HD
Continue to monitor VSq4. Continue to encourage compliance with tele monitoring.
Provider aware.
Provider notified, educated patient on risks of not removing/placing new IV and on risks of not getting labs drawn. Will continue to re-attempt and encourage compliance. Awaiting further orders.
call IV RN to place IV access tomorrow

## 2023-06-08 NOTE — H&P ADULT - NSICDXPASTMEDICALHX_GEN_ALL_CORE_FT
PAST MEDICAL HISTORY:  AICD (automatic cardioverter/defibrillator) present [employer]    Atrial fibrillation     Bladder cancer     CAD (coronary artery disease) (s/p 2 stents in Sep 2017)    Chronic congestive heart failure, unspecified congestive heart failure type rEF/pEF    COPD (chronic obstructive pulmonary disease)     Hep C w/o coma, chronic     HTN (hypertension)     Hyperlipidemia     Kidney stone     Nephrolithiasis     Peripheral neuropathy     Prostate cancer s/p radiation    Renal cell carcinoma of left kidney s/p partial nephrectomy in 2002  biopsy again in Sep 2017 showed RCC again in stage 2
Quality 226: Preventive Care And Screening: Tobacco Use: Screening And Cessation Intervention: Patient screened for tobacco use and is an ex/non-smoker
Quality 130: Documentation Of Current Medications In The Medical Record: Current Medications Documented
Detail Level: Detailed

## 2023-06-20 LAB
CULTURE RESULTS: SIGNIFICANT CHANGE UP
CULTURE RESULTS: SIGNIFICANT CHANGE UP
ORGANISM # SPEC MICROSCOPIC CNT: SIGNIFICANT CHANGE UP
ORGANISM # SPEC MICROSCOPIC CNT: SIGNIFICANT CHANGE UP
SPECIMEN SOURCE: SIGNIFICANT CHANGE UP

## 2023-07-13 NOTE — ED PROVIDER NOTE - DATE/TIME 1
Cryotherapy Text: The wound bed was treated with cryotherapy after the biopsy was performed. 15-Apr-2019 21:45

## 2023-07-17 RX ORDER — SEVELAMER CARBONATE 2400 MG/1
2 POWDER, FOR SUSPENSION ORAL
Qty: 0 | Refills: 0 | DISCHARGE

## 2023-07-17 RX ORDER — METOPROLOL TARTRATE 50 MG
1 TABLET ORAL
Qty: 0 | Refills: 0 | DISCHARGE

## 2023-07-17 RX ORDER — TAMSULOSIN HYDROCHLORIDE 0.4 MG/1
1 CAPSULE ORAL
Qty: 0 | Refills: 0 | DISCHARGE

## 2023-07-17 RX ORDER — CLONAZEPAM 1 MG
1 TABLET ORAL
Qty: 0 | Refills: 0 | DISCHARGE

## 2023-07-17 RX ORDER — DIPHENHYDRAMINE HCL 50 MG
2 CAPSULE ORAL
Refills: 0 | DISCHARGE

## 2023-07-17 RX ORDER — ONDANSETRON 8 MG/1
1 TABLET, FILM COATED ORAL
Refills: 0 | DISCHARGE

## 2023-07-17 RX ORDER — ASCORBIC ACID 60 MG
1 TABLET,CHEWABLE ORAL
Qty: 0 | Refills: 0 | DISCHARGE

## 2023-07-17 RX ORDER — APIXABAN 2.5 MG/1
1 TABLET, FILM COATED ORAL
Qty: 0 | Refills: 0 | DISCHARGE

## 2023-07-17 RX ORDER — POLYETHYLENE GLYCOL 3350 17 G/17G
17 POWDER, FOR SOLUTION ORAL
Refills: 0 | DISCHARGE

## 2023-07-17 RX ORDER — BACILLUS COAGULANS/INULIN 1B-250 MG
1 CAPSULE ORAL
Refills: 0 | DISCHARGE

## 2023-07-17 RX ORDER — LEVOTHYROXINE SODIUM 125 MCG
1 TABLET ORAL
Qty: 0 | Refills: 0 | DISCHARGE

## 2023-07-17 RX ORDER — MUPIROCIN 20 MG/G
1 OINTMENT TOPICAL
Qty: 0 | Refills: 0 | DISCHARGE

## 2023-07-17 RX ORDER — BACLOFEN 100 %
1 POWDER (GRAM) MISCELLANEOUS
Qty: 0 | Refills: 0 | DISCHARGE

## 2023-07-17 RX ORDER — OMEPRAZOLE 10 MG/1
1 CAPSULE, DELAYED RELEASE ORAL
Qty: 0 | Refills: 0 | DISCHARGE

## 2023-07-17 RX ORDER — ATORVASTATIN CALCIUM 80 MG/1
1 TABLET, FILM COATED ORAL
Qty: 0 | Refills: 0 | DISCHARGE

## 2023-07-17 RX ORDER — ASCORBIC ACID 60 MG
1 TABLET,CHEWABLE ORAL
Refills: 0 | DISCHARGE

## 2023-07-17 RX ORDER — LEVETIRACETAM 250 MG/1
1 TABLET, FILM COATED ORAL
Qty: 0 | Refills: 0 | DISCHARGE

## 2023-07-17 RX ORDER — HALOBETASOL PROPIONATE 0.5 MG/G
1 OINTMENT TOPICAL
Qty: 0 | Refills: 0 | DISCHARGE

## 2023-07-17 RX ORDER — LANOLIN ALCOHOL/MO/W.PET/CERES
1 CREAM (GRAM) TOPICAL
Qty: 0 | Refills: 0 | DISCHARGE

## 2023-07-17 RX ORDER — LEVETIRACETAM 250 MG/1
1 TABLET, FILM COATED ORAL
Refills: 0 | DISCHARGE

## 2023-07-17 RX ORDER — SENNA PLUS 8.6 MG/1
2 TABLET ORAL
Refills: 0 | DISCHARGE

## 2023-07-21 NOTE — PROGRESS NOTE ADULT - PROBLEM SELECTOR PROBLEM 8
Subjective     Patient ID: Jeana Muhammad is a 76 y.o. female.    Chief Complaint: Hyperparathyroidism    [facility] 3/21/2022 endocrine clinic note: 74-year-old female scheduled today as new patient referral to endocrine clinic history of hyperparathyroidism. history of hypercalcemia, hyperparathyroidism and osteopenia. Patient's most recent labs 10/5/2021 calcium level 10.7, corrected calcium 10.4, albumin level 4.4, PTH 96.8 on 10/05/2021.  Patient started with hypercalcemia 11/22/2019 previously patient had low calcium levels and normal calcium levels.  Patient reports she was previously on calcium replacement which was stopped by her primary care provider she is unsure when may have been a couple months ago.  Patient denies a previous history of a kidney stone which was over 10 years ago.  She denies any recent fractures.  Patient had a bone density 7/22/2020 IMPRESSION:  Normal mineralization is noted within the spine, not significantly changed.  Osteopenia is noted in the right hip, not significantly changed. Osteopenia is noted within the left hip with mild worsening in bone density.  Patient denies any palpable nodules to the neck she denies any muscle cramps or dystonia.     Endocrine Clinic Note:  07/25/2022: 74-year-old female scheduled today for endocrine clinic follow-up.  History of hyperparathyroidism with hypercalcemia vitamin-D deficiency.  Workup for hyperparathyroidism again.  Initially Calcium 10.7 albumin 4.4., PTH 96.8 on 10/05/2021 Patient's vitamin-D was increased repeat labs 03/24/2022 PTH decreased to 82, calcium 10.1.  In vitamin-D 35. Since patient was increased to vitamin D3 2000 IU per day patient was to be taking 1 tablet per day but states she takes 3 per day equaling 6000 IU per day.  Instructed patient will repeat a vitamin-D level today ensure that her vitamin-D level is not too high.  Patient had ultrasound of the thyroid 06/01/2022 no parathyroid adenoma was seen.  Patient had NM  parathyroid of on 07/15/2022 parathyroid adenoma was seen.  Patient denies any palpable masses.  She denies any muscle cramps or dystonia.  Patient denies any fractures or kidney stones no previous visit.      Endocrine clinic note 07/21/2023:  76-year-old female scheduled today for endocrine clinic follow-up.  History of hyperparathyroidism with hypercalcemia and vitamin-D deficiency.  Recent calcium level increased Calcium 10.8 on 03/14/2023, PTH 96.8 on 10/05/2021  Repeat levels on vitamin D 03/24/2022  PTH decreased to 82, calcium 10.1, Ultrasound of thyroid 06/01/2022 no adenoma seen  NM parathyroid 06/01/2022 no adenoma seen.  Previously on patient's visit she denied any history of kidney stones.  Patient was recently treated in April of 2023 for a 9 mm left kidney stone.  Patient's elevated calcium level and kidney stone we will check CT 4D parathyroid continue workup.  Patient is asymptomatic she denies any muscle tingling or dystonia.  Vitamin-D deficiency vitamin-D corrected to 45 on 04/10/2023.  Osteopenia Bone Density 7/22/2020 IMPRESSION:  Normal mineralization is noted within the spine, not significantly changed.  Osteopenia is noted in the right hip, not significantly changed. Osteopenia is noted within the left hip with mild worsening in bone density.  Patient denies any fractures since her previous visit will Repeat 2 year bone density.             Narrative & Impression  EXAMINATION:  US THYROID     CLINICAL HISTORY:  , Hyperparathyroidism, unspecified.     TECHNIQUE:  Multiple transverse and sagittal grayscale sonographic images were acquired through the thyroid gland.     FINDINGS:  Grayscale imaging color flow Doppler images are available for interpretation.     Examination reveals right hemithyroid to measured 3.8 x 1.9 x 1.4 cm, left ginger thyroid measured 3.5 x 8 mm x 1.2 cm isthmus measures 2 mm in thickness.     There is slight irregularity of the posterior contour of the thyroid on the  right with a small isoechoic nodule that measures 1.3 x 1.1 x 1.1 cm these  might be related to the parathyroid other imaging modalities might prove helpful for further assessment.     Small subcentimeter nodule identified in the left ginger thyroid measuring 5 mm x 5 mm by 6 mm     Impression:     Slight irregularity of the posterior aspect of the right ginger thyroid with a isoechoic nodule that measures 1.3 x 1.1 x 1.1 cm these might be related to the parathyroid other imaging modalities might prove helpful for further evaluation.     Subcentimeter nodule in the left ginger thyroid does not meet criteria for biopsy        Electronically signed by: Sid Dorantes  Date:                                            06/01/2022  Time:                                           14:20              Exam Ended: 06/01/22 14:12 Last Resulted: 06/01/22 14:20           Narrative & Impression  EXAMINATION:  NM PARATHYROID SCAN PLANAR     CLINICAL HISTORY:  Hyperparathyroidism, unspecified;     TECHNIQUE:  Intravenous administration of 24.2 mCi Tc-99m labeled sestamibi was performed uneventfully.  Planar and SPECT scintigraphic images of the neck were subsequently obtained immediately and 2 hours after radiotracer injection.     COMPARISON:  None available at the time of initial interpretation.     FINDINGS:  There is normal physiologic distribution of radiotracer within the salivary and thyroid glands on the initial imaging.     No focus of abnormal radiotracer activity is appreciated on the initial images.  There are no persistent or otherwise suspicious areas of uptake on the delayed acquisitions.  No unexpected regional radiotracer uptake is identified to raise suspicion of possible ectopic parathyroid or thyroid tissue.        Impression:        No scintigraphic evidence of abnormal parathyroid activity.        Electronically signed by: Delano Ferrer  Date:                                            07/15/2022  Time:                                            12:19              Exam Ended: 07/15/22 12:03 Last Resulted: 07/15/22 12:19      Result Notes  Details      Reading Physician Reading Date Result Priority  Sid Dorantes MD  [phone] 4/11/2023 Routine    Narrative & Impression  EXAMINATION:  XR ABDOMEN AP OBLIQUE AND CONE     CLINICAL HISTORY:  pre lithotripsy;     COMPARISON:  None     FINDINGS:  Examination reveals some residual feces throughout the colon gas pattern is nonspecific with no clear evidence of ileus or obstruction some calcified densities are identified projecting over the expected contour of the left kidney largest measuring approximately 9 mm likely representing a calculus no other definite abnormalities identified     Impression:     Nephrolithiasis        Electronically signed by: Sid Dorantes  Date:                                            04/11/2023  Time:                                           14:03        Exam Ended: 04/11/23 12:49 Last Resulted: 04/11/23 14:03            Review of Systems   Constitutional:  Negative for activity change, appetite change and fatigue.   HENT:  Negative for dental problem, hearing loss, tinnitus, trouble swallowing and goiter.    Eyes:  Negative for photophobia, pain and visual disturbance.   Respiratory:  Negative for cough, chest tightness and wheezing.    Cardiovascular:  Negative for chest pain, palpitations and leg swelling.   Gastrointestinal:  Negative for abdominal pain, constipation, diarrhea, nausea and reflux.   Endocrine: Negative for cold intolerance, heat intolerance, polydipsia and polyphagia.   Genitourinary:  Negative for difficulty urinating, flank pain, hematuria, hot flashes, menstrual irregularity, menstrual problem, nocturia and urgency.   Musculoskeletal:  Negative for back pain, gait problem, joint swelling, leg pain and joint deformity.   Integumentary:  Negative for color change, pallor, rash and breast discharge.   Allergic/Immunologic:  Negative for environmental allergies, food allergies and immunocompromised state.   Neurological:  Negative for tremors, seizures, headaches, coordination difficulties, memory loss and coordination difficulties.   Psychiatric/Behavioral:  Negative for agitation, behavioral problems and sleep disturbance. The patient is not nervous/anxious.         Objective     Physical Exam  Constitutional:       General: She is not in acute distress.     Appearance: Normal appearance. She is not ill-appearing.   HENT:      Head: Normocephalic and atraumatic.      Right Ear: External ear normal.      Left Ear: External ear normal.      Nose: Nose normal. No congestion or rhinorrhea.      Mouth/Throat:      Mouth: Mucous membranes are moist.      Pharynx: Oropharynx is clear. No oropharyngeal exudate.   Eyes:      General:         Right eye: No discharge.         Left eye: No discharge.      Conjunctiva/sclera: Conjunctivae normal.      Pupils: Pupils are equal, round, and reactive to light.   Neck:      Thyroid: No thyroid mass, thyromegaly or thyroid tenderness.   Cardiovascular:      Rate and Rhythm: Normal rate and regular rhythm.      Pulses: Normal pulses.      Heart sounds: Normal heart sounds. No murmur heard.  Pulmonary:      Effort: Pulmonary effort is normal. No respiratory distress.      Breath sounds: Normal breath sounds.   Abdominal:      General: Abdomen is flat. Bowel sounds are normal. There is no distension.      Palpations: Abdomen is soft.      Tenderness: There is no abdominal tenderness.   Musculoskeletal:         General: No swelling or tenderness. Normal range of motion.      Cervical back: Normal range of motion and neck supple. No tenderness.      Right lower leg: No edema.      Left lower leg: No edema.   Feet:      Right foot:      Skin integrity: Skin integrity normal.      Left foot:      Skin integrity: Skin integrity normal.   Lymphadenopathy:      Cervical: No cervical adenopathy.   Skin:      General: Skin is warm and dry.      Coloration: Skin is not jaundiced or pale.   Neurological:      General: No focal deficit present.      Mental Status: She is alert and oriented to person, place, and time. Mental status is at baseline.      Coordination: Coordination normal.      Gait: Gait normal.   Psychiatric:         Mood and Affect: Mood normal.         Behavior: Behavior normal.         Thought Content: Thought content normal.          Assessment and Plan     1. Hyperparathyroidism  Calcium 10.8 on 03/14/2023    PTH 96.8 on 10/05/2021  Repeat levels on vitamin D 03/24/2022  PTH decreased to 82, calcium 10.1  Ultrasound of thyroid 06/01/2022 no adenoma seen  NM parathyroid 06/01/2022 no adenoma seen  Repeat PTH, renal panel vitamin-D level today  CT 4D parathyroid ordered  Return to clinic in 6 months             Component Ref Range & Units 4 mo ago  (3/14/23) 5 mo ago  (2/10/23) 3 yr ago  (3/10/20) 4 yr ago  (7/20/19) 4 yr ago  (7/19/19) 4 yr ago  (6/28/19) 4 yr ago  (5/20/19)   Sodium Level 136 - 145 mmol/L 144  142  141  143  143  140  139    Potassium Level 3.5 - 5.1 mmol/L 4.4  4.2  4.1  3.5  4.2  4.3  4.3    Chloride 98 - 107 mmol/L 105  107  104  113 High   113 High   106  106    Carbon Dioxide 23 - 31 mmol/L 28  25  31.0 R  25.0 R  25.0 R  30.0 R  29.0 R    Glucose Level 82 - 115 mg/dL 110  93  90 R  107 High  R  112 High  R  94 R  90 R    Blood Urea Nitrogen 9.8 - 20.1 mg/dL 9.8  9.5 Low   9.0 R  12.0 R  12.0 R  12.0 R  17.0 R    Creatinine 0.55 - 1.02 mg/dL 0.73  0.65  0.57  0.52 Low   0.56  0.63  0.48 Low     Calcium Level Total 8.4 - 10.2 mg/dL 10.8 High   10.2  10.5 High  R  8.4 Low  R  7.8 Low  R  10.0 R  9.9 R       -     Renal Function Panel; Future; Expected date: 07/21/2023  -     Vitamin D; Future; Expected date: 07/21/2023  -     PTH, Intact; Future; Expected date: 07/21/2023  -     DXA Bone Density Appendicular Skeleton; Future; Expected date: 07/21/2023  -     CT Neck Parathyroid (4D);  Seizure disorder Future; Expected date: 07/22/2023  -     Magnesium; Future; Expected date: 07/21/2023    2. Hypercalcemia  See Above   -     Renal Function Panel; Future; Expected date: 07/21/2023    3. Vitamin D deficiency  Vit D level 45 on 02/10/2023   On Vit D 3 2000 IU (pt takes 3 a day)   Component Ref Range & Units 5 mo ago   Vit D 25 OH 30.0 - 80.0 ng/mL 45.0    -     Vitamin D; Future; Expected date: 07/21/2023      4. Osteopenia, unspecified location  Bone Density 7/22/2020 IMPRESSION:  Normal mineralization is noted within the spine, not significantly changed.  Osteopenia is noted in the right hip, not significantly changed. Osteopenia is noted within the left hip with mild worsening in bone density.   Repeat 2 year bone density   -     DXA Bone Density Appendicular Skeleton; Future; Expected date: 07/21/2023        -     Renal Function Panel; Future; Expected date: 07/21/2023         -     CT Neck Parathyroid (4D); Future; Expected date: 07/22/2023    5. Kidney stone  Recent with removal   April 2023 9 mm left stone     Follow up in about 6 months (around 1/21/2024) for Hyperparathyroidism, osteopenia .    I spent a total of 30 minutes on the day of the visit.  This includes face to face time and non-face to face time preparing to see the patient (eg, review of tests), obtaining and/or reviewing separately obtained history, documenting clinical information in the electronic or other health record, independently interpreting results and communicating results to the patient/family/caregiver, or care coordinator.

## 2023-08-26 NOTE — ED ADULT NURSE NOTE - NS ED PATIENT SAFETY CONCERN
ED Provider Note    CHIEF COMPLAINT  Chief Complaint   Patient presents with    Abdominal Pain     The pt reports lower right abd pain that started 10pm, suppen onset. The pain is stabby, 10/10, radiates to back. The pt reports N/V and pain upon inspiration.        EXTERNAL RECORDS REVIEWED  Inpatient Notes patient was admitted to the hospital July 29, 2022 for lower GI bleed and has history of duodenal ulcers requiring intervention.  He had EGD done on 07/30/2023 which showed duodenal bulb ulcer and small gastric ulcer.  At that time, GI recommended no NSAIDs    HPI/ROS  LIMITATION TO HISTORY   Select: : None  OUTSIDE HISTORIAN(S):  None    Roberto Braswell is a 41 y.o. male who presents with right lower quadrant abdominal pain that started around 10 PM when he was resting at home.  States that pain came on suddenly.  Pain is sharp.  He had an episode of vomiting last night.  No alleviating or exacerbating factors.  He did not take any pain medication at home.  He has not had any fevers, chills, chest pain, shortness of breath, diarrhea, dysuria, hematuria.  He notes that he has had his gallbladder removed previously but still has his appendix.  He is recently admitted for GI bleed but states that he has had no blood in his stool or melena.    PAST MEDICAL HISTORY   has a past medical history of Arthritis, Drug-seeking behavior (7/30/2023), Duodenal ulcer (7/30/2023), Gastric ulcer, and Pain.    SURGICAL HISTORY   has a past surgical history that includes other orthopedic surgery; other abdominal surgery; ankle arthroscopy (Right, 5/21/2018); biopsy ortho (Right, 5/21/2018); upper gi endoscopy,diagnosis (3/28/2022); upper gi endoscopy,biopsy (3/28/2022); and upper gi endoscopy,biopsy (N/A, 7/30/2023).    FAMILY HISTORY  No family history on file.    SOCIAL HISTORY  Social History     Tobacco Use    Smoking status: Never    Smokeless tobacco: Never   Vaping Use    Vaping Use: Never used   Substance and Sexual  "Activity    Alcohol use: Yes     Comment: occ    Drug use: Never    Sexual activity: Not on file       CURRENT MEDICATIONS  Home Medications       Reviewed by Von Patel R.N. (Registered Nurse) on 08/26/23 at 0152  Med List Status: Partial     Medication Last Dose Status   ASPIRIN-ACETAMINOPHEN-CAFFEINE PO  Active   omeprazole (PRILOSEC) 40 MG delayed-release capsule  Active   sucralfate (CARAFATE) 1 GM/10ML Suspension  Active                    ALLERGIES  Allergies   Allergen Reactions    Tramadol Unspecified     Seizure  [id number]    Morphine Vomiting       PHYSICAL EXAM  VITAL SIGNS: BP (!) 132/105   Pulse 83   Temp 36.4 °C (97.5 °F) (Temporal)   Resp 18   Ht 1.93 m (6' 4\")   Wt 104 kg (230 lb)   SpO2 100%   BMI 28.00 kg/m²      Constitutional: Well developed, Well nourished, Non-toxic appearance.  Holding his abdomen, in mild distress secondary to pain  HEENT: Normocephalic, Atraumatic,  external ears normal, pharynx pink,  Mucous  Membranes moist, No rhinorrhea or mucosal edema  Eyes: PERRL, EOMI, Conjunctiva normal, No discharge.   Neck: Normal range of motion, No tenderness, Supple, No stridor.   Lymphatic: No lymphadenopathy    Cardiovascular: Regular Rate and Rhythm, No murmurs,  rubs, or gallops.   Thorax & Lungs: Lungs clear to auscultation bilaterally, No respiratory distress, No wheezes, rhales or rhonchi, No chest wall tenderness.   Abdomen: Bowel sounds normal, Soft, right lower quadrant tenderness to palpation otherwise abdomen nontender, non distended,  No pulsatile masses., no rebound guarding or peritoneal signs.   Skin: Warm, Dry, No erythema, No rash,   Back:  No CVA tenderness,  No spinal tenderness, bony crepitance step offs or instability.   Extremities: Equal, intact distal pulses, No cyanosis, clubbing or edema,  No tenderness.   Musculoskeletal: Good range of motion in all major joints. No tenderness to palpation or major deformities noted.   Neurologic: Alert & oriented x 3, No " focal deficits noted.   Psychiatric: Affect normal, Judgment normal, Mood normal. No suicidal or homicidal ideation      DIAGNOSTIC STUDIES / PROCEDURES    LABS  Results for orders placed or performed during the hospital encounter of 08/26/23   CBC WITH DIFFERENTIAL   Result Value Ref Range    WBC 4.8 4.8 - 10.8 K/uL    RBC 3.92 (L) 4.70 - 6.10 M/uL    Hemoglobin 10.6 (L) 14.0 - 18.0 g/dL    Hematocrit 34.0 (L) 42.0 - 52.0 %    MCV 86.7 81.4 - 97.8 fL    MCH 27.0 27.0 - 33.0 pg    MCHC 31.2 (L) 32.3 - 36.5 g/dL    RDW 43.0 35.9 - 50.0 fL    Platelet Count 364 164 - 446 K/uL    MPV 8.9 (L) 9.0 - 12.9 fL    Neutrophils-Polys 52.50 44.00 - 72.00 %    Lymphocytes 33.60 22.00 - 41.00 %    Monocytes 9.50 0.00 - 13.40 %    Eosinophils 2.50 0.00 - 6.90 %    Basophils 1.70 0.00 - 1.80 %    Immature Granulocytes 0.20 0.00 - 0.90 %    Nucleated RBC 0.00 0.00 - 0.20 /100 WBC    Neutrophils (Absolute) 2.50 1.82 - 7.42 K/uL    Lymphs (Absolute) 1.60 1.00 - 4.80 K/uL    Monos (Absolute) 0.45 0.00 - 0.85 K/uL    Eos (Absolute) 0.12 0.00 - 0.51 K/uL    Baso (Absolute) 0.08 0.00 - 0.12 K/uL    Immature Granulocytes (abs) 0.01 0.00 - 0.11 K/uL    NRBC (Absolute) 0.00 K/uL   COMP METABOLIC PANEL   Result Value Ref Range    Sodium 143 135 - 145 mmol/L    Potassium 4.2 3.6 - 5.5 mmol/L    Chloride 110 96 - 112 mmol/L    Co2 22 20 - 33 mmol/L    Anion Gap 11.0 7.0 - 16.0    Glucose 102 (H) 65 - 99 mg/dL    Bun 18 8 - 22 mg/dL    Creatinine 1.09 0.50 - 1.40 mg/dL    Calcium 8.6 8.5 - 10.5 mg/dL    Correct Calcium 8.4 (L) 8.5 - 10.5 mg/dL    AST(SGOT) 15 12 - 45 U/L    ALT(SGPT) 11 2 - 50 U/L    Alkaline Phosphatase 41 30 - 99 U/L    Total Bilirubin 0.3 0.1 - 1.5 mg/dL    Albumin 4.3 3.2 - 4.9 g/dL    Total Protein 6.3 6.0 - 8.2 g/dL    Globulin 2.0 1.9 - 3.5 g/dL    A-G Ratio 2.2 g/dL   LIPASE   Result Value Ref Range    Lipase 31 11 - 82 U/L   URINALYSIS    Specimen: Urine   Result Value Ref Range    Color Yellow     Character Clear      Specific Gravity 1.020 <1.035    Ph 6.0 5.0 - 8.0    Glucose Negative Negative mg/dL    Ketones Negative Negative mg/dL    Protein Negative Negative mg/dL    Bilirubin Negative Negative    Urobilinogen, Urine 0.2 Negative    Nitrite Negative Negative    Leukocyte Esterase Negative Negative    Occult Blood Negative Negative    Micro Urine Req see below    ESTIMATED GFR   Result Value Ref Range    GFR (CKD-EPI) 87 >60 mL/min/1.73 m 2         RADIOLOGY  I have independently interpreted the diagnostic imaging associated with this visit and am waiting the final reading from the radiologist.   My preliminary interpretation is as follows: no free air  Radiologist interpretation:   CT-ABDOMEN-PELVIS WITH   Final Result         1.  Hepatomegaly   2.  Small fat-containing left inguinal hernia            COURSE & MEDICAL DECISION MAKING    ED Observation Status? Yes; I am placing the patient in to an observation status due to a diagnostic uncertainty as well as therapeutic intensity. Patient placed in observation status at 2:50 AM, 8/26/2023.     Observation plan is as follows: labs, imaging, serial abdominal exams    4:30 AM patient reevaluated at bedside.  His abdomen was reexamined.  Tenderness has resolved after Dilaudid.  Awaiting CT results.      4:42 AM patient reevaluated bedside.  He is resting comfortably.  Discussed CT results which showed no evidence of appendicitis.  Discussed patient that hepatomegaly as well as fat-containing left inguinal hernia were only findings.  He states that he drinks alcohol here and then.  Discussed that there is no elevation in his LFTs.  Discussed he can follow-up with his primary care doctor should he want repair of the inguinal hernia and they can place a referral to general surgery.  Discussed with patient that we do not know what etiology of his pain is.  He is instructed to monitor symptoms for the next 24 hours or return to the emergency room for reevaluation if pain is  worsening or if he develops any fever or persistent vomiting.  Otherwise he should see his primary care doctor next week for reassessment.  Strict ER return precautions were discussed.  Patient is agreeable to discharge plan and no further questions.    Upon Reevaluation, the patient's condition has: Improved; and will be discharged.    Patient discharged from ED Observation status at 4:50 AM (Time) 08/26/2023 (Date).       INITIAL ASSESSMENT, COURSE AND PLAN  Care Narrative: This is a 41-year-old male who is presenting with right lower quadrant abdominal pain that started last night.  On arrival his vital signs are stable.  Physical exam shows right lower quadrant tenderness to palpation but there are no peritoneal signs.    An IV was established and labs are obtained.  There is no leukocytosis.  He is anemic with hemoglobin of 10.6 and has history of recent GI bleed and his hemoglobin is stable from 3 weeks ago.  He denies any melena or blood in his stool.  His platelet count is normal.  His electrolytes are within normal limits.  His LFTs as well as bilirubin are all within normal limits.  He is status post cholecystectomy therefore ruling out gallbladder etiology.  I considered pancreatitis however lipase is within normal limits.  I considered urinary tract infection however there is no signs of infection on the UA.  CT abdomen was obtained to evaluate for appendicitis.  There is no acute intra-abdominal abnormality such as appendicitis, diverticulitis, intestinal inflammation.  He was incidentally noted to have hepatomegaly as well as fat containing left inguinal hernia.  Patient required Dilaudid for his pain but his pain did resolve.  He was also given Tylenol.  The patient is tolerating p.o.  I did discuss results with the patient.  Given that we do not have etiology for his pain, he will monitor his symptoms and return if pain is persistent or worsening or if he develops any other new or worsening symptoms.   I have asked him to make a follow-up appointment with his primary care doctor next week for reassessment.  Strict ER return precautions were discussed.  Patient is agreeable to discharge plan with no further questions.    HTN/IDDM FOLLOW UP:  The patient is referred to a primary physician for blood pressure management, diabetic screening, and for all other preventive health concerns        DISPOSITION AND DISCUSSIONS  Patient discharged home in stable condition with instructions to follow-up with primary care doctor.  Strict ER return precautions discussed.  Patient is agreeable to discharge plan and no further questions.    FINAL DIAGNOSIS  1. Right lower quadrant abdominal pain         Electronically signed by: Shawna Chou M.D., 8/26/2023 2:50 AM       No

## 2023-09-18 NOTE — ED ADULT NURSE NOTE - MODE OF DISCHARGE
[phone]   Ambulatory

## 2023-09-19 NOTE — ED PROVIDER NOTE - MEDICATIONS Q1 - PARENTERAL NARCOTICS ADMINISTERED FOR MANAGEMENT OF PAIN
Yes Cantharidin Pregnancy And Lactation Text: This medication has not been proven safe during pregnancy. It is unknown if this medication is excreted in breast milk.

## 2023-10-23 NOTE — DISCHARGE NOTE ADULT - PATIENT PORTAL LINK FT
18-Oct-2023 08:09 “You can access the FollowHealth Patient Portal, offered by [facility], by registering with the following website: [email or web address]” no

## 2023-10-23 NOTE — ED ADULT NURSE NOTE - PSH
TO PCP    Sister calling stating that she is going into assisted living at the end of the month and that there was a release of medical records that were sent over. She is wondering if PCP has received it?    Writer advised to call medical records as well.     Sandra Thomason RN on 10/23/2023 at 9:32 AM     AICD (automatic cardioverter/defibrillator) present    H/O partial nephrectomy  2002  H/O transurethral destruction of bladder lesion    History of coronary artery stent placement    History of percutaneous coronary intervention    S/P cholecystectomy  2015

## 2023-11-04 NOTE — ED PROVIDER NOTE - NEUROLOGICAL, MLM
Alert and oriented, no focal deficits, no motor or sensory deficits. Alert and oriented to person, place and time

## 2023-12-14 NOTE — PATIENT PROFILE ADULT. - HARM RISK FACTORS
[Normal] : inferior turbinates and middle turbinates are normal [de-identified] : crusting b/l  no

## 2023-12-16 NOTE — ED PROVIDER NOTE - PMH
AICD (automatic cardioverter/defibrillator) present  [employer]  Atrial fibrillation    Bladder cancer    CAD (coronary artery disease)  (s/p 2 stents in Sep 2017)  Chronic congestive heart failure, unspecified congestive heart failure type  rEF/pEF  COPD (chronic obstructive pulmonary disease)    Hep C w/o coma, chronic    HTN (hypertension)    Hyperlipidemia    Kidney stone    Nephrolithiasis    Peripheral neuropathy    Prostate cancer  s/p radiation  Renal cell carcinoma of left kidney  s/p partial nephrectomy in 2002  biopsy again in Sep 2017 showed RCC again in stage 2 normal

## 2024-01-16 NOTE — PROGRESS NOTE ADULT - PROBLEM/PLAN-2
Specialty Pharmacy     Received refill request for Abbvie Humira PAP -  need to have provider sign     Vicky Rivera  Specialty Pharmacy Technician        
DISPLAY PLAN FREE TEXT

## 2024-04-16 NOTE — H&P ADULT - NSHPLABSRESULTS_GEN_ALL_CORE
SW reviewed the patient's PD with her and the patient signed it.     SW signed the PD and submitted it to the patient's  Roger Jarrett to review and sign.          8.2    11.95 )-----------( 132      ( 23 Mar 2023 14:51 )             26.4     03-23    142  |  104  |  57<H>  ----------------------------<  93  4.1   |  26  |  4.18<H>    Ca    8.9      23 Mar 2023 14:51         Phos  4.3     03-23         Mg     2.1     03-23    TPro  6.8  /  Alb  3.9  /  TBili  0.5  /  DBili  x   /  AST  32  /  ALT  42  /  AlkPhos  323<H>  03-23    Troponin T, High Sensitivity Result: 106: Specimen not hemolyzed (03.23.23 @ 14:51)  Troponin T, High Sensitivity Result: 101: Specimen not hemolyzed (03.23.23 @ 17:44)    Flu With COVID-19 By GEORGE (03.23.23 @ 14:07)    SARS-CoV-2 Result: NotDetec    Influenza A Result: NotDetec    Influenza B Result: NotDetec    Resp Syn Virus Result: NotDetec    Urinalysis + Microscopic Examination (03.23.23 @ 15:53)    Urine Appearance: Slightly Turbid    Urobilinogen: Negative    Specific Gravity: 1.025    Protein, Urine: 300 mg/dL    pH Urine: 7.0    Leukocyte Esterase Concentration: Small    Nitrite: Positive    Ketone - Urine: Negative    Bilirubin: Negative    Color: Other    Glucose Qualitative, Urine: 100 mg/dL    Blood, Urine: Large    Red Blood Cell - Urine: >50 /hpf    White Blood Cell - Urine: 5 /HPF    Epithelial Cells: 0    Hyaline Casts: 0 /LPF    Bacteria: Few    EKG personally reviewed:  Afib 70bpm     Images personally reviewed:     < from: Xray Chest 1 View- PORTABLE-Urgent (Xray Chest 1 View- PORTABLE-Urgent .) (03.23.23 @ 17:35) >  IMPRESSION: Clear lungs.    < from: Xray Tibia + Fibula 2 Views, Right (03.23.23 @ 17:34) >  IMPRESSION:  Soft tissue ulceration over the posterior calcaneus. No radiographic   evidence of osteomyelitis.    Extensive chronic/degenerative changes as above.    < from: CT Head No Cont (03.23.23 @ 16:31) >  IMPRESSION:  No acute intracranial hemorrhage    No acute fracture cervical spine. If pain persists, follow-up MRI exam   recommended.

## 2024-04-26 NOTE — ED ADULT TRIAGE NOTE - CHIEF COMPLAINT QUOTE
Pt.  he got shocked x2 by his defibrillator at 0510 AM. endorses left-sided chest pain radiating to arm with numbness. States he also fell and hit his head yesterday,  was seen at a different hospital after the fall.  PHx CVA, COPD, CAD, AICD on blood thinners. EKG in progress. 2 seconds or less Pt.  he got shocked x2 by his defibrillator at 0510 AM. endorses left-sided chest pain radiating to arm with numbness. States he also fell and hit his head yesterday,  was seen at a different hospital after the fall.  PHx CVA, COPD, CAD, AICD on blood thinners, ESRD on HD M,W,F. FS 65 patient provided with apple juice. EKG in progress Pt. states he got shocked x2 by his defibrillator at 0510 AM. endorses left-sided chest pain radiating to arm with numbness. States he also fell and hit his head yesterday, states was seen at a different hospital after the fall.  PHx CVA, COPD, CAD, AICD on blood thinners, ESRD on HD M,W,F. missed dialysis yesterday. FS 66 patient provided with apple juice. EKG in progress

## 2024-05-28 NOTE — ED PROVIDER NOTE - ALLERGIC/IMMUNOLOGIC NEGATIVE STATEMENT, MLM
[No studies available for review at this time.] : No studies available for review at this time. no dermatitis, no environmental allergies, no food allergies, no immunosuppressive disorder, and no pruritus.

## 2024-06-02 NOTE — DISCHARGE NOTE ADULT - NSCORESITESY/N_GEN_A_CORE_RD
Procedures  Physical Exam  Review of Systems    6/2/202412:38 PM  I have personally seen and examined the patient.  I personally performed the key   components of the encounter and provided a substantive portion of the care and   medical decision making for this patient.     I have reviewed and agree with the PA/NP/Resident's assessment and ED plan of care, with any exceptions as documented below.  My examination, assessment and plan of care of Anna Pruett is as follows:    Patient is a 63-year-old female who presents after a fall with left hip discomfort, patient fell on 27 May, she was mopping floors when she slipped and fell, no significant head injury reported, no loss of consciousness at the time of the fall, patient's not on any blood thinning medications    Exam:   Vital signs have been reviewed, pulse ox is 99% on room air, normal  Heart: RRR, normal S1/S2  Lungs: CTA bilaterally  Abdo: soft, non-tender    Plan/Medical Decision Making: Patient is a 63-year-old female who presents after a fall with left hip discomfort, checking imaging and will reevaluate afterwards      This document was created using Dragon Dictation software. There might be some typographical errors due to this technology.          Godshall, Duane K, MD  06/02/24 [id number]    
Yes

## 2024-06-20 NOTE — DIETITIAN INITIAL EVALUATION ADULT - FACTORS AFF FOOD INTAKE
Detail Level: Generalized
Detail Level: Simple
Detail Level: Zone
Moisturizer Recommendations: OTC Amlactin lotion/cream at least once daily.
none

## 2024-06-20 NOTE — H&P ADULT - NSHPLABSRESULTS_GEN_ALL_CORE
Hemoglobin A1c has worsened  Low sugar diet and exercise discussed at length  Will recheck labs in 3 months    Hemoglobin A1C   Date Value Ref Range Status   06/14/2024 6.4 (H) <5.7 % of total Hgb Final     Comment:     For someone without known diabetes, a hemoglobin   A1c value between 5.7% and 6.4% is consistent with  prediabetes and should be confirmed with a   follow-up test.     For someone with known diabetes, a value <7%  indicates that their diabetes is well controlled. A1c  targets should be individualized based on duration of  diabetes, age, comorbid conditions, and other  considerations.     This assay result is consistent with an increased risk  of diabetes.     Currently, no consensus exists regarding use of  hemoglobin A1c for diagnosis of diabetes for children.             CBC Full  -  ( 11 Feb 2018 04:18 )  WBC Count : 15.47 K/uL  Hemoglobin : 15.2 g/dL  Hematocrit : 45.8 %  Platelet Count - Automated : 183 K/uL  Mean Cell Volume : 87.2 fL  Mean Cell Hemoglobin : 29.0 pg  Mean Cell Hemoglobin Concentration : 33.2 g/dL  Auto Neutrophil # : 13.39 K/uL  Auto Lymphocyte # : 0.98 K/uL  Auto Monocyte # : 0.98 K/uL  Auto Eosinophil # : 0.00 K/uL  Auto Basophil # : 0.01 K/uL  Auto Neutrophil % : 86.6 %  Auto Lymphocyte % : 6.3 %  Auto Monocyte % : 6.3 %  Auto Eosinophil % : 0.0 %  Auto Basophil % : 0.1 %                 136  |  101  |  28<H>  ----------------------------<  109  5.5<H>   |  24  |  1.0    Ca    9.7      11 Feb 2018 04:18    TPro  6.4  /  Alb  4.0  /  TBili  1.5<H>  /  DBili  0.6<H>  /  AST  68<H>  /  ALT  85<H>  /  AlkPhos  200<H>  02-11    CXR - UNREMARKABLE    EKG NSR    ECHO [2017] G2DD    STRESS TEST REDUCED EF 38% CARDIAC MARKERS ( 11 Feb 2018 04:18 )  <0.02 ng/mL / x     / 157 U/L / x     / 13.3 ng/mL    WBC Count : 15.47 K/uL  Hemoglobin : 15.2 g/dL  Hematocrit : 45.8 %  Platelet Count - Automated : 183 K/uL  Mean Cell Volume : 87.2 fL  Mean Cell Hemoglobin : 29.0 pg  Mean Cell Hemoglobin Concentration : 33.2 g/dL  Auto Neutrophil # : 13.39 K/uL  Auto Lymphocyte # : 0.98 K/uL  Auto Monocyte # : 0.98 K/uL  Auto Eosinophil # : 0.00 K/uL  Auto Basophil # : 0.01 K/uL  Auto Neutrophil % : 86.6 %  Auto Lymphocyte % : 6.3 %  Auto Monocyte % : 6.3 %  Auto Eosinophil % : 0.0 %  Auto Basophil % : 0.1 %                 136  |  101  |  28<H>  ----------------------------<  109  5.5<H>   |  24  |  1.0    Ca    9.7      11 Feb 2018 04:18    TPro  6.4  /  Alb  4.0  /  TBili  1.5<H>  /  DBili  0.6<H>  /  AST  68<H>  /  ALT  85<H>  /  AlkPhos  200<H>  02-11    CXR - UNREMARKABLE    Urinalysis (01.11.18 @ 11:56)    Glucose Qualitative, Urine: NEGATIVE    Blood, Urine: Large    pH Urine: 6.0    Color: Yellow    Urine Appearance: Clear    Bilirubin: Negative    Ketone - Urine: NEGATIVE    Specific Gravity: 1.025    Protein, Urine: 100 mg/dL    Urobilinogen: 0.2 E.U./dL    Nitrite: POSITIVE    Leukocyte Esterase Concentration: Trace      Urine Microscopic-Add On (NC) (01.11.18 @ 11:56)    Epithelial Cells: 0-5: Occasional: <3 /HPF  Few: 3-10 /HPF  Mod: 10-30 /HPF  Many: >30 /HPF /HPF    Red Blood Cell - Urine: Many /HPF    Bacteria: Present /HPF    White Blood Cell - Urine: < 5 /HPF    EKG NSR    ECHO [2017] G2DD    STRESS TEST REDUCED EF 38% CARDIAC MARKERS ( 11 Feb 2018 04:18 )  <0.02 ng/mL / x     / 157 U/L / x     / 13.3 ng/mL    WBC Count : 15.47 K/uL  Hemoglobin : 15.2 g/dL  Hematocrit : 45.8 %  Platelet Count - Automated : 183 K/uL  Mean Cell Volume : 87.2 fL  Mean Cell Hemoglobin : 29.0 pg  Mean Cell Hemoglobin Concentration : 33.2 g/dL  Auto Neutrophil # : 13.39 K/uL  Auto Lymphocyte # : 0.98 K/uL  Auto Monocyte # : 0.98 K/uL  Auto Eosinophil # : 0.00 K/uL  Auto Basophil # : 0.01 K/uL  Auto Neutrophil % : 86.6 %  Auto Lymphocyte % : 6.3 %  Auto Monocyte % : 6.3 %  Auto Eosinophil % : 0.0 %  Auto Basophil % : 0.1 %                 136  |  101  |  28<H>  ----------------------------<  109  5.5<H>   |  24  |  1.0    Ca    9.7      11 Feb 2018 04:18    TPro  6.4  /  Alb  4.0  /  TBili  1.5<H>  /  DBili  0.6<H>  /  AST  68<H>  /  ALT  85<H>  /  AlkPhos  200<H>  02-11    CXR - UNREMARKABLE    Urinalysis (01.11.18 @ 11:56)    Glucose Qualitative, Urine: NEGATIVE    Blood, Urine: Large    pH Urine: 6.0    Color: Yellow    Urine Appearance: Clear    Bilirubin: Negative    Ketone - Urine: NEGATIVE    Specific Gravity: 1.025    Protein, Urine: 100 < from: CT Abdomen and Pelvis No Cont (02.11.18 @ 08:00) >    Urobilinogen: 0.2 E.U./dL    Nitrite: POSITIVE    Leukocyte Esterase Concentration: Trace      Urine Microscopic-Add On (NC) (01.11.18 @ 11:56)    Epithelial Cells: 0-5: Occasional: <3 /HPF  Few: 3-10 /HPF  Mod: 10-30 /HPF  Many: >30 /HPF /HPF    Red Blood Cell - Urine: Many /HPF    Bacteria: Present /HPF    White Blood Cell - Urine: < 5 /HPF    EKG NSR    ECHO [2017] G2DD    STRESS TEST REDUCED EF 38%    CT A/P NONCON   Status post cholecystectomy.    A few round CAGES within the pancreas consistent with chronic   pancreatitis.    Colonic diverticulosis.

## 2024-07-08 NOTE — PROGRESS NOTE ADULT - PROBLEM SELECTOR PLAN 8
Patient with complex medical history including HTN, HLD, HFwpEF, CAD, emphysema, GI bleed, and recent prolonged hospital admission for incarcerated inguinal hernia requiring exploratory laparotomy for ischemic bowel.  Now presenting with signs/symptoms of partial small bowel obstruction.  CT scan with evidence of what seems to be an adhesive band with a midabdomen.  No peritonitic signs right now.    - D/c NGT  - Clear liquid diet  - Continue maintenance fluids  - p.r.n. hydralazine  - multimodal pain control  - home meds  - Protonix  - DVT prophylaxis with heparin   c.w Phelps atorvastatin

## 2024-09-26 NOTE — DISCHARGE NOTE ADULT - TOBACCO CESSATION EDUCATION/COUNSELLING(PROVIDED IF TOBACCO USED IN THE PAST 30 DAYS- CORE MEASURE SITES)
[FreeTextEntry1] : If dysuria continues patient will get urinalysis with reflex culture.  Advance diet as tolerated.  Follow-up hematology.  Follow-up with me as needed. Offered and patient declined

## 2024-11-10 NOTE — ED PROVIDER NOTE - DATE/TIME 2
Problem: Chronic Conditions and Co-morbidities  Goal: Patient's chronic conditions and co-morbidity symptoms are monitored and maintained or improved  Outcome: Progressing     Problem: Discharge Planning  Goal: Discharge to home or other facility with appropriate resources  Outcome: Progressing     Problem: Safety - Adult  Goal: Free from fall injury  Outcome: Progressing      11-Feb-2018 08:30

## 2024-12-05 NOTE — ED PROVIDER NOTE - CPE EDP CARDIAC NORM
Lov: 11/25/24  No upcoming appointments    Augmented betamethasone ointment is not available at [facility]  Pt would like this script sen to [facility] [address]       normal...

## 2025-06-17 NOTE — ED PROVIDER NOTE - EYES, MLM
06/17/25 1000   Discharge disposition   Expected discharge disposition subacute   Post Acute Care Provider Nolan Del Valle   Discharge transportation [facility]     The patient received a MDO for discharge.    The patient will be transported to [facility] via [facility] at 1pm.  PCS complete.    The number to call report is [phone].  The room number is .    Social work informed the patient's daughter Deirdre via phone.  RN to inform patient.    SW/CM to remain available for support and/or discharge planning.     Shameka FIGUEROA, LSW  Discharge Planner [id number]     Clear bilaterally, pupils equal, round and reactive to light.

## 2025-08-01 NOTE — ED ADULT TRIAGE NOTE - AS HEIGHT TYPE
MEDICARE WELLNESS VISIT + SOAP NOTE    Patient Care Team:  Hedy Shepherd MD as PCP - General (Internal Medicine)  Geena Gill MD (Internal Medicine - Pulmonary Disease)  Hu Fatima MD (Allergy & Immunology)  Airam Becker MD (Ophthalmology)  Rosalva Martinez APNP as Nurse Practitioner (Nurse Practitioner)  Aroldo Turner MD as Consulting Physician (Cardiovascular Disease)  Natalie Damon MD (Neurology)  [facility] (Dentist - General Practice)    Edith presents for her Subsequent Annual Medicare Wellness Visit with Medicare Wellness Visit (Mwv-sub)   Acute and chronic problems were discussed separately.    Status MWV Topics Details (Refresh Note to Update)    Depression Screening (Trend)   PHQ2 Interpretation Negative          PHQ2: Score = 0      Depression screening is negative no further plan needed.    Blood Pressure  Weight  BMI     /80  Current Weight 139 lbs  body mass index is 26.26 kg/m².  BMI is in overweight range.    Caloric restriction and Low carbohydrate diet         Advanced Directives on File Not on file. Not interested today.      Health Risk Assessment  (Click to Review or Edit)   Completed      Falls Risk  Have you had a fall in the past year?................................. No  Do you feel unsteady when standing or walking?........ No  Do you worry about falling?.................................................. No       Tobacco/Alcohol/Drugs Never Smoker      reports no history of alcohol use.   reports no history of drug use.      Opioid Review (Update Meds)    No opioid on med list.      Cognitive/Functional Status  (Optional MOCA  Mini Cog)   Preexisting cognitive issues - stable.    Vision and Hearing Screens    Not applicable      Health Maintenance (Link)  See patient instructions and orders    Received covid-19 booster today       The following items on the Medicare Health Risk Assessment were found to be positive  1.) Do you have an Advance  directive, living will, or power of  for health care document that contains your wishes for end of life care?: No     3.) During the past 4 weeks, how would you rate your health?: Fair     6 a.) How many servings of Fruits and Vegetables do you have each day ( 1 serving = 1 piece of fruit, 1/2 cup fruits or vegetables): 1 per day     6 b.) How many servings of High Fiber / Whole Grain Foods to you have each day ( 1 serving = 1 cup cold cereal, 1/2 cup cooked cereal, 1 slice bread): 1 per day       I reviewed and updated the past Medical, Surgical, Family, Social History, Allergies and Medications in Epic: Yes    Family History   Problem Relation Age of Onset   • Diabetes Mother    • Heart disease Mother    • Kidney disease Mother    • Hypertension Mother    • Myocardial Infarction Mother    • Diabetes Sister    • Hypertension Sister    • Diabetes Sister    • Diabetes Brother    • Hypertension Brother    • Asthma Son    • Allergic Rhinitis Neg Hx    • Eczema Neg Hx    • Urticaria Neg Hx          SOAP NOTE       Subjective     Lie-Nah is a 62 year old here for Medicare Wellness Visit (Mwv-sub)    The patient presents for a Medicare wellness visit.    She reports persistent intermittent nausea, which she attributes to an episode of foodborne illness in June 2025. This episode was accompanied by diarrhea. Despite undergoing multiple diagnostic evaluations, the etiology of her symptoms remains undetermined. She experiences emesis approximately once per week and frequently awakens with nausea. She denies any changes in memory or symptoms of constipation. She has a scheduled follow-up with her gastroenterologist on September 12, 2025.    Her last mammogram was performed in September 2024, and she is due for repeat laboratory testing in January 2026.    The patient underwent surgical intervention in her right eye due to progressive vision loss. She describes the procedure as involving the placement of an intraocular  gas bubble to address an open space in the posterior segment of the eye, followed by closure. She continues to follow up with her ophthalmologist. Additionally, she had a consultation with her nephrologist on July 10, 2025.    PAST SURGICAL HISTORY:  - Surgical intervention in the right eye.  Review of Systems  As documented above.    SDOH Never Smoker       Objective   Vitals:    08/01/25 0923   BP: 110/80   Pulse: (!) 60   Resp: 14   Weight: 63 kg (139 lb)   Height: 5' 1\" (1.549 m)   BMI (Calculated): 26.26     Physical Exam          Respiratory: Clear to auscultation, no wheezing, rales or rhonchi  Cardiovascular: Regular rate and rhythm, no murmurs, rubs, or gallops     ASSESSMENT AND PLAN        1. Nausea  - Nausea persists, potentially a side effect of current medications  - Reports feeling nauseous and vomiting at least once a week, especially in the mornings  - Advised to contact gastroenterologist Dr. Rosalva Martinez via the [employer] or phone call to discuss persistent nausea and inquire about potential treatments or an earlier appointment than 09/12/2025  - Follow-up with gastroenterologist recommended    2. Health maintenance  - Bone density is up-to-date, and Pap smear is completed  - Recommended to get the RSV vaccine at the pharmacy  - Provided a list of doctors accepting new patients for care establishment after 09/26/2025  - Mammogram recommended for this year  - COVID-19 booster to be administered today  1. Medicare annual wellness visit, subsequent  2. Encounter for screening mammogram for malignant neoplasm of breast  -     MAMMO SCREENING BILATERAL W GOKUL; Future  3. Need for COVID-19 vaccine  -     COVID SPIKEVAX 12+      Return in about 2 months (around 10/1/2025) for Establish care with a new provider.             stated